# Patient Record
Sex: MALE | Race: WHITE | NOT HISPANIC OR LATINO | Employment: OTHER | URBAN - METROPOLITAN AREA
[De-identification: names, ages, dates, MRNs, and addresses within clinical notes are randomized per-mention and may not be internally consistent; named-entity substitution may affect disease eponyms.]

---

## 2017-01-04 ENCOUNTER — TELEPHONE (OUTPATIENT)
Dept: DERMATOLOGY | Facility: CLINIC | Age: 77
End: 2017-01-04

## 2017-01-09 ENCOUNTER — OFFICE VISIT (OUTPATIENT)
Dept: INTERNAL MEDICINE | Facility: CLINIC | Age: 77
End: 2017-01-09
Payer: MEDICARE

## 2017-01-09 VITALS
DIASTOLIC BLOOD PRESSURE: 82 MMHG | SYSTOLIC BLOOD PRESSURE: 130 MMHG | HEART RATE: 83 BPM | WEIGHT: 183.44 LBS | HEIGHT: 68 IN | BODY MASS INDEX: 27.8 KG/M2 | TEMPERATURE: 99 F

## 2017-01-09 DIAGNOSIS — E11.9 TYPE 2 DIABETES MELLITUS WITHOUT COMPLICATION, WITHOUT LONG-TERM CURRENT USE OF INSULIN: Primary | ICD-10-CM

## 2017-01-09 DIAGNOSIS — D61.818 PANCYTOPENIA: ICD-10-CM

## 2017-01-09 DIAGNOSIS — I10 ESSENTIAL HYPERTENSION: ICD-10-CM

## 2017-01-09 DIAGNOSIS — K75.81 LIVER CIRRHOSIS SECONDARY TO NASH (NONALCOHOLIC STEATOHEPATITIS): ICD-10-CM

## 2017-01-09 DIAGNOSIS — F31.9 BIPOLAR 1 DISORDER: ICD-10-CM

## 2017-01-09 DIAGNOSIS — J40 BRONCHITIS: ICD-10-CM

## 2017-01-09 DIAGNOSIS — K74.60 LIVER CIRRHOSIS SECONDARY TO NASH (NONALCOHOLIC STEATOHEPATITIS): ICD-10-CM

## 2017-01-09 PROCEDURE — 99214 OFFICE O/P EST MOD 30 MIN: CPT | Mod: S$GLB,,, | Performed by: INTERNAL MEDICINE

## 2017-01-09 PROCEDURE — 99999 PR PBB SHADOW E&M-EST. PATIENT-LVL III: CPT | Mod: PBBFAC,,, | Performed by: INTERNAL MEDICINE

## 2017-01-09 PROCEDURE — 3075F SYST BP GE 130 - 139MM HG: CPT | Mod: S$GLB,,, | Performed by: INTERNAL MEDICINE

## 2017-01-09 PROCEDURE — 1126F AMNT PAIN NOTED NONE PRSNT: CPT | Mod: S$GLB,,, | Performed by: INTERNAL MEDICINE

## 2017-01-09 PROCEDURE — 1159F MED LIST DOCD IN RCRD: CPT | Mod: S$GLB,,, | Performed by: INTERNAL MEDICINE

## 2017-01-09 PROCEDURE — 1157F ADVNC CARE PLAN IN RCRD: CPT | Mod: S$GLB,,, | Performed by: INTERNAL MEDICINE

## 2017-01-09 PROCEDURE — 3079F DIAST BP 80-89 MM HG: CPT | Mod: S$GLB,,, | Performed by: INTERNAL MEDICINE

## 2017-01-09 PROCEDURE — 1160F RVW MEDS BY RX/DR IN RCRD: CPT | Mod: S$GLB,,, | Performed by: INTERNAL MEDICINE

## 2017-01-09 RX ORDER — BROMPHENIRAMINE MALEATE, PSEUDOEPHEDRINE HYDROCHLORIDE, AND DEXTROMETHORPHAN HYDROBROMIDE 2; 30; 10 MG/5ML; MG/5ML; MG/5ML
SYRUP ORAL
Refills: 0 | COMMUNITY
Start: 2017-01-03 | End: 2017-01-17

## 2017-01-09 RX ORDER — DIVALPROEX SODIUM 250 MG/1
TABLET, DELAYED RELEASE ORAL
COMMUNITY
Start: 2016-11-08 | End: 2017-01-17 | Stop reason: SDUPTHER

## 2017-01-09 RX ORDER — ALBUTEROL SULFATE 90 UG/1
2 AEROSOL, METERED RESPIRATORY (INHALATION) EVERY 6 HOURS PRN
Qty: 18 G | Refills: 0 | Status: SHIPPED | OUTPATIENT
Start: 2017-01-09 | End: 2017-02-21

## 2017-01-09 RX ORDER — PROMETHAZINE HYDROCHLORIDE AND DEXTROMETHORPHAN HYDROBROMIDE 6.25; 15 MG/5ML; MG/5ML
SYRUP ORAL
Refills: 0 | COMMUNITY
Start: 2017-01-04 | End: 2017-02-21

## 2017-01-09 RX ORDER — ARIPIPRAZOLE 5 MG/1
TABLET ORAL
COMMUNITY
Start: 2016-10-06 | End: 2017-01-09

## 2017-01-09 RX ORDER — LEVOFLOXACIN 500 MG/1
500 TABLET, FILM COATED ORAL DAILY
Refills: 0 | COMMUNITY
Start: 2017-01-03 | End: 2017-01-17

## 2017-01-09 RX ORDER — CIPROFLOXACIN 500 MG/1
TABLET ORAL
COMMUNITY
Start: 2017-01-01 | End: 2017-01-17

## 2017-01-09 NOTE — MR AVS SNAPSHOT
[facility] - Internal Medicine  [address]  [place] [address]  Phone: [phone]  Fax: [phone]                  Kenneth Sheikh Jr.   2017 1:00 PM   Office Visit    Description:  Male : 1940   Provider:  Prisca Persaud MD   Department:  [facility] - Internal Medicine           Reason for Visit     Establish Care           Diagnoses this Visit        Comments    Type 2 diabetes mellitus without complication, without long-term current use of insulin    -  Primary     Bronchitis                To Do List           Your Future Surgeries/Procedures     2017   Surgery with Domenico Fox MD   [facility] ([facility])    [address] [facility] [address] [phone]              Goals (5 Years of Data)     None      Follow-Up and Disposition     Return in about 4 weeks (around 2017).       These Medications        Disp Refills Start End    SITagliptan (JANUVIA) 50 MG Tab 30 tablet 3 2017     Take 1 tablet (50 mg total) by mouth once daily. - Oral    Pharmacy: [facility]/pharmacy #[id number] [place], [place] - [address] [facility] Ph #: [phone]       albuterol 90 mcg/actuation inhaler 18 g 0 2017     Inhale 2 puffs into the lungs every 6 (six) hours as needed for Wheezing. - Inhalation    Pharmacy: [facility]/pharmacy #[id number] [place] [address] - [address] [facility] Ph #: [phone]         [facility] On Call     [facility] On Call Nurse Care Line -  Assistance  Registered nurses in the [facility] provide clinical advisement, health education, appointment booking, and other advisory services.  Call for this free service at [phone].             Medications           Message regarding Medications     Verify the changes and/or additions to your medication regime listed below are the same as discussed with your clinician today.  If any of these changes or additions are incorrect,  "please notify your healthcare provider.        START taking these NEW medications        Refills    albuterol 90 mcg/actuation inhaler 0    Sig: Inhale 2 puffs into the lungs every 6 (six) hours as needed for Wheezing.    Class: Normal    Route: Inhalation      STOP taking these medications     aripiprazole (ABILIFY) 5 MG Tab            Verify that the below list of medications is an accurate representation of the medications you are currently taking.  If none reported, the list may be blank. If incorrect, please contact your healthcare provider. Carry this list with you in case of emergency.           Current Medications     aspirin 81 MG Chew Take 81 mg by mouth once daily.    brompheniramine-pseudoeph-DM 2-30-10 mg/5 mL Syrp TAKE 1 TEASPOONFUL BY MOUTH EVERY 6 HOURS AS NEEDED FOR COUGH /CONGESTION    divalproex (DEPAKOTE) 250 MG 24 hr tablet Take 750 mg by mouth once daily.    divalproex (DEPAKOTE) 250 MG EC tablet     lactulose (CHRONULAC) 10 gram/15 mL solution Take 15 mLs (10 g total) by mouth 2 (two) times daily.    levoFLOXacin (LEVAQUIN) 500 MG tablet Take 500 mg by mouth once daily.    losartan (COZAAR) 50 MG tablet Take 1 tablet (50 mg total) by mouth once daily.    promethazine-dextromethorphan (PROMETHAZINE-DM) 6.25-15 mg/5 mL Syrp TAKE 5 ML BY MOUTH AT BEDTIME    salicylic acid-lactic acid 17 % external solution     SITagliptan (JANUVIA) 50 MG Tab Take 1 tablet (50 mg total) by mouth once daily.    albuterol 90 mcg/actuation inhaler Inhale 2 puffs into the lungs every 6 (six) hours as needed for Wheezing.    ciprofloxacin HCl (CIPRO) 500 MG tablet     omega-3 fatty acids (FISH OIL) 500 mg Cap Take 1,000 mg by mouth 2 (two) times daily.           Clinical Reference Information           Vital Signs - Last Recorded  Most recent update: 1/9/2017  1:20 PM by Genoveva Barron MA    BP Pulse Temp Ht Wt BMI    130/82 83 98.7 °F (37.1 °C) (Oral) 5' 8" (1.727 m) 83.2 kg (183 lb 6.8 oz) 27.89 kg/m2      Blood " Pressure          Most Recent Value    BP  130/82      Allergies as of 1/9/2017     Abilify [Aripiprazole]      Immunizations Administered on Date of Encounter - 1/9/2017     None      [facility] Sign-Up     Activating your [facility] account is as easy as 1-2-3!     1) Visit [email or web address], select Sign Up Now, enter this activation code and your date of birth, then select Next.  [email or web address]  Expires: 2/3/2017  3:51 PM      2) Create a username and password to use when you visit [facility] in the future and select a security question in case you lose your password and select Next.    3) Enter your e-mail address and click Sign Up!    Additional Information  If you have questions, please e-mail [email or web address] or call [phone] to talk to our [facility] staff. Remember, [facility] is NOT to be used for urgent needs. For medical emergencies, dial 911.

## 2017-01-13 ENCOUNTER — TELEPHONE (OUTPATIENT)
Dept: INTERNAL MEDICINE | Facility: CLINIC | Age: 77
End: 2017-01-13

## 2017-01-13 NOTE — TELEPHONE ENCOUNTER
Spoke to pt wife and she stated that someone from hemo. was suppose to give her a call about scheduling an appt for her . She stated that no one has called as of yet and that she will give them a call to schedule with Dr.Alejandra Contreras . Number to hematology was provided to pt.

## 2017-01-13 NOTE — TELEPHONE ENCOUNTER
Please call patient and schedule follow up appointment with Lena Reynolds MD in March (Hematology).    ----- Message from Lena Reynolds MD sent at 1/10/2017  8:34 AM CST -----  Regarding: RE: Pancytopenia  If there are any significant changes in patient's blood counts then we would definitely want to see him sooner but he shoul have a follow up appt around February or March. I had spoken to the wife maybe two months ago?? But a follow up around March would be appropriate. If everything looks stable then we can extend follow up to every 6 months or so. Thank you!    ----- Message -----     From: Prisca Persaud MD     Sent: 1/9/2017   6:47 PM       To: Lena Reynolds MD  Subject: Pancytopenia                                     I wanted to touch base on a mutual patient, Mr. Sheikh.  I met him for the first time today and will take over as his PCP.  I know he saw you in October for pancytopenia and had bone marrow biopsy which was inconclusive.  I know some labs were ordered as well.  Would you like to see him for follow up?  He was diagnosed with EBV and cirrhosis last month, both of which could explain the pancytopenia, but I just wanted to make sure y'all weren't planning to repeat bone marrow biopsy or do any further workup?    Thanks so much,  Prisca Persaud

## 2017-01-17 ENCOUNTER — LAB VISIT (OUTPATIENT)
Dept: LAB | Facility: HOSPITAL | Age: 77
End: 2017-01-17
Attending: INTERNAL MEDICINE
Payer: MEDICARE

## 2017-01-17 ENCOUNTER — TELEPHONE (OUTPATIENT)
Dept: INTERNAL MEDICINE | Facility: CLINIC | Age: 77
End: 2017-01-17

## 2017-01-17 ENCOUNTER — OFFICE VISIT (OUTPATIENT)
Dept: HEPATOLOGY | Facility: CLINIC | Age: 77
End: 2017-01-17
Payer: MEDICARE

## 2017-01-17 ENCOUNTER — TELEPHONE (OUTPATIENT)
Dept: HEPATOLOGY | Facility: CLINIC | Age: 77
End: 2017-01-17

## 2017-01-17 VITALS
SYSTOLIC BLOOD PRESSURE: 171 MMHG | RESPIRATION RATE: 16 BRPM | HEART RATE: 76 BPM | DIASTOLIC BLOOD PRESSURE: 82 MMHG | BODY MASS INDEX: 28.34 KG/M2 | WEIGHT: 187 LBS | HEIGHT: 68 IN | OXYGEN SATURATION: 97 % | TEMPERATURE: 96 F

## 2017-01-17 DIAGNOSIS — K74.60 LIVER CIRRHOSIS SECONDARY TO NASH (NONALCOHOLIC STEATOHEPATITIS): ICD-10-CM

## 2017-01-17 DIAGNOSIS — R18.8 CIRRHOSIS OF LIVER WITH ASCITES, UNSPECIFIED HEPATIC CIRRHOSIS TYPE: Primary | ICD-10-CM

## 2017-01-17 DIAGNOSIS — R18.8 ASCITES OF LIVER: ICD-10-CM

## 2017-01-17 DIAGNOSIS — K75.81 LIVER CIRRHOSIS SECONDARY TO NASH (NONALCOHOLIC STEATOHEPATITIS): ICD-10-CM

## 2017-01-17 DIAGNOSIS — K74.60 CIRRHOSIS OF LIVER WITH ASCITES, UNSPECIFIED HEPATIC CIRRHOSIS TYPE: ICD-10-CM

## 2017-01-17 DIAGNOSIS — K74.60 CIRRHOSIS OF LIVER WITH ASCITES, UNSPECIFIED HEPATIC CIRRHOSIS TYPE: Primary | ICD-10-CM

## 2017-01-17 DIAGNOSIS — E11.9 TYPE 2 DIABETES MELLITUS WITHOUT COMPLICATION, WITHOUT LONG-TERM CURRENT USE OF INSULIN: ICD-10-CM

## 2017-01-17 DIAGNOSIS — R18.8 CIRRHOSIS OF LIVER WITH ASCITES, UNSPECIFIED HEPATIC CIRRHOSIS TYPE: ICD-10-CM

## 2017-01-17 LAB
ALBUMIN SERPL BCP-MCNC: 2.6 G/DL
ALP SERPL-CCNC: 77 U/L
ALT SERPL W/O P-5'-P-CCNC: 7 U/L
ANION GAP SERPL CALC-SCNC: 7 MMOL/L
AST SERPL-CCNC: 23 U/L
BILIRUB SERPL-MCNC: 0.8 MG/DL
BUN SERPL-MCNC: 19 MG/DL
CALCIUM SERPL-MCNC: 8.9 MG/DL
CHLORIDE SERPL-SCNC: 108 MMOL/L
CO2 SERPL-SCNC: 24 MMOL/L
CREAT SERPL-MCNC: 0.9 MG/DL
EST. GFR  (AFRICAN AMERICAN): >60 ML/MIN/1.73 M^2
EST. GFR  (NON AFRICAN AMERICAN): >60 ML/MIN/1.73 M^2
GLUCOSE SERPL-MCNC: 140 MG/DL
POTASSIUM SERPL-SCNC: 4.8 MMOL/L
PROT SERPL-MCNC: 6.9 G/DL
SODIUM SERPL-SCNC: 139 MMOL/L

## 2017-01-17 PROCEDURE — 1159F MED LIST DOCD IN RCRD: CPT | Mod: S$GLB,,, | Performed by: INTERNAL MEDICINE

## 2017-01-17 PROCEDURE — 1160F RVW MEDS BY RX/DR IN RCRD: CPT | Mod: S$GLB,,, | Performed by: INTERNAL MEDICINE

## 2017-01-17 PROCEDURE — 3079F DIAST BP 80-89 MM HG: CPT | Mod: S$GLB,,, | Performed by: INTERNAL MEDICINE

## 2017-01-17 PROCEDURE — 80053 COMPREHEN METABOLIC PANEL: CPT

## 2017-01-17 PROCEDURE — 3077F SYST BP >= 140 MM HG: CPT | Mod: S$GLB,,, | Performed by: INTERNAL MEDICINE

## 2017-01-17 PROCEDURE — 99214 OFFICE O/P EST MOD 30 MIN: CPT | Mod: S$GLB,,, | Performed by: INTERNAL MEDICINE

## 2017-01-17 PROCEDURE — 1126F AMNT PAIN NOTED NONE PRSNT: CPT | Mod: S$GLB,,, | Performed by: INTERNAL MEDICINE

## 2017-01-17 PROCEDURE — 36415 COLL VENOUS BLD VENIPUNCTURE: CPT | Mod: PO

## 2017-01-17 PROCEDURE — 1157F ADVNC CARE PLAN IN RCRD: CPT | Mod: S$GLB,,, | Performed by: INTERNAL MEDICINE

## 2017-01-17 PROCEDURE — 99999 PR PBB SHADOW E&M-EST. PATIENT-LVL III: CPT | Mod: PBBFAC,,, | Performed by: INTERNAL MEDICINE

## 2017-01-17 RX ORDER — FUROSEMIDE 40 MG/1
40 TABLET ORAL DAILY
Qty: 30 TABLET | Refills: 11 | Status: SHIPPED | OUTPATIENT
Start: 2017-01-17 | End: 2017-02-21 | Stop reason: SDUPTHER

## 2017-01-17 NOTE — TELEPHONE ENCOUNTER
----- Message from Lissette Tolliver sent at 1/17/2017 10:32 AM CST -----  Contact: Ginna, pts wife  Ginna is calling to request that you put a referral in to see someone in hematology.  She is unsure of the reason why she was told to make an appt in hematology for the pt and we will need the reason for visit in order to schedule the appt.    Ginna can be reached at [phone]

## 2017-01-17 NOTE — TELEPHONE ENCOUNTER
----- Message from Lissette Tolliver sent at 1/17/2017 10:21 AM CST -----  Contact: Ginna, pts wife  Ginna is calling to schedule an appt for pt to be seen today.  She stated that pt is experiencing swelling in his stomach and ankles. She also stated that he is having problems with his medication.      Ginna also stated that she has left several messages and has not had a return phone call to discuss pts problems and that is why she is trying to schedule an appt to be seen today.  She stated that pt cannot wait any longer.    Ginna can be reached at [phone]    Ginna also stated that they are debating whether or not she should change physicians and go to another facility.  She also stated that this is a very scary situation for her and this all all new to her.  Please call her back to give her some clarity on things and schedule her appt to be seen asap.

## 2017-01-17 NOTE — PROGRESS NOTES
"Subjective:       Patient ID: Kenneth Sheikh Jr. is a 76 y.o. male.    Chief Complaint: Cirrhosis    HPI  I saw this 76 y.o. man I had met in hospital recently when he was admitted with lethargy and some confusion.  His wife wanted another meeting for clarification of some of their questions.    This was attributed to EBV infection but I suspect that it may have been related to hepatic encephalopathy.    Admitted because of increasing lethargy +++/confusion/dizziness/dysarthria  - Nov 2016  - imaging showed ascites  - abdo swelling improving    - IR failed to drain fluid    - imaging earlier this year showed some liver abnormalities with hepatic steatosis    Risk factors for MCDONALD and some ascites on imaging.  Unable to get ascitic fluid for tests.        The patient denies any history of liver disease. No significant alcohol use ("a couple beers when I was younger"). No family history of liver disease. No new medications.       Patient has risk factors for MCDONALD, which can progress to cirrhosis, which could explain ascites, fatigue, and pancytopenia.     ?MCDONALD  No significant hx of alcohol    Investigations:  Platelets 150-160  Normal LFTs, bilirubin  HBV/HCV neg    CT abdo: 11/26/2016  Normal liver but ascites present    Abdo US: 11/25/2016  Hepatic steatosis.  Splenomegaly.  Moderate volume ascites.      PMH:  DM  Sinus Ca- 1996  Rectal Ca- June 2016    SH:  Retired  Ex [employer] worker      FH:  Aunt had cirrhosis- non- alcohol      Review of Systems   Constitutional: Negative for activity change, appetite change, chills, fatigue, fever and unexpected weight change.   HENT: Negative for hearing loss.    Eyes: Negative for discharge and visual disturbance.   Respiratory: Negative for cough, chest tightness, shortness of breath and wheezing.    Cardiovascular: Negative for chest pain, palpitations and leg swelling.   Gastrointestinal: Negative for abdominal distention, abdominal pain, constipation, diarrhea and " nausea.   Genitourinary: Negative for dysuria and frequency.   Musculoskeletal: Negative for arthralgias and back pain.   Skin: Negative for pallor and rash.   Neurological: Negative for dizziness, tremors, speech difficulty and headaches.   Hematological: Negative for adenopathy.   Psychiatric/Behavioral: Negative for agitation and confusion.           Lab Results   Component Value Date    ALT 11 12/08/2016    AST 32 12/08/2016    ALKPHOS 66 12/08/2016    BILITOT 0.9 12/08/2016     Past Medical History   Diagnosis Date    Anticoagulant long-term use     Bipolar 1 disorder     Bipolar 1 disorder 11/24/2016     bipolar    Cancer      history of skin cancer/sinus cancer & rectal cancer    Depressed bipolar affective disorder     Diabetes mellitus      type 2    EBV infection 12/7/2016     EBV DNA, PCR Latest Ref Range: None detected  None detected EBV DNA-Copies/mL Unknown Test Not Performed EBV Early Antigen Ab, IgG Latest Ref Range: <1:10 Titer 1:40 (A) EBV Nuclear Ag Ab Latest Ref Range: <1:5 Titer >=1:80 (A) EBV VCA IgG Latest Ref Range: <1:10 Titer 1:160 (A) EBV VCA IgM Latest Ref Range: <1:10 Titer <1:10     History of TIA (transient ischemic attack)      several-taking Plavix    Hx of gallstones     Hypertension     Hyperthyroidism 11/30/2016    Low HDL (under 40) 12/7/2016    Mixed hyperlipidemia 11/23/2016    JUAN MANUEL (obstructive sleep apnea)     Stroke     Transient cerebral ischemia 7/22/2016     Past Surgical History   Procedure Laterality Date    Tonsillectomy      Sinus sx for cancer      Sinus surgery for sinus cancer      Skin cancer removed-several times-nose & ear      Moh's procedure x4      Uvulopalatopharyngoplasty       for sleep apnea    Undescened testicle sx       Current Outpatient Prescriptions   Medication Sig    aspirin 81 MG Chew Take 81 mg by mouth once daily.    divalproex (DEPAKOTE) 250 MG 24 hr tablet Take 750 mg by mouth once daily.    lactulose (CHRONULAC) 10  gram/15 mL solution Take 15 mLs (10 g total) by mouth 2 (two) times daily.    losartan (COZAAR) 50 MG tablet Take 1 tablet (50 mg total) by mouth once daily.    promethazine-dextromethorphan (PROMETHAZINE-DM) 6.25-15 mg/5 mL Syrp TAKE 5 ML BY MOUTH AT BEDTIME    SITagliptan (JANUVIA) 50 MG Tab Take 1 tablet (50 mg total) by mouth once daily.    albuterol 90 mcg/actuation inhaler Inhale 2 puffs into the lungs every 6 (six) hours as needed for Wheezing.    furosemide (LASIX) 40 MG tablet Take 1 tablet (40 mg total) by mouth once daily.    omega-3 fatty acids (FISH OIL) 500 mg Cap Take 1,000 mg by mouth 2 (two) times daily.     No current facility-administered medications for this visit.        Objective:      Physical Exam   Constitutional: He is oriented to person, place, and time. He appears well-nourished.   HENT:   Head: Normocephalic.   Eyes: Pupils are equal, round, and reactive to light.   Neck: No thyromegaly present.   Cardiovascular: Normal rate, regular rhythm and normal heart sounds.    Pulmonary/Chest: Effort normal and breath sounds normal. He has no wheezes.   Abdominal: Soft. He exhibits no distension and no mass. There is no tenderness.   Lymphadenopathy:     He has no cervical adenopathy.   Neurological: He is alert and oriented to person, place, and time.   Skin: Skin is warm. No rash noted. No erythema.   Psychiatric: He has a normal mood and affect. His behavior is normal.       Assessment:       1. Cirrhosis of liver with ascites, unspecified hepatic cirrhosis type    2. Ascites of liver    3. 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 Type 2 diabetes mellitus without complication, without long-term current use of insulin        Plan:   - discussed the dx of cirrhosis and the implications of this  - started furosemide 40mg daily  - advised on how to use lactulose in order to minimize diarrheal symptoms  - electrolytes next week  - discussed his ongoing treatment  with depakote which is contraindicated in liver disease- he has been on this for a number of years and I think the benefits of this outweigh the risks so I suggested continuing treatment but I will discuss this with his psychiatrist.    Clinic in 4 weeks.     NB [employer]    DR Marty Figueroa  [phone]    Discussed with Dr Figueroa on the telephone on 1/26/17 at 10am. He agrees that we should continue with his current psychiatric medications since it was difficult to achieve a good result with alternative mood stabilizers. He feels that the chances of achieving a good result with other drugs are around 15%.

## 2017-01-17 NOTE — TELEPHONE ENCOUNTER
Spoke with patient's wife. I apologize for no one getting back in touch with her. Pt voice all concerns. Regarding medication, retaining fluid, diagnosis, and not getting a call back, and going to pt relation. I asked pt if offered an appointment today would she be able to come. Mrs. Sheikh stated yes.     Spoke with Dr. SUMMERS, he will see patient today @ 230.     MA called pt's wife today offered appt today at 230 she accepted.

## 2017-01-17 NOTE — MR AVS SNAPSHOT
[facility] - Hepatology  [address]  [place] [address]  Phone: [phone]  Fax: [phone]                  Kenneth Sheikh Jr.   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)         Type 2 diabetes mellitus without complication, without long-term current use of insulin                To Do List           Future Appointments        Provider Department Dept Phone    2017 3:40 PM Prisca Persaud MD [facility] - Internal Medicine [phone]    2017 8:00 AM LAB, HEMONC CANCER BLDG [facility] [phone]    2017 9:30 AM Bud Bob MD [facility] [phone]      Your Future Surgeries/Procedures     2017   Surgery with Domenico Fox MD   [facility] ([facility])    [address] [facility] [address] [phone]              Goals (5 Years of Data)     None       These Medications        Disp Refills Start End    furosemide (LASIX) 40 MG tablet 30 tablet 11 2017    Take 1 tablet (40 mg total) by mouth once daily. - Oral    Pharmacy: [facility]/pharmacy #[id number] - [place], [place] - [address] [facility] #: [phone]         [facility] On Call     [facility] On [facility] Nurse Care Line -  Assistance  Registered nurses in the [facility] provide clinical advisement, health education, appointment booking, and other advisory services.  Call for this free service at [phone].             Medications           Message regarding Medications     Verify the changes and/or additions to your  medication regime listed below are the same as discussed with your clinician today.  If any of these changes or additions are incorrect, please notify your healthcare provider.        START taking these NEW medications        Refills    furosemide (LASIX) 40 MG tablet 11    Sig: Take 1 tablet (40 mg total) by mouth once daily.    Class: Normal    Route: Oral      STOP taking these medications     ciprofloxacin HCl (CIPRO) 500 MG tablet     brompheniramine-pseudoeph-DM 2-30-10 mg/5 mL Syrp TAKE 1 TEASPOONFUL BY MOUTH EVERY 6 HOURS AS NEEDED FOR COUGH /CONGESTION    divalproex (DEPAKOTE) 250 MG EC tablet     levoFLOXacin (LEVAQUIN) 500 MG tablet Take 500 mg by mouth once daily.    salicylic acid-lactic acid 17 % external solution            Verify that the below list of medications is an accurate representation of the medications you are currently taking.  If none reported, the list may be blank. If incorrect, please contact your healthcare provider. Carry this list with you in case of emergency.           Current Medications     aspirin 81 MG Chew Take 81 mg by mouth once daily.    divalproex (DEPAKOTE) 250 MG 24 hr tablet Take 750 mg by mouth once daily.    lactulose (CHRONULAC) 10 gram/15 mL solution Take 15 mLs (10 g total) by mouth 2 (two) times daily.    losartan (COZAAR) 50 MG tablet Take 1 tablet (50 mg total) by mouth once daily.    promethazine-dextromethorphan (PROMETHAZINE-DM) 6.25-15 mg/5 mL Syrp TAKE 5 ML BY MOUTH AT BEDTIME    SITagliptan (JANUVIA) 50 MG Tab Take 1 tablet (50 mg total) by mouth once daily.    albuterol 90 mcg/actuation inhaler Inhale 2 puffs into the lungs every 6 (six) hours as needed for Wheezing.    furosemide (LASIX) 40 MG tablet Take 1 tablet (40 mg total) by mouth once daily.    omega-3 fatty acids (FISH OIL) 500 mg Cap Take 1,000 mg by mouth 2 (two) times daily.           Clinical Reference Information           Vital Signs - Last Recorded  Most recent update: 1/17/2017  2:36 PM  "by Rosita Underwood MA    BP Pulse Temp Resp Ht Wt    (!) 171/82 (BP Location: Right arm, Patient Position: Sitting, BP Method: Automatic) 76 96.3 °F (35.7 °C) (Oral) 16 5' 8" (1.727 m) 84.8 kg (187 lb)    SpO2 BMI             97% 28.43 kg/m2         Blood Pressure          Most Recent Value    BP  (!)  171/82      Allergies as of 1/17/2017     Abilify [Aripiprazole]      Immunizations Administered on Date of Encounter - 1/17/2017     None      [facility] Sign-Up     Activating your [facility] account is as easy as 1-2-3!     1) Visit [email or web address], select Sign Up Now, enter this activation code and your date of birth, then select Next.  [email or web address]  Expires: 2/3/2017  3:51 PM      2) Create a username and password to use when you visit [facility] in the future and select a security question in case you lose your password and select Next.    3) Enter your e-mail address and click Sign Up!    Additional Information  If you have questions, please e-mail [email or web address] or call [phone] to talk to our [facility] staff. Remember, [facility] is NOT to be used for urgent needs. For medical emergencies, dial 911.         "

## 2017-01-18 NOTE — TELEPHONE ENCOUNTER
Spoke to patient . He said that he already have an appt with  on 02/21 and there is no need to scheduled another appt with Dr.Alejandre Reynolds. Please advise.

## 2017-01-24 ENCOUNTER — LAB VISIT (OUTPATIENT)
Dept: LAB | Facility: HOSPITAL | Age: 77
End: 2017-01-24
Attending: INTERNAL MEDICINE
Payer: MEDICARE

## 2017-01-24 DIAGNOSIS — R18.8 ASCITES OF LIVER: ICD-10-CM

## 2017-01-24 DIAGNOSIS — R18.8 CIRRHOSIS OF LIVER WITH ASCITES, UNSPECIFIED HEPATIC CIRRHOSIS TYPE: ICD-10-CM

## 2017-01-24 DIAGNOSIS — K74.60 CIRRHOSIS OF LIVER WITH ASCITES, UNSPECIFIED HEPATIC CIRRHOSIS TYPE: ICD-10-CM

## 2017-01-24 LAB
ALBUMIN SERPL BCP-MCNC: 2.6 G/DL
ALP SERPL-CCNC: 76 U/L
ALT SERPL W/O P-5'-P-CCNC: 6 U/L
ANION GAP SERPL CALC-SCNC: 7 MMOL/L
AST SERPL-CCNC: 23 U/L
BILIRUB SERPL-MCNC: 1.1 MG/DL
BUN SERPL-MCNC: 21 MG/DL
CALCIUM SERPL-MCNC: 8.8 MG/DL
CHLORIDE SERPL-SCNC: 105 MMOL/L
CO2 SERPL-SCNC: 29 MMOL/L
CREAT SERPL-MCNC: 1 MG/DL
EST. GFR  (AFRICAN AMERICAN): >60 ML/MIN/1.73 M^2
EST. GFR  (NON AFRICAN AMERICAN): >60 ML/MIN/1.73 M^2
GLUCOSE SERPL-MCNC: 155 MG/DL
POTASSIUM SERPL-SCNC: 4 MMOL/L
PROT SERPL-MCNC: 6.9 G/DL
SODIUM SERPL-SCNC: 141 MMOL/L

## 2017-01-24 PROCEDURE — 36415 COLL VENOUS BLD VENIPUNCTURE: CPT | Mod: PO

## 2017-01-24 PROCEDURE — 80053 COMPREHEN METABOLIC PANEL: CPT

## 2017-01-25 ENCOUNTER — TELEPHONE (OUTPATIENT)
Dept: SURGERY | Facility: CLINIC | Age: 77
End: 2017-01-25

## 2017-01-25 NOTE — TELEPHONE ENCOUNTER
Returned call. No answer. Left message he does need a 3 month follow up appointment with a flexible sigmoidoscopy in March 2017. Appointment scheduled and appointment letter mailed.

## 2017-01-25 NOTE — TELEPHONE ENCOUNTER
----- Message from Sonia Knight sent at 1/24/2017 10:31 AM CST -----  Contact: pt#[phone]  Pt wants to know if he still needs to come see Dr Talavera every 3 months. Please call

## 2017-01-31 DIAGNOSIS — R18.8 CIRRHOSIS OF LIVER WITH ASCITES, UNSPECIFIED HEPATIC CIRRHOSIS TYPE: Primary | ICD-10-CM

## 2017-01-31 DIAGNOSIS — K74.60 CIRRHOSIS OF LIVER WITH ASCITES, UNSPECIFIED HEPATIC CIRRHOSIS TYPE: Primary | ICD-10-CM

## 2017-01-31 DIAGNOSIS — K75.81 LIVER CIRRHOSIS SECONDARY TO NASH (NONALCOHOLIC STEATOHEPATITIS): ICD-10-CM

## 2017-01-31 DIAGNOSIS — K74.60 LIVER CIRRHOSIS SECONDARY TO NASH (NONALCOHOLIC STEATOHEPATITIS): ICD-10-CM

## 2017-02-01 ENCOUNTER — TELEPHONE (OUTPATIENT)
Dept: HEPATOLOGY | Facility: CLINIC | Age: 77
End: 2017-02-01

## 2017-02-01 NOTE — TELEPHONE ENCOUNTER
----- Message from Renato Womack MD sent at 2/1/2017  4:03 PM CST -----  Please let him know that him blood work is stable

## 2017-02-03 ENCOUNTER — ANESTHESIA EVENT (OUTPATIENT)
Dept: ENDOSCOPY | Facility: HOSPITAL | Age: 77
End: 2017-02-03
Payer: MEDICARE

## 2017-02-03 ENCOUNTER — TELEPHONE (OUTPATIENT)
Dept: HEPATOLOGY | Facility: CLINIC | Age: 77
End: 2017-02-03

## 2017-02-03 ENCOUNTER — SURGERY (OUTPATIENT)
Age: 77
End: 2017-02-03

## 2017-02-03 ENCOUNTER — ANESTHESIA (OUTPATIENT)
Dept: ENDOSCOPY | Facility: HOSPITAL | Age: 77
End: 2017-02-03
Payer: MEDICARE

## 2017-02-03 VITALS — RESPIRATION RATE: 20 BRPM

## 2017-02-03 PROBLEM — K74.60 CIRRHOSIS: Status: ACTIVE | Noted: 2017-02-03

## 2017-02-03 PROCEDURE — D9220A PRA ANESTHESIA: Mod: ,,, | Performed by: ANESTHESIOLOGY

## 2017-02-03 PROCEDURE — 63600175 PHARM REV CODE 636 W HCPCS: Performed by: NURSE ANESTHETIST, CERTIFIED REGISTERED

## 2017-02-03 PROCEDURE — 25000003 PHARM REV CODE 250: Performed by: NURSE ANESTHETIST, CERTIFIED REGISTERED

## 2017-02-03 RX ORDER — LIDOCAINE HCL/PF 100 MG/5ML
SYRINGE (ML) INTRAVENOUS
Status: DISCONTINUED | OUTPATIENT
Start: 2017-02-03 | End: 2017-02-03

## 2017-02-03 RX ORDER — PROPOFOL 10 MG/ML
VIAL (ML) INTRAVENOUS CONTINUOUS PRN
Status: DISCONTINUED | OUTPATIENT
Start: 2017-02-03 | End: 2017-02-03

## 2017-02-03 RX ORDER — PROPOFOL 10 MG/ML
VIAL (ML) INTRAVENOUS
Status: DISCONTINUED | OUTPATIENT
Start: 2017-02-03 | End: 2017-02-03

## 2017-02-03 RX ADMIN — EPHEDRINE SULFATE 5 MG: 50 INJECTION, SOLUTION INTRAMUSCULAR; INTRAVENOUS; SUBCUTANEOUS at 11:02

## 2017-02-03 RX ADMIN — PROPOFOL 50 MG: 10 INJECTION, EMULSION INTRAVENOUS at 11:02

## 2017-02-03 RX ADMIN — LIDOCAINE HYDROCHLORIDE 50 MG: 20 INJECTION, SOLUTION INTRAVENOUS at 11:02

## 2017-02-03 RX ADMIN — PROPOFOL 100 MCG/KG/MIN: 10 INJECTION, EMULSION INTRAVENOUS at 11:02

## 2017-02-03 RX ADMIN — PROPOFOL 20 MG: 10 INJECTION, EMULSION INTRAVENOUS at 11:02

## 2017-02-03 NOTE — TELEPHONE ENCOUNTER
Returned patient call, spoke with patient's wife Ginna.  Patient's wife would like to speak with you regarding patient's medication. States after patient had his EGD done today 2/3/17. Dr Fox was suggesting that patient be placed on a Beta Blocker. Informed wife that will will inform Dr Womack. Also that  the Provider will review Dr Fox findings, and we will give her a call back.

## 2017-02-03 NOTE — ANESTHESIA POSTPROCEDURE EVALUATION
"Anesthesia Post Evaluation    Patient: Kenneth Sheikh Jr.    Procedure(s) Performed: Procedure(s) (LRB):  ESOPHAGOGASTRODUODENOSCOPY (EGD) (N/A)    Final Anesthesia Type: general  Patient location during evaluation: GI PACU  Patient participation: Yes- Able to Participate  Level of consciousness: awake and alert and oriented  Post-procedure vital signs: reviewed and stable  Pain management: adequate  Airway patency: patent  PONV status at discharge: No PONV  Anesthetic complications: no      Cardiovascular status: hemodynamically stable  Respiratory status: unassisted, spontaneous ventilation and room air  Hydration status: euvolemic  Follow-up not needed.        Visit Vitals    /60 (BP Location: Left arm, Patient Position: Lying, BP Method: Automatic)    Pulse 60    Temp 36.4 °C (97.5 °F) (Axillary)    Resp 18    Ht 5' 8" (1.727 m)    Wt 79.4 kg (175 lb)    SpO2 100%    BMI 26.61 kg/m2       Pain/Sean Score: Pain Assessment Performed: Yes (2/3/2017 11:40 AM)  Presence of Pain: denies (2/3/2017 11:50 AM)  Sean Score: 10 (2/3/2017 11:50 AM)      "

## 2017-02-03 NOTE — ANESTHESIA RELEASE NOTE
"Anesthesia Release from PACU Note    Patient: Kenneth Sheikh Jr.    Procedure(s) Performed: Procedure(s) (LRB):  ESOPHAGOGASTRODUODENOSCOPY (EGD) (N/A)    Anesthesia type: general    Post pain: Adequate analgesia    Post assessment: no apparent anesthetic complications, tolerated procedure well and no evidence of recall    Last Vitals:   Visit Vitals    /60 (BP Location: Left arm, Patient Position: Lying, BP Method: Automatic)    Pulse 60    Temp 36.4 °C (97.5 °F) (Axillary)    Resp 18    Ht 5' 8" (1.727 m)    Wt 79.4 kg (175 lb)    SpO2 100%    BMI 26.61 kg/m2       Post vital signs: stable    Level of consciousness: awake, alert  and oriented    Nausea/Vomiting: no nausea/no vomiting    Complications: none    Airway Patency: patent    Respiratory: unassisted, spontaneous ventilation, room air    Cardiovascular: stable and blood pressure at baseline    Hydration: euvolemic  "

## 2017-02-03 NOTE — ANESTHESIA PREPROCEDURE EVALUATION
02/03/2017  Kenneth Sheikh Jr. is a 77 y.o., male.    [facility] Anesthesia Evaluation    I have reviewed the Patient Summary Reports.    I have reviewed the Nursing Notes.   I have reviewed the Medications.     Review of Systems  Anesthesia Hx:  No problems with previous Anesthesia  History of prior surgery of interest to airway management or planning: Previous anesthesia: General Denies Family Hx of Anesthesia complications.   Denies Personal Hx of Anesthesia complications.   Social:  Non-Smoker    Hematology/Oncology:  Hematology Normal       -- Cancer in past history:  surgery  Oncology Comments: Skin     EENT/Dental:   Top upper right incisor cap   Cardiovascular:   Exercise tolerance: poor Hypertension hyperlipidemia    Pulmonary:   Shortness of breath Sleep Apnea JUAN MANUEL resolved with weight loss   Renal/:  Renal/ Normal     Hepatic/GI:   Liver Disease, Cirrhosis   Musculoskeletal:  Musculoskeletal Normal    Neurological:   TIA, CVA    Endocrine:   Diabetes, type 2 Hyperthyroidism    Dermatological:  Skin Normal    Psych:   Psychiatric History depression Bipolar I         Physical Exam  General:  Well nourished    Airway/Jaw/Neck:  Airway Findings: Mouth Opening: Small, but > 3cm Tongue: Normal  General Airway Assessment: Adult, Average  Mallampati: III  Improves to II with phonation.  TM Distance: 4 - 6 cm        Eyes/Ears/Nose:  EYES/EARS/NOSE FINDINGS: Normal   Dental:  Dental Findings: upper front caps   Chest/Lungs:  Chest/Lungs Findings: Clear to auscultation, Normal Respiratory Rate     Heart/Vascular:  Heart Findings: Rate: Normal  Rhythm: Regular Rhythm  Sounds: Normal  Heart murmur: negative Vascular Findings: Normal    Abdomen:  Abdomen Findings: Normal    Musculoskeletal:  Musculoskeletal Findings: Normal   Skin:  Skin Findings: Normal    Mental Status:  Mental Status Findings:  Cooperative, Alert  and Oriented         Anesthesia Plan  Type of Anesthesia, risks & benefits discussed:  Anesthesia Type:  general  Patient's Preference:   Intra-op Monitoring Plan:   Intra-op Monitoring Plan Comments:   Post Op Pain Control Plan:   Post Op Pain Control Plan Comments:   Induction:   IV  Beta Blocker:  Patient is not currently on a Beta-Blocker (No further documentation required).       Informed Consent: Patient understands risks and agrees with Anesthesia plan.  Questions answered. Anesthesia consent signed with patient.  ASA Score: 3     Day of Surgery Review of History & Physical:  There are no significant changes.          Ready For Surgery From Anesthesia Perspective.

## 2017-02-03 NOTE — TELEPHONE ENCOUNTER
Called Mrs Sheikh to discuss the following issues:    1. Recent EGD showed gastric varix- I explained that he has a risk of bleeding but he is currently on losartan which can be used for portal hypertension.  I will discuss with Dr Perla the possibility of sclerosant or coil injection as well.    2. Discuss the instructions of his psychiatrist to reduce valproic acid dosing- they have already reduced to 2 instead of 3 tabs daily.  I agreed with this approach.    I will see him in about 2 weeks time and he will get another CMP next week.

## 2017-02-03 NOTE — ANESTHESIA POSTPROCEDURE EVALUATION
"Anesthesia Post Evaluation    Patient: Kenneth Sheikh Jr.    Procedure(s) Performed: Procedure(s) (LRB):  ESOPHAGOGASTRODUODENOSCOPY (EGD) (N/A)    Final Anesthesia Type: general  Patient location during evaluation: PACU  Patient participation: Yes- Able to Participate  Level of consciousness: awake and alert  Post-procedure vital signs: reviewed and stable  Pain management: adequate  Airway patency: patent  PONV status at discharge: No PONV  Anesthetic complications: no      Cardiovascular status: hemodynamically stable  Respiratory status: unassisted  Hydration status: euvolemic  Follow-up not needed.        Visit Vitals    /60 (BP Location: Left arm, Patient Position: Lying, BP Method: Automatic)    Pulse 60    Temp 36.4 °C (97.5 °F) (Axillary)    Resp 18    Ht 5' 8" (1.727 m)    Wt 79.4 kg (175 lb)    SpO2 100%    BMI 26.61 kg/m2       Pain/Sean Score: Pain Assessment Performed: Yes (2/3/2017 11:40 AM)  Presence of Pain: denies (2/3/2017 11:50 AM)  Sean Score: 10 (2/3/2017 11:50 AM)      "

## 2017-02-03 NOTE — TELEPHONE ENCOUNTER
----- Message from Ligia Syed sent at 2/3/2017  1:42 PM CST -----  Contact: patient   Calling to speak with someone about a medication for her  . Please call  (home) or call  (cell)

## 2017-02-03 NOTE — TRANSFER OF CARE
"Anesthesia Transfer of Care Note    Patient: Kenneth Sheikh Jr.    Procedure(s) Performed: Procedure(s) (LRB):  ESOPHAGOGASTRODUODENOSCOPY (EGD) (N/A)    Patient location: PACU    Anesthesia Type: general    Transport from OR: Transported from OR on 2-3 L/min O2 by NC with adequate spontaneous ventilation    Post pain: adequate analgesia    Post assessment: no apparent anesthetic complications    Post vital signs: stable    Level of consciousness: sedated    Nausea/Vomiting: no nausea/vomiting    Complications: none    Comments: Remained at bedside in pacu to adequately treat his bp.      Last vitals:   Visit Vitals    BP (!) 111/54    Pulse (!) 54    Temp 36.4 °C (97.5 °F) (Axillary)    Resp 18    Ht 5' 8" (1.727 m)    Wt 79.4 kg (175 lb)    SpO2 100%    BMI 26.61 kg/m2     "

## 2017-02-06 ENCOUNTER — OFFICE VISIT (OUTPATIENT)
Dept: INTERNAL MEDICINE | Facility: CLINIC | Age: 77
End: 2017-02-06
Payer: MEDICARE

## 2017-02-06 ENCOUNTER — TELEPHONE (OUTPATIENT)
Dept: GASTROENTEROLOGY | Facility: CLINIC | Age: 77
End: 2017-02-06

## 2017-02-06 VITALS
HEART RATE: 74 BPM | SYSTOLIC BLOOD PRESSURE: 161 MMHG | HEIGHT: 68 IN | BODY MASS INDEX: 25.42 KG/M2 | OXYGEN SATURATION: 99 % | DIASTOLIC BLOOD PRESSURE: 65 MMHG | WEIGHT: 167.75 LBS | TEMPERATURE: 98 F

## 2017-02-06 DIAGNOSIS — D61.818 PANCYTOPENIA: ICD-10-CM

## 2017-02-06 DIAGNOSIS — F31.9 BIPOLAR 1 DISORDER: ICD-10-CM

## 2017-02-06 DIAGNOSIS — I10 ESSENTIAL HYPERTENSION: ICD-10-CM

## 2017-02-06 DIAGNOSIS — K74.60 LIVER CIRRHOSIS SECONDARY TO NASH (NONALCOHOLIC STEATOHEPATITIS): Primary | ICD-10-CM

## 2017-02-06 DIAGNOSIS — K75.81 LIVER CIRRHOSIS SECONDARY TO NASH (NONALCOHOLIC STEATOHEPATITIS): Primary | ICD-10-CM

## 2017-02-06 DIAGNOSIS — E11.9 TYPE 2 DIABETES MELLITUS WITHOUT COMPLICATION, WITHOUT LONG-TERM CURRENT USE OF INSULIN: ICD-10-CM

## 2017-02-06 PROCEDURE — 1159F MED LIST DOCD IN RCRD: CPT | Mod: S$GLB,,, | Performed by: INTERNAL MEDICINE

## 2017-02-06 PROCEDURE — 1157F ADVNC CARE PLAN IN RCRD: CPT | Mod: S$GLB,,, | Performed by: INTERNAL MEDICINE

## 2017-02-06 PROCEDURE — 1126F AMNT PAIN NOTED NONE PRSNT: CPT | Mod: S$GLB,,, | Performed by: INTERNAL MEDICINE

## 2017-02-06 PROCEDURE — 99999 PR PBB SHADOW E&M-EST. PATIENT-LVL III: CPT | Mod: PBBFAC,,, | Performed by: INTERNAL MEDICINE

## 2017-02-06 PROCEDURE — 3078F DIAST BP <80 MM HG: CPT | Mod: S$GLB,,, | Performed by: INTERNAL MEDICINE

## 2017-02-06 PROCEDURE — 3077F SYST BP >= 140 MM HG: CPT | Mod: S$GLB,,, | Performed by: INTERNAL MEDICINE

## 2017-02-06 PROCEDURE — 99214 OFFICE O/P EST MOD 30 MIN: CPT | Mod: S$GLB,,, | Performed by: INTERNAL MEDICINE

## 2017-02-06 NOTE — TELEPHONE ENCOUNTER
----- Message from Aneudy Perla MD sent at 2/3/2017  5:35 PM CST -----  Renato- Reviewed images. Certainly looks amenable to EUS-glue/coil therapy. I'll arrange to bring him and his wife to my clinic to discuss pros/cons of therapy.    Arleth- please arrange for a clinic visit with me. Thanks.      ----- Message -----     From: Renato Womack MD     Sent: 2/3/2017   3:38 PM       To: MD Aneudy Rose    This marlon had a large Gastric varix discovered on endoscopy. He's never bled but his wife is extremely anxious about his risk of bleeding.    Do you think you can do something endoscopically?    Thanks  Renato

## 2017-02-06 NOTE — PROGRESS NOTES
Internal Medicine    Subjective:      Patient ID: Kenneth Sheikh Jr. is a 77 y.o. male.    Chief Complaint: Follow-up    HPI Comments: Presents to establish care.  Admitted to the hospital for worsening fatigue, ataxia, and falls in November.  [facility] by Dr. Sheikh on 12/7/16.      Cirrhosis 2/2 MCDONALD:  Diagnosed by Hepatology on 12/20 after fibroscan.  Followed by Dr. Womack.  Taking lactulose - feels he can not leave the house due to bowel movements.  Discussed with patient that goal is 3 BMs per day.  Had EGD 2/3/17 which showed gastric varices - planning for EUS-glue/coil therapy with Dr. Perla.  Recently started on Lasix 40mg daily for fluid - patient states that he has lost weight now that he is on this.  He feels his swelling has improved some.  Patient has follow up with Dr. Womack on 2/21/17.     Pancytopenia:  Recently worked up by Heme-Onc for pancytopenia and had a bone marrow biopsy on 11/3/16 that was inconclusive.  Has follow up scheduled for 2/21/17 with Dr. Bob.    HTN:  Taking Losartan 50mg daily.    HLD:  Stopped fenofibrate per Dr. Sheikh due to low cholesterol.  Now taking fish oil.      DM-2:  Off metformin due to elevated lactate and diarrhea.  Started on Januvia 50mg daily by Dr. Sheikh.  States his FSG's have improved - denies hypoglycemia or hyperglycemia.  Lowest blood sugar was 80.    TIAs:  Taking ASA 81mg daily.  No symptoms recently    Bipolar d/o:  Taking Depeakote 2 tablets daily - reduced from 3 tablets daily per psychiatrist, Dr. Figueroa, due to liver function.  Per patient and wife, Dr. Womack is fine with patient taking 3 tablets per day.      H/o Rectal cancer s/p resection 06/2016:  Seen by colorectal surgery 12/16/16 - repeat biopsy taken which was negative per patient.      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 Camacho.  Had recent Mohs at [facility].      Review of Systems  "  Constitutional: Negative for appetite change, chills, fatigue, fever and unexpected weight change (Losing weight with addition of Lasix).   HENT: Positive for hearing loss (Followed by ENT).    Eyes: Negative for visual disturbance.   Respiratory: Negative for cough, chest tightness, shortness of breath and wheezing.    Cardiovascular: Negative for chest pain, palpitations and leg swelling.   Gastrointestinal: Positive for abdominal distention and diarrhea (Taking lactulose). Negative for abdominal pain, blood in stool, constipation, nausea and vomiting.   Genitourinary: Positive for frequency (Since starting Lasix). Negative for dysuria, hematuria and urgency.   Musculoskeletal: Negative for arthralgias and myalgias.   Skin: Negative for rash and wound.   Neurological: Negative for dizziness, numbness and headaches.   Psychiatric/Behavioral: Negative for behavioral problems.        Patient reports he feels good, wife states he is anxious       Past medical history, surgical history, and family medical history reviewed and updated as appropriate.    Medications and allergies reviewed.     Objective:     Visit Vitals    BP (!) 161/65    Pulse 74    Temp 97.9 °F (36.6 °C) (Oral)    Ht 5' 8" (1.727 m)    Wt 76.1 kg (167 lb 12.3 oz)    SpO2 99%    BMI 25.51 kg/m2     Physical Exam   Constitutional: He is oriented to person, place, and time. He appears well-developed and well-nourished. No distress.   HENT:   Head: Normocephalic and atraumatic.   Eyes: Conjunctivae and EOM are normal.   Cardiovascular: Normal rate and regular rhythm.  Exam reveals no gallop and no friction rub.    No murmur heard.  Pulmonary/Chest: Effort normal and breath sounds normal. No respiratory distress. He has no wheezes. He has no rales.   Abdominal: He exhibits distension. He exhibits no mass. There is no tenderness. There is no rebound and no guarding.   Musculoskeletal: Normal range of motion. He exhibits no edema or tenderness. "   1+ pitting edema in bilateral LE's - improved since last appointment.   Neurological: He is alert and oriented to person, place, and time. No cranial nerve deficit.   Skin: No rash noted. No erythema.   Psychiatric: He has a normal mood and affect. His behavior is normal.       RESULTS: Recent Depakote level 71.8.  On 3 tablets daily.    Assessment:     1. 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 Pancytopenia    3. Bipolar 1 disorder    4. Essential hypertension    5. Type 2 diabetes mellitus without complication, without long-term current use of insulin        Plan:   Kenneth was seen today for follow-up.    Diagnoses and all orders for this visit:    Liver cirrhosis secondary to MCDONALD (nonalcoholic steatohepatitis)   Continue losartan per Dr. Womack for gastric varices.  Continue lactulose for hepatic encephalopathy.  Continue Lasix for edema/ascites.  Has follow up on 2/21/17.    Pancytopenia   Has follow up with Hematology on 2/21/17.    Bipolar 1 disorder    Per wife, psychiatrist wants patient on only 2 tablets, however Dr. Womack has told her he is fine with 3 tablets.  Will confirm with Dr. Womack.      Essential hypertension   Continue losartan and Lasix.    Type 2 diabetes mellitus without complication, without long-term current use of insulin   Continue Januvia.      Return in about 2 months (around 4/6/2017).    Prisca Persaud MD  Internal Medicine  [facility]

## 2017-02-06 NOTE — MR AVS SNAPSHOT
[facility]  [address]  [facility] [address]  Phone: [phone]  Fax: [phone]                  Kenneth Sheikh Jr.   2017 3:40 PM   Office Visit    Description:  Male : 1940   Provider:  Prisca Persaud MD   Department:  [facility] Internal Medicine           Reason for Visit     Follow-up                To Do List           Future Appointments        Provider Department Dept Phone    2017 8:15 AM LAB, [place] [facility] [phone]    2017 8:00 AM LAB, HEMONC CANCER BLDG [facility] [phone]    2017 9:30 AM Bud Bob MD [place] [phone]    2017 11:00 AM Renato Womack MD [facility] - Hepatology [phone]    3/13/2017 10:15 AM Haris Talavera MD [facility] Rect[facility] [phone]      Goals (5 Years of Data)     None      Follow-Up and Disposition     Return in about 2 months (around 2017).      [facility]     [facility] Nurse Care Line -  Assistance  Registered nurses in the [facility] provide clinical advisement, health education, appointment booking, and other advisory services.  Call for this free service at [phone].             Medications           Message regarding Medications     Verify the changes and/or additions to your medication regime listed below are the same as discussed with your clinician today.  If any of these changes or additions are incorrect, please notify your healthcare provider.             Verify that the below list of medications is an accurate representation of the medications you are currently taking.  If none reported, the list may be blank. If incorrect, please contact your healthcare provider. Carry this list with you in case of emergency.           Current Medications     albuterol 90 mcg/actuation inhaler Inhale 2 puffs into the lungs every 6 (six) hours as needed for Wheezing.  "   aspirin 81 MG Chew Take 81 mg by mouth once daily.    divalproex (DEPAKOTE) 250 MG 24 hr tablet Take 750 mg by mouth once daily.    furosemide (LASIX) 40 MG tablet Take 1 tablet (40 mg total) by mouth once daily.    lactulose (CHRONULAC) 10 gram/15 mL solution Take 15 mLs (10 g total) by mouth 2 (two) times daily.    losartan (COZAAR) 50 MG tablet Take 1 tablet (50 mg total) by mouth once daily.    omega-3 fatty acids (FISH OIL) 500 mg Cap Take 1,000 mg by mouth 2 (two) times daily.    promethazine-dextromethorphan (PROMETHAZINE-DM) 6.25-15 mg/5 mL Syrp TAKE 5 ML BY MOUTH AT BEDTIME    SITagliptan (JANUVIA) 50 MG Tab Take 1 tablet (50 mg total) by mouth once daily.           Clinical Reference Information           Your Vitals Were     BP Pulse Temp Height Weight SpO2    161/65 74 97.9 °F (36.6 °C) (Oral) 5' 8" (1.727 m) 76.1 kg (167 lb 12.3 oz) 99%    BMI                25.51 kg/m2          Blood Pressure          Most Recent Value    BP  (!)  161/65      Allergies as of 2/6/2017     Abilify [Aripiprazole]      Immunizations Administered on Date of Encounter - 2/6/2017     None      [facility] Sign-Up     Activating your [facility] account is as easy as 1-2-3!     1) Visit [email or web address], select Sign Up Now, enter this activation code and your date of birth, then select Next.  [email or web address]  Expires: 3/23/2017  4:36 PM      2) Create a username and password to use when you visit [facility] in the future and select a security question in case you lose your password and select Next.    3) Enter your e-mail address and click Sign Up!    Additional Information  If you have questions, please e-mail [email or web address] or call [phone] to talk to our [facility] staff. Remember, [facility] is NOT to be used for urgent needs. For medical emergencies, dial 911.         Language Assistance Services     ATTENTION: Language assistance services are available, free of charge. Please call [phone].      ATENCIÓN: Si " habla ramonañol, tiene a bell disposición servicios gratuitos de asistencia lingüística. Marc al [phone].     MARGARET Ý: N?u b?n nói Ti?ng Vi?t, có các d?ch v? h? tr? ngôn ng? mi?n phí dành cho b?n. G?i s? [phone].         [facility] complies with applicable Federal civil rights laws and does not discriminate on the basis of race, color, national origin, age, disability, or sex.

## 2017-02-07 ENCOUNTER — LAB VISIT (OUTPATIENT)
Dept: LAB | Facility: HOSPITAL | Age: 77
End: 2017-02-07
Attending: INTERNAL MEDICINE
Payer: MEDICARE

## 2017-02-07 DIAGNOSIS — K75.81 LIVER CIRRHOSIS SECONDARY TO NASH (NONALCOHOLIC STEATOHEPATITIS): ICD-10-CM

## 2017-02-07 DIAGNOSIS — R18.8 CIRRHOSIS OF LIVER WITH ASCITES, UNSPECIFIED HEPATIC CIRRHOSIS TYPE: ICD-10-CM

## 2017-02-07 DIAGNOSIS — K74.60 CIRRHOSIS OF LIVER WITH ASCITES, UNSPECIFIED HEPATIC CIRRHOSIS TYPE: ICD-10-CM

## 2017-02-07 DIAGNOSIS — K74.60 LIVER CIRRHOSIS SECONDARY TO NASH (NONALCOHOLIC STEATOHEPATITIS): ICD-10-CM

## 2017-02-07 LAB
ALBUMIN SERPL BCP-MCNC: 2.7 G/DL
ALP SERPL-CCNC: 112 U/L
ALT SERPL W/O P-5'-P-CCNC: 10 U/L
ANION GAP SERPL CALC-SCNC: 6 MMOL/L
AST SERPL-CCNC: 28 U/L
BILIRUB SERPL-MCNC: 1 MG/DL
BUN SERPL-MCNC: 22 MG/DL
CALCIUM SERPL-MCNC: 9.1 MG/DL
CHLORIDE SERPL-SCNC: 107 MMOL/L
CO2 SERPL-SCNC: 28 MMOL/L
CREAT SERPL-MCNC: 1 MG/DL
EST. GFR  (AFRICAN AMERICAN): >60 ML/MIN/1.73 M^2
EST. GFR  (NON AFRICAN AMERICAN): >60 ML/MIN/1.73 M^2
GLUCOSE SERPL-MCNC: 128 MG/DL
POTASSIUM SERPL-SCNC: 4.2 MMOL/L
PROT SERPL-MCNC: 7.4 G/DL
SODIUM SERPL-SCNC: 141 MMOL/L

## 2017-02-07 PROCEDURE — 80053 COMPREHEN METABOLIC PANEL: CPT

## 2017-02-07 PROCEDURE — 36415 COLL VENOUS BLD VENIPUNCTURE: CPT | Mod: PO

## 2017-02-08 ENCOUNTER — TELEPHONE (OUTPATIENT)
Dept: HEPATOLOGY | Facility: CLINIC | Age: 77
End: 2017-02-08

## 2017-02-08 NOTE — TELEPHONE ENCOUNTER
----- Message from Renato Womack MD sent at 2/8/2017 10:28 AM CST -----  Please let him know that his blood work is stable

## 2017-02-09 ENCOUNTER — TELEPHONE (OUTPATIENT)
Dept: INTERNAL MEDICINE | Facility: CLINIC | Age: 77
End: 2017-02-09

## 2017-02-09 NOTE — TELEPHONE ENCOUNTER
Please call patient and let him know that Dr. Womack is fine with him continuing Depakote.  The dose of the Depakote should be left up to his psychiatrist.        ----- Message from Renato Womack MD sent at 2/8/2017  8:17 AM CST -----  Walt Cope    I spoke to his psychiatrist and we had agreed that the risks of taking him off Depakote outweigh the theoretical liver benefits. We therefore agreed to continue it. We did not discuss a dose but I think the psychiatrist felt that he would reduce the dose because of his liver dysfunction.    Given this patient's other medical problems, I don't think a dose reduction is going to make much difference but I guess the psychiatrist who prescribes it felt more comfortable lowering the dose.    I don't have any strong feelings. If it's going to cause unhappiness, he can resume his higher dose but that is really the psychiatrist's decision since he prescribes it.    I hope I am making sense. If you want to discuss this further give me a call on my cell .    Renato

## 2017-02-10 ENCOUNTER — TELEPHONE (OUTPATIENT)
Dept: ENDOSCOPY | Facility: HOSPITAL | Age: 77
End: 2017-02-10

## 2017-02-10 RX ORDER — LOSARTAN POTASSIUM 50 MG/1
50 TABLET ORAL DAILY
Qty: 90 TABLET | Refills: 3 | Status: SHIPPED | OUTPATIENT
Start: 2017-02-10 | End: 2017-05-24 | Stop reason: SDUPTHER

## 2017-02-10 NOTE — TELEPHONE ENCOUNTER
----- Message from Camelia Jya sent at 2/9/2017  4:28 PM CST -----  Contact: self [phone]  Patient is returning a phone call.  Who left a message for the patient: nurse  Does patient know what this is regarding:  Returning nurse call  Comments:

## 2017-02-12 ENCOUNTER — HOSPITAL ENCOUNTER (EMERGENCY)
Facility: HOSPITAL | Age: 77
Discharge: HOME | End: 2017-02-12
Attending: EMERGENCY MEDICINE
Payer: MEDICARE

## 2017-02-12 VITALS
DIASTOLIC BLOOD PRESSURE: 74 MMHG | HEIGHT: 68 IN | TEMPERATURE: 99 F | WEIGHT: 176 LBS | RESPIRATION RATE: 18 BRPM | BODY MASS INDEX: 26.67 KG/M2 | OXYGEN SATURATION: 97 % | HEART RATE: 65 BPM | SYSTOLIC BLOOD PRESSURE: 158 MMHG

## 2017-02-12 DIAGNOSIS — R19.5 STOOL GUAIAC POSITIVE: ICD-10-CM

## 2017-02-12 DIAGNOSIS — R04.0 EPISTAXIS: Primary | ICD-10-CM

## 2017-02-12 DIAGNOSIS — K74.60 LIVER CIRRHOSIS SECONDARY TO NASH (NONALCOHOLIC STEATOHEPATITIS): ICD-10-CM

## 2017-02-12 DIAGNOSIS — K75.81 LIVER CIRRHOSIS SECONDARY TO NASH (NONALCOHOLIC STEATOHEPATITIS): ICD-10-CM

## 2017-02-12 LAB
BUN SERPL-MCNC: 23 MG/DL (ref 6–30)
CHLORIDE SERPL-SCNC: 104 MMOL/L (ref 95–110)
CREAT SERPL-MCNC: 0.9 MG/DL (ref 0.5–1.4)
GLUCOSE SERPL-MCNC: 137 MG/DL (ref 70–110)
HCT VFR BLD CALC: 31 %PCV (ref 36–54)
POC IONIZED CALCIUM: 1.2 MMOL/L (ref 1.06–1.42)
POC PTINR: 1.6 (ref 0.9–1.2)
POC PTWBT: 18.4 SEC (ref 9.7–14.3)
POC TCO2 (MEASURED): 26 MMOL/L (ref 23–29)
POTASSIUM BLD-SCNC: 3.6 MMOL/L (ref 3.5–5.1)
SAMPLE: ABNORMAL
SAMPLE: ABNORMAL
SODIUM BLD-SCNC: 145 MMOL/L (ref 136–145)

## 2017-02-12 PROCEDURE — 25000003 PHARM REV CODE 250: Performed by: ANESTHESIOLOGY

## 2017-02-12 PROCEDURE — 99283 EMERGENCY DEPT VISIT LOW MDM: CPT | Mod: ,,, | Performed by: EMERGENCY MEDICINE

## 2017-02-12 PROCEDURE — 99900035 HC TECH TIME PER 15 MIN (STAT)

## 2017-02-12 PROCEDURE — 85610 PROTHROMBIN TIME: CPT

## 2017-02-12 PROCEDURE — 99284 EMERGENCY DEPT VISIT MOD MDM: CPT | Mod: 25

## 2017-02-12 RX ORDER — SILVER NITRATE 38.21; 12.74 MG/1; MG/1
1 STICK TOPICAL
Status: COMPLETED | OUTPATIENT
Start: 2017-02-12 | End: 2017-02-12

## 2017-02-12 RX ADMIN — PHENYLEPHRINE HYDROCHLORIDE 1 SPRAY: 0.5 SPRAY NASAL at 08:02

## 2017-02-12 RX ADMIN — SILVER NITRATE 1 APPLICATOR: 38.21; 12.74 STICK TOPICAL at 09:02

## 2017-02-12 NOTE — ED PROVIDER NOTES
Encounter Date: 2/12/2017    SCRIBE #1 NOTE: I, Nathalie Lopez, am scribing for, and in the presence of,  Dr. Lee. I have scribed the following portions of the note - the Resident attestation.       History     Chief Complaint   Patient presents with    Epistaxis     Patient reports that symptoms began on last night. Patient reports that he takes 81mg ASA.     Review of patient's allergies indicates:   Allergen Reactions    Abilify [aripiprazole] Other (See Comments)     Sleepy, could not function.     HPI  The history is provided by the patient.   76yo M with PMHx of bipolar disorder, cirrhosis, DM2, CVA, JUAN MANUEL, HTN, HLD, CAD on 81mg aspirin, sinus cancer s/p resection who presents with epistaxis since last night. He reports that it is intermittent, and it comes in waves with clots. He tried afrin and neosporin. He denies CP, SOB, N/V, fever, chills.       Past Medical History   Diagnosis Date    Anticoagulant long-term use     Bipolar 1 disorder     Bipolar 1 disorder 11/24/2016     bipolar    Cancer      history of skin cancer/sinus cancer & rectal cancer    Depressed bipolar affective disorder     Diabetes mellitus      type 2    EBV infection 12/7/2016     EBV DNA, PCR Latest Ref Range: None detected  None detected EBV DNA-Copies/mL Unknown Test Not Performed EBV Early Antigen Ab, IgG Latest Ref Range: <1:10 Titer 1:40 (A) EBV Nuclear Ag Ab Latest Ref Range: <1:5 Titer >=1:80 (A) EBV VCA IgG Latest Ref Range: <1:10 Titer 1:160 (A) EBV VCA IgM Latest Ref Range: <1:10 Titer <1:10     History of TIA (transient ischemic attack)      several-taking Plavix    Hx of gallstones     Hypertension     Hyperthyroidism 11/30/2016    Low HDL (under 40) 12/7/2016    Mixed hyperlipidemia 11/23/2016    JUAN MANUEL (obstructive sleep apnea)     Stroke     Transient cerebral ischemia 7/22/2016     Past Medical History Pertinent Negatives   Diagnosis Date Noted    Arthritis 11/23/2016    Asthma 11/23/2016    CHF  (congestive heart failure) 11/23/2016    COPD (chronic obstructive pulmonary disease) 11/23/2016    Coronary artery disease 11/23/2016    Encounter for blood transfusion 11/23/2016    Seizures 11/23/2016     Past Surgical History   Procedure Laterality Date    Tonsillectomy      Sinus sx for cancer      Sinus surgery for sinus cancer      Skin cancer removed-several times-nose & ear      Moh's procedure x4      Uvulopalatopharyngoplasty       for sleep apnea    Undescened testicle sx       Family History   Problem Relation Age of Onset    Anesthesia problems Neg Hx      Social History   Substance Use Topics    Smoking status: Former Smoker     Packs/day: 0.25     Years: 2.00    Smokeless tobacco: None      Comment: quit at age 17 less than a pack a day    Alcohol use Yes      Comment: rare     Review of Systems   Constitutional: Negative for chills and fever.   HENT: Negative for congestion and sore throat.         Epistaxis   Respiratory: Negative for chest tightness, shortness of breath and wheezing.    Cardiovascular: Negative for chest pain and leg swelling.   Gastrointestinal: Negative for abdominal pain, nausea and vomiting.   Genitourinary: Negative for dysuria, frequency and urgency.   Musculoskeletal: Negative for back pain.   Skin: Negative for rash.   Neurological: Negative for weakness and headaches.   Hematological: Does not bruise/bleed easily.   Psychiatric/Behavioral: Negative for agitation, behavioral problems and confusion.       Physical Exam   Initial Vitals   BP Pulse Resp Temp SpO2   02/12/17 0632 02/12/17 0632 02/12/17 0632 02/12/17 0632 02/12/17 0632   157/74 74 20 98.9 °F (37.2 °C) 94 %     Physical Exam    Nursing note and vitals reviewed.  Constitutional: He appears well-developed and well-nourished. He is not diaphoretic. No distress.   Blood over clothes   HENT:   Head: Normocephalic and atraumatic.   Dried blood on face    L sided bleeding > R-sided bleeding  Nose crocked  from previous nasal surgery   Eyes: Pupils are equal, round, and reactive to light. No scleral icterus.   Neck: Normal range of motion. Neck supple.   Cardiovascular: Normal rate, regular rhythm and normal heart sounds.   Pulmonary/Chest: Breath sounds normal. No respiratory distress. He has no wheezes.   Abdominal: Bowel sounds are normal. He exhibits distension (chronically distended with positive fluid wave). There is no tenderness. There is no rebound and no guarding.   Genitourinary: Rectal exam shows guaiac positive stool. Guaiac positive stool. : Acceptable.  Genitourinary Comments: No gross blood. No hemorrhoids seen externally or palpated internally   Musculoskeletal: He exhibits no edema or tenderness.   Neurological: He is alert and oriented to person, place, and time. No cranial nerve deficit.   Psychiatric: He has a normal mood and affect. Thought content normal.     MELD-Na score: 7 at 11/27/2016  4:17 AM  MELD score: 7 at 11/27/2016  4:17 AM  Calculated from:  Serum Creatinine: 0.9 mg/dL (Rounded to 1) at 11/27/2016  4:17 AM  Serum Sodium: 139 mmol/L (Rounded to 137) at 11/27/2016  4:17 AM  Total Bilirubin: 0.7 mg/dL (Rounded to 1) at 11/27/2016  4:17 AM  INR(ratio): 1.1 at 11/26/2016  3:53 AM  Age: 76 years      ED Course   Procedures  Labs Reviewed   ISTAT PROCEDURE - Abnormal; Notable for the following:        Result Value    POC PTWBT 18.4 (*)     POC PTINR 1.6 (*)     All other components within normal limits   ISTAT PROCEDURE - Abnormal; Notable for the following:     POC Glucose 137 (*)     POC Hematocrit 31 (*)     All other components within normal limits              Differential diagnosis includes but not limited to decreased PT/INR 2/2 to cirrhosis, nasal mucosal irritation. istat chem 8 and INR ordered. Phenylephrine spray to nose.   Nadia Arana MD, PGY-3  7:23 AM    Elevated INR at 1.6.  Nadia Arana MD, PGY-3  8:38 AM    Patient's wife now  complaining of possibly seeing blood in stool or on tissue paper. Will do heme card for stool.  Nadia Arana MD, PGY-3  8:49 AM    As wife concerned for repeat bleed, will apply silver nitrate.  Nadia Arana MD, PGY-3  9:10 AM    Will discharge home with phenylephrine. Counseled to follow up with hepatology and GI. Return precautions given.   Nadia Arana MD, PGY-3  10:04 AM              Medical Decision Making:   History:   Old Medical Records: I decided to obtain old medical records.  Clinical Tests:   Lab Tests: Ordered and Reviewed            Scribe Attestation:   Scribe #1: I performed the above scribed service and the documentation accurately describes the services I performed. I attest to the accuracy of the note.    Attending Attestation:   Physician Attestation Statement for Resident:  As the supervising MD   Physician Attestation Statement: I have personally seen and examined this patient.   I agree with the above history. -: Patient is a 77 year old male with a nosebleed, essentially resolved. INR is a little elevated. We will treat supportively.    As the supervising MD I agree with the above PE.    As the supervising MD I agree with the above treatment, course, plan, and disposition.  I have reviewed and agree with the residents interpretation of the following: lab data.          Physician Attestation for Scribe:  Physician Attestation Statement for Scribe #1: I, Dr. Lee, reviewed documentation, as scribed by Nathalie Lopez in my presence, and it is both accurate and complete.                 ED Course     Clinical Impression:   The primary encounter diagnosis was Epistaxis. 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    Disposition:   Disposition: Discharged  Condition: Stable       Sherwin Lee MD  02/17/17 1114

## 2017-02-12 NOTE — ED NOTES
Patient c/o nose starting to bleed spontaneously last night around 9pm.  On baby aspirin.  Denies Shortness of breath or difficulty breathing but c/o spitting up blood from it draining from nose into throat.  Hx Cirrhosis.

## 2017-02-12 NOTE — ED AVS SNAPSHOT
[facility]  [address] [address]               Kenneth Sheikh Jr.   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    Specialty:  Hepatology    Why:  As needed    Contact information:    [address] [address]  [phone]        Follow up with [facility] - Gastroenterology In 1 week.    Specialty:  Gastroenterology    Why:  hemoccult positive stool    Contact information:    [address] [id number] [phone]    Additional information:    [address] - 4th Floor      [facility] On Call     [facility] On Call Nurse Care Line - 24/7 Assistance  Registered nurses in the [facility] On Call Center provide clinical advisement, health education, appointment booking, and other advisory services.  Call for this free service at [phone].             Medications           Message regarding Medications     Verify the changes and/or additions to your medication regime listed below are the same as discussed with your clinician today.  If any of these changes or additions are incorrect, please notify your healthcare provider.        These medications were administered today        Dose Freq    phenylephrine HCL 0.5% nasal spray 1 spray 1 spray ED 1 Time    Si spray by Each Nare route ED 1 Time.    Class: Normal    Route: Each Nare    silver nitrate  "applicators applicator 1 applicator 1 applicator ED 1 Time    Sig: Apply 1 applicator topically ED 1 Time.    Class: Normal    Route: Topical           Verify that the below list of medications is an accurate representation of the medications you are currently taking.  If none reported, the list may be blank. If incorrect, please contact your healthcare provider. Carry this list with you in case of emergency.           Current Medications     albuterol 90 mcg/actuation inhaler Inhale 2 puffs into the lungs every 6 (six) hours as needed for Wheezing.    aspirin 81 MG Chew Take 81 mg by mouth once daily.    divalproex (DEPAKOTE) 250 MG 24 hr tablet Take 750 mg by mouth once daily.    furosemide (LASIX) 40 MG tablet Take 1 tablet (40 mg total) by mouth once daily.    lactulose (CHRONULAC) 10 gram/15 mL solution Take 15 mLs (10 g total) by mouth 2 (two) times daily.    losartan (COZAAR) 50 MG tablet Take 1 tablet (50 mg total) by mouth once daily.    omega-3 fatty acids (FISH OIL) 500 mg Cap Take 1,000 mg by mouth 2 (two) times daily.    promethazine-dextromethorphan (PROMETHAZINE-DM) 6.25-15 mg/5 mL Syrp TAKE 5 ML BY MOUTH AT BEDTIME    SITagliptan (JANUVIA) 50 MG Tab Take 1 tablet (50 mg total) by mouth once daily.           Clinical Reference Information           Your Vitals Were     BP Pulse Temp Resp Height Weight    159/74 (BP Location: Left arm, Patient Position: Sitting, BP Method: Automatic) 60 98.9 °F (37.2 °C) (Oral) 18 5' 8" (1.727 m) 79.8 kg (176 lb)    SpO2 BMI             96% 26.76 kg/m2         Allergies as of 2/12/2017        Reactions    Abilify [Aripiprazole] Other (See Comments)    Sleepy, could not function.      Immunizations Administered on Date of Encounter - 2/12/2017     None      ED Micro, Lab, POCT     Start Ordered       Status Ordering Provider    02/12/17 0751 02/12/17 0751  ISTAT PROCEDURE  Once      Final result     02/12/17 0750 02/12/17 0750  ISTAT PROCEDURE  Once      Final result  "    02/12/17 0722 02/12/17 0722  ISTAT CHEM8  Once      Acknowledged       ED Imaging Orders     None      Your Scheduled Appointments     Feb 21, 2017  8:00 AM CST   Non-Fasting Lab with LAB, HEMONC CANCER BLDG   [facility]    [address]  [place] [address]   [phone]            Feb 21, 2017  9:30 AM CST   Established Patient Visit with Bud Bob MD   [facility] ([facility])    [address]  [place] [address]   [phone]            Feb 21, 2017 11:00 AM CST   Established Patient Visit with Renato Womack MD   [facility] - Hepatology ([facility] )    [facility]  [place] [address]   [phone]            Mar 13, 2017 10:15 AM CDT   Established Patient Visit with Haris Talavera MD   [facility]Col[facility] Rectal [facility]urg ([facility] )    [address]  [place] [address]   [phone]            Mar 21, 2017 11:30 AM CDT   GASTROENTEROLOGY ESTABLISHED PATIENT with Aneudy Perla MD   [facility] - Gastroenterology ([facility] )    [address]  [place] [address]   [phone]              [facility] Sign-Up     Activating your [facility] account is as easy as 1-2-3!     1) Visit [email or web address], select Sign Up Now, enter this activation code and your date of birth, then select Next.  [email or web address]  Expires: 3/23/2017  4:36 PM      2) Create a username and password to use when you visit [facility] in the future and select a security question in case you lose your password and select Next.    3) Enter your e-mail address and click Sign Up!    Additional Information  If you have questions, please e-mail [email or web address] or call [phone] to talk to our [facility] staff. Remember, [facility] is NOT to be used for urgent needs. For medical emergencies, dial 911.         Smoking Cessation     If you would like to quit smoking:   You may be eligible for  free services if you are a [place] resident and started smoking cigarettes before September 1, 1988.  Call the [employer]oking [employer]essation [employer] ([employer]) toll free at (246) 345-1735 or (988) 622-6529.   Call 8-800-QUIT-NOW if you do not meet the above criteria.             [facility] complies with applicable Federal civil rights laws and does not discriminate on the basis of race, color, national origin, age, disability, or sex.        Language Assistance Services     ATTENTION: Language assistance services are available, free of charge. Please call [phone].      ATENCIÓN: Si habla español, tiene a bell disposición servicios gratuitos de asistencia lingüística. Llame al [phone].     CHÚ Ý: N?u b?n nói Ti?ng Vi?t, có các d?ch v? h? tr? ngôn ng? mi?n phí dành cho b?n. G?i s? [phone].

## 2017-02-21 ENCOUNTER — TELEPHONE (OUTPATIENT)
Dept: GASTROENTEROLOGY | Facility: CLINIC | Age: 77
End: 2017-02-21

## 2017-02-21 ENCOUNTER — OFFICE VISIT (OUTPATIENT)
Dept: HEPATOLOGY | Facility: CLINIC | Age: 77
End: 2017-02-21
Payer: MEDICARE

## 2017-02-21 ENCOUNTER — OFFICE VISIT (OUTPATIENT)
Dept: HEMATOLOGY/ONCOLOGY | Facility: CLINIC | Age: 77
End: 2017-02-21
Payer: MEDICARE

## 2017-02-21 ENCOUNTER — LAB VISIT (OUTPATIENT)
Dept: LAB | Facility: HOSPITAL | Age: 77
End: 2017-02-21
Attending: INTERNAL MEDICINE
Payer: MEDICARE

## 2017-02-21 VITALS
BODY MASS INDEX: 26.43 KG/M2 | HEIGHT: 68 IN | HEART RATE: 79 BPM | SYSTOLIC BLOOD PRESSURE: 149 MMHG | OXYGEN SATURATION: 100 % | DIASTOLIC BLOOD PRESSURE: 86 MMHG | TEMPERATURE: 96 F | WEIGHT: 174.38 LBS

## 2017-02-21 VITALS
BODY MASS INDEX: 26.49 KG/M2 | HEIGHT: 68 IN | TEMPERATURE: 98 F | HEART RATE: 68 BPM | SYSTOLIC BLOOD PRESSURE: 161 MMHG | WEIGHT: 174.81 LBS | DIASTOLIC BLOOD PRESSURE: 73 MMHG | RESPIRATION RATE: 18 BRPM

## 2017-02-21 DIAGNOSIS — K74.60 CIRRHOSIS OF LIVER WITH ASCITES, UNSPECIFIED HEPATIC CIRRHOSIS TYPE: ICD-10-CM

## 2017-02-21 DIAGNOSIS — K74.60 LIVER CIRRHOSIS SECONDARY TO NASH (NONALCOHOLIC STEATOHEPATITIS): ICD-10-CM

## 2017-02-21 DIAGNOSIS — D61.818 PANCYTOPENIA: ICD-10-CM

## 2017-02-21 DIAGNOSIS — R18.8 ASCITES OF LIVER: ICD-10-CM

## 2017-02-21 DIAGNOSIS — K76.82 HEPATIC ENCEPHALOPATHY: Primary | ICD-10-CM

## 2017-02-21 DIAGNOSIS — R18.8 CIRRHOSIS OF LIVER WITH ASCITES, UNSPECIFIED HEPATIC CIRRHOSIS TYPE: ICD-10-CM

## 2017-02-21 DIAGNOSIS — D61.818 PANCYTOPENIA: Primary | ICD-10-CM

## 2017-02-21 DIAGNOSIS — I86.4 GASTRIC VARICES: Primary | ICD-10-CM

## 2017-02-21 DIAGNOSIS — K75.81 LIVER CIRRHOSIS SECONDARY TO NASH (NONALCOHOLIC STEATOHEPATITIS): ICD-10-CM

## 2017-02-21 LAB
ALBUMIN SERPL BCP-MCNC: 2.6 G/DL
ALP SERPL-CCNC: 125 U/L
ALT SERPL W/O P-5'-P-CCNC: 9 U/L
ANION GAP SERPL CALC-SCNC: 7 MMOL/L
AST SERPL-CCNC: 25 U/L
BASOPHILS # BLD AUTO: 0.02 K/UL
BASOPHILS NFR BLD: 0.6 %
BILIRUB SERPL-MCNC: 1 MG/DL
BUN SERPL-MCNC: 26 MG/DL
CALCIUM SERPL-MCNC: 9.3 MG/DL
CHLORIDE SERPL-SCNC: 106 MMOL/L
CO2 SERPL-SCNC: 27 MMOL/L
CREAT SERPL-MCNC: 1.2 MG/DL
DIFFERENTIAL METHOD: ABNORMAL
EOSINOPHIL # BLD AUTO: 0.1 K/UL
EOSINOPHIL NFR BLD: 2.3 %
ERYTHROCYTE [DISTWIDTH] IN BLOOD BY AUTOMATED COUNT: 14 %
EST. GFR  (AFRICAN AMERICAN): >60 ML/MIN/1.73 M^2
EST. GFR  (NON AFRICAN AMERICAN): 57.2 ML/MIN/1.73 M^2
GLUCOSE SERPL-MCNC: 190 MG/DL
HCT VFR BLD AUTO: 34.7 %
HGB BLD-MCNC: 11 G/DL
LYMPHOCYTES # BLD AUTO: 1 K/UL
LYMPHOCYTES NFR BLD: 28.6 %
MCH RBC QN AUTO: 27.4 PG
MCHC RBC AUTO-ENTMCNC: 31.7 %
MCV RBC AUTO: 87 FL
MONOCYTES # BLD AUTO: 0.5 K/UL
MONOCYTES NFR BLD: 14.2 %
NEUTROPHILS # BLD AUTO: 1.9 K/UL
NEUTROPHILS NFR BLD: 54.3 %
PLATELET # BLD AUTO: 118 K/UL
PMV BLD AUTO: 10.2 FL
POTASSIUM SERPL-SCNC: 4.6 MMOL/L
PROT SERPL-MCNC: 7.5 G/DL
RBC # BLD AUTO: 4.01 M/UL
SODIUM SERPL-SCNC: 140 MMOL/L
WBC # BLD AUTO: 3.46 K/UL

## 2017-02-21 PROCEDURE — 1159F MED LIST DOCD IN RCRD: CPT | Mod: S$GLB,,, | Performed by: INTERNAL MEDICINE

## 2017-02-21 PROCEDURE — 3077F SYST BP >= 140 MM HG: CPT | Mod: S$GLB,,, | Performed by: INTERNAL MEDICINE

## 2017-02-21 PROCEDURE — 3079F DIAST BP 80-89 MM HG: CPT | Mod: S$GLB,,, | Performed by: INTERNAL MEDICINE

## 2017-02-21 PROCEDURE — 1157F ADVNC CARE PLAN IN RCRD: CPT | Mod: S$GLB,,, | Performed by: INTERNAL MEDICINE

## 2017-02-21 PROCEDURE — 99999 PR PBB SHADOW E&M-EST. PATIENT-LVL III: CPT | Mod: PBBFAC,,, | Performed by: INTERNAL MEDICINE

## 2017-02-21 PROCEDURE — 99214 OFFICE O/P EST MOD 30 MIN: CPT | Mod: S$GLB,,, | Performed by: INTERNAL MEDICINE

## 2017-02-21 PROCEDURE — 3078F DIAST BP <80 MM HG: CPT | Mod: S$GLB,,, | Performed by: INTERNAL MEDICINE

## 2017-02-21 PROCEDURE — 1160F RVW MEDS BY RX/DR IN RCRD: CPT | Mod: S$GLB,,, | Performed by: INTERNAL MEDICINE

## 2017-02-21 PROCEDURE — 1126F AMNT PAIN NOTED NONE PRSNT: CPT | Mod: S$GLB,,, | Performed by: INTERNAL MEDICINE

## 2017-02-21 PROCEDURE — 99213 OFFICE O/P EST LOW 20 MIN: CPT | Mod: S$GLB,,, | Performed by: INTERNAL MEDICINE

## 2017-02-21 RX ORDER — FUROSEMIDE 20 MG/1
60 TABLET ORAL DAILY
Qty: 270 TABLET | Refills: 3 | Status: SHIPPED | OUTPATIENT
Start: 2017-02-21 | End: 2017-04-25 | Stop reason: SDUPTHER

## 2017-02-21 NOTE — MR AVS SNAPSHOT
[facility] - Hepatology  [address]  [place] [address]  Phone: [phone]  Fax: [phone]                  Kenneth Sheikh Jr.   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    Follow-up and Disposition History       These Medications        Disp Refills Start End    rifAXIMin (XIFAXAN) 550 mg Tab 60 tablet 6 2017     Take 1 tablet (550 mg total) by mouth 2 (two) times daily. - Oral    Pharmacy: [facility] [address] Ph #: [phone]       furosemide (LASIX) 20 MG tablet 270 tablet 3 2017    Take 3 tablets (60 mg total) by mouth once daily. - Oral    Pharmacy: [facility] - [place] [id number] [facility] Ph #: [phone]         [facility]     [facility] Nurse Care [facility] -  Assistance  Registered nurses in the [facility] provide clinical advisement, health education, appointment booking, and other advisory services.  Call for this free service at [phone].              Medications           Message regarding Medications     Verify the changes and/or additions to your medication regime listed below are the same as discussed with your clinician today.  If any of these changes or additions are incorrect, please notify your healthcare provider.        START taking these NEW medications        Refills    rifAXIMin (XIFAXAN) 550 mg Tab 6    Sig: Take 1 tablet (550 mg total) by mouth 2 (two) times daily.    Class: Normal    Route: Oral      CHANGE how you are taking these medications     Start Taking Instead of    furosemide (LASIX) 20 MG tablet furosemide (LASIX) 40 MG tablet    Dosage:  Take 3 tablets (60 mg total) by mouth once daily. Dosage:  Take 1 tablet (40 mg total) by mouth once daily.    Reason for Change:  Reorder       STOP taking these medications     albuterol 90 mcg/actuation inhaler Inhale 2 puffs into the lungs every 6 (six) hours as needed for Wheezing.    aspirin 81 MG Chew Take 81 mg by mouth once daily.    omega-3 fatty acids (FISH OIL) 500 mg Cap Take 1,000 mg by mouth 2 (two) times daily.    promethazine-dextromethorphan (PROMETHAZINE-DM) 6.25-15 mg/5 mL Syrp TAKE 5 ML BY MOUTH AT BEDTIME           Verify that the below list of medications is an accurate representation of the medications you are currently taking.  If none reported, the list may be blank. If incorrect, please contact your healthcare provider. Carry this list with you in case of emergency.           Current Medications     divalproex (DEPAKOTE) 250 MG 24 hr tablet Take 750 mg by mouth once daily.    furosemide (LASIX) 20 MG tablet Take 3 tablets (60 mg total) by mouth once daily.    lactulose (CHRONULAC) 10 gram/15 mL solution Take 15 mLs (10 g total) by mouth 2 (two) times daily.    losartan (COZAAR) 50 MG tablet Take 1 tablet (50 mg total) by mouth once daily.    rifAXIMin (XIFAXAN) 550 mg Tab Take 1 tablet (550 mg total) by mouth 2 (two) times daily.    SITagliptan (JANUVIA) 50 MG Tab Take 1  "tablet (50 mg total) by mouth once daily.           Clinical Reference Information           Your Vitals Were     BP Pulse Temp Height Weight SpO2    149/86 (BP Location: Left arm, Patient Position: Sitting, BP Method: Automatic) 79 96.1 °F (35.6 °C) (Oral) 5' 8" (1.727 m) 79.1 kg (174 lb 6.1 oz) 100%    BMI                26.51 kg/m2          Blood Pressure          Most Recent Value    BP  (!)  149/86      Allergies as of 2/21/2017     Abilify [Aripiprazole]      Immunizations Administered on Date of Encounter - 2/21/2017     None      [facility] Sign-Up     Activating your [facility] account is as easy as 1-2-3!     1) Visit [email or web address], select Sign Up Now, enter this activation code and your date of birth, then select Next.  [email or web address]  Expires: 3/23/2017  4:36 PM      2) Create a username and password to use when you visit [facility] in the future and select a security question in case you lose your password and select Next.    3) Enter your e-mail address and click Sign Up!    Additional Information  If you have questions, please e-mail [email or web address] or call [phone] to talk to our [facility] staff. Remember, [facility] is NOT to be used for urgent needs. For medical emergencies, dial 911.         Instructions    Increase furosemide (lasix) to 60 mg daily       Language Assistance Services     ATTENTION: Language assistance services are available, free of charge. Please call [phone].      ATENCIÓN: Si habla español, tiene a bell disposición servicios gratuitos de asistencia lingüística. Llame al [phone].     MARGARET Ý: N?u b?n nói Ti?ng Vi?t, có các d?ch v? h? tr? ngôn ng? mi?n phí dành cho b?n. G?i s? [phone].         [facility] complies with applicable Federal civil rights laws and does not discriminate on the basis of race, color, national origin, age, disability, or sex.        "

## 2017-02-21 NOTE — PROGRESS NOTES
"Subjective:       Patient ID: Kenneth Sheikh Jr. is a 77 y.o. male.    Chief Complaint: Cirrhosis and Ascites    HPI  I saw this 77 y.o. man I had met in hospital recently when he was admitted with lethargy and some confusion.    This was attributed to EBV infection but I suspect that it may have been related to hepatic encephalopathy.    Admitted because of increasing lethargy +++/confusion/dizziness/dysarthria  - Nov 2016  - imaging showed ascites    - imaging earlier this year showed some liver abnormalities with hepatic steatosis     The patient denies any history of liver disease. No significant alcohol use ("a couple beers when I was younger"). No family history of liver disease. No new medications.       Patient has risk factors for MCDONALD, which can progress to cirrhosis, which could explain ascites, fatigue, and pancytopenia.     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    Investigations:  Platelets 150-160  Normal LFTs, bilirubin  HBV/HCV neg    CT abdo: 11/26/2016  Normal liver but ascites present    Abdo US: 11/25/2016  Hepatic steatosis.  Splenomegaly.  Moderate volume ascites.    MELD-Na score: 7 at 11/27/2016  4:17 AM  MELD score: 7 at 11/27/2016  4:17 AM  Calculated from:  Serum Creatinine: 0.9 mg/dL (Rounded to 1) at 11/27/2016  4:17 AM  Serum Sodium: 139 mmol/L (Rounded to 137) at 11/27/2016  4:17 AM  Total Bilirubin: 0.7 mg/dL (Rounded to 1) at 11/27/2016  4:17 AM  INR(ratio): 1.1 at 11/26/2016  3:53 AM  Age: 76 years        PMH:  DM  Sinus Ca- 1996  Rectal Ca- June 2016    SH:  Retired  Ex [employer] worker      FH:  Aunt had cirrhosis- non- alcohol      Review of Systems   Constitutional: Negative for activity change, appetite change, chills, fatigue, fever and unexpected weight change.   HENT: Negative for hearing loss.    Eyes: Negative for discharge and visual disturbance. "   Respiratory: Negative for cough, chest tightness, shortness of breath and wheezing.    Cardiovascular: Negative for chest pain, palpitations and leg swelling.   Gastrointestinal: Negative for abdominal distention, abdominal pain, constipation, diarrhea and nausea.   Genitourinary: Negative for dysuria and frequency.   Musculoskeletal: Negative for arthralgias and back pain.   Skin: Negative for pallor and rash.   Neurological: Negative for dizziness, tremors, speech difficulty and headaches.   Hematological: Negative for adenopathy.   Psychiatric/Behavioral: Negative for agitation and confusion.           Lab Results   Component Value Date    ALT 9 (L) 02/21/2017    AST 25 02/21/2017    ALKPHOS 125 02/21/2017    BILITOT 1.0 02/21/2017     Past Medical History   Diagnosis Date    Anticoagulant long-term use     Bipolar 1 disorder     Bipolar 1 disorder 11/24/2016     bipolar    Cancer      history of skin cancer/sinus cancer & rectal cancer    Depressed bipolar affective disorder     Diabetes mellitus      type 2    EBV infection 12/7/2016     EBV DNA, PCR Latest Ref Range: None detected  None detected EBV DNA-Copies/mL Unknown Test Not Performed EBV Early Antigen Ab, IgG Latest Ref Range: <1:10 Titer 1:40 (A) EBV Nuclear Ag Ab Latest Ref Range: <1:5 Titer >=1:80 (A) EBV VCA IgG Latest Ref Range: <1:10 Titer 1:160 (A) EBV VCA IgM Latest Ref Range: <1:10 Titer <1:10     History of TIA (transient ischemic attack)      several-taking Plavix    Hx of gallstones     Hypertension     Hyperthyroidism 11/30/2016    Low HDL (under 40) 12/7/2016    Mixed hyperlipidemia 11/23/2016    JUAN MANUEL (obstructive sleep apnea)     Stroke     Transient cerebral ischemia 7/22/2016     Past Surgical History   Procedure Laterality Date    Tonsillectomy      Sinus sx for cancer      Sinus surgery for sinus cancer      Skin cancer removed-several times-nose & ear      Moh's procedure x4      Uvulopalatopharyngoplasty        for sleep apnea    Undescened testicle sx       Current Outpatient Prescriptions   Medication Sig    divalproex (DEPAKOTE) 250 MG 24 hr tablet Take 750 mg by mouth once daily.    furosemide (LASIX) 20 MG tablet Take 3 tablets (60 mg total) by mouth once daily.    lactulose (CHRONULAC) 10 gram/15 mL solution Take 15 mLs (10 g total) by mouth 2 (two) times daily.    losartan (COZAAR) 50 MG tablet Take 1 tablet (50 mg total) by mouth once daily.    SITagliptan (JANUVIA) 50 MG Tab Take 1 tablet (50 mg total) by mouth once daily.    rifAXIMin (XIFAXAN) 550 mg Tab Take 1 tablet (550 mg total) by mouth 2 (two) times daily.     No current facility-administered medications for this visit.        Objective:      Physical Exam   Constitutional: He is oriented to person, place, and time. He appears well-nourished.   HENT:   Head: Normocephalic.   Eyes: Pupils are equal, round, and reactive to light.   Neck: No thyromegaly present.   Cardiovascular: Normal rate, regular rhythm and normal heart sounds.    Pulmonary/Chest: Effort normal and breath sounds normal. He has no wheezes.   Abdominal: Soft. He exhibits distension. He exhibits no mass. There is no tenderness.   Mild ascites.   Musculoskeletal: He exhibits edema.   Lymphadenopathy:     He has no cervical adenopathy.   Neurological: He is alert and oriented to person, place, and time.   Skin: Skin is warm. No rash noted. No erythema.   Psychiatric: He has a normal mood and affect. His behavior is normal.       Assessment:       1. Hepatic encephalopathy    2. Cirrhosis of liver with ascites, unspecified hepatic cirrhosis type    3. Pancytopenia    4. 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 Ascites of liver        Plan:   - discussed the dx of cirrhosis and the implications of this  - although he has lost some of his leg edema, he still has some and also has mild ascites- increase furosemide to 60mg daily  - advised on how to use  lactulose in order to minimize diarrheal symptoms  + add rifaximin  - discussed his ongoing treatment with depakote which is contraindicated in liver disease- he has been on this for a number of years and I think the benefits of this outweigh the risks so I suggested continuing treatment but I will discuss this with his psychiatrist on lowered dose now.    - he is not on deshawn because he is currently on losartan- losartan has also been used for its portal hypotensive effect.  - I will ask Dr Perla to see if he can see him sooner than March 21st for his intervention    Clinic in 4 weeks.     MARIA G GarciaBuddhist

## 2017-02-21 NOTE — PROGRESS NOTES
Subjective:       Patient ID: Kenneth Sheikh Jr. is a 77 y.o. male.    Chief Complaint: No chief complaint on file.    HPI Comments: Mr. Sheikh is a 76 year old male with history of diabetes, bipolar disorder, rectal cancer, sinus cancer, squamous cell skin cancer who was iniitially seen in clinic October 2016 for pancytopenia.  Blood work showed WBC 2.5 42.5% neutrophils, 38.9% lymphocytes, and 15.3 % monocytes (elevated). Hgb 10.9 g/ dL with normal MCV and MCH. Plt count 109K. Bone marrow biopsy was performed 11/3/16 and showed an essentially normal marrow with 20-30 % cellularity, trilineage hematopoiesis, and normal cytogenetics.Patient had been experiencing increasing overall weakness, unbalanced gait, forgetfulness and generalized fatigue over the past couple of years. He was later hospitalized in November 2016 with fever and confusion. He was found to be EBV positive but ammonia was elevated as well.CT abdo: 11/26/2016 Normal liver but ascites present. Abdo US: 11/25/2016 Hepatic steatosis.Splenomegaly.Moderate volume ascites. Fibroscan 12/20/16 showed cirrhosis. EGD 2/3/17 showed large gastric cardia varices without any stigmata.  Patient has been on lasix and lactulose. Patient leg edema has improved but abdomen has become increasingly distended limiting lung expansion. Unfortunately he has experienced significant diarrhea from minimal doses of lacutulose  Patient was seen in ED about one week ago with epistaxis. Patient was given afrin and silver nitrate applied. Bleeding stopped. INR 1.6 at that time.  CBC today showed WBC 3.46 54.3% gran Hgb 11.0,   Patient presents with his wife to discuss etiology of pancytopenia and potential management given they are [employer]es.             Review of Systems   Constitutional: Negative for diaphoresis, fatigue and fever.   HENT: Negative for sinus pressure and trouble swallowing.    Eyes: Negative for photophobia and visual disturbance.   Respiratory:  Negative for cough and shortness of breath.    Gastrointestinal: Positive for abdominal distention and diarrhea. Negative for abdominal pain.   Endocrine: Negative for polyphagia and polyuria.   Genitourinary: Negative for dysuria and hematuria.   Musculoskeletal: Negative for gait problem and myalgias.   Skin: Negative for color change and pallor.   Neurological: Negative for light-headedness and headaches.   Hematological: Negative for adenopathy. Bruises/bleeds easily.   Psychiatric/Behavioral: Negative for agitation and behavioral problems.       Objective:      Physical Exam   Constitutional: He is oriented to person, place, and time. He appears well-developed and well-nourished.   HENT:   Mouth/Throat: Oropharynx is clear and moist. No oropharyngeal exudate.   Eyes: Conjunctivae are normal. Pupils are equal, round, and reactive to light.   Neck: Normal range of motion.   Cardiovascular: Normal rate and regular rhythm.    Pulmonary/Chest: Effort normal and breath sounds normal.   Abdominal: Soft. Bowel sounds are normal.   Musculoskeletal: He exhibits no edema or tenderness.   Lymphadenopathy:     He has no cervical adenopathy.   Neurological: He is alert and oriented to person, place, and time.   Skin: Skin is warm and dry.   Few ecchymosis over arms   Psychiatric: He has a normal mood and affect. His behavior is normal.       Assessment:       1. Pancytopenia    2. Cirrhosis of liver with ascites, unspecified hepatic cirrhosis type      3. 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 Blood counts appear stable and no interventions would be indicated. Patient will not be transfused blood, platelets or plasma. In the event of bleeding volume expansion would be indicated. Blood substitute is not available. Growth factor support may be considered in the future as needed depedning on the degree of cytopenia  progression.Splenectomy may improve blood counts but risk of surgery may outweigh the benefit from splenectomy. Hopefully interventions can be performed to reduce portal hypertension and limit the risk of variceal bleeding. Patient will be seen by hepatology today. Will follow their recommendations. All questions answered to satisfaction.   Case dicussed with Dr. Paulino Bee MD   Pager [phone]

## 2017-02-22 LAB
ANA SER QL IF: POSITIVE
ANA TITR SER IF: NORMAL {TITER}

## 2017-02-23 ENCOUNTER — TELEPHONE (OUTPATIENT)
Dept: HEPATOLOGY | Facility: CLINIC | Age: 77
End: 2017-02-23

## 2017-02-23 LAB
ANTI SM ANTIBODY: 0.77 EU
ANTI SM/RNP ANTIBODY: 6.21 EU
ANTI-SM INTERPRETATION: NEGATIVE
ANTI-SM/RNP INTERPRETATION: NEGATIVE
ANTI-SSA ANTIBODY: 1.59 EU
ANTI-SSA INTERPRETATION: NEGATIVE
ANTI-SSB ANTIBODY: 1.52 EU
ANTI-SSB INTERPRETATION: NEGATIVE
DSDNA AB SER-ACNC: NORMAL [IU]/ML

## 2017-02-23 NOTE — TELEPHONE ENCOUNTER
Patient seen in clinic with Dr Womack. [employer] Patient Enrollment Forms/Prior Authorization Forms faxed to [employer] [phone] and phone # [phone] option 1. DB

## 2017-02-23 NOTE — TELEPHONE ENCOUNTER
Received forms from [employer] to send ICD-10 code to the [place] Reimbursement Support [employer].  Information sent to Fax #  [phone] and  # [phone]. DB

## 2017-02-24 ENCOUNTER — TELEPHONE (OUTPATIENT)
Dept: GASTROENTEROLOGY | Facility: CLINIC | Age: 77
End: 2017-02-24

## 2017-02-24 ENCOUNTER — TELEPHONE (OUTPATIENT)
Dept: PHARMACY | Facility: CLINIC | Age: 77
End: 2017-02-24

## 2017-03-03 ENCOUNTER — TELEPHONE (OUTPATIENT)
Dept: HEPATOLOGY | Facility: CLINIC | Age: 77
End: 2017-03-03

## 2017-03-03 NOTE — TELEPHONE ENCOUNTER
Received a fax from [employer] to send the completed forms to the [employer] Patient Assistance Program at Fax # [phone] and PH # [phone].  Forms completed and faxed as requested. DB

## 2017-03-13 ENCOUNTER — OFFICE VISIT (OUTPATIENT)
Dept: SURGERY | Facility: CLINIC | Age: 77
End: 2017-03-13
Payer: MEDICARE

## 2017-03-13 VITALS
DIASTOLIC BLOOD PRESSURE: 78 MMHG | BODY MASS INDEX: 26.15 KG/M2 | HEART RATE: 72 BPM | WEIGHT: 171.94 LBS | SYSTOLIC BLOOD PRESSURE: 149 MMHG

## 2017-03-13 DIAGNOSIS — Z85.048 HISTORY OF RECTAL CANCER: Primary | ICD-10-CM

## 2017-03-13 PROCEDURE — 1157F ADVNC CARE PLAN IN RCRD: CPT | Mod: S$GLB,,, | Performed by: COLON & RECTAL SURGERY

## 2017-03-13 PROCEDURE — 99213 OFFICE O/P EST LOW 20 MIN: CPT | Mod: 25,S$GLB,, | Performed by: COLON & RECTAL SURGERY

## 2017-03-13 PROCEDURE — 3078F DIAST BP <80 MM HG: CPT | Mod: S$GLB,,, | Performed by: COLON & RECTAL SURGERY

## 2017-03-13 PROCEDURE — 1159F MED LIST DOCD IN RCRD: CPT | Mod: S$GLB,,, | Performed by: COLON & RECTAL SURGERY

## 2017-03-13 PROCEDURE — 3077F SYST BP >= 140 MM HG: CPT | Mod: S$GLB,,, | Performed by: COLON & RECTAL SURGERY

## 2017-03-13 PROCEDURE — 99999 PR PBB SHADOW E&M-EST. PATIENT-LVL II: CPT | Mod: PBBFAC,,, | Performed by: COLON & RECTAL SURGERY

## 2017-03-13 PROCEDURE — 45330 DIAGNOSTIC SIGMOIDOSCOPY: CPT | Mod: S$GLB,,, | Performed by: COLON & RECTAL SURGERY

## 2017-03-13 PROCEDURE — 1126F AMNT PAIN NOTED NONE PRSNT: CPT | Mod: S$GLB,,, | Performed by: COLON & RECTAL SURGERY

## 2017-03-13 PROCEDURE — 1160F RVW MEDS BY RX/DR IN RCRD: CPT | Mod: S$GLB,,, | Performed by: COLON & RECTAL SURGERY

## 2017-03-13 NOTE — PROGRESS NOTES
CRS Office Visit    SUBJECTIVE:     Chief Complaint: Follow up rectal mass    History of Present Illness:  Patient is a 77 y.o. male presents 5 months s/p TAE for T1 rectal cancer - doing well from anorectal standpoint - and here for surveillance. Had recent hospital admission for generalized fatigue and ataxia.  Currently being evaluated for cirrhosis. No rectal complaints today.  Plan for flex sig today        Review of Systems   Constitutional: Negative for fatigue and fever.   Respiratory: Negative for cough and shortness of breath.   Genitourinary: Negative for dysuria and urgency.     Review of patient's allergies indicates:   Allergen Reactions    Abilify [aripiprazole] Other (See Comments)     Sleepy, could not function.       Past Medical History:   Diagnosis Date    Anticoagulant long-term use     Bipolar 1 disorder     Bipolar 1 disorder 11/24/2016    bipolar    Cancer     history of skin cancer/sinus cancer & rectal cancer    Depressed bipolar affective disorder     Diabetes mellitus     type 2    EBV infection 12/7/2016    EBV DNA, PCR Latest Ref Range: None detected  None detected EBV DNA-Copies/mL Unknown Test Not Performed EBV Early Antigen Ab, IgG Latest Ref Range: <1:10 Titer 1:40 (A) EBV Nuclear Ag Ab Latest Ref Range: <1:5 Titer >=1:80 (A) EBV VCA IgG Latest Ref Range: <1:10 Titer 1:160 (A) EBV VCA IgM Latest Ref Range: <1:10 Titer <1:10     History of TIA (transient ischemic attack)     several-taking Plavix    Hx of gallstones     Hypertension     Hyperthyroidism 11/30/2016    Low HDL (under 40) 12/7/2016    Mixed hyperlipidemia 11/23/2016    JUAN MANUEL (obstructive sleep apnea)     Stroke     Transient cerebral ischemia 7/22/2016     Past Surgical History:   Procedure Laterality Date    Moh's procedure x4      Sinus surgery for sinus cancer      sinus sx for cancer      skin cancer removed-several times-nose & ear      TONSILLECTOMY      Undescened testicle sx       UVULOPALATOPHARYNGOPLASTY      for sleep apnea     Family History   Problem Relation Age of Onset    Anesthesia problems Neg Hx      Social History   Substance Use Topics    Smoking status: Former Smoker     Packs/day: 0.25     Years: 2.00    Smokeless tobacco: Not on file      Comment: quit at age 17 less than a pack a day    Alcohol use Yes      Comment: rare        Review of Systems:  Constitutional: no fever or chills  Eyes: no visual changes  ENT: no nasal congestion or sore throat  Respiratory: no cough or shortness of breath  Cardiovascular: no chest pain or palpitations  Gastrointestinal: no nausea or vomiting, tolerating diet    OBJECTIVE:     Vital Signs (Most Recent)  Pulse: 72 (03/13/17 1028)  BP: (!) 149/78 (03/13/17 1028)    Physical Exam:  General: White male in NAD sitting in chair in clinic  Neuro: aaox4 maex4 perrl  Respiratory: resps even unlabored  Cardiac: cap refill <2 sec  Abdomen: Normal, benign.  Extremities: Warm dry and intact  Anorectal: negative    SUBJECTIVE:   This patient presents for flexible sigmoidoscopy.   Indications: most recent sigmoid exam 3 months prior    OBJECTIVE:   He appears well, vitals are normal. Abdomen is normal; soft, non tender, no organomegaly or masses.  Anus - normal.  Digital rectal is normal.    PROCEDURE:    After explaining the procedure, informed consent was obtained. Using the Olympus OSF-3 instrument, flexible sigmoidoscopy was carried out.  Proceeded to 30 cm. Further exam limited by: full length of scope inserted.   Findings: normal mucosa without polyps, granulation tissue noted at prior scar site @ 10cm from anal verge, no  tumors or diverticula, no biopsies taken, bowel prep was adequate, procedure well tolerated without complications.    No complications were encountered;  the procedure was well   tolerated.  The patient is asked to call if any unusual pains or   bleeding occur.  May resume normal diet and activities at this  time.        ASSESSMENT/PLAN:     ASSESSMENT:   77M s/p trans anal excision for T1 rectal CA with normal flexible sigmoidoscopy.    PLAN:   Per orders. Post-procedure instructions are given to the patient.   Recommendations: repeat exam in 3 months during EGD with Dr Perla for esophageal varices. If granulation present, will plan on snare excision - will need full bowel prep.    Geo Tadeo MD  Colon and Rectal Surgery Fellow  [phone]      I have personally taken the history and examined this patient and agree with the resident's note as stated above.    Haris Talavera

## 2017-03-15 ENCOUNTER — HOSPITAL ENCOUNTER (OUTPATIENT)
Dept: CARDIOLOGY | Facility: CLINIC | Age: 77
Discharge: HOME | End: 2017-03-15
Payer: MEDICARE

## 2017-03-15 ENCOUNTER — DOCUMENTATION ONLY (OUTPATIENT)
Dept: CARDIOLOGY | Facility: CLINIC | Age: 77
End: 2017-03-15

## 2017-03-15 DIAGNOSIS — I35.9 NONRHEUMATIC AORTIC VALVE DISORDER: ICD-10-CM

## 2017-03-15 DIAGNOSIS — I86.4 GASTRIC VARICES: ICD-10-CM

## 2017-03-15 LAB
DIASTOLIC DYSFUNCTION: NO
ESTIMATED PA SYSTOLIC PRESSURE: 29
MITRAL VALVE REGURGITATION: NORMAL
RETIRED EF AND QEF - SEE NOTES: 65 (ref 55–65)
TRICUSPID VALVE REGURGITATION: NORMAL

## 2017-03-15 PROCEDURE — 93306 TTE W/DOPPLER COMPLETE: CPT | Mod: S$GLB,,, | Performed by: INTERNAL MEDICINE

## 2017-03-15 NOTE — PROGRESS NOTES
Patient identified by 2 identifiers.   Allergies reviewed.  22 g IV placed to Rt AC.  Bubble study explained to patient, patient verbalized understanding.  Bubble performed X 2, with & without Valsalva.  Pt tolerated procedure well.  IV d/c with catheter intact, pressure dsg applied.

## 2017-03-21 ENCOUNTER — OFFICE VISIT (OUTPATIENT)
Dept: GASTROENTEROLOGY | Facility: CLINIC | Age: 77
End: 2017-03-21
Payer: MEDICARE

## 2017-03-21 VITALS
WEIGHT: 172.38 LBS | HEIGHT: 68 IN | BODY MASS INDEX: 26.13 KG/M2 | HEART RATE: 54 BPM | DIASTOLIC BLOOD PRESSURE: 48 MMHG | SYSTOLIC BLOOD PRESSURE: 93 MMHG

## 2017-03-21 DIAGNOSIS — I86.4 GASTRIC VARICES: Primary | ICD-10-CM

## 2017-03-21 PROCEDURE — 1160F RVW MEDS BY RX/DR IN RCRD: CPT | Mod: S$GLB,,, | Performed by: INTERNAL MEDICINE

## 2017-03-21 PROCEDURE — 1159F MED LIST DOCD IN RCRD: CPT | Mod: S$GLB,,, | Performed by: INTERNAL MEDICINE

## 2017-03-21 PROCEDURE — 3078F DIAST BP <80 MM HG: CPT | Mod: S$GLB,,, | Performed by: INTERNAL MEDICINE

## 2017-03-21 PROCEDURE — 3074F SYST BP LT 130 MM HG: CPT | Mod: S$GLB,,, | Performed by: INTERNAL MEDICINE

## 2017-03-21 PROCEDURE — 1157F ADVNC CARE PLAN IN RCRD: CPT | Mod: S$GLB,,, | Performed by: INTERNAL MEDICINE

## 2017-03-21 PROCEDURE — 1126F AMNT PAIN NOTED NONE PRSNT: CPT | Mod: S$GLB,,, | Performed by: INTERNAL MEDICINE

## 2017-03-21 PROCEDURE — 99213 OFFICE O/P EST LOW 20 MIN: CPT | Mod: S$GLB,,, | Performed by: INTERNAL MEDICINE

## 2017-03-21 PROCEDURE — 99999 PR PBB SHADOW E&M-EST. PATIENT-LVL III: CPT | Mod: PBBFAC,,, | Performed by: INTERNAL MEDICINE

## 2017-03-21 RX ORDER — CEFUROXIME AXETIL 250 MG/1
250 TABLET ORAL 2 TIMES DAILY
Refills: 0 | COMMUNITY
Start: 2017-03-17 | End: 2017-04-25 | Stop reason: ALTCHOICE

## 2017-03-21 NOTE — PROGRESS NOTES
[facility]    Reason for visit (Chief Complaint): gastric varices    Referring provider/PCP: Renato Womack MD  [address] [place] [address]    History of Present Illness: Patient presents for evaluation of gastric varices. These were found on recent EGD performed for variceal screening in the setting of cirrhosis. He reports no hx of bleeding. Denies melena, hematochezia, or hemetemesis. He is followed for his liver disease by our hepatology group (Dr. Womack). He is a [employer], and is concerned about what may happen should he bleed from varices. He is deemed a suboptimal candidate for TIPS due to hepatic encephalopathy.       Review of Systems   Constitutional: no fever, chills or change in weight   Eyes: no visual changes   ENT: no sore throat or dysphagia; hx of nose bleeds  Respiratory: no cough or shortness of breath   Cardiovascular: no chest pain or palpitations   Gastrointestinal: as per HPI  Hematologic/Lymphatic: no easy bruising or lymphadenopathy   Musculoskeletal: no arthralgias or myalgias   Neurological: no seizures, tremors or change in mental status  Behavioral/Psych: no auditory or visual hallucinations    Past Medical History:   Diagnosis Date    Anticoagulant long-term use     Bipolar 1 disorder     Bipolar 1 disorder 11/24/2016    bipolar    Cancer     history of skin cancer/sinus cancer & rectal cancer    Depressed bipolar affective disorder     Diabetes mellitus     type 2    EBV infection 12/7/2016    EBV DNA, PCR Latest Ref Range: None detected  None detected EBV DNA-Copies/mL Unknown Test Not Performed EBV Early Antigen Ab, IgG Latest Ref Range: <1:10 Titer 1:40 (A) EBV Nuclear Ag Ab Latest Ref Range: <1:5 Titer >=1:80 (A) EBV VCA IgG Latest Ref Range: <1:10 Titer 1:160 (A) EBV VCA IgM Latest Ref Range: <1:10 Titer <1:10     History of TIA (transient ischemic attack)     several-taking Plavix    Hx of gallstones      Hypertension     Hyperthyroidism 11/30/2016    Low HDL (under 40) 12/7/2016    Mixed hyperlipidemia 11/23/2016    JUAN MANUEL (obstructive sleep apnea)     Stroke     Transient cerebral ischemia 7/22/2016       Past Surgical History:   Procedure Laterality Date    Moh's procedure x4      Sinus surgery for sinus cancer      sinus sx for cancer      skin cancer removed-several times-nose & ear      TONSILLECTOMY      Undescened testicle sx      UVULOPALATOPHARYNGOPLASTY      for sleep apnea       Family History   Problem Relation Age of Onset    Anesthesia problems Neg Hx        Social History     Social History    Marital status:      Spouse name: N/A    Number of children: N/A    Years of education: N/A     Occupational History    Not on file.     Social History Main Topics    Smoking status: Former Smoker     Packs/day: 0.25     Years: 2.00    Smokeless tobacco: Not on file      Comment: quit at age 17 less than a pack a day    Alcohol use Yes      Comment: rare    Drug use: No    Sexual activity: Not on file     Other Topics Concern    Not on file     Social History Narrative       Current Outpatient Prescriptions on File Prior to Visit   Medication Sig Dispense Refill    divalproex (DEPAKOTE) 250 MG 24 hr tablet Take 750 mg by mouth once daily.      furosemide (LASIX) 20 MG tablet Take 3 tablets (60 mg total) by mouth once daily. 270 tablet 3    losartan (COZAAR) 50 MG tablet Take 1 tablet (50 mg total) by mouth once daily. 90 tablet 3    rifAXIMin (XIFAXAN) 550 mg Tab Take 1 tablet (550 mg total) by mouth 2 (two) times daily. 60 tablet 6    SITagliptan (JANUVIA) 50 MG Tab Take 1 tablet (50 mg total) by mouth once daily. 30 tablet 3    lactulose (CHRONULAC) 10 gram/15 mL solution Take 15 mLs (10 g total) by mouth 2 (two) times daily. 473 mL 1     No current facility-administered medications on file prior to visit.        Review of patient's allergies indicates:   Allergen  Reactions    Abilify [aripiprazole] Other (See Comments)     Sleepy, could not function.       Physical Exam:  General: Well-developed, well-appearing, no acute distress  Neuro: alert and oriented to person, place, time; normal appearing gait  Eyes: No scleral icterus, pupils equally round, normal-appearing conjunctiva  Neck: supple; no cervical lymphadenopathy  CVS: regular rate and rhythm; no murmurs  Lungs: clear to auscultation bilaterally; no labored breathing, no wheezes  Abdomen: soft, non-distended, non-tender, no rebound tenderness or guarding, nomal bowel sounds  Extremities: no cyanosis, edema, or clubbing  Skin: no rash, no jaundice        Laboratory:   Lab Results   Component Value Date    ALT 9 (L) 02/21/2017    AST 25 02/21/2017    ALKPHOS 125 02/21/2017    BILITOT 1.0 02/21/2017     Lab Results   Component Value Date    WBC 3.46 (L) 02/21/2017    HGB 11.0 (L) 02/21/2017    HCT 34.7 (L) 02/21/2017    MCV 87 02/21/2017     (L) 02/21/2017     Lab Results   Component Value Date    INR 1.1 11/26/2016    INR 1.2 11/23/2016       Imaging:  Reviewed prior EGD report and images    Assessment/Plan:  Gastric varices- We discussed outcomes of no treatment for large gastric varices- about 25% rate of bleeding at 2yrs. We discussed options for endoscopic treatment that we have used at [facility]- specifically EUS-guided glue/coil treatment. A recent study that was published by my prior group demonstrated excellent success in GV eradication with EUS-guided glue/coil treatment, including for primary prophylactic treatment. We discussed that use of glue in this scenario is considered off-label, but that there is a large body of world-wide evidence in support of the efficacy and safety of intravariceal glue injection. The combination of coil + glue should theoretically decrease one of the most concerning possible risks- embolization. Even is embolization occurs, this is usually subclinical to the lungs. A recent  cardiac echo with bubble study confirms no intracardiac shunt- the presence of which would be more concerning for a possible cerebral embolic event from glue.    The patient did inquire about performing lower endoscopy at the same session. Dr. Talavera is tentatively planning snare resection of granulation tissue in the colon. I am concerned about performing interventions in two areas should the pt subsequently have post-procedure bleeding. It may be best to do these on separate days.    The impression and plan was discussed in detail with the patient and his wife. All questions have been answered and the patient voices understanding of our plan at this point. The risk of the procedure was discussed in detail which includes but not limited to bleeding, infection, inflammation, perforation, and death. An opportunity to ask questions was offered, and all questions were answered to the patient's satisfaction.    Aneudy Perla MD, [facility]  Section Head of Advanced Endoscopy  Department of Gastroenterology

## 2017-03-21 NOTE — LETTER
March 21, 2017      Renato Womack MD  [address]  [facility] [id number]           [facility] - Gastroenterology  [address]  [facility] [address]  Phone: [phone]  Fax: [phone]          Patient: Kenneth Sheikh Jr.   MR Number: [id number]   YOB: 1940   Date of Visit: 3/21/2017       Dear Dr. Renato Womack:    Thank you for referring Kenneth Sheikh to me for evaluation. Attached you will find relevant portions of my assessment and plan of care.    If you have questions, please do not hesitate to call me. I look forward to following Kenneth Sheikh along with you.    Sincerely,    Aneudy Perla MD    Enclosure  CC:  No Recipients    If you would like to receive this communication electronically, please contact [email or web address] or [phone] to request more information on [employer] Link access.    For providers and/or their staff who would like to refer a patient to [facility], please contact us through our one-stop-shop provider referral line, [facility], at [phone].    If you feel you have received this communication in error or would no longer like to receive these types of communications, please e-mail [email or web address]

## 2017-03-22 ENCOUNTER — TELEPHONE (OUTPATIENT)
Dept: ENDOSCOPY | Facility: HOSPITAL | Age: 77
End: 2017-03-22

## 2017-03-22 ENCOUNTER — TELEPHONE (OUTPATIENT)
Dept: HEPATOLOGY | Facility: CLINIC | Age: 77
End: 2017-03-22

## 2017-03-22 ENCOUNTER — LAB VISIT (OUTPATIENT)
Dept: LAB | Facility: HOSPITAL | Age: 77
End: 2017-03-22
Attending: INTERNAL MEDICINE
Payer: MEDICARE

## 2017-03-22 ENCOUNTER — OFFICE VISIT (OUTPATIENT)
Dept: HEPATOLOGY | Facility: CLINIC | Age: 77
End: 2017-03-22
Payer: MEDICARE

## 2017-03-22 VITALS
DIASTOLIC BLOOD PRESSURE: 71 MMHG | SYSTOLIC BLOOD PRESSURE: 159 MMHG | OXYGEN SATURATION: 99 % | HEIGHT: 68 IN | BODY MASS INDEX: 25.92 KG/M2 | TEMPERATURE: 96 F | RESPIRATION RATE: 20 BRPM | WEIGHT: 171.06 LBS | HEART RATE: 55 BPM

## 2017-03-22 DIAGNOSIS — K75.81 LIVER CIRRHOSIS SECONDARY TO NASH (NONALCOHOLIC STEATOHEPATITIS): Primary | ICD-10-CM

## 2017-03-22 DIAGNOSIS — I86.4 GASTRIC VARICES: Primary | ICD-10-CM

## 2017-03-22 DIAGNOSIS — K74.60 LIVER CIRRHOSIS SECONDARY TO NASH (NONALCOHOLIC STEATOHEPATITIS): ICD-10-CM

## 2017-03-22 DIAGNOSIS — K75.81 LIVER CIRRHOSIS SECONDARY TO NASH (NONALCOHOLIC STEATOHEPATITIS): ICD-10-CM

## 2017-03-22 DIAGNOSIS — R18.8 ASCITES OF LIVER: ICD-10-CM

## 2017-03-22 DIAGNOSIS — K74.60 LIVER CIRRHOSIS SECONDARY TO NASH (NONALCOHOLIC STEATOHEPATITIS): Primary | ICD-10-CM

## 2017-03-22 LAB
AFP SERPL-MCNC: 1.5 NG/ML
ALBUMIN SERPL BCP-MCNC: 2.5 G/DL
ALP SERPL-CCNC: 114 U/L
ALT SERPL W/O P-5'-P-CCNC: 9 U/L
ANION GAP SERPL CALC-SCNC: 9 MMOL/L
AST SERPL-CCNC: 22 U/L
BASOPHILS # BLD AUTO: 0.02 K/UL
BASOPHILS NFR BLD: 0.6 %
BILIRUB SERPL-MCNC: 1 MG/DL
BUN SERPL-MCNC: 37 MG/DL
CALCIUM SERPL-MCNC: 9 MG/DL
CHLORIDE SERPL-SCNC: 106 MMOL/L
CO2 SERPL-SCNC: 26 MMOL/L
CREAT SERPL-MCNC: 1.4 MG/DL
DIFFERENTIAL METHOD: ABNORMAL
EOSINOPHIL # BLD AUTO: 0.1 K/UL
EOSINOPHIL NFR BLD: 1.7 %
ERYTHROCYTE [DISTWIDTH] IN BLOOD BY AUTOMATED COUNT: 14.9 %
EST. GFR  (AFRICAN AMERICAN): 54.9 ML/MIN/1.73 M^2
EST. GFR  (NON AFRICAN AMERICAN): 47.4 ML/MIN/1.73 M^2
GLUCOSE SERPL-MCNC: 118 MG/DL
HCT VFR BLD AUTO: 31.6 %
HGB BLD-MCNC: 10 G/DL
INR PPP: 1.3
LYMPHOCYTES # BLD AUTO: 0.6 K/UL
LYMPHOCYTES NFR BLD: 17.8 %
MCH RBC QN AUTO: 26.5 PG
MCHC RBC AUTO-ENTMCNC: 31.6 %
MCV RBC AUTO: 84 FL
MONOCYTES # BLD AUTO: 1 K/UL
MONOCYTES NFR BLD: 27.3 %
NEUTROPHILS # BLD AUTO: 1.8 K/UL
NEUTROPHILS NFR BLD: 52 %
PLATELET # BLD AUTO: 108 K/UL
PMV BLD AUTO: 9.8 FL
POTASSIUM SERPL-SCNC: 3.9 MMOL/L
PROT SERPL-MCNC: 7.5 G/DL
PROTHROMBIN TIME: 13.7 SEC
RBC # BLD AUTO: 3.77 M/UL
SODIUM SERPL-SCNC: 141 MMOL/L
WBC # BLD AUTO: 3.48 K/UL

## 2017-03-22 PROCEDURE — 99999 PR PBB SHADOW E&M-EST. PATIENT-LVL III: CPT | Mod: PBBFAC,,, | Performed by: INTERNAL MEDICINE

## 2017-03-22 PROCEDURE — 1126F AMNT PAIN NOTED NONE PRSNT: CPT | Mod: S$GLB,,, | Performed by: INTERNAL MEDICINE

## 2017-03-22 PROCEDURE — 1157F ADVNC CARE PLAN IN RCRD: CPT | Mod: S$GLB,,, | Performed by: INTERNAL MEDICINE

## 2017-03-22 PROCEDURE — 1160F RVW MEDS BY RX/DR IN RCRD: CPT | Mod: S$GLB,,, | Performed by: INTERNAL MEDICINE

## 2017-03-22 PROCEDURE — 36415 COLL VENOUS BLD VENIPUNCTURE: CPT

## 2017-03-22 PROCEDURE — 3078F DIAST BP <80 MM HG: CPT | Mod: S$GLB,,, | Performed by: INTERNAL MEDICINE

## 2017-03-22 PROCEDURE — 80053 COMPREHEN METABOLIC PANEL: CPT

## 2017-03-22 PROCEDURE — 85610 PROTHROMBIN TIME: CPT

## 2017-03-22 PROCEDURE — 3077F SYST BP >= 140 MM HG: CPT | Mod: S$GLB,,, | Performed by: INTERNAL MEDICINE

## 2017-03-22 PROCEDURE — 99214 OFFICE O/P EST MOD 30 MIN: CPT | Mod: S$GLB,,, | Performed by: INTERNAL MEDICINE

## 2017-03-22 PROCEDURE — 85025 COMPLETE CBC W/AUTO DIFF WBC: CPT

## 2017-03-22 PROCEDURE — 82105 ALPHA-FETOPROTEIN SERUM: CPT

## 2017-03-22 PROCEDURE — 1159F MED LIST DOCD IN RCRD: CPT | Mod: S$GLB,,, | Performed by: INTERNAL MEDICINE

## 2017-03-22 NOTE — PROGRESS NOTES
"Subjective:       Patient ID: Kenneth Sheikh Jr. is a 77 y.o. male.    Chief Complaint: Cirrhosis    HPI  I saw this 77 y.o. man I had met in hospital recently when he was admitted with lethargy and some confusion.    I suspect that it may have been related to hepatic encephalopathy.    Admitted because of increasing lethargy +++/confusion/dizziness/dysarthria  - Nov 2016  - imaging showed ascites    - imaging earlier this year showed some liver abnormalities with hepatic steatosis     The patient denies any history of liver disease. No significant alcohol use ("a couple beers when I was younger"). No family history of liver disease. No new medications.       Patient has risk factors for MCDONALD, which can progress to cirrhosis, which could explain ascites, fatigue, and pancytopenia.     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    Investigations:  Platelets 150-160  Normal LFTs, bilirubin  HBV/HCV neg    CT abdo: 11/26/2016  Normal liver but ascites present    Abdo US: 11/25/2016  Hepatic steatosis.  Splenomegaly.  Moderate volume ascites.    MELD-Na score: 7 at 11/27/2016  4:17 AM  MELD score: 7 at 11/27/2016  4:17 AM  Calculated from:  Serum Creatinine: 0.9 mg/dL (Rounded to 1) at 11/27/2016  4:17 AM  Serum Sodium: 139 mmol/L (Rounded to 137) at 11/27/2016  4:17 AM  Total Bilirubin: 0.7 mg/dL (Rounded to 1) at 11/27/2016  4:17 AM  INR(ratio): 1.1 at 11/26/2016  3:53 AM  Age: 76 years        PMH:  DM  Sinus Ca- 1996  Rectal Ca- June 2016    SH:  Retired  Ex [employer] worker      FH:  Aunt had cirrhosis- non- alcohol      Review of Systems   Constitutional: Negative for activity change, appetite change, chills, fatigue, fever and unexpected weight change.   HENT: Negative for hearing loss.    Eyes: Negative for discharge and visual disturbance.   Respiratory: Negative for cough, chest tightness, shortness " of breath and wheezing.    Cardiovascular: Negative for chest pain, palpitations and leg swelling.   Gastrointestinal: Negative for abdominal distention, abdominal pain, constipation, diarrhea and nausea.   Genitourinary: Negative for dysuria and frequency.   Musculoskeletal: Negative for arthralgias and back pain.   Skin: Negative for pallor and rash.   Neurological: Negative for dizziness, tremors, speech difficulty and headaches.   Hematological: Negative for adenopathy.   Psychiatric/Behavioral: Negative for agitation and confusion.           Lab Results   Component Value Date    ALT 9 (L) 02/21/2017    AST 25 02/21/2017    ALKPHOS 125 02/21/2017    BILITOT 1.0 02/21/2017     Past Medical History:   Diagnosis Date    Anticoagulant long-term use     Bipolar 1 disorder     Bipolar 1 disorder 11/24/2016    bipolar    Cancer     history of skin cancer/sinus cancer & rectal cancer    Depressed bipolar affective disorder     Diabetes mellitus     type 2    EBV infection 12/7/2016    EBV DNA, PCR Latest Ref Range: None detected  None detected EBV DNA-Copies/mL Unknown Test Not Performed EBV Early Antigen Ab, IgG Latest Ref Range: <1:10 Titer 1:40 (A) EBV Nuclear Ag Ab Latest Ref Range: <1:5 Titer >=1:80 (A) EBV VCA IgG Latest Ref Range: <1:10 Titer 1:160 (A) EBV VCA IgM Latest Ref Range: <1:10 Titer <1:10     History of TIA (transient ischemic attack)     several-taking Plavix    Hx of gallstones     Hypertension     Hyperthyroidism 11/30/2016    Low HDL (under 40) 12/7/2016    Mixed hyperlipidemia 11/23/2016    JUAN MANUEL (obstructive sleep apnea)     Stroke     Transient cerebral ischemia 7/22/2016     Past Surgical History:   Procedure Laterality Date    Moh's procedure x4      Sinus surgery for sinus cancer      sinus sx for cancer      skin cancer removed-several times-nose & ear      TONSILLECTOMY      Undescened testicle sx      UVULOPALATOPHARYNGOPLASTY      for sleep apnea     Current  Outpatient Prescriptions   Medication Sig    cefUROXime (CEFTIN) 250 MG tablet Take 250 mg by mouth 2 (two) times daily.    dextromethorphan-guaifenesin  mg (MUCINEX DM)  mg per 12 hr tablet Take 1 tablet by mouth every 12 (twelve) hours.    divalproex (DEPAKOTE) 250 MG 24 hr tablet Take 750 mg by mouth once daily.    furosemide (LASIX) 20 MG tablet Take 3 tablets (60 mg total) by mouth once daily.    lactulose (CHRONULAC) 10 gram/15 mL solution Take 15 mLs (10 g total) by mouth 2 (two) times daily.    losartan (COZAAR) 50 MG tablet Take 1 tablet (50 mg total) by mouth once daily.    rifAXIMin (XIFAXAN) 550 mg Tab Take 1 tablet (550 mg total) by mouth 2 (two) times daily.    SITagliptan (JANUVIA) 50 MG Tab Take 1 tablet (50 mg total) by mouth once daily.     No current facility-administered medications for this visit.        Objective:      Physical Exam   Constitutional: He is oriented to person, place, and time. He appears well-nourished.   HENT:   Head: Normocephalic.   Eyes: Pupils are equal, round, and reactive to light.   Neck: No thyromegaly present.   Cardiovascular: Normal rate, regular rhythm and normal heart sounds.    Pulmonary/Chest: Effort normal and breath sounds normal. He has no wheezes.   Abdominal: Soft. He exhibits distension. He exhibits no mass. There is no tenderness.   Mild ascites.   Musculoskeletal: He exhibits edema.   Lymphadenopathy:     He has no cervical adenopathy.   Neurological: He is alert and oriented to person, place, and time.   Skin: Skin is warm. No rash noted. No erythema.   Psychiatric: He has a normal mood and affect. His behavior is normal.       Assessment:       1. 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 Ascites of liver        Plan:   - discussed the dx of cirrhosis and the implications of this  - has responded to diuretics and has very little edema but still has mild ascites.  - he is not on deshawn because he is  currently on losartan- losartan has also been used for its portal hypotensive effect.  - seen by Dr Perla who will arrange for coiling and gluing of his GV.    Watery diarrhea since starting rifaximin  - also on cefuroxime for sinus infection    Advised to stop rifaximin and reconsider after he comes off cefuroxime- but at 1 tab daily.    Labs today  Clinic in 4 weeks.     MARIA G GarciaYazidi

## 2017-03-22 NOTE — TELEPHONE ENCOUNTER
----- Message from Aneudy Perla MD sent at 3/22/2017  1:09 PM CDT -----  Thanks Rashmi Reinoso- Please go ahead and schedule upper EUS-glue/coil of gastric varix.    ----- Message -----     From: Haris Talavera MD     Sent: 3/22/2017   9:51 AM       To: Aneudy Perla MD    No problem.  Do your thing - mine can wait until he is over that.    cbw  ----- Message -----     From: Aneudy Perla MD     Sent: 3/21/2017   4:06 PM       To: Alicia Rouse MA, MD [facility]- Saw this pt today. I know you wanted to coordinate upper and lower endoscopies for him. If we do coordinate, I'm just concerned about assessing post-procedure bleeding from two sites in this pt with cirrhosis/portal HTN. I know that I'll be planning to place embolization coils and glue into this large gastric varix. There is some risk of bleeding just with this procedure, and depending on how things go, I may even admit for observation. If there's a good chance that his granulation tissue on colonoscopy needs snare resection, it may be best to perform these on separate days. He's OK with it if we do these separately. Let me know if that's OK with you. Thanks.

## 2017-03-30 ENCOUNTER — TELEPHONE (OUTPATIENT)
Dept: GASTROENTEROLOGY | Facility: CLINIC | Age: 77
End: 2017-03-30

## 2017-04-06 ENCOUNTER — OFFICE VISIT (OUTPATIENT)
Dept: INTERNAL MEDICINE | Facility: CLINIC | Age: 77
End: 2017-04-06
Payer: MEDICARE

## 2017-04-06 ENCOUNTER — TELEPHONE (OUTPATIENT)
Dept: INTERNAL MEDICINE | Facility: CLINIC | Age: 77
End: 2017-04-06

## 2017-04-06 VITALS
OXYGEN SATURATION: 97 % | HEART RATE: 55 BPM | HEIGHT: 68 IN | WEIGHT: 165.81 LBS | SYSTOLIC BLOOD PRESSURE: 118 MMHG | BODY MASS INDEX: 25.13 KG/M2 | DIASTOLIC BLOOD PRESSURE: 58 MMHG

## 2017-04-06 DIAGNOSIS — I10 ESSENTIAL HYPERTENSION: ICD-10-CM

## 2017-04-06 DIAGNOSIS — E11.9 TYPE 2 DIABETES MELLITUS WITHOUT COMPLICATION, WITHOUT LONG-TERM CURRENT USE OF INSULIN: ICD-10-CM

## 2017-04-06 DIAGNOSIS — F31.9 BIPOLAR 1 DISORDER: ICD-10-CM

## 2017-04-06 DIAGNOSIS — D61.818 PANCYTOPENIA: ICD-10-CM

## 2017-04-06 DIAGNOSIS — K74.60 LIVER CIRRHOSIS SECONDARY TO NASH (NONALCOHOLIC STEATOHEPATITIS): Primary | ICD-10-CM

## 2017-04-06 DIAGNOSIS — K75.81 LIVER CIRRHOSIS SECONDARY TO NASH (NONALCOHOLIC STEATOHEPATITIS): Primary | ICD-10-CM

## 2017-04-06 PROCEDURE — 3074F SYST BP LT 130 MM HG: CPT | Mod: S$GLB,,, | Performed by: INTERNAL MEDICINE

## 2017-04-06 PROCEDURE — 99214 OFFICE O/P EST MOD 30 MIN: CPT | Mod: S$GLB,,, | Performed by: INTERNAL MEDICINE

## 2017-04-06 PROCEDURE — 1157F ADVNC CARE PLAN IN RCRD: CPT | Mod: S$GLB,,, | Performed by: INTERNAL MEDICINE

## 2017-04-06 PROCEDURE — 3078F DIAST BP <80 MM HG: CPT | Mod: S$GLB,,, | Performed by: INTERNAL MEDICINE

## 2017-04-06 PROCEDURE — 1160F RVW MEDS BY RX/DR IN RCRD: CPT | Mod: S$GLB,,, | Performed by: INTERNAL MEDICINE

## 2017-04-06 PROCEDURE — 1159F MED LIST DOCD IN RCRD: CPT | Mod: S$GLB,,, | Performed by: INTERNAL MEDICINE

## 2017-04-06 PROCEDURE — 1126F AMNT PAIN NOTED NONE PRSNT: CPT | Mod: S$GLB,,, | Performed by: INTERNAL MEDICINE

## 2017-04-06 PROCEDURE — 99999 PR PBB SHADOW E&M-EST. PATIENT-LVL III: CPT | Mod: PBBFAC,,, | Performed by: INTERNAL MEDICINE

## 2017-04-06 RX ORDER — BLOOD SUGAR DIAGNOSTIC
STRIP MISCELLANEOUS
COMMUNITY
Start: 2017-02-23 | End: 2018-05-25 | Stop reason: SDUPTHER

## 2017-04-06 NOTE — MR AVS SNAPSHOT
[facility]  [address]  [facility] [address]  Phone: [phone]  Fax: [phone]                  Kenneth Sheikh Jr.   2017 10:40 AM   Office Visit    Description:  Male : 1940   Provider:  Prisca Persaud MD   Department:  [facility] - Internal Medicine           Reason for Visit     Follow-up           Diagnoses this Visit        Comments    Type 2 diabetes mellitus without complication, without long-term current use of insulin                To Do List           Future Appointments        Provider Department Dept Phone    2017 11:30 AM LAB, APPOINTMENT [facility] [phone]    2017 11:00 AM Renato Womack MD [facility] - Hepatology [phone]      Goals (5 Years of Data)     None      Follow-Up and Disposition     Return in about 2 months (around 2017).       These Medications        Disp Refills Start End    SITagliptan (JANUVIA) 50 MG Tab 90 tablet 3 2017     Take 1 tablet (50 mg total) by mouth once daily. - Oral    [facility] - [address] Ph #: [phone]         [facility] On Call     [facility] On [facility] Nurse Care Line -  Assistance  Unless otherwise directed by your provider, please contact [facility], our nurse care line that is available for  assistance.     Registered nurses in the [facility] Center provide: appointment scheduling, clinical advisement, health education, and other advisory services.  Call: [phone] (toll free)               Medications           Message regarding Medications     Verify the changes and/or additions to your medication regime listed below are the same as discussed with your clinician today.  If any of these changes or additions are incorrect, please notify your healthcare provider.             Verify that the below list of medications is an accurate representation of the medications you are  "currently taking.  If none reported, the list may be blank. If incorrect, please contact your healthcare provider. Carry this list with you in case of emergency.           Current Medications     ACCU-CHEK NEYDA PLUS TEST STRP Strp     cefUROXime (CEFTIN) 250 MG tablet Take 250 mg by mouth 2 (two) times daily.    dextromethorphan-guaifenesin  mg (MUCINEX DM)  mg per 12 hr tablet Take 1 tablet by mouth every 12 (twelve) hours.    divalproex (DEPAKOTE) 250 MG 24 hr tablet Take 750 mg by mouth once daily.    furosemide (LASIX) 20 MG tablet Take 3 tablets (60 mg total) by mouth once daily.    lactulose (CHRONULAC) 10 gram/15 mL solution Take 15 mLs (10 g total) by mouth 2 (two) times daily.    losartan (COZAAR) 50 MG tablet Take 1 tablet (50 mg total) by mouth once daily.    rifAXIMin (XIFAXAN) 550 mg Tab Take 1 tablet (550 mg total) by mouth 2 (two) times daily.    SITagliptan (JANUVIA) 50 MG Tab Take 1 tablet (50 mg total) by mouth once daily.           Clinical Reference Information           Your Vitals Were     BP Pulse Height Weight SpO2 BMI    118/58 (BP Location: Left arm, Patient Position: Sitting) 55 5' 8" (1.727 m) 75.2 kg (165 lb 12.6 oz) 97% 25.21 kg/m2      Blood Pressure          Most Recent Value    BP  (!)  118/58      Allergies as of 4/6/2017     Abilify [Aripiprazole]      Immunizations Administered on Date of Encounter - 4/6/2017     None      Orders Placed During Today's Visit     Future Labs/Procedures Expected by Expires    Hemoglobin A1c  4/6/2017 6/5/2018      [facility] Sign-Up     Activating your [facility] account is as easy as 1-2-3!     1) Visit [email or web address], select Sign Up Now, enter this activation code and your date of birth, then select Next.  [email or web address]  Expires: 5/21/2017 11:14 AM      2) Create a username and password to use when you visit [facility] in the future and select a security question in case you lose your password and select Next.    3) Enter your e-mail " address and click Sign Up!    Additional Information  If you have questions, please e-mail [email or web address] or call [phone] to talk to our [facility] staff. Remember, [facility] is NOT to be used for urgent needs. For medical emergencies, dial 911.         Language Assistance Services     ATTENTION: Language assistance services are available, free of charge. Please call [phone].      ATENCIÓN: Si habla ramonañol, tiene a bell disposición servicios gratuitos de asistencia lingüística. Llame al [phone].     CHÚ Ý: N?u b?n nói Ti?ng Vi?t, có các d?ch v? h? tr? ngôn ng? mi?n phí dành cho b?n. G?i s? [phone].         [facility] complies with applicable Federal civil rights laws and does not discriminate on the basis of race, color, national origin, age, disability, or sex.

## 2017-04-06 NOTE — TELEPHONE ENCOUNTER
----- Message from Dallas Tena sent at 4/6/2017 11:04 AM CDT -----  Contact: Donna/ Pharmacy Patient assistance program./ [phone]   Patient assistance is calling to inform the office that [employer] will be sending in some paperwork for the pt's medication Januvia and she needs to have the office call her when the paperwork comes in so that she can pick the paperwork up.  If you have any questions, please feel free to call Ms. Thompson at the ext above.  Please call and advise.    Thank you

## 2017-04-06 NOTE — PROGRESS NOTES
Internal Medicine    Subjective:      Patient ID: Kenneth Sheikh Jr. is a 77 y.o. male.    Chief Complaint: Follow-up    HPI Comments: Presents for follow up appointment.  Last seen 2/6/17.      Cirrhosis 2/2 MCDONALD:  Diagnosed by Hepatology on 12/20 after fibroscan.  Followed by Dr. Womack.  Taking Rifaximin once daily - has BM twice daily.  Denies recent confusion.  Swelling improved since last appointment - taking Lasix 60mg daily and Losartan 50mg daily.  Scheduled for EUS-glue/coil therapy with Dr. Perla - 4/19/17.     Pancytopenia:  Followed by Heme-Onc.  Last seen 2/21/17.  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   HTN: Taking Losartan 50mg daily.     HLD:  Stopped fenofibrate per Dr. Sheikh due to low cholesterol.  Now taking fish oil.      DM-2: Off metformin due to elevated lactate and diarrhea.  Now taking Januvia 50mg daily by Dr. Sheikh.  FSG's 80's to low 100's.     TIAs:  Taking ASA 81mg daily.  No symptoms recently     Bipolar d/o: Taking Depakote 500mg qHS.  Followed by psychiatrist, Dr. Figueroa.     H/o Rectal cancer s/p resection 06/2016:  Seen by colorectal surgery 12/16/16 - repeat biopsy taken which was negative per patient.      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 Camacho.  Had recent Mohs at [facility].      Review of Systems   Constitutional: Negative for appetite change, chills, fatigue, fever and unexpected weight change.   HENT: Positive for hearing loss and sore throat.    Eyes: Negative for visual disturbance.   Respiratory: Negative for cough, chest tightness, shortness of breath and wheezing.    Cardiovascular: Negative for chest pain, palpitations and leg swelling (Significantly improved).   Gastrointestinal: Negative for abdominal pain, blood in stool, constipation, diarrhea, nausea and vomiting.   Genitourinary: Negative for difficulty  "urinating.   Musculoskeletal: Negative for arthralgias and myalgias.   Skin: Negative for rash.   Neurological: Negative for dizziness, weakness, numbness and headaches.   Hematological: Negative for adenopathy.   Psychiatric/Behavioral: Negative for behavioral problems and confusion.       Past medical history, surgical history, and family medical history reviewed and updated as appropriate.    Medications and allergies reviewed.     Objective:     BP (!) 118/58 (BP Location: Left arm, Patient Position: Sitting)  Pulse (!) 55  Ht 5' 8" (1.727 m)  Wt 75.2 kg (165 lb 12.6 oz)  SpO2 97%  BMI 25.21 kg/m2  Physical Exam   Constitutional: He is oriented to person, place, and time. He appears well-developed and well-nourished. No distress.   HENT:   Head: Normocephalic and atraumatic.   Eyes: Conjunctivae and EOM are normal. No scleral icterus.   Cardiovascular: Normal rate and regular rhythm.  Exam reveals no gallop and no friction rub.    No murmur heard.  Pulmonary/Chest: Effort normal and breath sounds normal. No respiratory distress. He has no wheezes. He has no rales.   Abdominal: Soft. Bowel sounds are normal. He exhibits distension. He exhibits no mass. There is no tenderness. There is no rebound and no guarding.   Musculoskeletal: He exhibits no edema or tenderness.   Neurological: He is alert and oriented to person, place, and time.   Skin: No rash noted. No erythema.   Psychiatric: He has a normal mood and affect. His behavior is normal.       Assessment:     1. 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 Pancytopenia    3. Type 2 diabetes mellitus without complication, without long-term current use of insulin    4. Essential hypertension    5. Bipolar 1 disorder        Plan:   Kenneth was seen today for follow-up.    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    Pancytopenia   Continue to monitor.    Type 2 diabetes mellitus without complication, without long-term current use of insulin  -     SITagliptan (JANUVIA) 50 MG Tab; Take 1 tablet (50 mg total) by mouth once daily.  -     Hemoglobin A1c; Future; Expected date: 4/6/17    Essential hypertension   Continue medications as discussed in HPI.    Bipolar 1 disorder   Continue Depakote as per Psychiatrist.  Mood stable at this time.    Return in about 2 months (around 6/6/2017).    Prisca Persaud MD  Internal Medicine  [facility]

## 2017-04-07 NOTE — TELEPHONE ENCOUNTER
Spoke with pt in reference to his lab results. Pt understood results and pt also understood to keep taking Januvia at the same dosage

## 2017-04-07 NOTE — TELEPHONE ENCOUNTER
Please call patient and let him know that HgA1c from yesterday looked good.  It is 6.7.  Continue the Januvia at the same dose.

## 2017-04-10 ENCOUNTER — TELEPHONE (OUTPATIENT)
Dept: INTERNAL MEDICINE | Facility: CLINIC | Age: 77
End: 2017-04-10

## 2017-04-10 ENCOUNTER — TELEPHONE (OUTPATIENT)
Dept: ENDOSCOPY | Facility: HOSPITAL | Age: 77
End: 2017-04-10

## 2017-04-10 DIAGNOSIS — I85.00 ESOPHAGEAL VARICES WITHOUT BLEEDING, UNSPECIFIED ESOPHAGEAL VARICES TYPE: Primary | ICD-10-CM

## 2017-04-10 DIAGNOSIS — K74.60 HEPATIC CIRRHOSIS, UNSPECIFIED HEPATIC CIRRHOSIS TYPE: ICD-10-CM

## 2017-04-10 NOTE — TELEPHONE ENCOUNTER
Please call patient and let her know that I will send a message to the nurse who called her earlier.  I do not see any imaging in the patient's chart that says he has gallstones, so I would like to see where the nurse got this information.

## 2017-04-10 NOTE — TELEPHONE ENCOUNTER
Pt wife is concerned about Junevia 50mg, because the pt is having surgery on the 19th, the nurse who called before his surgery the nurse mention that the patient has gallstones and she's unaware of him ever being diagnosed with gallstones. Also the given medication states do not take if pt has gallstones. Please Advise

## 2017-04-10 NOTE — TELEPHONE ENCOUNTER
Attempted to contact patient regarding EUS scheduled 4/19/17 at 1000, spoke with patient's wife, Ginna. Reviewed patient's medical history, allergies, and medications. Verbally reviewed prep instructions with wife. Wife verbalized understanding. Prep instructions and lab appointment slip mailed to patient. Wife has no further questions at this time.

## 2017-04-11 ENCOUNTER — TELEPHONE (OUTPATIENT)
Dept: INTERNAL MEDICINE | Facility: CLINIC | Age: 77
End: 2017-04-11

## 2017-04-11 NOTE — TELEPHONE ENCOUNTER
Please call patient's wife back and let her know that I spoke to the nurse she spoke with yesterday.  Since the patient will be fasting for his procedure on 4/19/17, he is not to take Januvia that morning before the procedure.  He is to resume Januvia once he is eating again.  Please make sure that the patient and his wife understood these instructions.

## 2017-04-11 NOTE — TELEPHONE ENCOUNTER
"----- Message from Ernestine Garzon RN sent at 4/11/2017  9:22 AM CDT -----  Regarding: RE: Gallstones  Hi Prisca,    Part of the scheduling process, is to review the medical history listed in patient's chart and to update it accordingly. Gall stones was entered in previously under patient's medical history, although I'm not able to tell who entered this information. I did make a note next to this entry stating that his wife denied this. As far as stopping the Januvia, patient will be fasting for his EUS. Our diabetic instructions states "Do not take morning of procedure. Take when you start eating again." This was explained to patient's wife, and a written copy was mailed to patient. Please let me know if you need me to explain anything further.    Thank you,  Ernestine  ----- Message -----     From: Prisca Persaud MD     Sent: 4/10/2017   4:30 PM       To: Ernestine Garzon RN  Subject: Gallstones                                       Hi Ernestine,     I got a message from this patient's wife stating that during yall's conversation this morning she would told her  has gallstones and should stop Januvia.  I could not find any imaging in his record that shows gallstones.  Let me know what imaging you were looking at for the gallstones or whether the wife just misunderstood the conversation.  Just want to make sure I give them the correct info.    Thanks so much!  Prisca Persaud"

## 2017-04-11 NOTE — TELEPHONE ENCOUNTER
Spoke with pt's wife and notified her of the given instructions for the pt's procedure on April 19,2017. Pt's wife stated she understood the given instructions she also understood not to give the pt Januvia and to continue medication once pt is eating again

## 2017-04-19 ENCOUNTER — SURGERY (OUTPATIENT)
Age: 77
End: 2017-04-19

## 2017-04-19 ENCOUNTER — ANESTHESIA EVENT (OUTPATIENT)
Dept: ENDOSCOPY | Facility: HOSPITAL | Age: 77
End: 2017-04-19
Payer: MEDICARE

## 2017-04-19 ENCOUNTER — NURSE TRIAGE (OUTPATIENT)
Dept: ADMINISTRATIVE | Facility: CLINIC | Age: 77
End: 2017-04-19

## 2017-04-19 ENCOUNTER — HOSPITAL ENCOUNTER (OUTPATIENT)
Facility: HOSPITAL | Age: 77
Discharge: HOME | End: 2017-04-19
Attending: INTERNAL MEDICINE | Admitting: INTERNAL MEDICINE
Payer: MEDICARE

## 2017-04-19 ENCOUNTER — ANESTHESIA (OUTPATIENT)
Dept: ENDOSCOPY | Facility: HOSPITAL | Age: 77
End: 2017-04-19
Payer: MEDICARE

## 2017-04-19 VITALS
WEIGHT: 165 LBS | OXYGEN SATURATION: 100 % | HEIGHT: 68 IN | HEART RATE: 80 BPM | BODY MASS INDEX: 25.01 KG/M2 | SYSTOLIC BLOOD PRESSURE: 122 MMHG | DIASTOLIC BLOOD PRESSURE: 70 MMHG | TEMPERATURE: 98 F | RESPIRATION RATE: 18 BRPM

## 2017-04-19 DIAGNOSIS — I86.4 GASTRIC VARICES: Primary | ICD-10-CM

## 2017-04-19 LAB — POCT GLUCOSE: 104 MG/DL (ref 70–110)

## 2017-04-19 PROCEDURE — 37000008 HC ANESTHESIA 1ST 15 MINUTES: Performed by: INTERNAL MEDICINE

## 2017-04-19 PROCEDURE — 37244 VASC EMBOLIZE/OCCLUDE BLEED: CPT | Mod: 52,,, | Performed by: INTERNAL MEDICINE

## 2017-04-19 PROCEDURE — 25000003 PHARM REV CODE 250: Performed by: NURSE ANESTHETIST, CERTIFIED REGISTERED

## 2017-04-19 PROCEDURE — 82962 GLUCOSE BLOOD TEST: CPT | Performed by: INTERNAL MEDICINE

## 2017-04-19 PROCEDURE — 27201026 HC NEEDLE, EUSN-19QC: Performed by: INTERNAL MEDICINE

## 2017-04-19 PROCEDURE — 43253 EGD US TRANSMURAL INJXN/MARK: CPT | Performed by: INTERNAL MEDICINE

## 2017-04-19 PROCEDURE — 43253 EGD US TRANSMURAL INJXN/MARK: CPT | Mod: 51,,, | Performed by: INTERNAL MEDICINE

## 2017-04-19 PROCEDURE — D9220A PRA ANESTHESIA: Mod: ,,, | Performed by: ANESTHESIOLOGY

## 2017-04-19 PROCEDURE — 43999 UNLISTED PROCEDURE STOMACH: CPT | Performed by: INTERNAL MEDICINE

## 2017-04-19 PROCEDURE — 63600175 PHARM REV CODE 636 W HCPCS: Performed by: NURSE ANESTHETIST, CERTIFIED REGISTERED

## 2017-04-19 PROCEDURE — 37000009 HC ANESTHESIA EA ADD 15 MINS: Performed by: INTERNAL MEDICINE

## 2017-04-19 PROCEDURE — 25000003 PHARM REV CODE 250: Performed by: INTERNAL MEDICINE

## 2017-04-19 RX ORDER — LIDOCAINE HCL/PF 100 MG/5ML
SYRINGE (ML) INTRAVENOUS
Status: DISCONTINUED | OUTPATIENT
Start: 2017-04-19 | End: 2017-04-19

## 2017-04-19 RX ORDER — CIPROFLOXACIN 500 MG/1
500 TABLET ORAL 2 TIMES DAILY
Qty: 10 TABLET | Refills: 0 | Status: SHIPPED | OUTPATIENT
Start: 2017-04-19 | End: 2017-04-25 | Stop reason: ALTCHOICE

## 2017-04-19 RX ORDER — CIPROFLOXACIN 2 MG/ML
INJECTION, SOLUTION INTRAVENOUS
Status: DISCONTINUED | OUTPATIENT
Start: 2017-04-19 | End: 2017-04-19

## 2017-04-19 RX ORDER — GLYCOPYRROLATE 0.2 MG/ML
INJECTION INTRAMUSCULAR; INTRAVENOUS
Status: DISCONTINUED | OUTPATIENT
Start: 2017-04-19 | End: 2017-04-19

## 2017-04-19 RX ORDER — PROPOFOL 10 MG/ML
VIAL (ML) INTRAVENOUS
Status: DISCONTINUED | OUTPATIENT
Start: 2017-04-19 | End: 2017-04-19

## 2017-04-19 RX ORDER — SODIUM CHLORIDE 9 MG/ML
INJECTION, SOLUTION INTRAVENOUS CONTINUOUS
Status: DISCONTINUED | OUTPATIENT
Start: 2017-04-19 | End: 2017-04-19 | Stop reason: HOSPADM

## 2017-04-19 RX ORDER — PHENYLEPHRINE HYDROCHLORIDE 10 MG/ML
INJECTION INTRAVENOUS
Status: DISCONTINUED | OUTPATIENT
Start: 2017-04-19 | End: 2017-04-19

## 2017-04-19 RX ADMIN — PROPOFOL 30 MG: 10 INJECTION, EMULSION INTRAVENOUS at 10:04

## 2017-04-19 RX ADMIN — LIDOCAINE HYDROCHLORIDE 50 MG: 20 INJECTION, SOLUTION INTRAVENOUS at 10:04

## 2017-04-19 RX ADMIN — PROPOFOL 30 MG: 10 INJECTION, EMULSION INTRAVENOUS at 11:04

## 2017-04-19 RX ADMIN — PHENYLEPHRINE HYDROCHLORIDE 100 MCG: 10 INJECTION INTRAVENOUS at 10:04

## 2017-04-19 RX ADMIN — EPHEDRINE SULFATE 10 MG: 50 INJECTION, SOLUTION INTRAMUSCULAR; INTRAVENOUS; SUBCUTANEOUS at 10:04

## 2017-04-19 RX ADMIN — SODIUM CHLORIDE: 0.9 INJECTION, SOLUTION INTRAVENOUS at 09:04

## 2017-04-19 RX ADMIN — GLYCOPYRROLATE 0.2 MG: 0.2 INJECTION, SOLUTION INTRAMUSCULAR; INTRAVENOUS at 10:04

## 2017-04-19 RX ADMIN — EPHEDRINE SULFATE 10 MG: 50 INJECTION, SOLUTION INTRAMUSCULAR; INTRAVENOUS; SUBCUTANEOUS at 11:04

## 2017-04-19 RX ADMIN — SODIUM CHLORIDE: 0.9 INJECTION, SOLUTION INTRAVENOUS at 11:04

## 2017-04-19 RX ADMIN — PHENYLEPHRINE HYDROCHLORIDE 100 MCG: 10 INJECTION INTRAVENOUS at 11:04

## 2017-04-19 RX ADMIN — CIPROFLOXACIN 400 MG: 2 INJECTION, SOLUTION INTRAVENOUS at 10:04

## 2017-04-19 RX ADMIN — PROPOFOL 50 MG: 10 INJECTION, EMULSION INTRAVENOUS at 10:04

## 2017-04-19 NOTE — ANESTHESIA RELEASE NOTE
"Anesthesia Release from PACU Note    Patient: Kenneth Sheikh    Procedure(s) Performed: Procedure(s) (LRB):  ULTRASOUND-ENDOSCOPIC-UPPER/glue coil of gastric varices (N/A)    Anesthesia type: general    Post pain: Adequate analgesia    Post assessment: no apparent anesthetic complications, tolerated procedure well and no evidence of recall    Last Vitals:   Visit Vitals    /77    Pulse 85    Temp 36.6 °C (97.8 °F) (Oral)    Resp 15    Ht 5' 8" (1.727 m)    Wt 74.8 kg (165 lb)    SpO2 96%    BMI 25.09 kg/m2       Post vital signs: stable    Level of consciousness: awake    Nausea/Vomiting: no nausea/no vomiting    Complications: none    Airway Patency: patent    Respiratory: unassisted    Cardiovascular: stable and blood pressure at baseline    Hydration: euvolemic  "

## 2017-04-19 NOTE — PATIENT INSTRUCTIONS
Discharge Summary/Instructions for after an Upper EUS  Patient Name: Kenneth Sheikh  Patient MRN: [id number]  Patient YOB: 1940 Wednesday, April 19, 2017    Aneudy Perla MD  1.  Do Not eat or drink anything for 1 hour.  Try sips of water first.  If   tolerated, resume your regular diet or one recommended by your physician.  2.  Do not drive, operate machinery, make critical decisions, or do   activities that require coordination or balance for 24 hours.  3.  You may experience a sore throat for 24 to 48 hours.  You may use throat   lozenges or gargle with warm salt water to relieve the discomfort.  4.  Because air was put into your stomach during the procedure, you may   experience some belching.  5.  Go directly to the emergency room if you notice any of the following:   Chills and/or fever over 101   Persistent vomiting or vomiting with blood   Severe abdominal pain, other than gas cramps   Severe chest pain   Black, tarry stools  Your doctor recommends these additional instructions:  - Discharge patient to home (ambulatory).   - Resume previous diet; Discharge to home (ambulatory); Resume outpatient   medications  - Abx for 5 days (prescription given).  - Repeat the upper endoscopic ultrasound in 6 weeks for retreatment.  You are being discharged to home.   Your physician has recommended a repeat upper endoscopic ultrasound in six   weeks for retreatment.  Discharge patient to home (ambulatory).   Repeat the upper endoscopic ultrasound in 6 weeks for retreatment.  If you have any questions on the above instructions, call the GI Lab and ask   for an endoscopy nurse.  If you have any problems, please call your physician.  EMERGENCY PHONE NUMBER: [phone]  LAB RESULTS: [phone]  Aneudy ePrla MD  4/19/2017 12:14:21 PM  This report has been verified and signed electronically.

## 2017-04-19 NOTE — TRANSFER OF CARE
"Anesthesia Transfer of Care Note    Patient: Kenneth Sheikh    Procedure(s) Performed: Procedure(s) (LRB):  ULTRASOUND-ENDOSCOPIC-UPPER/glue coil of gastric varices (N/A)    Patient location: [facility]    Anesthesia Type: general    Transport from OR: Transported from OR on 2-3 L/min O2 by NC with adequate spontaneous ventilation    Post pain: adequate analgesia    Post assessment: no apparent anesthetic complications    Post vital signs: stable    Level of consciousness: awake and alert    Nausea/Vomiting: no nausea/vomiting    Complications: none          Last vitals:   Visit Vitals    BP (!) 137/59    Pulse 83    Temp 36.6 °C (97.8 °F) (Oral)    Resp 17    Ht 5' 8" (1.727 m)    Wt 74.8 kg (165 lb)    SpO2 96%    BMI 25.09 kg/m2     "

## 2017-04-19 NOTE — TELEPHONE ENCOUNTER
"  Reason for Disposition   [1] Caller requesting NON-URGENT health information AND [2] PCP's office is the best resource    Protocols used: ST INFORMATION ONLY CALL-A-AH  wife called- states that patient had surgery today and since being home, she hears a "rattling" in his chest. Wife states that it sounds like he needs to cough something up. Pt denies any kind of shortness of breath- no fever- no labored breathing. Wife states that it does not look like the patient is in any kind of respiratory distress. Pt is complaining of a sore throat after surgery. No other complaints from patient.    Please contact patient with further instructions, questions, or concerns.  "

## 2017-04-19 NOTE — ANESTHESIA PREPROCEDURE EVALUATION
04/19/2017  Kenneth Sheikh is a 77 y.o., male.    Anesthesia Evaluation         Review of Systems      Physical Exam  General:  Well nourished    Airway/Jaw/Neck:  Airway Findings: Mouth Opening: Normal Tongue: Normal  General Airway Assessment: Adult  Mallampati: II  Improves to I with phonation.  TM Distance: Normal, at least 6 cm      Dental:  Dental Findings: Upper Dentures   Chest/Lungs:  Chest/Lungs Findings: Clear to auscultation, Normal Respiratory Rate     Heart/Vascular:  Heart Findings: Rate: Normal  Rhythm: Regular Rhythm        Mental Status:  Mental Status Findings:  Cooperative         Anesthesia Plan  Type of Anesthesia, risks & benefits discussed:  Anesthesia Type:  general, MAC  Patient's Preference: either  Intra-op Monitoring Plan:   Intra-op Monitoring Plan Comments:   Post Op Pain Control Plan:   Post Op Pain Control Plan Comments:   Induction:   IV  Beta Blocker:  Patient is not currently on a Beta-Blocker (No further documentation required).       Informed Consent: Patient understands risks and agrees with Anesthesia plan.  Questions answered. Anesthesia consent signed with patient.  ASA Score: 3     Day of Surgery Review of History & Physical:    H&P update referred to the surgeon.         Ready For Surgery From Anesthesia Perspective.

## 2017-04-19 NOTE — DISCHARGE INSTRUCTIONS
Endoscopic Ultrasound (EUS)    An endoscopic ultrasound (EUS) is a test to look at the inside of your gastrointestinal (GI) tract. It's commonly used to look for cancers or growths in the esophagus, stomach, pancreas, liver, and rectum. It can help to stage cancer (see how advanced a cancer is). EUS may also be used to help diagnose certain diseases or to drain cysts or abscesses.  What is EUS?  EUS shows both ultrasound images and live video of the GI tract. During the test, a flexible tube called an endoscope (scope) is used. At the end of the scope is a tiny video camera and light. The video camera sends live images to a monitor. The scope also contains a very small ultrasound device. This uses sound waves to create images and send them to a monitor.  A needle is passed through the scope. The needle can be used take a small sample of tissue for testing. This is called a biopsy. The needle can be used to take a sample of fluid. This is called fine-needle aspiration (FNA).  Risks and possible complications of EUS  Risks and possible complications include the following:  · Bleeding  · Infection  · A perforation (hole) in the digestive tract   · Risks of sedation or anesthesia   Before the test  Be prepared prior to the test:  · Tell your healthcare provider what medicine you take. This includes vitamins, herbs, and over-the-counter medicine. It also includes any blood thinners, such as warfarin, clopidogrel, ibuprofen, or daily aspirin. Ask your healthcare provider if you need to stop taking some or all of them before the test.  · You may be prescribed antibiotics to take before or after the test. This depends on the area being studied and what is done during the test. These medicines help prevent infection.  · Carefully follow the instructions for preparing for the test to make sure results are accurate. Instructions may include:  ¨ If youre having an EUS of the upper GI tract (esophagus, stomach, duodenum,  pancreas, liver):  § Do not eat or drink for 6 hours before the test.  ¨ If youre having an EUS of the lower GI tract (rectum):  § Before the test, do bowel prep as instructed to clean your rectum of stool. This may involve a clear liquid diet and using a laxative (liquid or pills) the night before the test. Or it may mean doing one or more enemas the morning of the test.  § Do not eat or drink for 6 hours before the test.  · Be sure to arrive on time at the facility. Bring your identification and health insurance card. Leave valuables at home. If you have them, bring X-rays or other test results with you.  Let the healthcare provider know  For your safety, tell the healthcare provider if you:  · Take insulin. Your dose may need to be changed on the day of your test.  · Are allergic to latex.  · Have any other allergies.  · Are taking blood thinners.   During the test  An endoscopic ultrasound usually takes place in a hospital. The procedure itself may take 1 to 2 hours. You will likely go home soon afterward. During the test:  · You lie on your left side on an exam table.  · An intravenous (IV) line will be put into a vein in your arm or hand. This line supplies fluids and medicines. To keep you comfortable during the test, you will be given a sedative medicine. This medicine prevents discomfort and will make you sleepy.  · If you are having an EUS of the upper GI tract, local anesthetic may be sprayed in your throat. This will help you be more comfortable as the healthcare provider inserts the scope. The healthcare provider then gently puts the flexible scope into your mouth or nose and down your throat.  · If youre having an EUS of the lower GI tract, the healthcare provider gently puts the flexible scope into your anus.  · During the test, the scope sends live video and ultrasound images from inside your body to nearby monitors. These are used to examine your GI tract. Specialized procedures, such as drainage,  are done as needed.  · The healthcare provider may discuss the results with you soon after the test. Biopsy results take several  days.  · In most cases, you can go home within a few hours of the test. When you leave the facility, have an adult family member or friend drive you, even if you don't feel that sleepy.  After the test  Here is what to expect after the test:  · You may feel tired from the sedative. This should wear off by the end of the day.  · If you had an upper digestive endoscopy, your throat may feel sore for a day or two. Over-the-counter sore throat lozenges and spray should help.  · You can eat and drink normally as soon as the test is done.  When to call the healthcare provider  Call your healthcare provider if you notice any of the following:  · Fever of 100.4°F (38.0°C) or higher, or as advised by your healthcare provider  · Shortness of breath  · Vomiting blood, blood in stool, or black stools  · Coughing or hoarse voice that wont go away   Date Last Reviewed: 7/1/2016  © 0842-8283 [employer]. [address] [place] [address]. All rights reserved. This information is not intended as a substitute for professional medical care. Always follow your healthcare professional's instructions.

## 2017-04-19 NOTE — PROGRESS NOTES
Patient and family given discharge instructions.  All questions answered.  Patient and family verbalized understanding of instructions.  Dr. Perla at bedside.  Pt dressed and ready for discharge.

## 2017-04-19 NOTE — H&P (VIEW-ONLY)
[facility]    Reason for visit (Chief Complaint): gastric varices    Referring provider/PCP: Renato Woamck MD  [address] [place] [address]    History of Present Illness: Patient presents for evaluation of gastric varices. These were found on recent EGD performed for variceal screening in the setting of cirrhosis. He reports no hx of bleeding. Denies melena, hematochezia, or hemetemesis. He is followed for his liver disease by our hepatology group (Dr. Womack). He is a [employer], and is concerned about what may happen should he bleed from varices. He is deemed a suboptimal candidate for TIPS due to hepatic encephalopathy.       Review of Systems   Constitutional: no fever, chills or change in weight   Eyes: no visual changes   ENT: no sore throat or dysphagia; hx of nose bleeds  Respiratory: no cough or shortness of breath   Cardiovascular: no chest pain or palpitations   Gastrointestinal: as per HPI  Hematologic/Lymphatic: no easy bruising or lymphadenopathy   Musculoskeletal: no arthralgias or myalgias   Neurological: no seizures, tremors or change in mental status  Behavioral/Psych: no auditory or visual hallucinations    Past Medical History:   Diagnosis Date    Anticoagulant long-term use     Bipolar 1 disorder     Bipolar 1 disorder 11/24/2016    bipolar    Cancer     history of skin cancer/sinus cancer & rectal cancer    Depressed bipolar affective disorder     Diabetes mellitus     type 2    EBV infection 12/7/2016    EBV DNA, PCR Latest Ref Range: None detected  None detected EBV DNA-Copies/mL Unknown Test Not Performed EBV Early Antigen Ab, IgG Latest Ref Range: <1:10 Titer 1:40 (A) EBV Nuclear Ag Ab Latest Ref Range: <1:5 Titer >=1:80 (A) EBV VCA IgG Latest Ref Range: <1:10 Titer 1:160 (A) EBV VCA IgM Latest Ref Range: <1:10 Titer <1:10     History of TIA (transient ischemic attack)     several-taking Plavix    Hx of gallstones      Hypertension     Hyperthyroidism 11/30/2016    Low HDL (under 40) 12/7/2016    Mixed hyperlipidemia 11/23/2016    JUAN MANUEL (obstructive sleep apnea)     Stroke     Transient cerebral ischemia 7/22/2016       Past Surgical History:   Procedure Laterality Date    Moh's procedure x4      Sinus surgery for sinus cancer      sinus sx for cancer      skin cancer removed-several times-nose & ear      TONSILLECTOMY      Undescened testicle sx      UVULOPALATOPHARYNGOPLASTY      for sleep apnea       Family History   Problem Relation Age of Onset    Anesthesia problems Neg Hx        Social History     Social History    Marital status:      Spouse name: N/A    Number of children: N/A    Years of education: N/A     Occupational History    Not on file.     Social History Main Topics    Smoking status: Former Smoker     Packs/day: 0.25     Years: 2.00    Smokeless tobacco: Not on file      Comment: quit at age 17 less than a pack a day    Alcohol use Yes      Comment: rare    Drug use: No    Sexual activity: Not on file     Other Topics Concern    Not on file     Social History Narrative       Current Outpatient Prescriptions on File Prior to Visit   Medication Sig Dispense Refill    divalproex (DEPAKOTE) 250 MG 24 hr tablet Take 750 mg by mouth once daily.      furosemide (LASIX) 20 MG tablet Take 3 tablets (60 mg total) by mouth once daily. 270 tablet 3    losartan (COZAAR) 50 MG tablet Take 1 tablet (50 mg total) by mouth once daily. 90 tablet 3    rifAXIMin (XIFAXAN) 550 mg Tab Take 1 tablet (550 mg total) by mouth 2 (two) times daily. 60 tablet 6    SITagliptan (JANUVIA) 50 MG Tab Take 1 tablet (50 mg total) by mouth once daily. 30 tablet 3    lactulose (CHRONULAC) 10 gram/15 mL solution Take 15 mLs (10 g total) by mouth 2 (two) times daily. 473 mL 1     No current facility-administered medications on file prior to visit.        Review of patient's allergies indicates:   Allergen  Reactions    Abilify [aripiprazole] Other (See Comments)     Sleepy, could not function.       Physical Exam:  General: Well-developed, well-appearing, no acute distress  Neuro: alert and oriented to person, place, time; normal appearing gait  Eyes: No scleral icterus, pupils equally round, normal-appearing conjunctiva  Neck: supple; no cervical lymphadenopathy  CVS: regular rate and rhythm; no murmurs  Lungs: clear to auscultation bilaterally; no labored breathing, no wheezes  Abdomen: soft, non-distended, non-tender, no rebound tenderness or guarding, nomal bowel sounds  Extremities: no cyanosis, edema, or clubbing  Skin: no rash, no jaundice        Laboratory:   Lab Results   Component Value Date    ALT 9 (L) 02/21/2017    AST 25 02/21/2017    ALKPHOS 125 02/21/2017    BILITOT 1.0 02/21/2017     Lab Results   Component Value Date    WBC 3.46 (L) 02/21/2017    HGB 11.0 (L) 02/21/2017    HCT 34.7 (L) 02/21/2017    MCV 87 02/21/2017     (L) 02/21/2017     Lab Results   Component Value Date    INR 1.1 11/26/2016    INR 1.2 11/23/2016       Imaging:  Reviewed prior EGD report and images    Assessment/Plan:  Gastric varices- We discussed outcomes of no treatment for large gastric varices- about 25% rate of bleeding at 2yrs. We discussed options for endoscopic treatment that we have used at [facility]- specifically EUS-guided glue/coil treatment. A recent study that was published by my prior group demonstrated excellent success in GV eradication with EUS-guided glue/coil treatment, including for primary prophylactic treatment. We discussed that use of glue in this scenario is considered off-label, but that there is a large body of world-wide evidence in support of the efficacy and safety of intravariceal glue injection. The combination of coil + glue should theoretically decrease one of the most concerning possible risks- embolization. Even is embolization occurs, this is usually subclinical to the lungs. A recent  cardiac echo with bubble study confirms no intracardiac shunt- the presence of which would be more concerning for a possible cerebral embolic event from glue.    The patient did inquire about performing lower endoscopy at the same session. Dr. Talavera is tentatively planning snare resection of granulation tissue in the colon. I am concerned about performing interventions in two areas should the pt subsequently have post-procedure bleeding. It may be best to do these on separate days.    The impression and plan was discussed in detail with the patient and his wife. All questions have been answered and the patient voices understanding of our plan at this point. The risk of the procedure was discussed in detail which includes but not limited to bleeding, infection, inflammation, perforation, and death. An opportunity to ask questions was offered, and all questions were answered to the patient's satisfaction.    Aneudy Perla MD, [facility]  Section Head of Advanced Endoscopy  Department of Gastroenterology

## 2017-04-19 NOTE — ANESTHESIA POSTPROCEDURE EVALUATION
"Anesthesia Post Evaluation    Patient: Kenneth Sheikh    Procedure(s) Performed: Procedure(s) (LRB):  ULTRASOUND-ENDOSCOPIC-UPPER/glue coil of gastric varices (N/A)    Final Anesthesia Type: general  Patient location during evaluation: PACU  Patient participation: Yes- Able to Participate  Level of consciousness: awake and alert  Post-procedure vital signs: reviewed and stable  Pain management: adequate  Airway patency: patent  PONV status at discharge: No PONV  Anesthetic complications: no      Cardiovascular status: blood pressure returned to baseline and hemodynamically stable  Respiratory status: unassisted, spontaneous ventilation and room air  Hydration status: euvolemic  Follow-up not needed.        Visit Vitals    /73    Pulse 85    Temp 36.6 °C (97.8 °F) (Oral)    Resp 15    Ht 5' 8" (1.727 m)    Wt 74.8 kg (165 lb)    SpO2 96%    BMI 25.09 kg/m2       Pain/Sean Score: Presence of Pain: non-verbal indicators absent (4/19/2017 11:25 AM)  Pain Rating Prior to Med Admin: 0 (4/19/2017  9:31 AM)  Pain Rating Post Med Admin: 0 (4/19/2017  9:31 AM)  Sean Score: 8 (4/19/2017 11:25 AM)  Modified Sean Score: 16 (4/19/2017 11:25 AM)      "

## 2017-04-19 NOTE — IP AVS SNAPSHOT
[facility]  [address]  [facility] [address]  Phone: [phone]           Patient Discharge Instructions   Our goal is to set you up for success. This packet includes information on your condition, medications, and your home care.  It will help you care for yourself to prevent having to return to the hospital.     Please ask your nurse if you have any questions.      There are many details to remember when preparing to leave the hospital. Here is what you will need to do:    1. Take your medicine. If you are prescribed medications, review your Medication List on the following pages. You may have new medications to  at the pharmacy and others that you'll need to stop taking. Review the instructions for how and when to take your medications. Talk with your doctor or nurses if you are unsure of what to do.     2. Go to your follow-up appointments. Specific follow-up information is listed in the following pages. Your may be contacted by a nurse or clinical provider about future appointments. Be sure we have all of the phone numbers to reach you. Please contact your provider's office if you are unable to make an appointment.     3. Watch for warning signs. Your doctor or nurse will give you detailed warning signs to watch for and when to call for assistance. These instructions may also include educational information about your condition. If you experience any of warning signs to your health, call your doctor.           [facility]  Unless otherwise directed by your provider, please   contact [facility], our nurse care line   that is available for 24/7 assistance.     [phone] (toll-free)     Registered nurses in the [facility]   provide: appointment scheduling, clinical advisement, health education, and other advisory services.                  ** Verify the list of medication(s) below is accurate and up to date. Carry this with you in case of  emergency. If your medications have changed, please notify your healthcare provider.             Medication List      ASK your doctor about these medications        Additional Info                      ACCU-CHEK NEYDA PLUS TEST STRP Strp   Refills:  0   Generic drug:  blood sugar diagnostic      Begin Date    AM    Noon    PM    Bedtime       cefUROXime 250 MG tablet   Commonly known as:  CEFTIN   Refills:  0   Dose:  250 mg    Instructions:  Take 250 mg by mouth 2 (two) times daily.     Begin Date    AM    Noon    PM    Bedtime       dextromethorphan-guaifenesin  mg  mg per 12 hr tablet   Commonly known as:  MUCINEX DM   Refills:  0   Dose:  1 tablet    Instructions:  Take 1 tablet by mouth every 12 (twelve) hours.     Begin Date    AM    Noon    PM    Bedtime       divalproex  MG 24 hr tablet   Commonly known as:  DEPAKOTE ER   Refills:  0   Dose:  500 mg    Instructions:  Take 500 mg by mouth once daily.     Begin Date    AM    Noon    PM    Bedtime       furosemide 20 MG tablet   Commonly known as:  LASIX   Quantity:  270 tablet   Refills:  3   Dose:  60 mg    Instructions:  Take 3 tablets (60 mg total) by mouth once daily.     Begin Date    AM    Noon    PM    Bedtime       lactulose 10 gram/15 mL solution   Commonly known as:  CHRONULAC   Quantity:  473 mL   Refills:  1   Dose:  10 g    Instructions:  Take 15 mLs (10 g total) by mouth 2 (two) times daily.     Begin Date    AM    Noon    PM    Bedtime       losartan 50 MG tablet   Commonly known as:  COZAAR   Quantity:  90 tablet   Refills:  3   Dose:  50 mg    Instructions:  Take 1 tablet (50 mg total) by mouth once daily.     Begin Date    AM    Noon    PM    Bedtime       rifAXIMin 550 mg Tab   Commonly known as:  XIFAXAN   Quantity:  60 tablet   Refills:  6   Dose:  550 mg    Instructions:  Take 1 tablet (550 mg total) by mouth 2 (two) times daily.     Begin Date    AM    Noon    PM    Bedtime       SITagliptan 50 MG Tab   Commonly known  as:  BARBI   Quantity:  90 tablet   Refills:  3   Dose:  50 mg    Instructions:  Take 1 tablet (50 mg total) by mouth once daily.     Begin Date    AM    Noon    PM    Bedtime                  Please bring to all follow up appointments:    1. A copy of your discharge instructions.  2. All medicines you are currently taking in their original bottles.  3. Identification and insurance card.    Please arrive 15 minutes ahead of scheduled appointment time.    Please call 24 hours in advance if you must reschedule your appointment and/or time.        Your Scheduled Appointments     Apr 25, 2017 11:00 AM CDT   Established Patient Visit with MD Chi Mason - Hepatology ([facility] )    [address]  [place] [address]   [phone]            Jun 08, 2017 10:40 AM CDT   Established Patient Visit with MD Chi Ortiz - Internal Medicine ([facility])    [address]  [place] [address]   [phone]                  Discharge Instructions         Endoscopic Ultrasound (EUS)    An endoscopic ultrasound (EUS) is a test to look at the inside of your gastrointestinal (GI) tract. It's commonly used to look for cancers or growths in the esophagus, stomach, pancreas, liver, and rectum. It can help to stage cancer (see how advanced a cancer is). EUS may also be used to help diagnose certain diseases or to drain cysts or abscesses.  What is EUS?  EUS shows both ultrasound images and live video of the GI tract. During the test, a flexible tube called an endoscope (scope) is used. At the end of the scope is a tiny video camera and light. The video camera sends live images to a monitor. The scope also contains a very small ultrasound device. This uses sound waves to create images and send them to a monitor.  A needle is passed through the scope. The needle can be used take a small sample of tissue for testing. This is called a  biopsy. The needle can be used to take a sample of fluid. This is called fine-needle aspiration (FNA).  Risks and possible complications of EUS  Risks and possible complications include the following:  · Bleeding  · Infection  · A perforation (hole) in the digestive tract   · Risks of sedation or anesthesia   Before the test  Be prepared prior to the test:  · Tell your healthcare provider what medicine you take. This includes vitamins, herbs, and over-the-counter medicine. It also includes any blood thinners, such as warfarin, clopidogrel, ibuprofen, or daily aspirin. Ask your healthcare provider if you need to stop taking some or all of them before the test.  · You may be prescribed antibiotics to take before or after the test. This depends on the area being studied and what is done during the test. These medicines help prevent infection.  · Carefully follow the instructions for preparing for the test to make sure results are accurate. Instructions may include:  ¨ If youre having an EUS of the upper GI tract (esophagus, stomach, duodenum, pancreas, liver):  § Do not eat or drink for 6 hours before the test.  ¨ If youre having an EUS of the lower GI tract (rectum):  § Before the test, do bowel prep as instructed to clean your rectum of stool. This may involve a clear liquid diet and using a laxative (liquid or pills) the night before the test. Or it may mean doing one or more enemas the morning of the test.  § Do not eat or drink for 6 hours before the test.  · Be sure to arrive on time at the facility. Bring your identification and health insurance card. Leave valuables at home. If you have them, bring X-rays or other test results with you.  Let the healthcare provider know  For your safety, tell the healthcare provider if you:  · Take insulin. Your dose may need to be changed on the day of your test.  · Are allergic to latex.  · Have any other allergies.  · Are taking blood thinners.   During the test  An  endoscopic ultrasound usually takes place in a hospital. The procedure itself may take 1 to 2 hours. You will likely go home soon afterward. During the test:  · You lie on your left side on an exam table.  · An intravenous (IV) line will be put into a vein in your arm or hand. This line supplies fluids and medicines. To keep you comfortable during the test, you will be given a sedative medicine. This medicine prevents discomfort and will make you sleepy.  · If you are having an EUS of the upper GI tract, local anesthetic may be sprayed in your throat. This will help you be more comfortable as the healthcare provider inserts the scope. The healthcare provider then gently puts the flexible scope into your mouth or nose and down your throat.  · If youre having an EUS of the lower GI tract, the healthcare provider gently puts the flexible scope into your anus.  · During the test, the scope sends live video and ultrasound images from inside your body to nearby monitors. These are used to examine your GI tract. Specialized procedures, such as drainage, are done as needed.  · The healthcare provider may discuss the results with you soon after the test. Biopsy results take several  days.  · In most cases, you can go home within a few hours of the test. When you leave the facility, have an adult family member or friend drive you, even if you don't feel that sleepy.  After the test  Here is what to expect after the test:  · You may feel tired from the sedative. This should wear off by the end of the day.  · If you had an upper digestive endoscopy, your throat may feel sore for a day or two. Over-the-counter sore throat lozenges and spray should help.  · You can eat and drink normally as soon as the test is done.  When to call the healthcare provider  Call your healthcare provider if you notice any of the following:  · Fever of 100.4°F (38.0°C) or higher, or as advised by your healthcare provider  · Shortness of  "breath  · Vomiting blood, blood in stool, or black stools  · Coughing or hoarse voice that wont go away   Date Last Reviewed: 7/1/2016  © 7124-8761 [employer] [address] [place] [address], [place] [address]. All rights reserved. This information is not intended as a substitute for professional medical care. Always follow your healthcare professional's instructions.            Admission Information     Date & Time Provider Department CSN    4/19/2017  9:02 AM Aneudy Perla MD [facility] [id number]      Care Providers     Provider Role Specialty Primary office phone    Aneudy Perla MD Attending Provider Gastroenterology [phone]    Aneudy Perla MD Surgeon  Gastroenterology [phone]      Your Vitals Were     BP Pulse Temp Resp Height Weight    133/77 85 97.8 °F (36.6 °C) (Oral) 15 5' 8" (1.727 m) 74.8 kg (165 lb)    SpO2 BMI             96% 25.09 kg/m2         Recent Lab Values        11/30/2016 4/6/2017                        3:35 PM 11:24 AM          A1C 6.4 (H) 6.7 (H)          Comment for A1C at  3:35 PM on 11/30/2016:  According to ADA guidelines, hemoglobin A1C <7.0% represents  optimal control in non-pregnant diabetic patients.  Different  metrics may apply to specific populations.   Standards of Medical Care in Diabetes - 2016.  For the purpose of screening for the presence of diabetes:  <5.7%     Consistent with the absence of diabetes  5.7-6.4%  Consistent with increasing risk for diabetes   (prediabetes)  >or=6.5%  Consistent with diabetes  Currently no consensus exists for use of hemoglobin A1C  for diagnosis of diabetes for children.      Comment for A1C at 11:24 AM on 4/6/2017:  According to ADA guidelines, hemoglobin A1C <7.0% represents  optimal control in non-pregnant diabetic patients.  Different  metrics may apply to specific populations.   Standards of Medical Care in Diabetes - 2016.  For the purpose of screening for the presence of diabetes:  <5.7%     " Consistent with the absence of diabetes  5.7-6.4%  Consistent with increasing risk for diabetes   (prediabetes)  >or=6.5%  Consistent with diabetes  Currently no consensus exists for use of hemoglobin A1C  for diagnosis of diabetes for children.        Allergies as of 4/19/2017        Reactions    Abilify [Aripiprazole] Other (See Comments)    Sleepy, could not function.      Advance Directives     An advance directive is a document which, in the event you are no longer able to make decisions for yourself, tells your healthcare team what kind of treatment you do or do not want to receive, or who you would like to make those decisions for you.  If you do not currently have an advance directive, [facility] encourages you to create one.  For more information call:  [phone] ([phone]), [phone] ([phone]),  or log on to [email or web address].        Smoking Cessation     If you would like to quit smoking:   You may be eligible for free services if you are a [place] resident and started smoking cigarettes before September 1, 1988.  Call the Smoking Cessation Trust (SCT) toll free at [phone] or [phone] [phone].   Call -645-QUIT-NOW if you do not meet the above criteria.   Contact us via email: [email or web address]   View our website for more information: [email or web address]        Language Assistance Services     ATTENTION: Language assistance services are available, free of charge. Please call [phone].      ATENCIÓN: Si habla español, tiene a bell disposición servicios gratuitos de asistencia lingüística. Llame al [phone].     [facility] Ý: N?u b?n nói Ti?ng Vi?t, có các d?ch v? h? tr? ngôn ng? mi?n phí dành cho b?n. G?i s? [phone].        Diabetes Discharge Instructions                                   [facility] Sign-Up     Activating your [facility] account is as easy as 1-2-3!     1) Visit [email or web address], select Sign Up Now, enter this activation code and your date  of birth, then select Next.  [email or web address]  Expires: 5/21/2017 11:14 AM      2) Create a username and password to use when you visit [facility] in the future and select a security question in case you lose your password and select Next.    3) Enter your e-mail address and click Sign Up!    Additional Information  If you have questions, please e-mail [email or web address] or call [phone] to talk to our [facility] staff. Remember, [facility] is NOT to be used for urgent needs. For medical emergencies, dial 911.          [facility] complies with applicable Federal civil rights laws and does not discriminate on the basis of race, color, national origin, age, disability, or sex.

## 2017-04-20 ENCOUNTER — TELEPHONE (OUTPATIENT)
Dept: GASTROENTEROLOGY | Facility: CLINIC | Age: 77
End: 2017-04-20

## 2017-04-20 DIAGNOSIS — I85.00 ESOPHAGEAL VARICES WITHOUT BLEEDING, UNSPECIFIED ESOPHAGEAL VARICES TYPE: Primary | ICD-10-CM

## 2017-04-20 NOTE — TELEPHONE ENCOUNTER
"Reason for Disposition   [1] Caller requesting NON-URGENT health information AND [2] PCP's office is the best resource    Protocols used: ST INFORMATION ONLY CALL-A-AH  wife called- states that patient had surgery today and since being home, she hears a "rattling" in his chest. Wife states that it sounds like he needs to cough something up. Pt denies any kind of shortness of breath- no fever- no labored breathing. Wife states that it does not look like the patient is in any kind of respiratory distress. Pt is complaining of a sore throat after surgery. No other complaints from patient.     Please contact patient with further instructions, questions, or concerns.    I spoke with Ms Sheikh this AM.    She says he  Had a rattle in his chest and a fever of 100.8. He had some chills. He spit up some brown phylem She gave him something for the fever and he slept well last night. Today, he is doing well, No rattle or fever. He is eating and seems to be back to normal.  "

## 2017-04-24 ENCOUNTER — TELEPHONE (OUTPATIENT)
Dept: GASTROENTEROLOGY | Facility: CLINIC | Age: 77
End: 2017-04-24

## 2017-04-25 ENCOUNTER — LAB VISIT (OUTPATIENT)
Dept: LAB | Facility: HOSPITAL | Age: 77
End: 2017-04-25
Attending: INTERNAL MEDICINE
Payer: MEDICARE

## 2017-04-25 ENCOUNTER — OFFICE VISIT (OUTPATIENT)
Dept: HEPATOLOGY | Facility: CLINIC | Age: 77
End: 2017-04-25
Payer: MEDICARE

## 2017-04-25 VITALS
HEART RATE: 63 BPM | BODY MASS INDEX: 26.56 KG/M2 | OXYGEN SATURATION: 100 % | RESPIRATION RATE: 20 BRPM | HEIGHT: 68 IN | SYSTOLIC BLOOD PRESSURE: 132 MMHG | TEMPERATURE: 97 F | DIASTOLIC BLOOD PRESSURE: 65 MMHG | WEIGHT: 175.25 LBS

## 2017-04-25 DIAGNOSIS — K74.60 CIRRHOSIS OF LIVER WITH ASCITES, UNSPECIFIED HEPATIC CIRRHOSIS TYPE: ICD-10-CM

## 2017-04-25 DIAGNOSIS — R18.8 CIRRHOSIS OF LIVER WITH ASCITES, UNSPECIFIED HEPATIC CIRRHOSIS TYPE: ICD-10-CM

## 2017-04-25 DIAGNOSIS — E11.9 TYPE 2 DIABETES MELLITUS WITHOUT COMPLICATION, WITHOUT LONG-TERM CURRENT USE OF INSULIN: Primary | ICD-10-CM

## 2017-04-25 DIAGNOSIS — R18.8 ASCITES OF LIVER: ICD-10-CM

## 2017-04-25 LAB
ALBUMIN SERPL BCP-MCNC: 2.4 G/DL
ALP SERPL-CCNC: 122 U/L
ALT SERPL W/O P-5'-P-CCNC: 7 U/L
ANION GAP SERPL CALC-SCNC: 7 MMOL/L
AST SERPL-CCNC: 23 U/L
BILIRUB SERPL-MCNC: 0.9 MG/DL
BUN SERPL-MCNC: 31 MG/DL
CALCIUM SERPL-MCNC: 9.5 MG/DL
CHLORIDE SERPL-SCNC: 106 MMOL/L
CO2 SERPL-SCNC: 32 MMOL/L
CREAT SERPL-MCNC: 1.6 MG/DL
EST. GFR  (AFRICAN AMERICAN): 46.7 ML/MIN/1.73 M^2
EST. GFR  (NON AFRICAN AMERICAN): 40.4 ML/MIN/1.73 M^2
GLUCOSE SERPL-MCNC: 147 MG/DL
POTASSIUM SERPL-SCNC: 5.3 MMOL/L
PROT SERPL-MCNC: 7.5 G/DL
SODIUM SERPL-SCNC: 145 MMOL/L

## 2017-04-25 PROCEDURE — 99999 PR PBB SHADOW E&M-EST. PATIENT-LVL III: CPT | Mod: PBBFAC,,, | Performed by: INTERNAL MEDICINE

## 2017-04-25 PROCEDURE — 1157F ADVNC CARE PLAN IN RCRD: CPT | Mod: S$GLB,,, | Performed by: INTERNAL MEDICINE

## 2017-04-25 PROCEDURE — 1159F MED LIST DOCD IN RCRD: CPT | Mod: S$GLB,,, | Performed by: INTERNAL MEDICINE

## 2017-04-25 PROCEDURE — 36415 COLL VENOUS BLD VENIPUNCTURE: CPT

## 2017-04-25 PROCEDURE — 1126F AMNT PAIN NOTED NONE PRSNT: CPT | Mod: S$GLB,,, | Performed by: INTERNAL MEDICINE

## 2017-04-25 PROCEDURE — 3078F DIAST BP <80 MM HG: CPT | Mod: S$GLB,,, | Performed by: INTERNAL MEDICINE

## 2017-04-25 PROCEDURE — 3075F SYST BP GE 130 - 139MM HG: CPT | Mod: S$GLB,,, | Performed by: INTERNAL MEDICINE

## 2017-04-25 PROCEDURE — 80053 COMPREHEN METABOLIC PANEL: CPT

## 2017-04-25 PROCEDURE — 99215 OFFICE O/P EST HI 40 MIN: CPT | Mod: S$GLB,,, | Performed by: INTERNAL MEDICINE

## 2017-04-25 PROCEDURE — 1160F RVW MEDS BY RX/DR IN RCRD: CPT | Mod: S$GLB,,, | Performed by: INTERNAL MEDICINE

## 2017-04-25 RX ORDER — FUROSEMIDE 20 MG/1
80 TABLET ORAL DAILY
Qty: 270 TABLET | Refills: 3 | Status: SHIPPED | OUTPATIENT
Start: 2017-04-25 | End: 2018-10-08 | Stop reason: SDUPTHER

## 2017-04-25 RX ORDER — SPIRONOLACTONE 50 MG/1
50 TABLET, FILM COATED ORAL DAILY
Qty: 30 TABLET | Refills: 3 | Status: SHIPPED | OUTPATIENT
Start: 2017-04-25 | End: 2017-06-23

## 2017-04-25 NOTE — PROGRESS NOTES
"Subjective:       Patient ID: Kenneth Sheikh is a 77 y.o. male.    Chief Complaint: Cirrhosis    HPI  I saw this 77 y.o. man I had met in hospital recently when he was admitted with lethargy and some confusion.    I suspect that it may have been related to hepatic encephalopathy. At that time, he was diagnosed with decompensated cirrhosis.    Admitted because of increasing lethargy +++/confusion/dizziness/dysarthria  - Nov 2016  - imaging showed ascites     No significant alcohol use ("a couple beers when I was younger"). No family history of liver disease. No new medications.       Patient has risk factors for MCDONALD.  No significant hx of alcohol    Recent bilateral leg edema, worsening ascites and epistaxis  He also has a large gastric varix which has never bled- recently treated by Dr Perla endoscopically but will need another endoscopic session.    Watery diarrhea since starting rifaximin  - also on cefuroxime for sinus infection    Had post procedure fever despite antibiotics   repeat procedure in 6 week    Will need diuretic changes because of fluid retention:      CT abdo: 11/26/2016  Normal liver but ascites present    Abdo US: 11/25/2016  Hepatic steatosis.  Splenomegaly.  Moderate volume ascites.    EUS guided endoscopic treatment of varices: 4/19/2017  Normal esophagus.                        - Type 1 isolated gastric varices (IGV1, varices                         located in the fundus).                        - Varices were visualized endosonographically in                         the fundus of the stomach. Treated with EUS-guided                         insertion of embolization coils and EUS-guided glue                         injection.                        - No specimens collected.                        - Repeat the upper endoscopic ultrasound in 6 weeks                         for retreatment.    MELD-Na score: 13 at 3/22/2017 11:21 AM  MELD score: 13 at 3/22/2017 11:21 AM  Calculated from:  Serum " Creatinine: 1.4 mg/dL at 3/22/2017 11:21 AM  Serum Sodium: 141 mmol/L (Rounded to 137) at 3/22/2017 11:21 AM  Total Bilirubin: 1.0 mg/dL at 3/22/2017 11:21 AM  INR(ratio): 1.3 at 3/22/2017 11:21 AM  Age: 77 years      PMH:  DM  Sinus Ca- 1996  Rectal Ca- June 2016    SH:  Retired  Ex [employer] worker      FH:  Aunt had cirrhosis- non- alcohol      Review of Systems   Constitutional: Negative for activity change, appetite change, chills, fatigue, fever and unexpected weight change.   HENT: Negative for hearing loss.    Eyes: Negative for discharge and visual disturbance.   Respiratory: Negative for cough, chest tightness, shortness of breath and wheezing.    Cardiovascular: Negative for chest pain, palpitations and leg swelling.   Gastrointestinal: Negative for abdominal distention, abdominal pain, constipation, diarrhea and nausea.   Genitourinary: Negative for dysuria and frequency.   Musculoskeletal: Negative for arthralgias and back pain.   Skin: Negative for pallor and rash.   Neurological: Negative for dizziness, tremors, speech difficulty and headaches.   Hematological: Negative for adenopathy.   Psychiatric/Behavioral: Negative for agitation and confusion.           Lab Results   Component Value Date    ALT 7 (L) 04/25/2017    AST 23 04/25/2017    ALKPHOS 122 04/25/2017    BILITOT 0.9 04/25/2017     Past Medical History:   Diagnosis Date    Anticoagulant long-term use     Bipolar 1 disorder     Bipolar 1 disorder 11/24/2016    bipolar    Cancer     history of skin cancer/sinus cancer & rectal cancer    Depressed bipolar affective disorder     Diabetes mellitus     type 2    EBV infection 12/7/2016    EBV DNA, PCR Latest Ref Range: None detected  None detected EBV DNA-Copies/mL Unknown Test Not Performed EBV Early Antigen Ab, IgG Latest Ref Range: <1:10 Titer 1:40 (A) EBV Nuclear Ag Ab Latest Ref Range: <1:5 Titer >=1:80 (A) EBV VCA IgG Latest Ref Range: <1:10 Titer 1:160 (A) EBV VCA IgM Latest Ref  Range: <1:10 Titer <1:10     History of TIA (transient ischemic attack)     several-taking Plavix    Hx of gallstones     Wife denies    Hypertension     Hyperthyroidism 11/30/2016    Low HDL (under 40) 12/7/2016    Mixed hyperlipidemia 11/23/2016    JUAN MANUEL (obstructive sleep apnea)     Stroke     Transient cerebral ischemia 7/22/2016     Past Surgical History:   Procedure Laterality Date    Moh's procedure x4      Sinus surgery for sinus cancer      sinus sx for cancer      skin cancer removed-several times-nose & ear      TONSILLECTOMY      Undescened testicle sx      UVULOPALATOPHARYNGOPLASTY      for sleep apnea     Current Outpatient Prescriptions   Medication Sig    ACCU-CHEK NEYDA PLUS TEST STRP Strp     divalproex (DEPAKOTE) 250 MG 24 hr tablet Take 500 mg by mouth once daily.     furosemide (LASIX) 20 MG tablet Take 4 tablets (80 mg total) by mouth once daily.    lactulose (CHRONULAC) 10 gram/15 mL solution Take 15 mLs (10 g total) by mouth 2 (two) times daily.    losartan (COZAAR) 50 MG tablet Take 1 tablet (50 mg total) by mouth once daily.    rifAXIMin (XIFAXAN) 550 mg Tab Take 1 tablet (550 mg total) by mouth 2 (two) times daily.    SITagliptan (JANUVIA) 50 MG Tab Take 1 tablet (50 mg total) by mouth once daily.    spironolactone (ALDACTONE) 50 MG tablet Take 1 tablet (50 mg total) by mouth once daily.     No current facility-administered medications for this visit.        Objective:      Physical Exam   Constitutional: He is oriented to person, place, and time. He appears well-nourished.   HENT:   Head: Normocephalic.   Eyes: Pupils are equal, round, and reactive to light.   Neck: No thyromegaly present.   Cardiovascular: Normal rate, regular rhythm and normal heart sounds.    Pulmonary/Chest: Effort normal and breath sounds normal. He has no wheezes.   Abdominal: Soft. He exhibits distension. He exhibits no mass. There is no tenderness.   Mild ascites.   Musculoskeletal: He  exhibits edema.   Lymphadenopathy:     He has no cervical adenopathy.   Neurological: He is alert and oriented to person, place, and time.   Skin: Skin is warm. No rash noted. No erythema.   Psychiatric: He has a normal mood and affect. His behavior is normal.       Assessment:       1. Type 2 diabetes mellitus without complication, without long-term current use of insulin    2. Cirrhosis of liver with ascites, unspecified hepatic cirrhosis type    3. Ascites of liver        Plan:   - discussed the dx of cirrhosis and the implications of this  - has responded to diuretics and has some edema and mild-moderated ascites.  - had not been on deshawn because he is currently on losartan- losartan has also been used for its portal hypotensive effect.    - Add deshawn 50mg AND + another 20mg of furosemide  - prefers not to be drained at the moment  - CMP weekly  - On rifaximin  - Labs today  Clinic in 4 weeks.     NB Tenriism

## 2017-04-25 NOTE — MR AVS SNAPSHOT
[facility]  [address]  [facility] [address]  Phone: [phone]  Fax: [phone]                  Kenneth Sheikh   2017 11:00 AM   Office Visit    Description:  Male : 1940   Provider:  Renato Womack MD   Department:  [facility]           Reason for Visit     Cirrhosis           Diagnoses this Visit        Comments    Cirrhosis of liver with ascites, unspecified hepatic cirrhosis type                To Do List           Future Appointments        Provider Department Dept Phone    2017 10:40 AM MD [facility] - Internal Medicine [phone]      Goals (5 Years of Data)     None       These Medications        Disp Refills Start End    furosemide (LASIX) 20 MG tablet 270 tablet 3 2017    Take 4 tablets (80 mg total) by mouth once daily. - Oral    Pharmacy: [facility] - [place] [id number] [facility] Ph #: [phone]       spironolactone (ALDACTONE) 50 MG tablet 30 tablet 3 2017    Take 1 tablet (50 mg total) by mouth once daily. - Oral    Pharmacy: [facility] - [place] [id number] [facility] Ph #: [phone]         [facility]     [facility] Nurse Care Line -  Assistance  Unless otherwise directed by your provider, please contact [facility], our nurse care line that is available for  assistance.     Registered nurses in the [facility] provide: appointment scheduling, clinical advisement, health education, and other advisory services.  Call: [phone] (toll free)               Medications           Message regarding Medications     Verify the changes and/or additions to your medication regime listed below are the same as discussed with your clinician today.  If any of these changes or additions are incorrect, please notify your healthcare provider.        START taking these NEW medications        Refills     spironolactone (ALDACTONE) 50 MG tablet 3    Sig: Take 1 tablet (50 mg total) by mouth once daily.    Class: Normal    Route: Oral      CHANGE how you are taking these medications     Start Taking Instead of    furosemide (LASIX) 20 MG tablet furosemide (LASIX) 20 MG tablet    Dosage:  Take 4 tablets (80 mg total) by mouth once daily. Dosage:  Take 3 tablets (60 mg total) by mouth once daily.    Reason for Change:  Reorder       STOP taking these medications     ciprofloxacin HCl (CIPRO) 500 MG tablet Take 1 tablet (500 mg total) by mouth 2 (two) times daily.    cefUROXime (CEFTIN) 250 MG tablet Take 250 mg by mouth 2 (two) times daily.    dextromethorphan-guaifenesin  mg (MUCINEX DM)  mg per 12 hr tablet Take 1 tablet by mouth every 12 (twelve) hours.           Verify that the below list of medications is an accurate representation of the medications you are currently taking.  If none reported, the list may be blank. If incorrect, please contact your healthcare provider. Carry this list with you in case of emergency.           Current Medications     ACCU-CHEK NEYDA PLUS TEST STRP Strp     divalproex (DEPAKOTE) 250 MG 24 hr tablet Take 500 mg by mouth once daily.     furosemide (LASIX) 20 MG tablet Take 4 tablets (80 mg total) by mouth once daily.    lactulose (CHRONULAC) 10 gram/15 mL solution Take 15 mLs (10 g total) by mouth 2 (two) times daily.    losartan (COZAAR) 50 MG tablet Take 1 tablet (50 mg total) by mouth once daily.    rifAXIMin (XIFAXAN) 550 mg Tab Take 1 tablet (550 mg total) by mouth 2 (two) times daily.    SITagliptan (JANUVIA) 50 MG Tab Take 1 tablet (50 mg total) by mouth once daily.    spironolactone (ALDACTONE) 50 MG tablet Take 1 tablet (50 mg total) by mouth once daily.           Clinical Reference Information           Your Vitals Were     BP Pulse Temp Resp Height Weight    132/65 (BP Location: Left arm, Patient Position: Sitting, BP Method: Automatic) 63 97.3 °F (36.3 °C)  "(Oral) 20 5' 8" (1.727 m) 79.5 kg (175 lb 4.3 oz)    SpO2 BMI             100% 26.65 kg/m2         Blood Pressure          Most Recent Value    BP  132/65      Allergies as of 4/25/2017     Abilify [Aripiprazole]      Immunizations Administered on Date of Encounter - 4/25/2017     None      Orders Placed During Today's Visit     Recurring Lab Work Interval Expires    Comprehensive metabolic panel  Once a week until 4/25/2018 4/25/2018      [facility] Sign-Up     Activating your [facility] account is as easy as 1-2-3!     1) Visit [email or web address], select Sign Up Now, enter this activation code and your date of birth, then select Next.  [email or web address]  Expires: 5/21/2017 11:14 AM      2) Create a username and password to use when you visit [facility] in the future and select a security question in case you lose your password and select Next.    3) Enter your e-mail address and click Sign Up!    Additional Information  If you have questions, please e-mail [email or web address] or call [phone] to talk to our [facility] staff. Remember, [facility] is NOT to be used for urgent needs. For medical emergencies, dial 911.         Language Assistance Services     ATTENTION: Language assistance services are available, free of charge. Please call [phone].      ATENCIÓN: Si habla español, tiene a bell disposición servicios gratuitos de asistencia lingüística. Llame al [phone].     CHÚ Ý: N?u b?n nói Ti?ng Vi?t, có các d?ch v? h? tr? ngôn ng? mi?n phí dành cho b?n. G?i s? [phone].         [facility] complies with applicable Federal civil rights laws and does not discriminate on the basis of race, color, national origin, age, disability, or sex.        "

## 2017-05-01 ENCOUNTER — TELEPHONE (OUTPATIENT)
Dept: PHARMACY | Facility: CLINIC | Age: 77
End: 2017-05-01

## 2017-05-01 ENCOUNTER — TELEPHONE (OUTPATIENT)
Dept: INTERNAL MEDICINE | Facility: CLINIC | Age: 77
End: 2017-05-01

## 2017-05-01 ENCOUNTER — TELEPHONE (OUTPATIENT)
Dept: HEPATOLOGY | Facility: CLINIC | Age: 77
End: 2017-05-01

## 2017-05-01 NOTE — TELEPHONE ENCOUNTER
----- Message from Janelle Bueno sent at 5/1/2017 12:51 PM CDT -----  Contact: Donna with Pharmacy Patient Assistant Program  Donna would like to know if you have received a letter from "[employer] regarding Januvia.  Please call Donna at [phone].    Thanks!

## 2017-05-01 NOTE — TELEPHONE ENCOUNTER
Spoke with MsZeusDonna in reference to receiving a letter from [employer] which I haven't received yet. Ms. Thompson said she will give them a call and let them know to send out another letter

## 2017-05-02 DIAGNOSIS — E11.9 TYPE 2 DIABETES MELLITUS WITHOUT COMPLICATION, WITHOUT LONG-TERM CURRENT USE OF INSULIN: ICD-10-CM

## 2017-05-02 RX ORDER — SITAGLIPTIN 50 MG/1
TABLET, FILM COATED ORAL
Qty: 30 TABLET | Refills: 3 | Status: SHIPPED | OUTPATIENT
Start: 2017-05-02 | End: 2017-05-17 | Stop reason: SDUPTHER

## 2017-05-03 ENCOUNTER — TELEPHONE (OUTPATIENT)
Dept: HEPATOLOGY | Facility: CLINIC | Age: 77
End: 2017-05-03

## 2017-05-03 NOTE — TELEPHONE ENCOUNTER
Patient wife called back, want MD to know that patient stop taking his SPIRONOLACTONE due to CHEST PAIN. Wife said that patient only took one pill and he had chest pain after that so he stop taking the medicine.     Wife want's to know if its ok for patient to stop this medicine? She doesn't want the patient to start building up fluid. She wants MD's opinion about this.     She also want to know what is the results of patient weekly labs?    Route message to MD

## 2017-05-04 ENCOUNTER — LAB VISIT (OUTPATIENT)
Dept: LAB | Facility: HOSPITAL | Age: 77
End: 2017-05-04
Attending: INTERNAL MEDICINE
Payer: MEDICARE

## 2017-05-04 DIAGNOSIS — R18.8 CIRRHOSIS OF LIVER WITH ASCITES, UNSPECIFIED HEPATIC CIRRHOSIS TYPE: ICD-10-CM

## 2017-05-04 DIAGNOSIS — K74.60 CIRRHOSIS OF LIVER WITH ASCITES, UNSPECIFIED HEPATIC CIRRHOSIS TYPE: ICD-10-CM

## 2017-05-04 LAB
ALBUMIN SERPL BCP-MCNC: 2.5 G/DL
ALP SERPL-CCNC: 96 U/L
ALT SERPL W/O P-5'-P-CCNC: 6 U/L
ANION GAP SERPL CALC-SCNC: 9 MMOL/L
AST SERPL-CCNC: 23 U/L
BILIRUB SERPL-MCNC: 0.9 MG/DL
BUN SERPL-MCNC: 28 MG/DL
CALCIUM SERPL-MCNC: 9.3 MG/DL
CHLORIDE SERPL-SCNC: 100 MMOL/L
CO2 SERPL-SCNC: 29 MMOL/L
CREAT SERPL-MCNC: 1.6 MG/DL
EST. GFR  (AFRICAN AMERICAN): 46.7 ML/MIN/1.73 M^2
EST. GFR  (NON AFRICAN AMERICAN): 40.4 ML/MIN/1.73 M^2
GLUCOSE SERPL-MCNC: 198 MG/DL
POTASSIUM SERPL-SCNC: 4.1 MMOL/L
PROT SERPL-MCNC: 7.5 G/DL
SODIUM SERPL-SCNC: 138 MMOL/L

## 2017-05-04 PROCEDURE — 36415 COLL VENOUS BLD VENIPUNCTURE: CPT | Mod: PO

## 2017-05-04 PROCEDURE — 80053 COMPREHEN METABOLIC PANEL: CPT

## 2017-05-05 ENCOUNTER — TELEPHONE (OUTPATIENT)
Dept: HEPATOLOGY | Facility: CLINIC | Age: 77
End: 2017-05-05

## 2017-05-05 NOTE — TELEPHONE ENCOUNTER
Spoke with Mrs Sheikh- Mr Sheikh had chest pain after one dose of spironolactone and has not taken it since.  He feels lethargic.  Advised her to consider paracentesis for his ascites. He is apparently reluctant to get this done. Also advised to bring him to hospital if he becomes drowsy/confused.

## 2017-05-10 ENCOUNTER — HOSPITAL ENCOUNTER (INPATIENT)
Facility: HOSPITAL | Age: 77
LOS: 5 days | Discharge: HOME HEALTH | DRG: 871 | End: 2017-05-15
Attending: EMERGENCY MEDICINE | Admitting: HOSPITALIST
Payer: MEDICARE

## 2017-05-10 DIAGNOSIS — R26.9 GAIT DISORDER: ICD-10-CM
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
## 2017-05-10 DIAGNOSIS — R78.81 BACTEREMIA: ICD-10-CM

## 2017-05-10 DIAGNOSIS — R18.8 CIRRHOSIS OF LIVER WITH ASCITES, UNSPECIFIED HEPATIC CIRRHOSIS TYPE: Primary | ICD-10-CM
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
## 2017-05-10 DIAGNOSIS — K75.81 LIVER CIRRHOSIS SECONDARY TO NASH (NONALCOHOLIC STEATOHEPATITIS): ICD-10-CM

## 2017-05-10 DIAGNOSIS — F31.9 BIPOLAR 1 DISORDER: ICD-10-CM

## 2017-05-10 DIAGNOSIS — R25.1 TREMOR: ICD-10-CM

## 2017-05-10 DIAGNOSIS — K74.60 LIVER CIRRHOSIS SECONDARY TO NASH (NONALCOHOLIC STEATOHEPATITIS): ICD-10-CM

## 2017-05-10 DIAGNOSIS — K74.60 CIRRHOSIS OF LIVER WITH ASCITES, UNSPECIFIED HEPATIC CIRRHOSIS TYPE: Primary | ICD-10-CM
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
## 2017-05-10 DIAGNOSIS — R50.9 FEVER: ICD-10-CM

## 2017-05-10 DIAGNOSIS — R18.8 ASCITES OF LIVER: ICD-10-CM

## 2017-05-10 DIAGNOSIS — R16.1 SPLENOMEGALY: ICD-10-CM

## 2017-05-10 DIAGNOSIS — E78.6 LOW HDL (UNDER 40): Chronic | ICD-10-CM

## 2017-05-10 DIAGNOSIS — R18.8 ASCITES: ICD-10-CM

## 2017-05-10 DIAGNOSIS — I86.4 GASTRIC VARICES: ICD-10-CM

## 2017-05-10 DIAGNOSIS — A40.9 STREPTOCOCCAL SEPSIS: ICD-10-CM

## 2017-05-10 DIAGNOSIS — K74.60 HEPATIC CIRRHOSIS, UNSPECIFIED HEPATIC CIRRHOSIS TYPE: ICD-10-CM
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
## 2017-05-10 DIAGNOSIS — N17.9 AKI (ACUTE KIDNEY INJURY): ICD-10-CM

## 2017-05-10 DIAGNOSIS — E11.9 TYPE 2 DIABETES MELLITUS WITHOUT COMPLICATION, WITHOUT LONG-TERM CURRENT USE OF INSULIN: ICD-10-CM

## 2017-05-10 DIAGNOSIS — I95.1 ORTHOSTATIC HYPOTENSION: ICD-10-CM

## 2017-05-10 DIAGNOSIS — D61.818 PANCYTOPENIA: ICD-10-CM

## 2017-05-10 DIAGNOSIS — R41.3 MEMORY LOSS: ICD-10-CM

## 2017-05-10 DIAGNOSIS — I10 ESSENTIAL HYPERTENSION: ICD-10-CM

## 2017-05-10 LAB
ALBUMIN SERPL BCP-MCNC: 2.4 G/DL
ALP SERPL-CCNC: 122 U/L
ALT SERPL W/O P-5'-P-CCNC: 10 U/L
AMMONIA PLAS-SCNC: 27 UMOL/L
ANION GAP SERPL CALC-SCNC: 11 MMOL/L
AST SERPL-CCNC: 32 U/L
BACTERIA #/AREA URNS AUTO: ABNORMAL /HPF
BASOPHILS # BLD AUTO: 0.02 K/UL
BASOPHILS NFR BLD: 0.3 %
BILIRUB SERPL-MCNC: 1 MG/DL
BILIRUB UR QL STRIP: NEGATIVE
BUN SERPL-MCNC: 29 MG/DL
CALCIUM SERPL-MCNC: 8.9 MG/DL
CHLORIDE SERPL-SCNC: 98 MMOL/L
CLARITY UR REFRACT.AUTO: CLEAR
CO2 SERPL-SCNC: 27 MMOL/L
COLOR UR AUTO: YELLOW
CREAT SERPL-MCNC: 1.6 MG/DL
DIFFERENTIAL METHOD: ABNORMAL
EOSINOPHIL # BLD AUTO: 0 K/UL
EOSINOPHIL NFR BLD: 0.2 %
ERYTHROCYTE [DISTWIDTH] IN BLOOD BY AUTOMATED COUNT: 16.6 %
EST. GFR  (AFRICAN AMERICAN): 46.7 ML/MIN/1.73 M^2
EST. GFR  (NON AFRICAN AMERICAN): 40.4 ML/MIN/1.73 M^2
GLUCOSE SERPL-MCNC: 167 MG/DL
GLUCOSE UR QL STRIP: NEGATIVE
HCT VFR BLD AUTO: 32.5 %
HGB BLD-MCNC: 10.6 G/DL
HGB UR QL STRIP: ABNORMAL
HYALINE CASTS UR QL AUTO: 3 /LPF
INR PPP: 1.2
KETONES UR QL STRIP: NEGATIVE
LACTATE SERPL-SCNC: 1.8 MMOL/L
LEUKOCYTE ESTERASE UR QL STRIP: NEGATIVE
LIPASE SERPL-CCNC: 23 U/L
LYMPHOCYTES # BLD AUTO: 0.4 K/UL
LYMPHOCYTES NFR BLD: 7.2 %
MCH RBC QN AUTO: 27.2 PG
MCHC RBC AUTO-ENTMCNC: 32.6 %
MCV RBC AUTO: 84 FL
MICROSCOPIC COMMENT: ABNORMAL
MONOCYTES # BLD AUTO: 1 K/UL
MONOCYTES NFR BLD: 15.8 %
NEUTROPHILS # BLD AUTO: 4.6 K/UL
NEUTROPHILS NFR BLD: 76 %
NITRITE UR QL STRIP: NEGATIVE
PH UR STRIP: 5 [PH] (ref 5–8)
PLATELET # BLD AUTO: 106 K/UL
PMV BLD AUTO: 9.8 FL
POCT GLUCOSE: 125 MG/DL (ref 70–110)
POCT GLUCOSE: 127 MG/DL (ref 70–110)
POTASSIUM SERPL-SCNC: 4.1 MMOL/L
PROT SERPL-MCNC: 7.8 G/DL
PROT UR QL STRIP: NEGATIVE
PROTHROMBIN TIME: 12.7 SEC
RBC # BLD AUTO: 3.89 M/UL
RBC #/AREA URNS AUTO: 9 /HPF (ref 0–4)
SODIUM SERPL-SCNC: 136 MMOL/L
SP GR UR STRIP: 1.01 (ref 1–1.03)
URN SPEC COLLECT METH UR: ABNORMAL
UROBILINOGEN UR STRIP-ACNC: NEGATIVE EU/DL
WBC # BLD AUTO: 6.09 K/UL
WBC #/AREA URNS AUTO: 1 /HPF (ref 0–5)

## 2017-05-10 PROCEDURE — 99223 1ST HOSP IP/OBS HIGH 75: CPT | Mod: ,,, | Performed by: HOSPITALIST

## 2017-05-10 PROCEDURE — 25000003 PHARM REV CODE 250: Performed by: INTERNAL MEDICINE

## 2017-05-10 PROCEDURE — 85610 PROTHROMBIN TIME: CPT

## 2017-05-10 PROCEDURE — 93005 ELECTROCARDIOGRAM TRACING: CPT

## 2017-05-10 PROCEDURE — 87040 BLOOD CULTURE FOR BACTERIA: CPT | Mod: 59

## 2017-05-10 PROCEDURE — 93010 ELECTROCARDIOGRAM REPORT: CPT | Mod: ,,, | Performed by: INTERNAL MEDICINE

## 2017-05-10 PROCEDURE — 99285 EMERGENCY DEPT VISIT HI MDM: CPT | Mod: ,,, | Performed by: PHYSICIAN ASSISTANT

## 2017-05-10 PROCEDURE — 83690 ASSAY OF LIPASE: CPT

## 2017-05-10 PROCEDURE — 0W9G3ZZ DRAINAGE OF PERITONEAL CAVITY, PERCUTANEOUS APPROACH: ICD-10-PCS | Performed by: RADIOLOGY

## 2017-05-10 PROCEDURE — 11000001 HC ACUTE MED/SURG PRIVATE ROOM

## 2017-05-10 PROCEDURE — 82140 ASSAY OF AMMONIA: CPT

## 2017-05-10 PROCEDURE — 85025 COMPLETE CBC W/AUTO DIFF WBC: CPT

## 2017-05-10 PROCEDURE — 96360 HYDRATION IV INFUSION INIT: CPT

## 2017-05-10 PROCEDURE — 87186 SC STD MICRODIL/AGAR DIL: CPT

## 2017-05-10 PROCEDURE — 96361 HYDRATE IV INFUSION ADD-ON: CPT

## 2017-05-10 PROCEDURE — 81001 URINALYSIS AUTO W/SCOPE: CPT

## 2017-05-10 PROCEDURE — 80053 COMPREHEN METABOLIC PANEL: CPT

## 2017-05-10 PROCEDURE — 99285 EMERGENCY DEPT VISIT HI MDM: CPT | Mod: 25

## 2017-05-10 PROCEDURE — 83605 ASSAY OF LACTIC ACID: CPT

## 2017-05-10 RX ORDER — SPIRONOLACTONE 25 MG/1
25 TABLET ORAL DAILY
Status: DISCONTINUED | OUTPATIENT
Start: 2017-05-11 | End: 2017-05-11

## 2017-05-10 RX ORDER — INSULIN ASPART 100 [IU]/ML
0-5 INJECTION, SOLUTION INTRAVENOUS; SUBCUTANEOUS
Status: DISCONTINUED | OUTPATIENT
Start: 2017-05-10 | End: 2017-05-15 | Stop reason: HOSPADM

## 2017-05-10 RX ORDER — FUROSEMIDE 80 MG/1
80 TABLET ORAL DAILY
Status: DISCONTINUED | OUTPATIENT
Start: 2017-05-10 | End: 2017-05-10

## 2017-05-10 RX ORDER — GLUCAGON 1 MG
1 KIT INJECTION
Status: DISCONTINUED | OUTPATIENT
Start: 2017-05-10 | End: 2017-05-15 | Stop reason: HOSPADM

## 2017-05-10 RX ORDER — IBUPROFEN 200 MG
24 TABLET ORAL
Status: DISCONTINUED | OUTPATIENT
Start: 2017-05-10 | End: 2017-05-15 | Stop reason: HOSPADM

## 2017-05-10 RX ORDER — SODIUM CHLORIDE 9 MG/ML
INJECTION, SOLUTION INTRAVENOUS CONTINUOUS
Status: DISCONTINUED | OUTPATIENT
Start: 2017-05-10 | End: 2017-05-10

## 2017-05-10 RX ORDER — DIVALPROEX SODIUM 500 MG/1
500 TABLET, FILM COATED, EXTENDED RELEASE ORAL NIGHTLY
Status: DISCONTINUED | OUTPATIENT
Start: 2017-05-11 | End: 2017-05-15 | Stop reason: HOSPADM

## 2017-05-10 RX ORDER — LOSARTAN POTASSIUM 25 MG/1
25 TABLET ORAL DAILY
Status: DISCONTINUED | OUTPATIENT
Start: 2017-05-11 | End: 2017-05-10

## 2017-05-10 RX ORDER — LOSARTAN POTASSIUM 50 MG/1
50 TABLET ORAL DAILY
Status: DISCONTINUED | OUTPATIENT
Start: 2017-05-10 | End: 2017-05-10

## 2017-05-10 RX ORDER — IBUPROFEN 200 MG
16 TABLET ORAL
Status: DISCONTINUED | OUTPATIENT
Start: 2017-05-10 | End: 2017-05-15 | Stop reason: HOSPADM

## 2017-05-10 RX ORDER — SPIRONOLACTONE 50 MG/1
50 TABLET, FILM COATED ORAL DAILY
Status: DISCONTINUED | OUTPATIENT
Start: 2017-05-10 | End: 2017-05-10

## 2017-05-10 RX ORDER — DIVALPROEX SODIUM 500 MG/1
500 TABLET, FILM COATED, EXTENDED RELEASE ORAL DAILY
Status: DISCONTINUED | OUTPATIENT
Start: 2017-05-10 | End: 2017-05-10

## 2017-05-10 RX ORDER — FUROSEMIDE 40 MG/1
40 TABLET ORAL DAILY
Status: DISCONTINUED | OUTPATIENT
Start: 2017-05-11 | End: 2017-05-12

## 2017-05-10 RX ADMIN — RIFAXIMIN 550 MG: 550 TABLET ORAL at 09:05

## 2017-05-10 RX ADMIN — SPIRONOLACTONE 50 MG: 25 TABLET, FILM COATED ORAL at 10:05

## 2017-05-10 RX ADMIN — SODIUM CHLORIDE: 0.9 INJECTION, SOLUTION INTRAVENOUS at 10:05

## 2017-05-10 RX ADMIN — FUROSEMIDE 80 MG: 40 TABLET ORAL at 10:05

## 2017-05-10 RX ADMIN — RIFAXIMIN 550 MG: 550 TABLET ORAL at 10:05

## 2017-05-10 NOTE — ASSESSMENT & PLAN NOTE
Fall precautions discussed with his wife.  Shower chair, cane/walker  Reduce diruetics and hold losartin  SPB dropped 135-> 114 after receiving IVF today

## 2017-05-10 NOTE — ASSESSMENT & PLAN NOTE
Story is consistent with orthostatic hypotension, likely worse w diuretic therapy.     BS 60s - hold oral  diabetic meds .  Monitor BS.  No results for input(s): POCTGLUCOSE in the last 72 hours.     Note hypotension:  Check orthostatics, reduce diuretics and BP meds, IVFs started.   Albumin 2.4.     No evidence of infection, 1/4 SIRS

## 2017-05-10 NOTE — ASSESSMENT & PLAN NOTE
Telemetry.   Orthostatic BPs   BP meds - hold losartin  Reduce diuretics lasix 40 and spironolactone 25.  If BP remains low, consider midodrine  Echo 11/2016: ef 60%  Fall precautions at home, rest of life.

## 2017-05-10 NOTE — ED NOTES
TAISHA HOU paged. Wife would like to know if patient can have water and if his doctors have spoken to his liver MD yet to to let him know he is being admitted, and is concerned about him restarting home medications.

## 2017-05-10 NOTE — H&P
Inpatient Radiology Pre-procedure Note    History of Present Illness:  Kenneth Sheikh is a 77 y.o. male who presents for ultrasound assisted paracentesis.  Admission H&P reviewed.  Past Medical History:   Diagnosis Date    Anticoagulant long-term use     Bipolar 1 disorder     Bipolar 1 disorder 11/24/2016    bipolar    Cancer     history of skin cancer/sinus cancer & rectal cancer    Depressed bipolar affective disorder     Diabetes mellitus     type 2    EBV infection 12/7/2016    EBV DNA, PCR Latest Ref Range: None detected  None detected EBV DNA-Copies/mL Unknown Test Not Performed EBV Early Antigen Ab, IgG Latest Ref Range: <1:10 Titer 1:40 (A) EBV Nuclear Ag Ab Latest Ref Range: <1:5 Titer >=1:80 (A) EBV VCA IgG Latest Ref Range: <1:10 Titer 1:160 (A) EBV VCA IgM Latest Ref Range: <1:10 Titer <1:10     History of TIA (transient ischemic attack)     several-taking Plavix    Hx of gallstones     Wife denies    Hypertension     Hyperthyroidism 11/30/2016    Low HDL (under 40) 12/7/2016    Mixed hyperlipidemia 11/23/2016    JUAN MANUEL (obstructive sleep apnea)     Stroke     Transient cerebral ischemia 7/22/2016     Past Surgical History:   Procedure Laterality Date    Moh's procedure x4      Sinus surgery for sinus cancer      sinus sx for cancer      skin cancer removed-several times-nose & ear      TONSILLECTOMY      Undescened testicle sx      UVULOPALATOPHARYNGOPLASTY      for sleep apnea       Review of Systems:   As documented in primary team H&P    Home Meds:   Prior to Admission medications    Medication Sig Start Date End Date Taking? Authorizing Provider   ACCU-CHEK NEYDA PLUS TEST STRP Strp  2/23/17  Yes Historical Provider, MD   divalproex (DEPAKOTE) 250 MG 24 hr tablet Take 500 mg by mouth once daily.    Yes Historical Provider, MD   furosemide (LASIX) 20 MG tablet Take 4 tablets (80 mg total) by mouth once daily. 4/25/17 4/25/18 Yes Renato Womack MD   JANUVIA 50 mg Tab TAKE 1  TABLET (50 MG TOTAL) BY MOUTH ONCE DAILY. 5/2/17  Yes Prisca Espinoza MD   lactulose (CHRONULAC) 10 gram/15 mL solution Take 15 mLs (10 g total) by mouth 2 (two) times daily. 12/20/16  Yes Renato Womack MD   losartan (COZAAR) 50 MG tablet Take 1 tablet (50 mg total) by mouth once daily. 2/10/17  Yes Prisca Espinoza MD   rifAXIMin (XIFAXAN) 550 mg Tab Take 1 tablet (550 mg total) by mouth 2 (two) times daily. 2/21/17  Yes Renato Womack MD   spironolactone (ALDACTONE) 50 MG tablet Take 1 tablet (50 mg total) by mouth once daily. 4/25/17 4/25/18 Yes Renato Womack MD   SITagliptan (JANUVIA) 50 MG Tab Take 1 tablet (50 mg total) by mouth once daily. 4/6/17   Prisca Espinoza MD     Scheduled Meds:    divalproex ER  500 mg Oral Daily    [START ON 5/11/2017] furosemide  40 mg Oral Daily    [START ON 5/11/2017] losartan  25 mg Oral Daily    rifAXIMin  550 mg Oral BID    [START ON 5/11/2017] spironolactone  25 mg Oral Daily     Continuous Infusions:    sodium chloride 0.9% 50 mL/hr at 05/10/17 1058     PRN Meds:dextrose 50%, dextrose 50%, glucagon (human recombinant), glucose, glucose, insulin aspart  Anticoagulants/Antiplatelets: no anticoagulation    Allergies:   Review of patient's allergies indicates:   Allergen Reactions    Abilify [aripiprazole] Other (See Comments)     Sleepy, could not function.     Sedation Hx: have not been any systemic reactions    Labs:    Recent Labs  Lab 05/10/17  0519   INR 1.2       Recent Labs  Lab 05/10/17  0519   WBC 6.09   HGB 10.6*   HCT 32.5*   MCV 84   *      Recent Labs  Lab 05/10/17  0519   *      K 4.1   CL 98   CO2 27   BUN 29*   CREATININE 1.6*   CALCIUM 8.9   ALT 10   AST 32   ALBUMIN 2.4*   BILITOT 1.0         Vitals:  Temp: 97.5 °F (36.4 °C) (05/10/17 1437)  Pulse: (!) 54 (05/10/17 1534)  Resp: 18 (05/10/17 1534)  BP: 123/66 (05/10/17 1534)  SpO2: 99 % (05/10/17 1534)     Physical Exam:  ASA: 2  Mallampati: Ii    General:  no acute distress  Mental Status: alert and oriented to person, place and time  HEENT: normocephalic, atraumatic  Chest: unlabored breathing  Abdomen: distended  Extremity: moves all extremities    Plan: ultrasound assisted paracentesis  Sedation Plan: local    Bradley Mandujano MD  Resident  Department of Radiology  Pager: [phone]

## 2017-05-10 NOTE — H&P
[facility] Hospital Medicine  History & Physical    Patient Name: Kenneth Sheikh  MRN: [id number]  Admission Date: 5/10/2017  Attending Physician: Jannette Alston MD  Primary Care Provider: Prisca Espinoza MD    [facility] Medicine Team: [facility] Jannette Alston MD     Patient information was obtained from patient, spouse/SO and ER records.     Subjective:     Principal Problem:Fall at home    Chief Complaint:   Chief Complaint   Patient presents with    Weakness     Pt with history of Liver Cirrhosis c/o weakness for three days. Pt unable to get out of bed today.         HPI: 77 y.o. Male with MCDONALD Cirrhosis, p[t of Dr. Miller, w2ho has been having progressive fatigue and weakness over the past 3 weeks.  He has been talking his lasix, but not the spironolactone because it caused chest pain.  The weakness exacerbates in the shower and on several occasions he could not walk out of the Bathroom, opting to sit on the commode and call his wife.  He got up to go to the bathroom at 3 am, tried to sit on the toilet and missed it, falling on the floor.  He has polyurea with lasix but cam only relieve small volumes.  His wife states that his appitite is poor.  He has not eaten anything for 3 days.  She bought him a Smoothie and it took all day for him to finish it.      His wife reports inreased tremor, poor memory, one episiode of posterior neck pain, right sided pain after the fall. Lactulose was stopped when he had too many stools 2 months ago.     She denies fever at home, but temp[ 100.9 in ED, culture done, paracenteis ordered and results pending      Past Medical History:   Diagnosis Date    Anticoagulant long-term use     Bipolar 1 disorder     Bipolar 1 disorder 11/24/2016    bipolar    Cancer     history of skin cancer/sinus cancer & rectal cancer    Depressed bipolar affective disorder     Diabetes mellitus     type 2    EBV infection 12/7/2016    EBV DNA, PCR Latest Ref Range:  None detected  None detected EBV DNA-Copies/mL Unknown Test Not Performed EBV Early Antigen Ab, IgG Latest Ref Range: <1:10 Titer 1:40 (A) EBV Nuclear Ag Ab Latest Ref Range: <1:5 Titer >=1:80 (A) EBV VCA IgG Latest Ref Range: <1:10 Titer 1:160 (A) EBV VCA IgM Latest Ref Range: <1:10 Titer <1:10     History of TIA (transient ischemic attack)     several-taking Plavix    Hx of gallstones     Wife denies    Hypertension     Hyperthyroidism 11/30/2016    Low HDL (under 40) 12/7/2016    Mixed hyperlipidemia 11/23/2016    JUAN MANUEL (obstructive sleep apnea)     Stroke     Transient cerebral ischemia 7/22/2016       Past Surgical History:   Procedure Laterality Date    Moh's procedure x4      Sinus surgery for sinus cancer      sinus sx for cancer      skin cancer removed-several times-nose & ear      TONSILLECTOMY      Undescened testicle sx      UVULOPALATOPHARYNGOPLASTY      for sleep apnea       Review of patient's allergies indicates:   Allergen Reactions    Abilify [aripiprazole] Other (See Comments)     Sleepy, could not function.       No current facility-administered medications on file prior to encounter.      Current Outpatient Prescriptions on File Prior to Encounter   Medication Sig    ACCU-CHEK NEYDA PLUS TEST STRP Strp     divalproex (DEPAKOTE) 250 MG 24 hr tablet Take 500 mg by mouth once daily.     furosemide (LASIX) 20 MG tablet Take 4 tablets (80 mg total) by mouth once daily.    JANUVIA 50 mg Tab TAKE 1 TABLET (50 MG TOTAL) BY MOUTH ONCE DAILY.    lactulose (CHRONULAC) 10 gram/15 mL solution Take 15 mLs (10 g total) by mouth 2 (two) times daily.    losartan (COZAAR) 50 MG tablet Take 1 tablet (50 mg total) by mouth once daily.    rifAXIMin (XIFAXAN) 550 mg Tab Take 1 tablet (550 mg total) by mouth 2 (two) times daily.    spironolactone (ALDACTONE) 50 MG tablet Take 1 tablet (50 mg total) by mouth once daily.    SITagliptan (JANUVIA) 50 MG Tab Take 1 tablet (50 mg total) by mouth  "once daily.     Family History     None        Social History Main Topics    Smoking status: Former Smoker     Packs/day: 0.25     Years: 2.00    Smokeless tobacco: Not on file      Comment: quit at age 17 less than a pack a day    Alcohol use No      Comment: rare    Drug use: No    Sexual activity: Not on file     Review of Systems   Constitutional: Positive for appetite change and fatigue. Negative for chills, diaphoresis and fever.        Weakness,   Poor appetite     HENT: Negative for congestion, nosebleeds, sinus pressure, sore throat, trouble swallowing and voice change.    Eyes: Negative for visual disturbance.   Respiratory: Positive for apnea (snoring and gasps) and chest tightness (assosciated w spironolactone). Negative for cough, choking, shortness of breath and wheezing.    Cardiovascular: Positive for leg swelling. Negative for chest pain and palpitations.   Gastrointestinal: Positive for abdominal distention. Negative for abdominal pain, blood in stool, constipation, diarrhea, nausea and vomiting.   Endocrine: Positive for polyuria. Negative for polydipsia.   Genitourinary: Positive for decreased urine volume and difficulty urinating. Negative for dysuria, flank pain, frequency and hematuria.   Musculoskeletal: Negative for arthralgias, back pain, gait problem, joint swelling, myalgias and neck stiffness.   Skin: Negative for rash and wound.   Neurological: Positive for tremors, syncope, weakness (general;zed) and light-headedness. Negative for dizziness, speech difficulty, numbness and headaches.   Psychiatric/Behavioral: Positive for dysphoric mood ("He's a grumpy old man"). Negative for agitation, behavioral problems, confusion, decreased concentration and hallucinations.     Objective:     Vital Signs (Most Recent):  Temp: 97.5 °F (36.4 °C) (05/10/17 1437)  Pulse: (!) 54 (05/10/17 1534)  Resp: 18 (05/10/17 1534)  BP: 123/66 (05/10/17 1534)  SpO2: 99 % (05/10/17 1534) Vital Signs (24h " Range):  Temp:  [97.5 °F (36.4 °C)-100.9 °F (38.3 °C)] 97.5 °F (36.4 °C)  Pulse:  [53-92] 54  Resp:  [14-20] 18  SpO2:  [94 %-99 %] 99 %  BP: (108-143)/(58-80) 123/66     Weight: 76.2 kg (168 lb)  Body mass index is 25.54 kg/(m^2).    Physical Exam   Constitutional: He is oriented to person, place, and time. He appears well-developed. No distress.   Thin with m wasting in the face and estremities   HENT:   Head: Normocephalic and atraumatic.   Mouth/Throat: Oropharynx is clear and moist.   Eyes: Conjunctivae and EOM are normal. Pupils are equal, round, and reactive to light. No scleral icterus.   Neck: Normal range of motion and full passive range of motion without pain. Neck supple. No JVD present. Carotid bruit is not present. No tracheal deviation, no edema and normal range of motion present. No thyromegaly present.   Cardiovascular: Normal rate, regular rhythm, normal heart sounds and intact distal pulses.  Exam reveals no gallop and no friction rub.    No murmur heard.  Pulmonary/Chest: Effort normal and breath sounds normal. No accessory muscle usage or stridor. No apnea. No respiratory distress. He has no wheezes. He has no rales. He exhibits no tenderness.   Abdominal: Soft. Bowel sounds are normal. He exhibits distension. He exhibits no ascites and no mass. There is no hepatosplenomegaly. There is no tenderness. There is no rebound, no guarding, no tenderness at McBurney's point and negative Hatch's sign. No hernia.   Musculoskeletal: Normal range of motion. He exhibits edema. He exhibits no tenderness.        Right shoulder: He exhibits normal range of motion, no tenderness and normal strength.   Lymphadenopathy:        Head (right side): No submental, no submandibular, no preauricular and no posterior auricular adenopathy present.        Head (left side): No submental, no submandibular, no preauricular and no posterior auricular adenopathy present.     He has no cervical adenopathy.        Right: No  inguinal and no supraclavicular adenopathy present.        Left: No inguinal and no supraclavicular adenopathy present.   Neurological: He is alert and oriented to person, place, and time. He has normal strength. He displays no atrophy and no tremor. No cranial nerve deficit. He exhibits normal muscle tone.   Resting tremor   Skin: Skin is warm and dry. No abrasion, no bruising, no ecchymosis, no laceration and no rash noted. He is not diaphoretic. No cyanosis or erythema. No pallor. Nails show no clubbing.   Psychiatric: He has a normal mood and affect. His behavior is normal. Judgment and thought content normal.   Vitals reviewed.       Significant Labs:   CBC:   Recent Labs  Lab 05/10/17  0519   WBC 6.09   HGB 10.6*   HCT 32.5*   *     CMP:   Recent Labs  Lab 05/10/17  0519      K 4.1   CL 98   CO2 27   *   BUN 29*   CREATININE 1.6*   CALCIUM 8.9   PROT 7.8   ALBUMIN 2.4*   BILITOT 1.0   ALKPHOS 122   AST 32   ALT 10   ANIONGAP 11   EGFRNONAA 40.4*     Lipase:   Recent Labs  Lab 05/10/17  0519   LIPASE 23     POCT Glucose: No results for input(s): POCTGLUCOSE in the last 48 hours.    Significant Imaging: cxr - clear      Assessment/Plan:     * Fall at home  Telemetry.   Orthostatic BPs   BP meds - hold losartin  Reduce diuretics lasix 40 and spironolactone 25.  If BP remains low, consider midodrine  Echo 11/2016: ef 60%  Fall precautions at home, rest of life.         Orthostatic hypotension  Fall precautions discussed with his wife.  Shower chair, cane/walker  Reduce diruetics and hold losartin  SPB dropped 135-> 114 after receiving IVF today        Fatigue  Story is consistent with orthostatic hypotension, likely worse w diuretic therapy.     BS 60s - hold oral  diabetic meds .  Monitor BS.  No results for input(s): POCTGLUCOSE in the last 72 hours.     Note hypotension:  Check orthostatics, reduce diuretics and BP meds, IVFs started.   Albumin 2.4.     No evidence of infection, 1/4  SIRS        Liver cirrhosis secondary to MCDONALD (nonalcoholic steatohepatitis)  paracentesie done upon admit    Type 2 diabetes mellitus without complication, without long-term current use of insulin  No results for input(s): POCTGLUCOSE in the last 72 hours.  BS 60s->160s      BELLA (acute kidney injury)  Mild volume depletion due to diuretics. Vs early HRS        VTE Risk Mitigation         Ordered     Medium Risk of VTE  Once      05/10/17 0611     Place sequential compression device  Until discontinued      05/10/17 0611     Place JORDEN hose  Until discontinued      05/10/17 0611        Jannette Alston MD  Department of Hospital Medicine   [facility]

## 2017-05-10 NOTE — PLAN OF CARE
05/10/17 1524   Discharge Assessment   Assessment Type Discharge Planning Assessment   Confirmed/corrected address and phone number on facesheet? Yes   Assessment information obtained from? Caregiver   Expected Length of Stay (days) 3   Communicated expected length of stay with patient/caregiver yes   Prior to hospitilization cognitive status: Alert/Oriented   Prior to hospitalization functional status: Independent   Current cognitive status: Alert/Oriented   Current Functional Status: Independent   Arrived From home or self-care   Lives With spouse   Able to Return to Prior Arrangements yes   Is patient able to care for self after discharge? Unable to determine at this time (comments)   How many people do you have in your home that can help with your care after discharge? 1   Patient's perception of discharge disposition home or selfcare   Readmission Within The Last 30 Days no previous admission in last 30 days   Patient currently being followed by outpatient case management? No   Patient currently receives home health services? No   Does the patient currently use HME? No   Equipment Currently Used at Home none   Do you have any problems affording any of your prescribed medications? No   Is the patient taking medications as prescribed? yes   Discharge Plan A Home Health   Discharge Plan B Home with family   Patient/Family In Agreement With Plan yes   Met with patient spouse and discussed discharge planning. She states that patient has had home health in past and believes that he would benefit from home therapy now as he has become weaker. Will follow for PT/OT recs.

## 2017-05-10 NOTE — SUBJECTIVE & OBJECTIVE
Past Medical History:   Diagnosis Date    Anticoagulant long-term use     Bipolar 1 disorder     Bipolar 1 disorder 11/24/2016    bipolar    Cancer     history of skin cancer/sinus cancer & rectal cancer    Depressed bipolar affective disorder     Diabetes mellitus     type 2    EBV infection 12/7/2016    EBV DNA, PCR Latest Ref Range: None detected  None detected EBV DNA-Copies/mL Unknown Test Not Performed EBV Early Antigen Ab, IgG Latest Ref Range: <1:10 Titer 1:40 (A) EBV Nuclear Ag Ab Latest Ref Range: <1:5 Titer >=1:80 (A) EBV VCA IgG Latest Ref Range: <1:10 Titer 1:160 (A) EBV VCA IgM Latest Ref Range: <1:10 Titer <1:10     History of TIA (transient ischemic attack)     several-taking Plavix    Hx of gallstones     Wife denies    Hypertension     Hyperthyroidism 11/30/2016    Low HDL (under 40) 12/7/2016    Mixed hyperlipidemia 11/23/2016    JUAN MANUEL (obstructive sleep apnea)     Stroke     Transient cerebral ischemia 7/22/2016       Past Surgical History:   Procedure Laterality Date    Moh's procedure x4      Sinus surgery for sinus cancer      sinus sx for cancer      skin cancer removed-several times-nose & ear      TONSILLECTOMY      Undescened testicle sx      UVULOPALATOPHARYNGOPLASTY      for sleep apnea       Review of patient's allergies indicates:   Allergen Reactions    Abilify [aripiprazole] Other (See Comments)     Sleepy, could not function.       No current facility-administered medications on file prior to encounter.      Current Outpatient Prescriptions on File Prior to Encounter   Medication Sig    ACCU-CHEK NEYDA PLUS TEST STRP Strp     divalproex (DEPAKOTE) 250 MG 24 hr tablet Take 500 mg by mouth once daily.     furosemide (LASIX) 20 MG tablet Take 4 tablets (80 mg total) by mouth once daily.    JANUVIA 50 mg Tab TAKE 1 TABLET (50 MG TOTAL) BY MOUTH ONCE DAILY.    lactulose (CHRONULAC) 10 gram/15 mL solution Take 15 mLs (10 g total) by mouth 2 (two) times daily.  "   losartan (COZAAR) 50 MG tablet Take 1 tablet (50 mg total) by mouth once daily.    rifAXIMin (XIFAXAN) 550 mg Tab Take 1 tablet (550 mg total) by mouth 2 (two) times daily.    spironolactone (ALDACTONE) 50 MG tablet Take 1 tablet (50 mg total) by mouth once daily.    SITagliptan (JANUVIA) 50 MG Tab Take 1 tablet (50 mg total) by mouth once daily.     Family History     None        Social History Main Topics    Smoking status: Former Smoker     Packs/day: 0.25     Years: 2.00    Smokeless tobacco: Not on file      Comment: quit at age 17 less than a pack a day    Alcohol use No      Comment: rare    Drug use: No    Sexual activity: Not on file     Review of Systems   Constitutional: Positive for appetite change and fatigue. Negative for chills, diaphoresis and fever.        Weakness,   Poor appetite     HENT: Negative for congestion, nosebleeds, sinus pressure, sore throat, trouble swallowing and voice change.    Eyes: Negative for visual disturbance.   Respiratory: Positive for apnea (snoring and gasps) and chest tightness (assosciated w spironolactone). Negative for cough, choking, shortness of breath and wheezing.    Cardiovascular: Positive for leg swelling. Negative for chest pain and palpitations.   Gastrointestinal: Positive for abdominal distention. Negative for abdominal pain, blood in stool, constipation, diarrhea, nausea and vomiting.   Endocrine: Positive for polyuria. Negative for polydipsia.   Genitourinary: Positive for decreased urine volume and difficulty urinating. Negative for dysuria, flank pain, frequency and hematuria.   Musculoskeletal: Negative for arthralgias, back pain, gait problem, joint swelling, myalgias and neck stiffness.   Skin: Negative for rash and wound.   Neurological: Positive for tremors, syncope, weakness (general;zed) and light-headedness. Negative for dizziness, speech difficulty, numbness and headaches.   Psychiatric/Behavioral: Positive for dysphoric mood ("He's " "a grumpy old man"). Negative for agitation, behavioral problems, confusion, decreased concentration and hallucinations.     Objective:     Vital Signs (Most Recent):  Temp: 97.5 °F (36.4 °C) (05/10/17 1437)  Pulse: (!) 54 (05/10/17 1534)  Resp: 18 (05/10/17 1534)  BP: 123/66 (05/10/17 1534)  SpO2: 99 % (05/10/17 1534) Vital Signs (24h Range):  Temp:  [97.5 °F (36.4 °C)-100.9 °F (38.3 °C)] 97.5 °F (36.4 °C)  Pulse:  [53-92] 54  Resp:  [14-20] 18  SpO2:  [94 %-99 %] 99 %  BP: (108-143)/(58-80) 123/66     Weight: 76.2 kg (168 lb)  Body mass index is 25.54 kg/(m^2).    Physical Exam   Constitutional: He is oriented to person, place, and time. He appears well-developed. No distress.   Thin with m wasting in the face and estremities   HENT:   Head: Normocephalic and atraumatic.   Mouth/Throat: Oropharynx is clear and moist.   Eyes: Conjunctivae and EOM are normal. Pupils are equal, round, and reactive to light. No scleral icterus.   Neck: Normal range of motion and full passive range of motion without pain. Neck supple. No JVD present. Carotid bruit is not present. No tracheal deviation, no edema and normal range of motion present. No thyromegaly present.   Cardiovascular: Normal rate, regular rhythm, normal heart sounds and intact distal pulses.  Exam reveals no gallop and no friction rub.    No murmur heard.  Pulmonary/Chest: Effort normal and breath sounds normal. No accessory muscle usage or stridor. No apnea. No respiratory distress. He has no wheezes. He has no rales. He exhibits no tenderness.   Abdominal: Soft. Bowel sounds are normal. He exhibits distension. He exhibits no ascites and no mass. There is no hepatosplenomegaly. There is no tenderness. There is no rebound, no guarding, no tenderness at McBurney's point and negative Hatch's sign. No hernia.   Musculoskeletal: Normal range of motion. He exhibits edema. He exhibits no tenderness.        Right shoulder: He exhibits normal range of motion, no tenderness " and normal strength.   Lymphadenopathy:        Head (right side): No submental, no submandibular, no preauricular and no posterior auricular adenopathy present.        Head (left side): No submental, no submandibular, no preauricular and no posterior auricular adenopathy present.     He has no cervical adenopathy.        Right: No inguinal and no supraclavicular adenopathy present.        Left: No inguinal and no supraclavicular adenopathy present.   Neurological: He is alert and oriented to person, place, and time. He has normal strength. He displays no atrophy and no tremor. No cranial nerve deficit. He exhibits normal muscle tone.   Resting tremor   Skin: Skin is warm and dry. No abrasion, no bruising, no ecchymosis, no laceration and no rash noted. He is not diaphoretic. No cyanosis or erythema. No pallor. Nails show no clubbing.   Psychiatric: He has a normal mood and affect. His behavior is normal. Judgment and thought content normal.   Vitals reviewed.       Significant Labs:   CBC:   Recent Labs  Lab 05/10/17  0519   WBC 6.09   HGB 10.6*   HCT 32.5*   *     CMP:   Recent Labs  Lab 05/10/17  0519      K 4.1   CL 98   CO2 27   *   BUN 29*   CREATININE 1.6*   CALCIUM 8.9   PROT 7.8   ALBUMIN 2.4*   BILITOT 1.0   ALKPHOS 122   AST 32   ALT 10   ANIONGAP 11   EGFRNONAA 40.4*     Lipase:   Recent Labs  Lab 05/10/17  0519   LIPASE 23     POCT Glucose: No results for input(s): POCTGLUCOSE in the last 48 hours.    Significant Imaging: cxr - clear

## 2017-05-10 NOTE — NURSING
Pt arrived to room 1006 from ED. AVSS on RA. Wife at bedside. Med B MD, Dr. Alston. notified of pt to floor.

## 2017-05-10 NOTE — ED TRIAGE NOTES
Pt presents to ED after fall this am. Pt stated that his legs have been weak and that he recently has been having decreased PO intake. Pt currently AAOx2. Pt has hx of liver cirrhosis.     LOC: Patient name and date of birth verified.  The patient is awake, alert and aware of environment with an appropriate affect, the patient is oriented x 3 and speaking appropriately.  Pt in NAD.    APPEARANCE: Patient resting comfortably and in no acute distress, patient is clean and well groomed, patient's clothing is properly fastened.  SKIN: The skin is warm and dry, color consistent with ethnicity, patient has normal skin turgor and moist mucus membranes, skin intact, no breakdown or brusing noted.  MUSCULOSKELETAL: Patient moving all extremities well, no obvious swelling or deformities noted.  RESPIRATORY: Airway is open and patent, respirations are spontaneous, patient has a normal effort and rate, no accessory muscle use noted.  CARDIAC: Patient has a normal rate and rhythm, no periphreal edema noted, capillary refill < 3 seconds.  ABDOMEN: Soft and non tender to palpation, distention noted. Bowel sounds present in all four quadrants.  NEUROLOGIC: Eyes open spontaneously, behavior appropriate to situation, follows commands, facial expression symmetrical, bilateral hand grasp equal and even, purposeful motor response noted, normal sensation in all extremities when touched with a finger.

## 2017-05-10 NOTE — IP AVS SNAPSHOT
[facility]  [address]  [facility] [address]  Phone: [phone]           Patient Discharge Instructions   Our goal is to set you up for success. This packet includes information on your condition, medications, and your home care.  It will help you care for yourself to prevent having to return to the hospital.     Please ask your nurse if you have any questions.      There are many details to remember when preparing to leave the hospital. Here is what you will need to do:    1. Take your medicine. If you are prescribed medications, review your Medication List on the following pages. You may have new medications to  at the pharmacy and others that you'll need to stop taking. Review the instructions for how and when to take your medications. Talk with your doctor or nurses if you are unsure of what to do.     2. Go to your follow-up appointments. Specific follow-up information is listed in the following pages. Your may be contacted by a nurse or clinical provider about future appointments. Be sure we have all of the phone numbers to reach you. Please contact your provider's office if you are unable to make an appointment.     3. Watch for warning signs. Your doctor or nurse will give you detailed warning signs to watch for and when to call for assistance. These instructions may also include educational information about your condition. If you experience any of warning signs to your health, call your doctor.           [facility]  Unless otherwise directed by your provider, please   contact [facility], our nurse care line   that is available for 24/7 assistance.     [phone] (toll-free)     Registered nurses in the [facility]   provide: appointment scheduling, clinical advisement, health education, and other advisory services.                  ** Verify the list of medication(s) below is accurate and up to date. Carry this with you in case of  emergency. If your medications have changed, please notify your healthcare provider.             Medication List      START taking these medications        Additional Info                      cefTRIAXone 2 g in dextrose 5 % 50 mL 2 g/50 mL Pgbk IVPB   Commonly known as:  ROCEPHIN   Quantity:  12 each   Refills:  0   Dose:  2 g   Indications:  Intra-abdominal    Last time this was given:  2 g on 5/15/2017  3:04 PM   Instructions:  Inject 50 mLs (2 g total) into the vein once daily.     Begin Date    AM    Noon    PM    Bedtime         CONTINUE taking these medications        Additional Info                      ACCU-CHEK NEYDA PLUS TEST STRP Strp   Refills:  0   Generic drug:  blood sugar diagnostic      Begin Date    AM    Noon    PM    Bedtime       divalproex  MG 24 hr tablet   Commonly known as:  DEPAKOTE ER   Refills:  0   Dose:  500 mg    Last time this was given:  500 mg on 5/14/2017  9:09 PM   Instructions:  Take 500 mg by mouth once daily.     Begin Date    AM    Noon    PM    Bedtime       furosemide 20 MG tablet   Commonly known as:  LASIX   Quantity:  270 tablet   Refills:  3   Dose:  80 mg    Last time this was given:  40 mg on 5/12/2017 10:53 AM   Instructions:  Take 4 tablets (80 mg total) by mouth once daily.     Begin Date    AM    Noon    PM    Bedtime       lactulose 10 gram/15 mL solution   Commonly known as:  CHRONULAC   Quantity:  473 mL   Refills:  1   Dose:  10 g    Instructions:  Take 15 mLs (10 g total) by mouth 2 (two) times daily.     Begin Date    AM    Noon    PM    Bedtime       losartan 50 MG tablet   Commonly known as:  COZAAR   Quantity:  90 tablet   Refills:  3   Dose:  50 mg    Instructions:  Take 1 tablet (50 mg total) by mouth once daily.     Begin Date    AM    Noon    PM    Bedtime       rifAXIMin 550 mg Tab   Commonly known as:  XIFAXAN   Quantity:  60 tablet   Refills:  6   Dose:  550 mg    Last time this was given:  550 mg on 5/15/2017  9:44 AM   Instructions:  Take 1  tablet (550 mg total) by mouth 2 (two) times daily.     Begin Date    AM    Noon    PM    Bedtime       * SITagliptan 50 MG Tab   Commonly known as:  JANUVIA   Quantity:  90 tablet   Refills:  3   Dose:  50 mg    Instructions:  Take 1 tablet (50 mg total) by mouth once daily.     Begin Date    AM    Noon    PM    Bedtime       * JANUVIA 50 MG Tab   Quantity:  30 tablet   Refills:  3   Generic drug:  SITagliptan    Instructions:  TAKE 1 TABLET (50 MG TOTAL) BY MOUTH ONCE DAILY.     Begin Date    AM    Noon    PM    Bedtime       spironolactone 50 MG tablet   Commonly known as:  ALDACTONE   Quantity:  30 tablet   Refills:  3   Dose:  50 mg    Last time this was given:  50 mg on 5/10/2017 10:57 AM   Instructions:  Take 1 tablet (50 mg total) by mouth once daily.     Begin Date    AM    Noon    PM    Bedtime       * Notice:  This list has 2 medication(s) that are the same as other medications prescribed for you. Read the directions carefully, and ask your doctor or other care provider to review them with you.         Where to Get Your Medications      Information about where to get these medications is not yet available     ! Ask your nurse or doctor about these medications     cefTRIAXone 2 g in dextrose 5 % 50 mL 2 g/50 mL Pgbk IVPB                  Please bring to all follow up appointments:    1. A copy of your discharge instructions.  2. All medicines you are currently taking in their original bottles.  3. Identification and insurance card.    Please arrive 15 minutes ahead of scheduled appointment time.    Please call 24 hours in advance if you must reschedule your appointment and/or time.        Your Scheduled Appointments     May 17, 2017 11:00 AM CDT   Non-Fasting Lab with LAB, [place] [facility]    [address]  [facility] [address]   [phone]            May 24, 2017  2:00 PM CDT   Non-Fasting Lab with LAB, [place] [facility] ([facility])    [address] [facility] [address]   [phone]            May 24, 2017  3:00 PM CDT   Established Patient Visit with Renato Womack MD   [facility] ([facility] )    [address]  [place] [address]   [phone]            Jun 08, 2017 10:40 AM CDT   Established Patient Visit with Prisca Espinoza MD   [facility] Internal [facility] ([facility])    [address]  [place] [address] [phone]              Follow-up Information     Follow up with Prisca Espinoza MD.    Specialties:  Psychiatry, Internal Medicine    Contact information:    [address] [place] [address] [id number] [phone]          Discharge Instructions     Future Orders    CANE FOR HOME USE     Questions:    Type of Cane:  Narrow Quad    Height:  5' 8" (1.727 m)    Weight:  77 kg (169 lb 12.1 oz)    Does patient have medical equipment at home?:  none    Length of need (1-99 months):  99    Please check all that apply:  Patient's condition impairs ambulation.    Vendor:  [facility]    Expected Date of Delivery:  5/15/2017    Expected Time of Delivery:          Primary Diagnosis     Your primary diagnosis was:  Fall At Home      Admission Information     Date & Time Provider Department CSN    5/10/2017  5:05 AM Marcus Rice MD [facility] [id number]      Care Providers     Provider Role Specialty Primary office phone    Marcus Rice MD Attending Provider Hospitalist [phone]    Marcus Rice MD Team Attending  Hospitalist [phone]    Jannette Alston MD Team Attending  Hospitalist [phone]    Thelma March MD Consulting Physician  Hepatology [phone]    Modesto Berry MD Consulting Physician  Hepatology [phone]    Tracie Moncada MD Consulting Physician  Gastroenterology [phone]    Renato Womack MD Consulting " "Physician  Hepatology [phone]      Your Vitals Were     BP Pulse Temp Resp Height Weight    148/80 56 98 °F (36.7 °C) 18 5' 8" (1.727 m) 77 kg (169 lb 12.1 oz)    SpO2 BMI             99% 25.81 kg/m2         Recent Lab Values        11/30/2016 4/6/2017 5/11/2017                     3:35 PM 11:24 AM  4:07 AM         A1C 6.4 (H) 6.7 (H) 6.3 (H)         Comment for A1C at  3:35 PM on 11/30/2016:  According to ADA guidelines, hemoglobin A1C <7.0% represents  optimal control in non-pregnant diabetic patients.  Different  metrics may apply to specific populations.   Standards of Medical Care in Diabetes - 2016.  For the purpose of screening for the presence of diabetes:  <5.7%     Consistent with the absence of diabetes  5.7-6.4%  Consistent with increasing risk for diabetes   (prediabetes)  >or=6.5%  Consistent with diabetes  Currently no consensus exists for use of hemoglobin A1C  for diagnosis of diabetes for children.      Comment for A1C at 11:24 AM on 4/6/2017:  According to ADA guidelines, hemoglobin A1C <7.0% represents  optimal control in non-pregnant diabetic patients.  Different  metrics may apply to specific populations.   Standards of Medical Care in Diabetes - 2016.  For the purpose of screening for the presence of diabetes:  <5.7%     Consistent with the absence of diabetes  5.7-6.4%  Consistent with increasing risk for diabetes   (prediabetes)  >or=6.5%  Consistent with diabetes  Currently no consensus exists for use of hemoglobin A1C  for diagnosis of diabetes for children.      Comment for A1C at  4:07 AM on 5/11/2017:  According to ADA guidelines, hemoglobin A1C <7.0% represents  optimal control in non-pregnant diabetic patients.  Different  metrics may apply to specific populations.   Standards of Medical Care in Diabetes - 2016.  For the purpose of screening for the presence of diabetes:  <5.7%     Consistent with the absence of diabetes  5.7-6.4%  Consistent with increasing risk for diabetes "   (prediabetes)  >or=6.5%  Consistent with diabetes  Currently no consensus exists for use of hemoglobin A1C  for diagnosis of diabetes for children.        Pending Labs     Order Current Status    Blood culture Preliminary result    Blood culture Preliminary result      Allergies as of 5/15/2017        Reactions    Abilify [Aripiprazole] Other (See Comments)    Sleepy, could not function.      Advance Directives     An advance directive is a document which, in the event you are no longer able to make decisions for yourself, tells your healthcare team what kind of treatment you do or do not want to receive, or who you would like to make those decisions for you.  If you do not currently have an advance directive, [facility] encourages you to create one.  For more information call:  [phone]), [phone] ([phone]),  or log on to [email or web address].        Smoking Cessation     If you would like to quit smoking:   You may be eligible for free services if you are a [place] resident and started smoking cigarettes before September 1, 1988.  Call the Smoking Cessation Trust (SCT) toll free at (743) 301-3118 or (862) 212-2411.   Call 7-730-QUIT-NOW if you do not meet the above criteria.   Contact us via email: [email or web address]   View our website for more information: [email or web address]        Language Assistance Services     ATTENTION: Language assistance services are available, free of charge. Please call [phone].      ATENCIÓN: Si habla español, tiene a bell disposición servicios gratuitos de asistencia lingüística. Llame al [phone].     CHÚ Ý: N?u b?n nói Ti?ng Vi?t, có các d?ch v? h? tr? ngôn ng? mi?n phí dành cho b?n. G?i s? [phone].        Diabetes Discharge Instructions                                   [facility] Sign-Up     Activating your [facility] account is as easy as 1-2-3!     1) Visit [email or web address], select Sign Up Now, enter this activation  code and your date of birth, then select Next.  [email or web address]  Expires: 5/21/2017 11:14 AM      2) Create a username and password to use when you visit [facility] in the future and select a security question in case you lose your password and select Next.    3) Enter your e-mail address and click Sign Up!    Additional Information  If you have questions, please e-mail [email or web address] or call [phone] to talk to our [facility] staff. Remember, [facility] is NOT to be used for urgent needs. For medical emergencies, dial 911.          [facility] complies with applicable Federal civil rights laws and does not discriminate on the basis of race, color, national origin, age, disability, or sex.

## 2017-05-10 NOTE — ED PROVIDER NOTES
Encounter Date: 5/10/2017    SCRIBE #1 NOTE: I, Kaushik Cervantes, am scribing for, and in the presence of,  Dr. Hall. I have scribed the following portions of the note - the EKG reading and the APC attestation.       History     Chief Complaint   Patient presents with    Weakness     Pt with history of Liver Cirrhosis c/o weakness for three days. Pt unable to get out of bed today.      Review of patient's allergies indicates:   Allergen Reactions    Abilify [aripiprazole] Other (See Comments)     Sleepy, could not function.     HPI Comments: 77-year-old male presents to the ER with wife for evaluation of worsening weakness and fatigue.  Patient was recently diagnosed with cirrhosis of the liver secondary to MCDONALD.  He has had worsening abdominal ascites but has been reluctant to undergo drainage.  He was admitted recently and there was concern of hepatic encephalopathy.     Currently he admits to 2 weeks of worsening fatigue.  The wife reports he has become unstable with his gait.  There was a fall approximately 2 hours PTA today.  There was no head trauma or LOC.  He denies any fever, chills, nausea, vomiting.  He denies any urinary or bowel symptoms.    Past Medical History:   Diagnosis Date    Anticoagulant long-term use     Bipolar 1 disorder     Bipolar 1 disorder 11/24/2016    bipolar    Cancer     history of skin cancer/sinus cancer & rectal cancer    Depressed bipolar affective disorder     Diabetes mellitus     type 2    EBV infection 12/7/2016    EBV DNA, PCR Latest Ref Range: None detected  None detected EBV DNA-Copies/mL Unknown Test Not Performed EBV Early Antigen Ab, IgG Latest Ref Range: <1:10 Titer 1:40 (A) EBV Nuclear Ag Ab Latest Ref Range: <1:5 Titer >=1:80 (A) EBV VCA IgG Latest Ref Range: <1:10 Titer 1:160 (A) EBV VCA IgM Latest Ref Range: <1:10 Titer <1:10     History of TIA (transient ischemic attack)     several-taking Plavix    Hx of gallstones     Wife denies    Hypertension      Hyperthyroidism 11/30/2016    Low HDL (under 40) 12/7/2016    Mixed hyperlipidemia 11/23/2016    JUAN MANUEL (obstructive sleep apnea)     Stroke     Transient cerebral ischemia 7/22/2016     Past Surgical History:   Procedure Laterality Date    Moh's procedure x4      Sinus surgery for sinus cancer      sinus sx for cancer      skin cancer removed-several times-nose & ear      TONSILLECTOMY      Undescened testicle sx      UVULOPALATOPHARYNGOPLASTY      for sleep apnea     Family History   Problem Relation Age of Onset    Anesthesia problems Neg Hx      Social History   Substance Use Topics    Smoking status: Former Smoker     Packs/day: 0.25     Years: 2.00    Smokeless tobacco: None      Comment: quit at age 17 less than a pack a day    Alcohol use No      Comment: rare     Review of Systems   Constitutional: Positive for fatigue. Negative for fever.   HENT: Negative for sore throat.    Respiratory: Negative for shortness of breath.    Cardiovascular: Negative for chest pain.   Gastrointestinal: Negative for nausea.   Genitourinary: Negative for dysuria.   Musculoskeletal: Negative for back pain.   Skin: Negative for rash.   Neurological: Positive for weakness.   Hematological: Does not bruise/bleed easily.       Physical Exam   Initial Vitals   BP Pulse Resp Temp SpO2   05/10/17 0459 05/10/17 0459 05/10/17 0459 05/10/17 0459 05/10/17 0459   140/80 92 18 100.9 °F (38.3 °C) 95 %     Physical Exam    Constitutional: Vital signs are normal. He appears well-developed and well-nourished. He is not diaphoretic. No distress.   HENT:   Head: Normocephalic and atraumatic.   Right Ear: External ear normal.   Left Ear: External ear normal.   Eyes: Conjunctivae are normal.   Cardiovascular: Normal rate, regular rhythm and normal heart sounds. Exam reveals no gallop and no friction rub.    No murmur heard.  Pulmonary/Chest: No respiratory distress. He has no wheezes. He has no rhonchi. He has no rales. He exhibits no  tenderness.   Abdominal: Soft. Normal appearance, normal aorta and bowel sounds are normal.   Large abdomen, distended, there is a large amount of ascites    Nontender on exam   Musculoskeletal: Normal range of motion.   Neurological: He is alert and oriented to person, place, and time.   Skin: Skin is warm and intact.   Psychiatric: He has a normal mood and affect. His speech is normal and behavior is normal. Cognition and memory are normal.         ED Course   Procedures  Labs Reviewed   CBC W/ AUTO DIFFERENTIAL - Abnormal; Notable for the following:        Result Value    RBC 3.89 (*)     Hemoglobin 10.6 (*)     Hematocrit 32.5 (*)     RDW 16.6 (*)     Platelets 106 (*)     Lymph # 0.4 (*)     Gran% 76.0 (*)     Lymph% 7.2 (*)     Mono% 15.8 (*)     All other components within normal limits   COMPREHENSIVE METABOLIC PANEL - Abnormal; Notable for the following:     Glucose 167 (*)     BUN, Bld 29 (*)     Creatinine 1.6 (*)     Albumin 2.4 (*)     eGFR if  46.7 (*)     eGFR if non  40.4 (*)     All other components within normal limits   PROTIME-INR - Abnormal; Notable for the following:     Prothrombin Time 12.7 (*)     All other components within normal limits   URINALYSIS - Abnormal; Notable for the following:     Occult Blood UA 2+ (*)     All other components within normal limits   URINALYSIS MICROSCOPIC - Abnormal; Notable for the following:     RBC, UA 9 (*)     Hyaline Casts, UA 3 (*)     All other components within normal limits   LIPASE   AMMONIA   LACTIC ACID, PLASMA   POCT GLUCOSE MONITORING CONTINUOUS     EKG Readings: (Independently Interpreted)   Normal sinus rhythm, left axis deviation, no ST elevation or depression          Medical Decision Making:   History:   Old Medical Records: I decided to obtain old medical records.  Independently Interpreted Test(s):   I have ordered and independently interpreted X-rays - see summary below.       <> Summary of X-Ray  Reading(s): CXR: nad  I have ordered and independently interpreted EKG Reading(s) - see prior notes  Clinical Tests:   Lab Tests: Ordered and Reviewed  Radiological Study: Ordered  Medical Tests: Ordered  ED Management:  77-year-old male with liver cirrhosis secondary to ascites presents with weakness and febrile.     Plan to get septic workup, plan to admit to internal medicine    Patient evaluation for possible infectious etiology has been thus far unremarkable in the ER.  Admission orders placed to internal medicine for further evaluation and TAP of patient's abdomen.  Other:   I have discussed this case with another health care provider.       <> Summary of the Discussion: IM            Scribe Attestation:   Scribe #1: I performed the above scribed service and the documentation accurately describes the services I performed. I attest to the accuracy of the note.    Attending Attestation:     Physician Attestation Statement for NP/PA:   I discussed this assessment and plan of this patient with the NP/PA, but I did not personally examine the patient. The face to face encounter was performed by the NP/PA.    Other NP/PA Attestation Additions:    History of Present Illness: Weakness and fever         Physician Attestation for Scribe:  Physician Attestation Statement for Scribe #1: I, Dr. Hall, reviewed documentation, as scribed by Kaushik Cervantes in my presence, and it is both accurate and complete.                 ED Course     Clinical Impression:   The primary encounter diagnosis was Cirrhosis of liver with ascites, unspecified hepatic cirrhosis type. Diagnoses of Ascites and Fever were also pertinent to this visit.    Disposition:   Disposition: Admitted  Condition: Stable       Adrien Rodriguez PA-C  05/10/17 0613       William Hall III, MD  05/11/17 8522

## 2017-05-11 PROBLEM — R78.81 BACTEREMIA: Status: ACTIVE | Noted: 2017-05-11

## 2017-05-11 LAB
ALBUMIN SERPL BCP-MCNC: 1.8 G/DL
ALBUMIN SERPL BCP-MCNC: 2.1 G/DL
ALP SERPL-CCNC: 106 U/L
ALP SERPL-CCNC: 117 U/L
ALT SERPL W/O P-5'-P-CCNC: 11 U/L
ALT SERPL W/O P-5'-P-CCNC: 8 U/L
ANION GAP SERPL CALC-SCNC: 10 MMOL/L
ANION GAP SERPL CALC-SCNC: 9 MMOL/L
AST SERPL-CCNC: 27 U/L
AST SERPL-CCNC: 31 U/L
BASOPHILS # BLD AUTO: 0.01 K/UL
BASOPHILS NFR BLD: 0.2 %
BILIRUB SERPL-MCNC: 0.8 MG/DL
BILIRUB SERPL-MCNC: 1.1 MG/DL
BUN SERPL-MCNC: 30 MG/DL
BUN SERPL-MCNC: 36 MG/DL
CALCIUM SERPL-MCNC: 8.2 MG/DL
CALCIUM SERPL-MCNC: 8.9 MG/DL
CHLORIDE SERPL-SCNC: 98 MMOL/L
CHLORIDE SERPL-SCNC: 99 MMOL/L
CO2 SERPL-SCNC: 28 MMOL/L
CO2 SERPL-SCNC: 28 MMOL/L
CREAT SERPL-MCNC: 1.4 MG/DL
CREAT SERPL-MCNC: 2.1 MG/DL
DIFFERENTIAL METHOD: ABNORMAL
EOSINOPHIL # BLD AUTO: 0 K/UL
EOSINOPHIL NFR BLD: 0.7 %
ERYTHROCYTE [DISTWIDTH] IN BLOOD BY AUTOMATED COUNT: 16.3 %
EST. GFR  (AFRICAN AMERICAN): 33.6 ML/MIN/1.73 M^2
EST. GFR  (AFRICAN AMERICAN): 54.9 ML/MIN/1.73 M^2
EST. GFR  (NON AFRICAN AMERICAN): 29.1 ML/MIN/1.73 M^2
EST. GFR  (NON AFRICAN AMERICAN): 47.4 ML/MIN/1.73 M^2
ESTIMATED AVG GLUCOSE: 134 MG/DL
GLUCOSE SERPL-MCNC: 251 MG/DL
GLUCOSE SERPL-MCNC: 98 MG/DL
HBA1C MFR BLD HPLC: 6.3 %
HCT VFR BLD AUTO: 30 %
HGB BLD-MCNC: 9.9 G/DL
LACTATE SERPL-SCNC: 2.2 MMOL/L
LYMPHOCYTES # BLD AUTO: 0.6 K/UL
LYMPHOCYTES NFR BLD: 13.3 %
MAGNESIUM SERPL-MCNC: 1.7 MG/DL
MCH RBC QN AUTO: 27.4 PG
MCHC RBC AUTO-ENTMCNC: 33 %
MCV RBC AUTO: 83 FL
MONOCYTES # BLD AUTO: 0.6 K/UL
MONOCYTES NFR BLD: 14.5 %
NEUTROPHILS # BLD AUTO: 3.1 K/UL
NEUTROPHILS NFR BLD: 71.3 %
PHOSPHATE SERPL-MCNC: 3.3 MG/DL
PLATELET # BLD AUTO: 94 K/UL
PMV BLD AUTO: 9.9 FL
POCT GLUCOSE: 128 MG/DL (ref 70–110)
POCT GLUCOSE: 230 MG/DL (ref 70–110)
POTASSIUM SERPL-SCNC: 4 MMOL/L
POTASSIUM SERPL-SCNC: 4.1 MMOL/L
PROT SERPL-MCNC: 5.9 G/DL
PROT SERPL-MCNC: 6.8 G/DL
RBC # BLD AUTO: 3.61 M/UL
SODIUM SERPL-SCNC: 135 MMOL/L
SODIUM SERPL-SCNC: 137 MMOL/L
WBC # BLD AUTO: 4.28 K/UL

## 2017-05-11 PROCEDURE — 87040 BLOOD CULTURE FOR BACTERIA: CPT

## 2017-05-11 PROCEDURE — 63600175 PHARM REV CODE 636 W HCPCS: Performed by: INTERNAL MEDICINE

## 2017-05-11 PROCEDURE — 63600175 PHARM REV CODE 636 W HCPCS: Performed by: PHYSICIAN ASSISTANT

## 2017-05-11 PROCEDURE — 80053 COMPREHEN METABOLIC PANEL: CPT | Mod: 91

## 2017-05-11 PROCEDURE — 97167 OT EVAL HIGH COMPLEX 60 MIN: CPT

## 2017-05-11 PROCEDURE — 85025 COMPLETE CBC W/AUTO DIFF WBC: CPT

## 2017-05-11 PROCEDURE — 99233 SBSQ HOSP IP/OBS HIGH 50: CPT | Mod: GC,,, | Performed by: INTERNAL MEDICINE

## 2017-05-11 PROCEDURE — 25000003 PHARM REV CODE 250: Performed by: HOSPITALIST

## 2017-05-11 PROCEDURE — 83605 ASSAY OF LACTIC ACID: CPT

## 2017-05-11 PROCEDURE — 84100 ASSAY OF PHOSPHORUS: CPT

## 2017-05-11 PROCEDURE — 11000001 HC ACUTE MED/SURG PRIVATE ROOM

## 2017-05-11 PROCEDURE — 80053 COMPREHEN METABOLIC PANEL: CPT

## 2017-05-11 PROCEDURE — 25000003 PHARM REV CODE 250: Performed by: PHYSICIAN ASSISTANT

## 2017-05-11 PROCEDURE — P9047 ALBUMIN (HUMAN), 25%, 50ML: HCPCS | Performed by: INTERNAL MEDICINE

## 2017-05-11 PROCEDURE — 83036 HEMOGLOBIN GLYCOSYLATED A1C: CPT

## 2017-05-11 PROCEDURE — 97161 PT EVAL LOW COMPLEX 20 MIN: CPT

## 2017-05-11 PROCEDURE — 99233 SBSQ HOSP IP/OBS HIGH 50: CPT | Mod: ,,, | Performed by: INTERNAL MEDICINE

## 2017-05-11 PROCEDURE — 25000003 PHARM REV CODE 250: Performed by: INTERNAL MEDICINE

## 2017-05-11 PROCEDURE — 83735 ASSAY OF MAGNESIUM: CPT

## 2017-05-11 PROCEDURE — 36415 COLL VENOUS BLD VENIPUNCTURE: CPT

## 2017-05-11 RX ORDER — ALBUMIN HUMAN 250 G/1000ML
12.5 SOLUTION INTRAVENOUS ONCE
Status: DISCONTINUED | OUTPATIENT
Start: 2017-05-11 | End: 2017-05-11

## 2017-05-11 RX ORDER — ACETAMINOPHEN 500 MG
500 TABLET ORAL ONCE
Status: COMPLETED | OUTPATIENT
Start: 2017-05-11 | End: 2017-05-11

## 2017-05-11 RX ORDER — SODIUM CHLORIDE 9 MG/ML
INJECTION, SOLUTION INTRAVENOUS CONTINUOUS
Status: DISCONTINUED | OUTPATIENT
Start: 2017-05-11 | End: 2017-05-15 | Stop reason: HOSPADM

## 2017-05-11 RX ORDER — ACETAMINOPHEN 500 MG
500 TABLET ORAL EVERY 6 HOURS PRN
Status: DISCONTINUED | OUTPATIENT
Start: 2017-05-11 | End: 2017-05-15 | Stop reason: HOSPADM

## 2017-05-11 RX ORDER — ALBUMIN HUMAN 250 G/1000ML
100 SOLUTION INTRAVENOUS ONCE
Status: COMPLETED | OUTPATIENT
Start: 2017-05-11 | End: 2017-05-11

## 2017-05-11 RX ADMIN — INSULIN ASPART 2 UNITS: 100 INJECTION, SOLUTION INTRAVENOUS; SUBCUTANEOUS at 01:05

## 2017-05-11 RX ADMIN — ALBUMIN (HUMAN) 100 G: 12.5 SOLUTION INTRAVENOUS at 02:05

## 2017-05-11 RX ADMIN — CEFTRIAXONE 1 G: 1 INJECTION, SOLUTION INTRAVENOUS at 03:05

## 2017-05-11 RX ADMIN — FUROSEMIDE 40 MG: 40 TABLET ORAL at 08:05

## 2017-05-11 RX ADMIN — RIFAXIMIN 550 MG: 550 TABLET ORAL at 08:05

## 2017-05-11 RX ADMIN — ACETAMINOPHEN 500 MG: 500 TABLET, FILM COATED ORAL at 09:05

## 2017-05-11 RX ADMIN — VANCOMYCIN HYDROCHLORIDE 1250 MG: 10 INJECTION, POWDER, LYOPHILIZED, FOR SOLUTION INTRAVENOUS at 05:05

## 2017-05-11 RX ADMIN — SODIUM CHLORIDE: 0.9 INJECTION, SOLUTION INTRAVENOUS at 02:05

## 2017-05-11 RX ADMIN — DIVALPROEX SODIUM 500 MG: 500 TABLET, FILM COATED, EXTENDED RELEASE ORAL at 08:05

## 2017-05-11 NOTE — CONSULTS
Hepatology   Consult note      SUBJECTIVE:     Reason for Consult:  MCDONALD / now positive blood culture    History of Present Illness:  Patient is a 77 y.o. male with MCDONALD Cirrhosis, pt of Dr. Miller, who has been having progressive fatigue and weakness over the past 3 weeks. He has been talking his lasix, but not the spironolactone because it caused chest pain. The weakness exacerbates in the shower and on several occasions he could not walk out of the Bathroom, opting to sit on the commode and call his wife. He got up to go to the bathroom at 3 am, tried to sit on the toilet and missed it, falling on the floor. He has polyurea with lasix but cam only relieve small volumes. His wife states that his appitite is poor. He has not eaten much for the last 3-4 days     His wife reports inreased tremor, poor memory, one episiode of posterior neck pain, right sided pain after the fall. Lactulose was stopped when he had too many stools 2 months ago.      She denies fever at home, but temp 100.9 in ED, blood culture done here on 5/10/17 showing gram positive cocci in chains resembling strep , paracenteis done but no fluid sent for culture or studies.       PTA Medications   Medication Sig    ACCU-CHEK NEYDA PLUS TEST STRP Strp     divalproex (DEPAKOTE) 250 MG 24 hr tablet Take 500 mg by mouth once daily.     furosemide (LASIX) 20 MG tablet Take 4 tablets (80 mg total) by mouth once daily.    JANUVIA 50 mg Tab TAKE 1 TABLET (50 MG TOTAL) BY MOUTH ONCE DAILY.    lactulose (CHRONULAC) 10 gram/15 mL solution Take 15 mLs (10 g total) by mouth 2 (two) times daily.    losartan (COZAAR) 50 MG tablet Take 1 tablet (50 mg total) by mouth once daily.    rifAXIMin (XIFAXAN) 550 mg Tab Take 1 tablet (550 mg total) by mouth 2 (two) times daily.    spironolactone (ALDACTONE) 50 MG tablet Take 1 tablet (50 mg total) by mouth once daily.    SITagliptan (JANUVIA) 50 MG Tab Take 1 tablet (50 mg total) by mouth once daily.        Review of patient's allergies indicates:   Allergen Reactions    Abilify [aripiprazole] Other (See Comments)     Sleepy, could not function.        Past Medical History:   Diagnosis Date    Anticoagulant long-term use     Bipolar 1 disorder     Bipolar 1 disorder 11/24/2016    bipolar    Cancer     history of skin cancer/sinus cancer & rectal cancer    Depressed bipolar affective disorder     Diabetes mellitus     type 2    EBV infection 12/7/2016    EBV DNA, PCR Latest Ref Range: None detected  None detected EBV DNA-Copies/mL Unknown Test Not Performed EBV Early Antigen Ab, IgG Latest Ref Range: <1:10 Titer 1:40 (A) EBV Nuclear Ag Ab Latest Ref Range: <1:5 Titer >=1:80 (A) EBV VCA IgG Latest Ref Range: <1:10 Titer 1:160 (A) EBV VCA IgM Latest Ref Range: <1:10 Titer <1:10     History of TIA (transient ischemic attack)     several-taking Plavix    Hx of gallstones     Wife denies    Hypertension     Hyperthyroidism 11/30/2016    Low HDL (under 40) 12/7/2016    Mixed hyperlipidemia 11/23/2016    JUAN MANUEL (obstructive sleep apnea)     Stroke     Transient cerebral ischemia 7/22/2016     Past Surgical History:   Procedure Laterality Date    Moh's procedure x4      Sinus surgery for sinus cancer      sinus sx for cancer      skin cancer removed-several times-nose & ear      TONSILLECTOMY      Undescened testicle sx      UVULOPALATOPHARYNGOPLASTY      for sleep apnea     Family History   Problem Relation Age of Onset    Anesthesia problems Neg Hx      Social History   Substance Use Topics    Smoking status: Former Smoker     Packs/day: 0.25     Years: 2.00    Smokeless tobacco: None      Comment: quit at age 17 less than a pack a day    Alcohol use No      Comment: rare       Review of Systems:  Constitutional: + fever and fatigue   Eyes: no visual changes  ENT: no nasal congestion or sore throat  Respiratory: no cough or shorness of breath  Cardiovascular: no chest pain or  palpitations  Gastrointestinal: no nausea or vomiting, no abdominal pain, + decreased appetite   Genitourinary: no hematuria or dysuria  Integument/Breast: no rash or pruritis  Hematologic/Lymphatic: no easy bruising or lymphadenopathy  Musculoskeletal: no arthralgias or myalgias  Neurological: + tremor   Endocrine: + cold intolerance    OBJECTIVE:     Vital Signs (Most Recent)  Temp: 98.1 °F (36.7 °C) (05/11/17 1300)  Pulse: 82 (05/11/17 1100)  Resp: 18 (05/11/17 1300)  BP: (!) 82/48 (05/11/17 1300)  SpO2: 100 % (05/11/17 1300)    Physical Exam:  General appearance: elderly appearing male in no acute distress   Head: normocephalic, atraumatic  Eyes:  No scleral icterus   Nose: Nares normal. Septum midline.  Throat: lips, mucosa, and tongue normal  Neck: supple, symmetrical, trachea midline, no JVD  Lungs:  clear to auscultation bilaterally and normal respiratory effort  Heart: regular rate and rhythm, S1, S2 normal, no murmur  Abdomen: distended with positive fluid wave,BS positive x 4 quadrants; no rebound tenderness or guarding  Extremities: no cyanosis  Pulses: 2+ and symmetric  Skin: Skin color, texture, turgor normal.  Neurologic: oriented to person and place     Laboratory    CBC:   Recent Labs  Lab 05/10/17  0519 05/11/17  0407   WBC 6.09 4.28   RBC 3.89* 3.61*   HGB 10.6* 9.9*   HCT 32.5* 30.0*   * 94*   MCV 84 83   MCH 27.2 27.4   MCHC 32.6 33.0     BMP:   Recent Labs  Lab 05/10/17  0519 05/11/17  0407   * 98    137   K 4.1 4.1   CL 98 99   CO2 27 28   BUN 29* 30*   CREATININE 1.6* 1.4   CALCIUM 8.9 8.9   MG  --  1.7     Coagulation:   Recent Labs  Lab 05/10/17  0519   INR 1.2     Microbiology Results (last 7 days)     Procedure Component Value Units Date/Time    Blood Culture #2 **CANNOT BE ORDERED STAT** [[id number]] Collected:  05/10/17 0520    Order Status:  Completed Specimen:  Blood from Peripheral, Wrist, Right Updated:  05/11/17 0831     Blood Culture, Routine Gram stain aer  bottle: Gram positive cocci in chains resembling Strep      Blood Culture, Routine Results called to and read back by: Frances Corrigan RN  05/11/2017  02:47     Blood Culture, Routine Gram stain sp bottle: Gram positive cocci in chains resembling Strep     Blood Culture, Routine Positive results previously called 05/11/2017  08:31    Blood culture [[id number]] Collected:  05/11/17 0407    Order Status:  Sent Specimen:  Blood Updated:  05/11/17 0524    Blood culture [[id number]] Collected:  05/11/17 0407    Order Status:  Sent Specimen:  Blood Updated:  05/11/17 0500    Blood Culture #1 **CANNOT BE ORDERED STAT** [[id number]] Collected:  05/10/17 0519    Order Status:  Completed Specimen:  Blood from Peripheral, Antecubital, Right Updated:  05/11/17 0248     Blood Culture, Routine Gram stain aer bottle: Gram positive cocci in chains resembling Strep      Blood Culture, Routine Gram stain sp bottle: Gram positive cocci in chains resembling Strep      Blood Culture, Routine Results called to and read back by: Frances Corrigan RN   05/11/2017  02:48          Diagnostic Results:  Reviewed     ASSESSMENT/PLAN:     Active Hospital Problems    Diagnosis  POA    *Fall at home [W19.XXXA, Y92.099]  Not Applicable    Bacteremia [R78.81]  Unknown     GPC  On Vanc      Cirrhosis of liver with ascites [K74.60]  Yes    Orthostatic hypotension [I95.1]  Yes    BELLA (acute kidney injury) [N17.9]  Yes    Liver cirrhosis secondary to MCDONALD (nonalcoholic steatohepatitis) [K75.81, K74.60]  Yes    Fatigue [R53.83]  Yes    Gait disorder [R26.9]  Yes    Essential hypertension [I10]  Yes    Type 2 diabetes mellitus without complication, without long-term current use of insulin [E11.9]  Yes     A1c 6.4 (11/30/16)  Metformin stopped during hospitalization 11/2016 because of increased lactate.        Resolved Hospital Problems    Diagnosis Date Resolved POA   No resolved problems to display.     77 y.o. male with MCDONALD Cirrhosis, pt of   Angela, who has been having progressive fatigue and weakness over the past 3 weeks.     Blood culture done here on 5/10/17 showing gram positive cocci in chains resembling strep , paracenteis done but no fluid sent for culture or studies.     INR 1.2, bilirubin 1.1, AST/ALT 31/11.    MELD-Na score: 12 at 5/11/2017  4:07 AM  MELD score: 12 at 5/11/2017  4:07 AM  Calculated from:  Serum Creatinine: 1.4 mg/dL at 5/11/2017  4:07 AM  Serum Sodium: 137 mmol/L at 5/11/2017  4:07 AM  Total Bilirubin: 1.1 mg/dL at 5/11/2017  4:07 AM  INR(ratio): 1.2 at 5/10/2017  5:19 AM  Age: 77 years    Recommendations:   - keep scheduled outpatient appointment later this month with Dr. Womack   - would have obtained a diagnostic paracentesis with cultures, unfortunately sample was not sent, so would treat for possible SBP as a potential infectious source given the bacteremia  - low Na Diet, less than 2g  - continue antibiotics and follow up blood cultures   - recommend giving Albumin 1.5 g/kg IV today followed by 1 g/kg IV on day 3, given that we cannot evaluate for possible SBP and can presume that SBP is a possible source of patients bacteremia.      Esdras Rocha  Gastroenterology Fellow (PGY V)

## 2017-05-11 NOTE — PT/OT/SLP EVAL
Occupational Therapy  Evaluation    Kenneth Sheikh   MRN: [id number]   Admitting Diagnosis: Fall at home    OT Date of Treatment: 05/11/17   OT Start Time: 0749  OT Stop Time: 0805  OT Total Time (min): 16 min    Billable Minutes:  Evaluation 16    Diagnosis: Fall at home       Past Medical History:   Diagnosis Date    Anticoagulant long-term use     Bipolar 1 disorder     Bipolar 1 disorder 11/24/2016    bipolar    Cancer     history of skin cancer/sinus cancer & rectal cancer    Depressed bipolar affective disorder     Diabetes mellitus     type 2    EBV infection 12/7/2016    EBV DNA, PCR Latest Ref Range: None detected  None detected EBV DNA-Copies/mL Unknown Test Not Performed EBV Early Antigen Ab, IgG Latest Ref Range: <1:10 Titer 1:40 (A) EBV Nuclear Ag Ab Latest Ref Range: <1:5 Titer >=1:80 (A) EBV VCA IgG Latest Ref Range: <1:10 Titer 1:160 (A) EBV VCA IgM Latest Ref Range: <1:10 Titer <1:10     History of TIA (transient ischemic attack)     several-taking Plavix    Hx of gallstones     Wife denies    Hypertension     Hyperthyroidism 11/30/2016    Low HDL (under 40) 12/7/2016    Mixed hyperlipidemia 11/23/2016    JUAN MANUEL (obstructive sleep apnea)     Stroke     Transient cerebral ischemia 7/22/2016      Past Surgical History:   Procedure Laterality Date    Moh's procedure x4      Sinus surgery for sinus cancer      sinus sx for cancer      skin cancer removed-several times-nose & ear      TONSILLECTOMY      Undescened testicle sx      UVULOPALATOPHARYNGOPLASTY      for sleep apnea       Referring physician: Marcus Rice MD  Date referred to OT: 5/10/2017    General Precautions: Standard, fall (cardiac)  Orthopedic Precautions: N/A  Braces: N/A    Do you have any cultural, spiritual, Orthodoxy conflicts, given your current situation?: JW     Patient History:  Living Environment  Lives With: spouse  Living Arrangements: house  Home Accessibility: stairs to enter home  Home Layout: Able to live  "on 1st floor  Number of Stairs to Enter Home: 1  Transportation Available: family or friend will provide  Living Environment Comment: Pt reported living in a SSH with 1 TULIO. Bathroom has a tub/shower combo. PTA, pt was (I) with all ADLs and mobility. No other falls reported besides recent fall. Has assistance upon d/c.   Equipment Currently Used at Home: none    Prior level of function:   Bed Mobility/Transfers: independent  Grooming: independent  Bathing: independent  Upper Body Dressing: independent  Lower Body Dressing: independent  Toileting: independent        Dominant hand: right    Subjective:  Communicated with RN prior to session.    Pt agreeable to OT/PT evaluation     Chief Complaint: being cold  Patient/Family stated goals: increase (I) in ADLs    Pain Ratin/10  Location - Side: Bilateral  Location - Orientation: generalized  Location: abdomen  Pain Addressed: Reposition, Distraction  Pain Rating Post-Intervention: 4/10    Objective:  Patient found with: telemetry    Cognitive Exam:  Oriented to: Person, Place, Time and Situation  Follows Commands/attention: Inattentive and Follows two-step commands  Communication: clear/fluent  Memory:  Poor immediate recall  Safety awareness/insight to disability: impaired  Coping skills/emotional control: flat affect  -Increased time for information processing. Has multiple episodes of "freezing" and requiring cues to continue to task    Visual/perceptual:  Intact    Physical Exam:  Postural examination/scapula alignment: Rounded shoulder, Head forward and Abnormal trunk flexion  Skin integrity: Thin and Dry  Edema: None noted in BUE    Sensation:   Intact in BUE    Upper Extremity Range of Motion:  Right Upper Extremity: WFL  Left Upper Extremity: WFL    Upper Extremity Strength:  Right Upper Extremity: 3+/5 grossly in all generalized muscle groups  Left Upper Extremity: 3+/5 grossly in all generalized muscle groups   Strength: fair      Fine motor " "coordination:   Impaired  Left hand thumb/finger opposition skills impaired, Right hand thumb/finger opposition skills impaired, Left hand, manipulation of objects impaired and Right hand, manipulation of objects impaired    Gross motor coordination: tremors    Functional Mobility:  Bed Mobility:  Scooting/Bridging: Stand by Assistance  Supine to Sit: Stand by Assistance (required increase time )   -verbal cues for technique due to pt having an episode of "freezing" and required verbal cues to continue to tqask    Transfers: verbal/tactile cues for safety and technique  Sit <> Stand Assistance: Maximum Assistance (sit to stand: SBA , Stand to sit:maximum assistance from EOB)  Sit <> Stand Assistive Device: Rolling Walker    Functional Ambulation: Performed functional mobility ~20 ft with minimum assistance using RW. During mobility, pt stopped and stood outside of room and was not responding to questions or moving per PT's request. Increased time was given for pt to process information but pt had no response of understanding. Required tactile cues to continue mobility. Once near bed, pt began sitting while not being close to EOB safely required maximum assistance to safely transition from stand to sit to EOB.    Activities of Daily Living:    UE Dressing Level of Assistance: Total assistance (for buttons and adjustment of shirt due to increased tremors)      Balance:   Static Sit: FAIR+: Able to take MINIMAL challenges from all directions  Dynamic Sit: FAIR+: Maintains balance through MINIMAL excursions of active trunk motion  Static Stand: POOR+: Needs MINIMAL assist to maintain    Therapeutic Activities and Exercises:  Pt educated on OT role/POC, whiteboard updated, re-orientation to call light and safety UIC using call light. Verbalized understanding.       AM-PAC 6 CLICK ADL  How much help from another person does this patient currently need?  1 = Unable, Total/Dependent Assistance  2 = A lot, Maximum/Moderate " "Assistance  3 = A little, Minimum/Contact Guard/Supervision  4 = None, Modified [place]/Independent    Putting on and taking off regular lower body clothing? : 2  Bathing (including washing, rinsing, drying)?: 2  Toileting, which includes using toilet, bedpan, or urinal? : 2  Putting on and taking off regular upper body clothing?: 2  Taking care of personal grooming such as brushing teeth?: 2  Eating meals?: 3  Total Score: 13    AM-PAC Raw Score CMS "G-Code Modifier Level of Impairment Assistance   6 % Total / Unable   7 - 9 CM 80 - 100% Maximal Assist   10 - 14 CL 60 - 80% Moderate Assist   15 - 19 CK 40 - 60% Moderate Assist   20 - 22 CJ 20 - 40% Minimal Assist   23 CI 1-20% SBA / CGA   24 CH 0% Independent/ Mod I       Patient left supine with all lines intact, call button in reach, bed alarm on and RN notified. RN notified to order BSC due to pt safety in bathroom    Assessment:  Kenneth Sheikh is a 77 y.o. male with a medical diagnosis of Fall at home and presents with impaired endurance and impaired cognition impacting performance on ADLs. Session tolerated well and eager to work with therapy. PTA, pt was (I) with all ADLs. Currently requires significant assistance for UE dressing of buttons due to tremors and coordination impairments. Required continuous verbal cues for safety while performing mobility and transfers demonstrating and increased fall risk. Pt had multiple episodes of "freezing" where pt would not move and forget what task he was performing requiring verbal/tactile cues to complete task. OT is recommending SNF to address listed impairments and increase safety awareness. He will continue to benefit from skilled OT services to maximize safety and increase (I) in ADLs.     Pt presented with a high complexity OT evaluation.  Pt required an expanded an expanded review of medical history and occupational profile.  Pt demod 5+ performance deficits (physical, cognitive, or psychosocial) " resulting in limitations and engagement restrictions.  Clinical decision making required high analytical complexity with multiple treatment options.  Pt with cormorbidities and required significant modification of task/assistance with assessment.      Physical- skills refer to impairments of body structure or functions, balance, mobility; strength, endurance, FMC, GMC, sensation, dexterity, and posture.  Cognitive- skills refer to ability to attend, communicate, perceive, think, understand, problem solve, mentally sequence, learn, and remember resulting in ability to organize occupational performance in timely and safe manner.    Psychosocial- skills refer to interpersonal interactions, habits, routines, and behaviors, active use of coping strategies, and environmental adaptations to appropriately participate in everyday tasks and situations.       Rehab identified problem list/impairments: Rehab identified problem list/impairments: weakness, impaired endurance, impaired self care skills, impaired functional mobilty, gait instability, impaired balance, decreased upper extremity function, decreased lower extremity function, decreased safety awareness    Rehab potential is good.    Activity tolerance: Good    Discharge recommendations: Discharge Facility/Level Of Care Needs: nursing facility, skilled     Barriers to discharge: Barriers to Discharge: Other (Comment) (pt requires increased assistance)    Equipment recommendations: bath bench, bedside commode, walker, rolling     GOALS:   Occupational Therapy Goals        Problem: Occupational Therapy Goal    Goal Priority Disciplines Outcome Interventions   Occupational Therapy Goal     OT, PT/OT Ongoing (interventions implemented as appropriate)    Description:  Goals to be met by: 5/21/2017     Patient will increase functional independence with ADLs by performing:    UE Dressing with Supervision.  LE Dressing with Minimal Assistance.  Grooming while standing at sink  with Minimal Assistance.  Supine to sit with Minimal Assistance.  Stand pivot transfers with Minimal Assistance.  Toilet transfer to toilet with Minimal Assistance.  Upper extremity exercise program x15 reps per handout, with supervision.                PLAN:  Patient to be seen 4 x/week to address the above listed problems via self-care/home management, therapeutic activities, therapeutic exercises  Plan of Care expires: 06/10/17  Plan of Care reviewed with: patient         ZACKARY Foote  05/11/2017

## 2017-05-11 NOTE — PT/OT/SLP EVAL
Physical Therapy  Evaluation    Kenneth Sheikh   MRN: [id number]   Admitting Diagnosis: Fall at home    PT Received On: 05/11/17  PT Start Time: 0748     PT Stop Time: 0805    PT Total Time (min): 17 min       Billable Minutes:  Evaluation 17    Diagnosis: Fall at home    Past Medical History:   Diagnosis Date    Anticoagulant long-term use     Bipolar 1 disorder     Bipolar 1 disorder 11/24/2016    bipolar    Cancer     history of skin cancer/sinus cancer & rectal cancer    Depressed bipolar affective disorder     Diabetes mellitus     type 2    EBV infection 12/7/2016    EBV DNA, PCR Latest Ref Range: None detected  None detected EBV DNA-Copies/mL Unknown Test Not Performed EBV Early Antigen Ab, IgG Latest Ref Range: <1:10 Titer 1:40 (A) EBV Nuclear Ag Ab Latest Ref Range: <1:5 Titer >=1:80 (A) EBV VCA IgG Latest Ref Range: <1:10 Titer 1:160 (A) EBV VCA IgM Latest Ref Range: <1:10 Titer <1:10     History of TIA (transient ischemic attack)     several-taking Plavix    Hx of gallstones     Wife denies    Hypertension     Hyperthyroidism 11/30/2016    Low HDL (under 40) 12/7/2016    Mixed hyperlipidemia 11/23/2016    JUAN MANUEL (obstructive sleep apnea)     Stroke     Transient cerebral ischemia 7/22/2016      Past Surgical History:   Procedure Laterality Date    Moh's procedure x4      Sinus surgery for sinus cancer      sinus sx for cancer      skin cancer removed-several times-nose & ear      TONSILLECTOMY      Undescened testicle sx      UVULOPALATOPHARYNGOPLASTY      for sleep apnea       Referring physician: TIMMY Rice  Date referred to PT: 05/10/2017    General Precautions: Standard, fall  Orthopedic Precautions: N/A   Braces: N/A            Patient History:  Lives With: spouse  Living Arrangements: house  Home Accessibility: stairs to enter home  Home Layout: Able to live on 1st floor  Number of Stairs to Enter Home: 1  Living Environment Comment: Pt lives with wife in 1-story house with 1 TULIO. Pt  reports (I) with ADLs and amb. Pt reports no past falls prior to fall prior to admit. Pt reports wife able to provide assist upon d/c.   Equipment Currently Used at Home: none  DME owned (not currently used): none    Previous Level of Function:  Ambulation Skills: independent  Transfer Skills: independent  ADL Skills: independent    Subjective:  Communicated with RN prior to session.  Pt agreeable to therapy session.   Chief Complaint: stomach pain   Patient goals: return to PLOF.    Pain Ratin/10   Location - Side: Bilateral  Location - Orientation: generalized  Location: abdomen  Pain Addressed: Reposition, Distraction  Pain Rating Post-Intervention: 4/10    Objective:   Patient found with: telemetry     Cognitive Exam:  Oriented to: Person, Place and Time    Follows Commands/attention: Follows multistep  commands  Communication: clear/fluent and flat affect  Safety awareness/insight to disability: impaired    Physical Exam:  Postural examination/scapula alignment: Rounded shoulder    Skin integrity: Visible skin intact  Edema: None noted B LE    Sensation:   Intact  light/touch B LE    Lower Extremity Range of Motion:  Right Lower Extremity: WFL  Left Lower Extremity: WFL    Lower Extremity Strength:  Right Lower Extremity: WFL  Left Lower Extremity: WFL     Functional Mobility:  Bed Mobility:  Supine to Sit: Stand by Assistance (increased time)  Sit to Supine: Stand by Assistance    Transfers:  Sit <> Stand Assistance: Stand By Assistance (vc's for hand placement)  Sit <> Stand Assistive Device: Rolling Walker    Gait:   Gait Distance: ~20ft with increased flex posture with increased shuffling, required max A at end of gait to guide hips to EOB  Assistance 1: Minimum assistance, Maximum assistance  Gait Assistive Device: Rolling walker  Gait Pattern: reciprocal  Gait Deviation(s): decreased efrain, decreased toe-to-floor clearance, decreased weight-shifting ability, decreased velocity of limb motion,  decreased step length, decreased stride length    Balance:   Static Sit: FAIR+: Able to take MINIMAL challenges from all directions  Dynamic Sit: FAIR+: Maintains balance through MINIMAL excursions of active trunk motion  Static Stand: POOR+: Needs MINIMAL assist to maintain  Dynamic stand: POOR: N/A    Therapeutic Activities and Exercises:  Pt educated on role of PT/POC.  Pt safe to perform transfers with RN staff; RN notified to order BSC rather than amb to bathroom for safety at this time.     AM-PAC 6 CLICK MOBILITY  How much help from another person does this patient currently need?   1 = Unable, Total/Dependent Assistance  2 = A lot, Maximum/Moderate Assistance  3 = A little, Minimum/Contact Guard/Supervision  4 = None, Modified [place]/Independent    Turning over in bed (including adjusting bedclothes, sheets and blankets)?: 4  Sitting down on and standing up from a chair with arms (e.g., wheelchair, bedside commode, etc.): 3  Moving from lying on back to sitting on the side of the bed?: 3  Moving to and from a bed to a chair (including a wheelchair)?: 3  Need to walk in hospital room?: 2  Climbing 3-5 steps with a railing?: 1  Total Score: 16     AM-PAC Raw Score CMS G-Code Modifier Level of Impairment Assistance   6 % Total / Unable   7 - 9 CM 80 - 100% Maximal Assist   10 - 14 CL 60 - 80% Moderate Assist   15 - 19 CK 40 - 60% Moderate Assist   20 - 22 CJ 20 - 40% Minimal Assist   23 CI 1-20% SBA / CGA   24 CH 0% Independent/ Mod I     Patient left supine with all lines intact, call button in reach and RN notified.    Assessment:   Kenneth Sheikh is a 77 y.o. male with a medical diagnosis of Fall at home and presents with decreased balance, coordination, endurance and overall functional mobility. Pt performed bed mobility SBA and transfers SBA with RW. Pt amb ~20ft with increased flex posture with increased shuffling with min A with RW then required max A at end of gait to guide hips to EOB. Pt  will benefit from skilled PT to improve deficits and increase overall functional mobility. Pt will benefit from SNF (short-stay) to return to PLOF and ensure safety upon d/c.     Rehab identified problem list/impairments: Rehab identified problem list/impairments: weakness, gait instability, decreased lower extremity function, impaired endurance, impaired balance, decreased safety awareness, decreased coordination, impaired functional mobilty    Rehab potential is good.    Activity tolerance: Good    Discharge recommendations: Discharge Facility/Level Of Care Needs: nursing facility, skilled     Barriers to discharge: Barriers to Discharge: Decreased caregiver support (pt requires increased assist at this time)    Equipment recommendations: Equipment Needed After Discharge: bedside commode, bath bench, walker, rolling     GOALS:   Physical Therapy Goals        Problem: Physical Therapy Goal    Goal Priority Disciplines Outcome Goal Variances Interventions   Physical Therapy Goal     PT/OT, PT Ongoing (interventions implemented as appropriate)     Description:  Goals to be met by: 2017     Patient will increase functional independence with mobility by performin. Supine to sit with Modified [place]  2. Sit to supine with Modified [place]  3. Sit to stand transfer with Modified [place]  4. Gait  x 100 feet with Contact Guard Assistance using Rolling Walker.   5. Lower extremity exercise program x15 reps per handout, with supervision                PLAN:    Patient to be seen 5 x/week to address the above listed problems via gait training, therapeutic activities, therapeutic exercises  Plan of Care expires: 17  Plan of Care reviewed with: patient          SANJEEV NICHOLAS, PT  2017

## 2017-05-11 NOTE — PLAN OF CARE
Problem: Physical Therapy Goal  Goal: Physical Therapy Goal  Goals to be met by: 2017     Patient will increase functional independence with mobility by performin. Supine to sit with Modified [place]  2. Sit to supine with Modified [place]  3. Sit to stand transfer with Modified [place]  4. Gait x 100 feet with Contact Guard Assistance using Rolling Walker.   5. Lower extremity exercise program x15 reps per handout, with supervision  Outcome: Ongoing (interventions implemented as appropriate)  Pt evaluation complete.     SANJEEV PETERSON, PT  2017

## 2017-05-11 NOTE — PLAN OF CARE
Problem: Patient Care Overview  Goal: Plan of Care Review  Outcome: Ongoing (interventions implemented as appropriate)  Pt aaox4, up with assist. AVSS on RA. Tele monitor on. accuchecks achs maintained. Orthostatic BP's taken. Pt had paracentesis today, 5L removed, tolerated well. WCTM.

## 2017-05-11 NOTE — PLAN OF CARE
Problem: Patient Care Overview  Goal: Plan of Care Review  Outcome: Ongoing (interventions implemented as appropriate)  Pt free from falls. Pt wears non slip socks when ambulating. Pt bed low and locked position. Pt afebrile. Pt IV site without redness or edema.  Positive blood cultures pt started on vancomycin. Educated pt on importance of hand washing. Pt has denied any pain or discomfort this shift.

## 2017-05-11 NOTE — SUBJECTIVE & OBJECTIVE
Past Medical History:   Diagnosis Date    Anticoagulant long-term use     Bipolar 1 disorder     Bipolar 1 disorder 11/24/2016    bipolar    Cancer     history of skin cancer/sinus cancer & rectal cancer    Depressed bipolar affective disorder     Diabetes mellitus     type 2    EBV infection 12/7/2016    EBV DNA, PCR Latest Ref Range: None detected  None detected EBV DNA-Copies/mL Unknown Test Not Performed EBV Early Antigen Ab, IgG Latest Ref Range: <1:10 Titer 1:40 (A) EBV Nuclear Ag Ab Latest Ref Range: <1:5 Titer >=1:80 (A) EBV VCA IgG Latest Ref Range: <1:10 Titer 1:160 (A) EBV VCA IgM Latest Ref Range: <1:10 Titer <1:10     History of TIA (transient ischemic attack)     several-taking Plavix    Hx of gallstones     Wife denies    Hypertension     Hyperthyroidism 11/30/2016    Low HDL (under 40) 12/7/2016    Mixed hyperlipidemia 11/23/2016    JUAN MANUEL (obstructive sleep apnea)     Stroke     Transient cerebral ischemia 7/22/2016       Past Surgical History:   Procedure Laterality Date    Moh's procedure x4      Sinus surgery for sinus cancer      sinus sx for cancer      skin cancer removed-several times-nose & ear      TONSILLECTOMY      Undescened testicle sx      UVULOPALATOPHARYNGOPLASTY      for sleep apnea       Review of patient's allergies indicates:   Allergen Reactions    Abilify [aripiprazole] Other (See Comments)     Sleepy, could not function.       No current facility-administered medications on file prior to encounter.      Current Outpatient Prescriptions on File Prior to Encounter   Medication Sig    ACCU-CHEK NEYDA PLUS TEST STRP Strp     divalproex (DEPAKOTE) 250 MG 24 hr tablet Take 500 mg by mouth once daily.     furosemide (LASIX) 20 MG tablet Take 4 tablets (80 mg total) by mouth once daily.    JANUVIA 50 mg Tab TAKE 1 TABLET (50 MG TOTAL) BY MOUTH ONCE DAILY.    lactulose (CHRONULAC) 10 gram/15 mL solution Take 15 mLs (10 g total) by mouth 2 (two) times daily.  "   losartan (COZAAR) 50 MG tablet Take 1 tablet (50 mg total) by mouth once daily.    rifAXIMin (XIFAXAN) 550 mg Tab Take 1 tablet (550 mg total) by mouth 2 (two) times daily.    spironolactone (ALDACTONE) 50 MG tablet Take 1 tablet (50 mg total) by mouth once daily.    SITagliptan (JANUVIA) 50 MG Tab Take 1 tablet (50 mg total) by mouth once daily.     Family History     None        Social History Main Topics    Smoking status: Former Smoker     Packs/day: 0.25     Years: 2.00    Smokeless tobacco: Not on file      Comment: quit at age 17 less than a pack a day    Alcohol use No      Comment: rare    Drug use: No    Sexual activity: Not on file     Review of Systems   Constitutional: Positive for appetite change and fatigue. Negative for chills, diaphoresis and fever.        Weakness,   Poor appetite     HENT: Negative for congestion, nosebleeds, sinus pressure, sore throat, trouble swallowing and voice change.    Eyes: Negative for visual disturbance.   Respiratory: Positive for apnea (snoring and gasps) and chest tightness (assosciated w spironolactone). Negative for cough, choking, shortness of breath and wheezing.    Cardiovascular: Positive for leg swelling. Negative for chest pain and palpitations.   Gastrointestinal: Positive for abdominal distention. Negative for abdominal pain, blood in stool, constipation, diarrhea, nausea and vomiting.   Endocrine: Positive for polyuria. Negative for polydipsia.   Genitourinary: Positive for decreased urine volume and difficulty urinating. Negative for dysuria, flank pain, frequency and hematuria.   Musculoskeletal: Negative for arthralgias, back pain, gait problem, joint swelling, myalgias and neck stiffness.   Skin: Negative for rash and wound.   Neurological: Positive for tremors, syncope, weakness (general;zed) and light-headedness. Negative for dizziness, speech difficulty, numbness and headaches.   Psychiatric/Behavioral: Positive for dysphoric mood ("He's " "a grumpy old man"). Negative for agitation, behavioral problems, confusion, decreased concentration and hallucinations.     Objective:     Vital Signs (Most Recent):  Temp: (!) 101.4 °F (38.6 °C) (05/11/17 0951)  Pulse: 98 (05/11/17 0727)  Resp: 20 (05/11/17 0727)  BP: (!) 144/63 (05/11/17 0727)  SpO2: (!) 93 % (05/11/17 0727) Vital Signs (24h Range):  Temp:  [97.5 °F (36.4 °C)-101.5 °F (38.6 °C)] 101.4 °F (38.6 °C)  Pulse:  [53-98] 98  Resp:  [18-20] 20  SpO2:  [93 %-100 %] 93 %  BP: (108-151)/(57-67) 144/63     Weight: 77 kg (169 lb 12.1 oz)  Body mass index is 25.81 kg/(m^2).    Physical Exam   Constitutional: He is oriented to person, place, and time. He appears well-developed. No distress.   Thin with m wasting in the face and estremities   HENT:   Head: Normocephalic and atraumatic.   Mouth/Throat: Oropharynx is clear and moist.   Eyes: Conjunctivae and EOM are normal. Pupils are equal, round, and reactive to light. No scleral icterus.   Neck: Normal range of motion and full passive range of motion without pain. Neck supple. No JVD present. Carotid bruit is not present. No tracheal deviation, no edema and normal range of motion present. No thyromegaly present.   Cardiovascular: Normal rate, regular rhythm, normal heart sounds and intact distal pulses.  Exam reveals no gallop and no friction rub.    No murmur heard.  Pulmonary/Chest: Effort normal and breath sounds normal. No accessory muscle usage or stridor. No apnea. No respiratory distress. He has no wheezes. He has no rales. He exhibits no tenderness.   Abdominal: Soft. Bowel sounds are normal. He exhibits distension. He exhibits no ascites and no mass. There is no hepatosplenomegaly. There is no tenderness. There is no rebound, no guarding, no tenderness at McBurney's point and negative Hatch's sign. No hernia.   Musculoskeletal: Normal range of motion. He exhibits edema. He exhibits no tenderness.        Right shoulder: He exhibits normal range of " motion, no tenderness and normal strength.   Lymphadenopathy:        Head (right side): No submental, no submandibular, no preauricular and no posterior auricular adenopathy present.        Head (left side): No submental, no submandibular, no preauricular and no posterior auricular adenopathy present.     He has no cervical adenopathy.        Right: No inguinal and no supraclavicular adenopathy present.        Left: No inguinal and no supraclavicular adenopathy present.   Neurological: He is alert and oriented to person, place, and time. He has normal strength. He displays no atrophy and no tremor. No cranial nerve deficit. He exhibits normal muscle tone.   Resting tremor   Skin: Skin is warm and dry. No abrasion, no bruising, no ecchymosis, no laceration and no rash noted. He is not diaphoretic. No cyanosis or erythema. No pallor. Nails show no clubbing.   Psychiatric: He has a normal mood and affect. His behavior is normal. Judgment and thought content normal.   Vitals reviewed.       Significant Labs:   CBC:     Recent Labs  Lab 05/10/17  0519 05/11/17  0407   WBC 6.09 4.28   HGB 10.6* 9.9*   HCT 32.5* 30.0*   * 94*     CMP:     Recent Labs  Lab 05/10/17  0519 05/11/17  0407    137   K 4.1 4.1   CL 98 99   CO2 27 28   * 98   BUN 29* 30*   CREATININE 1.6* 1.4   CALCIUM 8.9 8.9   PROT 7.8 6.8   ALBUMIN 2.4* 2.1*   BILITOT 1.0 1.1*   ALKPHOS 122 117   AST 32 31   ALT 10 11   ANIONGAP 11 10   EGFRNONAA 40.4* 47.4*     Lipase:     Recent Labs  Lab 05/10/17  0519   LIPASE 23     POCT Glucose:     Recent Labs  Lab 05/10/17  1723 05/10/17  2118 05/11/17  0845   POCTGLUCOSE 125* 127* 128*       Significant Imaging: cxr - clear

## 2017-05-11 NOTE — PROGRESS NOTES
[facility] Hospital Medicine  Progress Note    Patient Name: Kenneth Sheikh  MRN: [id number]  Patient Class: IP- Inpatient   Admission Date: 5/10/2017  Length of Stay: 1 days  Attending Physician: Marcus Rice MD  Primary Care Provider: Prisca Espinoza MD    Hospital Medicine Team: [facility] HOSP MED B Marcus Rice MD    Subjective:     Principal Problem:Fall at home    HPI:  77 y.o. Male with MCDONALD Cirrhosis, p[t of Dr. Miller, w2ho has been having progressive fatigue and weakness over the past 3 weeks.  He has been talking his lasix, but not the spironolactone because it caused chest pain.  The weakness exacerbates in the shower and on several occasions he could not walk out of the Bathroom, opting to sit on the commode and call his wife.  He got up to go to the bathroom at 3 am, tried to sit on the toilet and missed it, falling on the floor.  He has polyurea with lasix but cam only relieve small volumes.  His wife states that his appitite is poor.  He has not eaten anything for 3 days.  She bought him a Smoothie and it took all day for him to finish it.      His wife reports inreased tremor, poor memory, one episiode of posterior neck pain, right sided pain after the fall. Lactulose was stopped when he had too many stools 2 months ago.     She denies fever at home, but temp[ 100.9 in ED, culture done, paracenteis ordered and results pending      Hospital Course:       Past Medical History:   Diagnosis Date    Anticoagulant long-term use     Bipolar 1 disorder     Bipolar 1 disorder 11/24/2016    bipolar    Cancer     history of skin cancer/sinus cancer & rectal cancer    Depressed bipolar affective disorder     Diabetes mellitus     type 2    EBV infection 12/7/2016    EBV DNA, PCR Latest Ref Range: None detected  None detected EBV DNA-Copies/mL Unknown Test Not Performed EBV Early Antigen Ab, IgG Latest Ref Range: <1:10 Titer 1:40 (A) EBV Nuclear Ag Ab Latest Ref Range: <1:5  Titer >=1:80 (A) EBV VCA IgG Latest Ref Range: <1:10 Titer 1:160 (A) EBV VCA IgM Latest Ref Range: <1:10 Titer <1:10     History of TIA (transient ischemic attack)     several-taking Plavix    Hx of gallstones     Wife denies    Hypertension     Hyperthyroidism 11/30/2016    Low HDL (under 40) 12/7/2016    Mixed hyperlipidemia 11/23/2016    JUAN MANUEL (obstructive sleep apnea)     Stroke     Transient cerebral ischemia 7/22/2016       Past Surgical History:   Procedure Laterality Date    Moh's procedure x4      Sinus surgery for sinus cancer      sinus sx for cancer      skin cancer removed-several times-nose & ear      TONSILLECTOMY      Undescened testicle sx      UVULOPALATOPHARYNGOPLASTY      for sleep apnea       Review of patient's allergies indicates:   Allergen Reactions    Abilify [aripiprazole] Other (See Comments)     Sleepy, could not function.       No current facility-administered medications on file prior to encounter.      Current Outpatient Prescriptions on File Prior to Encounter   Medication Sig    ACCU-CHEK NEYDA PLUS TEST STRP Strp     divalproex (DEPAKOTE) 250 MG 24 hr tablet Take 500 mg by mouth once daily.     furosemide (LASIX) 20 MG tablet Take 4 tablets (80 mg total) by mouth once daily.    JANUVIA 50 mg Tab TAKE 1 TABLET (50 MG TOTAL) BY MOUTH ONCE DAILY.    lactulose (CHRONULAC) 10 gram/15 mL solution Take 15 mLs (10 g total) by mouth 2 (two) times daily.    losartan (COZAAR) 50 MG tablet Take 1 tablet (50 mg total) by mouth once daily.    rifAXIMin (XIFAXAN) 550 mg Tab Take 1 tablet (550 mg total) by mouth 2 (two) times daily.    spironolactone (ALDACTONE) 50 MG tablet Take 1 tablet (50 mg total) by mouth once daily.    SITagliptan (JANUVIA) 50 MG Tab Take 1 tablet (50 mg total) by mouth once daily.     Family History     None        Social History Main Topics    Smoking status: Former Smoker     Packs/day: 0.25     Years: 2.00    Smokeless tobacco: Not on file       "Comment: quit at age 17 less than a pack a day    Alcohol use No      Comment: rare    Drug use: No    Sexual activity: Not on file     Review of Systems   Constitutional: Positive for appetite change and fatigue. Negative for chills, diaphoresis and fever.        Weakness,   Poor appetite     HENT: Negative for congestion, nosebleeds, sinus pressure, sore throat, trouble swallowing and voice change.    Eyes: Negative for visual disturbance.   Respiratory: Positive for apnea (snoring and gasps) and chest tightness (assosciated w spironolactone). Negative for cough, choking, shortness of breath and wheezing.    Cardiovascular: Positive for leg swelling. Negative for chest pain and palpitations.   Gastrointestinal: Positive for abdominal distention. Negative for abdominal pain, blood in stool, constipation, diarrhea, nausea and vomiting.   Endocrine: Positive for polyuria. Negative for polydipsia.   Genitourinary: Positive for decreased urine volume and difficulty urinating. Negative for dysuria, flank pain, frequency and hematuria.   Musculoskeletal: Negative for arthralgias, back pain, gait problem, joint swelling, myalgias and neck stiffness.   Skin: Negative for rash and wound.   Neurological: Positive for tremors, syncope, weakness (general;zed) and light-headedness. Negative for dizziness, speech difficulty, numbness and headaches.   Psychiatric/Behavioral: Positive for dysphoric mood ("He's a grumpy old man"). Negative for agitation, behavioral problems, confusion, decreased concentration and hallucinations.     Objective:     Vital Signs (Most Recent):  Temp: (!) 101.4 °F (38.6 °C) (05/11/17 0951)  Pulse: 98 (05/11/17 0727)  Resp: 20 (05/11/17 0727)  BP: (!) 144/63 (05/11/17 0727)  SpO2: (!) 93 % (05/11/17 0727) Vital Signs (24h Range):  Temp:  [97.5 °F (36.4 °C)-101.5 °F (38.6 °C)] 101.4 °F (38.6 °C)  Pulse:  [53-98] 98  Resp:  [18-20] 20  SpO2:  [93 %-100 %] 93 %  BP: (108-151)/(57-67) 144/63     Weight: " 77 kg (169 lb 12.1 oz)  Body mass index is 25.81 kg/(m^2).    Physical Exam   Constitutional: He is oriented to person, place, and time. He appears well-developed. No distress.   Thin with m wasting in the face and estremities   HENT:   Head: Normocephalic and atraumatic.   Mouth/Throat: Oropharynx is clear and moist.   Eyes: Conjunctivae and EOM are normal. Pupils are equal, round, and reactive to light. No scleral icterus.   Neck: Normal range of motion and full passive range of motion without pain. Neck supple. No JVD present. Carotid bruit is not present. No tracheal deviation, no edema and normal range of motion present. No thyromegaly present.   Cardiovascular: Normal rate, regular rhythm, normal heart sounds and intact distal pulses.  Exam reveals no gallop and no friction rub.    No murmur heard.  Pulmonary/Chest: Effort normal and breath sounds normal. No accessory muscle usage or stridor. No apnea. No respiratory distress. He has no wheezes. He has no rales. He exhibits no tenderness.   Abdominal: Soft. Bowel sounds are normal. He exhibits distension. He exhibits no ascites and no mass. There is no hepatosplenomegaly. There is no tenderness. There is no rebound, no guarding, no tenderness at McBurney's point and negative Hatch's sign. No hernia.   Musculoskeletal: Normal range of motion. He exhibits edema. He exhibits no tenderness.        Right shoulder: He exhibits normal range of motion, no tenderness and normal strength.   Lymphadenopathy:        Head (right side): No submental, no submandibular, no preauricular and no posterior auricular adenopathy present.        Head (left side): No submental, no submandibular, no preauricular and no posterior auricular adenopathy present.     He has no cervical adenopathy.        Right: No inguinal and no supraclavicular adenopathy present.        Left: No inguinal and no supraclavicular adenopathy present.   Neurological: He is alert and oriented to person,  place, and time. He has normal strength. He displays no atrophy and no tremor. No cranial nerve deficit. He exhibits normal muscle tone.   Resting tremor   Skin: Skin is warm and dry. No abrasion, no bruising, no ecchymosis, no laceration and no rash noted. He is not diaphoretic. No cyanosis or erythema. No pallor. Nails show no clubbing.   Psychiatric: He has a normal mood and affect. His behavior is normal. Judgment and thought content normal.   Vitals reviewed.       Significant Labs:   CBC:     Recent Labs  Lab 05/10/17  0519 05/11/17  0407   WBC 6.09 4.28   HGB 10.6* 9.9*   HCT 32.5* 30.0*   * 94*     CMP:     Recent Labs  Lab 05/10/17  0519 05/11/17  0407    137   K 4.1 4.1   CL 98 99   CO2 27 28   * 98   BUN 29* 30*   CREATININE 1.6* 1.4   CALCIUM 8.9 8.9   PROT 7.8 6.8   ALBUMIN 2.4* 2.1*   BILITOT 1.0 1.1*   ALKPHOS 122 117   AST 32 31   ALT 10 11   ANIONGAP 11 10   EGFRNONAA 40.4* 47.4*     Lipase:     Recent Labs  Lab 05/10/17  0519   LIPASE 23     POCT Glucose:     Recent Labs  Lab 05/10/17  1723 05/10/17  2118 05/11/17  0845   POCTGLUCOSE 125* 127* 128*       Significant Imaging: cxr - clear      Assessment/Plan:      Liver cirrhosis secondary to MCDONALD (nonalcoholic steatohepatitis)  paracentesie done upon admit    Cirrhosis of liver with ascites        BELLA (acute kidney injury)  Mild volume depletion due to diuretics. Vs early HRS        VTE Risk Mitigation         Ordered     Medium Risk of VTE  Once      05/10/17 0611     Place sequential compression device  Until discontinued      05/10/17 0611     Place JORDEN hose  Until discontinued      05/10/17 0611          Marcus Rice MD  Department of Hospital Medicine   [facility]

## 2017-05-11 NOTE — PLAN OF CARE
Problem: Patient Care Overview  Goal: Plan of Care Review  Outcome: Ongoing (interventions implemented as appropriate)  Patient oriented x4 today. At approximately 1500, patient because hypotensive, bradycardic, and lethargic. MD notified. New orders noted. Patient now more alert and less hypotensive. Wife at bedside. Plan of care reviewed with wife and patient, wife had many questions and concerns. Safety maintained throughout shift. Will continue to monitor.

## 2017-05-11 NOTE — PLAN OF CARE
Problem: Occupational Therapy Goal  Goal: Occupational Therapy Goal  Goals to be met by: 5/21/2017     Patient will increase functional independence with ADLs by performing:    UE Dressing with Supervision.  LE Dressing with Minimal Assistance.  Grooming while standing at sink with Minimal Assistance.  Supine to sit with Minimal Assistance.  Stand pivot transfers with Minimal Assistance.  Toilet transfer to toilet with Minimal Assistance.  Upper extremity exercise program x15 reps per handout, with supervision.  Outcome: Ongoing (interventions implemented as appropriate)  OT evaluation completed. OT goals and POC established.     ZACKARY Starkey  5/11/2017

## 2017-05-12 ENCOUNTER — TELEPHONE (OUTPATIENT)
Dept: ENDOSCOPY | Facility: HOSPITAL | Age: 77
End: 2017-05-12

## 2017-05-12 LAB
BASOPHILS # BLD AUTO: 0.01 K/UL
BASOPHILS NFR BLD: 0.2 %
DIFFERENTIAL METHOD: ABNORMAL
EOSINOPHIL # BLD AUTO: 0 K/UL
EOSINOPHIL NFR BLD: 1 %
ERYTHROCYTE [DISTWIDTH] IN BLOOD BY AUTOMATED COUNT: 16.5 %
HCT VFR BLD AUTO: 25 %
HGB BLD-MCNC: 8.2 G/DL
LYMPHOCYTES # BLD AUTO: 0.8 K/UL
LYMPHOCYTES NFR BLD: 19.1 %
MAGNESIUM SERPL-MCNC: 1.7 MG/DL
MCH RBC QN AUTO: 27.3 PG
MCHC RBC AUTO-ENTMCNC: 32.8 %
MCV RBC AUTO: 83 FL
MONOCYTES # BLD AUTO: 0.6 K/UL
MONOCYTES NFR BLD: 14.1 %
NEUTROPHILS # BLD AUTO: 2.6 K/UL
NEUTROPHILS NFR BLD: 65.4 %
PHOSPHATE SERPL-MCNC: 3.4 MG/DL
PLATELET # BLD AUTO: 85 K/UL
PMV BLD AUTO: 10.5 FL
POCT GLUCOSE: 115 MG/DL (ref 70–110)
RBC # BLD AUTO: 3 M/UL
WBC # BLD AUTO: 4.03 K/UL

## 2017-05-12 PROCEDURE — 83735 ASSAY OF MAGNESIUM: CPT

## 2017-05-12 PROCEDURE — 25000003 PHARM REV CODE 250: Performed by: HOSPITALIST

## 2017-05-12 PROCEDURE — 25000003 PHARM REV CODE 250: Performed by: PHYSICIAN ASSISTANT

## 2017-05-12 PROCEDURE — 99233 SBSQ HOSP IP/OBS HIGH 50: CPT | Mod: ,,, | Performed by: INTERNAL MEDICINE

## 2017-05-12 PROCEDURE — 11000001 HC ACUTE MED/SURG PRIVATE ROOM

## 2017-05-12 PROCEDURE — 25000003 PHARM REV CODE 250: Performed by: INTERNAL MEDICINE

## 2017-05-12 PROCEDURE — 84100 ASSAY OF PHOSPHORUS: CPT

## 2017-05-12 PROCEDURE — 63600175 PHARM REV CODE 636 W HCPCS: Performed by: PHYSICIAN ASSISTANT

## 2017-05-12 PROCEDURE — 36415 COLL VENOUS BLD VENIPUNCTURE: CPT

## 2017-05-12 PROCEDURE — 97535 SELF CARE MNGMENT TRAINING: CPT

## 2017-05-12 PROCEDURE — 63600175 PHARM REV CODE 636 W HCPCS: Performed by: INTERNAL MEDICINE

## 2017-05-12 PROCEDURE — 85025 COMPLETE CBC W/AUTO DIFF WBC: CPT

## 2017-05-12 RX ADMIN — RIFAXIMIN 550 MG: 550 TABLET ORAL at 08:05

## 2017-05-12 RX ADMIN — DIVALPROEX SODIUM 500 MG: 500 TABLET, FILM COATED, EXTENDED RELEASE ORAL at 08:05

## 2017-05-12 RX ADMIN — RIFAXIMIN 550 MG: 550 TABLET ORAL at 10:05

## 2017-05-12 RX ADMIN — FUROSEMIDE 40 MG: 40 TABLET ORAL at 10:05

## 2017-05-12 RX ADMIN — SODIUM CHLORIDE: 0.9 INJECTION, SOLUTION INTRAVENOUS at 08:05

## 2017-05-12 RX ADMIN — VANCOMYCIN HYDROCHLORIDE 1250 MG: 10 INJECTION, POWDER, LYOPHILIZED, FOR SOLUTION INTRAVENOUS at 04:05

## 2017-05-12 RX ADMIN — CEFTRIAXONE 1 G: 1 INJECTION, SOLUTION INTRAVENOUS at 05:05

## 2017-05-12 NOTE — PT/OT/SLP PROGRESS
Occupational Therapy  Treatment    Kenneth Sheikh   MRN: [id number]   Admitting Diagnosis: Fall at home    OT Date of Treatment: 17   OT Start Time: 1315  OT Stop Time: 1342  OT Total Time (min): 27 min    Billable Minutes:  Self Care/Home Management 27    General Precautions: Standard, fall  Orthopedic Precautions: N/A  Braces: N/A    Do you have any cultural, spiritual, Cheondoism conflicts, given your current situation?: MAINOR    Subjective:  Communicated with RN prior to session.      Pain Ratin/10              Pain Rating Post-Intervention: 0/10    Objective:  Patient found with: telemetry     Functional Mobility:  Bed Mobility:  Supine to Sit: Stand by Assistance    Transfers:   Sit <> Stand Assistance: Stand By Assistance  Sit <> Stand Assistive Device: Rolling Walker  Bed <> Chair Technique: Stand Pivot  Bed <> Chair Transfer Assistance: Stand By Assistance  Bed <> Chair Assistive Device: Rolling Walker  Toilet Transfer Technique: Stand Pivot  Toilet Transfer Assistance: Stand By Assistance    Functional Ambulation: Pt was able to walk out into hallway c CGA and then to bathroom c  RW.  Pt did not require any rest breaks.    Activities of Daily Living:       UE Dressing Level of Assistance: Minimum assistance (To don hospital gown 2* IV line)    LE Dressing Level of Assistance: Stand by assistance (To don/doff socks.)    Grooming Position: Standing at sink  Grooming Level of Assistance: Modified independent (To wash hands.)  Toileting Where Assessed: Toilet  Toileting Level of Assistance: Modified independent            Balance:   Static Sit: NORMAL: No deviations seen in posture held statically  Dynamic Sit: NORMAL: No deviations seen in posture held dynamically  Static Stand: GOOD: Takes MODERATE challenges from all directions  Dynamic stand: GOOD: Needs SUPERVISION only during gait and able to self right with moderate         AM-PAC 6 CLICK ADL   How much help from another person does this patient  currently need?   1 = Unable, Total/Dependent Assistance  2 = A lot, Maximum/Moderate Assistance  3 = A little, Minimum/Contact Guard/Supervision  4 = None, Modified [place]/Independent    Putting on and taking off regular lower body clothing? : 3  Bathing (including washing, rinsing, drying)?: 4  Toileting, which includes using toilet, bedpan, or urinal? : 4  Putting on and taking off regular upper body clothing?: 4  Taking care of personal grooming such as brushing teeth?: 4  Eating meals?: 4  Total Score: 23     AM-PAC Raw Score CMS G-Code Modifier Level of Impairment Assistance   6 % Total / Unable   7 - 9 CM 80 - 100% Maximal Assist   10 - 14 CL 60 - 80% Moderate Assist   15 - 19 CK 40 - 60% Moderate Assist   20 - 22 CJ 20 - 40% Minimal Assist   23 CI 1-20% SBA / CGA   24 CH 0% Independent/ Mod I     Patient left up in chair with all lines intact, call button in reach, RN notified and family present    ASSESSMENT:  Kenneth Sheikh is a 77 y.o. male with a medical diagnosis of Fall at home and presents with deficits in ADL's and functional T/F's.  Pt was able to perform supine/sit, sit/stand, and walk out into hallway c CGA and RW.  Able to perform UB dressing c min A and LB dressing, grooming task, and toilet hygiene c CGA/SBA.      Rehab identified problem list/impairments: Rehab identified problem list/impairments: impaired endurance, impaired self care skills, impaired functional mobilty    Rehab potential is good.    Activity tolerance: Good    Discharge recommendations: Discharge Facility/Level Of Care Needs: home health OT     Barriers to discharge: Barriers to Discharge: Inaccessible home environment (Educated wife on how to clear path in house for mobility)    Equipment recommendations: bedside commode, bath bench, walker, rolling     GOALS:   Occupational Therapy Goals        Problem: Occupational Therapy Goal    Goal Priority Disciplines Outcome Interventions   Occupational Therapy Goal      OT, PT/OT Ongoing (interventions implemented as appropriate)    Description:  Goals to be met by: 5/21/2017     Patient will increase functional independence with ADLs by performing:    UE Dressing with Supervision.  LE Dressing with Minimal Assistance.  Grooming while standing at sink with Minimal Assistance.  Supine to sit with Minimal Assistance.  Stand pivot transfers with Minimal Assistance.  Toilet transfer to toilet with Minimal Assistance.  Upper extremity exercise program x15 reps per handout, with supervision.                Plan:  Patient to be seen 4 x/week to address the above listed problems via self-care/home management, therapeutic activities, therapeutic exercises  Plan of Care expires: 06/10/17  Plan of Care reviewed with: patient, spouse         ZACKARY Sheridan  05/12/2017

## 2017-05-12 NOTE — SUBJECTIVE & OBJECTIVE
Past Medical History:   Diagnosis Date    Anticoagulant long-term use     Bipolar 1 disorder     Bipolar 1 disorder 11/24/2016    bipolar    Cancer     history of skin cancer/sinus cancer & rectal cancer    Depressed bipolar affective disorder     Diabetes mellitus     type 2    EBV infection 12/7/2016    EBV DNA, PCR Latest Ref Range: None detected  None detected EBV DNA-Copies/mL Unknown Test Not Performed EBV Early Antigen Ab, IgG Latest Ref Range: <1:10 Titer 1:40 (A) EBV Nuclear Ag Ab Latest Ref Range: <1:5 Titer >=1:80 (A) EBV VCA IgG Latest Ref Range: <1:10 Titer 1:160 (A) EBV VCA IgM Latest Ref Range: <1:10 Titer <1:10     History of TIA (transient ischemic attack)     several-taking Plavix    Hx of gallstones     Wife denies    Hypertension     Hyperthyroidism 11/30/2016    Low HDL (under 40) 12/7/2016    Mixed hyperlipidemia 11/23/2016    JUAN MANUEL (obstructive sleep apnea)     Stroke     Transient cerebral ischemia 7/22/2016       Past Surgical History:   Procedure Laterality Date    Moh's procedure x4      Sinus surgery for sinus cancer      sinus sx for cancer      skin cancer removed-several times-nose & ear      TONSILLECTOMY      Undescened testicle sx      UVULOPALATOPHARYNGOPLASTY      for sleep apnea       Review of patient's allergies indicates:   Allergen Reactions    Abilify [aripiprazole] Other (See Comments)     Sleepy, could not function.       No current facility-administered medications on file prior to encounter.      Current Outpatient Prescriptions on File Prior to Encounter   Medication Sig    ACCU-CHEK NEYDA PLUS TEST STRP Strp     divalproex (DEPAKOTE) 250 MG 24 hr tablet Take 500 mg by mouth once daily.     furosemide (LASIX) 20 MG tablet Take 4 tablets (80 mg total) by mouth once daily.    JANUVIA 50 mg Tab TAKE 1 TABLET (50 MG TOTAL) BY MOUTH ONCE DAILY.    lactulose (CHRONULAC) 10 gram/15 mL solution Take 15 mLs (10 g total) by mouth 2 (two) times daily.  "   losartan (COZAAR) 50 MG tablet Take 1 tablet (50 mg total) by mouth once daily.    rifAXIMin (XIFAXAN) 550 mg Tab Take 1 tablet (550 mg total) by mouth 2 (two) times daily.    spironolactone (ALDACTONE) 50 MG tablet Take 1 tablet (50 mg total) by mouth once daily.    SITagliptan (JANUVIA) 50 MG Tab Take 1 tablet (50 mg total) by mouth once daily.     Family History     None        Social History Main Topics    Smoking status: Former Smoker     Packs/day: 0.25     Years: 2.00    Smokeless tobacco: Not on file      Comment: quit at age 17 less than a pack a day    Alcohol use No      Comment: rare    Drug use: No    Sexual activity: Not on file     Review of Systems   Constitutional: Positive for appetite change and fatigue. Negative for chills, diaphoresis and fever.        Weakness,   Poor appetite     HENT: Negative for congestion, nosebleeds, sinus pressure, sore throat, trouble swallowing and voice change.    Eyes: Negative for visual disturbance.   Respiratory: Positive for apnea (snoring and gasps) and chest tightness (assosciated w spironolactone). Negative for cough, choking, shortness of breath and wheezing.    Cardiovascular: Positive for leg swelling. Negative for chest pain and palpitations.   Gastrointestinal: Positive for abdominal distention. Negative for abdominal pain, blood in stool, constipation, diarrhea, nausea and vomiting.   Endocrine: Positive for polyuria. Negative for polydipsia.   Genitourinary: Positive for decreased urine volume and difficulty urinating. Negative for dysuria, flank pain, frequency and hematuria.   Musculoskeletal: Negative for arthralgias, back pain, gait problem, joint swelling, myalgias and neck stiffness.   Skin: Negative for rash and wound.   Neurological: Positive for tremors, syncope, weakness (general;zed) and light-headedness. Negative for dizziness, speech difficulty, numbness and headaches.   Psychiatric/Behavioral: Positive for dysphoric mood ("He's " "a grumpy old man"). Negative for agitation, behavioral problems, confusion, decreased concentration and hallucinations.     Objective:     Vital Signs (Most Recent):  Temp: 98.8 °F (37.1 °C) (05/12/17 1100)  Pulse: (!) 52 (05/12/17 1100)  Resp: 18 (05/12/17 1100)  BP: (!) 120/58 (05/12/17 1031)  SpO2: (!) 94 % (05/12/17 1031) Vital Signs (24h Range):  Temp:  [97.4 °F (36.3 °C)-98.8 °F (37.1 °C)] 98.8 °F (37.1 °C)  Pulse:  [47-85] 52  Resp:  [18-20] 18  SpO2:  [90 %-100 %] 94 %  BP: ()/(39-58) 120/58     Weight: 77 kg (169 lb 12.1 oz)  Body mass index is 25.81 kg/(m^2).    Physical Exam   Constitutional: He is oriented to person, place, and time. He appears well-developed. No distress.   Thin with m wasting in the face and estremities   HENT:   Head: Normocephalic and atraumatic.   Mouth/Throat: Oropharynx is clear and moist.   Eyes: Conjunctivae and EOM are normal. Pupils are equal, round, and reactive to light. No scleral icterus.   Neck: Normal range of motion and full passive range of motion without pain. Neck supple. No JVD present. Carotid bruit is not present. No tracheal deviation, no edema and normal range of motion present. No thyromegaly present.   Cardiovascular: Normal rate, regular rhythm, normal heart sounds and intact distal pulses.  Exam reveals no gallop and no friction rub.    No murmur heard.  Pulmonary/Chest: Effort normal and breath sounds normal. No accessory muscle usage or stridor. No apnea. No respiratory distress. He has no wheezes. He has no rales. He exhibits no tenderness.   Abdominal: Soft. Bowel sounds are normal. He exhibits distension. He exhibits no ascites and no mass. There is no hepatosplenomegaly. There is no tenderness. There is no rebound, no guarding, no tenderness at McBurney's point and negative Hatch's sign. No hernia.   Musculoskeletal: Normal range of motion. He exhibits edema. He exhibits no tenderness.        Right shoulder: He exhibits normal range of motion, " no tenderness and normal strength.   Lymphadenopathy:        Head (right side): No submental, no submandibular, no preauricular and no posterior auricular adenopathy present.        Head (left side): No submental, no submandibular, no preauricular and no posterior auricular adenopathy present.     He has no cervical adenopathy.        Right: No inguinal and no supraclavicular adenopathy present.        Left: No inguinal and no supraclavicular adenopathy present.   Neurological: He is alert and oriented to person, place, and time. He has normal strength. He displays no atrophy and no tremor. No cranial nerve deficit. He exhibits normal muscle tone.   Resting tremor   Skin: Skin is warm and dry. No abrasion, no bruising, no ecchymosis, no laceration and no rash noted. He is not diaphoretic. No cyanosis or erythema. No pallor. Nails show no clubbing.   Psychiatric: He has a normal mood and affect. His behavior is normal. Judgment and thought content normal.   Vitals reviewed.       Significant Labs:   CBC:     Recent Labs  Lab 05/11/17  0407 05/12/17  0408   WBC 4.28 4.03   HGB 9.9* 8.2*   HCT 30.0* 25.0*   PLT 94* 85*     CMP:     Recent Labs  Lab 05/11/17  0407 05/11/17  1445    135*   K 4.1 4.0   CL 99 98   CO2 28 28   GLU 98 251*   BUN 30* 36*   CREATININE 1.4 2.1*   CALCIUM 8.9 8.2*   PROT 6.8 5.9*   ALBUMIN 2.1* 1.8*   BILITOT 1.1* 0.8   ALKPHOS 117 106   AST 31 27   ALT 11 8*   ANIONGAP 10 9   EGFRNONAA 47.4* 29.1*     Lipase:   No results for input(s): LIPASE in the last 48 hours.  POCT Glucose:     Recent Labs  Lab 05/11/17  0845 05/11/17  1256 05/12/17  0215   POCTGLUCOSE 128* 230* 115*       Significant Imaging: cxr - clear

## 2017-05-12 NOTE — PLAN OF CARE
Discharge planning: Discussed recommendation for snf from therapists.  Pt's spouse states she prefers home health services at time of discharge.

## 2017-05-12 NOTE — PROGRESS NOTES
[facility] Hospital Medicine  Progress Note    Patient Name: Kenneth Sheikh  MRN: [id number]  Patient Class: IP- Inpatient   Admission Date: 5/10/2017  Length of Stay: 2 days  Attending Physician: Marcus Rice MD  Primary Care Provider: Prisca Espinoza MD    Hospital Medicine Team: [facility] Marcus Rice MD    Subjective:     Principal Problem:Fall at home    HPI:  77 y.o. Male with MCDONALD Cirrhosis, p[t of Dr. Miller, w2ho has been having progressive fatigue and weakness over the past 3 weeks.  He has been talking his lasix, but not the spironolactone because it caused chest pain.  The weakness exacerbates in the shower and on several occasions he could not walk out of the Bathroom, opting to sit on the commode and call his wife.  He got up to go to the bathroom at 3 am, tried to sit on the toilet and missed it, falling on the floor.  He has polyurea with lasix but cam only relieve small volumes.  His wife states that his appitite is poor.  He has not eaten anything for 3 days.  She bought him a Smoothie and it took all day for him to finish it.      His wife reports inreased tremor, poor memory, one episiode of posterior neck pain, right sided pain after the fall. Lactulose was stopped when he had too many stools 2 months ago.     She denies fever at home, but temp[ 100.9 in ED, culture done, paracenteis ordered and results pending      Hospital Course:       Past Medical History:   Diagnosis Date    Anticoagulant long-term use     Bipolar 1 disorder     Bipolar 1 disorder 11/24/2016    bipolar    Cancer     history of skin cancer/sinus cancer & rectal cancer    Depressed bipolar affective disorder     Diabetes mellitus     type 2    EBV infection 12/7/2016    EBV DNA, PCR Latest Ref Range: None detected  None detected EBV DNA-Copies/mL Unknown Test Not Performed EBV Early Antigen Ab, IgG Latest Ref Range: <1:10 Titer 1:40 (A) EBV Nuclear Ag Ab Latest Ref Range: <1:5  Titer >=1:80 (A) EBV VCA IgG Latest Ref Range: <1:10 Titer 1:160 (A) EBV VCA IgM Latest Ref Range: <1:10 Titer <1:10     History of TIA (transient ischemic attack)     several-taking Plavix    Hx of gallstones     Wife denies    Hypertension     Hyperthyroidism 11/30/2016    Low HDL (under 40) 12/7/2016    Mixed hyperlipidemia 11/23/2016    JUAN MANUEL (obstructive sleep apnea)     Stroke     Transient cerebral ischemia 7/22/2016       Past Surgical History:   Procedure Laterality Date    Moh's procedure x4      Sinus surgery for sinus cancer      sinus sx for cancer      skin cancer removed-several times-nose & ear      TONSILLECTOMY      Undescened testicle sx      UVULOPALATOPHARYNGOPLASTY      for sleep apnea       Review of patient's allergies indicates:   Allergen Reactions    Abilify [aripiprazole] Other (See Comments)     Sleepy, could not function.       No current facility-administered medications on file prior to encounter.      Current Outpatient Prescriptions on File Prior to Encounter   Medication Sig    ACCU-CHEK NEYDA PLUS TEST STRP Strp     divalproex (DEPAKOTE) 250 MG 24 hr tablet Take 500 mg by mouth once daily.     furosemide (LASIX) 20 MG tablet Take 4 tablets (80 mg total) by mouth once daily.    JANUVIA 50 mg Tab TAKE 1 TABLET (50 MG TOTAL) BY MOUTH ONCE DAILY.    lactulose (CHRONULAC) 10 gram/15 mL solution Take 15 mLs (10 g total) by mouth 2 (two) times daily.    losartan (COZAAR) 50 MG tablet Take 1 tablet (50 mg total) by mouth once daily.    rifAXIMin (XIFAXAN) 550 mg Tab Take 1 tablet (550 mg total) by mouth 2 (two) times daily.    spironolactone (ALDACTONE) 50 MG tablet Take 1 tablet (50 mg total) by mouth once daily.    SITagliptan (JANUVIA) 50 MG Tab Take 1 tablet (50 mg total) by mouth once daily.     Family History     None        Social History Main Topics    Smoking status: Former Smoker     Packs/day: 0.25     Years: 2.00    Smokeless tobacco: Not on file       "Comment: quit at age 17 less than a pack a day    Alcohol use No      Comment: rare    Drug use: No    Sexual activity: Not on file     Review of Systems   Constitutional: Positive for appetite change and fatigue. Negative for chills, diaphoresis and fever.        Weakness,   Poor appetite     HENT: Negative for congestion, nosebleeds, sinus pressure, sore throat, trouble swallowing and voice change.    Eyes: Negative for visual disturbance.   Respiratory: Positive for apnea (snoring and gasps) and chest tightness (assosciated w spironolactone). Negative for cough, choking, shortness of breath and wheezing.    Cardiovascular: Positive for leg swelling. Negative for chest pain and palpitations.   Gastrointestinal: Positive for abdominal distention. Negative for abdominal pain, blood in stool, constipation, diarrhea, nausea and vomiting.   Endocrine: Positive for polyuria. Negative for polydipsia.   Genitourinary: Positive for decreased urine volume and difficulty urinating. Negative for dysuria, flank pain, frequency and hematuria.   Musculoskeletal: Negative for arthralgias, back pain, gait problem, joint swelling, myalgias and neck stiffness.   Skin: Negative for rash and wound.   Neurological: Positive for tremors, syncope, weakness (general;zed) and light-headedness. Negative for dizziness, speech difficulty, numbness and headaches.   Psychiatric/Behavioral: Positive for dysphoric mood ("He's a grumpy old man"). Negative for agitation, behavioral problems, confusion, decreased concentration and hallucinations.     Objective:     Vital Signs (Most Recent):  Temp: 98.8 °F (37.1 °C) (05/12/17 1100)  Pulse: (!) 52 (05/12/17 1100)  Resp: 18 (05/12/17 1100)  BP: (!) 120/58 (05/12/17 1031)  SpO2: (!) 94 % (05/12/17 1031) Vital Signs (24h Range):  Temp:  [97.4 °F (36.3 °C)-98.8 °F (37.1 °C)] 98.8 °F (37.1 °C)  Pulse:  [47-85] 52  Resp:  [18-20] 18  SpO2:  [90 %-100 %] 94 %  BP: ()/(39-58) 120/58     Weight: 77 " kg (169 lb 12.1 oz)  Body mass index is 25.81 kg/(m^2).    Physical Exam   Constitutional: He is oriented to person, place, and time. He appears well-developed. No distress.   Thin with m wasting in the face and estremities   HENT:   Head: Normocephalic and atraumatic.   Mouth/Throat: Oropharynx is clear and moist.   Eyes: Conjunctivae and EOM are normal. Pupils are equal, round, and reactive to light. No scleral icterus.   Neck: Normal range of motion and full passive range of motion without pain. Neck supple. No JVD present. Carotid bruit is not present. No tracheal deviation, no edema and normal range of motion present. No thyromegaly present.   Cardiovascular: Normal rate, regular rhythm, normal heart sounds and intact distal pulses.  Exam reveals no gallop and no friction rub.    No murmur heard.  Pulmonary/Chest: Effort normal and breath sounds normal. No accessory muscle usage or stridor. No apnea. No respiratory distress. He has no wheezes. He has no rales. He exhibits no tenderness.   Abdominal: Soft. Bowel sounds are normal. He exhibits distension. He exhibits no ascites and no mass. There is no hepatosplenomegaly. There is no tenderness. There is no rebound, no guarding, no tenderness at McBurney's point and negative Hatch's sign. No hernia.   Musculoskeletal: Normal range of motion. He exhibits edema. He exhibits no tenderness.        Right shoulder: He exhibits normal range of motion, no tenderness and normal strength.   Lymphadenopathy:        Head (right side): No submental, no submandibular, no preauricular and no posterior auricular adenopathy present.        Head (left side): No submental, no submandibular, no preauricular and no posterior auricular adenopathy present.     He has no cervical adenopathy.        Right: No inguinal and no supraclavicular adenopathy present.        Left: No inguinal and no supraclavicular adenopathy present.   Neurological: He is alert and oriented to person, place,  and time. He has normal strength. He displays no atrophy and no tremor. No cranial nerve deficit. He exhibits normal muscle tone.   Resting tremor   Skin: Skin is warm and dry. No abrasion, no bruising, no ecchymosis, no laceration and no rash noted. He is not diaphoretic. No cyanosis or erythema. No pallor. Nails show no clubbing.   Psychiatric: He has a normal mood and affect. His behavior is normal. Judgment and thought content normal.   Vitals reviewed.       Significant Labs:   CBC:     Recent Labs  Lab 05/11/17  0407 05/12/17  0408   WBC 4.28 4.03   HGB 9.9* 8.2*   HCT 30.0* 25.0*   PLT 94* 85*     CMP:     Recent Labs  Lab 05/11/17  0407 05/11/17  1445    135*   K 4.1 4.0   CL 99 98   CO2 28 28   GLU 98 251*   BUN 30* 36*   CREATININE 1.4 2.1*   CALCIUM 8.9 8.2*   PROT 6.8 5.9*   ALBUMIN 2.1* 1.8*   BILITOT 1.1* 0.8   ALKPHOS 117 106   AST 31 27   ALT 11 8*   ANIONGAP 10 9   EGFRNONAA 47.4* 29.1*     Lipase:   No results for input(s): LIPASE in the last 48 hours.  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

## 2017-05-12 NOTE — PLAN OF CARE
Problem: Occupational Therapy Goal  Goal: Occupational Therapy Goal  Goals to be met by: 5/21/2017     Patient will increase functional independence with ADLs by performing:    UE Dressing with Supervision.  LE Dressing with Minimal Assistance.  Grooming while standing at sink with Minimal Assistance.  Supine to sit with Minimal Assistance.  Stand pivot transfers with Minimal Assistance.  Toilet transfer to toilet with Minimal Assistance.  Upper extremity exercise program x15 reps per handout, with supervision.   Cont. POC

## 2017-05-13 PROBLEM — A40.9 STREPTOCOCCAL SEPSIS: Status: ACTIVE | Noted: 2017-05-13

## 2017-05-13 LAB
BACTERIA BLD CULT: NORMAL
BASOPHILS # BLD AUTO: 0.01 K/UL
BASOPHILS NFR BLD: 0.3 %
DIFFERENTIAL METHOD: ABNORMAL
EOSINOPHIL # BLD AUTO: 0.1 K/UL
EOSINOPHIL NFR BLD: 2.7 %
ERYTHROCYTE [DISTWIDTH] IN BLOOD BY AUTOMATED COUNT: 16.6 %
HCT VFR BLD AUTO: 28.4 %
HGB BLD-MCNC: 9.4 G/DL
LYMPHOCYTES # BLD AUTO: 0.7 K/UL
LYMPHOCYTES NFR BLD: 18.5 %
MAGNESIUM SERPL-MCNC: 1.8 MG/DL
MCH RBC QN AUTO: 27.4 PG
MCHC RBC AUTO-ENTMCNC: 33.1 %
MCV RBC AUTO: 83 FL
MONOCYTES # BLD AUTO: 0.7 K/UL
MONOCYTES NFR BLD: 17.4 %
NEUTROPHILS # BLD AUTO: 2.3 K/UL
NEUTROPHILS NFR BLD: 60.8 %
PHOSPHATE SERPL-MCNC: 2.9 MG/DL
PLATELET # BLD AUTO: 92 K/UL
PMV BLD AUTO: 10.2 FL
POCT GLUCOSE: 136 MG/DL (ref 70–110)
POCT GLUCOSE: 187 MG/DL (ref 70–110)
POCT GLUCOSE: 216 MG/DL (ref 70–110)
POCT GLUCOSE: 228 MG/DL (ref 70–110)
POCT GLUCOSE: 231 MG/DL (ref 70–110)
RBC # BLD AUTO: 3.43 M/UL
WBC # BLD AUTO: 3.73 K/UL

## 2017-05-13 PROCEDURE — 99232 SBSQ HOSP IP/OBS MODERATE 35: CPT | Mod: GC,,, | Performed by: INTERNAL MEDICINE

## 2017-05-13 PROCEDURE — 97110 THERAPEUTIC EXERCISES: CPT

## 2017-05-13 PROCEDURE — 63600175 PHARM REV CODE 636 W HCPCS: Performed by: INTERNAL MEDICINE

## 2017-05-13 PROCEDURE — 36415 COLL VENOUS BLD VENIPUNCTURE: CPT

## 2017-05-13 PROCEDURE — 11000001 HC ACUTE MED/SURG PRIVATE ROOM

## 2017-05-13 PROCEDURE — 25000003 PHARM REV CODE 250: Performed by: HOSPITALIST

## 2017-05-13 PROCEDURE — 99223 1ST HOSP IP/OBS HIGH 75: CPT | Mod: ,,, | Performed by: INTERNAL MEDICINE

## 2017-05-13 PROCEDURE — 25000003 PHARM REV CODE 250: Performed by: INTERNAL MEDICINE

## 2017-05-13 PROCEDURE — 84100 ASSAY OF PHOSPHORUS: CPT

## 2017-05-13 PROCEDURE — 25000003 PHARM REV CODE 250: Performed by: PHYSICIAN ASSISTANT

## 2017-05-13 PROCEDURE — 97116 GAIT TRAINING THERAPY: CPT

## 2017-05-13 PROCEDURE — 85025 COMPLETE CBC W/AUTO DIFF WBC: CPT

## 2017-05-13 PROCEDURE — 83735 ASSAY OF MAGNESIUM: CPT

## 2017-05-13 PROCEDURE — 63600175 PHARM REV CODE 636 W HCPCS: Performed by: PHYSICIAN ASSISTANT

## 2017-05-13 PROCEDURE — 99233 SBSQ HOSP IP/OBS HIGH 50: CPT | Mod: ,,, | Performed by: INTERNAL MEDICINE

## 2017-05-13 RX ADMIN — RIFAXIMIN 550 MG: 550 TABLET ORAL at 08:05

## 2017-05-13 RX ADMIN — RIFAXIMIN 550 MG: 550 TABLET ORAL at 09:05

## 2017-05-13 RX ADMIN — VANCOMYCIN HYDROCHLORIDE 1250 MG: 10 INJECTION, POWDER, LYOPHILIZED, FOR SOLUTION INTRAVENOUS at 05:05

## 2017-05-13 RX ADMIN — DIVALPROEX SODIUM 500 MG: 500 TABLET, FILM COATED, EXTENDED RELEASE ORAL at 09:05

## 2017-05-13 RX ADMIN — SODIUM CHLORIDE: 0.9 INJECTION, SOLUTION INTRAVENOUS at 09:05

## 2017-05-13 RX ADMIN — INSULIN ASPART 2 UNITS: 100 INJECTION, SOLUTION INTRAVENOUS; SUBCUTANEOUS at 07:05

## 2017-05-13 RX ADMIN — CEFTRIAXONE 2 G: 2 INJECTION, SOLUTION INTRAVENOUS at 05:05

## 2017-05-13 NOTE — ASSESSMENT & PLAN NOTE
- likely odontogenic or GI etiology  - seems transient  - patient is without stigmata of IE  - recommend 2 weeks of therapy from 05/11  - ceftriaxone seems like a reasonable choice given renal insufficiency

## 2017-05-13 NOTE — PLAN OF CARE
Problem: Fall Risk (Adult)  Goal: Identify Related Risk Factors and Signs and Symptoms  Related risk factors and signs and symptoms are identified upon initiation of Human Response Clinical Practice Guideline (CPG)   Pt AOx4; VSS; no falls or injuries noted; pt denies pain; wife at bedside will continue to monitor

## 2017-05-13 NOTE — PT/OT/SLP PROGRESS
"Physical Therapy  Treatment    Kenneth Sheikh   MRN: [id number]   Admitting Diagnosis: Fall at home    PT Received On: 10/23/17  PT Start Time: 1023     PT Stop Time: 1050    PT Total Time (min): 27 min       Billable Minutes:  Gait Training 19 and Therapeutic Exercise 8    Treatment Type: Treatment  PT/PTA: PTA     PTA Visit Number: 1       General Precautions: Standard, fall  Orthopedic Precautions: N/A   Braces:           Subjective:  Communicated with NSG prior to session.  Patient states " I am doing fine, I go to the toilette by myself and I walk in the hallway too"      Pain Ratin/10              Pain Rating Post-Intervention: 0/10    Objective:   Patient found with: telemetry, peripheral IV    Functional Mobility:  Bed Mobility:   Scooting/Bridging: Supervision  Supine to Sit: Supervision  Sit to Supine: Supervision    Transfers:  Sit <> Stand Assistance: Supervision  Sit <> Stand Assistive Device: Rolling Walker  Bed <> Chair Technique: Stand Pivot  Bed <> Chair Assistance: Supervision  Bed <> Chair Assistive Device: No Assistive Device    Gait:   Gait Distance: 300 ft with occasional cues to correct head down posture.  Assistance 1: Stand by Assistance  Gait Assistive Device: Rolling walker  Gait Pattern: reciprocal  Gait Deviation(s): decreased efrain    Stairs:      Balance:   Static Sit: GOOD: Takes MODERATE challenges from all directions  Dynamic Sit: GOOD: Maintains balance through MODERATE excursions of active trunk movement  Static Stand: FAIR+: Takes MINIMAL challenges from all directions  Dynamic stand: FAIR+: Needs CLOSE SUPERVISION during gait and is able to right self with minor LOB     Therapeutic Activities and Exercises:  Donned a second gown and assisted to the toilette. There ex in sitting: LAQ, HIP FLEX AND HEEL/TOE RAISES 2X15 REPS B LE with rest breaks between sets.      AM-PAC 6 CLICK MOBILITY  How much help from another person does this patient currently need?   1 = Unable, " Total/Dependent Assistance  2 = A lot, Maximum/Moderate Assistance  3 = A little, Minimum/Contact Guard/Supervision  4 = None, Modified [place]/Independent    Turning over in bed (including adjusting bedclothes, sheets and blankets)?: 4  Sitting down on and standing up from a chair with arms (e.g., wheelchair, bedside commode, etc.): 4  Moving from lying on back to sitting on the side of the bed?: 4  Moving to and from a bed to a chair (including a wheelchair)?: 4  Need to walk in hospital room?: 3  Climbing 3-5 steps with a railing?: 3  Total Score: 22    AM-PAC Raw Score CMS G-Code Modifier Level of Impairment Assistance   6 % Total / Unable   7 - 9 CM 80 - 100% Maximal Assist   10 - 14 CL 60 - 80% Moderate Assist   15 - 19 CK 40 - 60% Moderate Assist   20 - 22 CJ 20 - 40% Minimal Assist   23 CI 1-20% SBA / CGA   24 CH 0% Independent/ Mod I     Patient left up in chair with all lines intact, call button in reach and Spouse present.    Assessment:  Kenneth Sheikh is a 77 y.o. male with a medical diagnosis of Fall at home and presents with decreased postural awareness and min strength deficits, but showed good safety awareness during transfers and gait and good compliance with all instructions. Patient would benefit from continued P.T. To address deficits.    Rehab identified problem list/impairments: Rehab identified problem list/impairments: weakness, gait instability    Rehab potential is good.    Activity tolerance: Excellent    Discharge recommendations: Discharge Facility/Level Of Care Needs: home health PT, home health OT, other (see comments) (Changed recommendations to home health after talking to Medina Paz, P.T.)     Barriers to discharge: Barriers to Discharge: Inaccessible home environment    Equipment recommendations: Equipment Needed After Discharge: bath bench, walker, rolling     GOALS:   Physical Therapy Goals        Problem: Physical Therapy Goal    Goal Priority Disciplines Outcome  Goal Variances Interventions   Physical Therapy Goal     PT/OT, PT Ongoing (interventions implemented as appropriate)     Description:  Goals to be met by: 2017     Patient will increase functional independence with mobility by performin. Supine to sit with Modified [place]- met  2. Sit to supine with Modified [place]  3. Sit to stand transfer with Modified [place]- met  4. Gait  x 100 feet with Contact Guard Assistance using Rolling Walker- met.   5. Lower extremity exercise program x15 reps per handout, with supervision                 PLAN:    Patient to be seen 5 x/week  to address the above listed problems via gait training, therapeutic activities, therapeutic exercises  Plan of Care expires: 17  Plan of Care reviewed with: patient, spouse         Boo Ley, PTA  2017

## 2017-05-13 NOTE — PLAN OF CARE
Problem: Fall Risk (Adult)  Goal: Identify Related Risk Factors and Signs and Symptoms  Related risk factors and signs and symptoms are identified upon initiation of Human Response Clinical Practice Guideline (CPG)   Outcome: Ongoing (interventions implemented as appropriate)  Patient has been out of bed, supervised as he stood in the bathroom and shaved with the electric razor.  He has had no falls, standards of safety maintained and all personal effects and phone, call light within reach.  Wife with patient most of the day.  IV patent, NS at 100cc/hour.  Abdomen is large and gait is slightly unsteady.  Wife corrects patient often about his remembering,  He is clear and thoughts are connected when he communicates with this nurse.

## 2017-05-13 NOTE — SUBJECTIVE & OBJECTIVE
Past Medical History:   Diagnosis Date    Anticoagulant long-term use     Bipolar 1 disorder     Bipolar 1 disorder 11/24/2016    bipolar    Cancer     history of skin cancer/sinus cancer & rectal cancer    Depressed bipolar affective disorder     Diabetes mellitus     type 2    EBV infection 12/7/2016    EBV DNA, PCR Latest Ref Range: None detected  None detected EBV DNA-Copies/mL Unknown Test Not Performed EBV Early Antigen Ab, IgG Latest Ref Range: <1:10 Titer 1:40 (A) EBV Nuclear Ag Ab Latest Ref Range: <1:5 Titer >=1:80 (A) EBV VCA IgG Latest Ref Range: <1:10 Titer 1:160 (A) EBV VCA IgM Latest Ref Range: <1:10 Titer <1:10     History of TIA (transient ischemic attack)     several-taking Plavix    Hx of gallstones     Wife denies    Hypertension     Hyperthyroidism 11/30/2016    Low HDL (under 40) 12/7/2016    Mixed hyperlipidemia 11/23/2016    JUAN MANUEL (obstructive sleep apnea)     Stroke     Transient cerebral ischemia 7/22/2016       Past Surgical History:   Procedure Laterality Date    Moh's procedure x4      Sinus surgery for sinus cancer      sinus sx for cancer      skin cancer removed-several times-nose & ear      TONSILLECTOMY      Undescened testicle sx      UVULOPALATOPHARYNGOPLASTY      for sleep apnea       Review of patient's allergies indicates:   Allergen Reactions    Abilify [aripiprazole] Other (See Comments)     Sleepy, could not function.       Medications:  Prescriptions Prior to Admission   Medication Sig    ACCU-CHEK NEYDA PLUS TEST STRP Strp     divalproex (DEPAKOTE) 250 MG 24 hr tablet Take 500 mg by mouth once daily.     furosemide (LASIX) 20 MG tablet Take 4 tablets (80 mg total) by mouth once daily.    JANUVIA 50 mg Tab TAKE 1 TABLET (50 MG TOTAL) BY MOUTH ONCE DAILY.    lactulose (CHRONULAC) 10 gram/15 mL solution Take 15 mLs (10 g total) by mouth 2 (two) times daily.    losartan (COZAAR) 50 MG tablet Take 1 tablet (50 mg total) by mouth once daily.     rifAXIMin (XIFAXAN) 550 mg Tab Take 1 tablet (550 mg total) by mouth 2 (two) times daily.    spironolactone (ALDACTONE) 50 MG tablet Take 1 tablet (50 mg total) by mouth once daily.    SITagliptan (JANUVIA) 50 MG Tab Take 1 tablet (50 mg total) by mouth once daily.     Antibiotics     Start     Stop Route Frequency Ordered    05/10/17 1130  rifAXIMin tablet 550 mg      -- Oral 2 times daily 05/10/17 1031    05/11/17 0400  vancomycin (VANCOCIN) 1,250 mg in dextrose 5 % 250 mL IVPB  (Vancomycin IVPB with levels panel)      -- IV Every 24 hours (non-standard times) 05/11/17 0258    05/11/17 1600  cefTRIAXone (ROCEPHIN) 1 g in dextrose 5 % 50 mL IVPB      -- IV Every 24 hours (non-standard times) 05/11/17 1429        Antifungals     None        Antivirals     None           Immunization History   Administered Date(s) Administered    Pneumococcal Conjugate - 13 Valent 06/29/2016       Family History     None        Social History     Social History    Marital status:      Spouse name: N/A    Number of children: N/A    Years of education: N/A     Social History Main Topics    Smoking status: Former Smoker     Packs/day: 0.25     Years: 2.00    Smokeless tobacco: None      Comment: quit at age 17 less than a pack a day    Alcohol use No      Comment: rare    Drug use: No    Sexual activity: Not Asked     Other Topics Concern    None     Social History Narrative     Review of Systems   Constitutional: Positive for chills and fever.   Gastrointestinal: Positive for abdominal distention.   All other systems reviewed and are negative.    Objective:     Vital Signs (Most Recent):  Temp: 98.5 °F (36.9 °C) (05/13/17 1221)  Pulse: 67 (05/13/17 1221)  Resp: 18 (05/13/17 1221)  BP: 135/65 (05/13/17 1221)  SpO2: 96 % (05/13/17 1221) Vital Signs (24h Range):  Temp:  [97.5 °F (36.4 °C)-98.5 °F (36.9 °C)] 98.5 °F (36.9 °C)  Pulse:  [47-83] 67  Resp:  [16-18] 18  SpO2:  [95 %-97 %] 96 %  BP: (110-165)/(55-82) 135/65      Weight: 77 kg (169 lb 12.1 oz)  Body mass index is 25.81 kg/(m^2).    Estimated Creatinine Clearance: 27.6 mL/min (based on Cr of 2.1).    Physical Exam   Constitutional: He is oriented to person, place, and time. He appears well-developed and well-nourished.   HENT:   Head: Normocephalic and atraumatic.   Right Ear: External ear normal.   Left Ear: External ear normal.   Mouth/Throat: Oropharynx is clear and moist.   Eyes: Conjunctivae and EOM are normal. Pupils are equal, round, and reactive to light.   Neck: Normal range of motion. Neck supple. No thyromegaly present.   Cardiovascular: Normal rate, regular rhythm and normal heart sounds.    No murmur heard.  Pulmonary/Chest: Effort normal and breath sounds normal. He has no wheezes. He has no rales.   Abdominal: Soft. Bowel sounds are normal. He exhibits distension. He exhibits no mass. There is no tenderness. There is no rebound.   Musculoskeletal: Normal range of motion.   Lymphadenopathy:     He has no cervical adenopathy.   Neurological: He is alert and oriented to person, place, and time.   Skin: Skin is warm and dry. No erythema.   Psychiatric: He has a normal mood and affect. His behavior is normal. Judgment and thought content normal.   Nursing note and vitals reviewed.      Significant Labs:   Blood Culture:   Recent Labs  Lab 11/23/16  1615 11/23/16  1618 05/10/17  0519 05/10/17  0520 05/11/17  0407   LABBLOO No growth after 5 days. No growth after 5 days. Gram stain aer bottle: Gram positive cocci in chains resembling Strep   Gram stain sp bottle: Gram positive cocci in chains resembling Strep   Results called to and read back by: Frances Corrigan RN   05/11/2017  02:48  STREPTOCOCCUS MITIS/ORALIS  Gram stain aer bottle: Gram positive cocci in chains resembling Strep   Results called to and read back by: Frances Corrigan RN  05/11/2017  02:47  Gram stain sp bottle: Gram positive cocci in chains resembling Strep  Positive results previously called  05/11/2017  08:31  STREPTOCOCCUS MITIS/ORALIS For susceptibility see order # [id number] No Growth to date  No Growth to date  No Growth to date  No Growth to date  No Growth to date  No Growth to date     CBC:   Recent Labs  Lab 05/12/17  0408 05/13/17  0501   WBC 4.03 3.73*   HGB 8.2* 9.4*   HCT 25.0* 28.4*   PLT 85* 92*     CMP:   Recent Labs  Lab 05/11/17  1445   *   K 4.0   CL 98   CO2 28   *   BUN 36*   CREATININE 2.1*   CALCIUM 8.2*   PROT 5.9*   ALBUMIN 1.8*   BILITOT 0.8   ALKPHOS 106   AST 27   ALT 8*   ANIONGAP 9   EGFRNONAA 29.1*       Significant Imaging: I have reviewed all pertinent imaging results/findings within the past 24 hours.

## 2017-05-13 NOTE — CONSULTS
[facility]  Infectious Disease  Consult Note    Patient Name: Kenneth Sheikh  MRN: [id number]  Admission Date: 5/10/2017  Hospital Length of Stay: 3 days  Attending Physician: Jeanne Crawley MD  Primary Care Provider: Prisca Espinoza MD     Isolation Status: No active isolations    Patient information was obtained from patient, spouse/SO, past medical records and ER records.      Inpatient consult to Infectious Diseases  Consult performed by: TERRANCE MARTINEZ  Consult ordered by: JEANNE CRAWLEY        Assessment/Plan:     Streptococcal sepsis  - likely odontogenic or GI etiology  - seems transient  - patient is without stigmata of IE  - recommend 2 weeks of therapy from 05/11  - ceftriaxone seems like a reasonable choice given renal insufficiency      Thank you for your consult. I will sign off. Please contact us if you have any additional questions.    Terrance Martinez MD  Infectious Disease  [facility]    Subjective:     Principal Problem: Fall at home    HPI: Male with MCDONALD Cirrhosis, p[t of Dr. Miller, w2ho has been having progressive fatigue and weakness over the past 3 weeks. He has been talking his lasix, but not the spironolactone because it caused chest pain. The weakness exacerbates in the shower and on several occasions he could not walk out of the Bathroom, opting to sit on the commode and call his wife. He got up to go to the bathroom at 3 am, tried to sit on the toilet and missed it, falling on the floor. He has polyurea with lasix but cam only relieve small volumes. His wife states that his appitite is poor. He has not eaten anything for 3 days. She bought him a Smoothie and it took all day for him to finish it. His wife reports inreased tremor, poor memory, one episiode of posterior neck pain, right sided pain after the fall. Lactulose was stopped when he had too many stools 2 months ago.  She denies fever at home, but temp 100.9 in ED, culture  done and grew Strep mitis. I am consulted regarding his bacteremia.The patient reports shaking at home. No recent dental work. Had an EGD about 4 weeks ago with clipping of a blood vessel.        Past Medical History:   Diagnosis Date    Anticoagulant long-term use     Bipolar 1 disorder     Bipolar 1 disorder 11/24/2016    bipolar    Cancer     history of skin cancer/sinus cancer & rectal cancer    Depressed bipolar affective disorder     Diabetes mellitus     type 2    EBV infection 12/7/2016    EBV DNA, PCR Latest Ref Range: None detected  None detected EBV DNA-Copies/mL Unknown Test Not Performed EBV Early Antigen Ab, IgG Latest Ref Range: <1:10 Titer 1:40 (A) EBV Nuclear Ag Ab Latest Ref Range: <1:5 Titer >=1:80 (A) EBV VCA IgG Latest Ref Range: <1:10 Titer 1:160 (A) EBV VCA IgM Latest Ref Range: <1:10 Titer <1:10     History of TIA (transient ischemic attack)     several-taking Plavix    Hx of gallstones     Wife denies    Hypertension     Hyperthyroidism 11/30/2016    Low HDL (under 40) 12/7/2016    Mixed hyperlipidemia 11/23/2016    JUAN MANUEL (obstructive sleep apnea)     Stroke     Transient cerebral ischemia 7/22/2016       Past Surgical History:   Procedure Laterality Date    Moh's procedure x4      Sinus surgery for sinus cancer      sinus sx for cancer      skin cancer removed-several times-nose & ear      TONSILLECTOMY      Undescened testicle sx      UVULOPALATOPHARYNGOPLASTY      for sleep apnea       Review of patient's allergies indicates:   Allergen Reactions    Abilify [aripiprazole] Other (See Comments)     Sleepy, could not function.       Medications:  Prescriptions Prior to Admission   Medication Sig    ACCU-CHEK NEYDA PLUS TEST STRP Strp     divalproex (DEPAKOTE) 250 MG 24 hr tablet Take 500 mg by mouth once daily.     furosemide (LASIX) 20 MG tablet Take 4 tablets (80 mg total) by mouth once daily.    JANUVIA 50 mg Tab TAKE 1 TABLET (50 MG TOTAL) BY MOUTH ONCE DAILY.     lactulose (CHRONULAC) 10 gram/15 mL solution Take 15 mLs (10 g total) by mouth 2 (two) times daily.    losartan (COZAAR) 50 MG tablet Take 1 tablet (50 mg total) by mouth once daily.    rifAXIMin (XIFAXAN) 550 mg Tab Take 1 tablet (550 mg total) by mouth 2 (two) times daily.    spironolactone (ALDACTONE) 50 MG tablet Take 1 tablet (50 mg total) by mouth once daily.    SITagliptan (JANUVIA) 50 MG Tab Take 1 tablet (50 mg total) by mouth once daily.     Antibiotics     Start     Stop Route Frequency Ordered    05/10/17 1130  rifAXIMin tablet 550 mg      -- Oral 2 times daily 05/10/17 1031    05/11/17 0400  vancomycin (VANCOCIN) 1,250 mg in dextrose 5 % 250 mL IVPB  (Vancomycin IVPB with levels panel)      -- IV Every 24 hours (non-standard times) 05/11/17 0258    05/11/17 1600  cefTRIAXone (ROCEPHIN) 1 g in dextrose 5 % 50 mL IVPB      -- IV Every 24 hours (non-standard times) 05/11/17 1429        Antifungals     None        Antivirals     None           Immunization History   Administered Date(s) Administered    Pneumococcal Conjugate - 13 Valent 06/29/2016       Family History     None        Social History     Social History    Marital status:      Spouse name: N/A    Number of children: N/A    Years of education: N/A     Social History Main Topics    Smoking status: Former Smoker     Packs/day: 0.25     Years: 2.00    Smokeless tobacco: None      Comment: quit at age 17 less than a pack a day    Alcohol use No      Comment: rare    Drug use: No    Sexual activity: Not Asked     Other Topics Concern    None     Social History Narrative     Review of Systems   Constitutional: Positive for chills and fever.   Gastrointestinal: Positive for abdominal distention.   All other systems reviewed and are negative.    Objective:     Vital Signs (Most Recent):  Temp: 98.5 °F (36.9 °C) (05/13/17 1221)  Pulse: 67 (05/13/17 1221)  Resp: 18 (05/13/17 1221)  BP: 135/65 (05/13/17 1221)  SpO2: 96 % (05/13/17  1221) Vital Signs (24h Range):  Temp:  [97.5 °F (36.4 °C)-98.5 °F (36.9 °C)] 98.5 °F (36.9 °C)  Pulse:  [47-83] 67  Resp:  [16-18] 18  SpO2:  [95 %-97 %] 96 %  BP: (110-165)/(55-82) 135/65     Weight: 77 kg (169 lb 12.1 oz)  Body mass index is 25.81 kg/(m^2).    Estimated Creatinine Clearance: 27.6 mL/min (based on Cr of 2.1).    Physical Exam   Constitutional: He is oriented to person, place, and time. He appears well-developed and well-nourished.   HENT:   Head: Normocephalic and atraumatic.   Right Ear: External ear normal.   Left Ear: External ear normal.   Mouth/Throat: Oropharynx is clear and moist.   Eyes: Conjunctivae and EOM are normal. Pupils are equal, round, and reactive to light.   Neck: Normal range of motion. Neck supple. No thyromegaly present.   Cardiovascular: Normal rate, regular rhythm and normal heart sounds.    No murmur heard.  Pulmonary/Chest: Effort normal and breath sounds normal. He has no wheezes. He has no rales.   Abdominal: Soft. Bowel sounds are normal. He exhibits distension. He exhibits no mass. There is no tenderness. There is no rebound.   Musculoskeletal: Normal range of motion.   Lymphadenopathy:     He has no cervical adenopathy.   Neurological: He is alert and oriented to person, place, and time.   Skin: Skin is warm and dry. No erythema.   Psychiatric: He has a normal mood and affect. His behavior is normal. Judgment and thought content normal.   Nursing note and vitals reviewed.      Significant Labs:   Blood Culture:   Recent Labs  Lab 11/23/16  1615 11/23/16  1618 05/10/17  0519 05/10/17  0520 05/11/17  0407   LABBLOO No growth after 5 days. No growth after 5 days. Gram stain aer bottle: Gram positive cocci in chains resembling Strep   Gram stain sp bottle: Gram positive cocci in chains resembling Strep   Results called to and read back by: Frances Corrigan RN   05/11/2017  02:48  STREPTOCOCCUS MITIS/ORALIS  Gram stain aer bottle: Gram positive cocci in chains resembling  Strep   Results called to and read back by: Frances Corrigan RN  05/11/2017  02:47  Gram stain sp bottle: Gram positive cocci in chains resembling Strep  Positive results previously called 05/11/2017  08:31  STREPTOCOCCUS MITIS/ORALIS For susceptibility see order # [id number] No Growth to date  No Growth to date  No Growth to date  No Growth to date  No Growth to date  No Growth to date     CBC:   Recent Labs  Lab 05/12/17  0408 05/13/17  0501   WBC 4.03 3.73*   HGB 8.2* 9.4*   HCT 25.0* 28.4*   PLT 85* 92*     CMP:   Recent Labs  Lab 05/11/17  1445   *   K 4.0   CL 98   CO2 28   *   BUN 36*   CREATININE 2.1*   CALCIUM 8.2*   PROT 5.9*   ALBUMIN 1.8*   BILITOT 0.8   ALKPHOS 106   AST 27   ALT 8*   ANIONGAP 9   EGFRNONAA 29.1*       Significant Imaging: I have reviewed all pertinent imaging results/findings within the past 24 hours.

## 2017-05-13 NOTE — PROGRESS NOTES
Hepatology   Progress note      SUBJECTIVE:     Reason for Consult:  MCDONALD / now positive blood culture    Presentation history:  Patient is a 77 y.o. male with MCDONALD Cirrhosis, pt of Dr. Miller, who has been having progressive fatigue and weakness over the past 3 weeks. He has been talking his lasix, but not the spironolactone because it caused chest pain. The weakness exacerbates in the shower and on several occasions he could not walk out of the Bathroom, opting to sit on the commode and call his wife. He got up to go to the bathroom at 3 am, tried to sit on the toilet and missed it, falling on the floor. He has polyurea with lasix but cam only relieve small volumes. His wife states that his appitite is poor. He has not eaten much for the last 3-4 days     His wife reports inreased tremor, poor memory, one episiode of posterior neck pain, right sided pain after the fall. Lactulose was stopped when he had too many stools 2 months ago.      She denies fever at home, but temp 100.9 in ED, blood culture done here on 5/10/17 showing gram positive cocci in chains resembling strep , paracenteis done but no fluid sent for culture or studies.       Interval history:  No acute events overnight, patient states he feels much better     PTA Medications   Medication Sig    ACCU-CHEK NEYDA PLUS TEST STRP Strp     divalproex (DEPAKOTE) 250 MG 24 hr tablet Take 500 mg by mouth once daily.     furosemide (LASIX) 20 MG tablet Take 4 tablets (80 mg total) by mouth once daily.    JANUVIA 50 mg Tab TAKE 1 TABLET (50 MG TOTAL) BY MOUTH ONCE DAILY.    lactulose (CHRONULAC) 10 gram/15 mL solution Take 15 mLs (10 g total) by mouth 2 (two) times daily.    losartan (COZAAR) 50 MG tablet Take 1 tablet (50 mg total) by mouth once daily.    rifAXIMin (XIFAXAN) 550 mg Tab Take 1 tablet (550 mg total) by mouth 2 (two) times daily.    spironolactone (ALDACTONE) 50 MG tablet Take 1 tablet (50 mg total) by mouth once daily.     SITagliptan (JANUVIA) 50 MG Tab Take 1 tablet (50 mg total) by mouth once daily.       Review of patient's allergies indicates:   Allergen Reactions    Abilify [aripiprazole] Other (See Comments)     Sleepy, could not function.        Past Medical History:   Diagnosis Date    Anticoagulant long-term use     Bipolar 1 disorder     Bipolar 1 disorder 11/24/2016    bipolar    Cancer     history of skin cancer/sinus cancer & rectal cancer    Depressed bipolar affective disorder     Diabetes mellitus     type 2    EBV infection 12/7/2016    EBV DNA, PCR Latest Ref Range: None detected  None detected EBV DNA-Copies/mL Unknown Test Not Performed EBV Early Antigen Ab, IgG Latest Ref Range: <1:10 Titer 1:40 (A) EBV Nuclear Ag Ab Latest Ref Range: <1:5 Titer >=1:80 (A) EBV VCA IgG Latest Ref Range: <1:10 Titer 1:160 (A) EBV VCA IgM Latest Ref Range: <1:10 Titer <1:10     History of TIA (transient ischemic attack)     several-taking Plavix    Hx of gallstones     Wife denies    Hypertension     Hyperthyroidism 11/30/2016    Low HDL (under 40) 12/7/2016    Mixed hyperlipidemia 11/23/2016    JUAN MANUEL (obstructive sleep apnea)     Stroke     Transient cerebral ischemia 7/22/2016     Past Surgical History:   Procedure Laterality Date    Moh's procedure x4      Sinus surgery for sinus cancer      sinus sx for cancer      skin cancer removed-several times-nose & ear      TONSILLECTOMY      Undescened testicle sx      UVULOPALATOPHARYNGOPLASTY      for sleep apnea     Family History   Problem Relation Age of Onset    Anesthesia problems Neg Hx      Social History   Substance Use Topics    Smoking status: Former Smoker     Packs/day: 0.25     Years: 2.00    Smokeless tobacco: None      Comment: quit at age 17 less than a pack a day    Alcohol use No      Comment: rare       Review of Systems:  Respiratory: no cough or shorness of breath  Cardiovascular: no chest pain or palpitations  Gastrointestinal: no nausea  or vomiting, no abdominal pain, + decreased appetite   Hematologic/Lymphatic: no easy bruising or lymphadenopathy  Musculoskeletal: no arthralgias or myalgias  Neurological: + tremor   Endocrine: + cold intolerance    OBJECTIVE:     Vital Signs (Most Recent)  Temp: 98.2 °F (36.8 °C) (05/13/17 0835)  Pulse: 68 (05/13/17 1100)  Resp: 18 (05/13/17 0835)  BP: 130/61 (05/13/17 0835)  SpO2: 97 % (05/13/17 0835)    Physical Exam:  General appearance: elderly appearing male in no acute distress   Eyes:  No scleral icterus   Neck: supple, symmetrical, trachea midline, no JVD  Lungs:  clear to auscultation bilaterally and normal respiratory effort  Heart: regular rate and rhythm, S1, S2 normal, no murmur  Abdomen: distended with positive fluid wave,BS positive x 4 quadrants; no rebound tenderness or guarding  Extremities: no cyanosis  Pulses: 2+ and symmetric  Neurologic: oriented to person and place     Laboratory    CBC:     Recent Labs  Lab 05/11/17  0407 05/12/17  0408 05/13/17  0501   WBC 4.28 4.03 3.73*   RBC 3.61* 3.00* 3.43*   HGB 9.9* 8.2* 9.4*   HCT 30.0* 25.0* 28.4*   PLT 94* 85* 92*   MCV 83 83 83   MCH 27.4 27.3 27.4   MCHC 33.0 32.8 33.1     BMP:   Recent Labs  Lab 05/10/17  0519 05/11/17  0407 05/11/17  1445  05/13/17  0501   * 98 251*  --   --     137 135*  --   --    K 4.1 4.1 4.0  --   --    CL 98 99 98  --   --    CO2 27 28 28  --   --    BUN 29* 30* 36*  --   --    CREATININE 1.6* 1.4 2.1*  --   --    CALCIUM 8.9 8.9 8.2*  --   --    MG  --  1.7  --   < > 1.8   < > = values in this interval not displayed.  Coagulation:     Recent Labs  Lab 05/10/17  0519   INR 1.2     Microbiology Results (last 7 days)     Procedure Component Value Units Date/Time    Blood culture [[id number]] Collected:  05/11/17 0407    Order Status:  Completed Specimen:  Blood Updated:  05/13/17 1012     Blood Culture, Routine No Growth to date     Blood Culture, Routine No Growth to date     Blood Culture, Routine No Growth  to date    Blood culture [[id number]] Collected:  05/11/17 0407    Order Status:  Completed Specimen:  Blood Updated:  05/13/17 1012     Blood Culture, Routine No Growth to date     Blood Culture, Routine No Growth to date     Blood Culture, Routine No Growth to date    Blood Culture #1 **CANNOT BE ORDERED STAT** [[id number]]  (Susceptibility) Collected:  05/10/17 0519    Order Status:  Completed Specimen:  Blood from Peripheral, Antecubital, Right Updated:  05/13/17 0930     Blood Culture, Routine Gram stain aer bottle: Gram positive cocci in chains resembling Strep      Blood Culture, Routine Gram stain sp bottle: Gram positive cocci in chains resembling Strep      Blood Culture, Routine Results called to and read back by: Frances Corrigan RN   05/11/2017  02:48     Blood Culture, Routine STREPTOCOCCUS MITIS/ORALIS     Blood Culture #2 **CANNOT BE ORDERED STAT** [[id number]] Collected:  05/10/17 0520    Order Status:  Completed Specimen:  Blood from Peripheral, Wrist, Right Updated:  05/13/17 0930     Blood Culture, Routine Gram stain aer bottle: Gram positive cocci in chains resembling Strep      Blood Culture, Routine Results called to and read back by: Frances Corrigan RN  05/11/2017  02:47     Blood Culture, Routine Gram stain sp bottle: Gram positive cocci in chains resembling Strep     Blood Culture, Routine Positive results previously called 05/11/2017  08:31     Blood Culture, Routine --     STREPTOCOCCUS MITIS/ORALIS   For susceptibility see order # [id number]            Diagnostic Results:  Reviewed     ASSESSMENT/PLAN:     Active Hospital Problems    Diagnosis  POA    *Fall at home [W19.XXXA, Y92.099]  Not Applicable    Bacteremia [R78.81]  Unknown     GPC, No ID yet  On Vanc. Rocephin added      Cirrhosis of liver with ascites [K74.60]  Yes    Orthostatic hypotension [I95.1]  Yes    BELLA (acute kidney injury) [N17.9]  Yes    Liver cirrhosis secondary to MCDONALD (nonalcoholic steatohepatitis) [K75.81,  K74.60]  Yes    Fatigue [R53.83]  Yes    Gait disorder [R26.9]  Yes    Essential hypertension [I10]  Yes    Type 2 diabetes mellitus without complication, without long-term current use of insulin [E11.9]  Yes     A1c 6.4 (11/30/16)  Metformin stopped during hospitalization 11/2016 because of increased lactate.        Resolved Hospital Problems    Diagnosis Date Resolved POA   No resolved problems to display.     77 y.o. male with MCDONALD Cirrhosis, pt of Dr. Miller, who has been having progressive fatigue and weakness over the past 3 weeks.     Blood culture done here on 5/10/17 showing gram positive cocci in chains resembling strep , paracenteis done but no fluid sent for culture or studies.     INR 1.2, bilirubin 1.1, AST/ALT 31/11.    MELD-Na score: 18 at 5/11/2017  2:45 PM  MELD score: 16 at 5/11/2017  2:45 PM  Calculated from:  Serum Creatinine: 2.1 mg/dL at 5/11/2017  2:45 PM  Serum Sodium: 135 mmol/L at 5/11/2017  2:45 PM  Total Bilirubin: 0.8 mg/dL (Rounded to 1) at 5/11/2017  2:45 PM  INR(ratio): 1.2 at 5/10/2017  5:19 AM  Age: 77 years    Recommendations:   - keep scheduled outpatient appointment later this month with Dr. Womack   - would have obtained a diagnostic paracentesis with cultures, unfortunately sample was not sent, so would treat for possible SBP as a potential infectious source given the bacteremia  - low Na Diet, less than 2g  - recommend checking daily CMP, would certainly at least check renal function given acute kidney injury on admit  - patient will need 14 days of antibiotics for his bacteremia, either vancomycin or ceftriaxone would be reasonable options       Esdras Rocha  Gastroenterology Fellow (PGY V)

## 2017-05-13 NOTE — PLAN OF CARE
Problem: Fall Risk (Adult)  Goal: Absence of Falls  Patient will demonstrate the desired outcomes by discharge/transition of care.   Outcome: Ongoing (interventions implemented as appropriate)  No falls.

## 2017-05-13 NOTE — PLAN OF CARE
Problem: Physical Therapy Goal  Goal: Physical Therapy Goal  Goals to be met by: 2017     Patient will increase functional independence with mobility by performin. Supine to sit with Modified Elmo- met  2. Sit to supine with Modified Elmo  3. Sit to stand transfer with Modified Elmo- met  4. Gait x 100 feet with Contact Guard Assistance using Rolling Walker- met.   5. Lower extremity exercise program x15 reps per handout, with supervision   Outcome: Ongoing (interventions implemented as appropriate)  Patient has achieved significant progress as evidence of walking range and independence with transfers.

## 2017-05-13 NOTE — PLAN OF CARE
Problem: Patient Care Overview  Goal: Plan of Care Review  Outcome: Ongoing (interventions implemented as appropriate)  Uneventful night. VSS, afebrile. No falls or injury. Pt is AAOx4. He is able to ambulate with standby assist. Glucose is stable. Fall precautions are maintained.

## 2017-05-14 LAB
BASOPHILS # BLD AUTO: 0.01 K/UL
BASOPHILS NFR BLD: 0.3 %
DIFFERENTIAL METHOD: ABNORMAL
EOSINOPHIL # BLD AUTO: 0.1 K/UL
EOSINOPHIL NFR BLD: 2.8 %
ERYTHROCYTE [DISTWIDTH] IN BLOOD BY AUTOMATED COUNT: 16.4 %
HCT VFR BLD AUTO: 26.9 %
HGB BLD-MCNC: 8.9 G/DL
LYMPHOCYTES # BLD AUTO: 0.7 K/UL
LYMPHOCYTES NFR BLD: 20.2 %
MAGNESIUM SERPL-MCNC: 1.7 MG/DL
MCH RBC QN AUTO: 27.3 PG
MCHC RBC AUTO-ENTMCNC: 33.1 %
MCV RBC AUTO: 83 FL
MONOCYTES # BLD AUTO: 0.7 K/UL
MONOCYTES NFR BLD: 21.1 %
NEUTROPHILS # BLD AUTO: 1.8 K/UL
NEUTROPHILS NFR BLD: 55.3 %
PHOSPHATE SERPL-MCNC: 2.8 MG/DL
PLATELET # BLD AUTO: 102 K/UL
PMV BLD AUTO: 9.6 FL
POCT GLUCOSE: 125 MG/DL (ref 70–110)
POCT GLUCOSE: 158 MG/DL (ref 70–110)
POCT GLUCOSE: 165 MG/DL (ref 70–110)
POCT GLUCOSE: 167 MG/DL (ref 70–110)
POCT GLUCOSE: 171 MG/DL (ref 70–110)
RBC # BLD AUTO: 3.26 M/UL
WBC # BLD AUTO: 3.22 K/UL

## 2017-05-14 PROCEDURE — 85025 COMPLETE CBC W/AUTO DIFF WBC: CPT

## 2017-05-14 PROCEDURE — 99233 SBSQ HOSP IP/OBS HIGH 50: CPT | Mod: ,,, | Performed by: INTERNAL MEDICINE

## 2017-05-14 PROCEDURE — 84100 ASSAY OF PHOSPHORUS: CPT

## 2017-05-14 PROCEDURE — 25000003 PHARM REV CODE 250: Performed by: HOSPITALIST

## 2017-05-14 PROCEDURE — P9047 ALBUMIN (HUMAN), 25%, 50ML: HCPCS | Performed by: INTERNAL MEDICINE

## 2017-05-14 PROCEDURE — 63600175 PHARM REV CODE 636 W HCPCS: Performed by: INTERNAL MEDICINE

## 2017-05-14 PROCEDURE — 25000003 PHARM REV CODE 250: Performed by: INTERNAL MEDICINE

## 2017-05-14 PROCEDURE — 11000001 HC ACUTE MED/SURG PRIVATE ROOM

## 2017-05-14 PROCEDURE — 83735 ASSAY OF MAGNESIUM: CPT

## 2017-05-14 PROCEDURE — 36415 COLL VENOUS BLD VENIPUNCTURE: CPT

## 2017-05-14 RX ORDER — ALBUMIN HUMAN 250 G/1000ML
70 SOLUTION INTRAVENOUS ONCE
Status: COMPLETED | OUTPATIENT
Start: 2017-05-14 | End: 2017-05-14

## 2017-05-14 RX ADMIN — RIFAXIMIN 550 MG: 550 TABLET ORAL at 09:05

## 2017-05-14 RX ADMIN — CEFTRIAXONE 2 G: 2 INJECTION, SOLUTION INTRAVENOUS at 03:05

## 2017-05-14 RX ADMIN — ALBUMIN (HUMAN) 70 G: 12.5 SOLUTION INTRAVENOUS at 11:05

## 2017-05-14 RX ADMIN — DIVALPROEX SODIUM 500 MG: 500 TABLET, FILM COATED, EXTENDED RELEASE ORAL at 09:05

## 2017-05-14 NOTE — ASSESSMENT & PLAN NOTE
- B/C 5/10/17 S mitir/oralis, B/C 5/11/17 NGTD  - likely odontogenic or GI etiology  - seems transient  - patient is without stigmata of IE  - Continue Ceftriaxone total 14 days from 05/11/17 (last day would be 5/25/17)

## 2017-05-14 NOTE — PROGRESS NOTES
[facility] Hospital Medicine  Progress Note    Patient Name: Kenneth Sheikh  MRN: [id number]  Patient Class: IP- Inpatient   Admission Date: 5/10/2017  Length of Stay: 3 days  Attending Physician: Marcus Rice MD  Primary Care Provider: Prisca Espinoza MD    Hospital Medicine Team: [facility] B Marcus Rice MD    Subjective:     Principal Problem:Fall at home    HPI:  77 y.o. Male with MCDONALD Cirrhosis, p[t of Dr. Miller, w2ho has been having progressive fatigue and weakness over the past 3 weeks.  He has been talking his lasix, but not the spironolactone because it caused chest pain.  The weakness exacerbates in the shower and on several occasions he could not walk out of the Bathroom, opting to sit on the commode and call his wife.  He got up to go to the bathroom at 3 am, tried to sit on the toilet and missed it, falling on the floor.  He has polyurea with lasix but cam only relieve small volumes.  His wife states that his appitite is poor.  He has not eaten anything for 3 days.  She bought him a Smoothie and it took all day for him to finish it.      His wife reports inreased tremor, poor memory, one episiode of posterior neck pain, right sided pain after the fall. Lactulose was stopped when he had too many stools 2 months ago.     She denies fever at home, but temp[ 100.9 in ED, culture done, paracenteis ordered and results pending      Hospital Course:       Past Medical History:   Diagnosis Date    Anticoagulant long-term use     Bipolar 1 disorder     Bipolar 1 disorder 11/24/2016    bipolar    Cancer     history of skin cancer/sinus cancer & rectal cancer    Depressed bipolar affective disorder     Diabetes mellitus     type 2    EBV infection 12/7/2016    EBV DNA, PCR Latest Ref Range: None detected  None detected EBV DNA-Copies/mL Unknown Test Not Performed EBV Early Antigen Ab, IgG Latest Ref Range: <1:10 Titer 1:40 (A) EBV Nuclear Ag Ab Latest Ref Range: <1:5  Titer >=1:80 (A) EBV VCA IgG Latest Ref Range: <1:10 Titer 1:160 (A) EBV VCA IgM Latest Ref Range: <1:10 Titer <1:10     History of TIA (transient ischemic attack)     several-taking Plavix    Hx of gallstones     Wife denies    Hypertension     Hyperthyroidism 11/30/2016    Low HDL (under 40) 12/7/2016    Mixed hyperlipidemia 11/23/2016    JUAN MANUEL (obstructive sleep apnea)     Stroke     Transient cerebral ischemia 7/22/2016       Past Surgical History:   Procedure Laterality Date    Moh's procedure x4      Sinus surgery for sinus cancer      sinus sx for cancer      skin cancer removed-several times-nose & ear      TONSILLECTOMY      Undescened testicle sx      UVULOPALATOPHARYNGOPLASTY      for sleep apnea       Review of patient's allergies indicates:   Allergen Reactions    Abilify [aripiprazole] Other (See Comments)     Sleepy, could not function.       No current facility-administered medications on file prior to encounter.      Current Outpatient Prescriptions on File Prior to Encounter   Medication Sig    ACCU-CHEK NEYDA PLUS TEST STRP Strp     divalproex (DEPAKOTE) 250 MG 24 hr tablet Take 500 mg by mouth once daily.     furosemide (LASIX) 20 MG tablet Take 4 tablets (80 mg total) by mouth once daily.    JANUVIA 50 mg Tab TAKE 1 TABLET (50 MG TOTAL) BY MOUTH ONCE DAILY.    lactulose (CHRONULAC) 10 gram/15 mL solution Take 15 mLs (10 g total) by mouth 2 (two) times daily.    losartan (COZAAR) 50 MG tablet Take 1 tablet (50 mg total) by mouth once daily.    rifAXIMin (XIFAXAN) 550 mg Tab Take 1 tablet (550 mg total) by mouth 2 (two) times daily.    spironolactone (ALDACTONE) 50 MG tablet Take 1 tablet (50 mg total) by mouth once daily.    SITagliptan (JANUVIA) 50 MG Tab Take 1 tablet (50 mg total) by mouth once daily.     Family History     None        Social History Main Topics    Smoking status: Former Smoker     Packs/day: 0.25     Years: 2.00    Smokeless tobacco: Not on file       "Comment: quit at age 17 less than a pack a day    Alcohol use No      Comment: rare    Drug use: No    Sexual activity: Not on file     Review of Systems   Constitutional: Positive for appetite change and fatigue. Negative for chills, diaphoresis and fever.        Weakness,   Poor appetite     HENT: Negative for congestion, nosebleeds, sinus pressure, sore throat, trouble swallowing and voice change.    Eyes: Negative for visual disturbance.   Respiratory: Positive for apnea (snoring and gasps) and chest tightness (assosciated w spironolactone). Negative for cough, choking, shortness of breath and wheezing.    Cardiovascular: Positive for leg swelling. Negative for chest pain and palpitations.   Gastrointestinal: Positive for abdominal distention. Negative for abdominal pain, blood in stool, constipation, diarrhea, nausea and vomiting.   Endocrine: Positive for polyuria. Negative for polydipsia.   Genitourinary: Positive for decreased urine volume and difficulty urinating. Negative for dysuria, flank pain, frequency and hematuria.   Musculoskeletal: Negative for arthralgias, back pain, gait problem, joint swelling, myalgias and neck stiffness.   Skin: Negative for rash and wound.   Neurological: Positive for tremors, syncope, weakness (general;zed) and light-headedness. Negative for dizziness, speech difficulty, numbness and headaches.   Psychiatric/Behavioral: Positive for dysphoric mood ("He's a grumpy old man"). Negative for agitation, behavioral problems, confusion, decreased concentration and hallucinations.     Objective:     Vital Signs (Most Recent):  Temp: 98.3 °F (36.8 °C) (05/13/17 1631)  Pulse: 65 (05/13/17 1900)  Resp: 18 (05/13/17 1631)  BP: (!) 141/69 (05/13/17 1631)  SpO2: 96 % (05/13/17 1631) Vital Signs (24h Range):  Temp:  [97.5 °F (36.4 °C)-98.5 °F (36.9 °C)] 98.3 °F (36.8 °C)  Pulse:  [47-83] 65  Resp:  [16-18] 18  SpO2:  [95 %-97 %] 96 %  BP: (114-141)/(55-82) 141/69     Weight: 77 kg (169 " lb 12.1 oz)  Body mass index is 25.81 kg/(m^2).    Physical Exam   Constitutional: He is oriented to person, place, and time. He appears well-developed. No distress.   Thin with m wasting in the face and estremities   HENT:   Head: Normocephalic and atraumatic.   Mouth/Throat: Oropharynx is clear and moist.   Eyes: Conjunctivae and EOM are normal. Pupils are equal, round, and reactive to light. No scleral icterus.   Neck: Normal range of motion and full passive range of motion without pain. Neck supple. No JVD present. Carotid bruit is not present. No tracheal deviation, no edema and normal range of motion present. No thyromegaly present.   Cardiovascular: Normal rate, regular rhythm, normal heart sounds and intact distal pulses.  Exam reveals no gallop and no friction rub.    No murmur heard.  Pulmonary/Chest: Effort normal and breath sounds normal. No accessory muscle usage or stridor. No apnea. No respiratory distress. He has no wheezes. He has no rales. He exhibits no tenderness.   Abdominal: Soft. Bowel sounds are normal. He exhibits distension. He exhibits no ascites and no mass. There is no hepatosplenomegaly. There is no tenderness. There is no rebound, no guarding, no tenderness at McBurney's point and negative Hatch's sign. No hernia.   Musculoskeletal: Normal range of motion. He exhibits edema. He exhibits no tenderness.        Right shoulder: He exhibits normal range of motion, no tenderness and normal strength.   Lymphadenopathy:        Head (right side): No submental, no submandibular, no preauricular and no posterior auricular adenopathy present.        Head (left side): No submental, no submandibular, no preauricular and no posterior auricular adenopathy present.     He has no cervical adenopathy.        Right: No inguinal and no supraclavicular adenopathy present.        Left: No inguinal and no supraclavicular adenopathy present.   Neurological: He is alert and oriented to person, place, and time.  He has normal strength. He displays no atrophy and no tremor. No cranial nerve deficit. He exhibits normal muscle tone.   Resting tremor   Skin: Skin is warm and dry. No abrasion, no bruising, no ecchymosis, no laceration and no rash noted. He is not diaphoretic. No cyanosis or erythema. No pallor. Nails show no clubbing.   Psychiatric: He has a normal mood and affect. His behavior is normal. Judgment and thought content normal.   Vitals reviewed.       Significant Labs:   CBC:     Recent Labs  Lab 05/12/17  0408 05/13/17  0501   WBC 4.03 3.73*   HGB 8.2* 9.4*   HCT 25.0* 28.4*   PLT 85* 92*     CMP:   No results for input(s): NA, K, CL, CO2, GLU, BUN, CREATININE, CALCIUM, PROT, ALBUMIN, BILITOT, ALKPHOS, AST, ALT, ANIONGAP, EGFRNONAA in the last 48 hours.    Invalid input(s): ESTGFAFRICA  Lipase:   No results for input(s): LIPASE in the last 48 hours.  POCT Glucose:     Recent Labs  Lab 05/13/17  1323 05/13/17  1831 05/13/17  1856   POCTGLUCOSE 187* 216* 231*       Significant Imaging: cxr - clear      Assessment/Plan:      Cirrhosis of liver with ascites  Ascites is returning      Bacteremia        VTE Risk Mitigation         Ordered     Medium Risk of VTE  Once      05/10/17 0611     Place sequential compression device  Until discontinued      05/10/17 0611     Place JORDEN hose  Until discontinued      05/10/17 0611          Marcus Rice MD  Department of Hospital Medicine   [facility]

## 2017-05-14 NOTE — SUBJECTIVE & OBJECTIVE
Past Medical History:   Diagnosis Date    Anticoagulant long-term use     Bipolar 1 disorder     Bipolar 1 disorder 11/24/2016    bipolar    Cancer     history of skin cancer/sinus cancer & rectal cancer    Depressed bipolar affective disorder     Diabetes mellitus     type 2    EBV infection 12/7/2016    EBV DNA, PCR Latest Ref Range: None detected  None detected EBV DNA-Copies/mL Unknown Test Not Performed EBV Early Antigen Ab, IgG Latest Ref Range: <1:10 Titer 1:40 (A) EBV Nuclear Ag Ab Latest Ref Range: <1:5 Titer >=1:80 (A) EBV VCA IgG Latest Ref Range: <1:10 Titer 1:160 (A) EBV VCA IgM Latest Ref Range: <1:10 Titer <1:10     History of TIA (transient ischemic attack)     several-taking Plavix    Hx of gallstones     Wife denies    Hypertension     Hyperthyroidism 11/30/2016    Low HDL (under 40) 12/7/2016    Mixed hyperlipidemia 11/23/2016    JUAN MANUEL (obstructive sleep apnea)     Stroke     Transient cerebral ischemia 7/22/2016       Past Surgical History:   Procedure Laterality Date    Moh's procedure x4      Sinus surgery for sinus cancer      sinus sx for cancer      skin cancer removed-several times-nose & ear      TONSILLECTOMY      Undescened testicle sx      UVULOPALATOPHARYNGOPLASTY      for sleep apnea       Review of patient's allergies indicates:   Allergen Reactions    Abilify [aripiprazole] Other (See Comments)     Sleepy, could not function.       No current facility-administered medications on file prior to encounter.      Current Outpatient Prescriptions on File Prior to Encounter   Medication Sig    ACCU-CHEK NEYDA PLUS TEST STRP Strp     divalproex (DEPAKOTE) 250 MG 24 hr tablet Take 500 mg by mouth once daily.     furosemide (LASIX) 20 MG tablet Take 4 tablets (80 mg total) by mouth once daily.    JANUVIA 50 mg Tab TAKE 1 TABLET (50 MG TOTAL) BY MOUTH ONCE DAILY.    lactulose (CHRONULAC) 10 gram/15 mL solution Take 15 mLs (10 g total) by mouth 2 (two) times daily.  "   losartan (COZAAR) 50 MG tablet Take 1 tablet (50 mg total) by mouth once daily.    rifAXIMin (XIFAXAN) 550 mg Tab Take 1 tablet (550 mg total) by mouth 2 (two) times daily.    spironolactone (ALDACTONE) 50 MG tablet Take 1 tablet (50 mg total) by mouth once daily.    SITagliptan (JANUVIA) 50 MG Tab Take 1 tablet (50 mg total) by mouth once daily.     Family History     None        Social History Main Topics    Smoking status: Former Smoker     Packs/day: 0.25     Years: 2.00    Smokeless tobacco: Not on file      Comment: quit at age 17 less than a pack a day    Alcohol use No      Comment: rare    Drug use: No    Sexual activity: Not on file     Review of Systems   Constitutional: Positive for appetite change and fatigue. Negative for chills, diaphoresis and fever.        Weakness,   Poor appetite     HENT: Negative for congestion, nosebleeds, sinus pressure, sore throat, trouble swallowing and voice change.    Eyes: Negative for visual disturbance.   Respiratory: Positive for apnea (snoring and gasps) and chest tightness (assosciated w spironolactone). Negative for cough, choking, shortness of breath and wheezing.    Cardiovascular: Positive for leg swelling. Negative for chest pain and palpitations.   Gastrointestinal: Positive for abdominal distention. Negative for abdominal pain, blood in stool, constipation, diarrhea, nausea and vomiting.   Endocrine: Positive for polyuria. Negative for polydipsia.   Genitourinary: Positive for decreased urine volume and difficulty urinating. Negative for dysuria, flank pain, frequency and hematuria.   Musculoskeletal: Negative for arthralgias, back pain, gait problem, joint swelling, myalgias and neck stiffness.   Skin: Negative for rash and wound.   Neurological: Positive for tremors, syncope, weakness (general;zed) and light-headedness. Negative for dizziness, speech difficulty, numbness and headaches.   Psychiatric/Behavioral: Positive for dysphoric mood ("He's " "a grumpy old man"). Negative for agitation, behavioral problems, confusion, decreased concentration and hallucinations.     Objective:     Vital Signs (Most Recent):  Temp: 98.3 °F (36.8 °C) (05/13/17 1631)  Pulse: 65 (05/13/17 1900)  Resp: 18 (05/13/17 1631)  BP: (!) 141/69 (05/13/17 1631)  SpO2: 96 % (05/13/17 1631) Vital Signs (24h Range):  Temp:  [97.5 °F (36.4 °C)-98.5 °F (36.9 °C)] 98.3 °F (36.8 °C)  Pulse:  [47-83] 65  Resp:  [16-18] 18  SpO2:  [95 %-97 %] 96 %  BP: (114-141)/(55-82) 141/69     Weight: 77 kg (169 lb 12.1 oz)  Body mass index is 25.81 kg/(m^2).    Physical Exam   Constitutional: He is oriented to person, place, and time. He appears well-developed. No distress.   Thin with m wasting in the face and estremities   HENT:   Head: Normocephalic and atraumatic.   Mouth/Throat: Oropharynx is clear and moist.   Eyes: Conjunctivae and EOM are normal. Pupils are equal, round, and reactive to light. No scleral icterus.   Neck: Normal range of motion and full passive range of motion without pain. Neck supple. No JVD present. Carotid bruit is not present. No tracheal deviation, no edema and normal range of motion present. No thyromegaly present.   Cardiovascular: Normal rate, regular rhythm, normal heart sounds and intact distal pulses.  Exam reveals no gallop and no friction rub.    No murmur heard.  Pulmonary/Chest: Effort normal and breath sounds normal. No accessory muscle usage or stridor. No apnea. No respiratory distress. He has no wheezes. He has no rales. He exhibits no tenderness.   Abdominal: Soft. Bowel sounds are normal. He exhibits distension. He exhibits no ascites and no mass. There is no hepatosplenomegaly. There is no tenderness. There is no rebound, no guarding, no tenderness at McBurney's point and negative Hatch's sign. No hernia.   Musculoskeletal: Normal range of motion. He exhibits edema. He exhibits no tenderness.        Right shoulder: He exhibits normal range of motion, no " tenderness and normal strength.   Lymphadenopathy:        Head (right side): No submental, no submandibular, no preauricular and no posterior auricular adenopathy present.        Head (left side): No submental, no submandibular, no preauricular and no posterior auricular adenopathy present.     He has no cervical adenopathy.        Right: No inguinal and no supraclavicular adenopathy present.        Left: No inguinal and no supraclavicular adenopathy present.   Neurological: He is alert and oriented to person, place, and time. He has normal strength. He displays no atrophy and no tremor. No cranial nerve deficit. He exhibits normal muscle tone.   Resting tremor   Skin: Skin is warm and dry. No abrasion, no bruising, no ecchymosis, no laceration and no rash noted. He is not diaphoretic. No cyanosis or erythema. No pallor. Nails show no clubbing.   Psychiatric: He has a normal mood and affect. His behavior is normal. Judgment and thought content normal.   Vitals reviewed.       Significant Labs:   CBC:     Recent Labs  Lab 05/12/17  0408 05/13/17  0501   WBC 4.03 3.73*   HGB 8.2* 9.4*   HCT 25.0* 28.4*   PLT 85* 92*     CMP:   No results for input(s): NA, K, CL, CO2, GLU, BUN, CREATININE, CALCIUM, PROT, ALBUMIN, BILITOT, ALKPHOS, AST, ALT, ANIONGAP, EGFRNONAA in the last 48 hours.    Invalid input(s): ESTGFAFRICA  Lipase:   No results for input(s): LIPASE in the last 48 hours.  POCT Glucose:     Recent Labs  Lab 05/13/17  1323 05/13/17  1831 05/13/17  1856   POCTGLUCOSE 187* 216* 231*       Significant Imaging: cxr - clear

## 2017-05-14 NOTE — PROGRESS NOTES
[facility] Hospital Medicine  Progress Note    Patient Name: Kenneth Sheikh  MRN: [id number]  Patient Class: IP- Inpatient   Admission Date: 5/10/2017  Length of Stay: 4 days  Attending Physician: Marcus Rice MD  Primary Care Provider: Prisca Espinoza MD    Hospital Medicine Team: [facility] HOSP MED B Marcus Rice MD    Subjective:     Principal Problem:Fall at home    HPI:  77 y.o. Male with MCDONALD Cirrhosis, p[t of Dr. Miller, w2ho has been having progressive fatigue and weakness over the past 3 weeks.  He has been talking his lasix, but not the spironolactone because it caused chest pain.  The weakness exacerbates in the shower and on several occasions he could not walk out of the Bathroom, opting to sit on the commode and call his wife.  He got up to go to the bathroom at 3 am, tried to sit on the toilet and missed it, falling on the floor.  He has polyurea with lasix but cam only relieve small volumes.  His wife states that his appitite is poor.  He has not eaten anything for 3 days.  She bought him a Smoothie and it took all day for him to finish it.      His wife reports inreased tremor, poor memory, one episiode of posterior neck pain, right sided pain after the fall. Lactulose was stopped when he had too many stools 2 months ago.     She denies fever at home, but temp[ 100.9 in ED, culture done, paracenteis ordered and results pending      Hospital Course:       Past Medical History:   Diagnosis Date    Anticoagulant long-term use     Bipolar 1 disorder     Bipolar 1 disorder 11/24/2016    bipolar    Cancer     history of skin cancer/sinus cancer & rectal cancer    Depressed bipolar affective disorder     Diabetes mellitus     type 2    EBV infection 12/7/2016    EBV DNA, PCR Latest Ref Range: None detected  None detected EBV DNA-Copies/mL Unknown Test Not Performed EBV Early Antigen Ab, IgG Latest Ref Range: <1:10 Titer 1:40 (A) EBV Nuclear Ag Ab Latest Ref Range: <1:5  Titer >=1:80 (A) EBV VCA IgG Latest Ref Range: <1:10 Titer 1:160 (A) EBV VCA IgM Latest Ref Range: <1:10 Titer <1:10     History of TIA (transient ischemic attack)     several-taking Plavix    Hx of gallstones     Wife denies    Hypertension     Hyperthyroidism 11/30/2016    Low HDL (under 40) 12/7/2016    Mixed hyperlipidemia 11/23/2016    JUAN MANUEL (obstructive sleep apnea)     Stroke     Transient cerebral ischemia 7/22/2016       Past Surgical History:   Procedure Laterality Date    Moh's procedure x4      Sinus surgery for sinus cancer      sinus sx for cancer      skin cancer removed-several times-nose & ear      TONSILLECTOMY      Undescened testicle sx      UVULOPALATOPHARYNGOPLASTY      for sleep apnea       Review of patient's allergies indicates:   Allergen Reactions    Abilify [aripiprazole] Other (See Comments)     Sleepy, could not function.       No current facility-administered medications on file prior to encounter.      Current Outpatient Prescriptions on File Prior to Encounter   Medication Sig    ACCU-CHEK NEYDA PLUS TEST STRP Strp     divalproex (DEPAKOTE) 250 MG 24 hr tablet Take 500 mg by mouth once daily.     furosemide (LASIX) 20 MG tablet Take 4 tablets (80 mg total) by mouth once daily.    JANUVIA 50 mg Tab TAKE 1 TABLET (50 MG TOTAL) BY MOUTH ONCE DAILY.    lactulose (CHRONULAC) 10 gram/15 mL solution Take 15 mLs (10 g total) by mouth 2 (two) times daily.    losartan (COZAAR) 50 MG tablet Take 1 tablet (50 mg total) by mouth once daily.    rifAXIMin (XIFAXAN) 550 mg Tab Take 1 tablet (550 mg total) by mouth 2 (two) times daily.    spironolactone (ALDACTONE) 50 MG tablet Take 1 tablet (50 mg total) by mouth once daily.    SITagliptan (JANUVIA) 50 MG Tab Take 1 tablet (50 mg total) by mouth once daily.     Family History     None        Social History Main Topics    Smoking status: Former Smoker     Packs/day: 0.25     Years: 2.00    Smokeless tobacco: Not on file       "Comment: quit at age 17 less than a pack a day    Alcohol use No      Comment: rare    Drug use: No    Sexual activity: Not on file     Review of Systems   Constitutional: Positive for appetite change and fatigue. Negative for chills, diaphoresis and fever.        Weakness,   Poor appetite     HENT: Negative for congestion, nosebleeds, sinus pressure, sore throat, trouble swallowing and voice change.    Eyes: Negative for visual disturbance.   Respiratory: Positive for apnea (snoring and gasps) and chest tightness (assosciated w spironolactone). Negative for cough, choking, shortness of breath and wheezing.    Cardiovascular: Positive for leg swelling. Negative for chest pain and palpitations.   Gastrointestinal: Positive for abdominal distention. Negative for abdominal pain, blood in stool, constipation, diarrhea, nausea and vomiting.   Endocrine: Positive for polyuria. Negative for polydipsia.   Genitourinary: Positive for decreased urine volume and difficulty urinating. Negative for dysuria, flank pain, frequency and hematuria.   Musculoskeletal: Negative for arthralgias, back pain, gait problem, joint swelling, myalgias and neck stiffness.   Skin: Negative for rash and wound.   Neurological: Positive for tremors, syncope, weakness (general;zed) and light-headedness. Negative for dizziness, speech difficulty, numbness and headaches.   Psychiatric/Behavioral: Positive for dysphoric mood ("He's a grumpy old man"). Negative for agitation, behavioral problems, confusion, decreased concentration and hallucinations.     Objective:     Vital Signs (Most Recent):  Temp: 98.4 °F (36.9 °C) (05/14/17 0900)  Pulse: 74 (05/14/17 0900)  Resp: 18 (05/14/17 0900)  BP: 136/68 (05/14/17 0900)  SpO2: 95 % (05/14/17 0900) Vital Signs (24h Range):  Temp:  [98.1 °F (36.7 °C)-98.5 °F (36.9 °C)] 98.4 °F (36.9 °C)  Pulse:  [50-74] 74  Resp:  [18] 18  SpO2:  [93 %-96 %] 95 %  BP: (135-147)/(65-70) 136/68     Weight: 77 kg (169 lb 12.1 " oz)  Body mass index is 25.81 kg/(m^2).    Physical Exam   Constitutional: He is oriented to person, place, and time. He appears well-developed. No distress.   Thin with m wasting in the face and estremities   HENT:   Head: Normocephalic and atraumatic.   Mouth/Throat: Oropharynx is clear and moist.   Eyes: Conjunctivae and EOM are normal. Pupils are equal, round, and reactive to light. No scleral icterus.   Neck: Normal range of motion and full passive range of motion without pain. Neck supple. No JVD present. Carotid bruit is not present. No tracheal deviation, no edema and normal range of motion present. No thyromegaly present.   Cardiovascular: Normal rate, regular rhythm, normal heart sounds and intact distal pulses.  Exam reveals no gallop and no friction rub.    No murmur heard.  Pulmonary/Chest: Effort normal and breath sounds normal. No accessory muscle usage or stridor. No apnea. No respiratory distress. He has no wheezes. He has no rales. He exhibits no tenderness.   Abdominal: Soft. Bowel sounds are normal. He exhibits distension. He exhibits no ascites and no mass. There is no hepatosplenomegaly. There is no tenderness. There is no rebound, no guarding, no tenderness at McBurney's point and negative Hatch's sign. No hernia.   Musculoskeletal: Normal range of motion. He exhibits edema. He exhibits no tenderness.        Right shoulder: He exhibits normal range of motion, no tenderness and normal strength.   Lymphadenopathy:        Head (right side): No submental, no submandibular, no preauricular and no posterior auricular adenopathy present.        Head (left side): No submental, no submandibular, no preauricular and no posterior auricular adenopathy present.     He has no cervical adenopathy.        Right: No inguinal and no supraclavicular adenopathy present.        Left: No inguinal and no supraclavicular adenopathy present.   Neurological: He is alert and oriented to person, place, and time. He has  normal strength. He displays no atrophy and no tremor. No cranial nerve deficit. He exhibits normal muscle tone.   Resting tremor   Skin: Skin is warm and dry. No abrasion, no bruising, no ecchymosis, no laceration and no rash noted. He is not diaphoretic. No cyanosis or erythema. No pallor. Nails show no clubbing.   Psychiatric: He has a normal mood and affect. His behavior is normal. Judgment and thought content normal.   Vitals reviewed.       Significant Labs:   CBC:     Recent Labs  Lab 05/13/17  0501 05/14/17  0351   WBC 3.73* 3.22*   HGB 9.4* 8.9*   HCT 28.4* 26.9*   PLT 92* 102*     CMP:   No results for input(s): NA, K, CL, CO2, GLU, BUN, CREATININE, CALCIUM, PROT, ALBUMIN, BILITOT, ALKPHOS, AST, ALT, ANIONGAP, EGFRNONAA in the last 48 hours.    Invalid input(s): ESTGFAFRICA  Lipase:   No results for input(s): LIPASE in the last 48 hours.  POCT Glucose:     Recent Labs  Lab 05/13/17  1856 05/13/17  2103 05/14/17  0833   POCTGLUCOSE 231* 165* 125*       Significant Imaging: cxr - clear      Assessment/Plan:      Bacteremia        VTE Risk Mitigation         Ordered     Medium Risk of VTE  Once      05/10/17 0611     Place sequential compression device  Until discontinued      05/10/17 0611     Place JORDEN hose  Until discontinued      05/10/17 0611          Marcus Rice MD  Department of Hospital Medicine   [facility]

## 2017-05-14 NOTE — PROGRESS NOTES
[facility]  Infectious Disease  Progress Note    Patient Name: Kenneth Sheikh  MRN: [id number]  Admission Date: 5/10/2017  Length of Stay: 4 days  Attending Physician: Marcus Rice MD  Primary Care Provider: Prisca Espinoza MD    Isolation Status: No active isolations  Assessment/Plan:      Streptococcal sepsis  - B/C 5/10/17 S mitir/oralis, B/C 5/11/17 NGTD  - likely odontogenic or GI etiology  - seems transient  - patient is without stigmata of IE  - Continue Ceftriaxone total 14 days from 05/11/17 (last day would be 5/25/17)      Anticipated Disposition: Ceftriaxone until 5/25/17    Thank you for your consult. I will follow-up with patient. Please contact us if you have any additional questions.    Giuliano Figueroa MD  Infectious Disease Fellow, PGY-4  Pager: [phone]  [facility]      Subjective:     Principal Problem:Fall at home    HPI: Male with MCDONALD Cirrhosis, p[t of Dr. Miller, w2ho has been having progressive fatigue and weakness over the past 3 weeks. He has been talking his lasix, but not the spironolactone because it caused chest pain. The weakness exacerbates in the shower and on several occasions he could not walk out of the Bathroom, opting to sit on the commode and call his wife. He got up to go to the bathroom at 3 am, tried to sit on the toilet and missed it, falling on the floor. He has polyurea with lasix but cam only relieve small volumes. His wife states that his appitite is poor. He has not eaten anything for 3 days. She bought him a Smoothie and it took all day for him to finish it. His wife reports inreased tremor, poor memory, one episiode of posterior neck pain, right sided pain after the fall. Lactulose was stopped when he had too many stools 2 months ago.  She denies fever at home, but temp 100.9 in ED, culture done and grew Strep mitis. I am consulted regarding his bacteremia.The patient reports shaking at home. No recent dental work. Had  an EGD about 4 weeks ago with clipping of a blood vessel.      Interval History: Afebrile, no issues overnight    Review of Systems   Constitutional: Negative for chills and fever.   HENT: Negative for sore throat.    Respiratory: Negative for cough, chest tightness and shortness of breath.    Cardiovascular: Negative for chest pain, palpitations and leg swelling.   Gastrointestinal: Negative for abdominal distention, abdominal pain, diarrhea, nausea and vomiting.   Genitourinary: Negative for dysuria, flank pain and urgency.   Skin: Negative for rash.   Neurological: Negative for dizziness, light-headedness and numbness.   Psychiatric/Behavioral: Negative for confusion.     Objective:     Vital Signs (Most Recent):  Temp: 98.1 °F (36.7 °C) (05/14/17 0000)  Pulse: (!) 57 (05/14/17 0300)  Resp: 18 (05/14/17 0000)  BP: (!) 147/70 (05/14/17 0000)  SpO2: 96 % (05/14/17 0000) Vital Signs (24h Range):  Temp:  [98.1 °F (36.7 °C)-98.5 °F (36.9 °C)] 98.1 °F (36.7 °C)  Pulse:  [57-83] 57  Resp:  [18] 18  SpO2:  [93 %-97 %] 96 %  BP: (130-147)/(61-70) 147/70     Weight: 77 kg (169 lb 12.1 oz)  Body mass index is 25.81 kg/(m^2).    Estimated Creatinine Clearance: 27.6 mL/min (based on Cr of 2.1).    Physical Exam   Constitutional: He is oriented to person, place, and time. No distress.   HENT:   Head: Normocephalic and atraumatic.   Mouth/Throat: No oropharyngeal exudate.   Eyes: No scleral icterus.   Cardiovascular: Normal rate, regular rhythm and normal heart sounds.  Exam reveals no gallop and no friction rub.    No murmur heard.  Pulmonary/Chest: Effort normal and breath sounds normal. No respiratory distress. He has no wheezes. He has no rales.   Abdominal: Soft. Bowel sounds are normal. He exhibits distension. There is no tenderness. There is no rebound and no guarding.   Neurological: He is alert and oriented to person, place, and time. No cranial nerve deficit.   Vitals reviewed.      Significant Labs:   Blood Culture:    Recent Labs  Lab 11/23/16  1615 11/23/16  1618 05/10/17  0519 05/10/17  0520 05/11/17  0407   LABBLOO No growth after 5 days. No growth after 5 days. Gram stain aer bottle: Gram positive cocci in chains resembling Strep   Gram stain sp bottle: Gram positive cocci in chains resembling Strep   Results called to and read back by: Frances Corrigan RN   05/11/2017  02:48  STREPTOCOCCUS MITIS/ORALIS  Gram stain aer bottle: Gram positive cocci in chains resembling Strep   Results called to and read back by: Frances Corrigan RN  05/11/2017  02:47  Gram stain sp bottle: Gram positive cocci in chains resembling Strep  Positive results previously called 05/11/2017  08:31  STREPTOCOCCUS MITIS/ORALIS For susceptibility see order # [id number] No Growth to date  No Growth to date  No Growth to date  No Growth to date  No Growth to date  No Growth to date     BMP:   Recent Labs  Lab 05/14/17  0351   MG 1.7     CBC:   Recent Labs  Lab 05/13/17  0501 05/14/17  0351   WBC 3.73* 3.22*   HGB 9.4* 8.9*   HCT 28.4* 26.9*   PLT 92* 102*     CMP: No results for input(s): NA, K, CL, CO2, GLU, BUN, CREATININE, CALCIUM, PROT, ALBUMIN, BILITOT, ALKPHOS, AST, ALT, ANIONGAP, EGFRNONAA in the last 48 hours.    Invalid input(s): ESTGFAFRICA    Significant Imaging: I have reviewed all pertinent imaging results/findings within the past 24 hours.

## 2017-05-14 NOTE — SUBJECTIVE & OBJECTIVE
Interval History: Afebrile, no issues overnight    Review of Systems   Constitutional: Negative for chills and fever.   HENT: Negative for sore throat.    Respiratory: Negative for cough, chest tightness and shortness of breath.    Cardiovascular: Negative for chest pain, palpitations and leg swelling.   Gastrointestinal: Negative for abdominal distention, abdominal pain, diarrhea, nausea and vomiting.   Genitourinary: Negative for dysuria, flank pain and urgency.   Skin: Negative for rash.   Neurological: Negative for dizziness, light-headedness and numbness.   Psychiatric/Behavioral: Negative for confusion.     Objective:     Vital Signs (Most Recent):  Temp: 98.1 °F (36.7 °C) (05/14/17 0000)  Pulse: (!) 57 (05/14/17 0300)  Resp: 18 (05/14/17 0000)  BP: (!) 147/70 (05/14/17 0000)  SpO2: 96 % (05/14/17 0000) Vital Signs (24h Range):  Temp:  [98.1 °F (36.7 °C)-98.5 °F (36.9 °C)] 98.1 °F (36.7 °C)  Pulse:  [57-83] 57  Resp:  [18] 18  SpO2:  [93 %-97 %] 96 %  BP: (130-147)/(61-70) 147/70     Weight: 77 kg (169 lb 12.1 oz)  Body mass index is 25.81 kg/(m^2).    Estimated Creatinine Clearance: 27.6 mL/min (based on Cr of 2.1).    Physical Exam   Constitutional: He is oriented to person, place, and time. No distress.   HENT:   Head: Normocephalic and atraumatic.   Mouth/Throat: No oropharyngeal exudate.   Eyes: No scleral icterus.   Cardiovascular: Normal rate, regular rhythm and normal heart sounds.  Exam reveals no gallop and no friction rub.    No murmur heard.  Pulmonary/Chest: Effort normal and breath sounds normal. No respiratory distress. He has no wheezes. He has no rales.   Abdominal: Soft. Bowel sounds are normal. He exhibits distension. There is no tenderness. There is no rebound and no guarding.   Neurological: He is alert and oriented to person, place, and time. No cranial nerve deficit.   Vitals reviewed.      Significant Labs:   Blood Culture:   Recent Labs  Lab 11/23/16  1615 11/23/16  1618 05/10/17  0519  05/10/17  0520 05/11/17  0407   LABBLOO No growth after 5 days. No growth after 5 days. Gram stain aer bottle: Gram positive cocci in chains resembling Strep   Gram stain sp bottle: Gram positive cocci in chains resembling Strep   Results called to and read back by: Frances Corrigan RN   05/11/2017  02:48  STREPTOCOCCUS MITIS/ORALIS  Gram stain aer bottle: Gram positive cocci in chains resembling Strep   Results called to and read back by: Frances Corrigan RN  05/11/2017  02:47  Gram stain sp bottle: Gram positive cocci in chains resembling Strep  Positive results previously called 05/11/2017  08:31  STREPTOCOCCUS MITIS/ORALIS For susceptibility see order # [id number] No Growth to date  No Growth to date  No Growth to date  No Growth to date  No Growth to date  No Growth to date     BMP:   Recent Labs  Lab 05/14/17  0351   MG 1.7     CBC:   Recent Labs  Lab 05/13/17  0501 05/14/17  0351   WBC 3.73* 3.22*   HGB 9.4* 8.9*   HCT 28.4* 26.9*   PLT 92* 102*     CMP: No results for input(s): NA, K, CL, CO2, GLU, BUN, CREATININE, CALCIUM, PROT, ALBUMIN, BILITOT, ALKPHOS, AST, ALT, ANIONGAP, EGFRNONAA in the last 48 hours.    Invalid input(s): ESTGFAFRICA    Significant Imaging: I have reviewed all pertinent imaging results/findings within the past 24 hours.

## 2017-05-14 NOTE — SUBJECTIVE & OBJECTIVE
Past Medical History:   Diagnosis Date    Anticoagulant long-term use     Bipolar 1 disorder     Bipolar 1 disorder 11/24/2016    bipolar    Cancer     history of skin cancer/sinus cancer & rectal cancer    Depressed bipolar affective disorder     Diabetes mellitus     type 2    EBV infection 12/7/2016    EBV DNA, PCR Latest Ref Range: None detected  None detected EBV DNA-Copies/mL Unknown Test Not Performed EBV Early Antigen Ab, IgG Latest Ref Range: <1:10 Titer 1:40 (A) EBV Nuclear Ag Ab Latest Ref Range: <1:5 Titer >=1:80 (A) EBV VCA IgG Latest Ref Range: <1:10 Titer 1:160 (A) EBV VCA IgM Latest Ref Range: <1:10 Titer <1:10     History of TIA (transient ischemic attack)     several-taking Plavix    Hx of gallstones     Wife denies    Hypertension     Hyperthyroidism 11/30/2016    Low HDL (under 40) 12/7/2016    Mixed hyperlipidemia 11/23/2016    JUAN MANUEL (obstructive sleep apnea)     Stroke     Transient cerebral ischemia 7/22/2016       Past Surgical History:   Procedure Laterality Date    Moh's procedure x4      Sinus surgery for sinus cancer      sinus sx for cancer      skin cancer removed-several times-nose & ear      TONSILLECTOMY      Undescened testicle sx      UVULOPALATOPHARYNGOPLASTY      for sleep apnea       Review of patient's allergies indicates:   Allergen Reactions    Abilify [aripiprazole] Other (See Comments)     Sleepy, could not function.       No current facility-administered medications on file prior to encounter.      Current Outpatient Prescriptions on File Prior to Encounter   Medication Sig    ACCU-CHEK NEYDA PLUS TEST STRP Strp     divalproex (DEPAKOTE) 250 MG 24 hr tablet Take 500 mg by mouth once daily.     furosemide (LASIX) 20 MG tablet Take 4 tablets (80 mg total) by mouth once daily.    JANUVIA 50 mg Tab TAKE 1 TABLET (50 MG TOTAL) BY MOUTH ONCE DAILY.    lactulose (CHRONULAC) 10 gram/15 mL solution Take 15 mLs (10 g total) by mouth 2 (two) times daily.  "   losartan (COZAAR) 50 MG tablet Take 1 tablet (50 mg total) by mouth once daily.    rifAXIMin (XIFAXAN) 550 mg Tab Take 1 tablet (550 mg total) by mouth 2 (two) times daily.    spironolactone (ALDACTONE) 50 MG tablet Take 1 tablet (50 mg total) by mouth once daily.    SITagliptan (JANUVIA) 50 MG Tab Take 1 tablet (50 mg total) by mouth once daily.     Family History     None        Social History Main Topics    Smoking status: Former Smoker     Packs/day: 0.25     Years: 2.00    Smokeless tobacco: Not on file      Comment: quit at age 17 less than a pack a day    Alcohol use No      Comment: rare    Drug use: No    Sexual activity: Not on file     Review of Systems   Constitutional: Positive for appetite change and fatigue. Negative for chills, diaphoresis and fever.        Weakness,   Poor appetite     HENT: Negative for congestion, nosebleeds, sinus pressure, sore throat, trouble swallowing and voice change.    Eyes: Negative for visual disturbance.   Respiratory: Positive for apnea (snoring and gasps) and chest tightness (assosciated w spironolactone). Negative for cough, choking, shortness of breath and wheezing.    Cardiovascular: Positive for leg swelling. Negative for chest pain and palpitations.   Gastrointestinal: Positive for abdominal distention. Negative for abdominal pain, blood in stool, constipation, diarrhea, nausea and vomiting.   Endocrine: Positive for polyuria. Negative for polydipsia.   Genitourinary: Positive for decreased urine volume and difficulty urinating. Negative for dysuria, flank pain, frequency and hematuria.   Musculoskeletal: Negative for arthralgias, back pain, gait problem, joint swelling, myalgias and neck stiffness.   Skin: Negative for rash and wound.   Neurological: Positive for tremors, syncope, weakness (general;zed) and light-headedness. Negative for dizziness, speech difficulty, numbness and headaches.   Psychiatric/Behavioral: Positive for dysphoric mood ("He's " "a grumpy old man"). Negative for agitation, behavioral problems, confusion, decreased concentration and hallucinations.     Objective:     Vital Signs (Most Recent):  Temp: 98.4 °F (36.9 °C) (05/14/17 0900)  Pulse: 74 (05/14/17 0900)  Resp: 18 (05/14/17 0900)  BP: 136/68 (05/14/17 0900)  SpO2: 95 % (05/14/17 0900) Vital Signs (24h Range):  Temp:  [98.1 °F (36.7 °C)-98.5 °F (36.9 °C)] 98.4 °F (36.9 °C)  Pulse:  [50-74] 74  Resp:  [18] 18  SpO2:  [93 %-96 %] 95 %  BP: (135-147)/(65-70) 136/68     Weight: 77 kg (169 lb 12.1 oz)  Body mass index is 25.81 kg/(m^2).    Physical Exam   Constitutional: He is oriented to person, place, and time. He appears well-developed. No distress.   Thin with m wasting in the face and estremities   HENT:   Head: Normocephalic and atraumatic.   Mouth/Throat: Oropharynx is clear and moist.   Eyes: Conjunctivae and EOM are normal. Pupils are equal, round, and reactive to light. No scleral icterus.   Neck: Normal range of motion and full passive range of motion without pain. Neck supple. No JVD present. Carotid bruit is not present. No tracheal deviation, no edema and normal range of motion present. No thyromegaly present.   Cardiovascular: Normal rate, regular rhythm, normal heart sounds and intact distal pulses.  Exam reveals no gallop and no friction rub.    No murmur heard.  Pulmonary/Chest: Effort normal and breath sounds normal. No accessory muscle usage or stridor. No apnea. No respiratory distress. He has no wheezes. He has no rales. He exhibits no tenderness.   Abdominal: Soft. Bowel sounds are normal. He exhibits distension. He exhibits no ascites and no mass. There is no hepatosplenomegaly. There is no tenderness. There is no rebound, no guarding, no tenderness at McBurney's point and negative Hatch's sign. No hernia.   Musculoskeletal: Normal range of motion. He exhibits edema. He exhibits no tenderness.        Right shoulder: He exhibits normal range of motion, no tenderness " and normal strength.   Lymphadenopathy:        Head (right side): No submental, no submandibular, no preauricular and no posterior auricular adenopathy present.        Head (left side): No submental, no submandibular, no preauricular and no posterior auricular adenopathy present.     He has no cervical adenopathy.        Right: No inguinal and no supraclavicular adenopathy present.        Left: No inguinal and no supraclavicular adenopathy present.   Neurological: He is alert and oriented to person, place, and time. He has normal strength. He displays no atrophy and no tremor. No cranial nerve deficit. He exhibits normal muscle tone.   Resting tremor   Skin: Skin is warm and dry. No abrasion, no bruising, no ecchymosis, no laceration and no rash noted. He is not diaphoretic. No cyanosis or erythema. No pallor. Nails show no clubbing.   Psychiatric: He has a normal mood and affect. His behavior is normal. Judgment and thought content normal.   Vitals reviewed.       Significant Labs:   CBC:     Recent Labs  Lab 05/13/17  0501 05/14/17  0351   WBC 3.73* 3.22*   HGB 9.4* 8.9*   HCT 28.4* 26.9*   PLT 92* 102*     CMP:   No results for input(s): NA, K, CL, CO2, GLU, BUN, CREATININE, CALCIUM, PROT, ALBUMIN, BILITOT, ALKPHOS, AST, ALT, ANIONGAP, EGFRNONAA in the last 48 hours.    Invalid input(s): ESTGFAFRICA  Lipase:   No results for input(s): LIPASE in the last 48 hours.  POCT Glucose:     Recent Labs  Lab 05/13/17  1856 05/13/17  2103 05/14/17  0833   POCTGLUCOSE 231* 165* 125*       Significant Imaging: cxr - clear

## 2017-05-14 NOTE — PLAN OF CARE
Problem: Diabetes, Type 2 (Adult)  Intervention: Optimize Glycemic Control  AC/HS BG monitoring, pt is WNL no coverage needed. Pt is aware of BG parameters. involved in care.       Problem: Fall Risk (Adult)  Intervention: Reduce Risk/Promote Restraint Free Environment  Free from falls and injury      Problem: Patient Care Overview  Goal: Plan of Care Review  Outcome: Ongoing (interventions implemented as appropriate)  No complaints overnight. NS continues to infuse at 100 ml/hr. Voids spontaneously. AAOx4. No falls or injury overnight. Bed alarm is on. Pt will call staff if assistance is needed. Stand by assistance when ambulating.

## 2017-05-15 VITALS
WEIGHT: 169.75 LBS | HEART RATE: 60 BPM | RESPIRATION RATE: 18 BRPM | TEMPERATURE: 98 F | HEIGHT: 68 IN | OXYGEN SATURATION: 99 % | DIASTOLIC BLOOD PRESSURE: 78 MMHG | SYSTOLIC BLOOD PRESSURE: 164 MMHG | BODY MASS INDEX: 25.73 KG/M2

## 2017-05-15 LAB
BASOPHILS # BLD AUTO: 0.01 K/UL
BASOPHILS NFR BLD: 0.4 %
DIFFERENTIAL METHOD: ABNORMAL
EOSINOPHIL # BLD AUTO: 0.1 K/UL
EOSINOPHIL NFR BLD: 4.7 %
ERYTHROCYTE [DISTWIDTH] IN BLOOD BY AUTOMATED COUNT: 16.7 %
HCT VFR BLD AUTO: 25.9 %
HGB BLD-MCNC: 8.6 G/DL
LYMPHOCYTES # BLD AUTO: 0.8 K/UL
LYMPHOCYTES NFR BLD: 30.2 %
MAGNESIUM SERPL-MCNC: 1.7 MG/DL
MCH RBC QN AUTO: 27.4 PG
MCHC RBC AUTO-ENTMCNC: 33.2 %
MCV RBC AUTO: 83 FL
MONOCYTES # BLD AUTO: 0.5 K/UL
MONOCYTES NFR BLD: 18.8 %
NEUTROPHILS # BLD AUTO: 1.2 K/UL
NEUTROPHILS NFR BLD: 45.5 %
PHOSPHATE SERPL-MCNC: 2.8 MG/DL
PLATELET # BLD AUTO: 98 K/UL
PMV BLD AUTO: 9.7 FL
POCT GLUCOSE: 111 MG/DL (ref 70–110)
POCT GLUCOSE: 146 MG/DL (ref 70–110)
RBC # BLD AUTO: 3.14 M/UL
WBC # BLD AUTO: 2.55 K/UL

## 2017-05-15 PROCEDURE — 84100 ASSAY OF PHOSPHORUS: CPT

## 2017-05-15 PROCEDURE — 85025 COMPLETE CBC W/AUTO DIFF WBC: CPT

## 2017-05-15 PROCEDURE — 36415 COLL VENOUS BLD VENIPUNCTURE: CPT

## 2017-05-15 PROCEDURE — C1751 CATH, INF, PER/CENT/MIDLINE: HCPCS

## 2017-05-15 PROCEDURE — 76937 US GUIDE VASCULAR ACCESS: CPT

## 2017-05-15 PROCEDURE — 99233 SBSQ HOSP IP/OBS HIGH 50: CPT | Mod: ,,, | Performed by: INTERNAL MEDICINE

## 2017-05-15 PROCEDURE — 36569 INSJ PICC 5 YR+ W/O IMAGING: CPT

## 2017-05-15 PROCEDURE — 83735 ASSAY OF MAGNESIUM: CPT

## 2017-05-15 PROCEDURE — 25000003 PHARM REV CODE 250: Performed by: INTERNAL MEDICINE

## 2017-05-15 PROCEDURE — 99238 HOSP IP/OBS DSCHRG MGMT 30/<: CPT | Mod: ,,, | Performed by: INTERNAL MEDICINE

## 2017-05-15 PROCEDURE — 63600175 PHARM REV CODE 636 W HCPCS: Performed by: INTERNAL MEDICINE

## 2017-05-15 RX ADMIN — RIFAXIMIN 550 MG: 550 TABLET ORAL at 09:05

## 2017-05-15 RX ADMIN — CEFTRIAXONE 2 G: 2 INJECTION, SOLUTION INTRAVENOUS at 03:05

## 2017-05-15 NOTE — NURSING
Spoke with Farzana with [facility] will be here within a few mins to teach wife how to administer medications.

## 2017-05-15 NOTE — PROGRESS NOTES
[facility]  Infectious Disease  Progress Note    Patient Name: Kenneth Sheikh  MRN: [id number]  Admission Date: 5/10/2017  Length of Stay: 5 days  Attending Physician: Marcus Rice MD  Primary Care Provider: Prisca Espinoza MD    Isolation Status: No active isolations  Assessment/Plan:      Streptococcal sepsis  - B/C 5/10/17 S mitir/oralis, B/C 5/11/17 NGTD  - likely odontogenic or GI etiology  - seems transient  - patient is without stigmata of IE  - Continue Ceftriaxone total 14 days from 05/11/17 (last day would be 5/25/17)  - if bacteremia returns, will need CHARLINE    I will sign off. Please contact us if you have any additional questions.    Terrance Nash MD  Infectious Disease  [facility]    Subjective:     Principal Problem:Fall at home    HPI: Male with MCDONALD Cirrhosis, p[t of Dr. Miller, w2ho has been having progressive fatigue and weakness over the past 3 weeks. He has been talking his lasix, but not the spironolactone because it caused chest pain. The weakness exacerbates in the shower and on several occasions he could not walk out of the Bathroom, opting to sit on the commode and call his wife. He got up to go to the bathroom at 3 am, tried to sit on the toilet and missed it, falling on the floor. He has polyurea with lasix but cam only relieve small volumes. His wife states that his appitite is poor. He has not eaten anything for 3 days. She bought him a Smoothie and it took all day for him to finish it. His wife reports inreased tremor, poor memory, one episiode of posterior neck pain, right sided pain after the fall. Lactulose was stopped when he had too many stools 2 months ago.  She denies fever at home, but temp 100.9 in ED, culture done and grew Strep mitis. I am consulted regarding his bacteremia.The patient reports shaking at home. No recent dental work. Had an EGD about 4 weeks ago with clipping of a blood vessel.      Interval History: Feeling  well. Denies fever or chills. Ready to go home.    Review of Systems   Constitutional: Negative for chills and fever.   Gastrointestinal: Positive for abdominal distention.   All other systems reviewed and are negative.    Objective:     Vital Signs (Most Recent):  Temp: 97.9 °F (36.6 °C) (05/15/17 0912)  Pulse: (!) 59 (05/15/17 0912)  Resp: 20 (05/15/17 0912)  BP: (!) 156/80 (05/15/17 0912)  SpO2: 97 % (05/15/17 0912) Vital Signs (24h Range):  Temp:  [97.9 °F (36.6 °C)-98.6 °F (37 °C)] 97.9 °F (36.6 °C)  Pulse:  [50-74] 59  Resp:  [18-20] 20  SpO2:  [97 %-100 %] 97 %  BP: (124-156)/(70-80) 156/80     Weight: 77 kg (169 lb 12.1 oz)  Body mass index is 25.81 kg/(m^2).    Estimated Creatinine Clearance: 27.6 mL/min (based on Cr of 2.1).    Physical Exam   Constitutional: He is oriented to person, place, and time. He appears well-developed and well-nourished.   HENT:   Head: Normocephalic and atraumatic.   Right Ear: External ear normal.   Left Ear: External ear normal.   Mouth/Throat: Oropharynx is clear and moist.   Eyes: Conjunctivae and EOM are normal. Pupils are equal, round, and reactive to light.   Neck: Normal range of motion. Neck supple. No thyromegaly present.   Cardiovascular: Normal rate, regular rhythm and normal heart sounds.    No murmur heard.  Pulmonary/Chest: Effort normal and breath sounds normal. He has no wheezes. He has no rales.   Abdominal: Soft. Bowel sounds are normal. He exhibits distension. He exhibits no mass. There is no tenderness. There is no rebound.   Musculoskeletal: Normal range of motion.   Lymphadenopathy:     He has no cervical adenopathy.   Neurological: He is alert and oriented to person, place, and time.   Skin: Skin is warm and dry.   Psychiatric: He has a normal mood and affect. His behavior is normal.   Vitals reviewed.      Significant Labs:   Blood Culture:   Recent Labs  Lab 11/23/16  1615 11/23/16  1618 05/10/17  0519 05/10/17  0520 05/11/17  0407   LABBLOO No growth  after 5 days. No growth after 5 days. Gram stain aer bottle: Gram positive cocci in chains resembling Strep   Gram stain sp bottle: Gram positive cocci in chains resembling Strep   Results called to and read back by: Frances Corrigan RN   05/11/2017  02:48  STREPTOCOCCUS MITIS/ORALIS  Gram stain aer bottle: Gram positive cocci in chains resembling Strep   Results called to and read back by: Frances Corrigan RN  05/11/2017  02:47  Gram stain sp bottle: Gram positive cocci in chains resembling Strep  Positive results previously called 05/11/2017  08:31  STREPTOCOCCUS MITIS/ORALIS For susceptibility see order # [id number] No Growth to date  No Growth to date  No Growth to date  No Growth to date  No Growth to date  No Growth to date  No Growth to date  No Growth to date     CBC:   Recent Labs  Lab 05/14/17  0351 05/15/17  0402   WBC 3.22* 2.55*   HGB 8.9* 8.6*   HCT 26.9* 25.9*   * 98*     CMP: No results for input(s): NA, K, CL, CO2, GLU, BUN, CREATININE, CALCIUM, PROT, ALBUMIN, BILITOT, ALKPHOS, AST, ALT, ANIONGAP, EGFRNONAA in the last 48 hours.    Invalid input(s): ESTGFAFRICA    Significant Imaging: None

## 2017-05-15 NOTE — ASSESSMENT & PLAN NOTE
- B/C 5/10/17 S mitir/oralis, B/C 5/11/17 NGTD  - likely odontogenic or GI etiology  - seems transient  - patient is without stigmata of IE  - Continue Ceftriaxone total 14 days from 05/11/17 (last day would be 5/25/17)  - if bacteremia returns, will need CHARLINE

## 2017-05-15 NOTE — PLAN OF CARE
[facility]    HOME HEALTH ORDERS  FACE TO FACE ENCOUNTER    Patient Name: Kenneth Sheikh  YOB: 1940    PCP: Prisca Espinoza MD   PCP Address: [address] [address]  PCP Phone Number: [phone]  PCP Fax: [phone]    Encounter Date: 05/15/2017    Admit to Home Health    Diagnoses:  Active Hospital Problems    Diagnosis  POA    *Fall at home [W19.XXXA, Y92.099]  Not Applicable    Streptococcal sepsis [A40.9]  Unknown    Bacteremia [R78.81]  Unknown     GPC, NStrep mitis  C/W rocephin  PICC in am and dc.      Cirrhosis of liver with ascites [K74.60]  Yes    Orthostatic hypotension [I95.1]  Yes    BELLA (acute kidney injury) [N17.9]  Yes    Liver cirrhosis secondary to MCDONALD (nonalcoholic steatohepatitis) [K75.81, K74.60]  Yes    Fatigue [R53.83]  Yes    Gait disorder [R26.9]  Yes    Essential hypertension [I10]  Yes    Type 2 diabetes mellitus without complication, without long-term current use of insulin [E11.9]  Yes     A1c 6.4 (11/30/16)  Metformin stopped during hospitalization 11/2016 because of increased lactate.        Resolved Hospital Problems    Diagnosis Date Resolved POA   No resolved problems to display.       Future Appointments  Date Time Provider Department [place]   5/17/2017 11:00 AM LAB, [facility] LAB [place]   5/24/2017 2:00 PM LAB, [facility] LAB [facility]   5/24/2017 3:00 PM Renato Womack MD [facility] HEPAT [facility]   6/8/2017 10:40 AM [facility]           I have seen and examined this patient face to face today. My clinical findings that support the need for the home health skilled services and home bound status are the following:  Weakness/numbness causing balance and gait disturbance due to Infection making it taxing to leave home.    Allergies:  Review of patient's allergies indicates:   Allergen Reactions    Abilify [aripiprazole] Other (See Comments)      Sleepy, could not function.       Diet: regular diet    Activities: activity as tolerated    Nursing:   SN to complete comprehensive assessment including routine vital signs. Instruct on disease process and s/s of complications to report to MD. Review/verify medication list sent home with the patient at time of discharge  and instruct patient/caregiver as needed. Frequency may be adjusted depending on start of care date.    Notify MD if SBP > 160 or < 90; DBP > 90 or < 50; HR > 120 or < 50; Temp > 101; Other:        CONSULTS:    Physical Therapy to evaluate and treat. Evaluate for home safety and equipment needs; Establish/upgrade home exercise program. Perform / instruct on therapeutic exercises, gait training, transfer training, and Range of Motion.    MISCELLANEOUS CARE:  Home Infusion Therapy:   SN to perform Infusion Therapy/Central Line Care.  Review Central Line Care & Central Line Flush with patient.    Administer (drug and dose): Rocephin 2gms q24  IVPB  Last dose given: 5/15/2017                       Home dose due: 5/16/2017  End date 5/27/2017    Scrub the Hub: Prior to accessing the line, always perform a 30 second alcohol scrub  Each lumen of the central line is to be flushed at least daily with 10 mL Normal Saline and 3 mL Heparin flush (100 units/mL)  Skilled Nurse (SN) may draw blood from IV access  Blood Draw Procedure:   - Aspirate at least 5 mL of blood   - Discard   - Obtain specimen   - Change posiflow cap   - Flush with 20 mL Normal Saline followed by a                 3-5 mL Heparin flush (100 units/mL)  Central :   - Sterile dressing changes are done weekly and as needed.   - Use chlor-hexadine scrub to cleanse site, apply Biopatch to insertion site,       apply securement device dressing   - Posi-flow caps are changed weekly and after EVERY lab draw.   - If sterile gauze is under dressing to control oozing,                 dressing change must be performed every 24 hours  until gauze is not needed.          Medications: Review discharge medications with patient and family and provide education.      Current Discharge Medication List      START taking these medications    Details   cefTRIAXone 2 g in dextrose 5 % 50 mL (ROCEPHIN) 2 g/50 mL PgBk IVPB Inject 50 mLs (2 g total) into the vein once daily.  Qty: 12 each, Refills: 0         CONTINUE these medications which have NOT CHANGED    Details   ACCU-CHEK NEYDA PLUS TEST STRP Strp       divalproex (DEPAKOTE) 250 MG 24 hr tablet Take 500 mg by mouth once daily.       furosemide (LASIX) 20 MG tablet Take 4 tablets (80 mg total) by mouth once daily.  Qty: 270 tablet, Refills: 3    Associated Diagnoses: Cirrhosis of liver with ascites, unspecified hepatic cirrhosis type      !! JANUVIA 50 mg Tab TAKE 1 TABLET (50 MG TOTAL) BY MOUTH ONCE DAILY.  Qty: 30 tablet, Refills: 3    Associated Diagnoses: Type 2 diabetes mellitus without complication, without long-term current use of insulin      lactulose (CHRONULAC) 10 gram/15 mL solution Take 15 mLs (10 g total) by mouth 2 (two) times daily.  Qty: 473 mL, Refills: 1    Associated Diagnoses: Cirrhosis of liver with ascites, unspecified hepatic cirrhosis type      losartan (COZAAR) 50 MG tablet Take 1 tablet (50 mg total) by mouth once daily.  Qty: 90 tablet, Refills: 3      rifAXIMin (XIFAXAN) 550 mg Tab Take 1 tablet (550 mg total) by mouth 2 (two) times daily.  Qty: 60 tablet, Refills: 6    Associated Diagnoses: Hepatic encephalopathy      spironolactone (ALDACTONE) 50 MG tablet Take 1 tablet (50 mg total) by mouth once daily.  Qty: 30 tablet, Refills: 3    Associated Diagnoses: Cirrhosis of liver with ascites, unspecified hepatic cirrhosis type      !! SITagliptan (JANUVIA) 50 MG Tab Take 1 tablet (50 mg total) by mouth once daily.  Qty: 90 tablet, Refills: 3    Associated Diagnoses: Type 2 diabetes mellitus without complication, without long-term current use of insulin       !! - Potential  duplicate medications found. Please discuss with provider.          I certify that this patient is confined to his home and needs physical therapy.

## 2017-05-15 NOTE — SUBJECTIVE & OBJECTIVE
Interval History: Feeling well. Denies fever or chills. Ready to go home.    Review of Systems   Constitutional: Negative for chills and fever.   Gastrointestinal: Positive for abdominal distention.   All other systems reviewed and are negative.    Objective:     Vital Signs (Most Recent):  Temp: 97.9 °F (36.6 °C) (05/15/17 0912)  Pulse: (!) 59 (05/15/17 0912)  Resp: 20 (05/15/17 0912)  BP: (!) 156/80 (05/15/17 0912)  SpO2: 97 % (05/15/17 0912) Vital Signs (24h Range):  Temp:  [97.9 °F (36.6 °C)-98.6 °F (37 °C)] 97.9 °F (36.6 °C)  Pulse:  [50-74] 59  Resp:  [18-20] 20  SpO2:  [97 %-100 %] 97 %  BP: (124-156)/(70-80) 156/80     Weight: 77 kg (169 lb 12.1 oz)  Body mass index is 25.81 kg/(m^2).    Estimated Creatinine Clearance: 27.6 mL/min (based on Cr of 2.1).    Physical Exam   Constitutional: He is oriented to person, place, and time. He appears well-developed and well-nourished.   HENT:   Head: Normocephalic and atraumatic.   Right Ear: External ear normal.   Left Ear: External ear normal.   Mouth/Throat: Oropharynx is clear and moist.   Eyes: Conjunctivae and EOM are normal. Pupils are equal, round, and reactive to light.   Neck: Normal range of motion. Neck supple. No thyromegaly present.   Cardiovascular: Normal rate, regular rhythm and normal heart sounds.    No murmur heard.  Pulmonary/Chest: Effort normal and breath sounds normal. He has no wheezes. He has no rales.   Abdominal: Soft. Bowel sounds are normal. He exhibits distension. He exhibits no mass. There is no tenderness. There is no rebound.   Musculoskeletal: Normal range of motion.   Lymphadenopathy:     He has no cervical adenopathy.   Neurological: He is alert and oriented to person, place, and time.   Skin: Skin is warm and dry.   Psychiatric: He has a normal mood and affect. His behavior is normal.   Vitals reviewed.      Significant Labs:   Blood Culture:   Recent Labs  Lab 11/23/16  1615 11/23/16  1618 05/10/17  0519 05/10/17  0520  05/11/17  0407   LABBLOO No growth after 5 days. No growth after 5 days. Gram stain aer bottle: Gram positive cocci in chains resembling Strep   Gram stain sp bottle: Gram positive cocci in chains resembling Strep   Results called to and read back by: Frances Corrigan RN   05/11/2017  02:48  STREPTOCOCCUS MITIS/ORALIS  Gram stain aer bottle: Gram positive cocci in chains resembling Strep   Results called to and read back by: Frances Corrigan RN  05/11/2017  02:47  Gram stain sp bottle: Gram positive cocci in chains resembling Strep  Positive results previously called 05/11/2017  08:31  STREPTOCOCCUS MITIS/ORALIS For susceptibility see order # [id number] No Growth to date  No Growth to date  No Growth to date  No Growth to date  No Growth to date  No Growth to date  No Growth to date  No Growth to date     CBC:   Recent Labs  Lab 05/14/17  0351 05/15/17  0402   WBC 3.22* 2.55*   HGB 8.9* 8.6*   HCT 26.9* 25.9*   * 98*     CMP: No results for input(s): NA, K, CL, CO2, GLU, BUN, CREATININE, CALCIUM, PROT, ALBUMIN, BILITOT, ALKPHOS, AST, ALT, ANIONGAP, EGFRNONAA in the last 48 hours.    Invalid input(s): ESTGFAFR[place]    Significant Imaging: None

## 2017-05-15 NOTE — CONSULTS
Midline placed in left basilis vein, 18x8 catheter length. Lot #[id number]. Used real time ultrasound. Image recorded and saved.

## 2017-05-15 NOTE — PLAN OF CARE
Discharge planning:  Plan for d/c home today with home health and IV home infusion. Discussed choice with patient and spouse. Referrals placed to [facility] and [facility].

## 2017-05-15 NOTE — NURSING
Dr Rice contacted due to patient and spouse questions of albumin being a blood product due to Rastafari reasons

## 2017-05-15 NOTE — PLAN OF CARE
Problem: Patient Care Overview  Goal: Plan of Care Review  Outcome: Ongoing (interventions implemented as appropriate)  Glucoses stable. Reinforced to wear non-skid socks and call for assistance ambulating to prevent falling. Verbalized understanding. Afebrile. Rocephin given. Pt to receive daily  After discharge. [facility] RN educated wife on administration. Dose given today. HH to see pt tomorrow. Plan to discharge home today with wife.

## 2017-05-15 NOTE — NURSING
D/C instructions provided to pt and spouse, along with medications and times for next administration. IV's removed, except for midline placed today for IV infusions at home with [facility]. Pt will finish antibiotic infusion and then leave via wheelchair.

## 2017-05-15 NOTE — NURSING
Pt with d/c orders, needs PICC line placed and must await for [facility] education and medication , will continue to assess, orders were placed for both the PICC and [facility].

## 2017-05-16 ENCOUNTER — TELEPHONE (OUTPATIENT)
Dept: INTERNAL MEDICINE | Facility: CLINIC | Age: 77
End: 2017-05-16

## 2017-05-16 DIAGNOSIS — R53.1 WEAKNESS: Primary | ICD-10-CM

## 2017-05-16 DIAGNOSIS — K74.69 OTHER CIRRHOSIS OF LIVER: Primary | ICD-10-CM

## 2017-05-16 PROBLEM — R78.81 BACTEREMIA: Status: ACTIVE | Noted: 2017-05-16

## 2017-05-16 PROBLEM — A40.9 STREPTOCOCCAL SEPSIS: Status: ACTIVE | Noted: 2017-05-16

## 2017-05-16 LAB
BACTERIA BLD CULT: NORMAL
BACTERIA BLD CULT: NORMAL

## 2017-05-16 NOTE — TELEPHONE ENCOUNTER
----- Message from Dallas Tena sent at 5/16/2017 10:51 AM CDT -----  Contact: David/ Jacqueline/ [phone] ext [phone]  Type: Rx    Name of medication(s): Rollator walker with seat/ shower chair    Is this a refill? New rx? new    Who prescribed medication?    Pharmacy Name, Phone, & Location: [facility]/ [id number]    Comments: pt's  would like to request that prescriptions for the equipment above be sent in the DME.  Please call and advise.    Thank you

## 2017-05-17 ENCOUNTER — PATIENT OUTREACH (OUTPATIENT)
Dept: ADMINISTRATIVE | Facility: CLINIC | Age: 77
End: 2017-05-17
Payer: MEDICARE

## 2017-05-17 ENCOUNTER — TELEPHONE (OUTPATIENT)
Dept: HEPATOLOGY | Facility: CLINIC | Age: 77
End: 2017-05-17

## 2017-05-17 NOTE — TELEPHONE ENCOUNTER
MA called patient wife inform her that per MD yes they will test his fluid that is part of their routine. Patient wife understood. LINDSEY

## 2017-05-17 NOTE — TELEPHONE ENCOUNTER
MA called patient wife back, she stated that she want to know if patient fluid need to be tested for infection.she stated that patient have blood infection right now and she said that Doctors from ER don't know where the infection started. She also report that patient is very weak right now.

## 2017-05-17 NOTE — TELEPHONE ENCOUNTER
----- Message from Prisca Jung sent at 5/17/2017  9:55 AM CDT -----  Contact: Ginna/spouse    Called to see if Paracentesis van be done through pt's pic line, and a host of other questions. Please call back at [phone]

## 2017-05-22 ENCOUNTER — HOSPITAL ENCOUNTER (OUTPATIENT)
Dept: INTERVENTIONAL RADIOLOGY/VASCULAR | Facility: HOSPITAL | Age: 77
Discharge: HOME | End: 2017-05-22
Attending: INTERNAL MEDICINE
Payer: MEDICARE

## 2017-05-22 ENCOUNTER — TELEPHONE (OUTPATIENT)
Dept: HEPATOLOGY | Facility: CLINIC | Age: 77
End: 2017-05-22

## 2017-05-22 VITALS
SYSTOLIC BLOOD PRESSURE: 105 MMHG | OXYGEN SATURATION: 96 % | DIASTOLIC BLOOD PRESSURE: 58 MMHG | RESPIRATION RATE: 16 BRPM | HEART RATE: 70 BPM

## 2017-05-22 DIAGNOSIS — K74.69 OTHER CIRRHOSIS OF LIVER: ICD-10-CM

## 2017-05-22 LAB
APPEARANCE FLD: CLEAR
BODY FLD TYPE: NORMAL
COLOR FLD: YELLOW
LYMPHOCYTES NFR FLD MANUAL: 82 %
MONOS+MACROS NFR FLD MANUAL: 14 %
NEUTROPHILS NFR FLD MANUAL: 4 %
WBC # FLD: 395 /CU MM

## 2017-05-22 PROCEDURE — 49083 ABD PARACENTESIS W/IMAGING: CPT | Mod: ,,, | Performed by: FAMILY MEDICINE

## 2017-05-22 PROCEDURE — A7048 VACUUM DRAIN BOTTLE/TUBE KIT: HCPCS

## 2017-05-22 PROCEDURE — 87075 CULTR BACTERIA EXCEPT BLOOD: CPT

## 2017-05-22 PROCEDURE — P9047 ALBUMIN (HUMAN), 25%, 50ML: HCPCS | Performed by: INTERNAL MEDICINE

## 2017-05-22 PROCEDURE — 63600175 PHARM REV CODE 636 W HCPCS: Performed by: INTERNAL MEDICINE

## 2017-05-22 PROCEDURE — C1729 CATH, DRAINAGE: HCPCS

## 2017-05-22 PROCEDURE — 87070 CULTURE OTHR SPECIMN AEROBIC: CPT

## 2017-05-22 PROCEDURE — 89051 BODY FLUID CELL COUNT: CPT

## 2017-05-22 RX ORDER — ALBUMIN HUMAN 250 G/1000ML
25 SOLUTION INTRAVENOUS
Status: DISCONTINUED | OUTPATIENT
Start: 2017-05-22 | End: 2017-05-23 | Stop reason: HOSPADM

## 2017-05-22 RX ADMIN — ALBUMIN (HUMAN) 25 G: 25 SOLUTION INTRAVENOUS at 11:05

## 2017-05-22 RX ADMIN — ALBUMIN (HUMAN) 25 G: 25 SOLUTION INTRAVENOUS at 10:05

## 2017-05-22 NOTE — H&P
Radiology History & Physical      SUBJECTIVE:     Chief Complaint: abdominal distention    History of Present Illness:  Kenneth Sheikh is a 77 y.o. male who presents for ultrasound guided paracentesis  Past Medical History:   Diagnosis Date    Anticoagulant long-term use     Bipolar 1 disorder     Bipolar 1 disorder 11/24/2016    bipolar    Cancer     history of skin cancer/sinus cancer & rectal cancer    Depressed bipolar affective disorder     Diabetes mellitus     type 2    EBV infection 12/7/2016    EBV DNA, PCR Latest Ref Range: None detected  None detected EBV DNA-Copies/mL Unknown Test Not Performed EBV Early Antigen Ab, IgG Latest Ref Range: <1:10 Titer 1:40 (A) EBV Nuclear Ag Ab Latest Ref Range: <1:5 Titer >=1:80 (A) EBV VCA IgG Latest Ref Range: <1:10 Titer 1:160 (A) EBV VCA IgM Latest Ref Range: <1:10 Titer <1:10     History of TIA (transient ischemic attack)     several-taking Plavix    Hx of gallstones     Wife denies    Hypertension     Hyperthyroidism 11/30/2016    Low HDL (under 40) 12/7/2016    Mixed hyperlipidemia 11/23/2016    JUAN MANUEL (obstructive sleep apnea)     Stroke     Transient cerebral ischemia 7/22/2016     Past Surgical History:   Procedure Laterality Date    Moh's procedure x4      Sinus surgery for sinus cancer      sinus sx for cancer      skin cancer removed-several times-nose & ear      TONSILLECTOMY      Undescened testicle sx      UVULOPALATOPHARYNGOPLASTY      for sleep apnea       Home Meds:   Prior to Admission medications    Medication Sig Start Date End Date Taking? Authorizing Provider   ACCU-CHEK NEYDA PLUS TEST STRP Strp  2/23/17   Historical Provider, MD   cefTRIAXone 2 g in dextrose 5 % 50 mL (ROCEPHIN) 2 g/50 mL PgBk IVPB Inject 50 mLs (2 g total) into the vein once daily. 5/15/17 5/27/17  Marcus Rice MD   divalproex (DEPAKOTE) 250 MG 24 hr tablet Take 500 mg by mouth once daily.     Historical Provider, MD   furosemide (LASIX) 20 MG tablet Take 4  tablets (80 mg total) by mouth once daily. 4/25/17 4/25/18  Renato Womack MD   lactulose (CHRONULAC) 10 gram/15 mL solution Take 15 mLs (10 g total) by mouth 2 (two) times daily. 12/20/16   Renato Womack MD   losartan (COZAAR) 50 MG tablet Take 1 tablet (50 mg total) by mouth once daily. 2/10/17   Prisca Espinoza MD   rifAXIMin (XIFAXAN) 550 mg Tab Take 1 tablet (550 mg total) by mouth 2 (two) times daily. 2/21/17   Renato Womack MD   SITagliptan (JANUVIA) 50 MG Tab Take 1 tablet (50 mg total) by mouth once daily. 4/6/17   Prisca Espinoza MD   spironolactone (ALDACTONE) 50 MG tablet Take 1 tablet (50 mg total) by mouth once daily. 4/25/17 4/25/18  Renato Womack MD     Anticoagulants/Antiplatelets: no anticoagulation    Allergies:   Review of patient's allergies indicates:   Allergen Reactions    Abilify [aripiprazole] Other (See Comments)     Sleepy, could not function.     Sedation History:  no adverse reactions    Review of Systems:   Hematological: no known coagulopathies  Respiratory: no shortness of breath  Cardiovascular: no chest pain  Gastrointestinal: no abdominal pain  Genito-Urinary: no dysuria  Musculoskeletal: negative  Neurological: no TIA or stroke symptoms         OBJECTIVE:     Vital Signs (Most Recent)  Pulse: 75 (05/22/17 1004)  BP: 136/67 (05/22/17 1004)  SpO2: 96 % (05/22/17 1004)    Physical Exam:  ASA: 2  Mallampati: n/a    General: no acute distress  Mental Status: alert and oriented to person, place and time  HEENT: normocephalic, atraumatic  Chest: unlabored breathing  Heart: regular heart rate  Abdomen: distended  Extremity: moves all extremities    Laboratory  Lab Results   Component Value Date    INR 1.2 05/10/2017       Lab Results   Component Value Date    WBC 2.55 (L) 05/15/2017    HGB 8.6 (L) 05/15/2017    HCT 25.9 (L) 05/15/2017    MCV 83 05/15/2017    PLT 98 (L) 05/15/2017      Lab Results   Component Value Date     (H) 05/11/2017      (L) 05/11/2017    K 4.0 05/11/2017    CL 98 05/11/2017    CO2 28 05/11/2017    BUN 36 (H) 05/11/2017    CREATININE 2.1 (H) 05/11/2017    CALCIUM 8.2 (L) 05/11/2017    MG 1.7 05/15/2017    ALT 8 (L) 05/11/2017    AST 27 05/11/2017    ALBUMIN 1.8 (L) 05/11/2017    BILITOT 0.8 05/11/2017       ASSESSMENT/PLAN:     Sedation Plan: local  Patient will undergo ultrasound guided paracentesis.    MATT Mauricio, FNP  Interventional Radiology  [phone] spectralink

## 2017-05-22 NOTE — TELEPHONE ENCOUNTER
----- Message from Renato Womack MD sent at 5/22/2017  3:18 PM CDT -----  Please let him know that his ascites fluid shows no infection.

## 2017-05-22 NOTE — PROGRESS NOTES
Paracentesis complete.  7900 mLs peritoneal fluid drained. Pt tolerated well. Dressing to R abdomen clean, dry, and intact. Albumin 200 mL required per protocol. Ascites sent to lab for testing. Discharge instructions and handouts provided. Pt verbalized understanding. Pt transported to [facility] via wheelchair by radiology tech .

## 2017-05-22 NOTE — PROCEDURES
Radiology Post-Procedure Note    Pre Op Diagnosis: Ascites  Post Op Diagnosis: Same    Procedure: Ultrasound Guided Paracentesis    Procedure performed by: Souleymane ROSS, Елена     Written Informed Consent Obtained: Yes  Specimen Removed: YES clear yellow fluid  Estimated Blood Loss: Minimal    Findings:   Successful paracentesis.  Albumin administered PRN per protocol.    Patient tolerated procedure well.    Елена Comer, APRN, FNP  Interventional Radiology  [phone] [employer]

## 2017-05-23 ENCOUNTER — TELEPHONE (OUTPATIENT)
Dept: HEPATOLOGY | Facility: CLINIC | Age: 77
End: 2017-05-23

## 2017-05-23 DIAGNOSIS — R50.9 FEVER, UNSPECIFIED FEVER CAUSE: Primary | ICD-10-CM

## 2017-05-24 ENCOUNTER — OFFICE VISIT (OUTPATIENT)
Dept: HEPATOLOGY | Facility: CLINIC | Age: 77
End: 2017-05-24
Payer: MEDICARE

## 2017-05-24 ENCOUNTER — TELEPHONE (OUTPATIENT)
Dept: INTERNAL MEDICINE | Facility: CLINIC | Age: 77
End: 2017-05-24

## 2017-05-24 ENCOUNTER — LAB VISIT (OUTPATIENT)
Dept: LAB | Facility: HOSPITAL | Age: 77
End: 2017-05-24
Attending: INTERNAL MEDICINE
Payer: MEDICARE

## 2017-05-24 VITALS
WEIGHT: 163.56 LBS | OXYGEN SATURATION: 99 % | BODY MASS INDEX: 24.23 KG/M2 | HEIGHT: 69 IN | TEMPERATURE: 96 F | DIASTOLIC BLOOD PRESSURE: 62 MMHG | HEART RATE: 62 BPM | SYSTOLIC BLOOD PRESSURE: 100 MMHG

## 2017-05-24 DIAGNOSIS — K75.81 LIVER CIRRHOSIS SECONDARY TO NASH (NONALCOHOLIC STEATOHEPATITIS): ICD-10-CM

## 2017-05-24 DIAGNOSIS — K75.81 LIVER CIRRHOSIS SECONDARY TO NASH (NONALCOHOLIC STEATOHEPATITIS): Primary | ICD-10-CM

## 2017-05-24 DIAGNOSIS — F31.9 BIPOLAR 1 DISORDER: ICD-10-CM

## 2017-05-24 DIAGNOSIS — R29.898 MUSCULAR DECONDITIONING: Primary | ICD-10-CM

## 2017-05-24 DIAGNOSIS — R18.8 CIRRHOSIS OF LIVER WITH ASCITES, UNSPECIFIED HEPATIC CIRRHOSIS TYPE: ICD-10-CM

## 2017-05-24 DIAGNOSIS — R18.8 ASCITES OF LIVER: ICD-10-CM

## 2017-05-24 DIAGNOSIS — I10 ESSENTIAL HYPERTENSION: ICD-10-CM

## 2017-05-24 DIAGNOSIS — E11.9 TYPE 2 DIABETES MELLITUS WITHOUT COMPLICATION, WITHOUT LONG-TERM CURRENT USE OF INSULIN: ICD-10-CM

## 2017-05-24 DIAGNOSIS — K74.60 LIVER CIRRHOSIS SECONDARY TO NASH (NONALCOHOLIC STEATOHEPATITIS): ICD-10-CM

## 2017-05-24 DIAGNOSIS — K74.60 CIRRHOSIS OF LIVER WITH ASCITES, UNSPECIFIED HEPATIC CIRRHOSIS TYPE: ICD-10-CM

## 2017-05-24 DIAGNOSIS — A40.9 STREPTOCOCCAL SEPSIS: ICD-10-CM

## 2017-05-24 DIAGNOSIS — R50.9 FEVER, UNSPECIFIED FEVER CAUSE: ICD-10-CM

## 2017-05-24 DIAGNOSIS — K74.60 LIVER CIRRHOSIS SECONDARY TO NASH (NONALCOHOLIC STEATOHEPATITIS): Primary | ICD-10-CM

## 2017-05-24 LAB
ALBUMIN SERPL BCP-MCNC: 2.8 G/DL
ALP SERPL-CCNC: 135 U/L
ALT SERPL W/O P-5'-P-CCNC: 8 U/L
ANION GAP SERPL CALC-SCNC: 8 MMOL/L
AST SERPL-CCNC: 26 U/L
BILIRUB SERPL-MCNC: 0.4 MG/DL
BUN SERPL-MCNC: 28 MG/DL
CALCIUM SERPL-MCNC: 9.2 MG/DL
CHLORIDE SERPL-SCNC: 100 MMOL/L
CO2 SERPL-SCNC: 31 MMOL/L
CREAT SERPL-MCNC: 1.3 MG/DL
EST. GFR  (AFRICAN AMERICAN): 60 ML/MIN/1.73 M^2
EST. GFR  (NON AFRICAN AMERICAN): 51.9 ML/MIN/1.73 M^2
GLUCOSE SERPL-MCNC: 166 MG/DL
POTASSIUM SERPL-SCNC: 4 MMOL/L
PROT SERPL-MCNC: 7.1 G/DL
SODIUM SERPL-SCNC: 139 MMOL/L

## 2017-05-24 PROCEDURE — 1159F MED LIST DOCD IN RCRD: CPT | Mod: S$GLB,,, | Performed by: INTERNAL MEDICINE

## 2017-05-24 PROCEDURE — 1126F AMNT PAIN NOTED NONE PRSNT: CPT | Mod: S$GLB,,, | Performed by: INTERNAL MEDICINE

## 2017-05-24 PROCEDURE — 1157F ADVNC CARE PLAN IN RCRD: CPT | Mod: S$GLB,,, | Performed by: INTERNAL MEDICINE

## 2017-05-24 PROCEDURE — 99215 OFFICE O/P EST HI 40 MIN: CPT | Mod: S$GLB,,, | Performed by: INTERNAL MEDICINE

## 2017-05-24 PROCEDURE — 99999 PR PBB SHADOW E&M-EST. PATIENT-LVL III: CPT | Mod: PBBFAC,,, | Performed by: INTERNAL MEDICINE

## 2017-05-24 RX ORDER — LOSARTAN POTASSIUM 25 MG/1
25 TABLET ORAL DAILY
Qty: 90 TABLET | Refills: 6 | Status: SHIPPED | OUTPATIENT
Start: 2017-05-24 | End: 2017-06-23

## 2017-05-24 RX ORDER — CIPROFLOXACIN 500 MG/1
500 TABLET ORAL
Qty: 12 TABLET | Refills: 3 | Status: SHIPPED | OUTPATIENT
Start: 2017-05-29 | End: 2018-05-11 | Stop reason: SDUPTHER

## 2017-05-24 RX ORDER — DIVALPROEX SODIUM 250 MG/1
250 TABLET, DELAYED RELEASE ORAL DAILY
COMMUNITY
Start: 2017-05-05 | End: 2018-12-10 | Stop reason: SDUPTHER

## 2017-05-24 NOTE — TELEPHONE ENCOUNTER
"Spoke to Adán with [facility], he informed me on how to correct the pt;s DME order. Dr. Espinoza called so that she would be updated on the order as well    -change the Type to "Rolator"  - take out "Platform Attachments"    Spoke to pt's wife Ginna to inform her that order was corrected, Ginna states that she will call Adán to see if the insurance will pay for it and the Rolator will be ready.   "

## 2017-05-24 NOTE — TELEPHONE ENCOUNTER
Placed order for Rolator and Shower chair on 5/16/17.  Luis Alfredo call [facility] and see what prescription needs to say if they are not correct.

## 2017-05-24 NOTE — PROGRESS NOTES
"Subjective:       Patient ID: Kenneth Sheikh is a 77 y.o. male.    Chief Complaint: No chief complaint on file.    HPI  I saw this 77 y.o. man I had met in hospital recently when he was admitted with lethargy and some confusion.    I suspect that it may have been related to hepatic encephalopathy. At that time, he was diagnosed with decompensated cirrhosis.    Admitted because of increasing lethargy +++/confusion/dizziness/dysarthria  - Nov 2016  - imaging showed ascites     No significant alcohol use ("a couple beers when I was younger"). No family history of liver disease. No new medications.    Paracentesis on 5/22/2017- 7 liters      Patient has risk factors for MCDONALD.  No significant hx of alcohol    Recent bilateral leg edema, worsening ascites and epistaxis  He also has a large gastric varix which has never bled- recently treated by Dr Perla endoscopically but will need another endoscopic session.  - Had post procedure fever despite antibiotics   repeat procedure in 6 week    Most recent issue was an E coli bacteremia- on IV antibiotics at home- PICC    EUS guided endoscopic treatment of varices: 4/19/2017  Normal esophagus.                        - Type 1 isolated gastric varices (IGV1, varices                         located in the fundus).                        - Varices were visualized endosonographically in                         the fundus of the stomach. Treated with EUS-guided                         insertion of embolization coils and EUS-guided glue                         injection.                        - No specimens collected.                        - Repeat the upper endoscopic ultrasound in 6 weeks                         for retreatment.    MELD-Na score: 18 at 5/11/2017  2:45 PM  MELD score: 16 at 5/11/2017  2:45 PM  Calculated from:  Serum Creatinine: 2.1 mg/dL at 5/11/2017  2:45 PM  Serum Sodium: 135 mmol/L at 5/11/2017  2:45 PM  Total Bilirubin: 0.8 mg/dL (Rounded to 1) at 5/11/2017  2:45 " PM  INR(ratio): 1.2 at 5/10/2017  5:19 AM  Age: 77 years      PMH:  DM  Sinus Ca- 1996  Rectal Ca- June 2016    SH:  Retired  Ex [employer] worker      FH:  Aunt had cirrhosis- non- alcohol      Review of Systems   Constitutional: Negative for activity change, appetite change, chills, fatigue, fever and unexpected weight change.   HENT: Negative for hearing loss.    Eyes: Negative for discharge and visual disturbance.   Respiratory: Negative for cough, chest tightness, shortness of breath and wheezing.    Cardiovascular: Negative for chest pain, palpitations and leg swelling.   Gastrointestinal: Negative for abdominal distention, abdominal pain, constipation, diarrhea and nausea.   Genitourinary: Negative for dysuria and frequency.   Musculoskeletal: Negative for arthralgias and back pain.   Skin: Negative for pallor and rash.   Neurological: Negative for dizziness, tremors, speech difficulty and headaches.   Hematological: Negative for adenopathy.   Psychiatric/Behavioral: Negative for agitation and confusion.           Lab Results   Component Value Date    ALT 8 (L) 05/11/2017    AST 27 05/11/2017    ALKPHOS 106 05/11/2017    BILITOT 0.8 05/11/2017     Past Medical History:   Diagnosis Date    Anticoagulant long-term use     Bipolar 1 disorder     Bipolar 1 disorder 11/24/2016    bipolar    Cancer     history of skin cancer/sinus cancer & rectal cancer    Depressed bipolar affective disorder     Diabetes mellitus     type 2    EBV infection 12/7/2016    EBV DNA, PCR Latest Ref Range: None detected  None detected EBV DNA-Copies/mL Unknown Test Not Performed EBV Early Antigen Ab, IgG Latest Ref Range: <1:10 Titer 1:40 (A) EBV Nuclear Ag Ab Latest Ref Range: <1:5 Titer >=1:80 (A) EBV VCA IgG Latest Ref Range: <1:10 Titer 1:160 (A) EBV VCA IgM Latest Ref Range: <1:10 Titer <1:10     History of TIA (transient ischemic attack)     several-taking Plavix    Hx of gallstones     Wife denies    Hypertension      Hyperthyroidism 11/30/2016    Low HDL (under 40) 12/7/2016    Mixed hyperlipidemia 11/23/2016    JUAN MANUEL (obstructive sleep apnea)     Stroke     Transient cerebral ischemia 7/22/2016     Past Surgical History:   Procedure Laterality Date    Moh's procedure x4      Sinus surgery for sinus cancer      sinus sx for cancer      skin cancer removed-several times-nose & ear      TONSILLECTOMY      Undescened testicle sx      UVULOPALATOPHARYNGOPLASTY      for sleep apnea     Current Outpatient Prescriptions   Medication Sig    ACCU-CHEK NEYDA PLUS TEST STRP Strp     cefTRIAXone 2 g in dextrose 5 % 50 mL (ROCEPHIN) 2 g/50 mL PgBk IVPB Inject 50 mLs (2 g total) into the vein once daily.    divalproex (DEPAKOTE) 250 MG EC tablet     furosemide (LASIX) 20 MG tablet Take 4 tablets (80 mg total) by mouth once daily.    lactulose (CHRONULAC) 10 gram/15 mL solution Take 15 mLs (10 g total) by mouth 2 (two) times daily.    losartan (COZAAR) 50 MG tablet Take 1 tablet (50 mg total) by mouth once daily.    rifAXIMin (XIFAXAN) 550 mg Tab Take 1 tablet (550 mg total) by mouth 2 (two) times daily.    SITagliptan (JANUVIA) 50 MG Tab Take 1 tablet (50 mg total) by mouth once daily.    spironolactone (ALDACTONE) 50 MG tablet Take 1 tablet (50 mg total) by mouth once daily.     No current facility-administered medications for this visit.        Objective:      Physical Exam   Constitutional: He is oriented to person, place, and time. He appears well-nourished.   HENT:   Head: Normocephalic.   Eyes: Pupils are equal, round, and reactive to light.   Neck: No thyromegaly present.   Cardiovascular: Normal rate, regular rhythm and normal heart sounds.    Pulmonary/Chest: Effort normal and breath sounds normal. He has no wheezes.   Abdominal: Soft. He exhibits distension. He exhibits no mass. There is no tenderness.   Mild ascites.   Musculoskeletal: He exhibits edema.   Lymphadenopathy:     He has no cervical adenopathy.    Neurological: He is alert and oriented to person, place, and time.   Skin: Skin is warm. No rash noted. No erythema.   Psychiatric: He has a normal mood and affect. His behavior is normal.       Assessment:       1. 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 Ascites of liver    3. Bipolar 1 disorder    4. Type 2 diabetes mellitus without complication, without long-term current use of insulin    5. Streptococcal sepsis        Plan:   - re-discussed the dx of cirrhosis and the implications of this  - has responded to diuretics and has some edema and mild-moderated ascites..  - paracenteses every 10 days (approx)  - Dr Nash has decided that his PICC line will be removed on Friday May 26 2017.  - Dr Perla for repeat procedure May 31st- delay for 4 weeks  - Teeth cleaning on June 8th- postpone      Started on Cipro 500 mg weekly for SBP prophylaxis.  Clinic in 4-6  weeks       NB Yazidism

## 2017-05-24 NOTE — PATIENT INSTRUCTIONS
1. Decreased losartan dose to 25 mg daily  2. Start cipro 500mg daily  3. Regular as required paracenteses

## 2017-05-24 NOTE — TELEPHONE ENCOUNTER
----- Message from Michelle Geller sent at 5/24/2017  1:18 PM CDT -----  Contact: Wife/Ginna/[phone] cell   Pt's wife said that she is calling in regards to needing to speak with the nurse asap,she stated that she has been speaking with [facility] and was told the order they received for pt was not for a Rollaitor she stated that is what she requested for pt because he gets tired when he walks she is going to be in the area today until 4pm and wants to know if [facility] if someone can call Adán @[phone] he has been trying to assist her and if he gets the order she can pick the item up today . Please call and advise          Thank you

## 2017-05-25 ENCOUNTER — TELEPHONE (OUTPATIENT)
Dept: INTERNAL MEDICINE | Facility: CLINIC | Age: 77
End: 2017-05-25

## 2017-05-25 DIAGNOSIS — R53.1 WEAKNESS: Primary | ICD-10-CM

## 2017-05-25 LAB — BACTERIA SPEC AEROBE CULT: NO GROWTH

## 2017-05-25 NOTE — TELEPHONE ENCOUNTER
Wife stated that they received a bench for pt to use in shower and a it didn't fit in the bathroom so the company took it back pt wife would like for a bath chair to be ordered. Wife also stated that the pt was sent home with a cane but not the right one so the wife stated that the therapist wants the pt with a straight cane. Wife stated that they received the walker today.

## 2017-05-25 NOTE — TELEPHONE ENCOUNTER
Please call DME and see if they need additional orders from me or can make these changes on their own.

## 2017-05-25 NOTE — TELEPHONE ENCOUNTER
Spoke with Adán at [facility] and he stated that he will contact the pt's wife and have them deliver the pt a bath chair and a straight cane

## 2017-05-29 ENCOUNTER — HOSPITAL ENCOUNTER (OUTPATIENT)
Dept: INTERVENTIONAL RADIOLOGY/VASCULAR | Facility: HOSPITAL | Age: 77
Discharge: HOME | End: 2017-05-29
Attending: INTERNAL MEDICINE
Payer: MEDICARE

## 2017-05-29 VITALS
SYSTOLIC BLOOD PRESSURE: 98 MMHG | OXYGEN SATURATION: 100 % | RESPIRATION RATE: 18 BRPM | HEART RATE: 52 BPM | DIASTOLIC BLOOD PRESSURE: 49 MMHG

## 2017-05-29 DIAGNOSIS — R18.8 ASCITES OF LIVER: ICD-10-CM

## 2017-05-29 LAB
BACTERIA BLD CULT: NORMAL
BACTERIA SPEC ANAEROBE CULT: NORMAL
GRAM STN SPEC: NORMAL
GRAM STN SPEC: NORMAL

## 2017-05-29 PROCEDURE — C1729 CATH, DRAINAGE: HCPCS

## 2017-05-29 PROCEDURE — 87075 CULTR BACTERIA EXCEPT BLOOD: CPT

## 2017-05-29 PROCEDURE — 87070 CULTURE OTHR SPECIMN AEROBIC: CPT

## 2017-05-29 PROCEDURE — 63600175 PHARM REV CODE 636 W HCPCS: Performed by: INTERNAL MEDICINE

## 2017-05-29 PROCEDURE — 49083 ABD PARACENTESIS W/IMAGING: CPT | Mod: ,,, | Performed by: RADIOLOGY

## 2017-05-29 PROCEDURE — 87205 SMEAR GRAM STAIN: CPT

## 2017-05-29 PROCEDURE — P9047 ALBUMIN (HUMAN), 25%, 50ML: HCPCS | Performed by: INTERNAL MEDICINE

## 2017-05-29 RX ORDER — ALBUMIN HUMAN 250 G/1000ML
25 SOLUTION INTRAVENOUS ONCE
Status: COMPLETED | OUTPATIENT
Start: 2017-05-29 | End: 2017-05-29

## 2017-05-29 RX ADMIN — ALBUMIN (HUMAN) 25 G: 25 SOLUTION INTRAVENOUS at 12:05

## 2017-05-29 NOTE — DISCHARGE INSTRUCTIONS
Interventional Radiology [phone]  Mon-Fri  8A-4P  24hr Radiology Resident on call [phone]

## 2017-05-29 NOTE — PROGRESS NOTES
Paracentesis complete. Pt tolerated well. Total 6,600 ml peritoneal fluid removed. 200 ml Albumin 25% IV administered.   Pt verbalized instructions on care for puncture site to Q.  AVS reviewd and provided.

## 2017-05-29 NOTE — H&P
Radiology History & Physical      SUBJECTIVE:     Chief Complaint: ascites    History of Present Illness:  Kenneth Sheikh is a 77 y.o. male who presents for paracentesis.    Past Medical History:   Diagnosis Date    Anticoagulant long-term use     Bipolar 1 disorder     Bipolar 1 disorder 11/24/2016    bipolar    Cancer     history of skin cancer/sinus cancer & rectal cancer    Depressed bipolar affective disorder     Diabetes mellitus     type 2    EBV infection 12/7/2016    EBV DNA, PCR Latest Ref Range: None detected  None detected EBV DNA-Copies/mL Unknown Test Not Performed EBV Early Antigen Ab, IgG Latest Ref Range: <1:10 Titer 1:40 (A) EBV Nuclear Ag Ab Latest Ref Range: <1:5 Titer >=1:80 (A) EBV VCA IgG Latest Ref Range: <1:10 Titer 1:160 (A) EBV VCA IgM Latest Ref Range: <1:10 Titer <1:10     History of TIA (transient ischemic attack)     several-taking Plavix    Hx of gallstones     Wife denies    Hypertension     Hyperthyroidism 11/30/2016    Low HDL (under 40) 12/7/2016    Mixed hyperlipidemia 11/23/2016    JUAN MANUEL (obstructive sleep apnea)     Stroke     Transient cerebral ischemia 7/22/2016     Past Surgical History:   Procedure Laterality Date    Moh's procedure x4      Sinus surgery for sinus cancer      sinus sx for cancer      skin cancer removed-several times-nose & ear      TONSILLECTOMY      Undescened testicle sx      UVULOPALATOPHARYNGOPLASTY      for sleep apnea       Home Meds:   Prior to Admission medications    Medication Sig Start Date End Date Taking? Authorizing Provider   ACCU-CHEK NEYDA PLUS TEST STRP Strp  2/23/17   Historical Provider, MD   ciprofloxacin HCl (CIPRO) 500 MG tablet Take 1 tablet (500 mg total) by mouth every Monday. 5/29/17   Renato Womack MD   divalproex (DEPAKOTE) 250 MG EC tablet  5/5/17   Historical Provider, MD   furosemide (LASIX) 20 MG tablet Take 4 tablets (80 mg total) by mouth once daily. 4/25/17 4/25/18  Renato Womack MD    lactulose (CHRONULAC) 10 gram/15 mL solution Take 15 mLs (10 g total) by mouth 2 (two) times daily. 12/20/16   Renato Womack MD   losartan (COZAAR) 25 MG tablet Take 1 tablet (25 mg total) by mouth once daily. 5/24/17   Renato Womack MD   rifAXIMin (XIFAXAN) 550 mg Tab Take 1 tablet (550 mg total) by mouth 2 (two) times daily. 2/21/17   Renato Womack MD   SITagliptan (JANUVIA) 50 MG Tab Take 1 tablet (50 mg total) by mouth once daily. 4/6/17   Prisca Espinoza MD   spironolactone (ALDACTONE) 50 MG tablet Take 1 tablet (50 mg total) by mouth once daily. 4/25/17 4/25/18  Renato Womack MD     Anticoagulants/Antiplatelets: no anticoagulation    Allergies:   Review of patient's allergies indicates:   Allergen Reactions    Abilify [aripiprazole] Other (See Comments)     Sleepy, could not function.     Sedation History:  no adverse reactions    Review of Systems:   Hematological: no known coagulopathies  Respiratory: no shortness of breath  Cardiovascular: no chest pain  Gastrointestinal: no abdominal pain  Genito-Urinary: no dysuria  Musculoskeletal: negative  Neurological: no TIA or stroke symptoms         OBJECTIVE:     Vital Signs (Most Recent)       Physical Exam:  ASA: 2  Mallampati: 2    General: no acute distress  Mental Status: alert and oriented to person, place and time  HEENT: normocephalic, atraumatic  Chest: unlabored breathing  Heart: regular heart rate  Abdomen: nondistended  Extremity: moves all extremities    Laboratory  Lab Results   Component Value Date    INR 1.2 05/10/2017       Lab Results   Component Value Date    WBC 2.55 (L) 05/15/2017    HGB 8.6 (L) 05/15/2017    HCT 25.9 (L) 05/15/2017    MCV 83 05/15/2017    PLT 98 (L) 05/15/2017      Lab Results   Component Value Date     (H) 05/24/2017     05/24/2017    K 4.0 05/24/2017     05/24/2017    CO2 31 (H) 05/24/2017    BUN 28 (H) 05/24/2017    CREATININE 1.3 05/24/2017    CALCIUM 9.2 05/24/2017    MG  "1.7 05/15/2017    ALT 8 (L) 05/24/2017    AST 26 05/24/2017    ALBUMIN 2.8 (L) 05/24/2017    BILITOT 0.4 05/24/2017       ASSESSMENT/PLAN:     Sedation Plan: local  Patient will undergo paracentesis.    Kunal Kowalski MD ([place])  Radiology PGY-3  [phone]       "

## 2017-05-29 NOTE — PROCEDURES
"Radiology Post-Procedure Note    Pre Op Diagnosis: Ascites  Post Op Diagnosis: Same    Procedure: Paracentesis    Procedure performed by: Meghana TRAYLOR, Kunal and George Peña MD    Written Informed Consent Obtained: Yes  Specimen Removed: YES 20cc  Estimated Blood Loss: Minimal    Findings:   Successful paracentesis.  Albumin administered PRN per protocol.    Patient tolerated procedure well.    Kunal Kowalski MD ([facility])  Radiology PGY-3  [phone]      "

## 2017-05-29 NOTE — PROGRESS NOTES
Pt to rm 125. Dr Kowalski  to bedside. eh Catheter placed to LLQ with use of ultrasound guidance. No complaints or signs of distress. Will continue to monitor.

## 2017-05-31 NOTE — HOSPITAL COURSE
77 y.o. Male with MCDONALD Cirrhosis, p[t of Dr. Miller, w2ho has been having progressive fatigue and weakness over the past 3 weeks.  He has been talking his lasix, but not the spironolactone because it caused chest pain.  The weakness exacerbates in the shower and on several occasions he could not walk out of the Bathroom, opting to sit on the commode and call his wife.  He got up to go to the bathroom at 3 am, tried to sit on the toilet and missed it, falling on the floor.  He has polyurea with lasix but cam only relieve small volumes.  His wife states that his appitite is poor.  He has not eaten anything for 3 days.  She bought him a Smoothie and it took all day for him to finish it.       His wife reports inreased tremor, poor memory, one episiode of posterior neck pain, right sided pain after the fall. Lactulose was stopped when he had too many stools 2 months ago.      She denies fever at home, but temp[ 100.9 in ED, culture done, paracenteis ordered and results pending    Grew out strp mits from blood culture . Mouth source or SBP. Will treat with rocephin fro 14 days.

## 2017-05-31 NOTE — DISCHARGE SUMMARY
[facility] Hospital Medicine  Discharge Summary      Patient Name: Kenneth Sheikh  MRN: [id number]  Admission Date: 5/10/2017  Hospital Length of Stay: 5 days  Discharge Date and Time: 5/15/2017  4:31 PM  Attending Physician: No att. providers found   Discharging Provider: Jeanne Crawley MD  Primary Care Provider: Prisca Espinoza MD  [facility] Medicine Team: [facility] HOSP MED B Jeanne Crawley MD    HPI:   No notes on file    * No surgery found *      Indwelling Lines/Drains at time of discharge:   Lines/Drains/Airways          No matching active lines, drains, or airways        Hospital Course:   77 y.o. Male with MCDONALD Cirrhosis, p[t of Dr. Miller, w2ho has been having progressive fatigue and weakness over the past 3 weeks.  He has been talking his lasix, but not the spironolactone because it caused chest pain.  The weakness exacerbates in the shower and on several occasions he could not walk out of the Bathroom, opting to sit on the commode and call his wife.  He got up to go to the bathroom at 3 am, tried to sit on the toilet and missed it, falling on the floor.  He has polyurea with lasix but cam only relieve small volumes.  His wife states that his appitite is poor.  He has not eaten anything for 3 days.  She bought him a Smoothie and it took all day for him to finish it.       His wife reports inreased tremor, poor memory, one episiode of posterior neck pain, right sided pain after the fall. Lactulose was stopped when he had too many stools 2 months ago.      She denies fever at home, but temp[ 100.9 in ED, culture done, paracenteis ordered and results pending    Grew out strp mits from blood culture . Mouth source or SBP. Will treat with rocephin fro 14 days.     Consults:   Consults         Status Ordering Provider     Inpatient consult to Hepatology  Once     Provider:  (Not yet assigned)    Completed JEANNE CRAWLEY     Inpatient consult to Infectious Diseases  Once     Provider:  (Not  "yet assigned)    Completed JEANNE CRAWLEY     Inpatient consult to PICC team ([facility] [place])  Once     Provider:  (Not yet assigned)    Completed JEANNE CRAWLEY          Significant Diagnostic Studies: Labs: BMP: No results for input(s): GLU, NA, K, CL, CO2, BUN, CREATININE, CALCIUM, MG in the last 48 hours.    Pending Diagnostic Studies:     None        Final Active Diagnoses:    Diagnosis Date Noted POA    PRINCIPAL PROBLEM:  Fall at home [W19.XXXA, Y92.099] 05/10/2017 Not Applicable    Bacteremia [R78.81] 05/16/2017 Yes    Streptococcal sepsis [A40.9] 05/16/2017 Yes    Cirrhosis of liver with ascites [K74.60] 05/10/2017 Yes    Orthostatic hypotension [I95.1] 05/10/2017 Yes    BELLA (acute kidney injury) [N17.9] 05/10/2017 Yes    Liver cirrhosis secondary to MCDONALD (nonalcoholic steatohepatitis) [K75.81, K74.60] 12/25/2016 Yes    Fatigue [R53.83] 11/23/2016 Yes    Gait disorder [R26.9] 11/23/2016 Yes    Essential hypertension [I10] 11/23/2016 Yes    Type 2 diabetes mellitus without complication, without long-term current use of insulin [E11.9] 11/23/2016 Yes      Problems Resolved During this Admission:    Diagnosis Date Noted Date Resolved POA      No new Assessment & Plan notes have been filed under this hospital service since the last note was generated.  Service: Hospital Medicine      Discharged Condition: good    Disposition: Home-Health Care [facility]    Follow Up:  Follow-up Information     Prisca Espinoza MD.    Specialties:  Psychiatry, Internal Medicine  Contact information:  [address] [place] [address] [phone]                 Patient Instructions:     CANE FOR HOME USE   Order Specific Question Answer Comments   Type of Cane: Narrow Quad    Height: 5' 8" (1.727 m)    Weight: 77 kg (169 lb 12.1 oz)    Does patient have medical equipment at home? none    Length of need (1-99 months): 99    Please check all that apply: Patient's condition impairs ambulation.    Vendor: [facility]  "   Expected Date of Delivery: 5/15/2017        Medications:  Reconciled Home Medications:   Discharge Medication List as of 5/15/2017  3:08 PM      START taking these medications    Details   cefTRIAXone 2 g in dextrose 5 % 50 mL (ROCEPHIN) 2 g/50 mL PgBk IVPB Inject 50 mLs (2 g total) into the vein once daily., Starting 5/15/2017, Until Sat 5/27/17, No Print         CONTINUE these medications which have NOT CHANGED    Details   ACCU-CHEK NEYDA PLUS TEST STRP Strp Starting 2/23/2017, Until Discontinued, Historical Med      furosemide (LASIX) 20 MG tablet Take 4 tablets (80 mg total) by mouth once daily., Starting 4/25/2017, Until Wed 4/25/18, Normal      lactulose (CHRONULAC) 10 gram/15 mL solution Take 15 mLs (10 g total) by mouth 2 (two) times daily., Starting 12/20/2016, Until Discontinued, Normal      rifAXIMin (XIFAXAN) 550 mg Tab Take 1 tablet (550 mg total) by mouth 2 (two) times daily., Starting 2/21/2017, Until Discontinued, Normal      spironolactone (ALDACTONE) 50 MG tablet Take 1 tablet (50 mg total) by mouth once daily., Starting 4/25/2017, Until Wed 4/25/18, Normal      divalproex (DEPAKOTE) 250 MG 24 hr tablet Take 500 mg by mouth once daily. , Until Discontinued, Historical Med      !! JANUVIA 50 mg Tab TAKE 1 TABLET (50 MG TOTAL) BY MOUTH ONCE DAILY., Normal      losartan (COZAAR) 50 MG tablet Take 1 tablet (50 mg total) by mouth once daily., Starting 2/10/2017, Until Discontinued, Normal      !! SITagliptan (JANUVIA) 50 MG Tab Take 1 tablet (50 mg total) by mouth once daily., Starting 4/6/2017, Until Discontinued, Normal       !! - Potential duplicate medications found. Please discuss with provider.        Time spent on the discharge of patient: 30 minutes    Marcus Rice MD  Department of Hospital Medicine  [facility]

## 2017-06-01 LAB — BACTERIA SPEC AEROBE CULT: NO GROWTH

## 2017-06-05 ENCOUNTER — HOSPITAL ENCOUNTER (OUTPATIENT)
Dept: INTERVENTIONAL RADIOLOGY/VASCULAR | Facility: HOSPITAL | Age: 77
Discharge: HOME | End: 2017-06-05
Attending: INTERNAL MEDICINE
Payer: MEDICARE

## 2017-06-05 VITALS
HEART RATE: 63 BPM | OXYGEN SATURATION: 97 % | RESPIRATION RATE: 18 BRPM | SYSTOLIC BLOOD PRESSURE: 111 MMHG | DIASTOLIC BLOOD PRESSURE: 56 MMHG

## 2017-06-05 DIAGNOSIS — R18.8 ASCITES OF LIVER: ICD-10-CM

## 2017-06-05 LAB — BACTERIA SPEC ANAEROBE CULT: NORMAL

## 2017-06-05 PROCEDURE — P9047 ALBUMIN (HUMAN), 25%, 50ML: HCPCS | Performed by: INTERNAL MEDICINE

## 2017-06-05 PROCEDURE — 49083 ABD PARACENTESIS W/IMAGING: CPT | Mod: ,,, | Performed by: NURSE PRACTITIONER

## 2017-06-05 PROCEDURE — C1729 CATH, DRAINAGE: HCPCS

## 2017-06-05 PROCEDURE — 63600175 PHARM REV CODE 636 W HCPCS: Performed by: INTERNAL MEDICINE

## 2017-06-05 RX ORDER — ALBUMIN HUMAN 250 G/1000ML
25 SOLUTION INTRAVENOUS
Status: DISCONTINUED | OUTPATIENT
Start: 2017-06-05 | End: 2017-06-06 | Stop reason: HOSPADM

## 2017-06-05 RX ADMIN — ALBUMIN (HUMAN) 12.5 G: 25 SOLUTION INTRAVENOUS at 12:06

## 2017-06-05 RX ADMIN — ALBUMIN (HUMAN) 25 G: 25 SOLUTION INTRAVENOUS at 12:06

## 2017-06-05 NOTE — PROGRESS NOTES
Paracentesis complete. 5400 mLs peritoneal fluid drained. Pt tolerated well. Dressing to rlq clean, dry, and intact. Albumin 25% given 150 mLs. Pt given discharge instructions, verbalized understanding.  Pt brought to [facility] area to meet family via wheelchair.

## 2017-06-05 NOTE — PROGRESS NOTES
Pt arrived to  for paracentesis via wheelchair.  Name verified using two identifiers.  Allergies verified.  Waiting for consent.  Will continue to monitor.

## 2017-06-05 NOTE — PROCEDURES
Radiology Post-Procedure Note    Pre Op Diagnosis: Ascites  Post Op Diagnosis: Same    Procedure: Ultrasound Guided Paracentesis    Procedure performed by: Leroy Pyle NP/George Peña MD    Written Informed Consent Obtained: Yes  Specimen Removed: YES clear yellow fluid   Estimated Blood Loss: Minimal    Findings:   Successful paracentesis.  Albumin administered PRN per protocol.    Patient tolerated procedure well.    MATT Schmitz, FNP  Interventional Radiology  [phone] spectralink

## 2017-06-05 NOTE — H&P
Radiology History & Physical      SUBJECTIVE:     Chief Complaint: Ascites    History of Present Illness:  Kenneth Sheikh is a 77 y.o. male who presents for ultrasound guided paracentesis  Past Medical History:   Diagnosis Date    Anticoagulant long-term use     Bipolar 1 disorder     Bipolar 1 disorder 11/24/2016    bipolar    Cancer     history of skin cancer/sinus cancer & rectal cancer    Depressed bipolar affective disorder     Diabetes mellitus     type 2    EBV infection 12/7/2016    EBV DNA, PCR Latest Ref Range: None detected  None detected EBV DNA-Copies/mL Unknown Test Not Performed EBV Early Antigen Ab, IgG Latest Ref Range: <1:10 Titer 1:40 (A) EBV Nuclear Ag Ab Latest Ref Range: <1:5 Titer >=1:80 (A) EBV VCA IgG Latest Ref Range: <1:10 Titer 1:160 (A) EBV VCA IgM Latest Ref Range: <1:10 Titer <1:10     History of TIA (transient ischemic attack)     several-taking Plavix    Hx of gallstones     Wife denies    Hypertension     Hyperthyroidism 11/30/2016    Low HDL (under 40) 12/7/2016    Mixed hyperlipidemia 11/23/2016    JUAN MANUEL (obstructive sleep apnea)     Stroke     Transient cerebral ischemia 7/22/2016     Past Surgical History:   Procedure Laterality Date    Moh's procedure x4      Sinus surgery for sinus cancer      sinus sx for cancer      skin cancer removed-several times-nose & ear      TONSILLECTOMY      Undescened testicle sx      UVULOPALATOPHARYNGOPLASTY      for sleep apnea       Home Meds:   Prior to Admission medications    Medication Sig Start Date End Date Taking? Authorizing Provider   ACCU-CHEK NEYDA PLUS TEST STRP Strp  2/23/17   Historical Provider, MD   ciprofloxacin HCl (CIPRO) 500 MG tablet Take 1 tablet (500 mg total) by mouth every Monday. 5/29/17   Renato Womack MD   divalproex (DEPAKOTE) 250 MG EC tablet  5/5/17   Historical Provider, MD   furosemide (LASIX) 20 MG tablet Take 4 tablets (80 mg total) by mouth once daily. 4/25/17 4/25/18  Renato  MD Vu   lactulose (CHRONULAC) 10 gram/15 mL solution Take 15 mLs (10 g total) by mouth 2 (two) times daily. 12/20/16   Renato Womack MD   losartan (COZAAR) 25 MG tablet Take 1 tablet (25 mg total) by mouth once daily. 5/24/17   Renato Womack MD   rifAXIMin (XIFAXAN) 550 mg Tab Take 1 tablet (550 mg total) by mouth 2 (two) times daily. 2/21/17   Renato Womack MD   SITagliptan (JANUVIA) 50 MG Tab Take 1 tablet (50 mg total) by mouth once daily. 4/6/17   Prisca Espinoza MD   spironolactone (ALDACTONE) 50 MG tablet Take 1 tablet (50 mg total) by mouth once daily. 4/25/17 4/25/18  Renato Womack MD     Anticoagulants/Antiplatelets: no anticoagulation    Allergies:   Review of patient's allergies indicates:   Allergen Reactions    Abilify [aripiprazole] Other (See Comments)     Sleepy, could not function.     Sedation History:  no adverse reactions    Review of Systems:   Hematological: no known coagulopathies  Respiratory: no shortness of breath  Cardiovascular: no chest pain  Gastrointestinal: no abdominal pain  Genito-Urinary: no dysuria  Musculoskeletal: negative  Neurological: no TIA or stroke symptoms         OBJECTIVE:     Vital Signs (Most Recent)  Pulse: 63 (06/05/17 1030)  Resp: 18 (06/05/17 1030)  BP: (!) 111/56 (06/05/17 1030)  SpO2: 97 % (06/05/17 1030)    Physical Exam:  ASA: 3  Mallampati: na    General: no acute distress  Mental Status: alert and oriented to person, place and time  HEENT: normocephalic, atraumatic  Chest: unlabored breathing  Heart: regular heart rate  Abdomen: distended  Extremity: moves all extremities    Laboratory  Lab Results   Component Value Date    INR 1.2 05/10/2017       Lab Results   Component Value Date    WBC 2.55 (L) 05/15/2017    HGB 8.6 (L) 05/15/2017    HCT 25.9 (L) 05/15/2017    MCV 83 05/15/2017    PLT 98 (L) 05/15/2017      Lab Results   Component Value Date     (H) 05/24/2017     05/24/2017    K 4.0 05/24/2017    CL  100 05/24/2017    CO2 31 (H) 05/24/2017    BUN 28 (H) 05/24/2017    CREATININE 1.3 05/24/2017    CALCIUM 9.2 05/24/2017    MG 1.7 05/15/2017    ALT 8 (L) 05/24/2017    AST 26 05/24/2017    ALBUMIN 2.8 (L) 05/24/2017    BILITOT 0.4 05/24/2017       ASSESSMENT/PLAN:     Sedation Plan: local  Patient will undergo ultrasound guided paracentesis.    MATT Schmitz, FNP  Interventional Radiology  [phone] spectral[employer]

## 2017-06-08 ENCOUNTER — OFFICE VISIT (OUTPATIENT)
Dept: INTERNAL MEDICINE | Facility: CLINIC | Age: 77
End: 2017-06-08
Payer: MEDICARE

## 2017-06-08 ENCOUNTER — HOSPITAL ENCOUNTER (OUTPATIENT)
Dept: RADIOLOGY | Facility: HOSPITAL | Age: 77
Discharge: HOME | End: 2017-06-08
Attending: INTERNAL MEDICINE
Payer: MEDICARE

## 2017-06-08 VITALS
DIASTOLIC BLOOD PRESSURE: 64 MMHG | HEIGHT: 68 IN | BODY MASS INDEX: 24.29 KG/M2 | WEIGHT: 160.25 LBS | SYSTOLIC BLOOD PRESSURE: 90 MMHG

## 2017-06-08 DIAGNOSIS — D61.818 PANCYTOPENIA: ICD-10-CM

## 2017-06-08 DIAGNOSIS — I10 ESSENTIAL HYPERTENSION: Primary | ICD-10-CM

## 2017-06-08 DIAGNOSIS — F31.9 BIPOLAR 1 DISORDER: ICD-10-CM

## 2017-06-08 DIAGNOSIS — K74.60 LIVER CIRRHOSIS SECONDARY TO NASH (NONALCOHOLIC STEATOHEPATITIS): ICD-10-CM

## 2017-06-08 DIAGNOSIS — M54.2 NECK PAIN: ICD-10-CM

## 2017-06-08 DIAGNOSIS — E11.9 TYPE 2 DIABETES MELLITUS WITHOUT COMPLICATION, WITHOUT LONG-TERM CURRENT USE OF INSULIN: ICD-10-CM

## 2017-06-08 DIAGNOSIS — R18.8 ASCITES OF LIVER: ICD-10-CM

## 2017-06-08 DIAGNOSIS — I95.9 HYPOTENSION, UNSPECIFIED HYPOTENSION TYPE: ICD-10-CM

## 2017-06-08 DIAGNOSIS — K75.81 LIVER CIRRHOSIS SECONDARY TO NASH (NONALCOHOLIC STEATOHEPATITIS): ICD-10-CM

## 2017-06-08 PROCEDURE — 1157F ADVNC CARE PLAN IN RCRD: CPT | Mod: S$GLB,,, | Performed by: INTERNAL MEDICINE

## 2017-06-08 PROCEDURE — 99999 PR PBB SHADOW E&M-EST. PATIENT-LVL III: CPT | Mod: PBBFAC,,, | Performed by: INTERNAL MEDICINE

## 2017-06-08 PROCEDURE — 72040 X-RAY EXAM NECK SPINE 2-3 VW: CPT | Mod: TC

## 2017-06-08 PROCEDURE — 99214 OFFICE O/P EST MOD 30 MIN: CPT | Mod: S$GLB,,, | Performed by: INTERNAL MEDICINE

## 2017-06-08 PROCEDURE — 1125F AMNT PAIN NOTED PAIN PRSNT: CPT | Mod: S$GLB,,, | Performed by: INTERNAL MEDICINE

## 2017-06-08 PROCEDURE — 99499 UNLISTED E&M SERVICE: CPT | Mod: S$GLB,,, | Performed by: INTERNAL MEDICINE

## 2017-06-08 PROCEDURE — 1159F MED LIST DOCD IN RCRD: CPT | Mod: S$GLB,,, | Performed by: INTERNAL MEDICINE

## 2017-06-08 PROCEDURE — 72040 X-RAY EXAM NECK SPINE 2-3 VW: CPT | Mod: 26,,, | Performed by: RADIOLOGY

## 2017-06-08 NOTE — PROGRESS NOTES
Internal Medicine    Subjective:      Patient ID: Kenneth Sheikh is a 77 y.o. male.    Chief Complaint: Follow-up    Presents for follow up appointment.  Last seen 4/6/17.  Admitted 5/10/17 - 5/16/17 for Strep mitis sepsis - treated with Rocephin x 14 days.    Cirrhosis 2/2 MCDONALD:  Diagnosed by Hepatology on 12/20 after fibroscan.  Followed by Dr. Womack - last seen 5/29/17.  Recent bilateral leg edema, worsening ascites and epistaxis; He also has a large gastric varix which has never bled - recently treated by Dr Perla endoscopically but will need another endoscopic session.  Getting paracentesis every Monday.  Taking Lasix 80mg daily and Losartan 25mg daily (recently decreased due to hypotension).  Prescribed Aldactone but only took once and stopped because it caused chest pain.  Taking Rifaximin twice daily - has BM every day.  Taking lactulose as needed (about every other day).      Pancytopenia:  Followed by Heme-Onc.  Last seen 2/21/17.  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     HTN:  Taking Losartan 25mg daily (reduced recently).    HLD:  Stopped fenofibrate per Dr. Sheikh due to low cholesterol.  No longer on medication.    DM-2:  Off metformin due to elevated lactate and diarrhea.  Now taking Januvia 50mg daily by Dr. Sheikh.  FSG's 90's.  No hypo    TIAs:  No symptoms recently.  No longer taking ASA.    Bipolar d/o:  Taking Depakote 500mg qHS.  Followed by psychiatrist, Dr. Figueroa.    H/o Rectal cancer s/p resection 06/2016:  Seen by colorectal surgery 12/16/16 - repeat biopsy taken which was negative per patient.     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 Camacho.  Had recent Mohs at [facility].    Neck pain:  Has been present for several months.  Worse in the shower.  Feels better when he puts his chin to his chest or when he lies down.         Review of Systems  "  Constitutional: Positive for appetite change, fatigue and unexpected weight change. Negative for chills and fever.   HENT: Positive for congestion. Negative for trouble swallowing.    Eyes: Negative for visual disturbance.   Respiratory: Negative for cough, chest tightness, shortness of breath and wheezing.    Cardiovascular: Positive for leg swelling. Negative for chest pain and palpitations.   Gastrointestinal: Positive for abdominal distention. Negative for abdominal pain, blood in stool, constipation, diarrhea, nausea and vomiting.   Genitourinary: Negative for difficulty urinating.   Musculoskeletal: Positive for neck pain. Negative for arthralgias and myalgias.   Skin: Negative for rash and wound.   Neurological: Negative for dizziness, weakness, numbness and headaches.   Hematological: Negative for adenopathy.   Psychiatric/Behavioral: Negative for behavioral problems and confusion.       Past medical history, surgical history, and family medical history reviewed and updated as appropriate.    Medications and allergies reviewed.     Objective:     BP 90/64   Ht 5' 8" (1.727 m)   Wt 72.7 kg (160 lb 4.4 oz)   BMI 24.37 kg/m²   Physical Exam   Constitutional: He is oriented to person, place, and time. He appears well-developed. No distress.   Thin, frail   HENT:   Head: Normocephalic and atraumatic.   Eyes: Conjunctivae and EOM are normal. No scleral icterus.   Cardiovascular: Normal rate and regular rhythm.  Exam reveals no gallop and no friction rub.    No murmur heard.  Pulmonary/Chest: Effort normal and breath sounds normal. No respiratory distress. He has no wheezes. He has no rales.   Abdominal: Soft. He exhibits distension. There is no tenderness.   Musculoskeletal: He exhibits tenderness (Mild over cervical spine). He exhibits no edema.   Neurological: He is alert and oriented to person, place, and time.   Skin: No rash noted. No erythema.   Psychiatric: He has a normal mood and affect. His behavior " is normal.       RESULTS:   Lab Results   Component Value Date    WBC 2.55 (L) 05/15/2017    HGB 8.6 (L) 05/15/2017    HCT 25.9 (L) 05/15/2017    MCV 83 05/15/2017    PLT 98 (L) 05/15/2017     BMP  Lab Results   Component Value Date     06/08/2017    K 4.7 06/08/2017     06/08/2017    CO2 29 06/08/2017    BUN 43 (H) 06/08/2017    CREATININE 2.2 (H) 06/08/2017    CALCIUM 8.8 06/08/2017    ANIONGAP 10 06/08/2017    ESTGFRAFRICA 32 (A) 06/08/2017    EGFRNONAA 27 (A) 06/08/2017     Lab Results   Component Value Date    ALT 8 (L) 05/24/2017    AST 26 05/24/2017    ALKPHOS 135 05/24/2017    BILITOT 0.4 05/24/2017     Lab Results   Component Value Date    TSH 0.435 11/30/2016       Assessment:     1. Essential hypertension    2. Hypotension, unspecified hypotension type    3. 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 Ascites of liver    5. Type 2 diabetes mellitus without complication, without long-term current use of insulin    6. Neck pain    7. Bipolar 1 disorder    8. Pancytopenia        Plan:   Kenneth was seen today for follow-up.    Diagnoses and all orders for this visit:    Essential hypertension  Hypotension, unspecified hypotension type   Will discontinue Losartan due to low BP.  Will repeat BMP to assess K.  Will likely need to lower Lasix dose as well.  -     Basic metabolic panel; Future; Expected date: 06/08/2017    Liver cirrhosis secondary to MCDONALD (nonalcoholic steatohepatitis)  Ascites of liver  -     Basic metabolic panel; Future; Expected date: 06/08/2017    Type 2 diabetes mellitus without complication, without long-term current use of insulin   Continue Januvia.    Neck pain  -     X-Ray Cervical Spine AP And Lateral; Future; Expected date: 06/08/2017    Bipolar 1 disorder   Continue Depakote.    Pancytopenia   Continue to monitor.    Return in about 4 weeks (around 7/6/2017).    Prisca Espinoza MD  Internal Medicine  [facility]

## 2017-06-08 NOTE — Clinical Note
Dr. Womack, I saw this mutual patient in clinic today.  His BP has been running low, even on the lower dose of losartan.  He is weak and dizzy.  I stopped the losartan today and am repeating BMP to assess K.  His Lasix may even need to be lowered soon.  Just wanted to keep you updated and make sure you are ok with this. Thanks, Prisca Espinoza

## 2017-06-09 ENCOUNTER — TELEPHONE (OUTPATIENT)
Dept: INTERNAL MEDICINE | Facility: CLINIC | Age: 77
End: 2017-06-09

## 2017-06-09 DIAGNOSIS — R18.8 ASCITES OF LIVER: Primary | ICD-10-CM

## 2017-06-09 DIAGNOSIS — K74.60 HEPATIC CIRRHOSIS, UNSPECIFIED HEPATIC CIRRHOSIS TYPE: ICD-10-CM

## 2017-06-09 DIAGNOSIS — I10 ESSENTIAL HYPERTENSION: Primary | ICD-10-CM

## 2017-06-09 NOTE — TELEPHONE ENCOUNTER
Spoke with pt's wife and she understood his results. Pt doesn't want to schedule and appt just yet for Back and Spine pt would like to wait and see if he continues to experience pain in his neck before scheduling. Pt's wife understood to cut pt's Lasix to 40mg daily and if the pt swelling gets worse to contact the office to let Dr. Espinoza know

## 2017-06-09 NOTE — PROGRESS NOTES
Patient, Kenneth Sheikh (MRN #[id number]), presented with a recent Estimated Glumerular Filtration Rate (EGFR) between 15 and 29 consistent with the definition of chronic kidney disease stage 4 (ICD10 - N18.4).    eGFR if non    Date Value Ref Range Status   06/08/2017 27 (A) >60 mL/min/1.73 m^2 Final     Comment:     Calculation used to obtain the estimated glomerular filtration  rate (eGFR) is the CKD-EPI equation. Since race is unknown   in our information system, the eGFR values for   -American and Non--American patients are given   for each creatinine result.         The patient's chronic kidney disease stage 4 was monitored, evaluated, addressed and/or treated. This addendum to the medical record is made on 06/09/2017.

## 2017-06-09 NOTE — TELEPHONE ENCOUNTER
Please call patient and let him know that x-ray of his neck showed arthritis with some narrowing of his vertebrae.  Please find out if he is interested in me sending him to the [facility] for possible interventions?  Also let him know that his potassium level was fine on his blood work.  I would recommend that he reduce the Lasix to 40mg daily and we will need to repeat his blood work (BMP) in 1 week (please schedule).  Tell him to let me know if his swelling significantly worsens with decreased Lasix dose.

## 2017-06-12 ENCOUNTER — HOSPITAL ENCOUNTER (OUTPATIENT)
Dept: INTERVENTIONAL RADIOLOGY/VASCULAR | Facility: HOSPITAL | Age: 77
Discharge: HOME | End: 2017-06-12
Attending: INTERNAL MEDICINE
Payer: MEDICARE

## 2017-06-12 VITALS
DIASTOLIC BLOOD PRESSURE: 67 MMHG | RESPIRATION RATE: 16 BRPM | SYSTOLIC BLOOD PRESSURE: 135 MMHG | HEART RATE: 53 BPM | OXYGEN SATURATION: 100 %

## 2017-06-12 DIAGNOSIS — R18.8 ASCITES OF LIVER: ICD-10-CM

## 2017-06-12 PROCEDURE — 49083 ABD PARACENTESIS W/IMAGING: CPT | Mod: ,,, | Performed by: NURSE PRACTITIONER

## 2017-06-12 PROCEDURE — C1729 CATH, DRAINAGE: HCPCS

## 2017-06-12 PROCEDURE — A7048 VACUUM DRAIN BOTTLE/TUBE KIT: HCPCS

## 2017-06-12 NOTE — PROGRESS NOTES
Paracentesis complete. 4900 mLs peritoneal fluid drained. Pt tolerated well. Dressing to RLQ clean, dry, and intact. No Albumin required per protocol. No lab specimens ordered. Discharge instructions and handouts provided. Pt verbalized understanding. Pt ambulated to lobby, gait steady.

## 2017-06-12 NOTE — H&P
Radiology History & Physical      SUBJECTIVE:     Chief Complaint: Ascites    History of Present Illness:  Kenneth Sheikh is a 77 y.o. male who presents for ultrasound guided paracentesis  Past Medical History:   Diagnosis Date    Anticoagulant long-term use     Bipolar 1 disorder     Bipolar 1 disorder 11/24/2016    bipolar    Cancer     history of skin cancer/sinus cancer & rectal cancer    Depressed bipolar affective disorder     Diabetes mellitus     type 2    EBV infection 12/7/2016    EBV DNA, PCR Latest Ref Range: None detected  None detected EBV DNA-Copies/mL Unknown Test Not Performed EBV Early Antigen Ab, IgG Latest Ref Range: <1:10 Titer 1:40 (A) EBV Nuclear Ag Ab Latest Ref Range: <1:5 Titer >=1:80 (A) EBV VCA IgG Latest Ref Range: <1:10 Titer 1:160 (A) EBV VCA IgM Latest Ref Range: <1:10 Titer <1:10     History of TIA (transient ischemic attack)     several-taking Plavix    Hx of gallstones     Wife denies    Hypertension     Hyperthyroidism 11/30/2016    Low HDL (under 40) 12/7/2016    Mixed hyperlipidemia 11/23/2016    JUAN MANUEL (obstructive sleep apnea)     Stroke     Transient cerebral ischemia 7/22/2016     Past Surgical History:   Procedure Laterality Date    Moh's procedure x4      Sinus surgery for sinus cancer      sinus sx for cancer      skin cancer removed-several times-nose & ear      TONSILLECTOMY      Undescened testicle sx      UVULOPALATOPHARYNGOPLASTY      for sleep apnea       Home Meds:   Prior to Admission medications    Medication Sig Start Date End Date Taking? Authorizing Provider   ACCU-CHEK NEYDA PLUS TEST STRP Strp  2/23/17   Historical Provider, MD   ciprofloxacin HCl (CIPRO) 500 MG tablet Take 1 tablet (500 mg total) by mouth every Monday. 5/29/17   Renato Womack MD   divalproex (DEPAKOTE) 250 MG EC tablet  5/5/17   Historical Provider, MD   furosemide (LASIX) 20 MG tablet Take 4 tablets (80 mg total) by mouth once daily. 4/25/17 4/25/18  Renato  MD Vu   lactulose (CHRONULAC) 10 gram/15 mL solution Take 15 mLs (10 g total) by mouth 2 (two) times daily. 12/20/16   Renato Womack MD   losartan (COZAAR) 25 MG tablet Take 1 tablet (25 mg total) by mouth once daily. 5/24/17   Renato Womack MD   rifAXIMin (XIFAXAN) 550 mg Tab Take 1 tablet (550 mg total) by mouth 2 (two) times daily. 2/21/17   Renato Womack MD   SITagliptan (JANUVIA) 50 MG Tab Take 1 tablet (50 mg total) by mouth once daily. 4/6/17   Prisca Espinoza MD   spironolactone (ALDACTONE) 50 MG tablet Take 1 tablet (50 mg total) by mouth once daily. 4/25/17 4/25/18  Renato Womack MD     Anticoagulants/Antiplatelets: no anticoagulation    Allergies:   Review of patient's allergies indicates:   Allergen Reactions    Abilify [aripiprazole] Other (See Comments)     Sleepy, could not function.     Sedation History:  no adverse reactions    Review of Systems:   Hematological: no known coagulopathies  Respiratory: no shortness of breath  Cardiovascular: no chest pain  Gastrointestinal: no abdominal pain  Genito-Urinary: no dysuria  Musculoskeletal: negative  Neurological: no TIA or stroke symptoms         OBJECTIVE:     Vital Signs (Most Recent)  Pulse: (!) 51 (06/12/17 1108)  Resp: 16 (06/12/17 1108)  BP: (!) 147/65 (06/12/17 1108)  SpO2: 95 % (06/12/17 1108)    Physical Exam:  ASA: 3  Mallampati: na    General: no acute distress  Mental Status: alert and oriented to person, place and time  HEENT: normocephalic, atraumatic  Chest: unlabored breathing  Heart: regular heart rate  Abdomen: distended  Extremity: moves all extremities    Laboratory  Lab Results   Component Value Date    INR 1.1 06/12/2017       Lab Results   Component Value Date    WBC 2.55 (L) 05/15/2017    HGB 8.6 (L) 05/15/2017    HCT 25.9 (L) 05/15/2017    MCV 83 05/15/2017    PLT 98 (L) 05/15/2017      Lab Results   Component Value Date     (H) 06/08/2017     06/08/2017    K 4.7 06/08/2017      06/08/2017    CO2 29 06/08/2017    BUN 43 (H) 06/08/2017    CREATININE 2.2 (H) 06/08/2017    CALCIUM 8.8 06/08/2017    MG 1.7 05/15/2017    ALT 8 (L) 05/24/2017    AST 26 05/24/2017    ALBUMIN 2.8 (L) 05/24/2017    BILITOT 0.4 05/24/2017       ASSESSMENT/PLAN:     Sedation Plan: local  Patient will undergo: ultrasound guided paracentesis.    MATT Schmitz, FNP  Interventional Radiology  [phone] [employer]

## 2017-06-12 NOTE — PROCEDURES
Radiology Post-Procedure Note    Pre Op Diagnosis: Ascites  Post Op Diagnosis: Same    Procedure: Ultrasound Guided Paracentesis    Procedure performed by: Leroy Pyle NP/ George Peña MD    Written Informed Consent Obtained: Yes  Specimen Removed: YES clear yellow fluid  Estimated Blood Loss: Minimal    Findings:   Successful paracentesis.  Albumin administered PRN per protocol.    Patient tolerated procedure well.    MATT Schmitz, FNP  Interventional Radiology  [phone] [employer]

## 2017-06-16 ENCOUNTER — TELEPHONE (OUTPATIENT)
Dept: INTERNAL MEDICINE | Facility: CLINIC | Age: 77
End: 2017-06-16

## 2017-06-16 ENCOUNTER — LAB VISIT (OUTPATIENT)
Dept: LAB | Facility: HOSPITAL | Age: 77
End: 2017-06-16
Attending: INTERNAL MEDICINE
Payer: MEDICARE

## 2017-06-16 DIAGNOSIS — K74.69 OTHER CIRRHOSIS OF LIVER: Primary | ICD-10-CM

## 2017-06-16 DIAGNOSIS — I10 ESSENTIAL HYPERTENSION: ICD-10-CM

## 2017-06-16 DIAGNOSIS — K74.60 HEPATIC CIRRHOSIS, UNSPECIFIED HEPATIC CIRRHOSIS TYPE: ICD-10-CM

## 2017-06-16 LAB
ANION GAP SERPL CALC-SCNC: 8 MMOL/L
BUN SERPL-MCNC: 24 MG/DL
CALCIUM SERPL-MCNC: 8.9 MG/DL
CHLORIDE SERPL-SCNC: 102 MMOL/L
CO2 SERPL-SCNC: 28 MMOL/L
CREAT SERPL-MCNC: 1.5 MG/DL
EST. GFR  (AFRICAN AMERICAN): 50 ML/MIN/1.73 M^2
EST. GFR  (NON AFRICAN AMERICAN): 44 ML/MIN/1.73 M^2
GLUCOSE SERPL-MCNC: 161 MG/DL
POTASSIUM SERPL-SCNC: 4 MMOL/L
SODIUM SERPL-SCNC: 138 MMOL/L

## 2017-06-16 PROCEDURE — 80048 BASIC METABOLIC PNL TOTAL CA: CPT

## 2017-06-16 PROCEDURE — 36415 COLL VENOUS BLD VENIPUNCTURE: CPT

## 2017-06-16 NOTE — TELEPHONE ENCOUNTER
Spoke with pt and he understood his results pt also stated that his swelling is doing better since you lowered the lasix

## 2017-06-16 NOTE — TELEPHONE ENCOUNTER
Please call patient or wife and let them know that kidney function looks much better on today's labs than it did 1 week ago.  His potassium level looks good as well.  Please find out how his swelling is doing on the lower dose of Lasix?

## 2017-06-19 ENCOUNTER — HOSPITAL ENCOUNTER (OUTPATIENT)
Dept: INTERVENTIONAL RADIOLOGY/VASCULAR | Facility: HOSPITAL | Age: 77
Discharge: HOME | End: 2017-06-19
Attending: INTERNAL MEDICINE
Payer: MEDICARE

## 2017-06-19 VITALS
SYSTOLIC BLOOD PRESSURE: 114 MMHG | RESPIRATION RATE: 20 BRPM | DIASTOLIC BLOOD PRESSURE: 56 MMHG | OXYGEN SATURATION: 99 % | HEART RATE: 54 BPM

## 2017-06-19 DIAGNOSIS — R18.8 ASCITES OF LIVER: ICD-10-CM

## 2017-06-19 PROCEDURE — A7048 VACUUM DRAIN BOTTLE/TUBE KIT: HCPCS

## 2017-06-19 PROCEDURE — 49083 ABD PARACENTESIS W/IMAGING: CPT | Mod: ,,, | Performed by: FAMILY MEDICINE

## 2017-06-19 PROCEDURE — C1729 CATH, DRAINAGE: HCPCS

## 2017-06-19 NOTE — DISCHARGE INSTRUCTIONS
"For scheduling: Call Mackenzie at [phone]    For questions or concerns call: [facility] 8 AM- 5PM: [phone].   **After hours and weekends: Call [phone] and ask for "Radiology Resident on call".    For immediate concerns that are not emergent, you may call our radiology clinic at: [phone]      "

## 2017-06-19 NOTE — PROGRESS NOTES
Paracentesis complete. 4900 mLs peritoneal fluid drained. Pt tolerated well. Dressing to RLQ clean, dry, and intact. Patient given discharge instructions and verbalized understanding. Patient escorted to lobby in wheelchair.

## 2017-06-19 NOTE — H&P
Radiology History & Physical      SUBJECTIVE:     Chief Complaint: ascites    History of Present Illness:  Kenneth Sheikh is a 77 y.o. male who presents for ultrasound guided paracentesis  Past Medical History:   Diagnosis Date    Anticoagulant long-term use     Bipolar 1 disorder     Bipolar 1 disorder 11/24/2016    bipolar    Cancer     history of skin cancer/sinus cancer & rectal cancer    Depressed bipolar affective disorder     Diabetes mellitus     type 2    EBV infection 12/7/2016    EBV DNA, PCR Latest Ref Range: None detected  None detected EBV DNA-Copies/mL Unknown Test Not Performed EBV Early Antigen Ab, IgG Latest Ref Range: <1:10 Titer 1:40 (A) EBV Nuclear Ag Ab Latest Ref Range: <1:5 Titer >=1:80 (A) EBV VCA IgG Latest Ref Range: <1:10 Titer 1:160 (A) EBV VCA IgM Latest Ref Range: <1:10 Titer <1:10     History of TIA (transient ischemic attack)     several-taking Plavix    Hx of gallstones     Wife denies    Hypertension     Hyperthyroidism 11/30/2016    Low HDL (under 40) 12/7/2016    Mixed hyperlipidemia 11/23/2016    JUAN MANUEL (obstructive sleep apnea)     Stroke     Transient cerebral ischemia 7/22/2016     Past Surgical History:   Procedure Laterality Date    Moh's procedure x4      Sinus surgery for sinus cancer      sinus sx for cancer      skin cancer removed-several times-nose & ear      TONSILLECTOMY      Undescened testicle sx      UVULOPALATOPHARYNGOPLASTY      for sleep apnea       Home Meds:   Prior to Admission medications    Medication Sig Start Date End Date Taking? Authorizing Provider   ACCU-CHEK NEYDA PLUS TEST STRP Strp  2/23/17   Historical Provider, MD   ciprofloxacin HCl (CIPRO) 500 MG tablet Take 1 tablet (500 mg total) by mouth every Monday. 5/29/17   Renato Womack MD   divalproex (DEPAKOTE) 250 MG EC tablet  5/5/17   Historical Provider, MD   furosemide (LASIX) 20 MG tablet Take 4 tablets (80 mg total) by mouth once daily. 4/25/17 4/25/18  Renato  MD Vu   lactulose (CHRONULAC) 10 gram/15 mL solution Take 15 mLs (10 g total) by mouth 2 (two) times daily. 12/20/16   Renato Womack MD   losartan (COZAAR) 25 MG tablet Take 1 tablet (25 mg total) by mouth once daily. 5/24/17   Renato Womack MD   rifAXIMin (XIFAXAN) 550 mg Tab Take 1 tablet (550 mg total) by mouth 2 (two) times daily. 2/21/17   Renato Womack MD   SITagliptan (JANUVIA) 50 MG Tab Take 1 tablet (50 mg total) by mouth once daily. 4/6/17   Prisca Espinoza MD   spironolactone (ALDACTONE) 50 MG tablet Take 1 tablet (50 mg total) by mouth once daily. 4/25/17 4/25/18  Renato Womack MD     Anticoagulants/Antiplatelets: no anticoagulation    Allergies:   Review of patient's allergies indicates:   Allergen Reactions    Abilify [aripiprazole] Other (See Comments)     Sleepy, could not function.     Sedation History:  no adverse reactions    Review of Systems:   Hematological: no known coagulopathies  Respiratory: no shortness of breath  Cardiovascular: no chest pain  Gastrointestinal: no abdominal pain  Genito-Urinary: no dysuria  Musculoskeletal: negative  Neurological: no TIA or stroke symptoms         OBJECTIVE:     Vital Signs (Most Recent)  Pulse: (!) 53 (06/19/17 1057)  Resp: 18 (06/19/17 1057)  BP: 132/67 (06/19/17 1057)  SpO2: 98 % (06/19/17 1057)    Physical Exam:  ASA: 2  Mallampati: n/a    General: no acute distress  Mental Status: alert and oriented to person, place and time  HEENT: normocephalic, atraumatic  Chest: unlabored breathing  Heart: regular heart rate  Abdomen: distended  Extremity: moves all extremities    Laboratory  Lab Results   Component Value Date    INR 1.1 06/12/2017       Lab Results   Component Value Date    WBC 3.07 (L) 06/19/2017    HGB 10.2 (L) 06/19/2017    HCT 31.8 (L) 06/19/2017    MCV 86 06/19/2017     (L) 06/19/2017      Lab Results   Component Value Date     (H) 06/16/2017     06/16/2017    K 4.0 06/16/2017    CL  102 06/16/2017    CO2 28 06/16/2017    BUN 24 (H) 06/16/2017    CREATININE 1.5 (H) 06/16/2017    CALCIUM 8.9 06/16/2017    MG 1.7 05/15/2017    ALT 8 (L) 05/24/2017    AST 26 05/24/2017    ALBUMIN 2.8 (L) 05/24/2017    BILITOT 0.4 05/24/2017       ASSESSMENT/PLAN:     Sedation Plan: local  Patient will undergo ultrasound guided paracentesis.    MATT Mauricio, FNP  Interventional Radiology  [phone] [employer]

## 2017-06-23 ENCOUNTER — OFFICE VISIT (OUTPATIENT)
Dept: HEPATOLOGY | Facility: CLINIC | Age: 77
End: 2017-06-23
Payer: MEDICARE

## 2017-06-23 VITALS
WEIGHT: 164.88 LBS | OXYGEN SATURATION: 99 % | SYSTOLIC BLOOD PRESSURE: 133 MMHG | BODY MASS INDEX: 24.99 KG/M2 | RESPIRATION RATE: 18 BRPM | DIASTOLIC BLOOD PRESSURE: 63 MMHG | TEMPERATURE: 97 F | HEIGHT: 68 IN

## 2017-06-23 DIAGNOSIS — R18.8 CIRRHOSIS OF LIVER WITH ASCITES, UNSPECIFIED HEPATIC CIRRHOSIS TYPE: ICD-10-CM

## 2017-06-23 DIAGNOSIS — E11.9 TYPE 2 DIABETES MELLITUS WITHOUT COMPLICATION, WITHOUT LONG-TERM CURRENT USE OF INSULIN: ICD-10-CM

## 2017-06-23 DIAGNOSIS — I95.1 ORTHOSTATIC HYPOTENSION: ICD-10-CM

## 2017-06-23 DIAGNOSIS — K74.60 CIRRHOSIS OF LIVER WITH ASCITES, UNSPECIFIED HEPATIC CIRRHOSIS TYPE: ICD-10-CM

## 2017-06-23 DIAGNOSIS — K74.60 LIVER CIRRHOSIS SECONDARY TO NASH (NONALCOHOLIC STEATOHEPATITIS): ICD-10-CM

## 2017-06-23 DIAGNOSIS — D61.818 PANCYTOPENIA: ICD-10-CM

## 2017-06-23 DIAGNOSIS — K75.81 LIVER CIRRHOSIS SECONDARY TO NASH (NONALCOHOLIC STEATOHEPATITIS): ICD-10-CM

## 2017-06-23 DIAGNOSIS — F31.9 BIPOLAR 1 DISORDER: Primary | ICD-10-CM

## 2017-06-23 PROCEDURE — 1126F AMNT PAIN NOTED NONE PRSNT: CPT | Mod: S$GLB,,, | Performed by: INTERNAL MEDICINE

## 2017-06-23 PROCEDURE — 99215 OFFICE O/P EST HI 40 MIN: CPT | Mod: S$GLB,,, | Performed by: INTERNAL MEDICINE

## 2017-06-23 PROCEDURE — 1159F MED LIST DOCD IN RCRD: CPT | Mod: S$GLB,,, | Performed by: INTERNAL MEDICINE

## 2017-06-23 PROCEDURE — 1157F ADVNC CARE PLAN IN RCRD: CPT | Mod: S$GLB,,, | Performed by: INTERNAL MEDICINE

## 2017-06-23 PROCEDURE — 99999 PR PBB SHADOW E&M-EST. PATIENT-LVL IV: CPT | Mod: PBBFAC,,, | Performed by: INTERNAL MEDICINE

## 2017-06-23 NOTE — PROGRESS NOTES
"Subjective:       Patient ID: Kenneth Sheikh is a 77 y.o. male.    Chief Complaint: Cirrhosis    HPI  I saw this 77 y.o. man  whom I had met in hospital when he was admitted with lethargy and some confusion.    I suspect that it may have been related to hepatic encephalopathy. At that time, he was diagnosed with decompensated cirrhosis.    Admitted because of increasing lethargy +++/confusion/dizziness/dysarthria  - Nov 2016  - imaging showed ascites     No significant alcohol use ("a couple beers when I was younger"). No family history of liver disease. No new medications.      Patient has risk factors for MCDONALD.  No significant hx of alcohol    He also has a large gastric varix which has never bled- recently treated by Dr Perla endoscopically but will need another endoscopic session (4/16/17).  - Had post procedure fever despite antibiotics      Most recent issue was an E coli bacteremia- on IV antibiotics at home- PICC    Currently he is reasonably stable with weekly paracentesis.  Decreased dose of diuretics.  Creatinine has improved.  Continues to feel tired but no HE.    MELD-Na score: 18 at 5/11/2017  2:45 PM  MELD score: 16 at 5/11/2017  2:45 PM  Calculated from:  Serum Creatinine: 2.1 mg/dL at 5/11/2017  2:45 PM  Serum Sodium: 135 mmol/L at 5/11/2017  2:45 PM  Total Bilirubin: 0.8 mg/dL (Rounded to 1) at 5/11/2017  2:45 PM  INR(ratio): 1.2 at 5/10/2017  5:19 AM  Age: 77 years      PMH:  DM  Sinus Ca- 1996  Rectal Ca- June 2016    SH:  Retired  Ex [employer] worker      FH:  Aunt had cirrhosis- non- alcohol      Review of Systems   Constitutional: Negative for activity change, appetite change, chills, fatigue, fever and unexpected weight change.   HENT: Negative for hearing loss.    Eyes: Negative for discharge and visual disturbance.   Respiratory: Negative for cough, chest tightness, shortness of breath and wheezing.    Cardiovascular: Negative for chest pain, palpitations and leg swelling.   Gastrointestinal: " Negative for abdominal distention, abdominal pain, constipation, diarrhea and nausea.   Genitourinary: Negative for dysuria and frequency.   Musculoskeletal: Negative for arthralgias and back pain.   Skin: Negative for pallor and rash.   Neurological: Negative for dizziness, tremors, speech difficulty and headaches.   Hematological: Negative for adenopathy.   Psychiatric/Behavioral: Negative for agitation and confusion.           Lab Results   Component Value Date    ALT 8 (L) 05/24/2017    AST 26 05/24/2017    ALKPHOS 135 05/24/2017    BILITOT 0.4 05/24/2017     Past Medical History:   Diagnosis Date    Anticoagulant long-term use     Bipolar 1 disorder     Bipolar 1 disorder 11/24/2016    bipolar    Cancer     history of skin cancer/sinus cancer & rectal cancer    Depressed bipolar affective disorder     Diabetes mellitus     type 2    EBV infection 12/7/2016    EBV DNA, PCR Latest Ref Range: None detected  None detected EBV DNA-Copies/mL Unknown Test Not Performed EBV Early Antigen Ab, IgG Latest Ref Range: <1:10 Titer 1:40 (A) EBV Nuclear Ag Ab Latest Ref Range: <1:5 Titer >=1:80 (A) EBV VCA IgG Latest Ref Range: <1:10 Titer 1:160 (A) EBV VCA IgM Latest Ref Range: <1:10 Titer <1:10     History of TIA (transient ischemic attack)     several-taking Plavix    Hx of gallstones     Wife denies    Hypertension     Hyperthyroidism 11/30/2016    Low HDL (under 40) 12/7/2016    Mixed hyperlipidemia 11/23/2016    JUAN MANUEL (obstructive sleep apnea)     Stroke     Transient cerebral ischemia 7/22/2016     Past Surgical History:   Procedure Laterality Date    Moh's procedure x4      Sinus surgery for sinus cancer      sinus sx for cancer      skin cancer removed-several times-nose & ear      TONSILLECTOMY      Undescened testicle sx      UVULOPALATOPHARYNGOPLASTY      for sleep apnea     Current Outpatient Prescriptions   Medication Sig    ACCU-CHEK NEYDA PLUS TEST STRP Strp     ciprofloxacin HCl (CIPRO)  500 MG tablet Take 1 tablet (500 mg total) by mouth every Monday.    divalproex (DEPAKOTE) 250 MG EC tablet     furosemide (LASIX) 20 MG tablet Take 4 tablets (80 mg total) by mouth once daily. (Patient taking differently: Take 40 mg by mouth once daily. Taking 40 mg per day)    lactulose (CHRONULAC) 10 gram/15 mL solution Take 15 mLs (10 g total) by mouth 2 (two) times daily.    rifAXIMin (XIFAXAN) 550 mg Tab Take 1 tablet (550 mg total) by mouth 2 (two) times daily.    SITagliptan (JANUVIA) 50 MG Tab Take 1 tablet (50 mg total) by mouth once daily.     No current facility-administered medications for this visit.        Objective:      Physical Exam   Constitutional: He is oriented to person, place, and time. He appears well-nourished.   HENT:   Head: Normocephalic.   Eyes: Pupils are equal, round, and reactive to light.   Neck: No thyromegaly present.   Cardiovascular: Normal rate, regular rhythm and normal heart sounds.    Pulmonary/Chest: Effort normal and breath sounds normal. He has no wheezes.   Abdominal: Soft. He exhibits distension. He exhibits no mass. There is no tenderness.   Mild ascites.   Musculoskeletal: He exhibits edema.   Lymphadenopathy:     He has no cervical adenopathy.   Neurological: He is alert and oriented to person, place, and time.   Skin: Skin is warm. No rash noted. No erythema.   Psychiatric: He has a normal mood and affect. His behavior is normal.       Assessment:       1. Bipolar 1 disorder    2. Orthostatic hypotension    3. Pancytopenia    4. Type 2 diabetes mellitus without complication, without long-term current use of insulin    5. 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 Cirrhosis of liver with ascites, unspecified hepatic cirrhosis type        Plan:   - re-discussed the dx of cirrhosis and the implications of this  - no change in diuretic dose  - paracenteses every week- feels better when he gets albumin- will arrange for him to get albumin  infusion every time he gets a paracentesis regardless of the volume removed.  - on Cipro 500 mg weekly for SBP prophylaxis.  - delay EUS guided variceal injection per patient request because of concerns for infection.  - Dr Talavera re anal mass ?repeat colonoscopy  Clinic in 4-6  weeks       NB [employer]

## 2017-06-23 NOTE — Clinical Note
Aneudy, he would like to delay his next procedure for another month- he is concerned about infection.

## 2017-06-26 ENCOUNTER — HOSPITAL ENCOUNTER (OUTPATIENT)
Dept: INTERVENTIONAL RADIOLOGY/VASCULAR | Facility: HOSPITAL | Age: 77
Discharge: HOME | End: 2017-06-26
Attending: INTERNAL MEDICINE
Payer: MEDICARE

## 2017-06-26 VITALS
DIASTOLIC BLOOD PRESSURE: 63 MMHG | OXYGEN SATURATION: 100 % | HEART RATE: 55 BPM | RESPIRATION RATE: 16 BRPM | SYSTOLIC BLOOD PRESSURE: 114 MMHG

## 2017-06-26 DIAGNOSIS — R18.8 ASCITES OF LIVER: ICD-10-CM

## 2017-06-26 LAB
APPEARANCE FLD: NORMAL
BODY FLD TYPE: NORMAL
COLOR FLD: YELLOW
LYMPHOCYTES NFR FLD MANUAL: 62 %
MESOTHL CELL NFR FLD MANUAL: 3 %
MONOS+MACROS NFR FLD MANUAL: 27 %
NEUTROPHILS NFR FLD MANUAL: 8 %
WBC # FLD: 372 /CU MM

## 2017-06-26 PROCEDURE — P9047 ALBUMIN (HUMAN), 25%, 50ML: HCPCS | Performed by: INTERNAL MEDICINE

## 2017-06-26 PROCEDURE — 49083 ABD PARACENTESIS W/IMAGING: CPT | Mod: ,,, | Performed by: NURSE PRACTITIONER

## 2017-06-26 PROCEDURE — 63600175 PHARM REV CODE 636 W HCPCS: Performed by: INTERNAL MEDICINE

## 2017-06-26 PROCEDURE — C1729 CATH, DRAINAGE: HCPCS

## 2017-06-26 PROCEDURE — 89051 BODY FLUID CELL COUNT: CPT

## 2017-06-26 RX ORDER — ALBUMIN HUMAN 250 G/1000ML
25 SOLUTION INTRAVENOUS ONCE
Status: COMPLETED | OUTPATIENT
Start: 2017-06-26 | End: 2017-06-26

## 2017-06-26 RX ADMIN — ALBUMIN (HUMAN) 25 G: 25 SOLUTION INTRAVENOUS at 11:06

## 2017-06-26 NOTE — PROCEDURES
Radiology Post-Procedure Note    Pre Op Diagnosis: Ascites  Post Op Diagnosis: Same    Procedure: Ultrasound Guided Paracentesis    Procedure performed by: Leroy Pyle NP/ Chung Bass MD    Written Informed Consent Obtained: Yes  Specimen Removed: YES clear yellow fluid   Estimated Blood Loss: Minimal    Findings:   Successful paracentesis.  Albumin administered PRN per protocol.    Patient tolerated procedure well.    MATT Schmitz, ESTEFANYP  Interventional Radiology  [phone] [employer]

## 2017-06-26 NOTE — PROGRESS NOTES
Paracentesis finished at this time. Pt tolerated well. 5.5 liters removed from left abdomen. Dressing applied clean, dry and intact. 100cc of albumin given IV. IV discontinued with catheter intact. Patient given discharge instructions and verbalized understanding of at home care. Patient discharged home via wheelchair.

## 2017-06-26 NOTE — DISCHARGE INSTRUCTIONS
For scheduling: Call Mackenzie at [phone]    For questions or concerns call: [facility] 8 AM- 5PM [phone]. Radiology resident on call [phone].    For immediate concerns that are not emergent, you may call our radiology clinic at: [phone]

## 2017-06-26 NOTE — H&P
Radiology History & Physical      SUBJECTIVE:     Chief Complaint: Ascites    History of Present Illness:  Kenneth Sheikh is a 77 y.o. male who presents for ultrasound guided paracentesis  Past Medical History:   Diagnosis Date    Anticoagulant long-term use     Bipolar 1 disorder     Bipolar 1 disorder 11/24/2016    bipolar    Cancer     history of skin cancer/sinus cancer & rectal cancer    Depressed bipolar affective disorder     Diabetes mellitus     type 2    EBV infection 12/7/2016    EBV DNA, PCR Latest Ref Range: None detected  None detected EBV DNA-Copies/mL Unknown Test Not Performed EBV Early Antigen Ab, IgG Latest Ref Range: <1:10 Titer 1:40 (A) EBV Nuclear Ag Ab Latest Ref Range: <1:5 Titer >=1:80 (A) EBV VCA IgG Latest Ref Range: <1:10 Titer 1:160 (A) EBV VCA IgM Latest Ref Range: <1:10 Titer <1:10     History of TIA (transient ischemic attack)     several-taking Plavix    Hx of gallstones     Wife denies    Hypertension     Hyperthyroidism 11/30/2016    Low HDL (under 40) 12/7/2016    Mixed hyperlipidemia 11/23/2016    JUAN MANUEL (obstructive sleep apnea)     Stroke     Transient cerebral ischemia 7/22/2016     Past Surgical History:   Procedure Laterality Date    Moh's procedure x4      Sinus surgery for sinus cancer      sinus sx for cancer      skin cancer removed-several times-nose & ear      TONSILLECTOMY      Undescened testicle sx      UVULOPALATOPHARYNGOPLASTY      for sleep apnea       Home Meds:   Prior to Admission medications    Medication Sig Start Date End Date Taking? Authorizing Provider   ACCU-CHEK NEYDA PLUS TEST STRP Strp  2/23/17   Historical Provider, MD   ciprofloxacin HCl (CIPRO) 500 MG tablet Take 1 tablet (500 mg total) by mouth every Monday. 5/29/17   Renato Womack MD   divalproex (DEPAKOTE) 250 MG EC tablet  5/5/17   Historical Provider, MD   furosemide (LASIX) 20 MG tablet Take 4 tablets (80 mg total) by mouth once daily.  Patient taking differently: Take  40 mg by mouth once daily. Taking 40 mg per day 4/25/17 4/25/18  Renato Womack MD   lactulose (CHRONULAC) 10 gram/15 mL solution Take 15 mLs (10 g total) by mouth 2 (two) times daily. 12/20/16   Renato Womack MD   rifAXIMin (XIFAXAN) 550 mg Tab Take 1 tablet (550 mg total) by mouth 2 (two) times daily. 2/21/17   Renato Womack MD   SITagliptan (JANUVIA) 50 MG Tab Take 1 tablet (50 mg total) by mouth once daily. 4/6/17   Prisca Espinoza MD     Anticoagulants/Antiplatelets: no anticoagulation    Allergies:   Review of patient's allergies indicates:   Allergen Reactions    Abilify [aripiprazole] Other (See Comments)     Sleepy, could not function.     Sedation History:  no adverse reactions    Review of Systems:   Hematological: no known coagulopathies  Respiratory: no shortness of breath  Cardiovascular: no chest pain  Gastrointestinal: no abdominal pain  Genito-Urinary: no dysuria  Musculoskeletal: negative  Neurological: no TIA or stroke symptoms         OBJECTIVE:     Vital Signs (Most Recent)  Pulse: (!) 59 (06/26/17 1023)  Resp: 16 (06/26/17 1023)  BP: (!) 130/58 (06/26/17 1023)  SpO2: 98 % (06/26/17 1023)    Physical Exam:  ASA: 3  Mallampati: na    General: no acute distress  Mental Status: alert and oriented to person, place and time  HEENT: normocephalic, atraumatic  Chest: unlabored breathing  Heart: regular heart rate  Abdomen: distended  Extremity: moves all extremities    Laboratory  Lab Results   Component Value Date    INR 1.1 06/12/2017       Lab Results   Component Value Date    WBC 3.07 (L) 06/19/2017    HGB 10.2 (L) 06/19/2017    HCT 31.8 (L) 06/19/2017    MCV 86 06/19/2017     (L) 06/19/2017      Lab Results   Component Value Date     (H) 06/16/2017     06/16/2017    K 4.0 06/16/2017     06/16/2017    CO2 28 06/16/2017    BUN 24 (H) 06/16/2017    CREATININE 1.5 (H) 06/16/2017    CALCIUM 8.9 06/16/2017    MG 1.7 05/15/2017    ALT 8 (L) 05/24/2017     AST 26 05/24/2017    ALBUMIN 2.8 (L) 05/24/2017    BILITOT 0.4 05/24/2017       ASSESSMENT/PLAN:     Sedation Plan: local  Patient will undergo ultrasound guided paracentesis.    MATT Schmitz, FNP  Interventional Radiology  [phone] [employer]

## 2017-07-03 ENCOUNTER — HOSPITAL ENCOUNTER (OUTPATIENT)
Dept: INTERVENTIONAL RADIOLOGY/VASCULAR | Facility: HOSPITAL | Age: 77
Discharge: HOME | End: 2017-07-03
Attending: INTERNAL MEDICINE
Payer: MEDICARE

## 2017-07-03 VITALS
OXYGEN SATURATION: 100 % | RESPIRATION RATE: 18 BRPM | HEART RATE: 73 BPM | DIASTOLIC BLOOD PRESSURE: 78 MMHG | SYSTOLIC BLOOD PRESSURE: 137 MMHG

## 2017-07-03 DIAGNOSIS — R18.8 ASCITES OF LIVER: ICD-10-CM

## 2017-07-03 PROCEDURE — 49083 ABD PARACENTESIS W/IMAGING: CPT | Mod: ,,, | Performed by: FAMILY MEDICINE

## 2017-07-03 PROCEDURE — C1729 CATH, DRAINAGE: HCPCS

## 2017-07-03 NOTE — H&P
Radiology History & Physical      SUBJECTIVE:     Chief Complaint: ascites    History of Present Illness:  Kenneth Sheikh is a 77 y.o. male who presents for ultrasound guided paracentesis  Past Medical History:   Diagnosis Date    Anticoagulant long-term use     Bipolar 1 disorder     Bipolar 1 disorder 11/24/2016    bipolar    Cancer     history of skin cancer/sinus cancer & rectal cancer    Depressed bipolar affective disorder     Diabetes mellitus     type 2    EBV infection 12/7/2016    EBV DNA, PCR Latest Ref Range: None detected  None detected EBV DNA-Copies/mL Unknown Test Not Performed EBV Early Antigen Ab, IgG Latest Ref Range: <1:10 Titer 1:40 (A) EBV Nuclear Ag Ab Latest Ref Range: <1:5 Titer >=1:80 (A) EBV VCA IgG Latest Ref Range: <1:10 Titer 1:160 (A) EBV VCA IgM Latest Ref Range: <1:10 Titer <1:10     History of TIA (transient ischemic attack)     several-taking Plavix    Hx of gallstones     Wife denies    Hypertension     Hyperthyroidism 11/30/2016    Low HDL (under 40) 12/7/2016    Mixed hyperlipidemia 11/23/2016    JUAN MANUEL (obstructive sleep apnea)     Stroke     Transient cerebral ischemia 7/22/2016     Past Surgical History:   Procedure Laterality Date    Moh's procedure x4      Sinus surgery for sinus cancer      sinus sx for cancer      skin cancer removed-several times-nose & ear      TONSILLECTOMY      Undescened testicle sx      UVULOPALATOPHARYNGOPLASTY      for sleep apnea       Home Meds:   Prior to Admission medications    Medication Sig Start Date End Date Taking? Authorizing Provider   ACCU-CHEK NEYDA PLUS TEST STRP Strp  2/23/17   Historical Provider, MD   ciprofloxacin HCl (CIPRO) 500 MG tablet Take 1 tablet (500 mg total) by mouth every Monday. 5/29/17   Renato Womack MD   divalproex (DEPAKOTE) 250 MG EC tablet  5/5/17   Historical Provider, MD   furosemide (LASIX) 20 MG tablet Take 4 tablets (80 mg total) by mouth once daily.  Patient taking differently: Take  40 mg by mouth once daily. Taking 40 mg per day 4/25/17 4/25/18  Renato Womack MD   lactulose (CHRONULAC) 10 gram/15 mL solution Take 15 mLs (10 g total) by mouth 2 (two) times daily. 12/20/16   Renato Womack MD   rifAXIMin (XIFAXAN) 550 mg Tab Take 1 tablet (550 mg total) by mouth 2 (two) times daily. 2/21/17   Renato Womack MD   SITagliptan (JANUVIA) 50 MG Tab Take 1 tablet (50 mg total) by mouth once daily. 4/6/17   Prisca Espinoza MD     Anticoagulants/Antiplatelets: no anticoagulation    Allergies:   Review of patient's allergies indicates:   Allergen Reactions    Abilify [aripiprazole] Other (See Comments)     Sleepy, could not function.     Sedation History:  no adverse reactions    Review of Systems:   Hematological: no known coagulopathies  Respiratory: no shortness of breath  Cardiovascular: no chest pain  Gastrointestinal: no abdominal pain  Genito-Urinary: no dysuria  Musculoskeletal: negative  Neurological: no TIA or stroke symptoms         OBJECTIVE:     Vital Signs (Most Recent)       Physical Exam:  ASA: 2  Mallampati: n/a    General: no acute distress  Mental Status: alert and oriented to person, place and time  HEENT: normocephalic, atraumatic  Chest: unlabored breathing  Heart: regular heart rate  Abdomen: distended  Extremity: moves all extremities    Laboratory  Lab Results   Component Value Date    INR 1.1 06/12/2017       Lab Results   Component Value Date    WBC 3.07 (L) 06/19/2017    HGB 10.2 (L) 06/19/2017    HCT 31.8 (L) 06/19/2017    MCV 86 06/19/2017     (L) 06/19/2017      Lab Results   Component Value Date     (H) 06/16/2017     06/16/2017    K 4.0 06/16/2017     06/16/2017    CO2 28 06/16/2017    BUN 24 (H) 06/16/2017    CREATININE 1.5 (H) 06/16/2017    CALCIUM 8.9 06/16/2017    MG 1.7 05/15/2017    ALT 8 (L) 05/24/2017    AST 26 05/24/2017    ALBUMIN 2.8 (L) 05/24/2017    BILITOT 0.4 05/24/2017       ASSESSMENT/PLAN:     Sedation Plan:  local  Patient will undergo ultrasound guided paracentesis.    MATT Mauricio, FNP  Interventional Radiology  [phone] [employer]

## 2017-07-03 NOTE — PROGRESS NOTES
Paracentesis complete, 5400 MLs removed.  100 Albumin administered per MD request. Dressing applied to RLQ puncture site, dressing clean dry and intact. Pt given discharge instructions and handouts, pt verbalizes understanding. Questions answered. Pt denies pain and discomfort. Pt refusing transport. Ptto lobby for transport home with family.

## 2017-07-03 NOTE — DISCHARGE INSTRUCTIONS
For scheduling: Call Mackenzie at [phone]    For questions or concerns call: SHAR MON[facility] 8 AM- 5PM [phone]. Radiology resident on call [phone].    For immediate concerns that are not emergent, you may call our radiology clinic at: [phone]

## 2017-07-03 NOTE — PROCEDURES
Radiology Post-Procedure Note    Pre Op Diagnosis: Ascites  Post Op Diagnosis: Same    Procedure: Ultrasound Guided Paracentesis    Procedure performed by: Souleymane ROSS, Елена     Written Informed Consent Obtained: Yes  Specimen Removed: YES clear yellow fluid  Estimated Blood Loss: Minimal    Findings:   Successful paracentesis.  Albumin administered PRN per protocol.    Patient tolerated procedure well.    Елена Comer, APRN, FNP  Interventional Radiology  [phone] [employer]

## 2017-07-11 ENCOUNTER — HOSPITAL ENCOUNTER (OUTPATIENT)
Dept: INTERVENTIONAL RADIOLOGY/VASCULAR | Facility: HOSPITAL | Age: 77
Discharge: HOME | End: 2017-07-11
Attending: INTERNAL MEDICINE
Payer: MEDICARE

## 2017-07-11 VITALS
RESPIRATION RATE: 18 BRPM | HEART RATE: 58 BPM | SYSTOLIC BLOOD PRESSURE: 110 MMHG | DIASTOLIC BLOOD PRESSURE: 54 MMHG | OXYGEN SATURATION: 100 %

## 2017-07-11 DIAGNOSIS — R18.8 ASCITES OF LIVER: ICD-10-CM

## 2017-07-11 PROCEDURE — P9047 ALBUMIN (HUMAN), 25%, 50ML: HCPCS | Performed by: INTERNAL MEDICINE

## 2017-07-11 PROCEDURE — 49083 ABD PARACENTESIS W/IMAGING: CPT | Mod: ,,, | Performed by: FAMILY MEDICINE

## 2017-07-11 PROCEDURE — 63600175 PHARM REV CODE 636 W HCPCS: Performed by: INTERNAL MEDICINE

## 2017-07-11 PROCEDURE — C1729 CATH, DRAINAGE: HCPCS

## 2017-07-11 RX ORDER — ALBUMIN HUMAN 250 G/1000ML
25 SOLUTION INTRAVENOUS ONCE
Status: COMPLETED | OUTPATIENT
Start: 2017-07-11 | End: 2017-07-11

## 2017-07-11 RX ADMIN — ALBUMIN (HUMAN) 25 G: 25 SOLUTION INTRAVENOUS at 01:07

## 2017-07-11 NOTE — PROCEDURES
Radiology Post-Procedure Note    Pre Op Diagnosis: Ascites  Post Op Diagnosis: Same    Procedure: Ultrasound Guided Paracentesis    Procedure performed by: Souleymane ROSS, Елена     Written Informed Consent Obtained: Yes  Specimen Removed: YES clear yellow fluid  Estimated Blood Loss: Minimal    Findings:   Successful paracentesis.  Albumin administered PRN per protocol.    Patient tolerated procedure well.    Елена Comer, APRN, FNP  Interventional Radiology  [phone] [employer]

## 2017-07-11 NOTE — DISCHARGE INSTRUCTIONS
For scheduling: Call Mackenzie at [phone]    For questions or concerns call: SHAR [facility] 8 AM- 5PM [phone]. Radiology resident on call [phone].    For immediate concerns that are not emergent, you may call our radiology clinic at: [phone]

## 2017-07-11 NOTE — PROGRESS NOTES
Paracentesis complete. 5700 mLs peritoneal fluid drained. Pt tolerated well. Dressing to rlq clean, dry, and intact. Albumin 25% given, 100 mLs. NAD noted.   Pt given discharge instructions.  Verbalized understanding.  Pt escorted to lobby area via wheelchair.

## 2017-07-11 NOTE — PROGRESS NOTES
Pt arrived to  for paracentesis.  Name verified using two identifiers.  Allergies verified.  Will continue to monitor.

## 2017-07-11 NOTE — H&P
Radiology History & Physical      SUBJECTIVE:     Chief Complaint: ascites    History of Present Illness:  Kenneth Sheikh is a 77 y.o. male who presents for ultrasound guided paracentesis  Past Medical History:   Diagnosis Date    Anticoagulant long-term use     Bipolar 1 disorder     Bipolar 1 disorder 11/24/2016    bipolar    Cancer     history of skin cancer/sinus cancer & rectal cancer    Depressed bipolar affective disorder     Diabetes mellitus     type 2    EBV infection 12/7/2016    EBV DNA, PCR Latest Ref Range: None detected  None detected EBV DNA-Copies/mL Unknown Test Not Performed EBV Early Antigen Ab, IgG Latest Ref Range: <1:10 Titer 1:40 (A) EBV Nuclear Ag Ab Latest Ref Range: <1:5 Titer >=1:80 (A) EBV VCA IgG Latest Ref Range: <1:10 Titer 1:160 (A) EBV VCA IgM Latest Ref Range: <1:10 Titer <1:10     History of TIA (transient ischemic attack)     several-taking Plavix    Hx of gallstones     Wife denies    Hypertension     Hyperthyroidism 11/30/2016    Low HDL (under 40) 12/7/2016    Mixed hyperlipidemia 11/23/2016    JUAN MANUEL (obstructive sleep apnea)     Stroke     Transient cerebral ischemia 7/22/2016     Past Surgical History:   Procedure Laterality Date    Moh's procedure x4      Sinus surgery for sinus cancer      sinus sx for cancer      skin cancer removed-several times-nose & ear      TONSILLECTOMY      Undescened testicle sx      UVULOPALATOPHARYNGOPLASTY      for sleep apnea       Home Meds:   Prior to Admission medications    Medication Sig Start Date End Date Taking? Authorizing Provider   ACCU-CHEK NEYDA PLUS TEST STRP Strp  2/23/17   Historical Provider, MD   ciprofloxacin HCl (CIPRO) 500 MG tablet Take 1 tablet (500 mg total) by mouth every Monday. 5/29/17   Renato Womack MD   divalproex (DEPAKOTE) 250 MG EC tablet  5/5/17   Historical Provider, MD   furosemide (LASIX) 20 MG tablet Take 4 tablets (80 mg total) by mouth once daily.  Patient taking differently: Take  40 mg by mouth once daily. Taking 40 mg per day 4/25/17 4/25/18  Renato Womack MD   lactulose (CHRONULAC) 10 gram/15 mL solution Take 15 mLs (10 g total) by mouth 2 (two) times daily. 12/20/16   Renato Womack MD   rifAXIMin (XIFAXAN) 550 mg Tab Take 1 tablet (550 mg total) by mouth 2 (two) times daily. 2/21/17   Renato Womack MD   SITagliptan (JANUVIA) 50 MG Tab Take 1 tablet (50 mg total) by mouth once daily. 4/6/17   Prisca Espinoza MD     Anticoagulants/Antiplatelets: no anticoagulation    Allergies:   Review of patient's allergies indicates:   Allergen Reactions    Abilify [aripiprazole] Other (See Comments)     Sleepy, could not function.     Sedation History:  no adverse reactions    Review of Systems:   Hematological: no known coagulopathies  Respiratory: no shortness of breath  Cardiovascular: no chest pain  Gastrointestinal: no abdominal pain  Genito-Urinary: no dysuria  Musculoskeletal: negative  Neurological: no TIA or stroke symptoms         OBJECTIVE:     Vital Signs (Most Recent)       Physical Exam:  ASA: 2  Mallampati: n/a    General: no acute distress  Mental Status: alert and oriented to person, place and time  HEENT: normocephalic, atraumatic  Chest: unlabored breathing  Heart: regular heart rate  Abdomen: distended  Extremity: moves all extremities    Laboratory  Lab Results   Component Value Date    INR 1.1 06/12/2017       Lab Results   Component Value Date    WBC 3.07 (L) 06/19/2017    HGB 10.2 (L) 06/19/2017    HCT 31.8 (L) 06/19/2017    MCV 86 06/19/2017     (L) 06/19/2017      Lab Results   Component Value Date     (H) 06/16/2017     06/16/2017    K 4.0 06/16/2017     06/16/2017    CO2 28 06/16/2017    BUN 24 (H) 06/16/2017    CREATININE 1.5 (H) 06/16/2017    CALCIUM 8.9 06/16/2017    MG 1.7 05/15/2017    ALT 8 (L) 05/24/2017    AST 26 05/24/2017    ALBUMIN 2.8 (L) 05/24/2017    BILITOT 0.4 05/24/2017       ASSESSMENT/PLAN:     Sedation Plan:  local  Patient will undergo ultrasound guided paracentesis.    MATT Mauricio, FNP  Interventional Radiology  [phone] [employer]

## 2017-07-13 ENCOUNTER — OFFICE VISIT (OUTPATIENT)
Dept: INTERNAL MEDICINE | Facility: CLINIC | Age: 77
End: 2017-07-13
Payer: MEDICARE

## 2017-07-13 VITALS
HEIGHT: 68 IN | HEART RATE: 58 BPM | BODY MASS INDEX: 24.79 KG/M2 | SYSTOLIC BLOOD PRESSURE: 113 MMHG | WEIGHT: 163.56 LBS | DIASTOLIC BLOOD PRESSURE: 57 MMHG

## 2017-07-13 DIAGNOSIS — C20 RECTAL CANCER: ICD-10-CM

## 2017-07-13 DIAGNOSIS — F31.9 BIPOLAR 1 DISORDER: ICD-10-CM

## 2017-07-13 DIAGNOSIS — E11.9 TYPE 2 DIABETES MELLITUS WITHOUT COMPLICATION, WITHOUT LONG-TERM CURRENT USE OF INSULIN: ICD-10-CM

## 2017-07-13 DIAGNOSIS — K74.60 LIVER CIRRHOSIS SECONDARY TO NASH (NONALCOHOLIC STEATOHEPATITIS): Primary | ICD-10-CM

## 2017-07-13 DIAGNOSIS — K75.81 LIVER CIRRHOSIS SECONDARY TO NASH (NONALCOHOLIC STEATOHEPATITIS): Primary | ICD-10-CM

## 2017-07-13 DIAGNOSIS — M54.50 CHRONIC MIDLINE LOW BACK PAIN WITHOUT SCIATICA: ICD-10-CM

## 2017-07-13 DIAGNOSIS — D61.818 PANCYTOPENIA: ICD-10-CM

## 2017-07-13 DIAGNOSIS — G89.29 CHRONIC MIDLINE LOW BACK PAIN WITHOUT SCIATICA: ICD-10-CM

## 2017-07-13 PROBLEM — A40.9 STREPTOCOCCAL SEPSIS: Status: RESOLVED | Noted: 2017-05-16 | Resolved: 2017-07-13

## 2017-07-13 PROBLEM — R78.81 BACTEREMIA: Status: RESOLVED | Noted: 2017-05-16 | Resolved: 2017-07-13

## 2017-07-13 PROCEDURE — 1157F ADVNC CARE PLAN IN RCRD: CPT | Mod: S$GLB,,, | Performed by: INTERNAL MEDICINE

## 2017-07-13 PROCEDURE — 99999 PR PBB SHADOW E&M-EST. PATIENT-LVL III: CPT | Mod: PBBFAC,,, | Performed by: INTERNAL MEDICINE

## 2017-07-13 PROCEDURE — 1126F AMNT PAIN NOTED NONE PRSNT: CPT | Mod: S$GLB,,, | Performed by: INTERNAL MEDICINE

## 2017-07-13 PROCEDURE — 99214 OFFICE O/P EST MOD 30 MIN: CPT | Mod: S$GLB,,, | Performed by: INTERNAL MEDICINE

## 2017-07-13 PROCEDURE — 1159F MED LIST DOCD IN RCRD: CPT | Mod: S$GLB,,, | Performed by: INTERNAL MEDICINE

## 2017-07-13 NOTE — Clinical Note
Dr. Talavera,  You last saw this patient in March and wanted repeat exam in 3 months for possible excision.  Wife does not believe that was done during endoscopy with Dr. Perla.  They are wondering when you would like to see them?   Thanks so much, Prisca Espinoza

## 2017-07-13 NOTE — PROGRESS NOTES
Internal Medicine    Subjective:      Patient ID: Kenneth Sheikh is a 77 y.o. male.    Chief Complaint: Follow-up    Presents for follow up appointment.  Last seen 6/9/17.      Cirrhosis 2/2 MCDONALD:  Losartan stopped and Lasix decreased to 40mg daily after last appointment due to hypotension and BELLA.  Patient states he is doing well with the medication changes and denies worsening edema.  Repeat BMP on 6/16/17 with improved Cr of 1.5 down from 2.2.  Followed by Dr. Womack - last seen 6/25/17.  Treated by Dr. Perla endoscopically but will need another endoscopic session - this was put on hold due to recent Strep mitis sepsis.  Taking Rifaximin twice daily - has BM every day.  Getting paracentesis weekly.  Last paracentesis 7/11/17 - drained 5.7L.      H/o Rectal cancer s/p resection 06/2016:  Last seen by colorectal surgery (Dr. Talavera) on 3/14/17.  Plan was for repeat exam in 3 months during EGD with Dr. Perla, however wife reports this did not happen.  If granulation present during exam, plan was for snare excision with full bowel prep.    DM-2:  Off metformin due to elevated lactate and diarrhea.  Now taking Januvia 50mg daily by Dr. Sheikh.  Denies hyperglycemia or hypoglycemia.       Pancytopenia:  Followed by Heme-Onc.  Last seen 2/21/17.  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      HLD:  Stopped fenofibrate per Dr. Sheikh due to low cholesterol.  No longer on medication.     TIAs:  No symptoms recently.  No longer taking ASA.     Bipolar d/o:  Taking Depakote 500mg qHS.  Followed by psychiatrist, Dr. Figueroa.     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 Camacho.  Had recent Mohs at [facility].     Neck pain:  Patient reports neck pain has improved.  States he has lower back pain.  Sits on the couch all day per wife.  States he rarely moves around.  Patient declining  "referral to [facility] or PT.  Says he will start walking on his own.        Review of Systems   Constitutional: Positive for fatigue (Chronic). Negative for appetite change, chills, fever and unexpected weight change.   HENT: Negative for sore throat and trouble swallowing.    Eyes: Negative for visual disturbance.   Respiratory: Negative for cough, chest tightness, shortness of breath and wheezing.    Cardiovascular: Negative for chest pain, palpitations and leg swelling.   Gastrointestinal: Positive for abdominal distention (Off and on in between paracenteses). Negative for abdominal pain, blood in stool, constipation, diarrhea, nausea and vomiting.   Genitourinary: Negative for difficulty urinating.   Musculoskeletal: Positive for back pain. Negative for arthralgias, myalgias and neck pain.   Skin: Negative for rash and wound.   Neurological: Positive for weakness (Chronic). Negative for dizziness, numbness and headaches.   Hematological: Negative for adenopathy.   Psychiatric/Behavioral: Negative for behavioral problems, confusion and dysphoric mood. The patient is not nervous/anxious.        Past medical history, surgical history, and family medical history reviewed and updated as appropriate.    Medications and allergies reviewed.     Objective:     BP (!) 113/57   Pulse (!) 58   Ht 5' 8" (1.727 m)   Wt 74.2 kg (163 lb 9.3 oz)   BMI 24.87 kg/m²   Physical Exam   Constitutional: He is oriented to person, place, and time. He appears well-developed and well-nourished. No distress.   HENT:   Head: Normocephalic and atraumatic.   Eyes: Conjunctivae and EOM are normal.   Cardiovascular: Normal rate and regular rhythm.  Exam reveals no gallop and no friction rub.    No murmur heard.  Pulmonary/Chest: Effort normal and breath sounds normal. No respiratory distress. He has no wheezes. He has no rales.   Abdominal: Soft. He exhibits no distension. There is no tenderness.   Musculoskeletal: Normal range of motion. " He exhibits no edema or tenderness.   Neurological: He is alert and oriented to person, place, and time. No cranial nerve deficit.   Skin: No rash noted. No erythema.   Psychiatric: He has a normal mood and affect. His behavior is normal.       RESULTS:   Lab Results   Component Value Date    WBC 3.07 (L) 06/19/2017    HGB 10.2 (L) 06/19/2017    HCT 31.8 (L) 06/19/2017    MCV 86 06/19/2017     (L) 06/19/2017     BMP  Lab Results   Component Value Date     06/16/2017    K 4.0 06/16/2017     06/16/2017    CO2 28 06/16/2017    BUN 24 (H) 06/16/2017    CREATININE 1.5 (H) 06/16/2017    CALCIUM 8.9 06/16/2017    ANIONGAP 8 06/16/2017    ESTGFRAFRICA 50 (A) 06/16/2017    EGFRNONAA 44 (A) 06/16/2017     Lab Results   Component Value Date    ALT 8 (L) 05/24/2017    AST 26 05/24/2017    ALKPHOS 135 05/24/2017    BILITOT 0.4 05/24/2017     Lab Results   Component Value Date    TSH 0.435 11/30/2016       Assessment:     1. 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 Rectal cancer    3. Type 2 diabetes mellitus without complication, without long-term current use of insulin    4. Pancytopenia    5. Bipolar 1 disorder    6. Chronic midline low back pain without sciatica        Plan:   Kenneth was seen today for follow-up.    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    Rectal cancer   Will send message to Dr. Talavera regarding plan for follow up.      Type 2 diabetes mellitus without complication, without long-term current use of insulin   Continue medication/plan as discussed in HPI.    Pancytopenia   Continue to monitor.    Bipolar 1 disorder   Continue medication/plan as discussed in HPI.    Chronic midline low back pain without sciatica   Declining referral to [facility] or PT.  Plans to start walking on his own.      Health maintenance reviewed with patient.  Diabetic  foot and eye exam and next appointment.    Return in about 2 months (around 9/13/2017).    Prisca Espinoza MD  Internal Medicine  [facility]

## 2017-07-17 ENCOUNTER — TELEPHONE (OUTPATIENT)
Dept: INTERVENTIONAL RADIOLOGY/VASCULAR | Facility: HOSPITAL | Age: 77
End: 2017-07-17

## 2017-07-17 DIAGNOSIS — R18.8 ASCITES OF LIVER: Primary | ICD-10-CM

## 2017-07-18 ENCOUNTER — RESEARCH ENCOUNTER (OUTPATIENT)
Dept: RESEARCH | Facility: HOSPITAL | Age: 77
End: 2017-07-18

## 2017-07-18 ENCOUNTER — HOSPITAL ENCOUNTER (OUTPATIENT)
Dept: INTERVENTIONAL RADIOLOGY/VASCULAR | Facility: HOSPITAL | Age: 77
Discharge: HOME | End: 2017-07-18
Attending: INTERNAL MEDICINE
Payer: MEDICARE

## 2017-07-18 VITALS
HEART RATE: 58 BPM | OXYGEN SATURATION: 100 % | RESPIRATION RATE: 18 BRPM | DIASTOLIC BLOOD PRESSURE: 68 MMHG | SYSTOLIC BLOOD PRESSURE: 128 MMHG

## 2017-07-18 DIAGNOSIS — R18.8 ASCITES OF LIVER: ICD-10-CM

## 2017-07-18 PROCEDURE — 63600175 PHARM REV CODE 636 W HCPCS: Performed by: INTERNAL MEDICINE

## 2017-07-18 PROCEDURE — P9047 ALBUMIN (HUMAN), 25%, 50ML: HCPCS | Performed by: INTERNAL MEDICINE

## 2017-07-18 PROCEDURE — C1729 CATH, DRAINAGE: HCPCS

## 2017-07-18 PROCEDURE — 49083 ABD PARACENTESIS W/IMAGING: CPT | Mod: ,,, | Performed by: FAMILY MEDICINE

## 2017-07-18 RX ORDER — ALBUMIN HUMAN 250 G/1000ML
25 SOLUTION INTRAVENOUS
Status: DISCONTINUED | OUTPATIENT
Start: 2017-07-18 | End: 2017-07-19 | Stop reason: HOSPADM

## 2017-07-18 RX ADMIN — ALBUMIN (HUMAN) 25 G: 25 SOLUTION INTRAVENOUS at 11:07

## 2017-07-18 RX ADMIN — ALBUMIN (HUMAN) 12.5 G: 25 SOLUTION INTRAVENOUS at 11:07

## 2017-07-18 NOTE — PROGRESS NOTES
Pt to rm 125. M Wright NP to bedside. Yueh Catheter placed to RLQ with use of ultrasound guidance. No complaints or signs of distress. Will continue to monitor.

## 2017-07-18 NOTE — H&P
Radiology History & Physical      SUBJECTIVE:     Chief Complaint: ascites    History of Present Illness:  Kenneth Sheikh is a 77 y.o. male who presents for ultrasound guided paracentesis  Past Medical History:   Diagnosis Date    Anticoagulant long-term use     Bipolar 1 disorder     Bipolar 1 disorder 11/24/2016    bipolar    Cancer     history of skin cancer/sinus cancer & rectal cancer    Depressed bipolar affective disorder     Diabetes mellitus     type 2    EBV infection 12/7/2016    EBV DNA, PCR Latest Ref Range: None detected  None detected EBV DNA-Copies/mL Unknown Test Not Performed EBV Early Antigen Ab, IgG Latest Ref Range: <1:10 Titer 1:40 (A) EBV Nuclear Ag Ab Latest Ref Range: <1:5 Titer >=1:80 (A) EBV VCA IgG Latest Ref Range: <1:10 Titer 1:160 (A) EBV VCA IgM Latest Ref Range: <1:10 Titer <1:10     History of TIA (transient ischemic attack)     several-taking Plavix    Hx of gallstones     Wife denies    Hypertension     Hyperthyroidism 11/30/2016    Low HDL (under 40) 12/7/2016    Mixed hyperlipidemia 11/23/2016    JUAN MANUEL (obstructive sleep apnea)     Stroke     Transient cerebral ischemia 7/22/2016     Past Surgical History:   Procedure Laterality Date    Moh's procedure x4      Sinus surgery for sinus cancer      sinus sx for cancer      skin cancer removed-several times-nose & ear      TONSILLECTOMY      Undescened testicle sx      UVULOPALATOPHARYNGOPLASTY      for sleep apnea       Home Meds:   Prior to Admission medications    Medication Sig Start Date End Date Taking? Authorizing Provider   ACCU-CHEK NEYDA PLUS TEST STRP Strp  2/23/17   Historical Provider, MD   ciprofloxacin HCl (CIPRO) 500 MG tablet Take 1 tablet (500 mg total) by mouth every Monday. 5/29/17   Renato Womack MD   divalproex (DEPAKOTE) 250 MG EC tablet  5/5/17   Historical Provider, MD   furosemide (LASIX) 20 MG tablet Take 4 tablets (80 mg total) by mouth once daily.  Patient taking differently: Take  40 mg by mouth once daily. Taking 40 mg per day 4/25/17 4/25/18  Renato Womack MD   lactulose (CHRONULAC) 10 gram/15 mL solution Take 15 mLs (10 g total) by mouth 2 (two) times daily. 12/20/16   Renato Womack MD   rifAXIMin (XIFAXAN) 550 mg Tab Take 1 tablet (550 mg total) by mouth 2 (two) times daily. 2/21/17   Renato Womack MD   SITagliptan (JANUVIA) 50 MG Tab Take 1 tablet (50 mg total) by mouth once daily. 4/6/17   Prisca Espinoza MD     Anticoagulants/Antiplatelets: no anticoagulation    Allergies:   Review of patient's allergies indicates:   Allergen Reactions    Abilify [aripiprazole] Other (See Comments)     Sleepy, could not function.     Sedation History:  no adverse reactions    Review of Systems:   Hematological: no known coagulopathies  Respiratory: no shortness of breath  Cardiovascular: no chest pain  Gastrointestinal: no abdominal pain  Genito-Urinary: no dysuria  Musculoskeletal: negative  Neurological: no TIA or stroke symptoms         OBJECTIVE:     Vital Signs (Most Recent)       Physical Exam:  ASA: 2  Mallampati: n/a    General: no acute distress  Mental Status: alert and oriented to person, place and time  HEENT: normocephalic, atraumatic  Chest: unlabored breathing  Heart: regular heart rateloa  Abdomen: distended  Extremity: moves all extremities    Laboratory  Lab Results   Component Value Date    INR 1.0 07/18/2017       Lab Results   Component Value Date    WBC 3.83 (L) 07/18/2017    HGB 10.9 (L) 07/18/2017    HCT 34.0 (L) 07/18/2017    MCV 87 07/18/2017     (L) 07/18/2017      Lab Results   Component Value Date     (H) 06/16/2017     06/16/2017    K 4.0 06/16/2017     06/16/2017    CO2 28 06/16/2017    BUN 24 (H) 06/16/2017    CREATININE 1.5 (H) 06/16/2017    CALCIUM 8.9 06/16/2017    MG 1.7 05/15/2017    ALT 8 (L) 05/24/2017    AST 26 05/24/2017    ALBUMIN 2.8 (L) 05/24/2017    BILITOT 0.4 05/24/2017       ASSESSMENT/PLAN:     Sedation  Plan: local  Patient will undergo ultrasound guided paracentesis.    MATT Mauricio, FNP  Interventional Radiology  [phone] [employer]

## 2017-07-18 NOTE — PROGRESS NOTES
Pt alone was approached by Елена Perez in IR regarding participation in [employer] Research study (IRB#2015.036.B). The specific study project discussed was [id number].  The study discussed involves pts who are having a paracentesis procedure performed and do not have active infectious disease.  Pt was agreeable to participate.     The ICF was reviewed with pt. The discussion included:   - participation is voluntary;   - pt can change his mind about participating at any time;   - if he changes his mind about participation, he can call us at contact info in ICF and we will discard samples remaining;   - samples that have been used prior to our notification (such as samples collected and shipped the same day) will still be included in research;   - specimens collected will include excess ascites fluid from paracentesis procedure;   - specimens will be stored and shipped with unique code that can only be linked to pt by [facility] staff;   - all medical information released to researchers will be stripped of identifiers;   - de-identified samples will be released to outside researchers;   - there is a small risk of loss of confidentiality, but we make every effort to ensure privacy;   - no other physical risks outside of those involved in standard of care procedure;  - pt will be compensated with a $25.00 stipend if all study-related procedures are fully completed per each study protocol.     Pt did not have any questions.  Pt also agreed to store collected specimens at the [facility] if [facility] does not accept them due to their inclusion/exclusion criteria.  Pt willingly and independently signed ICF for protocol 2015.036.B. A copy of signed ICF will be mailed to pt with instructions to call with any questions that may arise or if she should change her mind regarding participation in [facility] program.

## 2017-07-18 NOTE — PROCEDURES
Radiology Post-Procedure Note    Pre Op Diagnosis: Ascites  Post Op Diagnosis: Same    Procedure: Ultrasound Guided Paracentesis    Procedure performed by: Souleymane ROSS, Елена     Written Informed Consent Obtained: Yes  Specimen Removed: YES clear yellow fluid  Estimated Blood Loss: Minimal    Findings:   Successful paracentesis.  Albumin administered PRN per protocol.    Patient tolerated procedure well.    Елена Comer, APRN, FNP  Interventional Radiology  [phone] [employer]

## 2017-07-21 DIAGNOSIS — E11.9 TYPE 2 DIABETES MELLITUS WITHOUT COMPLICATION: ICD-10-CM

## 2017-07-26 ENCOUNTER — HOSPITAL ENCOUNTER (OUTPATIENT)
Dept: INTERVENTIONAL RADIOLOGY/VASCULAR | Facility: HOSPITAL | Age: 77
Discharge: HOME | End: 2017-07-26
Attending: INTERNAL MEDICINE
Payer: MEDICARE

## 2017-07-26 VITALS
OXYGEN SATURATION: 99 % | HEART RATE: 62 BPM | RESPIRATION RATE: 20 BRPM | SYSTOLIC BLOOD PRESSURE: 134 MMHG | DIASTOLIC BLOOD PRESSURE: 77 MMHG

## 2017-07-26 DIAGNOSIS — R18.8 ASCITES OF LIVER: ICD-10-CM

## 2017-07-26 PROCEDURE — C1729 CATH, DRAINAGE: HCPCS

## 2017-07-26 PROCEDURE — P9047 ALBUMIN (HUMAN), 25%, 50ML: HCPCS | Performed by: INTERNAL MEDICINE

## 2017-07-26 PROCEDURE — A7048 VACUUM DRAIN BOTTLE/TUBE KIT: HCPCS

## 2017-07-26 PROCEDURE — 49083 ABD PARACENTESIS W/IMAGING: CPT | Mod: ,,, | Performed by: NURSE PRACTITIONER

## 2017-07-26 PROCEDURE — 63600175 PHARM REV CODE 636 W HCPCS: Performed by: INTERNAL MEDICINE

## 2017-07-26 RX ORDER — ALBUMIN HUMAN 250 G/1000ML
25 SOLUTION INTRAVENOUS CONTINUOUS PRN
Status: DISCONTINUED | OUTPATIENT
Start: 2017-07-26 | End: 2017-07-27 | Stop reason: HOSPADM

## 2017-07-26 RX ADMIN — ALBUMIN (HUMAN) 12.5 G: 25 SOLUTION INTRAVENOUS at 12:07

## 2017-07-26 RX ADMIN — ALBUMIN (HUMAN) 25 G: 25 SOLUTION INTRAVENOUS at 12:07

## 2017-07-26 NOTE — PROCEDURES
Radiology Post-Procedure Note    Pre Op Diagnosis: Ascites  Post Op Diagnosis: Same    Procedure: Ultrasound Guided Paracentesis    Procedure performed by: Leroy Pyle NP/Chung Bass MD    Written Informed Consent Obtained: Yes  Specimen Removed: YES clear yellow fluid   Estimated Blood Loss: Minimal    Findings:   Successful paracentesis.  Albumin administered PRN per protocol.    Patient tolerated procedure well.    MATT Schmitz, ESTEFANYP  Interventional Radiology  [phone] [employer]

## 2017-07-26 NOTE — PROGRESS NOTES
Paracentesis complete. 5900 mLs peritoneal fluid drained. Pt tolerated well. Dressing to LLQ clean, dry, and intact. Albumin 25% given 150 mLs. Patient given discharge instructions and verbalized understanding. Patient escorted out in wheelchair.

## 2017-07-26 NOTE — H&P
Radiology History & Physical      SUBJECTIVE:     Chief Complaint: Ascites    History of Present Illness:  Kenneth Sheikh is a 77 y.o. male who presents for ultrasound guided paracentesis  Past Medical History:   Diagnosis Date    Anticoagulant long-term use     Bipolar 1 disorder     Bipolar 1 disorder 11/24/2016    bipolar    Cancer     history of skin cancer/sinus cancer & rectal cancer    Depressed bipolar affective disorder     Diabetes mellitus     type 2    EBV infection 12/7/2016    EBV DNA, PCR Latest Ref Range: None detected  None detected EBV DNA-Copies/mL Unknown Test Not Performed EBV Early Antigen Ab, IgG Latest Ref Range: <1:10 Titer 1:40 (A) EBV Nuclear Ag Ab Latest Ref Range: <1:5 Titer >=1:80 (A) EBV VCA IgG Latest Ref Range: <1:10 Titer 1:160 (A) EBV VCA IgM Latest Ref Range: <1:10 Titer <1:10     History of TIA (transient ischemic attack)     several-taking Plavix    Hx of gallstones     Wife denies    Hypertension     Hyperthyroidism 11/30/2016    Low HDL (under 40) 12/7/2016    Mixed hyperlipidemia 11/23/2016    JUAN MANUEL (obstructive sleep apnea)     Stroke     Transient cerebral ischemia 7/22/2016     Past Surgical History:   Procedure Laterality Date    Moh's procedure x4      Sinus surgery for sinus cancer      sinus sx for cancer      skin cancer removed-several times-nose & ear      TONSILLECTOMY      Undescened testicle sx      UVULOPALATOPHARYNGOPLASTY      for sleep apnea       Home Meds:   Prior to Admission medications    Medication Sig Start Date End Date Taking? Authorizing Provider   ACCU-CHEK NEYDA PLUS TEST STRP Strp  2/23/17   Historical Provider, MD   ciprofloxacin HCl (CIPRO) 500 MG tablet Take 1 tablet (500 mg total) by mouth every Monday. 5/29/17   Renato Womack MD   divalproex (DEPAKOTE) 250 MG EC tablet  5/5/17   Historical Provider, MD   furosemide (LASIX) 20 MG tablet Take 4 tablets (80 mg total) by mouth once daily.  Patient taking differently: Take  40 mg by mouth once daily. Taking 40 mg per day 4/25/17 4/25/18  Renato Womack MD   lactulose (CHRONULAC) 10 gram/15 mL solution Take 15 mLs (10 g total) by mouth 2 (two) times daily. 12/20/16   Renato Womack MD   rifAXIMin (XIFAXAN) 550 mg Tab Take 1 tablet (550 mg total) by mouth 2 (two) times daily. 2/21/17   Renato Womack MD   SITagliptan (JANUVIA) 50 MG Tab Take 1 tablet (50 mg total) by mouth once daily. 4/6/17   Prisca Espinoza MD     Anticoagulants/Antiplatelets: no anticoagulation    Allergies:   Review of patient's allergies indicates:   Allergen Reactions    Abilify [aripiprazole] Other (See Comments)     Sleepy, could not function.     Sedation History:  no adverse reactions    Review of Systems:   Hematological: no known coagulopathies  Respiratory: no shortness of breath  Cardiovascular: no chest pain  Gastrointestinal: no abdominal pain  Genito-Urinary: no dysuria  Musculoskeletal: negative  Neurological: no TIA or stroke symptoms         OBJECTIVE:     Vital Signs (Most Recent)       Physical Exam:  ASA: 3  Mallampati: na    General: no acute distress  Mental Status: alert and oriented to person, place and time  HEENT: normocephalic, atraumatic  Chest: unlabored breathing  Heart: regular heart rate  Abdomen: distended  Extremity: moves all extremities    Laboratory  Lab Results   Component Value Date    INR 1.0 07/18/2017       Lab Results   Component Value Date    WBC 3.83 (L) 07/18/2017    HGB 10.9 (L) 07/18/2017    HCT 34.0 (L) 07/18/2017    MCV 87 07/18/2017     (L) 07/18/2017      Lab Results   Component Value Date     (H) 06/16/2017     06/16/2017    K 4.0 06/16/2017     06/16/2017    CO2 28 06/16/2017    BUN 24 (H) 06/16/2017    CREATININE 1.5 (H) 06/16/2017    CALCIUM 8.9 06/16/2017    MG 1.7 05/15/2017    ALT 8 (L) 05/24/2017    AST 26 05/24/2017    ALBUMIN 2.8 (L) 05/24/2017    BILITOT 0.4 05/24/2017       ASSESSMENT/PLAN:     Sedation Plan:  local  Patient will undergo ultrasound guided paracentesis.    MATT Schmitz, FNP  Interventional Radiology  [phone] [employer]

## 2017-07-26 NOTE — DISCHARGE INSTRUCTIONS
"For scheduling: Call Mackenzie at [phone]    For questions or concerns call: SHAR MON-FRI 8 AM- 5PM: [phone].   **After hours and weekends: Call [phone] and ask for "Radiology Resident on call".    For immediate concerns that are not emergent, you may call our radiology clinic at: [phone]      "

## 2017-07-31 ENCOUNTER — OFFICE VISIT (OUTPATIENT)
Dept: HEPATOLOGY | Facility: CLINIC | Age: 77
End: 2017-07-31
Payer: MEDICARE

## 2017-07-31 VITALS
BODY MASS INDEX: 25.76 KG/M2 | OXYGEN SATURATION: 98 % | TEMPERATURE: 96 F | WEIGHT: 170 LBS | HEART RATE: 64 BPM | HEIGHT: 68 IN | RESPIRATION RATE: 18 BRPM | DIASTOLIC BLOOD PRESSURE: 72 MMHG | SYSTOLIC BLOOD PRESSURE: 155 MMHG

## 2017-07-31 DIAGNOSIS — K74.60 LIVER CIRRHOSIS SECONDARY TO NASH (NONALCOHOLIC STEATOHEPATITIS): Primary | ICD-10-CM

## 2017-07-31 DIAGNOSIS — K75.81 LIVER CIRRHOSIS SECONDARY TO NASH (NONALCOHOLIC STEATOHEPATITIS): Primary | ICD-10-CM

## 2017-07-31 PROCEDURE — 3008F BODY MASS INDEX DOCD: CPT | Mod: S$GLB,,, | Performed by: INTERNAL MEDICINE

## 2017-07-31 PROCEDURE — 99214 OFFICE O/P EST MOD 30 MIN: CPT | Mod: S$GLB,,, | Performed by: INTERNAL MEDICINE

## 2017-07-31 PROCEDURE — 1159F MED LIST DOCD IN RCRD: CPT | Mod: S$GLB,,, | Performed by: INTERNAL MEDICINE

## 2017-07-31 PROCEDURE — 99999 PR PBB SHADOW E&M-EST. PATIENT-LVL IV: CPT | Mod: PBBFAC,,, | Performed by: INTERNAL MEDICINE

## 2017-07-31 PROCEDURE — 1157F ADVNC CARE PLAN IN RCRD: CPT | Mod: S$GLB,,, | Performed by: INTERNAL MEDICINE

## 2017-07-31 PROCEDURE — 1125F AMNT PAIN NOTED PAIN PRSNT: CPT | Mod: S$GLB,,, | Performed by: INTERNAL MEDICINE

## 2017-07-31 NOTE — Clinical Note
Dr Talavera, this man was asking me if you thought he needed a repeat flex sig to look at this anal lesion. His wife is very anxious about this.

## 2017-07-31 NOTE — PROGRESS NOTES
"Subjective:       Patient ID: Kenneth Sheikh is a 77 y.o. male.    Chief Complaint: Follow-up    HPI  I saw this 77 y.o. man  whom I had met in hospital when he was admitted with lethargy and some confusion.    I suspect that it may have been related to hepatic encephalopathy. At that time, he was diagnosed with decompensated cirrhosis.    Admitted because of increasing lethargy +++/confusion/dizziness/dysarthria  - Nov 2016  - imaging showed ascites     No significant alcohol use ("a couple beers when I was younger"). No family history of liver disease. No new medications.      Patient has risk factors for MCDONALD.  No significant hx of alcohol    He also has a large gastric varix which has never bled- recently treated by Dr Perla endoscopically but will need another endoscopic session (4/16/17).  - Had post procedure fever despite antibiotics      Most recent issue was an E coli bacteremia- on IV antibiotics at home- PICC    Currently he is reasonably stable with weekly paracentesis.  Decreased dose of diuretics.  Creatinine has improved.  Continues to feel tired but no HE.    MELD-Na score: 18 at 5/11/2017  2:45 PM  MELD score: 16 at 5/11/2017  2:45 PM  Calculated from:  Serum Creatinine: 2.1 mg/dL at 5/11/2017  2:45 PM  Serum Sodium: 135 mmol/L at 5/11/2017  2:45 PM  Total Bilirubin: 0.8 mg/dL (Rounded to 1) at 5/11/2017  2:45 PM  INR(ratio): 1.2 at 5/10/2017  5:19 AM  Age: 77 years      PMH:  DM  Sinus Ca- 1996  Rectal Ca- June 2016    SH:  Retired  Ex [employer] worker      FH:  Aunt had cirrhosis- non- alcohol      Review of Systems   Constitutional: Negative for activity change, appetite change, chills, fatigue, fever and unexpected weight change.   HENT: Negative for hearing loss.    Eyes: Negative for discharge and visual disturbance.   Respiratory: Negative for cough, chest tightness, shortness of breath and wheezing.    Cardiovascular: Negative for chest pain, palpitations and leg swelling.   Gastrointestinal: " Negative for abdominal distention, abdominal pain, constipation, diarrhea and nausea.   Genitourinary: Negative for dysuria and frequency.   Musculoskeletal: Negative for arthralgias and back pain.   Skin: Negative for pallor and rash.   Neurological: Negative for dizziness, tremors, speech difficulty and headaches.   Hematological: Negative for adenopathy.   Psychiatric/Behavioral: Negative for agitation and confusion.           Lab Results   Component Value Date    ALT 8 (L) 05/24/2017    AST 26 05/24/2017    ALKPHOS 135 05/24/2017    BILITOT 0.4 05/24/2017     Past Medical History:   Diagnosis Date    Anticoagulant long-term use     Bipolar 1 disorder     Bipolar 1 disorder 11/24/2016    bipolar    Cancer     history of skin cancer/sinus cancer & rectal cancer    Depressed bipolar affective disorder     Diabetes mellitus     type 2    EBV infection 12/7/2016    EBV DNA, PCR Latest Ref Range: None detected  None detected EBV DNA-Copies/mL Unknown Test Not Performed EBV Early Antigen Ab, IgG Latest Ref Range: <1:10 Titer 1:40 (A) EBV Nuclear Ag Ab Latest Ref Range: <1:5 Titer >=1:80 (A) EBV VCA IgG Latest Ref Range: <1:10 Titer 1:160 (A) EBV VCA IgM Latest Ref Range: <1:10 Titer <1:10     History of TIA (transient ischemic attack)     several-taking Plavix    Hx of gallstones     Wife denies    Hypertension     Hyperthyroidism 11/30/2016    Low HDL (under 40) 12/7/2016    Mixed hyperlipidemia 11/23/2016    JUAN MANUEL (obstructive sleep apnea)     Stroke     Transient cerebral ischemia 7/22/2016     Past Surgical History:   Procedure Laterality Date    Moh's procedure x4      Sinus surgery for sinus cancer      sinus sx for cancer      skin cancer removed-several times-nose & ear      TONSILLECTOMY      Undescened testicle sx      UVULOPALATOPHARYNGOPLASTY      for sleep apnea     Current Outpatient Prescriptions   Medication Sig    ACCU-CHEK NEYDA PLUS TEST STRP Strp     ciprofloxacin HCl (CIPRO)  500 MG tablet Take 1 tablet (500 mg total) by mouth every Monday.    divalproex (DEPAKOTE) 250 MG EC tablet     furosemide (LASIX) 20 MG tablet Take 4 tablets (80 mg total) by mouth once daily. (Patient taking differently: Take 40 mg by mouth once daily. Taking 40 mg per day)    lactulose (CHRONULAC) 10 gram/15 mL solution Take 15 mLs (10 g total) by mouth 2 (two) times daily.    rifAXIMin (XIFAXAN) 550 mg Tab Take 1 tablet (550 mg total) by mouth 2 (two) times daily.    SITagliptan (JANUVIA) 50 MG Tab Take 1 tablet (50 mg total) by mouth once daily.     No current facility-administered medications for this visit.        Objective:      Physical Exam   Constitutional: He is oriented to person, place, and time. He appears well-nourished.   HENT:   Head: Normocephalic.   Eyes: Pupils are equal, round, and reactive to light.   Neck: No thyromegaly present.   Cardiovascular: Normal rate, regular rhythm and normal heart sounds.    Pulmonary/Chest: Effort normal and breath sounds normal. He has no wheezes.   Abdominal: Soft. He exhibits distension. He exhibits no mass. There is no tenderness.   Mild ascites.   Musculoskeletal: He exhibits edema.   Lymphadenopathy:     He has no cervical adenopathy.   Neurological: He is alert and oriented to person, place, and time.   Skin: Skin is warm. No rash noted. No erythema.   Psychiatric: He has a normal mood and affect. His behavior is normal.       Assessment:       No diagnosis found.    Plan:   - re-discussed the dx of cirrhosis and the implications of this  - no change in diuretic dose  - paracenteses every week- feels better when he gets albumin- will arrange for him to get albumin infusion every time he gets a paracentesis regardless of the volume removed.  - on Cipro 500 mg weekly for SBP prophylaxis.  - further delay EUS guided variceal injection per patient request because of concerns for infection.  - Dr Talavera re anal mass ?repeat colonoscopy  Clinic in 6   baldo CUMMINS Islam    - needs repeat flex sigm for a check at rectum- Dr Talavera    - lactulose for HE- daily  - on rifaximin    Fever/HE/bleeding- to attend ER

## 2017-08-02 ENCOUNTER — HOSPITAL ENCOUNTER (OUTPATIENT)
Dept: INTERVENTIONAL RADIOLOGY/VASCULAR | Facility: HOSPITAL | Age: 77
Discharge: HOME | End: 2017-08-02
Attending: INTERNAL MEDICINE
Payer: MEDICARE

## 2017-08-02 VITALS
RESPIRATION RATE: 18 BRPM | SYSTOLIC BLOOD PRESSURE: 142 MMHG | DIASTOLIC BLOOD PRESSURE: 79 MMHG | OXYGEN SATURATION: 96 % | HEART RATE: 87 BPM

## 2017-08-02 DIAGNOSIS — R18.8 CIRRHOSIS OF LIVER WITH ASCITES, UNSPECIFIED HEPATIC CIRRHOSIS TYPE: ICD-10-CM

## 2017-08-02 DIAGNOSIS — K74.60 CIRRHOSIS OF LIVER WITH ASCITES, UNSPECIFIED HEPATIC CIRRHOSIS TYPE: ICD-10-CM

## 2017-08-02 DIAGNOSIS — R18.8 OTHER ASCITES: Primary | ICD-10-CM

## 2017-08-02 DIAGNOSIS — R18.8 ASCITES OF LIVER: ICD-10-CM

## 2017-08-02 PROCEDURE — 49083 ABD PARACENTESIS W/IMAGING: CPT | Mod: ,,, | Performed by: FAMILY MEDICINE

## 2017-08-02 PROCEDURE — A7048 VACUUM DRAIN BOTTLE/TUBE KIT: HCPCS

## 2017-08-02 PROCEDURE — P9047 ALBUMIN (HUMAN), 25%, 50ML: HCPCS | Performed by: INTERNAL MEDICINE

## 2017-08-02 PROCEDURE — 63600175 PHARM REV CODE 636 W HCPCS: Performed by: INTERNAL MEDICINE

## 2017-08-02 PROCEDURE — C1729 CATH, DRAINAGE: HCPCS

## 2017-08-02 RX ORDER — ALBUMIN HUMAN 250 G/1000ML
25 SOLUTION INTRAVENOUS CONTINUOUS PRN
Status: DISCONTINUED | OUTPATIENT
Start: 2017-08-02 | End: 2017-08-03 | Stop reason: HOSPADM

## 2017-08-02 RX ADMIN — ALBUMIN (HUMAN) 25 G: 25 SOLUTION INTRAVENOUS at 02:08

## 2017-08-02 NOTE — H&P
Radiology History & Physical      SUBJECTIVE:     Chief Complaint: ascites    History of Present Illness:  Kenneth Sheikh is a 77 y.o. male who presents for ultrasound guided paracentesis  Past Medical History:   Diagnosis Date    Anticoagulant long-term use     Bipolar 1 disorder     Bipolar 1 disorder 11/24/2016    bipolar    Cancer     history of skin cancer/sinus cancer & rectal cancer    Depressed bipolar affective disorder     Diabetes mellitus     type 2    EBV infection 12/7/2016    EBV DNA, PCR Latest Ref Range: None detected  None detected EBV DNA-Copies/mL Unknown Test Not Performed EBV Early Antigen Ab, IgG Latest Ref Range: <1:10 Titer 1:40 (A) EBV Nuclear Ag Ab Latest Ref Range: <1:5 Titer >=1:80 (A) EBV VCA IgG Latest Ref Range: <1:10 Titer 1:160 (A) EBV VCA IgM Latest Ref Range: <1:10 Titer <1:10     History of TIA (transient ischemic attack)     several-taking Plavix    Hx of gallstones     Wife denies    Hypertension     Hyperthyroidism 11/30/2016    Low HDL (under 40) 12/7/2016    Mixed hyperlipidemia 11/23/2016    JUAN MANUEL (obstructive sleep apnea)     Stroke     Transient cerebral ischemia 7/22/2016     Past Surgical History:   Procedure Laterality Date    Moh's procedure x4      Sinus surgery for sinus cancer      sinus sx for cancer      skin cancer removed-several times-nose & ear      TONSILLECTOMY      Undescened testicle sx      UVULOPALATOPHARYNGOPLASTY      for sleep apnea       Home Meds:   Prior to Admission medications    Medication Sig Start Date End Date Taking? Authorizing Provider   ACCU-CHEK NEYDA PLUS TEST STRP Strp  2/23/17   Historical Provider, MD   ciprofloxacin HCl (CIPRO) 500 MG tablet Take 1 tablet (500 mg total) by mouth every Monday. 5/29/17   Renato Womack MD   divalproex (DEPAKOTE) 250 MG EC tablet  5/5/17   Historical Provider, MD   furosemide (LASIX) 20 MG tablet Take 4 tablets (80 mg total) by mouth once daily.  Patient taking differently: Take  40 mg by mouth once daily. Taking 40 mg per day 4/25/17 4/25/18  Renato Womack MD   lactulose (CHRONULAC) 10 gram/15 mL solution Take 15 mLs (10 g total) by mouth 2 (two) times daily. 12/20/16   Renato Womack MD   rifAXIMin (XIFAXAN) 550 mg Tab Take 1 tablet (550 mg total) by mouth 2 (two) times daily. 2/21/17   Renato Womack MD   SITagliptan (JANUVIA) 50 MG Tab Take 1 tablet (50 mg total) by mouth once daily. 4/6/17   Prisca Espinoza MD     Anticoagulants/Antiplatelets: no anticoagulation    Allergies:   Review of patient's allergies indicates:   Allergen Reactions    Abilify [aripiprazole] Other (See Comments)     Sleepy, could not function.     Sedation History:  no adverse reactions    Review of Systems:   Hematological: no known coagulopathies  Respiratory: no shortness of breath  Cardiovascular: no chest pain  Gastrointestinal: no abdominal pain  Genito-Urinary: no dysuria  Musculoskeletal: negative  Neurological: no TIA or stroke symptoms         OBJECTIVE:     Vital Signs (Most Recent)       Physical Exam:  ASA: 2  Mallampati: n/a    General: no acute distress  Mental Status: alert and oriented to person, place and time  HEENT: normocephalic, atraumatic  Chest: unlabored breathing  Heart: regular heart rate  Abdomen: distended  Extremity: moves all extremities    Laboratory  Lab Results   Component Value Date    INR 1.0 07/18/2017       Lab Results   Component Value Date    WBC 3.83 (L) 07/18/2017    HGB 10.9 (L) 07/18/2017    HCT 34.0 (L) 07/18/2017    MCV 87 07/18/2017     (L) 07/18/2017      Lab Results   Component Value Date     (H) 06/16/2017     06/16/2017    K 4.0 06/16/2017     06/16/2017    CO2 28 06/16/2017    BUN 24 (H) 06/16/2017    CREATININE 1.5 (H) 06/16/2017    CALCIUM 8.9 06/16/2017    MG 1.7 05/15/2017    ALT 8 (L) 05/24/2017    AST 26 05/24/2017    ALBUMIN 2.8 (L) 05/24/2017    BILITOT 0.4 05/24/2017       ASSESSMENT/PLAN:     Sedation Plan:  local  Patient will undergo paracentesis.    MATT Mauricio, FNP  Interventional Radiology  [phone] [employer]

## 2017-08-02 NOTE — PROGRESS NOTES
Pt arrived to room 125 for paracentesis. Pt identified using two pt identifiers. Allergies reviewed. NAD noted. Awaiting consent and H&P at this time. WCTM.

## 2017-08-02 NOTE — PROCEDURES

## 2017-08-02 NOTE — PROGRESS NOTES
Paracentesis complete. 5300 mLs peritoneal fluid drained. Pt tolerated well. Dressing to LLQ clean, dry, and intact. Albumin 25% given 100 mLs. Specimens sent per lab order. Discharge instructions and handouts provided. Pt verbalized understanding. Pt to lobby via wheelchair.

## 2017-08-09 ENCOUNTER — TELEPHONE (OUTPATIENT)
Dept: INTERVENTIONAL RADIOLOGY/VASCULAR | Facility: HOSPITAL | Age: 77
End: 2017-08-09

## 2017-08-10 ENCOUNTER — HOSPITAL ENCOUNTER (OUTPATIENT)
Dept: INTERVENTIONAL RADIOLOGY/VASCULAR | Facility: HOSPITAL | Age: 77
Discharge: HOME | End: 2017-08-10
Attending: INTERNAL MEDICINE
Payer: MEDICARE

## 2017-08-10 VITALS
OXYGEN SATURATION: 100 % | SYSTOLIC BLOOD PRESSURE: 112 MMHG | HEART RATE: 60 BPM | DIASTOLIC BLOOD PRESSURE: 59 MMHG | RESPIRATION RATE: 18 BRPM

## 2017-08-10 DIAGNOSIS — K74.60 CIRRHOSIS OF LIVER WITH ASCITES, UNSPECIFIED HEPATIC CIRRHOSIS TYPE: ICD-10-CM

## 2017-08-10 DIAGNOSIS — R18.8 CIRRHOSIS OF LIVER WITH ASCITES, UNSPECIFIED HEPATIC CIRRHOSIS TYPE: ICD-10-CM

## 2017-08-10 DIAGNOSIS — R18.8 OTHER ASCITES: ICD-10-CM

## 2017-08-10 LAB
APPEARANCE FLD: CLEAR
BODY FLD TYPE: NORMAL
BODY FLUID COMMENTS: NORMAL
COLOR FLD: YELLOW
LYMPHOCYTES NFR FLD MANUAL: 61 %
MONOS+MACROS NFR FLD MANUAL: 35 %
NEUTROPHILS NFR FLD MANUAL: 4 %
WBC # FLD: 240 /CU MM

## 2017-08-10 PROCEDURE — A7048 VACUUM DRAIN BOTTLE/TUBE KIT: HCPCS

## 2017-08-10 PROCEDURE — 49083 ABD PARACENTESIS W/IMAGING: CPT | Mod: ,,, | Performed by: FAMILY MEDICINE

## 2017-08-10 PROCEDURE — 89051 BODY FLUID CELL COUNT: CPT

## 2017-08-10 PROCEDURE — P9047 ALBUMIN (HUMAN), 25%, 50ML: HCPCS | Performed by: INTERNAL MEDICINE

## 2017-08-10 PROCEDURE — 63600175 PHARM REV CODE 636 W HCPCS: Performed by: INTERNAL MEDICINE

## 2017-08-10 PROCEDURE — C1729 CATH, DRAINAGE: HCPCS

## 2017-08-10 RX ORDER — ALBUMIN HUMAN 250 G/1000ML
25 SOLUTION INTRAVENOUS
Status: DISCONTINUED | OUTPATIENT
Start: 2017-08-10 | End: 2017-08-11 | Stop reason: HOSPADM

## 2017-08-10 RX ADMIN — ALBUMIN (HUMAN) 25 G: 25 SOLUTION INTRAVENOUS at 02:08

## 2017-08-10 RX ADMIN — ALBUMIN (HUMAN) 12.5 G: 25 SOLUTION INTRAVENOUS at 02:08

## 2017-08-10 NOTE — PROGRESS NOTES
Paracentesis complete. 5200 mLs peritoneal fluid drained. Pt tolerated well. Dressing to clean, dry, and intact. Albumin 25% given 150 mLs. Specimens sent per lab order. Discharge instructions and handouts provided. Pt verbalized understanding.

## 2017-08-10 NOTE — H&P
Radiology History & Physical      SUBJECTIVE:     Chief Complaint: ascites    History of Present Illness:  Kenneth Sheikh is a 77 y.o. male who presents for ultrasound guided paracentesis  Past Medical History:   Diagnosis Date    Anticoagulant long-term use     Bipolar 1 disorder     Bipolar 1 disorder 11/24/2016    bipolar    Cancer     history of skin cancer/sinus cancer & rectal cancer    Depressed bipolar affective disorder     Diabetes mellitus     type 2    EBV infection 12/7/2016    EBV DNA, PCR Latest Ref Range: None detected  None detected EBV DNA-Copies/mL Unknown Test Not Performed EBV Early Antigen Ab, IgG Latest Ref Range: <1:10 Titer 1:40 (A) EBV Nuclear Ag Ab Latest Ref Range: <1:5 Titer >=1:80 (A) EBV VCA IgG Latest Ref Range: <1:10 Titer 1:160 (A) EBV VCA IgM Latest Ref Range: <1:10 Titer <1:10     History of TIA (transient ischemic attack)     several-taking Plavix    Hx of gallstones     Wife denies    Hypertension     Hyperthyroidism 11/30/2016    Low HDL (under 40) 12/7/2016    Mixed hyperlipidemia 11/23/2016    JUAN MANUEL (obstructive sleep apnea)     Stroke     Transient cerebral ischemia 7/22/2016     Past Surgical History:   Procedure Laterality Date    Moh's procedure x4      Sinus surgery for sinus cancer      sinus sx for cancer      skin cancer removed-several times-nose & ear      TONSILLECTOMY      Undescened testicle sx      UVULOPALATOPHARYNGOPLASTY      for sleep apnea       Home Meds:   Prior to Admission medications    Medication Sig Start Date End Date Taking? Authorizing Provider   ACCU-CHEK NEYDA PLUS TEST STRP Strp  2/23/17   Historical Provider, MD   ciprofloxacin HCl (CIPRO) 500 MG tablet Take 1 tablet (500 mg total) by mouth every Monday. 5/29/17   Renato Womack MD   divalproex (DEPAKOTE) 250 MG EC tablet  5/5/17   Historical Provider, MD   furosemide (LASIX) 20 MG tablet Take 4 tablets (80 mg total) by mouth once daily.  Patient taking differently: Take  40 mg by mouth once daily. Taking 40 mg per day 4/25/17 4/25/18  Renato Womack MD   lactulose (CHRONULAC) 10 gram/15 mL solution Take 15 mLs (10 g total) by mouth 2 (two) times daily. 12/20/16   Renato Womack MD   rifAXIMin (XIFAXAN) 550 mg Tab Take 1 tablet (550 mg total) by mouth 2 (two) times daily. 2/21/17   Renato Womack MD   SITagliptan (JANUVIA) 50 MG Tab Take 1 tablet (50 mg total) by mouth once daily. 4/6/17   Prisca Espinoza MD     Anticoagulants/Antiplatelets: no anticoagulation    Allergies:   Review of patient's allergies indicates:   Allergen Reactions    Abilify [aripiprazole] Other (See Comments)     Sleepy, could not function.     Sedation History:  no adverse reactions    Review of Systems:   Hematological: no known coagulopathies  Respiratory: no shortness of breath  Cardiovascular: no chest pain  Gastrointestinal: no abdominal pain  Genito-Urinary: no dysuria  Musculoskeletal: negative  Neurological: no TIA or stroke symptoms         OBJECTIVE:     Vital Signs (Most Recent)  Pulse: 76 (08/10/17 1349)  Resp: 18 (08/10/17 1349)  BP: 128/71 (08/10/17 1349)  SpO2: 98 % (08/10/17 1349)    Physical Exam:  ASA: 2  Mallampati: n/a    General: no acute distress  Mental Status: alert and oriented to person, place and time  HEENT: normocephalic, atraumatic  Chest: unlabored breathing  Heart: regular heart rate  Abdomen: distended  Extremity: moves all extremities    Laboratory  Lab Results   Component Value Date    INR 1.0 07/18/2017       Lab Results   Component Value Date    WBC 3.83 (L) 07/18/2017    HGB 10.9 (L) 07/18/2017    HCT 34.0 (L) 07/18/2017    MCV 87 07/18/2017     (L) 07/18/2017      Lab Results   Component Value Date     (H) 06/16/2017     06/16/2017    K 4.0 06/16/2017     06/16/2017    CO2 28 06/16/2017    BUN 24 (H) 06/16/2017    CREATININE 1.5 (H) 06/16/2017    CALCIUM 8.9 06/16/2017    MG 1.7 05/15/2017    ALT 8 (L) 05/24/2017    AST 26  05/24/2017    ALBUMIN 2.8 (L) 05/24/2017    BILITOT 0.4 05/24/2017       ASSESSMENT/PLAN:     Sedation Plan: local  Patient will undergo ultrasound guided paracentesis.    MATT Mauricio, FNP  Interventional Radiology  [phone] [employer]

## 2017-08-10 NOTE — DISCHARGE INSTRUCTIONS
For scheduling: Call Mackenzie at [phone]    For questions or concerns call: SHAR [facility] 8 AM- 5PM [phone]. Radiology resident on call [phone].    For immediate concerns that are not emergent, you may call our radiology clinic at: [phone]

## 2017-08-10 NOTE — PROCEDURES

## 2017-08-11 ENCOUNTER — HOSPITAL ENCOUNTER (OUTPATIENT)
Dept: RADIOLOGY | Facility: HOSPITAL | Age: 77
Discharge: HOME | End: 2017-08-11
Attending: INTERNAL MEDICINE
Payer: MEDICARE

## 2017-08-11 DIAGNOSIS — K75.81 LIVER CIRRHOSIS SECONDARY TO NASH (NONALCOHOLIC STEATOHEPATITIS): ICD-10-CM

## 2017-08-11 DIAGNOSIS — K74.60 LIVER CIRRHOSIS SECONDARY TO NASH (NONALCOHOLIC STEATOHEPATITIS): ICD-10-CM

## 2017-08-11 PROCEDURE — 76705 ECHO EXAM OF ABDOMEN: CPT | Mod: 26,,, | Performed by: RADIOLOGY

## 2017-08-11 PROCEDURE — 76705 ECHO EXAM OF ABDOMEN: CPT | Mod: TC

## 2017-08-16 ENCOUNTER — TELEPHONE (OUTPATIENT)
Dept: INTERVENTIONAL RADIOLOGY/VASCULAR | Facility: HOSPITAL | Age: 77
End: 2017-08-16

## 2017-08-17 ENCOUNTER — HOSPITAL ENCOUNTER (OUTPATIENT)
Dept: INTERVENTIONAL RADIOLOGY/VASCULAR | Facility: HOSPITAL | Age: 77
Discharge: HOME | End: 2017-08-17
Attending: INTERNAL MEDICINE
Payer: MEDICARE

## 2017-08-17 VITALS
HEART RATE: 59 BPM | RESPIRATION RATE: 12 BRPM | OXYGEN SATURATION: 100 % | DIASTOLIC BLOOD PRESSURE: 57 MMHG | SYSTOLIC BLOOD PRESSURE: 106 MMHG

## 2017-08-17 DIAGNOSIS — R18.8 CIRRHOSIS OF LIVER WITH ASCITES, UNSPECIFIED HEPATIC CIRRHOSIS TYPE: ICD-10-CM

## 2017-08-17 DIAGNOSIS — K74.60 CIRRHOSIS OF LIVER WITH ASCITES, UNSPECIFIED HEPATIC CIRRHOSIS TYPE: ICD-10-CM

## 2017-08-17 DIAGNOSIS — R18.8 OTHER ASCITES: ICD-10-CM

## 2017-08-17 LAB
APPEARANCE FLD: NORMAL
BASOPHILS NFR FLD MANUAL: 2 %
BODY FLD TYPE: NORMAL
COLOR FLD: YELLOW
LYMPHOCYTES NFR FLD MANUAL: 69 %
MONOS+MACROS NFR FLD MANUAL: 26 %
NEUTROPHILS NFR FLD MANUAL: 3 %
WBC # FLD: 230 /CU MM

## 2017-08-17 PROCEDURE — P9047 ALBUMIN (HUMAN), 25%, 50ML: HCPCS | Performed by: INTERNAL MEDICINE

## 2017-08-17 PROCEDURE — C1769 GUIDE WIRE: HCPCS

## 2017-08-17 PROCEDURE — 89051 BODY FLUID CELL COUNT: CPT

## 2017-08-17 PROCEDURE — 49083 ABD PARACENTESIS W/IMAGING: CPT | Mod: ,,, | Performed by: FAMILY MEDICINE

## 2017-08-17 PROCEDURE — 63600175 PHARM REV CODE 636 W HCPCS: Performed by: INTERNAL MEDICINE

## 2017-08-17 RX ORDER — ALBUMIN HUMAN 250 G/1000ML
12.5 SOLUTION INTRAVENOUS ONCE
Status: COMPLETED | OUTPATIENT
Start: 2017-08-17 | End: 2017-08-17

## 2017-08-17 RX ORDER — ALBUMIN HUMAN 250 G/1000ML
25 SOLUTION INTRAVENOUS ONCE
Status: COMPLETED | OUTPATIENT
Start: 2017-08-17 | End: 2017-08-17

## 2017-08-17 RX ADMIN — ALBUMIN (HUMAN) 25 G: 25 SOLUTION INTRAVENOUS at 02:08

## 2017-08-17 RX ADMIN — ALBUMIN (HUMAN) 12.5 G: 25 SOLUTION INTRAVENOUS at 03:08

## 2017-08-17 NOTE — H&P
Radiology History & Physical      SUBJECTIVE:     Chief Complaint: ascites    History of Present Illness:  Kenneth Sheikh is a 77 y.o. male who presents for ultrasound guided paracentesis  Past Medical History:   Diagnosis Date    Anticoagulant long-term use     Bipolar 1 disorder     Bipolar 1 disorder 11/24/2016    bipolar    Cancer     history of skin cancer/sinus cancer & rectal cancer    Depressed bipolar affective disorder     Diabetes mellitus     type 2    EBV infection 12/7/2016    EBV DNA, PCR Latest Ref Range: None detected  None detected EBV DNA-Copies/mL Unknown Test Not Performed EBV Early Antigen Ab, IgG Latest Ref Range: <1:10 Titer 1:40 (A) EBV Nuclear Ag Ab Latest Ref Range: <1:5 Titer >=1:80 (A) EBV VCA IgG Latest Ref Range: <1:10 Titer 1:160 (A) EBV VCA IgM Latest Ref Range: <1:10 Titer <1:10     History of TIA (transient ischemic attack)     several-taking Plavix    Hx of gallstones     Wife denies    Hypertension     Hyperthyroidism 11/30/2016    Low HDL (under 40) 12/7/2016    Mixed hyperlipidemia 11/23/2016    JUAN MANUEL (obstructive sleep apnea)     Stroke     Transient cerebral ischemia 7/22/2016     Past Surgical History:   Procedure Laterality Date    Moh's procedure x4      Sinus surgery for sinus cancer      sinus sx for cancer      skin cancer removed-several times-nose & ear      TONSILLECTOMY      Undescened testicle sx      UVULOPALATOPHARYNGOPLASTY      for sleep apnea       Home Meds:   Prior to Admission medications    Medication Sig Start Date End Date Taking? Authorizing Provider   ACCU-CHEK NEYDA PLUS TEST STRP Strp  2/23/17   Historical Provider, MD   ciprofloxacin HCl (CIPRO) 500 MG tablet Take 1 tablet (500 mg total) by mouth every Monday. 5/29/17   Renato Womack MD   divalproex (DEPAKOTE) 250 MG EC tablet  5/5/17   Historical Provider, MD   furosemide (LASIX) 20 MG tablet Take 4 tablets (80 mg total) by mouth once daily.  Patient taking differently: Take  40 mg by mouth once daily. Taking 40 mg per day 4/25/17 4/25/18  Renato Womack MD   lactulose (CHRONULAC) 10 gram/15 mL solution Take 15 mLs (10 g total) by mouth 2 (two) times daily. 12/20/16   Renato Womack MD   rifAXIMin (XIFAXAN) 550 mg Tab Take 1 tablet (550 mg total) by mouth 2 (two) times daily. 2/21/17   Renato Womack MD   SITagliptan (JANUVIA) 50 MG Tab Take 1 tablet (50 mg total) by mouth once daily. 4/6/17   Prisca Espinoza MD     Anticoagulants/Antiplatelets: no anticoagulation    Allergies:   Review of patient's allergies indicates:   Allergen Reactions    Abilify [aripiprazole] Other (See Comments)     Sleepy, could not function.     Sedation History:  no adverse reactions    Review of Systems:   Hematological: no known coagulopathies  Respiratory: no shortness of breath  Cardiovascular: no chest pain  Gastrointestinal: no abdominal pain  Genito-Urinary: no dysuria  Musculoskeletal: negative  Neurological: no TIA or stroke symptoms         OBJECTIVE:     Vital Signs (Most Recent)       Physical Exam:  ASA: 2  Mallampati: n/a    General: no acute distress  Mental Status: alert and oriented to person, place and time  HEENT: normocephalic, atraumatic  Chest: unlabored breathing  Heart: regular heart rate  Abdomen: distended  Extremity: moves all extremities    Laboratory  Lab Results   Component Value Date    INR 1.0 07/18/2017       Lab Results   Component Value Date    WBC 3.83 (L) 07/18/2017    HGB 10.9 (L) 07/18/2017    HCT 34.0 (L) 07/18/2017    MCV 87 07/18/2017     (L) 07/18/2017      Lab Results   Component Value Date     (H) 06/16/2017     06/16/2017    K 4.0 06/16/2017     06/16/2017    CO2 28 06/16/2017    BUN 24 (H) 06/16/2017    CREATININE 1.5 (H) 06/16/2017    CALCIUM 8.9 06/16/2017    MG 1.7 05/15/2017    ALT 8 (L) 05/24/2017    AST 26 05/24/2017    ALBUMIN 2.8 (L) 05/24/2017    BILITOT 0.4 05/24/2017       ASSESSMENT/PLAN:     Sedation Plan:  local  Patient will undergo ultrasound guided paracentesis.    MATT Mauricio, FNP  Interventional Radiology  [phone] [employer]

## 2017-08-17 NOTE — PROGRESS NOTES
Paracentesis finished at this time. Pt tolerated well. 5400cc removed from right abdomen. Dressing applied clean, dry and intact. 150cc of albumin given IV. IV removed with catheter intact. Patient given discharge instructions and verbalized understanding of at home care. Patient discharged home via wheelchair under care of spouse.

## 2017-08-17 NOTE — PROCEDURES

## 2017-08-17 NOTE — DISCHARGE INSTRUCTIONS
For scheduling: Call Mackenzie at [phone]    For questions or concerns call: [facility] 8 AM- 5PM [phone]. Radiology resident on call [phone].    For immediate concerns that are not emergent, you may call our radiology clinic at: [phone]

## 2017-08-18 ENCOUNTER — OFFICE VISIT (OUTPATIENT)
Dept: SURGERY | Facility: CLINIC | Age: 77
End: 2017-08-18
Payer: MEDICARE

## 2017-08-18 VITALS
DIASTOLIC BLOOD PRESSURE: 62 MMHG | SYSTOLIC BLOOD PRESSURE: 114 MMHG | HEART RATE: 58 BPM | BODY MASS INDEX: 24.99 KG/M2 | HEIGHT: 68 IN | WEIGHT: 164.88 LBS

## 2017-08-18 DIAGNOSIS — Z85.048 HISTORY OF RECTAL CANCER: Primary | ICD-10-CM

## 2017-08-18 PROCEDURE — 1126F AMNT PAIN NOTED NONE PRSNT: CPT | Mod: S$GLB,,, | Performed by: COLON & RECTAL SURGERY

## 2017-08-18 PROCEDURE — 99213 OFFICE O/P EST LOW 20 MIN: CPT | Mod: 25,S$GLB,, | Performed by: COLON & RECTAL SURGERY

## 2017-08-18 PROCEDURE — 3008F BODY MASS INDEX DOCD: CPT | Mod: S$GLB,,, | Performed by: COLON & RECTAL SURGERY

## 2017-08-18 PROCEDURE — 99999 PR PBB SHADOW E&M-EST. PATIENT-LVL III: CPT | Mod: PBBFAC,,, | Performed by: COLON & RECTAL SURGERY

## 2017-08-18 PROCEDURE — 3078F DIAST BP <80 MM HG: CPT | Mod: S$GLB,,, | Performed by: COLON & RECTAL SURGERY

## 2017-08-18 PROCEDURE — 1157F ADVNC CARE PLAN IN RCRD: CPT | Mod: S$GLB,,, | Performed by: COLON & RECTAL SURGERY

## 2017-08-18 PROCEDURE — 1159F MED LIST DOCD IN RCRD: CPT | Mod: S$GLB,,, | Performed by: COLON & RECTAL SURGERY

## 2017-08-18 PROCEDURE — 3074F SYST BP LT 130 MM HG: CPT | Mod: S$GLB,,, | Performed by: COLON & RECTAL SURGERY

## 2017-08-19 NOTE — PROGRESS NOTES
Subjective:       Patient ID: Kenneth Sheikh is a 77 y.o. male.    Chief Complaint: Follow-up    HPI Established patient.  Post transanal excision of a T1 rectal cancer in June 2016.  Since that time he has been diagnosed with non-alcoholic cirrhosis and has ascites requiring recurrent aspiration.  He is asymptomatic from his rectal cancer.               tReview of Systems   Constitutional: Negative for chills and fever.   Respiratory: Negative for cough and shortness of breath.    Cardiovascular: Negative for chest pain and palpitations.   Neurological: Negative for light-headedness and numbness.   Hematological: Negative for adenopathy. Does not bruise/bleed easily.       Objective:      Physical Exam   Constitutional: He is oriented to person, place, and time. He appears well-developed and well-nourished.   Pulmonary/Chest: Effort normal. No respiratory distress.   Abdominal: Soft. He exhibits distension.   Genitourinary:   Genitourinary Comments: See flex sig note below   Neurological: He is alert and oriented to person, place, and time.       Assessment:       1. History of rectal cancer        Plan:        sigmoidoscopy was performed.  Verbal consent.  2 enemas.  Scope #1922.  It was advanced to the sigmoid colon and then withdrawn slowly taking care to examine the mucosal surfaces carefully.  There was tattoo and scar noted in the rectum without evidence of recurrent neoplasia.  The procedure was well tolerated.  Patient was discharged home ambulatory. no complications.      F/u 6months.

## 2017-08-24 DIAGNOSIS — R18.8 OTHER ASCITES: Primary | ICD-10-CM

## 2017-08-25 ENCOUNTER — HOSPITAL ENCOUNTER (OUTPATIENT)
Dept: INTERVENTIONAL RADIOLOGY/VASCULAR | Facility: HOSPITAL | Age: 77
Discharge: HOME | End: 2017-08-25
Attending: INTERNAL MEDICINE
Payer: MEDICARE

## 2017-08-25 VITALS
DIASTOLIC BLOOD PRESSURE: 60 MMHG | HEART RATE: 67 BPM | OXYGEN SATURATION: 97 % | SYSTOLIC BLOOD PRESSURE: 131 MMHG | RESPIRATION RATE: 18 BRPM

## 2017-08-25 DIAGNOSIS — R18.8 OTHER ASCITES: ICD-10-CM

## 2017-08-25 DIAGNOSIS — K74.60 CIRRHOSIS OF LIVER WITH ASCITES, UNSPECIFIED HEPATIC CIRRHOSIS TYPE: ICD-10-CM

## 2017-08-25 DIAGNOSIS — R18.8 CIRRHOSIS OF LIVER WITH ASCITES, UNSPECIFIED HEPATIC CIRRHOSIS TYPE: ICD-10-CM

## 2017-08-25 LAB
APPEARANCE FLD: CLEAR
BASOPHILS NFR FLD MANUAL: 1 %
BODY FLD TYPE: NORMAL
COLOR FLD: YELLOW
LYMPHOCYTES NFR FLD MANUAL: 60 %
MESOTHL CELL NFR FLD MANUAL: 1 %
MONOS+MACROS NFR FLD MANUAL: 32 %
NEUTROPHILS NFR FLD MANUAL: 6 %
WBC # FLD: 223 /CU MM

## 2017-08-25 PROCEDURE — P9047 ALBUMIN (HUMAN), 25%, 50ML: HCPCS | Performed by: INTERNAL MEDICINE

## 2017-08-25 PROCEDURE — A7048 VACUUM DRAIN BOTTLE/TUBE KIT: HCPCS

## 2017-08-25 PROCEDURE — 89051 BODY FLUID CELL COUNT: CPT

## 2017-08-25 PROCEDURE — 49083 ABD PARACENTESIS W/IMAGING: CPT | Mod: ,,, | Performed by: FAMILY MEDICINE

## 2017-08-25 PROCEDURE — 63600175 PHARM REV CODE 636 W HCPCS: Performed by: INTERNAL MEDICINE

## 2017-08-25 PROCEDURE — C1729 CATH, DRAINAGE: HCPCS

## 2017-08-25 RX ORDER — ALBUMIN HUMAN 250 G/1000ML
25 SOLUTION INTRAVENOUS
Status: DISCONTINUED | OUTPATIENT
Start: 2017-08-25 | End: 2017-08-26 | Stop reason: HOSPADM

## 2017-08-25 RX ADMIN — ALBUMIN (HUMAN) 25 G: 25 SOLUTION INTRAVENOUS at 02:08

## 2017-08-25 NOTE — PROGRESS NOTES
Paracentesis complete. 5400 mLs peritoneal fluid drained. Pt tolerated well. Dressing to clean, dry, and intact. Albumin 25% given 150 mLs. Specimens sent per lab order. Discharge instructions and handouts provided. Pt verbalized understanding.

## 2017-08-25 NOTE — DISCHARGE INSTRUCTIONS
For scheduling: Call Mackenzie at [phone]    For questions or concerns call: [facility] 8 AM- 5PM [phone]. Radiology resident on call [phone].    For immediate concerns that are not emergent, you may call our radiology clinic at: [phone]

## 2017-08-31 ENCOUNTER — TELEPHONE (OUTPATIENT)
Dept: INTERVENTIONAL RADIOLOGY/VASCULAR | Facility: HOSPITAL | Age: 77
End: 2017-08-31

## 2017-09-01 ENCOUNTER — HOSPITAL ENCOUNTER (OUTPATIENT)
Dept: INTERVENTIONAL RADIOLOGY/VASCULAR | Facility: HOSPITAL | Age: 77
Discharge: HOME | End: 2017-09-01
Attending: INTERNAL MEDICINE
Payer: MEDICARE

## 2017-09-01 VITALS
SYSTOLIC BLOOD PRESSURE: 128 MMHG | RESPIRATION RATE: 18 BRPM | DIASTOLIC BLOOD PRESSURE: 64 MMHG | HEART RATE: 60 BPM | OXYGEN SATURATION: 100 %

## 2017-09-01 DIAGNOSIS — R18.8 OTHER ASCITES: ICD-10-CM

## 2017-09-01 DIAGNOSIS — K74.60 CIRRHOSIS OF LIVER WITH ASCITES, UNSPECIFIED HEPATIC CIRRHOSIS TYPE: ICD-10-CM

## 2017-09-01 DIAGNOSIS — R18.8 CIRRHOSIS OF LIVER WITH ASCITES, UNSPECIFIED HEPATIC CIRRHOSIS TYPE: ICD-10-CM

## 2017-09-01 LAB
APPEARANCE FLD: NORMAL
BODY FLD TYPE: NORMAL
COLOR FLD: YELLOW
LYMPHOCYTES NFR FLD MANUAL: 69 %
MONOS+MACROS NFR FLD MANUAL: 26 %
NEUTROPHILS NFR FLD MANUAL: 5 %
WBC # FLD: 196 /CU MM

## 2017-09-01 PROCEDURE — P9047 ALBUMIN (HUMAN), 25%, 50ML: HCPCS | Performed by: INTERNAL MEDICINE

## 2017-09-01 PROCEDURE — 89051 BODY FLUID CELL COUNT: CPT

## 2017-09-01 PROCEDURE — 49083 ABD PARACENTESIS W/IMAGING: CPT | Mod: ,,, | Performed by: FAMILY MEDICINE

## 2017-09-01 PROCEDURE — C1729 CATH, DRAINAGE: HCPCS

## 2017-09-01 PROCEDURE — A7048 VACUUM DRAIN BOTTLE/TUBE KIT: HCPCS

## 2017-09-01 PROCEDURE — 63600175 PHARM REV CODE 636 W HCPCS: Performed by: INTERNAL MEDICINE

## 2017-09-01 RX ORDER — ALBUMIN HUMAN 250 G/1000ML
25 SOLUTION INTRAVENOUS
Status: DISCONTINUED | OUTPATIENT
Start: 2017-09-01 | End: 2017-09-02 | Stop reason: HOSPADM

## 2017-09-01 RX ADMIN — ALBUMIN (HUMAN) 12.5 G: 25 SOLUTION INTRAVENOUS at 12:09

## 2017-09-01 RX ADMIN — ALBUMIN (HUMAN) 25 G: 25 SOLUTION INTRAVENOUS at 12:09

## 2017-09-01 NOTE — PROCEDURES

## 2017-09-01 NOTE — PROGRESS NOTES
Paracentesis complete. 5500 mLs peritoneal fluid drained. Pt tolerated well. Dressing to clean, dry, and intact. Albumin 25% given 150 mLs. Specimens sent per lab order. Discharge instructions and handouts provided. Pt verbalized understanding.

## 2017-09-01 NOTE — H&P
Radiology History & Physical      SUBJECTIVE:     Chief Complaint: ascites    History of Present Illness:  Kenneth Sheikh is a 77 y.o. male who presents for ultrasound guided paracentesis  Past Medical History:   Diagnosis Date    Anticoagulant long-term use     Bipolar 1 disorder     Bipolar 1 disorder 11/24/2016    bipolar    Cancer     history of skin cancer/sinus cancer & rectal cancer    Depressed bipolar affective disorder     Diabetes mellitus     type 2    EBV infection 12/7/2016    EBV DNA, PCR Latest Ref Range: None detected  None detected EBV DNA-Copies/mL Unknown Test Not Performed EBV Early Antigen Ab, IgG Latest Ref Range: <1:10 Titer 1:40 (A) EBV Nuclear Ag Ab Latest Ref Range: <1:5 Titer >=1:80 (A) EBV VCA IgG Latest Ref Range: <1:10 Titer 1:160 (A) EBV VCA IgM Latest Ref Range: <1:10 Titer <1:10     History of TIA (transient ischemic attack)     several-taking Plavix    Hx of gallstones     Wife denies    Hypertension     Hyperthyroidism 11/30/2016    Low HDL (under 40) 12/7/2016    Mixed hyperlipidemia 11/23/2016    JUAN MANUEL (obstructive sleep apnea)     Stroke     Transient cerebral ischemia 7/22/2016     Past Surgical History:   Procedure Laterality Date    Moh's procedure x4      Sinus surgery for sinus cancer      sinus sx for cancer      skin cancer removed-several times-nose & ear      TONSILLECTOMY      Undescened testicle sx      UVULOPALATOPHARYNGOPLASTY      for sleep apnea       Home Meds:   Prior to Admission medications    Medication Sig Start Date End Date Taking? Authorizing Provider   ACCU-CHEK NEYDA PLUS TEST STRP Strp  2/23/17   Historical Provider, MD   ciprofloxacin HCl (CIPRO) 500 MG tablet Take 1 tablet (500 mg total) by mouth every Monday. 5/29/17   Renato Womack MD   divalproex (DEPAKOTE) 250 MG EC tablet  5/5/17   Historical Provider, MD   furosemide (LASIX) 20 MG tablet Take 4 tablets (80 mg total) by mouth once daily.  Patient taking differently: Take  40 mg by mouth once daily. Taking 40 mg per day 4/25/17 4/25/18  Renato Womack MD   lactulose (CHRONULAC) 10 gram/15 mL solution Take 15 mLs (10 g total) by mouth 2 (two) times daily. 12/20/16   Renato Womack MD   rifAXIMin (XIFAXAN) 550 mg Tab Take 1 tablet (550 mg total) by mouth 2 (two) times daily. 2/21/17   Renato Womack MD   SITagliptan (JANUVIA) 50 MG Tab Take 1 tablet (50 mg total) by mouth once daily. 4/6/17   Prisca Espinoza MD     Anticoagulants/Antiplatelets: no anticoagulation    Allergies:   Review of patient's allergies indicates:   Allergen Reactions    Abilify [aripiprazole] Other (See Comments)     Sleepy, could not function.     Sedation History:  no adverse reactions    Review of Systems:   Hematological: no known coagulopathies  Respiratory: no shortness of breath  Cardiovascular: no chest pain  Gastrointestinal: no abdominal pain  Genito-Urinary: no dysuria  Musculoskeletal: negative  Neurological: no TIA or stroke symptoms         OBJECTIVE:     Vital Signs (Most Recent)  Pulse: 64 (09/01/17 1111)  Resp: 18 (09/01/17 1111)  BP: (!) 142/68 (09/01/17 1111)  SpO2: 99 % (09/01/17 1111)    Physical Exam:  ASA: 2  Mallampati: n/a    General: no acute distress  Mental Status: alert and oriented to person, place and time  HEENT: normocephalic, atraumatic  Chest: unlabored breathing  Heart: regular heart rate  Abdomen: distended  Extremity: moves all extremities    Laboratory  Lab Results   Component Value Date    INR 1.0 08/25/2017       Lab Results   Component Value Date    WBC 4.22 08/25/2017    HGB 11.0 (L) 08/25/2017    HCT 33.7 (L) 08/25/2017    MCV 86 08/25/2017     (L) 08/25/2017      Lab Results   Component Value Date     (H) 06/16/2017     06/16/2017    K 4.0 06/16/2017     06/16/2017    CO2 28 06/16/2017    BUN 24 (H) 06/16/2017    CREATININE 1.5 (H) 06/16/2017    CALCIUM 8.9 06/16/2017    MG 1.7 05/15/2017    ALT 8 (L) 05/24/2017    AST 26  05/24/2017    ALBUMIN 2.8 (L) 05/24/2017    BILITOT 0.4 05/24/2017       ASSESSMENT/PLAN:     Sedation Plan: local  Patient will undergo ultrasound guided paracentesis.    MATT Mauricio, FNP  Interventional Radiology  [phone] [employer]

## 2017-09-01 NOTE — DISCHARGE INSTRUCTIONS
For scheduling: Call Mackenzie at [phone]    For questions or concerns call: [facility] 8 AM- 5PM [phone]. Radiology resident on call [phone].    For immediate concerns that are not emergent, you may call our radiology clinic at: [phone]

## 2017-09-08 ENCOUNTER — HOSPITAL ENCOUNTER (OUTPATIENT)
Dept: INTERVENTIONAL RADIOLOGY/VASCULAR | Facility: HOSPITAL | Age: 77
Discharge: HOME | End: 2017-09-08
Attending: INTERNAL MEDICINE
Payer: MEDICARE

## 2017-09-08 VITALS
OXYGEN SATURATION: 100 % | HEART RATE: 54 BPM | DIASTOLIC BLOOD PRESSURE: 61 MMHG | RESPIRATION RATE: 18 BRPM | SYSTOLIC BLOOD PRESSURE: 124 MMHG

## 2017-09-08 DIAGNOSIS — K74.60 CIRRHOSIS OF LIVER WITH ASCITES, UNSPECIFIED HEPATIC CIRRHOSIS TYPE: ICD-10-CM

## 2017-09-08 DIAGNOSIS — R18.8 OTHER ASCITES: ICD-10-CM

## 2017-09-08 DIAGNOSIS — R18.8 CIRRHOSIS OF LIVER WITH ASCITES, UNSPECIFIED HEPATIC CIRRHOSIS TYPE: ICD-10-CM

## 2017-09-08 LAB
APPEARANCE FLD: CLEAR
BODY FLD TYPE: NORMAL
COLOR FLD: YELLOW
LYMPHOCYTES NFR FLD MANUAL: 74 %
MONOS+MACROS NFR FLD MANUAL: 20 %
NEUTROPHILS NFR FLD MANUAL: 6 %
WBC # FLD: 203 /CU MM

## 2017-09-08 PROCEDURE — C1729 CATH, DRAINAGE: HCPCS

## 2017-09-08 PROCEDURE — P9047 ALBUMIN (HUMAN), 25%, 50ML: HCPCS | Performed by: INTERNAL MEDICINE

## 2017-09-08 PROCEDURE — 89051 BODY FLUID CELL COUNT: CPT

## 2017-09-08 PROCEDURE — 63600175 PHARM REV CODE 636 W HCPCS: Performed by: INTERNAL MEDICINE

## 2017-09-08 PROCEDURE — 49083 ABD PARACENTESIS W/IMAGING: CPT | Mod: ,,, | Performed by: FAMILY MEDICINE

## 2017-09-08 RX ORDER — ALBUMIN HUMAN 250 G/1000ML
25 SOLUTION INTRAVENOUS
Status: DISCONTINUED | OUTPATIENT
Start: 2017-09-08 | End: 2017-09-09 | Stop reason: HOSPADM

## 2017-09-08 RX ADMIN — ALBUMIN (HUMAN) 25 G: 25 SOLUTION INTRAVENOUS at 02:09

## 2017-09-08 RX ADMIN — ALBUMIN (HUMAN) 12.5 G: 25 SOLUTION INTRAVENOUS at 03:09

## 2017-09-08 NOTE — PROGRESS NOTES
Paracentesis complete. 5300 mLs peritoneal fluid drained. Pt tolerated well. Dressing to rlq clean, dry, and intact. Albumin 25% given 150 mLs. Specimens sent per lab order. IV d/c'd with cath tip intact.  Pt given discharge instructions, verbalized understanding. Pt escorted to lobby via wheelchair.

## 2017-09-08 NOTE — PROCEDURES

## 2017-09-08 NOTE — DISCHARGE INSTRUCTIONS
For scheduling: Call Mackenzie at [phone]    For questions or concerns call: [facility] 8 AM- 5PM [phone]. Radiology resident on call [phone].    For immediate concerns that are not emergent, you may call our radiology clinic at: [phone]

## 2017-09-08 NOTE — H&P
Radiology History & Physical      SUBJECTIVE:     Chief Complaint: ascites    History of Present Illness:  Kenneth Sheikh is a 77 y.o. male who presents for ultrasound guided paracentesis  Past Medical History:   Diagnosis Date    Anticoagulant long-term use     Bipolar 1 disorder     Bipolar 1 disorder 11/24/2016    bipolar    Cancer     history of skin cancer/sinus cancer & rectal cancer    Depressed bipolar affective disorder     Diabetes mellitus     type 2    EBV infection 12/7/2016    EBV DNA, PCR Latest Ref Range: None detected  None detected EBV DNA-Copies/mL Unknown Test Not Performed EBV Early Antigen Ab, IgG Latest Ref Range: <1:10 Titer 1:40 (A) EBV Nuclear Ag Ab Latest Ref Range: <1:5 Titer >=1:80 (A) EBV VCA IgG Latest Ref Range: <1:10 Titer 1:160 (A) EBV VCA IgM Latest Ref Range: <1:10 Titer <1:10     History of TIA (transient ischemic attack)     several-taking Plavix    Hx of gallstones     Wife denies    Hypertension     Hyperthyroidism 11/30/2016    Low HDL (under 40) 12/7/2016    Mixed hyperlipidemia 11/23/2016    JUAN MANUEL (obstructive sleep apnea)     Stroke     Transient cerebral ischemia 7/22/2016     Past Surgical History:   Procedure Laterality Date    Moh's procedure x4      Sinus surgery for sinus cancer      sinus sx for cancer      skin cancer removed-several times-nose & ear      TONSILLECTOMY      Undescened testicle sx      UVULOPALATOPHARYNGOPLASTY      for sleep apnea       Home Meds:   Prior to Admission medications    Medication Sig Start Date End Date Taking? Authorizing Provider   ACCU-CHEK NEYDA PLUS TEST STRP Strp  2/23/17   Historical Provider, MD   ciprofloxacin HCl (CIPRO) 500 MG tablet Take 1 tablet (500 mg total) by mouth every Monday. 5/29/17   Renato Womack MD   divalproex (DEPAKOTE) 250 MG EC tablet  5/5/17   Historical Provider, MD   furosemide (LASIX) 20 MG tablet Take 4 tablets (80 mg total) by mouth once daily.  Patient taking differently: Take  40 mg by mouth once daily. Taking 40 mg per day 4/25/17 4/25/18  Renato Womack MD   lactulose (CHRONULAC) 10 gram/15 mL solution Take 15 mLs (10 g total) by mouth 2 (two) times daily. 12/20/16   Renato Womack MD   rifAXIMin (XIFAXAN) 550 mg Tab Take 1 tablet (550 mg total) by mouth 2 (two) times daily. 2/21/17   Renato Womack MD   SITagliptan (JANUVIA) 50 MG Tab Take 1 tablet (50 mg total) by mouth once daily. 4/6/17   Prisca Espinoza MD     Anticoagulants/Antiplatelets: no anticoagulation    Allergies:   Review of patient's allergies indicates:   Allergen Reactions    Abilify [aripiprazole] Other (See Comments)     Sleepy, could not function.     Sedation History:  no adverse reactions    Review of Systems:   Hematological: no known coagulopathies  Respiratory: no shortness of breath  Cardiovascular: no chest pain  Gastrointestinal: no abdominal pain  Genito-Urinary: no dysuria  Musculoskeletal: negative  Neurological: no TIA or stroke symptoms         OBJECTIVE:     Vital Signs (Most Recent)  Pulse: 63 (09/08/17 1404)  Resp: 15 (09/08/17 1404)  BP: (!) 125/59 (09/08/17 1404)  SpO2: 100 % (09/08/17 1404)    Physical Exam:  ASA: 2  Mallampati: n/a    General: no acute distress  Mental Status: alert and oriented to person, place and time  HEENT: normocephalic, atraumatic  Chest: unlabored breathing  Heart: regular heart rate  Abdomen: distended  Extremity: moves all extremities    Laboratory  Lab Results   Component Value Date    INR 1.0 08/25/2017       Lab Results   Component Value Date    WBC 4.22 08/25/2017    HGB 11.0 (L) 08/25/2017    HCT 33.7 (L) 08/25/2017    MCV 86 08/25/2017     (L) 08/25/2017      Lab Results   Component Value Date     (H) 06/16/2017     06/16/2017    K 4.0 06/16/2017     06/16/2017    CO2 28 06/16/2017    BUN 24 (H) 06/16/2017    CREATININE 1.5 (H) 06/16/2017    CALCIUM 8.9 06/16/2017    MG 1.7 05/15/2017    ALT 8 (L) 05/24/2017    AST 26  05/24/2017    ALBUMIN 2.8 (L) 05/24/2017    BILITOT 0.4 05/24/2017       ASSESSMENT/PLAN:     Sedation Plan: local  Patient will undergo ultrasound guided paracentesis.    MATT Mauricio, FNP  Interventional Radiology  [phone] [employer]

## 2017-09-15 ENCOUNTER — HOSPITAL ENCOUNTER (OUTPATIENT)
Dept: INTERVENTIONAL RADIOLOGY/VASCULAR | Facility: HOSPITAL | Age: 77
Discharge: HOME | End: 2017-09-15
Attending: INTERNAL MEDICINE
Payer: MEDICARE

## 2017-09-15 VITALS
SYSTOLIC BLOOD PRESSURE: 118 MMHG | HEART RATE: 57 BPM | RESPIRATION RATE: 18 BRPM | DIASTOLIC BLOOD PRESSURE: 69 MMHG | OXYGEN SATURATION: 100 %

## 2017-09-15 DIAGNOSIS — R18.8 CIRRHOSIS OF LIVER WITH ASCITES, UNSPECIFIED HEPATIC CIRRHOSIS TYPE: ICD-10-CM

## 2017-09-15 DIAGNOSIS — K74.60 CIRRHOSIS OF LIVER WITH ASCITES, UNSPECIFIED HEPATIC CIRRHOSIS TYPE: ICD-10-CM

## 2017-09-15 DIAGNOSIS — R18.8 OTHER ASCITES: ICD-10-CM

## 2017-09-15 LAB
APPEARANCE FLD: CLEAR
BODY FLD TYPE: NORMAL
COLOR FLD: YELLOW
LYMPHOCYTES NFR FLD MANUAL: 76 %
MONOS+MACROS NFR FLD MANUAL: 17 %
NEUTROPHILS NFR FLD MANUAL: 7 %
WBC # FLD: 213 /CU MM

## 2017-09-15 PROCEDURE — C1729 CATH, DRAINAGE: HCPCS

## 2017-09-15 PROCEDURE — 89051 BODY FLUID CELL COUNT: CPT

## 2017-09-15 PROCEDURE — 49083 ABD PARACENTESIS W/IMAGING: CPT | Mod: ,,, | Performed by: RADIOLOGY

## 2017-09-15 NOTE — PROGRESS NOTES
Paracentesis complete, 4.4L removed, pt tolerates well.  Labs sent. Dc instructions reviewed, pt verbalizes understanding.  Wheeled to lobby.

## 2017-09-15 NOTE — PROCEDURES
Radiology Post-Procedure Note    Pre Op Diagnosis: Ascites  Post Op Diagnosis: Same    Procedure: Paracentesis    Procedure performed by: Chung Bass MD    Written Informed Consent Obtained: Yes  Specimen Removed: YES 20 cc yellow ascitic fluid.   Estimated Blood Loss: Minimal    Findings:   Successful paracentesis.  Albumin administered PRN per protocol.    Patient tolerated procedure well.    David Galvan MD  PGY - 2  Department of Radiology

## 2017-09-15 NOTE — DISCHARGE INSTRUCTIONS
Please call with any questions or concerns.      Monday thru Friday 8:00 am - 4:30 pm    Interventional Radiology   [phone]    After Hours    Ask for the Radiology Intern on call  [phone]

## 2017-09-22 ENCOUNTER — HOSPITAL ENCOUNTER (OUTPATIENT)
Dept: INTERVENTIONAL RADIOLOGY/VASCULAR | Facility: HOSPITAL | Age: 77
Discharge: HOME | End: 2017-09-22
Attending: INTERNAL MEDICINE
Payer: MEDICARE

## 2017-09-22 VITALS
DIASTOLIC BLOOD PRESSURE: 71 MMHG | HEART RATE: 54 BPM | RESPIRATION RATE: 18 BRPM | OXYGEN SATURATION: 97 % | SYSTOLIC BLOOD PRESSURE: 120 MMHG

## 2017-09-22 DIAGNOSIS — K74.60 CIRRHOSIS OF LIVER WITH ASCITES, UNSPECIFIED HEPATIC CIRRHOSIS TYPE: ICD-10-CM

## 2017-09-22 DIAGNOSIS — R18.8 OTHER ASCITES: ICD-10-CM

## 2017-09-22 DIAGNOSIS — R18.8 CIRRHOSIS OF LIVER WITH ASCITES, UNSPECIFIED HEPATIC CIRRHOSIS TYPE: ICD-10-CM

## 2017-09-22 LAB
APPEARANCE FLD: CLEAR
BODY FLD TYPE: NORMAL
COLOR FLD: YELLOW
LYMPHOCYTES NFR FLD MANUAL: 66 %
MONOS+MACROS NFR FLD MANUAL: 27 %
NEUTROPHILS NFR FLD MANUAL: 7 %
WBC # FLD: 201 /CU MM

## 2017-09-22 PROCEDURE — 63600175 PHARM REV CODE 636 W HCPCS: Performed by: INTERNAL MEDICINE

## 2017-09-22 PROCEDURE — 89051 BODY FLUID CELL COUNT: CPT

## 2017-09-22 PROCEDURE — C1729 CATH, DRAINAGE: HCPCS

## 2017-09-22 PROCEDURE — 49083 ABD PARACENTESIS W/IMAGING: CPT | Mod: LT,,, | Performed by: FAMILY MEDICINE

## 2017-09-22 PROCEDURE — P9047 ALBUMIN (HUMAN), 25%, 50ML: HCPCS | Performed by: INTERNAL MEDICINE

## 2017-09-22 RX ORDER — ALBUMIN HUMAN 250 G/1000ML
25 SOLUTION INTRAVENOUS
Status: DISCONTINUED | OUTPATIENT
Start: 2017-09-22 | End: 2017-09-23 | Stop reason: HOSPADM

## 2017-09-22 RX ADMIN — ALBUMIN (HUMAN) 25 G: 25 SOLUTION INTRAVENOUS at 03:09

## 2017-09-22 RX ADMIN — ALBUMIN (HUMAN) 12.5 G: 25 SOLUTION INTRAVENOUS at 03:09

## 2017-09-22 NOTE — DISCHARGE INSTRUCTIONS
For scheduling: Call Mackenzie at [phone]    For questions or concerns call: [facility] 8 AM- 5PM [phone]. Radiology resident on call [phone].    For immediate concerns that are not emergent, you may call our radiology clinic at: [phone]

## 2017-09-22 NOTE — H&P
Radiology History & Physical      SUBJECTIVE:     Chief Complaint: ascites    History of Present Illness:  Kenneth Sheikh is a 77 y.o. male who presents for ultrasound guided paracentesis  Past Medical History:   Diagnosis Date    Anticoagulant long-term use     Bipolar 1 disorder     Bipolar 1 disorder 11/24/2016    bipolar    Cancer     history of skin cancer/sinus cancer & rectal cancer    Depressed bipolar affective disorder     Diabetes mellitus     type 2    EBV infection 12/7/2016    EBV DNA, PCR Latest Ref Range: None detected  None detected EBV DNA-Copies/mL Unknown Test Not Performed EBV Early Antigen Ab, IgG Latest Ref Range: <1:10 Titer 1:40 (A) EBV Nuclear Ag Ab Latest Ref Range: <1:5 Titer >=1:80 (A) EBV VCA IgG Latest Ref Range: <1:10 Titer 1:160 (A) EBV VCA IgM Latest Ref Range: <1:10 Titer <1:10     History of TIA (transient ischemic attack)     several-taking Plavix    Hx of gallstones     Wife denies    Hypertension     Hyperthyroidism 11/30/2016    Low HDL (under 40) 12/7/2016    Mixed hyperlipidemia 11/23/2016    JUAN MANUEL (obstructive sleep apnea)     Stroke     Transient cerebral ischemia 7/22/2016     Past Surgical History:   Procedure Laterality Date    Moh's procedure x4      Sinus surgery for sinus cancer      sinus sx for cancer      skin cancer removed-several times-nose & ear      TONSILLECTOMY      Undescened testicle sx      UVULOPALATOPHARYNGOPLASTY      for sleep apnea       Home Meds:   Prior to Admission medications    Medication Sig Start Date End Date Taking? Authorizing Provider   ACCU-CHEK NEYDA PLUS TEST STRP Strp  2/23/17   Historical Provider, MD   ciprofloxacin HCl (CIPRO) 500 MG tablet Take 1 tablet (500 mg total) by mouth every Monday. 5/29/17   Renato Womack MD   divalproex (DEPAKOTE) 250 MG EC tablet  5/5/17   Historical Provider, MD   furosemide (LASIX) 20 MG tablet Take 4 tablets (80 mg total) by mouth once daily.  Patient taking differently: Take  40 mg by mouth once daily. Taking 40 mg per day 4/25/17 4/25/18  Renato Womack MD   lactulose (CHRONULAC) 10 gram/15 mL solution Take 15 mLs (10 g total) by mouth 2 (two) times daily. 12/20/16   Renato Womack MD   rifAXIMin (XIFAXAN) 550 mg Tab Take 1 tablet (550 mg total) by mouth 2 (two) times daily. 2/21/17   Renato Womack MD   SITagliptan (JANUVIA) 50 MG Tab Take 1 tablet (50 mg total) by mouth once daily. 4/6/17   Prisca Espinoza MD     Anticoagulants/Antiplatelets: no anticoagulation    Allergies:   Review of patient's allergies indicates:   Allergen Reactions    Abilify [aripiprazole] Other (See Comments)     Sleepy, could not function.     Sedation History:  no adverse reactions    Review of Systems:   Hematological: no known coagulopathies  Respiratory: no shortness of breath  Cardiovascular: no chest pain  Gastrointestinal: no abdominal pain  Genito-Urinary: no dysuria  Musculoskeletal: negative  Neurological: no TIA or stroke symptoms         OBJECTIVE:     Vital Signs (Most Recent)  Pulse: 62 (09/22/17 1400)  Resp: 18 (09/22/17 1400)  BP: 122/69 (09/22/17 1400)  SpO2: 98 % (09/22/17 1400)    Physical Exam:  ASA: 2  Mallampati: n/a    General: no acute distress  Mental Status: alert and oriented to person, place and time  HEENT: normocephalic, atraumatic  Chest: unlabored breathing  Heart: regular heart rate  Abdomen: distended  Extremity: moves all extremities    Laboratory  Lab Results   Component Value Date    INR 1.0 08/25/2017       Lab Results   Component Value Date    WBC 4.22 08/25/2017    HGB 11.0 (L) 08/25/2017    HCT 33.7 (L) 08/25/2017    MCV 86 08/25/2017     (L) 08/25/2017      Lab Results   Component Value Date     (H) 06/16/2017     06/16/2017    K 4.0 06/16/2017     06/16/2017    CO2 28 06/16/2017    BUN 24 (H) 06/16/2017    CREATININE 1.5 (H) 06/16/2017    CALCIUM 8.9 06/16/2017    MG 1.7 05/15/2017    ALT 8 (L) 05/24/2017    AST 26  05/24/2017    ALBUMIN 2.8 (L) 05/24/2017    BILITOT 0.4 05/24/2017       ASSESSMENT/PLAN:     Sedation Plan: local  Patient will undergo ultrasound guided paracentesis.    MATT Mauricio, FNP  Interventional Radiology  [phone] [employer]

## 2017-09-22 NOTE — PROGRESS NOTES
Paracentesis complete. 5900 mLs peritoneal fluid drained. Pt tolerated well. Dressing to llq clean, dry, and intact. Albumin 25% given 150 mLs. Specimens sent per lab order. Discharge instructions given, verbalized understanding. Pt brought to lobby via wheelchair.

## 2017-09-22 NOTE — PROCEDURES

## 2017-09-28 DIAGNOSIS — R18.8 OTHER ASCITES: Primary | ICD-10-CM

## 2017-09-29 ENCOUNTER — HOSPITAL ENCOUNTER (OUTPATIENT)
Dept: CARDIOLOGY | Facility: CLINIC | Age: 77
Discharge: HOME | End: 2017-09-29
Payer: MEDICARE

## 2017-09-29 ENCOUNTER — HOSPITAL ENCOUNTER (OUTPATIENT)
Dept: INTERVENTIONAL RADIOLOGY/VASCULAR | Facility: HOSPITAL | Age: 77
Discharge: HOME | End: 2017-09-29
Attending: INTERNAL MEDICINE
Payer: MEDICARE

## 2017-09-29 ENCOUNTER — TELEPHONE (OUTPATIENT)
Dept: INTERNAL MEDICINE | Facility: CLINIC | Age: 77
End: 2017-09-29

## 2017-09-29 ENCOUNTER — OFFICE VISIT (OUTPATIENT)
Dept: INTERNAL MEDICINE | Facility: CLINIC | Age: 77
End: 2017-09-29
Payer: MEDICARE

## 2017-09-29 VITALS
SYSTOLIC BLOOD PRESSURE: 114 MMHG | HEIGHT: 68 IN | HEART RATE: 60 BPM | DIASTOLIC BLOOD PRESSURE: 60 MMHG | WEIGHT: 177.94 LBS | BODY MASS INDEX: 26.97 KG/M2

## 2017-09-29 VITALS
RESPIRATION RATE: 19 BRPM | HEART RATE: 81 BPM | OXYGEN SATURATION: 100 % | DIASTOLIC BLOOD PRESSURE: 68 MMHG | SYSTOLIC BLOOD PRESSURE: 140 MMHG

## 2017-09-29 DIAGNOSIS — Z85.828 HISTORY OF SCC (SQUAMOUS CELL CARCINOMA) OF SKIN: ICD-10-CM

## 2017-09-29 DIAGNOSIS — K74.60 CIRRHOSIS OF LIVER WITH ASCITES, UNSPECIFIED HEPATIC CIRRHOSIS TYPE: ICD-10-CM

## 2017-09-29 DIAGNOSIS — D61.818 PANCYTOPENIA: ICD-10-CM

## 2017-09-29 DIAGNOSIS — C20 RECTAL CANCER: ICD-10-CM

## 2017-09-29 DIAGNOSIS — R18.8 CIRRHOSIS OF LIVER WITH ASCITES, UNSPECIFIED HEPATIC CIRRHOSIS TYPE: ICD-10-CM

## 2017-09-29 DIAGNOSIS — R07.9 CHEST PAIN, UNSPECIFIED TYPE: Primary | ICD-10-CM

## 2017-09-29 DIAGNOSIS — E11.9 TYPE 2 DIABETES MELLITUS WITHOUT COMPLICATION, WITHOUT LONG-TERM CURRENT USE OF INSULIN: ICD-10-CM

## 2017-09-29 DIAGNOSIS — R18.8 OTHER ASCITES: ICD-10-CM

## 2017-09-29 DIAGNOSIS — F31.9 BIPOLAR 1 DISORDER: ICD-10-CM

## 2017-09-29 DIAGNOSIS — R07.9 CHEST PAIN, UNSPECIFIED TYPE: ICD-10-CM

## 2017-09-29 DIAGNOSIS — N18.30 CKD (CHRONIC KIDNEY DISEASE) STAGE 3, GFR 30-59 ML/MIN: ICD-10-CM

## 2017-09-29 LAB
APPEARANCE FLD: NORMAL
BODY FLD TYPE: NORMAL
COLOR FLD: YELLOW
LYMPHOCYTES NFR FLD MANUAL: 41 %
MESOTHL CELL NFR FLD MANUAL: 2 %
MONOS+MACROS NFR FLD MANUAL: 54 %
NEUTROPHILS NFR FLD MANUAL: 3 %
WBC # FLD: 208 /CU MM

## 2017-09-29 PROCEDURE — 3074F SYST BP LT 130 MM HG: CPT | Mod: S$GLB,,, | Performed by: INTERNAL MEDICINE

## 2017-09-29 PROCEDURE — 1126F AMNT PAIN NOTED NONE PRSNT: CPT | Mod: S$GLB,,, | Performed by: INTERNAL MEDICINE

## 2017-09-29 PROCEDURE — C1729 CATH, DRAINAGE: HCPCS

## 2017-09-29 PROCEDURE — 99214 OFFICE O/P EST MOD 30 MIN: CPT | Mod: S$GLB,,, | Performed by: INTERNAL MEDICINE

## 2017-09-29 PROCEDURE — 49083 ABD PARACENTESIS W/IMAGING: CPT | Mod: ,,, | Performed by: FAMILY MEDICINE

## 2017-09-29 PROCEDURE — 99999 PR PBB SHADOW E&M-EST. PATIENT-LVL III: CPT | Mod: PBBFAC,,, | Performed by: INTERNAL MEDICINE

## 2017-09-29 PROCEDURE — 93000 ELECTROCARDIOGRAM COMPLETE: CPT | Mod: S$GLB,,, | Performed by: INTERNAL MEDICINE

## 2017-09-29 PROCEDURE — 1157F ADVNC CARE PLAN IN RCRD: CPT | Mod: S$GLB,,, | Performed by: INTERNAL MEDICINE

## 2017-09-29 PROCEDURE — 63600175 PHARM REV CODE 636 W HCPCS: Performed by: INTERNAL MEDICINE

## 2017-09-29 PROCEDURE — P9047 ALBUMIN (HUMAN), 25%, 50ML: HCPCS | Performed by: INTERNAL MEDICINE

## 2017-09-29 PROCEDURE — 89051 BODY FLUID CELL COUNT: CPT

## 2017-09-29 PROCEDURE — 1159F MED LIST DOCD IN RCRD: CPT | Mod: S$GLB,,, | Performed by: INTERNAL MEDICINE

## 2017-09-29 PROCEDURE — 3078F DIAST BP <80 MM HG: CPT | Mod: S$GLB,,, | Performed by: INTERNAL MEDICINE

## 2017-09-29 PROCEDURE — 3008F BODY MASS INDEX DOCD: CPT | Mod: S$GLB,,, | Performed by: INTERNAL MEDICINE

## 2017-09-29 PROCEDURE — A7048 VACUUM DRAIN BOTTLE/TUBE KIT: HCPCS

## 2017-09-29 RX ORDER — ALBUMIN HUMAN 250 G/1000ML
25 SOLUTION INTRAVENOUS
Status: DISCONTINUED | OUTPATIENT
Start: 2017-09-29 | End: 2017-09-30 | Stop reason: HOSPADM

## 2017-09-29 RX ORDER — ALBUMIN HUMAN 250 G/1000ML
12.5 SOLUTION INTRAVENOUS ONCE
Status: COMPLETED | OUTPATIENT
Start: 2017-09-29 | End: 2017-09-29

## 2017-09-29 RX ADMIN — ALBUMIN (HUMAN) 12.5 G: 25 SOLUTION INTRAVENOUS at 02:09

## 2017-09-29 RX ADMIN — ALBUMIN (HUMAN) 25 G: 25 SOLUTION INTRAVENOUS at 01:09

## 2017-09-29 NOTE — PROCEDURES
Radiology Post-Procedure Note    Pre Op Diagnosis: Ascites  Post Op Diagnosis: Same    Procedure: Ultrasound Guided Paracentesis    Procedure performed by: Souleymane ROSS, Елена     Written Informed Consent Obtained: Yes  Specimen Removed: YES daniel colored fluid  Estimated Blood Loss: Minimal    Findings:   Successful paracentesis.  Albumin administered PRN per protocol.    Patient tolerated procedure well.    Елена Comer, APRN, FNP  Interventional Radiology  [phone] [employer]

## 2017-09-29 NOTE — TELEPHONE ENCOUNTER
----- Message from Prisca Espinoza MD sent at 9/29/2017  1:08 PM CDT -----  Please call patient and let him know that his Depakote level was in a good range.  It is 56.7.

## 2017-09-29 NOTE — DISCHARGE INSTRUCTIONS
Interventional Radiology [phone]  Mon-Fri  8A-4P  24hr Radiology Resident on call [phone]

## 2017-09-29 NOTE — PROGRESS NOTES
Internal Medicine    Subjective:      Patient ID: Kenneth Sheikh is a 77 y.o. male.    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  Last seen 6/9/17.      Chest pain:  Reports he had some chest pain while waiting for appointment today.  Lasted approximately 5 minutes.  Describes as dull pain.  Not associated with exertion.  Has had in the past too - never associated with exertion.  Goes away on its own.  Last episode was 1-2 months ago.  Denies chest pain currently.     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  No longer taking Losartan (stopped 2/2 BELLA).  Kidney function has improved.  Followed by Dr. Womack - last seen 6/25/17.  Treated by Dr. Perla endoscopically but will need another endoscopic session - this was put on hold due to recent Strep mitis sepsis.  Taking Rifaximin twice daily - has BM every day.  Getting paracentesis weekly.  Last paracentesis 9/22/17 - drained 5.9L.  Taking Cipro once a week for SBP prophylaxis.       H/o Rectal cancer s/p resection 06/2016:  Last seen by colorectal surgery (Dr. Talavera) on 8/19/17 - no evidence of recurrent neoplasia - follow up in 6 months.     DM-2:  Off metformin due to elevated lactate and diarrhea.  Now taking Januvia 50mg daily by Dr. Sheikh.  Denies hyperglycemia or hypoglycemia.       Pancytopenia:  Followed by Heme-Onc.  Last seen 2/21/17.  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      HLD:  Stopped fenofibrate per Dr. Sheikh due to low cholesterol.  No longer on medication.     TIAs:  No symptoms recently.  No longer taking ASA.     Bipolar d/o:  Taking Depakote 500mg qHS.  Needs level per wife.  Mood has been improved recently.  Denies depression or dany.  Followed by psychiatrist, Dr. Figueroa.     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 Camacho.  Had recent Mohs at [facility].        Review of Systems   Constitutional: Positive for fatigue. Negative for appetite change, chills, fever and unexpected weight change.   HENT: Negative for sore throat and trouble swallowing.    Eyes: Negative for visual disturbance.   Respiratory: Positive for shortness of breath (When ascites is bad). Negative for cough, chest tightness and wheezing.    Cardiovascular: Positive for chest pain (As per HPI, none currently) and leg swelling (Chronic). Negative for palpitations.   Gastrointestinal: Negative for abdominal pain, blood in stool, constipation, diarrhea, nausea and vomiting.   Genitourinary: Negative for difficulty urinating.   Musculoskeletal: Negative for arthralgias and myalgias.   Skin: Negative for rash and wound.   Neurological: Positive for weakness (Chronic). Negative for dizziness, numbness and headaches.   Psychiatric/Behavioral: Negative for behavioral problems and confusion.       Past medical history, surgical history, and family medical history reviewed and updated as appropriate.    Medications and allergies reviewed.     Objective:     Vitals:    09/29/17 1106   BP: 114/60   Pulse: 60   Weight: 80.7 kg (177 lb 14.6 oz)   Height: 5' 8" (1.727 m)     Physical Exam    RESULTS:   Lab Results   Component Value Date    WBC 2.82 (L) 09/29/2017    HGB 11.3 (L) 09/29/2017    HCT 34.4 (L) 09/29/2017    MCV 85 09/29/2017     (L) 09/29/2017     BMP  Lab Results   Component Value Date     06/16/2017    K 4.0 06/16/2017     06/16/2017    CO2 28 06/16/2017    BUN 24 (H) 06/16/2017    CREATININE 1.5 (H) 06/16/2017    CALCIUM 8.9 06/16/2017    ANIONGAP 8 06/16/2017    ESTGFRAFRICA 50 (A) 06/16/2017    EGFRNONAA 44 (A) 06/16/2017     Lab Results   Component Value Date    ALT 8 (L) 05/24/2017    AST 26 05/24/2017    ALKPHOS 135 05/24/2017    BILITOT 0.4 05/24/2017     Lab Results   Component Value Date    TSH 0.435 11/30/2016 "     Lab Results   Component Value Date    HGBA1C 6.3 (H) 05/11/2017         Assessment:     1. Chest pain, unspecified type    2. Cirrhosis of liver with ascites, unspecified hepatic cirrhosis type    3. CKD (chronic kidney disease) stage 3, GFR 30-59 ml/min    4. Pancytopenia    5. Type 2 diabetes mellitus without complication, without long-term current use of insulin    6. Rectal cancer    7. Bipolar 1 disorder    8. History of SCC (squamous cell carcinoma) of skin        Plan:   Kenneth was seen today for follow-up.    Diagnoses and all orders for this visit:    Chest pain, unspecified type   No chest pain currently.  Will get EKG.  -     SCHEDULED EKG 12-LEAD (to Muse); Future    Cirrhosis of liver with ascites, unspecified hepatic cirrhosis type  CKD (chronic kidney disease) stage 3, GFR 30-59 ml/min  Pancytopenia   Continue medication/plan as discussed in HPI.    Type 2 diabetes mellitus without complication, without long-term current use of insulin   Due for eye exam soon - patient plans to schedule.  Sees outside physician.   Continue medication/plan as discussed in HPI.    Rectal cancer   Continue medication/plan as discussed in HPI.    Bipolar 1 disorder   Continue medication/plan as discussed in HPI.  -     VALPROIC ACID; Future; Expected date: 09/29/2017    History of SCC (squamous cell carcinoma) of skin  -     Ambulatory Referral to Dermatology    Health maintenance reviewed with patient.  Foot exam and urine microalbumin at next visit.      Return in about 4 months (around 1/29/2018).      Prisca Espinoza MD  Internal Medicine  [facility]

## 2017-09-29 NOTE — H&P
Radiology History & Physical      SUBJECTIVE:     Chief Complaint: ascites    History of Present Illness:  Kenneth Sheikh is a 77 y.o. male who presents for ultrasound guided paracentesis  Past Medical History:   Diagnosis Date    Anticoagulant long-term use     Bipolar 1 disorder     Bipolar 1 disorder 11/24/2016    bipolar    Cancer     history of skin cancer/sinus cancer & rectal cancer    Depressed bipolar affective disorder     Diabetes mellitus     type 2    EBV infection 12/7/2016    EBV DNA, PCR Latest Ref Range: None detected  None detected EBV DNA-Copies/mL Unknown Test Not Performed EBV Early Antigen Ab, IgG Latest Ref Range: <1:10 Titer 1:40 (A) EBV Nuclear Ag Ab Latest Ref Range: <1:5 Titer >=1:80 (A) EBV VCA IgG Latest Ref Range: <1:10 Titer 1:160 (A) EBV VCA IgM Latest Ref Range: <1:10 Titer <1:10     History of TIA (transient ischemic attack)     several-taking Plavix    Hx of gallstones     Wife denies    Hypertension     Hyperthyroidism 11/30/2016    Low HDL (under 40) 12/7/2016    Mixed hyperlipidemia 11/23/2016    JUAN MANUEL (obstructive sleep apnea)     Stroke     Transient cerebral ischemia 7/22/2016     Past Surgical History:   Procedure Laterality Date    Moh's procedure x4      Sinus surgery for sinus cancer      sinus sx for cancer      skin cancer removed-several times-nose & ear      TONSILLECTOMY      Undescened testicle sx      UVULOPALATOPHARYNGOPLASTY      for sleep apnea       Home Meds:   Prior to Admission medications    Medication Sig Start Date End Date Taking? Authorizing Provider   ACCU-CHEK NEYDA PLUS TEST STRP Strp  2/23/17   Historical Provider, MD   ciprofloxacin HCl (CIPRO) 500 MG tablet Take 1 tablet (500 mg total) by mouth every Monday. 5/29/17   Renato Womack MD   divalproex (DEPAKOTE) 250 MG EC tablet  5/5/17   Historical Provider, MD   furosemide (LASIX) 20 MG tablet Take 4 tablets (80 mg total) by mouth once daily.  Patient taking differently: Take  40 mg by mouth once daily. Taking 40 mg per day 4/25/17 4/25/18  Renato Womack MD   lactulose (CHRONULAC) 10 gram/15 mL solution Take 15 mLs (10 g total) by mouth 2 (two) times daily. 12/20/16   Renato Womack MD   rifAXIMin (XIFAXAN) 550 mg Tab Take 1 tablet (550 mg total) by mouth 2 (two) times daily. 2/21/17   Renato Womack MD   SITagliptan (JANUVIA) 50 MG Tab Take 1 tablet (50 mg total) by mouth once daily. 4/6/17   Prisca Espinoza MD     Anticoagulants/Antiplatelets: no anticoagulation    Allergies:   Review of patient's allergies indicates:   Allergen Reactions    Abilify [aripiprazole] Other (See Comments)     Sleepy, could not function.     Sedation History:  no adverse reactions    Review of Systems:   Hematological: no known coagulopathies  Respiratory: no shortness of breath  Cardiovascular: no chest pain  Gastrointestinal: no abdominal pain  Genito-Urinary: no dysuria  Musculoskeletal: negative  Neurological: no TIA or stroke symptoms         OBJECTIVE:     Vital Signs (Most Recent)  Pulse: (!) 59 (09/29/17 1303)  Resp: 20 (09/29/17 1303)  BP: 137/87 (09/29/17 1303)  SpO2: 98 % (09/29/17 1303)    Physical Exam:  ASA: 2  Mallampati: n/a    General: no acute distress  Mental Status: alert and oriented to person, place and time  HEENT: normocephalic, atraumatic  Chest: unlabored breathing  Heart: regular heart rate  Abdomen: distended  Extremity: moves all extremities    Laboratory  Lab Results   Component Value Date    INR 1.1 09/29/2017       Lab Results   Component Value Date    WBC 2.82 (L) 09/29/2017    HGB 11.3 (L) 09/29/2017    HCT 34.4 (L) 09/29/2017    MCV 85 09/29/2017     (L) 09/29/2017      Lab Results   Component Value Date     (H) 06/16/2017     06/16/2017    K 4.0 06/16/2017     06/16/2017    CO2 28 06/16/2017    BUN 24 (H) 06/16/2017    CREATININE 1.5 (H) 06/16/2017    CALCIUM 8.9 06/16/2017    MG 1.7 05/15/2017    ALT 8 (L) 05/24/2017    AST  26 05/24/2017    ALBUMIN 2.8 (L) 05/24/2017    BILITOT 0.4 05/24/2017       ASSESSMENT/PLAN:     Sedation Plan: local  Patient will undergo ultrasound guided paracentesis.    MATT Mauricio, FNP  Interventional Radiology  [phone] [employer]

## 2017-10-03 ENCOUNTER — OFFICE VISIT (OUTPATIENT)
Dept: HEPATOLOGY | Facility: CLINIC | Age: 77
End: 2017-10-03
Payer: MEDICARE

## 2017-10-03 VITALS
TEMPERATURE: 98 F | OXYGEN SATURATION: 99 % | HEART RATE: 62 BPM | BODY MASS INDEX: 26.36 KG/M2 | HEIGHT: 68 IN | SYSTOLIC BLOOD PRESSURE: 141 MMHG | WEIGHT: 173.94 LBS | DIASTOLIC BLOOD PRESSURE: 66 MMHG

## 2017-10-03 DIAGNOSIS — K76.82 HEPATIC ENCEPHALOPATHY: ICD-10-CM

## 2017-10-03 DIAGNOSIS — R18.8 ASCITES OF LIVER: ICD-10-CM

## 2017-10-03 DIAGNOSIS — K75.81 LIVER CIRRHOSIS SECONDARY TO NASH (NONALCOHOLIC STEATOHEPATITIS): Primary | ICD-10-CM

## 2017-10-03 DIAGNOSIS — N18.30 CKD (CHRONIC KIDNEY DISEASE) STAGE 3, GFR 30-59 ML/MIN: ICD-10-CM

## 2017-10-03 DIAGNOSIS — Z85.828 HISTORY OF SCC (SQUAMOUS CELL CARCINOMA) OF SKIN: ICD-10-CM

## 2017-10-03 DIAGNOSIS — K74.60 LIVER CIRRHOSIS SECONDARY TO NASH (NONALCOHOLIC STEATOHEPATITIS): Primary | ICD-10-CM

## 2017-10-03 PROCEDURE — 99999 PR PBB SHADOW E&M-EST. PATIENT-LVL III: CPT | Mod: PBBFAC,,, | Performed by: INTERNAL MEDICINE

## 2017-10-03 PROCEDURE — 99214 OFFICE O/P EST MOD 30 MIN: CPT | Mod: S$GLB,,, | Performed by: INTERNAL MEDICINE

## 2017-10-03 NOTE — PROGRESS NOTES
"Subjective:       Patient ID: Kenneth Sheikh is a 77 y.o. male.    Chief Complaint: Cirrhosis and Ascites    HPI  I saw this 77 y.o. man  whom I had met in hospital when he was admitted with lethargy and some confusion back in Dec 2016.    At that time, he was diagnosed with decompensated cirrhosis.    Admitted because of increasing lethargy +++/confusion/dizziness/dysarthria  - Nov 2016  - imaging showed ascites     No significant alcohol use ("a couple beers when I was younger"). No family history of liver disease. No new medications.      Patient has risk factors for MCDONALD.  No significant hx of alcohol    He also has a large gastric varix which has never bled-  treated x1 by Dr Perla endoscopically but will need another endoscopic session (4/16/17).  - Had post procedure fever despite antibiotics    Most recent issue was an E coli bacteremia- on IV antibiotics at home- PICC    Currently he is reasonably stable with weekly paracentesis.  Decreased dose of diuretics.      MELD-Na score: 18 at 5/11/2017  2:45 PM  MELD score: 16 at 5/11/2017  2:45 PM  Calculated from:  Serum Creatinine: 2.1 mg/dL at 5/11/2017  2:45 PM  Serum Sodium: 135 mmol/L at 5/11/2017  2:45 PM  Total Bilirubin: 0.8 mg/dL (Rounded to 1) at 5/11/2017  2:45 PM  INR(ratio): 1.2 at 5/10/2017  5:19 AM  Age: 77 years      PMH:  DM  Sinus Ca- 1996  Rectal Ca- June 2016    SH:  Retired  Ex [employer] worker      FH:  Aunt had cirrhosis- non- alcohol    Review of Systems   Constitutional: Negative for activity change, appetite change, chills, fatigue, fever and unexpected weight change.   HENT: Negative for hearing loss.    Eyes: Negative for discharge and visual disturbance.   Respiratory: Negative for cough, chest tightness, shortness of breath and wheezing.    Cardiovascular: Negative for chest pain, palpitations and leg swelling.   Gastrointestinal: Negative for abdominal distention, abdominal pain, constipation, diarrhea and nausea.   Genitourinary: " Negative for dysuria and frequency.   Musculoskeletal: Negative for arthralgias and back pain.   Skin: Negative for pallor and rash.   Neurological: Negative for dizziness, tremors, speech difficulty and headaches.   Hematological: Negative for adenopathy.   Psychiatric/Behavioral: Negative for agitation and confusion.           Lab Results   Component Value Date    ALT 8 (L) 05/24/2017    AST 26 05/24/2017    ALKPHOS 135 05/24/2017    BILITOT 0.4 05/24/2017     Past Medical History:   Diagnosis Date    Anticoagulant long-term use     Bipolar 1 disorder     Bipolar 1 disorder 11/24/2016    bipolar    Cancer     history of skin cancer/sinus cancer & rectal cancer    Depressed bipolar affective disorder     Diabetes mellitus     type 2    EBV infection 12/7/2016    EBV DNA, PCR Latest Ref Range: None detected  None detected EBV DNA-Copies/mL Unknown Test Not Performed EBV Early Antigen Ab, IgG Latest Ref Range: <1:10 Titer 1:40 (A) EBV Nuclear Ag Ab Latest Ref Range: <1:5 Titer >=1:80 (A) EBV VCA IgG Latest Ref Range: <1:10 Titer 1:160 (A) EBV VCA IgM Latest Ref Range: <1:10 Titer <1:10     History of TIA (transient ischemic attack)     several-taking Plavix    Hx of gallstones     Wife denies    Hypertension     Hyperthyroidism 11/30/2016    Low HDL (under 40) 12/7/2016    Mixed hyperlipidemia 11/23/2016    JUAN MANUEL (obstructive sleep apnea)     Stroke     Transient cerebral ischemia 7/22/2016     Past Surgical History:   Procedure Laterality Date    Moh's procedure x4      Sinus surgery for sinus cancer      sinus sx for cancer      skin cancer removed-several times-nose & ear      TONSILLECTOMY      Undescened testicle sx      UVULOPALATOPHARYNGOPLASTY      for sleep apnea     Current Outpatient Prescriptions   Medication Sig    ciprofloxacin HCl (CIPRO) 500 MG tablet Take 1 tablet (500 mg total) by mouth every Monday.    divalproex (DEPAKOTE) 250 MG EC tablet     furosemide (LASIX) 20 MG  tablet Take 4 tablets (80 mg total) by mouth once daily. (Patient taking differently: Take 40 mg by mouth once daily. Taking 40 mg per day)    lactulose (CHRONULAC) 10 gram/15 mL solution Take 15 mLs (10 g total) by mouth 2 (two) times daily.    rifAXIMin (XIFAXAN) 550 mg Tab Take 1 tablet (550 mg total) by mouth 2 (two) times daily.    SITagliptan (JANUVIA) 50 MG Tab Take 1 tablet (50 mg total) by mouth once daily.    ACCU-CHEK NEYDA PLUS TEST STRP Strp      No current facility-administered medications for this visit.        Objective:      Physical Exam   Constitutional: He is oriented to person, place, and time. He appears well-nourished.   HENT:   Head: Normocephalic.   Eyes: Pupils are equal, round, and reactive to light.   Neck: No thyromegaly present.   Cardiovascular: Normal rate, regular rhythm and normal heart sounds.    Pulmonary/Chest: Effort normal and breath sounds normal. He has no wheezes.   Abdominal: Soft. He exhibits distension. He exhibits no mass. There is no tenderness.   Mild ascites.   Musculoskeletal: He exhibits edema.   Lymphadenopathy:     He has no cervical adenopathy.   Neurological: He is alert and oriented to person, place, and time.   Skin: Skin is warm. No rash noted. No erythema.   Psychiatric: He has a normal mood and affect. His behavior is normal.       Assessment:       1. 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 Hepatic encephalopathy    3. History of SCC (squamous cell carcinoma) of skin    4. CKD (chronic kidney disease) stage 3, GFR 30-59 ml/min    5. Ascites of liver        Plan:   - re-discussed the dx of cirrhosis and the implications of this  - no change in diuretic dose  - paracenteses every week- feels better when he gets albumin- will arrange for him to get albumin infusion every time he gets a paracentesis regardless of the volume removed.  - on Cipro 500 mg weekly for SBP prophylaxis.  - does not wish for further EUS guided  treatment of varices because of post procedure sepsis.  - will need follow up by Dr Talavera for his rectal mass- recent sigmoidoscopy was clear- 6 months- around Feb 2018  Clinic in  3 months     MARIA G dover

## 2017-10-04 ENCOUNTER — TELEPHONE (OUTPATIENT)
Dept: INTERNAL MEDICINE | Facility: CLINIC | Age: 77
End: 2017-10-04

## 2017-10-04 NOTE — TELEPHONE ENCOUNTER
----- Message from Prisca Espinoza MD sent at 10/3/2017  5:33 PM CDT -----  Please call patient and let them know that recent EKG looked fine.  Tell him to let us know if the chest pain occurs again.

## 2017-10-04 NOTE — TELEPHONE ENCOUNTER
Spoke with pt's wife and she understood his results and will give the office a call if he experience any pain in his chest again

## 2017-10-06 ENCOUNTER — HOSPITAL ENCOUNTER (OUTPATIENT)
Dept: INTERVENTIONAL RADIOLOGY/VASCULAR | Facility: HOSPITAL | Age: 77
Discharge: HOME | End: 2017-10-06
Attending: INTERNAL MEDICINE
Payer: MEDICARE

## 2017-10-06 VITALS
DIASTOLIC BLOOD PRESSURE: 58 MMHG | HEART RATE: 90 BPM | RESPIRATION RATE: 96 BRPM | SYSTOLIC BLOOD PRESSURE: 123 MMHG | OXYGEN SATURATION: 95 %

## 2017-10-06 DIAGNOSIS — K74.60 CIRRHOSIS OF LIVER WITH ASCITES, UNSPECIFIED HEPATIC CIRRHOSIS TYPE: ICD-10-CM

## 2017-10-06 DIAGNOSIS — R18.8 CIRRHOSIS OF LIVER WITH ASCITES, UNSPECIFIED HEPATIC CIRRHOSIS TYPE: ICD-10-CM

## 2017-10-06 DIAGNOSIS — R18.8 OTHER ASCITES: ICD-10-CM

## 2017-10-06 LAB
APPEARANCE FLD: CLEAR
BODY FLD TYPE: NORMAL
COLOR FLD: YELLOW
LYMPHOCYTES NFR FLD MANUAL: 62 %
MONOS+MACROS NFR FLD MANUAL: 33 %
NEUTROPHILS NFR FLD MANUAL: 5 %
WBC # FLD: 159 /CU MM

## 2017-10-06 PROCEDURE — P9047 ALBUMIN (HUMAN), 25%, 50ML: HCPCS | Performed by: INTERNAL MEDICINE

## 2017-10-06 PROCEDURE — 49083 ABD PARACENTESIS W/IMAGING: CPT | Mod: ,,, | Performed by: RADIOLOGY

## 2017-10-06 PROCEDURE — 89051 BODY FLUID CELL COUNT: CPT

## 2017-10-06 PROCEDURE — 63600175 PHARM REV CODE 636 W HCPCS: Performed by: INTERNAL MEDICINE

## 2017-10-06 PROCEDURE — C1729 CATH, DRAINAGE: HCPCS

## 2017-10-06 RX ORDER — ALBUMIN HUMAN 250 G/1000ML
25 SOLUTION INTRAVENOUS ONCE
Status: COMPLETED | OUTPATIENT
Start: 2017-10-06 | End: 2017-10-06

## 2017-10-06 RX ADMIN — ALBUMIN (HUMAN) 25 G: 25 SOLUTION INTRAVENOUS at 01:10

## 2017-10-06 NOTE — DISCHARGE INSTRUCTIONS
Interventional Radiology [phone]  Mon-Fri  8A-4P  24hr Radiology Resident on call [phone]

## 2017-10-06 NOTE — DISCHARGE SUMMARY
[facility]  Interventional Radiology  Procedure - Outpatient    Date: 10/06/2017 Time: 1:18 PM    Pre-Op Diagnosis: Recurrent ascites    Post-Op Diagnosis: same    Procedure Performed by: Darwin Yo MD    Assistant: none    Procedure: U/S guided paracentesis    Specimen/Tissue Removed: 5000 mL of clear yellow ascites    Estimated Blood Loss: Less than 5 mL    Procedure Note/Findings: U/S guided paracentesis performed with 6 Croatian Yueh needle via Right lower abdominal approach. Albumin administered at request of patient and referring physician.    Please refer to dictated report for additional details.

## 2017-10-06 NOTE — H&P
[facility]  History & Physical - Short Stay  Interventional Radiology    SUBJECTIVE:     Chief Complaint/Reason for Admission: Recurrent ascites    Informant(s):  self and Electronic Health Record    History of Present Illness:  Kenneth Sheikh is a 77 y.o. male with a history of recurrent ascites.    Patient presents for paracentesis.    Scheduled Meds:   Continuous Infusions:   PRN Meds:     Review of patient's allergies indicates:   Allergen Reactions    Abilify [aripiprazole] Other (See Comments)     Sleepy, could not function.       Past Medical History:   Diagnosis Date    Anticoagulant long-term use     Bipolar 1 disorder     Bipolar 1 disorder 11/24/2016    bipolar    Cancer     history of skin cancer/sinus cancer & rectal cancer    Depressed bipolar affective disorder     Diabetes mellitus     type 2    EBV infection 12/7/2016    EBV DNA, PCR Latest Ref Range: None detected  None detected EBV DNA-Copies/mL Unknown Test Not Performed EBV Early Antigen Ab, IgG Latest Ref Range: <1:10 Titer 1:40 (A) EBV Nuclear Ag Ab Latest Ref Range: <1:5 Titer >=1:80 (A) EBV VCA IgG Latest Ref Range: <1:10 Titer 1:160 (A) EBV VCA IgM Latest Ref Range: <1:10 Titer <1:10     History of TIA (transient ischemic attack)     several-taking Plavix    Hx of gallstones     Wife denies    Hypertension     Hyperthyroidism 11/30/2016    Low HDL (under 40) 12/7/2016    Mixed hyperlipidemia 11/23/2016    JUAN MANUEL (obstructive sleep apnea)     Stroke     Transient cerebral ischemia 7/22/2016     Past Surgical History:   Procedure Laterality Date    Moh's procedure x4      Sinus surgery for sinus cancer      sinus sx for cancer      skin cancer removed-several times-nose & ear      TONSILLECTOMY      Undescened testicle sx      UVULOPALATOPHARYNGOPLASTY      for sleep apnea     Family History   Problem Relation Age of Onset    Anesthesia problems Neg Hx      Social History   Substance Use Topics     Smoking status: Former Smoker     Packs/day: 0.25     Years: 2.00    Smokeless tobacco: Never Used      Comment: quit at age 17 less than a pack a day    Alcohol use No      Comment: rare        Review of Systems:  ROS not obtained    OBJECTIVE:     Vital Signs (Most Recent):       Physical Exam:  Alert and oriented    Laboratory  CBC:   Lab Results   Component Value Date/Time    WBC 2.82 (L) 09/29/2017 12:30 PM    RBC 4.05 (L) 09/29/2017 12:30 PM    HGB 11.3 (L) 09/29/2017 12:30 PM    HCT 34.4 (L) 09/29/2017 12:30 PM    HCT 31 (L) 02/12/2017 07:51 AM     (L) 09/29/2017 12:30 PM    MCV 85 09/29/2017 12:30 PM    MCH 27.9 09/29/2017 12:30 PM    MCHC 32.8 09/29/2017 12:30 PM     Coagulation:   Lab Results   Component Value Date/Time    INR 1.1 09/29/2017 12:30 PM    APTT 24.9 11/23/2016 04:15 PM         ASSESSMENT/PLAN:     Ascites.    Patient will undergo paracentesis.      Sedation Plan: 1% lidocaine local anesthesia

## 2017-10-06 NOTE — PROGRESS NOTES
Paracentesis complete. Pt tolerated well. Total 5,000 ml peritoneal fluid removed. 100 ml Albumin 25% IV administered.   Pt verbalized instructions on care for puncture site to Q.  AVS reviewd and provided.

## 2017-10-13 ENCOUNTER — OFFICE VISIT (OUTPATIENT)
Dept: DERMATOLOGY | Facility: CLINIC | Age: 77
End: 2017-10-13
Payer: MEDICARE

## 2017-10-13 ENCOUNTER — HOSPITAL ENCOUNTER (OUTPATIENT)
Dept: INTERVENTIONAL RADIOLOGY/VASCULAR | Facility: HOSPITAL | Age: 77
Discharge: HOME | End: 2017-10-13
Attending: INTERNAL MEDICINE
Payer: MEDICARE

## 2017-10-13 VITALS
SYSTOLIC BLOOD PRESSURE: 115 MMHG | DIASTOLIC BLOOD PRESSURE: 54 MMHG | HEART RATE: 60 BPM | RESPIRATION RATE: 20 BRPM | OXYGEN SATURATION: 98 %

## 2017-10-13 DIAGNOSIS — R18.8 OTHER ASCITES: ICD-10-CM

## 2017-10-13 DIAGNOSIS — D48.5 NEOPLASM OF UNCERTAIN BEHAVIOR OF SKIN: Primary | ICD-10-CM

## 2017-10-13 DIAGNOSIS — K74.60 CIRRHOSIS OF LIVER WITH ASCITES, UNSPECIFIED HEPATIC CIRRHOSIS TYPE: ICD-10-CM

## 2017-10-13 DIAGNOSIS — L57.0 AK (ACTINIC KERATOSIS): ICD-10-CM

## 2017-10-13 DIAGNOSIS — R18.8 CIRRHOSIS OF LIVER WITH ASCITES, UNSPECIFIED HEPATIC CIRRHOSIS TYPE: ICD-10-CM

## 2017-10-13 DIAGNOSIS — L82.1 SK (SEBORRHEIC KERATOSIS): ICD-10-CM

## 2017-10-13 LAB
APPEARANCE FLD: CLEAR
BODY FLD TYPE: NORMAL
COLOR FLD: YELLOW
LYMPHOCYTES NFR FLD MANUAL: 63 %
MONOS+MACROS NFR FLD MANUAL: 32 %
NEUTROPHILS NFR FLD MANUAL: 5 %
WBC # FLD: 176 /CU MM

## 2017-10-13 PROCEDURE — 99212 OFFICE O/P EST SF 10 MIN: CPT | Mod: 25,S$GLB,, | Performed by: DERMATOLOGY

## 2017-10-13 PROCEDURE — C1729 CATH, DRAINAGE: HCPCS

## 2017-10-13 PROCEDURE — 49083 ABD PARACENTESIS W/IMAGING: CPT | Mod: ,,, | Performed by: RADIOLOGY

## 2017-10-13 PROCEDURE — 63600175 PHARM REV CODE 636 W HCPCS: Performed by: INTERNAL MEDICINE

## 2017-10-13 PROCEDURE — A7048 VACUUM DRAIN BOTTLE/TUBE KIT: HCPCS

## 2017-10-13 PROCEDURE — 17000 DESTRUCT PREMALG LESION: CPT | Mod: S$GLB,,, | Performed by: DERMATOLOGY

## 2017-10-13 PROCEDURE — 89051 BODY FLUID CELL COUNT: CPT

## 2017-10-13 PROCEDURE — 99999 PR PBB SHADOW E&M-EST. PATIENT-LVL III: CPT | Mod: PBBFAC,,, | Performed by: DERMATOLOGY

## 2017-10-13 PROCEDURE — 11101 PR BIOPSY, EACH ADDED LESION: CPT | Mod: 59,S$GLB,, | Performed by: DERMATOLOGY

## 2017-10-13 PROCEDURE — P9047 ALBUMIN (HUMAN), 25%, 50ML: HCPCS | Performed by: INTERNAL MEDICINE

## 2017-10-13 PROCEDURE — 17003 DESTRUCT PREMALG LES 2-14: CPT | Mod: S$GLB,,, | Performed by: DERMATOLOGY

## 2017-10-13 PROCEDURE — 88305 TISSUE EXAM BY PATHOLOGIST: CPT | Performed by: PATHOLOGY

## 2017-10-13 PROCEDURE — 11100 PR BIOPSY OF SKIN LESION: CPT | Mod: 59,S$GLB,, | Performed by: DERMATOLOGY

## 2017-10-13 RX ORDER — ALBUMIN HUMAN 250 G/1000ML
25 SOLUTION INTRAVENOUS CONTINUOUS PRN
Status: DISCONTINUED | OUTPATIENT
Start: 2017-10-13 | End: 2017-10-14 | Stop reason: HOSPADM

## 2017-10-13 RX ADMIN — ALBUMIN (HUMAN) 12.5 G: 25 SOLUTION INTRAVENOUS at 02:10

## 2017-10-13 RX ADMIN — ALBUMIN (HUMAN) 25 G: 25 SOLUTION INTRAVENOUS at 01:10

## 2017-10-13 NOTE — H&P
Radiology History & Physical      SUBJECTIVE:     Chief Complaint: ascites    History of Present Illness:  Kenneth Sheikh is a 77 y.o. male who presents for ultrasound assisted paracentesis.  Past Medical History:   Diagnosis Date    Anticoagulant long-term use     Bipolar 1 disorder     Bipolar 1 disorder 11/24/2016    bipolar    Cancer     history of skin cancer/sinus cancer & rectal cancer    Depressed bipolar affective disorder     Diabetes mellitus     type 2    EBV infection 12/7/2016    EBV DNA, PCR Latest Ref Range: None detected  None detected EBV DNA-Copies/mL Unknown Test Not Performed EBV Early Antigen Ab, IgG Latest Ref Range: <1:10 Titer 1:40 (A) EBV Nuclear Ag Ab Latest Ref Range: <1:5 Titer >=1:80 (A) EBV VCA IgG Latest Ref Range: <1:10 Titer 1:160 (A) EBV VCA IgM Latest Ref Range: <1:10 Titer <1:10     History of TIA (transient ischemic attack)     several-taking Plavix    Hx of gallstones     Wife denies    Hypertension     Hyperthyroidism 11/30/2016    Low HDL (under 40) 12/7/2016    Mixed hyperlipidemia 11/23/2016    JUAN MANUEL (obstructive sleep apnea)     Stroke     Transient cerebral ischemia 7/22/2016     Past Surgical History:   Procedure Laterality Date    Moh's procedure x4      Sinus surgery for sinus cancer      sinus sx for cancer      skin cancer removed-several times-nose & ear      TONSILLECTOMY      Undescened testicle sx      UVULOPALATOPHARYNGOPLASTY      for sleep apnea       Home Meds:   Prior to Admission medications    Medication Sig Start Date End Date Taking? Authorizing Provider   ACCU-CHEK NEYDA PLUS TEST STRP Strp  2/23/17   Historical Provider, MD   ciprofloxacin HCl (CIPRO) 500 MG tablet Take 1 tablet (500 mg total) by mouth every Monday. 5/29/17   Renato Womack MD   divalproex (DEPAKOTE) 250 MG EC tablet  5/5/17   Historical Provider, MD   furosemide (LASIX) 20 MG tablet Take 4 tablets (80 mg total) by mouth once daily.  Patient taking differently:  Take 40 mg by mouth once daily. Taking 40 mg per day 4/25/17 4/25/18  Renato Womack MD   lactulose (CHRONULAC) 10 gram/15 mL solution Take 15 mLs (10 g total) by mouth 2 (two) times daily. 12/20/16   Renato Womack MD   rifAXIMin (XIFAXAN) 550 mg Tab Take 1 tablet (550 mg total) by mouth 2 (two) times daily. 10/3/17   Renato Womack MD   SITagliptan (JANUVIA) 50 MG Tab Take 1 tablet (50 mg total) by mouth once daily. 4/6/17   Prisca Espinoza MD     Anticoagulants/Antiplatelets: no anticoagulation    Allergies:   Review of patient's allergies indicates:   Allergen Reactions    Abilify [aripiprazole] Other (See Comments)     Sleepy, could not function.     Sedation History:  no adverse reactions    Review of Systems:   Hematological: no known coagulopathies  Respiratory: no shortness of breath  Cardiovascular: no chest pain  Gastrointestinal: no abdominal pain  Genito-Urinary: no dysuria  Musculoskeletal: negative  Neurological: no TIA or stroke symptoms         OBJECTIVE:     Vital Signs (Most Recent)  Pulse: 64 (10/13/17 1323)  Resp: 18 (10/13/17 1323)  BP: 139/79 (10/13/17 1323)  SpO2: 96 % (10/13/17 1323)    Physical Exam:  ASA: 2  Mallampati: II    General: no acute distress  Mental Status: alert and oriented to person, place and time  HEENT: normocephalic, atraumatic  Chest: unlabored breathing  Abdomen: distended  Extremity: moves all extremities    Laboratory  Lab Results   Component Value Date    INR 1.1 09/29/2017       Lab Results   Component Value Date    WBC 2.82 (L) 09/29/2017    HGB 11.3 (L) 09/29/2017    HCT 34.4 (L) 09/29/2017    MCV 85 09/29/2017     (L) 09/29/2017      Lab Results   Component Value Date     (H) 06/16/2017     06/16/2017    K 4.0 06/16/2017     06/16/2017    CO2 28 06/16/2017    BUN 24 (H) 06/16/2017    CREATININE 1.5 (H) 06/16/2017    CALCIUM 8.9 06/16/2017    MG 1.7 05/15/2017    ALT 8 (L) 05/24/2017    AST 26 05/24/2017    ALBUMIN 2.8  (L) 05/24/2017    BILITOT 0.4 05/24/2017       ASSESSMENT/PLAN:     Sedation Plan: local lidocaine  Patient will undergo ultrasound assisted paracentesis.    Bradley Mandujano MD  Resident  Department of Radiology  Pager: [phone]

## 2017-10-13 NOTE — DISCHARGE INSTRUCTIONS
[facility] [phone] (MON-FRI 8 AM- 5PM). Radiology Resident on call [phone].

## 2017-10-13 NOTE — PATIENT INSTRUCTIONS
Shave Biopsy Wound Care    Your doctor has performed a shave biopsy today.  A band aid and vaseline ointment has been placed over the site.  This should remain in place for 24 hours.  It is recommended that you keep the area dry for the first 24 hours.  After 24 hours, you may remove the band aid and wash the area with warm soap and water and apply Vaseline jelly.  Many patients prefer to use Neosporin or Bacitracin ointment.  This is acceptable; however, know that you can develop an allergy to this medication even if you have used it safely for years.  It is important to keep the area moist.  Letting it dry out and get air slows healing time, and will worsen the scar.  Band aid is optional after first 24 hours.      If you notice increasing redness, tenderness, pain, or yellow drainage at the biopsy site, please notify your doctor.  These are signs of an infection.    If your biopsy site is bleeding, apply firm pressure for 15 minutes straight.  Repeat for another 15 minutes, if it is still bleeding.   If the surgical site continues to bleed, then please contact your doctor.      [facility] [place] [address]/ [phone] FAX/ [email or web address]      CRYOSURGERY      Your doctor has used a method called cryosurgery to treat your skin condition. Cryosurgery refers to the use of very cold substances to treat a variety of skin conditions such as warts, pre-skin cancers, molluscum contagiosum, sun spots, and several benign growths. The substance we use in cryosurgery is liquid nitrogen and is so cold (-195 degrees Celsius) that is burns when administered.     Following treatment in the office, the skin may immediately burn and become red. You may find the area around the lesion is affected as well. It is sometimes necessary to treat not only the lesion, but a small area of the surrounding normal skin to achieve a good response.     A blister, and even a blood filled blister, may form  after treatment.   This is a normal response. If the blister is painful, it is acceptable to sterilize a needle and with rubbing alcohol and gently pop the blister. It is important that you gently wash the area with soap and warm water as the blister fluid may contain wart virus if a wart was treated. Do no remove the roof of the blister.     The area treated can take anywhere from 1-3 weeks to heal. Healing time depends on the kind skin lesion treated, the location, and how aggressively the lesion was treated. It is recommended that the areas treated are covered with Vaseline or bacitracin ointment and a band-aid. If a band-aid is not practical, just ointment applied several times per day will do. Keeping these areas moist will speed the healing time.    Treatment with liquid nitrogen can leave a scar. In dark skin, it may be a light or dark scar, in light skin it may be a white or pink scar. These will generally fade with time, but may never go away completely.     If you have any concerns after your treatment, please feel free to call the office.       [facility] [place] [address]/ [phone] FAX/ [email or web address]    Summer Sun Protection      The [facility] would like to remind you of the importance of sun protection all year round and particularly during the summer when the suns rays are the strongest. It has been proven that both acute and chronic sun exposure damages our cells and leads to skin cancer. Beyond skin cancer, the sun causes 90% of the symptoms of pre-mature skin aging, including wrinkles, lentigines (brown spots), and thin, easily bruised skin. Proper sun protection can help prevent these unwanted conditions.    Many patients report that the dont go in the sun. It has been shown that the average person receives 18 hours of incidental sun exposure per week during activities such as walking through parking lots, driving, or sitting next to  windows. This accumulates to several bad sunburns per year!    In choosing sunscreen, you want one that protects against both UVA and UVB rays. It is recommended that you use one of SPF 30 or higher. It is important to apply the sunscreen about 20 minutes prior to sun exposure. Most sunscreens are chemical sunscreens and a reaction must take place in the skin so that they are effective. If they are applied and then you are immediately exposed to the sun or start sweating, this reaction has not had time to take place and you are therefore unprotected. Sunscreen needs to be reapplied every 2 hours if you are participating in water sports or sweating. We recommend Elta MD or Neutrogena Ultra Sheer Dry Touch SPF 55 for daily use; however there are many options and it is most important for you to find one that you will use on a consistent basis.    If you have sensitive skin, you may do best with a sunscreen that contains only physical blockers such as titanium dioxide or zinc oxide. These are typically thicker and harder to apply, however they afford very good protection. Neutrogena Sensitive Skin, Blue Lizard Sensitive Skin (pink top) or Neutrogena Pure and Free are popular ones.     Aside from sunscreen, clothes with UV protection, wide brimmed hats, and sunglasses are other means of sun protection that we recommend.                        [facility] - DERMATOLOGY  [address]  [place] [address]  Dept: [phone]  Dept Fax: [phone]

## 2017-10-13 NOTE — PROGRESS NOTES
Subjective:       Patient ID:  Kenneth Sheikh is a 77 y.o. male who presents for   Chief Complaint   Patient presents with    Spot     Face     Spot  - Initial  Affected locations: face  Duration: 4 months  Signs / symptoms: scaling  Severity: mild  Timing: constant  Aggravated by: nothing  Relieving factors/Treatments tried: nothing  Improvement on treatment: no relief      Pt states he had some precancers frozen from his face about 4 months ago from a dermatologist elsewhere and he's concerned that they were frozen too hard so his skin never healed.  He does have a hx of NMSC.    Review of Systems   Constitutional: Negative for fever, chills and fatigue.   Hematologic/Lymphatic: Bruises/bleeds easily.        Objective:    Physical Exam   Constitutional: He appears well-developed and well-nourished.   Neurological: He is alert and oriented to person, place, and time.   Psychiatric: He has a normal mood and affect.   Skin:   Areas Examined (abnormalities noted in diagram):   Scalp / Hair Palpated and Inspected  Head / Face Inspection Performed  Neck Inspection Performed                          Diagram Legend     Erythematous scaling macule/papule c/w actinic keratosis       Vascular papule c/w angioma      Pigmented verrucoid papule/plaque c/w seborrheic keratosis      Yellow umbilicated papule c/w sebaceous hyperplasia      Irregularly shaped tan macule c/w lentigo     1-2 mm smooth white papules consistent with Milia      Movable subcutaneous cyst with punctum c/w epidermal inclusion cyst      Subcutaneous movable cyst c/w pilar cyst      Firm pink to brown papule c/w dermatofibroma      Pedunculated fleshy papule(s) c/w skin tag(s)      Evenly pigmented macule c/w junctional nevus     Mildly variegated pigmented, slightly irregular-bordered macule c/w mildly atypical nevus      Flesh colored to evenly pigmented papule c/w intradermal nevus       Pink pearly papule/plaque c/w basal cell carcinoma       Erythematous hyperkeratotic cursted plaque c/w SCC      Surgical scar with no sign of skin cancer recurrence      Open and closed comedones      Inflammatory papules and pustules      Verrucoid papule consistent consistent with wart     Erythematous eczematous patches and plaques     Dystrophic onycholytic nail with subungual debris c/w onychomycosis     Umbilicated papule    Erythematous-base heme-crusted tan verrucoid plaque consistent with inflamed seborrheic keratosis     Erythematous Silvery Scaling Plaque c/w Psoriasis     See annotation      Assessment / Plan:    Neoplasm of uncertain behavior of skin  Shave biopsy procedure note:    Shave biopsy performed after verbal consent including risk of infection, scar, recurrence, need for additional treatment of site. Area prepped with alcohol, anesthetized with approximately 1.0cc of 1% lidocaine with epinephrine. Lesional tissue shaved with razor blade. Hemostasis achieved with application of aluminum chloride followed by hyfrecation. No complications. Dressing applied. Wound care explained.  -     Tissue Specimen To Pathology, Dermatology  -     Tissue Specimen To Pathology, Dermatology  -     Tissue Specimen To Pathology, Dermatology  -     Tissue Specimen To Pathology, Dermatology    Pathology Orders:      Normal Orders This Visit    Tissue Specimen To Pathology, Dermatology     Questions:    Directional Terms:  Other(comment)    Clinical information:  r/o SCC vs. HAK Comment - shave    Specific Site:  L preauricular cheek    Tissue Specimen To Pathology, Dermatology     Questions:    Directional Terms:  Other(comment)    Clinical information:  r/o SCC vs. HAK Comment - shave    Specific Site:  R jawline    Tissue Specimen To Pathology, Dermatology     Questions:    Directional Terms:  Other(comment)    Clinical information:  r/o SCC vs. HAK Comment - shave    Specific Site:  R forehead    Tissue Specimen To Pathology, Dermatology     Questions:    Directional  Terms:  Other(comment)    Clinical information:  r/o SCC vs. HAK Comment - shave    Specific Site:  R frontal scalp          AK (actinic keratosis)  Cryosurgery Procedure Note    Verbal consent from the patient is obtained and the patient is aware of the precancerous quality and need for treatment of these lesions. Liquid nitrogen cryosurgery is applied to the 10 actinic keratoses, as detailed in the physical exam, to produce a freeze injury. The patient is aware that blisters may form and is instructed on wound care with gentle cleansing and use of vaseline ointment to keep moist until healed. The patient is supplied a handout on cryosurgery and is instructed to call if lesions do not completely resolve.    SK (seborrheic keratosis)  These are benign inherited growths without a malignant potential. Reassurance given to patient. No treatment is necessary.   Treatment of benign, asymptomatic lesions may be considered cosmetic.  Warned about risk of hypo- or hyperpigmentation with treatment and risk of recurrence.    Return in about 3 months (around 1/13/2018) for skin check or sooner pending biopsy results.

## 2017-10-13 NOTE — PROGRESS NOTES
.Radiology Post-Procedure Note    Pre Op Diagnosis: Ascites  Post Op Diagnosis: Same    Procedure: Paracentesis    Procedure performed by: Tevin TRAYLOR, Leisa Mandujano    Written Informed Consent Obtained: Yes  Specimen Removed: YES 5.5 L serous fluid  Estimated Blood Loss: Minimal    Findings:   Successful paracentesis.  Albumin administered PRN per protocol.    Patient tolerated procedure well.    Bradley Mandujano MD  Resident  Department of Radiology  Pager: [phone]

## 2017-10-13 NOTE — PROGRESS NOTES
Paracentesis complete. 5500 mLs peritoneal fluid drained. Pt tolerated well. Dressing to RLQ clean, dry, and intact. Albumin 25% given 150 mLs. Patient given discharge instructions and verbalized understanding. Patient escorted to lobby in wheelchair.

## 2017-10-20 ENCOUNTER — HOSPITAL ENCOUNTER (OUTPATIENT)
Dept: INTERVENTIONAL RADIOLOGY/VASCULAR | Facility: HOSPITAL | Age: 77
Discharge: HOME | End: 2017-10-20
Attending: INTERNAL MEDICINE
Payer: MEDICARE

## 2017-10-20 VITALS
DIASTOLIC BLOOD PRESSURE: 62 MMHG | RESPIRATION RATE: 18 BRPM | SYSTOLIC BLOOD PRESSURE: 115 MMHG | HEART RATE: 63 BPM | OXYGEN SATURATION: 99 %

## 2017-10-20 DIAGNOSIS — R18.8 CIRRHOSIS OF LIVER WITH ASCITES, UNSPECIFIED HEPATIC CIRRHOSIS TYPE: ICD-10-CM

## 2017-10-20 DIAGNOSIS — K74.60 CIRRHOSIS OF LIVER WITH ASCITES, UNSPECIFIED HEPATIC CIRRHOSIS TYPE: ICD-10-CM

## 2017-10-20 DIAGNOSIS — R18.8 OTHER ASCITES: ICD-10-CM

## 2017-10-20 LAB
APPEARANCE FLD: CLEAR
BODY FLD TYPE: NORMAL
COLOR FLD: YELLOW
LYMPHOCYTES NFR FLD MANUAL: 72 %
MONOS+MACROS NFR FLD MANUAL: 23 %
NEUTROPHILS NFR FLD MANUAL: 5 %
WBC # FLD: 151 /CU MM

## 2017-10-20 PROCEDURE — 49083 ABD PARACENTESIS W/IMAGING: CPT | Mod: ,,, | Performed by: RADIOLOGY

## 2017-10-20 PROCEDURE — C1729 CATH, DRAINAGE: HCPCS

## 2017-10-20 PROCEDURE — A7048 VACUUM DRAIN BOTTLE/TUBE KIT: HCPCS

## 2017-10-20 PROCEDURE — P9047 ALBUMIN (HUMAN), 25%, 50ML: HCPCS | Performed by: INTERNAL MEDICINE

## 2017-10-20 PROCEDURE — 89051 BODY FLUID CELL COUNT: CPT

## 2017-10-20 PROCEDURE — 63600175 PHARM REV CODE 636 W HCPCS: Performed by: INTERNAL MEDICINE

## 2017-10-20 RX ORDER — ALBUMIN HUMAN 250 G/1000ML
25 SOLUTION INTRAVENOUS
Status: DISCONTINUED | OUTPATIENT
Start: 2017-10-20 | End: 2017-10-21 | Stop reason: HOSPADM

## 2017-10-20 RX ADMIN — ALBUMIN (HUMAN) 12.5 G: 25 SOLUTION INTRAVENOUS at 02:10

## 2017-10-20 RX ADMIN — ALBUMIN (HUMAN) 25 G: 25 SOLUTION INTRAVENOUS at 02:10

## 2017-10-20 NOTE — H&P
Radiology Post-Procedure Note    Pre Op Diagnosis: Ascites  Post Op Diagnosis: Same    Procedure: Paracentesis    Procedure performed by: Dr. Ruddy Villarreal    Written Informed Consent Obtained: Yes  Specimen Removed: YES   Estimated Blood Loss: Minimal    Findings:   Successful paracentesis.  Albumin administered PRN per protocol.    Patient tolerated procedure well.    Ruddy Villarreal MD  Radiology, PGY - II  [phone]

## 2017-10-20 NOTE — DISCHARGE INSTRUCTIONS
For scheduling: Call Mackenzie at [phone]    For questions or concerns call: [facility] MON-FRI 8 AM- 5PM [phone]. Radiology resident on call [phone].    For immediate concerns that are not emergent, you may call our radiology clinic at: [phone]

## 2017-10-20 NOTE — H&P
Radiology History & Physical      SUBJECTIVE:     Chief Complaint: Ascites    History of Present Illness:  Kenneth Sheikh is a 77 y.o. male with hx of Cirrhosis who presents for paracentesis.    Past Medical History:   Diagnosis Date    Anticoagulant long-term use     Bipolar 1 disorder     Bipolar 1 disorder 11/24/2016    bipolar    Cancer     history of skin cancer/sinus cancer & rectal cancer    Depressed bipolar affective disorder     Diabetes mellitus     type 2    EBV infection 12/7/2016    EBV DNA, PCR Latest Ref Range: None detected  None detected EBV DNA-Copies/mL Unknown Test Not Performed EBV Early Antigen Ab, IgG Latest Ref Range: <1:10 Titer 1:40 (A) EBV Nuclear Ag Ab Latest Ref Range: <1:5 Titer >=1:80 (A) EBV VCA IgG Latest Ref Range: <1:10 Titer 1:160 (A) EBV VCA IgM Latest Ref Range: <1:10 Titer <1:10     History of TIA (transient ischemic attack)     several-taking Plavix    Hx of gallstones     Wife denies    Hypertension     Hyperthyroidism 11/30/2016    Low HDL (under 40) 12/7/2016    Mixed hyperlipidemia 11/23/2016    JUAN MANUEL (obstructive sleep apnea)     Stroke     Transient cerebral ischemia 7/22/2016     Past Surgical History:   Procedure Laterality Date    Moh's procedure x4      Sinus surgery for sinus cancer      sinus sx for cancer      skin cancer removed-several times-nose & ear      TONSILLECTOMY      Undescened testicle sx      UVULOPALATOPHARYNGOPLASTY      for sleep apnea       Home Meds:   Prior to Admission medications    Medication Sig Start Date End Date Taking? Authorizing Provider   ACCU-CHEK NEYDA PLUS TEST STRP Strp  2/23/17   Historical Provider, MD   ciprofloxacin HCl (CIPRO) 500 MG tablet Take 1 tablet (500 mg total) by mouth every Monday. 5/29/17   Renato Womack MD   divalproex (DEPAKOTE) 250 MG EC tablet  5/5/17   Historical Provider, MD   furosemide (LASIX) 20 MG tablet Take 4 tablets (80 mg total) by mouth once daily.  Patient taking  differently: Take 40 mg by mouth once daily. Taking 40 mg per day 4/25/17 4/25/18  Renato Womack MD   lactulose (CHRONULAC) 10 gram/15 mL solution Take 15 mLs (10 g total) by mouth 2 (two) times daily. 12/20/16   Renato Womack MD   rifAXIMin (XIFAXAN) 550 mg Tab Take 1 tablet (550 mg total) by mouth 2 (two) times daily. 10/3/17   Renato Womack MD   SITagliptan (JANUVIA) 50 MG Tab Take 1 tablet (50 mg total) by mouth once daily. 4/6/17   Prisca Espinoza MD     Anticoagulants/Antiplatelets: no anticoagulation    Allergies:   Review of patient's allergies indicates:   Allergen Reactions    Abilify [aripiprazole] Other (See Comments)     Sleepy, could not function.     Sedation History:  no adverse reactions    Review of Systems:   Hematological: no known coagulopathies  Respiratory: no shortness of breath  Cardiovascular: no chest pain  Gastrointestinal: no abdominal pain  Genito-Urinary: no dysuria  Musculoskeletal: negative  Neurological: no TIA or stroke symptoms         OBJECTIVE:     Vital Signs (Most Recent)  Pulse: 66 (10/20/17 1333)  Resp: 18 (10/20/17 1333)  BP: (!) 142/66 (10/20/17 1333)  SpO2: 97 % (10/20/17 1333)    Physical Exam:  ASA: 2  Mallampati: 2    General: no acute distress  Mental Status: alert and oriented to person, place and time  HEENT: normocephalic, atraumatic  Chest: unlabored breathing  Heart: regular heart rate  Abdomen: distended  Extremity: moves all extremities    Laboratory  Lab Results   Component Value Date    INR 1.1 09/29/2017       Lab Results   Component Value Date    WBC 2.82 (L) 09/29/2017    HGB 11.3 (L) 09/29/2017    HCT 34.4 (L) 09/29/2017    MCV 85 09/29/2017     (L) 09/29/2017      Lab Results   Component Value Date     (H) 06/16/2017     06/16/2017    K 4.0 06/16/2017     06/16/2017    CO2 28 06/16/2017    BUN 24 (H) 06/16/2017    CREATININE 1.5 (H) 06/16/2017    CALCIUM 8.9 06/16/2017    MG 1.7 05/15/2017    ALT 8 (L)  05/24/2017    AST 26 05/24/2017    ALBUMIN 2.8 (L) 05/24/2017    BILITOT 0.4 05/24/2017       ASSESSMENT/PLAN:     Sedation Plan: Local    Patient will undergo paracentesis.    Ruddy Villarreal MD  Radiology, PGY - II  [phone]

## 2017-10-20 NOTE — PROGRESS NOTES
Paracentesis complete. 5800 mLs peritoneal fluid drained. Pt tolerated well. Dressing to clean, dry, and intact. Albumin 25% given 150 mLs. Specimens sent per lab order. Discharge instructions and handouts provided. Pt verbalized understanding.

## 2017-10-26 ENCOUNTER — TELEPHONE (OUTPATIENT)
Dept: DERMATOLOGY | Facility: CLINIC | Age: 77
End: 2017-10-26

## 2017-10-26 ENCOUNTER — OFFICE VISIT (OUTPATIENT)
Dept: INTERNAL MEDICINE | Facility: CLINIC | Age: 77
End: 2017-10-26
Payer: MEDICARE

## 2017-10-26 VITALS
BODY MASS INDEX: 26.97 KG/M2 | SYSTOLIC BLOOD PRESSURE: 122 MMHG | HEART RATE: 72 BPM | WEIGHT: 177.94 LBS | DIASTOLIC BLOOD PRESSURE: 68 MMHG | HEIGHT: 68 IN

## 2017-10-26 DIAGNOSIS — D61.818 PANCYTOPENIA: ICD-10-CM

## 2017-10-26 DIAGNOSIS — R41.3 MEMORY LOSS: ICD-10-CM

## 2017-10-26 DIAGNOSIS — I70.0 ABDOMINAL AORTIC ATHEROSCLEROSIS: ICD-10-CM

## 2017-10-26 DIAGNOSIS — R25.1 TREMOR: ICD-10-CM

## 2017-10-26 DIAGNOSIS — N18.30 CKD (CHRONIC KIDNEY DISEASE) STAGE 3, GFR 30-59 ML/MIN: ICD-10-CM

## 2017-10-26 DIAGNOSIS — Z85.828 HISTORY OF SCC (SQUAMOUS CELL CARCINOMA) OF SKIN: ICD-10-CM

## 2017-10-26 DIAGNOSIS — K76.82 HEPATIC ENCEPHALOPATHY: ICD-10-CM

## 2017-10-26 DIAGNOSIS — R26.9 GAIT DISORDER: ICD-10-CM

## 2017-10-26 DIAGNOSIS — Z85.048 HISTORY OF RECTAL CANCER: ICD-10-CM

## 2017-10-26 DIAGNOSIS — F31.9 BIPOLAR 1 DISORDER: ICD-10-CM

## 2017-10-26 DIAGNOSIS — K75.81 LIVER CIRRHOSIS SECONDARY TO NASH (NONALCOHOLIC STEATOHEPATITIS): ICD-10-CM

## 2017-10-26 DIAGNOSIS — N18.30 TYPE 2 DIABETES MELLITUS WITH STAGE 3 CHRONIC KIDNEY DISEASE, WITHOUT LONG-TERM CURRENT USE OF INSULIN: ICD-10-CM

## 2017-10-26 DIAGNOSIS — K74.60 LIVER CIRRHOSIS SECONDARY TO NASH (NONALCOHOLIC STEATOHEPATITIS): ICD-10-CM

## 2017-10-26 DIAGNOSIS — E11.22 TYPE 2 DIABETES MELLITUS WITH STAGE 3 CHRONIC KIDNEY DISEASE, WITHOUT LONG-TERM CURRENT USE OF INSULIN: ICD-10-CM

## 2017-10-26 DIAGNOSIS — Z00.00 ENCOUNTER FOR PREVENTIVE HEALTH EXAMINATION: Primary | ICD-10-CM

## 2017-10-26 DIAGNOSIS — R18.8 ASCITES OF LIVER: ICD-10-CM

## 2017-10-26 PROCEDURE — 99999 PR PBB SHADOW E&M-EST. PATIENT-LVL IV: CPT | Mod: PBBFAC,,, | Performed by: NURSE PRACTITIONER

## 2017-10-26 PROCEDURE — G0439 PPPS, SUBSEQ VISIT: HCPCS | Mod: S$GLB,,, | Performed by: NURSE PRACTITIONER

## 2017-10-26 NOTE — TELEPHONE ENCOUNTER
----- Message from Prisca Lamb sent at 10/25/2017 11:09 AM CDT -----  Contact: pts wife  Baylee -pt- pts wife is calling to speak with the nurse pt needs an appt to be schedule an appt for an evaluation pt would like to be seen at the main campus on [facility]. Can you please call pt at [phone] jc

## 2017-10-26 NOTE — PROGRESS NOTES
"Kenneth Sheikh presented for a  Medicare AWV and comprehensive Health Risk Assessment today. The following components were reviewed and updated:    · Medical history  · Family History  · Social history  · Allergies and Current Medications  · Health Risk Assessment  · Health Maintenance  · Care Team     ** See Completed Assessments for Annual Wellness Visit within the encounter summary.**       The following assessments were completed:  · Living Situation  · CAGE  · Depression Screening  · Timed Get Up and Go  · Whisper Test  · Cognitive Function Screening  · Nutrition Screening  · ADL Screening  · PAQ Screening    Vitals:    10/26/17 1314   BP: 122/68   BP Location: Left arm   Patient Position: Sitting   Pulse: 72   Weight: 80.7 kg (177 lb 14.6 oz)   Height: 5' 8" (1.727 m)     Body mass index is 27.05 kg/m².     Physical Exam   Constitutional: He is oriented to person, place, and time. He appears well-developed. No distress.   HENT:   Head: Normocephalic and atraumatic.   Cardiovascular: Normal rate, regular rhythm and normal heart sounds.    Pulses:       Dorsalis pedis pulses are 1+ on the right side, and 1+ on the left side.        Posterior tibial pulses are 1+ on the right side, and 1+ on the left side.   Bilateral 2+ pedal edema   Pulmonary/Chest: Effort normal. No respiratory distress. He has no wheezes. He has no rales.   Abdominal: He exhibits distension.   Musculoskeletal: He exhibits no edema, tenderness or deformity.   Slow gait   Feet:   Right Foot:   Protective Sensation: 5 sites tested. 5 sites sensed.   Skin Integrity: Negative for ulcer or skin breakdown.   Left Foot:   Protective Sensation: 5 sites tested. 5 sites sensed.   Skin Integrity: Negative for ulcer or skin breakdown.   Neurological: He is alert and oriented to person, place, and time.   Skin: Skin is warm and dry. He is not diaphoretic.   Psychiatric: He has a normal mood and affect. His behavior is normal. He expresses no homicidal and no " suicidal ideation. He expresses no suicidal plans and no homicidal plans.   Vitals reviewed.        Diagnoses and health risks identified today and associated recommendations/orders:    1. Encounter for preventive health examination  Declined Flu and Pneumovax vaccines.   Microalbumin already ordered by PCP.  Declined referral to Case Management.  Diabetic Foot exam completed.    2. Liver cirrhosis secondary to MCDONALD (nonalcoholic steatohepatitis)  Stable.   Paracentesis scheduled for 10/27/2017.  Followed by Hepatology.     3. Pancytopenia  Stable.   Followed by Hem/Onc.     4. Type 2 diabetes mellitus with stage 3 chronic kidney disease, without long-term current use of insulin  Stable.   Continue current medication.  Followed by PCP.     5. Bipolar 1 disorder  Stable.   Continue current medication.  Followed by PCP.     6. Hepatic encephalopathy  Stable.   Followed by Hepatology.     7. Abdominal aortic atherosclerosis  Stable.   Seen on CT from 11/26/2016.  Followed by PCP.     8. History of rectal cancer  Stable.   Followed by Colon Surgery.     9. CKD (chronic kidney disease) stage 3, GFR 30-59 ml/min  Stable.   Followed by PCP.     10. Memory loss  Stable.   Followed by Neurology.     11. Tremor  Stable.   Followed by Neurology.     12. Gait disorder  Stable.   Followed by Neurology.     13. Ascites of liver  Stable.   Followed by Hepatology.     14. History of SCC (squamous cell carcinoma) of skin  Stable.   Followed by Derm.       Provided Kenneth with a 5-10 year written screening schedule and personal prevention plan. Recommendations were developed using the USPSTF age appropriate recommendations. Education, counseling, and referrals were provided as needed. After Visit Summary printed and given to patient which includes a list of additional screenings\tests needed.    Return in about 3 months (around 1/25/2018) for follow up with PCP. Return in 1 year for HRA.    Kym Zimmerman NP

## 2017-10-26 NOTE — TELEPHONE ENCOUNTER
----- Message from Prisca Lamb sent at 10/26/2017 11:14 AM CDT -----  Contact: pts wife   Baylee-pt- pts wife is calling to speak with the nurse they have left a message on Friday and left another message about the pts appt. Can you please call pts wife at [phone] or cell phone number [phone] pts wife is calling to schedule his consult or surgery. Pts wife has left several messages and no one has returned her call to schedule the pts appt     CARINA

## 2017-10-26 NOTE — PATIENT INSTRUCTIONS
Counseling and Referral of Other Preventative  (Italic type indicates deductible and co-insurance are waived)    Patient Name: Kenneth Sheikh  Today's Date: 10/26/2017      SERVICE LIMITATIONS RECOMMENDATION    Vaccines    · Pneumococcal (once after 65)    · Influenza (annually)    · Hepatitis B (if medium/high risk)    · Prevnar 13      Hepatitis B medium/high risk factors:       - End-stage renal disease       - Hemophiliacs who received Factor VII or         IX concentrates       - Clients of institutions for the mentally             retarded       - Persons who live in the same house as          a HepB carrier       - Homosexual men       - Illicit injectable drug abusers     Pneumococcal: Recommended to patient     Influenza: Recommended to patient     Hepatitis B: N/A     Prevnar 13: Done, no repeat necessary    Prostate cancer screening (annually to age 75)     Prostate specific antigen (PSA) Shared decision making with Provider. Sometimes a co-pay may be required if the patient decides to have this test. The USPSTF no longer recommends prostate cancer screening routinely in medicine: every 1 year    Colorectal cancer screening (to age 75)    · Fecal occult blood test (annual)  · Flexible sigmoidoscopy (5y)  · Screening colonoscopy (10y)  · Barium enema   N/A    Diabetes self-management training (no USPSTF recommendations)  Requires referral by treating physician for patient with diabetes or renal disease. 10 hours of initial DSMT sessions of no less than 30 minutes each in a continuous 12-month period. 2 hours of follow-up DSMT in subsequent years.  N/A    Glaucoma screening (no USPSTF recommendation)  Diabetes mellitus, family history   , age 50 or over    American, age 65 or over  Recommend follow up with eye care professional regularly    Medical nutrition therapy for diabetes or renal disease (no recommended schedule)  Requires referral by treating physician for patient with diabetes  or renal disease or kidney transplant within the past 3 years.  Can be provided in same year as diabetes self-management training (DSMT), and CMS recommends medical nutrition therapy take place after DSMT. Up to 3 hours for initial year and 2 hours in subsequent years.  N/A    Cardiovascular screening blood tests (every 5 years)  · Fasting lipid panel  Order as a panel if possible  Last done 12/2016, recommend to repeat every  year    Diabetes screening tests (at least every 3 years, Medicare covers annually or at 6-month intervals for prediabetic patients)  · Fasting blood sugar (FBS) or glucose tolerance test (GTT)  Patient must be diagnosed with one of the following:       - Hypertension       - Dyslipidemia       - Obesity (BMI 30kg/m2)       - Previous elevated impaired FBS or GTT       ... or any two of the following:       - Overweight (BMI 25 but <30)       - Family history of diabetes       - Age 65 or older       - History of gestational diabetes or birth of baby weighing more than 9 pounds  Last done 5/2017, recommend to repeat every 6  months    Abdominal aortic aneurysm screening (once)  · Sonogram   Limited to patients who meet one of the following criteria:       - Men who are 65-75 years old and have smoked more than 100 cigarette in their lifetime       - Anyone with a family history of abdominal aortic aneurysm       - Anyone recommended for screening by the USPSTF  N/A    HIV screening (annually for increased risk patients)  · HIV-1 and HIV-2 by EIA, or BETO, rapid antibody test or oral mucosa transudate  Patients must be at increased risk for HIV infection per USPSTF guidelines or pregnant. Tests covered annually for patient at increased risk or as requested by the patient. Pregnant patients may receive up to 3 tests during pregnancy.  Risks discussed, screening is not recommended    Smoking cessation counseling (up to 8 sessions per year)  Patients must be asymptomatic of tobacco-related  conditions to receive as a preventative service.  Non-smoker    Subsequent annual wellness visit  At least 12 months since last AWV  Return in one year     The following information is provided to all patients.  This information is to help you find resources for any of the problems found today that may be affecting your health:                Living healthy guide: www.[facility].[place].[facility]      Understanding Diabetes: www.diabetes.org      Eating healthy: www.cdc.gov/healthyweight      CDC home safety checklist: www.cdc.gov/steadi/patient.html      Agency on Aging: www.goea.[place].[facility]      Alcoholics anonymous (AA): www.aa.org      Physical Activity: www.michaelle.nih.gov/un6vxed      Tobacco use: www.quitwithusla.org

## 2017-10-27 ENCOUNTER — HOSPITAL ENCOUNTER (OUTPATIENT)
Dept: INTERVENTIONAL RADIOLOGY/VASCULAR | Facility: HOSPITAL | Age: 77
Discharge: HOME | End: 2017-10-27
Attending: INTERNAL MEDICINE
Payer: MEDICARE

## 2017-10-27 VITALS
HEART RATE: 60 BPM | SYSTOLIC BLOOD PRESSURE: 134 MMHG | RESPIRATION RATE: 18 BRPM | DIASTOLIC BLOOD PRESSURE: 61 MMHG | OXYGEN SATURATION: 99 %

## 2017-10-27 DIAGNOSIS — R18.8 OTHER ASCITES: ICD-10-CM

## 2017-10-27 DIAGNOSIS — R18.8 CIRRHOSIS OF LIVER WITH ASCITES, UNSPECIFIED HEPATIC CIRRHOSIS TYPE: ICD-10-CM

## 2017-10-27 DIAGNOSIS — K74.60 CIRRHOSIS OF LIVER WITH ASCITES, UNSPECIFIED HEPATIC CIRRHOSIS TYPE: ICD-10-CM

## 2017-10-27 LAB
APPEARANCE FLD: CLEAR
BODY FLD TYPE: NORMAL
COLOR FLD: YELLOW
LYMPHOCYTES NFR FLD MANUAL: 52 %
MONOS+MACROS NFR FLD MANUAL: 45 %
NEUTROPHILS NFR FLD MANUAL: 3 %
WBC # FLD: 153 /CU MM

## 2017-10-27 PROCEDURE — A7048 VACUUM DRAIN BOTTLE/TUBE KIT: HCPCS

## 2017-10-27 PROCEDURE — 63600175 PHARM REV CODE 636 W HCPCS: Performed by: INTERNAL MEDICINE

## 2017-10-27 PROCEDURE — C1729 CATH, DRAINAGE: HCPCS

## 2017-10-27 PROCEDURE — P9047 ALBUMIN (HUMAN), 25%, 50ML: HCPCS | Performed by: INTERNAL MEDICINE

## 2017-10-27 PROCEDURE — 49083 ABD PARACENTESIS W/IMAGING: CPT | Mod: ,,, | Performed by: FAMILY MEDICINE

## 2017-10-27 PROCEDURE — 89051 BODY FLUID CELL COUNT: CPT

## 2017-10-27 RX ORDER — ALBUMIN HUMAN 250 G/1000ML
25 SOLUTION INTRAVENOUS
Status: DISCONTINUED | OUTPATIENT
Start: 2017-10-27 | End: 2017-10-28 | Stop reason: HOSPADM

## 2017-10-27 RX ADMIN — ALBUMIN (HUMAN) 25 G: 25 SOLUTION INTRAVENOUS at 02:10

## 2017-10-27 RX ADMIN — ALBUMIN (HUMAN) 12.5 G: 25 SOLUTION INTRAVENOUS at 02:10

## 2017-10-27 NOTE — DISCHARGE INSTRUCTIONS
For scheduling: Call Mackenzie at [phone]    For questions or concerns call: SHAR MON-FRI 8 AM- 5PM [phone]. Radiology resident on call [phone].    For immediate concerns that are not emergent, you may call our radiology clinic at: [phone]

## 2017-10-27 NOTE — H&P
Radiology History & Physical      SUBJECTIVE:     Chief Complaint: ascites    History of Present Illness:  Kenneth Sheikh is a 77 y.o. male who presents for ultrasound guided paracentesis  Past Medical History:   Diagnosis Date    Anticoagulant long-term use     Bipolar 1 disorder     Bipolar 1 disorder 11/24/2016    bipolar    Cancer     history of skin cancer/sinus cancer & rectal cancer    Depressed bipolar affective disorder     Diabetes mellitus     type 2    EBV infection 12/7/2016    EBV DNA, PCR Latest Ref Range: None detected  None detected EBV DNA-Copies/mL Unknown Test Not Performed EBV Early Antigen Ab, IgG Latest Ref Range: <1:10 Titer 1:40 (A) EBV Nuclear Ag Ab Latest Ref Range: <1:5 Titer >=1:80 (A) EBV VCA IgG Latest Ref Range: <1:10 Titer 1:160 (A) EBV VCA IgM Latest Ref Range: <1:10 Titer <1:10     History of TIA (transient ischemic attack)     several-taking Plavix    Hx of gallstones     Wife denies    Hypertension     Hyperthyroidism 11/30/2016    Low HDL (under 40) 12/7/2016    Mixed hyperlipidemia 11/23/2016    JUAN MANUEL (obstructive sleep apnea)     Pneumonia     Stroke     Transient cerebral ischemia 7/22/2016    Type 2 diabetes mellitus      Past Surgical History:   Procedure Laterality Date    Moh's procedure x4      rectal cancer      Sinus surgery for sinus cancer      sinus sx for cancer      skin cancer removed-several times-nose & ear      TONSILLECTOMY      Undescened testicle sx      UVULOPALATOPHARYNGOPLASTY      for sleep apnea       Home Meds:   Prior to Admission medications    Medication Sig Start Date End Date Taking? Authorizing Provider   ACCU-CHEK NEYDA PLUS TEST STRP Strp  2/23/17   Historical Provider, MD   ciprofloxacin HCl (CIPRO) 500 MG tablet Take 1 tablet (500 mg total) by mouth every Monday. 5/29/17   Renato Womack MD   divalproex (DEPAKOTE) 250 MG EC tablet  5/5/17   Historical Provider, MD   furosemide (LASIX) 20 MG tablet Take 4 tablets (80  mg total) by mouth once daily.  Patient taking differently: Take 40 mg by mouth once daily. Taking 40 mg per day 4/25/17 4/25/18  Renato Womack MD   lactulose (CHRONULAC) 10 gram/15 mL solution Take 15 mLs (10 g total) by mouth 2 (two) times daily. 12/20/16   Renato Womack MD   rifAXIMin (XIFAXAN) 550 mg Tab Take 1 tablet (550 mg total) by mouth 2 (two) times daily. 10/3/17   Renato Womack MD   SITagliptan (JANUVIA) 50 MG Tab Take 1 tablet (50 mg total) by mouth once daily. 4/6/17   Prisca Espinoza MD     Anticoagulants/Antiplatelets: no anticoagulation    Allergies:   Review of patient's allergies indicates:   Allergen Reactions    Abilify [aripiprazole] Other (See Comments)     Sleepy, could not function.     Sedation History:  no adverse reactions    Review of Systems:   Hematological: no known coagulopathies  Respiratory: no shortness of breath  Cardiovascular: no chest pain  Gastrointestinal: no abdominal pain  Genito-Urinary: no dysuria  Musculoskeletal: negative  Neurological: no TIA or stroke symptoms         OBJECTIVE:     Vital Signs (Most Recent)  Pulse: 62 (10/27/17 1318)  Resp: 18 (10/27/17 1318)  BP: (!) 155/71 (10/27/17 1318)  SpO2: 97 % (10/27/17 1318)    Physical Exam:  ASA: 2  Mallampati: n/a    General: no acute distress  Mental Status: alert and oriented to person, place and time  HEENT: normocephalic, atraumatic  Chest: unlabored breathing  Heart: regular heart rate  Abdomen: distended  Extremity: moves all extremities    Laboratory  Lab Results   Component Value Date    INR 1.1 09/29/2017       Lab Results   Component Value Date    WBC 2.82 (L) 09/29/2017    HGB 11.3 (L) 09/29/2017    HCT 34.4 (L) 09/29/2017    MCV 85 09/29/2017     (L) 09/29/2017      Lab Results   Component Value Date     (H) 06/16/2017     06/16/2017    K 4.0 06/16/2017     06/16/2017    CO2 28 06/16/2017    BUN 24 (H) 06/16/2017    CREATININE 1.5 (H) 06/16/2017    CALCIUM 8.9  06/16/2017    MG 1.7 05/15/2017    ALT 8 (L) 05/24/2017    AST 26 05/24/2017    ALBUMIN 2.8 (L) 05/24/2017    BILITOT 0.4 05/24/2017       ASSESSMENT/PLAN:     Sedation Plan: local  Patient will undergo ultrasound guided paracentesis.    MATT Mauricio, FNP  Interventional Radiology  [phone] [employer]

## 2017-10-27 NOTE — PROCEDURES

## 2017-10-30 ENCOUNTER — INITIAL CONSULT (OUTPATIENT)
Dept: DERMATOLOGY | Facility: CLINIC | Age: 77
End: 2017-10-30
Payer: MEDICARE

## 2017-10-30 VITALS
BODY MASS INDEX: 26.83 KG/M2 | HEIGHT: 68 IN | WEIGHT: 177 LBS | DIASTOLIC BLOOD PRESSURE: 74 MMHG | HEART RATE: 68 BPM | SYSTOLIC BLOOD PRESSURE: 142 MMHG

## 2017-10-30 DIAGNOSIS — D04.4 SQUAMOUS CELL CARCINOMA IN SITU OF SCALP: ICD-10-CM

## 2017-10-30 DIAGNOSIS — C44.329 SQUAMOUS CELL CANCER OF SKIN OF RIGHT CHEEK: Primary | ICD-10-CM

## 2017-10-30 DIAGNOSIS — D04.39 SQUAMOUS CELL CARCINOMA IN SITU OF SKIN OF LEFT CHEEK: ICD-10-CM

## 2017-10-30 PROCEDURE — 99214 OFFICE O/P EST MOD 30 MIN: CPT | Mod: S$GLB,,, | Performed by: DERMATOLOGY

## 2017-10-30 PROCEDURE — 99999 PR PBB SHADOW E&M-EST. PATIENT-LVL III: CPT | Mod: PBBFAC,,, | Performed by: DERMATOLOGY

## 2017-10-30 NOTE — PROGRESS NOTES
=============    ALLERGIES:  Abilify [aripiprazole]    CHIEF COMPLAINT:  This 77 y.o. male comes for evaluation for Mohs' Micrographic Surgery, Fresh Tissue Technique, for treatment of four skin cancers as noted below . Consultation requested by Dejah Anders MD.    The patient is accompanied to this visit by his wife.     HISTORY OF PRESENT ILLNESS:   Location(s): as noted below  Duration: 5-6 months or so  Quality: persistent   Context: status post biopsies by Dr. Anders; path = as noted below; pathology accession #[id number], [facility]   Prior Treatment: none to these sites that he knows of  See also the handwritten notes/diagrams scanned to chart for additional details.    Defibrillator: No  Pacemaker: No  Artificial heart valves: No  Artificial joints: No     FINAL PATHOLOGIC DIAGNOSIS  1. Skin, left preauricular cheek, shave biopsy:  - SQUAMOUS CELL CARCINOMA IN SITU.  - THE TUMOR EXTENDS TO THE LATERAL BIOPSY MARGINS.  2. Skin, right jawline, shave biopsy:  - INVASIVE SQUAMOUS CELL CARCINOMA WITH FOCAL BASALOID AND CLEAR CELL DIFFERENTIATION.  - THE TUMOR EXTENDS TO THE LATERAL BIOPSY MARGINS.  3. Skin, right forehead, shave biopsy:  - SQUAMOUS CELL CARCINOMA IN SITU.  - THE TUMOR EXTENDS TO THE LATERAL BIOPSY MARGINS.  4. Skin, right frontal scalp, shave biopsy:  - SQUAMOUS CELL CARCINOMA IN SITU.  - MARGINS ARE NEGATIVE FOR FULL THICKNESS SQUAMOUS ATYPIA IN THE PLANES OF SECTION.  Diagnosed by: Lorenza Vila M.D.  (Electronically Signed: 2017-10-18 14:37:11)    REVIEW OF SYSTEMS:   General: general health fair  Skin: has previous history of skin cancer(s); prior Mohs surgery  CV: has hypertension, no artificial valves, has no chest pain  Resp: has no shortness of breath  Endo: has diabetes  Hem/Lymph: not taking prescribed anticoagulants, has easy bruising/bleeding  Allergy/Immuno: has allergies as noted above  GI: has no history of hepatitis; has non-alcoholic hepatic cirrhosis  MS: as noted  above   Neuro: history of TIA    PAST MEDICAL HISTORY:  Past Medical History:   Diagnosis Date    Anticoagulant long-term use     Bipolar 1 disorder     Bipolar 1 disorder 11/24/2016    bipolar    Cancer     history of skin cancer/sinus cancer & rectal cancer    Depressed bipolar affective disorder     Diabetes mellitus     type 2    EBV infection 12/7/2016    EBV DNA, PCR Latest Ref Range: None detected  None detected EBV DNA-Copies/mL Unknown Test Not Performed EBV Early Antigen Ab, IgG Latest Ref Range: <1:10 Titer 1:40 (A) EBV Nuclear Ag Ab Latest Ref Range: <1:5 Titer >=1:80 (A) EBV VCA IgG Latest Ref Range: <1:10 Titer 1:160 (A) EBV VCA IgM Latest Ref Range: <1:10 Titer <1:10     History of TIA (transient ischemic attack)     several-taking Plavix    Hx of gallstones     Wife denies    Hypertension     Hyperthyroidism 11/30/2016    Low HDL (under 40) 12/7/2016    Mixed hyperlipidemia 11/23/2016    JUAN MANUEL (obstructive sleep apnea)     Pneumonia     Stroke     Transient cerebral ischemia 7/22/2016    Type 2 diabetes mellitus        PAST SURGICAL HISTORY:  Past Surgical History:   Procedure Laterality Date    Moh's procedure x4      rectal cancer      Sinus surgery for sinus cancer      sinus sx for cancer      skin cancer removed-several times-nose & ear      TONSILLECTOMY      Undescened testicle sx      UVULOPALATOPHARYNGOPLASTY      for sleep apnea        SOCIAL HISTORY:  Dependencies: smoking status as noted below  Social History   Substance Use Topics    Smoking status: Former Smoker     Packs/day: 0.25     Years: 2.00    Smokeless tobacco: Never Used      Comment: quit at age 17 less than a pack a day    Alcohol use No      Comment: rare       PERTINENT MEDICATIONS:  See medications list.  Current Outpatient Prescriptions on File Prior to Visit   Medication Sig Dispense Refill    ACCU-CHEK NEYDA PLUS TEST STRP Strp       ciprofloxacin HCl (CIPRO) 500 MG tablet Take 1 tablet (500  mg total) by mouth every Monday. 12 tablet 3    divalproex (DEPAKOTE) 250 MG EC tablet       furosemide (LASIX) 20 MG tablet Take 4 tablets (80 mg total) by mouth once daily. (Patient taking differently: Take 40 mg by mouth once daily. Taking 40 mg per day) 270 tablet 3    lactulose (CHRONULAC) 10 gram/15 mL solution Take 15 mLs (10 g total) by mouth 2 (two) times daily. 473 mL 1    rifAXIMin (XIFAXAN) 550 mg Tab Take 1 tablet (550 mg total) by mouth 2 (two) times daily. 60 tablet 11    SITagliptan (JANUVIA) 50 MG Tab Take 1 tablet (50 mg total) by mouth once daily. 90 tablet 3     No current facility-administered medications on file prior to visit.        ALLERGIES:  Abilify [aripiprazole]    EXAM:  See also the handwritten notes/diagrams scanned to chart for additional details.  Constitutional  General appearance: well-developed, well-nourished, well-kempt elderly white male    Eyes  Inspection of conjunctivae and lids reveals no abnormalities; sclerae anicteric  Neurologic/Psychiatric  Alert,  normal orientation to time, place, person  Normal mood and affect with no evidence of depression, anxiety, agitation  Skin: see photo(s)3  Head: background marked solar damage to exposed areas of skin; in addition, on his right inferior cheek there is an approximately 1 cm eschar at the site of previous biopsy; on his right forehead/temple there is an approximately 1.5 cm erythematous/scaly plaque at the site of previous biopsy; on his right anterior scalp there is an approximately 1.5 cm eroded plaque at the site of previous biopsy; on his left preauricular cheek there is an ill-defined, 4-5 cm area of irregular hyperkeratosis and erythema at the site of previous biopsy; these site(s) confirmed by reference to the photograph(s) in the chart taken at the time of the biopsy/biopsies by the referring physician; in addition, there are multiple areas of erythema with irregular hyperkeratosis consistent with actinic  keratoses on his scalp and elsewhere on his face  Neck: examination reveals marked chronic solar damage  Right upper extremity: examination reveals marked chronic solar damage; some ecchymosis/ecchymoses   Left upper extremity: examination reveals marked chronic solar damage; some ecchymosis/ecchymoses   Lymph: no palpable adenopathy noted to the bilateral submental, submandibular, or anterior cervical areas    ASSESSMENT: biopsy-proven invasive squamous cell carcinoma of the right cheek/mandibular margin, pending treatment  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, pending treatment  biopsy-proven squamous cell carcinoma in situ of the right anterior scalp, possibly adequately treated by the biopsy  Biopsy-proven squamous cell carcinoma in situ of the right forehead/temple, pending treatment  Multiple actinic keratoses, scalp and face   chronic solar damage to areas as noted above  personal history of non-melanoma skin cancer    PLAN:  The diagnoses and management options, and risks and benefits of the alternatives, including observation/non-treatment, radiation treatment, excision with vertical frozen section or paraffin-embedded section margin evaluation, and Mohs' Micrographic Surgery, Fresh Tissue Technique, were discussed at length with the patient. In particular, the discussion included, but was not limited to, the following:    One alternative at this point would be to defer further treatment and observe the lesions. With small skin cancers of this kind, it is possible that a biopsy can be sufficient to definitively treat a small skin cancer of this kind. Alternatively, some skin cancers are slow growing and do not require immediate treatment. The potential advantage of this choice would be to avoid the need for possibly unnecessary additional surgery. Among the potential disadvantages of this would be the possibility of enlargement of the lesions, more extensive spread of  the lesions or recurrence at a later date, which might necessitate larger and more complex surgeries.    Radiation treatment can be an effective treatment for these types of skin cancer. The usual course of treatment is every weekday for several weeks. Local irritation will result from treatment, although no systemic side effects are expected. The potential advantage of radiation treatment is that it avoids the need for surgery. Among the disadvantages of radiation treatment are the length of treatment, the local inflammatory response, the absence of pathologic confirmation of the removal of the skin cancer, a possible increased risk of additional skin cancer in the treated area in later years, and a somewhat increased risk of recurrence at a later date.     Excisional surgery can be an effective treatment for these types of skin cancer. This would involve excision of the lesions with margin evaluation by submitting the specimens to a pathologist for either immediate marginal assessment via frozen section processing, or delayed marginal assessment by fixed-tissue processing. The potential advantage of this technique is that it offers a way of treating the lesions with some degree of histologic confirmation of tumor removal. Among the disadvantages of this treatment are the possible need for re-excision if marginal involvement is identified, a somewhat greater likelihood of recurrence as compared to Mohs' surgery because of the less comprehensive margin evaluation inherent in the technique, and the general potential risks of surgery, including allergic reactions to the anesthetic and other materials used, infection, injury to nerves in the area with consequent loss of sensation or muscle function, and scarring or distortion of surrounding structures.    Mohs' surgery is a very effective treatment for these types of skin cancer. The potential advantage of Mohs' surgery is that this technique offers the greatest  possible certainty of knowing that the skin cancer has been completely removed, with the removal of the least amount of normal tissue. The potential disadvantages of Mohs' surgery include the duration of the surgery, the possible need for a separate surgery for reconstruction following tumor removal, and scarring as a result. In addition, general potential risks of surgery as noted above also apply to treatment via Mohs' surgery.    Topical treatment with 5-fluorouracil or imiquimod or another similar agent could be an effective treatment for a superficial, early skin cancers such as the squamous cell carcinomas in situ on his right forehead/temple, right anterior scalp, and left preauricular area  The usual course of treatment is application of the medication for 2-3 months. Local irritation will result from treatment, although no systemic side effects are expected. The potential advantage of topical treatment is that it avoids the need for surgery. Among the disadvantages of this treatment are the length of treatment, the local inflammatory response, the absence of pathologic confirmation of the removal of the skin cancer, and the possible risk of recurrence at a later date.    In light of the nature of  the invasive squamous cell carcinoma on his right inferior cheek,  Mohs' micrographic surgery was thought to be the most appropriate management choice, and this diagnosis is appropriate for treatment by Mohs' micrographic surgery.    A decision nontreatment of the other sites is deferred for the time being.  However, given the multiple other lesions compatible with actinic keratoses elsewhere on his scalp and face, it might be reasonable to have him undergo a trial of therapy with Efudex or imiquimod to these areas and to the areas which showed squamous cell carcinoma in situ on previous biopsy, and attempt to avoid the need for a more invasive procedure.  In particular, given the extent of the changes on his left  cheek, Mohs' surgery would probably require the participation of a head and neck surgeon for reconstruction to follow tumor removal, which I discussed with Mr. Sheikh and his wife.      All questions were answered to their satisfaction. He fully understands the aims, risks, alternatives, and possible complications, and has elected to proceed with the surgery, and verbally consented to do so. The procedure will be scheduled in the near future.    Routine pre-op instructions were given to him.    Discussion of the above issues constituted greater than 50% of the face-to-face encounter, the duration of which was 25 minutes.    --------------------------------------  Note: Some or all of this note may have been generated using voice recognition software. There may be voice recognition errors including grammatical and/or spelling errors found in the text. Attempts were made to correct these errors prior to signature.

## 2017-10-30 NOTE — LETTER
October 30, 2017      Dejah Anders MD  [address]  [facility] [address]           [facility]  [address]  [facility] [address]  Phone: [phone]  Fax: [phone]          Patient: Kenneth Sheikh   MR Number: [id number]   YOB: 1940   Date of Visit: 10/30/2017       Dear Dr. Dejah Anders:    Thank you for referring Kenneth Sheikh to me for evaluation. Attached you will find relevant portions of my assessment and plan of care.    If you have questions, please do not hesitate to call me. I look forward to following Kenneth Sheikh along with you.    Sincerely,    Kenn Beach MD    Enclosure  CC:  No Recipients    If you would like to receive this communication electronically, please contact [email or web address] or [phone] to request more information on [employer] access.    For providers and/or their staff who would like to refer a patient to [facility], please contact us through our one-stop-shop provider referral line, [facility], at [phone].    If you feel you have received this communication in error or would no longer like to receive these types of communications, please e-mail [email or web address]

## 2017-11-02 DIAGNOSIS — R18.8 OTHER ASCITES: Primary | ICD-10-CM

## 2017-11-03 ENCOUNTER — HOSPITAL ENCOUNTER (OUTPATIENT)
Dept: INTERVENTIONAL RADIOLOGY/VASCULAR | Facility: HOSPITAL | Age: 77
Discharge: HOME | End: 2017-11-03
Attending: INTERNAL MEDICINE
Payer: MEDICARE

## 2017-11-03 VITALS
WEIGHT: 160 LBS | DIASTOLIC BLOOD PRESSURE: 78 MMHG | HEART RATE: 74 BPM | OXYGEN SATURATION: 99 % | SYSTOLIC BLOOD PRESSURE: 148 MMHG | BODY MASS INDEX: 24.33 KG/M2 | RESPIRATION RATE: 20 BRPM

## 2017-11-03 DIAGNOSIS — R18.8 OTHER ASCITES: ICD-10-CM

## 2017-11-03 DIAGNOSIS — R18.8 CIRRHOSIS OF LIVER WITH ASCITES, UNSPECIFIED HEPATIC CIRRHOSIS TYPE: ICD-10-CM

## 2017-11-03 DIAGNOSIS — K74.60 CIRRHOSIS OF LIVER WITH ASCITES, UNSPECIFIED HEPATIC CIRRHOSIS TYPE: ICD-10-CM

## 2017-11-03 LAB
APPEARANCE FLD: NORMAL
BODY FLD TYPE: NORMAL
COLOR FLD: YELLOW
LYMPHOCYTES NFR FLD MANUAL: 75 %
MESOTHL CELL NFR FLD MANUAL: 2 %
MONOS+MACROS NFR FLD MANUAL: 22 %
NEUTROPHILS NFR FLD MANUAL: 1 %
WBC # FLD: 100 /CU MM

## 2017-11-03 PROCEDURE — C1729 CATH, DRAINAGE: HCPCS

## 2017-11-03 PROCEDURE — 63600175 PHARM REV CODE 636 W HCPCS: Performed by: INTERNAL MEDICINE

## 2017-11-03 PROCEDURE — 89051 BODY FLUID CELL COUNT: CPT

## 2017-11-03 PROCEDURE — P9047 ALBUMIN (HUMAN), 25%, 50ML: HCPCS | Performed by: INTERNAL MEDICINE

## 2017-11-03 PROCEDURE — 49083 ABD PARACENTESIS W/IMAGING: CPT | Mod: ,,, | Performed by: RADIOLOGY

## 2017-11-03 PROCEDURE — A7048 VACUUM DRAIN BOTTLE/TUBE KIT: HCPCS

## 2017-11-03 RX ORDER — ALBUMIN HUMAN 250 G/1000ML
37.5 SOLUTION INTRAVENOUS ONCE
Status: COMPLETED | OUTPATIENT
Start: 2017-11-03 | End: 2017-11-03

## 2017-11-03 RX ADMIN — ALBUMIN (HUMAN) 37.5 G: 25 SOLUTION INTRAVENOUS at 02:11

## 2017-11-03 NOTE — H&P
Radiology History & Physical      SUBJECTIVE:     Chief Complaint: ascites    History of Present Illness:  Kenneth Sheikh is a 77 y.o. male who presents for ultrasound guided paracentesis  Past Medical History:   Diagnosis Date    Anticoagulant long-term use     Bipolar 1 disorder     Bipolar 1 disorder 11/24/2016    bipolar    Cancer     history of skin cancer/sinus cancer & rectal cancer    Depressed bipolar affective disorder     Diabetes mellitus     type 2    EBV infection 12/7/2016    EBV DNA, PCR Latest Ref Range: None detected  None detected EBV DNA-Copies/mL Unknown Test Not Performed EBV Early Antigen Ab, IgG Latest Ref Range: <1:10 Titer 1:40 (A) EBV Nuclear Ag Ab Latest Ref Range: <1:5 Titer >=1:80 (A) EBV VCA IgG Latest Ref Range: <1:10 Titer 1:160 (A) EBV VCA IgM Latest Ref Range: <1:10 Titer <1:10     History of TIA (transient ischemic attack)     several-taking Plavix    Hx of gallstones     Wife denies    Hypertension     Hyperthyroidism 11/30/2016    Low HDL (under 40) 12/7/2016    Mixed hyperlipidemia 11/23/2016    JUAN MANUEL (obstructive sleep apnea)     Pneumonia     Stroke     Transient cerebral ischemia 7/22/2016    Type 2 diabetes mellitus      Past Surgical History:   Procedure Laterality Date    Moh's procedure x4      rectal cancer      Sinus surgery for sinus cancer      sinus sx for cancer      skin cancer removed-several times-nose & ear      TONSILLECTOMY      Undescened testicle sx      UVULOPALATOPHARYNGOPLASTY      for sleep apnea       Home Meds:   Prior to Admission medications    Medication Sig Start Date End Date Taking? Authorizing Provider   ACCU-CHEK NEYDA PLUS TEST STRP Strp  2/23/17   Historical Provider, MD   ciprofloxacin HCl (CIPRO) 500 MG tablet Take 1 tablet (500 mg total) by mouth every Monday. 5/29/17   Renato Womack MD   divalproex (DEPAKOTE) 250 MG EC tablet  5/5/17   Historical Provider, MD   furosemide (LASIX) 20 MG tablet Take 4 tablets (80  mg total) by mouth once daily.  Patient taking differently: Take 40 mg by mouth once daily. Taking 40 mg per day 4/25/17 4/25/18  Renato Womack MD   lactulose (CHRONULAC) 10 gram/15 mL solution Take 15 mLs (10 g total) by mouth 2 (two) times daily. 12/20/16   Renato Womack MD   rifAXIMin (XIFAXAN) 550 mg Tab Take 1 tablet (550 mg total) by mouth 2 (two) times daily. 10/3/17   Renato Womack MD   SITagliptan (JANUVIA) 50 MG Tab Take 1 tablet (50 mg total) by mouth once daily. 4/6/17   Prisca Espinoza MD     Anticoagulants/Antiplatelets: no anticoagulation    Allergies:   Review of patient's allergies indicates:   Allergen Reactions    Abilify [aripiprazole] Other (See Comments)     Sleepy, could not function.     Sedation History:  no adverse reactions    Review of Systems:   Hematological: no known coagulopathies  Respiratory: no shortness of breath  Cardiovascular: no chest pain  Gastrointestinal: no abdominal pain  Genito-Urinary: no dysuria  Musculoskeletal: negative  Neurological: no TIA or stroke symptoms         OBJECTIVE:     Vital Signs (Most Recent)       Physical Exam:  ASA: 2  Mallampati: n/a    General: no acute distress  Mental Status: alert and oriented to person, place and time  HEENT: normocephalic, atraumatic  Chest: unlabored breathing  Heart: regular heart rate  Abdomen: distended  Extremity: moves all extremities    Laboratory  Lab Results   Component Value Date    INR 1.1 09/29/2017       Lab Results   Component Value Date    WBC 3.58 (L) 11/03/2017    HGB 10.6 (L) 11/03/2017    HCT 32.9 (L) 11/03/2017    MCV 85 11/03/2017     (L) 11/03/2017      Lab Results   Component Value Date     (H) 06/16/2017     06/16/2017    K 4.0 06/16/2017     06/16/2017    CO2 28 06/16/2017    BUN 24 (H) 06/16/2017    CREATININE 1.5 (H) 06/16/2017    CALCIUM 8.9 06/16/2017    MG 1.7 05/15/2017    ALT 8 (L) 05/24/2017    AST 26 05/24/2017    ALBUMIN 2.8 (L) 05/24/2017     BILITOT 0.4 05/24/2017       ASSESSMENT/PLAN:     Sedation Plan: local  Patient will undergo ultrasound guided paracentesis.    MATT Mauricio, FNP  Interventional Radiology  [phone] [employer]

## 2017-11-03 NOTE — PROCEDURES
Radiology Post-Procedure Note    Pre Op Diagnosis: Ascites  Post Op Diagnosis: Same    Procedure: Paracentesis    Procedure performed by: Alexander Peña MD, MD, Nicholas     Written Informed Consent Obtained: Yes  Specimen Removed: YES   Estimated Blood Loss: Minimal    Findings:   Successful paracentesis.  Albumin administered PRN per protocol.    Patient tolerated procedure well.    Marcos Beckford MD  Department of Radiology   PGY II  [phone]

## 2017-11-03 NOTE — PROGRESS NOTES
Paracentesis complete, 6000 MLs removed. Specimen sent to lab. 150ml Albumin administered per protocol. Dressing applied to RUQ puncture site, dressing clean dry and intact. Pt given discharge instructions and handouts, pt verbalizes understanding. Questions answered. Pt denies pain and discomfort. Pt refusing transport. Pt  to lobby via wheelchair for transport home with family. .

## 2017-11-03 NOTE — DISCHARGE INSTRUCTIONS
For scheduling: Call Mackenzie at [phone]    For questions or concerns call: [facility] 8 AM- 5PM [phone]. Radiology resident on call [phone].    For immediate concerns that are not emergent, you may call our radiology clinic at: [phone]

## 2017-11-10 ENCOUNTER — HOSPITAL ENCOUNTER (OUTPATIENT)
Dept: INTERVENTIONAL RADIOLOGY/VASCULAR | Facility: HOSPITAL | Age: 77
Discharge: HOME | End: 2017-11-10
Attending: INTERNAL MEDICINE
Payer: MEDICARE

## 2017-11-10 VITALS
RESPIRATION RATE: 18 BRPM | OXYGEN SATURATION: 99 % | HEART RATE: 69 BPM | DIASTOLIC BLOOD PRESSURE: 70 MMHG | SYSTOLIC BLOOD PRESSURE: 153 MMHG

## 2017-11-10 DIAGNOSIS — K74.60 CIRRHOSIS OF LIVER WITH ASCITES, UNSPECIFIED HEPATIC CIRRHOSIS TYPE: ICD-10-CM

## 2017-11-10 DIAGNOSIS — R18.8 CIRRHOSIS OF LIVER WITH ASCITES, UNSPECIFIED HEPATIC CIRRHOSIS TYPE: ICD-10-CM

## 2017-11-10 DIAGNOSIS — R18.8 OTHER ASCITES: ICD-10-CM

## 2017-11-10 LAB
APPEARANCE FLD: NORMAL
BODY FLD TYPE: NORMAL
COLOR FLD: YELLOW
LYMPHOCYTES NFR FLD MANUAL: 65 %
MESOTHL CELL NFR FLD MANUAL: 1 %
MONOS+MACROS NFR FLD MANUAL: 30 %
NEUTROPHILS NFR FLD MANUAL: 4 %
WBC # FLD: 141 /CU MM

## 2017-11-10 PROCEDURE — C1729 CATH, DRAINAGE: HCPCS

## 2017-11-10 PROCEDURE — A7048 VACUUM DRAIN BOTTLE/TUBE KIT: HCPCS

## 2017-11-10 PROCEDURE — 89051 BODY FLUID CELL COUNT: CPT

## 2017-11-10 PROCEDURE — 49083 ABD PARACENTESIS W/IMAGING: CPT | Mod: ,,, | Performed by: RADIOLOGY

## 2017-11-15 ENCOUNTER — TELEPHONE (OUTPATIENT)
Dept: HEPATOLOGY | Facility: CLINIC | Age: 77
End: 2017-11-15

## 2017-11-16 DIAGNOSIS — R18.8 CIRRHOSIS OF LIVER WITH ASCITES, UNSPECIFIED HEPATIC CIRRHOSIS TYPE: ICD-10-CM

## 2017-11-16 DIAGNOSIS — K74.60 CIRRHOSIS OF LIVER WITH ASCITES, UNSPECIFIED HEPATIC CIRRHOSIS TYPE: ICD-10-CM

## 2017-11-16 RX ORDER — LACTULOSE 10 G/15ML
10 SOLUTION ORAL; RECTAL 2 TIMES DAILY
Qty: 473 ML | Refills: 1 | Status: SHIPPED | OUTPATIENT
Start: 2017-11-16 | End: 2017-11-20 | Stop reason: SDUPTHER

## 2017-11-16 NOTE — TELEPHONE ENCOUNTER
----- Message from Lorie Joseph sent at 11/15/2017 11:21 AM CST -----  [facility]  [facility]  lactulose (CHRONULAC) 10 gram/15 mL solution        Please call [phone]        Thanks!!!

## 2017-11-17 ENCOUNTER — HOSPITAL ENCOUNTER (OUTPATIENT)
Dept: INTERVENTIONAL RADIOLOGY/VASCULAR | Facility: HOSPITAL | Age: 77
Discharge: HOME | End: 2017-11-17
Attending: INTERNAL MEDICINE
Payer: MEDICARE

## 2017-11-17 VITALS
OXYGEN SATURATION: 98 % | DIASTOLIC BLOOD PRESSURE: 81 MMHG | RESPIRATION RATE: 18 BRPM | SYSTOLIC BLOOD PRESSURE: 164 MMHG | HEART RATE: 60 BPM

## 2017-11-17 DIAGNOSIS — K74.60 CIRRHOSIS OF LIVER WITH ASCITES, UNSPECIFIED HEPATIC CIRRHOSIS TYPE: ICD-10-CM

## 2017-11-17 DIAGNOSIS — R18.8 CIRRHOSIS OF LIVER WITH ASCITES, UNSPECIFIED HEPATIC CIRRHOSIS TYPE: ICD-10-CM

## 2017-11-17 DIAGNOSIS — R18.8 OTHER ASCITES: ICD-10-CM

## 2017-11-17 LAB
APPEARANCE FLD: NORMAL
BODY FLD TYPE: NORMAL
COLOR FLD: YELLOW
LYMPHOCYTES NFR FLD MANUAL: 48 %
MONOS+MACROS NFR FLD MANUAL: 48 %
NEUTROPHILS NFR FLD MANUAL: 4 %
WBC # FLD: 128 /CU MM

## 2017-11-17 PROCEDURE — 63600175 PHARM REV CODE 636 W HCPCS: Performed by: INTERNAL MEDICINE

## 2017-11-17 PROCEDURE — C1729 CATH, DRAINAGE: HCPCS

## 2017-11-17 PROCEDURE — P9047 ALBUMIN (HUMAN), 25%, 50ML: HCPCS | Performed by: INTERNAL MEDICINE

## 2017-11-17 PROCEDURE — 89051 BODY FLUID CELL COUNT: CPT

## 2017-11-17 PROCEDURE — 49083 ABD PARACENTESIS W/IMAGING: CPT | Mod: ,,, | Performed by: RADIOLOGY

## 2017-11-17 RX ORDER — ALBUMIN HUMAN 250 G/1000ML
25 SOLUTION INTRAVENOUS
Status: DISCONTINUED | OUTPATIENT
Start: 2017-11-17 | End: 2017-11-18 | Stop reason: HOSPADM

## 2017-11-17 RX ADMIN — ALBUMIN (HUMAN) 12.5 G: 25 SOLUTION INTRAVENOUS at 02:11

## 2017-11-17 RX ADMIN — ALBUMIN (HUMAN) 25 G: 25 SOLUTION INTRAVENOUS at 02:11

## 2017-11-17 NOTE — H&P
Radiology History & Physical      SUBJECTIVE:     Chief Complaint: ascites     History of Present Illness:  Kenneth Sheikh is a 77 y.o. male who presents for paracentesis.     Past Medical History:   Diagnosis Date    Anticoagulant long-term use     Bipolar 1 disorder     Bipolar 1 disorder 11/24/2016    bipolar    Cancer     history of skin cancer/sinus cancer & rectal cancer    Depressed bipolar affective disorder     Diabetes mellitus     type 2    EBV infection 12/7/2016    EBV DNA, PCR Latest Ref Range: None detected  None detected EBV DNA-Copies/mL Unknown Test Not Performed EBV Early Antigen Ab, IgG Latest Ref Range: <1:10 Titer 1:40 (A) EBV Nuclear Ag Ab Latest Ref Range: <1:5 Titer >=1:80 (A) EBV VCA IgG Latest Ref Range: <1:10 Titer 1:160 (A) EBV VCA IgM Latest Ref Range: <1:10 Titer <1:10     History of TIA (transient ischemic attack)     several-taking Plavix    Hx of gallstones     Wife denies    Hypertension     Hyperthyroidism 11/30/2016    Low HDL (under 40) 12/7/2016    Mixed hyperlipidemia 11/23/2016    JUAN MANUEL (obstructive sleep apnea)     Pneumonia     Stroke     Transient cerebral ischemia 7/22/2016    Type 2 diabetes mellitus      Past Surgical History:   Procedure Laterality Date    Moh's procedure x4      rectal cancer      Sinus surgery for sinus cancer      sinus sx for cancer      skin cancer removed-several times-nose & ear      TONSILLECTOMY      Undescened testicle sx      UVULOPALATOPHARYNGOPLASTY      for sleep apnea       Home Meds:   Prior to Admission medications    Medication Sig Start Date End Date Taking? Authorizing Provider   ACCU-CHEK NEYDA PLUS TEST STRP Strp  2/23/17   Historical Provider, MD   ciprofloxacin HCl (CIPRO) 500 MG tablet Take 1 tablet (500 mg total) by mouth every Monday. 5/29/17   Renato Womack MD   divalproex (DEPAKOTE) 250 MG EC tablet  5/5/17   Historical Provider, MD   furosemide (LASIX) 20 MG tablet Take 4 tablets (80 mg total) by  mouth once daily.  Patient taking differently: Take 40 mg by mouth once daily. Taking 40 mg per day 4/25/17 4/25/18  Renato Womack MD   lactulose (CHRONULAC) 10 gram/15 mL solution Take 15 mLs (10 g total) by mouth 2 (two) times daily. 11/16/17   Renato Womack MD   rifAXIMin (XIFAXAN) 550 mg Tab Take 1 tablet (550 mg total) by mouth 2 (two) times daily. 10/3/17   Renato Womack MD   SITagliptan (JANUVIA) 50 MG Tab Take 1 tablet (50 mg total) by mouth once daily. 4/6/17   Prisca Espinoza MD     Anticoagulants/Antiplatelets: no anticoagulation    Allergies:   Review of patient's allergies indicates:   Allergen Reactions    Abilify [aripiprazole] Other (See Comments)     Sleepy, could not function.     Sedation History:  no adverse reactions    Review of Systems:   Hematological: no known coagulopathies  Respiratory: no shortness of breath  Cardiovascular: no chest pain  Gastrointestinal: no abdominal pain  Genito-Urinary: no dysuria  Musculoskeletal: negative  Neurological: no TIA or stroke symptoms         OBJECTIVE:     Vital Signs (Most Recent)       Physical Exam:  ASA: 2  Mallampati: 2    General: no acute distress  Mental Status: alert and oriented to person, place and time  HEENT: normocephalic, atraumatic  Chest: unlabored breathing  Heart: regular heart rate  Abdomen: nondistended  Extremity: moves all extremities    Laboratory  Lab Results   Component Value Date    INR 1.1 11/03/2017       Lab Results   Component Value Date    WBC 3.58 (L) 11/03/2017    HGB 10.6 (L) 11/03/2017    HCT 32.9 (L) 11/03/2017    MCV 85 11/03/2017     (L) 11/03/2017      Lab Results   Component Value Date     (H) 06/16/2017     06/16/2017    K 4.0 06/16/2017     06/16/2017    CO2 28 06/16/2017    BUN 24 (H) 06/16/2017    CREATININE 1.5 (H) 06/16/2017    CALCIUM 8.9 06/16/2017    MG 1.7 05/15/2017    ALT 8 (L) 05/24/2017    AST 26 05/24/2017    ALBUMIN 2.8 (L) 05/24/2017    BILITOT 0.4  05/24/2017       ASSESSMENT/PLAN:     Sedation Plan: local   Patient will undergo US guided paracentesis.    Marcos Beckford MD  Department of Radiology   PGY II  [phone]

## 2017-11-17 NOTE — PROGRESS NOTES
Paracentesis complete. 5300 mLs peritoneal fluid drained. Pt tolerated well. Dressing to llq clean, dry, and intact. Albumin 25% given 150 mLs. Specimens sent per lab order. Pt given discharge instructions, verbalized understanding.  Pt brought to lobby via wheelchair.

## 2017-11-17 NOTE — PROCEDURES
Radiology Post-Procedure Note    Pre Op Diagnosis: Ascites  Post Op Diagnosis: Same    Procedure: Paracentesis    Procedure performed by: Erik Hinson MD    Written Informed Consent Obtained: Yes  Specimen Removed: YES   Estimated Blood Loss: Minimal    Findings:   Successful paracentesis.  Albumin administered PRN per protocol.    Patient tolerated procedure well.    Marcos Beckford MD  Department of Radiology   PGY II  [phone]

## 2017-11-17 NOTE — DISCHARGE INSTRUCTIONS
For scheduling: Call Mackenzie at [phone]    For questions or concerns call: SHAR MON[facility] 8 AM- 5PM [phone]. Radiology resident on call [phone].    For immediate concerns that are not emergent, you may call our radiology clinic at: [phone]

## 2017-11-19 NOTE — PROGRESS NOTES
ALLERGIES:   Abilify [aripiprazole]      Current Outpatient Prescriptions:     ACCU-CHEK NEYDA PLUS TEST STRP Strp, , Disp: , Rfl:     ciprofloxacin HCl (CIPRO) 500 MG tablet, Take 1 tablet (500 mg total) by mouth every Monday., Disp: 12 tablet, Rfl: 3    divalproex (DEPAKOTE) 250 MG EC tablet, , Disp: , Rfl:     furosemide (LASIX) 20 MG tablet, Take 4 tablets (80 mg total) by mouth once daily. (Patient taking differently: Take 40 mg by mouth once daily. Taking 40 mg per day), Disp: 270 tablet, Rfl: 3    lactulose (CHRONULAC) 10 gram/15 mL solution, Take 15 mLs (10 g total) by mouth 2 (two) times daily., Disp: 473 mL, Rfl: 1    rifAXIMin (XIFAXAN) 550 mg Tab, Take 1 tablet (550 mg total) by mouth 2 (two) times daily., Disp: 60 tablet, Rfl: 11    SITagliptan (JANUVIA) 50 MG Tab, Take 1 tablet (50 mg total) by mouth once daily., Disp: 90 tablet, Rfl: 3  -------------------------------------------------------------  PROCEDURE: Mohs' Micrographic Surgery    SITE: right cheek    INDICATION: squamous cell carcinoma, invasive, in an area at increased risk of recurrence    CASE NUMBER: [id number]      ANESTHETIC: 4 mL 1% Lidocaine with Epinephrine 1:100,000    SURGICAL PREP: Ethanol and Hibiclens    SURGEON: Kenn Beach MD    ASSISTANTS: Oumou Landers CST     PREOPERATIVE DIAGNOSIS: squamous cell carcinoma     POSTOPERATIVE DIAGNOSIS: squamous cell carcinoma     PATHOLOGIC DIAGNOSIS: squamous cell carcinoma, invasive    STAGES OF MOHS' SURGERY PERFORMED: one    TUMOR-FREE PLANE ACHIEVED: yes    HEMOSTASIS: Hyfrecation     SPECIMENS: two (two in stage A)    INITIAL LESION SIZE: 2.0 x 2.2 cm    FINAL DEFECT SIZE: 2.0 x 2.3 cm    WOUND REPAIR/DISPOSITION: see below    NARRATIVE:    The patient is a 77 y.o.male referred by Dejah Anders MD with a history of cancer on the right inferior cheek which was biopsied - pathology accession #[id number], [facility]. Findings revealed invasive squamous cell carcinoma.  Examination revealed an eschar within an ill-defined area of erythema on the right inferior cheek at the site of prior biopsy, which was confirmed by reference to the photograph taken at the previous patient visit. In light of the ill-defined nature of this tumor and the location on the face, Mohs' micrographic surgery was thought to be the most appropriate management choice, and this diagnosis is appropriate for treatment by Mohs' micrographic surgery.  I discussed it with the patient and he fully understands the aims, risks, alternatives, and possible complications, and elects to proceed.  There are no medical or surgical contraindications to the procedure.     A signed informed consent was obtained.    PROCEDURE:  The patient was placed in the left lateral decubitus position on the operating table in the Mohs' Surgery Suite. The area in question was thoroughly prepped with ethanol and Hibiclens, with particular care to avoid application to the immediate periocular skin. A sterile surgical marker was used to outline the clinically apparent margins of the involved area, and a narrow margin of normal-appearing skin. Reference marks were made at the periphery of the outlined area with the surgical marker. The proposed area of excision was measured and photographed. Local anesthesia of 1% Lidocaine with 1:100,000 epinephrine was administered.  The total volume of anesthetic used throughout this portion of the procedure was as documented above. The area was prepared and draped in the standard manner. All of the grossly identifiable area of clinically abnormal tissue and an underlying/peripheral layer was taken and processed by the Mohs' technique.  Hemostasis was obtained with the hyfrecator. Tissue was taken from any areas of residual marginal involvement (if present) and processed by the Mohs' technique in as many stages as needed until a tumor-free plane was achieved.    Colors of inks used in the reference nicks at  "epidermal margins (if present) and/or inking of non-epithelial edges, if applicable, is represented on the Mohs map as follows: solid lines represent red ink, dots represent blue ink, jagged lines represent black ink, curlicues represent green ink, "xxx" represents yellow ink.    The first Mohs' layer consisted of two section(s) with 7 slide(s) evaluated. No residual tumor was noted at the margins of the first Mohs' layer. Histology of the specimen(s) showed changes consistent with chronic solar damage and an area of proliferative actinic keratosis on the section 2, as noted on the Mohs map; no evidence of invasive squamous cell carcinoma was noted.    A total of two section(s) and 7 slide(s) were examined under the microscope via the Mohs technique.  A cancer free plane was reached after layer number one. Defect final size was as noted above.      The wound was covered with a nonadherent dressing between stages, and the patient allowed to wait in the waiting area during these periods. The final defect was photographed at the completion of the Mohs' procedure.    See the separate procedure note which follows regarding repair of the defect following Mohs' surgery.        -----------------------------------------------    REPAIR FOLLOWING MOHS' MICROGRAPHIC SURGERY    PREOPERATIVE DIAGNOSIS: defect following Mohs' surgery for an invasive squamous cell carcinoma     POSTOPERATIVE DIAGNOSIS: same    PROCEDURE PERFORMED: complex linear closure     ANESTHETIC: 5 mL 1% Lidocaine with Epinephrine 1:100,000     SURGICAL PREP: Hibiclens    SURGEON: Kenn Beach MD     ASSISTANTS: as above    LOCATION: right cheek      INDICATIONS:  Earlier in the day, the patient underwent Mohs' micrographic surgical excision of a squamous cell carcinoma on the right cheek. Tumor free margins were achieved after layer number one.  Later in the day, the management of the resulting wound was addressed with the patient. I discussed the " various wound management options with the patient and he fully understands the aims, risks, alternatives, and possible complications of the alternatives, and he elects to proceed with closure of the defect in the manner noted below.  There are no medical or surgical contraindications to the procedure.    A signed informed consent was previously obtained.    PROCEDURE:  Repair via complex closure:  The patient was returned to the procedure room following completion of the Mohs' procedure and final slide review. Because of the size, shape and location of the defect, simple closure could not be achieved without possible distortion of surrounding structures, excessive tension on the wound margins and an unacceptable risk of wound dehiscence, and the creation of standing cone deformities. Consideration was given to the site of the wound, the surrounding structures, and the orientation of closure necessary to provide the optimal functional and cosmetic outcome. After devoting time to these considerations, and to the orientation of the vectors of maximal skin tension surrounding the defect, the area was prepped again and a fusiform closure was outlined on the skin surrounding the defect with a sterile surgical marker, to minimize tension across the wound. Additional anesthetic was infiltrated into the tissues surrounding the defect and the anticipated area of repair, to maintain anesthesia during the procedure. Preparation of the site for closure was then carried out by extending the defect through excision of triangles of superfluous tissue on either side of the wound to square the shoulders of the defect and to allow closure without distortion by standing cone deformities, creating a fusiform defect measuring 2.0 x 6.0 cm in size.  Wound margins were extensively undermined to allow advancement of the wound margins into the defect and to permit closure with minimal tension. After hemostasis was achieved with the  hyfrecator, closure was accomplished with:      multiple #5-0 buried interrupted Vicryl suture(s) and    several #5-0 simple interrupted Prolene suture(s) and    two #5-0 running locked Prolene suture(s) for final approximation of the wound margins.    Total length of the final closure was 6.0 cm.     The site was photographed following completion of the repair. Final dressing consisted of petrolatum, Telfa and tape.    Estimated blood loss for the total procedure was less than 5 mL.    Total operative time including tissue processing in the Mohs' laboratory and microscopic Mohs' frozen section slide review was 2 hour(s). Verbal and written wound care instructions were given to the patient, and he expressed understanding of these instructions. The patient tolerated the procedure well and left the operating room in good condition; he is to return in 7 days for suture removal.     Dr. Beach's cell phone number was given to the patient with instructions to call prn with any problems.    Addendum  He is still pending treatment to several other sites; this may involve use of topicals eg efudex; we will discuss at his followup visit.

## 2017-11-20 ENCOUNTER — PROCEDURE VISIT (OUTPATIENT)
Dept: DERMATOLOGY | Facility: CLINIC | Age: 77
End: 2017-11-20
Payer: MEDICARE

## 2017-11-20 VITALS
HEIGHT: 68 IN | BODY MASS INDEX: 24.25 KG/M2 | SYSTOLIC BLOOD PRESSURE: 119 MMHG | WEIGHT: 160 LBS | HEART RATE: 54 BPM | DIASTOLIC BLOOD PRESSURE: 58 MMHG

## 2017-11-20 DIAGNOSIS — K74.60 CIRRHOSIS OF LIVER WITH ASCITES, UNSPECIFIED HEPATIC CIRRHOSIS TYPE: ICD-10-CM

## 2017-11-20 DIAGNOSIS — R18.8 CIRRHOSIS OF LIVER WITH ASCITES, UNSPECIFIED HEPATIC CIRRHOSIS TYPE: ICD-10-CM

## 2017-11-20 DIAGNOSIS — C44.329 SQUAMOUS CELL CANCER OF SKIN OF RIGHT CHEEK: ICD-10-CM

## 2017-11-20 PROCEDURE — 13132 CMPLX RPR F/C/C/M/N/AX/G/H/F: CPT | Mod: 51,S$GLB,, | Performed by: DERMATOLOGY

## 2017-11-20 PROCEDURE — 99499 UNLISTED E&M SERVICE: CPT | Mod: S$GLB,,, | Performed by: DERMATOLOGY

## 2017-11-20 PROCEDURE — 17311 MOHS 1 STAGE H/N/HF/G: CPT | Mod: S$GLB,,, | Performed by: DERMATOLOGY

## 2017-11-20 RX ORDER — LACTULOSE 10 G/15ML
10 SOLUTION ORAL; RECTAL 2 TIMES DAILY
Qty: 473 ML | Refills: 6 | Status: SHIPPED | OUTPATIENT
Start: 2017-11-20 | End: 2018-02-09 | Stop reason: SDUPTHER

## 2017-11-22 ENCOUNTER — TELEPHONE (OUTPATIENT)
Dept: INTERVENTIONAL RADIOLOGY/VASCULAR | Facility: HOSPITAL | Age: 77
End: 2017-11-22

## 2017-11-22 NOTE — TELEPHONE ENCOUNTER
Phone call ( complete)   Arrival time-     1p     Allergies reviewed  Labs (current  )

## 2017-11-24 ENCOUNTER — HOSPITAL ENCOUNTER (OUTPATIENT)
Dept: INTERVENTIONAL RADIOLOGY/VASCULAR | Facility: HOSPITAL | Age: 77
Discharge: HOME | End: 2017-11-24
Attending: INTERNAL MEDICINE
Payer: MEDICARE

## 2017-11-24 VITALS
OXYGEN SATURATION: 98 % | HEART RATE: 60 BPM | RESPIRATION RATE: 16 BRPM | SYSTOLIC BLOOD PRESSURE: 108 MMHG | DIASTOLIC BLOOD PRESSURE: 55 MMHG

## 2017-11-24 DIAGNOSIS — R18.8 OTHER ASCITES: ICD-10-CM

## 2017-11-24 DIAGNOSIS — R18.8 CIRRHOSIS OF LIVER WITH ASCITES, UNSPECIFIED HEPATIC CIRRHOSIS TYPE: ICD-10-CM

## 2017-11-24 DIAGNOSIS — K74.60 CIRRHOSIS OF LIVER WITH ASCITES, UNSPECIFIED HEPATIC CIRRHOSIS TYPE: ICD-10-CM

## 2017-11-24 LAB
APPEARANCE FLD: CLEAR
BODY FLD TYPE: NORMAL
BODY FLUID COMMENTS: NORMAL
COLOR FLD: YELLOW
LYMPHOCYTES NFR FLD MANUAL: 64 %
MONOS+MACROS NFR FLD MANUAL: 25 %
NEUTROPHILS NFR FLD MANUAL: 11 %
WBC # FLD: 160 /CU MM

## 2017-11-24 PROCEDURE — P9047 ALBUMIN (HUMAN), 25%, 50ML: HCPCS | Performed by: INTERNAL MEDICINE

## 2017-11-24 PROCEDURE — 63600175 PHARM REV CODE 636 W HCPCS: Performed by: INTERNAL MEDICINE

## 2017-11-24 PROCEDURE — 49083 ABD PARACENTESIS W/IMAGING: CPT | Mod: ,,, | Performed by: RADIOLOGY

## 2017-11-24 PROCEDURE — 89051 BODY FLUID CELL COUNT: CPT

## 2017-11-24 PROCEDURE — A7048 VACUUM DRAIN BOTTLE/TUBE KIT: HCPCS

## 2017-11-24 PROCEDURE — C1729 CATH, DRAINAGE: HCPCS

## 2017-11-24 RX ORDER — ALBUMIN HUMAN 250 G/1000ML
12.5 SOLUTION INTRAVENOUS ONCE
Status: COMPLETED | OUTPATIENT
Start: 2017-11-24 | End: 2017-11-24

## 2017-11-24 RX ORDER — ALBUMIN HUMAN 250 G/1000ML
25 SOLUTION INTRAVENOUS ONCE
Status: COMPLETED | OUTPATIENT
Start: 2017-11-24 | End: 2017-11-24

## 2017-11-24 RX ADMIN — ALBUMIN (HUMAN) 12.5 G: 25 SOLUTION INTRAVENOUS at 02:11

## 2017-11-24 RX ADMIN — ALBUMIN (HUMAN) 25 G: 25 SOLUTION INTRAVENOUS at 02:11

## 2017-11-24 NOTE — PROGRESS NOTES
Paracentesis finished at this time. Pt tolerated well. 5600cc removed from right abdomen. Labs taken and sent for testing. 150cc of albumin given IV. Bandage applied to right abdomen clean, dry and intact. IV discontinued with catheter intact. Patient given discharge instructions and verbalized understanding of at home care. Patient discharged home via wheelchair under care of spouse.

## 2017-11-24 NOTE — DISCHARGE SUMMARY
Radiology Discharge Summary      Hospital Course: No complications    Admit Date: 11/24/2017  Discharge Date: 11/24/2017     Instructions Given to Patient: Yes  Diet: Resume prior diet  Activity: activity as tolerated    Description of Condition on Discharge: Stable  Vital Signs (Most Recent): Pulse: 60 (11/24/17 1422)  Resp: 16 (11/24/17 1422)  BP: (!) 108/55 (11/24/17 1422)  SpO2: 98 % (11/24/17 1422)    Discharge Disposition: Home    Discharge Diagnosis: Ascites. F/U as scheduled.    George Peña M.D.  Diagnostic and Interventional Radiologist  Department of Radiology  Pager: [phone]

## 2017-11-24 NOTE — DISCHARGE INSTRUCTIONS
For scheduling: Call Mackenzie at [phone]    For questions or concerns call: SHAR MON[facility] 8 AM- 5PM [phone]. Radiology resident on call [phone].    For immediate concerns that are not emergent, you may call our radiology clinic at: [phone]

## 2017-11-24 NOTE — H&P
Radiology History & Physical      SUBJECTIVE:     Chief Complaint: Ascites    History of Present Illness:  Kenneth Sheikh is a 77 y.o. male who presents for diagnostic/therapeutic paracentesis  Past Medical History:   Diagnosis Date    Anticoagulant long-term use     Bipolar 1 disorder     Bipolar 1 disorder 11/24/2016    bipolar    Cancer     history of skin cancer/sinus cancer & rectal cancer    Depressed bipolar affective disorder     Diabetes mellitus     type 2    EBV infection 12/7/2016    EBV DNA, PCR Latest Ref Range: None detected  None detected EBV DNA-Copies/mL Unknown Test Not Performed EBV Early Antigen Ab, IgG Latest Ref Range: <1:10 Titer 1:40 (A) EBV Nuclear Ag Ab Latest Ref Range: <1:5 Titer >=1:80 (A) EBV VCA IgG Latest Ref Range: <1:10 Titer 1:160 (A) EBV VCA IgM Latest Ref Range: <1:10 Titer <1:10     History of TIA (transient ischemic attack)     several-taking Plavix    Hx of gallstones     Wife denies    Hypertension     Hyperthyroidism 11/30/2016    Low HDL (under 40) 12/7/2016    Mixed hyperlipidemia 11/23/2016    JUAN MANUEL (obstructive sleep apnea)     Pneumonia     Stroke     Transient cerebral ischemia 7/22/2016    Type 2 diabetes mellitus      Past Surgical History:   Procedure Laterality Date    Moh's procedure x4      rectal cancer      Sinus surgery for sinus cancer      sinus sx for cancer      skin cancer removed-several times-nose & ear      TONSILLECTOMY      Undescened testicle sx      UVULOPALATOPHARYNGOPLASTY      for sleep apnea       Home Meds:   Prior to Admission medications    Medication Sig Start Date End Date Taking? Authorizing Provider   ACCU-CHEK NEYDA PLUS TEST STRP Strp  2/23/17   Historical Provider, MD   ciprofloxacin HCl (CIPRO) 500 MG tablet Take 1 tablet (500 mg total) by mouth every Monday. 5/29/17   Renato Womack MD   divalproex (DEPAKOTE) 250 MG EC tablet  5/5/17   Historical Provider, MD   furosemide (LASIX) 20 MG tablet Take 4  tablets (80 mg total) by mouth once daily.  Patient taking differently: Take 40 mg by mouth once daily. Taking 40 mg per day 4/25/17 4/25/18  Renato Womack MD   lactulose (CHRONULAC) 10 gram/15 mL solution Take 15 mLs (10 g total) by mouth 2 (two) times daily. 11/20/17   Renato Womack MD   rifAXIMin (XIFAXAN) 550 mg Tab Take 1 tablet (550 mg total) by mouth 2 (two) times daily. 10/3/17   Renato Womack MD   SITagliptan (JANUVIA) 50 MG Tab Take 1 tablet (50 mg total) by mouth once daily. 4/6/17   Prisca Espinoza MD     Anticoagulants/Antiplatelets: no anticoagulation    Allergies:   Review of patient's allergies indicates:   Allergen Reactions    Abilify [aripiprazole] Other (See Comments)     Sleepy, could not function.     Sedation History:  no adverse reactions    Review of Systems:   Hematological: no known coagulopathies  Respiratory: no shortness of breath  Cardiovascular: no chest pain  Gastrointestinal: no abdominal pain  Genito-Urinary: no dysuria  Musculoskeletal: negative  Neurological: no TIA or stroke symptoms         OBJECTIVE:     Vital Signs (Most Recent)  Pulse: 67 (11/24/17 1306)  Resp: 18 (11/24/17 1306)  BP: (!) 159/77 (11/24/17 1306)  SpO2: 98 % (11/24/17 1306)    Physical Exam:  ASA: 3  Mallampati: 2    General: no acute distress  Mental Status: alert and oriented to person, place and time  HEENT: normocephalic, atraumatic  Chest: unlabored breathing  Heart: regular heart rate  Abdomen: distended  Extremity: moves all extremities    Laboratory  Lab Results   Component Value Date    INR 1.1 11/03/2017       Lab Results   Component Value Date    WBC 3.58 (L) 11/03/2017    HGB 10.6 (L) 11/03/2017    HCT 32.9 (L) 11/03/2017    MCV 85 11/03/2017     (L) 11/03/2017      Lab Results   Component Value Date     (H) 06/16/2017     06/16/2017    K 4.0 06/16/2017     06/16/2017    CO2 28 06/16/2017    BUN 24 (H) 06/16/2017    CREATININE 1.5 (H) 06/16/2017     CALCIUM 8.9 06/16/2017    MG 1.7 05/15/2017    ALT 8 (L) 05/24/2017    AST 26 05/24/2017    ALBUMIN 2.8 (L) 05/24/2017    BILITOT 0.4 05/24/2017       ASSESSMENT/PLAN:     Sedation Plan: Local  Patient will undergo diagnostic/therapeutic paracentesis.    Justin Rivas M.D.  PGY-2 Radiology  Pager# [phone]

## 2017-11-27 ENCOUNTER — OFFICE VISIT (OUTPATIENT)
Dept: DERMATOLOGY | Facility: CLINIC | Age: 77
End: 2017-11-27
Payer: MEDICARE

## 2017-11-27 DIAGNOSIS — L57.0 ACTINIC KERATOSIS: ICD-10-CM

## 2017-11-27 DIAGNOSIS — D04.30 SQUAMOUS CELL CARCINOMA IN SITU OF SKIN OF FACE: Primary | ICD-10-CM

## 2017-11-27 PROCEDURE — 99024 POSTOP FOLLOW-UP VISIT: CPT | Mod: S$GLB,,, | Performed by: DERMATOLOGY

## 2017-11-27 RX ORDER — FLUOROURACIL 50 MG/G
CREAM TOPICAL
Qty: 40 G | Refills: 1 | Status: SHIPPED | OUTPATIENT
Start: 2017-11-27 | End: 2018-03-01 | Stop reason: SDUPTHER

## 2017-11-27 NOTE — PROGRESS NOTES
CC: 77 y.o.male patient is here for suture removal.     HPI: Patient is one week(s) s/p Mohs' micrographic surgery, fresh tissue technique of a squamous cell carcinoma on the right cheek, with subsequent repair   Patient reports no problems.    ROS: Skin: still pending treatment to other areas of squamous cell carcinoma in situ as noted below    FINAL PATHOLOGIC DIAGNOSIS  1. Skin, left preauricular cheek, shave biopsy:  - SQUAMOUS CELL CARCINOMA IN SITU.  - THE TUMOR EXTENDS TO THE LATERAL BIOPSY MARGINS.  2. Skin, right jawline, shave biopsy:  - INVASIVE SQUAMOUS CELL CARCINOMA WITH FOCAL BASALOID AND CLEAR CELL DIFFERENTIATION.  - THE TUMOR EXTENDS TO THE LATERAL BIOPSY MARGINS.  3. Skin, right forehead, shave biopsy:  - SQUAMOUS CELL CARCINOMA IN SITU.  - THE TUMOR EXTENDS TO THE LATERAL BIOPSY MARGINS.  4. Skin, right frontal scalp, shave biopsy:  - SQUAMOUS CELL CARCINOMA IN SITU.  - MARGINS ARE NEGATIVE FOR FULL THICKNESS SQUAMOUS ATYPIA IN THE PLANES OF SECTION.  Diagnosed by: Lorenza Vila M.D.  (Electronically Signed: 2017-10-18 14:37:11)    EXAM:  Sutures intact to surgical site.  Wound healing well.  Good approximation of skin edges.  No undue erythema to surrounding skin or signs or symptoms of infection.  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  Given the extent of involvement, it seems reasonable to have him undergo a course of treatment with a topical such as Efudex cream to clear as much of the changes as possible, and possibly avoid the need for more invasive treatment such as Mohs' surgery.  We discussed options for the timing of treatment.  Given the upcoming holidays, we will defer treatment until after New Year's.  He is to start treatment after an activity on January 6.  I have sent a prescription to his pharmacy.  He is to follow-up 2 weeks thereafter, or sooner in the event that any problems arise in the meantime.  All questions were answered to his satisfaction.

## 2017-11-29 ENCOUNTER — TELEPHONE (OUTPATIENT)
Dept: DERMATOLOGY | Facility: CLINIC | Age: 77
End: 2017-11-29

## 2017-11-29 NOTE — TELEPHONE ENCOUNTER
----- Message from Shana Chawla sent at 11/28/2017  4:32 PM CST -----  Contact: pts wife at [phone]  PWS pt_Pam-Pt needs to change his appt on Jan. 29 if possible.  Please call at above number.

## 2017-11-30 ENCOUNTER — TELEPHONE (OUTPATIENT)
Dept: DERMATOLOGY | Facility: CLINIC | Age: 77
End: 2017-11-30

## 2017-11-30 NOTE — TELEPHONE ENCOUNTER
----- Message from Saniya York sent at 11/30/2017 11:09 AM CST -----  Contact: patient wife  Please call above patient wife has question about script given to her  waiting on a call from the nurse

## 2017-11-30 NOTE — TELEPHONE ENCOUNTER
11-30-17 Returned patient call, spoke with hhis wife,she was concerned about the cost of the medicatin, and wanted to know if we h ad samples. I told her that we do not have samples.She said they will get the medication. Oumou

## 2017-12-01 ENCOUNTER — HOSPITAL ENCOUNTER (OUTPATIENT)
Dept: INTERVENTIONAL RADIOLOGY/VASCULAR | Facility: HOSPITAL | Age: 77
Discharge: HOME | End: 2017-12-01
Attending: INTERNAL MEDICINE
Payer: MEDICARE

## 2017-12-01 VITALS — DIASTOLIC BLOOD PRESSURE: 53 MMHG | SYSTOLIC BLOOD PRESSURE: 108 MMHG | HEART RATE: 56 BPM | RESPIRATION RATE: 18 BRPM

## 2017-12-01 DIAGNOSIS — K74.60 CIRRHOSIS OF LIVER WITH ASCITES, UNSPECIFIED HEPATIC CIRRHOSIS TYPE: ICD-10-CM

## 2017-12-01 DIAGNOSIS — R18.8 OTHER ASCITES: ICD-10-CM

## 2017-12-01 DIAGNOSIS — R18.8 CIRRHOSIS OF LIVER WITH ASCITES, UNSPECIFIED HEPATIC CIRRHOSIS TYPE: ICD-10-CM

## 2017-12-01 LAB
APPEARANCE FLD: NORMAL
BODY FLD TYPE: NORMAL
COLOR FLD: YELLOW
EOSINOPHIL NFR FLD MANUAL: 1 %
LYMPHOCYTES NFR FLD MANUAL: 60 %
MESOTHL CELL NFR FLD MANUAL: 2 %
MONOS+MACROS NFR FLD MANUAL: 34 %
NEUTROPHILS NFR FLD MANUAL: 3 %
WBC # FLD: 108 /CU MM

## 2017-12-01 PROCEDURE — C1729 CATH, DRAINAGE: HCPCS

## 2017-12-01 PROCEDURE — 89051 BODY FLUID CELL COUNT: CPT

## 2017-12-01 PROCEDURE — A7048 VACUUM DRAIN BOTTLE/TUBE KIT: HCPCS

## 2017-12-01 PROCEDURE — P9047 ALBUMIN (HUMAN), 25%, 50ML: HCPCS | Performed by: INTERNAL MEDICINE

## 2017-12-01 PROCEDURE — 49083 ABD PARACENTESIS W/IMAGING: CPT | Mod: ,,, | Performed by: FAMILY MEDICINE

## 2017-12-01 PROCEDURE — 63600175 PHARM REV CODE 636 W HCPCS: Performed by: INTERNAL MEDICINE

## 2017-12-01 RX ORDER — ALBUMIN HUMAN 250 G/1000ML
37.5 SOLUTION INTRAVENOUS ONCE
Status: COMPLETED | OUTPATIENT
Start: 2017-12-01 | End: 2017-12-01

## 2017-12-01 RX ADMIN — ALBUMIN (HUMAN) 37.5 G: 25 SOLUTION INTRAVENOUS at 02:12

## 2017-12-01 NOTE — PROCEDURES

## 2017-12-01 NOTE — PROGRESS NOTES
Paracentesis complete. 6600 mLs peritoneal fluid drained. Pt tolerated well. Dressing to left abd clean, dry, and intact. Albumin 25% given 150 mLs. Specimens sent per lab order. Pt discharge

## 2017-12-01 NOTE — H&P
Radiology History & Physical      SUBJECTIVE:     Chief Complaint: ascites    History of Present Illness:  Kenneth Sheikh is a 77 y.o. male who presents for ultrasound guided paracentesis  Past Medical History:   Diagnosis Date    Anticoagulant long-term use     Bipolar 1 disorder     Bipolar 1 disorder 11/24/2016    bipolar    Cancer     history of skin cancer/sinus cancer & rectal cancer    Depressed bipolar affective disorder     Diabetes mellitus     type 2    EBV infection 12/7/2016    EBV DNA, PCR Latest Ref Range: None detected  None detected EBV DNA-Copies/mL Unknown Test Not Performed EBV Early Antigen Ab, IgG Latest Ref Range: <1:10 Titer 1:40 (A) EBV Nuclear Ag Ab Latest Ref Range: <1:5 Titer >=1:80 (A) EBV VCA IgG Latest Ref Range: <1:10 Titer 1:160 (A) EBV VCA IgM Latest Ref Range: <1:10 Titer <1:10     History of TIA (transient ischemic attack)     several-taking Plavix    Hx of gallstones     Wife denies    Hypertension     Hyperthyroidism 11/30/2016    Low HDL (under 40) 12/7/2016    Mixed hyperlipidemia 11/23/2016    JUAN MANUEL (obstructive sleep apnea)     Pneumonia     Stroke     Transient cerebral ischemia 7/22/2016    Type 2 diabetes mellitus      Past Surgical History:   Procedure Laterality Date    Moh's procedure x4      rectal cancer      Sinus surgery for sinus cancer      sinus sx for cancer      skin cancer removed-several times-nose & ear      TONSILLECTOMY      Undescened testicle sx      UVULOPALATOPHARYNGOPLASTY      for sleep apnea       Home Meds:   Prior to Admission medications    Medication Sig Start Date End Date Taking? Authorizing Provider   ACCU-CHEK NEYDA PLUS TEST STRP Strp  2/23/17   Historical Provider, MD   ciprofloxacin HCl (CIPRO) 500 MG tablet Take 1 tablet (500 mg total) by mouth every Monday. 5/29/17   Renato Womack MD   divalproex (DEPAKOTE) 250 MG EC tablet  5/5/17   Historical Provider, MD   fluorouracil (EFUDEX) 5 % cream AAA bid x 2 weeks  11/27/17   Kenn Beach MD   furosemide (LASIX) 20 MG tablet Take 4 tablets (80 mg total) by mouth once daily.  Patient taking differently: Take 40 mg by mouth once daily. Taking 40 mg per day 4/25/17 4/25/18  Renato Womack MD   lactulose (CHRONULAC) 10 gram/15 mL solution Take 15 mLs (10 g total) by mouth 2 (two) times daily. 11/20/17   eRnato Womack MD   rifAXIMin (XIFAXAN) 550 mg Tab Take 1 tablet (550 mg total) by mouth 2 (two) times daily. 10/3/17   Renato Womack MD   SITagliptan (JANUVIA) 50 MG Tab Take 1 tablet (50 mg total) by mouth once daily. 4/6/17   Prisca Espinoza MD     Anticoagulants/Antiplatelets: no anticoagulation    Allergies:   Review of patient's allergies indicates:   Allergen Reactions    Abilify [aripiprazole] Other (See Comments)     Sleepy, could not function.     Sedation History:  no adverse reactions    Review of Systems:   Hematological: no known coagulopathies  Respiratory: no shortness of breath  Cardiovascular: no chest pain  Gastrointestinal: no abdominal pain  Genito-Urinary: no dysuria  Musculoskeletal: negative  Neurological: no TIA or stroke symptoms         OBJECTIVE:     Vital Signs (Most Recent)       Physical Exam:  ASA: 2  Mallampati: n/a    General: no acute distress  Mental Status: alert and oriented to person, place and time  HEENT: normocephalic, atraumatic  Chest: unlabored breathing  Heart: regular heart rate  Abdomen: distended  Extremity: moves all extremities    Laboratory  Lab Results   Component Value Date    INR 1.1 11/03/2017       Lab Results   Component Value Date    WBC 3.58 (L) 11/03/2017    HGB 10.6 (L) 11/03/2017    HCT 32.9 (L) 11/03/2017    MCV 85 11/03/2017     (L) 11/03/2017      Lab Results   Component Value Date     (H) 06/16/2017     06/16/2017    K 4.0 06/16/2017     06/16/2017    CO2 28 06/16/2017    BUN 24 (H) 06/16/2017    CREATININE 1.5 (H) 06/16/2017    CALCIUM 8.9 06/16/2017    MG 1.7  05/15/2017    ALT 8 (L) 05/24/2017    AST 26 05/24/2017    ALBUMIN 2.8 (L) 05/24/2017    BILITOT 0.4 05/24/2017       ASSESSMENT/PLAN:     Sedation Plan: local  Patient will undergo ultrasound guided paracentesis.    MATT Mauricio, FNP  Interventional Radiology  [phone] [employer]

## 2017-12-07 DIAGNOSIS — R18.8 OTHER ASCITES: Primary | ICD-10-CM

## 2017-12-08 ENCOUNTER — HOSPITAL ENCOUNTER (OUTPATIENT)
Dept: INTERVENTIONAL RADIOLOGY/VASCULAR | Facility: HOSPITAL | Age: 77
Discharge: HOME | End: 2017-12-08
Attending: INTERNAL MEDICINE
Payer: MEDICARE

## 2017-12-08 VITALS
SYSTOLIC BLOOD PRESSURE: 107 MMHG | HEART RATE: 56 BPM | DIASTOLIC BLOOD PRESSURE: 52 MMHG | RESPIRATION RATE: 18 BRPM | OXYGEN SATURATION: 100 %

## 2017-12-08 DIAGNOSIS — K74.60 CIRRHOSIS OF LIVER WITH ASCITES, UNSPECIFIED HEPATIC CIRRHOSIS TYPE: ICD-10-CM

## 2017-12-08 DIAGNOSIS — R18.8 OTHER ASCITES: ICD-10-CM

## 2017-12-08 DIAGNOSIS — R18.8 CIRRHOSIS OF LIVER WITH ASCITES, UNSPECIFIED HEPATIC CIRRHOSIS TYPE: ICD-10-CM

## 2017-12-08 LAB
APPEARANCE FLD: CLEAR
BODY FLD TYPE: NORMAL
COLOR FLD: YELLOW
LYMPHOCYTES NFR FLD MANUAL: 25 %
MONOS+MACROS NFR FLD MANUAL: 64 %
NEUTROPHILS NFR FLD MANUAL: 11 %
WBC # FLD: 149 /CU MM

## 2017-12-08 PROCEDURE — C1729 CATH, DRAINAGE: HCPCS

## 2017-12-08 PROCEDURE — A7048 VACUUM DRAIN BOTTLE/TUBE KIT: HCPCS

## 2017-12-08 PROCEDURE — P9047 ALBUMIN (HUMAN), 25%, 50ML: HCPCS | Performed by: INTERNAL MEDICINE

## 2017-12-08 PROCEDURE — 89051 BODY FLUID CELL COUNT: CPT

## 2017-12-08 PROCEDURE — 63600175 PHARM REV CODE 636 W HCPCS: Performed by: INTERNAL MEDICINE

## 2017-12-08 PROCEDURE — 49083 ABD PARACENTESIS W/IMAGING: CPT | Mod: ,,, | Performed by: FAMILY MEDICINE

## 2017-12-08 RX ORDER — ALBUMIN HUMAN 250 G/1000ML
25 SOLUTION INTRAVENOUS
Status: DISCONTINUED | OUTPATIENT
Start: 2017-12-08 | End: 2017-12-09 | Stop reason: HOSPADM

## 2017-12-08 RX ADMIN — ALBUMIN (HUMAN) 12.5 G: 25 SOLUTION INTRAVENOUS at 02:12

## 2017-12-08 RX ADMIN — ALBUMIN (HUMAN) 25 G: 25 SOLUTION INTRAVENOUS at 02:12

## 2017-12-08 NOTE — DISCHARGE INSTRUCTIONS
For scheduling: Call Mackenzie at [phone]    For questions or concerns call: SHAR MON[facility] 8 AM- 5PM [phone]. Radiology resident on call [phone].    For immediate concerns that are not emergent, you may call our radiology clinic at: [phone]

## 2017-12-08 NOTE — PROCEDURES

## 2017-12-08 NOTE — PROGRESS NOTES
Paracentesis complete. 5250 mLs peritoneal fluid drained. Pt tolerated well. Dressing to clean, dry, and intact. Albumin 25% given 150 mLs. Specimens sent per lab order. Discharge instructions and handouts provided. Pt verbalized understanding.

## 2017-12-08 NOTE — H&P
Radiology History & Physical      SUBJECTIVE:     Chief Complaint: ascites    History of Present Illness:  Kenneth Sheikh is a 77 y.o. male who presents for ultrasound guided paracentesis  Past Medical History:   Diagnosis Date    Anticoagulant long-term use     Bipolar 1 disorder     Bipolar 1 disorder 11/24/2016    bipolar    Cancer     history of skin cancer/sinus cancer & rectal cancer    Depressed bipolar affective disorder     Diabetes mellitus     type 2    EBV infection 12/7/2016    EBV DNA, PCR Latest Ref Range: None detected  None detected EBV DNA-Copies/mL Unknown Test Not Performed EBV Early Antigen Ab, IgG Latest Ref Range: <1:10 Titer 1:40 (A) EBV Nuclear Ag Ab Latest Ref Range: <1:5 Titer >=1:80 (A) EBV VCA IgG Latest Ref Range: <1:10 Titer 1:160 (A) EBV VCA IgM Latest Ref Range: <1:10 Titer <1:10     History of TIA (transient ischemic attack)     several-taking Plavix    Hx of gallstones     Wife denies    Hypertension     Hyperthyroidism 11/30/2016    Low HDL (under 40) 12/7/2016    Mixed hyperlipidemia 11/23/2016    JUAN MANUEL (obstructive sleep apnea)     Pneumonia     Stroke     Transient cerebral ischemia 7/22/2016    Type 2 diabetes mellitus      Past Surgical History:   Procedure Laterality Date    Moh's procedure x4      rectal cancer      Sinus surgery for sinus cancer      sinus sx for cancer      skin cancer removed-several times-nose & ear      TONSILLECTOMY      Undescened testicle sx      UVULOPALATOPHARYNGOPLASTY      for sleep apnea       Home Meds:   Prior to Admission medications    Medication Sig Start Date End Date Taking? Authorizing Provider   ACCU-CHEK NEYDA PLUS TEST STRP Strp  2/23/17   Historical Provider, MD   ciprofloxacin HCl (CIPRO) 500 MG tablet Take 1 tablet (500 mg total) by mouth every Monday. 5/29/17   Renato Womack MD   divalproex (DEPAKOTE) 250 MG EC tablet  5/5/17   Historical Provider, MD   fluorouracil (EFUDEX) 5 % cream AAA bid x 2 weeks  11/27/17   Kenn Beach MD   furosemide (LASIX) 20 MG tablet Take 4 tablets (80 mg total) by mouth once daily.  Patient taking differently: Take 40 mg by mouth once daily. Taking 40 mg per day 4/25/17 4/25/18  Renato Womack MD   lactulose (CHRONULAC) 10 gram/15 mL solution Take 15 mLs (10 g total) by mouth 2 (two) times daily. 11/20/17   Renato Womack MD   rifAXIMin (XIFAXAN) 550 mg Tab Take 1 tablet (550 mg total) by mouth 2 (two) times daily. 10/3/17   Renato Womack MD   SITagliptan (JANUVIA) 50 MG Tab Take 1 tablet (50 mg total) by mouth once daily. 4/6/17   Prisca Espinoza MD     Anticoagulants/Antiplatelets: no anticoagulation    Allergies:   Review of patient's allergies indicates:   Allergen Reactions    Abilify [aripiprazole] Other (See Comments)     Sleepy, could not function.     Sedation History:  no adverse reactions    Review of Systems:   Hematological: no known coagulopathies  Respiratory: no shortness of breath  Cardiovascular: no chest pain  Gastrointestinal: no abdominal pain  Genito-Urinary: no dysuria  Musculoskeletal: negative  Neurological: no TIA or stroke symptoms         OBJECTIVE:     Vital Signs (Most Recent)       Physical Exam:  ASA: 2  Mallampati: n/a    General: no acute distress  Mental Status: alert and oriented to person, place and time  HEENT: normocephalic, atraumatic  Chest: unlabored breathing  Heart: regular heart rate  Abdomen: distended  Extremity: moves all extremities    Laboratory  Lab Results   Component Value Date    INR 1.0 12/08/2017       Lab Results   Component Value Date    WBC 3.22 (L) 12/08/2017    HGB 11.0 (L) 12/08/2017    HCT 33.9 (L) 12/08/2017    MCV 86 12/08/2017     (L) 12/08/2017      Lab Results   Component Value Date     (H) 06/16/2017     06/16/2017    K 4.0 06/16/2017     06/16/2017    CO2 28 06/16/2017    BUN 24 (H) 06/16/2017    CREATININE 1.5 (H) 06/16/2017    CALCIUM 8.9 06/16/2017    MG 1.7  05/15/2017    ALT 8 (L) 05/24/2017    AST 26 05/24/2017    ALBUMIN 2.8 (L) 05/24/2017    BILITOT 0.4 05/24/2017       ASSESSMENT/PLAN:     Sedation Plan: local  Patient will undergo ultrasound guided paracentesis.    MATT Mauricio, FNP  Interventional Radiology  [phone] [employer]

## 2017-12-15 ENCOUNTER — HOSPITAL ENCOUNTER (OUTPATIENT)
Dept: INTERVENTIONAL RADIOLOGY/VASCULAR | Facility: HOSPITAL | Age: 77
Discharge: HOME | End: 2017-12-15
Attending: INTERNAL MEDICINE
Payer: MEDICARE

## 2017-12-15 VITALS
OXYGEN SATURATION: 96 % | DIASTOLIC BLOOD PRESSURE: 69 MMHG | RESPIRATION RATE: 20 BRPM | SYSTOLIC BLOOD PRESSURE: 130 MMHG | HEART RATE: 68 BPM

## 2017-12-15 DIAGNOSIS — R18.8 OTHER ASCITES: Primary | ICD-10-CM

## 2017-12-15 DIAGNOSIS — R18.8 OTHER ASCITES: ICD-10-CM

## 2017-12-15 DIAGNOSIS — K75.81 LIVER CIRRHOSIS SECONDARY TO NASH: ICD-10-CM

## 2017-12-15 DIAGNOSIS — K74.60 LIVER CIRRHOSIS SECONDARY TO NASH: ICD-10-CM

## 2017-12-15 DIAGNOSIS — K74.60 CIRRHOSIS OF LIVER WITH ASCITES, UNSPECIFIED HEPATIC CIRRHOSIS TYPE: ICD-10-CM

## 2017-12-15 DIAGNOSIS — R18.8 CIRRHOSIS OF LIVER WITH ASCITES, UNSPECIFIED HEPATIC CIRRHOSIS TYPE: ICD-10-CM

## 2017-12-15 LAB
APPEARANCE FLD: CLEAR
BODY FLD TYPE: NORMAL
COLOR FLD: YELLOW
LYMPHOCYTES NFR FLD MANUAL: 57 %
MONOS+MACROS NFR FLD MANUAL: 36 %
NEUTROPHILS NFR FLD MANUAL: 7 %
WBC # FLD: 134 /CU MM

## 2017-12-15 PROCEDURE — P9047 ALBUMIN (HUMAN), 25%, 50ML: HCPCS | Performed by: INTERNAL MEDICINE

## 2017-12-15 PROCEDURE — 49083 ABD PARACENTESIS W/IMAGING: CPT | Mod: ,,, | Performed by: RADIOLOGY

## 2017-12-15 PROCEDURE — A7048 VACUUM DRAIN BOTTLE/TUBE KIT: HCPCS

## 2017-12-15 PROCEDURE — 63600175 PHARM REV CODE 636 W HCPCS: Performed by: INTERNAL MEDICINE

## 2017-12-15 PROCEDURE — 89051 BODY FLUID CELL COUNT: CPT

## 2017-12-15 PROCEDURE — C1729 CATH, DRAINAGE: HCPCS

## 2017-12-15 RX ORDER — ALBUMIN HUMAN 250 G/1000ML
37.5 SOLUTION INTRAVENOUS ONCE
Status: COMPLETED | OUTPATIENT
Start: 2017-12-15 | End: 2017-12-15

## 2017-12-15 RX ADMIN — ALBUMIN (HUMAN) 37.5 G: 25 SOLUTION INTRAVENOUS at 03:12

## 2017-12-15 NOTE — H&P
Radiology Post-Procedure Note    Pre Op Diagnosis: Ascites  Post Op Diagnosis: Same    Procedure: Paracentesis    Procedure performed by: Justin Rivas MD and Chung Bass MD    Following informed consent, the patient underwent sterile preparation and 1% lidocaine for local anesthesia. Ultrasound guidance was used to insert a 5-Occitan Yueh catheter into the peritoneal space. 5.8L of yellow-colored fluid was successfully removed with no complications.    Written Informed Consent Obtained: Yes  Specimen Removed: YES; 20 cc's of ascites obtained for laboratory analysis  Estimated Blood Loss: Minimal    Findings:   Successful paracentesis.  Albumin administered PRN per protocol.    Patient tolerated procedure well.    Justin Rivas M.D.  PGY-2 Radiology  Pager# [phone]

## 2017-12-15 NOTE — PROGRESS NOTES
Paracentesis complete.5800   mLs peritoneal fluid drained. Pt tolerated well. Dressing to right abdclean, dry, and intact. Albumin 25% given 150mLs. Specimens sent per lab order. Pt discharged

## 2017-12-15 NOTE — H&P
Radiology History & Physical      SUBJECTIVE:     Chief Complaint: ascites    History of Present Illness:  Kenneth Sheikh is a 77 y.o. male who presents for ultrasound guided paracentesis  Past Medical History:   Diagnosis Date    Anticoagulant long-term use     Bipolar 1 disorder     Bipolar 1 disorder 11/24/2016    bipolar    Cancer     history of skin cancer/sinus cancer & rectal cancer    Depressed bipolar affective disorder     Diabetes mellitus     type 2    EBV infection 12/7/2016    EBV DNA, PCR Latest Ref Range: None detected  None detected EBV DNA-Copies/mL Unknown Test Not Performed EBV Early Antigen Ab, IgG Latest Ref Range: <1:10 Titer 1:40 (A) EBV Nuclear Ag Ab Latest Ref Range: <1:5 Titer >=1:80 (A) EBV VCA IgG Latest Ref Range: <1:10 Titer 1:160 (A) EBV VCA IgM Latest Ref Range: <1:10 Titer <1:10     History of TIA (transient ischemic attack)     several-taking Plavix    Hx of gallstones     Wife denies    Hypertension     Hyperthyroidism 11/30/2016    Low HDL (under 40) 12/7/2016    Mixed hyperlipidemia 11/23/2016    JUAN MANUEL (obstructive sleep apnea)     Pneumonia     Stroke     Transient cerebral ischemia 7/22/2016    Type 2 diabetes mellitus      Past Surgical History:   Procedure Laterality Date    Moh's procedure x4      rectal cancer      Sinus surgery for sinus cancer      sinus sx for cancer      skin cancer removed-several times-nose & ear      TONSILLECTOMY      Undescened testicle sx      UVULOPALATOPHARYNGOPLASTY      for sleep apnea       Home Meds:   Prior to Admission medications    Medication Sig Start Date End Date Taking? Authorizing Provider   ACCU-CHEK NEYDA PLUS TEST STRP Strp  2/23/17   Historical Provider, MD   ciprofloxacin HCl (CIPRO) 500 MG tablet Take 1 tablet (500 mg total) by mouth every Monday. 5/29/17   Renato Womack MD   divalproex (DEPAKOTE) 250 MG EC tablet  5/5/17   Historical Provider, MD   fluorouracil (EFUDEX) 5 % cream AAA bid x 2 weeks  11/27/17   Kenn Beach MD   furosemide (LASIX) 20 MG tablet Take 4 tablets (80 mg total) by mouth once daily.  Patient taking differently: Take 40 mg by mouth once daily. Taking 40 mg per day 4/25/17 4/25/18  Renato Womack MD   lactulose (CHRONULAC) 10 gram/15 mL solution Take 15 mLs (10 g total) by mouth 2 (two) times daily. 11/20/17   Renato Womack MD   rifAXIMin (XIFAXAN) 550 mg Tab Take 1 tablet (550 mg total) by mouth 2 (two) times daily. 10/3/17   Renato Womack MD   SITagliptan (JANUVIA) 50 MG Tab Take 1 tablet (50 mg total) by mouth once daily. 4/6/17   Prisca Espinoza MD     Anticoagulants/Antiplatelets: no anticoagulation    Allergies:   Review of patient's allergies indicates:   Allergen Reactions    Abilify [aripiprazole] Other (See Comments)     Sleepy, could not function.     Sedation History:  no adverse reactions    Review of Systems:   Hematological: no known coagulopathies  Respiratory: no shortness of breath  Cardiovascular: no chest pain  Gastrointestinal: no abdominal pain  Genito-Urinary: no dysuria  Musculoskeletal: negative  Neurological: no TIA or stroke symptoms         OBJECTIVE:     Vital Signs (Most Recent)       Physical Exam:  ASA: 2  Mallampati: n/a    General: no acute distress  Mental Status: alert and oriented to person, place and time  HEENT: normocephalic, atraumatic  Chest: unlabored breathing  Heart: regular heart rate  Abdomen: distended  Extremity: moves all extremities    Laboratory  Lab Results   Component Value Date    INR 1.0 12/08/2017       Lab Results   Component Value Date    WBC 3.22 (L) 12/08/2017    HGB 11.0 (L) 12/08/2017    HCT 33.9 (L) 12/08/2017    MCV 86 12/08/2017     (L) 12/08/2017      Lab Results   Component Value Date     (H) 06/16/2017     06/16/2017    K 4.0 06/16/2017     06/16/2017    CO2 28 06/16/2017    BUN 24 (H) 06/16/2017    CREATININE 1.5 (H) 06/16/2017    CALCIUM 8.9 06/16/2017    MG 1.7  05/15/2017    ALT 8 (L) 05/24/2017    AST 26 05/24/2017    ALBUMIN 2.8 (L) 05/24/2017    BILITOT 0.4 05/24/2017       ASSESSMENT/PLAN:     Sedation Plan: local  Patient will undergo ultrasound guided paracentesis.    MATT Mauricio, FNP  Interventional Radiology  [phone] [employer]

## 2017-12-22 ENCOUNTER — HOSPITAL ENCOUNTER (OUTPATIENT)
Dept: INTERVENTIONAL RADIOLOGY/VASCULAR | Facility: HOSPITAL | Age: 77
Discharge: HOME | End: 2017-12-22
Attending: INTERNAL MEDICINE
Payer: MEDICARE

## 2017-12-22 VITALS
HEART RATE: 62 BPM | OXYGEN SATURATION: 98 % | RESPIRATION RATE: 16 BRPM | DIASTOLIC BLOOD PRESSURE: 70 MMHG | SYSTOLIC BLOOD PRESSURE: 144 MMHG

## 2017-12-22 DIAGNOSIS — R18.8 OTHER ASCITES: ICD-10-CM

## 2017-12-22 DIAGNOSIS — R18.8 CIRRHOSIS OF LIVER WITH ASCITES, UNSPECIFIED HEPATIC CIRRHOSIS TYPE: ICD-10-CM

## 2017-12-22 DIAGNOSIS — K74.60 CIRRHOSIS OF LIVER WITH ASCITES, UNSPECIFIED HEPATIC CIRRHOSIS TYPE: ICD-10-CM

## 2017-12-22 LAB
APPEARANCE FLD: CLEAR
BODY FLD TYPE: NORMAL
COLOR FLD: YELLOW
GRAM STN SPEC: NORMAL
LYMPHOCYTES NFR FLD MANUAL: 71 %
MESOTHL CELL NFR FLD MANUAL: 2 %
MONOS+MACROS NFR FLD MANUAL: 25 %
NEUTROPHILS NFR FLD MANUAL: 2 %
WBC # FLD: 102 /CU MM

## 2017-12-22 PROCEDURE — 87205 SMEAR GRAM STAIN: CPT

## 2017-12-22 PROCEDURE — 49083 ABD PARACENTESIS W/IMAGING: CPT | Mod: ,,, | Performed by: RADIOLOGY

## 2017-12-22 PROCEDURE — 87075 CULTR BACTERIA EXCEPT BLOOD: CPT

## 2017-12-22 PROCEDURE — 87070 CULTURE OTHR SPECIMN AEROBIC: CPT

## 2017-12-22 PROCEDURE — A7048 VACUUM DRAIN BOTTLE/TUBE KIT: HCPCS

## 2017-12-22 PROCEDURE — 89051 BODY FLUID CELL COUNT: CPT

## 2017-12-22 PROCEDURE — 63600175 PHARM REV CODE 636 W HCPCS: Performed by: INTERNAL MEDICINE

## 2017-12-22 PROCEDURE — P9047 ALBUMIN (HUMAN), 25%, 50ML: HCPCS | Performed by: INTERNAL MEDICINE

## 2017-12-22 PROCEDURE — C1729 CATH, DRAINAGE: HCPCS

## 2017-12-22 RX ORDER — ALBUMIN HUMAN 250 G/1000ML
12.5 SOLUTION INTRAVENOUS ONCE
Status: COMPLETED | OUTPATIENT
Start: 2017-12-22 | End: 2017-12-22

## 2017-12-22 RX ORDER — ALBUMIN HUMAN 250 G/1000ML
25 SOLUTION INTRAVENOUS ONCE
Status: COMPLETED | OUTPATIENT
Start: 2017-12-22 | End: 2017-12-22

## 2017-12-22 RX ADMIN — ALBUMIN (HUMAN) 25 G: 25 SOLUTION INTRAVENOUS at 03:12

## 2017-12-22 RX ADMIN — ALBUMIN (HUMAN) 12.5 G: 25 SOLUTION INTRAVENOUS at 03:12

## 2017-12-22 NOTE — H&P
Radiology History & Physical      SUBJECTIVE:     Chief Complaint: ascites    History of Present Illness:  Kenneth Sheikh is a 77 y.o. male who presents for paracentesis.     Past Medical History:   Diagnosis Date    Anticoagulant long-term use     Bipolar 1 disorder     Bipolar 1 disorder 11/24/2016    bipolar    Cancer     history of skin cancer/sinus cancer & rectal cancer    Depressed bipolar affective disorder     Diabetes mellitus     type 2    EBV infection 12/7/2016    EBV DNA, PCR Latest Ref Range: None detected  None detected EBV DNA-Copies/mL Unknown Test Not Performed EBV Early Antigen Ab, IgG Latest Ref Range: <1:10 Titer 1:40 (A) EBV Nuclear Ag Ab Latest Ref Range: <1:5 Titer >=1:80 (A) EBV VCA IgG Latest Ref Range: <1:10 Titer 1:160 (A) EBV VCA IgM Latest Ref Range: <1:10 Titer <1:10     History of TIA (transient ischemic attack)     several-taking Plavix    Hx of gallstones     Wife denies    Hypertension     Hyperthyroidism 11/30/2016    Low HDL (under 40) 12/7/2016    Mixed hyperlipidemia 11/23/2016    JUAN MANUEL (obstructive sleep apnea)     Pneumonia     Stroke     Transient cerebral ischemia 7/22/2016    Type 2 diabetes mellitus      Past Surgical History:   Procedure Laterality Date    Moh's procedure x4      rectal cancer      Sinus surgery for sinus cancer      sinus sx for cancer      skin cancer removed-several times-nose & ear      TONSILLECTOMY      Undescened testicle sx      UVULOPALATOPHARYNGOPLASTY      for sleep apnea       Home Meds:   Prior to Admission medications    Medication Sig Start Date End Date Taking? Authorizing Provider   ACCU-CHEK NEYDA PLUS TEST STRP Strp  2/23/17   Historical Provider, MD   ciprofloxacin HCl (CIPRO) 500 MG tablet Take 1 tablet (500 mg total) by mouth every Monday. 5/29/17   Renato Womack MD   divalproex (DEPAKOTE) 250 MG EC tablet  5/5/17   Historical Provider, MD   fluorouracil (EFUDEX) 5 % cream AAA bid x 2 weeks 11/27/17    Kenn Beach MD   furosemide (LASIX) 20 MG tablet Take 4 tablets (80 mg total) by mouth once daily.  Patient taking differently: Take 40 mg by mouth once daily. Taking 40 mg per day 4/25/17 4/25/18  Renato Womack MD   lactulose (CHRONULAC) 10 gram/15 mL solution Take 15 mLs (10 g total) by mouth 2 (two) times daily. 11/20/17   Renato Womack MD   rifAXIMin (XIFAXAN) 550 mg Tab Take 1 tablet (550 mg total) by mouth 2 (two) times daily. 10/3/17   Renato Womack MD   SITagliptan (JANUVIA) 50 MG Tab Take 1 tablet (50 mg total) by mouth once daily. 4/6/17   Prisca Espinoza MD     Anticoagulants/Antiplatelets: no anticoagulation    Allergies:   Review of patient's allergies indicates:   Allergen Reactions    Abilify [aripiprazole] Other (See Comments)     Sleepy, could not function.     Sedation History:  no adverse reactions    Review of Systems:   Hematological: no known coagulopathies  Respiratory: no shortness of breath  Cardiovascular: no chest pain  Gastrointestinal: no abdominal pain  Genito-Urinary: no dysuria  Musculoskeletal: negative  Neurological: no TIA or stroke symptoms         OBJECTIVE:     Vital Signs (Most Recent)  Pulse: 62 (12/22/17 1423)  Resp: 16 (12/22/17 1423)  BP: (!) 144/70 (12/22/17 1423)  SpO2: 98 % (12/22/17 1423)    Physical Exam:  ASA: 2  Mallampati: 2    General: no acute distress  Mental Status: alert and oriented to person, place and time  HEENT: normocephalic, atraumatic  Chest: unlabored breathing  Heart: regular heart rate  Abdomen: nondistended  Extremity: moves all extremities    Laboratory  Lab Results   Component Value Date    INR 1.0 12/08/2017       Lab Results   Component Value Date    WBC 3.22 (L) 12/08/2017    HGB 11.0 (L) 12/08/2017    HCT 33.9 (L) 12/08/2017    MCV 86 12/08/2017     (L) 12/08/2017      Lab Results   Component Value Date     (H) 06/16/2017     06/16/2017    K 4.0 06/16/2017     06/16/2017    CO2 28  06/16/2017    BUN 24 (H) 06/16/2017    CREATININE 1.5 (H) 06/16/2017    CALCIUM 8.9 06/16/2017    MG 1.7 05/15/2017    ALT 8 (L) 05/24/2017    AST 26 05/24/2017    ALBUMIN 2.8 (L) 05/24/2017    BILITOT 0.4 05/24/2017       ASSESSMENT/PLAN:     Sedation Plan: local  Patient will undergo paracentesis.

## 2017-12-22 NOTE — PROCEDURES
Radiology Post-Procedure Note    Pre Op Diagnosis: Ascites  Post Op Diagnosis: Same    Procedure: Paracentesis    Procedure performed by: MD Arron.     Written Informed Consent Obtained: Yes  Specimen Removed: NO  Estimated Blood Loss: Minimal    Findings:   US was used to identify LLQ pocket of fluid. 5Fr Yeuh catheter was advanced and clear yellow fluid was aspirated. Successful paracentesis.  Albumin administered PRN per protocol.    Patient tolerated procedure well.    Azalia Heller M.D. 2:38 PM 12/22/2017

## 2017-12-22 NOTE — PROGRESS NOTES
Paracentesis complete, 5000 mls removed. 150 mls administered per protocol. Specimen sent to lab. No acute events. Pt resting comfortably.

## 2017-12-26 LAB — BACTERIA SPEC AEROBE CULT: NO GROWTH

## 2017-12-29 ENCOUNTER — HOSPITAL ENCOUNTER (OUTPATIENT)
Dept: INTERVENTIONAL RADIOLOGY/VASCULAR | Facility: HOSPITAL | Age: 77
Discharge: HOME | End: 2017-12-29
Attending: INTERNAL MEDICINE
Payer: MEDICARE

## 2017-12-29 VITALS
HEART RATE: 58 BPM | OXYGEN SATURATION: 100 % | DIASTOLIC BLOOD PRESSURE: 70 MMHG | RESPIRATION RATE: 17 BRPM | SYSTOLIC BLOOD PRESSURE: 115 MMHG

## 2017-12-29 DIAGNOSIS — Z79.899 NEED FOR PROPHYLACTIC CHEMOTHERAPY: Primary | ICD-10-CM

## 2017-12-29 DIAGNOSIS — R18.8 CIRRHOSIS OF LIVER WITH ASCITES, UNSPECIFIED HEPATIC CIRRHOSIS TYPE: ICD-10-CM

## 2017-12-29 DIAGNOSIS — K74.60 CIRRHOSIS OF LIVER WITH ASCITES, UNSPECIFIED HEPATIC CIRRHOSIS TYPE: ICD-10-CM

## 2017-12-29 DIAGNOSIS — K75.81 LIVER CIRRHOSIS SECONDARY TO NASH: ICD-10-CM

## 2017-12-29 DIAGNOSIS — R18.8 OTHER ASCITES: ICD-10-CM

## 2017-12-29 DIAGNOSIS — K74.60 LIVER CIRRHOSIS SECONDARY TO NASH: ICD-10-CM

## 2017-12-29 LAB
APPEARANCE FLD: CLEAR
BACTERIA SPEC ANAEROBE CULT: NORMAL
BODY FLD TYPE: NORMAL
COLOR FLD: YELLOW
LYMPHOCYTES NFR FLD MANUAL: 67 %
MONOS+MACROS NFR FLD MANUAL: 28 %
NEUTROPHILS NFR FLD MANUAL: 5 %
WBC # FLD: 122 /CU MM

## 2017-12-29 PROCEDURE — P9047 ALBUMIN (HUMAN), 25%, 50ML: HCPCS | Performed by: INTERNAL MEDICINE

## 2017-12-29 PROCEDURE — 89051 BODY FLUID CELL COUNT: CPT

## 2017-12-29 PROCEDURE — C1729 CATH, DRAINAGE: HCPCS

## 2017-12-29 PROCEDURE — 63600175 PHARM REV CODE 636 W HCPCS: Performed by: INTERNAL MEDICINE

## 2017-12-29 PROCEDURE — 49083 ABD PARACENTESIS W/IMAGING: CPT | Mod: ,,, | Performed by: RADIOLOGY

## 2017-12-29 RX ORDER — ALBUMIN HUMAN 250 G/1000ML
12.5 SOLUTION INTRAVENOUS ONCE
Status: COMPLETED | OUTPATIENT
Start: 2017-12-29 | End: 2017-12-29

## 2017-12-29 RX ORDER — ALBUMIN HUMAN 250 G/1000ML
25 SOLUTION INTRAVENOUS ONCE
Status: COMPLETED | OUTPATIENT
Start: 2017-12-29 | End: 2017-12-29

## 2017-12-29 RX ADMIN — ALBUMIN (HUMAN) 25 G: 25 SOLUTION INTRAVENOUS at 02:12

## 2017-12-29 RX ADMIN — ALBUMIN (HUMAN) 12.5 G: 25 SOLUTION INTRAVENOUS at 02:12

## 2017-12-29 NOTE — DISCHARGE INSTRUCTIONS
For scheduling: Call Mackenzie at [phone]    For questions or concerns call: [facility] 8 AM- 5PM [phone]. Radiology resident on call [phone].    For immediate concerns that are not emergent, you may call our radiology clinic at: [phone]

## 2017-12-29 NOTE — PROCEDURES
Radiology Post-Procedure Note    Pre Op Diagnosis: Ascites  Post Op Diagnosis: Same    Procedure: Paracentesis    Procedure performed by: Juventino Prado MD    Written Informed Consent Obtained: Yes  Specimen Removed: YES clear yellow fluid  Estimated Blood Loss: Minimal    Findings:   Successful paracentesis.  Albumin administered PRN per protocol.    Patient tolerated procedure well.    Juventino Prado MD  Radiology PGY-2  [phone]

## 2017-12-29 NOTE — H&P
Radiology History & Physical      SUBJECTIVE:     Chief Complaint: ascites    History of Present Illness:  Kenneth Sheikh is a 77 y.o. male who presents for paracentesis.    Past Medical History:   Diagnosis Date    Anticoagulant long-term use     Bipolar 1 disorder     Bipolar 1 disorder 11/24/2016    bipolar    Cancer     history of skin cancer/sinus cancer & rectal cancer    Depressed bipolar affective disorder     Diabetes mellitus     type 2    EBV infection 12/7/2016    EBV DNA, PCR Latest Ref Range: None detected  None detected EBV DNA-Copies/mL Unknown Test Not Performed EBV Early Antigen Ab, IgG Latest Ref Range: <1:10 Titer 1:40 (A) EBV Nuclear Ag Ab Latest Ref Range: <1:5 Titer >=1:80 (A) EBV VCA IgG Latest Ref Range: <1:10 Titer 1:160 (A) EBV VCA IgM Latest Ref Range: <1:10 Titer <1:10     History of TIA (transient ischemic attack)     several-taking Plavix    Hx of gallstones     Wife denies    Hypertension     Hyperthyroidism 11/30/2016    Low HDL (under 40) 12/7/2016    Mixed hyperlipidemia 11/23/2016    JUAN MANUEL (obstructive sleep apnea)     Pneumonia     Stroke     Transient cerebral ischemia 7/22/2016    Type 2 diabetes mellitus      Past Surgical History:   Procedure Laterality Date    Moh's procedure x4      rectal cancer      Sinus surgery for sinus cancer      sinus sx for cancer      skin cancer removed-several times-nose & ear      TONSILLECTOMY      Undescened testicle sx      UVULOPALATOPHARYNGOPLASTY      for sleep apnea       Home Meds:   Prior to Admission medications    Medication Sig Start Date End Date Taking? Authorizing Provider   ACCU-CHEK NEYDA PLUS TEST STRP Strp  2/23/17   Historical Provider, MD   ciprofloxacin HCl (CIPRO) 500 MG tablet Take 1 tablet (500 mg total) by mouth every Monday. 5/29/17   Renato Womack MD   divalproex (DEPAKOTE) 250 MG EC tablet  5/5/17   Historical Provider, MD   fluorouracil (EFUDEX) 5 % cream AAA bid x 2 weeks 11/27/17    Kenn Beach MD   furosemide (LASIX) 20 MG tablet Take 4 tablets (80 mg total) by mouth once daily.  Patient taking differently: Take 40 mg by mouth once daily. Taking 40 mg per day 4/25/17 4/25/18  Renato Womack MD   lactulose (CHRONULAC) 10 gram/15 mL solution Take 15 mLs (10 g total) by mouth 2 (two) times daily. 11/20/17   Renato Womack MD   rifAXIMin (XIFAXAN) 550 mg Tab Take 1 tablet (550 mg total) by mouth 2 (two) times daily. 10/3/17   Renato Womack MD   SITagliptan (JANUVIA) 50 MG Tab Take 1 tablet (50 mg total) by mouth once daily. 4/6/17   Prisca Espinoza MD     Anticoagulants/Antiplatelets: no anticoagulation    Allergies:   Review of patient's allergies indicates:   Allergen Reactions    Abilify [aripiprazole] Other (See Comments)     Sleepy, could not function.     Sedation History:  no adverse reactions    Review of Systems:   Hematological: no known coagulopathies  Respiratory: no shortness of breath  Cardiovascular: no chest pain  Gastrointestinal: no abdominal pain  Genito-Urinary: no dysuria  Musculoskeletal: negative  Neurological: no TIA or stroke symptoms         OBJECTIVE:     Vital Signs (Most Recent)  Pulse: (!) 57 (12/29/17 1335)  Resp: 19 (12/29/17 1335)  BP: (!) 155/69 (12/29/17 1335)  SpO2: 98 % (12/29/17 1335)    Physical Exam:  ASA: 2  Mallampati: 2    General: no acute distress  Mental Status: alert and oriented to person, place and time  HEENT: normocephalic, atraumatic  Chest: unlabored breathing  Heart: regular heart rate  Abdomen: distended  Extremity: moves all extremities    Laboratory  Lab Results   Component Value Date    INR 1.0 12/08/2017       Lab Results   Component Value Date    WBC 3.69 (L) 12/29/2017    HGB 11.0 (L) 12/29/2017    HCT 35.0 (L) 12/29/2017    MCV 87 12/29/2017     (L) 12/29/2017      Lab Results   Component Value Date     (H) 06/16/2017     06/16/2017    K 4.0 06/16/2017     06/16/2017    CO2 28  06/16/2017    BUN 24 (H) 06/16/2017    CREATININE 1.5 (H) 06/16/2017    CALCIUM 8.9 06/16/2017    MG 1.7 05/15/2017    ALT 8 (L) 05/24/2017    AST 26 05/24/2017    ALBUMIN 2.8 (L) 05/24/2017    BILITOT 0.4 05/24/2017       ASSESSMENT/PLAN:     Sedation Plan: local   Patient will undergo paracentesis.    Juventino Prado MD  Radiology PGY-2  [phone]

## 2017-12-29 NOTE — PROGRESS NOTES
Paracentesis complete at this time. Patient tolerated well 5L removed from right abdomen.  150cc of albumin given IV. Dressing applied, clean dry and intact. Patient verbalized understanding of discharge instructions. Patient in wheelchair, wheeled to lobby.

## 2018-01-05 ENCOUNTER — HOSPITAL ENCOUNTER (OUTPATIENT)
Dept: INTERVENTIONAL RADIOLOGY/VASCULAR | Facility: HOSPITAL | Age: 78
Discharge: HOME | End: 2018-01-05
Attending: INTERNAL MEDICINE
Payer: MEDICARE

## 2018-01-05 ENCOUNTER — OFFICE VISIT (OUTPATIENT)
Dept: DERMATOLOGY | Facility: CLINIC | Age: 78
End: 2018-01-05
Payer: MEDICARE

## 2018-01-05 VITALS
DIASTOLIC BLOOD PRESSURE: 64 MMHG | OXYGEN SATURATION: 100 % | RESPIRATION RATE: 16 BRPM | SYSTOLIC BLOOD PRESSURE: 106 MMHG | HEART RATE: 54 BPM

## 2018-01-05 DIAGNOSIS — K74.60 LIVER CIRRHOSIS SECONDARY TO NASH: ICD-10-CM

## 2018-01-05 DIAGNOSIS — L57.0 AK (ACTINIC KERATOSIS): ICD-10-CM

## 2018-01-05 DIAGNOSIS — D48.5 NEOPLASM OF UNCERTAIN BEHAVIOR OF SKIN: Primary | ICD-10-CM

## 2018-01-05 DIAGNOSIS — R18.8 CIRRHOSIS OF LIVER WITH ASCITES, UNSPECIFIED HEPATIC CIRRHOSIS TYPE: ICD-10-CM

## 2018-01-05 DIAGNOSIS — K75.81 LIVER CIRRHOSIS SECONDARY TO NASH: ICD-10-CM

## 2018-01-05 DIAGNOSIS — D09.9 SQUAMOUS CELL CARCINOMA IN SITU: ICD-10-CM

## 2018-01-05 DIAGNOSIS — R18.8 OTHER ASCITES: ICD-10-CM

## 2018-01-05 DIAGNOSIS — K74.60 CIRRHOSIS OF LIVER WITH ASCITES, UNSPECIFIED HEPATIC CIRRHOSIS TYPE: ICD-10-CM

## 2018-01-05 LAB
APPEARANCE FLD: CLEAR
BODY FLD TYPE: NORMAL
COLOR FLD: YELLOW
LYMPHOCYTES NFR FLD MANUAL: 29 %
MONOS+MACROS NFR FLD MANUAL: 69 %
NEUTROPHILS NFR FLD MANUAL: 2 %
WBC # FLD: 118 /CU MM

## 2018-01-05 PROCEDURE — 99213 OFFICE O/P EST LOW 20 MIN: CPT | Mod: PBBFAC,25 | Performed by: DERMATOLOGY

## 2018-01-05 PROCEDURE — 49083 ABD PARACENTESIS W/IMAGING: CPT | Mod: GC,,, | Performed by: RADIOLOGY

## 2018-01-05 PROCEDURE — 11100 PR BIOPSY OF SKIN LESION: CPT | Mod: 59,S$PBB,, | Performed by: DERMATOLOGY

## 2018-01-05 PROCEDURE — 89051 BODY FLUID CELL COUNT: CPT

## 2018-01-05 PROCEDURE — 49083 ABD PARACENTESIS W/IMAGING: CPT

## 2018-01-05 PROCEDURE — 99212 OFFICE O/P EST SF 10 MIN: CPT | Mod: 25,S$PBB,, | Performed by: DERMATOLOGY

## 2018-01-05 PROCEDURE — 99999 PR PBB SHADOW E&M-EST. PATIENT-LVL III: CPT | Mod: PBBFAC,,, | Performed by: DERMATOLOGY

## 2018-01-05 PROCEDURE — 17000 DESTRUCT PREMALG LESION: CPT | Mod: S$PBB,,, | Performed by: DERMATOLOGY

## 2018-01-05 PROCEDURE — 11100 PR BIOPSY OF SKIN LESION: CPT | Mod: 59,PBBFAC | Performed by: DERMATOLOGY

## 2018-01-05 PROCEDURE — 88305 TISSUE EXAM BY PATHOLOGIST: CPT | Performed by: PATHOLOGY

## 2018-01-05 PROCEDURE — 17000 DESTRUCT PREMALG LESION: CPT | Mod: PBBFAC | Performed by: DERMATOLOGY

## 2018-01-05 PROCEDURE — A7048 VACUUM DRAIN BOTTLE/TUBE KIT: HCPCS

## 2018-01-05 RX ORDER — ALBUMIN HUMAN 250 G/1000ML
25 SOLUTION INTRAVENOUS
Status: DISCONTINUED | OUTPATIENT
Start: 2018-01-05 | End: 2018-01-06 | Stop reason: HOSPADM

## 2018-01-05 NOTE — PATIENT INSTRUCTIONS
Shave Biopsy Wound Care    Your doctor has performed a shave biopsy today.  A band aid and vaseline ointment has been placed over the site.  This should remain in place for 24 hours.  It is recommended that you keep the area dry for the first 24 hours.  After 24 hours, you may remove the band aid and wash the area with warm soap and water and apply Vaseline jelly.  Many patients prefer to use Neosporin or Bacitracin ointment.  This is acceptable; however, know that you can develop an allergy to this medication even if you have used it safely for years.  It is important to keep the area moist.  Letting it dry out and get air slows healing time, and will worsen the scar.  Band aid is optional after first 24 hours.      If you notice increasing redness, tenderness, pain, or yellow drainage at the biopsy site, please notify your doctor.  These are signs of an infection.    If your biopsy site is bleeding, apply firm pressure for 15 minutes straight.  Repeat for another 15 minutes, if it is still bleeding.   If the surgical site continues to bleed, then please contact your doctor.      [facility] [place] [address]/ [phone] FAX/ [email or web address]

## 2018-01-05 NOTE — PROCEDURES
Radiology Post-Procedure Note    Pre Op Diagnosis: Ascites  Post Op Diagnosis: Same    Procedure: Paracentesis    Procedure performed by: David Bravo MD and Gabriel Ruiz MD    Written Informed Consent Obtained: Yes  Specimen Removed: YES ascites  Estimated Blood Loss: Minimal    Findings:     Successful paracentesis.  Albumin administered PRN per protocol.    Patient tolerated procedure well.    Gabriel Ruiz MD  PGY-5  Department of Radiology  [phone]

## 2018-01-05 NOTE — DISCHARGE INSTRUCTIONS
For scheduling: Call Mackenzie at [phone]    For questions or concerns call: [facility] 8 AM- 5PM [phone]. Radiology resident on call [phone].    For immediate concerns that are not emergent, you may call our radiology clinic at: [phone]

## 2018-01-05 NOTE — PROGRESS NOTES
Paracentesis complete. 5000 mLs peritoneal fluid drained. Pt tolerated well. Dressing to clean, dry, and intact. Albumin 25% given 150 mLs. Specimens sent per lab order. Discharge instructions and handouts provided. Pt verbalized understanding.

## 2018-01-05 NOTE — H&P
Radiology History & Physical      SUBJECTIVE:     Chief Complaint: Ascites, hx of skin and rectal cancer.    History of Present Illness:    Kenneth Sheikh is a 77 y.o. male who presents for paracentesis.    Past Medical History:   Diagnosis Date    Anticoagulant long-term use     Bipolar 1 disorder     Bipolar 1 disorder 11/24/2016    bipolar    Cancer     history of skin cancer/sinus cancer & rectal cancer    Depressed bipolar affective disorder     Diabetes mellitus     type 2    EBV infection 12/7/2016    EBV DNA, PCR Latest Ref Range: None detected  None detected EBV DNA-Copies/mL Unknown Test Not Performed EBV Early Antigen Ab, IgG Latest Ref Range: <1:10 Titer 1:40 (A) EBV Nuclear Ag Ab Latest Ref Range: <1:5 Titer >=1:80 (A) EBV VCA IgG Latest Ref Range: <1:10 Titer 1:160 (A) EBV VCA IgM Latest Ref Range: <1:10 Titer <1:10     History of TIA (transient ischemic attack)     several-taking Plavix    Hx of gallstones     Wife denies    Hypertension     Hyperthyroidism 11/30/2016    Low HDL (under 40) 12/7/2016    Mixed hyperlipidemia 11/23/2016    JUAN MANUEL (obstructive sleep apnea)     Pneumonia     Stroke     Transient cerebral ischemia 7/22/2016    Type 2 diabetes mellitus      Past Surgical History:   Procedure Laterality Date    Moh's procedure x4      rectal cancer      Sinus surgery for sinus cancer      sinus sx for cancer      skin cancer removed-several times-nose & ear      TONSILLECTOMY      Undescened testicle sx      UVULOPALATOPHARYNGOPLASTY      for sleep apnea       Home Meds:   Prior to Admission medications    Medication Sig Start Date End Date Taking? Authorizing Provider   ACCU-CHEK NEYDA PLUS TEST STRP Strp  2/23/17   Historical Provider, MD   ciprofloxacin HCl (CIPRO) 500 MG tablet Take 1 tablet (500 mg total) by mouth every Monday. 5/29/17   Renato Womack MD   divalproex (DEPAKOTE) 250 MG EC tablet  5/5/17   Historical Provider, MD   fluorouracil (EFUDEX) 5 % cream  AAA bid x 2 weeks 11/27/17   Kenn Beach MD   furosemide (LASIX) 20 MG tablet Take 4 tablets (80 mg total) by mouth once daily.  Patient taking differently: Take 40 mg by mouth once daily. Taking 40 mg per day 4/25/17 4/25/18  Renato Womack MD   lactulose (CHRONULAC) 10 gram/15 mL solution Take 15 mLs (10 g total) by mouth 2 (two) times daily. 11/20/17   Renato Womack MD   rifAXIMin (XIFAXAN) 550 mg Tab Take 1 tablet (550 mg total) by mouth 2 (two) times daily. 10/3/17   Renato Womack MD   SITagliptan (JANUVIA) 50 MG Tab Take 1 tablet (50 mg total) by mouth once daily. 4/6/17   Prisca Espinoza MD     Anticoagulants/Antiplatelets: no anticoagulation    Allergies:   Review of patient's allergies indicates:   Allergen Reactions    Abilify [aripiprazole] Other (See Comments)     Sleepy, could not function.     Sedation History:  no adverse reactions    Review of Systems:   Hematological: no known coagulopathies  Respiratory: no shortness of breath  Cardiovascular: no chest pain  Gastrointestinal: no abdominal pain  Genito-Urinary: no dysuria  Musculoskeletal: negative  Neurological: no TIA or stroke symptoms         OBJECTIVE:     Vital Signs (Most Recent)  Pulse: (!) 58 (01/05/18 1330)  Resp: 16 (01/05/18 1330)  BP: (!) 140/69 (01/05/18 1330)  SpO2: 99 % (01/05/18 1330)    Physical Exam:  ASA: 2  Mallampati: 2    General: no acute distress  Mental Status: alert and oriented to person, place and time  HEENT: normocephalic, atraumatic  Chest: unlabored breathing  Heart: regular heart rate  Abdomen: distended  Extremity: moves all extremities    Laboratory  Lab Results   Component Value Date    INR 1.0 12/08/2017       Lab Results   Component Value Date    WBC 3.69 (L) 12/29/2017    HGB 11.0 (L) 12/29/2017    HCT 35.0 (L) 12/29/2017    MCV 87 12/29/2017     (L) 12/29/2017      Lab Results   Component Value Date     (H) 12/29/2017     12/29/2017    K 4.3 12/29/2017    CL  102 12/29/2017    CO2 28 12/29/2017    BUN 24 (H) 12/29/2017    CREATININE 1.8 (H) 12/29/2017    CALCIUM 9.0 12/29/2017    MG 1.7 05/15/2017    ALT <5 (L) 12/29/2017    AST 16 12/29/2017    ALBUMIN 2.6 (L) 12/29/2017    BILITOT 0.7 12/29/2017       ASSESSMENT/PLAN:     Sedation Plan: local  Patient will undergo paracentesis.    Gabriel Ruiz MD  PGY-5  Department of Radiology  [phone]

## 2018-01-05 NOTE — PROGRESS NOTES
Subjective:       Patient ID:  Kenneth Sheikh is a 77 y.o. male who presents for   Chief Complaint   Patient presents with    Spot     new spot on right shoulder x months, hurts, picked it, no med     HPI  Pt is here today with his wife for a new lesion on his R shoulder x a few months. Tender to touch. No current tx.    Pt recently had a SCC excised from his R jawline by Dr. Beach in 11/2017, and he has 3 other SCC's in situ on his face which will be treated with Efudex, starting 1/14/17. He states he already has a f/u appt with Dr. Beach scheduled to monitor that treatment.    Also c/o a tender spot on his L neck x few months.    Review of Systems   Constitutional: Negative for fever and chills.   Skin: Negative for itching and rash.        Objective:    Physical Exam   Constitutional: He appears well-developed and well-nourished. No distress.   Neurological: He is alert and oriented to person, place, and time. He is not disoriented.   Psychiatric: He has a normal mood and affect.   Skin:   Areas Examined (abnormalities noted in diagram):   Head / Face Inspection Performed  Neck Inspection Performed  RUE Inspected            Pt declined skin exam of any other areas today.                Diagram Legend     Erythematous scaling macule/papule c/w actinic keratosis       Vascular papule c/w angioma      Pigmented verrucoid papule/plaque c/w seborrheic keratosis      Yellow umbilicated papule c/w sebaceous hyperplasia      Irregularly shaped tan macule c/w lentigo     1-2 mm smooth white papules consistent with Milia      Movable subcutaneous cyst with punctum c/w epidermal inclusion cyst      Subcutaneous movable cyst c/w pilar cyst      Firm pink to brown papule c/w dermatofibroma      Pedunculated fleshy papule(s) c/w skin tag(s)      Evenly pigmented macule c/w junctional nevus     Mildly variegated pigmented, slightly irregular-bordered macule c/w mildly atypical nevus      Flesh colored to evenly  pigmented papule c/w intradermal nevus       Pink pearly papule/plaque c/w basal cell carcinoma      Erythematous hyperkeratotic cursted plaque c/w SCC      Surgical scar with no sign of skin cancer recurrence      Open and closed comedones      Inflammatory papules and pustules      Verrucoid papule consistent consistent with wart     Erythematous eczematous patches and plaques     Dystrophic onycholytic nail with subungual debris c/w onychomycosis     Umbilicated papule    Erythematous-base heme-crusted tan verrucoid plaque consistent with inflamed seborrheic keratosis     Erythematous Silvery Scaling Plaque c/w Psoriasis     See annotation      Assessment / Plan:      Neoplasm of uncertain behavior of skin  Shave biopsy procedure note:    Shave biopsy performed after verbal consent including risk of infection, scar, recurrence, need for additional treatment of site. Area prepped with alcohol, anesthetized with approximately 1.0cc of 1% lidocaine with epinephrine. Lesional tissue shaved with razor blade. Hemostasis achieved with application of aluminum chloride followed by hyfrecation. No complications. Dressing applied. Wound care explained.    -     Tissue Specimen To Pathology, Dermatology    Pathology Orders:     Normal Orders This Visit    Tissue Specimen To Pathology, Dermatology     Questions:    Directional Terms:  Other(comment)    Clinical information:  r/o SCC vs. HAK Comment - shave    Specific Site:  R shoulder        AK (actinic keratosis)  Cryosurgery Procedure Note    Verbal consent from the patient is obtained and the patient is aware of the precancerous quality and need for treatment of these lesions. Liquid nitrogen cryosurgery is applied to the 1 actinic keratoses, as detailed in the physical exam, to produce a freeze injury. The patient is aware that blisters may form and is instructed on wound care with gentle cleansing and use of vaseline ointment to keep moist until healed. The patient is  supplied a handout on cryosurgery and is instructed to call if lesions do not completely resolve.    Squamous cell carcinoma in situ x 3  To be treated with efudex as prescribed by Dr. Beach    rtc 3-4 months for skin check or sooner pending bx results

## 2018-01-11 DIAGNOSIS — R18.8 OTHER ASCITES: Primary | ICD-10-CM

## 2018-01-12 ENCOUNTER — HOSPITAL ENCOUNTER (OUTPATIENT)
Dept: INTERVENTIONAL RADIOLOGY/VASCULAR | Facility: HOSPITAL | Age: 78
Discharge: HOME | End: 2018-01-12
Attending: INTERNAL MEDICINE
Payer: MEDICARE

## 2018-01-12 VITALS
HEART RATE: 56 BPM | SYSTOLIC BLOOD PRESSURE: 126 MMHG | RESPIRATION RATE: 18 BRPM | DIASTOLIC BLOOD PRESSURE: 61 MMHG | OXYGEN SATURATION: 97 %

## 2018-01-12 DIAGNOSIS — K74.60 CIRRHOSIS OF LIVER WITH ASCITES, UNSPECIFIED HEPATIC CIRRHOSIS TYPE: ICD-10-CM

## 2018-01-12 DIAGNOSIS — K75.81 LIVER CIRRHOSIS SECONDARY TO NASH: ICD-10-CM

## 2018-01-12 DIAGNOSIS — R18.8 CIRRHOSIS OF LIVER WITH ASCITES, UNSPECIFIED HEPATIC CIRRHOSIS TYPE: ICD-10-CM

## 2018-01-12 DIAGNOSIS — K74.60 LIVER CIRRHOSIS SECONDARY TO NASH: ICD-10-CM

## 2018-01-12 DIAGNOSIS — R18.8 OTHER ASCITES: ICD-10-CM

## 2018-01-12 LAB
APPEARANCE FLD: CLEAR
BASOPHILS NFR FLD MANUAL: 1 %
BODY FLD TYPE: NORMAL
COLOR FLD: YELLOW
LYMPHOCYTES NFR FLD MANUAL: 74 %
MONOS+MACROS NFR FLD MANUAL: 20 %
NEUTROPHILS NFR FLD MANUAL: 5 %
WBC # FLD: 105 /CU MM

## 2018-01-12 PROCEDURE — 49083 ABD PARACENTESIS W/IMAGING: CPT

## 2018-01-12 PROCEDURE — 89051 BODY FLUID CELL COUNT: CPT

## 2018-01-12 PROCEDURE — P9047 ALBUMIN (HUMAN), 25%, 50ML: HCPCS | Mod: JG | Performed by: INTERNAL MEDICINE

## 2018-01-12 PROCEDURE — 49083 ABD PARACENTESIS W/IMAGING: CPT | Mod: GC,,, | Performed by: FAMILY MEDICINE

## 2018-01-12 PROCEDURE — 63600175 PHARM REV CODE 636 W HCPCS: Mod: JG | Performed by: INTERNAL MEDICINE

## 2018-01-12 RX ORDER — ALBUMIN HUMAN 250 G/1000ML
37.5 SOLUTION INTRAVENOUS ONCE
Status: COMPLETED | OUTPATIENT
Start: 2018-01-12 | End: 2018-01-12

## 2018-01-12 RX ADMIN — ALBUMIN (HUMAN) 37.5 G: 25 SOLUTION INTRAVENOUS at 02:01

## 2018-01-12 NOTE — DISCHARGE INSTRUCTIONS
"For scheduling: Call Mackenzie at [phone]    For questions or concerns call: [facility] 8 AM- 5PM: [phone].   **After hours and weekends: Call [phone] and ask for "Radiology Resident on call".    For immediate concerns that are not emergent, you may call our radiology clinic at: [phone]    "

## 2018-01-12 NOTE — PROCEDURES

## 2018-01-12 NOTE — PROGRESS NOTES
Pt arrived to IR room 124 for para, no acute distress noted. Orders and labs reviewed on chart. Awaiting consent.

## 2018-01-12 NOTE — H&P
Radiology History & Physical      SUBJECTIVE:     Chief Complaint: ascites    History of Present Illness:  Kenneth Sheikh is a 77 y.o. male who presents for ultrasound guided paracentesis  Past Medical History:   Diagnosis Date    Anticoagulant long-term use     Bipolar 1 disorder     Bipolar 1 disorder 11/24/2016    bipolar    Cancer     history of skin cancer/sinus cancer & rectal cancer    Depressed bipolar affective disorder     Diabetes mellitus     type 2    EBV infection 12/7/2016    EBV DNA, PCR Latest Ref Range: None detected  None detected EBV DNA-Copies/mL Unknown Test Not Performed EBV Early Antigen Ab, IgG Latest Ref Range: <1:10 Titer 1:40 (A) EBV Nuclear Ag Ab Latest Ref Range: <1:5 Titer >=1:80 (A) EBV VCA IgG Latest Ref Range: <1:10 Titer 1:160 (A) EBV VCA IgM Latest Ref Range: <1:10 Titer <1:10     History of TIA (transient ischemic attack)     several-taking Plavix    Hx of gallstones     Wife denies    Hypertension     Hyperthyroidism 11/30/2016    Low HDL (under 40) 12/7/2016    Mixed hyperlipidemia 11/23/2016    JUAN MANUEL (obstructive sleep apnea)     Pneumonia     Skin disease     Stroke     Transient cerebral ischemia 7/22/2016    Type 2 diabetes mellitus      Past Surgical History:   Procedure Laterality Date    Moh's procedure x4      rectal cancer      Sinus surgery for sinus cancer      sinus sx for cancer      skin cancer removed-several times-nose & ear      TONSILLECTOMY      Undescened testicle sx      UVULOPALATOPHARYNGOPLASTY      for sleep apnea       Home Meds:   Prior to Admission medications    Medication Sig Start Date End Date Taking? Authorizing Provider   ACCU-CHEK NEYDA PLUS TEST STRP Strp  2/23/17   Historical Provider, MD   ciprofloxacin HCl (CIPRO) 500 MG tablet Take 1 tablet (500 mg total) by mouth every Monday. 5/29/17   Renato Womack MD   divalproex (DEPAKOTE) 250 MG EC tablet  5/5/17   Historical Provider, MD   fluorouracil (EFUDEX) 5 % cream  AAA bid x 2 weeks 11/27/17   Kenn Beach MD   furosemide (LASIX) 20 MG tablet Take 4 tablets (80 mg total) by mouth once daily.  Patient taking differently: Take 40 mg by mouth once daily. Taking 40 mg per day 4/25/17 4/25/18  Renato Womack MD   lactulose (CHRONULAC) 10 gram/15 mL solution Take 15 mLs (10 g total) by mouth 2 (two) times daily. 11/20/17   Renato Womack MD   rifAXIMin (XIFAXAN) 550 mg Tab Take 1 tablet (550 mg total) by mouth 2 (two) times daily. 10/3/17   Renato Womack MD   SITagliptan (JANUVIA) 50 MG Tab Take 1 tablet (50 mg total) by mouth once daily. 4/6/17   Prisca Espinoza MD     Anticoagulants/Antiplatelets: no anticoagulation    Allergies:   Review of patient's allergies indicates:   Allergen Reactions    Abilify [aripiprazole] Other (See Comments)     Sleepy, could not function.     Sedation History:  no adverse reactions    Review of Systems:   Hematological: no known coagulopathies  Respiratory: no shortness of breath  Cardiovascular: no chest pain  Gastrointestinal: no abdominal pain  Genito-Urinary: no dysuria  Musculoskeletal: negative  Neurological: no TIA or stroke symptoms         OBJECTIVE:     Vital Signs (Most Recent)       Physical Exam:  ASA: 2  Mallampati: n/a    General: no acute distress  Mental Status: alert and oriented to person, place and time  HEENT: normocephalic, atraumatic  Chest: unlabored breathing  Heart: regular heart rate  Abdomen: distended  Extremity: moves all extremities    Laboratory  Lab Results   Component Value Date    INR 1.1 01/12/2018       Lab Results   Component Value Date    WBC 3.69 (L) 12/29/2017    HGB 11.0 (L) 12/29/2017    HCT 35.0 (L) 12/29/2017    MCV 87 12/29/2017     (L) 12/29/2017      Lab Results   Component Value Date     (H) 12/29/2017     12/29/2017    K 4.3 12/29/2017     12/29/2017    CO2 28 12/29/2017    BUN 24 (H) 12/29/2017    CREATININE 1.8 (H) 12/29/2017    CALCIUM 9.0  12/29/2017    MG 1.7 05/15/2017    ALT <5 (L) 12/29/2017    AST 16 12/29/2017    ALBUMIN 2.6 (L) 12/29/2017    BILITOT 0.7 12/29/2017       ASSESSMENT/PLAN:     Sedation Plan: local  Patient will undergo ultrasound guided paracentesis.    MATT Mauricio, FNP  Interventional Radiology  [phone] [employer]

## 2018-01-12 NOTE — PROGRESS NOTES
Para completed, pt tolerated well. No apparent distress noted. 5 Liters removed, mepore applied CDI. Labs collected and sent. Albumin 150 ml given per protocol.  Discharge instructions reviewed and acknowledged. Pt discharged via wheelchair and private vehicle.

## 2018-01-16 ENCOUNTER — TELEPHONE (OUTPATIENT)
Dept: DERMATOLOGY | Facility: CLINIC | Age: 78
End: 2018-01-16

## 2018-01-16 NOTE — TELEPHONE ENCOUNTER
[phone] Returned patient call and I told him to apply a tin layer of the cream twice a day. After bath time wait 15-20 minutes than apply.

## 2018-01-16 NOTE — TELEPHONE ENCOUNTER
----- Message from Prisca Lamb sent at 1/15/2018  2:42 PM CST -----  Contact: pts wife   Baylee- pts wife is calling to speak with the nurse pt was given a cream to put on his face to take the skin cancers off pts wife is asking if the pt can wash his face can you please call pts wife at they need to know this afternoon if he can wash his face [phone]    CARINA

## 2018-01-19 ENCOUNTER — HOSPITAL ENCOUNTER (OUTPATIENT)
Dept: INTERVENTIONAL RADIOLOGY/VASCULAR | Facility: HOSPITAL | Age: 78
Discharge: HOME | End: 2018-01-19
Attending: INTERNAL MEDICINE
Payer: MEDICARE

## 2018-01-19 VITALS
HEART RATE: 55 BPM | RESPIRATION RATE: 18 BRPM | DIASTOLIC BLOOD PRESSURE: 55 MMHG | OXYGEN SATURATION: 98 % | SYSTOLIC BLOOD PRESSURE: 115 MMHG

## 2018-01-19 DIAGNOSIS — R18.8 OTHER ASCITES: ICD-10-CM

## 2018-01-19 DIAGNOSIS — K74.60 LIVER CIRRHOSIS SECONDARY TO NASH: ICD-10-CM

## 2018-01-19 DIAGNOSIS — R18.8 CIRRHOSIS OF LIVER WITH ASCITES, UNSPECIFIED HEPATIC CIRRHOSIS TYPE: ICD-10-CM

## 2018-01-19 DIAGNOSIS — K75.81 LIVER CIRRHOSIS SECONDARY TO NASH: ICD-10-CM

## 2018-01-19 DIAGNOSIS — K74.60 CIRRHOSIS OF LIVER WITH ASCITES, UNSPECIFIED HEPATIC CIRRHOSIS TYPE: ICD-10-CM

## 2018-01-19 LAB
APPEARANCE FLD: NORMAL
BODY FLD TYPE: NORMAL
COLOR FLD: YELLOW
LYMPHOCYTES NFR FLD MANUAL: 26 %
MONOS+MACROS NFR FLD MANUAL: 63 %
NEUTROPHILS NFR FLD MANUAL: 11 %
WBC # FLD: 83 /CU MM

## 2018-01-19 PROCEDURE — 49083 ABD PARACENTESIS W/IMAGING: CPT | Mod: GC,,, | Performed by: RADIOLOGY

## 2018-01-19 PROCEDURE — 89051 BODY FLUID CELL COUNT: CPT

## 2018-01-19 PROCEDURE — A7048 VACUUM DRAIN BOTTLE/TUBE KIT: HCPCS

## 2018-01-19 PROCEDURE — 49083 ABD PARACENTESIS W/IMAGING: CPT

## 2018-01-19 RX ORDER — ALBUMIN HUMAN 250 G/1000ML
25 SOLUTION INTRAVENOUS
Status: DISCONTINUED | OUTPATIENT
Start: 2018-01-19 | End: 2018-01-20 | Stop reason: HOSPADM

## 2018-01-19 NOTE — PROCEDURES
Radiology Post-Procedure Note    Pre Op Diagnosis: Ascites  Post Op Diagnosis: Same    Procedure: Paracentesis    Procedure performed by: Daniel Lainez MD and Gabriel Ruiz MD    Written Informed Consent Obtained: Yes  Specimen Removed: YES ascites  Estimated Blood Loss: Minimal    Findings:   Successful paracentesis.  Albumin administered PRN per protocol.    Patient tolerated procedure well.    Gabriel Ruiz MD  PGY-5  Department of Radiology  [phone]

## 2018-01-19 NOTE — H&P
Radiology History & Physical      SUBJECTIVE:     Chief Complaint: Rectal cancer and ascites.    History of Present Illness:    Kenneth Sheikh is a 77 y.o. male who presents for paracentesis.    Past Medical History:   Diagnosis Date    Anticoagulant long-term use     Bipolar 1 disorder     Bipolar 1 disorder 11/24/2016    bipolar    Cancer     history of skin cancer/sinus cancer & rectal cancer    Depressed bipolar affective disorder     Diabetes mellitus     type 2    EBV infection 12/7/2016    EBV DNA, PCR Latest Ref Range: None detected  None detected EBV DNA-Copies/mL Unknown Test Not Performed EBV Early Antigen Ab, IgG Latest Ref Range: <1:10 Titer 1:40 (A) EBV Nuclear Ag Ab Latest Ref Range: <1:5 Titer >=1:80 (A) EBV VCA IgG Latest Ref Range: <1:10 Titer 1:160 (A) EBV VCA IgM Latest Ref Range: <1:10 Titer <1:10     History of TIA (transient ischemic attack)     several-taking Plavix    Hx of gallstones     Wife denies    Hypertension     Hyperthyroidism 11/30/2016    Low HDL (under 40) 12/7/2016    Mixed hyperlipidemia 11/23/2016    JUAN MANUEL (obstructive sleep apnea)     Pneumonia     Skin disease     Stroke     Transient cerebral ischemia 7/22/2016    Type 2 diabetes mellitus      Past Surgical History:   Procedure Laterality Date    Moh's procedure x4      rectal cancer      Sinus surgery for sinus cancer      sinus sx for cancer      skin cancer removed-several times-nose & ear      TONSILLECTOMY      Undescened testicle sx      UVULOPALATOPHARYNGOPLASTY      for sleep apnea       Home Meds:   Prior to Admission medications    Medication Sig Start Date End Date Taking? Authorizing Provider   ACCU-CHEK NEYDA PLUS TEST STRP Strp  2/23/17   Historical Provider, MD   ciprofloxacin HCl (CIPRO) 500 MG tablet Take 1 tablet (500 mg total) by mouth every Monday. 5/29/17   Renato Womack MD   divalproex (DEPAKOTE) 250 MG EC tablet  5/5/17   Historical Provider, MD   fluorouracil (EFUDEX) 5 %  cream AAA bid x 2 weeks 11/27/17   Kenn Beach MD   furosemide (LASIX) 20 MG tablet Take 4 tablets (80 mg total) by mouth once daily.  Patient taking differently: Take 40 mg by mouth once daily. Taking 40 mg per day 4/25/17 4/25/18  Renato Womack MD   lactulose (CHRONULAC) 10 gram/15 mL solution Take 15 mLs (10 g total) by mouth 2 (two) times daily. 11/20/17   Renato Womack MD   rifAXIMin (XIFAXAN) 550 mg Tab Take 1 tablet (550 mg total) by mouth 2 (two) times daily. 10/3/17   Renato Womack MD   SITagliptan (JANUVIA) 50 MG Tab Take 1 tablet (50 mg total) by mouth once daily. 4/6/17   Prisca Espinoza MD     Anticoagulants/Antiplatelets: no anticoagulation    Allergies:   Review of patient's allergies indicates:   Allergen Reactions    Abilify [aripiprazole] Other (See Comments)     Sleepy, could not function.     Sedation History:  no adverse reactions    Review of Systems:   Hematological: no known coagulopathies  Respiratory: no shortness of breath  Cardiovascular: no chest pain  Gastrointestinal: no abdominal pain  Genito-Urinary: no dysuria  Musculoskeletal: negative  Neurological: no TIA or stroke symptoms         OBJECTIVE:     Vital Signs (Most Recent)  Pulse: 60 (01/19/18 1350)  Resp: 18 (01/19/18 1350)  BP: (!) 113/58 (01/19/18 1350)  SpO2: 96 % (01/19/18 1350)    Physical Exam:    General: no acute distress  Mental Status: alert and oriented to person, place and time  HEENT: normocephalic, atraumatic  Chest: unlabored breathing  Heart: regular heart rate  Abdomen: distended  Extremity: moves all extremities    Laboratory  Lab Results   Component Value Date    INR 1.1 01/12/2018       Lab Results   Component Value Date    WBC 3.69 (L) 12/29/2017    HGB 11.0 (L) 12/29/2017    HCT 35.0 (L) 12/29/2017    MCV 87 12/29/2017     (L) 12/29/2017      Lab Results   Component Value Date     (H) 12/29/2017     12/29/2017    K 4.3 12/29/2017     12/29/2017    CO2  28 12/29/2017    BUN 24 (H) 12/29/2017    CREATININE 1.8 (H) 12/29/2017    CALCIUM 9.0 12/29/2017    MG 1.7 05/15/2017    ALT <5 (L) 12/29/2017    AST 16 12/29/2017    ALBUMIN 2.6 (L) 12/29/2017    BILITOT 0.7 12/29/2017       ASSESSMENT/PLAN:     Sedation Plan: local  Patient will undergo paracentesis    Gabriel Ruiz MD  PGY-5  Department of Radiology  [phone]

## 2018-01-19 NOTE — PROGRESS NOTES
Paracentesis complete, 4500 mls removed.  Pt tolerated procedure well.  Dressing applied to site, C/D/I. Specimen sent to lab.  Pt discharged, VSS

## 2018-01-25 ENCOUNTER — HOSPITAL ENCOUNTER (OUTPATIENT)
Dept: RADIOLOGY | Facility: HOSPITAL | Age: 78
Discharge: HOME | End: 2018-01-25
Attending: INTERNAL MEDICINE
Payer: MEDICARE

## 2018-01-25 ENCOUNTER — OFFICE VISIT (OUTPATIENT)
Dept: INTERNAL MEDICINE | Facility: CLINIC | Age: 78
End: 2018-01-25
Payer: MEDICARE

## 2018-01-25 VITALS
HEIGHT: 68 IN | BODY MASS INDEX: 27.46 KG/M2 | DIASTOLIC BLOOD PRESSURE: 70 MMHG | HEART RATE: 94 BPM | SYSTOLIC BLOOD PRESSURE: 124 MMHG | WEIGHT: 181.19 LBS

## 2018-01-25 DIAGNOSIS — G89.29 CHRONIC PAIN OF BOTH KNEES: ICD-10-CM

## 2018-01-25 DIAGNOSIS — M25.561 CHRONIC PAIN OF BOTH KNEES: ICD-10-CM

## 2018-01-25 DIAGNOSIS — Z23 NEED FOR 23-POLYVALENT PNEUMOCOCCAL POLYSACCHARIDE VACCINE: ICD-10-CM

## 2018-01-25 DIAGNOSIS — M25.562 CHRONIC PAIN OF BOTH KNEES: Primary | ICD-10-CM

## 2018-01-25 DIAGNOSIS — N18.30 TYPE 2 DIABETES MELLITUS WITH STAGE 3 CHRONIC KIDNEY DISEASE, WITHOUT LONG-TERM CURRENT USE OF INSULIN: ICD-10-CM

## 2018-01-25 DIAGNOSIS — M25.561 CHRONIC PAIN OF BOTH KNEES: Primary | ICD-10-CM

## 2018-01-25 DIAGNOSIS — K74.60 LIVER CIRRHOSIS SECONDARY TO NASH (NONALCOHOLIC STEATOHEPATITIS): ICD-10-CM

## 2018-01-25 DIAGNOSIS — I86.4 GASTRIC VARICES: ICD-10-CM

## 2018-01-25 DIAGNOSIS — G89.29 CHRONIC PAIN OF BOTH KNEES: Primary | ICD-10-CM

## 2018-01-25 DIAGNOSIS — Z12.5 PROSTATE CANCER SCREENING: ICD-10-CM

## 2018-01-25 DIAGNOSIS — M25.562 CHRONIC PAIN OF BOTH KNEES: ICD-10-CM

## 2018-01-25 DIAGNOSIS — E11.22 TYPE 2 DIABETES MELLITUS WITH STAGE 3 CHRONIC KIDNEY DISEASE, WITHOUT LONG-TERM CURRENT USE OF INSULIN: ICD-10-CM

## 2018-01-25 DIAGNOSIS — N18.30 CKD (CHRONIC KIDNEY DISEASE) STAGE 3, GFR 30-59 ML/MIN: ICD-10-CM

## 2018-01-25 DIAGNOSIS — M54.2 CHRONIC NECK PAIN: ICD-10-CM

## 2018-01-25 DIAGNOSIS — Z85.828 HISTORY OF SCC (SQUAMOUS CELL CARCINOMA) OF SKIN: ICD-10-CM

## 2018-01-25 DIAGNOSIS — G89.29 CHRONIC NECK PAIN: ICD-10-CM

## 2018-01-25 DIAGNOSIS — D61.818 PANCYTOPENIA: ICD-10-CM

## 2018-01-25 DIAGNOSIS — K75.81 LIVER CIRRHOSIS SECONDARY TO NASH (NONALCOHOLIC STEATOHEPATITIS): ICD-10-CM

## 2018-01-25 PROCEDURE — 73562 X-RAY EXAM OF KNEE 3: CPT | Mod: 50,TC

## 2018-01-25 PROCEDURE — 99999 PR PBB SHADOW E&M-EST. PATIENT-LVL III: CPT | Mod: PBBFAC,,, | Performed by: INTERNAL MEDICINE

## 2018-01-25 PROCEDURE — 99214 OFFICE O/P EST MOD 30 MIN: CPT | Mod: S$PBB,,, | Performed by: INTERNAL MEDICINE

## 2018-01-25 PROCEDURE — G0009 ADMIN PNEUMOCOCCAL VACCINE: HCPCS | Mod: PBBFAC

## 2018-01-25 PROCEDURE — 99213 OFFICE O/P EST LOW 20 MIN: CPT | Mod: PBBFAC,25 | Performed by: INTERNAL MEDICINE

## 2018-01-25 PROCEDURE — 73562 X-RAY EXAM OF KNEE 3: CPT | Mod: 26,50,, | Performed by: RADIOLOGY

## 2018-01-25 NOTE — PROGRESS NOTES
Internal Medicine    Subjective:      Patient ID: Kenneth Sheikh is a 77 y.o. male.    Chief Complaint: Follow-up    Presents for follow up appointment.  Last seen 9/29/17.      Neck pain, bilateral knee pain:  Says these have been present for 2-3 months.  Neck pain is worse on the left and feels like a pulled muscle.  Wants to try massage because this helped his friend.  Knee pain is posterior and only hurts when he is on his feet.  Denies worsening LE swelling or erythema.  Avoiding NSAIDs due to cirrhosis.  Was told he could occasionally take Tylenol but not regularly.       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  Losartan stopped due to BELLA.  Followed by Dr. Womack - last seen 10/3/17.  Taking Rifaximin twice daily - has BM every day.  Getting paracentesis weekly.  Taking Cipro once a week for SBP prophylaxis.       H/o Rectal cancer s/p resection 06/2016:  Last seen by colorectal surgery (Dr. Talavera) on 8/19/17 - no evidence of recurrent neoplasia - follow up in 6 months (2/2018, not scheduled).     DM-2:  Off metformin due to elevated lactate and diarrhea.  Now taking Januvia 50mg daily by Dr. Sheikh.  Denies hyperglycemia or hypoglycemia.  No recent HgA1c.     Pancytopenia:  Followed by Heme-Onc.  Last seen 2/21/17.  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      HLD:  Stopped fenofibrate per Dr. Sheikh due to low cholesterol.  No longer on medication.     TIAs:  No symptoms recently.  No longer taking ASA.     Bipolar d/o:  Taking Depakote 500mg qHS.  Last Depakote level 58.5 on 12/29/17.  Denies depression or dany.  Followed by psychiatrist, Dr. Figueroa.     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 Anders and Dr. Beach.        Review of Systems   Constitutional: Positive for fatigue (Chronic). Negative for activity change, chills, fever and  "unexpected weight change.   Eyes: Negative for visual disturbance.   Respiratory: Negative for cough, chest tightness, shortness of breath and wheezing.    Cardiovascular: Positive for leg swelling (Baseline, no increased swelling). Negative for chest pain and palpitations.   Gastrointestinal: Negative for abdominal pain, blood in stool, diarrhea, nausea and vomiting.   Genitourinary: Negative for difficulty urinating.   Musculoskeletal: Positive for arthralgias and neck pain.   Skin: Positive for rash (Irritation on face from Derm treatment). Negative for wound.   Neurological: Negative for dizziness, weakness, numbness and headaches.   Psychiatric/Behavioral: Negative for confusion.       Past medical history, surgical history, and family medical history reviewed and updated as appropriate.    Medications and allergies reviewed.     Objective:     Vitals:    01/25/18 1037   BP: 124/70   BP Location: Left arm   Patient Position: Sitting   BP Method: Medium (Manual)   Pulse: 94   Weight: 82.2 kg (181 lb 3.5 oz)   Height: 5' 8" (1.727 m)     Physical Exam   Constitutional: He is oriented to person, place, and time. He appears well-developed and well-nourished. No distress.   HENT:   Head: Normocephalic and atraumatic.   Eyes: Conjunctivae and EOM are normal. No scleral icterus.   Cardiovascular: Normal rate and regular rhythm.  Exam reveals no gallop and no friction rub.    No murmur heard.  Pulmonary/Chest: Effort normal and breath sounds normal. No respiratory distress. He has no wheezes. He has no rales.   Abdominal: Soft. He exhibits distension. There is no tenderness.   Musculoskeletal: He exhibits edema (1+). He exhibits no tenderness.   Neurological: He is alert and oriented to person, place, and time.   Skin: No rash noted. No erythema.   Psychiatric: He has a normal mood and affect. His behavior is normal.       RESULTS:   Lab Results   Component Value Date    WBC 3.69 (L) 12/29/2017    HGB 11.0 (L) " 12/29/2017    HCT 35.0 (L) 12/29/2017    MCV 87 12/29/2017     (L) 12/29/2017     BMP  Lab Results   Component Value Date     12/29/2017    K 4.3 12/29/2017     12/29/2017    CO2 28 12/29/2017    BUN 24 (H) 12/29/2017    CREATININE 1.8 (H) 12/29/2017    CALCIUM 9.0 12/29/2017    ANIONGAP 8 12/29/2017    ESTGFRAFRICA 40.5 (A) 12/29/2017    EGFRNONAA 35.0 (A) 12/29/2017     Lab Results   Component Value Date    ALT <5 (L) 12/29/2017    AST 16 12/29/2017    ALKPHOS 92 12/29/2017    BILITOT 0.7 12/29/2017     Lab Results   Component Value Date    TSH 0.435 11/30/2016     Lab Results   Component Value Date    HGBA1C 7.9 (H) 01/25/2018         Assessment:     1. Chronic pain of both knees    2. Chronic neck pain    3. Type 2 diabetes mellitus with stage 3 chronic kidney disease, without long-term current use of insulin    4. 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 Gastric varices    6. Pancytopenia    7. CKD (chronic kidney disease) stage 3, GFR 30-59 ml/min    8. History of SCC (squamous cell carcinoma) of skin    9. Prostate cancer screening    10. Need for 23-polyvalent pneumococcal polysaccharide vaccine        Plan:   Kenneth was seen today for follow-up.    Diagnoses and all orders for this visit:    *Continue medication/plan as discussed in HPI except for changes discussed below.    Chronic pain of both knees   Declining PT at this time.  Patient is aware he can not take NSAIDs and can only take Tylenol on occasion.  Discussed possibility of lidocaine patches - he wants to discuss this with his Hepatologist.    -     X-Ray Knee 3 View Bilateral; Future; Expected date: 01/25/2018    Chronic neck pain   Discussed recommendation of Healthy Back program.  He would rather do PT because [place] location is more convenient.  He first wants to try massages (told him that this should not be deep massage).  He will let me know if he changes his mind about PT and I will place  order.      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     Follow-up in about 3 months (around 4/25/2018).    Prisca Espinoza MD  Internal Medicine  [facility]

## 2018-01-26 ENCOUNTER — HOSPITAL ENCOUNTER (OUTPATIENT)
Dept: INTERVENTIONAL RADIOLOGY/VASCULAR | Facility: HOSPITAL | Age: 78
Discharge: HOME | End: 2018-01-26
Attending: INTERNAL MEDICINE
Payer: MEDICARE

## 2018-01-26 VITALS
RESPIRATION RATE: 16 BRPM | OXYGEN SATURATION: 97 % | SYSTOLIC BLOOD PRESSURE: 122 MMHG | DIASTOLIC BLOOD PRESSURE: 58 MMHG | HEART RATE: 56 BPM

## 2018-01-26 DIAGNOSIS — K74.60 CIRRHOSIS OF LIVER WITH ASCITES, UNSPECIFIED HEPATIC CIRRHOSIS TYPE: ICD-10-CM

## 2018-01-26 DIAGNOSIS — K75.81 LIVER CIRRHOSIS SECONDARY TO NASH: ICD-10-CM

## 2018-01-26 DIAGNOSIS — K74.60 LIVER CIRRHOSIS SECONDARY TO NASH: ICD-10-CM

## 2018-01-26 DIAGNOSIS — R18.8 CIRRHOSIS OF LIVER WITH ASCITES, UNSPECIFIED HEPATIC CIRRHOSIS TYPE: ICD-10-CM

## 2018-01-26 DIAGNOSIS — R18.8 OTHER ASCITES: ICD-10-CM

## 2018-01-26 LAB
APPEARANCE FLD: NORMAL
BODY FLD TYPE: NORMAL
COLOR FLD: YELLOW
GRAM STN SPEC: NORMAL
GRAM STN SPEC: NORMAL
LYMPHOCYTES NFR FLD MANUAL: 69 %
MESOTHL CELL NFR FLD MANUAL: 1 %
MONOS+MACROS NFR FLD MANUAL: 21 %
NEUTROPHILS NFR FLD MANUAL: 9 %
WBC # FLD: 123 /CU MM

## 2018-01-26 PROCEDURE — 87075 CULTR BACTERIA EXCEPT BLOOD: CPT

## 2018-01-26 PROCEDURE — 49083 ABD PARACENTESIS W/IMAGING: CPT

## 2018-01-26 PROCEDURE — 49083 ABD PARACENTESIS W/IMAGING: CPT | Mod: GC,,, | Performed by: RADIOLOGY

## 2018-01-26 PROCEDURE — A7048 VACUUM DRAIN BOTTLE/TUBE KIT: HCPCS

## 2018-01-26 PROCEDURE — 87070 CULTURE OTHR SPECIMN AEROBIC: CPT

## 2018-01-26 PROCEDURE — 87205 SMEAR GRAM STAIN: CPT

## 2018-01-26 PROCEDURE — 89051 BODY FLUID CELL COUNT: CPT

## 2018-01-26 NOTE — PROGRESS NOTES
Paracentesis complete, 4300 MLs removed. Specimen sent to lab. Dressing applied to LLQ puncture site, dressing clean dry and intact. Pt given discharge instructions and handouts, pt verbalizes understanding. Questions answered. Pt denies pain and discomfort. Pt refusing transport. Pt ambulated to [facility] for transport home with family. Gait steady.

## 2018-01-26 NOTE — PROCEDURES
Radiology Post-Procedure Note    Pre Op Diagnosis: Ascites  Post Op Diagnosis: Same    Procedure: Paracentesis    Procedure performed by: Juventino Prado MD    Written Informed Consent Obtained: Yes  Specimen Removed: YES clear yellow fluid  Estimated Blood Loss: Minimal    Findings:   Successful paracentesis.  Albumin administered PRN per protocol.    Patient tolerated procedure well.    Juventino Prado MD  Radiology PGY-2  [phone]

## 2018-01-26 NOTE — H&P
Radiology History & Physical      SUBJECTIVE:     Chief Complaint: ascites    History of Present Illness:  Kenneth Sheikh is a 77 y.o. male who presents for paracentesis.    Past Medical History:   Diagnosis Date    Anticoagulant long-term use     Bipolar 1 disorder     Bipolar 1 disorder 11/24/2016    bipolar    Cancer     history of skin cancer/sinus cancer & rectal cancer    Depressed bipolar affective disorder     Diabetes mellitus     type 2    EBV infection 12/7/2016    EBV DNA, PCR Latest Ref Range: None detected  None detected EBV DNA-Copies/mL Unknown Test Not Performed EBV Early Antigen Ab, IgG Latest Ref Range: <1:10 Titer 1:40 (A) EBV Nuclear Ag Ab Latest Ref Range: <1:5 Titer >=1:80 (A) EBV VCA IgG Latest Ref Range: <1:10 Titer 1:160 (A) EBV VCA IgM Latest Ref Range: <1:10 Titer <1:10     History of TIA (transient ischemic attack)     several-taking Plavix    Hx of gallstones     Wife denies    Hypertension     Hyperthyroidism 11/30/2016    Low HDL (under 40) 12/7/2016    Mixed hyperlipidemia 11/23/2016    JUAN MANUEL (obstructive sleep apnea)     Pneumonia     Skin disease     Stroke     Transient cerebral ischemia 7/22/2016    Type 2 diabetes mellitus      Past Surgical History:   Procedure Laterality Date    Moh's procedure x4      rectal cancer      Sinus surgery for sinus cancer      sinus sx for cancer      skin cancer removed-several times-nose & ear      TONSILLECTOMY      Undescened testicle sx      UVULOPALATOPHARYNGOPLASTY      for sleep apnea       Home Meds:   Prior to Admission medications    Medication Sig Start Date End Date Taking? Authorizing Provider   ACCU-CHEK NEYDA PLUS TEST STRP Strp  2/23/17   Historical Provider, MD   ciprofloxacin HCl (CIPRO) 500 MG tablet Take 1 tablet (500 mg total) by mouth every Monday. 5/29/17   Renato Womack MD   divalproex (DEPAKOTE) 250 MG EC tablet  5/5/17   Historical Provider, MD   fluorouracil (EFUDEX) 5 % cream AAA bid x 2  weeks 11/27/17   Kenn Beach MD   furosemide (LASIX) 20 MG tablet Take 4 tablets (80 mg total) by mouth once daily.  Patient taking differently: Take 40 mg by mouth once daily. Taking 40 mg per day 4/25/17 4/25/18  Renato Womack MD   lactulose (CHRONULAC) 10 gram/15 mL solution Take 15 mLs (10 g total) by mouth 2 (two) times daily. 11/20/17   Renato Womack MD   rifAXIMin (XIFAXAN) 550 mg Tab Take 1 tablet (550 mg total) by mouth 2 (two) times daily. 10/3/17   Renato Womack MD   SITagliptan (JANUVIA) 50 MG Tab Take 1 tablet (50 mg total) by mouth once daily. 4/6/17   Prisca Espinoza MD     Anticoagulants/Antiplatelets: no anticoagulation    Allergies:   Review of patient's allergies indicates:   Allergen Reactions    Abilify [aripiprazole] Other (See Comments)     Sleepy, could not function.     Sedation History:  no adverse reactions    Review of Systems:   Hematological: no known coagulopathies  Respiratory: no shortness of breath  Cardiovascular: no chest pain  Gastrointestinal: no abdominal pain  Genito-Urinary: no dysuria  Musculoskeletal: negative  Neurological: no TIA or stroke symptoms         OBJECTIVE:     Vital Signs (Most Recent)  Pulse: (!) 59 (01/26/18 1316)  Resp: 16 (01/26/18 1316)  BP: (!) 151/70 (01/26/18 1316)  SpO2: 97 % (01/26/18 1316)    Physical Exam:  ASA: 3  Mallampati: 2    General: no acute distress  Mental Status: alert and oriented to person, place and time  HEENT: normocephalic, atraumatic  Chest: unlabored breathing  Heart: regular heart rate  Abdomen: distended  Extremity: moves all extremities    Laboratory  Lab Results   Component Value Date    INR 1.1 01/12/2018       Lab Results   Component Value Date    WBC 3.69 (L) 12/29/2017    HGB 11.0 (L) 12/29/2017    HCT 35.0 (L) 12/29/2017    MCV 87 12/29/2017     (L) 12/29/2017      Lab Results   Component Value Date     (H) 12/29/2017     12/29/2017    K 4.3 12/29/2017     102  12/29/2017    CO2 28 12/29/2017    BUN 24 (H) 12/29/2017    CREATININE 1.8 (H) 12/29/2017    CALCIUM 9.0 12/29/2017    MG 1.7 05/15/2017    ALT <5 (L) 12/29/2017    AST 16 12/29/2017    ALBUMIN 2.6 (L) 12/29/2017    BILITOT 0.7 12/29/2017       ASSESSMENT/PLAN:     Sedation Plan: local  Patient will undergo paracentesis.    Juventino Prado MD  Radiology PGY-2  [phone]

## 2018-01-26 NOTE — DISCHARGE INSTRUCTIONS
For scheduling: Call Mackenzie at [phone]    For questions or concerns call: [facility] 8 AM- 5PM [phone]. Radiology resident on call [phone].    For immediate concerns that are not emergent, you may call our radiology clinic at: [phone]

## 2018-01-29 ENCOUNTER — OFFICE VISIT (OUTPATIENT)
Dept: DERMATOLOGY | Facility: CLINIC | Age: 78
End: 2018-01-29
Payer: MEDICARE

## 2018-01-29 ENCOUNTER — TELEPHONE (OUTPATIENT)
Dept: INTERNAL MEDICINE | Facility: CLINIC | Age: 78
End: 2018-01-29

## 2018-01-29 DIAGNOSIS — L57.0 ACTINIC KERATOSIS: ICD-10-CM

## 2018-01-29 DIAGNOSIS — D04.30 SQUAMOUS CELL CARCINOMA IN SITU OF SKIN OF FACE: ICD-10-CM

## 2018-01-29 DIAGNOSIS — T49.95XA DERMATITIS MEDICAMENTOSA (DRUG APPLIED TO SKIN): Primary | ICD-10-CM

## 2018-01-29 DIAGNOSIS — L25.1 DERMATITIS MEDICAMENTOSA (DRUG APPLIED TO SKIN): Primary | ICD-10-CM

## 2018-01-29 DIAGNOSIS — R40.0 DAYTIME SOMNOLENCE: Primary | ICD-10-CM

## 2018-01-29 LAB — BACTERIA SPEC AEROBE CULT: NO GROWTH

## 2018-01-29 PROCEDURE — 99212 OFFICE O/P EST SF 10 MIN: CPT | Mod: PBBFAC | Performed by: DERMATOLOGY

## 2018-01-29 PROCEDURE — 99999 PR PBB SHADOW E&M-EST. PATIENT-LVL II: CPT | Mod: PBBFAC,,, | Performed by: DERMATOLOGY

## 2018-01-29 PROCEDURE — 99212 OFFICE O/P EST SF 10 MIN: CPT | Mod: S$PBB,,, | Performed by: DERMATOLOGY

## 2018-01-29 NOTE — TELEPHONE ENCOUNTER
----- Message from Jocelyn Cox sent at 1/29/2018 10:53 AM CST -----  Contact: self/[phone] please call before 12 or after 3 pm.    Name of test: HEMOGLOBIN A1C [LAB90]  PSA, SCREENING [[id number]]    Date of test: 01/25/18    Ordering provider: Dr Espinoza    Where was the test performed: [facility] INTERNAL MED    Comments: Please call and advise.       Thank you

## 2018-01-29 NOTE — PROGRESS NOTES
CHIEF COMPLAINT: followup treatment with topical fluoruracil    HISTORY OF PRESENT ILLNESS:  Location(s): face  Duration: has been applying the medication for 2 weeks  Quality: some irritation, some stinging/burning  Context: applying medication BID    ROS:   Skin: status post Mohs surgery to the area of invasive SCC on the right cheek on 11/20  status post biopsy to other lesions as noted below  See the last note; started treatment for multifocal actinic keratosis and the lesions noted    Prior path  FINAL PATHOLOGIC DIAGNOSIS  1. Skin, left preauricular cheek, shave biopsy:  - SQUAMOUS CELL CARCINOMA IN SITU.  - THE TUMOR EXTENDS TO THE LATERAL BIOPSY MARGINS.    2. Skin, right jawline, shave biopsy:  - INVASIVE SQUAMOUS CELL CARCINOMA WITH FOCAL BASALOID AND CLEAR CELL DIFFERENTIATION.  - THE TUMOR EXTENDS TO THE LATERAL BIOPSY MARGINS.    3. Skin, right forehead, shave biopsy:  - SQUAMOUS CELL CARCINOMA IN SITU.  - THE TUMOR EXTENDS TO THE LATERAL BIOPSY MARGINS.    4. Skin, right frontal scalp, shave biopsy:  - SQUAMOUS CELL CARCINOMA IN SITU.  - MARGINS ARE NEGATIVE FOR FULL THICKNESS SQUAMOUS ATYPIA IN THE PLANES OF SECTION.    EXAMINATION:  Skin: there are confluent areas of erythema with scaling/crusting to areas of application on the cheeks, nose and forehead and anterior scalp; typical in appearance for fluorouracil-induced dermatitis in areas of actinic keratosis; less confluent and more spotty changes to the anterior scalp; there are definite areas of sparing in these areas as well    ASSESSMENT:   Typical dermatitis due to application of fluoruracil to areas of actinic keratosis and squamous cell carcinoma in situ    PLAN:  Current status and management options, and risks, benefits, and alternatives were discussed.  discontinue application of fluorouracil pro tem  Followup 4 weeks  Will probably restart after the current irritation has resolved  Call prn  amena  --------------------------------------  Note: Some or all of this note may have been generated using voice recognition software. There may be voice recognition errors including grammatical and/or spelling errors found in the text. Attempts were made to correct these errors prior to signature.

## 2018-01-29 NOTE — TELEPHONE ENCOUNTER
----- Message from Prisca Espinoza MD sent at 1/29/2018  3:38 PM CST -----  Please call patient and wife and let them know that patient's HgA1c has worsened to 7.9.  Please find out if he is taking his Januvia as prescribed.  Also find out how long he has been feeling fatigued since this was not reported at appointment with me last week.

## 2018-01-29 NOTE — TELEPHONE ENCOUNTER
Please call patient and wife and let them know that patient's HgA1c has worsened to 7.9.  Please find out if he is taking his Januvia as prescribed.  Let her know that knee x-rays showed arthritis.  Also find out how long he has been feeling fatigued and sleeping more since this was not reported at appointment with me last week.

## 2018-01-29 NOTE — TELEPHONE ENCOUNTER
Spoke with pt's wife and she would like to know pt's blood test and x-ray. She also stated that the pt sleeps all day and hasn't been testing his blood sugars lately

## 2018-01-29 NOTE — TELEPHONE ENCOUNTER
Spoke with pt's wife he understood his results. She also understood that the pt has come in for a follow up appt and additional lab work prior to follow up appt date and time

## 2018-01-31 NOTE — TELEPHONE ENCOUNTER
Per wife, daytime somnolence is not new and has been going on for a long time.  Will get some additional lab work to ensure nothing new is going on.  Orders have been placed - please schedule for this Friday.  Also please schedule follow up appointment in the next 2-4 weeks (40 minute appointment).

## 2018-01-31 NOTE — TELEPHONE ENCOUNTER
Spoke with pt and he stated that he will like for the provider to contact him in reference the lab tetsing

## 2018-02-01 ENCOUNTER — TELEPHONE (OUTPATIENT)
Dept: INTERNAL MEDICINE | Facility: CLINIC | Age: 78
End: 2018-02-01

## 2018-02-01 DIAGNOSIS — R18.8 OTHER ASCITES: Primary | ICD-10-CM

## 2018-02-01 NOTE — TELEPHONE ENCOUNTER
----- Message from Amanda Hubbard sent at 2/1/2018 12:42 PM CST -----  Contact: [place] [phone]  Patient's wife requesting a call back from the office in regards to labs , stated she would like to know do patient need to fast aren't . Please call and advise, Thanks

## 2018-02-01 NOTE — TELEPHONE ENCOUNTER
Spoke w pt wife; schedule follow up appointment with MD. Also, informed that labs are non fasting.

## 2018-02-02 ENCOUNTER — HOSPITAL ENCOUNTER (OUTPATIENT)
Dept: INTERVENTIONAL RADIOLOGY/VASCULAR | Facility: HOSPITAL | Age: 78
Discharge: HOME | End: 2018-02-02
Attending: INTERNAL MEDICINE
Payer: MEDICARE

## 2018-02-02 VITALS
OXYGEN SATURATION: 98 % | HEART RATE: 54 BPM | DIASTOLIC BLOOD PRESSURE: 67 MMHG | SYSTOLIC BLOOD PRESSURE: 148 MMHG | RESPIRATION RATE: 18 BRPM

## 2018-02-02 DIAGNOSIS — K74.60 LIVER CIRRHOSIS SECONDARY TO NASH: ICD-10-CM

## 2018-02-02 DIAGNOSIS — R18.8 CIRRHOSIS OF LIVER WITH ASCITES, UNSPECIFIED HEPATIC CIRRHOSIS TYPE: ICD-10-CM

## 2018-02-02 DIAGNOSIS — K74.60 CIRRHOSIS OF LIVER WITH ASCITES, UNSPECIFIED HEPATIC CIRRHOSIS TYPE: ICD-10-CM

## 2018-02-02 DIAGNOSIS — R18.8 OTHER ASCITES: ICD-10-CM

## 2018-02-02 DIAGNOSIS — K75.81 LIVER CIRRHOSIS SECONDARY TO NASH: ICD-10-CM

## 2018-02-02 LAB
APPEARANCE FLD: NORMAL
BACTERIA SPEC ANAEROBE CULT: NORMAL
BODY FLD TYPE: NORMAL
COLOR FLD: YELLOW
LYMPHOCYTES NFR FLD MANUAL: 68 %
MONOS+MACROS NFR FLD MANUAL: 28 %
NEUTROPHILS NFR FLD MANUAL: 4 %
WBC # FLD: 96 /CU MM

## 2018-02-02 PROCEDURE — 49083 ABD PARACENTESIS W/IMAGING: CPT

## 2018-02-02 PROCEDURE — 89051 BODY FLUID CELL COUNT: CPT

## 2018-02-02 PROCEDURE — P9047 ALBUMIN (HUMAN), 25%, 50ML: HCPCS | Mod: JG | Performed by: INTERNAL MEDICINE

## 2018-02-02 PROCEDURE — 63600175 PHARM REV CODE 636 W HCPCS: Mod: JG | Performed by: INTERNAL MEDICINE

## 2018-02-02 PROCEDURE — 49083 ABD PARACENTESIS W/IMAGING: CPT | Mod: ,,, | Performed by: FAMILY MEDICINE

## 2018-02-02 RX ORDER — ALBUMIN HUMAN 250 G/1000ML
25 SOLUTION INTRAVENOUS
Status: DISCONTINUED | OUTPATIENT
Start: 2018-02-02 | End: 2018-02-03 | Stop reason: HOSPADM

## 2018-02-02 RX ADMIN — ALBUMIN (HUMAN) 12.5 G: 25 SOLUTION INTRAVENOUS at 03:02

## 2018-02-02 RX ADMIN — ALBUMIN (HUMAN) 25 G: 25 SOLUTION INTRAVENOUS at 02:02

## 2018-02-02 NOTE — TELEPHONE ENCOUNTER
----- Message from Prisca Espinoza MD sent at 2/2/2018  4:37 PM CST -----  Please call patient and let him know that labs from today were stable.

## 2018-02-02 NOTE — PROGRESS NOTES
Paracentesis complete. 5300 mLs peritoneal fluid drained. Pt tolerated well. Dressing to rlq clean, dry, and intact. Albumin 25% given 150 mLs. Specimens sent per lab order. Pt given discharge instructions. Pt to be brought to lobby via wheelchair.

## 2018-02-02 NOTE — DISCHARGE INSTRUCTIONS
For scheduling: Call Mackenzie at [phone]    For questions or concerns call: [facility] 8 AM- 5PM [phone]. Radiology resident on call [phone].    For immediate concerns that are not emergent, you may call our radiology clinic at: [phone]

## 2018-02-02 NOTE — H&P
Radiology History & Physical      SUBJECTIVE:     Chief Complaint: ascites    History of Present Illness:  Kenneth Sheikh is a 78 y.o. male who presents for ultrasound guided paracentesis  Past Medical History:   Diagnosis Date    Anticoagulant long-term use     Bipolar 1 disorder     Bipolar 1 disorder 11/24/2016    bipolar    Cancer     history of skin cancer/sinus cancer & rectal cancer    Depressed bipolar affective disorder     Diabetes mellitus     type 2    EBV infection 12/7/2016    EBV DNA, PCR Latest Ref Range: None detected  None detected EBV DNA-Copies/mL Unknown Test Not Performed EBV Early Antigen Ab, IgG Latest Ref Range: <1:10 Titer 1:40 (A) EBV Nuclear Ag Ab Latest Ref Range: <1:5 Titer >=1:80 (A) EBV VCA IgG Latest Ref Range: <1:10 Titer 1:160 (A) EBV VCA IgM Latest Ref Range: <1:10 Titer <1:10     History of TIA (transient ischemic attack)     several-taking Plavix    Hx of gallstones     Wife denies    Hypertension     Hyperthyroidism 11/30/2016    Low HDL (under 40) 12/7/2016    Mixed hyperlipidemia 11/23/2016    JUAN MANUEL (obstructive sleep apnea)     Pneumonia     Skin disease     Stroke     Transient cerebral ischemia 7/22/2016    Type 2 diabetes mellitus      Past Surgical History:   Procedure Laterality Date    Moh's procedure x4      rectal cancer      Sinus surgery for sinus cancer      sinus sx for cancer      skin cancer removed-several times-nose & ear      TONSILLECTOMY      Undescened testicle sx      UVULOPALATOPHARYNGOPLASTY      for sleep apnea       Home Meds:   Prior to Admission medications    Medication Sig Start Date End Date Taking? Authorizing Provider   ACCU-CHEK NEYDA PLUS TEST STRP Strp  2/23/17   Historical Provider, MD   ciprofloxacin HCl (CIPRO) 500 MG tablet Take 1 tablet (500 mg total) by mouth every Monday. 5/29/17   Renato Womack MD   divalproex (DEPAKOTE) 250 MG EC tablet  5/5/17   Historical Provider, MD   fluorouracil (EFUDEX) 5 % cream  AAA bid x 2 weeks 11/27/17   Kenn Beach MD   furosemide (LASIX) 20 MG tablet Take 4 tablets (80 mg total) by mouth once daily.  Patient taking differently: Take 40 mg by mouth once daily. Taking 40 mg per day 4/25/17 4/25/18  Renato Womack MD   lactulose (CHRONULAC) 10 gram/15 mL solution Take 15 mLs (10 g total) by mouth 2 (two) times daily. 11/20/17   Renato Womack MD   rifAXIMin (XIFAXAN) 550 mg Tab Take 1 tablet (550 mg total) by mouth 2 (two) times daily. 10/3/17   Renato Womack MD   SITagliptan (JANUVIA) 50 MG Tab Take 1 tablet (50 mg total) by mouth once daily. 4/6/17   Prisca Espinoza MD     Anticoagulants/Antiplatelets: no anticoagulation    Allergies:   Review of patient's allergies indicates:   Allergen Reactions    Abilify [aripiprazole] Other (See Comments)     Sleepy, could not function.     Sedation History:  no adverse reactions    Review of Systems:   Hematological: no known coagulopathies  Respiratory: no shortness of breath  Cardiovascular: no chest pain  Gastrointestinal: no abdominal pain  Genito-Urinary: no dysuria  Musculoskeletal: negative  Neurological: no TIA or stroke symptoms         OBJECTIVE:     Vital Signs (Most Recent)  Pulse: (!) 54 (02/02/18 1350)  Resp: 18 (02/02/18 1350)  BP: (!) 148/67 (02/02/18 1350)  SpO2: 98 % (02/02/18 1350)    Physical Exam:  ASA: 2  Mallampati: n/a    General: no acute distress  Mental Status: alert and oriented to person, place and time  HEENT: normocephalic, atraumatic  Chest: unlabored breathing  Heart: regular heart rate  Abdomen: distended  Extremity: moves all extremities    Laboratory  Lab Results   Component Value Date    INR 1.1 01/12/2018       Lab Results   Component Value Date    WBC 3.96 02/02/2018    WBC 3.96 02/02/2018    HGB 10.7 (L) 02/02/2018    HGB 10.7 (L) 02/02/2018    HCT 33.4 (L) 02/02/2018    HCT 33.4 (L) 02/02/2018    MCV 85 02/02/2018    MCV 85 02/02/2018     (L) 02/02/2018     (L)  02/02/2018      Lab Results   Component Value Date     (H) 02/02/2018     02/02/2018    K 4.0 02/02/2018     02/02/2018    CO2 29 02/02/2018    BUN 25 (H) 02/02/2018    CREATININE 1.5 (H) 02/02/2018    CALCIUM 8.6 (L) 02/02/2018    MG 1.7 05/15/2017    ALT 9 (L) 02/02/2018    AST 21 02/02/2018    ALBUMIN 2.2 (L) 02/02/2018    BILITOT 0.7 02/02/2018       ASSESSMENT/PLAN:     Sedation Plan: local  Patient will undergo ultrasound guided paracentesis.    MATT Mauricio, FNP  Interventional Radiology  [phone] [employer]

## 2018-02-02 NOTE — PROCEDURES
Radiology Post-Procedure Note    Pre Op Diagnosis: Ascites  Post Op Diagnosis: Same    Procedure: Ultrasound Guided Paracentesis    Procedure performed by: Souleymane ROSS, Елена     Written Informed Consent Obtained: Yes  Specimen Removed: YES clear yellow  Estimated Blood Loss: Minimal    Findings:   Successful paracentesis.  Albumin administered PRN per protocol.    Patient tolerated procedure well.    Елена Comer, APRN, FNP  Interventional Radiology  [phone] [employer]

## 2018-02-05 ENCOUNTER — TELEPHONE (OUTPATIENT)
Dept: INTERNAL MEDICINE | Facility: CLINIC | Age: 78
End: 2018-02-05

## 2018-02-05 NOTE — TELEPHONE ENCOUNTER
----- Message from Tomasa Garay sent at 2/3/2018 10:48 AM CST -----  Contact: Spouse/Ginna [phone]  Returned Call    Who left the message: Tejas Connelly    When did the practice call: 02/02/2018 4:45pm    Please advise.    Thank You

## 2018-02-09 ENCOUNTER — HOSPITAL ENCOUNTER (OUTPATIENT)
Dept: INTERVENTIONAL RADIOLOGY/VASCULAR | Facility: HOSPITAL | Age: 78
Discharge: HOME | End: 2018-02-09
Attending: INTERNAL MEDICINE
Payer: MEDICARE

## 2018-02-09 ENCOUNTER — OFFICE VISIT (OUTPATIENT)
Dept: HEPATOLOGY | Facility: CLINIC | Age: 78
End: 2018-02-09
Payer: MEDICARE

## 2018-02-09 VITALS
DIASTOLIC BLOOD PRESSURE: 60 MMHG | RESPIRATION RATE: 18 BRPM | HEART RATE: 53 BPM | SYSTOLIC BLOOD PRESSURE: 137 MMHG | DIASTOLIC BLOOD PRESSURE: 63 MMHG | SYSTOLIC BLOOD PRESSURE: 129 MMHG | RESPIRATION RATE: 18 BRPM | TEMPERATURE: 97 F | HEART RATE: 53 BPM | OXYGEN SATURATION: 100 % | OXYGEN SATURATION: 99 % | HEIGHT: 68 IN | WEIGHT: 165 LBS | BODY MASS INDEX: 25.01 KG/M2

## 2018-02-09 DIAGNOSIS — R18.8 ASCITES OF LIVER: ICD-10-CM

## 2018-02-09 DIAGNOSIS — R18.8 OTHER ASCITES: ICD-10-CM

## 2018-02-09 DIAGNOSIS — R18.8 OTHER ASCITES: Primary | ICD-10-CM

## 2018-02-09 DIAGNOSIS — K75.81 LIVER CIRRHOSIS SECONDARY TO NASH: ICD-10-CM

## 2018-02-09 DIAGNOSIS — K74.60 LIVER CIRRHOSIS SECONDARY TO NASH: ICD-10-CM

## 2018-02-09 DIAGNOSIS — R18.8 CIRRHOSIS OF LIVER WITH ASCITES, UNSPECIFIED HEPATIC CIRRHOSIS TYPE: ICD-10-CM

## 2018-02-09 DIAGNOSIS — K75.81 LIVER CIRRHOSIS SECONDARY TO NASH (NONALCOHOLIC STEATOHEPATITIS): ICD-10-CM

## 2018-02-09 DIAGNOSIS — K74.60 CIRRHOSIS OF LIVER WITH ASCITES, UNSPECIFIED HEPATIC CIRRHOSIS TYPE: ICD-10-CM

## 2018-02-09 DIAGNOSIS — K74.60 LIVER CIRRHOSIS SECONDARY TO NASH (NONALCOHOLIC STEATOHEPATITIS): ICD-10-CM

## 2018-02-09 LAB
APPEARANCE FLD: CLEAR
BASOPHILS NFR FLD MANUAL: 2 %
BODY FLD TYPE: NORMAL
COLOR FLD: YELLOW
EOSINOPHIL NFR FLD MANUAL: 1 %
LYMPHOCYTES NFR FLD MANUAL: 55 %
MONOS+MACROS NFR FLD MANUAL: 34 %
NEUTROPHILS NFR FLD MANUAL: 8 %
WBC # FLD: 87 /CU MM

## 2018-02-09 PROCEDURE — 49083 ABD PARACENTESIS W/IMAGING: CPT

## 2018-02-09 PROCEDURE — A7048 VACUUM DRAIN BOTTLE/TUBE KIT: HCPCS

## 2018-02-09 PROCEDURE — 1126F AMNT PAIN NOTED NONE PRSNT: CPT | Mod: ,,, | Performed by: INTERNAL MEDICINE

## 2018-02-09 PROCEDURE — 49083 ABD PARACENTESIS W/IMAGING: CPT | Mod: LT,,, | Performed by: RADIOLOGY

## 2018-02-09 PROCEDURE — 99999 PR PBB SHADOW E&M-EST. PATIENT-LVL III: CPT | Mod: PBBFAC,,, | Performed by: INTERNAL MEDICINE

## 2018-02-09 PROCEDURE — 1159F MED LIST DOCD IN RCRD: CPT | Mod: ,,, | Performed by: INTERNAL MEDICINE

## 2018-02-09 PROCEDURE — 89051 BODY FLUID CELL COUNT: CPT

## 2018-02-09 PROCEDURE — 99213 OFFICE O/P EST LOW 20 MIN: CPT | Mod: PBBFAC,25 | Performed by: INTERNAL MEDICINE

## 2018-02-09 PROCEDURE — 99214 OFFICE O/P EST MOD 30 MIN: CPT | Mod: S$PBB,,, | Performed by: INTERNAL MEDICINE

## 2018-02-09 RX ORDER — LACTULOSE 10 G/15ML
10 SOLUTION ORAL; RECTAL 2 TIMES DAILY
Qty: 473 ML | Refills: 6 | Status: SHIPPED | OUTPATIENT
Start: 2018-02-09 | End: 2018-03-07 | Stop reason: SDUPTHER

## 2018-02-09 NOTE — PROGRESS NOTES
Para completed, pt tolerated well. No apparent distress noted. 4.5 Liters removed from left abd, tegaderm applied CDI. Labs collected and sent. Discharge instructions reviewed and acknowledged. Pt discharged via ambulation and private vehicle.

## 2018-02-09 NOTE — PROCEDURES
Radiology Post-Procedure Note    Pre Op Diagnosis: Ascites  Post Op Diagnosis: Same    Procedure: Paracentesis    Procedure performed by: Erik Hinson MD    Written Informed Consent Obtained: Yes  Specimen Removed: YES 10cc clear yellow fluid  Estimated Blood Loss: Minimal    Findings:   Successful paracentesis.  Albumin administered PRN per protocol.    Patient tolerated procedure well.    Westley Garzon MD  PGY-III  Dept of Radiology   Pager: [phone]

## 2018-02-09 NOTE — H&P
Radiology History & Physical      SUBJECTIVE:     Chief Complaint: Ascites    History of Present Illness:  Kenneth Sheikh is a 78 y.o. male who presents for ultrasound guided paracentesis.    Past Medical History:   Diagnosis Date    Anticoagulant long-term use     Bipolar 1 disorder     Bipolar 1 disorder 11/24/2016    bipolar    Cancer     history of skin cancer/sinus cancer & rectal cancer    Depressed bipolar affective disorder     Diabetes mellitus     type 2    EBV infection 12/7/2016    EBV DNA, PCR Latest Ref Range: None detected  None detected EBV DNA-Copies/mL Unknown Test Not Performed EBV Early Antigen Ab, IgG Latest Ref Range: <1:10 Titer 1:40 (A) EBV Nuclear Ag Ab Latest Ref Range: <1:5 Titer >=1:80 (A) EBV VCA IgG Latest Ref Range: <1:10 Titer 1:160 (A) EBV VCA IgM Latest Ref Range: <1:10 Titer <1:10     History of TIA (transient ischemic attack)     several-taking Plavix    Hx of gallstones     Wife denies    Hypertension     Hyperthyroidism 11/30/2016    Low HDL (under 40) 12/7/2016    Mixed hyperlipidemia 11/23/2016    JUAN MANUEL (obstructive sleep apnea)     Pneumonia     Skin disease     Stroke     Transient cerebral ischemia 7/22/2016    Type 2 diabetes mellitus      Past Surgical History:   Procedure Laterality Date    Moh's procedure x4      rectal cancer      Sinus surgery for sinus cancer      sinus sx for cancer      skin cancer removed-several times-nose & ear      TONSILLECTOMY      Undescened testicle sx      UVULOPALATOPHARYNGOPLASTY      for sleep apnea       Home Meds:   Prior to Admission medications    Medication Sig Start Date End Date Taking? Authorizing Provider   ACCU-CHEK NEYDA PLUS TEST STRP Strp  2/23/17   Historical Provider, MD   ciprofloxacin HCl (CIPRO) 500 MG tablet Take 1 tablet (500 mg total) by mouth every Monday. 5/29/17   Renato Womack MD   divalproex (DEPAKOTE) 250 MG EC tablet  5/5/17   Historical Provider, MD   fluorouracil (EFUDEX) 5 %  cream AAA bid x 2 weeks 11/27/17   Kenn Beach MD   furosemide (LASIX) 20 MG tablet Take 4 tablets (80 mg total) by mouth once daily.  Patient taking differently: Take 40 mg by mouth once daily. Taking 40 mg per day 4/25/17 4/25/18  Renato Womack MD   lactulose (CHRONULAC) 10 gram/15 mL solution Take 15 mLs (10 g total) by mouth 2 (two) times daily. 11/20/17   Renato Womack MD   rifAXIMin (XIFAXAN) 550 mg Tab Take 1 tablet (550 mg total) by mouth 2 (two) times daily. 10/3/17   Renato Womack MD   SITagliptan (JANUVIA) 50 MG Tab Take 1 tablet (50 mg total) by mouth once daily. 4/6/17   Prisca Espinoza MD     Anticoagulants/Antiplatelets: no anticoagulation    Allergies:   Review of patient's allergies indicates:   Allergen Reactions    Abilify [aripiprazole] Other (See Comments)     Sleepy, could not function.     Sedation History:  no adverse reactions    Review of Systems:   Hematological: no known coagulopathies  Respiratory: no shortness of breath  Cardiovascular: no chest pain  Gastrointestinal: no abdominal pain  Genito-Urinary: no dysuria  Musculoskeletal: negative  Neurological: no TIA or stroke symptoms         OBJECTIVE:     Vital Signs (Most Recent)       Physical Exam:  ASA: II  Mallampati: II    General: no acute distress  Mental Status: alert and oriented to person, place and time  HEENT: normocephalic, atraumatic  Chest: unlabored breathing  Abdomen: nondistended  Extremity: moves all extremities    Laboratory  Lab Results   Component Value Date    INR 1.1 01/12/2018       Lab Results   Component Value Date    WBC 3.96 02/02/2018    WBC 3.96 02/02/2018    HGB 10.7 (L) 02/02/2018    HGB 10.7 (L) 02/02/2018    HCT 33.4 (L) 02/02/2018    HCT 33.4 (L) 02/02/2018    MCV 85 02/02/2018    MCV 85 02/02/2018     (L) 02/02/2018     (L) 02/02/2018      Lab Results   Component Value Date     (H) 02/02/2018     02/02/2018    K 4.0 02/02/2018      02/02/2018    CO2 29 02/02/2018    BUN 25 (H) 02/02/2018    CREATININE 1.5 (H) 02/02/2018    CALCIUM 8.6 (L) 02/02/2018    MG 1.7 05/15/2017    ALT 9 (L) 02/02/2018    AST 21 02/02/2018    ALBUMIN 2.2 (L) 02/02/2018    BILITOT 0.7 02/02/2018       ASSESSMENT/PLAN:     Sedation Plan: Local anesthesia  Patient will undergo ultrasound guided paracentesis.    Westley Garzon MD  PGY-III  Dept of Radiology   Pager: [phone]

## 2018-02-09 NOTE — PROGRESS NOTES
"Subjective:       Patient ID: Kenneth Sheikh is a 78 y.o. male.    Chief Complaint: Liver cirrhosis secondary to MCDONALD (nonalcoholic steatohepati    HPI  I saw this 78 y.o. man  whom I had met in hospital when he was admitted with lethargy and some confusion back in Dec 2016.    At that time, he was diagnosed with decompensated cirrhosis.    Admitted because of increasing lethargy +++/confusion/dizziness/dysarthria  - Nov 2016  - imaging showed ascites     No significant alcohol use ("a couple beers when I was younger"). No family history of liver disease. No new medications.      Patient has risk factors for MCDONALD.  No significant hx of alcohol    He also has a large gastric varix which has never bled-  treated x1 by Dr Perla endoscopically but will need another endoscopic session (4/16/17).  - Had post procedure fever despite antibiotics    Most recent issue was an E coli bacteremia- on IV antibiotics at home- PICC    Currently he is reasonably stable with weekly paracentesis.  Decreased dose of diuretics and his para volume appears to also be decreasing.  Mental alert and has not experienced HE for a while.    - lower leg edema and ascites  - generally weak      MELD-Na score: 18 at 5/11/2017  2:45 PM  MELD score: 16 at 5/11/2017  2:45 PM  Calculated from:  Serum Creatinine: 2.1 mg/dL at 5/11/2017  2:45 PM  Serum Sodium: 135 mmol/L at 5/11/2017  2:45 PM  Total Bilirubin: 0.8 mg/dL (Rounded to 1) at 5/11/2017  2:45 PM  INR(ratio): 1.2 at 5/10/2017  5:19 AM  Age: 77 years      PMH:  DM  Sinus Ca- 1996  Rectal Ca- June 2016    SH:  Retired  Ex [employer] worker      FH:  Aunt had cirrhosis- non- alcohol    Review of Systems   Constitutional: Negative for activity change, appetite change, chills, fatigue, fever and unexpected weight change.   HENT: Negative for hearing loss.    Eyes: Negative for discharge and visual disturbance.   Respiratory: Negative for cough, chest tightness, shortness of breath and wheezing.  "   Cardiovascular: Negative for chest pain, palpitations and leg swelling.   Gastrointestinal: Negative for abdominal distention, abdominal pain, constipation, diarrhea and nausea.   Genitourinary: Negative for dysuria and frequency.   Musculoskeletal: Negative for arthralgias and back pain.   Skin: Negative for pallor and rash.   Neurological: Negative for dizziness, tremors, speech difficulty and headaches.   Hematological: Negative for adenopathy.   Psychiatric/Behavioral: Negative for agitation and confusion.           Lab Results   Component Value Date    ALT 9 (L) 02/02/2018    AST 21 02/02/2018    ALKPHOS 110 02/02/2018    BILITOT 0.7 02/02/2018     Past Medical History:   Diagnosis Date    Anticoagulant long-term use     Bipolar 1 disorder     Bipolar 1 disorder 11/24/2016    bipolar    Cancer     history of skin cancer/sinus cancer & rectal cancer    Depressed bipolar affective disorder     Diabetes mellitus     type 2    EBV infection 12/7/2016    EBV DNA, PCR Latest Ref Range: None detected  None detected EBV DNA-Copies/mL Unknown Test Not Performed EBV Early Antigen Ab, IgG Latest Ref Range: <1:10 Titer 1:40 (A) EBV Nuclear Ag Ab Latest Ref Range: <1:5 Titer >=1:80 (A) EBV VCA IgG Latest Ref Range: <1:10 Titer 1:160 (A) EBV VCA IgM Latest Ref Range: <1:10 Titer <1:10     History of TIA (transient ischemic attack)     several-taking Plavix    Hx of gallstones     Wife denies    Hypertension     Hyperthyroidism 11/30/2016    Low HDL (under 40) 12/7/2016    Mixed hyperlipidemia 11/23/2016    JUAN MANUEL (obstructive sleep apnea)     Pneumonia     Skin disease     Stroke     Transient cerebral ischemia 7/22/2016    Type 2 diabetes mellitus      Past Surgical History:   Procedure Laterality Date    Moh's procedure x4      rectal cancer      Sinus surgery for sinus cancer      sinus sx for cancer      skin cancer removed-several times-nose & ear      TONSILLECTOMY      Undescened testicle sx       UVULOPALATOPHARYNGOPLASTY      for sleep apnea     Current Outpatient Prescriptions   Medication Sig    ACCU-CHEK NEYDA PLUS TEST STRP Strp     ciprofloxacin HCl (CIPRO) 500 MG tablet Take 1 tablet (500 mg total) by mouth every Monday.    divalproex (DEPAKOTE) 250 MG EC tablet     fluorouracil (EFUDEX) 5 % cream AAA bid x 2 weeks    furosemide (LASIX) 20 MG tablet Take 4 tablets (80 mg total) by mouth once daily. (Patient taking differently: Take 40 mg by mouth once daily. Taking 40 mg per day)    lactulose (CHRONULAC) 10 gram/15 mL solution Take 15 mLs (10 g total) by mouth 2 (two) times daily.    rifAXIMin (XIFAXAN) 550 mg Tab Take 1 tablet (550 mg total) by mouth 2 (two) times daily.    SITagliptan (JANUVIA) 50 MG Tab Take 1 tablet (50 mg total) by mouth once daily.     No current facility-administered medications for this visit.        Objective:      Physical Exam   Constitutional: He is oriented to person, place, and time. He appears well-nourished.   HENT:   Head: Normocephalic.   Eyes: Pupils are equal, round, and reactive to light.   Neck: No thyromegaly present.   Cardiovascular: Normal rate, regular rhythm and normal heart sounds.    Pulmonary/Chest: Effort normal and breath sounds normal. He has no wheezes.   Abdominal: Soft. He exhibits distension. He exhibits no mass. There is no tenderness.   Mild ascites.   Musculoskeletal: He exhibits edema.   Lymphadenopathy:     He has no cervical adenopathy.   Neurological: He is alert and oriented to person, place, and time.   Skin: Skin is warm. No rash noted. No erythema.   Psychiatric: He has a normal mood and affect. His behavior is normal.       Assessment:       1. Other ascites    2. Cirrhosis of liver with ascites, unspecified hepatic cirrhosis type    3. Ascites of liver    4. Liver cirrhosis secondary to MCDONALD (nonalcoholic steatohepatitis)        Plan:   - re-discussed the dx of cirrhosis and the implications of this  - no change in  diuretic dose  - paracenteses every week- feels better when he gets albumin-   - on Cipro 500 mg weekly for SBP prophylaxis.  - does not wish for further EUS guided treatment of varices because of post procedure sepsis.  Clinic in  3 months     MARIA G dover

## 2018-02-09 NOTE — DISCHARGE INSTRUCTIONS
[facility] [phone] (MON-FRI 8 AM- 5PM). Radiology Resident on call [phone].

## 2018-02-15 DIAGNOSIS — K74.60 CIRRHOSIS OF LIVER WITH ASCITES, UNSPECIFIED HEPATIC CIRRHOSIS TYPE: Primary | ICD-10-CM

## 2018-02-15 DIAGNOSIS — R18.8 CIRRHOSIS OF LIVER WITH ASCITES, UNSPECIFIED HEPATIC CIRRHOSIS TYPE: Primary | ICD-10-CM

## 2018-02-16 ENCOUNTER — HOSPITAL ENCOUNTER (OUTPATIENT)
Dept: INTERVENTIONAL RADIOLOGY/VASCULAR | Facility: HOSPITAL | Age: 78
Discharge: HOME | End: 2018-02-16
Attending: INTERNAL MEDICINE
Payer: MEDICARE

## 2018-02-16 ENCOUNTER — OFFICE VISIT (OUTPATIENT)
Dept: INTERNAL MEDICINE | Facility: CLINIC | Age: 78
End: 2018-02-16
Payer: MEDICARE

## 2018-02-16 VITALS
DIASTOLIC BLOOD PRESSURE: 63 MMHG | OXYGEN SATURATION: 100 % | HEART RATE: 51 BPM | SYSTOLIC BLOOD PRESSURE: 132 MMHG | RESPIRATION RATE: 18 BRPM

## 2018-02-16 VITALS
BODY MASS INDEX: 27.9 KG/M2 | HEART RATE: 58 BPM | SYSTOLIC BLOOD PRESSURE: 130 MMHG | HEIGHT: 68 IN | DIASTOLIC BLOOD PRESSURE: 58 MMHG | WEIGHT: 184.06 LBS

## 2018-02-16 DIAGNOSIS — R18.8 OTHER ASCITES: ICD-10-CM

## 2018-02-16 DIAGNOSIS — R53.1 WEAKNESS: ICD-10-CM

## 2018-02-16 DIAGNOSIS — K74.60 LIVER CIRRHOSIS SECONDARY TO NASH: ICD-10-CM

## 2018-02-16 DIAGNOSIS — K75.81 LIVER CIRRHOSIS SECONDARY TO NASH: ICD-10-CM

## 2018-02-16 DIAGNOSIS — N18.30 TYPE 2 DIABETES MELLITUS WITH STAGE 3 CHRONIC KIDNEY DISEASE, WITHOUT LONG-TERM CURRENT USE OF INSULIN: Primary | ICD-10-CM

## 2018-02-16 DIAGNOSIS — K74.60 LIVER CIRRHOSIS SECONDARY TO NASH (NONALCOHOLIC STEATOHEPATITIS): ICD-10-CM

## 2018-02-16 DIAGNOSIS — R18.8 CIRRHOSIS OF LIVER WITH ASCITES, UNSPECIFIED HEPATIC CIRRHOSIS TYPE: ICD-10-CM

## 2018-02-16 DIAGNOSIS — K74.60 CIRRHOSIS OF LIVER WITH ASCITES, UNSPECIFIED HEPATIC CIRRHOSIS TYPE: ICD-10-CM

## 2018-02-16 DIAGNOSIS — K75.81 LIVER CIRRHOSIS SECONDARY TO NASH (NONALCOHOLIC STEATOHEPATITIS): ICD-10-CM

## 2018-02-16 DIAGNOSIS — E11.22 TYPE 2 DIABETES MELLITUS WITH STAGE 3 CHRONIC KIDNEY DISEASE, WITHOUT LONG-TERM CURRENT USE OF INSULIN: Primary | ICD-10-CM

## 2018-02-16 DIAGNOSIS — R18.8 ASCITES OF LIVER: ICD-10-CM

## 2018-02-16 LAB
APPEARANCE FLD: NORMAL
BODY FLD TYPE: NORMAL
COLOR FLD: YELLOW
EOSINOPHIL NFR FLD MANUAL: 1 %
LYMPHOCYTES NFR FLD MANUAL: 67 %
MONOS+MACROS NFR FLD MANUAL: 27 %
NEUTROPHILS NFR FLD MANUAL: 5 %
WBC # FLD: 71 /CU MM

## 2018-02-16 PROCEDURE — 1126F AMNT PAIN NOTED NONE PRSNT: CPT | Mod: ,,, | Performed by: INTERNAL MEDICINE

## 2018-02-16 PROCEDURE — 89051 BODY FLUID CELL COUNT: CPT

## 2018-02-16 PROCEDURE — 99213 OFFICE O/P EST LOW 20 MIN: CPT | Mod: S$PBB,,, | Performed by: INTERNAL MEDICINE

## 2018-02-16 PROCEDURE — P9047 ALBUMIN (HUMAN), 25%, 50ML: HCPCS | Mod: JG | Performed by: INTERNAL MEDICINE

## 2018-02-16 PROCEDURE — 49083 ABD PARACENTESIS W/IMAGING: CPT

## 2018-02-16 PROCEDURE — 99213 OFFICE O/P EST LOW 20 MIN: CPT | Mod: PBBFAC,25 | Performed by: INTERNAL MEDICINE

## 2018-02-16 PROCEDURE — 49083 ABD PARACENTESIS W/IMAGING: CPT | Mod: ,,, | Performed by: FAMILY MEDICINE

## 2018-02-16 PROCEDURE — 99999 PR PBB SHADOW E&M-EST. PATIENT-LVL III: CPT | Mod: PBBFAC,,, | Performed by: INTERNAL MEDICINE

## 2018-02-16 PROCEDURE — 63600175 PHARM REV CODE 636 W HCPCS: Mod: JG | Performed by: INTERNAL MEDICINE

## 2018-02-16 PROCEDURE — 1159F MED LIST DOCD IN RCRD: CPT | Mod: ,,, | Performed by: INTERNAL MEDICINE

## 2018-02-16 RX ORDER — ALBUMIN HUMAN 250 G/1000ML
25 SOLUTION INTRAVENOUS ONCE
Status: COMPLETED | OUTPATIENT
Start: 2018-02-16 | End: 2018-02-16

## 2018-02-16 RX ADMIN — ALBUMIN (HUMAN) 25 G: 25 SOLUTION INTRAVENOUS at 01:02

## 2018-02-16 NOTE — H&P
Radiology History & Physical      SUBJECTIVE:     Chief Complaint: ascites    History of Present Illness:  Kenneth Sheikh is a 78 y.o. male who presents for ultrasound guided paracentesis  Past Medical History:   Diagnosis Date    Anticoagulant long-term use     Bipolar 1 disorder     Bipolar 1 disorder 11/24/2016    bipolar    Cancer     history of skin cancer/sinus cancer & rectal cancer    Depressed bipolar affective disorder     Diabetes mellitus     type 2    EBV infection 12/7/2016    EBV DNA, PCR Latest Ref Range: None detected  None detected EBV DNA-Copies/mL Unknown Test Not Performed EBV Early Antigen Ab, IgG Latest Ref Range: <1:10 Titer 1:40 (A) EBV Nuclear Ag Ab Latest Ref Range: <1:5 Titer >=1:80 (A) EBV VCA IgG Latest Ref Range: <1:10 Titer 1:160 (A) EBV VCA IgM Latest Ref Range: <1:10 Titer <1:10     History of TIA (transient ischemic attack)     several-taking Plavix    Hx of gallstones     Wife denies    Hypertension     Hyperthyroidism 11/30/2016    Low HDL (under 40) 12/7/2016    Mixed hyperlipidemia 11/23/2016    JUAN MANUEL (obstructive sleep apnea)     Pneumonia     Skin disease     Stroke     Transient cerebral ischemia 7/22/2016    Type 2 diabetes mellitus      Past Surgical History:   Procedure Laterality Date    Moh's procedure x4      rectal cancer      Sinus surgery for sinus cancer      sinus sx for cancer      skin cancer removed-several times-nose & ear      TONSILLECTOMY      Undescened testicle sx      UVULOPALATOPHARYNGOPLASTY      for sleep apnea       Home Meds:   Prior to Admission medications    Medication Sig Start Date End Date Taking? Authorizing Provider   ACCU-CHEK NEYDA PLUS TEST STRP Strp  2/23/17   Historical Provider, MD   ciprofloxacin HCl (CIPRO) 500 MG tablet Take 1 tablet (500 mg total) by mouth every Monday. 5/29/17   Renato Womack MD   divalproex (DEPAKOTE) 250 MG EC tablet  5/5/17   Historical Provider, MD   fluorouracil (EFUDEX) 5 % cream  AAA bid x 2 weeks 11/27/17   Kenn Beach MD   furosemide (LASIX) 20 MG tablet Take 4 tablets (80 mg total) by mouth once daily.  Patient taking differently: Take 40 mg by mouth once daily. Taking 40 mg per day 4/25/17 4/25/18  Renato Womack MD   lactulose (CHRONULAC) 10 gram/15 mL solution Take 15 mLs (10 g total) by mouth 2 (two) times daily. 2/9/18   Renato Womack MD   rifAXIMin (XIFAXAN) 550 mg Tab Take 1 tablet (550 mg total) by mouth 2 (two) times daily. 10/3/17   Renato Womack MD   SITagliptan (JANUVIA) 50 MG Tab Take 1 tablet (50 mg total) by mouth once daily. 4/6/17   Prisca Espinoza MD     Anticoagulants/Antiplatelets: no anticoagulation    Allergies:   Review of patient's allergies indicates:   Allergen Reactions    Abilify [aripiprazole] Other (See Comments)     Sleepy, could not function.     Sedation History:  no adverse reactions    Review of Systems:   Hematological: no known coagulopathies  Respiratory: no shortness of breath  Cardiovascular: no chest pain  Gastrointestinal: no abdominal pain  Genito-Urinary: no dysuria  Musculoskeletal: negative  Neurological: no TIA or stroke symptoms         OBJECTIVE:     Vital Signs (Most Recent)       Physical Exam:  ASA: 2  Mallampati: n/a    General: no acute distress  Mental Status: alert and oriented to person, place and time  HEENT: normocephalic, atraumatic  Chest: unlabored breathing  Heart: regular heart rate  Abdomen: distended  Extremity: moves all extremities    Laboratory  Lab Results   Component Value Date    INR 1.1 01/12/2018       Lab Results   Component Value Date    WBC 3.96 02/02/2018    WBC 3.96 02/02/2018    HGB 10.7 (L) 02/02/2018    HGB 10.7 (L) 02/02/2018    HCT 33.4 (L) 02/02/2018    HCT 33.4 (L) 02/02/2018    MCV 85 02/02/2018    MCV 85 02/02/2018     (L) 02/02/2018     (L) 02/02/2018      Lab Results   Component Value Date     (H) 02/02/2018     02/02/2018    K 4.0 02/02/2018      02/02/2018    CO2 29 02/02/2018    BUN 25 (H) 02/02/2018    CREATININE 1.5 (H) 02/02/2018    CALCIUM 8.6 (L) 02/02/2018    MG 1.7 05/15/2017    ALT 9 (L) 02/02/2018    AST 21 02/02/2018    ALBUMIN 2.2 (L) 02/02/2018    BILITOT 0.7 02/02/2018       ASSESSMENT/PLAN:     Sedation Plan: local  Patient will undergo ultrasound guided paracentesis.    MATT Mauricio, FNP  Interventional Radiology  [phone] [employer]

## 2018-02-16 NOTE — PROGRESS NOTES
Paracentesis complete. 5000 mLs peritoneal fluid drained. Pt tolerated well. Dressing to left abd  clean, dry, and intact. Albumin 25% given 100 mLs.  Pt discharged.

## 2018-02-16 NOTE — PROGRESS NOTES
"Internal Medicine    Subjective:      Patient ID: Kenneth Sheikh is a 78 y.o. male.    Chief Complaint: Follow-up    Presents for follow up appointment.  Last seen 1/27/18.  Scheduled today's visit because wife was reporting increased somnolence at home.        Patient and wife state patient has not been somnolent at home and state this was a misunderstanding with my assistant.  See last note on 1/27/18 for chronic issues.      DM-2:  Last HgA1c 7.9 on 1/25/18 (up from 6.3 on 5/11/17).  Off metformin due to elevated lactate and diarrhea.  Now taking Januvia 50mg daily.  States he has been working on his diet since he found out about recent HgA1c increase.    Chronic weakness, deconditioning:  Patient declining referral to PT stating he already does exercises at home.        Review of Systems   Constitutional: Positive for fatigue. Negative for appetite change, chills, fever and unexpected weight change.   HENT: Negative for sore throat and trouble swallowing.    Eyes: Negative for visual disturbance.   Respiratory: Negative for cough, chest tightness, shortness of breath and wheezing.    Cardiovascular: Positive for leg swelling (Chronic). Negative for chest pain and palpitations.   Gastrointestinal: Positive for abdominal distention (Getting para today). Negative for abdominal pain, blood in stool, constipation, diarrhea, nausea and vomiting.   Genitourinary: Negative for difficulty urinating.   Musculoskeletal: Negative for arthralgias and myalgias.   Skin: Negative for rash and wound.   Neurological: Positive for weakness (Chronic). Negative for dizziness, numbness and headaches.   Psychiatric/Behavioral: Negative for behavioral problems.       Past medical history, surgical history, and family medical history reviewed and updated as appropriate.    Medications and allergies reviewed.     Objective:     Vitals:    02/16/18 1138   BP: (!) 130/58   Pulse: (!) 58   Weight: 83.5 kg (184 lb 1.4 oz)   Height: 5' 8" (1.727 " m)     Physical Exam   Constitutional: He is oriented to person, place, and time. He appears well-developed and well-nourished. No distress.   HENT:   Head: Normocephalic and atraumatic.   Eyes: Conjunctivae and EOM are normal. No scleral icterus.   Cardiovascular: Normal rate and regular rhythm.  Exam reveals no gallop and no friction rub.    No murmur heard.  Pulmonary/Chest: Effort normal and breath sounds normal. No respiratory distress. He has no wheezes. He has no rales.   Abdominal: Soft. He exhibits distension. There is no tenderness.   Musculoskeletal: He exhibits edema (BLE's, chronic). He exhibits no tenderness.   Neurological: He is alert and oriented to person, place, and time.   Skin: No rash noted. No erythema.   Psychiatric: He has a normal mood and affect. His behavior is normal.       RESULTS:   Lab Results   Component Value Date    WBC 3.96 02/02/2018    WBC 3.96 02/02/2018    HGB 10.7 (L) 02/02/2018    HGB 10.7 (L) 02/02/2018    HCT 33.4 (L) 02/02/2018    HCT 33.4 (L) 02/02/2018    MCV 85 02/02/2018    MCV 85 02/02/2018     (L) 02/02/2018     (L) 02/02/2018     BMP  Lab Results   Component Value Date     02/02/2018    K 4.0 02/02/2018     02/02/2018    CO2 29 02/02/2018    BUN 25 (H) 02/02/2018    CREATININE 1.5 (H) 02/02/2018    CALCIUM 8.6 (L) 02/02/2018    ANIONGAP 6 (L) 02/02/2018    ESTGFRAFRICA 50.1 (A) 02/02/2018    EGFRNONAA 43.3 (A) 02/02/2018     Lab Results   Component Value Date    ALT 9 (L) 02/02/2018    AST 21 02/02/2018    ALKPHOS 110 02/02/2018    BILITOT 0.7 02/02/2018     Lab Results   Component Value Date    TSH 1.179 02/02/2018     Lab Results   Component Value Date    HGBA1C 7.9 (H) 01/25/2018         Assessment:     1. Type 2 diabetes mellitus with stage 3 chronic kidney disease, without long-term current use of insulin    2. 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 Ascites of liver    4. Weakness        Plan:   Kenneth  was seen today for follow-up.    Diagnoses and all orders for this visit:    *Continue medication/plan as discussed in HPI except for changes discussed below.    Type 2 diabetes mellitus with stage 3 chronic kidney disease, without long-term current use of insulin    Liver cirrhosis secondary to MCDONALD (nonalcoholic steatohepatitis)    Ascites of liver    Weakness    Follow-up in about 3 months (around 5/16/2018).    Prisca Espinoza MD  Internal Medicine  [facility]

## 2018-02-16 NOTE — PROCEDURES

## 2018-02-23 ENCOUNTER — HOSPITAL ENCOUNTER (OUTPATIENT)
Dept: INTERVENTIONAL RADIOLOGY/VASCULAR | Facility: HOSPITAL | Age: 78
Discharge: HOME | End: 2018-02-23
Attending: INTERNAL MEDICINE
Payer: MEDICARE

## 2018-02-23 VITALS
HEART RATE: 57 BPM | OXYGEN SATURATION: 98 % | DIASTOLIC BLOOD PRESSURE: 61 MMHG | SYSTOLIC BLOOD PRESSURE: 118 MMHG | RESPIRATION RATE: 20 BRPM

## 2018-02-23 DIAGNOSIS — R18.8 OTHER ASCITES: ICD-10-CM

## 2018-02-23 DIAGNOSIS — K74.60 LIVER CIRRHOSIS SECONDARY TO NASH: ICD-10-CM

## 2018-02-23 DIAGNOSIS — K75.81 LIVER CIRRHOSIS SECONDARY TO NASH: ICD-10-CM

## 2018-02-23 DIAGNOSIS — K74.60 CIRRHOSIS OF LIVER WITH ASCITES, UNSPECIFIED HEPATIC CIRRHOSIS TYPE: ICD-10-CM

## 2018-02-23 DIAGNOSIS — K74.60 LIVER CIRRHOSIS SECONDARY TO NASH (NONALCOHOLIC STEATOHEPATITIS): ICD-10-CM

## 2018-02-23 DIAGNOSIS — K75.81 LIVER CIRRHOSIS SECONDARY TO NASH (NONALCOHOLIC STEATOHEPATITIS): ICD-10-CM

## 2018-02-23 DIAGNOSIS — R18.8 OTHER ASCITES: Primary | ICD-10-CM

## 2018-02-23 DIAGNOSIS — R18.8 CIRRHOSIS OF LIVER WITH ASCITES, UNSPECIFIED HEPATIC CIRRHOSIS TYPE: ICD-10-CM

## 2018-02-23 PROCEDURE — 63600175 PHARM REV CODE 636 W HCPCS: Mod: JG | Performed by: INTERNAL MEDICINE

## 2018-02-23 PROCEDURE — P9047 ALBUMIN (HUMAN), 25%, 50ML: HCPCS | Mod: JG | Performed by: INTERNAL MEDICINE

## 2018-02-23 PROCEDURE — A7048 VACUUM DRAIN BOTTLE/TUBE KIT: HCPCS

## 2018-02-23 PROCEDURE — 49083 ABD PARACENTESIS W/IMAGING: CPT

## 2018-02-23 PROCEDURE — 49083 ABD PARACENTESIS W/IMAGING: CPT | Mod: GC,,, | Performed by: RADIOLOGY

## 2018-02-23 RX ORDER — ALBUMIN HUMAN 250 G/1000ML
25 SOLUTION INTRAVENOUS CONTINUOUS PRN
Status: DISCONTINUED | OUTPATIENT
Start: 2018-02-23 | End: 2018-02-24 | Stop reason: HOSPADM

## 2018-02-23 RX ADMIN — ALBUMIN (HUMAN) 25 G: 25 SOLUTION INTRAVENOUS at 01:02

## 2018-02-23 NOTE — H&P
Radiology History & Physical      SUBJECTIVE:     Chief Complaint: Rectal cancer and ascites.    History of Present Illness:    Kenneth Sheikh is a 78 y.o. male who presents for paracentesis.    Past Medical History:   Diagnosis Date    Anticoagulant long-term use     Bipolar 1 disorder     Bipolar 1 disorder 11/24/2016    bipolar    Cancer     history of skin cancer/sinus cancer & rectal cancer    Depressed bipolar affective disorder     Diabetes mellitus     type 2    EBV infection 12/7/2016    EBV DNA, PCR Latest Ref Range: None detected  None detected EBV DNA-Copies/mL Unknown Test Not Performed EBV Early Antigen Ab, IgG Latest Ref Range: <1:10 Titer 1:40 (A) EBV Nuclear Ag Ab Latest Ref Range: <1:5 Titer >=1:80 (A) EBV VCA IgG Latest Ref Range: <1:10 Titer 1:160 (A) EBV VCA IgM Latest Ref Range: <1:10 Titer <1:10     History of TIA (transient ischemic attack)     several-taking Plavix    Hx of gallstones     Wife denies    Hypertension     Hyperthyroidism 11/30/2016    Low HDL (under 40) 12/7/2016    Mixed hyperlipidemia 11/23/2016    JUAN MANUEL (obstructive sleep apnea)     Pneumonia     Skin disease     Stroke     Transient cerebral ischemia 7/22/2016    Type 2 diabetes mellitus      Past Surgical History:   Procedure Laterality Date    Moh's procedure x4      rectal cancer      Sinus surgery for sinus cancer      sinus sx for cancer      skin cancer removed-several times-nose & ear      TONSILLECTOMY      Undescened testicle sx      UVULOPALATOPHARYNGOPLASTY      for sleep apnea       Home Meds:   Prior to Admission medications    Medication Sig Start Date End Date Taking? Authorizing Provider   ACCU-CHEK NEYDA PLUS TEST STRP Strp  2/23/17   Historical Provider, MD   ciprofloxacin HCl (CIPRO) 500 MG tablet Take 1 tablet (500 mg total) by mouth every Monday. 5/29/17   Renato Womack MD   divalproex (DEPAKOTE) 250 MG EC tablet  5/5/17   Historical Provider, MD   fluorouracil (EFUDEX) 5 %  cream AAA bid x 2 weeks 11/27/17   Kenn Beach MD   furosemide (LASIX) 20 MG tablet Take 4 tablets (80 mg total) by mouth once daily.  Patient taking differently: Take 40 mg by mouth once daily. Taking 40 mg per day 4/25/17 4/25/18  Renato Womack MD   lactulose (CHRONULAC) 10 gram/15 mL solution Take 15 mLs (10 g total) by mouth 2 (two) times daily. 2/9/18   Renato Womack MD   rifAXIMin (XIFAXAN) 550 mg Tab Take 1 tablet (550 mg total) by mouth 2 (two) times daily. 10/3/17   Renato Womack MD   SITagliptan (JANUVIA) 50 MG Tab Take 1 tablet (50 mg total) by mouth once daily. 4/6/17   Prisca Espinoza MD     Anticoagulants/Antiplatelets: no anticoagulation    Allergies:   Review of patient's allergies indicates:   Allergen Reactions    Abilify [aripiprazole] Other (See Comments)     Sleepy, could not function.     Sedation History:  no adverse reactions    Review of Systems:   Hematological: no known coagulopathies  Respiratory: no shortness of breath  Cardiovascular: no chest pain  Gastrointestinal: no abdominal pain  Genito-Urinary: no dysuria  Musculoskeletal: negative  Neurological: no TIA or stroke symptoms         OBJECTIVE:     Vital Signs (Most Recent)       Physical Exam:    General: no acute distress  Mental Status: alert and oriented to person, place and time  HEENT: normocephalic, atraumatic  Chest: unlabored breathing  Heart: regular heart rate  Abdomen: distended  Extremity: moves all extremities    Laboratory  Lab Results   Component Value Date    INR 1.0 02/16/2018       Lab Results   Component Value Date    WBC 3.96 02/02/2018    WBC 3.96 02/02/2018    HGB 10.7 (L) 02/02/2018    HGB 10.7 (L) 02/02/2018    HCT 33.4 (L) 02/02/2018    HCT 33.4 (L) 02/02/2018    MCV 85 02/02/2018    MCV 85 02/02/2018     (L) 02/02/2018     (L) 02/02/2018      Lab Results   Component Value Date     (H) 02/02/2018     02/02/2018    K 4.0 02/02/2018     02/02/2018     CO2 29 02/02/2018    BUN 25 (H) 02/02/2018    CREATININE 1.5 (H) 02/02/2018    CALCIUM 8.6 (L) 02/02/2018    MG 1.7 05/15/2017    ALT 9 (L) 02/02/2018    AST 21 02/02/2018    ALBUMIN 2.2 (L) 02/02/2018    BILITOT 0.7 02/02/2018       ASSESSMENT/PLAN:     Sedation Plan: local.  Patient will undergo paracentesis.    Gabriel Ruiz MD  PGY-5  Department of Radiology  [phone]

## 2018-02-23 NOTE — PROGRESS NOTES
Paracentesis complete. 5000 mLs peritoneal fluid drained. Pt tolerated well. Dressing to RLQ clean, dry, and intact. Albumin 25% 100mL given. Patient verbalized understanding of discharge instructions and escorted to lobby.

## 2018-03-01 ENCOUNTER — OFFICE VISIT (OUTPATIENT)
Dept: DERMATOLOGY | Facility: CLINIC | Age: 78
End: 2018-03-01
Payer: MEDICARE

## 2018-03-01 DIAGNOSIS — D04.4 SQUAMOUS CELL CARCINOMA IN SITU (SCCIS) OF SCALP: ICD-10-CM

## 2018-03-01 DIAGNOSIS — T49.95XA DERMATITIS MEDICAMENTOSA (DRUG APPLIED TO SKIN): Primary | ICD-10-CM

## 2018-03-01 DIAGNOSIS — L25.1 DERMATITIS MEDICAMENTOSA (DRUG APPLIED TO SKIN): Primary | ICD-10-CM

## 2018-03-01 DIAGNOSIS — D04.39 SQUAMOUS CELL CARCINOMA IN SITU OF SKIN OF CHEEK: ICD-10-CM

## 2018-03-01 PROCEDURE — 99212 OFFICE O/P EST SF 10 MIN: CPT | Mod: S$PBB,,, | Performed by: DERMATOLOGY

## 2018-03-01 PROCEDURE — 99212 OFFICE O/P EST SF 10 MIN: CPT | Mod: PBBFAC | Performed by: DERMATOLOGY

## 2018-03-01 PROCEDURE — 99999 PR PBB SHADOW E&M-EST. PATIENT-LVL II: CPT | Mod: PBBFAC,,, | Performed by: DERMATOLOGY

## 2018-03-01 RX ORDER — FLUOROURACIL 50 MG/G
CREAM TOPICAL
Qty: 40 G | Refills: 2 | Status: SHIPPED | OUTPATIENT
Start: 2018-03-01 | End: 2018-05-25

## 2018-03-01 NOTE — PROGRESS NOTES
CHIEF COMPLAINT: followup treatment with topical fluoruracil    HISTORY OF PRESENT ILLNESS:  Location(s): face  Timing: I last saw him one month ago   Duration: He applied the medication for about 2 weeks  Quality: better now  Context: I had him discontinue treatment at his last visit because of the degree of irritation    ROS:   Skin: status post Mohs surgery to the area of invasive SCC on the right cheek on 11/20  status post biopsy to other lesions as noted below  See the previous notes; started treatment for multifocal actinic keratosis and the lesions noted  His wife also notes spots on his left temporal scalp and right deltoid region    Prior path  FINAL PATHOLOGIC DIAGNOSIS  1. Skin, left preauricular cheek, shave biopsy:  - SQUAMOUS CELL CARCINOMA IN SITU.  - THE TUMOR EXTENDS TO THE LATERAL BIOPSY MARGINS.    2. Skin, right jawline, shave biopsy:  - INVASIVE SQUAMOUS CELL CARCINOMA WITH FOCAL BASALOID AND CLEAR CELL DIFFERENTIATION.  - THE TUMOR EXTENDS TO THE LATERAL BIOPSY MARGINS.    3. Skin, right forehead, shave biopsy:  - SQUAMOUS CELL CARCINOMA IN SITU.  - THE TUMOR EXTENDS TO THE LATERAL BIOPSY MARGINS.    4. Skin, right frontal scalp, shave biopsy:  - SQUAMOUS CELL CARCINOMA IN SITU.  - MARGINS ARE NEGATIVE FOR FULL THICKNESS SQUAMOUS ATYPIA IN THE PLANES OF SECTION.    EXAMINATION:  Skin: There remains some mild erythema to areas of previous application on his face, with minimal to no residual scaling at present  On his right temple there is an approximately 1 cm raised, crusted nodule which is consistent with a basal cell carcinoma clinically  On his left temporal scalp there is an approximate 1.5 cm pink, somewhat scaly plaque which is consistent with an area of actinic keratosis  On his right deltoid region of the upper arm there is an approximately 1 cm pink, somewhat sclerotic plaque which is clinically suggestive of an area of actinic keratosis    ASSESSMENT:   Resolving dermatitis  medicamentosa, face  Areas of squamous cell carcinoma in situ as noted above  Probable actinic keratosis, left temporal scalp  Probable basal cell carcinoma, right temple  Actinic keratosis versus superficial basal cell carcinoma, right deltoid area    PLAN:  Current status and management options, and risks, benefits, and alternatives were discussed.  Restart application of Efudex  Follow-up one week; call as needed sooner  --------------------------------------  Note: Some or all of this note may have been generated using voice recognition software. There may be voice recognition errors including grammatical and/or spelling errors found in the text. Attempts were made to correct these errors prior to signature.

## 2018-03-02 ENCOUNTER — HOSPITAL ENCOUNTER (OUTPATIENT)
Dept: INTERVENTIONAL RADIOLOGY/VASCULAR | Facility: HOSPITAL | Age: 78
Discharge: HOME | End: 2018-03-02
Attending: INTERNAL MEDICINE
Payer: MEDICARE

## 2018-03-02 VITALS
DIASTOLIC BLOOD PRESSURE: 58 MMHG | OXYGEN SATURATION: 97 % | SYSTOLIC BLOOD PRESSURE: 127 MMHG | RESPIRATION RATE: 16 BRPM | HEART RATE: 54 BPM

## 2018-03-02 DIAGNOSIS — K75.81 LIVER CIRRHOSIS SECONDARY TO NASH (NONALCOHOLIC STEATOHEPATITIS): ICD-10-CM

## 2018-03-02 DIAGNOSIS — R18.8 CIRRHOSIS OF LIVER WITH ASCITES, UNSPECIFIED HEPATIC CIRRHOSIS TYPE: Primary | ICD-10-CM

## 2018-03-02 DIAGNOSIS — K74.60 CIRRHOSIS OF LIVER WITH ASCITES, UNSPECIFIED HEPATIC CIRRHOSIS TYPE: Primary | ICD-10-CM

## 2018-03-02 DIAGNOSIS — K74.60 LIVER CIRRHOSIS SECONDARY TO NASH (NONALCOHOLIC STEATOHEPATITIS): ICD-10-CM

## 2018-03-02 DIAGNOSIS — R18.8 OTHER ASCITES: ICD-10-CM

## 2018-03-02 LAB
APPEARANCE FLD: CLEAR
BASOPHILS NFR FLD MANUAL: 1 %
BODY FLD TYPE: NORMAL
COLOR FLD: YELLOW
LYMPHOCYTES NFR FLD MANUAL: 33 %
MONOS+MACROS NFR FLD MANUAL: 61 %
NEUTROPHILS NFR FLD MANUAL: 5 %
WBC # FLD: 91 /CU MM

## 2018-03-02 PROCEDURE — 49083 ABD PARACENTESIS W/IMAGING: CPT

## 2018-03-02 PROCEDURE — P9047 ALBUMIN (HUMAN), 25%, 50ML: HCPCS | Mod: JG | Performed by: INTERNAL MEDICINE

## 2018-03-02 PROCEDURE — 49083 ABD PARACENTESIS W/IMAGING: CPT | Mod: GC,,, | Performed by: RADIOLOGY

## 2018-03-02 PROCEDURE — 87075 CULTR BACTERIA EXCEPT BLOOD: CPT

## 2018-03-02 PROCEDURE — 89051 BODY FLUID CELL COUNT: CPT

## 2018-03-02 PROCEDURE — 63600175 PHARM REV CODE 636 W HCPCS: Mod: JG | Performed by: INTERNAL MEDICINE

## 2018-03-02 PROCEDURE — 87070 CULTURE OTHR SPECIMN AEROBIC: CPT

## 2018-03-02 PROCEDURE — 87076 CULTURE ANAEROBE IDENT EACH: CPT

## 2018-03-02 PROCEDURE — A7048 VACUUM DRAIN BOTTLE/TUBE KIT: HCPCS

## 2018-03-02 RX ORDER — ALBUMIN HUMAN 250 G/1000ML
25 SOLUTION INTRAVENOUS CONTINUOUS PRN
Status: DISCONTINUED | OUTPATIENT
Start: 2018-03-02 | End: 2018-03-03 | Stop reason: HOSPADM

## 2018-03-02 RX ADMIN — ALBUMIN (HUMAN) 25 G: 25 SOLUTION INTRAVENOUS at 02:03

## 2018-03-02 NOTE — DISCHARGE INSTRUCTIONS
For scheduling: Call Mackenzie at [phone]    For questions or concerns call: [facility] 8 AM- 5PM [phone]. Radiology resident on call [phone].    For immediate concerns that are not emergent, you may call our radiology clinic at: [phone]

## 2018-03-02 NOTE — H&P
Radiology History & Physical      SUBJECTIVE:     Chief Complaint: ascites    History of Present Illness:  Kenneth Sheikh is a 78 y.o. male who presents for us guided paracentesis  Past Medical History:   Diagnosis Date    Anticoagulant long-term use     Bipolar 1 disorder     Bipolar 1 disorder 11/24/2016    bipolar    Cancer     history of skin cancer/sinus cancer & rectal cancer    Depressed bipolar affective disorder     Diabetes mellitus     type 2    EBV infection 12/7/2016    EBV DNA, PCR Latest Ref Range: None detected  None detected EBV DNA-Copies/mL Unknown Test Not Performed EBV Early Antigen Ab, IgG Latest Ref Range: <1:10 Titer 1:40 (A) EBV Nuclear Ag Ab Latest Ref Range: <1:5 Titer >=1:80 (A) EBV VCA IgG Latest Ref Range: <1:10 Titer 1:160 (A) EBV VCA IgM Latest Ref Range: <1:10 Titer <1:10     History of TIA (transient ischemic attack)     several-taking Plavix    Hx of gallstones     Wife denies    Hypertension     Hyperthyroidism 11/30/2016    Low HDL (under 40) 12/7/2016    Mixed hyperlipidemia 11/23/2016    JUAN MANUEL (obstructive sleep apnea)     Pneumonia     Skin disease     Stroke     Transient cerebral ischemia 7/22/2016    Type 2 diabetes mellitus      Past Surgical History:   Procedure Laterality Date    Moh's procedure x4      rectal cancer      Sinus surgery for sinus cancer      sinus sx for cancer      skin cancer removed-several times-nose & ear      TONSILLECTOMY      Undescened testicle sx      UVULOPALATOPHARYNGOPLASTY      for sleep apnea       Home Meds:   Prior to Admission medications    Medication Sig Start Date End Date Taking? Authorizing Provider   ACCU-CHEK NEYDA PLUS TEST STRP Strp  2/23/17   Historical Provider, MD   ciprofloxacin HCl (CIPRO) 500 MG tablet Take 1 tablet (500 mg total) by mouth every Monday. 5/29/17   Renato Womack MD   divalproex (DEPAKOTE) 250 MG EC tablet  5/5/17   Historical Provider, MD   fluorouracil (EFUDEX) 5 % cream Apply  BID to affected areas as directed 3/1/18   Kenn Beach MD   furosemide (LASIX) 20 MG tablet Take 4 tablets (80 mg total) by mouth once daily.  Patient taking differently: Take 40 mg by mouth once daily. Taking 40 mg per day 4/25/17 4/25/18  Renato Womack MD   lactulose (CHRONULAC) 10 gram/15 mL solution Take 15 mLs (10 g total) by mouth 2 (two) times daily. 2/9/18   Renato Womack MD   rifAXIMin (XIFAXAN) 550 mg Tab Take 1 tablet (550 mg total) by mouth 2 (two) times daily. 10/3/17   Renato Womack MD   SITagliptan (JANUVIA) 50 MG Tab Take 1 tablet (50 mg total) by mouth once daily. 4/6/17   Prisca Espinoza MD     Anticoagulants/Antiplatelets: no anticoagulation    Allergies:   Review of patient's allergies indicates:   Allergen Reactions    Abilify [aripiprazole] Other (See Comments)     Sleepy, could not function.     Sedation History:  no adverse reactions    Review of Systems:   Hematological: no known coagulopathies  Respiratory: no shortness of breath  Cardiovascular: no chest pain  Gastrointestinal: no abdominal pain  Genito-Urinary: no dysuria  Musculoskeletal: negative  Neurological: no TIA or stroke symptoms         OBJECTIVE:     Vital Signs (Most Recent)       Physical Exam:  ASA: 2  Mallampati: 1    General: no acute distress  Mental Status: alert and oriented to person, place and time  HEENT: normocephalic, atraumatic  Chest: unlabored breathing  Heart: regular heart rate  Abdomen: distended  Extremity: moves all extremities    Laboratory  Lab Results   Component Value Date    INR 1.0 02/16/2018       Lab Results   Component Value Date    WBC 3.96 02/02/2018    WBC 3.96 02/02/2018    HGB 10.7 (L) 02/02/2018    HGB 10.7 (L) 02/02/2018    HCT 33.4 (L) 02/02/2018    HCT 33.4 (L) 02/02/2018    MCV 85 02/02/2018    MCV 85 02/02/2018     (L) 02/02/2018     (L) 02/02/2018      Lab Results   Component Value Date     (H) 02/02/2018     02/02/2018    K 4.0  02/02/2018     02/02/2018    CO2 29 02/02/2018    BUN 25 (H) 02/02/2018    CREATININE 1.5 (H) 02/02/2018    CALCIUM 8.6 (L) 02/02/2018    MG 1.7 05/15/2017    ALT 9 (L) 02/02/2018    AST 21 02/02/2018    ALBUMIN 2.2 (L) 02/02/2018    BILITOT 0.7 02/02/2018       ASSESSMENT/PLAN:     Sedation Plan: local olnly  Patient will undergo us guided paracentesis.

## 2018-03-02 NOTE — DISCHARGE SUMMARY
Radiology Discharge Summary      Hospital Course: No complications    Admit Date: 3/2/2018  Discharge Date: 03/02/2018     Instructions Given to Patient: Yes  Diet: Resume prior diet  Activity: activity as tolerated    Description of Condition on Discharge: Stable  Vital Signs (Most Recent): Pulse: (!) 54 (03/02/18 1300)  Resp: 16 (03/02/18 1300)  BP: (!) 127/58 (03/02/18 1300)  SpO2: 97 % (03/02/18 1300)    Discharge Disposition: Home    Discharge Diagnosis: ascites     Follow-up: per ordering provider    Gokul Stovall MD  Department of Radiology  Pager: [phone]

## 2018-03-02 NOTE — PROGRESS NOTES
Paracentesis complete, 3700 MLs removed. Specimen sent to lab. 100 mls Albumin administered per MD order. Dressing applied to RLQ puncture site, dressing clean dry and intact. Pt given discharge instructions and handouts, pt verbalizes understanding. Questions answered. Pt denies pain and discomfort. Pt to lobby for transport home with family.

## 2018-03-02 NOTE — H&P
Radiology Post-Procedure Note    Pre Op Diagnosis: Ascites  Post Op Diagnosis: Same    Procedure: Paracentesis    Procedure performed by: Gokul Stovall MD (res); George Peña MD    Written Informed Consent Obtained: Yes  Specimen Removed: NO  Estimated Blood Loss: Minimal    Findings:   Successful paracentesis.  Albumin administered PRN per protocol.    Patient tolerated procedure well.    Gokul Stovall MD  R1, Radiology  P: [phone]

## 2018-03-05 ENCOUNTER — OFFICE VISIT (OUTPATIENT)
Dept: SURGERY | Facility: CLINIC | Age: 78
End: 2018-03-05
Payer: MEDICARE

## 2018-03-05 VITALS
HEIGHT: 68 IN | WEIGHT: 176.38 LBS | SYSTOLIC BLOOD PRESSURE: 138 MMHG | HEART RATE: 54 BPM | DIASTOLIC BLOOD PRESSURE: 62 MMHG | BODY MASS INDEX: 26.73 KG/M2

## 2018-03-05 DIAGNOSIS — Z85.048 HISTORY OF RECTAL CANCER: Primary | ICD-10-CM

## 2018-03-05 LAB — BACTERIA SPEC AEROBE CULT: NO GROWTH

## 2018-03-05 PROCEDURE — 45330 DIAGNOSTIC SIGMOIDOSCOPY: CPT | Mod: PBBFAC | Performed by: COLON & RECTAL SURGERY

## 2018-03-05 PROCEDURE — 99213 OFFICE O/P EST LOW 20 MIN: CPT | Mod: PBBFAC | Performed by: COLON & RECTAL SURGERY

## 2018-03-05 PROCEDURE — 45330 DIAGNOSTIC SIGMOIDOSCOPY: CPT | Mod: S$PBB,,, | Performed by: COLON & RECTAL SURGERY

## 2018-03-05 PROCEDURE — 99213 OFFICE O/P EST LOW 20 MIN: CPT | Mod: 25,S$PBB,, | Performed by: COLON & RECTAL SURGERY

## 2018-03-05 PROCEDURE — 99999 PR PBB SHADOW E&M-EST. PATIENT-LVL III: CPT | Mod: PBBFAC,,, | Performed by: COLON & RECTAL SURGERY

## 2018-03-06 NOTE — PROGRESS NOTES
Subjective:       Patient ID: Kenneth Sheikh is a 78 y.o. male.    Chief Complaint: Rectal Cancer    HPI established patient.  History of transanal excision for a T1 rectal cancer in 2016.  Presents today for surveillance.  Over the past year he has developed ascites which requires weekly paracentesis.  He denies change in bowel habits, rectal bleeding, anal, rectal, or pelvic pain    Review of Systems   Constitutional: Negative for chills and fever.   Respiratory: Positive for shortness of breath. Negative for cough.    Cardiovascular: Negative for chest pain and palpitations.   Gastrointestinal: Positive for abdominal distention. Negative for nausea and vomiting.   Genitourinary: Negative for dysuria and urgency.   Neurological: Negative for seizures and numbness.       Objective:      Physical Exam   Constitutional: He is oriented to person, place, and time.   Eyes: Conjunctivae and EOM are normal.   Pulmonary/Chest: Effort normal. No respiratory distress.   Abdominal: He exhibits distension. There is no tenderness.   Genitourinary:   Genitourinary Comments: Anal rectal and sigmoidoscopy.    Verbal consent.  2 enema prep.  Scope number [id number].  Scope was inserted via the anus and inserted to the mid sigmoid.  Quality of the prep was good scope was withdrawn slowly and mucosal service inspected.  There is scarring tattoo in the area of the transanal excision without evidence of neoplasia.  Procedure was well tolerated.  Patient is discharged home with escort.   Musculoskeletal: Normal range of motion. He exhibits no edema.   Neurological: He is alert and oriented to person, place, and time.   Skin: Skin is warm and dry.       Assessment:       1. History of rectal cancer        Plan:       No evidence of recurrence.   Follow-up 1 year for surveillance sigmoidoscopy - if medically fit.

## 2018-03-07 DIAGNOSIS — K74.60 CIRRHOSIS OF LIVER WITH ASCITES, UNSPECIFIED HEPATIC CIRRHOSIS TYPE: ICD-10-CM

## 2018-03-07 DIAGNOSIS — R18.8 CIRRHOSIS OF LIVER WITH ASCITES, UNSPECIFIED HEPATIC CIRRHOSIS TYPE: ICD-10-CM

## 2018-03-08 DIAGNOSIS — R18.8 OTHER ASCITES: Primary | ICD-10-CM

## 2018-03-08 RX ORDER — LACTULOSE 10 G/15ML
SOLUTION ORAL; RECTAL
Qty: 2838 ML | Refills: 5 | Status: SHIPPED | OUTPATIENT
Start: 2018-03-08 | End: 2019-05-02 | Stop reason: SDUPTHER

## 2018-03-08 NOTE — TELEPHONE ENCOUNTER
Received a call from patient saying that patient usually do CBC and PT-INR labs once a month. Since patient will have blood test drawn tomorrow she want the INR to be done also so he will get lab one time only linked PT-INR to his appt tomorrow. Next one will be next month. Sun

## 2018-03-09 ENCOUNTER — HOSPITAL ENCOUNTER (OUTPATIENT)
Dept: INTERVENTIONAL RADIOLOGY/VASCULAR | Facility: HOSPITAL | Age: 78
Discharge: HOME | End: 2018-03-09
Attending: INTERNAL MEDICINE
Payer: MEDICARE

## 2018-03-09 VITALS — RESPIRATION RATE: 18 BRPM | DIASTOLIC BLOOD PRESSURE: 74 MMHG | SYSTOLIC BLOOD PRESSURE: 169 MMHG | HEART RATE: 64 BPM

## 2018-03-09 DIAGNOSIS — K74.60 LIVER CIRRHOSIS SECONDARY TO NASH: ICD-10-CM

## 2018-03-09 DIAGNOSIS — K75.81 LIVER CIRRHOSIS SECONDARY TO NASH: ICD-10-CM

## 2018-03-09 DIAGNOSIS — K74.60 CIRRHOSIS OF LIVER WITH ASCITES, UNSPECIFIED HEPATIC CIRRHOSIS TYPE: ICD-10-CM

## 2018-03-09 DIAGNOSIS — R18.8 OTHER ASCITES: ICD-10-CM

## 2018-03-09 DIAGNOSIS — R18.8 CIRRHOSIS OF LIVER WITH ASCITES, UNSPECIFIED HEPATIC CIRRHOSIS TYPE: ICD-10-CM

## 2018-03-09 LAB
APPEARANCE FLD: NORMAL
BODY FLD TYPE: NORMAL
COLOR FLD: YELLOW
LYMPHOCYTES NFR FLD MANUAL: 52 %
MONOS+MACROS NFR FLD MANUAL: 46 %
NEUTROPHILS NFR FLD MANUAL: 2 %
WBC # FLD: 76 /CU MM

## 2018-03-09 PROCEDURE — 49083 ABD PARACENTESIS W/IMAGING: CPT | Mod: GC,,, | Performed by: RADIOLOGY

## 2018-03-09 PROCEDURE — 49083 ABD PARACENTESIS W/IMAGING: CPT

## 2018-03-09 PROCEDURE — 89051 BODY FLUID CELL COUNT: CPT

## 2018-03-09 PROCEDURE — 63600175 PHARM REV CODE 636 W HCPCS: Mod: JG | Performed by: INTERNAL MEDICINE

## 2018-03-09 PROCEDURE — P9047 ALBUMIN (HUMAN), 25%, 50ML: HCPCS | Mod: JG | Performed by: INTERNAL MEDICINE

## 2018-03-09 PROCEDURE — A7048 VACUUM DRAIN BOTTLE/TUBE KIT: HCPCS

## 2018-03-09 RX ORDER — ALBUMIN HUMAN 250 G/1000ML
12.5 SOLUTION INTRAVENOUS
Status: DISCONTINUED | OUTPATIENT
Start: 2018-03-09 | End: 2018-03-10 | Stop reason: HOSPADM

## 2018-03-09 RX ADMIN — ALBUMIN (HUMAN) 12.5 G: 25 SOLUTION INTRAVENOUS at 02:03

## 2018-03-09 NOTE — DISCHARGE INSTRUCTIONS
For scheduling: Call Mackenzie at [phone]    For questions or concerns call: [facility] 8 AM- 5PM [phone]. Radiology resident on call [phone].    For immediate concerns that are not emergent, you may call our radiology clinic at: [phone]

## 2018-03-09 NOTE — H&P
Radiology History & Physical      SUBJECTIVE:     Chief Complaint: Ascites    History of Present Illness:  Kenneth Sheikh is a 78 y.o. male who presents for paracentesis  Past Medical History:   Diagnosis Date    Anticoagulant long-term use     Bipolar 1 disorder     Bipolar 1 disorder 11/24/2016    bipolar    Cancer     history of skin cancer/sinus cancer & rectal cancer    Depressed bipolar affective disorder     Diabetes mellitus     type 2    EBV infection 12/7/2016    EBV DNA, PCR Latest Ref Range: None detected  None detected EBV DNA-Copies/mL Unknown Test Not Performed EBV Early Antigen Ab, IgG Latest Ref Range: <1:10 Titer 1:40 (A) EBV Nuclear Ag Ab Latest Ref Range: <1:5 Titer >=1:80 (A) EBV VCA IgG Latest Ref Range: <1:10 Titer 1:160 (A) EBV VCA IgM Latest Ref Range: <1:10 Titer <1:10     History of TIA (transient ischemic attack)     several-taking Plavix    Hx of gallstones     Wife denies    Hypertension     Hyperthyroidism 11/30/2016    Low HDL (under 40) 12/7/2016    Mixed hyperlipidemia 11/23/2016    JUAN MANUEL (obstructive sleep apnea)     Pneumonia     Skin disease     Stroke     Transient cerebral ischemia 7/22/2016    Type 2 diabetes mellitus      Past Surgical History:   Procedure Laterality Date    Moh's procedure x4      rectal cancer      Sinus surgery for sinus cancer      sinus sx for cancer      skin cancer removed-several times-nose & ear      TONSILLECTOMY      Undescened testicle sx      UVULOPALATOPHARYNGOPLASTY      for sleep apnea       Home Meds:   Prior to Admission medications    Medication Sig Start Date End Date Taking? Authorizing Provider   ACCU-CHEK NEYDA PLUS TEST STRP Strp  2/23/17   Historical Provider, MD   ciprofloxacin HCl (CIPRO) 500 MG tablet Take 1 tablet (500 mg total) by mouth every Monday. 5/29/17   Renato Womack MD   divalproex (DEPAKOTE) 250 MG EC tablet  5/5/17   Historical Provider, MD   fluorouracil (EFUDEX) 5 % cream Apply BID to  affected areas as directed 3/1/18   Kenn Beach MD   furosemide (LASIX) 20 MG tablet Take 4 tablets (80 mg total) by mouth once daily.  Patient taking differently: Take 40 mg by mouth once daily. Taking 40 mg per day 4/25/17 4/25/18  Renato Womack MD   lactulose (CHRONULAC) 10 gram/15 mL solution TAKE 15 MLS BY MOUTH TWICE DAILY 3/8/18   Renato Womack MD   rifAXIMin (XIFAXAN) 550 mg Tab Take 1 tablet (550 mg total) by mouth 2 (two) times daily. 10/3/17   Renato Womack MD   SITagliptan (JANUVIA) 50 MG Tab Take 1 tablet (50 mg total) by mouth once daily. 4/6/17   Prisca Espinoza MD     Anticoagulants/Antiplatelets: no anticoagulation    Allergies:   Review of patient's allergies indicates:   Allergen Reactions    Abilify [aripiprazole] Other (See Comments)     Sleepy, could not function.     Sedation History:  no adverse reactions    Review of Systems:   Hematological: no known coagulopathies  Respiratory: no shortness of breath  Cardiovascular: no chest pain  Gastrointestinal: no abdominal pain  Genito-Urinary: no dysuria  Musculoskeletal: negative  Neurological: no TIA or stroke symptoms         OBJECTIVE:     Vital Signs (Most Recent)  Pulse: 61 (03/09/18 1337)  Resp: 18 (03/09/18 1337)  BP: (!) 161/76 (03/09/18 1337)    Physical Exam:  ASA: 3  Mallampati: 3    General: no acute distress  Mental Status: alert and oriented to person, place and time  HEENT: normocephalic, atraumatic  Chest: unlabored breathing  Heart: regular heart rate  Abdomen: nondistended  Extremity: moves all extremities    Laboratory  Lab Results   Component Value Date    INR 1.0 02/16/2018       Lab Results   Component Value Date    WBC 3.56 (L) 03/09/2018    HGB 11.1 (L) 03/09/2018    HCT 34.0 (L) 03/09/2018    MCV 86 03/09/2018     (L) 03/09/2018      Lab Results   Component Value Date     (H) 02/02/2018     02/02/2018    K 4.0 02/02/2018     02/02/2018    CO2 29 02/02/2018    BUN 25  (H) 02/02/2018    CREATININE 1.5 (H) 02/02/2018    CALCIUM 8.6 (L) 02/02/2018    MG 1.7 05/15/2017    ALT 9 (L) 02/02/2018    AST 21 02/02/2018    ALBUMIN 2.2 (L) 02/02/2018    BILITOT 0.7 02/02/2018       ASSESSMENT/PLAN:     Sedation Plan: none  Patient will undergo paracentesis.    Abhi Pelletier MD  Radiology

## 2018-03-09 NOTE — PROGRESS NOTES
Paracentesis complete. 4000 mLs peritoneal fluid drained. Pt tolerated well. Dressing to rlq clean, dry, and intact. Albumin 25% given 100 mLs. Specimens sent per lab order. Pt given discharge instructions, verbalized understanding.  Pt to be brought to [facility] area via wheelchair.

## 2018-03-12 LAB — BACTERIA SPEC ANAEROBE CULT: NORMAL

## 2018-03-15 ENCOUNTER — OFFICE VISIT (OUTPATIENT)
Dept: DERMATOLOGY | Facility: CLINIC | Age: 78
End: 2018-03-15
Payer: MEDICARE

## 2018-03-15 DIAGNOSIS — L25.1 DERMATITIS MEDICAMENTOSA (DRUG APPLIED TO SKIN): Primary | ICD-10-CM

## 2018-03-15 DIAGNOSIS — T49.95XA DERMATITIS MEDICAMENTOSA (DRUG APPLIED TO SKIN): Primary | ICD-10-CM

## 2018-03-15 PROCEDURE — 99999 PR PBB SHADOW E&M-EST. PATIENT-LVL III: CPT | Mod: PBBFAC,,, | Performed by: DERMATOLOGY

## 2018-03-15 PROCEDURE — 99213 OFFICE O/P EST LOW 20 MIN: CPT | Mod: PBBFAC | Performed by: DERMATOLOGY

## 2018-03-15 PROCEDURE — 99212 OFFICE O/P EST SF 10 MIN: CPT | Mod: S$PBB,,, | Performed by: DERMATOLOGY

## 2018-03-15 NOTE — PROGRESS NOTES
CHIEF COMPLAINT: followup treatment with topical fluoruracil    The patient is accompanied to this visit by his wife.    HISTORY OF PRESENT ILLNESS:  Location(s): face  Timing: I last saw him 2 weeks ago  Duration: He has been applying medication for the last 2 weeks  Quality: some irritation; not bad  Context: see previous notes; he used the medication about 2 weeks and had a good deal of irritation, so he stopped for about a month and restarted 2 weeks ago    ROS:   Skin: status post Mohs surgery to the area of invasive SCC on the right cheek on 11/20  status post biopsy to other lesions as noted below  See the previous notes; started treatment for multifocal actinic keratosis and the lesions noted  His wife also notes spots on his right forearm and left upper arm, and on his scalp    Prior path  FINAL PATHOLOGIC DIAGNOSIS  1. Skin, left preauricular cheek, shave biopsy:  - SQUAMOUS CELL CARCINOMA IN SITU.  - THE TUMOR EXTENDS TO THE LATERAL BIOPSY MARGINS.    2. Skin, right jawline, shave biopsy:  - INVASIVE SQUAMOUS CELL CARCINOMA WITH FOCAL BASALOID AND CLEAR CELL DIFFERENTIATION.  - THE TUMOR EXTENDS TO THE LATERAL BIOPSY MARGINS.    3. Skin, right forehead, shave biopsy:  - SQUAMOUS CELL CARCINOMA IN SITU.  - THE TUMOR EXTENDS TO THE LATERAL BIOPSY MARGINS.    4. Skin, right frontal scalp, shave biopsy:  - SQUAMOUS CELL CARCINOMA IN SITU.  - MARGINS ARE NEGATIVE FOR FULL THICKNESS SQUAMOUS ATYPIA IN THE PLANES OF SECTION.                                      EXAM:  Constitutional  General appearance: well-developed, well-nourished, well-kempt older white male    Eyes  Inspection of conjunctivae and lids reveals no abnormalities  Neurologic/Psychiatric  Alert,  normal orientation to time, place, person  Normal mood and affect with no evidence of depression, anxiety, agitation  Skin: see photos below  Head: there is some mild to moderate eczematous changes on the right and left cheek and forehead; there is an  approximately 1.5 cm eschar to the left temporal scalp; there are some scattered lesions on his scalp consistent with actinic keratoses  Right upper extremity: there are several pink, scaly plaques consistent with actinic keratosis vs squamous cell carcinoma in situ   Left upper extremity: there is an approximately 1 cm pink, crusted plaque on his left upper arm                ASSESSMENT:   dermatitis medicamentosa, face  Areas of squamous cell carcinoma in situ as noted above, previously biopsied  actinic keratosis vs squamous cell carcinoma in situ, right forearm and left arm    PLAN:  Current status and management options, and risks, benefits, and alternatives were discussed.  Continue application of Efudex  Follow-up 2 weeks; call as needed sooner  --------------------------------------  Note: Some or all of this note may have been generated using voice recognition software. There may be voice recognition errors including grammatical and/or spelling errors found in the text. Attempts were made to correct these errors prior to signature.

## 2018-03-16 ENCOUNTER — HOSPITAL ENCOUNTER (OUTPATIENT)
Dept: INTERVENTIONAL RADIOLOGY/VASCULAR | Facility: HOSPITAL | Age: 78
Discharge: HOME | End: 2018-03-16
Attending: INTERNAL MEDICINE
Payer: MEDICARE

## 2018-03-16 VITALS
SYSTOLIC BLOOD PRESSURE: 147 MMHG | RESPIRATION RATE: 16 BRPM | DIASTOLIC BLOOD PRESSURE: 67 MMHG | HEART RATE: 56 BPM | OXYGEN SATURATION: 99 %

## 2018-03-16 DIAGNOSIS — R18.8 OTHER ASCITES: ICD-10-CM

## 2018-03-16 LAB
APPEARANCE FLD: CLEAR
BODY FLD TYPE: NORMAL
COLOR FLD: YELLOW
LYMPHOCYTES NFR FLD MANUAL: 48 %
MONOS+MACROS NFR FLD MANUAL: 52 %
WBC # FLD: 112 /CU MM

## 2018-03-16 PROCEDURE — 63600175 PHARM REV CODE 636 W HCPCS: Mod: JG | Performed by: INTERNAL MEDICINE

## 2018-03-16 PROCEDURE — 89051 BODY FLUID CELL COUNT: CPT

## 2018-03-16 PROCEDURE — 49083 ABD PARACENTESIS W/IMAGING: CPT

## 2018-03-16 PROCEDURE — 49083 ABD PARACENTESIS W/IMAGING: CPT | Mod: ,,, | Performed by: RADIOLOGY

## 2018-03-16 PROCEDURE — A7048 VACUUM DRAIN BOTTLE/TUBE KIT: HCPCS

## 2018-03-16 PROCEDURE — P9047 ALBUMIN (HUMAN), 25%, 50ML: HCPCS | Mod: JG | Performed by: INTERNAL MEDICINE

## 2018-03-16 RX ORDER — ALBUMIN HUMAN 250 G/1000ML
25 SOLUTION INTRAVENOUS ONCE
Status: COMPLETED | OUTPATIENT
Start: 2018-03-16 | End: 2018-03-16

## 2018-03-16 RX ADMIN — ALBUMIN (HUMAN) 25 G: 25 SOLUTION INTRAVENOUS at 03:03

## 2018-03-16 NOTE — H&P
Radiology History & Physical      SUBJECTIVE:     Chief Complaint: Ascites    History of Present Illness:  Kenneth Sheikh is a 78 y.o. male with rectal cancer who presents for a therapeutic diagnosis.     Past Medical History:   Diagnosis Date    Anticoagulant long-term use     Bipolar 1 disorder     Bipolar 1 disorder 11/24/2016    bipolar    Cancer     history of skin cancer/sinus cancer & rectal cancer    Depressed bipolar affective disorder     Diabetes mellitus     type 2    EBV infection 12/7/2016    EBV DNA, PCR Latest Ref Range: None detected  None detected EBV DNA-Copies/mL Unknown Test Not Performed EBV Early Antigen Ab, IgG Latest Ref Range: <1:10 Titer 1:40 (A) EBV Nuclear Ag Ab Latest Ref Range: <1:5 Titer >=1:80 (A) EBV VCA IgG Latest Ref Range: <1:10 Titer 1:160 (A) EBV VCA IgM Latest Ref Range: <1:10 Titer <1:10     History of TIA (transient ischemic attack)     several-taking Plavix    Hx of gallstones     Wife denies    Hypertension     Hyperthyroidism 11/30/2016    Low HDL (under 40) 12/7/2016    Mixed hyperlipidemia 11/23/2016    JUAN MANUEL (obstructive sleep apnea)     Pneumonia     Skin disease     Stroke     Transient cerebral ischemia 7/22/2016    Type 2 diabetes mellitus      Past Surgical History:   Procedure Laterality Date    Moh's procedure x4      rectal cancer      Sinus surgery for sinus cancer      sinus sx for cancer      skin cancer removed-several times-nose & ear      TONSILLECTOMY      Undescened testicle sx      UVULOPALATOPHARYNGOPLASTY      for sleep apnea       Home Meds:   Prior to Admission medications    Medication Sig Start Date End Date Taking? Authorizing Provider   ACCU-CHEK NEYDA PLUS TEST STRP Strp  2/23/17   Historical Provider, MD   ciprofloxacin HCl (CIPRO) 500 MG tablet Take 1 tablet (500 mg total) by mouth every Monday. 5/29/17   Renato Womack MD   divalproex (DEPAKOTE) 250 MG EC tablet  5/5/17   Historical Provider, MD   fluorouracil  (EFUDEX) 5 % cream Apply BID to affected areas as directed 3/1/18   Kenn Beach MD   furosemide (LASIX) 20 MG tablet Take 4 tablets (80 mg total) by mouth once daily.  Patient taking differently: Take 40 mg by mouth once daily. Taking 40 mg per day 4/25/17 4/25/18  Renato Womack MD   lactulose (CHRONULAC) 10 gram/15 mL solution TAKE 15 MLS BY MOUTH TWICE DAILY 3/8/18   Renato Womack MD   rifAXIMin (XIFAXAN) 550 mg Tab Take 1 tablet (550 mg total) by mouth 2 (two) times daily. 10/3/17   Renato Womack MD   SITagliptan (JANUVIA) 50 MG Tab Take 1 tablet (50 mg total) by mouth once daily. 4/6/17   Prisca Espinoza MD     Anticoagulants/Antiplatelets: no anticoagulation    Allergies:   Review of patient's allergies indicates:   Allergen Reactions    Abilify [aripiprazole] Other (See Comments)     Sleepy, could not function.     Sedation History:  no adverse reactions    Review of Systems:   Hematological: negative  no known coagulopathies  Respiratory: no shortness of breath  Cardiovascular: no chest pain  Gastrointestinal: no abdominal pain  Genito-Urinary: no dysuria  Musculoskeletal: negative  Neurological: no TIA or stroke symptoms         OBJECTIVE:     Vital Signs (Most Recent)  Pulse: (!) 54 (03/16/18 1328)  Resp: 16 (03/16/18 1328)  BP: (!) 160/74 (03/16/18 1328)  SpO2: 97 % (03/16/18 1328)    Physical Exam:  General: no acute distress  Mental Status: alert and oriented to person, place and time  HEENT: normocephalic, atraumatic  Chest: unlabored breathing  Abdomen: nondistended  Extremity: moves all extremities    Laboratory  Lab Results   Component Value Date    INR 1.1 03/09/2018       Lab Results   Component Value Date    WBC 3.56 (L) 03/09/2018    HGB 11.1 (L) 03/09/2018    HCT 34.0 (L) 03/09/2018    MCV 86 03/09/2018     (L) 03/09/2018      Lab Results   Component Value Date     (H) 02/02/2018     02/02/2018    K 4.0 02/02/2018     02/02/2018    CO2 29  02/02/2018    BUN 25 (H) 02/02/2018    CREATININE 1.5 (H) 02/02/2018    CALCIUM 8.6 (L) 02/02/2018    MG 1.7 05/15/2017    ALT 9 (L) 02/02/2018    AST 21 02/02/2018    ALBUMIN 2.2 (L) 02/02/2018    BILITOT 0.7 02/02/2018       ASSESSMENT/PLAN:     Sedation Plan: Local.   Patient will undergo a therapeutic paracentesis.    Fco Borrero M.D.  Radiology, PGY-V  [phone]

## 2018-03-16 NOTE — PROCEDURES
Radiology Post-Procedure Note    Pre Op Diagnosis: Ascites  Post Op Diagnosis: Same    Procedure: Paracentesis    Procedure performed by: Daniel Lainez MD    Written Informed Consent Obtained: Yes  Specimen Removed: YES   Estimated Blood Loss: Minimal    Findings:   Successful paracentesis.  Albumin administered PRN per protocol.    Patient tolerated procedure well.    Fco Borrero M.D.  Radiology, PGY-V  [phone]

## 2018-03-16 NOTE — DISCHARGE INSTRUCTIONS
For scheduling: Call Mackenzie at [phone]    For questions or concerns call: [facility] 8 AM- 5PM [phone]. Radiology resident on call [phone].    For immediate concerns that are not emergent, you may call our radiology clinic at: [phone]

## 2018-03-16 NOTE — PROGRESS NOTES
Paracentesis complete, 4100 MLs removed. Specimen sent to lab. Dressing applied to right abdomen puncture site, dressing clean dry and intact. Pt given discharge instructions and handouts, pt verbalizes understanding. Questions answered. Pt denies pain and discomfort. Pt transported to lobby via wheelchair.

## 2018-03-23 ENCOUNTER — HOSPITAL ENCOUNTER (OUTPATIENT)
Dept: INTERVENTIONAL RADIOLOGY/VASCULAR | Facility: HOSPITAL | Age: 78
Discharge: HOME | End: 2018-03-23
Attending: INTERNAL MEDICINE
Payer: MEDICARE

## 2018-03-23 VITALS
HEART RATE: 65 BPM | DIASTOLIC BLOOD PRESSURE: 54 MMHG | OXYGEN SATURATION: 100 % | RESPIRATION RATE: 18 BRPM | SYSTOLIC BLOOD PRESSURE: 118 MMHG

## 2018-03-23 DIAGNOSIS — K75.81 LIVER CIRRHOSIS SECONDARY TO NASH (NONALCOHOLIC STEATOHEPATITIS): ICD-10-CM

## 2018-03-23 DIAGNOSIS — K74.60 LIVER CIRRHOSIS SECONDARY TO NASH (NONALCOHOLIC STEATOHEPATITIS): ICD-10-CM

## 2018-03-23 DIAGNOSIS — R18.8 OTHER ASCITES: ICD-10-CM

## 2018-03-23 LAB
APPEARANCE FLD: NORMAL
BODY FLD TYPE: NORMAL
COLOR FLD: YELLOW
LYMPHOCYTES NFR FLD MANUAL: 57 %
MONOS+MACROS NFR FLD MANUAL: 38 %
NEUTROPHILS NFR FLD MANUAL: 5 %
WBC # FLD: 78 /CU MM

## 2018-03-23 PROCEDURE — 49083 ABD PARACENTESIS W/IMAGING: CPT | Mod: ,,, | Performed by: FAMILY MEDICINE

## 2018-03-23 PROCEDURE — 49083 ABD PARACENTESIS W/IMAGING: CPT

## 2018-03-23 PROCEDURE — 89051 BODY FLUID CELL COUNT: CPT

## 2018-03-23 RX ORDER — ALBUMIN HUMAN 250 G/1000ML
25 SOLUTION INTRAVENOUS ONCE
Status: DISCONTINUED | OUTPATIENT
Start: 2018-03-23 | End: 2018-03-24 | Stop reason: HOSPADM

## 2018-03-23 NOTE — H&P
Radiology History & Physical      SUBJECTIVE:     Chief Complaint: ascites    History of Present Illness:  Kenneth Sheikh is a 78 y.o. male who presents for ultrasound guided paracentesis  Past Medical History:   Diagnosis Date    Anticoagulant long-term use     Bipolar 1 disorder     Bipolar 1 disorder 11/24/2016    bipolar    Cancer     history of skin cancer/sinus cancer & rectal cancer    Depressed bipolar affective disorder     Diabetes mellitus     type 2    EBV infection 12/7/2016    EBV DNA, PCR Latest Ref Range: None detected  None detected EBV DNA-Copies/mL Unknown Test Not Performed EBV Early Antigen Ab, IgG Latest Ref Range: <1:10 Titer 1:40 (A) EBV Nuclear Ag Ab Latest Ref Range: <1:5 Titer >=1:80 (A) EBV VCA IgG Latest Ref Range: <1:10 Titer 1:160 (A) EBV VCA IgM Latest Ref Range: <1:10 Titer <1:10     History of TIA (transient ischemic attack)     several-taking Plavix    Hx of gallstones     Wife denies    Hypertension     Hyperthyroidism 11/30/2016    Low HDL (under 40) 12/7/2016    Mixed hyperlipidemia 11/23/2016    JUAN MANUEL (obstructive sleep apnea)     Pneumonia     Skin disease     Stroke     Transient cerebral ischemia 7/22/2016    Type 2 diabetes mellitus      Past Surgical History:   Procedure Laterality Date    Moh's procedure x4      rectal cancer      Sinus surgery for sinus cancer      sinus sx for cancer      skin cancer removed-several times-nose & ear      TONSILLECTOMY      Undescened testicle sx      UVULOPALATOPHARYNGOPLASTY      for sleep apnea       Home Meds:   Prior to Admission medications    Medication Sig Start Date End Date Taking? Authorizing Provider   ACCU-CHEK NEYDA PLUS TEST STRP Strp  2/23/17   Historical Provider, MD   ciprofloxacin HCl (CIPRO) 500 MG tablet Take 1 tablet (500 mg total) by mouth every Monday. 5/29/17   Renato Womack MD   divalproex (DEPAKOTE) 250 MG EC tablet  5/5/17   Historical Provider, MD   fluorouracil (EFUDEX) 5 % cream  Apply BID to affected areas as directed 3/1/18   Kenn Beach MD   furosemide (LASIX) 20 MG tablet Take 4 tablets (80 mg total) by mouth once daily.  Patient taking differently: Take 40 mg by mouth once daily. Taking 40 mg per day 4/25/17 4/25/18  Renato Womack MD   lactulose (CHRONULAC) 10 gram/15 mL solution TAKE 15 MLS BY MOUTH TWICE DAILY 3/8/18   Renato Womack MD   rifAXIMin (XIFAXAN) 550 mg Tab Take 1 tablet (550 mg total) by mouth 2 (two) times daily. 10/3/17   Renato Womack MD   SITagliptan (JANUVIA) 50 MG Tab Take 1 tablet (50 mg total) by mouth once daily. 4/6/17   Prisca Espinoza MD     Anticoagulants/Antiplatelets: no anticoagulation    Allergies:   Review of patient's allergies indicates:   Allergen Reactions    Abilify [aripiprazole] Other (See Comments)     Sleepy, could not function.     Sedation History:  no adverse reactions    Review of Systems:   Hematological: no known coagulopathies  Respiratory: no shortness of breath  Cardiovascular: no chest pain  Gastrointestinal: no abdominal pain  Genito-Urinary: no dysuria  Musculoskeletal: negative  Neurological: no TIA or stroke symptoms         OBJECTIVE:     Vital Signs (Most Recent)       Physical Exam:  ASA: 2  Mallampati: n/a    General: no acute distress  Mental Status: alert and oriented to person, place and time  HEENT: normocephalic, atraumatic  Chest: unlabored breathing  Heart: regular heart rate  Abdomen: distended  Extremity: moves all extremities    Laboratory  Lab Results   Component Value Date    INR 1.1 03/09/2018       Lab Results   Component Value Date    WBC 3.56 (L) 03/09/2018    HGB 11.1 (L) 03/09/2018    HCT 34.0 (L) 03/09/2018    MCV 86 03/09/2018     (L) 03/09/2018      Lab Results   Component Value Date     (H) 02/02/2018     02/02/2018    K 4.0 02/02/2018     02/02/2018    CO2 29 02/02/2018    BUN 25 (H) 02/02/2018    CREATININE 1.5 (H) 02/02/2018    CALCIUM 8.6 (L)  02/02/2018    MG 1.7 05/15/2017    ALT 9 (L) 02/02/2018    AST 21 02/02/2018    ALBUMIN 2.2 (L) 02/02/2018    BILITOT 0.7 02/02/2018       ASSESSMENT/PLAN:     Sedation Plan: local  Patient will undergo ultrasound guided paracentesis.    MATT Mauricio, FNP  Interventional Radiology  [phone] [employer]

## 2018-03-23 NOTE — PROGRESS NOTES
Paracentesis complete, 51666 MLs removed. Specimen sent to lab. Albumin administered per protocol. Dressing applied to LUQ puncture site, dressing clean dry and intact. Pt given discharge instructions and handouts, pt verbalizes understanding. Questions answered. Pt denies pain and discomfort. Pt wheeled to lobby

## 2018-03-29 ENCOUNTER — OFFICE VISIT (OUTPATIENT)
Dept: DERMATOLOGY | Facility: CLINIC | Age: 78
End: 2018-03-29
Payer: MEDICARE

## 2018-03-29 ENCOUNTER — HOSPITAL ENCOUNTER (OUTPATIENT)
Dept: INTERVENTIONAL RADIOLOGY/VASCULAR | Facility: HOSPITAL | Age: 78
Discharge: HOME | End: 2018-03-29
Attending: INTERNAL MEDICINE
Payer: MEDICARE

## 2018-03-29 VITALS
OXYGEN SATURATION: 99 % | HEART RATE: 57 BPM | SYSTOLIC BLOOD PRESSURE: 146 MMHG | RESPIRATION RATE: 18 BRPM | DIASTOLIC BLOOD PRESSURE: 65 MMHG

## 2018-03-29 DIAGNOSIS — R18.8 OTHER ASCITES: ICD-10-CM

## 2018-03-29 DIAGNOSIS — D04.30 SQUAMOUS CELL CARCINOMA IN SITU OF SKIN OF FACE: ICD-10-CM

## 2018-03-29 DIAGNOSIS — L57.0 ACTINIC KERATOSIS: ICD-10-CM

## 2018-03-29 DIAGNOSIS — K75.81 LIVER CIRRHOSIS SECONDARY TO NASH (NONALCOHOLIC STEATOHEPATITIS): ICD-10-CM

## 2018-03-29 DIAGNOSIS — L25.1 DERMATITIS MEDICAMENTOSA (DRUG APPLIED TO SKIN): Primary | ICD-10-CM

## 2018-03-29 DIAGNOSIS — T49.95XA DERMATITIS MEDICAMENTOSA (DRUG APPLIED TO SKIN): Primary | ICD-10-CM

## 2018-03-29 DIAGNOSIS — K74.60 LIVER CIRRHOSIS SECONDARY TO NASH (NONALCOHOLIC STEATOHEPATITIS): ICD-10-CM

## 2018-03-29 LAB
APPEARANCE FLD: CLEAR
BODY FLD TYPE: NORMAL
COLOR FLD: YELLOW
GRAM STN SPEC: NORMAL
LYMPHOCYTES NFR FLD MANUAL: 67 %
MONOS+MACROS NFR FLD MANUAL: 29 %
NEUTROPHILS NFR FLD MANUAL: 4 %
WBC # FLD: 71 /CU MM

## 2018-03-29 PROCEDURE — 99999 PR PBB SHADOW E&M-EST. PATIENT-LVL II: CPT | Mod: PBBFAC,,, | Performed by: DERMATOLOGY

## 2018-03-29 PROCEDURE — 49083 ABD PARACENTESIS W/IMAGING: CPT

## 2018-03-29 PROCEDURE — 87205 SMEAR GRAM STAIN: CPT

## 2018-03-29 PROCEDURE — 63600175 PHARM REV CODE 636 W HCPCS: Mod: JG | Performed by: INTERNAL MEDICINE

## 2018-03-29 PROCEDURE — 87070 CULTURE OTHR SPECIMN AEROBIC: CPT

## 2018-03-29 PROCEDURE — P9047 ALBUMIN (HUMAN), 25%, 50ML: HCPCS | Mod: JG | Performed by: INTERNAL MEDICINE

## 2018-03-29 PROCEDURE — 49083 ABD PARACENTESIS W/IMAGING: CPT | Mod: ,,, | Performed by: FAMILY MEDICINE

## 2018-03-29 PROCEDURE — 89051 BODY FLUID CELL COUNT: CPT

## 2018-03-29 PROCEDURE — 99212 OFFICE O/P EST SF 10 MIN: CPT | Mod: S$PBB,,, | Performed by: DERMATOLOGY

## 2018-03-29 PROCEDURE — 87075 CULTR BACTERIA EXCEPT BLOOD: CPT

## 2018-03-29 PROCEDURE — 99212 OFFICE O/P EST SF 10 MIN: CPT | Mod: PBBFAC,25 | Performed by: DERMATOLOGY

## 2018-03-29 RX ORDER — ALBUMIN HUMAN 250 G/1000ML
25 SOLUTION INTRAVENOUS CONTINUOUS PRN
Status: DISCONTINUED | OUTPATIENT
Start: 2018-03-29 | End: 2018-03-30 | Stop reason: HOSPADM

## 2018-03-29 RX ADMIN — ALBUMIN (HUMAN) 25 G: 25 SOLUTION INTRAVENOUS at 01:03

## 2018-03-29 NOTE — PROGRESS NOTES
Paracentesis complete, 4200 MLs removed. Specimen sent to lab. 100 ml Albumin administered per protocol. Dressing applied to LLQ puncture site, dressing clean dry and intact. Pt given discharge instructions and handouts, pt verbalizes understanding. Questions answered. Pt denies pain and discomfort. Pt refusing transport. Pt wheeled to lobby for transport home with family.

## 2018-03-29 NOTE — PROCEDURES
Radiology Post-Procedure Note    Pre Op Diagnosis: Ascites  Post Op Diagnosis: Same    Procedure: Ultrasound Guided Paracentesis    Procedure performed by: [place] ROSS, Елена     Written Informed Consent Obtained: Yes  Specimen Removed: YES cloudy yellow  Estimated Blood Loss: Minimal    Findings:   Successful paracentesis.  Albumin administered PRN per protocol.    Patient tolerated procedure well.    Елена Comer, APRN, FNP  Interventional Radiology  [phone] [employer]

## 2018-03-29 NOTE — H&P
Radiology History & Physical      SUBJECTIVE:     Chief Complaint: ascites    History of Present Illness:  Kenneth Sheikh is a 78 y.o. male who presents for ultrasound guided paracentesis  Past Medical History:   Diagnosis Date    Anticoagulant long-term use     Bipolar 1 disorder     Bipolar 1 disorder 11/24/2016    bipolar    Cancer     history of skin cancer/sinus cancer & rectal cancer    Depressed bipolar affective disorder     Diabetes mellitus     type 2    EBV infection 12/7/2016    EBV DNA, PCR Latest Ref Range: None detected  None detected EBV DNA-Copies/mL Unknown Test Not Performed EBV Early Antigen Ab, IgG Latest Ref Range: <1:10 Titer 1:40 (A) EBV Nuclear Ag Ab Latest Ref Range: <1:5 Titer >=1:80 (A) EBV VCA IgG Latest Ref Range: <1:10 Titer 1:160 (A) EBV VCA IgM Latest Ref Range: <1:10 Titer <1:10     History of TIA (transient ischemic attack)     several-taking Plavix    Hx of gallstones     Wife denies    Hypertension     Hyperthyroidism 11/30/2016    Low HDL (under 40) 12/7/2016    Mixed hyperlipidemia 11/23/2016    JUAN MANUEL (obstructive sleep apnea)     Pneumonia     Skin disease     Stroke     Transient cerebral ischemia 7/22/2016    Type 2 diabetes mellitus      Past Surgical History:   Procedure Laterality Date    Moh's procedure x4      rectal cancer      Sinus surgery for sinus cancer      sinus sx for cancer      skin cancer removed-several times-nose & ear      TONSILLECTOMY      Undescened testicle sx      UVULOPALATOPHARYNGOPLASTY      for sleep apnea       Home Meds:   Prior to Admission medications    Medication Sig Start Date End Date Taking? Authorizing Provider   ACCU-CHEK NEYDA PLUS TEST STRP Strp  2/23/17   Historical Provider, MD   ciprofloxacin HCl (CIPRO) 500 MG tablet Take 1 tablet (500 mg total) by mouth every Monday. 5/29/17   Renato Womack MD   divalproex (DEPAKOTE) 250 MG EC tablet  5/5/17   Historical Provider, MD   fluorouracil (EFUDEX) 5 % cream  Apply BID to affected areas as directed 3/1/18   Kenn Beach MD   furosemide (LASIX) 20 MG tablet Take 4 tablets (80 mg total) by mouth once daily.  Patient taking differently: Take 40 mg by mouth once daily. Taking 40 mg per day 4/25/17 4/25/18  Renato Womack MD   lactulose (CHRONULAC) 10 gram/15 mL solution TAKE 15 MLS BY MOUTH TWICE DAILY 3/8/18   Renato Womack MD   rifAXIMin (XIFAXAN) 550 mg Tab Take 1 tablet (550 mg total) by mouth 2 (two) times daily. 10/3/17   Renato Womack MD   SITagliptan (JANUVIA) 50 MG Tab Take 1 tablet (50 mg total) by mouth once daily. 4/6/17   Prisca Espinoza MD     Anticoagulants/Antiplatelets: no anticoagulation    Allergies:   Review of patient's allergies indicates:   Allergen Reactions    Abilify [aripiprazole] Other (See Comments)     Sleepy, could not function.     Sedation History:  no adverse reactions    Review of Systems:   Hematological: no known coagulopathies  Respiratory: no shortness of breath  Cardiovascular: no chest pain  Gastrointestinal: no abdominal pain  Genito-Urinary: no dysuria  Musculoskeletal: negative  Neurological: no TIA or stroke symptoms         OBJECTIVE:     Vital Signs (Most Recent)  Pulse: 95 (03/29/18 1225)  Resp: 18 (03/29/18 1225)  BP: (!) 143/66 (03/29/18 1225)  SpO2: 96 % (03/29/18 1225)    Physical Exam:  ASA: 2  Mallampati: n/a    General: no acute distress  Mental Status: alert and oriented to person, place and time  HEENT: normocephalic, atraumatic  Chest: unlabored breathing  Heart: regular heart rate  Abdomen: distended  Extremity: moves all extremities    Laboratory  Lab Results   Component Value Date    INR 1.1 03/09/2018       Lab Results   Component Value Date    WBC 3.56 (L) 03/09/2018    HGB 11.1 (L) 03/09/2018    HCT 34.0 (L) 03/09/2018    MCV 86 03/09/2018     (L) 03/09/2018      Lab Results   Component Value Date     (H) 02/02/2018     02/02/2018    K 4.0 02/02/2018      02/02/2018    CO2 29 02/02/2018    BUN 25 (H) 02/02/2018    CREATININE 1.5 (H) 02/02/2018    CALCIUM 8.6 (L) 02/02/2018    MG 1.7 05/15/2017    ALT 9 (L) 02/02/2018    AST 21 02/02/2018    ALBUMIN 2.2 (L) 02/02/2018    BILITOT 0.7 02/02/2018       ASSESSMENT/PLAN:     Sedation Plan: local  Patient will undergo ultrasound guided paracentesis.    MATT Mauricio, FNP  Interventional Radiology  [phone] [employer]

## 2018-03-29 NOTE — PROGRESS NOTES
CHIEF COMPLAINT: followup treatment with topical fluoruracil    The patient is accompanied to this visit by his wife.    HISTORY OF PRESENT ILLNESS:  Location(s): face, right arm, left upper arm, neck  Duration: has been applying the medication for 2 weeks this round  Quality: some irritation to the face; less so to the extremities  Context: he used the medication for 2 weeks in January; he had a very brisk reaction, and we discontinued treatment; at his followup 2 weeks ago most of the inflammation had resolved, and he restarted treatment on his face and upper extremities, but is not putting it on his forehead    ROS:   Skin: status post Mohs surgery to the area of invasive SCC on the right cheek on 11/20  status post biopsy to other lesions as noted below  See the previous notes; started treatment for multifocal actinic keratosis and the lesions noted    Prior path  FINAL PATHOLOGIC DIAGNOSIS  1. Skin, left preauricular cheek, shave biopsy:  - SQUAMOUS CELL CARCINOMA IN SITU.  - THE TUMOR EXTENDS TO THE LATERAL BIOPSY MARGINS.    2. Skin, right jawline, shave biopsy:  - INVASIVE SQUAMOUS CELL CARCINOMA WITH FOCAL BASALOID AND CLEAR CELL DIFFERENTIATION.  - THE TUMOR EXTENDS TO THE LATERAL BIOPSY MARGINS.    3. Skin, right forehead, shave biopsy:  - SQUAMOUS CELL CARCINOMA IN SITU.  - THE TUMOR EXTENDS TO THE LATERAL BIOPSY MARGINS.    4. Skin, right frontal scalp, shave biopsy:  - SQUAMOUS CELL CARCINOMA IN SITU.  - MARGINS ARE NEGATIVE FOR FULL THICKNESS SQUAMOUS ATYPIA IN THE PLANES OF SECTION.    EXAMINATION:  Skin: there are areas of near confluent mild to moderate eczematous changes on both cheeks; there is a crusted area on his left temporal scalp, and eczematous plaques on his right forearm and left upper arm, and more confetti-like changes on this right and left neck; typical in appearance for fluorouracil-induced dermatitis in areas of actinic keratosis/squamous cell carcinoma in situ     Photos from  visit on 3/15            ASSESSMENT:   Typical dermatitis due to application of fluoruracil to areas of application, as expected    PLAN:  Current status and management options, and risks, benefits, and alternatives were discussed.  discontinue application of fluorouracil x 2 weeks on the face, then restart, including on the forehead  Continue application to other areas if tolerated  Followup 3 weeks  Call PRN sooner  --------------------------------------  Note: Some or all of this note may have been generated using voice recognition software. There may be voice recognition errors including grammatical and/or spelling errors found in the text. Attempts were made to correct these errors prior to signature.

## 2018-04-01 LAB — BACTERIA SPEC AEROBE CULT: NO GROWTH

## 2018-04-05 LAB — BACTERIA SPEC ANAEROBE CULT: NORMAL

## 2018-04-06 ENCOUNTER — HOSPITAL ENCOUNTER (OUTPATIENT)
Dept: INTERVENTIONAL RADIOLOGY/VASCULAR | Facility: HOSPITAL | Age: 78
Discharge: HOME | End: 2018-04-06
Attending: INTERNAL MEDICINE
Payer: MEDICARE

## 2018-04-06 VITALS
DIASTOLIC BLOOD PRESSURE: 71 MMHG | RESPIRATION RATE: 18 BRPM | HEART RATE: 63 BPM | OXYGEN SATURATION: 100 % | SYSTOLIC BLOOD PRESSURE: 136 MMHG

## 2018-04-06 DIAGNOSIS — R18.8 OTHER ASCITES: ICD-10-CM

## 2018-04-06 DIAGNOSIS — K75.81 LIVER CIRRHOSIS SECONDARY TO NASH (NONALCOHOLIC STEATOHEPATITIS): ICD-10-CM

## 2018-04-06 DIAGNOSIS — K74.60 LIVER CIRRHOSIS SECONDARY TO NASH (NONALCOHOLIC STEATOHEPATITIS): ICD-10-CM

## 2018-04-06 LAB
APPEARANCE FLD: CLEAR
BODY FLD TYPE: NORMAL
COLOR FLD: YELLOW
GRAM STN SPEC: NORMAL
GRAM STN SPEC: NORMAL
LYMPHOCYTES NFR FLD MANUAL: 72 %
MONOS+MACROS NFR FLD MANUAL: 24 %
NEUTROPHILS NFR FLD MANUAL: 4 %
WBC # FLD: 66 /CU MM

## 2018-04-06 PROCEDURE — 49083 ABD PARACENTESIS W/IMAGING: CPT | Mod: ,,, | Performed by: FAMILY MEDICINE

## 2018-04-06 PROCEDURE — 87205 SMEAR GRAM STAIN: CPT

## 2018-04-06 PROCEDURE — 87070 CULTURE OTHR SPECIMN AEROBIC: CPT

## 2018-04-06 PROCEDURE — 87075 CULTR BACTERIA EXCEPT BLOOD: CPT

## 2018-04-06 PROCEDURE — 89051 BODY FLUID CELL COUNT: CPT

## 2018-04-06 PROCEDURE — 63600175 PHARM REV CODE 636 W HCPCS: Mod: JG | Performed by: INTERNAL MEDICINE

## 2018-04-06 PROCEDURE — 49083 ABD PARACENTESIS W/IMAGING: CPT

## 2018-04-06 PROCEDURE — P9047 ALBUMIN (HUMAN), 25%, 50ML: HCPCS | Mod: JG | Performed by: INTERNAL MEDICINE

## 2018-04-06 RX ORDER — ALBUMIN HUMAN 250 G/1000ML
25 SOLUTION INTRAVENOUS ONCE
Status: COMPLETED | OUTPATIENT
Start: 2018-04-06 | End: 2018-04-06

## 2018-04-06 RX ADMIN — ALBUMIN (HUMAN) 25 G: 25 SOLUTION INTRAVENOUS at 11:04

## 2018-04-06 NOTE — H&P
Radiology History & Physical      SUBJECTIVE:     Chief Complaint: ascites    History of Present Illness:  Kenneth Sheikh is a 78 y.o. male who presents for ultrasound guided paracentesis  Past Medical History:   Diagnosis Date    Anticoagulant long-term use     Bipolar 1 disorder     Bipolar 1 disorder 11/24/2016    bipolar    Cancer     history of skin cancer/sinus cancer & rectal cancer    Depressed bipolar affective disorder     Diabetes mellitus     type 2    EBV infection 12/7/2016    EBV DNA, PCR Latest Ref Range: None detected  None detected EBV DNA-Copies/mL Unknown Test Not Performed EBV Early Antigen Ab, IgG Latest Ref Range: <1:10 Titer 1:40 (A) EBV Nuclear Ag Ab Latest Ref Range: <1:5 Titer >=1:80 (A) EBV VCA IgG Latest Ref Range: <1:10 Titer 1:160 (A) EBV VCA IgM Latest Ref Range: <1:10 Titer <1:10     History of TIA (transient ischemic attack)     several-taking Plavix    Hx of gallstones     Wife denies    Hypertension     Hyperthyroidism 11/30/2016    Low HDL (under 40) 12/7/2016    Mixed hyperlipidemia 11/23/2016    JUAN MANUEL (obstructive sleep apnea)     Pneumonia     Skin disease     Stroke     Transient cerebral ischemia 7/22/2016    Type 2 diabetes mellitus      Past Surgical History:   Procedure Laterality Date    Moh's procedure x4      rectal cancer      Sinus surgery for sinus cancer      sinus sx for cancer      skin cancer removed-several times-nose & ear      TONSILLECTOMY      Undescened testicle sx      UVULOPALATOPHARYNGOPLASTY      for sleep apnea       Home Meds:   Prior to Admission medications    Medication Sig Start Date End Date Taking? Authorizing Provider   ACCU-CHEK NEYDA PLUS TEST STRP Strp  2/23/17   Historical Provider, MD   ciprofloxacin HCl (CIPRO) 500 MG tablet Take 1 tablet (500 mg total) by mouth every Monday. 5/29/17   Renato Womack MD   divalproex (DEPAKOTE) 250 MG EC tablet  5/5/17   Historical Provider, MD   fluorouracil (EFUDEX) 5 % cream  Apply BID to affected areas as directed 3/1/18   Kenn Beach MD   furosemide (LASIX) 20 MG tablet Take 4 tablets (80 mg total) by mouth once daily.  Patient taking differently: Take 40 mg by mouth once daily. Taking 40 mg per day 4/25/17 4/25/18  Renato Womack MD   lactulose (CHRONULAC) 10 gram/15 mL solution TAKE 15 MLS BY MOUTH TWICE DAILY 3/8/18   Renato Womack MD   rifAXIMin (XIFAXAN) 550 mg Tab Take 1 tablet (550 mg total) by mouth 2 (two) times daily. 10/3/17   Renato Womack MD   SITagliptan (JANUVIA) 50 MG Tab Take 1 tablet (50 mg total) by mouth once daily. 4/6/17   Prisca Espinoza MD     Anticoagulants/Antiplatelets: no anticoagulation    Allergies:   Review of patient's allergies indicates:   Allergen Reactions    Abilify [aripiprazole] Other (See Comments)     Sleepy, could not function.     Sedation History:  no adverse reactions    Review of Systems:   Hematological: no known coagulopathies  Respiratory: no shortness of breath  Cardiovascular: no chest pain  Gastrointestinal: no abdominal pain  Genito-Urinary: no dysuria  Musculoskeletal: negative  Neurological: no TIA or stroke symptoms         OBJECTIVE:     Vital Signs (Most Recent)  Pulse: 65 (04/06/18 1114)  Resp: 17 (04/06/18 1114)  BP: (!) 147/81 (04/06/18 1114)  SpO2: 98 % (04/06/18 1114)    Physical Exam:  ASA: 2  Mallampati: n/a    General: no acute distress  Mental Status: alert and oriented to person, place and time  HEENT: normocephalic, atraumatic  Chest: unlabored breathing  Heart: regular heart rate  Abdomen: distended  Extremity: moves all extremities    Laboratory  Lab Results   Component Value Date    INR 1.1 03/09/2018       Lab Results   Component Value Date    WBC 3.56 (L) 03/09/2018    HGB 11.1 (L) 03/09/2018    HCT 34.0 (L) 03/09/2018    MCV 86 03/09/2018     (L) 03/09/2018      Lab Results   Component Value Date     (H) 02/02/2018     02/02/2018    K 4.0 02/02/2018      02/02/2018    CO2 29 02/02/2018    BUN 25 (H) 02/02/2018    CREATININE 1.5 (H) 02/02/2018    CALCIUM 8.6 (L) 02/02/2018    MG 1.7 05/15/2017    ALT 9 (L) 02/02/2018    AST 21 02/02/2018    ALBUMIN 2.2 (L) 02/02/2018    BILITOT 0.7 02/02/2018       ASSESSMENT/PLAN:     Sedation Plan: local  Patient will undergo ultrasound guided paracentesis.    MATT Mauricio, FNP  Interventional Radiology  [phone] [employer]

## 2018-04-06 NOTE — PROGRESS NOTES
Paracentesis complete at this time. Patient tolerated well 5000mL removed from right abdoment.  100cc of albumin given IV. Dressing applied, clean dry and intact. Patient verbalized understanding of discharge instructions. Patient taken to lobby via wheelchair.

## 2018-04-06 NOTE — PROCEDURES

## 2018-04-06 NOTE — DISCHARGE INSTRUCTIONS
For scheduling: Call Mackenzie at [phone]    For questions or concerns call: [facility] 8 AM- 5PM [phone]. Radiology resident on call [phone].    For immediate concerns that are not emergent, you may call our radiology clinic at: [phone]

## 2018-04-07 ENCOUNTER — NURSE TRIAGE (OUTPATIENT)
Dept: ADMINISTRATIVE | Facility: CLINIC | Age: 78
End: 2018-04-07

## 2018-04-07 NOTE — TELEPHONE ENCOUNTER
"    Reason for Disposition   MILD-MODERATE diarrhea (e.g., 1-6 times / day more than normal) (all triage questions negative)    Answer Assessment - Initial Assessment Questions  1. DIARRHEA SEVERITY: "How bad is the diarrhea?" "How many extra stools have you had in the past 24 hours than normal?"     - MILD: Few loose or mushy BMs; increase of 1-3 stools over normal daily number of stools; mild increase in ostomy output.    - MODERATE: Increase of 4-6 stools daily over normal; moderate increase in ostomy output.    - SEVERE (or Worst Possible): Increase of 7 or more stools daily over normal; moderate increase in ostomy output; incontinence.      Moderate   2. ONSET: "When did the diarrhea begin?"       Yesterday   3. BM CONSISTENCY: "How loose or watery is the diarrhea?"       Loose   4. VOMITING: "Are you also vomiting?" If so, ask: "How many times in the past 24 hours?"       No   5. ABDOMINAL PAIN: "Are you having any abdominal pain?" If yes: "What does it feel like?" (e.g., crampy, dull, intermittent, constant)       No   6. ABDOMINAL PAIN SEVERITY: If present, ask: "How bad is the pain?"  (e.g., Scale 1-10; mild, moderate, or severe)     - MILD (1-3): doesn't interfere with normal activities, abdomen soft and not tender to touch      - MODERATE (4-7): interferes with normal activities or awakens from sleep, tender to touch      - SEVERE (8-10): excruciating pain, doubled over, unable to do any normal activities        No   7. ORAL INTAKE: If vomiting, "Have you been able to drink liquids?" "How much fluids have you had in the past 24 hours?"      Yes   8. HYDRATION: "Any signs of dehydration?" (e.g., dry mouth [not just dry lips], too weak to stand, dizziness, new weight loss) "When did you last urinate?"      Yes   9. EXPOSURE: "Have you traveled to a foreign country recently?" "Have you been exposed to anyone with diarrhea?" "Could you have eaten any food that was spoiled?"      No   10. OTHER SYMPTOMS: "Do " "you have any other symptoms?" (e.g., fever, blood in stool)        No   11. PREGNANCY: "Is there any chance you are pregnant?" "When was your last menstrual period?"        N/A    [facility]      "

## 2018-04-09 LAB — BACTERIA SPEC AEROBE CULT: NO GROWTH

## 2018-04-13 ENCOUNTER — HOSPITAL ENCOUNTER (OUTPATIENT)
Dept: INTERVENTIONAL RADIOLOGY/VASCULAR | Facility: HOSPITAL | Age: 78
Discharge: HOME | End: 2018-04-13
Attending: INTERNAL MEDICINE
Payer: MEDICARE

## 2018-04-13 VITALS
RESPIRATION RATE: 19 BRPM | OXYGEN SATURATION: 97 % | HEART RATE: 61 BPM | DIASTOLIC BLOOD PRESSURE: 58 MMHG | SYSTOLIC BLOOD PRESSURE: 121 MMHG

## 2018-04-13 DIAGNOSIS — R18.8 OTHER ASCITES: ICD-10-CM

## 2018-04-13 DIAGNOSIS — K75.81 LIVER CIRRHOSIS SECONDARY TO NASH (NONALCOHOLIC STEATOHEPATITIS): ICD-10-CM

## 2018-04-13 DIAGNOSIS — K74.60 LIVER CIRRHOSIS SECONDARY TO NASH (NONALCOHOLIC STEATOHEPATITIS): ICD-10-CM

## 2018-04-13 LAB
APPEARANCE FLD: CLEAR
BACTERIA SPEC ANAEROBE CULT: NORMAL
BODY FLD TYPE: NORMAL
COLOR FLD: YELLOW
GRAM STN SPEC: NORMAL
GRAM STN SPEC: NORMAL
LYMPHOCYTES NFR FLD MANUAL: 80 %
MONOS+MACROS NFR FLD MANUAL: 10 %
NEUTROPHILS NFR FLD MANUAL: 10 %
WBC # FLD: 79 /CU MM

## 2018-04-13 PROCEDURE — P9047 ALBUMIN (HUMAN), 25%, 50ML: HCPCS | Mod: JG | Performed by: INTERNAL MEDICINE

## 2018-04-13 PROCEDURE — 63600175 PHARM REV CODE 636 W HCPCS: Mod: JG | Performed by: INTERNAL MEDICINE

## 2018-04-13 PROCEDURE — 87075 CULTR BACTERIA EXCEPT BLOOD: CPT

## 2018-04-13 PROCEDURE — 49083 ABD PARACENTESIS W/IMAGING: CPT | Mod: ,,, | Performed by: FAMILY MEDICINE

## 2018-04-13 PROCEDURE — 87205 SMEAR GRAM STAIN: CPT

## 2018-04-13 PROCEDURE — 87070 CULTURE OTHR SPECIMN AEROBIC: CPT

## 2018-04-13 PROCEDURE — 49083 ABD PARACENTESIS W/IMAGING: CPT

## 2018-04-13 PROCEDURE — 89051 BODY FLUID CELL COUNT: CPT

## 2018-04-13 RX ORDER — ALBUMIN HUMAN 250 G/1000ML
25 SOLUTION INTRAVENOUS CONTINUOUS PRN
Status: DISCONTINUED | OUTPATIENT
Start: 2018-04-13 | End: 2018-04-14 | Stop reason: HOSPADM

## 2018-04-13 RX ADMIN — ALBUMIN (HUMAN) 25 G: 25 SOLUTION INTRAVENOUS at 11:04

## 2018-04-13 NOTE — H&P
Radiology History & Physical      SUBJECTIVE:     Chief Complaint: ascites    History of Present Illness:  Kenneth Sheikh is a 78 y.o. male who presents for ultrasound guided paracentesis  Past Medical History:   Diagnosis Date    Anticoagulant long-term use     Bipolar 1 disorder     Bipolar 1 disorder 11/24/2016    bipolar    Cancer     history of skin cancer/sinus cancer & rectal cancer    Depressed bipolar affective disorder     Diabetes mellitus     type 2    EBV infection 12/7/2016    EBV DNA, PCR Latest Ref Range: None detected  None detected EBV DNA-Copies/mL Unknown Test Not Performed EBV Early Antigen Ab, IgG Latest Ref Range: <1:10 Titer 1:40 (A) EBV Nuclear Ag Ab Latest Ref Range: <1:5 Titer >=1:80 (A) EBV VCA IgG Latest Ref Range: <1:10 Titer 1:160 (A) EBV VCA IgM Latest Ref Range: <1:10 Titer <1:10     History of TIA (transient ischemic attack)     several-taking Plavix    Hx of gallstones     Wife denies    Hypertension     Hyperthyroidism 11/30/2016    Low HDL (under 40) 12/7/2016    Mixed hyperlipidemia 11/23/2016    JUAN MANUEL (obstructive sleep apnea)     Pneumonia     Skin disease     Stroke     Transient cerebral ischemia 7/22/2016    Type 2 diabetes mellitus      Past Surgical History:   Procedure Laterality Date    Moh's procedure x4      rectal cancer      Sinus surgery for sinus cancer      sinus sx for cancer      skin cancer removed-several times-nose & ear      TONSILLECTOMY      Undescened testicle sx      UVULOPALATOPHARYNGOPLASTY      for sleep apnea       Home Meds:   Prior to Admission medications    Medication Sig Start Date End Date Taking? Authorizing Provider   ACCU-CHEK NEYDA PLUS TEST STRP Strp  2/23/17   Historical Provider, MD   ciprofloxacin HCl (CIPRO) 500 MG tablet Take 1 tablet (500 mg total) by mouth every Monday. 5/29/17   Renato Womack MD   divalproex (DEPAKOTE) 250 MG EC tablet  5/5/17   Historical Provider, MD   fluorouracil (EFUDEX) 5 % cream  Apply BID to affected areas as directed 3/1/18   Kenn Beach MD   furosemide (LASIX) 20 MG tablet Take 4 tablets (80 mg total) by mouth once daily.  Patient taking differently: Take 40 mg by mouth once daily. Taking 40 mg per day 4/25/17 4/25/18  Renato Womack MD   lactulose (CHRONULAC) 10 gram/15 mL solution TAKE 15 MLS BY MOUTH TWICE DAILY 3/8/18   Renato Womack MD   rifAXIMin (XIFAXAN) 550 mg Tab Take 1 tablet (550 mg total) by mouth 2 (two) times daily. 10/3/17   Renato Womack MD   SITagliptan (JANUVIA) 50 MG Tab Take 1 tablet (50 mg total) by mouth once daily. 4/6/17   Prisca Espinoza MD     Anticoagulants/Antiplatelets: no anticoagulation    Allergies:   Review of patient's allergies indicates:   Allergen Reactions    Abilify [aripiprazole] Other (See Comments)     Sleepy, could not function.     Sedation History:  no adverse reactions    Review of Systems:   Hematological: no known coagulopathies  Respiratory: no shortness of breath  Cardiovascular: no chest pain  Gastrointestinal: no abdominal pain  Genito-Urinary: no dysuria  Musculoskeletal: negative  Neurological: no TIA or stroke symptoms         OBJECTIVE:     Vital Signs (Most Recent)  Pulse: 63 (04/13/18 1122)  Resp: 19 (04/13/18 1122)  BP: (!) 168/72 (04/13/18 1122)  SpO2: 97 % (04/13/18 1122)    Physical Exam:  ASA: 2  Mallampati: n/a    General: no acute distress  Mental Status: alert and oriented to person, place and time  HEENT: normocephalic, atraumatic  Chest: unlabored breathing  Heart: regular heart rate  Abdomen: distended  Extremity: moves all extremities    Laboratory  Lab Results   Component Value Date    INR 1.1 03/09/2018       Lab Results   Component Value Date    WBC 3.56 (L) 03/09/2018    HGB 11.1 (L) 03/09/2018    HCT 34.0 (L) 03/09/2018    MCV 86 03/09/2018     (L) 03/09/2018      Lab Results   Component Value Date     (H) 02/02/2018     02/02/2018    K 4.0 02/02/2018      02/02/2018    CO2 29 02/02/2018    BUN 25 (H) 02/02/2018    CREATININE 1.5 (H) 02/02/2018    CALCIUM 8.6 (L) 02/02/2018    MG 1.7 05/15/2017    ALT 9 (L) 02/02/2018    AST 21 02/02/2018    ALBUMIN 2.2 (L) 02/02/2018    BILITOT 0.7 02/02/2018       ASSESSMENT/PLAN:     Sedation Plan: local  Patient will undergo ultrasound guided paracentesis.    MATT Mauricio, FNP  Interventional Radiology  [phone] [employer]

## 2018-04-13 NOTE — DISCHARGE INSTRUCTIONS
[facility] [phone] (MON-FRI 8 AM- 5PM). Radiology Resident on call [phone].

## 2018-04-13 NOTE — PROGRESS NOTES
Para completed, pt tolerated well. No apparent distress noted. 5.8 Liters removed from right abd, mepore applied CDI. Labs collected and sent. Albumin 100 ml given per md order.  Discharge instructions reviewed and acknowledged. Pt discharged via wheelchair and private vehicle.

## 2018-04-13 NOTE — PROCEDURES

## 2018-04-16 LAB — BACTERIA SPEC AEROBE CULT: NO GROWTH

## 2018-04-19 ENCOUNTER — OFFICE VISIT (OUTPATIENT)
Dept: DERMATOLOGY | Facility: CLINIC | Age: 78
End: 2018-04-19
Payer: MEDICARE

## 2018-04-19 DIAGNOSIS — L25.1 DERMATITIS MEDICAMENTOSA (DRUG APPLIED TO SKIN): Primary | ICD-10-CM

## 2018-04-19 DIAGNOSIS — T49.95XA DERMATITIS MEDICAMENTOSA (DRUG APPLIED TO SKIN): Primary | ICD-10-CM

## 2018-04-19 PROCEDURE — 99212 OFFICE O/P EST SF 10 MIN: CPT | Mod: S$PBB,,, | Performed by: DERMATOLOGY

## 2018-04-19 PROCEDURE — 99999 PR PBB SHADOW E&M-EST. PATIENT-LVL II: CPT | Mod: PBBFAC,,, | Performed by: DERMATOLOGY

## 2018-04-19 PROCEDURE — 99212 OFFICE O/P EST SF 10 MIN: CPT | Mod: PBBFAC | Performed by: DERMATOLOGY

## 2018-04-19 NOTE — PROGRESS NOTES
CHIEF COMPLAINT: followup treatment with topical fluoruracil    The patient is accompanied to this visit by his wife.    HISTORY OF PRESENT ILLNESS:  Location(s): face, scalp, arms  Duration: has been applying the medication for one week this go round on the face; about 5 weeks elsewhere  Quality: mild irritation  Context: applying medication BID; has about a half a tube left, which his wife thinks will last about a week    ROS: he is status post Mohs micrographic surgery, fresh tissue technique to the right lower cheek squamous cell carcinoma in November    EXAMINATION:  Skin: there is some mild erythema to the face; there is still an approximately 1 x 1.5 cm crusted plaque on the right temple  The previously-noted site to his left temporal scalp is pink without crusting now  He has some pink plaques on the right forearm and shoulder    Prior Path:   FINAL PATHOLOGIC DIAGNOSIS  1. Skin, left preauricular cheek, shave biopsy:  - SQUAMOUS CELL CARCINOMA IN SITU.  - THE TUMOR EXTENDS TO THE LATERAL BIOPSY MARGINS.    2. Skin, right jawline, shave biopsy:  - INVASIVE SQUAMOUS CELL CARCINOMA WITH FOCAL BASALOID AND CLEAR CELL DIFFERENTIATION.  - THE TUMOR EXTENDS TO THE LATERAL BIOPSY MARGINS.    3. Skin, right forehead, shave biopsy:  - SQUAMOUS CELL CARCINOMA IN SITU.  - THE TUMOR EXTENDS TO THE LATERAL BIOPSY MARGINS.    4. Skin, right frontal scalp, shave biopsy:  - SQUAMOUS CELL CARCINOMA IN SITU.  - MARGINS ARE NEGATIVE FOR FULL THICKNESS SQUAMOUS ATYPIA IN THE PLANES OF SECTION.    Prior photos:                          ASSESSMENT:   Typical dermatitis due to application of fluoruracil, as expected    PLAN:  Current status and management options, and risks, benefits, and alternatives were discussed.  continue application of fluorouracil until current supply completed  Followup 4 weeks  Call prn sooner  --------------------------------------  Note: Some or all of this note may have been generated using voice  recognition software. There may be voice recognition errors including grammatical and/or spelling errors found in the text. Attempts were made to correct these errors prior to signature.

## 2018-04-20 ENCOUNTER — HOSPITAL ENCOUNTER (OUTPATIENT)
Dept: INTERVENTIONAL RADIOLOGY/VASCULAR | Facility: HOSPITAL | Age: 78
Discharge: HOME | End: 2018-04-20
Attending: INTERNAL MEDICINE
Payer: MEDICARE

## 2018-04-20 VITALS
HEART RATE: 58 BPM | SYSTOLIC BLOOD PRESSURE: 132 MMHG | OXYGEN SATURATION: 99 % | RESPIRATION RATE: 18 BRPM | DIASTOLIC BLOOD PRESSURE: 63 MMHG

## 2018-04-20 DIAGNOSIS — K75.81 LIVER CIRRHOSIS SECONDARY TO NASH (NONALCOHOLIC STEATOHEPATITIS): ICD-10-CM

## 2018-04-20 DIAGNOSIS — R18.8 OTHER ASCITES: ICD-10-CM

## 2018-04-20 DIAGNOSIS — K74.60 LIVER CIRRHOSIS SECONDARY TO NASH (NONALCOHOLIC STEATOHEPATITIS): ICD-10-CM

## 2018-04-20 LAB
APPEARANCE FLD: CLEAR
BACTERIA SPEC ANAEROBE CULT: NORMAL
BASOPHILS NFR FLD MANUAL: 1 %
BODY FLD TYPE: NORMAL
COLOR FLD: YELLOW
GRAM STN SPEC: NORMAL
GRAM STN SPEC: NORMAL
LYMPHOCYTES NFR FLD MANUAL: 62 %
MONOS+MACROS NFR FLD MANUAL: 32 %
NEUTROPHILS NFR FLD MANUAL: 5 %
WBC # FLD: 136 /CU MM

## 2018-04-20 PROCEDURE — P9047 ALBUMIN (HUMAN), 25%, 50ML: HCPCS | Mod: JG | Performed by: INTERNAL MEDICINE

## 2018-04-20 PROCEDURE — A7048 VACUUM DRAIN BOTTLE/TUBE KIT: HCPCS

## 2018-04-20 PROCEDURE — 63600175 PHARM REV CODE 636 W HCPCS: Mod: JG | Performed by: INTERNAL MEDICINE

## 2018-04-20 PROCEDURE — 87075 CULTR BACTERIA EXCEPT BLOOD: CPT

## 2018-04-20 PROCEDURE — 49083 ABD PARACENTESIS W/IMAGING: CPT | Mod: ,,, | Performed by: FAMILY MEDICINE

## 2018-04-20 PROCEDURE — 89051 BODY FLUID CELL COUNT: CPT

## 2018-04-20 PROCEDURE — 87070 CULTURE OTHR SPECIMN AEROBIC: CPT

## 2018-04-20 PROCEDURE — 87205 SMEAR GRAM STAIN: CPT

## 2018-04-20 PROCEDURE — 49083 ABD PARACENTESIS W/IMAGING: CPT

## 2018-04-20 RX ORDER — ALBUMIN HUMAN 250 G/1000ML
25 SOLUTION INTRAVENOUS ONCE
Status: COMPLETED | OUTPATIENT
Start: 2018-04-20 | End: 2018-04-20

## 2018-04-20 RX ADMIN — ALBUMIN (HUMAN) 25 G: 25 SOLUTION INTRAVENOUS at 03:04

## 2018-04-20 NOTE — PROGRESS NOTES
Paracentesis complete, 4900 MLs removed. Specimen sent to lab. Albumin administered per protocol. Dressing applied to right abdomen puncture site, dressing clean dry and intact. Pt given discharge instructions and handouts, pt verbalizes understanding. Questions answered. Pt denies pain and discomfort. Pt transported to [facility] via wheelchair.

## 2018-04-20 NOTE — PROCEDURES

## 2018-04-20 NOTE — H&P
Radiology History & Physical      SUBJECTIVE:     Chief Complaint: ascites    History of Present Illness:  Kenneth Sheikh is a 78 y.o. male who presents for ultrasound guided paracentesis  Past Medical History:   Diagnosis Date    Anticoagulant long-term use     Bipolar 1 disorder     Bipolar 1 disorder 11/24/2016    bipolar    Cancer     history of skin cancer/sinus cancer & rectal cancer    Depressed bipolar affective disorder     Diabetes mellitus     type 2    EBV infection 12/7/2016    EBV DNA, PCR Latest Ref Range: None detected  None detected EBV DNA-Copies/mL Unknown Test Not Performed EBV Early Antigen Ab, IgG Latest Ref Range: <1:10 Titer 1:40 (A) EBV Nuclear Ag Ab Latest Ref Range: <1:5 Titer >=1:80 (A) EBV VCA IgG Latest Ref Range: <1:10 Titer 1:160 (A) EBV VCA IgM Latest Ref Range: <1:10 Titer <1:10     History of TIA (transient ischemic attack)     several-taking Plavix    Hx of gallstones     Wife denies    Hypertension     Hyperthyroidism 11/30/2016    Low HDL (under 40) 12/7/2016    Mixed hyperlipidemia 11/23/2016    JUAN MANUEL (obstructive sleep apnea)     Pneumonia     Skin disease     Stroke     Transient cerebral ischemia 7/22/2016    Type 2 diabetes mellitus      Past Surgical History:   Procedure Laterality Date    Moh's procedure x4      rectal cancer      Sinus surgery for sinus cancer      sinus sx for cancer      skin cancer removed-several times-nose & ear      TONSILLECTOMY      Undescened testicle sx      UVULOPALATOPHARYNGOPLASTY      for sleep apnea       Home Meds:   Prior to Admission medications    Medication Sig Start Date End Date Taking? Authorizing Provider   ACCU-CHEK NEYDA PLUS TEST STRP Strp  2/23/17   Historical Provider, MD   ciprofloxacin HCl (CIPRO) 500 MG tablet Take 1 tablet (500 mg total) by mouth every Monday. 5/29/17   Renato Womack MD   divalproex (DEPAKOTE) 250 MG EC tablet  5/5/17   Historical Provider, MD   fluorouracil (EFUDEX) 5 % cream  Apply BID to affected areas as directed 3/1/18   Kenn Beach MD   furosemide (LASIX) 20 MG tablet Take 4 tablets (80 mg total) by mouth once daily.  Patient taking differently: Take 40 mg by mouth once daily. Taking 40 mg per day 4/25/17 4/25/18  Renato Womack MD   lactulose (CHRONULAC) 10 gram/15 mL solution TAKE 15 MLS BY MOUTH TWICE DAILY 3/8/18   Renato Womack MD   rifAXIMin (XIFAXAN) 550 mg Tab Take 1 tablet (550 mg total) by mouth 2 (two) times daily. 10/3/17   Renato Womack MD   SITagliptan (JANUVIA) 50 MG Tab Take 1 tablet (50 mg total) by mouth once daily. 4/6/17   Prisca Espinoza MD     Anticoagulants/Antiplatelets: no anticoagulation    Allergies:   Review of patient's allergies indicates:   Allergen Reactions    Abilify [aripiprazole] Other (See Comments)     Sleepy, could not function.     Sedation History:  no adverse reactions    Review of Systems:   Hematological: no known coagulopathies  Respiratory: no shortness of breath  Cardiovascular: no chest pain  Gastrointestinal: no abdominal pain  Genito-Urinary: no dysuria  Musculoskeletal: negative  Neurological: no TIA or stroke symptoms         OBJECTIVE:     Vital Signs (Most Recent)       Physical Exam:  ASA: 2  Mallampati: n/a    General: no acute distress  Mental Status: alert and oriented to person, place and time  HEENT: normocephalic, atraumatic  Chest: unlabored breathing  Heart: regular heart rate  Abdomen: distended  Extremity: moves all extremities    Laboratory  Lab Results   Component Value Date    INR 1.1 03/09/2018       Lab Results   Component Value Date    WBC 3.56 (L) 03/09/2018    HGB 11.1 (L) 03/09/2018    HCT 34.0 (L) 03/09/2018    MCV 86 03/09/2018     (L) 03/09/2018      Lab Results   Component Value Date     (H) 02/02/2018     02/02/2018    K 4.0 02/02/2018     02/02/2018    CO2 29 02/02/2018    BUN 25 (H) 02/02/2018    CREATININE 1.5 (H) 02/02/2018    CALCIUM 8.6 (L)  02/02/2018    MG 1.7 05/15/2017    ALT 9 (L) 02/02/2018    AST 21 02/02/2018    ALBUMIN 2.2 (L) 02/02/2018    BILITOT 0.7 02/02/2018       ASSESSMENT/PLAN:     Sedation Plan: local  Patient will undergo ultrasound guided paracentesis.    MATT Mauricio, FNP  Interventional Radiology  [phone] [employer]

## 2018-04-23 LAB — BACTERIA SPEC AEROBE CULT: NO GROWTH

## 2018-04-27 ENCOUNTER — HOSPITAL ENCOUNTER (OUTPATIENT)
Dept: INTERVENTIONAL RADIOLOGY/VASCULAR | Facility: HOSPITAL | Age: 78
Discharge: HOME | End: 2018-04-27
Attending: INTERNAL MEDICINE
Payer: MEDICARE

## 2018-04-27 VITALS
HEART RATE: 59 BPM | RESPIRATION RATE: 18 BRPM | DIASTOLIC BLOOD PRESSURE: 55 MMHG | SYSTOLIC BLOOD PRESSURE: 113 MMHG | OXYGEN SATURATION: 99 %

## 2018-04-27 DIAGNOSIS — K75.81 LIVER CIRRHOSIS SECONDARY TO NASH (NONALCOHOLIC STEATOHEPATITIS): ICD-10-CM

## 2018-04-27 DIAGNOSIS — R18.8 OTHER ASCITES: ICD-10-CM

## 2018-04-27 DIAGNOSIS — K74.60 LIVER CIRRHOSIS SECONDARY TO NASH (NONALCOHOLIC STEATOHEPATITIS): ICD-10-CM

## 2018-04-27 LAB
APPEARANCE FLD: NORMAL
BACTERIA SPEC ANAEROBE CULT: NORMAL
BODY FLD TYPE: NORMAL
COLOR FLD: NORMAL
EOSINOPHIL NFR FLD MANUAL: 1 %
LYMPHOCYTES NFR FLD MANUAL: 72 %
MONOS+MACROS NFR FLD MANUAL: 24 %
NEUTROPHILS NFR FLD MANUAL: 3 %
WBC # FLD: 61 /CU MM

## 2018-04-27 PROCEDURE — 49083 ABD PARACENTESIS W/IMAGING: CPT | Mod: ,,, | Performed by: FAMILY MEDICINE

## 2018-04-27 PROCEDURE — P9047 ALBUMIN (HUMAN), 25%, 50ML: HCPCS | Mod: JG | Performed by: INTERNAL MEDICINE

## 2018-04-27 PROCEDURE — 89051 BODY FLUID CELL COUNT: CPT

## 2018-04-27 PROCEDURE — 63600175 PHARM REV CODE 636 W HCPCS: Mod: JG | Performed by: INTERNAL MEDICINE

## 2018-04-27 PROCEDURE — A7048 VACUUM DRAIN BOTTLE/TUBE KIT: HCPCS

## 2018-04-27 PROCEDURE — 49083 ABD PARACENTESIS W/IMAGING: CPT

## 2018-04-27 RX ORDER — ALBUMIN HUMAN 250 G/1000ML
25 SOLUTION INTRAVENOUS
Status: DISCONTINUED | OUTPATIENT
Start: 2018-04-27 | End: 2018-04-28 | Stop reason: HOSPADM

## 2018-04-27 RX ADMIN — ALBUMIN (HUMAN) 25 G: 25 SOLUTION INTRAVENOUS at 11:04

## 2018-04-27 NOTE — PROGRESS NOTES
Patient stable, tolerated paracentesis well, 5 L removed, labs sent,100 mL albumin given per protocol.  Discharge instruction, and follow up care reviewed.  Patient verbalized understanding, and denies further questions.  Provided with ROCU and after hours number.  Patient transported via wheelchair.

## 2018-04-27 NOTE — PROCEDURES
Radiology Post-Procedure Note    Pre Op Diagnosis: Ascites  Post Op Diagnosis: Same    Procedure: Ultrasound Guided Paracentesis    Procedure performed by: Souleymane ROSS, Елена     Written Informed Consent Obtained: Yes  Specimen Removed: YES daniel fluid  Estimated Blood Loss: Minimal    Findings:   Successful paracentesis.  Albumin administered PRN per protocol.    Patient tolerated procedure well.    Елена Comer, APRN, FNP  Interventional Radiology  [phone] [employer]

## 2018-04-27 NOTE — DISCHARGE INSTRUCTIONS
"  For questions or concerns call: [facility] MON-FRI 8 AM- 5PM: [phone].   **After hours and weekends: Call [phone] and ask for "Radiology Resident on call".    For immediate concerns that are not emergent, you may call our radiology clinic at: [phone]    "

## 2018-04-27 NOTE — H&P
Radiology History & Physical      SUBJECTIVE:     Chief Complaint: ascites    History of Present Illness:  Kenneth Sheikh is a 78 y.o. male who presents for ultrasound guided paracentesis  Past Medical History:   Diagnosis Date    Anticoagulant long-term use     Bipolar 1 disorder     Bipolar 1 disorder 11/24/2016    bipolar    Cancer     history of skin cancer/sinus cancer & rectal cancer    Depressed bipolar affective disorder     Diabetes mellitus     type 2    EBV infection 12/7/2016    EBV DNA, PCR Latest Ref Range: None detected  None detected EBV DNA-Copies/mL Unknown Test Not Performed EBV Early Antigen Ab, IgG Latest Ref Range: <1:10 Titer 1:40 (A) EBV Nuclear Ag Ab Latest Ref Range: <1:5 Titer >=1:80 (A) EBV VCA IgG Latest Ref Range: <1:10 Titer 1:160 (A) EBV VCA IgM Latest Ref Range: <1:10 Titer <1:10     History of TIA (transient ischemic attack)     several-taking Plavix    Hx of gallstones     Wife denies    Hypertension     Hyperthyroidism 11/30/2016    Low HDL (under 40) 12/7/2016    Mixed hyperlipidemia 11/23/2016    JUAN MANUEL (obstructive sleep apnea)     Pneumonia     Skin disease     Stroke     Transient cerebral ischemia 7/22/2016    Type 2 diabetes mellitus      Past Surgical History:   Procedure Laterality Date    Moh's procedure x4      rectal cancer      Sinus surgery for sinus cancer      sinus sx for cancer      skin cancer removed-several times-nose & ear      TONSILLECTOMY      Undescened testicle sx      UVULOPALATOPHARYNGOPLASTY      for sleep apnea       Home Meds:   Prior to Admission medications    Medication Sig Start Date End Date Taking? Authorizing Provider   ACCU-CHEK NEYDA PLUS TEST STRP Strp  2/23/17   Historical Provider, MD   ciprofloxacin HCl (CIPRO) 500 MG tablet Take 1 tablet (500 mg total) by mouth every Monday. 5/29/17   Renato Womack MD   divalproex (DEPAKOTE) 250 MG EC tablet  5/5/17   Historical Provider, MD   fluorouracil (EFUDEX) 5 % cream  Apply BID to affected areas as directed 3/1/18   Kenn Beach MD   furosemide (LASIX) 20 MG tablet Take 4 tablets (80 mg total) by mouth once daily.  Patient taking differently: Take 40 mg by mouth once daily. Taking 40 mg per day 4/25/17 4/25/18  Renato Womack MD   lactulose (CHRONULAC) 10 gram/15 mL solution TAKE 15 MLS BY MOUTH TWICE DAILY 3/8/18   Renato Womack MD   rifAXIMin (XIFAXAN) 550 mg Tab Take 1 tablet (550 mg total) by mouth 2 (two) times daily. 10/3/17   Renato Womack MD   SITagliptan (JANUVIA) 50 MG Tab Take 1 tablet (50 mg total) by mouth once daily. 4/6/17   Prisca Espinoza MD     Anticoagulants/Antiplatelets: no anticoagulation    Allergies:   Review of patient's allergies indicates:   Allergen Reactions    Abilify [aripiprazole] Other (See Comments)     Sleepy, could not function.     Sedation History:  no adverse reactions    Review of Systems:   Hematological: no known coagulopathies  Respiratory: no shortness of breath  Cardiovascular: no chest pain  Gastrointestinal: no abdominal pain  Genito-Urinary: no dysuria  Musculoskeletal: negative  Neurological: no TIA or stroke symptoms         OBJECTIVE:     Vital Signs (Most Recent)       Physical Exam:  ASA: 2  Mallampati: n/a    General: no acute distress  Mental Status: alert and oriented to person, place and time  HEENT: normocephalic, atraumatic  Chest: unlabored breathing  Heart: regular heart rate  Abdomen: distended  Extremity: moves all extremities    Laboratory  Lab Results   Component Value Date    INR 1.1 03/09/2018       Lab Results   Component Value Date    WBC 3.56 (L) 03/09/2018    HGB 11.1 (L) 03/09/2018    HCT 34.0 (L) 03/09/2018    MCV 86 03/09/2018     (L) 03/09/2018      Lab Results   Component Value Date     (H) 02/02/2018     02/02/2018    K 4.0 02/02/2018     02/02/2018    CO2 29 02/02/2018    BUN 25 (H) 02/02/2018    CREATININE 1.5 (H) 02/02/2018    CALCIUM 8.6 (L)  02/02/2018    MG 1.7 05/15/2017    ALT 9 (L) 02/02/2018    AST 21 02/02/2018    ALBUMIN 2.2 (L) 02/02/2018    BILITOT 0.7 02/02/2018       ASSESSMENT/PLAN:     Sedation Plan: local  Patient will undergo ultrasound guided paracentesis.    MATT Mauricio, FNP  Interventional Radiology  [phone] [employer]

## 2018-05-04 ENCOUNTER — HOSPITAL ENCOUNTER (OUTPATIENT)
Dept: INTERVENTIONAL RADIOLOGY/VASCULAR | Facility: HOSPITAL | Age: 78
Discharge: HOME | End: 2018-05-04
Attending: INTERNAL MEDICINE
Payer: MEDICARE

## 2018-05-04 VITALS
OXYGEN SATURATION: 98 % | RESPIRATION RATE: 17 BRPM | DIASTOLIC BLOOD PRESSURE: 60 MMHG | HEART RATE: 59 BPM | SYSTOLIC BLOOD PRESSURE: 127 MMHG

## 2018-05-04 DIAGNOSIS — K74.60 LIVER CIRRHOSIS SECONDARY TO NASH (NONALCOHOLIC STEATOHEPATITIS): ICD-10-CM

## 2018-05-04 DIAGNOSIS — K75.81 LIVER CIRRHOSIS SECONDARY TO NASH (NONALCOHOLIC STEATOHEPATITIS): ICD-10-CM

## 2018-05-04 DIAGNOSIS — R18.8 OTHER ASCITES: ICD-10-CM

## 2018-05-04 LAB
APPEARANCE FLD: NORMAL
BODY FLD TYPE: NORMAL
COLOR FLD: YELLOW
EOSINOPHIL NFR FLD MANUAL: 1 %
LYMPHOCYTES NFR FLD MANUAL: 52 %
MONOS+MACROS NFR FLD MANUAL: 41 %
NEUTROPHILS NFR FLD MANUAL: 6 %
WBC # FLD: 118 /CU MM

## 2018-05-04 PROCEDURE — 49083 ABD PARACENTESIS W/IMAGING: CPT

## 2018-05-04 PROCEDURE — 89051 BODY FLUID CELL COUNT: CPT

## 2018-05-04 PROCEDURE — 49083 ABD PARACENTESIS W/IMAGING: CPT | Mod: ,,, | Performed by: FAMILY MEDICINE

## 2018-05-04 RX ORDER — ALBUMIN HUMAN 250 G/1000ML
12.5 SOLUTION INTRAVENOUS ONCE
Status: DISCONTINUED | OUTPATIENT
Start: 2018-05-04 | End: 2018-05-05 | Stop reason: HOSPADM

## 2018-05-04 RX ORDER — ALBUMIN HUMAN 250 G/1000ML
25 SOLUTION INTRAVENOUS ONCE
Status: DISCONTINUED | OUTPATIENT
Start: 2018-05-04 | End: 2018-05-05 | Stop reason: HOSPADM

## 2018-05-04 NOTE — H&P
Radiology History & Physical      SUBJECTIVE:     Chief Complaint: ascites    History of Present Illness:  Kenneth Sheikh is a 78 y.o. male who presents for ultrasound guided paracentesis  Past Medical History:   Diagnosis Date    Anticoagulant long-term use     Bipolar 1 disorder     Bipolar 1 disorder 11/24/2016    bipolar    Cancer     history of skin cancer/sinus cancer & rectal cancer    Depressed bipolar affective disorder     Diabetes mellitus     type 2    EBV infection 12/7/2016    EBV DNA, PCR Latest Ref Range: None detected  None detected EBV DNA-Copies/mL Unknown Test Not Performed EBV Early Antigen Ab, IgG Latest Ref Range: <1:10 Titer 1:40 (A) EBV Nuclear Ag Ab Latest Ref Range: <1:5 Titer >=1:80 (A) EBV VCA IgG Latest Ref Range: <1:10 Titer 1:160 (A) EBV VCA IgM Latest Ref Range: <1:10 Titer <1:10     History of TIA (transient ischemic attack)     several-taking Plavix    Hx of gallstones     Wife denies    Hypertension     Hyperthyroidism 11/30/2016    Low HDL (under 40) 12/7/2016    Mixed hyperlipidemia 11/23/2016    JUAN MANUEL (obstructive sleep apnea)     Pneumonia     Skin disease     Stroke     Transient cerebral ischemia 7/22/2016    Type 2 diabetes mellitus      Past Surgical History:   Procedure Laterality Date    Moh's procedure x4      rectal cancer      Sinus surgery for sinus cancer      sinus sx for cancer      skin cancer removed-several times-nose & ear      TONSILLECTOMY      Undescened testicle sx      UVULOPALATOPHARYNGOPLASTY      for sleep apnea       Home Meds:   Prior to Admission medications    Medication Sig Start Date End Date Taking? Authorizing Provider   ACCU-CHEK NEYDA PLUS TEST STRP Strp  2/23/17   Historical Provider, MD   ciprofloxacin HCl (CIPRO) 500 MG tablet Take 1 tablet (500 mg total) by mouth every Monday. 5/29/17   Renato Womack MD   divalproex (DEPAKOTE) 250 MG EC tablet  5/5/17   Historical Provider, MD   fluorouracil (EFUDEX) 5 % cream  Apply BID to affected areas as directed 3/1/18   Kenn Beach MD   furosemide (LASIX) 20 MG tablet Take 4 tablets (80 mg total) by mouth once daily.  Patient taking differently: Take 40 mg by mouth once daily. Taking 40 mg per day 4/25/17 4/25/18  Renato Womack MD   lactulose (CHRONULAC) 10 gram/15 mL solution TAKE 15 MLS BY MOUTH TWICE DAILY 3/8/18   Renato Womack MD   rifAXIMin (XIFAXAN) 550 mg Tab Take 1 tablet (550 mg total) by mouth 2 (two) times daily. 10/3/17   Renato Womack MD   SITagliptan (JANUVIA) 50 MG Tab Take 1 tablet (50 mg total) by mouth once daily. 4/6/17   Prisca Espinoza MD     Anticoagulants/Antiplatelets: no anticoagulation    Allergies:   Review of patient's allergies indicates:   Allergen Reactions    Abilify [aripiprazole] Other (See Comments)     Sleepy, could not function.     Sedation History:  no adverse reactions    Review of Systems:   Hematological: no known coagulopathies  Respiratory: no shortness of breath  Cardiovascular: no chest pain  Gastrointestinal: no abdominal pain  Genito-Urinary: no dysuria  Musculoskeletal: negative  Neurological: no TIA or stroke symptoms         OBJECTIVE:     Vital Signs (Most Recent)  Pulse: 61 (05/04/18 1107)  Resp: 17 (05/04/18 1107)  BP: (!) 151/78 (05/04/18 1107)  SpO2: 98 % (05/04/18 1107)    Physical Exam:  ASA: 2  Mallampati: n/a    General: no acute distress  Mental Status: alert and oriented to person, place and time  HEENT: normocephalic, atraumatic  Chest: unlabored breathing  Heart: regular heart rate  Abdomen: distended  Extremity: moves all extremities    Laboratory  Lab Results   Component Value Date    INR 1.1 03/09/2018       Lab Results   Component Value Date    WBC 3.56 (L) 03/09/2018    HGB 11.1 (L) 03/09/2018    HCT 34.0 (L) 03/09/2018    MCV 86 03/09/2018     (L) 03/09/2018      Lab Results   Component Value Date     (H) 02/02/2018     02/02/2018    K 4.0 02/02/2018      02/02/2018    CO2 29 02/02/2018    BUN 25 (H) 02/02/2018    CREATININE 1.5 (H) 02/02/2018    CALCIUM 8.6 (L) 02/02/2018    MG 1.7 05/15/2017    ALT 9 (L) 02/02/2018    AST 21 02/02/2018    ALBUMIN 2.2 (L) 02/02/2018    BILITOT 0.7 02/02/2018       ASSESSMENT/PLAN:     Sedation Plan: local  Patient will undergo ultrasound guided paracentesis.    MATT Mauricio, FNP  Interventional Radiology  [phone] [employer]

## 2018-05-04 NOTE — PROCEDURES

## 2018-05-04 NOTE — PROGRESS NOTES
Paracentesis complete at this time. Patient tolerated well 5000mL removed from left abdomen.  150cc of albumin given IV. Dressing applied, clean dry and intact. Patient verbalized understanding of discharge instructions. Patient transported to waiting area via wheelchair with IR staff.

## 2018-05-10 DIAGNOSIS — R18.8 OTHER ASCITES: Primary | ICD-10-CM

## 2018-05-11 ENCOUNTER — HOSPITAL ENCOUNTER (OUTPATIENT)
Dept: INTERVENTIONAL RADIOLOGY/VASCULAR | Facility: HOSPITAL | Age: 78
Discharge: HOME | End: 2018-05-11
Attending: INTERNAL MEDICINE
Payer: MEDICARE

## 2018-05-11 ENCOUNTER — OFFICE VISIT (OUTPATIENT)
Dept: HEPATOLOGY | Facility: CLINIC | Age: 78
End: 2018-05-11
Payer: MEDICARE

## 2018-05-11 VITALS
SYSTOLIC BLOOD PRESSURE: 142 MMHG | RESPIRATION RATE: 18 BRPM | HEART RATE: 59 BPM | OXYGEN SATURATION: 98 % | DIASTOLIC BLOOD PRESSURE: 66 MMHG

## 2018-05-11 VITALS
SYSTOLIC BLOOD PRESSURE: 150 MMHG | HEIGHT: 68 IN | WEIGHT: 173.75 LBS | RESPIRATION RATE: 18 BRPM | OXYGEN SATURATION: 100 % | DIASTOLIC BLOOD PRESSURE: 70 MMHG | HEART RATE: 57 BPM | BODY MASS INDEX: 26.33 KG/M2 | TEMPERATURE: 98 F

## 2018-05-11 DIAGNOSIS — N18.30 TYPE 2 DIABETES MELLITUS WITH STAGE 3 CHRONIC KIDNEY DISEASE, WITHOUT LONG-TERM CURRENT USE OF INSULIN: ICD-10-CM

## 2018-05-11 DIAGNOSIS — E11.22 TYPE 2 DIABETES MELLITUS WITH STAGE 3 CHRONIC KIDNEY DISEASE, WITHOUT LONG-TERM CURRENT USE OF INSULIN: ICD-10-CM

## 2018-05-11 DIAGNOSIS — K74.60 LIVER CIRRHOSIS SECONDARY TO NASH (NONALCOHOLIC STEATOHEPATITIS): ICD-10-CM

## 2018-05-11 DIAGNOSIS — R18.8 OTHER ASCITES: ICD-10-CM

## 2018-05-11 DIAGNOSIS — K75.81 LIVER CIRRHOSIS SECONDARY TO NASH (NONALCOHOLIC STEATOHEPATITIS): ICD-10-CM

## 2018-05-11 DIAGNOSIS — K74.60 LIVER CIRRHOSIS SECONDARY TO NASH (NONALCOHOLIC STEATOHEPATITIS): Primary | ICD-10-CM

## 2018-05-11 DIAGNOSIS — R18.8 ASCITES OF LIVER: ICD-10-CM

## 2018-05-11 DIAGNOSIS — N18.30 CKD (CHRONIC KIDNEY DISEASE) STAGE 3, GFR 30-59 ML/MIN: ICD-10-CM

## 2018-05-11 DIAGNOSIS — K75.81 LIVER CIRRHOSIS SECONDARY TO NASH (NONALCOHOLIC STEATOHEPATITIS): Primary | ICD-10-CM

## 2018-05-11 LAB
APPEARANCE FLD: NORMAL
BODY FLD TYPE: NORMAL
COLOR FLD: YELLOW
LYMPHOCYTES NFR FLD MANUAL: 66 %
MONOS+MACROS NFR FLD MANUAL: 28 %
NEUTROPHILS NFR FLD MANUAL: 6 %
WBC # FLD: 74 /CU MM

## 2018-05-11 PROCEDURE — P9047 ALBUMIN (HUMAN), 25%, 50ML: HCPCS | Mod: JG | Performed by: INTERNAL MEDICINE

## 2018-05-11 PROCEDURE — 99214 OFFICE O/P EST MOD 30 MIN: CPT | Mod: PBBFAC,25 | Performed by: INTERNAL MEDICINE

## 2018-05-11 PROCEDURE — 49083 ABD PARACENTESIS W/IMAGING: CPT

## 2018-05-11 PROCEDURE — 63600175 PHARM REV CODE 636 W HCPCS: Mod: JG | Performed by: INTERNAL MEDICINE

## 2018-05-11 PROCEDURE — 99214 OFFICE O/P EST MOD 30 MIN: CPT | Mod: S$PBB,,, | Performed by: INTERNAL MEDICINE

## 2018-05-11 PROCEDURE — 99999 PR PBB SHADOW E&M-EST. PATIENT-LVL IV: CPT | Mod: PBBFAC,,, | Performed by: INTERNAL MEDICINE

## 2018-05-11 PROCEDURE — 49083 ABD PARACENTESIS W/IMAGING: CPT | Mod: ,,, | Performed by: FAMILY MEDICINE

## 2018-05-11 PROCEDURE — 89051 BODY FLUID CELL COUNT: CPT

## 2018-05-11 RX ORDER — CIPROFLOXACIN 500 MG/1
500 TABLET ORAL
Qty: 12 TABLET | Refills: 3 | Status: SHIPPED | OUTPATIENT
Start: 2018-05-14 | End: 2019-05-02 | Stop reason: SDUPTHER

## 2018-05-11 RX ORDER — ALBUMIN HUMAN 250 G/1000ML
25 SOLUTION INTRAVENOUS
Status: DISCONTINUED | OUTPATIENT
Start: 2018-05-11 | End: 2018-05-12 | Stop reason: HOSPADM

## 2018-05-11 RX ADMIN — ALBUMIN (HUMAN) 25 G: 25 SOLUTION INTRAVENOUS at 12:05

## 2018-05-11 NOTE — PROCEDURES

## 2018-05-11 NOTE — PROGRESS NOTES
Paracentesis complete. 4800 mLs peritoneal fluid drained. Pt tolerated well. Dressing to clean, dry, and intact. Albumin 25% given 100 mLs. Specimens sent per lab order. Discharge instructions and handouts provided. Pt verbalized understanding.

## 2018-05-11 NOTE — DISCHARGE INSTRUCTIONS
For scheduling: Call Nichole at [phone]    For questions or concerns call: [facility] 8 AM- 5PM [phone]. Radiology resident on call [phone].    For immediate concerns that are not emergent, you may call our radiology clinic at: [phone]

## 2018-05-11 NOTE — PROGRESS NOTES
"Subjective:       Patient ID: Kenneth Sheikh is a 78 y.o. male.    Chief Complaint: Cirrhosis    HPI  I saw this 78 y.o. man  whom I had met in hospital when he was admitted with lethargy and some confusion back in Dec 2016.    At that time, he was diagnosed with decompensated cirrhosis.    Admitted because of increasing lethargy +++/confusion/dizziness/dysarthria  - Nov 2016  - imaging showed ascites     No significant alcohol use ("a couple beers when I was younger"). No family history of liver disease. No new medications.    Patient has risk factors for MCDONALD.    He also has a large gastric varix which has never bled-  treated x1 by Dr Perla endoscopically but will need another endoscopic session (4/16/17).  - Had post procedure fever despite antibiotics    Kylie E coli bacteremia- on IV antibiotics at home- PICC    Currently he is reasonably stable with weekly paracentesis.  Decreased dose of diuretics and his para volume appears to also be decreasing.  Mental alert and has not experienced HE for a while although wife describes him as lethargic and forgetful.    - lower leg edema and ascites      MELD-Na score: 18 at 5/11/2017  2:45 PM  MELD score: 16 at 5/11/2017  2:45 PM  Calculated from:  Serum Creatinine: 2.1 mg/dL at 5/11/2017  2:45 PM  Serum Sodium: 135 mmol/L at 5/11/2017  2:45 PM  Total Bilirubin: 0.8 mg/dL (Rounded to 1) at 5/11/2017  2:45 PM  INR(ratio): 1.2 at 5/10/2017  5:19 AM  Age: 77 years      PMH:  DM  Sinus Ca- 1996  Rectal Ca- June 2016    SH:  Retired  Ex [employer] worker      FH:  Aunt had cirrhosis- non- alcohol    Review of Systems   Constitutional: Negative for activity change, appetite change, chills, fatigue, fever and unexpected weight change.   HENT: Negative for hearing loss.    Eyes: Negative for discharge and visual disturbance.   Respiratory: Negative for cough, chest tightness, shortness of breath and wheezing.    Cardiovascular: Negative for chest pain, palpitations and leg " swelling.   Gastrointestinal: Negative for abdominal distention, abdominal pain, constipation, diarrhea and nausea.   Genitourinary: Negative for dysuria and frequency.   Musculoskeletal: Negative for arthralgias and back pain.   Skin: Negative for pallor and rash.   Neurological: Negative for dizziness, tremors, speech difficulty and headaches.   Hematological: Negative for adenopathy.   Psychiatric/Behavioral: Negative for agitation and confusion.           Lab Results   Component Value Date    ALT 9 (L) 02/02/2018    AST 21 02/02/2018    ALKPHOS 110 02/02/2018    BILITOT 0.7 02/02/2018     Past Medical History:   Diagnosis Date    Anticoagulant long-term use     Bipolar 1 disorder     Bipolar 1 disorder 11/24/2016    bipolar    Cancer     history of skin cancer/sinus cancer & rectal cancer    Depressed bipolar affective disorder     Diabetes mellitus     type 2    EBV infection 12/7/2016    EBV DNA, PCR Latest Ref Range: None detected  None detected EBV DNA-Copies/mL Unknown Test Not Performed EBV Early Antigen Ab, IgG Latest Ref Range: <1:10 Titer 1:40 (A) EBV Nuclear Ag Ab Latest Ref Range: <1:5 Titer >=1:80 (A) EBV VCA IgG Latest Ref Range: <1:10 Titer 1:160 (A) EBV VCA IgM Latest Ref Range: <1:10 Titer <1:10     History of TIA (transient ischemic attack)     several-taking Plavix    Hx of gallstones     Wife denies    Hypertension     Hyperthyroidism 11/30/2016    Low HDL (under 40) 12/7/2016    Mixed hyperlipidemia 11/23/2016    JUAN MANUEL (obstructive sleep apnea)     Pneumonia     Skin disease     Stroke     Transient cerebral ischemia 7/22/2016    Type 2 diabetes mellitus      Past Surgical History:   Procedure Laterality Date    Moh's procedure x4      rectal cancer      Sinus surgery for sinus cancer      sinus sx for cancer      skin cancer removed-several times-nose & ear      TONSILLECTOMY      Undescened testicle sx      UVULOPALATOPHARYNGOPLASTY      for sleep apnea     Current  Outpatient Prescriptions   Medication Sig    ACCU-CHEK NEYDA PLUS TEST STRP Strp     ciprofloxacin HCl (CIPRO) 500 MG tablet Take 1 tablet (500 mg total) by mouth every Monday.    divalproex (DEPAKOTE) 250 MG EC tablet     fluorouracil (EFUDEX) 5 % cream Apply BID to affected areas as directed    lactulose (CHRONULAC) 10 gram/15 mL solution TAKE 15 MLS BY MOUTH TWICE DAILY    rifAXIMin (XIFAXAN) 550 mg Tab Take 1 tablet (550 mg total) by mouth 2 (two) times daily.    SITagliptan (JANUVIA) 50 MG Tab Take 1 tablet (50 mg total) by mouth once daily.    furosemide (LASIX) 20 MG tablet Take 4 tablets (80 mg total) by mouth once daily. (Patient taking differently: Take 40 mg by mouth once daily. Taking 40 mg per day)     No current facility-administered medications for this visit.      Facility-Administered Medications Ordered in Other Visits   Medication    albumin human 25% bottle 25 g       Objective:      Physical Exam   Constitutional: He is oriented to person, place, and time. He appears well-nourished.   HENT:   Head: Normocephalic.   Eyes: Pupils are equal, round, and reactive to light.   Neck: No thyromegaly present.   Cardiovascular: Normal rate, regular rhythm and normal heart sounds.    Pulmonary/Chest: Effort normal and breath sounds normal. He has no wheezes.   Abdominal: Soft. He exhibits distension. He exhibits no mass. There is no tenderness.   Mild ascites.   Musculoskeletal: He exhibits edema.   Lymphadenopathy:     He has no cervical adenopathy.   Neurological: He is alert and oriented to person, place, and time.   Skin: Skin is warm. No rash noted. No erythema.   Psychiatric: He has a normal mood and affect. His behavior is normal.       Assessment:       1. 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 Type 2 diabetes mellitus with stage 3 chronic kidney disease, without long-term current use of insulin    3. CKD (chronic kidney disease) stage 3, GFR 30-59 ml/min     4. Ascites of liver        Plan:   - re-discussed the dx of cirrhosis and the implications of this  - no change in diuretic dose  - paracenteses every week- feels better when he gets albumin-   - on Cipro 500 mg weekly for SBP prophylaxis.  - does not wish for further EUS guided treatment of varices because of post procedure sepsis.  - labs next week with para  - abdo US  Clinic in  3 months     MARIA G GarciaYazidism

## 2018-05-16 ENCOUNTER — TELEPHONE (OUTPATIENT)
Dept: HEPATOLOGY | Facility: CLINIC | Age: 78
End: 2018-05-16

## 2018-05-16 NOTE — TELEPHONE ENCOUNTER
----- Message from Lorie Joseph sent at 5/16/2018 11:23 AM CDT -----  Contact: patient   Patient returning a call but unsure who and why.          Please call [phone]      Thanks

## 2018-05-16 NOTE — TELEPHONE ENCOUNTER
----- Message from Rosita Underwood MA sent at 5/16/2018 11:14 AM CDT -----  Contact: Patient's wife Ginna      ----- Message -----  From: Lissette Tolliver  Sent: 5/15/2018  10:48 AM  To: [facility] Gross Staff    Ginna is calling to find out if five hours fasting is enough for patient's US appt that's scheduled on 5/18 at 1:15pm.     Please advise       Ginna can be reached at [phone]      AS

## 2018-05-16 NOTE — TELEPHONE ENCOUNTER
Ginna wife of the patient wanted to know if the patient could fast for only 5 hrs for the U/S and not for 8 as the appt letter says. Checked with Dr Womack and he stated 5 would be OK.  Ginna wanted to talk directly to Dr Womack. Informed he was not in the clinic today.

## 2018-05-17 DIAGNOSIS — Z79.899 ON VALPROATE THERAPY: Primary | ICD-10-CM

## 2018-05-18 ENCOUNTER — HOSPITAL ENCOUNTER (OUTPATIENT)
Dept: INTERVENTIONAL RADIOLOGY/VASCULAR | Facility: HOSPITAL | Age: 78
Discharge: HOME | End: 2018-05-18
Attending: INTERNAL MEDICINE
Payer: MEDICARE

## 2018-05-18 ENCOUNTER — HOSPITAL ENCOUNTER (OUTPATIENT)
Dept: RADIOLOGY | Facility: HOSPITAL | Age: 78
Discharge: HOME | End: 2018-05-18
Attending: INTERNAL MEDICINE
Payer: MEDICARE

## 2018-05-18 VITALS
OXYGEN SATURATION: 100 % | DIASTOLIC BLOOD PRESSURE: 61 MMHG | RESPIRATION RATE: 12 BRPM | HEART RATE: 60 BPM | SYSTOLIC BLOOD PRESSURE: 118 MMHG

## 2018-05-18 DIAGNOSIS — K74.60 LIVER CIRRHOSIS SECONDARY TO NASH (NONALCOHOLIC STEATOHEPATITIS): ICD-10-CM

## 2018-05-18 DIAGNOSIS — K75.81 LIVER CIRRHOSIS SECONDARY TO NASH (NONALCOHOLIC STEATOHEPATITIS): ICD-10-CM

## 2018-05-18 DIAGNOSIS — R18.8 OTHER ASCITES: ICD-10-CM

## 2018-05-18 LAB
APPEARANCE FLD: NORMAL
BODY FLD TYPE: NORMAL
COLOR FLD: YELLOW
EOSINOPHIL NFR FLD MANUAL: 1 %
LYMPHOCYTES NFR FLD MANUAL: 34 %
MONOS+MACROS NFR FLD MANUAL: 60 %
NEUTROPHILS NFR FLD MANUAL: 5 %
WBC # FLD: 78 /CU MM

## 2018-05-18 PROCEDURE — P9047 ALBUMIN (HUMAN), 25%, 50ML: HCPCS | Mod: JG | Performed by: INTERNAL MEDICINE

## 2018-05-18 PROCEDURE — 49083 ABD PARACENTESIS W/IMAGING: CPT

## 2018-05-18 PROCEDURE — 63600175 PHARM REV CODE 636 W HCPCS: Mod: JG | Performed by: INTERNAL MEDICINE

## 2018-05-18 PROCEDURE — 76700 US EXAM ABDOM COMPLETE: CPT | Mod: TC

## 2018-05-18 PROCEDURE — 49083 ABD PARACENTESIS W/IMAGING: CPT | Mod: ,,, | Performed by: FAMILY MEDICINE

## 2018-05-18 PROCEDURE — 76700 US EXAM ABDOM COMPLETE: CPT | Mod: 26,,, | Performed by: RADIOLOGY

## 2018-05-18 PROCEDURE — 89051 BODY FLUID CELL COUNT: CPT

## 2018-05-18 RX ORDER — ALBUMIN HUMAN 250 G/1000ML
25 SOLUTION INTRAVENOUS ONCE
Status: COMPLETED | OUTPATIENT
Start: 2018-05-18 | End: 2018-05-18

## 2018-05-18 RX ADMIN — ALBUMIN (HUMAN) 25 G: 25 SOLUTION INTRAVENOUS at 11:05

## 2018-05-18 NOTE — DISCHARGE INSTRUCTIONS
For scheduling: Call Mackenzie at [phone]    For questions or concerns call: [facility] 8 AM- 5PM [phone]. Radiology resident on call [phone].    For immediate concerns that are not emergent, you may call our radiology clinic at: [phone]

## 2018-05-18 NOTE — H&P
Radiology History & Physical      SUBJECTIVE:     Chief Complaint: ascites    History of Present Illness:  Kenneth Sheikh is a 78 y.o. male who presents for ultrasound guided paracentesis  Past Medical History:   Diagnosis Date    Anticoagulant long-term use     Bipolar 1 disorder     Bipolar 1 disorder 11/24/2016    bipolar    Cancer     history of skin cancer/sinus cancer & rectal cancer    Depressed bipolar affective disorder     Diabetes mellitus     type 2    EBV infection 12/7/2016    EBV DNA, PCR Latest Ref Range: None detected  None detected EBV DNA-Copies/mL Unknown Test Not Performed EBV Early Antigen Ab, IgG Latest Ref Range: <1:10 Titer 1:40 (A) EBV Nuclear Ag Ab Latest Ref Range: <1:5 Titer >=1:80 (A) EBV VCA IgG Latest Ref Range: <1:10 Titer 1:160 (A) EBV VCA IgM Latest Ref Range: <1:10 Titer <1:10     History of TIA (transient ischemic attack)     several-taking Plavix    Hx of gallstones     Wife denies    Hypertension     Hyperthyroidism 11/30/2016    Low HDL (under 40) 12/7/2016    Mixed hyperlipidemia 11/23/2016    JUAN MANUEL (obstructive sleep apnea)     Pneumonia     Skin disease     Stroke     Transient cerebral ischemia 7/22/2016    Type 2 diabetes mellitus      Past Surgical History:   Procedure Laterality Date    Moh's procedure x4      rectal cancer      Sinus surgery for sinus cancer      sinus sx for cancer      skin cancer removed-several times-nose & ear      TONSILLECTOMY      Undescened testicle sx      UVULOPALATOPHARYNGOPLASTY      for sleep apnea       Home Meds:   Prior to Admission medications    Medication Sig Start Date End Date Taking? Authorizing Provider   ACCU-CHEK NEYDA PLUS TEST STRP Strp  2/23/17   Historical Provider, MD   ciprofloxacin HCl (CIPRO) 500 MG tablet Take 1 tablet (500 mg total) by mouth every Monday. 5/14/18   Renato Womack MD   divalproex (DEPAKOTE) 250 MG EC tablet  5/5/17   Historical Provider, MD   fluorouracil (EFUDEX) 5 % cream  Apply BID to affected areas as directed 3/1/18   Kenn Beach MD   furosemide (LASIX) 20 MG tablet Take 4 tablets (80 mg total) by mouth once daily.  Patient taking differently: Take 40 mg by mouth once daily. Taking 40 mg per day 4/25/17 4/25/18  Renato Womack MD   lactulose (CHRONULAC) 10 gram/15 mL solution TAKE 15 MLS BY MOUTH TWICE DAILY 3/8/18   Renato Womack MD   rifAXIMin (XIFAXAN) 550 mg Tab Take 1 tablet (550 mg total) by mouth 2 (two) times daily. 10/3/17   Renato Womack MD   SITagliptan (JANUVIA) 50 MG Tab Take 1 tablet (50 mg total) by mouth once daily. 4/6/17   Prisca Espinoza MD     Anticoagulants/Antiplatelets: no anticoagulation    Allergies:   Review of patient's allergies indicates:   Allergen Reactions    Abilify [aripiprazole] Other (See Comments)     Sleepy, could not function.     Sedation History:  no adverse reactions    Review of Systems:   Hematological: no known coagulopathies  Respiratory: no shortness of breath  Cardiovascular: no chest pain  Gastrointestinal: no abdominal pain  Genito-Urinary: no dysuria  Musculoskeletal: negative  Neurological: no TIA or stroke symptoms         OBJECTIVE:     Vital Signs (Most Recent)  Pulse: 86 (05/18/18 1054)  Resp: 16 (05/18/18 1054)  BP: (!) 146/79 (05/18/18 1054)  SpO2: 98 % (05/18/18 1054)    Physical Exam:  ASA: 2  Mallampati: n/a    General: no acute distress  Mental Status: alert and oriented to person, place and time  HEENT: normocephalic, atraumatic  Chest: unlabored breathing  Heart: regular heart rate  Abdomen: distended  Extremity: moves all extremities    Laboratory  Lab Results   Component Value Date    INR 1.1 03/09/2018       Lab Results   Component Value Date    WBC 3.56 (L) 03/09/2018    HGB 11.1 (L) 03/09/2018    HCT 34.0 (L) 03/09/2018    MCV 86 03/09/2018     (L) 03/09/2018      Lab Results   Component Value Date     (H) 02/02/2018     02/02/2018    K 4.0 02/02/2018      02/02/2018    CO2 29 02/02/2018    BUN 25 (H) 02/02/2018    CREATININE 1.5 (H) 02/02/2018    CALCIUM 8.6 (L) 02/02/2018    MG 1.7 05/15/2017    ALT 9 (L) 02/02/2018    AST 21 02/02/2018    ALBUMIN 2.2 (L) 02/02/2018    BILITOT 0.7 02/02/2018       ASSESSMENT/PLAN:     Sedation Plan: local  Patient will undergo ultrasound guided paracentesis.    MATT Mauricio, FNP  Interventional Radiology  [phone] [employer]

## 2018-05-18 NOTE — PROGRESS NOTES
Paracentesis complete at this time. Pt tolerated well. 5000cc removed from right abdomen. 100cc of albumin given IV. Patient given discharge instructions and verbalized understanding of at home care. IV discontinued with catheter intact. Bandage to right abdomen clean, dry and intact. Patient transferred to ultrasound department for next appointment. Wife updated.

## 2018-05-18 NOTE — PROCEDURES

## 2018-05-21 ENCOUNTER — TELEPHONE (OUTPATIENT)
Dept: HEPATOLOGY | Facility: CLINIC | Age: 78
End: 2018-05-21

## 2018-05-21 NOTE — TELEPHONE ENCOUNTER
----- Message from Renato Womack MD sent at 5/18/2018  4:32 PM CDT -----  Please let him know that him blood work is stable- including Depakote level

## 2018-05-21 NOTE — TELEPHONE ENCOUNTER
----- Message from Renato Womack MD sent at 5/18/2018  4:19 PM CDT -----  Please let him know that his US is okay- I'll see him in clinic in 3 months.

## 2018-05-22 NOTE — TELEPHONE ENCOUNTER
Received a call from patient wife asking about patient US results inform her that per MD results is ok.     She said that she have a questions when she read the [facility] results [facility] about the     Hyperechogenic lesion identified in the right hepatic lobe measuring 0.8 x 0.8 x 0.8 cm.    She wants to know what they need to do about this lesion. She want to make sure that this is nothing.     She also requested to fax the lab results to Dr. Lawanda Figueroa fax# [phone] faxed his lab results that was done 5/11/18.

## 2018-05-23 ENCOUNTER — TELEPHONE (OUTPATIENT)
Dept: TRANSPLANT | Facility: CLINIC | Age: 78
End: 2018-05-23

## 2018-05-23 NOTE — TELEPHONE ENCOUNTER
Spoke with Mrs Sheikh about the US result and reassured her that it showed a very small lesion that is likely benign but we will follow it in 3 months with a repeat US.

## 2018-05-24 ENCOUNTER — OFFICE VISIT (OUTPATIENT)
Dept: DERMATOLOGY | Facility: CLINIC | Age: 78
End: 2018-05-24
Payer: MEDICARE

## 2018-05-24 ENCOUNTER — TELEPHONE (OUTPATIENT)
Dept: HEPATOLOGY | Facility: CLINIC | Age: 78
End: 2018-05-24

## 2018-05-24 DIAGNOSIS — D04.39 SQUAMOUS CELL CARCINOMA IN SITU OF SKIN OF RIGHT TEMPLE REGION: Primary | ICD-10-CM

## 2018-05-24 PROCEDURE — 99999 PR PBB SHADOW E&M-EST. PATIENT-LVL II: CPT | Mod: PBBFAC,,, | Performed by: DERMATOLOGY

## 2018-05-24 PROCEDURE — 99213 OFFICE O/P EST LOW 20 MIN: CPT | Mod: S$PBB,,, | Performed by: DERMATOLOGY

## 2018-05-24 PROCEDURE — 99212 OFFICE O/P EST SF 10 MIN: CPT | Mod: PBBFAC | Performed by: DERMATOLOGY

## 2018-05-24 NOTE — TELEPHONE ENCOUNTER
----- Message from Janna Beaver sent at 5/24/2018 12:19 PM CDT -----  Contact: Kenn with Dr Lawanda Figueroa [phone]  eKnn with Dr Lawanda Figueroa's office is requesting the results from the lab work on 5.18.18 be faxed over to the following:    Fax [phone]    If there are any questions Kenn may be reached at [phone].    Thank you.  LC

## 2018-05-24 NOTE — PROGRESS NOTES
Review of patient's allergies indicates:   Allergen Reactions    Abilify [aripiprazole] Other (See Comments)     Sleepy, could not function.          CHIEF COMPLAINT: followup treatment with topical fluoruracil    The patient is accompanied to this visit by his wife.    HISTORY OF PRESENT ILLNESS:  Location(s): face, scalp  Timing: I last saw him 4 weeks ago  Duration: see previous notes; he applied the medication for about 6-7 weeks total on his face, with several breaks due to irritation  Quality: no residual irritation  Context: he finished use about 2 weeks ago; still has a little medicine left    Prior Path:  FINAL PATHOLOGIC DIAGNOSIS  1. Skin, left preauricular cheek, shave biopsy:  - SQUAMOUS CELL CARCINOMA IN SITU.  - THE TUMOR EXTENDS TO THE LATERAL BIOPSY MARGINS.  2. Skin, right jawline, shave biopsy:  - INVASIVE SQUAMOUS CELL CARCINOMA WITH FOCAL BASALOID AND CLEAR CELL DIFFERENTIATION.  - THE TUMOR EXTENDS TO THE LATERAL BIOPSY MARGINS.  3. Skin, right forehead, shave biopsy:  - SQUAMOUS CELL CARCINOMA IN SITU.  - THE TUMOR EXTENDS TO THE LATERAL BIOPSY MARGINS.  4. Skin, right frontal scalp, shave biopsy:  - SQUAMOUS CELL CARCINOMA IN SITU.  - MARGINS ARE NEGATIVE FOR FULL THICKNESS SQUAMOUS ATYPIA IN THE PLANES OF SECTION.  Diagnosed by: Lorenza Vila M.D.  (Electronically Signed: 2017-10-18 14:37:11)    Photo from 10/30/2017          Defibrillator: No  Pacemaker: No  Artificial heart valves: No  Artificial joints: No     REVIEW OF SYSTEMS:   General: general fair  Skin: has one persistent spot right temple  CV: has hypertension, no artificial valves  GI: undergoes weekly paracentesis on Fridays due to hepatic cirrhosis  Hem/Lymph: not taking prescribed anticoagulants  Allergy/Immuno: has allergies as noted above      PAST MEDICAL HISTORY:  Past Medical History:   Diagnosis Date    Anticoagulant long-term use     Bipolar 1 disorder     Bipolar 1 disorder 11/24/2016    bipolar    Cancer      history of skin cancer/sinus cancer & rectal cancer    Depressed bipolar affective disorder     Diabetes mellitus     type 2    EBV infection 12/7/2016    EBV DNA, PCR Latest Ref Range: None detected  None detected EBV DNA-Copies/mL Unknown Test Not Performed EBV Early Antigen Ab, IgG Latest Ref Range: <1:10 Titer 1:40 (A) EBV Nuclear Ag Ab Latest Ref Range: <1:5 Titer >=1:80 (A) EBV VCA IgG Latest Ref Range: <1:10 Titer 1:160 (A) EBV VCA IgM Latest Ref Range: <1:10 Titer <1:10     History of TIA (transient ischemic attack)     several-taking Plavix    Hx of gallstones     Wife denies    Hypertension     Hyperthyroidism 11/30/2016    Low HDL (under 40) 12/7/2016    Mixed hyperlipidemia 11/23/2016    JUAN MANUEL (obstructive sleep apnea)     Pneumonia     Skin disease     Stroke     Transient cerebral ischemia 7/22/2016    Type 2 diabetes mellitus        PAST SURGICAL HISTORY:  Past Surgical History:   Procedure Laterality Date    Moh's procedure x4      rectal cancer      Sinus surgery for sinus cancer      sinus sx for cancer      skin cancer removed-several times-nose & ear      TONSILLECTOMY      Undescened testicle sx      UVULOPALATOPHARYNGOPLASTY      for sleep apnea       SOCIAL HISTORY:  Social History   Substance Use Topics    Smoking status: Former Smoker     Packs/day: 0.25     Years: 2.00    Smokeless tobacco: Never Used      Comment: quit at age 17 less than a pack a day    Alcohol use No      Comment: rare        PERTINENT MEDICATIONS:  See medications list.  Current Outpatient Prescriptions on File Prior to Visit   Medication Sig Dispense Refill    ACCU-CHEK NEYDA PLUS TEST STRP Strp       ciprofloxacin HCl (CIPRO) 500 MG tablet Take 1 tablet (500 mg total) by mouth every Monday. 12 tablet 3    divalproex (DEPAKOTE) 250 MG EC tablet       fluorouracil (EFUDEX) 5 % cream Apply BID to affected areas as directed 40 g 2    lactulose (CHRONULAC) 10 gram/15 mL solution TAKE 15 MLS BY  MOUTH TWICE DAILY 2838 mL 5    rifAXIMin (XIFAXAN) 550 mg Tab Take 1 tablet (550 mg total) by mouth 2 (two) times daily. 60 tablet 11    SITagliptan (JANUVIA) 50 MG Tab Take 1 tablet (50 mg total) by mouth once daily. 90 tablet 3    furosemide (LASIX) 20 MG tablet Take 4 tablets (80 mg total) by mouth once daily. (Patient taking differently: Take 40 mg by mouth once daily. Taking 40 mg per day) 270 tablet 3     No current facility-administered medications on file prior to visit.        EXAMINATION:  Skin: the areas of previous erythema and scaling on his face have essentially cleared completely, except as noted below   There is still an eschar with some surrounding erythema and induration on his right forehead/temple; see the photo above; this is at the site of the eschar which is about 3 cm superior and posterior to the right lateral brow, and which on review of prior path is the area of prior biopsy by Dr. 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 In particular, the discussion included, but was not limited to, the following:    One alternative at this point would be to defer further treatment and observe the lesion. With small skin cancers of this kind, it is possible that a biopsy can be sufficient to definitively treat a small skin cancer of this kind. Alternatively, some skin cancers are slow growing and do not require  immediate treatment. The potential advantage of this choice would be to avoid the need for possibly unnecessary additional surgery. Among the potential disadvantages of this would be the possibility of enlargement of the lesion, more extensive spread of the lesion or recurrence at a later date, which might necessitate a larger and more complex surgery.    Radiation treatment can be an effective treatment for this type of skin cancer. The usual course of treatment is every weekday for several weeks. Local irritation will result from treatment, although no systemic side effects are expected. The potential advantage of radiation treatment is that it avoids the need for surgery. Among the disadvantages of radiation treatment are the length of treatment, the local inflammatory response, the absence of pathologic confirmation of the removal of the skin cancer, a possible increased risk of additional skin cancer in the treated area in later years, and a somewhat increased risk of recurrence at a later date.     Excisional surgery can be an effective treatment for this type of skin cancer. This would involve excision of the lesion with margin evaluation by submitting the specimen to a pathologist for either immediate marginal assessment via frozen section processing, or delayed marginal assessment by fixed-tissue processing. The potential advantage of this technique is that it offers a way of treating the lesion with some degree of histologic confirmation of tumor removal. Among the disadvantages of this treatment are the possible need for re-excision if marginal involvement is identified, a somewhat greater likelihood of recurrence as compared to Mohs' surgery because of the less comprehensive margin evaluation inherent in the technique, and the general potential risks of surgery, including allergic reactions to the anesthetic and other materials used, infection, injury to nerves in the area with consequent loss of  sensation or muscle function, and scarring or distortion of surrounding structures.    Mohs' surgery is a very effective treatment for this type of skin cancer. The potential advantage of Mohs' surgery is that this technique offers the greatest possible certainty of knowing that the skin cancer has been completely removed, with the removal of the least amount of normal tissue. The potential disadvantages of Mohs' surgery include the duration of the surgery, the possible need for a separate surgery for reconstruction following tumor removal, and scarring as a result. In addition, general potential risks of surgery as noted above also apply to treatment via Mohs' surgery.    In light of the peristent nature of this tumor following topical treatment and the location on the face,  Mohs' micrographic surgery was thought to be the most appropriate management choice, and this diagnosis is appropriate for treatment by Mohs' micrographic surgery.     Sufficient time was available for questions, and all questions were answered to his satisfaction. He fully understands the aims, risks, alternatives, and possible complications, and has elected to proceed with the surgery, and verbally consented to do so. The procedure will be scheduled in the near future.    Routine pre-op instructions were given to him.    --------------------------------------  Note: Some or all of this note may have been generated using voice recognition software. There may be voice recognition errors including grammatical and/or spelling errors found in the text. Attempts were made to correct these errors prior to signature.

## 2018-05-25 ENCOUNTER — OFFICE VISIT (OUTPATIENT)
Dept: INTERNAL MEDICINE | Facility: CLINIC | Age: 78
End: 2018-05-25
Payer: MEDICARE

## 2018-05-25 ENCOUNTER — TELEPHONE (OUTPATIENT)
Dept: INTERNAL MEDICINE | Facility: CLINIC | Age: 78
End: 2018-05-25

## 2018-05-25 ENCOUNTER — HOSPITAL ENCOUNTER (OUTPATIENT)
Dept: INTERVENTIONAL RADIOLOGY/VASCULAR | Facility: HOSPITAL | Age: 78
Discharge: HOME | End: 2018-05-25
Attending: INTERNAL MEDICINE
Payer: MEDICARE

## 2018-05-25 VITALS
BODY MASS INDEX: 27.87 KG/M2 | HEIGHT: 68 IN | DIASTOLIC BLOOD PRESSURE: 68 MMHG | WEIGHT: 183.88 LBS | SYSTOLIC BLOOD PRESSURE: 110 MMHG

## 2018-05-25 VITALS
RESPIRATION RATE: 16 BRPM | DIASTOLIC BLOOD PRESSURE: 65 MMHG | HEART RATE: 54 BPM | SYSTOLIC BLOOD PRESSURE: 135 MMHG | OXYGEN SATURATION: 98 %

## 2018-05-25 DIAGNOSIS — R18.8 OTHER ASCITES: ICD-10-CM

## 2018-05-25 DIAGNOSIS — K75.81 LIVER CIRRHOSIS SECONDARY TO NASH (NONALCOHOLIC STEATOHEPATITIS): ICD-10-CM

## 2018-05-25 DIAGNOSIS — D61.818 PANCYTOPENIA: ICD-10-CM

## 2018-05-25 DIAGNOSIS — Z85.048 HISTORY OF RECTAL CANCER: ICD-10-CM

## 2018-05-25 DIAGNOSIS — E11.22 TYPE 2 DIABETES MELLITUS WITH STAGE 3 CHRONIC KIDNEY DISEASE, WITHOUT LONG-TERM CURRENT USE OF INSULIN: Primary | ICD-10-CM

## 2018-05-25 DIAGNOSIS — K74.60 LIVER CIRRHOSIS SECONDARY TO NASH (NONALCOHOLIC STEATOHEPATITIS): ICD-10-CM

## 2018-05-25 DIAGNOSIS — R18.8 ASCITES OF LIVER: ICD-10-CM

## 2018-05-25 DIAGNOSIS — N18.30 CKD (CHRONIC KIDNEY DISEASE) STAGE 3, GFR 30-59 ML/MIN: ICD-10-CM

## 2018-05-25 DIAGNOSIS — F31.9 BIPOLAR 1 DISORDER: ICD-10-CM

## 2018-05-25 DIAGNOSIS — Z85.828 HISTORY OF SCC (SQUAMOUS CELL CARCINOMA) OF SKIN: ICD-10-CM

## 2018-05-25 DIAGNOSIS — N18.30 TYPE 2 DIABETES MELLITUS WITH STAGE 3 CHRONIC KIDNEY DISEASE, WITHOUT LONG-TERM CURRENT USE OF INSULIN: Primary | ICD-10-CM

## 2018-05-25 LAB
APPEARANCE FLD: CLEAR
BASOPHILS NFR FLD MANUAL: 1 %
BODY FLD TYPE: NORMAL
COLOR FLD: YELLOW
LYMPHOCYTES NFR FLD MANUAL: 85 %
MONOS+MACROS NFR FLD MANUAL: 11 %
NEUTROPHILS NFR FLD MANUAL: 3 %
WBC # FLD: 69 /CU MM

## 2018-05-25 PROCEDURE — P9047 ALBUMIN (HUMAN), 25%, 50ML: HCPCS | Mod: JG | Performed by: INTERNAL MEDICINE

## 2018-05-25 PROCEDURE — 99213 OFFICE O/P EST LOW 20 MIN: CPT | Mod: PBBFAC,25 | Performed by: INTERNAL MEDICINE

## 2018-05-25 PROCEDURE — 99214 OFFICE O/P EST MOD 30 MIN: CPT | Mod: S$PBB,,, | Performed by: INTERNAL MEDICINE

## 2018-05-25 PROCEDURE — A7048 VACUUM DRAIN BOTTLE/TUBE KIT: HCPCS

## 2018-05-25 PROCEDURE — 63600175 PHARM REV CODE 636 W HCPCS: Mod: JG | Performed by: INTERNAL MEDICINE

## 2018-05-25 PROCEDURE — 99999 PR PBB SHADOW E&M-EST. PATIENT-LVL III: CPT | Mod: PBBFAC,,, | Performed by: INTERNAL MEDICINE

## 2018-05-25 PROCEDURE — 49083 ABD PARACENTESIS W/IMAGING: CPT

## 2018-05-25 PROCEDURE — 89051 BODY FLUID CELL COUNT: CPT

## 2018-05-25 PROCEDURE — 49083 ABD PARACENTESIS W/IMAGING: CPT | Mod: ,,, | Performed by: FAMILY MEDICINE

## 2018-05-25 RX ORDER — ALBUMIN HUMAN 250 G/1000ML
25 SOLUTION INTRAVENOUS
Status: DISCONTINUED | OUTPATIENT
Start: 2018-05-25 | End: 2018-05-26 | Stop reason: HOSPADM

## 2018-05-25 RX ORDER — BLOOD SUGAR DIAGNOSTIC
1 STRIP MISCELLANEOUS DAILY
Qty: 90 STRIP | Refills: 3 | Status: SHIPPED | OUTPATIENT
Start: 2018-05-25 | End: 2018-10-26 | Stop reason: SDUPTHER

## 2018-05-25 RX ADMIN — ALBUMIN (HUMAN) 25 G: 25 SOLUTION INTRAVENOUS at 12:05

## 2018-05-25 NOTE — PROGRESS NOTES
Patient stable, tolerated paracentesis well,4.3 L removed, labs sent, 100 mL albumin given per protocol.  Discharge instruction, and follow up care reviewed.  Patient verbalized understanding, and denies further questions.  Provided with ROCU and after hours number.

## 2018-05-25 NOTE — TELEPHONE ENCOUNTER
----- Message from Prisca Espinoza MD sent at 5/25/2018  3:34 PM CDT -----  Please call patient and let him (and wife) know that diabetes looks much worse.  HgA1c has increased from 7.9 to 10.2.  I would like him to see Diabetes Education (referral is in) in the next few weeks - please schedule.  Then I want him to see me back in clinic sometime after that to discuss treatment options.  We will likely need to start insulin or another injectable option.  Please make sure patient has diabetes supplies at home.  He needs to check his glucose 3 times per day before meals and write all numbers down and bring glucose log to next appointment.

## 2018-05-25 NOTE — TELEPHONE ENCOUNTER
----- Message from Jocelyn Cox sent at 5/25/2018  3:44 PM CDT -----  Contact: [place]/[phone]/cell or [phone]  Patient is returning a phone call.  Who left a message for the patient: Dr Espinoza's Medical assistant  Does patient know what this is regarding:  result  Comments: thank you

## 2018-05-25 NOTE — H&P
Radiology History & Physical      SUBJECTIVE:     Chief Complaint: ascites    History of Present Illness:  Kenneth Sheikh is a 78 y.o. male who presents for ultrasound guided paracentesis  Past Medical History:   Diagnosis Date    Anticoagulant long-term use     Bipolar 1 disorder     Bipolar 1 disorder 11/24/2016    bipolar    Cancer     history of skin cancer/sinus cancer & rectal cancer    Depressed bipolar affective disorder     Diabetes mellitus     type 2    EBV infection 12/7/2016    EBV DNA, PCR Latest Ref Range: None detected  None detected EBV DNA-Copies/mL Unknown Test Not Performed EBV Early Antigen Ab, IgG Latest Ref Range: <1:10 Titer 1:40 (A) EBV Nuclear Ag Ab Latest Ref Range: <1:5 Titer >=1:80 (A) EBV VCA IgG Latest Ref Range: <1:10 Titer 1:160 (A) EBV VCA IgM Latest Ref Range: <1:10 Titer <1:10     History of TIA (transient ischemic attack)     several-taking Plavix    Hx of gallstones     Wife denies    Hypertension     Hyperthyroidism 11/30/2016    Low HDL (under 40) 12/7/2016    Mixed hyperlipidemia 11/23/2016    JUAN MANUEL (obstructive sleep apnea)     Pneumonia     Skin disease     Stroke     Transient cerebral ischemia 7/22/2016    Type 2 diabetes mellitus      Past Surgical History:   Procedure Laterality Date    Moh's procedure x4      rectal cancer      Sinus surgery for sinus cancer      sinus sx for cancer      skin cancer removed-several times-nose & ear      TONSILLECTOMY      Undescened testicle sx      UVULOPALATOPHARYNGOPLASTY      for sleep apnea       Home Meds:   Prior to Admission medications    Medication Sig Start Date End Date Taking? Authorizing Provider   ACCU-CHEK NEYDA PLUS TEST STRP Strp 1 strip by Misc.(Non-Drug; Combo Route) route once daily. 5/25/18   Prisca Espinoza MD   ciprofloxacin HCl (CIPRO) 500 MG tablet Take 1 tablet (500 mg total) by mouth every Monday. 5/14/18   Renato Womack MD   divalproex (DEPAKOTE) 250 MG EC tablet  5/5/17    Historical Provider, MD   furosemide (LASIX) 20 MG tablet Take 4 tablets (80 mg total) by mouth once daily.  Patient taking differently: Take 40 mg by mouth once daily. Taking 40 mg per day 4/25/17 5/25/18  Renato Womack MD   lactulose (CHRONULAC) 10 gram/15 mL solution TAKE 15 MLS BY MOUTH TWICE DAILY 3/8/18   Renato Womack MD   rifAXIMin (XIFAXAN) 550 mg Tab Take 1 tablet (550 mg total) by mouth 2 (two) times daily. 10/3/17   Renato Womack MD   SITagliptan (JANUVIA) 50 MG Tab Take 1 tablet (50 mg total) by mouth once daily. 4/6/17   Prisca Espinoza MD   ACCU-CHEK NEYDA PLUS TEST STRP Strp  2/23/17 5/25/18  Historical Provider, MD   fluorouracil (EFUDEX) 5 % cream Apply BID to affected areas as directed 3/1/18 5/25/18  Kenn Beach MD     Anticoagulants/Antiplatelets: no anticoagulation    Allergies:   Review of patient's allergies indicates:   Allergen Reactions    Abilify [aripiprazole] Other (See Comments)     Sleepy, could not function.     Sedation History:  no adverse reactions    Review of Systems:   Hematological: no known coagulopathies  Respiratory: no shortness of breath  Cardiovascular: no chest pain  Gastrointestinal: no abdominal pain  Genito-Urinary: no dysuria  Musculoskeletal: negative  Neurological: no TIA or stroke symptoms         OBJECTIVE:     Vital Signs (Most Recent)  Pulse: (!) 53 (05/25/18 1148)  Resp: 18 (05/25/18 1148)  BP: (!) 143/70 (05/25/18 1148)  SpO2: 97 % (05/25/18 1148)    Physical Exam:  ASA: 2  Mallampati: n/a    General: no acute distress  Mental Status: alert and oriented to person, place and time  HEENT: normocephalic, atraumatic  Chest: unlabored breathing  Heart: regular heart rate  Abdomen: distended  Extremity: moves all extremities    Laboratory  Lab Results   Component Value Date    INR 1.1 05/18/2018       Lab Results   Component Value Date    WBC 2.93 (L) 05/18/2018    HGB 10.7 (L) 05/18/2018    HCT 33.4 (L) 05/18/2018    MCV 86  05/18/2018     (L) 05/18/2018      Lab Results   Component Value Date     (H) 05/18/2018     05/18/2018    K 4.0 05/18/2018     05/18/2018    CO2 29 05/18/2018    BUN 19 05/18/2018    CREATININE 1.5 (H) 05/18/2018    CALCIUM 8.9 05/18/2018    MG 1.7 05/15/2017    ALT 7 (L) 05/18/2018    AST 19 05/18/2018    ALBUMIN 2.4 (L) 05/18/2018    BILITOT 0.5 05/18/2018       ASSESSMENT/PLAN:     Sedation Plan: local  Patient will undergo ultrasound guided paracentesis.    Елена Comer, APRN, FNP  Interventional Radiology  [phone] [employer]

## 2018-05-25 NOTE — DISCHARGE INSTRUCTIONS
"For scheduling: Call Mackenzie at [phone]    For questions or concerns call: [facility] 8 AM- 5PM: [phone].   **After hours and weekends: Call [phone] and ask for "Radiology Resident on call".    For immediate concerns that are not emergent, you may call our radiology clinic at: [phone]    "

## 2018-05-25 NOTE — TELEPHONE ENCOUNTER
Spoke with pt's wife she understood his results she has scheduled pt for diabetes Education and a follow up appt for pt to come in if appts are good for them they will keep if not she will call and reschedule

## 2018-05-25 NOTE — PROCEDURES

## 2018-05-25 NOTE — PROGRESS NOTES
Internal Medicine    Subjective:      Patient ID: Kenneth Sheikh is a 78 y.o. male.    Chief Complaint: Follow-up (type 2 diabetes mellitus with stage 3 chronic kidney disease, without long-term current use on insulin)    HPI:  Patient presents for follow up appointment.  The patient's last visit with me was on 2/16/2018.    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  Losartan stopped due to BELLA.  Followed by Dr. Womack - last seen 5/11/18.  Taking Rifaximin twice daily.  Getting paracentesis weekly.  Taking Cipro once a week for SBP prophylaxis.       H/o Rectal cancer s/p resection 06/2016:  Last seen by colorectal surgery (Dr. Talavera) on 3/6/18 - no evidence of recurrent neoplasia - follow up in 1 year.     DM-2:  Last HgA1c 7.9 on 1/25/18 (up from 6.3 on 5/11/17).  Off metformin due to elevated lactate and diarrhea.  Now taking Januvia 50mg daily.  States he is not compliant with diabetic diet.  Eat sugar frequently.  Declining referral to Diabetes Education.  Wife reports patient does not have much of an appetite.    Pancytopenia:  Has been seen by Heme-Onc (2/21/17).  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      HLD:  Stopped fenofibrate per Dr. Sheikh due to low cholesterol.  No longer on medication.     TIAs:  No symptoms recently.  No longer taking ASA.     Bipolar d/o:  Taking Depakote 500mg qHS.  Last Depakote level 58.8 on 5/18/18.  Denies depression or dany.  Followed by psychiatrist, Dr. Figueroa.     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 Beach (last seen yesterday).    Neck pain, bilateral knee pain:  Stable.  Avoiding NSAIDs due to cirrhosis.  Was told he could occasionally take Tylenol but not regularly.         Review of Systems   Constitutional: Positive for appetite change (Poor since cirrhosis diagnosis) and fatigue (Chronic). Negative for chills, fever  "and unexpected weight change.   HENT: Negative for sore throat and trouble swallowing.    Eyes: Negative for visual disturbance.   Respiratory: Negative for cough, chest tightness, shortness of breath and wheezing.    Cardiovascular: Negative for chest pain, palpitations and leg swelling.   Gastrointestinal: Positive for abdominal pain (Distention, para today). Negative for blood in stool, constipation, diarrhea, nausea and vomiting.   Genitourinary: Negative for difficulty urinating.   Musculoskeletal: Positive for arthralgias. Negative for myalgias.   Skin: Negative for rash and wound.   Neurological: Positive for weakness (Chronic). Negative for dizziness, numbness and headaches.   Psychiatric/Behavioral: Negative for behavioral problems and confusion.       Past medical history, surgical history, and family medical history reviewed and updated as appropriate.    Medications and allergies reviewed.     Objective:     Vitals:    05/25/18 0950   BP: 110/68   Weight: 83.4 kg (183 lb 13.8 oz)   Height: 5' 8" (1.727 m)     Physical Exam   Constitutional: He is oriented to person, place, and time. He appears well-developed and well-nourished. No distress.   HENT:   Head: Normocephalic and atraumatic.   Eyes: Conjunctivae and EOM are normal. No scleral icterus.   Cardiovascular: Normal rate and regular rhythm.  Exam reveals no gallop and no friction rub.    No murmur heard.  Pulmonary/Chest: Effort normal and breath sounds normal. No respiratory distress. He has no wheezes. He has no rales.   Abdominal: Soft. He exhibits distension. There is no tenderness.   Musculoskeletal: He exhibits edema. He exhibits no tenderness.   Neurological: He is alert and oriented to person, place, and time.   Skin: No rash noted. No erythema.   Psychiatric: He has a normal mood and affect. His behavior is normal. Judgment and thought content normal.       RESULTS:   Lab Results   Component Value Date    WBC 2.93 (L) 05/18/2018    HGB 10.7 " (L) 05/18/2018    HCT 33.4 (L) 05/18/2018    MCV 86 05/18/2018     (L) 05/18/2018     BMP  Lab Results   Component Value Date     05/18/2018    K 4.0 05/18/2018     05/18/2018    CO2 29 05/18/2018    BUN 19 05/18/2018    CREATININE 1.5 (H) 05/18/2018    CALCIUM 8.9 05/18/2018    ANIONGAP 7 (L) 05/18/2018    ESTGFRAFRICA 50.1 (A) 05/18/2018    EGFRNONAA 43.3 (A) 05/18/2018     Lab Results   Component Value Date    ALT 7 (L) 05/18/2018    AST 19 05/18/2018    ALKPHOS 108 05/18/2018    BILITOT 0.5 05/18/2018     Lab Results   Component Value Date    TSH 1.179 02/02/2018     Lab Results   Component Value Date    HGBA1C 10.2 (H) 05/25/2018         Assessment:     1. Type 2 diabetes mellitus with stage 3 chronic kidney disease, without long-term current use of insulin    2. 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 Ascites of liver    4. Pancytopenia    5. CKD (chronic kidney disease) stage 3, GFR 30-59 ml/min    6. History of SCC (squamous cell carcinoma) of skin    7. History of rectal cancer    8. Bipolar 1 disorder        Plan:     *Continue medication/plan as discussed in HPI except for changes discussed below.    Kenneth was seen today for follow-up.    Diagnoses and all orders for this visit:    Type 2 diabetes mellitus with stage 3 chronic kidney disease, without long-term current use of insulin   Discussed recommendations for diabetic diet.  Declining Diabetes Education.  If next HgA1c not improved, will need to discuss adding another medication.  Can not increase Januvia due to CKD.  -     Hemoglobin A1c; Future; Expected date: 05/25/2018  -     ACCU-CHEK NEYDA PLUS TEST STRP Strp; 1 strip by Misc.(Non-Drug; Combo Route) route once daily.    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     Follow-up in about 3 months (around 8/25/2018).    Prisca Espinoza MD  Internal Medicine  [facility]

## 2018-05-28 ENCOUNTER — TELEPHONE (OUTPATIENT)
Dept: INTERNAL MEDICINE | Facility: CLINIC | Age: 78
End: 2018-05-28

## 2018-05-28 NOTE — TELEPHONE ENCOUNTER
Spoke with pt's wife and rescheduled pt's appt for diabetes Education and f/u appt with us. Letters will be mailed out as a reminder for pt appt dates and time

## 2018-05-28 NOTE — TELEPHONE ENCOUNTER
----- Message from Trinidad Alves sent at 5/25/2018  4:18 PM CDT -----  Contact: pt [phone]  Pt wife would like a call back regarding an appt that was just scheduled. Please advise.

## 2018-06-01 ENCOUNTER — HOSPITAL ENCOUNTER (OUTPATIENT)
Dept: INTERVENTIONAL RADIOLOGY/VASCULAR | Facility: HOSPITAL | Age: 78
Discharge: HOME | End: 2018-06-01
Attending: INTERNAL MEDICINE
Payer: MEDICARE

## 2018-06-01 VITALS
HEART RATE: 63 BPM | SYSTOLIC BLOOD PRESSURE: 112 MMHG | DIASTOLIC BLOOD PRESSURE: 65 MMHG | OXYGEN SATURATION: 99 % | RESPIRATION RATE: 18 BRPM

## 2018-06-01 DIAGNOSIS — R18.8 OTHER ASCITES: ICD-10-CM

## 2018-06-01 LAB
APPEARANCE FLD: CLEAR
BODY FLD TYPE: NORMAL
COLOR FLD: YELLOW
LYMPHOCYTES NFR FLD MANUAL: 57 %
MONOS+MACROS NFR FLD MANUAL: 40 %
NEUTROPHILS NFR FLD MANUAL: 3 %
WBC # FLD: 80 /CU MM

## 2018-06-01 PROCEDURE — P9047 ALBUMIN (HUMAN), 25%, 50ML: HCPCS | Mod: JG | Performed by: INTERNAL MEDICINE

## 2018-06-01 PROCEDURE — A7048 VACUUM DRAIN BOTTLE/TUBE KIT: HCPCS

## 2018-06-01 PROCEDURE — 49083 ABD PARACENTESIS W/IMAGING: CPT | Mod: ,,, | Performed by: FAMILY MEDICINE

## 2018-06-01 PROCEDURE — 63600175 PHARM REV CODE 636 W HCPCS: Mod: JG | Performed by: INTERNAL MEDICINE

## 2018-06-01 PROCEDURE — 49083 ABD PARACENTESIS W/IMAGING: CPT

## 2018-06-01 PROCEDURE — 89051 BODY FLUID CELL COUNT: CPT

## 2018-06-01 RX ORDER — ALBUMIN HUMAN 250 G/1000ML
25 SOLUTION INTRAVENOUS ONCE
Status: COMPLETED | OUTPATIENT
Start: 2018-06-01 | End: 2018-06-01

## 2018-06-01 RX ADMIN — ALBUMIN (HUMAN) 25 G: 25 SOLUTION INTRAVENOUS at 11:06

## 2018-06-01 NOTE — PROGRESS NOTES
Paracentesis complete.4800 mLs peritoneal fluid drained. Pt tolerated well. Dressing to right abd clean, dry, and intact. Albumin 25% given 100 mLs. Specimens sent per lab order.

## 2018-06-01 NOTE — H&P

## 2018-06-01 NOTE — H&P
Radiology History & Physical      SUBJECTIVE:     Chief Complaint: ascites    History of Present Illness:  Kenneth Sheikh is a 78 y.o. male who presents for ultrasound guided paracentesis  Past Medical History:   Diagnosis Date    Anticoagulant long-term use     Bipolar 1 disorder     Bipolar 1 disorder 11/24/2016    bipolar    Cancer     history of skin cancer/sinus cancer & rectal cancer    Depressed bipolar affective disorder     Diabetes mellitus     type 2    EBV infection 12/7/2016    EBV DNA, PCR Latest Ref Range: None detected  None detected EBV DNA-Copies/mL Unknown Test Not Performed EBV Early Antigen Ab, IgG Latest Ref Range: <1:10 Titer 1:40 (A) EBV Nuclear Ag Ab Latest Ref Range: <1:5 Titer >=1:80 (A) EBV VCA IgG Latest Ref Range: <1:10 Titer 1:160 (A) EBV VCA IgM Latest Ref Range: <1:10 Titer <1:10     History of TIA (transient ischemic attack)     several-taking Plavix    Hx of gallstones     Wife denies    Hypertension     Hyperthyroidism 11/30/2016    Low HDL (under 40) 12/7/2016    Mixed hyperlipidemia 11/23/2016    JUAN MANUEL (obstructive sleep apnea)     Pneumonia     Skin disease     Stroke     Transient cerebral ischemia 7/22/2016    Type 2 diabetes mellitus      Past Surgical History:   Procedure Laterality Date    Moh's procedure x4      rectal cancer      Sinus surgery for sinus cancer      sinus sx for cancer      skin cancer removed-several times-nose & ear      TONSILLECTOMY      Undescened testicle sx      UVULOPALATOPHARYNGOPLASTY      for sleep apnea       Home Meds:   Prior to Admission medications    Medication Sig Start Date End Date Taking? Authorizing Provider   ACCU-CHEK NEYDA PLUS TEST STRP Strp 1 strip by Misc.(Non-Drug; Combo Route) route once daily. 5/25/18   Prisca Espinoza MD   ciprofloxacin HCl (CIPRO) 500 MG tablet Take 1 tablet (500 mg total) by mouth every Monday. 5/14/18   Renato Womack MD   divalproex (DEPAKOTE) 250 MG EC tablet  5/5/17    Historical Provider, MD   furosemide (LASIX) 20 MG tablet Take 4 tablets (80 mg total) by mouth once daily.  Patient taking differently: Take 40 mg by mouth once daily. Taking 40 mg per day 4/25/17 5/25/18  Renato Womack MD   lactulose (CHRONULAC) 10 gram/15 mL solution TAKE 15 MLS BY MOUTH TWICE DAILY 3/8/18   Renato Womack MD   rifAXIMin (XIFAXAN) 550 mg Tab Take 1 tablet (550 mg total) by mouth 2 (two) times daily. 10/3/17   Renato Womack MD   SITagliptan (JANUVIA) 50 MG Tab Take 1 tablet (50 mg total) by mouth once daily. 4/6/17   Prisca Espinoza MD     Anticoagulants/Antiplatelets: no anticoagulation    Allergies:   Review of patient's allergies indicates:   Allergen Reactions    Abilify [aripiprazole] Other (See Comments)     Sleepy, could not function.     Sedation History:  no adverse reactions    Review of Systems:   Hematological: no known coagulopathies  Respiratory: no shortness of breath  Cardiovascular: no chest pain  Gastrointestinal: no abdominal pain  Genito-Urinary: no dysuria  Musculoskeletal: negative  Neurological: no TIA or stroke symptoms         OBJECTIVE:     Vital Signs (Most Recent)       Physical Exam:  ASA: 2  Mallampati: n/a    General: no acute distress  Mental Status: alert and oriented to person, place and time  HEENT: normocephalic, atraumatic  Chest: unlabored breathing  Heart: regular heart rate  Abdomen: distended  Extremity: moves all extremities    Laboratory  Lab Results   Component Value Date    INR 1.1 05/18/2018       Lab Results   Component Value Date    WBC 2.93 (L) 05/18/2018    HGB 10.7 (L) 05/18/2018    HCT 33.4 (L) 05/18/2018    MCV 86 05/18/2018     (L) 05/18/2018      Lab Results   Component Value Date     (H) 05/18/2018     05/18/2018    K 4.0 05/18/2018     05/18/2018    CO2 29 05/18/2018    BUN 19 05/18/2018    CREATININE 1.5 (H) 05/18/2018    CALCIUM 8.9 05/18/2018    MG 1.7 05/15/2017    ALT 7 (L) 05/18/2018     AST 19 05/18/2018    ALBUMIN 2.4 (L) 05/18/2018    BILITOT 0.5 05/18/2018       ASSESSMENT/PLAN:     Sedation Plan: local  Patient will undergo ultrasound guided paracentesis.    MATT Mauricio, FNP  Interventional Radiology  [phone] [employer]

## 2018-06-08 ENCOUNTER — HOSPITAL ENCOUNTER (OUTPATIENT)
Dept: INTERVENTIONAL RADIOLOGY/VASCULAR | Facility: HOSPITAL | Age: 78
Discharge: HOME | End: 2018-06-08
Attending: INTERNAL MEDICINE
Payer: MEDICARE

## 2018-06-08 VITALS
HEART RATE: 57 BPM | OXYGEN SATURATION: 99 % | DIASTOLIC BLOOD PRESSURE: 60 MMHG | RESPIRATION RATE: 18 BRPM | SYSTOLIC BLOOD PRESSURE: 122 MMHG

## 2018-06-08 DIAGNOSIS — R18.8 OTHER ASCITES: ICD-10-CM

## 2018-06-08 LAB
APPEARANCE FLD: NORMAL
BODY FLD TYPE: NORMAL
COLOR FLD: YELLOW
EOSINOPHIL NFR FLD MANUAL: 1 %
LYMPHOCYTES NFR FLD MANUAL: 67 %
MESOTHL CELL NFR FLD MANUAL: 5 %
MONOS+MACROS NFR FLD MANUAL: 10 %
NEUTROPHILS NFR FLD MANUAL: 17 %
WBC # FLD: 72 /CU MM

## 2018-06-08 PROCEDURE — 49083 ABD PARACENTESIS W/IMAGING: CPT

## 2018-06-08 PROCEDURE — P9047 ALBUMIN (HUMAN), 25%, 50ML: HCPCS | Mod: JG | Performed by: INTERNAL MEDICINE

## 2018-06-08 PROCEDURE — A7048 VACUUM DRAIN BOTTLE/TUBE KIT: HCPCS

## 2018-06-08 PROCEDURE — 49083 ABD PARACENTESIS W/IMAGING: CPT | Mod: ,,, | Performed by: FAMILY MEDICINE

## 2018-06-08 PROCEDURE — 63600175 PHARM REV CODE 636 W HCPCS: Mod: JG | Performed by: INTERNAL MEDICINE

## 2018-06-08 PROCEDURE — 89051 BODY FLUID CELL COUNT: CPT

## 2018-06-08 RX ORDER — ALBUMIN HUMAN 250 G/1000ML
25 SOLUTION INTRAVENOUS ONCE
Status: COMPLETED | OUTPATIENT
Start: 2018-06-08 | End: 2018-06-08

## 2018-06-08 RX ADMIN — ALBUMIN (HUMAN) 25 G: 25 SOLUTION INTRAVENOUS at 12:06

## 2018-06-08 NOTE — H&P
Radiology History & Physical      SUBJECTIVE:     Chief Complaint: ascites    History of Present Illness:  Kenneth Sheikh is a 78 y.o. male who presents for ultrasound guided paracentesis  Past Medical History:   Diagnosis Date    Anticoagulant long-term use     Bipolar 1 disorder     Bipolar 1 disorder 11/24/2016    bipolar    Cancer     history of skin cancer/sinus cancer & rectal cancer    Depressed bipolar affective disorder     Diabetes mellitus     type 2    EBV infection 12/7/2016    EBV DNA, PCR Latest Ref Range: None detected  None detected EBV DNA-Copies/mL Unknown Test Not Performed EBV Early Antigen Ab, IgG Latest Ref Range: <1:10 Titer 1:40 (A) EBV Nuclear Ag Ab Latest Ref Range: <1:5 Titer >=1:80 (A) EBV VCA IgG Latest Ref Range: <1:10 Titer 1:160 (A) EBV VCA IgM Latest Ref Range: <1:10 Titer <1:10     History of TIA (transient ischemic attack)     several-taking Plavix    Hx of gallstones     Wife denies    Hypertension     Hyperthyroidism 11/30/2016    Low HDL (under 40) 12/7/2016    Mixed hyperlipidemia 11/23/2016    JUAN MANUEL (obstructive sleep apnea)     Pneumonia     Skin disease     Stroke     Transient cerebral ischemia 7/22/2016    Type 2 diabetes mellitus      Past Surgical History:   Procedure Laterality Date    Moh's procedure x4      rectal cancer      Sinus surgery for sinus cancer      sinus sx for cancer      skin cancer removed-several times-nose & ear      TONSILLECTOMY      Undescened testicle sx      UVULOPALATOPHARYNGOPLASTY      for sleep apnea       Home Meds:   Prior to Admission medications    Medication Sig Start Date End Date Taking? Authorizing Provider   ACCU-CHEK NEYDA PLUS TEST STRP Strp 1 strip by Misc.(Non-Drug; Combo Route) route once daily. 5/25/18   Prisca Espinoza MD   ciprofloxacin HCl (CIPRO) 500 MG tablet Take 1 tablet (500 mg total) by mouth every Monday. 5/14/18   Renato Womack MD   divalproex (DEPAKOTE) 250 MG EC tablet  5/5/17    Historical Provider, MD   furosemide (LASIX) 20 MG tablet Take 4 tablets (80 mg total) by mouth once daily.  Patient taking differently: Take 40 mg by mouth once daily. Taking 40 mg per day 4/25/17 5/25/18  Renato Womack MD   lactulose (CHRONULAC) 10 gram/15 mL solution TAKE 15 MLS BY MOUTH TWICE DAILY 3/8/18   Renato Womack MD   rifAXIMin (XIFAXAN) 550 mg Tab Take 1 tablet (550 mg total) by mouth 2 (two) times daily. 10/3/17   Renato Womack MD   SITagliptan (JANUVIA) 50 MG Tab Take 1 tablet (50 mg total) by mouth once daily. 4/6/17   Prisca Espinoza MD     Anticoagulants/Antiplatelets: no anticoagulation    Allergies:   Review of patient's allergies indicates:   Allergen Reactions    Abilify [aripiprazole] Other (See Comments)     Sleepy, could not function.     Sedation History:  no adverse reactions    Review of Systems:   Hematological: no known coagulopathies  Respiratory: no shortness of breath  Cardiovascular: no chest pain  Gastrointestinal: no abdominal pain  Genito-Urinary: no dysuria  Musculoskeletal: negative  Neurological: no TIA or stroke symptoms         OBJECTIVE:     Vital Signs (Most Recent)  Pulse: (!) 52 (06/08/18 1108)  Resp: 18 (06/08/18 1108)  BP: 133/63 (06/08/18 1108)  SpO2: 99 % (06/08/18 1108)    Physical Exam:  ASA: 2  Mallampati: n/a    General: no acute distress  Mental Status: alert and oriented to person, place and time  HEENT: normocephalic, atraumatic  Chest: unlabored breathing  Heart: regular heart rate  Abdomen: distended  Extremity: moves all extremities    Laboratory  Lab Results   Component Value Date    INR 1.1 05/18/2018       Lab Results   Component Value Date    WBC 2.93 (L) 05/18/2018    HGB 10.7 (L) 05/18/2018    HCT 33.4 (L) 05/18/2018    MCV 86 05/18/2018     (L) 05/18/2018      Lab Results   Component Value Date     (H) 05/18/2018     05/18/2018    K 4.0 05/18/2018     05/18/2018    CO2 29 05/18/2018    BUN 19  05/18/2018    CREATININE 1.5 (H) 05/18/2018    CALCIUM 8.9 05/18/2018    MG 1.7 05/15/2017    ALT 7 (L) 05/18/2018    AST 19 05/18/2018    ALBUMIN 2.4 (L) 05/18/2018    BILITOT 0.5 05/18/2018       ASSESSMENT/PLAN:     Sedation Plan: local  Patient will undergo ultrasound paracentesis.    MATT Mauricio, FNP  Interventional Radiology  [phone] [employer]

## 2018-06-08 NOTE — PROCEDURES
Radiology Post-Procedure Note    Pre Op Diagnosis: Ascites  Post Op Diagnosis: Same    Procedure: Ultrasound Guided Paracentesis    Procedure performed by: Souleymane ROSS, Елена     Written Informed Consent Obtained: Yes  Specimen Removed: YES cloudy yellow  Estimated Blood Loss: Minimal    Findings:   Successful paracentesis.  Albumin administered PRN per protocol.    Patient tolerated procedure well.    Елена Comer, APRN, FNP  Interventional Radiology  [phone] [employer]

## 2018-06-08 NOTE — PROGRESS NOTES
Paracentesis complete. 4900 mLs peritoneal fluid drained. Pt tolerated well. Dressing to right abd clean, dry, and intact. Albumin 25% given 100 mLs. Specimens sent per lab order. Pt discharged

## 2018-06-15 ENCOUNTER — HOSPITAL ENCOUNTER (OUTPATIENT)
Dept: INTERVENTIONAL RADIOLOGY/VASCULAR | Facility: HOSPITAL | Age: 78
Discharge: HOME | End: 2018-06-15
Attending: INTERNAL MEDICINE
Payer: MEDICARE

## 2018-06-15 VITALS
OXYGEN SATURATION: 98 % | HEART RATE: 58 BPM | SYSTOLIC BLOOD PRESSURE: 108 MMHG | RESPIRATION RATE: 16 BRPM | DIASTOLIC BLOOD PRESSURE: 60 MMHG

## 2018-06-15 DIAGNOSIS — R18.8 OTHER ASCITES: ICD-10-CM

## 2018-06-15 LAB
APPEARANCE FLD: NORMAL
BODY FLD TYPE: NORMAL
COLOR FLD: YELLOW
LYMPHOCYTES NFR FLD MANUAL: 80 %
MONOS+MACROS NFR FLD MANUAL: 12 %
NEUTROPHILS NFR FLD MANUAL: 8 %
WBC # FLD: 78 /CU MM

## 2018-06-15 PROCEDURE — 49083 ABD PARACENTESIS W/IMAGING: CPT | Mod: GC,,, | Performed by: RADIOLOGY

## 2018-06-15 PROCEDURE — 89051 BODY FLUID CELL COUNT: CPT

## 2018-06-15 PROCEDURE — 49083 ABD PARACENTESIS W/IMAGING: CPT

## 2018-06-15 PROCEDURE — 63600175 PHARM REV CODE 636 W HCPCS: Mod: JG | Performed by: INTERNAL MEDICINE

## 2018-06-15 PROCEDURE — P9047 ALBUMIN (HUMAN), 25%, 50ML: HCPCS | Mod: JG | Performed by: INTERNAL MEDICINE

## 2018-06-15 RX ORDER — ALBUMIN HUMAN 250 G/1000ML
25 SOLUTION INTRAVENOUS ONCE
Status: COMPLETED | OUTPATIENT
Start: 2018-06-15 | End: 2018-06-15

## 2018-06-15 RX ADMIN — ALBUMIN (HUMAN) 25 G: 25 SOLUTION INTRAVENOUS at 12:06

## 2018-06-15 NOTE — PROGRESS NOTES
Paracentesis complete at this time. Pt tolerated well. 4300cc removed from right abdomen. Labs obtained and sent. 100cc of albumin given IV. Bandage to right abdomen applied clean, dry and intact. IV discontinued with catheter intact. Patient verbalized understanding of at home care. Patient discharged home via wheelchair under care of spouse and transport.

## 2018-06-15 NOTE — DISCHARGE INSTRUCTIONS
For scheduling: Call Mackenzie at [phone]    For questions or concerns call: [facility] 8 AM- 5PM [phone]. Radiology resident on call [phone].    For immediate concerns that are not emergent, you may call our radiology clinic at: [phone]

## 2018-06-15 NOTE — H&P
Radiology History & Physical      SUBJECTIVE:     Chief Complaint: ascites    History of Present Illness:  Kenneth Sheikh is a 78 y.o. male with recurrent ascites who presents for US guided paracentesis.  Past Medical History:   Diagnosis Date    Anticoagulant long-term use     Bipolar 1 disorder     Bipolar 1 disorder 11/24/2016    bipolar    Cancer     history of skin cancer/sinus cancer & rectal cancer    Depressed bipolar affective disorder     Diabetes mellitus     type 2    EBV infection 12/7/2016    EBV DNA, PCR Latest Ref Range: None detected  None detected EBV DNA-Copies/mL Unknown Test Not Performed EBV Early Antigen Ab, IgG Latest Ref Range: <1:10 Titer 1:40 (A) EBV Nuclear Ag Ab Latest Ref Range: <1:5 Titer >=1:80 (A) EBV VCA IgG Latest Ref Range: <1:10 Titer 1:160 (A) EBV VCA IgM Latest Ref Range: <1:10 Titer <1:10     History of TIA (transient ischemic attack)     several-taking Plavix    Hx of gallstones     Wife denies    Hypertension     Hyperthyroidism 11/30/2016    Low HDL (under 40) 12/7/2016    Mixed hyperlipidemia 11/23/2016    JUAN MANUEL (obstructive sleep apnea)     Pneumonia     Skin disease     Stroke     Transient cerebral ischemia 7/22/2016    Type 2 diabetes mellitus      Past Surgical History:   Procedure Laterality Date    Moh's procedure x4      rectal cancer      Sinus surgery for sinus cancer      sinus sx for cancer      skin cancer removed-several times-nose & ear      TONSILLECTOMY      Undescened testicle sx      UVULOPALATOPHARYNGOPLASTY      for sleep apnea       Home Meds:   Prior to Admission medications    Medication Sig Start Date End Date Taking? Authorizing Provider   ACCU-CHEK NEYDA PLUS TEST STRP Strp 1 strip by Misc.(Non-Drug; Combo Route) route once daily. 5/25/18   Prisca Espinoza MD   ciprofloxacin HCl (CIPRO) 500 MG tablet Take 1 tablet (500 mg total) by mouth every Monday. 5/14/18   Renato Womack MD   divalproex (DEPAKOTE) 250 MG EC  tablet  5/5/17   Historical Provider, MD   furosemide (LASIX) 20 MG tablet Take 4 tablets (80 mg total) by mouth once daily.  Patient taking differently: Take 40 mg by mouth once daily. Taking 40 mg per day 4/25/17 5/25/18  Renato Womack MD   lactulose (CHRONULAC) 10 gram/15 mL solution TAKE 15 MLS BY MOUTH TWICE DAILY 3/8/18   Renato Womack MD   rifAXIMin (XIFAXAN) 550 mg Tab Take 1 tablet (550 mg total) by mouth 2 (two) times daily. 10/3/17   Renato Womack MD   SITagliptan (JANUVIA) 50 MG Tab Take 1 tablet (50 mg total) by mouth once daily. 4/6/17   Prisca Espinoza MD     Anticoagulants/Antiplatelets: no anticoagulation    Allergies:   Review of patient's allergies indicates:   Allergen Reactions    Abilify [aripiprazole] Other (See Comments)     Sleepy, could not function.     Sedation History:  no adverse reactions    Review of Systems:   Hematological: no known coagulopathies  Respiratory: no shortness of breath  Cardiovascular: no chest pain  Gastrointestinal: no abdominal pain  Genito-Urinary: no dysuria  Musculoskeletal: negative  Neurological: no TIA or stroke symptoms         OBJECTIVE:     Vital Signs (Most Recent)  Pulse: (!) 56 (06/15/18 1108)  Resp: 16 (06/15/18 1108)  BP: (!) 151/68 (06/15/18 1108)  SpO2: 99 % (06/15/18 1108)    Physical Exam:  ASA: 3  Mallampati: na    General: no acute distress  Mental Status: alert and oriented to person, place and time  HEENT: normocephalic, atraumatic  Chest: unlabored breathing  Heart: regular heart rate  Abdomen: distended  Extremity: moves all extremities    Laboratory  Lab Results   Component Value Date    INR 1.1 05/18/2018       Lab Results   Component Value Date    WBC 2.93 (L) 05/18/2018    HGB 10.7 (L) 05/18/2018    HCT 33.4 (L) 05/18/2018    MCV 86 05/18/2018     (L) 05/18/2018      Lab Results   Component Value Date     (H) 05/18/2018     05/18/2018    K 4.0 05/18/2018     05/18/2018    CO2 29 05/18/2018     BUN 19 05/18/2018    CREATININE 1.5 (H) 05/18/2018    CALCIUM 8.9 05/18/2018    MG 1.7 05/15/2017    ALT 7 (L) 05/18/2018    AST 19 05/18/2018    ALBUMIN 2.4 (L) 05/18/2018    BILITOT 0.5 05/18/2018       ASSESSMENT/PLAN:     Sedation Plan: local  Patient will undergo US guided paracentesis.    [facility]  Radiology PGY-4

## 2018-06-15 NOTE — PROCEDURES
Radiology Post-Procedure Note    Pre Op Diagnosis: Ascites  Post Op Diagnosis: Same    Procedure: Paracentesis    Procedure performed by: Chung Bass MD; Chilo Ivey (resident)      Written Informed Consent Obtained: Yes  Specimen Removed: YES clear yellow fluid; see dictation for volume removed  Estimated Blood Loss: Minimal    Findings:   Successful paracentesis.  Albumin administered PRN per protocol.    Patient tolerated procedure well.    Chilo Ivey  Radiology PGY-4

## 2018-06-17 NOTE — PROGRESS NOTES
ALLERGIES:   Abilify [aripiprazole]      Current Outpatient Prescriptions:     ACCU-CHEK NEYDA PLUS TEST STRP Strp, 1 strip by Misc.(Non-Drug; Combo Route) route once daily., Disp: 90 strip, Rfl: 3    ciprofloxacin HCl (CIPRO) 500 MG tablet, Take 1 tablet (500 mg total) by mouth every Monday., Disp: 12 tablet, Rfl: 3    divalproex (DEPAKOTE) 250 MG EC tablet, , Disp: , Rfl:     furosemide (LASIX) 20 MG tablet, Take 4 tablets (80 mg total) by mouth once daily. (Patient taking differently: Take 40 mg by mouth once daily. 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    CASE NUMBER: [id number]      ANESTHETIC: 3 mL 1% Lidocaine with Epinephrine 1:100,000    SURGICAL PREP: Ethanol and Hibiclens    SURGEON: Kenn Beach MD    ASSISTANTS: Gabriel Blackman CST     PREOPERATIVE DIAGNOSIS: squamous cell carcinoma in situ    POSTOPERATIVE DIAGNOSIS: squamous cell carcinoma in situ    PATHOLOGIC DIAGNOSIS: squamous cell carcinoma in situ    STAGES OF MOHS' SURGERY PERFORMED: one    TUMOR-FREE PLANE ACHIEVED: yes    HEMOSTASIS: Hyfrecation     SPECIMENS: two (two in stage A)    INITIAL LESION SIZE: 2.0 x 3.0 cm    FINAL DEFECT SIZE: 2.3 x 3.0 cm    WOUND REPAIR/DISPOSITION: see below    NARRATIVE:    The patient is a 78 y.o.male referred by Dejah Anders MD  with a history of cancer on the right forehead/temple which was biopsied - pathology accession #[id number], [facility]. 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 Examination revealed an area of crusting with pearly, rolled margins, anterior to an area of hypopigmented sclerotic changes on the right forehead/temple at the site of prior biopsy, which was confirmed by reference to the photograph taken at the previous patient visit. In light of the nature of this tumor and the location on the face, Mohs' micrographic surgery was thought to be the most appropriate management choice, and this diagnosis is appropriate for treatment by Mohs' micrographic surgery.  I discussed it with the patient and he fully understands the aims, risks, alternatives, and possible complications, and elects to proceed.  There are no medical or surgical contraindications to the procedure.     A signed informed consent was obtained.    PROCEDURE:  The patient was placed in the left lateral decubitus position on the operating table in the Mohs' Surgery Suite. The area in question was thoroughly prepped with ethanol and Hibiclens, with particular care to avoid application to the immediate periocular skin. A sterile surgical marker was used to outline the clinically apparent margins of the involved area, and a narrow margin of normal-appearing skin. Reference marks were made at the periphery of the outlined area with the surgical marker. The proposed area of excision was measured and photographed. Local anesthesia as noted above was administered.  The total volume of anesthetic used throughout this portion of the procedure was as documented above. The area was prepared and draped in the standard manner. All of the grossly identifiable area of clinically abnormal tissue and an underlying/peripheral layer was taken and processed by the Mohs' technique.  Hemostasis was obtained with the hyfrecator. Tissue was taken from any areas of residual marginal involvement (if present) and processed by the Mohs' technique in as many stages as needed until a  "tumor-free plane was achieved.    Colors of inks used in the reference nicks at epidermal margins (if present) and/or inking of non-epithelial edges, if applicable, is represented on the Mohs map as follows: solid lines represent red ink, dots represent blue ink, jagged lines represent black ink, curlicues represent green ink, "xxx" represents yellow ink.    The first Mohs' layer consisted of two section(s) with 4 slide(s) evaluated. No residual tumor was noted at the margins of the first Mohs' layer. Histology of the specimen(s) showed changes consistent with chronic solar damage.     A total of two section(s) and 4 slide(s) were examined under the microscope via the Mohs technique.  A cancer free plane was reached after layer number one. Defect final size was as noted above.      The wound was covered with a nonadherent dressing between stages, and the patient allowed to wait in the waiting area during these periods. The final defect was photographed at the completion of the Mohs' procedure.    See the separate procedure note which follows regarding repair of the defect following Mohs' surgery.        -----------------------------------------------    REPAIR FOLLOWING MOHS' MICROGRAPHIC SURGERY    PREOPERATIVE DIAGNOSIS: defect following Mohs' surgery for a squamous cell carcinoma in situ    POSTOPERATIVE DIAGNOSIS: same    PROCEDURE PERFORMED: complex closure    ANESTHETIC: 3 mL 1% Lidocaine with Epinephrine 1:100,000     SURGICAL PREP: Hibiclens    SURGEON: Kenn Beach MD     ASSISTANTS: as above    LOCATION: right temple      INDICATIONS:  Earlier in the day, the patient underwent Mohs' micrographic surgical excision of a squamous cell carcinoma on the right temple. Tumor free margins were achieved after layer number one.  Later in the day, the management of the resulting wound was addressed with the patient. I discussed the various wound management options with the patient and he fully understands the " aims, risks, alternatives, and possible complications of the alternatives, and he elects to proceed with closure of the defect in the manner noted below.  There are no medical or surgical contraindications to the procedure.    A signed informed consent was previously obtained.    PROCEDURE:  Repair via complex closure:  The patient was returned to the procedure room following completion of the Mohs' procedure and final slide review. Because of the size, shape and location of the defect, simple closure could not be achieved without possible distortion of surrounding structures, excessive tension on the wound margins and an unacceptable risk of wound dehiscence, and the creation of standing cone deformities. Consideration was given to the site of the wound, the surrounding structures, and the orientation of closure necessary to provide the optimal functional and cosmetic outcome. After devoting time to these considerations, and to the orientation of the vectors of maximal skin tension surrounding the defect, the area was prepped again and a fusiform closure was outlined on the skin surrounding the defect with a sterile surgical marker, to minimize tension across the wound. Additional anesthetic was infiltrated into the tissues surrounding the defect and the anticipated area of repair, to maintain anesthesia during the procedure. Preparation of the site for closure was then carried out by extending the defect through excision of triangles of superfluous tissue on either side of the wound to square the shoulders of the defect and to allow closure without distortion by standing cone deformities, creating a semi-fusiform defect with an anterior/medial M-plasty.  Wound margins were extensively undermined to allow advancement of the wound margins into the defect and to permit closure with minimal tension. After hemostasis was achieved with the hyfrecator, closure was accomplished with:      multiple #4-0 buried interrupted  Vicryl suture(s) and    several #4-0 simple interrupted Prolene suture(s) and    three #4-0 running locked Prolene suture(s) for final approximation of the wound margins.    Total length of the final closure, including the limbs of the M-plasty, was 7.0 cm.     The site was photographed following completion of the repair. Final dressing consisted of petrolatum, Telfa and tape.    Estimated blood loss for the total procedure was less than 10 mL.    Total operative time including tissue processing in the Mohs' laboratory and microscopic Mohs' frozen section slide review was 2 hour(s). Verbal and written wound care instructions were given to the patient, and he expressed understanding of these instructions. The patient tolerated the procedure well and left the operating room in good condition; he is to return in 7 days for suture removal.     Dr. Beach's cell phone number was given to the patient with instructions to call prn with any problems.

## 2018-06-18 ENCOUNTER — PROCEDURE VISIT (OUTPATIENT)
Dept: DERMATOLOGY | Facility: CLINIC | Age: 78
End: 2018-06-18
Payer: MEDICARE

## 2018-06-18 VITALS
HEIGHT: 68 IN | BODY MASS INDEX: 26.22 KG/M2 | HEART RATE: 54 BPM | DIASTOLIC BLOOD PRESSURE: 64 MMHG | SYSTOLIC BLOOD PRESSURE: 142 MMHG | WEIGHT: 173 LBS

## 2018-06-18 DIAGNOSIS — D04.39 SQUAMOUS CELL CARCINOMA IN SITU OF SKIN OF FOREHEAD: Primary | ICD-10-CM

## 2018-06-18 PROCEDURE — 13132 CMPLX RPR F/C/C/M/N/AX/G/H/F: CPT | Mod: PBBFAC | Performed by: DERMATOLOGY

## 2018-06-18 PROCEDURE — 17311 MOHS 1 STAGE H/N/HF/G: CPT | Mod: PBBFAC | Performed by: DERMATOLOGY

## 2018-06-18 PROCEDURE — 17311 MOHS 1 STAGE H/N/HF/G: CPT | Mod: S$PBB,,, | Performed by: DERMATOLOGY

## 2018-06-18 PROCEDURE — 13132 CMPLX RPR F/C/C/M/N/AX/G/H/F: CPT | Mod: 51,S$PBB,, | Performed by: DERMATOLOGY

## 2018-06-18 PROCEDURE — 99499 UNLISTED E&M SERVICE: CPT | Mod: S$PBB,,, | Performed by: DERMATOLOGY

## 2018-06-22 ENCOUNTER — TELEPHONE (OUTPATIENT)
Dept: ENDOCRINOLOGY | Facility: CLINIC | Age: 78
End: 2018-06-22

## 2018-06-22 ENCOUNTER — OFFICE VISIT (OUTPATIENT)
Dept: INTERNAL MEDICINE | Facility: CLINIC | Age: 78
End: 2018-06-22
Payer: MEDICARE

## 2018-06-22 ENCOUNTER — HOSPITAL ENCOUNTER (OUTPATIENT)
Dept: INTERVENTIONAL RADIOLOGY/VASCULAR | Facility: HOSPITAL | Age: 78
Discharge: HOME | End: 2018-06-22
Attending: INTERNAL MEDICINE
Payer: MEDICARE

## 2018-06-22 VITALS
OXYGEN SATURATION: 100 % | HEART RATE: 53 BPM | SYSTOLIC BLOOD PRESSURE: 138 MMHG | DIASTOLIC BLOOD PRESSURE: 63 MMHG | RESPIRATION RATE: 18 BRPM

## 2018-06-22 VITALS
HEIGHT: 68 IN | SYSTOLIC BLOOD PRESSURE: 112 MMHG | WEIGHT: 183 LBS | DIASTOLIC BLOOD PRESSURE: 58 MMHG | HEART RATE: 62 BPM | BODY MASS INDEX: 27.74 KG/M2

## 2018-06-22 DIAGNOSIS — N18.30 CKD (CHRONIC KIDNEY DISEASE) STAGE 3, GFR 30-59 ML/MIN: ICD-10-CM

## 2018-06-22 DIAGNOSIS — K75.81 LIVER CIRRHOSIS SECONDARY TO NASH (NONALCOHOLIC STEATOHEPATITIS): ICD-10-CM

## 2018-06-22 DIAGNOSIS — N18.30 TYPE 2 DIABETES MELLITUS WITH STAGE 3 CHRONIC KIDNEY DISEASE, WITHOUT LONG-TERM CURRENT USE OF INSULIN: Primary | ICD-10-CM

## 2018-06-22 DIAGNOSIS — Z85.048 HISTORY OF RECTAL CANCER: ICD-10-CM

## 2018-06-22 DIAGNOSIS — K74.60 LIVER CIRRHOSIS SECONDARY TO NASH (NONALCOHOLIC STEATOHEPATITIS): ICD-10-CM

## 2018-06-22 DIAGNOSIS — I86.4 GASTRIC VARICES: ICD-10-CM

## 2018-06-22 DIAGNOSIS — Z85.828 HISTORY OF SCC (SQUAMOUS CELL CARCINOMA) OF SKIN: ICD-10-CM

## 2018-06-22 DIAGNOSIS — R18.8 ASCITES OF LIVER: ICD-10-CM

## 2018-06-22 DIAGNOSIS — R18.8 OTHER ASCITES: ICD-10-CM

## 2018-06-22 DIAGNOSIS — E11.22 TYPE 2 DIABETES MELLITUS WITH STAGE 3 CHRONIC KIDNEY DISEASE, WITHOUT LONG-TERM CURRENT USE OF INSULIN: Primary | ICD-10-CM

## 2018-06-22 DIAGNOSIS — F31.9 BIPOLAR 1 DISORDER: ICD-10-CM

## 2018-06-22 LAB
APPEARANCE FLD: NORMAL
BODY FLD TYPE: NORMAL
COLOR FLD: YELLOW
EOSINOPHIL NFR FLD MANUAL: 1 %
LYMPHOCYTES NFR FLD MANUAL: 43 %
MESOTHL CELL NFR FLD MANUAL: 1 %
MONOS+MACROS NFR FLD MANUAL: 49 %
NEUTROPHILS NFR FLD MANUAL: 6 %
WBC # FLD: 96 /CU MM

## 2018-06-22 PROCEDURE — 49083 ABD PARACENTESIS W/IMAGING: CPT | Mod: ,,, | Performed by: FAMILY MEDICINE

## 2018-06-22 PROCEDURE — 99213 OFFICE O/P EST LOW 20 MIN: CPT | Mod: PBBFAC,25 | Performed by: INTERNAL MEDICINE

## 2018-06-22 PROCEDURE — 99999 PR PBB SHADOW E&M-EST. PATIENT-LVL III: CPT | Mod: PBBFAC,,, | Performed by: INTERNAL MEDICINE

## 2018-06-22 PROCEDURE — 63600175 PHARM REV CODE 636 W HCPCS: Mod: JG | Performed by: INTERNAL MEDICINE

## 2018-06-22 PROCEDURE — 49083 ABD PARACENTESIS W/IMAGING: CPT

## 2018-06-22 PROCEDURE — 89051 BODY FLUID CELL COUNT: CPT

## 2018-06-22 PROCEDURE — 99214 OFFICE O/P EST MOD 30 MIN: CPT | Mod: S$PBB,,, | Performed by: INTERNAL MEDICINE

## 2018-06-22 PROCEDURE — P9047 ALBUMIN (HUMAN), 25%, 50ML: HCPCS | Mod: JG | Performed by: INTERNAL MEDICINE

## 2018-06-22 RX ORDER — ALBUMIN HUMAN 250 G/1000ML
25 SOLUTION INTRAVENOUS ONCE
Status: DISCONTINUED | OUTPATIENT
Start: 2018-06-22 | End: 2018-06-23 | Stop reason: HOSPADM

## 2018-06-22 RX ORDER — ALBUMIN HUMAN 250 G/1000ML
25 SOLUTION INTRAVENOUS ONCE
Status: COMPLETED | OUTPATIENT
Start: 2018-06-22 | End: 2018-06-22

## 2018-06-22 RX ADMIN — ALBUMIN (HUMAN) 25 G: 25 SOLUTION INTRAVENOUS at 12:06

## 2018-06-22 NOTE — DISCHARGE INSTRUCTIONS
Scheduling:   [phone]      Please call with any questions or concerns.      Monday thru Friday 8:00 am - 4:30 pm    Interventional Radiology   [phone]    After Hours    Ask for the Radiology Intern on call  [phone]

## 2018-06-22 NOTE — PROCEDURES

## 2018-06-22 NOTE — PROGRESS NOTES
Internal Medicine    Subjective:      Patient ID: Kenneth Sheikh is a 78 y.o. male.    Chief Complaint: Follow-up (type 2 diabetes mellitus with stage 3 chronic kidner disease, without long-tern current use of insulin)    HPI:  Patient presents for follow up appointment.  The patient's last visit with me was on 5/25/2018.    DM-2:  Last HgA1c 10.2 on 5/25/18 up from 7.9 four months prior.  Well controlled at 6.3 last year.  Off metformin due to elevated lactate and diarrhea.  Now taking Januvia 50mg daily (can not increase dose due to renal function).  Has been working on diet and -255.  Has Diabetes Education appointment on 7/6/18.  Patient very hesitant to start insulin and asking that I speak with Dr. Womack before starting any new medications.      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  Losartan stopped due to BELLA.  Followed by Dr. Womack - last seen 5/11/18.  Taking Rifaximin twice daily.  Getting paracentesis weekly.  Taking Cipro once a week for SBP prophylaxis.       H/o Rectal cancer s/p resection 06/2016:  Last seen by colorectal surgery (Dr. Talavera) on 3/5/18 - no evidence of recurrent neoplasia - follow up in 1 year.     Pancytopenia:  Has been seen by Heme-Onc (2/21/17).  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      HLD:  Stopped fenofibrate per Dr. Sheikh due to low cholesterol.  No longer on medication.     TIAs:  No symptoms recently.  No longer taking ASA.     Bipolar d/o:  Taking Depakote 500mg qHS.  Last Depakote level 58.8 on 5/18/18.  Denies depression or dany.  Followed by psychiatrist, Dr. Figueroa.     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 Beach.     Neck pain, bilateral knee pain:  Stable.  Avoiding NSAIDs due to cirrhosis.  Was told he could occasionally take Tylenol but not regularly.      Review of Systems   Constitutional:  "Positive for appetite change (Poor) and fatigue (Chronic). Negative for chills, fever and unexpected weight change.   HENT: Negative for sore throat and trouble swallowing.    Eyes: Negative for visual disturbance.   Respiratory: Positive for shortness of breath (Occurs end of week prior to paracentesis). Negative for cough, chest tightness and wheezing.    Cardiovascular: Positive for leg swelling. Negative for chest pain and palpitations.   Gastrointestinal: Positive for abdominal distention. Negative for abdominal pain, blood in stool, constipation, diarrhea, nausea and vomiting.   Genitourinary: Negative for difficulty urinating.   Musculoskeletal: Positive for arthralgias (Chronic) and neck pain (Chronic). Negative for myalgias.   Skin: Negative for rash and wound.   Neurological: Positive for weakness (Chronic). Negative for dizziness, numbness and headaches.   Psychiatric/Behavioral: Negative for behavioral problems, confusion and dysphoric mood. The patient is not nervous/anxious.        Past medical history, surgical history, and family medical history reviewed and updated as appropriate.    Medications and allergies reviewed.     Objective:     Vitals:    06/22/18 0956   BP: (!) 112/58   Pulse: 62   Weight: 83 kg (182 lb 15.7 oz)   Height: 5' 8" (1.727 m)     Physical Exam   Constitutional: He is oriented to person, place, and time. He appears well-developed and well-nourished. No distress.   HENT:   Head: Normocephalic and atraumatic.   Eyes: Conjunctivae and EOM are normal. No scleral icterus.   Cardiovascular: Normal rate and regular rhythm.  Exam reveals no gallop and no friction rub.    No murmur heard.  Pulmonary/Chest: Effort normal and breath sounds normal. No respiratory distress. He has no wheezes. He has no rales.   Abdominal: Soft. He exhibits distension.   Musculoskeletal: He exhibits edema (BLE's). He exhibits no tenderness.   Neurological: He is alert and oriented to person, place, and time. "   Skin: No rash noted. No erythema.   Psychiatric: He has a normal mood and affect. His behavior is normal. Judgment and thought content normal.       RESULTS:   Lab Results   Component Value Date    WBC 2.93 (L) 05/18/2018    HGB 10.7 (L) 05/18/2018    HCT 33.4 (L) 05/18/2018    MCV 86 05/18/2018     (L) 05/18/2018     BMP  Lab Results   Component Value Date     05/18/2018    K 4.0 05/18/2018     05/18/2018    CO2 29 05/18/2018    BUN 19 05/18/2018    CREATININE 1.5 (H) 05/18/2018    CALCIUM 8.9 05/18/2018    ANIONGAP 7 (L) 05/18/2018    ESTGFRAFRICA 50.1 (A) 05/18/2018    EGFRNONAA 43.3 (A) 05/18/2018     Lab Results   Component Value Date    ALT 7 (L) 05/18/2018    AST 19 05/18/2018    ALKPHOS 108 05/18/2018    BILITOT 0.5 05/18/2018     Lab Results   Component Value Date    TSH 1.179 02/02/2018     Lab Results   Component Value Date    HGBA1C 10.2 (H) 05/25/2018         Assessment:     1. Type 2 diabetes mellitus with stage 3 chronic kidney disease, without long-term current use of insulin    2. CKD (chronic kidney disease) stage 3, GFR 30-59 ml/min    3. 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 Ascites of liver    5. Gastric varices    6. History of rectal cancer    7. History of SCC (squamous cell carcinoma) of skin    8. Bipolar 1 disorder        Plan:     *Continue medication/plan as discussed in HPI except for changes discussed below.    Kenneth was seen today for follow-up.    Diagnoses and all orders for this visit:    Type 2 diabetes mellitus with stage 3 chronic kidney disease, without long-term current use of insulin   Patient will continue to work on diet and has Diabetes Education scheduled.  I will send a message to Endocrine and Hepatology regarding safest agents since patient does not want to start insulin.  Repeat HgA1c in 2 months.  Patient will continue to monitor FSG (recommending BID but patient only doing daily) and bring log to next  appointment.     Eye exam and urine microalbumin at next appointment.  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    Prisca Espinoza MD  Internal Medicine  [facility]

## 2018-06-22 NOTE — H&P
Radiology History & Physical      SUBJECTIVE:     Chief Complaint: ascites    History of Present Illness:  Kenneth Sheikh is a 78 y.o. male who presents for ultrasound guided paracentesis  Past Medical History:   Diagnosis Date    Anticoagulant long-term use     Bipolar 1 disorder     Bipolar 1 disorder 11/24/2016    bipolar    Cancer     history of skin cancer/sinus cancer & rectal cancer    Depressed bipolar affective disorder     Diabetes mellitus     type 2    EBV infection 12/7/2016    EBV DNA, PCR Latest Ref Range: None detected  None detected EBV DNA-Copies/mL Unknown Test Not Performed EBV Early Antigen Ab, IgG Latest Ref Range: <1:10 Titer 1:40 (A) EBV Nuclear Ag Ab Latest Ref Range: <1:5 Titer >=1:80 (A) EBV VCA IgG Latest Ref Range: <1:10 Titer 1:160 (A) EBV VCA IgM Latest Ref Range: <1:10 Titer <1:10     History of TIA (transient ischemic attack)     several-taking Plavix    Hx of gallstones     Wife denies    Hypertension     Hyperthyroidism 11/30/2016    Low HDL (under 40) 12/7/2016    Mixed hyperlipidemia 11/23/2016    JUAN MANUEL (obstructive sleep apnea)     Pneumonia     Skin disease     Stroke     Transient cerebral ischemia 7/22/2016    Type 2 diabetes mellitus      Past Surgical History:   Procedure Laterality Date    Moh's procedure x4      rectal cancer      Sinus surgery for sinus cancer      sinus sx for cancer      skin cancer removed-several times-nose & ear      TONSILLECTOMY      Undescened testicle sx      UVULOPALATOPHARYNGOPLASTY      for sleep apnea       Home Meds:   Prior to Admission medications    Medication Sig Start Date End Date Taking? Authorizing Provider   ACCU-CHEK NEYDA PLUS TEST STRP Strp 1 strip by Misc.(Non-Drug; Combo Route) route once daily. 5/25/18   Prisca Espinoza MD   ciprofloxacin HCl (CIPRO) 500 MG tablet Take 1 tablet (500 mg total) by mouth every Monday. 5/14/18   Renato Womack MD   divalproex (DEPAKOTE) 250 MG EC tablet  5/5/17    Historical Provider, MD   furosemide (LASIX) 20 MG tablet Take 4 tablets (80 mg total) by mouth once daily.  Patient taking differently: Take 40 mg by mouth once daily. Taking 40 mg per day 4/25/17 6/22/18  Renato Womack MD   lactulose (CHRONULAC) 10 gram/15 mL solution TAKE 15 MLS BY MOUTH TWICE DAILY 3/8/18   Renato Womack MD   rifAXIMin (XIFAXAN) 550 mg Tab Take 1 tablet (550 mg total) by mouth 2 (two) times daily. 10/3/17   Renato Womack MD   SITagliptan (JANUVIA) 50 MG Tab Take 1 tablet (50 mg total) by mouth once daily. 4/6/17   Prisca Espinoza MD     Anticoagulants/Antiplatelets: no anticoagulation    Allergies:   Review of patient's allergies indicates:   Allergen Reactions    Abilify [aripiprazole] Other (See Comments)     Sleepy, could not function.     Sedation History:  no adverse reactions    Review of Systems:   Hematological: no known coagulopathies  Respiratory: no shortness of breath  Cardiovascular: no chest pain  Gastrointestinal: no abdominal pain  Genito-Urinary: no dysuria  Musculoskeletal: negative  Neurological: no TIA or stroke symptoms         OBJECTIVE:     Vital Signs (Most Recent)       Physical Exam:  ASA: 2  Mallampati: n/a    General: no acute distress  Mental Status: alert and oriented to person, place and time  HEENT: normocephalic, atraumatic  Chest: unlabored breathing  Heart: regular heart rate  Abdomen: distended  Extremity: moves all extremities    Laboratory  Lab Results   Component Value Date    INR 1.1 05/18/2018       Lab Results   Component Value Date    WBC 2.93 (L) 05/18/2018    HGB 10.7 (L) 05/18/2018    HCT 33.4 (L) 05/18/2018    MCV 86 05/18/2018     (L) 05/18/2018      Lab Results   Component Value Date     (H) 05/18/2018     05/18/2018    K 4.0 05/18/2018     05/18/2018    CO2 29 05/18/2018    BUN 19 05/18/2018    CREATININE 1.5 (H) 05/18/2018    CALCIUM 8.9 05/18/2018    MG 1.7 05/15/2017    ALT 7 (L) 05/18/2018     AST 19 05/18/2018    ALBUMIN 2.4 (L) 05/18/2018    BILITOT 0.5 05/18/2018       ASSESSMENT/PLAN:     Sedation Plan: local  Patient will undergo ultrasound guided paracentesis.    MATT Mauricio, FNP  Interventional Radiology  [phone] [employer]

## 2018-06-22 NOTE — PROGRESS NOTES
Paracentesis complete, 4500 MLs removed. Specimen sent to lab. 100ml Albumin administered per protocol. Dressing applied to LLQ puncture site, dressing clean dry and intact. Pt given discharge instructions and handouts, pt verbalizes understanding. Questions answered. Pt denies pain and discomfort. Pt refusing transport. Pt ambulated to [facility] for transport home with family. Gait steady.

## 2018-06-25 ENCOUNTER — OFFICE VISIT (OUTPATIENT)
Dept: DERMATOLOGY | Facility: CLINIC | Age: 78
End: 2018-06-25
Payer: MEDICARE

## 2018-06-25 DIAGNOSIS — Z48.02 VISIT FOR SUTURE REMOVAL: Primary | ICD-10-CM

## 2018-06-25 PROCEDURE — 99024 POSTOP FOLLOW-UP VISIT: CPT | Mod: POP,,, | Performed by: DERMATOLOGY

## 2018-06-25 PROCEDURE — 99212 OFFICE O/P EST SF 10 MIN: CPT | Mod: PBBFAC | Performed by: DERMATOLOGY

## 2018-06-25 PROCEDURE — 99999 PR PBB SHADOW E&M-EST. PATIENT-LVL II: CPT | Mod: PBBFAC,,, | Performed by: DERMATOLOGY

## 2018-06-25 NOTE — PROGRESS NOTES
CC: 78 y.o.male patient is here for suture removal.     HPI: Patient is one week(s) s/p Mohs' micrographic surgery, fresh tissue technique of aSquamous Cell Carcinomaon the forehead, with subsequent repair   Patient reports no problems.    EXAM:  Sutures intact.  Wound healing well.  Good approximation of skin edges.  No undue erythema to surrounding skin or signs or symptoms of infection.    IMPRESSION: Healing well post Mohs' micrographic surgery and repair    PLAN:  Site cleaned with peroxide, sutures removed  Dressed with petrolatum   Reviewed further care and expected course  Followup 6 weeks; call prn sooner

## 2018-06-26 DIAGNOSIS — E11.22 TYPE 2 DIABETES MELLITUS WITH STAGE 3 CHRONIC KIDNEY DISEASE, WITHOUT LONG-TERM CURRENT USE OF INSULIN: Primary | ICD-10-CM

## 2018-06-26 DIAGNOSIS — N18.30 TYPE 2 DIABETES MELLITUS WITH STAGE 3 CHRONIC KIDNEY DISEASE, WITHOUT LONG-TERM CURRENT USE OF INSULIN: Primary | ICD-10-CM

## 2018-06-26 RX ORDER — GLIPIZIDE 5 MG/1
2.5 TABLET ORAL
Qty: 45 TABLET | Refills: 1 | Status: SHIPPED | OUTPATIENT
Start: 2018-06-26 | End: 2018-08-10

## 2018-06-26 RX ORDER — GLIPIZIDE 5 MG/1
2.5 TABLET ORAL
Qty: 45 TABLET | Refills: 1 | Status: SHIPPED | OUTPATIENT
Start: 2018-06-26 | End: 2018-06-26 | Stop reason: SDUPTHER

## 2018-06-26 NOTE — TELEPHONE ENCOUNTER
Please call patient (and wife) and let him know that I have spoken with Endocrine and Dr. Womack and they both agree with starting a very low dose of an oral diabetic medication called glipizide.  He will take 1/2 tablet (2.5mg) every morning.  It is important that he continue checking glucose at least 2 times per day, especially right when he starts the medication.  If blood sugar is less than 150, then do not take the glipizide that day.  If blood sugars are consistently running too low, please give me a call.  I have sent an electronic prescription to his pharmacy for the new medication.

## 2018-06-29 ENCOUNTER — HOSPITAL ENCOUNTER (OUTPATIENT)
Dept: INTERVENTIONAL RADIOLOGY/VASCULAR | Facility: HOSPITAL | Age: 78
Discharge: HOME | End: 2018-06-29
Attending: INTERNAL MEDICINE
Payer: MEDICARE

## 2018-06-29 VITALS
RESPIRATION RATE: 18 BRPM | SYSTOLIC BLOOD PRESSURE: 109 MMHG | DIASTOLIC BLOOD PRESSURE: 50 MMHG | HEART RATE: 57 BPM | OXYGEN SATURATION: 100 %

## 2018-06-29 DIAGNOSIS — R18.8 OTHER ASCITES: ICD-10-CM

## 2018-06-29 LAB
APPEARANCE FLD: NORMAL
BODY FLD TYPE: NORMAL
COLOR FLD: YELLOW
EOSINOPHIL NFR FLD MANUAL: 1 %
LYMPHOCYTES NFR FLD MANUAL: 81 %
MONOS+MACROS NFR FLD MANUAL: 16 %
NEUTROPHILS NFR FLD MANUAL: 2 %
WBC # FLD: 59 /CU MM

## 2018-06-29 PROCEDURE — 49083 ABD PARACENTESIS W/IMAGING: CPT

## 2018-06-29 PROCEDURE — 63600175 PHARM REV CODE 636 W HCPCS: Mod: JG | Performed by: INTERNAL MEDICINE

## 2018-06-29 PROCEDURE — 89051 BODY FLUID CELL COUNT: CPT

## 2018-06-29 PROCEDURE — 49083 ABD PARACENTESIS W/IMAGING: CPT | Mod: ,,, | Performed by: FAMILY MEDICINE

## 2018-06-29 PROCEDURE — P9047 ALBUMIN (HUMAN), 25%, 50ML: HCPCS | Mod: JG | Performed by: INTERNAL MEDICINE

## 2018-06-29 RX ORDER — ALBUMIN HUMAN 250 G/1000ML
25 SOLUTION INTRAVENOUS ONCE
Status: COMPLETED | OUTPATIENT
Start: 2018-06-29 | End: 2018-06-29

## 2018-06-29 RX ADMIN — ALBUMIN (HUMAN) 25 G: 25 SOLUTION INTRAVENOUS at 01:06

## 2018-06-29 NOTE — PROCEDURES

## 2018-06-29 NOTE — H&P
Radiology History & Physical      SUBJECTIVE:     Chief Complaint: ascites    History of Present Illness:  Kenneth Sheikh is a 78 y.o. male who presents for ultrasound guided paracentesis  Past Medical History:   Diagnosis Date    Anticoagulant long-term use     Bipolar 1 disorder     Bipolar 1 disorder 11/24/2016    bipolar    Cancer     history of skin cancer/sinus cancer & rectal cancer    Depressed bipolar affective disorder     Diabetes mellitus     type 2    EBV infection 12/7/2016    EBV DNA, PCR Latest Ref Range: None detected  None detected EBV DNA-Copies/mL Unknown Test Not Performed EBV Early Antigen Ab, IgG Latest Ref Range: <1:10 Titer 1:40 (A) EBV Nuclear Ag Ab Latest Ref Range: <1:5 Titer >=1:80 (A) EBV VCA IgG Latest Ref Range: <1:10 Titer 1:160 (A) EBV VCA IgM Latest Ref Range: <1:10 Titer <1:10     History of TIA (transient ischemic attack)     several-taking Plavix    Hx of gallstones     Wife denies    Hypertension     Hyperthyroidism 11/30/2016    Low HDL (under 40) 12/7/2016    Mixed hyperlipidemia 11/23/2016    JUAN MANUEL (obstructive sleep apnea)     Pneumonia     Skin disease     Stroke     Transient cerebral ischemia 7/22/2016    Type 2 diabetes mellitus      Past Surgical History:   Procedure Laterality Date    Moh's procedure x4      rectal cancer      Sinus surgery for sinus cancer      sinus sx for cancer      skin cancer removed-several times-nose & ear      TONSILLECTOMY      Undescened testicle sx      UVULOPALATOPHARYNGOPLASTY      for sleep apnea       Home Meds:   Prior to Admission medications    Medication Sig Start Date End Date Taking? Authorizing Provider   ACCU-CHEK NEYDA PLUS TEST STRP Strp 1 strip by Misc.(Non-Drug; Combo Route) route once daily. 5/25/18   Prisca Espinoza MD   ciprofloxacin HCl (CIPRO) 500 MG tablet Take 1 tablet (500 mg total) by mouth every Monday. 5/14/18   Renato Womack MD   divalproex (DEPAKOTE) 250 MG EC tablet  5/5/17    Historical Provider, MD   furosemide (LASIX) 20 MG tablet Take 4 tablets (80 mg total) by mouth once daily.  Patient taking differently: Take 40 mg by mouth once daily. Taking 40 mg per day 4/25/17 6/22/18  Renato Womack MD   glipiZIDE (GLUCOTROL) 5 MG tablet Take 0.5 tablets (2.5 mg total) by mouth daily with breakfast. 6/26/18 6/26/19  Prisca Espinoza MD   lactulose (CHRONULAC) 10 gram/15 mL solution TAKE 15 MLS BY MOUTH TWICE DAILY 3/8/18   Renato Womack MD   rifAXIMin (XIFAXAN) 550 mg Tab Take 1 tablet (550 mg total) by mouth 2 (two) times daily. 10/3/17   Renato Womack MD   SITagliptan (JANUVIA) 50 MG Tab Take 1 tablet (50 mg total) by mouth once daily. 4/6/17   Prisca Espinoza MD     Anticoagulants/Antiplatelets: no anticoagulation    Allergies:   Review of patient's allergies indicates:   Allergen Reactions    Abilify [aripiprazole] Other (See Comments)     Sleepy, could not function.     Sedation History:  no adverse reactions    Review of Systems:   Hematological: no known coagulopathies  Respiratory: no shortness of breath  Cardiovascular: no chest pain  Gastrointestinal: no abdominal pain  Genito-Urinary: no dysuria  Musculoskeletal: negative  Neurological: no TIA or stroke symptoms         OBJECTIVE:     Vital Signs (Most Recent)  Pulse: (!) 55 (06/29/18 1202)  Resp: 18 (06/29/18 1202)  BP: 129/70 (06/29/18 1202)  SpO2: 100 % (06/29/18 1202)    Physical Exam:  ASA: 2  Mallampati: n/a    General: no acute distress  Mental Status: alert and oriented to person, place and time  HEENT: normocephalic, atraumatic  Chest: unlabored breathing  Heart: regular heart rate  Abdomen: nondistended  Extremity: moves all extremities    Laboratory  Lab Results   Component Value Date    INR 1.1 05/18/2018       Lab Results   Component Value Date    WBC 2.93 (L) 05/18/2018    HGB 10.7 (L) 05/18/2018    HCT 33.4 (L) 05/18/2018    MCV 86 05/18/2018     (L) 05/18/2018      Lab Results    Component Value Date     (H) 05/18/2018     05/18/2018    K 4.0 05/18/2018     05/18/2018    CO2 29 05/18/2018    BUN 19 05/18/2018    CREATININE 1.5 (H) 05/18/2018    CALCIUM 8.9 05/18/2018    MG 1.7 05/15/2017    ALT 7 (L) 05/18/2018    AST 19 05/18/2018    ALBUMIN 2.4 (L) 05/18/2018    BILITOT 0.5 05/18/2018       ASSESSMENT/PLAN:     Sedation Plan: local   Patient will undergo ultrasound guided paracentesis.    Елена Comer APRN, FNP  Interventional Radiology  [phone] [employer]

## 2018-06-29 NOTE — PROGRESS NOTES
Paracentesis complete. 4900 mLs peritoneal fluid drained. Pt tolerated well. Dressing to left abd clean, dry, and intact. Albumin 25% given 100mLs. Specimens sent per lab order.

## 2018-07-06 ENCOUNTER — CLINICAL SUPPORT (OUTPATIENT)
Dept: DIABETES | Facility: CLINIC | Age: 78
End: 2018-07-06
Payer: MEDICARE

## 2018-07-06 ENCOUNTER — HOSPITAL ENCOUNTER (OUTPATIENT)
Dept: INTERVENTIONAL RADIOLOGY/VASCULAR | Facility: HOSPITAL | Age: 78
Discharge: HOME | End: 2018-07-06
Attending: INTERNAL MEDICINE
Payer: MEDICARE

## 2018-07-06 VITALS
OXYGEN SATURATION: 100 % | SYSTOLIC BLOOD PRESSURE: 106 MMHG | HEART RATE: 56 BPM | RESPIRATION RATE: 18 BRPM | DIASTOLIC BLOOD PRESSURE: 59 MMHG

## 2018-07-06 DIAGNOSIS — R18.8 OTHER ASCITES: ICD-10-CM

## 2018-07-06 DIAGNOSIS — E11.22 TYPE 2 DIABETES MELLITUS WITH STAGE 3 CHRONIC KIDNEY DISEASE, WITHOUT LONG-TERM CURRENT USE OF INSULIN: ICD-10-CM

## 2018-07-06 DIAGNOSIS — N18.30 TYPE 2 DIABETES MELLITUS WITH STAGE 3 CHRONIC KIDNEY DISEASE, WITHOUT LONG-TERM CURRENT USE OF INSULIN: ICD-10-CM

## 2018-07-06 LAB
APPEARANCE FLD: CLEAR
BODY FLD TYPE: NORMAL
COLOR FLD: YELLOW
EOSINOPHIL NFR FLD MANUAL: 1 %
LYMPHOCYTES NFR FLD MANUAL: 80 %
MESOTHL CELL NFR FLD MANUAL: 2 %
MONOS+MACROS NFR FLD MANUAL: 13 %
NEUTROPHILS NFR FLD MANUAL: 4 %
WBC # FLD: 63 /CU MM

## 2018-07-06 PROCEDURE — G0108 DIAB MANAGE TRN  PER INDIV: HCPCS | Mod: PBBFAC | Performed by: DIETITIAN, REGISTERED

## 2018-07-06 PROCEDURE — 63600175 PHARM REV CODE 636 W HCPCS: Mod: JG | Performed by: INTERNAL MEDICINE

## 2018-07-06 PROCEDURE — 49083 ABD PARACENTESIS W/IMAGING: CPT | Mod: ,,, | Performed by: RADIOLOGY

## 2018-07-06 PROCEDURE — 27200940 IR PARACENTESIS WITH IMAGING

## 2018-07-06 PROCEDURE — 89051 BODY FLUID CELL COUNT: CPT

## 2018-07-06 PROCEDURE — 49083 ABD PARACENTESIS W/IMAGING: CPT

## 2018-07-06 PROCEDURE — P9047 ALBUMIN (HUMAN), 25%, 50ML: HCPCS | Mod: JG | Performed by: INTERNAL MEDICINE

## 2018-07-06 RX ORDER — ALBUMIN HUMAN 250 G/1000ML
25 SOLUTION INTRAVENOUS ONCE
Status: COMPLETED | OUTPATIENT
Start: 2018-07-06 | End: 2018-07-06

## 2018-07-06 RX ADMIN — ALBUMIN (HUMAN) 25 G: 25 SOLUTION INTRAVENOUS at 11:07

## 2018-07-06 NOTE — PROGRESS NOTES
Paracentesis complete, 4200 MLs removed. Specimen sent to lab. 100ml Albumin administered per protocol. Dressing applied to LLQ puncture site, dressing clean dry and intact. Pt given discharge instructions and handouts, pt verbalizes understanding. Questions answered. Pt denies pain and discomfort. Pt refusing transport. Pt wheeled to lobby for transport home with family.

## 2018-07-06 NOTE — H&P
Radiology History & Physical      SUBJECTIVE:     Chief Complaint: ascites    History of Present Illness:  Kenneth Sheikh is a 78 y.o. male who presents for ultrasound guided paracentesis  Past Medical History:   Diagnosis Date    Anticoagulant long-term use     Bipolar 1 disorder     Bipolar 1 disorder 11/24/2016    bipolar    Cancer     history of skin cancer/sinus cancer & rectal cancer    Depressed bipolar affective disorder     Diabetes mellitus     type 2    EBV infection 12/7/2016    EBV DNA, PCR Latest Ref Range: None detected  None detected EBV DNA-Copies/mL Unknown Test Not Performed EBV Early Antigen Ab, IgG Latest Ref Range: <1:10 Titer 1:40 (A) EBV Nuclear Ag Ab Latest Ref Range: <1:5 Titer >=1:80 (A) EBV VCA IgG Latest Ref Range: <1:10 Titer 1:160 (A) EBV VCA IgM Latest Ref Range: <1:10 Titer <1:10     History of TIA (transient ischemic attack)     several-taking Plavix    Hx of gallstones     Wife denies    Hypertension     Hyperthyroidism 11/30/2016    Low HDL (under 40) 12/7/2016    Mixed hyperlipidemia 11/23/2016    JUAN MANUEL (obstructive sleep apnea)     Pneumonia     Skin disease     Stroke     Transient cerebral ischemia 7/22/2016    Type 2 diabetes mellitus      Past Surgical History:   Procedure Laterality Date    Moh's procedure x4      rectal cancer      Sinus surgery for sinus cancer      sinus sx for cancer      skin cancer removed-several times-nose & ear      TONSILLECTOMY      Undescened testicle sx      UVULOPALATOPHARYNGOPLASTY      for sleep apnea       Home Meds:   Prior to Admission medications    Medication Sig Start Date End Date Taking? Authorizing Provider   ACCU-CHEK NEYDA PLUS TEST STRP Strp 1 strip by Misc.(Non-Drug; Combo Route) route once daily. 5/25/18   Prisca Espinoza MD   ciprofloxacin HCl (CIPRO) 500 MG tablet Take 1 tablet (500 mg total) by mouth every Monday. 5/14/18   Renato Womack MD   divalproex (DEPAKOTE) 250 MG EC tablet  5/5/17    Historical Provider, MD   furosemide (LASIX) 20 MG tablet Take 4 tablets (80 mg total) by mouth once daily.  Patient taking differently: Take 40 mg by mouth once daily. Taking 40 mg per day 4/25/17 6/22/18  Renato Womack MD   glipiZIDE (GLUCOTROL) 5 MG tablet Take 0.5 tablets (2.5 mg total) by mouth daily with breakfast. 6/26/18 6/26/19  Prisca Espinoza MD   lactulose (CHRONULAC) 10 gram/15 mL solution TAKE 15 MLS BY MOUTH TWICE DAILY 3/8/18   Renato Womack MD   rifAXIMin (XIFAXAN) 550 mg Tab Take 1 tablet (550 mg total) by mouth 2 (two) times daily. 10/3/17   Renato Womack MD   SITagliptan (JANUVIA) 50 MG Tab Take 1 tablet (50 mg total) by mouth once daily. 4/6/17   Prisca Espinoza MD     Anticoagulants/Antiplatelets: no anticoagulation    Allergies:   Review of patient's allergies indicates:   Allergen Reactions    Abilify [aripiprazole] Other (See Comments)     Sleepy, could not function.     Sedation History:  no adverse reactions    Review of Systems:   Hematological: no known coagulopathies  Respiratory: no shortness of breath  Cardiovascular: no chest pain  Gastrointestinal: no abdominal pain  Genito-Urinary: no dysuria  Musculoskeletal: negative  Neurological: no TIA or stroke symptoms         OBJECTIVE:     Vital Signs (Most Recent)       Physical Exam:  ASA: 2  Mallampati: n/a    General: no acute distress  Mental Status: alert and oriented to person, place and time  HEENT: normocephalic, atraumatic  Chest: unlabored breathing  Heart: regular heart rate  Abdomen: distended  Extremity: moves all extremities    Laboratory  Lab Results   Component Value Date    INR 1.1 05/18/2018       Lab Results   Component Value Date    WBC 2.93 (L) 05/18/2018    HGB 10.7 (L) 05/18/2018    HCT 33.4 (L) 05/18/2018    MCV 86 05/18/2018     (L) 05/18/2018      Lab Results   Component Value Date     (H) 05/18/2018     05/18/2018    K 4.0 05/18/2018     05/18/2018    CO2 29  05/18/2018    BUN 19 05/18/2018    CREATININE 1.5 (H) 05/18/2018    CALCIUM 8.9 05/18/2018    MG 1.7 05/15/2017    ALT 7 (L) 05/18/2018    AST 19 05/18/2018    ALBUMIN 2.4 (L) 05/18/2018    BILITOT 0.5 05/18/2018       ASSESSMENT/PLAN:     Sedation Plan: local  Patient will undergo ultrasound guided paracentesis.    MATT Mauricio, FNP  Interventional Radiology  [phone] [employer]

## 2018-07-06 NOTE — DISCHARGE INSTRUCTIONS
Please call with any questions or concerns.      Monday thru Friday 8:00 am - 4:30 pm    Interventional Radiology   [phone]    After Hours    Ask for the Radiology Intern on call  [phone]

## 2018-07-07 NOTE — PROGRESS NOTES
Diabetes Education  Author: Kelin Arredondo RD  Date: 2018    Diabetes Education Visit  Diabetes Education Record Assessment/Progress: Initial    Diabetes Type  Diabetes Type : Type II    Nutrition  Meal Planning: 3 meals per day (very limited appetite - eats very small meals)  What type of beverages do you drink?: diet soda/tea (sprite zero; u/s tea with splenda)  Meal Plan 24 Hour Recall - Breakfast:  (1 boiled egg, 1/2 english muffin, tea, water)  Meal Plan 24 Hour Recall - Lunch:  (1/4 sandwich, chips; OR nectarine, plum, cherries)  Meal Plan 24 Hour Recall - Dinner:  (1/2 pc catfish, 4 pc asparagus, 2 bites smothered potatoes)  Meal Plan 24 Hour Recall - Snack:  (used to eat  alot of chocolates and hard candies but has stopped)    Monitoring   Self Monitoring :  (SMBG fastin-199)  Blood Glucose Logs: No  Do you use a personal glucose monitor?: No  In the last month, how often have you had a low blood sugar reaction?: never  Can you tell when your blood sugar is too high?: no    Exercise   Exercise Type: none    Current Diabetes Treatment   Current Treatment: Oral Medication (Januvia 50 mg daily)    Social History  Preferred Learning Method: Face to Face  Primary Support: Self, Spouse (wife present - primary caretaker)  Smoking Status: Ex Smoker  Alcohol Use: Never    Barriers to Change  Barriers to Change: None  Learning Challenges : None    Readiness to Learn   Readiness to Learn : Acceptance    Cultural Influences  Cultural Influences:  (Pt is Jewish - no blood transfusions)        Diabetes Education Assessment/Progress  Diabetes Disease Process (diabetes disease process and treatment options): Discussion, Instructed, Individual Session, Written Materials Provided, Requires Assistance Family/SO  Discussed disease process and possible causes of recently elevated A1c. Discussed treatment options including dietary and lifestyle changes in addition to medication modification.    Nutrition  (Incorporating nutritional management into one's lifestyle): Discussion, Instructed, Individual Session, Written Materials Provided, Requires Assistance Family/SO  Reviewed all SF drink options. Discussed carb vs non-carb foods and reviewed appropriate amounts of carbs to have at meals vs snacks. Recommended no more than 2-3 servings of carb at meals and 0-1 servings of carb at snacks. Discussed need to have a protein and/or fat source with all carb choices - ramon since pt likes to eat large amounts of fruit at times. Discussed meal plans and snack ideas amenable to pt.     Medications (states correct name, dose, onset, peak, duration, side effects & timing of meds): Discussion, Instructed, Individual Session, Written Materials Provided, Requires Assistance Family/SO  Discussed timing and MOA of Januvia and glipizide. Pt recently instructed to take 1/2 tab (2.5mg) glipizide every morning BG >150. He has not started this yet. Discussed the importance of adding this medication as instructed, as his diet is very limited and unlikely to change much. Discussed possibility of insulin use to avoid further dietary restriction.     Monitoring (monitoring blood glucose/other parameters & using results): Discussion, Instructed, Individual Session, Written Materials Provided, Requires Assistance Family/SO  Reviewed goal BGs for different times of day and in relation to meals. Instructed pt to test BG once daily, but vary times between fasting and pre-dinner. Reviewed need for updated BG logs for all endo, PCP, and education appts.    Acute Complications (preventing, detecting, and treating acute complications): Discussion, Instructed, Individual Session, Written Materials Provided, Requires Assistance Family/SO  Reviewed hypoglycemia vs hyperglycemia symptoms and discussed appropriate treatments for each.     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 He receives weekly paracentesis - appetite improves following paracentesis, then declines throughout the days following.)    Cognitive (knowledge of self-management skills, functional health literacy): Discussion, Requires Assistance Family/SO  Wife with good health management knowledge - some diabetes self care knowledge - needs direct instruction and explanation to why meds should be taken or things done the way we recommend them.    Behavioral (readiness for change, lifestyle practices, self-care behaviors): Discussion  They appear motivated to avoid insulin - question ability to restrict diet enough to avoid "forever."      Goals  Patient has selected/evaluated goals during today's session: Yes, selected    Medications: Set (Take medications as instructed)  Start Date: 07/06/18         Diabetes Care Plan/Intervention  Education Plan/Intervention: Individual Follow-Up DSMT        Diabetes Meal Plan  Restrictions: Restricted Carbohydrate  Carbohydrate Per Meal: 20-30g, 30-45g  Carbohydrate Per Snack : 7-15g    Education Units of Time   Time Spent: 75 min    Health Maintenance was reviewed today with patient. Discussed with patient importance of routine eye exams, foot exams/foot care, blood work (i.e.: A1c, microalbumin, and lipid), dental visits, yearly flu vaccine, and pneumonia vaccine as indicated by PCP. Patient verbalized understanding.     Health Maintenance Topics with due status: Not Due       Topic Last Completion Date    Foot Exam 10/26/2017    Influenza Vaccine 01/27/2018    Hemoglobin A1c 05/25/2018     Health Maintenance Due   Topic Date Due    Urine Microalbumin  01/30/1950    TETANUS VACCINE  01/30/1956    Zoster Vaccine  01/30/1998    Eye Exam  11/29/2017    Lipid Panel  12/08/2017       "

## 2018-07-10 ENCOUNTER — TELEPHONE (OUTPATIENT)
Dept: INTERNAL MEDICINE | Facility: CLINIC | Age: 78
End: 2018-07-10

## 2018-07-10 NOTE — TELEPHONE ENCOUNTER
Spoke with Ms. Chacko to try to reschedule pt appt from his canceled appt 8/17/8  only would like the pt scheduled on Friday Evening's ONLY.. She was very upset that  would not contact her personally to let her know that she had a conversation with  in regards to the pt being prescribed a new medication she was aware that I spoke with the pt an he received the information that was given in message but she states that the pt doesn't understand everything someone calls him on the phone to say. I let  know if something becomes available I will call to help her schedule a sooner appointment

## 2018-07-10 NOTE — TELEPHONE ENCOUNTER
From the messages in Epic, it appears Chelsey spoke with him on 6/26/18 regarding instructions for new medication.  Did wife have questions about the new medication?

## 2018-07-10 NOTE — TELEPHONE ENCOUNTER
Yes. I went over her questions and concerns and she was still very upset about the pt being prescribed a new medication an she wasn't notified personally. I think she will feel better if you call her personally.

## 2018-07-12 ENCOUNTER — TELEPHONE (OUTPATIENT)
Dept: INTERNAL MEDICINE | Facility: CLINIC | Age: 78
End: 2018-07-12

## 2018-07-12 NOTE — TELEPHONE ENCOUNTER
Spoke with patient.  Says FSG has been <150 so has not taken glipizide.  Now glucometer is broken so he is working on getting a new one.  He will let me know if he needs a new prescription.  Left voicemail for wife as well.

## 2018-07-13 ENCOUNTER — HOSPITAL ENCOUNTER (OUTPATIENT)
Dept: INTERVENTIONAL RADIOLOGY/VASCULAR | Facility: HOSPITAL | Age: 78
Discharge: HOME | End: 2018-07-13
Attending: INTERNAL MEDICINE
Payer: MEDICARE

## 2018-07-13 VITALS
RESPIRATION RATE: 16 BRPM | HEART RATE: 52 BPM | SYSTOLIC BLOOD PRESSURE: 152 MMHG | DIASTOLIC BLOOD PRESSURE: 63 MMHG | OXYGEN SATURATION: 98 %

## 2018-07-13 DIAGNOSIS — R18.8 OTHER ASCITES: ICD-10-CM

## 2018-07-13 LAB
GRAM STN SPEC: NORMAL

## 2018-07-13 PROCEDURE — 49083 ABD PARACENTESIS W/IMAGING: CPT

## 2018-07-13 PROCEDURE — 87070 CULTURE OTHR SPECIMN AEROBIC: CPT

## 2018-07-13 PROCEDURE — P9047 ALBUMIN (HUMAN), 25%, 50ML: HCPCS | Mod: JG | Performed by: INTERNAL MEDICINE

## 2018-07-13 PROCEDURE — 87205 SMEAR GRAM STAIN: CPT

## 2018-07-13 PROCEDURE — 49083 ABD PARACENTESIS W/IMAGING: CPT | Mod: ,,, | Performed by: FAMILY MEDICINE

## 2018-07-13 PROCEDURE — 87075 CULTR BACTERIA EXCEPT BLOOD: CPT

## 2018-07-13 PROCEDURE — 63600175 PHARM REV CODE 636 W HCPCS: Mod: JG | Performed by: INTERNAL MEDICINE

## 2018-07-13 RX ORDER — ALBUMIN HUMAN 250 G/1000ML
25 SOLUTION INTRAVENOUS
Status: COMPLETED | OUTPATIENT
Start: 2018-07-13 | End: 2018-07-13

## 2018-07-13 RX ADMIN — ALBUMIN (HUMAN) 25 G: 25 SOLUTION INTRAVENOUS at 12:07

## 2018-07-13 NOTE — PROCEDURES

## 2018-07-13 NOTE — DISCHARGE INSTRUCTIONS
For scheduling: Call Mackenzie at [phone]    For questions or concerns call: [facility] 8 AM- 5PM [phone]. Radiology resident on call [phone].    For immediate concerns that are not emergent, you may call our radiology clinic at: [phone]

## 2018-07-13 NOTE — H&P
Radiology History & Physical      SUBJECTIVE:     Chief Complaint: ascites    History of Present Illness:  Kenneth Sheikh is a 78 y.o. male who presents for ultrasound guided paracentesis  Past Medical History:   Diagnosis Date    Anticoagulant long-term use     Bipolar 1 disorder     Bipolar 1 disorder 11/24/2016    bipolar    Cancer     history of skin cancer/sinus cancer & rectal cancer    Depressed bipolar affective disorder     Diabetes mellitus     type 2    EBV infection 12/7/2016    EBV DNA, PCR Latest Ref Range: None detected  None detected EBV DNA-Copies/mL Unknown Test Not Performed EBV Early Antigen Ab, IgG Latest Ref Range: <1:10 Titer 1:40 (A) EBV Nuclear Ag Ab Latest Ref Range: <1:5 Titer >=1:80 (A) EBV VCA IgG Latest Ref Range: <1:10 Titer 1:160 (A) EBV VCA IgM Latest Ref Range: <1:10 Titer <1:10     History of TIA (transient ischemic attack)     several-taking Plavix    Hx of gallstones     Wife denies    Hypertension     Hyperthyroidism 11/30/2016    Low HDL (under 40) 12/7/2016    Mixed hyperlipidemia 11/23/2016    JUAN MANUEL (obstructive sleep apnea)     Pneumonia     Skin disease     Stroke     Transient cerebral ischemia 7/22/2016    Type 2 diabetes mellitus      Past Surgical History:   Procedure Laterality Date    Moh's procedure x4      rectal cancer      Sinus surgery for sinus cancer      sinus sx for cancer      skin cancer removed-several times-nose & ear      TONSILLECTOMY      Undescened testicle sx      UVULOPALATOPHARYNGOPLASTY      for sleep apnea       Home Meds:   Prior to Admission medications    Medication Sig Start Date End Date Taking? Authorizing Provider   ACCU-CHEK NEYDA PLUS TEST STRP Strp 1 strip by Misc.(Non-Drug; Combo Route) route once daily. 5/25/18   Prisca Espinoza MD   ciprofloxacin HCl (CIPRO) 500 MG tablet Take 1 tablet (500 mg total) by mouth every Monday. 5/14/18   Renato Womack MD   divalproex (DEPAKOTE) 250 MG EC tablet  5/5/17    Historical Provider, MD   furosemide (LASIX) 20 MG tablet Take 4 tablets (80 mg total) by mouth once daily.  Patient taking differently: Take 40 mg by mouth once daily. Taking 40 mg per day 4/25/17 6/22/18  Renato Womack MD   glipiZIDE (GLUCOTROL) 5 MG tablet Take 0.5 tablets (2.5 mg total) by mouth daily with breakfast. 6/26/18 6/26/19  Prisca Espinoza MD   lactulose (CHRONULAC) 10 gram/15 mL solution TAKE 15 MLS BY MOUTH TWICE DAILY 3/8/18   Renato Womack MD   rifAXIMin (XIFAXAN) 550 mg Tab Take 1 tablet (550 mg total) by mouth 2 (two) times daily. 10/3/17   Renato Womack MD   SITagliptan (JANUVIA) 50 MG Tab Take 1 tablet (50 mg total) by mouth once daily. 4/6/17   Prisca Espinoza MD     Anticoagulants/Antiplatelets: no anticoagulation    Allergies:   Review of patient's allergies indicates:   Allergen Reactions    Abilify [aripiprazole] Other (See Comments)     Sleepy, could not function.     Sedation History:  no adverse reactions    Review of Systems:   Hematological: no known coagulopathies  Respiratory: no shortness of breath  Cardiovascular: no chest pain  Gastrointestinal: no abdominal pain  Genito-Urinary: no dysuria  Musculoskeletal: negative  Neurological: no TIA or stroke symptoms         OBJECTIVE:     Vital Signs (Most Recent)  Pulse: (!) 52 (07/13/18 1123)  Resp: 16 (07/13/18 1123)  BP: (!) 152/63 (07/13/18 1123)  SpO2: 98 % (07/13/18 1123)    Physical Exam:  ASA: 2  Mallampati: n/a    General: no acute distress  Mental Status: alert and oriented to person, place and time  HEENT: normocephalic, atraumatic  Chest: unlabored breathing  Heart: regular heart rate  Abdomen: distended  Extremity: moves all extremities    Laboratory  Lab Results   Component Value Date    INR 1.1 05/18/2018       Lab Results   Component Value Date    WBC 2.93 (L) 05/18/2018    HGB 10.7 (L) 05/18/2018    HCT 33.4 (L) 05/18/2018    MCV 86 05/18/2018     (L) 05/18/2018      Lab Results    Component Value Date     (H) 05/18/2018     05/18/2018    K 4.0 05/18/2018     05/18/2018    CO2 29 05/18/2018    BUN 19 05/18/2018    CREATININE 1.5 (H) 05/18/2018    CALCIUM 8.9 05/18/2018    MG 1.7 05/15/2017    ALT 7 (L) 05/18/2018    AST 19 05/18/2018    ALBUMIN 2.4 (L) 05/18/2018    BILITOT 0.5 05/18/2018       ASSESSMENT/PLAN:     Sedation Plan: local  Patient will undergo ultrasound guided paracentesis.    Елена Comer APRN, FNP  Interventional Radiology  [phone] [employer]

## 2018-07-13 NOTE — PROGRESS NOTES
Paracentesis complete, 4900 MLs removed. Specimen sent to lab. 100 Albumin administered per protocol. Dressing applied to puncture site, dressing clean dry and intact. Pt given discharge instructions and handouts, pt verbalizes understanding. Questions answered. Pt denies pain and discomfort. Pt refusing transport. Pt ambulated to [facility] for transport home with family. Gait steady.

## 2018-07-16 LAB — BACTERIA SPEC AEROBE CULT: NO GROWTH

## 2018-07-20 ENCOUNTER — HOSPITAL ENCOUNTER (OUTPATIENT)
Dept: INTERVENTIONAL RADIOLOGY/VASCULAR | Facility: HOSPITAL | Age: 78
Discharge: HOME | End: 2018-07-20
Attending: INTERNAL MEDICINE
Payer: MEDICARE

## 2018-07-20 VITALS
SYSTOLIC BLOOD PRESSURE: 131 MMHG | RESPIRATION RATE: 16 BRPM | HEART RATE: 55 BPM | DIASTOLIC BLOOD PRESSURE: 62 MMHG | OXYGEN SATURATION: 100 %

## 2018-07-20 DIAGNOSIS — R18.8 OTHER ASCITES: ICD-10-CM

## 2018-07-20 LAB
APPEARANCE FLD: NORMAL
BACTERIA SPEC ANAEROBE CULT: NORMAL
BODY FLD TYPE: NORMAL
COLOR FLD: YELLOW
LYMPHOCYTES NFR FLD MANUAL: 91 %
MONOS+MACROS NFR FLD MANUAL: 9 %
WBC # FLD: 68 /CU MM

## 2018-07-20 PROCEDURE — 49083 ABD PARACENTESIS W/IMAGING: CPT | Mod: GC,,, | Performed by: RADIOLOGY

## 2018-07-20 PROCEDURE — P9047 ALBUMIN (HUMAN), 25%, 50ML: HCPCS | Mod: JG | Performed by: INTERNAL MEDICINE

## 2018-07-20 PROCEDURE — 89051 BODY FLUID CELL COUNT: CPT

## 2018-07-20 PROCEDURE — 63600175 PHARM REV CODE 636 W HCPCS: Mod: JG | Performed by: INTERNAL MEDICINE

## 2018-07-20 PROCEDURE — 49083 ABD PARACENTESIS W/IMAGING: CPT

## 2018-07-20 PROCEDURE — 27200940 IR PARACENTESIS WITH IMAGING

## 2018-07-20 RX ORDER — ALBUMIN HUMAN 250 G/1000ML
25 SOLUTION INTRAVENOUS ONCE
Status: COMPLETED | OUTPATIENT
Start: 2018-07-20 | End: 2018-07-20

## 2018-07-20 RX ADMIN — ALBUMIN (HUMAN) 25 G: 25 SOLUTION INTRAVENOUS at 11:07

## 2018-07-20 NOTE — DISCHARGE INSTRUCTIONS
For scheduling: Call Mackenzie at [phone]    For questions or concerns call: [facility] 8 AM- 5PM [phone]. Radiology resident on call [phone].    For immediate concerns that are not emergent, you may call our radiology clinic at: [phone]

## 2018-07-20 NOTE — PROCEDURES
Radiology Post-Procedure Note    Pre Op Diagnosis: Ascites  Post Op Diagnosis: Same    Procedure: Paracentesis    Procedure performed by: Juanita    Written Informed Consent Obtained: Yes  Specimen Removed: YES   Estimated Blood Loss: Minimal    Findings:   Successful paracentesis.  Albumin administered PRN per protocol.    Patient tolerated procedure well.  Please see dictated note for further details and amount of fluid removed.         Idania Love MD  Department of Radiology  Resident

## 2018-07-20 NOTE — PROGRESS NOTES
Paracentesis complete at this time. Pt tolerated well. 5500cc removed from right abdomen. 100cc of albumin given IV. Bandage applied to right abdomen clean, dry, and intact. IV discontinued with catheter intact. Patient given discharge instructions and verbalized understanding of at home care. Patient discharged home via wheelchair with wife by side.

## 2018-07-20 NOTE — H&P
Radiology History & Physical      SUBJECTIVE:     Chief Complaint: ascites    History of Present Illness:  Kenneth Sheikh is a 78 y.o. male with MCDONALD cirrhosis and recurrent ascites who presents for routine paracentesis.     Past Medical History:   Diagnosis Date    Anticoagulant long-term use     Bipolar 1 disorder     Bipolar 1 disorder 11/24/2016    bipolar    Cancer     history of skin cancer/sinus cancer & rectal cancer    Depressed bipolar affective disorder     Diabetes mellitus     type 2    EBV infection 12/7/2016    EBV DNA, PCR Latest Ref Range: None detected  None detected EBV DNA-Copies/mL Unknown Test Not Performed EBV Early Antigen Ab, IgG Latest Ref Range: <1:10 Titer 1:40 (A) EBV Nuclear Ag Ab Latest Ref Range: <1:5 Titer >=1:80 (A) EBV VCA IgG Latest Ref Range: <1:10 Titer 1:160 (A) EBV VCA IgM Latest Ref Range: <1:10 Titer <1:10     History of TIA (transient ischemic attack)     several-taking Plavix    Hx of gallstones     Wife denies    Hypertension     Hyperthyroidism 11/30/2016    Low HDL (under 40) 12/7/2016    Mixed hyperlipidemia 11/23/2016    JUAN MANUEL (obstructive sleep apnea)     Pneumonia     Skin disease     Stroke     Transient cerebral ischemia 7/22/2016    Type 2 diabetes mellitus      Past Surgical History:   Procedure Laterality Date    Moh's procedure x4      rectal cancer      Sinus surgery for sinus cancer      sinus sx for cancer      skin cancer removed-several times-nose & ear      TONSILLECTOMY      Undescened testicle sx      UVULOPALATOPHARYNGOPLASTY      for sleep apnea       Home Meds:   Prior to Admission medications    Medication Sig Start Date End Date Taking? Authorizing Provider   ACCU-CHEK NEYDA PLUS TEST STRP Strp 1 strip by Misc.(Non-Drug; Combo Route) route once daily. 5/25/18   Prisca Espinoza MD   ciprofloxacin HCl (CIPRO) 500 MG tablet Take 1 tablet (500 mg total) by mouth every Monday. 5/14/18   Renato Womack MD   divalproex  (DEPAKOTE) 250 MG EC tablet  5/5/17   Historical Provider, MD   furosemide (LASIX) 20 MG tablet Take 4 tablets (80 mg total) by mouth once daily.  Patient taking differently: Take 40 mg by mouth once daily. Taking 40 mg per day 4/25/17 6/22/18  Renato Womack MD   glipiZIDE (GLUCOTROL) 5 MG tablet Take 0.5 tablets (2.5 mg total) by mouth daily with breakfast. 6/26/18 6/26/19  Prisca Espinoza MD   lactulose (CHRONULAC) 10 gram/15 mL solution TAKE 15 MLS BY MOUTH TWICE DAILY 3/8/18   Renato Womack MD   rifAXIMin (XIFAXAN) 550 mg Tab Take 1 tablet (550 mg total) by mouth 2 (two) times daily. 10/3/17   Renato Womack MD   SITagliptan (JANUVIA) 50 MG Tab Take 1 tablet (50 mg total) by mouth once daily. 4/6/17   Prisca Espinoza MD     Anticoagulants/Antiplatelets: no anticoagulation    Allergies:   Review of patient's allergies indicates:   Allergen Reactions    Abilify [aripiprazole] Other (See Comments)     Sleepy, could not function.     Sedation History:  no adverse reactions    Review of Systems:   Hematological: no known coagulopathies  Respiratory: no shortness of breath  Cardiovascular: no chest pain  Gastrointestinal: no abdominal pain  Genito-Urinary: no dysuria  Musculoskeletal: negative  Neurological: no TIA or stroke symptoms         OBJECTIVE:     Vital Signs (Most Recent)  Pulse: (!) 55 (07/20/18 1118)  Resp: 12 (07/20/18 1118)  BP: (!) 172/72 (07/20/18 1118)  SpO2: 97 % (07/20/18 1118)    Physical Exam:    General: no acute distress  Mental Status: alert and oriented to person, place and time  HEENT: normocephalic, atraumatic  Chest: unlabored breathing  Heart: regular heart rate  Abdomen: nondistended  Extremity: moves all extremities    Laboratory  Lab Results   Component Value Date    INR 1.1 05/18/2018       Lab Results   Component Value Date    WBC 2.93 (L) 05/18/2018    HGB 10.7 (L) 05/18/2018    HCT 33.4 (L) 05/18/2018    MCV 86 05/18/2018     (L) 05/18/2018      Lab  Results   Component Value Date     (H) 05/18/2018     05/18/2018    K 4.0 05/18/2018     05/18/2018    CO2 29 05/18/2018    BUN 19 05/18/2018    CREATININE 1.5 (H) 05/18/2018    CALCIUM 8.9 05/18/2018    MG 1.7 05/15/2017    ALT 7 (L) 05/18/2018    AST 19 05/18/2018    ALBUMIN 2.4 (L) 05/18/2018    BILITOT 0.5 05/18/2018       ASSESSMENT/PLAN:     Sedation Plan: local    Patient will undergo US guided paracentesis.      Idania Love MD  Department of Radiology  Resident

## 2018-07-27 ENCOUNTER — HOSPITAL ENCOUNTER (OUTPATIENT)
Dept: INTERVENTIONAL RADIOLOGY/VASCULAR | Facility: HOSPITAL | Age: 78
Discharge: HOME | End: 2018-07-27
Attending: INTERNAL MEDICINE
Payer: MEDICARE

## 2018-07-27 VITALS
RESPIRATION RATE: 18 BRPM | OXYGEN SATURATION: 100 % | SYSTOLIC BLOOD PRESSURE: 129 MMHG | DIASTOLIC BLOOD PRESSURE: 62 MMHG | HEART RATE: 57 BPM

## 2018-07-27 DIAGNOSIS — R18.8 OTHER ASCITES: ICD-10-CM

## 2018-07-27 LAB
APPEARANCE FLD: NORMAL
BODY FLD TYPE: NORMAL
COLOR FLD: YELLOW
EOSINOPHIL NFR FLD MANUAL: 1 %
GRAM STN SPEC: NORMAL
GRAM STN SPEC: NORMAL
LYMPHOCYTES NFR FLD MANUAL: 70 %
MONOS+MACROS NFR FLD MANUAL: 25 %
NEUTROPHILS NFR FLD MANUAL: 4 %
WBC # FLD: 65 /CU MM

## 2018-07-27 PROCEDURE — 87075 CULTR BACTERIA EXCEPT BLOOD: CPT

## 2018-07-27 PROCEDURE — 87205 SMEAR GRAM STAIN: CPT

## 2018-07-27 PROCEDURE — 49083 ABD PARACENTESIS W/IMAGING: CPT | Mod: ,,, | Performed by: FAMILY MEDICINE

## 2018-07-27 PROCEDURE — 89051 BODY FLUID CELL COUNT: CPT

## 2018-07-27 PROCEDURE — 27200940 IR PARACENTESIS WITH IMAGING

## 2018-07-27 PROCEDURE — 87070 CULTURE OTHR SPECIMN AEROBIC: CPT

## 2018-07-27 PROCEDURE — 49083 ABD PARACENTESIS W/IMAGING: CPT

## 2018-07-27 PROCEDURE — P9047 ALBUMIN (HUMAN), 25%, 50ML: HCPCS | Mod: JG | Performed by: INTERNAL MEDICINE

## 2018-07-27 PROCEDURE — 63600175 PHARM REV CODE 636 W HCPCS: Mod: JG | Performed by: INTERNAL MEDICINE

## 2018-07-27 RX ORDER — ALBUMIN HUMAN 250 G/1000ML
12.5 SOLUTION INTRAVENOUS ONCE
Status: COMPLETED | OUTPATIENT
Start: 2018-07-27 | End: 2018-07-27

## 2018-07-27 RX ADMIN — ALBUMIN (HUMAN) 12.5 G: 25 SOLUTION INTRAVENOUS at 12:07

## 2018-07-27 NOTE — PROCEDURES
Radiology Post-Procedure Note    Pre Op Diagnosis: Ascites  Post Op Diagnosis: Same    Procedure: Ultrasound Guided Paracentesis    Procedure performed by: Souleymane ROSS, Елена     Written Informed Consent Obtained: Yes  Specimen Removed: YES cloudy yellow  Estimated Blood Loss: Minimal    Findings:   Successful paracentesis.  Albumin administered PRN per protocol.    Patient tolerated procedure well.    Елена Comer, APRN, FNP  Interventional Radiology  [phone] [employer]

## 2018-07-27 NOTE — PROGRESS NOTES
Patient stable, tolerated paracentesis well, 4.8L removed, labs sent, 100 mL albumin given per protocol.  Discharge instruction, and follow up care reviewed.  Patient verbalized understanding, and denies further questions.  Provided with ROCU and after hours number.  Patient transported via wheelchair.

## 2018-07-27 NOTE — DISCHARGE INSTRUCTIONS
"For scheduling: Call Mackenzie at [phone]    For questions or concerns call: SHAR MON[facility] 8 AM- 5PM: [phone].   **After hours and weekends: Call [phone] and ask for "Radiology Resident on call".    For immediate concerns that are not emergent, you may call our radiology clinic at: [phone]    "

## 2018-07-27 NOTE — H&P
Radiology History & Physical      SUBJECTIVE:     Chief Complaint: ascites    History of Present Illness:  Kenneth Sheikh is a 78 y.o. male who presents for ultrasound guided paracentesis  Past Medical History:   Diagnosis Date    Anticoagulant long-term use     Bipolar 1 disorder     Bipolar 1 disorder 11/24/2016    bipolar    Cancer     history of skin cancer/sinus cancer & rectal cancer    Depressed bipolar affective disorder     Diabetes mellitus     type 2    EBV infection 12/7/2016    EBV DNA, PCR Latest Ref Range: None detected  None detected EBV DNA-Copies/mL Unknown Test Not Performed EBV Early Antigen Ab, IgG Latest Ref Range: <1:10 Titer 1:40 (A) EBV Nuclear Ag Ab Latest Ref Range: <1:5 Titer >=1:80 (A) EBV VCA IgG Latest Ref Range: <1:10 Titer 1:160 (A) EBV VCA IgM Latest Ref Range: <1:10 Titer <1:10     History of TIA (transient ischemic attack)     several-taking Plavix    Hx of gallstones     Wife denies    Hypertension     Hyperthyroidism 11/30/2016    Low HDL (under 40) 12/7/2016    Mixed hyperlipidemia 11/23/2016    JUAN MANUEL (obstructive sleep apnea)     Pneumonia     Skin disease     Stroke     Transient cerebral ischemia 7/22/2016    Type 2 diabetes mellitus      Past Surgical History:   Procedure Laterality Date    Moh's procedure x4      rectal cancer      Sinus surgery for sinus cancer      sinus sx for cancer      skin cancer removed-several times-nose & ear      TONSILLECTOMY      Undescened testicle sx      UVULOPALATOPHARYNGOPLASTY      for sleep apnea       Home Meds:   Prior to Admission medications    Medication Sig Start Date End Date Taking? Authorizing Provider   ACCU-CHEK NEYDA PLUS TEST STRP Strp 1 strip by Misc.(Non-Drug; Combo Route) route once daily. 5/25/18   Prisca Espinoza MD   ciprofloxacin HCl (CIPRO) 500 MG tablet Take 1 tablet (500 mg total) by mouth every Monday. 5/14/18   Renato Womack MD   divalproex (DEPAKOTE) 250 MG EC tablet  5/5/17    Historical Provider, MD   furosemide (LASIX) 20 MG tablet Take 4 tablets (80 mg total) by mouth once daily.  Patient taking differently: Take 40 mg by mouth once daily. Taking 40 mg per day 4/25/17 6/22/18  Renato Womack MD   glipiZIDE (GLUCOTROL) 5 MG tablet Take 0.5 tablets (2.5 mg total) by mouth daily with breakfast. 6/26/18 6/26/19  Prisca Espinoza MD   lactulose (CHRONULAC) 10 gram/15 mL solution TAKE 15 MLS BY MOUTH TWICE DAILY 3/8/18   Renato Womack MD   rifAXIMin (XIFAXAN) 550 mg Tab Take 1 tablet (550 mg total) by mouth 2 (two) times daily. 10/3/17   Renato Womack MD   SITagliptan (JANUVIA) 50 MG Tab Take 1 tablet (50 mg total) by mouth once daily. 4/6/17   Prisca Espinoza MD     Anticoagulants/Antiplatelets: no anticoagulation    Allergies:   Review of patient's allergies indicates:   Allergen Reactions    Abilify [aripiprazole] Other (See Comments)     Sleepy, could not function.     Sedation History:  no adverse reactions    Review of Systems:   Hematological: no known coagulopathies  Respiratory: no shortness of breath  Cardiovascular: no chest pain  Gastrointestinal: no abdominal pain  Genito-Urinary: no dysuria  Musculoskeletal: negative  Neurological: no TIA or stroke symptoms         OBJECTIVE:     Vital Signs (Most Recent)       Physical Exam:  ASA: 2  Mallampati: n/a    General: no acute distress  Mental Status: alert and oriented to person, place and time  HEENT: normocephalic, atraumatic  Chest: unlabored breathing  Heart: regular heart rate  Abdomen: distended  Extremity: moves all extremities    Laboratory  Lab Results   Component Value Date    INR 1.1 05/18/2018       Lab Results   Component Value Date    WBC 2.93 (L) 05/18/2018    HGB 10.7 (L) 05/18/2018    HCT 33.4 (L) 05/18/2018    MCV 86 05/18/2018     (L) 05/18/2018      Lab Results   Component Value Date     (H) 05/18/2018     05/18/2018    K 4.0 05/18/2018     05/18/2018    CO2 29  05/18/2018    BUN 19 05/18/2018    CREATININE 1.5 (H) 05/18/2018    CALCIUM 8.9 05/18/2018    MG 1.7 05/15/2017    ALT 7 (L) 05/18/2018    AST 19 05/18/2018    ALBUMIN 2.4 (L) 05/18/2018    BILITOT 0.5 05/18/2018       ASSESSMENT/PLAN:     Sedation Plan: local  Patient will undergo ultrasound guided paracentesis.    MATT Mauricio, FNP  Interventional Radiology  [phone] [employer]

## 2018-07-30 LAB — BACTERIA SPEC AEROBE CULT: NO GROWTH

## 2018-07-31 LAB — BACTERIA SPEC ANAEROBE CULT: NORMAL

## 2018-08-03 ENCOUNTER — HOSPITAL ENCOUNTER (OUTPATIENT)
Dept: INTERVENTIONAL RADIOLOGY/VASCULAR | Facility: HOSPITAL | Age: 78
Discharge: HOME | End: 2018-08-03
Attending: INTERNAL MEDICINE
Payer: MEDICARE

## 2018-08-03 VITALS
HEART RATE: 55 BPM | DIASTOLIC BLOOD PRESSURE: 56 MMHG | SYSTOLIC BLOOD PRESSURE: 117 MMHG | RESPIRATION RATE: 20 BRPM | OXYGEN SATURATION: 100 %

## 2018-08-03 DIAGNOSIS — R18.8 OTHER ASCITES: ICD-10-CM

## 2018-08-03 LAB
APPEARANCE FLD: NORMAL
BODY FLD TYPE: NORMAL
COLOR FLD: YELLOW
EOSINOPHIL NFR FLD MANUAL: 1 %
LYMPHOCYTES NFR FLD MANUAL: 63 %
MONOS+MACROS NFR FLD MANUAL: 34 %
NEUTROPHILS NFR FLD MANUAL: 2 %
WBC # FLD: 90 /CU MM

## 2018-08-03 PROCEDURE — 63600175 PHARM REV CODE 636 W HCPCS: Mod: JG | Performed by: INTERNAL MEDICINE

## 2018-08-03 PROCEDURE — P9047 ALBUMIN (HUMAN), 25%, 50ML: HCPCS | Mod: JG | Performed by: INTERNAL MEDICINE

## 2018-08-03 PROCEDURE — 89051 BODY FLUID CELL COUNT: CPT

## 2018-08-03 PROCEDURE — 49083 ABD PARACENTESIS W/IMAGING: CPT

## 2018-08-03 PROCEDURE — 49083 ABD PARACENTESIS W/IMAGING: CPT | Mod: ,,, | Performed by: FAMILY MEDICINE

## 2018-08-03 RX ORDER — ALBUMIN HUMAN 250 G/1000ML
25 SOLUTION INTRAVENOUS
Status: DISCONTINUED | OUTPATIENT
Start: 2018-08-03 | End: 2018-08-04 | Stop reason: HOSPADM

## 2018-08-03 RX ADMIN — ALBUMIN (HUMAN) 25 G: 25 SOLUTION INTRAVENOUS at 12:08

## 2018-08-03 NOTE — H&P
Radiology History & Physical      SUBJECTIVE:     Chief Complaint: ascites    History of Present Illness:  Kenneth Sheikh is a 78 y.o. male who presents for ultrasound guided paracentesis  Past Medical History:   Diagnosis Date    Anticoagulant long-term use     Bipolar 1 disorder     Bipolar 1 disorder 11/24/2016    bipolar    Cancer     history of skin cancer/sinus cancer & rectal cancer    Depressed bipolar affective disorder     Diabetes mellitus     type 2    EBV infection 12/7/2016    EBV DNA, PCR Latest Ref Range: None detected  None detected EBV DNA-Copies/mL Unknown Test Not Performed EBV Early Antigen Ab, IgG Latest Ref Range: <1:10 Titer 1:40 (A) EBV Nuclear Ag Ab Latest Ref Range: <1:5 Titer >=1:80 (A) EBV VCA IgG Latest Ref Range: <1:10 Titer 1:160 (A) EBV VCA IgM Latest Ref Range: <1:10 Titer <1:10     History of TIA (transient ischemic attack)     several-taking Plavix    Hx of gallstones     Wife denies    Hypertension     Hyperthyroidism 11/30/2016    Low HDL (under 40) 12/7/2016    Mixed hyperlipidemia 11/23/2016    JUAN MANUEL (obstructive sleep apnea)     Pneumonia     Skin disease     Stroke     Transient cerebral ischemia 7/22/2016    Type 2 diabetes mellitus      Past Surgical History:   Procedure Laterality Date    Moh's procedure x4      rectal cancer      Sinus surgery for sinus cancer      sinus sx for cancer      skin cancer removed-several times-nose & ear      TONSILLECTOMY      Undescened testicle sx      UVULOPALATOPHARYNGOPLASTY      for sleep apnea       Home Meds:   Prior to Admission medications    Medication Sig Start Date End Date Taking? Authorizing Provider   ACCU-CHEK NEYDA PLUS TEST STRP Strp 1 strip by Misc.(Non-Drug; Combo Route) route once daily. 5/25/18   Prisca Espinoza MD   ciprofloxacin HCl (CIPRO) 500 MG tablet Take 1 tablet (500 mg total) by mouth every Monday. 5/14/18   Renato Womack MD   divalproex (DEPAKOTE) 250 MG EC tablet  5/5/17    Historical Provider, MD   furosemide (LASIX) 20 MG tablet Take 4 tablets (80 mg total) by mouth once daily.  Patient taking differently: Take 40 mg by mouth once daily. Taking 40 mg per day 4/25/17 6/22/18  Renato Womack MD   glipiZIDE (GLUCOTROL) 5 MG tablet Take 0.5 tablets (2.5 mg total) by mouth daily with breakfast. 6/26/18 6/26/19  Prisca Espinoza MD   lactulose (CHRONULAC) 10 gram/15 mL solution TAKE 15 MLS BY MOUTH TWICE DAILY 3/8/18   Renato Womack MD   rifAXIMin (XIFAXAN) 550 mg Tab Take 1 tablet (550 mg total) by mouth 2 (two) times daily. 10/3/17   Reanto Womack MD   SITagliptan (JANUVIA) 50 MG Tab Take 1 tablet (50 mg total) by mouth once daily. 4/6/17   Prisca Espinoza MD     Anticoagulants/Antiplatelets: no anticoagulation    Allergies:   Review of patient's allergies indicates:   Allergen Reactions    Abilify [aripiprazole] Other (See Comments)     Sleepy, could not function.     Sedation History:  no adverse reactions    Review of Systems:   Hematological: no known coagulopathies  Respiratory: no shortness of breath  Cardiovascular: no chest pain  Gastrointestinal: no abdominal pain  Genito-Urinary: no dysuria  Musculoskeletal: negative  Neurological: no TIA or stroke symptoms         OBJECTIVE:     Vital Signs (Most Recent)       Physical Exam:  ASA: 2  Mallampati: n/a    General: no acute distress  Mental Status: alert and oriented to person, place and time  HEENT: normocephalic, atraumatic  Chest: unlabored breathing  Heart: regular heart rate  Abdomen: distended  Extremity: moves all extremities    Laboratory  Lab Results   Component Value Date    INR 1.1 05/18/2018       Lab Results   Component Value Date    WBC 2.93 (L) 05/18/2018    HGB 10.7 (L) 05/18/2018    HCT 33.4 (L) 05/18/2018    MCV 86 05/18/2018     (L) 05/18/2018      Lab Results   Component Value Date     (H) 05/18/2018     05/18/2018    K 4.0 05/18/2018     05/18/2018    CO2 29  05/18/2018    BUN 19 05/18/2018    CREATININE 1.5 (H) 05/18/2018    CALCIUM 8.9 05/18/2018    MG 1.7 05/15/2017    ALT 7 (L) 05/18/2018    AST 19 05/18/2018    ALBUMIN 2.4 (L) 05/18/2018    BILITOT 0.5 05/18/2018       ASSESSMENT/PLAN:     Sedation Plan: local  Patient will undergo ultrasound guided paracentesis.    MATT Mauricio, FNP  Interventional Radiology  [phone] [employer]

## 2018-08-03 NOTE — PROGRESS NOTES
Pt to IR-MPU 2 for paracentesis. ZULAY Comer NP to bedside. Yueh Catheter placed to RLQ with use of ultrasound guidance. No complaints or signs of distress. Will continue to monitor.

## 2018-08-03 NOTE — DISCHARGE INSTRUCTIONS
Interventional Radiology [phone]  Mon-Fri  8A-4P  24hr Radiology Resident on call [phone]

## 2018-08-03 NOTE — PROGRESS NOTES
Paracentesis complete. Pt tolerated well. Total 3,800 ml peritoneal fluid removed. 100 ml Albumin 25% IV administered.   Pt verbalized instructions on care for puncture site to RLQ.  AVS reviewd and provided.

## 2018-08-10 ENCOUNTER — HOSPITAL ENCOUNTER (OUTPATIENT)
Dept: INTERVENTIONAL RADIOLOGY/VASCULAR | Facility: HOSPITAL | Age: 78
Discharge: HOME | End: 2018-08-10
Attending: INTERNAL MEDICINE
Payer: MEDICARE

## 2018-08-10 ENCOUNTER — OFFICE VISIT (OUTPATIENT)
Dept: HEPATOLOGY | Facility: CLINIC | Age: 78
End: 2018-08-10
Payer: MEDICARE

## 2018-08-10 VITALS
SYSTOLIC BLOOD PRESSURE: 142 MMHG | RESPIRATION RATE: 18 BRPM | WEIGHT: 169.75 LBS | OXYGEN SATURATION: 98 % | HEIGHT: 68 IN | TEMPERATURE: 97 F | HEART RATE: 66 BPM | DIASTOLIC BLOOD PRESSURE: 63 MMHG | BODY MASS INDEX: 25.73 KG/M2

## 2018-08-10 VITALS
OXYGEN SATURATION: 99 % | SYSTOLIC BLOOD PRESSURE: 121 MMHG | DIASTOLIC BLOOD PRESSURE: 76 MMHG | HEART RATE: 58 BPM | RESPIRATION RATE: 16 BRPM

## 2018-08-10 DIAGNOSIS — K75.81 LIVER CIRRHOSIS SECONDARY TO NASH (NONALCOHOLIC STEATOHEPATITIS): Primary | ICD-10-CM

## 2018-08-10 DIAGNOSIS — R18.8 ASCITES OF LIVER: ICD-10-CM

## 2018-08-10 DIAGNOSIS — R18.8 OTHER ASCITES: ICD-10-CM

## 2018-08-10 DIAGNOSIS — K74.60 LIVER CIRRHOSIS SECONDARY TO NASH (NONALCOHOLIC STEATOHEPATITIS): Primary | ICD-10-CM

## 2018-08-10 LAB
APPEARANCE FLD: NORMAL
BASOPHILS NFR FLD MANUAL: 1 %
BODY FLD TYPE: NORMAL
COLOR FLD: YELLOW
EOSINOPHIL NFR FLD MANUAL: 1 %
LYMPHOCYTES NFR FLD MANUAL: 55 %
MONOS+MACROS NFR FLD MANUAL: 39 %
NEUTROPHILS NFR FLD MANUAL: 4 %
WBC # FLD: 121 /CU MM

## 2018-08-10 PROCEDURE — 89051 BODY FLUID CELL COUNT: CPT

## 2018-08-10 PROCEDURE — 63600175 PHARM REV CODE 636 W HCPCS: Mod: JG | Performed by: INTERNAL MEDICINE

## 2018-08-10 PROCEDURE — 99999 PR PBB SHADOW E&M-EST. PATIENT-LVL III: CPT | Mod: PBBFAC,,, | Performed by: INTERNAL MEDICINE

## 2018-08-10 PROCEDURE — 99214 OFFICE O/P EST MOD 30 MIN: CPT | Mod: S$PBB,,, | Performed by: INTERNAL MEDICINE

## 2018-08-10 PROCEDURE — 99213 OFFICE O/P EST LOW 20 MIN: CPT | Mod: PBBFAC,25 | Performed by: INTERNAL MEDICINE

## 2018-08-10 PROCEDURE — 27200940 IR PARACENTESIS WITH IMAGING

## 2018-08-10 PROCEDURE — 49083 ABD PARACENTESIS W/IMAGING: CPT | Mod: LT,GC,, | Performed by: STUDENT IN AN ORGANIZED HEALTH CARE EDUCATION/TRAINING PROGRAM

## 2018-08-10 PROCEDURE — P9047 ALBUMIN (HUMAN), 25%, 50ML: HCPCS | Mod: JG | Performed by: INTERNAL MEDICINE

## 2018-08-10 PROCEDURE — 49083 ABD PARACENTESIS W/IMAGING: CPT

## 2018-08-10 RX ORDER — ALBUMIN HUMAN 250 G/1000ML
25 SOLUTION INTRAVENOUS ONCE
Status: COMPLETED | OUTPATIENT
Start: 2018-08-10 | End: 2018-08-10

## 2018-08-10 RX ADMIN — ALBUMIN (HUMAN) 25 G: 25 SOLUTION INTRAVENOUS at 12:08

## 2018-08-10 NOTE — H&P
Radiology History & Physical      SUBJECTIVE:     Chief Complaint: ascites    History of Present Illness:  Kenneth Sheikh is a 78 y.o. male with recurrent ascites who presents for US guided paracentesis.  Past Medical History:   Diagnosis Date    Anticoagulant long-term use     Bipolar 1 disorder     Bipolar 1 disorder 11/24/2016    bipolar    Cancer     history of skin cancer/sinus cancer & rectal cancer    Depressed bipolar affective disorder     Diabetes mellitus     type 2    EBV infection 12/7/2016    EBV DNA, PCR Latest Ref Range: None detected  None detected EBV DNA-Copies/mL Unknown Test Not Performed EBV Early Antigen Ab, IgG Latest Ref Range: <1:10 Titer 1:40 (A) EBV Nuclear Ag Ab Latest Ref Range: <1:5 Titer >=1:80 (A) EBV VCA IgG Latest Ref Range: <1:10 Titer 1:160 (A) EBV VCA IgM Latest Ref Range: <1:10 Titer <1:10     History of TIA (transient ischemic attack)     several-taking Plavix    Hx of gallstones     Wife denies    Hypertension     Hyperthyroidism 11/30/2016    Low HDL (under 40) 12/7/2016    Mixed hyperlipidemia 11/23/2016    JUAN MANUEL (obstructive sleep apnea)     Pneumonia     Skin disease     Stroke     Transient cerebral ischemia 7/22/2016    Type 2 diabetes mellitus      Past Surgical History:   Procedure Laterality Date    Moh's procedure x4      rectal cancer      Sinus surgery for sinus cancer      sinus sx for cancer      skin cancer removed-several times-nose & ear      TONSILLECTOMY      Undescened testicle sx      UVULOPALATOPHARYNGOPLASTY      for sleep apnea       Home Meds:   Prior to Admission medications    Medication Sig Start Date End Date Taking? Authorizing Provider   ACCU-CHEK NEYDA PLUS TEST STRP Strp 1 strip by Misc.(Non-Drug; Combo Route) route once daily. 5/25/18   Prisca Espinoza MD   ciprofloxacin HCl (CIPRO) 500 MG tablet Take 1 tablet (500 mg total) by mouth every Monday. 5/14/18   Renato Womack MD   divalproex (DEPAKOTE) 250 MG EC  tablet  5/5/17   Historical Provider, MD   furosemide (LASIX) 20 MG tablet Take 4 tablets (80 mg total) by mouth once daily.  Patient taking differently: Take 40 mg by mouth once daily. Taking 40 mg per day 4/25/17 6/22/18  Renato Womack MD   glipiZIDE (GLUCOTROL) 5 MG tablet Take 0.5 tablets (2.5 mg total) by mouth daily with breakfast. 6/26/18 6/26/19  Prisca Espinoza MD   lactulose (CHRONULAC) 10 gram/15 mL solution TAKE 15 MLS BY MOUTH TWICE DAILY 3/8/18   Renato Womack MD   rifAXIMin (XIFAXAN) 550 mg Tab Take 1 tablet (550 mg total) by mouth 2 (two) times daily. 10/3/17   Renato Womack MD   SITagliptan (JANUVIA) 50 MG Tab Take 1 tablet (50 mg total) by mouth once daily. 4/6/17   Prisca Espinoza MD     Anticoagulants/Antiplatelets: no anticoagulation    Allergies:   Review of patient's allergies indicates:   Allergen Reactions    Abilify [aripiprazole] Other (See Comments)     Sleepy, could not function.     Sedation History:  no adverse reactions    Review of Systems:   Hematological: no known coagulopathies  Respiratory: no shortness of breath  Cardiovascular: no chest pain  Gastrointestinal: no abdominal pain  Genito-Urinary: no dysuria  Musculoskeletal: negative  Neurological: no TIA or stroke symptoms         OBJECTIVE:     Vital Signs (Most Recent)       Physical Exam:  ASA: 3  Mallampati: na    General: no acute distress  Mental Status: alert and oriented to person, place and time  HEENT: normocephalic, atraumatic  Chest: unlabored breathing  Heart: regular heart rate  Abdomen: distended  Extremity: moves all extremities    Laboratory  Lab Results   Component Value Date    INR 1.1 05/18/2018       Lab Results   Component Value Date    WBC 2.93 (L) 05/18/2018    HGB 10.7 (L) 05/18/2018    HCT 33.4 (L) 05/18/2018    MCV 86 05/18/2018     (L) 05/18/2018      Lab Results   Component Value Date     (H) 05/18/2018     05/18/2018    K 4.0 05/18/2018      05/18/2018    CO2 29 05/18/2018    BUN 19 05/18/2018    CREATININE 1.5 (H) 05/18/2018    CALCIUM 8.9 05/18/2018    MG 1.7 05/15/2017    ALT 7 (L) 05/18/2018    AST 19 05/18/2018    ALBUMIN 2.4 (L) 05/18/2018    BILITOT 0.5 05/18/2018       ASSESSMENT/PLAN:     Sedation Plan: local only  Patient will undergo US guided paracentesis.    Chilo Ivey  Radiology PGY-5

## 2018-08-10 NOTE — PROGRESS NOTES
Paracentesis complete, 3.6 MLs removed. Specimen sent to lab. 100ml Albumin administered per protocol. Dressing applied to LUQ puncture site, dressing clean dry and intact. Pt given discharge instructions and handouts, pt verbalizes understanding. Questions answered. Pt denies pain and discomfort. Pt wheeled to lobby.

## 2018-08-10 NOTE — PROGRESS NOTES
"Subjective:       Patient ID: Kenneth Sheikh is a 78 y.o. male.    Chief Complaint: Cirrhosis    HPI  I saw this 78 y.o. man  whom I had met in hospital when he was admitted with lethargy and some confusion back in Dec 2016.  At that time, he was diagnosed with decompensated cirrhosis.    - asking about ocaliva because someone saw a press release    Admitted because of increasing lethargy +++/confusion/dizziness/dysarthria  - Nov 2016  - imaging showed ascites     No significant alcohol use ("a couple beers when I was younger"). No family history of liver disease. No new medications.    Patient has risk factors for MCDONALD.    He also has a large gastric varix which has never bled-  treated x1 by Dr Perla endoscopically but will need another endoscopic session (4/16/17).  - Had post procedure fever despite antibiotics    Kylie E coli bacteremia- on IV antibiotics at home- PICC    Currently he is reasonably stable with weekly paracentesis.  Decreased dose of diuretics and his para volume appears to also be decreasing.  Mental alert and has not experienced HE for a while although wife describes him as lethargic and forgetful.    - lower leg edema and ascites    MELD-Na score: 11 at 5/18/2018 10:47 AM  MELD score: 11 at 5/18/2018 10:47 AM  Calculated from:  Serum Creatinine: 1.5 mg/dL at 5/18/2018 10:47 AM  Serum Sodium: 137 mmol/L at 5/18/2018 10:47 AM  Total Bilirubin: 0.5 mg/dL (Rounded to 1) at 5/18/2018 10:47 AM  INR(ratio): 1.1 at 5/18/2018 10:47 AM  Age: 78 years      PMH:  DM  Sinus Ca- 1996  Rectal Ca- June 2016    SH:  Retired  Ex [employer] worker      FH:  Aunt had cirrhosis- non- alcohol    Review of Systems   Constitutional: Negative for activity change, appetite change, chills, fatigue, fever and unexpected weight change.   HENT: Negative for hearing loss.    Eyes: Negative for discharge and visual disturbance.   Respiratory: Negative for cough, chest tightness, shortness of breath and wheezing.  "   Cardiovascular: Negative for chest pain, palpitations and leg swelling.   Gastrointestinal: Negative for abdominal distention, abdominal pain, constipation, diarrhea and nausea.   Genitourinary: Negative for dysuria and frequency.   Musculoskeletal: Negative for arthralgias and back pain.   Skin: Negative for pallor and rash.   Neurological: Negative for dizziness, tremors, speech difficulty and headaches.   Hematological: Negative for adenopathy.   Psychiatric/Behavioral: Negative for agitation and confusion.           Lab Results   Component Value Date    ALT 7 (L) 05/18/2018    AST 19 05/18/2018    ALKPHOS 108 05/18/2018    BILITOT 0.5 05/18/2018     Past Medical History:   Diagnosis Date    Anticoagulant long-term use     Bipolar 1 disorder     Bipolar 1 disorder 11/24/2016    bipolar    Cancer     history of skin cancer/sinus cancer & rectal cancer    Depressed bipolar affective disorder     Diabetes mellitus     type 2    EBV infection 12/7/2016    EBV DNA, PCR Latest Ref Range: None detected  None detected EBV DNA-Copies/mL Unknown Test Not Performed EBV Early Antigen Ab, IgG Latest Ref Range: <1:10 Titer 1:40 (A) EBV Nuclear Ag Ab Latest Ref Range: <1:5 Titer >=1:80 (A) EBV VCA IgG Latest Ref Range: <1:10 Titer 1:160 (A) EBV VCA IgM Latest Ref Range: <1:10 Titer <1:10     History of TIA (transient ischemic attack)     several-taking Plavix    Hx of gallstones     Wife denies    Hypertension     Hyperthyroidism 11/30/2016    Low HDL (under 40) 12/7/2016    Mixed hyperlipidemia 11/23/2016    JUAN MANUEL (obstructive sleep apnea)     Pneumonia     Skin disease     Stroke     Transient cerebral ischemia 7/22/2016    Type 2 diabetes mellitus      Past Surgical History:   Procedure Laterality Date    Moh's procedure x4      rectal cancer      Sinus surgery for sinus cancer      sinus sx for cancer      skin cancer removed-several times-nose & ear      TONSILLECTOMY      Undescened testicle sx       UVULOPALATOPHARYNGOPLASTY      for sleep apnea     Current Outpatient Prescriptions   Medication Sig    ciprofloxacin HCl (CIPRO) 500 MG tablet Take 1 tablet (500 mg total) by mouth every Monday.    divalproex (DEPAKOTE) 250 MG EC tablet Take 250 mg by mouth once daily.     lactulose (CHRONULAC) 10 gram/15 mL solution TAKE 15 MLS BY MOUTH TWICE DAILY    rifAXIMin (XIFAXAN) 550 mg Tab Take 1 tablet (550 mg total) by mouth 2 (two) times daily.    SITagliptan (JANUVIA) 50 MG Tab Take 1 tablet (50 mg total) by mouth once daily.    ACCU-CHEK NEYDA PLUS TEST STRP Strp 1 strip by Misc.(Non-Drug; Combo Route) route once daily.    furosemide (LASIX) 20 MG tablet Take 4 tablets (80 mg total) by mouth once daily. (Patient taking differently: Take 40 mg by mouth once daily. Taking 40 mg per day)     No current facility-administered medications for this visit.        Objective:      Physical Exam   Constitutional: He is oriented to person, place, and time. He appears well-nourished.   HENT:   Head: Normocephalic.   Eyes: Pupils are equal, round, and reactive to light.   Neck: No thyromegaly present.   Cardiovascular: Normal rate, regular rhythm and normal heart sounds.    Pulmonary/Chest: Effort normal and breath sounds normal. He has no wheezes.   Abdominal: Soft. He exhibits distension. He exhibits no mass. There is no tenderness.   Mild ascites.   Musculoskeletal: He exhibits edema.   Lymphadenopathy:     He has no cervical adenopathy.   Neurological: He is alert and oriented to person, place, and time.   Skin: Skin is warm. No rash noted. No erythema.   Psychiatric: He has a normal mood and affect. His behavior is normal.       Assessment:       1. 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 Ascites of liver        Plan:   - re-discussed the dx of cirrhosis and the implications of this  - no change in diuretic dose  - paracenteses every week- feels better when he gets albumin-   - on  Cipro 500 mg weekly for SBP prophylaxis.  - does not wish for further EUS guided treatment of varices because of post procedure sepsis.  - labs today  - no change in management  Clinic in  3 months     MARIA G CazaresConfucianism

## 2018-08-10 NOTE — PROCEDURES
Radiology Post-Procedure Note    Pre Op Diagnosis: Ascites  Post Op Diagnosis: Same    Procedure: Paracentesis    Procedure performed by: Casey TRAYLOR, Pradeep mueller      Written Informed Consent Obtained: Yes  Specimen Removed: YES clear yellow fluid  Estimated Blood Loss: Minimal    Findings:   Successful paracentesis.  Albumin administered PRN per protocol.    Patient tolerated procedure well.    Chilo Ivey  Radiology PGY-5

## 2018-08-13 ENCOUNTER — OFFICE VISIT (OUTPATIENT)
Dept: DERMATOLOGY | Facility: CLINIC | Age: 78
End: 2018-08-13
Payer: MEDICARE

## 2018-08-13 DIAGNOSIS — Z85.828 HISTORY OF MOH'S MICROGRAPHIC SURGERY FOR SKIN CANCER: Primary | ICD-10-CM

## 2018-08-13 DIAGNOSIS — Z98.890 HISTORY OF MOH'S MICROGRAPHIC SURGERY FOR SKIN CANCER: Primary | ICD-10-CM

## 2018-08-13 PROCEDURE — 99999 PR PBB SHADOW E&M-EST. PATIENT-LVL II: CPT | Mod: PBBFAC,,, | Performed by: DERMATOLOGY

## 2018-08-13 PROCEDURE — 99024 POSTOP FOLLOW-UP VISIT: CPT | Mod: POP,,, | Performed by: DERMATOLOGY

## 2018-08-13 PROCEDURE — 99212 OFFICE O/P EST SF 10 MIN: CPT | Mod: PBBFAC | Performed by: DERMATOLOGY

## 2018-08-13 NOTE — PROGRESS NOTES
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 Some residual erythema as anticipated.   Overall the cosmetic result is good      IMPRESSION: Well healed post Mohs' micrographic surgery    PLAN:  Discussed anticipated course  Followup to Dr. Anders in 1-2 months; PRN to me

## 2018-08-16 ENCOUNTER — TELEPHONE (OUTPATIENT)
Dept: INTERNAL MEDICINE | Facility: CLINIC | Age: 78
End: 2018-08-16

## 2018-08-16 NOTE — TELEPHONE ENCOUNTER
----- Message from Prisca Espinoza MD sent at 8/16/2018 12:16 PM CDT -----  Please call patient and wife and let them know that Hemoglobin A1c (diabetes number) has improved to 8.5, however it shows that his glucose is still running high (averaging 197 over the last 3 months).  We need to start the new medication (glipizide) or work on his diet more.  Goal Hemoglobin A1c is under 8.  Make sure he has diabetic supplies at home.  Also, I do not see a follow up appointment in our system, so please schedule this.

## 2018-08-17 ENCOUNTER — HOSPITAL ENCOUNTER (OUTPATIENT)
Dept: INTERVENTIONAL RADIOLOGY/VASCULAR | Facility: HOSPITAL | Age: 78
Discharge: HOME | End: 2018-08-17
Attending: INTERNAL MEDICINE
Payer: MEDICARE

## 2018-08-17 VITALS
DIASTOLIC BLOOD PRESSURE: 63 MMHG | OXYGEN SATURATION: 99 % | HEART RATE: 56 BPM | SYSTOLIC BLOOD PRESSURE: 148 MMHG | RESPIRATION RATE: 18 BRPM

## 2018-08-17 DIAGNOSIS — R18.8 OTHER ASCITES: ICD-10-CM

## 2018-08-17 LAB
APPEARANCE FLD: CLEAR
BODY FLD TYPE: NORMAL
COLOR FLD: YELLOW
EOSINOPHIL NFR FLD MANUAL: 1 %
LYMPHOCYTES NFR FLD MANUAL: 46 %
MONOS+MACROS NFR FLD MANUAL: 51 %
NEUTROPHILS NFR FLD MANUAL: 2 %
WBC # FLD: 71 /CU MM

## 2018-08-17 PROCEDURE — 27200940 IR PARACENTESIS WITH IMAGING

## 2018-08-17 PROCEDURE — 63600175 PHARM REV CODE 636 W HCPCS: Mod: JG | Performed by: INTERNAL MEDICINE

## 2018-08-17 PROCEDURE — 89051 BODY FLUID CELL COUNT: CPT

## 2018-08-17 PROCEDURE — P9047 ALBUMIN (HUMAN), 25%, 50ML: HCPCS | Mod: JG | Performed by: INTERNAL MEDICINE

## 2018-08-17 PROCEDURE — 49083 ABD PARACENTESIS W/IMAGING: CPT | Mod: LT,GC,, | Performed by: FAMILY MEDICINE

## 2018-08-17 RX ORDER — ALBUMIN HUMAN 250 G/1000ML
25 SOLUTION INTRAVENOUS
Status: DISCONTINUED | OUTPATIENT
Start: 2018-08-17 | End: 2018-08-18 | Stop reason: HOSPADM

## 2018-08-17 RX ADMIN — ALBUMIN (HUMAN) 25 G: 25 SOLUTION INTRAVENOUS at 12:08

## 2018-08-17 NOTE — DISCHARGE INSTRUCTIONS
Interventional Radiology [phone]  Mon-Fri  8A-4P  24hr Radiology Resident on call [phone]

## 2018-08-17 NOTE — PROGRESS NOTES
Paracentesis complete. Pt tolerated well. Total 3,600 ml peritoneal fluid removed. 100 ml Albumin 25% IV administered.   Pt verbalized instructions on care for puncture site to Q.  AVS reviewd and provided.

## 2018-08-17 NOTE — TELEPHONE ENCOUNTER
----- Message from Amanda Hubbard sent at 8/17/2018  3:24 PM CDT -----  Contact: [phone]  Type: Returning a call    Who left a message? Tejas    When did the practice call? 08/16    Comments: please advise, Thanks

## 2018-08-17 NOTE — PROGRESS NOTES
Pt to MPU 2 for paracentesis. ZULAY Comer NP to bedside. Yueh Catheter placed to LLQ with use of ultrasound guidance. No complaints or signs of distress. Will continue to monitor.

## 2018-08-17 NOTE — TELEPHONE ENCOUNTER
Spoke with pt's wife she understood pt's results she also understood that the pt should either work on a diet or start  The new medication pt stated that the pt has been working on a diet and trying very hard to stay on track also that the pt hasn't taken any of the glipizide since he has been prescribed this medication she stated he hasn't had a glucose reading in the AM above 150. I scheduled the pt for an follow up appt with us going off how the wife would like the pt to be scheduled she would only like the pt scheduled on Friday's around 1 or 1:30pm I tried to scheduled pt in the mornings when we have availability she stated she would only like appts after 1pm she was upset with next appt date we have available I advised her that if an appt becomes available before there scheduled appt time with us I can call and reschedule the pt's appt.

## 2018-08-17 NOTE — H&P
Radiology History & Physical      SUBJECTIVE:     Chief Complaint: ascites    History of Present Illness:  Kenneth Sheikh is a 78 y.o. male who presents for ultrasound guided paracentesis  Past Medical History:   Diagnosis Date    Anticoagulant long-term use     Bipolar 1 disorder     Bipolar 1 disorder 11/24/2016    bipolar    Cancer     history of skin cancer/sinus cancer & rectal cancer    Depressed bipolar affective disorder     Diabetes mellitus     type 2    EBV infection 12/7/2016    EBV DNA, PCR Latest Ref Range: None detected  None detected EBV DNA-Copies/mL Unknown Test Not Performed EBV Early Antigen Ab, IgG Latest Ref Range: <1:10 Titer 1:40 (A) EBV Nuclear Ag Ab Latest Ref Range: <1:5 Titer >=1:80 (A) EBV VCA IgG Latest Ref Range: <1:10 Titer 1:160 (A) EBV VCA IgM Latest Ref Range: <1:10 Titer <1:10     History of TIA (transient ischemic attack)     several-taking Plavix    Hx of gallstones     Wife denies    Hypertension     Hyperthyroidism 11/30/2016    Low HDL (under 40) 12/7/2016    Mixed hyperlipidemia 11/23/2016    JUAN MANUEL (obstructive sleep apnea)     Pneumonia     Skin disease     Stroke     Transient cerebral ischemia 7/22/2016    Type 2 diabetes mellitus      Past Surgical History:   Procedure Laterality Date    Moh's procedure x4      rectal cancer      Sinus surgery for sinus cancer      sinus sx for cancer      skin cancer removed-several times-nose & ear      TONSILLECTOMY      Undescened testicle sx      UVULOPALATOPHARYNGOPLASTY      for sleep apnea       Home Meds:   Prior to Admission medications    Medication Sig Start Date End Date Taking? Authorizing Provider   ACCU-CHEK NEYDA PLUS TEST STRP Strp 1 strip by Misc.(Non-Drug; Combo Route) route once daily. 5/25/18   Prisca Espinoza MD   ciprofloxacin HCl (CIPRO) 500 MG tablet Take 1 tablet (500 mg total) by mouth every Monday. 5/14/18   Renato Womack MD   divalproex (DEPAKOTE) 250 MG EC tablet Take 250 mg  by mouth once daily.  5/5/17   Historical Provider, MD   furosemide (LASIX) 20 MG tablet Take 4 tablets (80 mg total) by mouth once daily.  Patient taking differently: Take 40 mg by mouth once daily. Taking 40 mg per day 4/25/17 6/22/18  Renato Womack MD   lactulose (CHRONULAC) 10 gram/15 mL solution TAKE 15 MLS BY MOUTH TWICE DAILY 3/8/18   Renato Womack MD   rifAXIMin (XIFAXAN) 550 mg Tab Take 1 tablet (550 mg total) by mouth 2 (two) times daily. 10/3/17   Renato Womack MD   SITagliptan (JANUVIA) 50 MG Tab Take 1 tablet (50 mg total) by mouth once daily. 4/6/17   Prisca Espinoza MD     Anticoagulants/Antiplatelets: no anticoagulation    Allergies:   Review of patient's allergies indicates:   Allergen Reactions    Abilify [aripiprazole] Other (See Comments)     Sleepy, could not function.     Sedation History:  no adverse reactions    Review of Systems:   Hematological: no known coagulopathies  Respiratory: no shortness of breath  Cardiovascular: no chest pain  Gastrointestinal: no abdominal pain  Genito-Urinary: no dysuria  Musculoskeletal: negative  Neurological: no TIA or stroke symptoms         OBJECTIVE:     Vital Signs (Most Recent)       Physical Exam:  ASA: 2  Mallampati: n/a    General: no acute distress  Mental Status: alert and oriented to person, place and time  HEENT: normocephalic, atraumatic  Chest: unlabored breathing  Heart: regular heart rate  Abdomen: distended  Extremity: moves all extremities    Laboratory  Lab Results   Component Value Date    INR 1.1 08/10/2018       Lab Results   Component Value Date    WBC 2.72 (L) 08/10/2018    HGB 9.6 (L) 08/10/2018    HCT 30.2 (L) 08/10/2018    MCV 87 08/10/2018     (L) 08/10/2018      Lab Results   Component Value Date     (H) 08/10/2018     (H) 08/10/2018     08/10/2018     08/10/2018    K 4.2 08/10/2018    K 4.2 08/10/2018     08/10/2018     08/10/2018    CO2 27 08/10/2018    CO2 27  08/10/2018    BUN 17 08/10/2018    BUN 17 08/10/2018    CREATININE 1.2 08/10/2018    CREATININE 1.2 08/10/2018    CALCIUM 8.6 (L) 08/10/2018    CALCIUM 8.6 (L) 08/10/2018    MG 1.7 05/15/2017    ALT 6 (L) 08/10/2018    AST 15 08/10/2018    ALBUMIN 2.7 (L) 08/10/2018    BILITOT 0.6 08/10/2018       ASSESSMENT/PLAN:     Sedation Plan: local  Patient will undergo ultrasound guided paracentesis.    Елена Comer APRN, FNP  Interventional Radiology  [phone] [employer]

## 2018-08-17 NOTE — PROCEDURES

## 2018-08-24 ENCOUNTER — HOSPITAL ENCOUNTER (OUTPATIENT)
Dept: INTERVENTIONAL RADIOLOGY/VASCULAR | Facility: HOSPITAL | Age: 78
Discharge: HOME | End: 2018-08-24
Attending: INTERNAL MEDICINE
Payer: MEDICARE

## 2018-08-24 VITALS
RESPIRATION RATE: 18 BRPM | SYSTOLIC BLOOD PRESSURE: 128 MMHG | HEART RATE: 55 BPM | OXYGEN SATURATION: 100 % | DIASTOLIC BLOOD PRESSURE: 65 MMHG

## 2018-08-24 DIAGNOSIS — R18.8 OTHER ASCITES: ICD-10-CM

## 2018-08-24 LAB
APPEARANCE FLD: CLEAR
BODY FLD TYPE: NORMAL
COLOR FLD: YELLOW
GRAM STN SPEC: NORMAL
GRAM STN SPEC: NORMAL
LYMPHOCYTES NFR FLD MANUAL: 69 %
MONOS+MACROS NFR FLD MANUAL: 30 %
NEUTROPHILS NFR FLD MANUAL: 1 %
WBC # FLD: 65 /CU MM

## 2018-08-24 PROCEDURE — 63600175 PHARM REV CODE 636 W HCPCS: Mod: JG | Performed by: INTERNAL MEDICINE

## 2018-08-24 PROCEDURE — 89051 BODY FLUID CELL COUNT: CPT

## 2018-08-24 PROCEDURE — 87075 CULTR BACTERIA EXCEPT BLOOD: CPT

## 2018-08-24 PROCEDURE — P9047 ALBUMIN (HUMAN), 25%, 50ML: HCPCS | Mod: JG | Performed by: INTERNAL MEDICINE

## 2018-08-24 PROCEDURE — 49083 ABD PARACENTESIS W/IMAGING: CPT

## 2018-08-24 PROCEDURE — 87205 SMEAR GRAM STAIN: CPT

## 2018-08-24 PROCEDURE — 87070 CULTURE OTHR SPECIMN AEROBIC: CPT

## 2018-08-24 PROCEDURE — 49083 ABD PARACENTESIS W/IMAGING: CPT | Mod: ,,, | Performed by: FAMILY MEDICINE

## 2018-08-24 RX ORDER — ALBUMIN HUMAN 250 G/1000ML
25 SOLUTION INTRAVENOUS CONTINUOUS PRN
Status: DISCONTINUED | OUTPATIENT
Start: 2018-08-24 | End: 2018-08-25 | Stop reason: HOSPADM

## 2018-08-24 RX ADMIN — ALBUMIN (HUMAN) 25 G: 25 SOLUTION INTRAVENOUS at 11:08

## 2018-08-24 NOTE — PROGRESS NOTES
Paracentesis completed, pt tolerated well. No apparent distress noted. 4.1 Liters removed from left abd, mepore applied CDI. Labs collected and sent. Albumin 100 ml given per md order.  Discharge instructions reviewed and acknowledged. Pt discharged via wheelchair and private vehicle.

## 2018-08-24 NOTE — PROGRESS NOTES
Pt arrived to US room 2 for para, no acute distress noted. Orders and labs reviewed on chart. Awaiting consent.

## 2018-08-24 NOTE — DISCHARGE INSTRUCTIONS
[facility] [phone] (MON-FRI 8 AM- 5PM). Radiology Resident on call [phone].

## 2018-08-24 NOTE — H&P
Radiology History & Physical      SUBJECTIVE:     Chief Complaint: ascites    History of Present Illness:  Kenneth Sheikh is a 78 y.o. male who presents for ultrasound guided paracentesis  Past Medical History:   Diagnosis Date    Anticoagulant long-term use     Bipolar 1 disorder     Bipolar 1 disorder 11/24/2016    bipolar    Cancer     history of skin cancer/sinus cancer & rectal cancer    Depressed bipolar affective disorder     Diabetes mellitus     type 2    EBV infection 12/7/2016    EBV DNA, PCR Latest Ref Range: None detected  None detected EBV DNA-Copies/mL Unknown Test Not Performed EBV Early Antigen Ab, IgG Latest Ref Range: <1:10 Titer 1:40 (A) EBV Nuclear Ag Ab Latest Ref Range: <1:5 Titer >=1:80 (A) EBV VCA IgG Latest Ref Range: <1:10 Titer 1:160 (A) EBV VCA IgM Latest Ref Range: <1:10 Titer <1:10     History of TIA (transient ischemic attack)     several-taking Plavix    Hx of gallstones     Wife denies    Hypertension     Hyperthyroidism 11/30/2016    Low HDL (under 40) 12/7/2016    Mixed hyperlipidemia 11/23/2016    JUAN MANUEL (obstructive sleep apnea)     Pneumonia     Skin disease     Stroke     Transient cerebral ischemia 7/22/2016    Type 2 diabetes mellitus      Past Surgical History:   Procedure Laterality Date    Moh's procedure x4      rectal cancer      Sinus surgery for sinus cancer      sinus sx for cancer      skin cancer removed-several times-nose & ear      TONSILLECTOMY      Undescened testicle sx      UVULOPALATOPHARYNGOPLASTY      for sleep apnea       Home Meds:   Prior to Admission medications    Medication Sig Start Date End Date Taking? Authorizing Provider   ACCU-CHEK NEYDA PLUS TEST STRP Strp 1 strip by Misc.(Non-Drug; Combo Route) route once daily. 5/25/18   Prisca Espinoza MD   ciprofloxacin HCl (CIPRO) 500 MG tablet Take 1 tablet (500 mg total) by mouth every Monday. 5/14/18   Renato Womack MD   divalproex (DEPAKOTE) 250 MG EC tablet Take 250 mg  by mouth once daily.  5/5/17   Historical Provider, MD   furosemide (LASIX) 20 MG tablet Take 4 tablets (80 mg total) by mouth once daily.  Patient taking differently: Take 40 mg by mouth once daily. Taking 40 mg per day 4/25/17 6/22/18  Renato Womack MD   lactulose (CHRONULAC) 10 gram/15 mL solution TAKE 15 MLS BY MOUTH TWICE DAILY 3/8/18   Renato Womack MD   rifAXIMin (XIFAXAN) 550 mg Tab Take 1 tablet (550 mg total) by mouth 2 (two) times daily. 10/3/17   Renato Womack MD   SITagliptan (JANUVIA) 50 MG Tab Take 1 tablet (50 mg total) by mouth once daily. 4/6/17   Prisca Espinoza MD     Anticoagulants/Antiplatelets: no anticoagulation    Allergies:   Review of patient's allergies indicates:   Allergen Reactions    Abilify [aripiprazole] Other (See Comments)     Sleepy, could not function.     Sedation History:  no adverse reactions    Review of Systems:   Hematological: no known coagulopathies  Respiratory: no shortness of breath  Cardiovascular: no chest pain  Gastrointestinal: no abdominal pain  Genito-Urinary: no dysuria  Musculoskeletal: negative  Neurological: no TIA or stroke symptoms         OBJECTIVE:     Vital Signs (Most Recent)  Pulse: 60 (08/24/18 1115)  Resp: 18 (08/24/18 1115)  BP: 137/70 (08/24/18 1115)  SpO2: 100 % (08/24/18 1115)    Physical Exam:  ASA: 2  Mallampati: n/a    General: no acute distress  Mental Status: alert and oriented to person, place and time  HEENT: normocephalic, atraumatic  Chest: unlabored breathing  Heart: regular heart rate  Abdomen: distended  Extremity: moves all extremities    Laboratory  Lab Results   Component Value Date    INR 1.1 08/10/2018       Lab Results   Component Value Date    WBC 2.72 (L) 08/10/2018    HGB 9.6 (L) 08/10/2018    HCT 30.2 (L) 08/10/2018    MCV 87 08/10/2018     (L) 08/10/2018      Lab Results   Component Value Date     (H) 08/10/2018     (H) 08/10/2018     08/10/2018     08/10/2018    K  4.2 08/10/2018    K 4.2 08/10/2018     08/10/2018     08/10/2018    CO2 27 08/10/2018    CO2 27 08/10/2018    BUN 17 08/10/2018    BUN 17 08/10/2018    CREATININE 1.2 08/10/2018    CREATININE 1.2 08/10/2018    CALCIUM 8.6 (L) 08/10/2018    CALCIUM 8.6 (L) 08/10/2018    MG 1.7 05/15/2017    ALT 6 (L) 08/10/2018    AST 15 08/10/2018    ALBUMIN 2.7 (L) 08/10/2018    BILITOT 0.6 08/10/2018       ASSESSMENT/PLAN:     Sedation Plan: local  Patient will undergo ultrasound guided paracentesis.    MATT Mauricio, FNP  Interventional Radiology  [phone] [employer]

## 2018-08-27 ENCOUNTER — TELEPHONE (OUTPATIENT)
Dept: DERMATOLOGY | Facility: CLINIC | Age: 78
End: 2018-08-27

## 2018-08-27 LAB — BACTERIA SPEC AEROBE CULT: NO GROWTH

## 2018-08-31 ENCOUNTER — HOSPITAL ENCOUNTER (OUTPATIENT)
Dept: INTERVENTIONAL RADIOLOGY/VASCULAR | Facility: HOSPITAL | Age: 78
Discharge: HOME | End: 2018-08-31
Attending: INTERNAL MEDICINE
Payer: MEDICARE

## 2018-08-31 VITALS
DIASTOLIC BLOOD PRESSURE: 78 MMHG | HEART RATE: 62 BPM | OXYGEN SATURATION: 100 % | SYSTOLIC BLOOD PRESSURE: 157 MMHG | RESPIRATION RATE: 20 BRPM

## 2018-08-31 DIAGNOSIS — R18.8 OTHER ASCITES: ICD-10-CM

## 2018-08-31 LAB
APPEARANCE FLD: CLEAR
BACTERIA SPEC ANAEROBE CULT: NORMAL
BODY FLD TYPE: NORMAL
COLOR FLD: YELLOW
EOSINOPHIL NFR FLD MANUAL: 1 %
LYMPHOCYTES NFR FLD MANUAL: 89 %
MESOTHL CELL NFR FLD MANUAL: 4 %
MONOS+MACROS NFR FLD MANUAL: 3 %
NEUTROPHILS NFR FLD MANUAL: 3 %
WBC # FLD: 48 /CU MM

## 2018-08-31 PROCEDURE — 49083 ABD PARACENTESIS W/IMAGING: CPT | Mod: ,,, | Performed by: FAMILY MEDICINE

## 2018-08-31 PROCEDURE — 89051 BODY FLUID CELL COUNT: CPT

## 2018-08-31 PROCEDURE — 27200940 IR PARACENTESIS WITH IMAGING

## 2018-08-31 PROCEDURE — P9047 ALBUMIN (HUMAN), 25%, 50ML: HCPCS | Mod: JG | Performed by: INTERNAL MEDICINE

## 2018-08-31 PROCEDURE — 63600175 PHARM REV CODE 636 W HCPCS: Mod: JG | Performed by: INTERNAL MEDICINE

## 2018-08-31 PROCEDURE — 49083 ABD PARACENTESIS W/IMAGING: CPT

## 2018-08-31 RX ORDER — ALBUMIN HUMAN 250 G/1000ML
25 SOLUTION INTRAVENOUS ONCE
Status: COMPLETED | OUTPATIENT
Start: 2018-08-31 | End: 2018-08-31

## 2018-08-31 RX ADMIN — ALBUMIN (HUMAN) 25 G: 25 SOLUTION INTRAVENOUS at 01:08

## 2018-08-31 NOTE — H&P
Radiology History & Physical      SUBJECTIVE:     Chief Complaint: abdominal distention    History of Present Illness:  Kenneth Sheikh is a 78 y.o. male who presents for ultrasound guided paracentesis  Past Medical History:   Diagnosis Date    Anticoagulant long-term use     Bipolar 1 disorder     Bipolar 1 disorder 11/24/2016    bipolar    Cancer     history of skin cancer/sinus cancer & rectal cancer    Depressed bipolar affective disorder     Diabetes mellitus     type 2    EBV infection 12/7/2016    EBV DNA, PCR Latest Ref Range: None detected  None detected EBV DNA-Copies/mL Unknown Test Not Performed EBV Early Antigen Ab, IgG Latest Ref Range: <1:10 Titer 1:40 (A) EBV Nuclear Ag Ab Latest Ref Range: <1:5 Titer >=1:80 (A) EBV VCA IgG Latest Ref Range: <1:10 Titer 1:160 (A) EBV VCA IgM Latest Ref Range: <1:10 Titer <1:10     History of TIA (transient ischemic attack)     several-taking Plavix    Hx of gallstones     Wife denies    Hypertension     Hyperthyroidism 11/30/2016    Low HDL (under 40) 12/7/2016    Mixed hyperlipidemia 11/23/2016    JUAN MANUEL (obstructive sleep apnea)     Pneumonia     Skin disease     Stroke     Transient cerebral ischemia 7/22/2016    Type 2 diabetes mellitus      Past Surgical History:   Procedure Laterality Date    Moh's procedure x4      rectal cancer      Sinus surgery for sinus cancer      sinus sx for cancer      skin cancer removed-several times-nose & ear      TONSILLECTOMY      Undescened testicle sx      UVULOPALATOPHARYNGOPLASTY      for sleep apnea       Home Meds:   Prior to Admission medications    Medication Sig Start Date End Date Taking? Authorizing Provider   ACCU-CHEK NEYDA PLUS TEST STRP Strp 1 strip by Misc.(Non-Drug; Combo Route) route once daily. 5/25/18   Prisca Espinoza MD   ciprofloxacin HCl (CIPRO) 500 MG tablet Take 1 tablet (500 mg total) by mouth every Monday. 5/14/18   Renato Womack MD   divalproex (DEPAKOTE) 250 MG EC tablet  Take 250 mg by mouth once daily.  5/5/17   Historical Provider, MD   furosemide (LASIX) 20 MG tablet Take 4 tablets (80 mg total) by mouth once daily.  Patient taking differently: Take 40 mg by mouth once daily. Taking 40 mg per day 4/25/17 6/22/18  Renato Womack MD   lactulose (CHRONULAC) 10 gram/15 mL solution TAKE 15 MLS BY MOUTH TWICE DAILY 3/8/18   Renato Womack MD   rifAXIMin (XIFAXAN) 550 mg Tab Take 1 tablet (550 mg total) by mouth 2 (two) times daily. 10/3/17   Renato Womack MD   SITagliptan (JANUVIA) 50 MG Tab Take 1 tablet (50 mg total) by mouth once daily. 4/6/17   Prisca Espinoza MD     Anticoagulants/Antiplatelets: no anticoagulation    Allergies:   Review of patient's allergies indicates:   Allergen Reactions    Abilify [aripiprazole] Other (See Comments)     Sleepy, could not function.     Sedation History:  no adverse reactions    Review of Systems:   Hematological: no known coagulopathies  Respiratory: no shortness of breath  Cardiovascular: no chest pain  Gastrointestinal: no abdominal pain  Genito-Urinary: no dysuria  Musculoskeletal: negative  Neurological: no TIA or stroke symptoms         OBJECTIVE:     Vital Signs (Most Recent)       Physical Exam:  ASA: 2  Mallampati: NA    General: no acute distress  Mental Status: alert and oriented to person, place and time  HEENT: normocephalic, atraumatic  Chest: unlabored breathing  Heart: regular heart rate  Abdomen: distended  Extremity: moves all extremities    Laboratory  Lab Results   Component Value Date    INR 1.1 08/10/2018       Lab Results   Component Value Date    WBC 2.72 (L) 08/10/2018    HGB 9.6 (L) 08/10/2018    HCT 30.2 (L) 08/10/2018    MCV 87 08/10/2018     (L) 08/10/2018      Lab Results   Component Value Date     (H) 08/10/2018     (H) 08/10/2018     08/10/2018     08/10/2018    K 4.2 08/10/2018    K 4.2 08/10/2018     08/10/2018     08/10/2018    CO2 27 08/10/2018     CO2 27 08/10/2018    BUN 17 08/10/2018    BUN 17 08/10/2018    CREATININE 1.2 08/10/2018    CREATININE 1.2 08/10/2018    CALCIUM 8.6 (L) 08/10/2018    CALCIUM 8.6 (L) 08/10/2018    MG 1.7 05/15/2017    ALT 6 (L) 08/10/2018    AST 15 08/10/2018    ALBUMIN 2.7 (L) 08/10/2018    BILITOT 0.6 08/10/2018       ASSESSMENT/PLAN:     Sedation Plan: local anesthesia  Patient will undergo ultrasound guided paracentesis.    Елена Comer APRN, FNP  Interventional Radiology  [phone] [employer]

## 2018-08-31 NOTE — DISCHARGE INSTRUCTIONS
For scheduling: Call Mackenzie at [phone]    For questions or concerns call: [facility] 8 AM- 5PM [phone]. Radiology resident on call [phone].    For immediate concerns that are not emergent, you may call our radiology clinic at: [phone]

## 2018-08-31 NOTE — PROGRESS NOTES
Paracentesis complete. 3500 mLs peritoneal fluid drained. Pt tolerated well. Dressing to left clean, dry, and intact. Albumin 25% given 100 mLs. Specimens sent per lab order. Patient verbalized understanding of discharge instructions and was escorted out to lobby.

## 2018-08-31 NOTE — PROCEDURES

## 2018-09-07 ENCOUNTER — HOSPITAL ENCOUNTER (OUTPATIENT)
Dept: INTERVENTIONAL RADIOLOGY/VASCULAR | Facility: HOSPITAL | Age: 78
Discharge: HOME | End: 2018-09-07
Attending: INTERNAL MEDICINE
Payer: MEDICARE

## 2018-09-07 VITALS
DIASTOLIC BLOOD PRESSURE: 74 MMHG | OXYGEN SATURATION: 98 % | HEART RATE: 66 BPM | RESPIRATION RATE: 16 BRPM | SYSTOLIC BLOOD PRESSURE: 128 MMHG

## 2018-09-07 DIAGNOSIS — R18.8 OTHER ASCITES: ICD-10-CM

## 2018-09-07 LAB
APPEARANCE FLD: CLEAR
BODY FLD TYPE: NORMAL
COLOR FLD: YELLOW
LYMPHOCYTES NFR FLD MANUAL: 83 %
MONOS+MACROS NFR FLD MANUAL: 17 %
WBC # FLD: 65 /CU MM

## 2018-09-07 PROCEDURE — 63600175 PHARM REV CODE 636 W HCPCS: Mod: JG | Performed by: INTERNAL MEDICINE

## 2018-09-07 PROCEDURE — 49083 ABD PARACENTESIS W/IMAGING: CPT

## 2018-09-07 PROCEDURE — P9047 ALBUMIN (HUMAN), 25%, 50ML: HCPCS | Mod: JG | Performed by: INTERNAL MEDICINE

## 2018-09-07 PROCEDURE — 27200940 IR PARACENTESIS WITH IMAGING

## 2018-09-07 PROCEDURE — 49083 ABD PARACENTESIS W/IMAGING: CPT | Mod: ,,, | Performed by: RADIOLOGY

## 2018-09-07 PROCEDURE — 89051 BODY FLUID CELL COUNT: CPT

## 2018-09-07 RX ORDER — ALBUMIN HUMAN 250 G/1000ML
25 SOLUTION INTRAVENOUS ONCE
Status: COMPLETED | OUTPATIENT
Start: 2018-09-07 | End: 2018-09-07

## 2018-09-07 RX ADMIN — ALBUMIN (HUMAN) 25 G: 25 SOLUTION INTRAVENOUS at 12:09

## 2018-09-07 NOTE — DISCHARGE INSTRUCTIONS
For scheduling: Call Mackenzie at [phone]    For questions or concerns call: [facility] 8 AM- 5PM [phone].   Radiology resident on call [phone].    For immediate concerns that are not emergent, you may call our radiology clinic at: [phone]

## 2018-09-07 NOTE — PROCEDURES

## 2018-09-07 NOTE — H&P
Radiology History & Physical      SUBJECTIVE:     Chief Complaint: ascites    History of Present Illness:  Kenneth Sheikh is a 78 y.o. male who presents for ultrasound guided paracentesis  Past Medical History:   Diagnosis Date    Anticoagulant long-term use     Bipolar 1 disorder     Bipolar 1 disorder 11/24/2016    bipolar    Cancer     history of skin cancer/sinus cancer & rectal cancer    Depressed bipolar affective disorder     Diabetes mellitus     type 2    EBV infection 12/7/2016    EBV DNA, PCR Latest Ref Range: None detected  None detected EBV DNA-Copies/mL Unknown Test Not Performed EBV Early Antigen Ab, IgG Latest Ref Range: <1:10 Titer 1:40 (A) EBV Nuclear Ag Ab Latest Ref Range: <1:5 Titer >=1:80 (A) EBV VCA IgG Latest Ref Range: <1:10 Titer 1:160 (A) EBV VCA IgM Latest Ref Range: <1:10 Titer <1:10     History of TIA (transient ischemic attack)     several-taking Plavix    Hx of gallstones     Wife denies    Hypertension     Hyperthyroidism 11/30/2016    Low HDL (under 40) 12/7/2016    Mixed hyperlipidemia 11/23/2016    JUAN MANUEL (obstructive sleep apnea)     Pneumonia     Skin disease     Stroke     Transient cerebral ischemia 7/22/2016    Type 2 diabetes mellitus      Past Surgical History:   Procedure Laterality Date    Moh's procedure x4      rectal cancer      Sinus surgery for sinus cancer      sinus sx for cancer      skin cancer removed-several times-nose & ear      TONSILLECTOMY      Undescened testicle sx      UVULOPALATOPHARYNGOPLASTY      for sleep apnea       Home Meds:   Prior to Admission medications    Medication Sig Start Date End Date Taking? Authorizing Provider   ACCU-CHEK NEYDA PLUS TEST STRP Strp 1 strip by Misc.(Non-Drug; Combo Route) route once daily. 5/25/18   Prisca Espinoza MD   ciprofloxacin HCl (CIPRO) 500 MG tablet Take 1 tablet (500 mg total) by mouth every Monday. 5/14/18   Renato Womack MD   divalproex (DEPAKOTE) 250 MG EC tablet Take 250 mg  by mouth once daily.  5/5/17   Historical Provider, MD   furosemide (LASIX) 20 MG tablet Take 4 tablets (80 mg total) by mouth once daily.  Patient taking differently: Take 40 mg by mouth once daily. Taking 40 mg per day 4/25/17 6/22/18  Renato Womack MD   lactulose (CHRONULAC) 10 gram/15 mL solution TAKE 15 MLS BY MOUTH TWICE DAILY 3/8/18   Renato Womack MD   rifAXIMin (XIFAXAN) 550 mg Tab Take 1 tablet (550 mg total) by mouth 2 (two) times daily. 10/3/17   Renato Womack MD   SITagliptan (JANUVIA) 50 MG Tab Take 1 tablet (50 mg total) by mouth once daily. 4/6/17   Prisca Espinoza MD     Anticoagulants/Antiplatelets: no anticoagulation    Allergies:   Review of patient's allergies indicates:   Allergen Reactions    Abilify [aripiprazole] Other (See Comments)     Sleepy, could not function.     Sedation History:  no adverse reactions    Review of Systems:   Hematological: no known coagulopathies  Respiratory: no shortness of breath  Cardiovascular: no chest pain  Gastrointestinal: no abdominal pain  Genito-Urinary: no dysuria  Musculoskeletal: negative  Neurological: no TIA or stroke symptoms         OBJECTIVE:     Vital Signs (Most Recent)       Physical Exam:  ASA: 2  Mallampati: n/a    General: no acute distress  Mental Status: alert and oriented to person, place and time  HEENT: normocephalic, atraumatic  Chest: unlabored breathing  Heart: regular heart rate  Abdomen: distended  Extremity: moves all extremities    Laboratory  Lab Results   Component Value Date    INR 1.1 08/10/2018       Lab Results   Component Value Date    WBC 2.72 (L) 08/10/2018    HGB 9.6 (L) 08/10/2018    HCT 30.2 (L) 08/10/2018    MCV 87 08/10/2018     (L) 08/10/2018      Lab Results   Component Value Date     (H) 08/10/2018     (H) 08/10/2018     08/10/2018     08/10/2018    K 4.2 08/10/2018    K 4.2 08/10/2018     08/10/2018     08/10/2018    CO2 27 08/10/2018    CO2 27  08/10/2018    BUN 17 08/10/2018    BUN 17 08/10/2018    CREATININE 1.2 08/10/2018    CREATININE 1.2 08/10/2018    CALCIUM 8.6 (L) 08/10/2018    CALCIUM 8.6 (L) 08/10/2018    MG 1.7 05/15/2017    ALT 6 (L) 08/10/2018    AST 15 08/10/2018    ALBUMIN 2.7 (L) 08/10/2018    BILITOT 0.6 08/10/2018       ASSESSMENT/PLAN:     Sedation Plan: local  Patient will undergo ultrasound guided paracentesis.    Елена Comer APRN, FNP  Interventional Radiology  [phone] [employer]

## 2018-09-14 ENCOUNTER — HOSPITAL ENCOUNTER (OUTPATIENT)
Dept: INTERVENTIONAL RADIOLOGY/VASCULAR | Facility: HOSPITAL | Age: 78
Discharge: HOME | End: 2018-09-14
Attending: INTERNAL MEDICINE
Payer: MEDICARE

## 2018-09-14 VITALS
HEART RATE: 67 BPM | OXYGEN SATURATION: 97 % | SYSTOLIC BLOOD PRESSURE: 129 MMHG | RESPIRATION RATE: 18 BRPM | DIASTOLIC BLOOD PRESSURE: 60 MMHG

## 2018-09-14 DIAGNOSIS — R18.8 OTHER ASCITES: ICD-10-CM

## 2018-09-14 LAB
APPEARANCE FLD: NORMAL
BODY FLD TYPE: NORMAL
COLOR FLD: YELLOW
LYMPHOCYTES NFR FLD MANUAL: 60 %
MONOS+MACROS NFR FLD MANUAL: 36 %
NEUTROPHILS NFR FLD MANUAL: 4 %
WBC # FLD: 48 /CU MM

## 2018-09-14 PROCEDURE — 27200940 IR PARACENTESIS WITH IMAGING

## 2018-09-14 PROCEDURE — 49083 ABD PARACENTESIS W/IMAGING: CPT | Mod: ,,, | Performed by: FAMILY MEDICINE

## 2018-09-14 PROCEDURE — 63600175 PHARM REV CODE 636 W HCPCS: Mod: JG | Performed by: INTERNAL MEDICINE

## 2018-09-14 PROCEDURE — P9047 ALBUMIN (HUMAN), 25%, 50ML: HCPCS | Mod: JG | Performed by: INTERNAL MEDICINE

## 2018-09-14 PROCEDURE — 89051 BODY FLUID CELL COUNT: CPT

## 2018-09-14 PROCEDURE — 49083 ABD PARACENTESIS W/IMAGING: CPT

## 2018-09-14 RX ORDER — ALBUMIN HUMAN 250 G/1000ML
25 SOLUTION INTRAVENOUS CONTINUOUS PRN
Status: DISCONTINUED | OUTPATIENT
Start: 2018-09-14 | End: 2018-09-15 | Stop reason: HOSPADM

## 2018-09-14 RX ADMIN — ALBUMIN (HUMAN) 25 G: 25 SOLUTION INTRAVENOUS at 12:09

## 2018-09-14 NOTE — PROGRESS NOTES
Pt arrived to MPU room 1 for para, no acute distress noted. Orders and labs reviewed on chart. Awaiting consent.

## 2018-09-14 NOTE — PROCEDURES

## 2018-09-14 NOTE — DISCHARGE INSTRUCTIONS
[facility] [phone] (MON-FRI 8 AM- 5PM). Radiology Resident on call [phone].

## 2018-09-14 NOTE — PROGRESS NOTES
Para completed, pt tolerated well. No apparent distress noted. 5 Liters removed from right abd, mepore applied CDI. Labs collected and sent. Albumin 100 ml given per md order.  Discharge instructions reviewed and acknowledged. Pt discharged via wheelchair and private vehicle.

## 2018-09-21 ENCOUNTER — HOSPITAL ENCOUNTER (OUTPATIENT)
Dept: INTERVENTIONAL RADIOLOGY/VASCULAR | Facility: HOSPITAL | Age: 78
Discharge: HOME | End: 2018-09-21
Attending: INTERNAL MEDICINE
Payer: MEDICARE

## 2018-09-21 VITALS
DIASTOLIC BLOOD PRESSURE: 65 MMHG | HEART RATE: 62 BPM | OXYGEN SATURATION: 100 % | RESPIRATION RATE: 20 BRPM | SYSTOLIC BLOOD PRESSURE: 147 MMHG

## 2018-09-21 DIAGNOSIS — R18.8 OTHER ASCITES: ICD-10-CM

## 2018-09-21 LAB
APPEARANCE FLD: NORMAL
BODY FLD TYPE: NORMAL
COLOR FLD: YELLOW
LYMPHOCYTES NFR FLD MANUAL: 94 %
MONOS+MACROS NFR FLD MANUAL: 2 %
NEUTROPHILS NFR FLD MANUAL: 4 %
WBC # FLD: 52 /CU MM

## 2018-09-21 PROCEDURE — P9047 ALBUMIN (HUMAN), 25%, 50ML: HCPCS | Mod: JG | Performed by: INTERNAL MEDICINE

## 2018-09-21 PROCEDURE — 49083 ABD PARACENTESIS W/IMAGING: CPT | Mod: GC,,, | Performed by: STUDENT IN AN ORGANIZED HEALTH CARE EDUCATION/TRAINING PROGRAM

## 2018-09-21 PROCEDURE — 63600175 PHARM REV CODE 636 W HCPCS: Mod: JG | Performed by: INTERNAL MEDICINE

## 2018-09-21 PROCEDURE — 27200940 IR PARACENTESIS WITH IMAGING

## 2018-09-21 PROCEDURE — 89051 BODY FLUID CELL COUNT: CPT

## 2018-09-21 RX ORDER — ALBUMIN HUMAN 250 G/1000ML
25 SOLUTION INTRAVENOUS
Status: DISCONTINUED | OUTPATIENT
Start: 2018-09-21 | End: 2018-09-22 | Stop reason: HOSPADM

## 2018-09-21 RX ADMIN — ALBUMIN (HUMAN) 12.5 G: 25 SOLUTION INTRAVENOUS at 12:09

## 2018-09-21 RX ADMIN — ALBUMIN (HUMAN) 25 G: 25 SOLUTION INTRAVENOUS at 12:09

## 2018-09-21 NOTE — PROCEDURES
Radiology Post-Procedure Note    Pre Op Diagnosis: Ascites  Post Op Diagnosis: Same    Procedure: Paracentesis    Procedure performed by: George Peña MD and Justin Rivas MD     Following informed consent, the patient underwent sterile preparation and 1% lidocaine for local anesthesia. Ultrasound guidance was used to insert a 5-[place] Yueh catheter into the peritoneal space. Opaque yellow-colored fluid was successfully removed with no complications.    Written Informed Consent Obtained: Yes  Specimen Removed: YES; approximately 20 cc's of ascites obtained for laboratory analysis  Estimated Blood Loss: Minimal    Findings:   Successful paracentesis.  Albumin administered PRN per protocol.    Patient tolerated procedure well. Please see imaging report for further details.    Justin Rivas MD

## 2018-09-21 NOTE — PROGRESS NOTES
Paracentesis complete. Pt tolerated well. Total 5,200 ml peritoneal fluid removed. 150 ml Albumin 25% IV administered.   Pt verbalized instructions on care for puncture site to RLQ.  AVS reviewd and provided.

## 2018-09-21 NOTE — PROGRESS NOTES
Pt to MPU 4 for paracentesis. Dr Rivas to bedside. Yueh Catheter placed to RLQ with use of ultrasound guidance. No complaints or signs of distress. Will continue to monitor.

## 2018-09-21 NOTE — H&P
Radiology History & Physical      SUBJECTIVE:     Chief Complaint: Ascites    History of Present Illness:  Kenneth Sheikh is a 78 y.o. male who presents for US-guided paracentesis.    Past Medical History:   Diagnosis Date    Anticoagulant long-term use     Bipolar 1 disorder     Bipolar 1 disorder 11/24/2016    bipolar    Cancer     history of skin cancer/sinus cancer & rectal cancer    Depressed bipolar affective disorder     Diabetes mellitus     type 2    EBV infection 12/7/2016    EBV DNA, PCR Latest Ref Range: None detected  None detected EBV DNA-Copies/mL Unknown Test Not Performed EBV Early Antigen Ab, IgG Latest Ref Range: <1:10 Titer 1:40 (A) EBV Nuclear Ag Ab Latest Ref Range: <1:5 Titer >=1:80 (A) EBV VCA IgG Latest Ref Range: <1:10 Titer 1:160 (A) EBV VCA IgM Latest Ref Range: <1:10 Titer <1:10     History of TIA (transient ischemic attack)     several-taking Plavix    Hx of gallstones     Wife denies    Hypertension     Hyperthyroidism 11/30/2016    Low HDL (under 40) 12/7/2016    Mixed hyperlipidemia 11/23/2016    JUAN MANUEL (obstructive sleep apnea)     Pneumonia     Skin disease     Stroke     Transient cerebral ischemia 7/22/2016    Type 2 diabetes mellitus      Past Surgical History:   Procedure Laterality Date    BIOPSY-BONE MARROW Left 11/3/2016    Performed by [facility] at [facility] 2ND FLR    ESOPHAGOGASTRODUODENOSCOPY (EGD) N/A 2/3/2017    Performed by Domenico Fox MD at [facility] ENDO (4TH FLR)    EXCISION-MASS RECTAL  6/28/2016    Performed by Haris Talavera MD at [facility] OR 2ND FLR    Moh's procedure x4      rectal cancer      Sinus surgery for sinus cancer      sinus sx for cancer      skin cancer removed-several times-nose & ear      TONSILLECTOMY      ULTRASOUND-ENDOSCOPIC-UPPER/glue coil of gastric varices N/A 4/19/2017    Performed by Aneudy Perla MD at [facility] ENDO (2ND FLR)    Undescened testicle sx      UVULOPALATOPHARYNGOPLASTY      for sleep apnea        Home Meds:   Prior to Admission medications    Medication Sig Start Date End Date Taking? Authorizing Provider   ACCU-CHEK NEYDA PLUS TEST STRP Strp 1 strip by Misc.(Non-Drug; Combo Route) route once daily. 5/25/18   Prisca Espinoza MD   ciprofloxacin HCl (CIPRO) 500 MG tablet Take 1 tablet (500 mg total) by mouth every Monday. 5/14/18   Renato Womack MD   divalproex (DEPAKOTE) 250 MG EC tablet Take 250 mg by mouth once daily.  5/5/17   Historical Provider, MD   furosemide (LASIX) 20 MG tablet Take 4 tablets (80 mg total) by mouth once daily.  Patient taking differently: Take 40 mg by mouth once daily. Taking 40 mg per day 4/25/17 6/22/18  Renato Womack MD   lactulose (CHRONULAC) 10 gram/15 mL solution TAKE 15 MLS BY MOUTH TWICE DAILY 3/8/18   Renato Womack MD   rifAXIMin (XIFAXAN) 550 mg Tab Take 1 tablet (550 mg total) by mouth 2 (two) times daily. 10/3/17   Renato Womack MD   SITagliptan (JANUVIA) 50 MG Tab Take 1 tablet (50 mg total) by mouth once daily. 4/6/17   Prisca Espinoza MD     Anticoagulants/Antiplatelets: no anticoagulation    Allergies:   Review of patient's allergies indicates:   Allergen Reactions    Abilify [aripiprazole] Other (See Comments)     Sleepy, could not function.     Sedation History:  no adverse reactions    Review of Systems:   Hematological: no known coagulopathies  Respiratory: no shortness of breath  Cardiovascular: no chest pain  Gastrointestinal: no abdominal pain  Genito-Urinary: no dysuria  Musculoskeletal: negative  Neurological: no TIA or stroke symptoms         OBJECTIVE:     Vital Signs (Most Recent)  Pulse: 63 (09/21/18 1116)  Resp: 18 (09/21/18 1116)  BP: 126/60 (09/21/18 1116)  SpO2: 100 % (09/21/18 1116)    Physical Exam:  ASA: 2  Mallampati: 2    General: no acute distress  Mental Status: alert and oriented to person, place and time  HEENT: normocephalic, atraumatic  Chest: unlabored breathing  Abdomen: distended  Extremity: moves  all extremities    Laboratory  Lab Results   Component Value Date    INR 1.1 08/10/2018       Lab Results   Component Value Date    WBC 2.72 (L) 08/10/2018    HGB 9.6 (L) 08/10/2018    HCT 30.2 (L) 08/10/2018    MCV 87 08/10/2018     (L) 08/10/2018      Lab Results   Component Value Date     (H) 08/10/2018     (H) 08/10/2018     08/10/2018     08/10/2018    K 4.2 08/10/2018    K 4.2 08/10/2018     08/10/2018     08/10/2018    CO2 27 08/10/2018    CO2 27 08/10/2018    BUN 17 08/10/2018    BUN 17 08/10/2018    CREATININE 1.2 08/10/2018    CREATININE 1.2 08/10/2018    CALCIUM 8.6 (L) 08/10/2018    CALCIUM 8.6 (L) 08/10/2018    MG 1.7 05/15/2017    ALT 6 (L) 08/10/2018    AST 15 08/10/2018    ALBUMIN 2.7 (L) 08/10/2018    BILITOT 0.6 08/10/2018       ASSESSMENT/PLAN:     Sedation Plan: Local  Patient will undergo US-guided paracentesis.    Justin Rivas M.D.  PGY-3 Radiology

## 2018-09-21 NOTE — DISCHARGE INSTRUCTIONS
Interventional Radiology [phone]  Mon-Fri  8A-4P  24hr Radiology Resident on call [phone]

## 2018-09-28 ENCOUNTER — HOSPITAL ENCOUNTER (OUTPATIENT)
Dept: INTERVENTIONAL RADIOLOGY/VASCULAR | Facility: HOSPITAL | Age: 78
Discharge: HOME | End: 2018-09-28
Attending: INTERNAL MEDICINE
Payer: MEDICARE

## 2018-09-28 ENCOUNTER — OFFICE VISIT (OUTPATIENT)
Dept: DERMATOLOGY | Facility: CLINIC | Age: 78
End: 2018-09-28
Payer: MEDICARE

## 2018-09-28 VITALS
RESPIRATION RATE: 16 BRPM | DIASTOLIC BLOOD PRESSURE: 58 MMHG | HEART RATE: 58 BPM | OXYGEN SATURATION: 98 % | SYSTOLIC BLOOD PRESSURE: 122 MMHG

## 2018-09-28 DIAGNOSIS — L82.1 SEBORRHEIC KERATOSES: ICD-10-CM

## 2018-09-28 DIAGNOSIS — Z12.83 SCREENING EXAM FOR SKIN CANCER: ICD-10-CM

## 2018-09-28 DIAGNOSIS — R18.8 OTHER ASCITES: ICD-10-CM

## 2018-09-28 DIAGNOSIS — D48.5 NEOPLASM OF UNCERTAIN BEHAVIOR OF SKIN: Primary | ICD-10-CM

## 2018-09-28 DIAGNOSIS — L57.0 ACTINIC KERATOSIS: ICD-10-CM

## 2018-09-28 LAB
APPEARANCE FLD: CLEAR
BODY FLD TYPE: NORMAL
COLOR FLD: YELLOW
LYMPHOCYTES NFR FLD MANUAL: 83 %
MONOS+MACROS NFR FLD MANUAL: 17 %
WBC # FLD: 46 /CU MM

## 2018-09-28 PROCEDURE — 63600175 PHARM REV CODE 636 W HCPCS: Mod: JG | Performed by: INTERNAL MEDICINE

## 2018-09-28 PROCEDURE — P9047 ALBUMIN (HUMAN), 25%, 50ML: HCPCS | Mod: JG | Performed by: INTERNAL MEDICINE

## 2018-09-28 PROCEDURE — 11100 PR BIOPSY OF SKIN LESION: CPT | Mod: PBBFAC | Performed by: DERMATOLOGY

## 2018-09-28 PROCEDURE — 99213 OFFICE O/P EST LOW 20 MIN: CPT | Mod: PBBFAC,25 | Performed by: DERMATOLOGY

## 2018-09-28 PROCEDURE — 99999 PR PBB SHADOW E&M-EST. PATIENT-LVL III: CPT | Mod: PBBFAC,,, | Performed by: DERMATOLOGY

## 2018-09-28 PROCEDURE — 88305 TISSUE EXAM BY PATHOLOGIST: CPT | Performed by: PATHOLOGY

## 2018-09-28 PROCEDURE — 17000 DESTRUCT PREMALG LESION: CPT | Mod: 59,PBBFAC | Performed by: DERMATOLOGY

## 2018-09-28 PROCEDURE — 49083 ABD PARACENTESIS W/IMAGING: CPT

## 2018-09-28 PROCEDURE — 99213 OFFICE O/P EST LOW 20 MIN: CPT | Mod: 25,S$PBB,, | Performed by: DERMATOLOGY

## 2018-09-28 PROCEDURE — 89051 BODY FLUID CELL COUNT: CPT

## 2018-09-28 PROCEDURE — 17003 DESTRUCT PREMALG LES 2-14: CPT | Mod: 59,PBBFAC | Performed by: DERMATOLOGY

## 2018-09-28 PROCEDURE — 49083 ABD PARACENTESIS W/IMAGING: CPT | Mod: GC,,, | Performed by: RADIOLOGY

## 2018-09-28 PROCEDURE — 17003 DESTRUCT PREMALG LES 2-14: CPT | Mod: S$PBB,,, | Performed by: DERMATOLOGY

## 2018-09-28 PROCEDURE — 11100 PR BIOPSY OF SKIN LESION: CPT | Mod: 59,S$PBB,, | Performed by: DERMATOLOGY

## 2018-09-28 PROCEDURE — 17000 DESTRUCT PREMALG LESION: CPT | Mod: S$PBB,,, | Performed by: DERMATOLOGY

## 2018-09-28 RX ORDER — ALBUMIN HUMAN 250 G/1000ML
25 SOLUTION INTRAVENOUS CONTINUOUS PRN
Status: DISCONTINUED | OUTPATIENT
Start: 2018-09-28 | End: 2018-09-29 | Stop reason: HOSPADM

## 2018-09-28 RX ADMIN — ALBUMIN (HUMAN) 25 G: 25 SOLUTION INTRAVENOUS at 12:09

## 2018-09-28 NOTE — DISCHARGE INSTRUCTIONS
"For scheduling: Call Mackenzie at [phone]    For questions or concerns call: SHAR MON-FRI 8 AM- 5PM: [phone].   **After hours and weekends: Call [phone] and ask for "Radiology Resident on call".    For immediate concerns that are not emergent, you may call our radiology clinic at: [phone]    "

## 2018-09-28 NOTE — PROCEDURES
Radiology Post-Procedure Note    Pre Op Diagnosis: Ascites  Post Op Diagnosis: Same    Procedure: Paracentesis    Procedure performed by: Ruddy Villarreal MD    Written Informed Consent Obtained: Yes  Specimen Removed: YES (Yellow serous fluid)  Estimated Blood Loss: Minimal    Findings:   Successful paracentesis.  Albumin administered PRN per protocol.    Patient tolerated procedure well.    Ruddy Villarreal MD  Radiology, PGY - III  [phone]

## 2018-09-28 NOTE — H&P
Radiology History & Physical      SUBJECTIVE:     Chief Complaint: preprocedure    History of Present Illness:  Kenneth Sheikh is a 78 y.o. male who presents for U/S guided paracentesis.   Past Medical History:   Diagnosis Date    Anticoagulant long-term use     Bipolar 1 disorder     Bipolar 1 disorder 11/24/2016    bipolar    Cancer     history of skin cancer/sinus cancer & rectal cancer    Depressed bipolar affective disorder     Diabetes mellitus     type 2    EBV infection 12/7/2016    EBV DNA, PCR Latest Ref Range: None detected  None detected EBV DNA-Copies/mL Unknown Test Not Performed EBV Early Antigen Ab, IgG Latest Ref Range: <1:10 Titer 1:40 (A) EBV Nuclear Ag Ab Latest Ref Range: <1:5 Titer >=1:80 (A) EBV VCA IgG Latest Ref Range: <1:10 Titer 1:160 (A) EBV VCA IgM Latest Ref Range: <1:10 Titer <1:10     History of TIA (transient ischemic attack)     several-taking Plavix    Hx of gallstones     Wife denies    Hypertension     Hyperthyroidism 11/30/2016    Low HDL (under 40) 12/7/2016    Mixed hyperlipidemia 11/23/2016    JUAN MANUEL (obstructive sleep apnea)     Pneumonia     Skin disease     Stroke     Transient cerebral ischemia 7/22/2016    Type 2 diabetes mellitus      Past Surgical History:   Procedure Laterality Date    BIOPSY-BONE MARROW Left 11/3/2016    Performed by Bud Bob MD at [facility] OR 2ND FLR    ESOPHAGOGASTRODUODENOSCOPY (EGD) N/A 2/3/2017    Performed by Domenico Fox MD at [facility] ENDO (4TH FLR)    EXCISION-MASS RECTAL  6/28/2016    Performed by Haris Talavera MD at [facility] OR 2ND FLR    Moh's procedure x4      rectal cancer      Sinus surgery for sinus cancer      sinus sx for cancer      skin cancer removed-several times-nose & ear      TONSILLECTOMY      ULTRASOUND-ENDOSCOPIC-UPPER/glue coil of gastric varices N/A 4/19/2017    Performed by Aneudy Perla MD at [facility] ENDO (2ND FLR)    Undescened testicle sx      UVULOPALATOPHARYNGOPLASTY      for sleep  apnea       Home Meds:   Prior to Admission medications    Medication Sig Start Date End Date Taking? Authorizing Provider   ACCU-CHEK NEYDA PLUS TEST STRP Strp 1 strip by Misc.(Non-Drug; Combo Route) route once daily. 5/25/18   Prisca Espinoza MD   ciprofloxacin HCl (CIPRO) 500 MG tablet Take 1 tablet (500 mg total) by mouth every Monday. 5/14/18   Renato Womack MD   divalproex (DEPAKOTE) 250 MG EC tablet Take 250 mg by mouth once daily.  5/5/17   Historical Provider, MD   furosemide (LASIX) 20 MG tablet Take 4 tablets (80 mg total) by mouth once daily.  Patient taking differently: Take 40 mg by mouth once daily. Taking 40 mg per day 4/25/17 6/22/18  Renato Womack MD   lactulose (CHRONULAC) 10 gram/15 mL solution TAKE 15 MLS BY MOUTH TWICE DAILY 3/8/18   Renato Womack MD   rifAXIMin (XIFAXAN) 550 mg Tab Take 1 tablet (550 mg total) by mouth 2 (two) times daily. 10/3/17   Renato Womack MD   SITagliptan (JANUVIA) 50 MG Tab Take 1 tablet (50 mg total) by mouth once daily. 4/6/17   Prisca Espinoza MD     Anticoagulants/Antiplatelets: no anticoagulation    Allergies:   Review of patient's allergies indicates:   Allergen Reactions    Abilify [aripiprazole] Other (See Comments)     Sleepy, could not function.     Sedation History:  no adverse reactions    Review of Systems:   Hematological: no known coagulopathies  Respiratory: no shortness of breath  Cardiovascular: no chest pain  Gastrointestinal: no abdominal pain  Genito-Urinary: no dysuria  Musculoskeletal: negative  Neurological: no TIA or stroke symptoms         OBJECTIVE:     Vital Signs (Most Recent)  Pulse: (!) 59 (09/28/18 1145)  Resp: 16 (09/28/18 1145)  BP: (!) 158/69 (09/28/18 1145)  SpO2: 97 % (09/28/18 1145)    Physical Exam:  ASA: 2  Mallampati: 2  General: no acute distress  Mental Status: alert and oriented to person, place and time  HEENT: normocephalic, atraumatic  Chest: unlabored breathing  Heart: regular heart  rate  Abdomen: nondistended  Extremity: moves all extremities    Laboratory  Lab Results   Component Value Date    INR 1.1 08/10/2018       Lab Results   Component Value Date    WBC 2.72 (L) 08/10/2018    HGB 9.6 (L) 08/10/2018    HCT 30.2 (L) 08/10/2018    MCV 87 08/10/2018     (L) 08/10/2018      Lab Results   Component Value Date     (H) 08/10/2018     (H) 08/10/2018     08/10/2018     08/10/2018    K 4.2 08/10/2018    K 4.2 08/10/2018     08/10/2018     08/10/2018    CO2 27 08/10/2018    CO2 27 08/10/2018    BUN 17 08/10/2018    BUN 17 08/10/2018    CREATININE 1.2 08/10/2018    CREATININE 1.2 08/10/2018    CALCIUM 8.6 (L) 08/10/2018    CALCIUM 8.6 (L) 08/10/2018    MG 1.7 05/15/2017    ALT 6 (L) 08/10/2018    AST 15 08/10/2018    ALBUMIN 2.7 (L) 08/10/2018    BILITOT 0.6 08/10/2018       ASSESSMENT/PLAN:     Sedation Plan: Local anesthesia  Patient will undergo paracentesis.    Guerrero Victor MD  Interventional Radiology PGY-II  [facility]

## 2018-09-28 NOTE — PROGRESS NOTES
.Paracentesis complete. 5250 mLs peritoneal fluid drained. Pt tolerated well. Dressing to RLQ clean, dry, and intact. Albumin 25% given 100 mLs. Specimens sent per lab order. Patient given discharge instructions and verbalized understanding.

## 2018-10-03 ENCOUNTER — TELEPHONE (OUTPATIENT)
Dept: DERMATOLOGY | Facility: CLINIC | Age: 78
End: 2018-10-03

## 2018-10-03 NOTE — TELEPHONE ENCOUNTER
----- Message from Melani Kern MD sent at 10/3/2018  1:06 PM CDT -----  FINAL PATHOLOGIC DIAGNOSIS  1. Skin, left upper cutaneous lip, shave biopsy:  - BASAL CELL CARCINOMA.  - THE TUMOR EXTENDS TO THE DEEP AND LATERAL BIOPSY MARGINS.  - THE LESION IS ULCERATED.  MICROSCOPIC DESCRIPTION: Sections show a basaloid tumor within the dermis exhibiting peripheral palisading,  retraction artifact and apoptosis. There is epidermal ulceration with underlying dermal necrosis and inflammation.    Please notify patient of results and schedule for Mohs with Dr Beach. He is a patient well known to his staff.

## 2018-10-05 ENCOUNTER — HOSPITAL ENCOUNTER (OUTPATIENT)
Dept: INTERVENTIONAL RADIOLOGY/VASCULAR | Facility: HOSPITAL | Age: 78
Discharge: HOME | End: 2018-10-05
Attending: INTERNAL MEDICINE
Payer: MEDICARE

## 2018-10-05 VITALS
OXYGEN SATURATION: 99 % | HEART RATE: 60 BPM | DIASTOLIC BLOOD PRESSURE: 74 MMHG | SYSTOLIC BLOOD PRESSURE: 128 MMHG | RESPIRATION RATE: 16 BRPM

## 2018-10-05 DIAGNOSIS — R18.8 OTHER ASCITES: ICD-10-CM

## 2018-10-05 LAB
APPEARANCE FLD: NORMAL
BODY FLD TYPE: NORMAL
COLOR FLD: YELLOW
LYMPHOCYTES NFR FLD MANUAL: 78 %
MONOS+MACROS NFR FLD MANUAL: 17 %
NEUTROPHILS NFR FLD MANUAL: 5 %
WBC # FLD: 54 /CU MM

## 2018-10-05 PROCEDURE — P9047 ALBUMIN (HUMAN), 25%, 50ML: HCPCS | Mod: JG | Performed by: INTERNAL MEDICINE

## 2018-10-05 PROCEDURE — 27200940 IR PARACENTESIS WITH IMAGING

## 2018-10-05 PROCEDURE — 89051 BODY FLUID CELL COUNT: CPT

## 2018-10-05 PROCEDURE — 63600175 PHARM REV CODE 636 W HCPCS: Mod: JG | Performed by: INTERNAL MEDICINE

## 2018-10-05 PROCEDURE — 49083 ABD PARACENTESIS W/IMAGING: CPT | Mod: ,,, | Performed by: RADIOLOGY

## 2018-10-05 RX ORDER — ALBUMIN HUMAN 250 G/1000ML
25 SOLUTION INTRAVENOUS ONCE
Status: COMPLETED | OUTPATIENT
Start: 2018-10-05 | End: 2018-10-05

## 2018-10-05 RX ADMIN — ALBUMIN (HUMAN) 25 G: 25 SOLUTION INTRAVENOUS at 01:10

## 2018-10-05 NOTE — PROCEDURES
Radiology Post-Procedure Note    Pre Op Diagnosis: Ascites  Post Op Diagnosis: Same    Procedure: Paracentesis    Procedure performed by: Chung Bass MD    Written Informed Consent Obtained: Yes  Specimen Removed: YES please see dictated report for total volume removed.  Estimated Blood Loss: Minimal    Findings:   Successful paracentesis.  Albumin administered PRN per protocol.    Patient tolerated procedure well.    Chung Bass MD  Department of Radiology  Pager: [phone]

## 2018-10-05 NOTE — H&P
Radiology History & Physical      SUBJECTIVE:     Chief Complaint: Ascites    History of Present Illness:  Kenneth Sheikh is a 78 y.o. male who presents for paracentesis.  Past Medical History:   Diagnosis Date    Anticoagulant long-term use     Bipolar 1 disorder     Bipolar 1 disorder 11/24/2016    bipolar    Cancer     history of skin cancer/sinus cancer & rectal cancer    Depressed bipolar affective disorder     Diabetes mellitus     type 2    EBV infection 12/7/2016    EBV DNA, PCR Latest Ref Range: None detected  None detected EBV DNA-Copies/mL Unknown Test Not Performed EBV Early Antigen Ab, IgG Latest Ref Range: <1:10 Titer 1:40 (A) EBV Nuclear Ag Ab Latest Ref Range: <1:5 Titer >=1:80 (A) EBV VCA IgG Latest Ref Range: <1:10 Titer 1:160 (A) EBV VCA IgM Latest Ref Range: <1:10 Titer <1:10     History of TIA (transient ischemic attack)     several-taking Plavix    Hx of gallstones     Wife denies    Hypertension     Hyperthyroidism 11/30/2016    Low HDL (under 40) 12/7/2016    Mixed hyperlipidemia 11/23/2016    JUAN MANUEL (obstructive sleep apnea)     Pneumonia     Skin disease     Squamous cell carcinoma 08/2018    right temple    Stroke     Transient cerebral ischemia 7/22/2016    Type 2 diabetes mellitus      Past Surgical History:   Procedure Laterality Date    BIOPSY-BONE MARROW Left 11/3/2016    Performed by Bud [facility] at [facility] OR 2ND FLR    ESOPHAGOGASTRODUODENOSCOPY (EGD) N/A 2/3/2017    Performed by Domenico Fox MD at [facility] ENDO (4TH FLR)    EXCISION-MASS RECTAL  6/28/2016    Performed by Haris Talavera MD at [facility] OR 2ND FLR    Moh's procedure x4      rectal cancer      Sinus surgery for sinus cancer      sinus sx for cancer      skin cancer removed-several times-nose & ear      TONSILLECTOMY      ULTRASOUND-ENDOSCOPIC-UPPER/glue coil of gastric varices N/A 4/19/2017    Performed by Aneudy Perla MD at [facility] ENDO (2ND FLR)    Undescened testicle sx       UVULOPALATOPHARYNGOPLASTY      for sleep apnea       Home Meds:   Prior to Admission medications    Medication Sig Start Date End Date Taking? Authorizing Provider   ACCU-CHEK NEYDA PLUS TEST STRP Strp 1 strip by Misc.(Non-Drug; Combo Route) route once daily. 5/25/18   Prisca Espinoza MD   ciprofloxacin HCl (CIPRO) 500 MG tablet Take 1 tablet (500 mg total) by mouth every Monday. 5/14/18   Renato Womack MD   divalproex (DEPAKOTE) 250 MG EC tablet Take 250 mg by mouth once daily.  5/5/17   Historical Provider, MD   furosemide (LASIX) 20 MG tablet Take 4 tablets (80 mg total) by mouth once daily.  Patient taking differently: Take 40 mg by mouth once daily. Taking 40 mg per day 4/25/17 6/22/18  Renato Womack MD   lactulose (CHRONULAC) 10 gram/15 mL solution TAKE 15 MLS BY MOUTH TWICE DAILY 3/8/18   Renato Womack MD   rifAXIMin (XIFAXAN) 550 mg Tab Take 1 tablet (550 mg total) by mouth 2 (two) times daily. 10/3/17   Renato Womack MD   SITagliptan (JANUVIA) 50 MG Tab Take 1 tablet (50 mg total) by mouth once daily. 4/6/17   Prisca Espinoza MD     Anticoagulants/Antiplatelets: no anticoagulation    Allergies:   Review of patient's allergies indicates:   Allergen Reactions    Abilify [aripiprazole] Other (See Comments)     Sleepy, could not function.           OBJECTIVE:     Vital Signs (Most Recent)  Pulse: 60 (10/05/18 1100)  Resp: 16 (10/05/18 1100)  BP: 128/74 (10/05/18 1100)  SpO2: 99 % (10/05/18 1100)    Physical Exam:      General: no acute distress  Mental Status: alert and oriented to person, place and time  HEENT: normocephalic, atraumatic  Chest: unlabored breathing  Heart: regular heart rate  Abdomen: distended  Extremity: moves all extremities    Laboratory  Lab Results   Component Value Date    INR 1.1 08/10/2018       Lab Results   Component Value Date    WBC 2.72 (L) 08/10/2018    HGB 9.6 (L) 08/10/2018    HCT 30.2 (L) 08/10/2018    MCV 87 08/10/2018     (L)  08/10/2018      Lab Results   Component Value Date     (H) 08/10/2018     (H) 08/10/2018     08/10/2018     08/10/2018    K 4.2 08/10/2018    K 4.2 08/10/2018     08/10/2018     08/10/2018    CO2 27 08/10/2018    CO2 27 08/10/2018    BUN 17 08/10/2018    BUN 17 08/10/2018    CREATININE 1.2 08/10/2018    CREATININE 1.2 08/10/2018    CALCIUM 8.6 (L) 08/10/2018    CALCIUM 8.6 (L) 08/10/2018    MG 1.7 05/15/2017    ALT 6 (L) 08/10/2018    AST 15 08/10/2018    ALBUMIN 2.7 (L) 08/10/2018    BILITOT 0.6 08/10/2018       ASSESSMENT/PLAN:     Sedation Plan: Local  Patient will undergo US guided paracentesis. Informed consent obtained.    Chung Bass MD  Department of Radiology  Pager: [phone]

## 2018-10-08 DIAGNOSIS — K74.60 CIRRHOSIS OF LIVER WITH ASCITES, UNSPECIFIED HEPATIC CIRRHOSIS TYPE: ICD-10-CM

## 2018-10-08 DIAGNOSIS — R18.8 CIRRHOSIS OF LIVER WITH ASCITES, UNSPECIFIED HEPATIC CIRRHOSIS TYPE: ICD-10-CM

## 2018-10-08 NOTE — TELEPHONE ENCOUNTER
----- Message from Mayda Arce sent at 10/8/2018  8:31 AM CDT -----  Contact: [facility]  Pharmacy Calling    Reason for call: New Rx  Pharmacy Name: [facility]  Prescription Name: furosemide (LASIX) 20 MG tablet ()  Phone Number: [phone] fax: [phone]   Additional Information: n/a

## 2018-10-09 RX ORDER — FUROSEMIDE 20 MG/1
40 TABLET ORAL DAILY
Qty: 60 TABLET | Refills: 6 | Status: SHIPPED | OUTPATIENT
Start: 2018-10-09 | End: 2019-05-02 | Stop reason: SDUPTHER

## 2018-10-12 ENCOUNTER — HOSPITAL ENCOUNTER (OUTPATIENT)
Dept: INTERVENTIONAL RADIOLOGY/VASCULAR | Facility: HOSPITAL | Age: 78
Discharge: HOME | End: 2018-10-12
Attending: INTERNAL MEDICINE
Payer: MEDICARE

## 2018-10-12 VITALS
HEART RATE: 62 BPM | RESPIRATION RATE: 18 BRPM | DIASTOLIC BLOOD PRESSURE: 86 MMHG | SYSTOLIC BLOOD PRESSURE: 154 MMHG | OXYGEN SATURATION: 99 %

## 2018-10-12 DIAGNOSIS — R18.8 OTHER ASCITES: ICD-10-CM

## 2018-10-12 LAB
APPEARANCE FLD: CLEAR
BODY FLD TYPE: NORMAL
COLOR FLD: YELLOW
LYMPHOCYTES NFR FLD MANUAL: 76 %
MONOS+MACROS NFR FLD MANUAL: 23 %
NEUTROPHILS NFR FLD MANUAL: 1 %
WBC # FLD: 40 /CU MM

## 2018-10-12 PROCEDURE — C1729 CATH, DRAINAGE: HCPCS

## 2018-10-12 PROCEDURE — 89051 BODY FLUID CELL COUNT: CPT

## 2018-10-12 PROCEDURE — 49083 ABD PARACENTESIS W/IMAGING: CPT | Mod: ,,, | Performed by: NURSE PRACTITIONER

## 2018-10-12 PROCEDURE — 63600175 PHARM REV CODE 636 W HCPCS: Mod: JG | Performed by: INTERNAL MEDICINE

## 2018-10-12 PROCEDURE — P9047 ALBUMIN (HUMAN), 25%, 50ML: HCPCS | Mod: JG | Performed by: INTERNAL MEDICINE

## 2018-10-12 RX ORDER — ALBUMIN HUMAN 250 G/1000ML
25 SOLUTION INTRAVENOUS ONCE
Status: COMPLETED | OUTPATIENT
Start: 2018-10-12 | End: 2018-10-12

## 2018-10-12 RX ADMIN — ALBUMIN (HUMAN) 25 G: 25 SOLUTION INTRAVENOUS at 01:10

## 2018-10-12 NOTE — PROCEDURES
Radiology Post-Procedure Note    Pre Op Diagnosis: Ascites  Post Op Diagnosis: Same    Procedure: Ultrasound Guided Paracentesis    Procedure performed by: Vero Londono DNP  Written Informed Consent Obtained: Yes  Specimen Removed: YES clear yellow  Estimated Blood Loss: Minimal    Findings:   Successful paracentesis.  Albumin administered PRN per protocol.    Patient tolerated procedure well.

## 2018-10-12 NOTE — H&P
Radiology History & Physical        SUBJECTIVE:      Chief Complaint: abdominal distention     History of Present Illness:  Kenneth Sheikh is a 78 y.o. male who presents for evaluation of ascites       Past Medical History:   Diagnosis Date    Anticoagulant long-term use      Bipolar 1 disorder      Bipolar 1 disorder 11/24/2016     bipolar    Cancer       history of skin cancer/sinus cancer & rectal cancer    Depressed bipolar affective disorder      Diabetes mellitus       type 2    EBV infection 12/7/2016     EBV DNA, PCR      Latest Ref Range: None detected    None detected EBV DNA-Copies/mL         Unknown            Test Not Performed EBV Early Antigen Ab, IgG   Latest Ref Range: <1:10 Titer          1:40 (A) EBV Nuclear Ag Ab         Latest Ref Range: <1:5 Titer            >=1:80 (A) EBV VCA IgG       Latest Ref Range: <1:10 Titer          1:160 (A) EBV VCA IgM      Latest Ref Range: <1:10 Titer          <1:10     History of TIA (transient ischemic attack)       several-taking Plavix    Hx of gallstones       Wife denies    Hypertension      Hyperthyroidism 11/30/2016    Low HDL (under 40) 12/7/2016    Mixed hyperlipidemia 11/23/2016    JUAN MANUEL (obstructive sleep apnea)      Pneumonia      Skin disease      Squamous cell carcinoma 08/2018     right temple    Stroke      Transient cerebral ischemia 7/22/2016    Type 2 diabetes mellitus              Past Surgical History:   Procedure Laterality Date    BIOPSY-BONE MARROW Left 11/3/2016     Performed by [facility] at [facility] OR 2ND FLR    ESOPHAGOGASTRODUODENOSCOPY (EGD) N/A 2/3/2017     Performed by Domenico Fox MD at [facility] ENDO (4TH FLR)    EXCISION-MASS RECTAL   6/28/2016     Performed by Haris Talavera MD at [facility] OR 2ND FLR    Moh's procedure x4        rectal cancer        Sinus surgery for sinus cancer        sinus sx for cancer        skin cancer removed-several times-nose & ear        TONSILLECTOMY         ULTRASOUND-ENDOSCOPIC-UPPER/glue coil of gastric varices N/A 4/19/2017     Performed by [facility]udy [facility]la [facility] at [facility] ENDO (2ND FLR)    Undescened testicle sx        UVULOPALATOPHARYNGOPLASTY         for sleep apnea         Home Meds:           Prior to Admission medications    Medication Sig Start Date End Date Taking? Authorizing Provider   ACCU-CHEK NEYDA PLUS TEST STRP Strp 1 strip by Misc.(Non-Drug; Combo Route) route once daily. 5/25/18     Prisca Espinoza MD   ciprofloxacin HCl (CIPRO) 500 MG tablet Take 1 tablet (500 mg total) by mouth every Monday. 5/14/18     Renato Womack MD   divalproex (DEPAKOTE) 250 MG EC tablet Take 250 mg by mouth once daily.  5/5/17     Historical Provider, MD   furosemide (LASIX) 20 MG tablet Take 2 tablets (40 mg total) by mouth once daily. 10/9/18 10/9/19   Renato Womack MD   lactulose (CHRONULAC) 10 gram/15 mL solution TAKE 15 MLS BY MOUTH TWICE DAILY 3/8/18     Renato Womack MD   rifAXIMin (XIFAXAN) 550 mg Tab Take 1 tablet (550 mg total) by mouth 2 (two) times daily. 10/3/17     Renato Womack MD   SITagliptan (JANUVIA) 50 MG Tab Take 1 tablet (50 mg total) by mouth once daily. 4/6/17     Prisca Espinoza MD      Anticoagulants/Antiplatelets: no anticoagulation     Allergies:         Review of patient's allergies indicates:   Allergen Reactions    Abilify [aripiprazole] Other (See Comments)       Sleepy, could not function.      Sedation History:  no adverse reactions     Review of Systems:   Hematological: no known coagulopathies  Respiratory: no shortness of breath  Cardiovascular: no chest pain  Gastrointestinal: no abdominal pain  Genito-Urinary: no dysuria  Musculoskeletal: negative  Neurological: no TIA or stroke symptoms          OBJECTIVE:      Vital Signs  154/86; 99%, 62, 13 RR     Physical Exam:  ASA: 2  Mallampati: NA     General: no acute distress  Mental Status: alert and oriented to person, place and time  HEENT: normocephalic,  atraumatic  Chest: unlabored breathing  Heart: regular heart rate  Abdomen: distended  Extremity: moves all extremities     Laboratory  Lab Results   Component Value Date     INR 1.1 08/10/2018             Lab Results   Component Value Date     WBC 2.72 (L) 08/10/2018     HGB 9.6 (L) 08/10/2018     HCT 30.2 (L) 08/10/2018     MCV 87 08/10/2018      (L) 08/10/2018            Lab Results   Component Value Date      (H) 08/10/2018      (H) 08/10/2018      08/10/2018      08/10/2018     K 4.2 08/10/2018     K 4.2 08/10/2018      08/10/2018      08/10/2018     CO2 27 08/10/2018     CO2 27 08/10/2018     BUN 17 08/10/2018     BUN 17 08/10/2018     CREATININE 1.2 08/10/2018     CREATININE 1.2 08/10/2018     CALCIUM 8.6 (L) 08/10/2018     CALCIUM 8.6 (L) 08/10/2018     MG 1.7 05/15/2017     ALT 6 (L) 08/10/2018     AST 15 08/10/2018     ALBUMIN 2.7 (L) 08/10/2018     BILITOT 0.6 08/10/2018         ASSESSMENT/PLAN:      Sedation Plan: local anesthesia  Patient will undergo US guided paracentesis      Vero Londono DNP  Interventional Radiology

## 2018-10-19 ENCOUNTER — HOSPITAL ENCOUNTER (OUTPATIENT)
Dept: INTERVENTIONAL RADIOLOGY/VASCULAR | Facility: HOSPITAL | Age: 78
Discharge: HOME | End: 2018-10-19
Attending: INTERNAL MEDICINE
Payer: MEDICARE

## 2018-10-19 VITALS
HEART RATE: 56 BPM | RESPIRATION RATE: 18 BRPM | SYSTOLIC BLOOD PRESSURE: 128 MMHG | DIASTOLIC BLOOD PRESSURE: 57 MMHG | OXYGEN SATURATION: 98 %

## 2018-10-19 DIAGNOSIS — R18.8 OTHER ASCITES: ICD-10-CM

## 2018-10-19 LAB
APPEARANCE FLD: NORMAL
BODY FLD TYPE: NORMAL
COLOR FLD: YELLOW
LYMPHOCYTES NFR FLD MANUAL: 80 %
MONOS+MACROS NFR FLD MANUAL: 20 %
WBC # FLD: 40 /CU MM

## 2018-10-19 PROCEDURE — 27200940 IR PARACENTESIS WITH IMAGING

## 2018-10-19 PROCEDURE — P9047 ALBUMIN (HUMAN), 25%, 50ML: HCPCS | Mod: JG | Performed by: INTERNAL MEDICINE

## 2018-10-19 PROCEDURE — 89051 BODY FLUID CELL COUNT: CPT

## 2018-10-19 PROCEDURE — 63600175 PHARM REV CODE 636 W HCPCS: Mod: JG | Performed by: INTERNAL MEDICINE

## 2018-10-19 PROCEDURE — 49083 ABD PARACENTESIS W/IMAGING: CPT | Mod: ,,, | Performed by: RADIOLOGY

## 2018-10-19 RX ORDER — ALBUMIN HUMAN 250 G/1000ML
25 SOLUTION INTRAVENOUS CONTINUOUS PRN
Status: DISCONTINUED | OUTPATIENT
Start: 2018-10-19 | End: 2018-10-20 | Stop reason: HOSPADM

## 2018-10-19 RX ADMIN — ALBUMIN (HUMAN) 25 G: 25 SOLUTION INTRAVENOUS at 12:10

## 2018-10-19 NOTE — H&P
Radiology History & Physical      SUBJECTIVE:     Chief Complaint: abdominal distention    History of Present Illness:  Kenneth Sheikh is a 78 y.o. male who presents for ultrasound guided paracentesis  Past Medical History:   Diagnosis Date    Anticoagulant long-term use     Bipolar 1 disorder     Bipolar 1 disorder 11/24/2016    bipolar    Cancer     history of skin cancer/sinus cancer & rectal cancer    Depressed bipolar affective disorder     Diabetes mellitus     type 2    EBV infection 12/7/2016    EBV DNA, PCR Latest Ref Range: None detected  None detected EBV DNA-Copies/mL Unknown Test Not Performed EBV Early Antigen Ab, IgG Latest Ref Range: <1:10 Titer 1:40 (A) EBV Nuclear Ag Ab Latest Ref Range: <1:5 Titer >=1:80 (A) EBV VCA IgG Latest Ref Range: <1:10 Titer 1:160 (A) EBV VCA IgM Latest Ref Range: <1:10 Titer <1:10     History of TIA (transient ischemic attack)     several-taking Plavix    Hx of gallstones     Wife denies    Hypertension     Hyperthyroidism 11/30/2016    Low HDL (under 40) 12/7/2016    Mixed hyperlipidemia 11/23/2016    JUAN MANUEL (obstructive sleep apnea)     Pneumonia     Skin disease     Squamous cell carcinoma 08/2018    right temple    Stroke     Transient cerebral ischemia 7/22/2016    Type 2 diabetes mellitus      Past Surgical History:   Procedure Laterality Date    BIOPSY-BONE MARROW Left 11/3/2016    Performed by Bud Bob MD at [facility] OR 2ND FLR    ESOPHAGOGASTRODUODENOSCOPY (EGD) N/A 2/3/2017    Performed by Domenico Fox MD at [facility] ENDO (4TH FLR)    EXCISION-MASS RECTAL  6/28/2016    Performed by Haris Talavera MD at [facility] OR 2ND FLR    Moh's procedure x4      rectal cancer      Sinus surgery for sinus cancer      sinus sx for cancer      skin cancer removed-several times-nose & ear      TONSILLECTOMY      ULTRASOUND-ENDOSCOPIC-UPPER/glue coil of gastric varices N/A 4/19/2017    Performed by Aneudy Perla MD at [facility] ENDO (2ND FLR)     Undescened testicle sx      UVULOPALATOPHARYNGOPLASTY      for sleep apnea       Home Meds:   Prior to Admission medications    Medication Sig Start Date End Date Taking? Authorizing Provider   ACCU-CHEK NEYDA PLUS TEST STRP Strp 1 strip by Misc.(Non-Drug; Combo Route) route once daily. 5/25/18   Prisca Espinoza MD   ciprofloxacin HCl (CIPRO) 500 MG tablet Take 1 tablet (500 mg total) by mouth every Monday. 5/14/18   Renato Womack MD   divalproex (DEPAKOTE) 250 MG EC tablet Take 250 mg by mouth once daily.  5/5/17   Historical Provider, MD   furosemide (LASIX) 20 MG tablet Take 2 tablets (40 mg total) by mouth once daily. 10/9/18 10/9/19  Renato Womack MD   lactulose (CHRONULAC) 10 gram/15 mL solution TAKE 15 MLS BY MOUTH TWICE DAILY 3/8/18   Renato Womack MD   rifAXIMin (XIFAXAN) 550 mg Tab Take 1 tablet (550 mg total) by mouth 2 (two) times daily. 10/3/17   Renato Womack MD   SITagliptan (JANUVIA) 50 MG Tab Take 1 tablet (50 mg total) by mouth once daily. 4/6/17   Prisca Espinoza MD     Anticoagulants/Antiplatelets: no anticoagulation    Allergies:   Review of patient's allergies indicates:   Allergen Reactions    Abilify [aripiprazole] Other (See Comments)     Sleepy, could not function.     Sedation History:  no adverse reactions    Review of Systems:   Hematological: no known coagulopathies  Respiratory: no shortness of breath  Cardiovascular: no chest pain  Gastrointestinal: no abdominal pain  Genito-Urinary: no dysuria  Musculoskeletal: negative  Neurological: no TIA or stroke symptoms         OBJECTIVE:     Vital Signs (Most Recent)  Pulse: 60 (10/19/18 1130)  Resp: 18 (10/19/18 1130)  BP: (!) 148/67 (10/19/18 1130)  SpO2: 97 % (10/19/18 1130)    Physical Exam:  ASA: 2  Mallampati: n/a    General: no acute distress  Mental Status: alert and oriented to person, place and time  HEENT: normocephalic, atraumatic  Chest: unlabored breathing  Heart: regular heart rate  Abdomen:  distended  Extremity: moves all extremities    Laboratory  Lab Results   Component Value Date    INR 1.1 08/10/2018       Lab Results   Component Value Date    WBC 2.72 (L) 08/10/2018    HGB 9.6 (L) 08/10/2018    HCT 30.2 (L) 08/10/2018    MCV 87 08/10/2018     (L) 08/10/2018      Lab Results   Component Value Date     (H) 08/10/2018     (H) 08/10/2018     08/10/2018     08/10/2018    K 4.2 08/10/2018    K 4.2 08/10/2018     08/10/2018     08/10/2018    CO2 27 08/10/2018    CO2 27 08/10/2018    BUN 17 08/10/2018    BUN 17 08/10/2018    CREATININE 1.2 08/10/2018    CREATININE 1.2 08/10/2018    CALCIUM 8.6 (L) 08/10/2018    CALCIUM 8.6 (L) 08/10/2018    MG 1.7 05/15/2017    ALT 6 (L) 08/10/2018    AST 15 08/10/2018    ALBUMIN 2.7 (L) 08/10/2018    BILITOT 0.6 08/10/2018       ASSESSMENT/PLAN:     Sedation Plan: local  Patient will undergo ultrasound guided paracentesis.    MATT Mauricio, FNP  Interventional Radiology  [phone] [employer]

## 2018-10-19 NOTE — PROCEDURES

## 2018-10-19 NOTE — DISCHARGE INSTRUCTIONS
[facility] [phone] (MON-FRI 8 AM- 5PM). Radiology Resident on call [phone].

## 2018-10-19 NOTE — PROGRESS NOTES
Para completed, pt tolerated well. No apparent distress noted. 4.9 Liters removed from left abd, mepore applied CDI. Labs collected and sent. Albumin 100 ml given per md orders.  Discharge instructions reviewed and acknowledged. Pt discharged via wheelchair and private vehicle.

## 2018-10-25 ENCOUNTER — INITIAL CONSULT (OUTPATIENT)
Dept: DERMATOLOGY | Facility: CLINIC | Age: 78
End: 2018-10-25
Payer: MEDICARE

## 2018-10-25 VITALS
WEIGHT: 170 LBS | SYSTOLIC BLOOD PRESSURE: 137 MMHG | HEIGHT: 68 IN | DIASTOLIC BLOOD PRESSURE: 74 MMHG | HEART RATE: 55 BPM | BODY MASS INDEX: 25.76 KG/M2

## 2018-10-25 DIAGNOSIS — C44.01 BASAL CELL CARCINOMA, LIP: Primary | ICD-10-CM

## 2018-10-25 PROCEDURE — 99213 OFFICE O/P EST LOW 20 MIN: CPT | Mod: PBBFAC | Performed by: DERMATOLOGY

## 2018-10-25 PROCEDURE — 99999 PR PBB SHADOW E&M-EST. PATIENT-LVL III: CPT | Mod: PBBFAC,,, | Performed by: DERMATOLOGY

## 2018-10-25 PROCEDURE — 99214 OFFICE O/P EST MOD 30 MIN: CPT | Mod: S$PBB,,, | Performed by: DERMATOLOGY

## 2018-10-25 NOTE — PROGRESS NOTES
ALLERGIES:  Abilify [aripiprazole]    CHIEF COMPLAINT:  This 78 y.o. male comes for evaluation for Mohs' Micrographic Surgery, Fresh Tissue Technique, for treatment of a biopsy-proven basal cell carcinoma on the left upper cutaneous lip. Consultation requested by Erlin Polo M.D..    The patient is accompanied to this visit by his wife.    HISTORY OF PRESENT ILLNESS:   Location: left upper cutaneous lip  Duration: 2 months or more  Quality: persistent  Context: status post biopsy by Melani Kern M.D.; path = basal cell carcinoma; pathology accession #[id number], [facility]    Prior Treatment: none  See also the handwritten notes/diagrams scanned to chart for additional details.    Defibrillator: No  Pacemaker: No  Artificial heart valves: No  Artificial joints: No    REVIEW OF SYSTEMS:   General: general health fair  Skin: has previous history of skin cancer(s)  CV: has hypertension, no artificial valves, has no chest pain  Resp: has no shortness of breath  Endo: has diabetes  Hem/Lymph: not taking prescribed anticoagulants, has easy bruising/bleeding  Allergy/Immuno: has allergies as noted above  GI: has history of cirrhosis; weekly paracentesis to drain fluid, on Fridays  MS: as noted above     PAST MEDICAL HISTORY:  Past Medical History:   Diagnosis Date    Anticoagulant long-term use     Bipolar 1 disorder     Bipolar 1 disorder 11/24/2016    bipolar    Cancer     history of skin cancer/sinus cancer & rectal cancer    Depressed bipolar affective disorder     Diabetes mellitus     type 2    EBV infection 12/7/2016    EBV DNA, PCR Latest Ref Range: None detected  None detected EBV DNA-Copies/mL Unknown Test Not Performed EBV Early Antigen Ab, IgG Latest Ref Range: <1:10 Titer 1:40 (A) EBV Nuclear Ag Ab Latest Ref Range: <1:5 Titer >=1:80 (A) EBV VCA IgG Latest Ref Range: <1:10 Titer 1:160 (A) EBV VCA IgM Latest Ref Range: <1:10 Titer <1:10     History of TIA (transient ischemic attack)      several-taking Plavix    Hx of gallstones     Wife denies    Hypertension     Hyperthyroidism 11/30/2016    Low HDL (under 40) 12/7/2016    Mixed hyperlipidemia 11/23/2016    JUAN MANUEL (obstructive sleep apnea)     Pneumonia     Skin disease     Squamous cell carcinoma 08/2018    right temple    Stroke     Transient cerebral ischemia 7/22/2016    Type 2 diabetes mellitus        PAST SURGICAL HISTORY:  Past Surgical History:   Procedure Laterality Date    BIOPSY-BONE MARROW Left 11/3/2016    Performed by Bud Bob MD at [facility] OR 2ND FLR    ESOPHAGOGASTRODUODENOSCOPY (EGD) N/A 2/3/2017    Performed by Domenico Fox MD at [facility] ENDO (4TH FLR)    EXCISION-MASS RECTAL  6/28/2016    Performed by Haris Talavera MD at [facility] OR 2ND FLR    Moh's procedure x4      rectal cancer      Sinus surgery for sinus cancer      sinus sx for cancer      skin cancer removed-several times-nose & ear      TONSILLECTOMY      ULTRASOUND-ENDOSCOPIC-UPPER/glue coil of gastric varices N/A 4/19/2017    Performed by Aneudy Perla MD at [facility] ENDO (2ND FLR)    Undescened testicle sx      UVULOPALATOPHARYNGOPLASTY      for sleep apnea        SOCIAL HISTORY:  Dependencies: smoking status as noted below  Social History     Tobacco Use    Smoking status: Former Smoker     Packs/day: 0.25     Years: 2.00     Pack years: 0.50    Smokeless tobacco: Never Used    Tobacco comment: quit at age 17 less than a pack a day   Substance Use Topics    Alcohol use: No     Comment: rare    Drug use: No       PERTINENT MEDICATIONS:  See medications list.    Current Outpatient Medications:     ACCU-CHEK NEYDA PLUS TEST STRP Strp, 1 strip by Misc.(Non-Drug; Combo Route) route once daily., Disp: 90 strip, Rfl: 3    ciprofloxacin HCl (CIPRO) 500 MG tablet, Take 1 tablet (500 mg total) by mouth every Monday., Disp: 12 tablet, Rfl: 3    divalproex (DEPAKOTE) 250 MG EC tablet, Take 250 mg by mouth once daily. , Disp: , Rfl:      furosemide (LASIX) 20 MG tablet, Take 2 tablets (40 mg total) by mouth once daily., Disp: 60 tablet, Rfl: 6    lactulose (CHRONULAC) 10 gram/15 mL solution, TAKE 15 MLS BY MOUTH TWICE DAILY, Disp: 2838 mL, Rfl: 5    rifAXIMin (XIFAXAN) 550 mg Tab, Take 1 tablet (550 mg total) by mouth 2 (two) times daily., Disp: 60 tablet, Rfl: 11    SITagliptan (JANUVIA) 50 MG Tab, Take 1 tablet (50 mg total) by mouth once daily., Disp: 90 tablet, Rfl: 3    ALLERGIES:  Abilify [aripiprazole]    EXAM:  See also the handwritten notes/diagrams scanned to chart for additional details.  Constitutional  General appearance: well-developed, well-nourished, well-kempt older white male    Eyes  Inspection of conjunctivae and lids reveals no abnormalities; sclerae anicteric  Neurologic/Psychiatric  Alert,  normal orientation to time, place, person  Normal mood and affect with no evidence of depression, anxiety, agitation  Skin: see photo(s)  Head: background marked solar damage to exposed areas of skin; in addition, inspection/palpation reveals an approximately 1 cm pink plaque on the left lateral upper lip; site(s) confirmed by reference to the photograph(s) in the chart taken at the time of the biopsy/biopsies by the referring physician and he confirmed this as the site of the prior biopsy  Neck: examination reveals marked chronic solar damage  Right upper extremity: examination reveals marked chronic solar damage; some ecchymosis/ecchymoses   Left upper extremity: examination reveals marked chronic solar damage; some ecchymosis/ecchymoses     ASSESSMENT: biopsy-proven basal cell carcinoma of the left upper lip  chronic solar damage to areas as noted above  personal history of non-melanoma skin cancer    PLAN:  The diagnosis and management options, and risks and benefits of the alternatives, including observation/non-treatment, radiation treatment, excision with vertical frozen section or paraffin-embedded section margin evaluation, and  Mohs' Micrographic Surgery, Fresh Tissue Technique, were discussed at length with the patient. In particular, the discussion included, but was not limited to, the following:    One alternative at this point would be to defer further treatment and observe the lesion. With small skin cancers of this kind, it is possible that a biopsy can be sufficient to definitively treat a small skin cancer of this kind. Alternatively, some skin cancers are slow growing and do not require immediate treatment. The potential advantage of this choice would be to avoid the need for possibly unnecessary additional surgery. Among the potential disadvantages of this would be the possibility of enlargement of the lesion, more extensive spread of the lesion or recurrence at a later date, which might necessitate a larger and more complex surgery.    Radiation treatment can be an effective treatment for this type of skin cancer. The usual course of treatment is every weekday for several weeks. Local irritation will result from treatment, although no systemic side effects are expected. The potential advantage of radiation treatment is that it avoids the need for surgery. Among the disadvantages of radiation treatment are the length of treatment, the local inflammatory response, the absence of pathologic confirmation of the removal of the skin cancer, a possible increased risk of additional skin cancer in the treated area in later years, and a somewhat increased risk of recurrence at a later date.     Excisional surgery can be an effective treatment for this type of skin cancer. This would involve excision of the lesion with margin evaluation by submitting the specimen to a pathologist for either immediate marginal assessment via frozen section processing, or delayed marginal assessment by fixed-tissue processing. The potential advantage of this technique is that it offers a way of treating the lesion with some degree of histologic confirmation  of tumor removal. Among the disadvantages of this treatment are the possible need for re-excision if marginal involvement is identified, a somewhat greater likelihood of recurrence as compared to Mohs' surgery because of the less comprehensive margin evaluation inherent in the technique, and the general potential risks of surgery, including allergic reactions to the anesthetic and other materials used, infection, injury to nerves in the area with consequent loss of sensation or muscle function, and scarring or distortion of surrounding structures.    Mohs' surgery is a very effective treatment for this type of skin cancer. The potential advantage of Mohs' surgery is that this technique offers the greatest possible certainty of knowing that the skin cancer has been completely removed, with the removal of the least amount of normal tissue. The potential disadvantages of Mohs' surgery include the duration of the surgery, the possible need for a separate surgery for reconstruction following tumor removal, and scarring as a result. In addition, general potential risks of surgery as noted above also apply to treatment via Mohs' surgery.    In light of the nature of this tumor and the location on the lip in an area of increased risk of recurrence,  Mohs' micrographic surgery was thought to be the most appropriate management choice, and this diagnosis is appropriate for treatment by Mohs' micrographic surgery.     Sufficient time was available for questions, and all questions were answered to his satisfaction. He fully understands the aims, risks, alternatives, and possible complications, and has elected to proceed with the surgery, and verbally consented to do so. The procedure will be scheduled in the near future.    Routine pre-op instructions were given to him.    --------------------------------------  Note: Some or all of this note may have been generated using voice recognition software. There may be voice  recognition errors including grammatical and/or spelling errors found in the text. Attempts were made to correct these errors prior to signature.

## 2018-10-25 NOTE — LETTER
October 25, 2018      Melani Kern MD  [address] [facility] [address]           [facility] - Dermatology Surgery  [address]  [place] [address]  Phone: [phone]  Fax: [phone]          Patient: Kenneth Sheikh   MR Number: [id number]   YOB: 1940   Date of Visit: 10/25/2018       Dear Dr. Melani Kern:    Thank you for referring Kenneth Sheikh to me for evaluation. Attached you will find relevant portions of my assessment and plan of care.    If you have questions, please do not hesitate to call me. I look forward to following Kenneth Sheikh along with you.    Sincerely,    Kenn Beach MD    Enclosure  CC:  No Recipients    If you would like to receive this communication electronically, please contact [email or web address] or [phone] to request more information on [employer] access.    For providers and/or their staff who would like to refer a patient to [facility], please contact us through our one-stop-shop provider referral line, [facility], at [phone].    If you feel you have received this communication in error or would no longer like to receive these types of communications, please e-mail [email or web address]

## 2018-10-26 ENCOUNTER — OFFICE VISIT (OUTPATIENT)
Dept: INTERNAL MEDICINE | Facility: CLINIC | Age: 78
End: 2018-10-26
Payer: MEDICARE

## 2018-10-26 ENCOUNTER — HOSPITAL ENCOUNTER (OUTPATIENT)
Dept: INTERVENTIONAL RADIOLOGY/VASCULAR | Facility: HOSPITAL | Age: 78
Discharge: HOME | End: 2018-10-26
Attending: INTERNAL MEDICINE
Payer: MEDICARE

## 2018-10-26 VITALS
HEIGHT: 68 IN | HEART RATE: 66 BPM | SYSTOLIC BLOOD PRESSURE: 140 MMHG | DIASTOLIC BLOOD PRESSURE: 56 MMHG | BODY MASS INDEX: 25.66 KG/M2 | WEIGHT: 169.31 LBS

## 2018-10-26 VITALS
DIASTOLIC BLOOD PRESSURE: 78 MMHG | SYSTOLIC BLOOD PRESSURE: 132 MMHG | OXYGEN SATURATION: 95 % | HEART RATE: 77 BPM | RESPIRATION RATE: 17 BRPM

## 2018-10-26 DIAGNOSIS — D61.818 PANCYTOPENIA: ICD-10-CM

## 2018-10-26 DIAGNOSIS — K74.60 LIVER CIRRHOSIS SECONDARY TO NASH (NONALCOHOLIC STEATOHEPATITIS): ICD-10-CM

## 2018-10-26 DIAGNOSIS — R18.8 OTHER ASCITES: ICD-10-CM

## 2018-10-26 DIAGNOSIS — E11.22 TYPE 2 DIABETES MELLITUS WITH STAGE 3 CHRONIC KIDNEY DISEASE, WITHOUT LONG-TERM CURRENT USE OF INSULIN: Primary | ICD-10-CM

## 2018-10-26 DIAGNOSIS — F31.9 BIPOLAR 1 DISORDER: ICD-10-CM

## 2018-10-26 DIAGNOSIS — N18.30 TYPE 2 DIABETES MELLITUS WITH STAGE 3 CHRONIC KIDNEY DISEASE, WITHOUT LONG-TERM CURRENT USE OF INSULIN: Primary | ICD-10-CM

## 2018-10-26 DIAGNOSIS — K75.81 LIVER CIRRHOSIS SECONDARY TO NASH (NONALCOHOLIC STEATOHEPATITIS): ICD-10-CM

## 2018-10-26 DIAGNOSIS — N18.30 CKD (CHRONIC KIDNEY DISEASE) STAGE 3, GFR 30-59 ML/MIN: ICD-10-CM

## 2018-10-26 LAB
ALBUMIN/CREAT UR: 18.4 UG/MG
APPEARANCE FLD: NORMAL
BODY FLD TYPE: NORMAL
COLOR FLD: YELLOW
CREAT UR-MCNC: 38 MG/DL
LYMPHOCYTES NFR FLD MANUAL: 43 %
MESOTHL CELL NFR FLD MANUAL: 4 %
MICROALBUMIN UR DL<=1MG/L-MCNC: 7 UG/ML
MONOS+MACROS NFR FLD MANUAL: 49 %
NEUTROPHILS NFR FLD MANUAL: 4 %
WBC # FLD: 62 /CU MM

## 2018-10-26 PROCEDURE — 49083 ABD PARACENTESIS W/IMAGING: CPT | Mod: ,,, | Performed by: NURSE PRACTITIONER

## 2018-10-26 PROCEDURE — P9047 ALBUMIN (HUMAN), 25%, 50ML: HCPCS | Mod: JG | Performed by: INTERNAL MEDICINE

## 2018-10-26 PROCEDURE — 99999 PR PBB SHADOW E&M-EST. PATIENT-LVL III: CPT | Mod: PBBFAC,,, | Performed by: INTERNAL MEDICINE

## 2018-10-26 PROCEDURE — 99214 OFFICE O/P EST MOD 30 MIN: CPT | Mod: S$PBB,,, | Performed by: INTERNAL MEDICINE

## 2018-10-26 PROCEDURE — 27200940 IR PARACENTESIS WITH IMAGING

## 2018-10-26 PROCEDURE — 63600175 PHARM REV CODE 636 W HCPCS: Mod: JG | Performed by: INTERNAL MEDICINE

## 2018-10-26 PROCEDURE — 89051 BODY FLUID CELL COUNT: CPT

## 2018-10-26 PROCEDURE — 82043 UR ALBUMIN QUANTITATIVE: CPT

## 2018-10-26 PROCEDURE — 99213 OFFICE O/P EST LOW 20 MIN: CPT | Mod: PBBFAC | Performed by: INTERNAL MEDICINE

## 2018-10-26 RX ORDER — LANCETS
EACH MISCELLANEOUS
Qty: 90 EACH | Refills: 3 | Status: SHIPPED | OUTPATIENT
Start: 2018-10-26 | End: 2018-11-05 | Stop reason: SDUPTHER

## 2018-10-26 RX ORDER — BLOOD SUGAR DIAGNOSTIC
1 STRIP MISCELLANEOUS DAILY
Qty: 90 STRIP | Refills: 3 | Status: SHIPPED | OUTPATIENT
Start: 2018-10-26 | End: 2018-11-05 | Stop reason: SDUPTHER

## 2018-10-26 RX ORDER — ALBUMIN HUMAN 250 G/1000ML
25 SOLUTION INTRAVENOUS ONCE
Status: COMPLETED | OUTPATIENT
Start: 2018-10-26 | End: 2018-10-26

## 2018-10-26 RX ADMIN — ALBUMIN (HUMAN) 25 G: 25 SOLUTION INTRAVENOUS at 12:10

## 2018-10-26 NOTE — DISCHARGE INSTRUCTIONS
For scheduling: Call Mackenzie at [phone]    For questions or concerns call: SHAR MON-FRI 8 AM- 5PM [phone].   Radiology resident on call [phone].    For immediate concerns that are not emergent, you may call our radiology clinic at: [phone]

## 2018-10-26 NOTE — H&P
Radiology History & Physical      SUBJECTIVE:     Chief Complaint: abdominal distention    History of Present Illness:  Kenneth Sheikh is a 78 y.o. male who presents for evaluation for ascites  Past Medical History:   Diagnosis Date    Anticoagulant long-term use     Bipolar 1 disorder     Bipolar 1 disorder 11/24/2016    bipolar    Cancer     history of skin cancer/sinus cancer & rectal cancer    Depressed bipolar affective disorder     Diabetes mellitus     type 2    EBV infection 12/7/2016    EBV DNA, PCR Latest Ref Range: None detected  None detected EBV DNA-Copies/mL Unknown Test Not Performed EBV Early Antigen Ab, IgG Latest Ref Range: <1:10 Titer 1:40 (A) EBV Nuclear Ag Ab Latest Ref Range: <1:5 Titer >=1:80 (A) EBV VCA IgG Latest Ref Range: <1:10 Titer 1:160 (A) EBV VCA IgM Latest Ref Range: <1:10 Titer <1:10     History of TIA (transient ischemic attack)     several-taking Plavix    Hx of gallstones     Wife denies    Hypertension     Hyperthyroidism 11/30/2016    Low HDL (under 40) 12/7/2016    Mixed hyperlipidemia 11/23/2016    JUAN MANUEL (obstructive sleep apnea)     Pneumonia     Skin disease     Squamous cell carcinoma 08/2018    right temple    Stroke     Transient cerebral ischemia 7/22/2016    Type 2 diabetes mellitus      Past Surgical History:   Procedure Laterality Date    BIOPSY-BONE MARROW Left 11/3/2016    Performed by Bud Bob MD at [facility] OR 2ND FLR    ESOPHAGOGASTRODUODENOSCOPY (EGD) N/A 2/3/2017    Performed by Domenico Fox MD at [facility] ENDO (4TH FLR)    EXCISION-MASS RECTAL  6/28/2016    Performed by Haris Talavera MD at [facility] OR 2ND FLR    Moh's procedure x4      rectal cancer      Sinus surgery for sinus cancer      sinus sx for cancer      skin cancer removed-several times-nose & ear      TONSILLECTOMY      ULTRASOUND-ENDOSCOPIC-UPPER/glue coil of gastric varices N/A 4/19/2017    Performed by Aneudy Perla MD at [facility] ENDO (2ND FLR)    Undescened  testicle sx      UVULOPALATOPHARYNGOPLASTY      for sleep apnea       Home Meds:   Prior to Admission medications    Medication Sig Start Date End Date Taking? Authorizing Provider   ACCU-CHEK NEYDA PLUS TEST STRP Strp 1 strip by Misc.(Non-Drug; Combo Route) route once daily. 5/25/18   Prisca Espinoza MD   ciprofloxacin HCl (CIPRO) 500 MG tablet Take 1 tablet (500 mg total) by mouth every Monday. 5/14/18   Renato Womack MD   divalproex (DEPAKOTE) 250 MG EC tablet Take 250 mg by mouth once daily.  5/5/17   Historical Provider, MD   furosemide (LASIX) 20 MG tablet Take 2 tablets (40 mg total) by mouth once daily. 10/9/18 10/9/19  Renato Womack MD   lactulose (CHRONULAC) 10 gram/15 mL solution TAKE 15 MLS BY MOUTH TWICE DAILY 3/8/18   Renato Womack MD   rifAXIMin (XIFAXAN) 550 mg Tab Take 1 tablet (550 mg total) by mouth 2 (two) times daily. 10/3/17   Renato Womack MD   SITagliptan (JANUVIA) 50 MG Tab Take 1 tablet (50 mg total) by mouth once daily. 4/6/17   Prisca Espinoza MD     Anticoagulants/Antiplatelets: no anticoagulation    Allergies:   Review of patient's allergies indicates:   Allergen Reactions    Abilify [aripiprazole] Other (See Comments)     Sleepy, could not function.     Sedation History:  no adverse reactions    Review of Systems:   Hematological: no known coagulopathies  Respiratory: no shortness of breath  Cardiovascular: no chest pain  Gastrointestinal: no abdominal pain  Genito-Urinary: no dysuria  Musculoskeletal: negative  Neurological: no TIA or stroke symptoms         OBJECTIVE:     Vital Signs (Most Recent)       Physical Exam:  ASA: 2  Mallampati: na    General: no acute distress  Mental Status: alert and oriented to person, place and time  HEENT: normocephalic, atraumatic  Chest: unlabored breathing  Heart: regular heart rate  Abdomen: distended  Extremity: moves all extremities    Laboratory  Lab Results   Component Value Date    INR 1.1 08/10/2018        Lab Results   Component Value Date    WBC 2.72 (L) 08/10/2018    HGB 9.6 (L) 08/10/2018    HCT 30.2 (L) 08/10/2018    MCV 87 08/10/2018     (L) 08/10/2018      Lab Results   Component Value Date     (H) 08/10/2018     (H) 08/10/2018     08/10/2018     08/10/2018    K 4.2 08/10/2018    K 4.2 08/10/2018     08/10/2018     08/10/2018    CO2 27 08/10/2018    CO2 27 08/10/2018    BUN 17 08/10/2018    BUN 17 08/10/2018    CREATININE 1.2 08/10/2018    CREATININE 1.2 08/10/2018    CALCIUM 8.6 (L) 08/10/2018    CALCIUM 8.6 (L) 08/10/2018    MG 1.7 05/15/2017    ALT 6 (L) 08/10/2018    AST 15 08/10/2018    ALBUMIN 2.7 (L) 08/10/2018    BILITOT 0.6 08/10/2018       ASSESSMENT/PLAN:     Sedation Plan: local anesthesia  Patient will undergo US guided paracentesis

## 2018-10-26 NOTE — H&P
Radiology Post-Procedure Note    Pre Op Diagnosis: Ascites  Post Op Diagnosis: Same    Procedure: Ultrasound Guided Paracentesis    Procedure performed by: MINA Londono DNP  Written Informed Consent Obtained: Yes  Specimen Removed: YES clear yellow  Estimated Blood Loss: Minimal    Findings:   Successful paracentesis.  Albumin administered PRN per protocol.    Patient tolerated procedure well.

## 2018-10-26 NOTE — PROGRESS NOTES
Internal Medicine    Subjective:      Patient ID: Kenneth Sheikh is a 78 y.o. male.    Chief Complaint: Follow-up    HPI:  Patient presents for follow up appointment.  The patient's last visit with me was on 6/22/2018.    DM-2:  Last HgA1c 8.5 on 8/10/18.  Plan was to start glipizide 2.5mg daily but patient is not taking.  Has worked on diet.  FSG at home now ranging 110's-140's.  Off metformin due to elevated lactate and diarrhea.  Off Januvia due to renal function.     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  Losartan stopped due to BELLA.  Followed by Dr. Womack - last seen 8/10/18.  Taking Rifaximin twice daily.  Getting paracentesis weekly.  Taking Cipro once a week for SBP prophylaxis.       H/o Rectal cancer s/p resection 06/2016:  Last seen by colorectal surgery (Dr. Talavera) on 3/5/18 - no evidence of recurrent neoplasia - follow up in 1 year.     Pancytopenia:  Has been seen by [facility]e-Onc (2/21/17).  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      HLD:  No longer on medication.     TIAs:  No symptoms recently.  No longer taking ASA.     Bipolar d/o:  Taking Depakote 500mg qHS.  Last Depakote level 58.8 on 5/18/18.  Denies depression or dany.  Followed by psychiatrist, Dr. Figueroa.     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 Beach.     Neck pain, bilateral knee pain:  Stable.  Avoiding NSAIDs due to cirrhosis.  Was told he could occasionally take Tylenol but not taking regularly.         Review of Systems   Constitutional: Negative for appetite change, chills, fatigue, fever and unexpected weight change.   HENT: Negative for sore throat and trouble swallowing.    Eyes: Negative for visual disturbance.   Respiratory: Negative for cough, chest tightness, shortness of breath and wheezing.    Cardiovascular: Negative for chest pain, palpitations and leg swelling.  "  Gastrointestinal: Negative for abdominal pain, blood in stool, constipation, diarrhea, nausea and vomiting.   Genitourinary: Negative for difficulty urinating.   Musculoskeletal: Negative for arthralgias and myalgias.   Skin: Negative for rash and wound.   Neurological: Negative for dizziness, weakness, numbness and headaches.   Psychiatric/Behavioral: Negative for behavioral problems and confusion.       Past medical history, surgical history, and family medical history reviewed and updated as appropriate.    Medications and allergies reviewed.     Objective:     Vitals:    10/26/18 1409   BP: (!) 140/56   Pulse: 66   Weight: 76.8 kg (169 lb 5 oz)   Height: 5' 8" (1.727 m)     Physical Exam   Constitutional: He is oriented to person, place, and time. He appears well-developed and well-nourished. No distress.   HENT:   Head: Normocephalic and atraumatic.   Eyes: Conjunctivae and EOM are normal. No scleral icterus.   Cardiovascular: Normal rate and regular rhythm. Exam reveals no gallop and no friction rub.   No murmur heard.  Pulses:       Dorsalis pedis pulses are 2+ on the right side, and 2+ on the left side.        Posterior tibial pulses are 2+ on the right side, and 2+ on the left side.   Pulmonary/Chest: Effort normal and breath sounds normal. No respiratory distress. He has no wheezes. He has no rales.   Abdominal: Soft. He exhibits no distension.   Musculoskeletal: He exhibits edema (1+). He exhibits no tenderness.        Right foot: There is normal range of motion and no deformity.        Left foot: There is normal range of motion and no deformity.   Feet:   Right Foot:   Protective Sensation: 6 sites tested. 6 sites sensed.   Skin Integrity: Negative for ulcer, blister, skin breakdown, erythema, warmth, callus or dry skin.   Left Foot:   Protective Sensation: 6 sites tested. 6 sites sensed.   Skin Integrity: Negative for ulcer, blister, skin breakdown, erythema, warmth, callus or dry skin. "   Neurological: He is alert and oriented to person, place, and time.   Skin: No rash noted. No erythema.   Psychiatric: He has a normal mood and affect. His behavior is normal. Judgment and thought content normal.       RESULTS:   Lab Results   Component Value Date    WBC 2.72 (L) 08/10/2018    HGB 9.6 (L) 08/10/2018    HCT 30.2 (L) 08/10/2018    MCV 87 08/10/2018     (L) 08/10/2018     BMP  Lab Results   Component Value Date     08/10/2018     08/10/2018    K 4.2 08/10/2018    K 4.2 08/10/2018     08/10/2018     08/10/2018    CO2 27 08/10/2018    CO2 27 08/10/2018    BUN 17 08/10/2018    BUN 17 08/10/2018    CREATININE 1.2 08/10/2018    CREATININE 1.2 08/10/2018    CALCIUM 8.6 (L) 08/10/2018    CALCIUM 8.6 (L) 08/10/2018    ANIONGAP 7 (L) 08/10/2018    ANIONGAP 7 (L) 08/10/2018    ESTGFRAFRICA >60.0 08/10/2018    ESTGFRAFRICA >60.0 08/10/2018    EGFRNONAA 56.8 (A) 08/10/2018    EGFRNONAA 56.8 (A) 08/10/2018     Lab Results   Component Value Date    ALT 6 (L) 08/10/2018    AST 15 08/10/2018    ALKPHOS 107 08/10/2018    BILITOT 0.6 08/10/2018     Lab Results   Component Value Date    TSH 1.179 02/02/2018     Lab Results   Component Value Date    HGBA1C 8.5 (H) 08/10/2018         Assessment:     1. Type 2 diabetes mellitus with stage 3 chronic kidney disease, without long-term current use of insulin    2. 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 CKD (chronic kidney disease) stage 3, GFR 30-59 ml/min    4. Pancytopenia    5. Bipolar 1 disorder        Plan:     *Continue medication/plan as discussed in HPI except for changes discussed below.    Kenneth was seen today for follow-up.    Diagnoses and all orders for this visit:    Type 2 diabetes mellitus with stage 3 chronic kidney disease, without long-term current use of insulin  -     lancets Misc; To check blood sugar once daily, to use with Accu-Chek meter.  -     ACCU-CHEK NEYDA PLUS TEST STRP Strp; 1 strip  by Misc.(Non-Drug; Combo Route) route once daily.  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  Patient will schedule eye exam.    Follow-up in about 6 months (around 4/26/2019).    Prisca Espinoza MD  Internal Medicine  [facility]

## 2018-10-26 NOTE — PROGRESS NOTES
Paracentesis complete at this time. Patient tolerated well 4.5L removed from left abdomen.  100cc of albumin given IV. Dressing applied, clean dry and intact. Patient verbalized understanding of discharge instructions. Patient taken to lobby via wheelchair.

## 2018-10-29 NOTE — PROCEDURES
See H&P from same day at 11:38 for details regarding procedure.     George Peña M.D.  Diagnostic and Interventional Radiologist  Department of Radiology  Pager: [phone]

## 2018-11-02 ENCOUNTER — HOSPITAL ENCOUNTER (OUTPATIENT)
Dept: INTERVENTIONAL RADIOLOGY/VASCULAR | Facility: HOSPITAL | Age: 78
Discharge: HOME | End: 2018-11-02
Attending: INTERNAL MEDICINE
Payer: MEDICARE

## 2018-11-02 VITALS
HEART RATE: 55 BPM | SYSTOLIC BLOOD PRESSURE: 122 MMHG | OXYGEN SATURATION: 100 % | DIASTOLIC BLOOD PRESSURE: 60 MMHG | RESPIRATION RATE: 16 BRPM

## 2018-11-02 DIAGNOSIS — R18.8 OTHER ASCITES: ICD-10-CM

## 2018-11-02 PROCEDURE — 27200940 IR PARACENTESIS WITH IMAGING

## 2018-11-02 PROCEDURE — P9047 ALBUMIN (HUMAN), 25%, 50ML: HCPCS | Mod: JG | Performed by: INTERNAL MEDICINE

## 2018-11-02 PROCEDURE — 49083 ABD PARACENTESIS W/IMAGING: CPT | Mod: ,,, | Performed by: NURSE PRACTITIONER

## 2018-11-02 PROCEDURE — 63600175 PHARM REV CODE 636 W HCPCS: Mod: JG | Performed by: INTERNAL MEDICINE

## 2018-11-02 RX ORDER — ALBUMIN HUMAN 250 G/1000ML
25 SOLUTION INTRAVENOUS ONCE
Status: COMPLETED | OUTPATIENT
Start: 2018-11-02 | End: 2018-11-02

## 2018-11-02 RX ORDER — ALBUMIN HUMAN 250 G/1000ML
25 SOLUTION INTRAVENOUS ONCE
Status: DISCONTINUED | OUTPATIENT
Start: 2018-11-02 | End: 2018-11-03 | Stop reason: HOSPADM

## 2018-11-02 RX ADMIN — ALBUMIN (HUMAN) 25 G: 25 SOLUTION INTRAVENOUS at 12:11

## 2018-11-02 NOTE — H&P
Radiology History & Physical      SUBJECTIVE:     Chief Complaint: abdominal distention    History of Present Illness:  Kenneth Sheikh is a 78 y.o. male who presents for eval of ascites  Past Medical History:   Diagnosis Date    Anticoagulant long-term use     Bipolar 1 disorder     Bipolar 1 disorder 11/24/2016    bipolar    Cancer     history of skin cancer/sinus cancer & rectal cancer    Depressed bipolar affective disorder     Diabetes mellitus     type 2    EBV infection 12/7/2016    EBV DNA, PCR Latest Ref Range: None detected  None detected EBV DNA-Copies/mL Unknown Test Not Performed EBV Early Antigen Ab, IgG Latest Ref Range: <1:10 Titer 1:40 (A) EBV Nuclear Ag Ab Latest Ref Range: <1:5 Titer >=1:80 (A) EBV VCA IgG Latest Ref Range: <1:10 Titer 1:160 (A) EBV VCA IgM Latest Ref Range: <1:10 Titer <1:10     History of TIA (transient ischemic attack)     several-taking Plavix    Hx of gallstones     Wife denies    Hypertension     Hyperthyroidism 11/30/2016    Low HDL (under 40) 12/7/2016    Mixed hyperlipidemia 11/23/2016    JUAN MANUEL (obstructive sleep apnea)     Pneumonia     Skin disease     Squamous cell carcinoma 08/2018    right temple    Stroke     Transient cerebral ischemia 7/22/2016    Type 2 diabetes mellitus      Past Surgical History:   Procedure Laterality Date    BIOPSY-BONE MARROW Left 11/3/2016    Performed by [facility] at [facility] OR 2ND FLR    ESOPHAGOGASTRODUODENOSCOPY (EGD) N/A 2/3/2017    Performed by Domenico Fox MD at [facility] ENDO (4TH FLR)    EXCISION-MASS RECTAL  6/28/2016    Performed by Haris Talavera MD at [facility] OR 2ND FLR    Moh's procedure x4      rectal cancer      Sinus surgery for sinus cancer      sinus sx for cancer      skin cancer removed-several times-nose & ear      TONSILLECTOMY      ULTRASOUND-ENDOSCOPIC-UPPER/glue coil of gastric varices N/A 4/19/2017    Performed by Aneudy Perla MD at [facility] ENDO (2ND FLR)    Undescened testicle sx       UVULOPALATOPHARYNGOPLASTY      for sleep apnea       Home Meds:   Prior to Admission medications    Medication Sig Start Date End Date Taking? Authorizing Provider   ACCU-CHEK NEYDA PLUS TEST STRP Strp 1 strip by Misc.(Non-Drug; Combo Route) route once daily. 10/26/18   Prisca Espinoza MD   ciprofloxacin HCl (CIPRO) 500 MG tablet Take 1 tablet (500 mg total) by mouth every Monday. 5/14/18   Renato Womack MD   divalproex (DEPAKOTE) 250 MG EC tablet Take 250 mg by mouth once daily.  5/5/17   Historical Provider, MD   furosemide (LASIX) 20 MG tablet Take 2 tablets (40 mg total) by mouth once daily. 10/9/18 10/9/19  Renato Womack MD   lactulose (CHRONULAC) 10 gram/15 mL solution TAKE 15 MLS BY MOUTH TWICE DAILY 3/8/18   Renato Womack MD   lancets [facility] To check blood sugar once daily, to use with Accu-Chek meter. 10/26/18   Prisca Espinoza MD   rifAXIMin (XIFAXAN) 550 mg Tab Take 1 tablet (550 mg total) by mouth 2 (two) times daily. 10/3/17   Renato Womack MD   SITagliptan (JANUVIA) 50 MG Tab Take 1 tablet (50 mg total) by mouth once daily. 4/6/17   Prisca Espinoza MD     Anticoagulants/Antiplatelets: no anticoagulation    Allergies:   Review of patient's allergies indicates:   Allergen Reactions    Abilify [aripiprazole] Other (See Comments)     Sleepy, could not function.     Sedation History:  no adverse reactions    Review of Systems:   Hematological: no known coagulopathies  Respiratory: no shortness of breath  Cardiovascular: no chest pain  Gastrointestinal: no abdominal pain  Genito-Urinary: no dysuria  Musculoskeletal: negative  Neurological: no TIA or stroke symptoms         OBJECTIVE:     Vital Signs (Most Recent)  Pulse: (!) 54 (11/02/18 1159)  Resp: 16 (11/02/18 1159)  BP: (!) 152/72 (11/02/18 1159)  SpO2: 100 % (11/02/18 1159)    Physical Exam:  ASA: 2  Mallampati: na    General: no acute distress  Mental Status: alert and oriented to person, place and time  HEENT:  normocephalic, atraumatic  Chest: unlabored breathing  Heart: regular heart rate  Abdomen: distended  Extremity: moves all extremities    Laboratory  Lab Results   Component Value Date    INR 1.1 08/10/2018       Lab Results   Component Value Date    WBC 2.72 (L) 08/10/2018    HGB 9.6 (L) 08/10/2018    HCT 30.2 (L) 08/10/2018    MCV 87 08/10/2018     (L) 08/10/2018      Lab Results   Component Value Date     (H) 08/10/2018     (H) 08/10/2018     08/10/2018     08/10/2018    K 4.2 08/10/2018    K 4.2 08/10/2018     08/10/2018     08/10/2018    CO2 27 08/10/2018    CO2 27 08/10/2018    BUN 17 08/10/2018    BUN 17 08/10/2018    CREATININE 1.2 08/10/2018    CREATININE 1.2 08/10/2018    CALCIUM 8.6 (L) 08/10/2018    CALCIUM 8.6 (L) 08/10/2018    MG 1.7 05/15/2017    ALT 6 (L) 08/10/2018    AST 15 08/10/2018    ALBUMIN 2.7 (L) 08/10/2018    BILITOT 0.6 08/10/2018       ASSESSMENT/PLAN:     Sedation Plan: local anesthesia  Patient will undergo US guided paracentesis

## 2018-11-05 DIAGNOSIS — E11.22 TYPE 2 DIABETES MELLITUS WITH STAGE 3 CHRONIC KIDNEY DISEASE, WITHOUT LONG-TERM CURRENT USE OF INSULIN: ICD-10-CM

## 2018-11-05 DIAGNOSIS — N18.30 TYPE 2 DIABETES MELLITUS WITH STAGE 3 CHRONIC KIDNEY DISEASE, WITHOUT LONG-TERM CURRENT USE OF INSULIN: ICD-10-CM

## 2018-11-05 RX ORDER — LANCETS
EACH MISCELLANEOUS
Qty: 90 EACH | Refills: 3 | Status: ON HOLD | OUTPATIENT
Start: 2018-11-05 | End: 2020-09-11 | Stop reason: HOSPADM

## 2018-11-05 RX ORDER — BLOOD SUGAR DIAGNOSTIC
1 STRIP MISCELLANEOUS DAILY
Qty: 90 STRIP | Refills: 3 | Status: SHIPPED | OUTPATIENT
Start: 2018-11-05 | End: 2019-12-26 | Stop reason: SDUPTHER

## 2018-11-09 ENCOUNTER — HOSPITAL ENCOUNTER (OUTPATIENT)
Dept: INTERVENTIONAL RADIOLOGY/VASCULAR | Facility: HOSPITAL | Age: 78
Discharge: HOME | End: 2018-11-09
Attending: INTERNAL MEDICINE
Payer: MEDICARE

## 2018-11-09 VITALS
HEART RATE: 61 BPM | OXYGEN SATURATION: 100 % | SYSTOLIC BLOOD PRESSURE: 123 MMHG | RESPIRATION RATE: 16 BRPM | DIASTOLIC BLOOD PRESSURE: 67 MMHG

## 2018-11-09 DIAGNOSIS — R18.8 OTHER ASCITES: ICD-10-CM

## 2018-11-09 LAB
APPEARANCE FLD: CLEAR
BASOPHILS NFR FLD MANUAL: 1 %
BODY FLD TYPE: NORMAL
COLOR FLD: YELLOW
LYMPHOCYTES NFR FLD MANUAL: 59 %
MONOS+MACROS NFR FLD MANUAL: 36 %
NEUTROPHILS NFR FLD MANUAL: 4 %
WBC # FLD: 73 /CU MM

## 2018-11-09 PROCEDURE — 89051 BODY FLUID CELL COUNT: CPT

## 2018-11-09 PROCEDURE — 49083 ABD PARACENTESIS W/IMAGING: CPT | Mod: ,,, | Performed by: NURSE PRACTITIONER

## 2018-11-09 PROCEDURE — 27200940 IR PARACENTESIS WITH IMAGING

## 2018-11-09 PROCEDURE — 49083 ABD PARACENTESIS W/IMAGING: CPT

## 2018-11-09 PROCEDURE — P9047 ALBUMIN (HUMAN), 25%, 50ML: HCPCS | Mod: JG | Performed by: INTERNAL MEDICINE

## 2018-11-09 PROCEDURE — 63600175 PHARM REV CODE 636 W HCPCS: Mod: JG | Performed by: INTERNAL MEDICINE

## 2018-11-09 RX ORDER — ALBUMIN HUMAN 250 G/1000ML
25 SOLUTION INTRAVENOUS ONCE
Status: COMPLETED | OUTPATIENT
Start: 2018-11-09 | End: 2018-11-09

## 2018-11-09 RX ADMIN — ALBUMIN (HUMAN) 25 G: 25 SOLUTION INTRAVENOUS at 12:11

## 2018-11-09 NOTE — H&P
Radiology History & Physical      SUBJECTIVE:     Chief Complaint: abdominal distention    History of Present Illness:  Kenneth Sheikh is a 78 y.o. male who presents for evaluation of ascites  Past Medical History:   Diagnosis Date    Anticoagulant long-term use     Bipolar 1 disorder     Bipolar 1 disorder 11/24/2016    bipolar    Cancer     history of skin cancer/sinus cancer & rectal cancer    Depressed bipolar affective disorder     Diabetes mellitus     type 2    EBV infection 12/7/2016    EBV DNA, PCR Latest Ref Range: None detected  None detected EBV DNA-Copies/mL Unknown Test Not Performed EBV Early Antigen Ab, IgG Latest Ref Range: <1:10 Titer 1:40 (A) EBV Nuclear Ag Ab Latest Ref Range: <1:5 Titer >=1:80 (A) EBV VCA IgG Latest Ref Range: <1:10 Titer 1:160 (A) EBV VCA IgM Latest Ref Range: <1:10 Titer <1:10     History of TIA (transient ischemic attack)     several-taking Plavix    Hx of gallstones     Wife denies    Hypertension     Hyperthyroidism 11/30/2016    Low HDL (under 40) 12/7/2016    Mixed hyperlipidemia 11/23/2016    JUAN MANUEL (obstructive sleep apnea)     Pneumonia     Skin disease     Squamous cell carcinoma 08/2018    right temple    Stroke     Transient cerebral ischemia 7/22/2016    Type 2 diabetes mellitus      Past Surgical History:   Procedure Laterality Date    BIOPSY-BONE MARROW Left 11/3/2016    Performed by Bud Bob MD at [facility] OR 2ND FLR    ESOPHAGOGASTRODUODENOSCOPY (EGD) N/A 2/3/2017    Performed by Domenico Fox MD at [facility] ENDO (4TH FLR)    EXCISION-MASS RECTAL  6/28/2016    Performed by Haris Talavera MD at [facility] OR 2ND FLR    Moh's procedure x4      rectal cancer      Sinus surgery for sinus cancer      sinus sx for cancer      skin cancer removed-several times-nose & ear      TONSILLECTOMY      ULTRASOUND-ENDOSCOPIC-UPPER/glue coil of gastric varices N/A 4/19/2017    Performed by Aneudy Perla MD at [facility] ENDO (2ND FLR)    Undescened  testicle sx      UVULOPALATOPHARYNGOPLASTY      for sleep apnea       Home Meds:   Prior to Admission medications    Medication Sig Start Date End Date Taking? Authorizing Provider   ACCU-CHEK NEYDA PLUS TEST STRP Strp 1 strip by Misc.(Non-Drug; Combo Route) route once daily. 11/5/18   Prisca Espinoza MD   ciprofloxacin HCl (CIPRO) 500 MG tablet Take 1 tablet (500 mg total) by mouth every Monday. 5/14/18   Renato Womack MD   divalproex (DEPAKOTE) 250 MG EC tablet Take 250 mg by mouth once daily.  5/5/17   Historical Provider, MD   furosemide (LASIX) 20 MG tablet Take 2 tablets (40 mg total) by mouth once daily. 10/9/18 10/9/19  Renato Womack MD   lactulose (CHRONULAC) 10 gram/15 mL solution TAKE 15 MLS BY MOUTH TWICE DAILY 3/8/18   Renato Womack MD   lancets [facility] To check blood sugar once daily, to use with Accu-Chek meter. 11/5/18   Prisca Espinoza MD   rifAXIMin (XIFAXAN) 550 mg Tab Take 1 tablet (550 mg total) by mouth 2 (two) times daily. 10/3/17   Renato Womack MD   SITagliptan (JANUVIA) 50 MG Tab Take 1 tablet (50 mg total) by mouth once daily. 4/6/17   Prisca Espinoza MD     Anticoagulants/Antiplatelets: no anticoagulation    Allergies:   Review of patient's allergies indicates:   Allergen Reactions    Abilify [aripiprazole] Other (See Comments)     Sleepy, could not function.     Sedation History:  no adverse reactions    Review of Systems:   Hematological: no known coagulopathies  Respiratory: no shortness of breath  Cardiovascular: no chest pain  Gastrointestinal: no abdominal pain  Genito-Urinary: no dysuria  Musculoskeletal: negative  Neurological: no TIA or stroke symptoms         OBJECTIVE:     Vital Signs (Most Recent)  Pulse: 63 (11/09/18 1204)  Resp: 16 (11/09/18 1204)  BP: (!) 134/59 (11/09/18 1204)  SpO2: 99 % (11/09/18 1204)    Physical Exam:  ASA: 2  Mallampati: na    General: no acute distress  Mental Status: alert and oriented to person, place and  time  HEENT: normocephalic, atraumatic  Chest: unlabored breathing  Heart: regular heart rate  Abdomen: distended  Extremity: moves all extremities    Laboratory  Lab Results   Component Value Date    INR 1.1 08/10/2018       Lab Results   Component Value Date    WBC 2.72 (L) 08/10/2018    HGB 9.6 (L) 08/10/2018    HCT 30.2 (L) 08/10/2018    MCV 87 08/10/2018     (L) 08/10/2018      Lab Results   Component Value Date     (H) 08/10/2018     (H) 08/10/2018     08/10/2018     08/10/2018    K 4.2 08/10/2018    K 4.2 08/10/2018     08/10/2018     08/10/2018    CO2 27 08/10/2018    CO2 27 08/10/2018    BUN 17 08/10/2018    BUN 17 08/10/2018    CREATININE 1.2 08/10/2018    CREATININE 1.2 08/10/2018    CALCIUM 8.6 (L) 08/10/2018    CALCIUM 8.6 (L) 08/10/2018    MG 1.7 05/15/2017    ALT 6 (L) 08/10/2018    AST 15 08/10/2018    ALBUMIN 2.7 (L) 08/10/2018    BILITOT 0.6 08/10/2018       ASSESSMENT/PLAN:     Sedation Plan: local anesthesia  Patient will undergo US guided paracentesis.    MATT Mauricio, FNP  Interventional Radiology  [phone] [employer]

## 2018-11-09 NOTE — PROGRESS NOTES
Paracentesis complete at this time. Patient tolerated well 5000mL removed from right abdomen.  100cc of albumin given IV. Dressing applied, clean dry and intact. Patient verbalized understanding of discharge instructions. Patient taken out via wheelchair.

## 2018-11-09 NOTE — DISCHARGE INSTRUCTIONS
For scheduling: Call Mackenzie at [phone]    For questions or concerns call: [facility] 8 AM- 5PM [phone].   Radiology resident on call [phone].    For immediate concerns that are not emergent, you may call our radiology clinic at: [phone]

## 2018-11-11 NOTE — MR AVS SNAPSHOT
[facility]  [address]  [facility] [address]  Phone: [phone]  Fax: [phone]                  Kenneth Sheikh Jr.   3/22/2017 10:40 AM   Office Visit    Description:  Male : 1940   Provider:  Renato Womack MD   Department:  [facility]           Reason for Visit     Cirrhosis     Ascites           Diagnoses this Visit        Comments    Liver cirrhosis secondary to MCDONALD (nonalcoholic steatohepatitis)    -  Primary     Ascites of liver                To Do List           Future Appointments        Provider Department Dept Phone    2017 10:40 AM MD [facility] [phone]      Goals (5 Years of Data)     None      [facility]     [facility] On Call Nurse Care Line -  Assistance  Registered nurses in the [facility] On Call Center provide clinical advisement, health education, appointment booking, and other advisory services.  Call for this free service at [phone].             Medications           Message regarding Medications     Verify the changes and/or additions to your medication regime listed below are the same as discussed with your clinician today.  If any of these changes or additions are incorrect, please notify your healthcare provider.             Verify that the below list of medications is an accurate representation of the medications you are currently taking.  If none reported, the list may be blank. If incorrect, please contact your healthcare provider. Carry this list with you in case of emergency.           Current Medications     cefUROXime (CEFTIN) 250 MG tablet Take 250 mg by mouth 2 (two) times daily.    dextromethorphan-guaifenesin  mg (MUCINEX DM)  mg per 12 hr tablet Take 1 tablet by mouth every 12 (twelve) hours.    divalproex (DEPAKOTE) 250 MG 24 hr tablet Take 750 mg by mouth once daily.    furosemide (LASIX) 20 MG tablet Take 3 tablets (60 mg total) by mouth once daily.     "lactulose (CHRONULAC) 10 gram/15 mL solution Take 15 mLs (10 g total) by mouth 2 (two) times daily.    losartan (COZAAR) 50 MG tablet Take 1 tablet (50 mg total) by mouth once daily.    rifAXIMin (XIFAXAN) 550 mg Tab Take 1 tablet (550 mg total) by mouth 2 (two) times daily.    SITagliptan (JANUVIA) 50 MG Tab Take 1 tablet (50 mg total) by mouth once daily.           Clinical Reference Information           Your Vitals Were     BP Pulse Temp Resp Height Weight    159/71 (BP Location: Right arm, Patient Position: Sitting, BP Method: Automatic) 55 96.3 °F (35.7 °C) 20 5' 8" (1.727 m) 77.6 kg (171 lb 1.2 oz)    SpO2 BMI             99% 26.01 kg/m2         Blood Pressure          Most Recent Value    BP  (!)  159/71      Allergies as of 3/22/2017     Abilify [Aripiprazole]      Immunizations Administered on Date of Encounter - 3/22/2017     None      Orders Placed During Today's Visit     Future Labs/Procedures Expected by Expires    AFP tumor marker  3/22/2017 5/21/2018    CBC auto differential  3/22/2017 3/22/2018    Comprehensive metabolic panel  3/22/2017 5/21/2018    Protime-INR  3/22/2017 5/21/2018      [facility] Sign-Up     Activating your [facility] account is as easy as 1-2-3!     1) Visit [email or web address], select Sign Up Now, enter this activation code and your date of birth, then select Next.  [email or web address]  Expires: 3/23/2017  5:36 PM      2) Create a username and password to use when you visit [facility] in the future and select a security question in case you lose your password and select Next.    3) Enter your e-mail address and click Sign Up!    Additional Information  If you have questions, please e-mail [email or web address] or call [phone] to talk to our [facility] staff. Remember, [facility] is NOT to be used for urgent needs. For medical emergencies, dial 911.         Language Assistance Services     ATTENTION: Language assistance services are available, free of charge. Please call " [phone].      ATENCIÓN: Si habla español, tiene a bell disposición servicios gratuitos de asistencia lingüística. Llame al [phone].     CHÚ Ý: N?u b?n nói Ti?ng Vi?t, có các d?ch v? h? tr? ngôn ng? mi?n phí dành cho b?n. G?i s? [phone].         [facility] complies with applicable Federal civil rights laws and does not discriminate on the basis of race, color, national origin, age, disability, or sex.         Yes

## 2018-11-16 ENCOUNTER — HOSPITAL ENCOUNTER (OUTPATIENT)
Dept: INTERVENTIONAL RADIOLOGY/VASCULAR | Facility: HOSPITAL | Age: 78
Discharge: HOME | End: 2018-11-16
Attending: INTERNAL MEDICINE
Payer: MEDICARE

## 2018-11-16 VITALS
DIASTOLIC BLOOD PRESSURE: 57 MMHG | HEART RATE: 60 BPM | OXYGEN SATURATION: 96 % | SYSTOLIC BLOOD PRESSURE: 133 MMHG | RESPIRATION RATE: 18 BRPM

## 2018-11-16 DIAGNOSIS — R18.8 OTHER ASCITES: ICD-10-CM

## 2018-11-16 LAB
APPEARANCE FLD: NORMAL
BASOPHILS NFR FLD MANUAL: 3 %
BODY FLD TYPE: NORMAL
COLOR FLD: YELLOW
EOSINOPHIL NFR FLD MANUAL: 3 %
LYMPHOCYTES NFR FLD MANUAL: 35 %
MONOS+MACROS NFR FLD MANUAL: 50 %
NEUTROPHILS NFR FLD MANUAL: 9 %
WBC # FLD: 67 /CU MM

## 2018-11-16 PROCEDURE — 27200940 IR PARACENTESIS WITH IMAGING

## 2018-11-16 PROCEDURE — P9047 ALBUMIN (HUMAN), 25%, 50ML: HCPCS | Mod: JG | Performed by: INTERNAL MEDICINE

## 2018-11-16 PROCEDURE — 63600175 PHARM REV CODE 636 W HCPCS: Mod: JG | Performed by: INTERNAL MEDICINE

## 2018-11-16 PROCEDURE — 49083 ABD PARACENTESIS W/IMAGING: CPT | Mod: ,,, | Performed by: NURSE PRACTITIONER

## 2018-11-16 PROCEDURE — 89051 BODY FLUID CELL COUNT: CPT

## 2018-11-16 RX ORDER — ALBUMIN HUMAN 250 G/1000ML
25 SOLUTION INTRAVENOUS
Status: DISCONTINUED | OUTPATIENT
Start: 2018-11-16 | End: 2018-11-17 | Stop reason: HOSPADM

## 2018-11-16 RX ADMIN — ALBUMIN (HUMAN) 25 G: 25 SOLUTION INTRAVENOUS at 12:11

## 2018-11-16 NOTE — H&P
Radiology History & Physical      SUBJECTIVE:     Chief Complaint: abdominal distention    History of Present Illness:  Kenneth Sheikh is a 78 y.o. male who presents for local anesthesia  Past Medical History:   Diagnosis Date    Anticoagulant long-term use     Bipolar 1 disorder     Bipolar 1 disorder 11/24/2016    bipolar    Cancer     history of skin cancer/sinus cancer & rectal cancer    Depressed bipolar affective disorder     Diabetes mellitus     type 2    EBV infection 12/7/2016    EBV DNA, PCR Latest Ref Range: None detected  None detected EBV DNA-Copies/mL Unknown Test Not Performed EBV Early Antigen Ab, IgG Latest Ref Range: <1:10 Titer 1:40 (A) EBV Nuclear Ag Ab Latest Ref Range: <1:5 Titer >=1:80 (A) EBV VCA IgG Latest Ref Range: <1:10 Titer 1:160 (A) EBV VCA IgM Latest Ref Range: <1:10 Titer <1:10     History of TIA (transient ischemic attack)     several-taking Plavix    Hx of gallstones     Wife denies    Hypertension     Hyperthyroidism 11/30/2016    Low HDL (under 40) 12/7/2016    Mixed hyperlipidemia 11/23/2016    JUAN MANUEL (obstructive sleep apnea)     Pneumonia     Skin disease     Squamous cell carcinoma 08/2018    right temple    Stroke     Transient cerebral ischemia 7/22/2016    Type 2 diabetes mellitus      Past Surgical History:   Procedure Laterality Date    BIOPSY-BONE MARROW Left 11/3/2016    Performed by [facility] at [facility] OR 2ND FLR    ESOPHAGOGASTRODUODENOSCOPY (EGD) N/A 2/3/2017    Performed by Domenico Fox MD at [facility] ENDO (4TH FLR)    EXCISION-MASS RECTAL  6/28/2016    Performed by Haris Talavera MD at [facility] OR 2ND FLR    Moh's procedure x4      rectal cancer      Sinus surgery for sinus cancer      sinus sx for cancer      skin cancer removed-several times-nose & ear      TONSILLECTOMY      ULTRASOUND-ENDOSCOPIC-UPPER/glue coil of gastric varices N/A 4/19/2017    Performed by Aneudy Perla MD at [facility] ENDO (2ND FLR)    Undescened testicle  sx      UVULOPALATOPHARYNGOPLASTY      for sleep apnea       Home Meds:   Prior to Admission medications    Medication Sig Start Date End Date Taking? Authorizing Provider   ACCU-CHEK NEYDA PLUS TEST STRP Strp 1 strip by Misc.(Non-Drug; Combo Route) route once daily. 11/5/18   Prisca Espinoza MD   ciprofloxacin HCl (CIPRO) 500 MG tablet Take 1 tablet (500 mg total) by mouth every Monday. 5/14/18   Renato Womack MD   divalproex (DEPAKOTE) 250 MG EC tablet Take 250 mg by mouth once daily.  5/5/17   Historical Provider, MD   furosemide (LASIX) 20 MG tablet Take 2 tablets (40 mg total) by mouth once daily. 10/9/18 10/9/19  Renato Womack MD   lactulose (CHRONULAC) 10 gram/15 mL solution TAKE 15 MLS BY MOUTH TWICE DAILY 3/8/18   Renato Womack MD   lancets [facility] To check blood sugar once daily, to use with Accu-Chek meter. 11/5/18   Prisca Espinoza MD   rifAXIMin (XIFAXAN) 550 mg Tab Take 1 tablet (550 mg total) by mouth 2 (two) times daily. 10/3/17   Renato Womack MD   SITagliptan (JANUVIA) 50 MG Tab Take 1 tablet (50 mg total) by mouth once daily. 4/6/17   Prisca Espinoza MD     Anticoagulants/Antiplatelets: no anticoagulation    Allergies:   Review of patient's allergies indicates:   Allergen Reactions    Abilify [aripiprazole] Other (See Comments)     Sleepy, could not function.     Sedation History:  no adverse reactions    Review of Systems:   Hematological: no known coagulopathies  Respiratory: no shortness of breath  Cardiovascular: no chest pain  Gastrointestinal: no abdominal pain  Genito-Urinary: no dysuria  Musculoskeletal: negative  Neurological: no TIA or stroke symptoms         OBJECTIVE:     Vital Signs (Most Recent)  Pulse: 60 (11/16/18 1135)  Resp: 18 (11/16/18 1135)  BP: (!) 143/65 (11/16/18 1135)  SpO2: 97 % (11/16/18 1135)    Physical Exam:  ASA: 2  Mallampati: na    General: no acute distress  Mental Status: alert and oriented to person, place and time  HEENT:  normocephalic, atraumatic  Chest: unlabored breathing  Heart: regular heart rate  Abdomen: distended  Extremity: moves all extremities    Laboratory  Lab Results   Component Value Date    INR 1.1 08/10/2018       Lab Results   Component Value Date    WBC 2.72 (L) 08/10/2018    HGB 9.6 (L) 08/10/2018    HCT 30.2 (L) 08/10/2018    MCV 87 08/10/2018     (L) 08/10/2018      Lab Results   Component Value Date     (H) 08/10/2018     (H) 08/10/2018     08/10/2018     08/10/2018    K 4.2 08/10/2018    K 4.2 08/10/2018     08/10/2018     08/10/2018    CO2 27 08/10/2018    CO2 27 08/10/2018    BUN 17 08/10/2018    BUN 17 08/10/2018    CREATININE 1.2 08/10/2018    CREATININE 1.2 08/10/2018    CALCIUM 8.6 (L) 08/10/2018    CALCIUM 8.6 (L) 08/10/2018    MG 1.7 05/15/2017    ALT 6 (L) 08/10/2018    AST 15 08/10/2018    ALBUMIN 2.7 (L) 08/10/2018    BILITOT 0.6 08/10/2018       ASSESSMENT/PLAN:     Sedation Plan: local anesthesia  Patient will undergo US guided paracentesis

## 2018-11-16 NOTE — PROCEDURES
Radiology Post-Procedure Note    Pre Op Diagnosis: Ascites  Post Op Diagnosis: Same    Procedure: Paracentesis    Procedure performed by: Westley Trotter MD, Vero Londono NP, Harleen Castellanos MD     Written Informed Consent Obtained: Yes  Specimen Removed: YES, clear light yellow ascitic fluid  Estimated Blood Loss: Minimal    Findings:   Successful paracentesis.  Albumin administered PRN per protocol.    Patient tolerated procedure well.    Harleen Castellanos MD  PGY-1  Pager: [phone]

## 2018-11-23 ENCOUNTER — HOSPITAL ENCOUNTER (OUTPATIENT)
Dept: INTERVENTIONAL RADIOLOGY/VASCULAR | Facility: HOSPITAL | Age: 78
Discharge: HOME | End: 2018-11-23
Attending: INTERNAL MEDICINE
Payer: MEDICARE

## 2018-11-23 VITALS — HEART RATE: 59 BPM | SYSTOLIC BLOOD PRESSURE: 116 MMHG | DIASTOLIC BLOOD PRESSURE: 56 MMHG | OXYGEN SATURATION: 94 %

## 2018-11-23 DIAGNOSIS — R18.8 OTHER ASCITES: ICD-10-CM

## 2018-11-23 LAB
APPEARANCE FLD: NORMAL
BODY FLD TYPE: NORMAL
COLOR FLD: YELLOW
LYMPHOCYTES NFR FLD MANUAL: 83 %
MONOS+MACROS NFR FLD MANUAL: 12 %
NEUTROPHILS NFR FLD MANUAL: 5 %
WBC # FLD: 58 /CU MM

## 2018-11-23 PROCEDURE — 49083 ABD PARACENTESIS W/IMAGING: CPT | Mod: GC,,, | Performed by: FAMILY MEDICINE

## 2018-11-23 PROCEDURE — 27200940 IR PARACENTESIS WITH IMAGING

## 2018-11-23 PROCEDURE — 89051 BODY FLUID CELL COUNT: CPT

## 2018-11-23 PROCEDURE — 49083 ABD PARACENTESIS W/IMAGING: CPT

## 2018-11-23 NOTE — H&P
Radiology History & Physical      SUBJECTIVE:     Chief Complaint: abdominal distention    History of Present Illness:  Kenneth Sheikh is a 78 y.o. male who presents for ultrasound guided paracentesis  Past Medical History:   Diagnosis Date    Anticoagulant long-term use     Bipolar 1 disorder     Bipolar 1 disorder 11/24/2016    bipolar    Cancer     history of skin cancer/sinus cancer & rectal cancer    Depressed bipolar affective disorder     Diabetes mellitus     type 2    EBV infection 12/7/2016    EBV DNA, PCR Latest Ref Range: None detected  None detected EBV DNA-Copies/mL Unknown Test Not Performed EBV Early Antigen Ab, IgG Latest Ref Range: <1:10 Titer 1:40 (A) EBV Nuclear Ag Ab Latest Ref Range: <1:5 Titer >=1:80 (A) EBV VCA IgG Latest Ref Range: <1:10 Titer 1:160 (A) EBV VCA IgM Latest Ref Range: <1:10 Titer <1:10     History of TIA (transient ischemic attack)     several-taking Plavix    Hx of gallstones     Wife denies    Hypertension     Hyperthyroidism 11/30/2016    Low HDL (under 40) 12/7/2016    Mixed hyperlipidemia 11/23/2016    JUAN MANUEL (obstructive sleep apnea)     Pneumonia     Skin disease     Squamous cell carcinoma 08/2018    right temple    Stroke     Transient cerebral ischemia 7/22/2016    Type 2 diabetes mellitus      Past Surgical History:   Procedure Laterality Date    BIOPSY-BONE MARROW Left 11/3/2016    Performed by [facility] at [facility] 2ND FLR    ESOPHAGOGASTRODUODENOSCOPY (EGD) N/A 2/3/2017    Performed by Domenico Fox MD at [facility] ENDO (4TH FLR)    EXCISION-MASS RECTAL  6/28/2016    Performed by Haris Talavera MD at [facility] OR 2ND FLR    Moh's procedure x4      rectal cancer      Sinus surgery for sinus cancer      sinus sx for cancer      skin cancer removed-several times-nose & ear      TONSILLECTOMY      ULTRASOUND-ENDOSCOPIC-UPPER/glue coil of gastric varices N/A 4/19/2017    Performed by Aneudy Perla MD at [facility] ENDO (2ND FLR)     Undescened testicle sx      UVULOPALATOPHARYNGOPLASTY      for sleep apnea       Home Meds:   Prior to Admission medications    Medication Sig Start Date End Date Taking? Authorizing Provider   ACCU-CHEK NEYDA PLUS TEST STRP Strp 1 strip by Misc.(Non-Drug; Combo Route) route once daily. 11/5/18   Prisca Espinoza MD   ciprofloxacin HCl (CIPRO) 500 MG tablet Take 1 tablet (500 mg total) by mouth every Monday. 5/14/18   Renato Womack MD   divalproex (DEPAKOTE) 250 MG EC tablet Take 250 mg by mouth once daily.  5/5/17   Historical Provider, MD   furosemide (LASIX) 20 MG tablet Take 2 tablets (40 mg total) by mouth once daily. 10/9/18 10/9/19  Renato Womack MD   lactulose (CHRONULAC) 10 gram/15 mL solution TAKE 15 MLS BY MOUTH TWICE DAILY 3/8/18   Renato Womack MD   lancets [facility] To check blood sugar once daily, to use with Accu-Chek meter. 11/5/18   Prisca Espinoza MD   rifAXIMin (XIFAXAN) 550 mg Tab Take 1 tablet (550 mg total) by mouth 2 (two) times daily. 10/3/17   Renato Womack MD   SITagliptan (JANUVIA) 50 MG Tab Take 1 tablet (50 mg total) by mouth once daily. 4/6/17   Prisca Espinoza MD     Anticoagulants/Antiplatelets: no anticoagulation    Allergies:   Review of patient's allergies indicates:   Allergen Reactions    Abilify [aripiprazole] Other (See Comments)     Sleepy, could not function.     Sedation History:  no adverse reactions    Review of Systems:   Hematological: no known coagulopathies  Respiratory: no shortness of breath  Cardiovascular: no chest pain  Gastrointestinal: no abdominal pain  Genito-Urinary: no dysuria  Musculoskeletal: negative  Neurological: no TIA or stroke symptoms         OBJECTIVE:     Vital Signs (Most Recent)       Physical Exam:  ASA: 2  Mallampati: n/a    General: no acute distress  Mental Status: alert and oriented to person, place and time  HEENT: normocephalic, atraumatic  Chest: unlabored breathing  Heart: regular heart rate  Abdomen:  distended  Extremity: moves all extremities    Laboratory  Lab Results   Component Value Date    INR 1.1 08/10/2018       Lab Results   Component Value Date    WBC 2.72 (L) 08/10/2018    HGB 9.6 (L) 08/10/2018    HCT 30.2 (L) 08/10/2018    MCV 87 08/10/2018     (L) 08/10/2018      Lab Results   Component Value Date     (H) 08/10/2018     (H) 08/10/2018     08/10/2018     08/10/2018    K 4.2 08/10/2018    K 4.2 08/10/2018     08/10/2018     08/10/2018    CO2 27 08/10/2018    CO2 27 08/10/2018    BUN 17 08/10/2018    BUN 17 08/10/2018    CREATININE 1.2 08/10/2018    CREATININE 1.2 08/10/2018    CALCIUM 8.6 (L) 08/10/2018    CALCIUM 8.6 (L) 08/10/2018    MG 1.7 05/15/2017    ALT 6 (L) 08/10/2018    AST 15 08/10/2018    ALBUMIN 2.7 (L) 08/10/2018    BILITOT 0.6 08/10/2018       ASSESSMENT/PLAN:     Sedation Plan: local  Patient will undergo ultrasound guided paracentesis.    MATT Mauricio, FNP  Interventional Radiology  [phone] [employer]

## 2018-11-23 NOTE — PROCEDURES

## 2018-11-28 ENCOUNTER — TELEPHONE (OUTPATIENT)
Dept: INTERNAL MEDICINE | Facility: CLINIC | Age: 78
End: 2018-11-28

## 2018-11-28 NOTE — TELEPHONE ENCOUNTER
Please advise     Pt is asking that a depakote level be added to lab order. Labs have been schedule. Pt also want someone to call her back once lab results come in,and she needs a copy of the depakote level results for another provider sent to her.

## 2018-11-28 NOTE — TELEPHONE ENCOUNTER
Depakote level already ordered (same as valproic acid).  Make sure it is attached to the lab appointment.

## 2018-11-29 DIAGNOSIS — R18.8 OTHER ASCITES: Primary | ICD-10-CM

## 2018-11-30 ENCOUNTER — TELEPHONE (OUTPATIENT)
Dept: INTERNAL MEDICINE | Facility: CLINIC | Age: 78
End: 2018-11-30

## 2018-11-30 ENCOUNTER — HOSPITAL ENCOUNTER (OUTPATIENT)
Dept: INTERVENTIONAL RADIOLOGY/VASCULAR | Facility: HOSPITAL | Age: 78
Discharge: HOME | End: 2018-11-30
Attending: INTERNAL MEDICINE
Payer: MEDICARE

## 2018-11-30 VITALS
RESPIRATION RATE: 16 BRPM | DIASTOLIC BLOOD PRESSURE: 57 MMHG | HEART RATE: 58 BPM | SYSTOLIC BLOOD PRESSURE: 115 MMHG | OXYGEN SATURATION: 100 %

## 2018-11-30 DIAGNOSIS — R18.8 OTHER ASCITES: ICD-10-CM

## 2018-11-30 LAB
APPEARANCE FLD: NORMAL
BODY FLD TYPE: NORMAL
COLOR FLD: YELLOW
EOSINOPHIL NFR FLD MANUAL: 2 %
LYMPHOCYTES NFR FLD MANUAL: 82 %
MONOS+MACROS NFR FLD MANUAL: 14 %
NEUTROPHILS NFR FLD MANUAL: 2 %
WBC # FLD: 45 /CU MM

## 2018-11-30 PROCEDURE — 87075 CULTR BACTERIA EXCEPT BLOOD: CPT

## 2018-11-30 PROCEDURE — 49083 ABD PARACENTESIS W/IMAGING: CPT | Mod: GC,,, | Performed by: RADIOLOGY

## 2018-11-30 PROCEDURE — 87070 CULTURE OTHR SPECIMN AEROBIC: CPT

## 2018-11-30 PROCEDURE — 63600175 PHARM REV CODE 636 W HCPCS: Mod: JG | Performed by: INTERNAL MEDICINE

## 2018-11-30 PROCEDURE — P9047 ALBUMIN (HUMAN), 25%, 50ML: HCPCS | Mod: JG | Performed by: INTERNAL MEDICINE

## 2018-11-30 PROCEDURE — 27200940 IR PARACENTESIS WITH IMAGING

## 2018-11-30 PROCEDURE — 89051 BODY FLUID CELL COUNT: CPT

## 2018-11-30 PROCEDURE — 49083 ABD PARACENTESIS W/IMAGING: CPT

## 2018-11-30 RX ORDER — ALBUMIN HUMAN 250 G/1000ML
12.5 SOLUTION INTRAVENOUS ONCE
Status: COMPLETED | OUTPATIENT
Start: 2018-11-30 | End: 2018-11-30

## 2018-11-30 RX ORDER — ALBUMIN HUMAN 250 G/1000ML
25 SOLUTION INTRAVENOUS ONCE
Status: COMPLETED | OUTPATIENT
Start: 2018-11-30 | End: 2018-11-30

## 2018-11-30 RX ADMIN — ALBUMIN (HUMAN) 12.5 G: 25 SOLUTION INTRAVENOUS at 12:11

## 2018-11-30 RX ADMIN — ALBUMIN (HUMAN) 25 G: 25 SOLUTION INTRAVENOUS at 12:11

## 2018-11-30 NOTE — PROCEDURES
Radiology Post-Procedure Note    Pre Op Diagnosis: Ascites  Post Op Diagnosis: Same    Procedure: Paracentesis    Procedure performed by: Juventino Prado MD    Written Informed Consent Obtained: Yes  Specimen Removed: YES clear yellow fluid  Estimated Blood Loss: Minimal    Findings:   Successful paracentesis.  Albumin administered PRN per protocol.    Patient tolerated procedure well.    Juventino Prado MD  Radiology PGY-3  [phone]

## 2018-11-30 NOTE — H&P
Radiology History & Physical      SUBJECTIVE:     Chief Complaint: ascites    History of Present Illness:  Kenneth Sheikh is a 78 y.o. male who presents for paracentesis.  Past Medical History:   Diagnosis Date    Anticoagulant long-term use     Bipolar 1 disorder     Bipolar 1 disorder 11/24/2016    bipolar    Cancer     history of skin cancer/sinus cancer & rectal cancer    Depressed bipolar affective disorder     Diabetes mellitus     type 2    EBV infection 12/7/2016    EBV DNA, PCR Latest Ref Range: None detected  None detected EBV DNA-Copies/mL Unknown Test Not Performed EBV Early Antigen Ab, IgG Latest Ref Range: <1:10 Titer 1:40 (A) EBV Nuclear Ag Ab Latest Ref Range: <1:5 Titer >=1:80 (A) EBV VCA IgG Latest Ref Range: <1:10 Titer 1:160 (A) EBV VCA IgM Latest Ref Range: <1:10 Titer <1:10     History of TIA (transient ischemic attack)     several-taking Plavix    Hx of gallstones     Wife denies    Hypertension     Hyperthyroidism 11/30/2016    Low HDL (under 40) 12/7/2016    Mixed hyperlipidemia 11/23/2016    JUAN MANUEL (obstructive sleep apnea)     Pneumonia     Skin disease     Squamous cell carcinoma 08/2018    right temple    Stroke     Transient cerebral ischemia 7/22/2016    Type 2 diabetes mellitus      Past Surgical History:   Procedure Laterality Date    BIOPSY-BONE MARROW Left 11/3/2016    Performed by [facility] at [facility] OR 2ND FLR    ESOPHAGOGASTRODUODENOSCOPY (EGD) N/A 2/3/2017    Performed by Domenico Fox MD at [facility] ENDO (4TH FLR)    EXCISION-MASS RECTAL  6/28/2016    Performed by Haris Talavera MD at [facility] OR 2ND FLR    Moh's procedure x4      rectal cancer      Sinus surgery for sinus cancer      sinus sx for cancer      skin cancer removed-several times-nose & ear      TONSILLECTOMY      ULTRASOUND-ENDOSCOPIC-UPPER/glue coil of gastric varices N/A 4/19/2017    Performed by Aneudy Perla MD at [facility] ENDO (2ND FLR)    Undescened testicle sx       UVULOPALATOPHARYNGOPLASTY      for sleep apnea       Home Meds:   Prior to Admission medications    Medication Sig Start Date End Date Taking? Authorizing Provider   ACCU-CHEK NEYDA PLUS TEST STRP Strp 1 strip by Misc.(Non-Drug; Combo Route) route once daily. 11/5/18   Prisca Espinoza MD   ciprofloxacin HCl (CIPRO) 500 MG tablet Take 1 tablet (500 mg total) by mouth every Monday. 5/14/18   Renato Womack MD   divalproex (DEPAKOTE) 250 MG EC tablet Take 250 mg by mouth once daily.  5/5/17   Historical Provider, MD   furosemide (LASIX) 20 MG tablet Take 2 tablets (40 mg total) by mouth once daily. 10/9/18 10/9/19  Renato Womack MD   lactulose (CHRONULAC) 10 gram/15 mL solution TAKE 15 MLS BY MOUTH TWICE DAILY 3/8/18   Renato Womack MD   lancets [facility] To check blood sugar once daily, to use with Accu-Chek meter. 11/5/18   Prisca Espinoza MD   rifAXIMin (XIFAXAN) 550 mg Tab Take 1 tablet (550 mg total) by mouth 2 (two) times daily. 10/3/17   Renato Womack MD   SITagliptan (JANUVIA) 50 MG Tab Take 1 tablet (50 mg total) by mouth once daily. 4/6/17   Prisca Espinoza MD     Anticoagulants/Antiplatelets: no anticoagulation    Allergies:   Review of patient's allergies indicates:   Allergen Reactions    Abilify [aripiprazole] Other (See Comments)     Sleepy, could not function.     Sedation History:  no adverse reactions    Review of Systems:   Hematological: no known coagulopathies  Respiratory: no shortness of breath  Cardiovascular: no chest pain  Gastrointestinal: no abdominal pain  Genito-Urinary: no dysuria  Musculoskeletal: negative  Neurological: no TIA or stroke symptoms         OBJECTIVE:     Vital Signs (Most Recent)       Physical Exam:  ASA: 3  Mallampati: 2    General: no acute distress  Mental Status: alert and oriented to person, place and time  HEENT: normocephalic, atraumatic  Chest: unlabored breathing  Heart: regular heart rate  Abdomen: distended  Extremity: moves all  extremities    Laboratory  Lab Results   Component Value Date    INR 1.1 08/10/2018       Lab Results   Component Value Date    WBC 2.72 (L) 08/10/2018    HGB 9.6 (L) 08/10/2018    HCT 30.2 (L) 08/10/2018    MCV 87 08/10/2018     (L) 08/10/2018      Lab Results   Component Value Date     (H) 08/10/2018     (H) 08/10/2018     08/10/2018     08/10/2018    K 4.2 08/10/2018    K 4.2 08/10/2018     08/10/2018     08/10/2018    CO2 27 08/10/2018    CO2 27 08/10/2018    BUN 17 08/10/2018    BUN 17 08/10/2018    CREATININE 1.2 08/10/2018    CREATININE 1.2 08/10/2018    CALCIUM 8.6 (L) 08/10/2018    CALCIUM 8.6 (L) 08/10/2018    MG 1.7 05/15/2017    ALT 6 (L) 08/10/2018    AST 15 08/10/2018    ALBUMIN 2.7 (L) 08/10/2018    BILITOT 0.6 08/10/2018       ASSESSMENT/PLAN:     Sedation Plan: local  Patient will undergo paracentesis.    Juventino Prado MD  Radiology PGY-3  [phone]

## 2018-11-30 NOTE — TELEPHONE ENCOUNTER
Pt wife stated the medication reads Divalproex sod  250mg tab. Take 2 tablets by mouth at bed time. Pt wife says he doesn't need it just yet she just had it refilled. Pt wife also ask the provider give her a call back she would like to speak to her personally.

## 2018-11-30 NOTE — DISCHARGE INSTRUCTIONS
For scheduling: Call Mackenzie at [phone]    For questions or concerns call: SHAR MON[facility] 8 AM- 5PM [phone]. Radiology resident on call [phone].    For immediate concerns that are not emergent, you may call our radiology clinic at: [phone]

## 2018-11-30 NOTE — PROGRESS NOTES
Procedure complete. PT tolerated well. 4500 ml removed. Albumin given as per protocol. Left abdomen site clean, dry, intact, no bleeding, no hematoma. Pt given discharge and site care instructions. Pt verbalized understanding. Pt discharged home in care of wife via wheelchair.

## 2018-11-30 NOTE — TELEPHONE ENCOUNTER
----- Message from Мария Tolliver sent at 11/30/2018  4:21 PM CST -----  Contact: Pt wife   Pt wife is requesting a call back in regards to depakote level be added to pt blood test.       Pt wife can be contacted at [phone]

## 2018-11-30 NOTE — TELEPHONE ENCOUNTER
Yes, please confirm with wife whether he is taking 250mg before bed or 500mg before bed.  Also get them to read you the bottle word for word because there are different forms of Depakote and I want to make sure he stays on the same one since I have never prescribed it in the past.

## 2018-12-03 ENCOUNTER — TELEPHONE (OUTPATIENT)
Dept: INTERNAL MEDICINE | Facility: CLINIC | Age: 78
End: 2018-12-03

## 2018-12-03 DIAGNOSIS — F31.9 BIPOLAR 1 DISORDER: Primary | ICD-10-CM

## 2018-12-03 LAB — BACTERIA SPEC AEROBE CULT: NO GROWTH

## 2018-12-03 NOTE — TELEPHONE ENCOUNTER
Please call pharmacy and see if patient is currently prescribed divalproex delayed release tablet or extended release tablet?

## 2018-12-03 NOTE — TELEPHONE ENCOUNTER
----- Message from Bud Fox sent at 12/3/2018  1:56 PM CST -----  Contact: pt wife  Pt wife  would like to be called back regarding her husbands treatment.    Pt wife  can be reached at [phone] after 4 pm

## 2018-12-04 ENCOUNTER — LAB VISIT (OUTPATIENT)
Dept: LAB | Facility: HOSPITAL | Age: 78
End: 2018-12-04
Attending: INTERNAL MEDICINE
Payer: MEDICARE

## 2018-12-04 DIAGNOSIS — K75.81 LIVER CIRRHOSIS SECONDARY TO NASH (NONALCOHOLIC STEATOHEPATITIS): ICD-10-CM

## 2018-12-04 DIAGNOSIS — E11.22 TYPE 2 DIABETES MELLITUS WITH STAGE 3 CHRONIC KIDNEY DISEASE, WITHOUT LONG-TERM CURRENT USE OF INSULIN: ICD-10-CM

## 2018-12-04 DIAGNOSIS — D61.818 PANCYTOPENIA: ICD-10-CM

## 2018-12-04 DIAGNOSIS — N18.30 CKD (CHRONIC KIDNEY DISEASE) STAGE 3, GFR 30-59 ML/MIN: ICD-10-CM

## 2018-12-04 DIAGNOSIS — F31.9 BIPOLAR 1 DISORDER: ICD-10-CM

## 2018-12-04 DIAGNOSIS — K74.60 LIVER CIRRHOSIS SECONDARY TO NASH (NONALCOHOLIC STEATOHEPATITIS): ICD-10-CM

## 2018-12-04 DIAGNOSIS — N18.30 TYPE 2 DIABETES MELLITUS WITH STAGE 3 CHRONIC KIDNEY DISEASE, WITHOUT LONG-TERM CURRENT USE OF INSULIN: ICD-10-CM

## 2018-12-04 LAB
ALBUMIN SERPL BCP-MCNC: 2.5 G/DL
ALP SERPL-CCNC: 105 U/L
ALT SERPL W/O P-5'-P-CCNC: 5 U/L
ANION GAP SERPL CALC-SCNC: 7 MMOL/L
AST SERPL-CCNC: 18 U/L
BASOPHILS # BLD AUTO: 0.02 K/UL
BASOPHILS NFR BLD: 0.7 %
BILIRUB SERPL-MCNC: 0.7 MG/DL
BUN SERPL-MCNC: 17 MG/DL
CALCIUM SERPL-MCNC: 8.8 MG/DL
CHLORIDE SERPL-SCNC: 103 MMOL/L
CHOLEST SERPL-MCNC: 113 MG/DL
CHOLEST/HDLC SERPL: 3.3 {RATIO}
CO2 SERPL-SCNC: 29 MMOL/L
CREAT SERPL-MCNC: 1.2 MG/DL
DIFFERENTIAL METHOD: ABNORMAL
EOSINOPHIL # BLD AUTO: 0.1 K/UL
EOSINOPHIL NFR BLD: 2.4 %
ERYTHROCYTE [DISTWIDTH] IN BLOOD BY AUTOMATED COUNT: 15 %
EST. GFR  (AFRICAN AMERICAN): >60 ML/MIN/1.73 M^2
EST. GFR  (NON AFRICAN AMERICAN): 56.8 ML/MIN/1.73 M^2
ESTIMATED AVG GLUCOSE: 166 MG/DL
GLUCOSE SERPL-MCNC: 127 MG/DL
HBA1C MFR BLD HPLC: 7.4 %
HCT VFR BLD AUTO: 32.7 %
HDLC SERPL-MCNC: 34 MG/DL
HDLC SERPL: 30.1 %
HGB BLD-MCNC: 10.1 G/DL
IMM GRANULOCYTES # BLD AUTO: 0 K/UL
IMM GRANULOCYTES NFR BLD AUTO: 0 %
LDLC SERPL CALC-MCNC: 56.6 MG/DL
LYMPHOCYTES # BLD AUTO: 0.8 K/UL
LYMPHOCYTES NFR BLD: 26.6 %
MCH RBC QN AUTO: 27 PG
MCHC RBC AUTO-ENTMCNC: 30.9 G/DL
MCV RBC AUTO: 87 FL
MONOCYTES # BLD AUTO: 0.6 K/UL
MONOCYTES NFR BLD: 19.8 %
NEUTROPHILS # BLD AUTO: 1.5 K/UL
NEUTROPHILS NFR BLD: 50.5 %
NONHDLC SERPL-MCNC: 79 MG/DL
NRBC BLD-RTO: 0 /100 WBC
PLATELET # BLD AUTO: 122 K/UL
PMV BLD AUTO: 10.8 FL
POTASSIUM SERPL-SCNC: 3.6 MMOL/L
PROT SERPL-MCNC: 6.5 G/DL
RBC # BLD AUTO: 3.74 M/UL
SODIUM SERPL-SCNC: 139 MMOL/L
TRIGL SERPL-MCNC: 112 MG/DL
VALPROATE SERPL-MCNC: 56.2 UG/ML
WBC # BLD AUTO: 2.93 K/UL

## 2018-12-04 PROCEDURE — 80164 ASSAY DIPROPYLACETIC ACD TOT: CPT

## 2018-12-04 PROCEDURE — 83036 HEMOGLOBIN GLYCOSYLATED A1C: CPT

## 2018-12-04 PROCEDURE — 85025 COMPLETE CBC W/AUTO DIFF WBC: CPT

## 2018-12-04 PROCEDURE — 80053 COMPREHEN METABOLIC PANEL: CPT

## 2018-12-04 PROCEDURE — 80061 LIPID PANEL: CPT

## 2018-12-04 PROCEDURE — 36415 COLL VENOUS BLD VENIPUNCTURE: CPT | Mod: PO

## 2018-12-04 NOTE — TELEPHONE ENCOUNTER
Please advise     Contacted both pharmacy's on file and neither have the medication Divalproex on file.

## 2018-12-06 NOTE — TELEPHONE ENCOUNTER
Please call wife and see what pharmacy they are using for the Depakote.  I want to confirm exactly what formulation he is taking.  I know the bottle says Divalproex but I want to know if he is taking the delayed release tablet or extended release tablet.  They are slightly different.  Also let wife know that recent labs were stable.

## 2018-12-06 NOTE — TELEPHONE ENCOUNTER
Spoke to wife---the prescription bottle reads---Divalproex SOD  Tab 250mg  2 tablets at bedtime----wife states that this bottle she got was from [facility] mail order----would like to start getting it at the [facility] in [facility] but patient is not due for refill right now---has enough for about couple of months----states that Dr. Espinoza will let the pharmacy know to put it on filed

## 2018-12-07 ENCOUNTER — HOSPITAL ENCOUNTER (OUTPATIENT)
Dept: INTERVENTIONAL RADIOLOGY/VASCULAR | Facility: HOSPITAL | Age: 78
Discharge: HOME | End: 2018-12-07
Attending: INTERNAL MEDICINE
Payer: MEDICARE

## 2018-12-07 VITALS
HEART RATE: 61 BPM | SYSTOLIC BLOOD PRESSURE: 119 MMHG | OXYGEN SATURATION: 100 % | RESPIRATION RATE: 18 BRPM | DIASTOLIC BLOOD PRESSURE: 67 MMHG

## 2018-12-07 DIAGNOSIS — R18.8 OTHER ASCITES: ICD-10-CM

## 2018-12-07 LAB — BACTERIA SPEC ANAEROBE CULT: NORMAL

## 2018-12-07 PROCEDURE — 27200940 IR PARACENTESIS WITH IMAGING

## 2018-12-07 PROCEDURE — 49083 ABD PARACENTESIS W/IMAGING: CPT | Mod: GC,,, | Performed by: NURSE PRACTITIONER

## 2018-12-07 NOTE — PROGRESS NOTES
Paracentesis complete, 4000 MLs removed. Dressing applied to right abdomen puncture site, dressing clean dry and intact. Pt given discharge instructions and handouts, pt verbalizes understanding. Questions answered. Pt denies pain and discomfort. Pt transported to lobby via wheelchair.

## 2018-12-07 NOTE — H&P
Radiology History & Physical      SUBJECTIVE:     Chief Complaint: abdominal distention    History of Present Illness:  Kenneth Sheikh is a 78 y.o. male who presents for evaluation of ascites  Past Medical History:   Diagnosis Date    Anticoagulant long-term use     Bipolar 1 disorder     Bipolar 1 disorder 11/24/2016    bipolar    Cancer     history of skin cancer/sinus cancer & rectal cancer    Depressed bipolar affective disorder     Diabetes mellitus     type 2    EBV infection 12/7/2016    EBV DNA, PCR Latest Ref Range: None detected  None detected EBV DNA-Copies/mL Unknown Test Not Performed EBV Early Antigen Ab, IgG Latest Ref Range: <1:10 Titer 1:40 (A) EBV Nuclear Ag Ab Latest Ref Range: <1:5 Titer >=1:80 (A) EBV VCA IgG Latest Ref Range: <1:10 Titer 1:160 (A) EBV VCA IgM Latest Ref Range: <1:10 Titer <1:10     History of TIA (transient ischemic attack)     several-taking Plavix    Hx of gallstones     Wife denies    Hypertension     Hyperthyroidism 11/30/2016    Low HDL (under 40) 12/7/2016    Mixed hyperlipidemia 11/23/2016    JUAN MANUEL (obstructive sleep apnea)     Pneumonia     Skin disease     Squamous cell carcinoma 08/2018    right temple    Stroke     Transient cerebral ischemia 7/22/2016    Type 2 diabetes mellitus      Past Surgical History:   Procedure Laterality Date    BIOPSY-BONE MARROW Left 11/3/2016    Performed by Bud Bob MD at [facility] OR 2ND FLR    ESOPHAGOGASTRODUODENOSCOPY (EGD) N/A 2/3/2017    Performed by Domenico Fox MD at [facility] ENDO (4TH FLR)    EXCISION-MASS RECTAL  6/28/2016    Performed by Haris Talavera MD at [facility] OR 2ND FLR    Moh's procedure x4      rectal cancer      Sinus surgery for sinus cancer      sinus sx for cancer      skin cancer removed-several times-nose & ear      TONSILLECTOMY      ULTRASOUND-ENDOSCOPIC-UPPER/glue coil of gastric varices N/A 4/19/2017    Performed by Aneudy Perla MD at [facility] ENDO (2ND FLR)    Undescened  testicle sx      UVULOPALATOPHARYNGOPLASTY      for sleep apnea       Home Meds:   Prior to Admission medications    Medication Sig Start Date End Date Taking? Authorizing Provider   ACCU-CHEK NEYDA PLUS TEST STRP Strp 1 strip by Misc.(Non-Drug; Combo Route) route once daily. 11/5/18   Prisca Espinoza MD   ciprofloxacin HCl (CIPRO) 500 MG tablet Take 1 tablet (500 mg total) by mouth every Monday. 5/14/18   Renato Womack MD   divalproex (DEPAKOTE) 250 MG EC tablet Take 250 mg by mouth once daily.  5/5/17   Historical Provider, MD   furosemide (LASIX) 20 MG tablet Take 2 tablets (40 mg total) by mouth once daily. 10/9/18 10/9/19  Renato Womack MD   lactulose (CHRONULAC) 10 gram/15 mL solution TAKE 15 MLS BY MOUTH TWICE DAILY 3/8/18   Renato Womack MD   lancets [facility] To check blood sugar once daily, to use with Accu-Chek meter. 11/5/18   Prisca Espinoza MD   rifAXIMin (XIFAXAN) 550 mg Tab Take 1 tablet (550 mg total) by mouth 2 (two) times daily. 10/3/17   Renato Womack MD   SITagliptan (JANUVIA) 50 MG Tab Take 1 tablet (50 mg total) by mouth once daily. 4/6/17   Prisca Espinoza MD     Anticoagulants/Antiplatelets: no anticoagulation    Allergies:   Review of patient's allergies indicates:   Allergen Reactions    Abilify [aripiprazole] Other (See Comments)     Sleepy, could not function.     Sedation History:  no adverse reactions    Review of Systems:   Hematological: no known coagulopathies  Respiratory: no shortness of breath  Cardiovascular: no chest pain  Gastrointestinal: no abdominal pain  Genito-Urinary: no dysuria  Musculoskeletal: negative  Neurological: no TIA or stroke symptoms         OBJECTIVE:     Vital Signs (Most Recent)  Pulse: (!) 54 (12/07/18 1120)  Resp: 18 (12/07/18 1120)  BP: 124/61 (12/07/18 1120)  SpO2: 99 % (12/07/18 1120)    Physical Exam:  ASA: 2  Mallampati: na    General: no acute distress  Mental Status: alert and oriented to person, place and  time  HEENT: normocephalic, atraumatic  Chest: unlabored breathing  Heart: regular heart rate  Abdomen: distended  Extremity: moves all extremities    Laboratory  Lab Results   Component Value Date    INR 1.1 08/10/2018       Lab Results   Component Value Date    WBC 2.93 (L) 12/04/2018    HGB 10.1 (L) 12/04/2018    HCT 32.7 (L) 12/04/2018    MCV 87 12/04/2018     (L) 12/04/2018      Lab Results   Component Value Date     (H) 12/04/2018     12/04/2018    K 3.6 12/04/2018     12/04/2018    CO2 29 12/04/2018    BUN 17 12/04/2018    CREATININE 1.2 12/04/2018    CALCIUM 8.8 12/04/2018    MG 1.7 05/15/2017    ALT 5 (L) 12/04/2018    AST 18 12/04/2018    ALBUMIN 2.5 (L) 12/04/2018    BILITOT 0.7 12/04/2018       ASSESSMENT/PLAN:     Sedation Plan: local anesthesia  Patient will undergo US guided paracentesis

## 2018-12-07 NOTE — DISCHARGE INSTRUCTIONS
For scheduling: Call Mackenzie at [phone]    For questions or concerns call: SHAR MON[facility] 8 AM- 5PM [phone]. Radiology resident on call [phone].    For immediate concerns that are not emergent, you may call our radiology clinic at: [phone]

## 2018-12-10 ENCOUNTER — TELEPHONE (OUTPATIENT)
Dept: INTERNAL MEDICINE | Facility: CLINIC | Age: 78
End: 2018-12-10

## 2018-12-10 RX ORDER — DIVALPROEX SODIUM 250 MG/1
500 TABLET, DELAYED RELEASE ORAL NIGHTLY
Qty: 60 TABLET | Refills: 3 | Status: SHIPPED | OUTPATIENT
Start: 2018-12-10 | End: 2019-10-25 | Stop reason: SDUPTHER

## 2018-12-10 NOTE — TELEPHONE ENCOUNTER
Spoke to pharmacy and is needing clarification on the Depakote----they are wanting to know if it is Delay release or Extended release---pls advise

## 2018-12-10 NOTE — TELEPHONE ENCOUNTER
----- Message from Scot Stein sent at 12/10/2018  3:51 PM CST -----  Contact: Marleni/[facility]/[phone]  Pt's pharmacy is calling to speak with someone in the office in regards to the medication divalproex (DEPAKOTE) 250 MG EC tablet. They need clarification on the prescription. Please advise.        Thanks

## 2018-12-14 ENCOUNTER — HOSPITAL ENCOUNTER (OUTPATIENT)
Dept: INTERVENTIONAL RADIOLOGY/VASCULAR | Facility: HOSPITAL | Age: 78
Discharge: HOME | End: 2018-12-14
Attending: INTERNAL MEDICINE
Payer: MEDICARE

## 2018-12-14 VITALS — OXYGEN SATURATION: 96 % | SYSTOLIC BLOOD PRESSURE: 152 MMHG | DIASTOLIC BLOOD PRESSURE: 68 MMHG | HEART RATE: 53 BPM

## 2018-12-14 DIAGNOSIS — R18.8 OTHER ASCITES: ICD-10-CM

## 2018-12-14 PROCEDURE — 49083 ABD PARACENTESIS W/IMAGING: CPT | Mod: ,,, | Performed by: FAMILY MEDICINE

## 2018-12-14 PROCEDURE — 49083 ABD PARACENTESIS W/IMAGING: CPT

## 2018-12-14 PROCEDURE — 27201082 IR PARACENTESIS WITH IMAGING

## 2018-12-14 NOTE — PROGRESS NOTES
ZULAY Comer NP to bedside. Hananeeh Catheter placed to LLQ with use of ultrasound guidance. No complaints or signs of distress. Will continue to monitor.

## 2018-12-14 NOTE — H&P
Radiology History & Physical      SUBJECTIVE:     Chief Complaint: abdominal distention    History of Present Illness:  Kenneth Sheikh is a 78 y.o. male who presents for ultrasound guided paracentesis  Past Medical History:   Diagnosis Date    Anticoagulant long-term use     Bipolar 1 disorder     Bipolar 1 disorder 11/24/2016    bipolar    Cancer     history of skin cancer/sinus cancer & rectal cancer    Depressed bipolar affective disorder     Diabetes mellitus     type 2    EBV infection 12/7/2016    EBV DNA, PCR Latest Ref Range: None detected  None detected EBV DNA-Copies/mL Unknown Test Not Performed EBV Early Antigen Ab, IgG Latest Ref Range: <1:10 Titer 1:40 (A) EBV Nuclear Ag Ab Latest Ref Range: <1:5 Titer >=1:80 (A) EBV VCA IgG Latest Ref Range: <1:10 Titer 1:160 (A) EBV VCA IgM Latest Ref Range: <1:10 Titer <1:10     History of TIA (transient ischemic attack)     several-taking Plavix    Hx of gallstones     Wife denies    Hypertension     Hyperthyroidism 11/30/2016    Low HDL (under 40) 12/7/2016    Mixed hyperlipidemia 11/23/2016    JUAN MANUEL (obstructive sleep apnea)     Pneumonia     Skin disease     Squamous cell carcinoma 08/2018    right temple    Stroke     Transient cerebral ischemia 7/22/2016    Type 2 diabetes mellitus      Past Surgical History:   Procedure Laterality Date    BIOPSY-BONE MARROW Left 11/3/2016    Performed by Bud Bob MD at [facility] OR 2ND FLR    ESOPHAGOGASTRODUODENOSCOPY (EGD) N/A 2/3/2017    Performed by Domenico Fox MD at [facility] ENDO (4TH FLR)    EXCISION-MASS RECTAL  6/28/2016    Performed by Haris Talavera MD at [facility] OR 2ND FLR    Moh's procedure x4      rectal cancer      Sinus surgery for sinus cancer      sinus sx for cancer      skin cancer removed-several times-nose & ear      TONSILLECTOMY      ULTRASOUND-ENDOSCOPIC-UPPER/glue coil of gastric varices N/A 4/19/2017    Performed by Aneudy Perla MD at [facility] ENDO (2ND FLR)     Undescened testicle sx      UVULOPALATOPHARYNGOPLASTY      for sleep apnea       Home Meds:   Prior to Admission medications    Medication Sig Start Date End Date Taking? Authorizing Provider   ACCU-CHEK NEYDA PLUS TEST STRP Strp 1 strip by Misc.(Non-Drug; Combo Route) route once daily. 11/5/18   Prisca Espinoza MD   ciprofloxacin HCl (CIPRO) 500 MG tablet Take 1 tablet (500 mg total) by mouth every Monday. 5/14/18   Renato Womack MD   divalproex (DEPAKOTE) 250 MG EC tablet Take 2 tablets (500 mg total) by mouth every evening. Do not fill prescription until patient calls and requests. 12/10/18   Prisca Espinoza MD   furosemide (LASIX) 20 MG tablet Take 2 tablets (40 mg total) by mouth once daily. 10/9/18 10/9/19  Renato Womack MD   lactulose (CHRONULAC) 10 gram/15 mL solution TAKE 15 MLS BY MOUTH TWICE DAILY 3/8/18   Renato Womack MD   lancets [facility] [place] To check blood sugar once daily, to use with Accu-Chek meter. 11/5/18   Prisca Espinoza MD   rifAXIMin (XIFAXAN) 550 mg Tab Take 1 tablet (550 mg total) by mouth 2 (two) times daily. 10/3/17   Renato Womack MD   SITagliptan (JANUVIA) 50 MG Tab Take 1 tablet (50 mg total) by mouth once daily. 4/6/17   Prisca Espinoza MD     Anticoagulants/Antiplatelets: no anticoagulation    Allergies:   Review of patient's allergies indicates:   Allergen Reactions    Abilify [aripiprazole] Other (See Comments)     Sleepy, could not function.     Sedation History:  no adverse reactions    Review of Systems:   Hematological: no known coagulopathies  Respiratory: no shortness of breath  Cardiovascular: no chest pain  Gastrointestinal: no abdominal pain  Genito-Urinary: no dysuria  Musculoskeletal: negative  Neurological: no TIA or stroke symptoms         OBJECTIVE:     Vital Signs (Most Recent)       Physical Exam:  ASA: 2  Mallampati: n/a    General: no acute distress  Mental Status: alert and oriented to person, place and time  HEENT:  normocephalic, atraumatic  Chest: unlabored breathing  Heart: regular heart rate  Abdomen: distended  Extremity: moves all extremities    Laboratory  Lab Results   Component Value Date    INR 1.1 08/10/2018       Lab Results   Component Value Date    WBC 2.93 (L) 12/04/2018    HGB 10.1 (L) 12/04/2018    HCT 32.7 (L) 12/04/2018    MCV 87 12/04/2018     (L) 12/04/2018      Lab Results   Component Value Date     (H) 12/04/2018     12/04/2018    K 3.6 12/04/2018     12/04/2018    CO2 29 12/04/2018    BUN 17 12/04/2018    CREATININE 1.2 12/04/2018    CALCIUM 8.8 12/04/2018    MG 1.7 05/15/2017    ALT 5 (L) 12/04/2018    AST 18 12/04/2018    ALBUMIN 2.5 (L) 12/04/2018    BILITOT 0.7 12/04/2018       ASSESSMENT/PLAN:     Sedation Plan: local  Patient will undergo ultrasound guided paracentesis.    MATT Mauricio, FNP  Interventional Radiology  [phone] [employer]

## 2018-12-14 NOTE — PROGRESS NOTES
Paracentesis complete. Pt tolerated well. Total 3,750 ml peritoneal fluid removed.    Pt verbalized instructions on care for puncture site to Q.  AVS reviewd and provided.

## 2018-12-14 NOTE — PROCEDURES

## 2018-12-17 ENCOUNTER — PROCEDURE VISIT (OUTPATIENT)
Dept: DERMATOLOGY | Facility: CLINIC | Age: 78
End: 2018-12-17
Payer: MEDICARE

## 2018-12-17 VITALS
SYSTOLIC BLOOD PRESSURE: 103 MMHG | HEIGHT: 68 IN | DIASTOLIC BLOOD PRESSURE: 63 MMHG | BODY MASS INDEX: 25.76 KG/M2 | HEART RATE: 51 BPM | WEIGHT: 170 LBS

## 2018-12-17 DIAGNOSIS — C44.01 BASAL CELL CARCINOMA, LIP: Primary | ICD-10-CM

## 2018-12-17 PROCEDURE — 17311 MOHS 1 STAGE H/N/HF/G: CPT | Mod: PBBFAC | Performed by: DERMATOLOGY

## 2018-12-17 PROCEDURE — 99499 UNLISTED E&M SERVICE: CPT | Mod: S$PBB,,, | Performed by: DERMATOLOGY

## 2018-12-17 PROCEDURE — 14060 TIS TRNFR E/N/E/L 10 SQ CM/<: CPT | Mod: S$PBB,,, | Performed by: DERMATOLOGY

## 2018-12-17 PROCEDURE — 14060 TIS TRNFR E/N/E/L 10 SQ CM/<: CPT | Mod: PBBFAC | Performed by: DERMATOLOGY

## 2018-12-17 PROCEDURE — 17311 MOHS 1 STAGE H/N/HF/G: CPT | Mod: 51,S$PBB,, | Performed by: DERMATOLOGY

## 2018-12-17 NOTE — PROGRESS NOTES
ALLERGIES:   Abilify [aripiprazole]      Current Outpatient Medications:     ACCU-CHEK NEYDA PLUS TEST STRP Strp, 1 strip by Misc.(Non-Drug; Combo Route) route once daily., Disp: 90 strip, Rfl: 3    ciprofloxacin HCl (CIPRO) 500 MG tablet, Take 1 tablet (500 mg total) by mouth every Monday., Disp: 12 tablet, Rfl: 3    divalproex (DEPAKOTE) 250 MG EC tablet, Take 2 tablets (500 mg total) by mouth every evening. Do not fill prescription until patient calls and requests., Disp: 60 tablet, Rfl: 3    furosemide (LASIX) 20 MG tablet, Take 2 tablets (40 mg total) by mouth once daily., Disp: 60 tablet, Rfl: 6    lactulose (CHRONULAC) 10 gram/15 mL solution, TAKE 15 MLS BY MOUTH TWICE DAILY, Disp: 2838 mL, Rfl: 5    lancets Misc, To check blood sugar once daily, to use with Accu-Chek meter., Disp: 90 each, Rfl: 3    rifAXIMin (XIFAXAN) 550 mg Tab, Take 1 tablet (550 mg total) by mouth 2 (two) times daily., Disp: 60 tablet, Rfl: 11    SITagliptan (JANUVIA) 50 MG Tab, Take 1 tablet (50 mg total) by mouth once daily., Disp: 90 tablet, Rfl: 3  -------------------------------------------------------------  PROCEDURE: Mohs' Micrographic Surgery    SITE: left upper lip    INDICATION: basal cell carcinoma in an area at increased risk of recurrence    CASE NUMBER: [id number]      ANESTHETIC: 2 mL 1% Lidocaine with Epinephrine 1:100,000    SURGICAL PREP: Ethanol and Hibiclens    SURGEON: Kenn Beach MD    ASSISTANTS: Oumou Landers CST     PREOPERATIVE DIAGNOSIS: basal cell carcinoma    POSTOPERATIVE DIAGNOSIS: basal cell carcinoma    PATHOLOGIC DIAGNOSIS: basal cell carcinoma    STAGES OF MOHS' SURGERY PERFORMED: one    TUMOR-FREE PLANE ACHIEVED: yes    HEMOSTASIS: Hyfrecation     SPECIMENS: one (one in stage A)    INITIAL LESION SIZE: 1.0 x 1.0 cm    FINAL DEFECT SIZE: 1.2 x 1.4 cm    WOUND REPAIR/DISPOSITION: see below    NARRATIVE:    The patient is a 78 y.o.male referred by Melani Kern M.D. with a history of cancer  on the left upper cutaneous lip which was biopsied - pathology accession #[id number], [facility]. Findings revealed basal cell carcinoma. Examination revealed a somewhat sclerotic plaque on the left upper lip at the site of prior biopsy, which was confirmed by reference to the photograph taken at the previous patient visit. In light of the nature of this tumor and the location on the lip, Mohs' micrographic surgery was thought to be the most appropriate management choice, and this diagnosis is appropriate for treatment by Mohs' micrographic surgery.  I discussed it with the patient and he fully understands the aims, risks, alternatives, and possible complications, and elects to proceed.  There are no medical or surgical contraindications to the procedure.     A signed informed consent was obtained.    PROCEDURE:  The patient was placed in the semi-recumbent position on the operating table in the Mohs' Surgery Suite. The area in question was thoroughly prepped with ethanol and Hibiclens, with particular care to avoid application to the immediate periocular skin. A sterile surgical marker was used to outline the clinically apparent margins of the involved area, and a narrow margin of normal-appearing skin. Reference marks were made at the periphery of the outlined area with the surgical marker. The proposed area of excision was measured and photographed. Local anesthesia as noted above was administered.  The total volume of anesthetic used throughout this portion of the procedure was as documented above. The area was prepared and draped in the standard manner. All of the grossly identifiable area of clinically abnormal tissue and an underlying/peripheral layer was taken and processed by the Mohs' technique.  Hemostasis was obtained with the hyfrecator. Tissue was taken from any areas of residual marginal involvement (if present) and processed by the Mohs' technique in as many stages as needed until a tumor-free  "plane was achieved.    Colors of inks used in the reference nicks at epidermal margins (if present) and/or inking of non-epithelial edges, if applicable, is represented on the Mohs map as follows: solid lines represent red ink, dots represent blue ink, jagged lines represent black ink, curlicues represent green ink, "xxx" represents yellow ink.    The first Mohs' layer consisted of one section(s) with 4 slide(s) evaluated. No residual tumor was noted at the margins of the first Mohs' layer. Histology of the specimen(s) showed changes consistent with chronic solar damage.     A total of one section(s) and 4 slide(s) were examined under the microscope via the Mohs technique.  A cancer free plane was reached after layer number one. Defect final size was as noted above.      The wound was covered with a nonadherent dressing between stages, and the patient allowed to wait in the waiting area during these periods. The final defect was photographed at the completion of the Mohs' procedure.    See the separate procedure note which follows regarding repair of the defect following Mohs' surgery.       -----------------------------------------------    REPAIR FOLLOWING MOHS' MICROGRAPHIC SURGERY    PREOPERATIVE DIAGNOSIS: defect following Mohs' surgery for a basal cell carcinoma    POSTOPERATIVE DIAGNOSIS: same    PROCEDURE PERFORMED: advancement flap    ANESTHETIC: 5.5 mL 1% Lidocaine with Epinephrine 1:100,000    SURGICAL PREP: Hibiclens    SURGEON: eKnn Beach MD     ASSISTANTS: as above    LOCATION: left upper lip      INDICATIONS:  Earlier in the day, the patient underwent Mohs' micrographic surgical excision of a basal cell carcinoma on the left upper cutaneous lip. Tumor free margins were achieved after layer number one.  Later in the day, the management of the resulting wound was addressed with the patient. I discussed the various wound management options with the patient and he fully understands the aims, " risks, alternatives, and possible complications of the alternatives, and he elects to proceed with closure of the defect in the manner noted below.  There are no medical or surgical contraindications to the procedure.    A signed informed consent was previously obtained.    PROCEDURE:  Repair via adjacent tissue transfer:  The patient was returned to the procedure room following completion of the Mohs' procedure and final slide review. Because of the size and location of the defect, primary closure could not be achieved without significant distortion of the surrounding structures and an unacceptable risk of wound dehiscence. After discussing options for management of the wound with the patient, a local advancement flap was chosen to close the wound. The flap was outlined on the adjacent skin with a sterile surgical marker. Infiltration of local anesthetic to include the defect and the area of the flap was carried out. The flap was incised and undermined sufficiently to allow movement into the defect. The surrounding skin was undermined to minimize the tension of closure. Areas of redundant skin were excised to permit closure without creation of standing cone deformities. Hemostasis was achieved by hyfrecation. The flap was sutured into place using:      multiple #5-0 buried interrupted Vicryl suture(s) and    multiple #5-0 simple interrupted Prolene suture(s) and    one #5-0 running locked and one #5-0 simple running Prolene suture(s) for final approximation of the wound margins.    Total area of the closure was approximately 6 square centimeters.      The site was photographed following completion of the repair. Final dressing consisted of petrolatum, Telfa and tape.    Estimated blood loss for the total procedure was less than 5 mL.    Total operative time including tissue processing in the Mohs' laboratory and microscopic Mohs' frozen section slide review was 2 hour(s). Verbal and written wound care instructions  were given to the patient, and he expressed understanding of these instructions. The patient tolerated the procedure well and left the operating room in good condition; he is to return in 7 days for suture removal.     Dr. Beach's cell phone number was given to the patient with instructions to call prn with any problems.

## 2018-12-21 ENCOUNTER — HOSPITAL ENCOUNTER (OUTPATIENT)
Dept: INTERVENTIONAL RADIOLOGY/VASCULAR | Facility: HOSPITAL | Age: 78
Discharge: HOME | End: 2018-12-21
Attending: INTERNAL MEDICINE
Payer: MEDICARE

## 2018-12-21 VITALS
SYSTOLIC BLOOD PRESSURE: 131 MMHG | OXYGEN SATURATION: 98 % | DIASTOLIC BLOOD PRESSURE: 62 MMHG | RESPIRATION RATE: 18 BRPM | HEART RATE: 52 BPM

## 2018-12-21 DIAGNOSIS — R18.8 OTHER ASCITES: ICD-10-CM

## 2018-12-21 LAB
APPEARANCE FLD: CLEAR
BODY FLD TYPE: NORMAL
COLOR FLD: YELLOW
LYMPHOCYTES NFR FLD MANUAL: 47 %
MONOS+MACROS NFR FLD MANUAL: 53 %
WBC # FLD: 73 /CU MM

## 2018-12-21 PROCEDURE — 49083 ABD PARACENTESIS W/IMAGING: CPT

## 2018-12-21 PROCEDURE — 27200940 IR PARACENTESIS NO IMAGING

## 2018-12-21 PROCEDURE — 49083 ABD PARACENTESIS W/IMAGING: CPT | Mod: ,,, | Performed by: RADIOLOGY

## 2018-12-21 PROCEDURE — 89051 BODY FLUID CELL COUNT: CPT

## 2018-12-21 NOTE — PROGRESS NOTES
Paracentesis complete. 3700 mLs peritoneal fluid drained. Pt tolerated well. Dressing toright abd clean, dry, and intact. Specimens sent per lab order. Pt discharged

## 2018-12-21 NOTE — PROCEDURES
Radiology Post-Procedure Note    Pre Op Diagnosis: Ascites  Post Op Diagnosis: Same    Procedure: Paracentesis    Procedure performed by: Elbert    Written Informed Consent Obtained: Yes  Specimen Removed: YES 3.7L  Estimated Blood Loss: Minimal    Findings:   Successful paracentesis.  Albumin administered PRN per protocol.    Patient tolerated procedure well.    Abhi Pelletier MD  Radiology

## 2018-12-21 NOTE — DISCHARGE SUMMARY
Radiology Discharge Summary      Hospital Course: No complications    Admit Date: 12/21/2018  Discharge Date: 12/21/2018     Instructions Given to Patient: Yes  Diet: Resume prior diet  Activity: activity as tolerated    Description of Condition on Discharge: Stable  Vital Signs (Most Recent): Pulse: (!) 52 (12/21/18 1318)  Resp: 18 (12/21/18 1318)  BP: 131/62 (12/21/18 1318)  SpO2: 98 % (12/21/18 1318)    Discharge Disposition: Home    Discharge Diagnosis: ascites    Procedure performed: paracentesis    Followup: No dedicated follow up with radiology is required. Patient instructed to call if there is any complication, post procedural pain, or signs of infection. Return to PCP as previously scheduled.      Abhi Pelleiter MD  Radiology

## 2018-12-21 NOTE — H&P
Radiology History & Physical      SUBJECTIVE:     Chief Complaint: Ascites    History of Present Illness:  Kenneth Sheikh is a 78 y.o. male who presents for paracentesis  Past Medical History:   Diagnosis Date    Anticoagulant long-term use     Bipolar 1 disorder     Bipolar 1 disorder 11/24/2016    bipolar    Cancer     history of skin cancer/sinus cancer & rectal cancer    Depressed bipolar affective disorder     Diabetes mellitus     type 2    EBV infection 12/7/2016    EBV DNA, PCR Latest Ref Range: None detected  None detected EBV DNA-Copies/mL Unknown Test Not Performed EBV Early Antigen Ab, IgG Latest Ref Range: <1:10 Titer 1:40 (A) EBV Nuclear Ag Ab Latest Ref Range: <1:5 Titer >=1:80 (A) EBV VCA IgG Latest Ref Range: <1:10 Titer 1:160 (A) EBV VCA IgM Latest Ref Range: <1:10 Titer <1:10     History of TIA (transient ischemic attack)     several-taking Plavix    Hx of gallstones     Wife denies    Hypertension     Hyperthyroidism 11/30/2016    Low HDL (under 40) 12/7/2016    Mixed hyperlipidemia 11/23/2016    JUAN MANUEL (obstructive sleep apnea)     Pneumonia     Skin disease     Squamous cell carcinoma 08/2018    right temple    Stroke     Transient cerebral ischemia 7/22/2016    Type 2 diabetes mellitus      Past Surgical History:   Procedure Laterality Date    BIOPSY-BONE MARROW Left 11/3/2016    Performed by Bud Bob MD at [facility] OR 2ND FLR    ESOPHAGOGASTRODUODENOSCOPY (EGD) N/A 2/3/2017    Performed by Domenico Fox MD at [facility] ENDO (4TH FLR)    EXCISION-MASS RECTAL  6/28/2016    Performed by Haris Talavera MD at [facility] OR 2ND FLR    Moh's procedure x4      rectal cancer      Sinus surgery for sinus cancer      sinus sx for cancer      skin cancer removed-several times-nose & ear      TONSILLECTOMY      ULTRASOUND-ENDOSCOPIC-UPPER/glue coil of gastric varices N/A 4/19/2017    Performed by Aneudy Perla MD at [facility] ENDO (2ND FLR)    Undescened testicle sx       UVULOPALATOPHARYNGOPLASTY      for sleep apnea       Home Meds:   Prior to Admission medications    Medication Sig Start Date End Date Taking? Authorizing Provider   ACCU-CHEK NEYDA PLUS TEST STRP Strp 1 strip by Misc.(Non-Drug; Combo Route) route once daily. 11/5/18   Prisca Espinoza MD   ciprofloxacin HCl (CIPRO) 500 MG tablet Take 1 tablet (500 mg total) by mouth every Monday. 5/14/18   Renato Womack MD   divalproex (DEPAKOTE) 250 MG EC tablet Take 2 tablets (500 mg total) by mouth every evening. Do not fill prescription until patient calls and requests. 12/10/18   Prisca Espinoza MD   furosemide (LASIX) 20 MG tablet Take 2 tablets (40 mg total) by mouth once daily. 10/9/18 10/9/19  Renato Womack MD   lactulose (CHRONULAC) 10 gram/15 mL solution TAKE 15 MLS BY MOUTH TWICE DAILY 3/8/18   Renato Womack MD   lancets [facility] [place] To check blood sugar once daily, to use with Accu-Chek meter. 11/5/18   Prisca Espinoza MD   rifAXIMin (XIFAXAN) 550 mg Tab Take 1 tablet (550 mg total) by mouth 2 (two) times daily. 10/3/17   Renato Womack MD   SITagliptan (JANUVIA) 50 MG Tab Take 1 tablet (50 mg total) by mouth once daily. 4/6/17   Prisca Espinoza MD     Anticoagulants/Antiplatelets: no anticoagulation    Allergies:   Review of patient's allergies indicates:   Allergen Reactions    Abilify [aripiprazole] Other (See Comments)     Sleepy, could not function.     Sedation History:  no adverse reactions    Review of Systems:   Hematological: no known coagulopathies  Respiratory: no shortness of breath  Cardiovascular: no chest pain  Gastrointestinal: no abdominal pain  Genito-Urinary: no dysuria  Musculoskeletal: negative  Neurological: no TIA or stroke symptoms         OBJECTIVE:     Vital Signs (Most Recent)  Pulse: 68 (12/21/18 1155)  Resp: 17 (12/21/18 1155)  BP: 138/74 (12/21/18 1155)  SpO2: 98 % (12/21/18 1155)    Physical Exam:  ASA: 3  Mallampati: 3    General: no acute distress  Mental  Status: alert and oriented to person, place and time  HEENT: normocephalic, atraumatic  Chest: unlabored breathing  Heart: no heaves  Abdomen: distended  Extremity: moves all extremities    Laboratory  Lab Results   Component Value Date    INR 1.1 08/10/2018       Lab Results   Component Value Date    WBC 2.93 (L) 12/04/2018    HGB 10.1 (L) 12/04/2018    HCT 32.7 (L) 12/04/2018    MCV 87 12/04/2018     (L) 12/04/2018      Lab Results   Component Value Date     (H) 12/04/2018     12/04/2018    K 3.6 12/04/2018     12/04/2018    CO2 29 12/04/2018    BUN 17 12/04/2018    CREATININE 1.2 12/04/2018    CALCIUM 8.8 12/04/2018    MG 1.7 05/15/2017    ALT 5 (L) 12/04/2018    AST 18 12/04/2018    ALBUMIN 2.5 (L) 12/04/2018    BILITOT 0.7 12/04/2018       ASSESSMENT/PLAN:     Sedation Plan: none  Patient will undergo paracentesis.    Abhi Pelletier MD  Radiology

## 2018-12-24 ENCOUNTER — CLINICAL SUPPORT (OUTPATIENT)
Dept: DERMATOLOGY | Facility: CLINIC | Age: 78
End: 2018-12-24
Payer: MEDICARE

## 2018-12-24 PROCEDURE — 99212 OFFICE O/P EST SF 10 MIN: CPT | Mod: PBBFAC

## 2018-12-24 PROCEDURE — 99999 PR PBB SHADOW E&M-EST. PATIENT-LVL II: CPT | Mod: PBBFAC,,,

## 2018-12-24 NOTE — PROGRESS NOTES
78 y.o. male patient is here for suture removal following Mohs' surgery.    Patient reports no problems.    WOUND PE:  The left upper lip sutures intact. Wound healing well. Good skin edges. No signs or symptoms of infection. Flap Is pink and viable.        IMPRESSION:  Healing operative site from Mohs' surgery Left upper lip advancement flap. Postop day #7    PLAN:  Sutures removed today.  Continue wound care.  Keep moist with Aquaphor.    RTC:  In 1 month with  for skin check or sooner if new concern arises.

## 2018-12-27 ENCOUNTER — TELEPHONE (OUTPATIENT)
Dept: INTERVENTIONAL RADIOLOGY/VASCULAR | Facility: HOSPITAL | Age: 78
End: 2018-12-27

## 2018-12-27 ENCOUNTER — OFFICE VISIT (OUTPATIENT)
Dept: DERMATOLOGY | Facility: CLINIC | Age: 78
End: 2018-12-27
Payer: MEDICARE

## 2018-12-27 DIAGNOSIS — Z85.828 HISTORY OF MOH'S MICROGRAPHIC SURGERY FOR SKIN CANCER: Primary | ICD-10-CM

## 2018-12-27 DIAGNOSIS — Z98.890 HISTORY OF MOH'S MICROGRAPHIC SURGERY FOR SKIN CANCER: Primary | ICD-10-CM

## 2018-12-27 PROCEDURE — 99024 POSTOP FOLLOW-UP VISIT: CPT | Mod: POP,,, | Performed by: DERMATOLOGY

## 2018-12-27 NOTE — PROGRESS NOTES
CC: 78 y.o.male patient is here for followup     HPI: Patient is 10 days s/p Mohs' micrographic surgery, fresh tissue technique, of a basal cell carcinoma on the left upper lip; with subsequent repair   Patient reports no problems    EXAM: Site appears well healed. Some residual erythema as anticipated. Some mild induration to the area. Overall good cosmetic result    IMPRESSION: Well healed post Mohs' micrographic surgery    PLAN:  Discussed anticipated course  Followup to Dr. Kern or Dr. Anders in 2-3 months; prn to me

## 2018-12-28 ENCOUNTER — HOSPITAL ENCOUNTER (OUTPATIENT)
Dept: INTERVENTIONAL RADIOLOGY/VASCULAR | Facility: HOSPITAL | Age: 78
Discharge: HOME | End: 2018-12-28
Attending: INTERNAL MEDICINE
Payer: MEDICARE

## 2018-12-28 VITALS
HEART RATE: 55 BPM | RESPIRATION RATE: 18 BRPM | DIASTOLIC BLOOD PRESSURE: 63 MMHG | TEMPERATURE: 98 F | SYSTOLIC BLOOD PRESSURE: 134 MMHG | OXYGEN SATURATION: 100 %

## 2018-12-28 DIAGNOSIS — R18.8 OTHER ASCITES: ICD-10-CM

## 2018-12-28 LAB
APPEARANCE FLD: NORMAL
BODY FLD TYPE: NORMAL
COLOR FLD: YELLOW
EOSINOPHIL NFR FLD MANUAL: 2 %
LYMPHOCYTES NFR FLD MANUAL: 64 %
MONOS+MACROS NFR FLD MANUAL: 31 %
NEUTROPHILS NFR FLD MANUAL: 3 %
WBC # FLD: 38 /CU MM

## 2018-12-28 PROCEDURE — 49083 ABD PARACENTESIS W/IMAGING: CPT | Mod: GC,,, | Performed by: FAMILY MEDICINE

## 2018-12-28 PROCEDURE — A7048 VACUUM DRAIN BOTTLE/TUBE KIT: HCPCS

## 2018-12-28 PROCEDURE — 27201082 IR PARACENTESIS NO IMAGING

## 2018-12-28 PROCEDURE — 89051 BODY FLUID CELL COUNT: CPT

## 2018-12-28 NOTE — H&P
Radiology History & Physical      SUBJECTIVE:     Chief Complaint: abdominal distention    History of Present Illness:  Kenneth Sheikh is a 78 y.o. male who presents for ultrasound guided paracentesis  Past Medical History:   Diagnosis Date    Anticoagulant long-term use     Bipolar 1 disorder     Bipolar 1 disorder 11/24/2016    bipolar    Cancer     history of skin cancer/sinus cancer & rectal cancer    Depressed bipolar affective disorder     Diabetes mellitus     type 2    EBV infection 12/7/2016    EBV DNA, PCR Latest Ref Range: None detected  None detected EBV DNA-Copies/mL Unknown Test Not Performed EBV Early Antigen Ab, IgG Latest Ref Range: <1:10 Titer 1:40 (A) EBV Nuclear Ag Ab Latest Ref Range: <1:5 Titer >=1:80 (A) EBV VCA IgG Latest Ref Range: <1:10 Titer 1:160 (A) EBV VCA IgM Latest Ref Range: <1:10 Titer <1:10     History of TIA (transient ischemic attack)     several-taking Plavix    Hx of gallstones     Wife denies    Hypertension     Hyperthyroidism 11/30/2016    Low HDL (under 40) 12/7/2016    Mixed hyperlipidemia 11/23/2016    JUAN MANUEL (obstructive sleep apnea)     Pneumonia     Skin disease     Squamous cell carcinoma 08/2018    right temple    Stroke     Transient cerebral ischemia 7/22/2016    Type 2 diabetes mellitus      Past Surgical History:   Procedure Laterality Date    BIOPSY-BONE MARROW Left 11/3/2016    Performed by Bud Bob MD at [facility] OR 2ND FLR    ESOPHAGOGASTRODUODENOSCOPY (EGD) N/A 2/3/2017    Performed by Domenico Fox MD at [facility] ENDO (4TH FLR)    EXCISION-MASS RECTAL  6/28/2016    Performed by Haris Talavera MD at [facility] OR 2ND FLR    Moh's procedure x4      rectal cancer      Sinus surgery for sinus cancer      sinus sx for cancer      skin cancer removed-several times-nose & ear      TONSILLECTOMY      ULTRASOUND-ENDOSCOPIC-UPPER/glue coil of gastric varices N/A 4/19/2017    Performed by Aneudy Perla MD at [facility] ENDO (2ND FLR)     Undescened testicle sx      UVULOPALATOPHARYNGOPLASTY      for sleep apnea       Home Meds:   Prior to Admission medications    Medication Sig Start Date End Date Taking? Authorizing Provider   ACCU-CHEK NEYDA PLUS TEST STRP Strp 1 strip by Misc.(Non-Drug; Combo Route) route once daily. 11/5/18   Prisca Espinoza MD   ciprofloxacin HCl (CIPRO) 500 MG tablet Take 1 tablet (500 mg total) by mouth every Monday. 5/14/18   Renato Womack MD   divalproex (DEPAKOTE) 250 MG EC tablet Take 2 tablets (500 mg total) by mouth every evening. Do not fill prescription until patient calls and requests. 12/10/18   Prisca Espinoza MD   furosemide (LASIX) 20 MG tablet Take 2 tablets (40 mg total) by mouth once daily. 10/9/18 10/9/19  Renato Womack MD   lactulose (CHRONULAC) 10 gram/15 mL solution TAKE 15 MLS BY MOUTH TWICE DAILY 3/8/18   Renato Womack MD   lancets [facility] To check blood sugar once daily, to use with Accu-Chek meter. 11/5/18   Prisca Espinoza MD   rifAXIMin (XIFAXAN) 550 mg Tab Take 1 tablet (550 mg total) by mouth 2 (two) times daily. 10/3/17   Renato Womack MD   SITagliptan (JANUVIA) 50 MG Tab Take 1 tablet (50 mg total) by mouth once daily. 4/6/17   Prisca Espinoza MD     Anticoagulants/Antiplatelets: no anticoagulation    Allergies:   Review of patient's allergies indicates:   Allergen Reactions    Abilify [aripiprazole] Other (See Comments)     Sleepy, could not function.     Sedation History:  no adverse reactions    Review of Systems:   Hematological: no known coagulopathies  Respiratory: no shortness of breath  Cardiovascular: no chest pain  Gastrointestinal: no abdominal pain  Genito-Urinary: no dysuria  Musculoskeletal: negative  Neurological: no TIA or stroke symptoms         OBJECTIVE:     Vital Signs (Most Recent)       Physical Exam:  ASA: 2  Mallampati: n/a    General: no acute distress  Mental Status: alert and oriented to person, place and time  HEENT:  normocephalic, atraumatic  Chest: unlabored breathing  Heart: regular heart rate  Abdomen: distended  Extremity: moves all extremities    Laboratory  Lab Results   Component Value Date    INR 1.1 08/10/2018       Lab Results   Component Value Date    WBC 2.93 (L) 12/04/2018    HGB 10.1 (L) 12/04/2018    HCT 32.7 (L) 12/04/2018    MCV 87 12/04/2018     (L) 12/04/2018      Lab Results   Component Value Date     (H) 12/04/2018     12/04/2018    K 3.6 12/04/2018     12/04/2018    CO2 29 12/04/2018    BUN 17 12/04/2018    CREATININE 1.2 12/04/2018    CALCIUM 8.8 12/04/2018    MG 1.7 05/15/2017    ALT 5 (L) 12/04/2018    AST 18 12/04/2018    ALBUMIN 2.5 (L) 12/04/2018    BILITOT 0.7 12/04/2018       ASSESSMENT/PLAN:     Sedation Plan: local  Patient will undergo ultrasound guided paracentesis.    MATT Mauricio, FNP  Interventional Radiology  [phone] [employer]

## 2018-12-28 NOTE — PROCEDURES

## 2018-12-28 NOTE — DISCHARGE INSTRUCTIONS
Please call with any questions or concerns.      Monday thru Friday 8:00 am - 4:30 pm    Interventional Radiology   [phone]    After Hours    Ask for the Radiology Intern on call  [phone]

## 2018-12-28 NOTE — PROGRESS NOTES
Paracentesis complete, 4000 MLs removed. Specimen sent to lab. Dressing applied to left side puncture site, dressing clean dry and intact. Pt given discharge instructions and handouts, pt verbalizes understanding. Questions answered. Pt denies pain and discomfort. Pt to lobby via wheelchair to meet family.

## 2018-12-29 NOTE — H&P
Radiology History & Physical      SUBJECTIVE:     Chief Complaint: ascites    History of Present Illness:  Kenneth Sheikh is a 77 y.o. male who presents for ultrasound guided paracentesis  Past Medical History:   Diagnosis Date    Anticoagulant long-term use     Bipolar 1 disorder     Bipolar 1 disorder 11/24/2016    bipolar    Cancer     history of skin cancer/sinus cancer & rectal cancer    Depressed bipolar affective disorder     Diabetes mellitus     type 2    EBV infection 12/7/2016    EBV DNA, PCR Latest Ref Range: None detected  None detected EBV DNA-Copies/mL Unknown Test Not Performed EBV Early Antigen Ab, IgG Latest Ref Range: <1:10 Titer 1:40 (A) EBV Nuclear Ag Ab Latest Ref Range: <1:5 Titer >=1:80 (A) EBV VCA IgG Latest Ref Range: <1:10 Titer 1:160 (A) EBV VCA IgM Latest Ref Range: <1:10 Titer <1:10     History of TIA (transient ischemic attack)     several-taking Plavix    Hx of gallstones     Wife denies    Hypertension     Hyperthyroidism 11/30/2016    Low HDL (under 40) 12/7/2016    Mixed hyperlipidemia 11/23/2016    JUAN MANUEL (obstructive sleep apnea)     Stroke     Transient cerebral ischemia 7/22/2016     Past Surgical History:   Procedure Laterality Date    Moh's procedure x4      Sinus surgery for sinus cancer      sinus sx for cancer      skin cancer removed-several times-nose & ear      TONSILLECTOMY      Undescened testicle sx      UVULOPALATOPHARYNGOPLASTY      for sleep apnea       Home Meds:   Prior to Admission medications    Medication Sig Start Date End Date Taking? Authorizing Provider   ACCU-CHEK NEYDA PLUS TEST STRP Strp  2/23/17   Historical Provider, MD   ciprofloxacin HCl (CIPRO) 500 MG tablet Take 1 tablet (500 mg total) by mouth every Monday. 5/29/17   Renato Womack MD   divalproex (DEPAKOTE) 250 MG EC tablet  5/5/17   Historical Provider, MD   furosemide (LASIX) 20 MG tablet Take 4 tablets (80 mg total) by mouth once daily.  Patient taking differently: Take  40 mg by mouth once daily. Taking 40 mg per day 4/25/17 4/25/18  Renato Womack MD   lactulose (CHRONULAC) 10 gram/15 mL solution Take 15 mLs (10 g total) by mouth 2 (two) times daily. 12/20/16   Renato Womack MD   rifAXIMin (XIFAXAN) 550 mg Tab Take 1 tablet (550 mg total) by mouth 2 (two) times daily. 2/21/17   Renato Womack MD   SITagliptan (JANUVIA) 50 MG Tab Take 1 tablet (50 mg total) by mouth once daily. 4/6/17   Prisca Espinoza MD     Anticoagulants/Antiplatelets: no anticoagulation    Allergies:   Review of patient's allergies indicates:   Allergen Reactions    Abilify [aripiprazole] Other (See Comments)     Sleepy, could not function.     Sedation History:  no adverse reactions    Review of Systems:   Hematological: no known coagulopathies  Respiratory: no shortness of breath  Cardiovascular: no chest pain  Gastrointestinal: no abdominal pain  Genito-Urinary: no dysuria  Musculoskeletal: negative  Neurological: no TIA or stroke symptoms         OBJECTIVE:     Vital Signs (Most Recent)  Pulse: 82 (08/25/17 1330)  Resp: 18 (08/25/17 1330)  BP: 121/64 (08/25/17 1330)  SpO2: 95 % (08/25/17 1330)    Physical Exam:  ASA: 2  Mallampati: n/a    General: no acute distress  Mental Status: alert and oriented to person, place and time  HEENT: normocephalic, atraumatic  Chest: unlabored breathing  Heart: regular heart rate  Abdomen: distended  Extremity: moves all extremities    Laboratory  Lab Results   Component Value Date    INR 1.0 08/25/2017       Lab Results   Component Value Date    WBC 4.22 08/25/2017    HGB 11.0 (L) 08/25/2017    HCT 33.7 (L) 08/25/2017    MCV 86 08/25/2017     (L) 08/25/2017      Lab Results   Component Value Date     (H) 06/16/2017     06/16/2017    K 4.0 06/16/2017     06/16/2017    CO2 28 06/16/2017    BUN 24 (H) 06/16/2017    CREATININE 1.5 (H) 06/16/2017    CALCIUM 8.9 06/16/2017    MG 1.7 05/15/2017    ALT 8 (L) 05/24/2017    AST 26  05/24/2017    ALBUMIN 2.8 (L) 05/24/2017    BILITOT 0.4 05/24/2017       ASSESSMENT/PLAN:     Sedation Plan: local  Patient will undergo ultrasound guided paracentesis.    MATT Mauricio, FNP  Interventional Radiology  [phone] [employer]     toddler

## 2019-01-04 ENCOUNTER — OFFICE VISIT (OUTPATIENT)
Dept: HEPATOLOGY | Facility: CLINIC | Age: 79
End: 2019-01-04
Payer: MEDICARE

## 2019-01-04 ENCOUNTER — HOSPITAL ENCOUNTER (OUTPATIENT)
Dept: INTERVENTIONAL RADIOLOGY/VASCULAR | Facility: HOSPITAL | Age: 79
Discharge: HOME | End: 2019-01-04
Attending: INTERNAL MEDICINE
Payer: MEDICARE

## 2019-01-04 VITALS
TEMPERATURE: 98 F | DIASTOLIC BLOOD PRESSURE: 72 MMHG | BODY MASS INDEX: 25.03 KG/M2 | SYSTOLIC BLOOD PRESSURE: 167 MMHG | OXYGEN SATURATION: 99 % | RESPIRATION RATE: 18 BRPM | HEIGHT: 68 IN | WEIGHT: 165.13 LBS | HEART RATE: 58 BPM

## 2019-01-04 DIAGNOSIS — R18.8 OTHER ASCITES: ICD-10-CM

## 2019-01-04 DIAGNOSIS — R18.8 CIRRHOSIS OF LIVER WITH ASCITES, UNSPECIFIED HEPATIC CIRRHOSIS TYPE: Primary | ICD-10-CM

## 2019-01-04 DIAGNOSIS — K74.60 CIRRHOSIS OF LIVER WITH ASCITES, UNSPECIFIED HEPATIC CIRRHOSIS TYPE: Primary | ICD-10-CM

## 2019-01-04 LAB
APPEARANCE FLD: CLEAR
BODY FLD TYPE: NORMAL
COLOR FLD: YELLOW
LYMPHOCYTES NFR FLD MANUAL: 41 %
MONOS+MACROS NFR FLD MANUAL: 56 %
NEUTROPHILS NFR FLD MANUAL: 3 %
WBC # FLD: 52 /CU MM

## 2019-01-04 PROCEDURE — 49083 ABD PARACENTESIS W/IMAGING: CPT | Mod: ,,, | Performed by: NURSE PRACTITIONER

## 2019-01-04 PROCEDURE — 99999 PR PBB SHADOW E&M-EST. PATIENT-LVL IV: CPT | Mod: PBBFAC,,, | Performed by: INTERNAL MEDICINE

## 2019-01-04 PROCEDURE — 99214 PR OFFICE/OUTPT VISIT, EST, LEVL IV, 30-39 MIN: ICD-10-PCS | Mod: S$PBB,,, | Performed by: INTERNAL MEDICINE

## 2019-01-04 PROCEDURE — 87075 CULTR BACTERIA EXCEPT BLOOD: CPT

## 2019-01-04 PROCEDURE — 99214 OFFICE O/P EST MOD 30 MIN: CPT | Mod: S$PBB,,, | Performed by: INTERNAL MEDICINE

## 2019-01-04 PROCEDURE — 49083 ABD PARACENTESIS W/IMAGING: CPT

## 2019-01-04 PROCEDURE — 89051 BODY FLUID CELL COUNT: CPT

## 2019-01-04 PROCEDURE — 49083 IR PARACENTESIS WITH IMAGING: ICD-10-PCS | Mod: ,,, | Performed by: NURSE PRACTITIONER

## 2019-01-04 PROCEDURE — 99999 PR PBB SHADOW E&M-EST. PATIENT-LVL IV: ICD-10-PCS | Mod: PBBFAC,,, | Performed by: INTERNAL MEDICINE

## 2019-01-04 PROCEDURE — 99214 OFFICE O/P EST MOD 30 MIN: CPT | Mod: PBBFAC,25 | Performed by: INTERNAL MEDICINE

## 2019-01-04 PROCEDURE — 87070 CULTURE OTHR SPECIMN AEROBIC: CPT

## 2019-01-04 NOTE — PROGRESS NOTES
"Subjective:       Patient ID: Kenneth Sheikh is a 78 y.o. male.    Chief Complaint: Cirrhosis    HPI  I saw this 78 y.o. man  whom I had met in hospital when he was admitted with lethargy and some confusion back in Dec 2016.  At that time, he was diagnosed with decompensated cirrhosis.    Admitted because of increasing lethargy +++/confusion/dizziness/dysarthria  - Nov 2016  - imaging showed ascites     No significant alcohol use ("a couple beers when I was younger"). No family history of liver disease. No new medications.    Patient has risk factors for MCDONALD.    He also has a large gastric varix which has never bled-  treated x1 by Dr Perla endoscopically but will need another endoscopic session (4/16/17).  - Had post procedure fever despite antibiotics    Kylie E coli bacteremia- on IV antibiotics at home- PICC    Currently he is reasonably stable with weekly paracentesis.  Decreased dose of diuretics and his para volume appears to also be decreasing.  Mental alert and has not experienced HE for a while although wife describes him as lethargic and forgetful.    - lower leg edema and ascites    MELD-Na score: 9 at 8/10/2018  2:12 PM  MELD score: 9 at 8/10/2018  2:12 PM  Calculated from:  Serum Creatinine: 1.2 mg/dL at 8/10/2018  2:12 PM  Serum Sodium: 136 mmol/L at 8/10/2018  2:12 PM  Total Bilirubin: 0.6 mg/dL (Rounded to 1 mg/dL) at 8/10/2018  2:12 PM  INR(ratio): 1.1 at 8/10/2018  2:12 PM  Age: 78 years      PMH:  DM  Sinus Ca- 1996  Rectal Ca- June 2016    SH:  Retired  Ex [employer] worker      FH:  Aunt had cirrhosis- non- alcohol    Review of Systems   Constitutional: Negative for activity change, appetite change, chills, fatigue, fever and unexpected weight change.   HENT: Negative for hearing loss.    Eyes: Negative for discharge and visual disturbance.   Respiratory: Negative for cough, chest tightness, shortness of breath and wheezing.    Cardiovascular: Negative for chest pain, palpitations and leg " swelling.   Gastrointestinal: Negative for abdominal distention, abdominal pain, constipation, diarrhea and nausea.   Genitourinary: Negative for dysuria and frequency.   Musculoskeletal: Negative for arthralgias and back pain.   Skin: Negative for pallor and rash.   Neurological: Negative for dizziness, tremors, speech difficulty and headaches.   Hematological: Negative for adenopathy.   Psychiatric/Behavioral: Negative for agitation and confusion.           Lab Results   Component Value Date    ALT 5 (L) 12/04/2018    AST 18 12/04/2018    ALKPHOS 105 12/04/2018    BILITOT 0.7 12/04/2018     Past Medical History:   Diagnosis Date    Anticoagulant long-term use     Bipolar 1 disorder     Bipolar 1 disorder 11/24/2016    bipolar    Cancer     history of skin cancer/sinus cancer & rectal cancer    Depressed bipolar affective disorder     Diabetes mellitus     type 2    EBV infection 12/7/2016    EBV DNA, PCR Latest Ref Range: None detected  None detected EBV DNA-Copies/mL Unknown Test Not Performed EBV Early Antigen Ab, IgG Latest Ref Range: <1:10 Titer 1:40 (A) EBV Nuclear Ag Ab Latest Ref Range: <1:5 Titer >=1:80 (A) EBV VCA IgG Latest Ref Range: <1:10 Titer 1:160 (A) EBV VCA IgM Latest Ref Range: <1:10 Titer <1:10     History of TIA (transient ischemic attack)     several-taking Plavix    Hx of gallstones     Wife denies    Hypertension     Hyperthyroidism 11/30/2016    Low HDL (under 40) 12/7/2016    Mixed hyperlipidemia 11/23/2016    JUAN MANUEL (obstructive sleep apnea)     Pneumonia     Skin disease     Squamous cell carcinoma 08/2018    right temple    Stroke     Transient cerebral ischemia 7/22/2016    Type 2 diabetes mellitus      Past Surgical History:   Procedure Laterality Date    BIOPSY-BONE MARROW Left 11/3/2016    Performed by Bud Bob MD at [facility] OR 2ND FLR    ESOPHAGOGASTRODUODENOSCOPY (EGD) N/A 2/3/2017    Performed by Domenico Fox MD at [facility] ENDO (4TH FLR)     EXCISION-MASS RECTAL  6/28/2016    Performed by [facility] at [facility] 2ND FLR    Moh's procedure x4      rectal cancer      Sinus surgery for sinus cancer      sinus sx for cancer      skin cancer removed-several times-nose & ear      TONSILLECTOMY      ULTRASOUND-ENDOSCOPIC-UPPER/glue coil of gastric varices N/A 4/19/2017    Performed by Aneudy Perla MD at [facility] ENDO (2ND FLR)    Undescened testicle sx      UVULOPALATOPHARYNGOPLASTY      for sleep apnea     Current Outpatient Medications   Medication Sig    ciprofloxacin HCl (CIPRO) 500 MG tablet Take 1 tablet (500 mg total) by mouth every Monday.    divalproex (DEPAKOTE) 250 MG EC tablet Take 2 tablets (500 mg total) by mouth every evening. Do not fill prescription until patient calls and requests.    furosemide (LASIX) 20 MG tablet Take 2 tablets (40 mg total) by mouth once daily.    lactulose (CHRONULAC) 10 gram/15 mL solution TAKE 15 MLS BY MOUTH TWICE DAILY    rifAXIMin (XIFAXAN) 550 mg Tab Take 1 tablet (550 mg total) by mouth 2 (two) times daily.    SITagliptan (JANUVIA) 50 MG Tab Take 1 tablet (50 mg total) by mouth once daily.    ACCU-CHEK NEYDA PLUS TEST STRP Strp 1 strip by Misc.(Non-Drug; Combo Route) route once daily.    lancets Misc To check blood sugar once daily, to use with Accu-Chek meter.     No current facility-administered medications for this visit.        Objective:      Physical Exam   Constitutional: He is oriented to person, place, and time. He appears well-nourished.   HENT:   Head: Normocephalic.   Eyes: Pupils are equal, round, and reactive to light.   Neck: No thyromegaly present.   Cardiovascular: Normal rate, regular rhythm and normal heart sounds.   Pulmonary/Chest: Effort normal and breath sounds normal. He has no wheezes.   Abdominal: Soft. He exhibits distension. He exhibits no mass. There is no tenderness.   Mild ascites.   Musculoskeletal: He exhibits edema.   Lymphadenopathy:     He has no  cervical adenopathy.   Neurological: He is alert and oriented to person, place, and time.   Skin: Skin is warm. No rash noted. No erythema.   Psychiatric: He has a normal mood and affect. His behavior is normal.       Assessment:       1. Cirrhosis of liver with ascites, unspecified hepatic cirrhosis type        Plan:   - re-discussed the dx of cirrhosis and the implications of this  - no change in diuretic dose  - paracenteses every week  - on Cipro 500 mg weekly for SBP prophylaxis.  - does not wish for further EUS guided treatment of varices because of post procedure sepsis.    - no change in management  Clinic in  4 months    - can go to Dentist for cleaning  - liver US ordered for next week    Clinic in 4 months     NB Caodaism

## 2019-01-04 NOTE — H&P
Radiology History & Physical      SUBJECTIVE:     Chief Complaint: abdominal distention    History of Present Illness:  Kenneth Sheikh is a 78 y.o. male who presents for eval of abdominal distention  Past Medical History:   Diagnosis Date    Anticoagulant long-term use     Bipolar 1 disorder     Bipolar 1 disorder 11/24/2016    bipolar    Cancer     history of skin cancer/sinus cancer & rectal cancer    Depressed bipolar affective disorder     Diabetes mellitus     type 2    EBV infection 12/7/2016    EBV DNA, PCR Latest Ref Range: None detected  None detected EBV DNA-Copies/mL Unknown Test Not Performed EBV Early Antigen Ab, IgG Latest Ref Range: <1:10 Titer 1:40 (A) EBV Nuclear Ag Ab Latest Ref Range: <1:5 Titer >=1:80 (A) EBV VCA IgG Latest Ref Range: <1:10 Titer 1:160 (A) EBV VCA IgM Latest Ref Range: <1:10 Titer <1:10     History of TIA (transient ischemic attack)     several-taking Plavix    Hx of gallstones     Wife denies    Hypertension     Hyperthyroidism 11/30/2016    Low HDL (under 40) 12/7/2016    Mixed hyperlipidemia 11/23/2016    JUAN MANUEL (obstructive sleep apnea)     Pneumonia     Skin disease     Squamous cell carcinoma 08/2018    right temple    Stroke     Transient cerebral ischemia 7/22/2016    Type 2 diabetes mellitus      Past Surgical History:   Procedure Laterality Date    BIOPSY-BONE MARROW Left 11/3/2016    Performed by Bud [facility] at [facility] 2ND FLR    ESOPHAGOGASTRODUODENOSCOPY (EGD) N/A 2/3/2017    Performed by Domenico Fox MD at [facility] ENDO (4TH FLR)    EXCISION-MASS RECTAL  6/28/2016    Performed by Haris [facility]alavera MD at [facility] OR 2ND FLR    Moh's procedure x4      rectal cancer      Sinus surgery for sinus cancer      sinus sx for cancer      skin cancer removed-several times-nose & ear      TONSILLECTOMY      ULTRASOUND-ENDOSCOPIC-UPPER/glue coil of gastric varices N/A 4/19/2017    Performed by Aneudy Perla MD at [facility] ENDO (2ND FLR)     Undescened testicle sx      UVULOPALATOPHARYNGOPLASTY      for sleep apnea       Home Meds:   Prior to Admission medications    Medication Sig Start Date End Date Taking? Authorizing Provider   ACCU-CHEK NEYDA PLUS TEST STRP Strp 1 strip by Misc.(Non-Drug; Combo Route) route once daily. 11/5/18   Prisca Espinoza MD   ciprofloxacin HCl (CIPRO) 500 MG tablet Take 1 tablet (500 mg total) by mouth every Monday. 5/14/18   Renato Womack MD   divalproex (DEPAKOTE) 250 MG EC tablet Take 2 tablets (500 mg total) by mouth every evening. Do not fill prescription until patient calls and requests. 12/10/18   Prisca Espinoza MD   furosemide (LASIX) 20 MG tablet Take 2 tablets (40 mg total) by mouth once daily. 10/9/18 10/9/19  Renato Womack MD   lactulose (CHRONULAC) 10 gram/15 mL solution TAKE 15 MLS BY MOUTH TWICE DAILY 3/8/18   Renato Womack MD   lancets [facility] To check blood sugar once daily, to use with Accu-Chek meter. 11/5/18   Prisca Espinoza MD   rifAXIMin (XIFAXAN) 550 mg Tab Take 1 tablet (550 mg total) by mouth 2 (two) times daily. 10/3/17   Renato Womack MD   SITagliptan (JANUVIA) 50 MG Tab Take 1 tablet (50 mg total) by mouth once daily. 4/6/17   Prisca Espinoza MD     Anticoagulants/Antiplatelets: no anticoagulation    Allergies:   Review of patient's allergies indicates:   Allergen Reactions    Abilify [aripiprazole] Other (See Comments)     Sleepy, could not function.     Sedation History:  no adverse reactions    Review of Systems:   Hematological: no known coagulopathies  Respiratory: no shortness of breath  Cardiovascular: no chest pain  Gastrointestinal: no abdominal pain  Genito-Urinary: no dysuria  Musculoskeletal: negative  Neurological: no TIA or stroke symptoms         OBJECTIVE:     Vital Signs (Most Recent)       Physical Exam:  ASA: 2  Mallampati: na    General: no acute distress  Mental Status: alert and oriented to person, place and time  HEENT: normocephalic,  atraumatic  Chest: unlabored breathing  Heart: regular heart rate  Abdomen: distended  Extremity: moves all extremities    Laboratory  Lab Results   Component Value Date    INR 1.1 08/10/2018       Lab Results   Component Value Date    WBC 2.93 (L) 12/04/2018    HGB 10.1 (L) 12/04/2018    HCT 32.7 (L) 12/04/2018    MCV 87 12/04/2018     (L) 12/04/2018      Lab Results   Component Value Date     (H) 12/04/2018     12/04/2018    K 3.6 12/04/2018     12/04/2018    CO2 29 12/04/2018    BUN 17 12/04/2018    CREATININE 1.2 12/04/2018    CALCIUM 8.8 12/04/2018    MG 1.7 05/15/2017    ALT 5 (L) 12/04/2018    AST 18 12/04/2018    ALBUMIN 2.5 (L) 12/04/2018    BILITOT 0.7 12/04/2018       ASSESSMENT/PLAN:     Sedation Plan: local anesthesia   Patient will undergo US guided paracenetesis

## 2019-01-04 NOTE — DISCHARGE INSTRUCTIONS
Please call with any questions or concerns.      Monday thru Friday 8:00 am - 4:30 pm    Interventional Radiology   [phone]    After Hours    Ask for the Radiology Intern on call  [phone]

## 2019-01-04 NOTE — PROGRESS NOTES
Paracentesis complete, 3800 MLs removed. Specimen sent to lab. Dressing applied to RLQ puncture site, dressing clean dry and intact. Pt given discharge instructions and handouts, pt verbalizes understanding. Questions answered. Pt denies pain and discomfort. Pt to lobby for transport home with family.

## 2019-01-07 LAB — BACTERIA SPEC AEROBE CULT: NO GROWTH

## 2019-01-10 ENCOUNTER — TELEPHONE (OUTPATIENT)
Dept: INTERNAL MEDICINE | Facility: CLINIC | Age: 79
End: 2019-01-10

## 2019-01-10 NOTE — TELEPHONE ENCOUNTER
----- Message from Mackenzie Joseph sent at 1/10/2019 11:34 AM CST -----  Contact: wife/  Ginna Noguera [phone]/ cell # [phone]  Caller is requesting a sooner appointment. Caller declined first available appointment listed below. Caller will not accept being placed on the wait list and is requesting a message be sent to the provider.    When is the next available appointment:  2/29/19  Did you offer to schedule the next available appt and put the patient on the wait list?:     What visit type:   Symptoms:    Patient preference of timeframe to be scheduled:    What is the reason the patient is requesting a sooner appointment? (insurance terminating, changing jobs):    Would you prefer an answer via [employer]?:    Comments:  pre op clearnce/ eye surgery 2/19 does he need an appointment or can the paperwork be dropped off, please call and advise.

## 2019-01-11 ENCOUNTER — HOSPITAL ENCOUNTER (OUTPATIENT)
Dept: RADIOLOGY | Facility: HOSPITAL | Age: 79
Discharge: HOME | End: 2019-01-11
Attending: INTERNAL MEDICINE
Payer: MEDICARE

## 2019-01-11 ENCOUNTER — HOSPITAL ENCOUNTER (OUTPATIENT)
Dept: INTERVENTIONAL RADIOLOGY/VASCULAR | Facility: HOSPITAL | Age: 79
Discharge: HOME | End: 2019-01-11
Attending: INTERNAL MEDICINE
Payer: MEDICARE

## 2019-01-11 ENCOUNTER — TELEPHONE (OUTPATIENT)
Dept: HEPATOLOGY | Facility: CLINIC | Age: 79
End: 2019-01-11

## 2019-01-11 VITALS
DIASTOLIC BLOOD PRESSURE: 85 MMHG | RESPIRATION RATE: 20 BRPM | OXYGEN SATURATION: 100 % | SYSTOLIC BLOOD PRESSURE: 175 MMHG | HEART RATE: 53 BPM

## 2019-01-11 DIAGNOSIS — R18.8 CIRRHOSIS OF LIVER WITH ASCITES, UNSPECIFIED HEPATIC CIRRHOSIS TYPE: ICD-10-CM

## 2019-01-11 DIAGNOSIS — R18.8 OTHER ASCITES: ICD-10-CM

## 2019-01-11 DIAGNOSIS — K74.60 CIRRHOSIS OF LIVER WITH ASCITES, UNSPECIFIED HEPATIC CIRRHOSIS TYPE: ICD-10-CM

## 2019-01-11 LAB
APPEARANCE FLD: NORMAL
BACTERIA SPEC ANAEROBE CULT: NORMAL
BODY FLD TYPE: NORMAL
COLOR FLD: YELLOW
EOSINOPHIL NFR FLD MANUAL: 2 %
LYMPHOCYTES NFR FLD MANUAL: 83 %
MONOS+MACROS NFR FLD MANUAL: 11 %
NEUTROPHILS NFR FLD MANUAL: 4 %
WBC # FLD: 35 /CU MM

## 2019-01-11 PROCEDURE — A7048 VACUUM DRAIN BOTTLE/TUBE KIT: HCPCS

## 2019-01-11 PROCEDURE — 49083 ABD PARACENTESIS W/IMAGING: CPT

## 2019-01-11 PROCEDURE — 49083 ABD PARACENTESIS W/IMAGING: CPT | Mod: ,,, | Performed by: RADIOLOGY

## 2019-01-11 PROCEDURE — 76705 ECHO EXAM OF ABDOMEN: CPT | Mod: 26,,, | Performed by: RADIOLOGY

## 2019-01-11 PROCEDURE — 76705 US ABDOMEN LIMITED: ICD-10-PCS | Mod: 26,,, | Performed by: RADIOLOGY

## 2019-01-11 PROCEDURE — 89051 BODY FLUID CELL COUNT: CPT

## 2019-01-11 PROCEDURE — 49083 IR PARACENTESIS WITH IMAGING: ICD-10-PCS | Mod: ,,, | Performed by: RADIOLOGY

## 2019-01-11 PROCEDURE — 76705 ECHO EXAM OF ABDOMEN: CPT | Mod: TC

## 2019-01-11 NOTE — DISCHARGE INSTRUCTIONS
SANDIEU MON-F[facility] 8 AM- 5PM [phone]. Radiology resident on call [phone].      3950 ml of ascites removed

## 2019-01-11 NOTE — H&P
Radiology History & Physical      SUBJECTIVE:     Chief Complaint: abdominal distention    History of Present Illness:  Kenneth Sheikh is a 78 y.o. male who presents for eval for abdominal distention  Past Medical History:   Diagnosis Date    Anticoagulant long-term use     Bipolar 1 disorder     Bipolar 1 disorder 11/24/2016    bipolar    Cancer     history of skin cancer/sinus cancer & rectal cancer    Depressed bipolar affective disorder     Diabetes mellitus     type 2    EBV infection 12/7/2016    EBV DNA, PCR Latest Ref Range: None detected  None detected EBV DNA-Copies/mL Unknown Test Not Performed EBV Early Antigen Ab, IgG Latest Ref Range: <1:10 Titer 1:40 (A) EBV Nuclear Ag Ab Latest Ref Range: <1:5 Titer >=1:80 (A) EBV VCA IgG Latest Ref Range: <1:10 Titer 1:160 (A) EBV VCA IgM Latest Ref Range: <1:10 Titer <1:10     History of TIA (transient ischemic attack)     several-taking Plavix    Hx of gallstones     Wife denies    Hypertension     Hyperthyroidism 11/30/2016    Low HDL (under 40) 12/7/2016    Mixed hyperlipidemia 11/23/2016    JUAN MANUEL (obstructive sleep apnea)     Pneumonia     Skin disease     Squamous cell carcinoma 08/2018    right temple    Stroke     Transient cerebral ischemia 7/22/2016    Type 2 diabetes mellitus      Past Surgical History:   Procedure Laterality Date    BIOPSY-BONE MARROW Left 11/3/2016    Performed by [facility] at [facility] OR 2ND FLR    ESOPHAGOGASTRODUODENOSCOPY (EGD) N/A 2/3/2017    Performed by Domenico Fox MD at [facility] ENDO (4TH FLR)    EXCISION-MASS RECTAL  6/28/2016    Performed by Haris Talavera MD at [facility] OR 2ND FLR    Moh's procedure x4      rectal cancer      Sinus surgery for sinus cancer      sinus sx for cancer      skin cancer removed-several times-nose & ear      TONSILLECTOMY      ULTRASOUND-ENDOSCOPIC-UPPER/glue coil of gastric varices N/A 4/19/2017    Performed by Aneudy Perla MD at [facility] ENDO (2ND FLR)     Undescened testicle sx      UVULOPALATOPHARYNGOPLASTY      for sleep apnea       Home Meds:   Prior to Admission medications    Medication Sig Start Date End Date Taking? Authorizing Provider   ACCU-CHEK NEYDA PLUS TEST STRP Strp 1 strip by Misc.(Non-Drug; Combo Route) route once daily. 11/5/18   Prisca Espinoza MD   ciprofloxacin HCl (CIPRO) 500 MG tablet Take 1 tablet (500 mg total) by mouth every Monday. 5/14/18   Renato Womack MD   divalproex (DEPAKOTE) 250 MG EC tablet Take 2 tablets (500 mg total) by mouth every evening. Do not fill prescription until patient calls and requests. 12/10/18   Prisca Espinoza MD   furosemide (LASIX) 20 MG tablet Take 2 tablets (40 mg total) by mouth once daily. 10/9/18 10/9/19  Renato Womack MD   lactulose (CHRONULAC) 10 gram/15 mL solution TAKE 15 MLS BY MOUTH TWICE DAILY 3/8/18   Renato Womack MD   lancets [facility] To check blood sugar once daily, to use with Accu-Chek meter. 11/5/18   Prisca Espinoza MD   rifAXIMin (XIFAXAN) 550 mg Tab Take 1 tablet (550 mg total) by mouth 2 (two) times daily. 10/3/17   Renato Womack MD   SITagliptan (JANUVIA) 50 MG Tab Take 1 tablet (50 mg total) by mouth once daily. 4/6/17   Prisca Espinoza MD     Anticoagulants/Antiplatelets: no anticoagulation    Allergies:   Review of patient's allergies indicates:   Allergen Reactions    Abilify [aripiprazole] Other (See Comments)     Sleepy, could not function.     Sedation History:  no adverse reactions    Review of Systems:   Hematological: no known coagulopathies  Respiratory: no shortness of breath  Cardiovascular: no chest pain  Gastrointestinal: no abdominal pain  Genito-Urinary: no dysuria  Musculoskeletal: negative  Neurological: no TIA or stroke symptoms         OBJECTIVE:     Vital Signs (Most Recent)     Pending, nurse in room    Physical Exam:  ASA: 2  Mallampati: na    General: no acute distress  Mental Status: alert and oriented to person, place and  time  HEENT: normocephalic, atraumatic  Chest: unlabored breathing  Heart: regular heart rate  Abdomen: distended  Extremity: moves all extremities    Laboratory  Lab Results   Component Value Date    INR 1.1 08/10/2018       Lab Results   Component Value Date    WBC 2.93 (L) 12/04/2018    HGB 10.1 (L) 12/04/2018    HCT 32.7 (L) 12/04/2018    MCV 87 12/04/2018     (L) 12/04/2018      Lab Results   Component Value Date     (H) 12/04/2018     12/04/2018    K 3.6 12/04/2018     12/04/2018    CO2 29 12/04/2018    BUN 17 12/04/2018    CREATININE 1.2 12/04/2018    CALCIUM 8.8 12/04/2018    MG 1.7 05/15/2017    ALT 5 (L) 12/04/2018    AST 18 12/04/2018    ALBUMIN 2.5 (L) 12/04/2018    BILITOT 0.7 12/04/2018       ASSESSMENT/PLAN:     Sedation Plan: local anesthesia  Patient will undergo US guided paracentesis

## 2019-01-18 ENCOUNTER — HOSPITAL ENCOUNTER (OUTPATIENT)
Dept: INTERVENTIONAL RADIOLOGY/VASCULAR | Facility: HOSPITAL | Age: 79
Discharge: HOME | End: 2019-01-18
Attending: INTERNAL MEDICINE
Payer: MEDICARE

## 2019-01-18 VITALS
OXYGEN SATURATION: 98 % | SYSTOLIC BLOOD PRESSURE: 137 MMHG | HEART RATE: 52 BPM | RESPIRATION RATE: 16 BRPM | DIASTOLIC BLOOD PRESSURE: 70 MMHG

## 2019-01-18 DIAGNOSIS — R18.8 OTHER ASCITES: ICD-10-CM

## 2019-01-18 PROCEDURE — 49083 IR PARACENTESIS WITH IMAGING: ICD-10-PCS | Mod: ,,, | Performed by: FAMILY MEDICINE

## 2019-01-18 PROCEDURE — 49083 ABD PARACENTESIS W/IMAGING: CPT

## 2019-01-18 PROCEDURE — 49083 ABD PARACENTESIS W/IMAGING: CPT | Mod: ,,, | Performed by: FAMILY MEDICINE

## 2019-01-18 NOTE — PROGRESS NOTES
When attempting to start an IV on the patient he stated that his doctor does not want him to get an IV for albumin unless we fill 5 liters of paracentesis fluid. Patient states he used to get IV albumin after 3 liters removed, but per his doctor he now waits till 5 liters. ZANDER

## 2019-01-18 NOTE — PROCEDURES

## 2019-01-18 NOTE — PROGRESS NOTES
Paracentesis complete. Pt tolerated well. 4000cc removed from left abdomen. Dressing applied clean, dry, and intact. Patient given discharge instructions and verbalized understanding of at home care. Patient transferred to [facility] via wheelchair with spouse by side.

## 2019-01-18 NOTE — DISCHARGE INSTRUCTIONS
For scheduling: Call Mackenzie at [phone]    For questions or concerns call: SHAR MON-[facility] 8 AM- 5PM [phone]. Radiology resident on call [phone].    For immediate concerns that are not emergent, you may call our radiology clinic at: [phone]

## 2019-01-24 ENCOUNTER — TELEPHONE (OUTPATIENT)
Dept: INTERVENTIONAL RADIOLOGY/VASCULAR | Facility: HOSPITAL | Age: 79
End: 2019-01-24

## 2019-01-25 ENCOUNTER — HOSPITAL ENCOUNTER (OUTPATIENT)
Dept: INTERVENTIONAL RADIOLOGY/VASCULAR | Facility: HOSPITAL | Age: 79
Discharge: HOME | End: 2019-01-25
Attending: INTERNAL MEDICINE
Payer: MEDICARE

## 2019-01-25 VITALS
RESPIRATION RATE: 16 BRPM | OXYGEN SATURATION: 100 % | HEART RATE: 58 BPM | SYSTOLIC BLOOD PRESSURE: 124 MMHG | DIASTOLIC BLOOD PRESSURE: 80 MMHG

## 2019-01-25 DIAGNOSIS — R18.8 OTHER ASCITES: ICD-10-CM

## 2019-01-25 LAB
APPEARANCE FLD: NORMAL
BODY FLD TYPE: NORMAL
COLOR FLD: YELLOW
GRAM STN SPEC: NORMAL
LYMPHOCYTES NFR FLD MANUAL: 61 %
MESOTHL CELL NFR FLD MANUAL: 4 %
MONOS+MACROS NFR FLD MANUAL: 30 %
NEUTROPHILS NFR FLD MANUAL: 5 %
WBC # FLD: 46 /CU MM

## 2019-01-25 PROCEDURE — 87075 CULTR BACTERIA EXCEPT BLOOD: CPT

## 2019-01-25 PROCEDURE — 49083 IR PARACENTESIS WITH IMAGING: ICD-10-PCS | Mod: ,,, | Performed by: RADIOLOGY

## 2019-01-25 PROCEDURE — 89051 BODY FLUID CELL COUNT: CPT

## 2019-01-25 PROCEDURE — 87070 CULTURE OTHR SPECIMN AEROBIC: CPT

## 2019-01-25 PROCEDURE — 87205 SMEAR GRAM STAIN: CPT

## 2019-01-25 PROCEDURE — 49083 ABD PARACENTESIS W/IMAGING: CPT | Mod: ,,, | Performed by: RADIOLOGY

## 2019-01-25 PROCEDURE — 49083 ABD PARACENTESIS W/IMAGING: CPT

## 2019-01-25 NOTE — DISCHARGE INSTRUCTIONS
Please call with any questions or concerns.      Monday thru Friday 8:00 am - 4:30 pm    Interventional Radiology   [phone]    After Hours    Ask for the Radiology Intern on call  [phone]           3L removed

## 2019-01-25 NOTE — H&P
Radiology History & Physical      SUBJECTIVE:     Chief Complaint: abdominal distention    History of Present Illness:  Kenneth Sheikh is a 78 y.o. male who presents for evaluation of abdominal distention  Past Medical History:   Diagnosis Date    Anticoagulant long-term use     Bipolar 1 disorder     Bipolar 1 disorder 11/24/2016    bipolar    Cancer     history of skin cancer/sinus cancer & rectal cancer    Depressed bipolar affective disorder     Diabetes mellitus     type 2    EBV infection 12/7/2016    EBV DNA, PCR Latest Ref Range: None detected  None detected EBV DNA-Copies/mL Unknown Test Not Performed EBV Early Antigen Ab, IgG Latest Ref Range: <1:10 Titer 1:40 (A) EBV Nuclear Ag Ab Latest Ref Range: <1:5 Titer >=1:80 (A) EBV VCA IgG Latest Ref Range: <1:10 Titer 1:160 (A) EBV VCA IgM Latest Ref Range: <1:10 Titer <1:10     History of TIA (transient ischemic attack)     several-taking Plavix    Hx of gallstones     Wife denies    Hypertension     Hyperthyroidism 11/30/2016    Low HDL (under 40) 12/7/2016    Mixed hyperlipidemia 11/23/2016    JUAN MANUEL (obstructive sleep apnea)     Pneumonia     Skin disease     Squamous cell carcinoma 08/2018    right temple    Stroke     Transient cerebral ischemia 7/22/2016    Type 2 diabetes mellitus      Past Surgical History:   Procedure Laterality Date    BIOPSY-BONE MARROW Left 11/3/2016    Performed by [facility] at [facility] OR 2ND FLR    ESOPHAGOGASTRODUODENOSCOPY (EGD) N/A 2/3/2017    Performed by Domenico Fox MD at [facility] ENDO (4TH FLR)    EXCISION-MASS RECTAL  6/28/2016    Performed by Haris Talavera MD at [facility] OR 2ND FLR    Moh's procedure x4      rectal cancer      Sinus surgery for sinus cancer      sinus sx for cancer      skin cancer removed-several times-nose & ear      TONSILLECTOMY      ULTRASOUND-ENDOSCOPIC-UPPER/glue coil of gastric varices N/A 4/19/2017    Performed by Aneudy Perla MD at [facility] ENDO (2ND FLR)     Undescened testicle sx      UVULOPALATOPHARYNGOPLASTY      for sleep apnea       Home Meds:   Prior to Admission medications    Medication Sig Start Date End Date Taking? Authorizing Provider   ACCU-CHEK NEYDA PLUS TEST STRP Strp 1 strip by Misc.(Non-Drug; Combo Route) route once daily. 11/5/18   Prisca Espinoza MD   ciprofloxacin HCl (CIPRO) 500 MG tablet Take 1 tablet (500 mg total) by mouth every Monday. 5/14/18   Renato Womack MD   divalproex (DEPAKOTE) 250 MG EC tablet Take 2 tablets (500 mg total) by mouth every evening. Do not fill prescription until patient calls and requests. 12/10/18   Prisca Espinoza MD   furosemide (LASIX) 20 MG tablet Take 2 tablets (40 mg total) by mouth once daily. 10/9/18 10/9/19  Renato Womack MD   lactulose (CHRONULAC) 10 gram/15 mL solution TAKE 15 MLS BY MOUTH TWICE DAILY 3/8/18   Renato Womack MD   lancets [facility] To check blood sugar once daily, to use with Accu-Chek meter. 11/5/18   Prisca Espinoza MD   rifAXIMin (XIFAXAN) 550 mg Tab Take 1 tablet (550 mg total) by mouth 2 (two) times daily. 10/3/17   Renato Womack MD   SITagliptan (JANUVIA) 50 MG Tab Take 1 tablet (50 mg total) by mouth once daily. 4/6/17   Prisca sEpinoza MD     Anticoagulants/Antiplatelets: no anticoagulation    Allergies:   Review of patient's allergies indicates:   Allergen Reactions    Abilify [aripiprazole] Other (See Comments)     Sleepy, could not function.     Sedation History:  no adverse reactions    Review of Systems:   Hematological: no known coagulopathies  Respiratory: no shortness of breath  Cardiovascular: no chest pain  Gastrointestinal: no abdominal pain  Genito-Urinary: no dysuria  Musculoskeletal: negative  Neurological: no TIA or stroke symptoms         OBJECTIVE:     Vital Signs (Most Recent)  Pulse: (!) 58 (01/25/19 1120)  Resp: 16 (01/25/19 1120)  BP: 124/80 (01/25/19 1120)  SpO2: 100 % (01/25/19 1120)    Physical Exam:  ASA: 2  Mallampati:  na    General: no acute distress  Mental Status: alert and oriented to person, place and time  HEENT: normocephalic, atraumatic  Chest: unlabored breathing  Heart: regular heart rate  Abdomen: distended  Extremity: moves all extremities    Laboratory  Lab Results   Component Value Date    INR 1.1 08/10/2018       Lab Results   Component Value Date    WBC 2.93 (L) 12/04/2018    HGB 10.1 (L) 12/04/2018    HCT 32.7 (L) 12/04/2018    MCV 87 12/04/2018     (L) 12/04/2018      Lab Results   Component Value Date     (H) 12/04/2018     12/04/2018    K 3.6 12/04/2018     12/04/2018    CO2 29 12/04/2018    BUN 17 12/04/2018    CREATININE 1.2 12/04/2018    CALCIUM 8.8 12/04/2018    MG 1.7 05/15/2017    ALT 5 (L) 12/04/2018    AST 18 12/04/2018    ALBUMIN 2.5 (L) 12/04/2018    BILITOT 0.7 12/04/2018       ASSESSMENT/PLAN:     Sedation Plan: local anesthesia  Patient will undergo US guided paracentesis

## 2019-01-25 NOTE — PROGRESS NOTES
Paracentesis complete, 3000 MLs removed. Specimen sent to lab. No Albumin administered per protocol. Dressing applied to RLQ puncture site, dressing clean dry and intact. Pt given discharge instructions and handouts, pt verbalizes understanding. Questions answered. Pt denies pain and discomfort.Pt to lobby for transport home with family.    3000 ML removed

## 2019-01-29 LAB — BACTERIA SPEC AEROBE CULT: NO GROWTH

## 2019-01-31 ENCOUNTER — TELEPHONE (OUTPATIENT)
Dept: INTERVENTIONAL RADIOLOGY/VASCULAR | Facility: HOSPITAL | Age: 79
End: 2019-01-31

## 2019-02-01 ENCOUNTER — HOSPITAL ENCOUNTER (OUTPATIENT)
Dept: INTERVENTIONAL RADIOLOGY/VASCULAR | Facility: HOSPITAL | Age: 79
Discharge: HOME | End: 2019-02-01
Attending: INTERNAL MEDICINE
Payer: MEDICARE

## 2019-02-01 VITALS
DIASTOLIC BLOOD PRESSURE: 63 MMHG | SYSTOLIC BLOOD PRESSURE: 133 MMHG | RESPIRATION RATE: 17 BRPM | HEART RATE: 73 BPM | OXYGEN SATURATION: 97 %

## 2019-02-01 DIAGNOSIS — R18.8 OTHER ASCITES: ICD-10-CM

## 2019-02-01 LAB
APPEARANCE FLD: NORMAL
BACTERIA SPEC ANAEROBE CULT: NORMAL
BASOPHILS NFR FLD MANUAL: 0 %
BODY FLD TYPE: NORMAL
COLOR FLD: YELLOW
LYMPHOCYTES NFR FLD MANUAL: 91 %
MONOS+MACROS NFR FLD MANUAL: 7 %
NEUTROPHILS NFR FLD MANUAL: 2 %
WBC # FLD: 44 /CU MM

## 2019-02-01 PROCEDURE — 49083 ABD PARACENTESIS W/IMAGING: CPT

## 2019-02-01 PROCEDURE — 49083 IR PARACENTESIS WITH IMAGING: ICD-10-PCS | Mod: ,,, | Performed by: RADIOLOGY

## 2019-02-01 PROCEDURE — 89051 BODY FLUID CELL COUNT: CPT

## 2019-02-01 PROCEDURE — 49083 ABD PARACENTESIS W/IMAGING: CPT | Mod: ,,, | Performed by: RADIOLOGY

## 2019-02-01 NOTE — H&P
Radiology History & Physical      SUBJECTIVE:     Chief Complaint: abdominal distention    History of Present Illness:  Kenneth Sheikh is a 79 y.o. male who presents for evaluation of ascites  Past Medical History:   Diagnosis Date    Anticoagulant long-term use     Bipolar 1 disorder     Bipolar 1 disorder 11/24/2016    bipolar    Cancer     history of skin cancer/sinus cancer & rectal cancer    Depressed bipolar affective disorder     Diabetes mellitus     type 2    EBV infection 12/7/2016    EBV DNA, PCR Latest Ref Range: None detected  None detected EBV DNA-Copies/mL Unknown Test Not Performed EBV Early Antigen Ab, IgG Latest Ref Range: <1:10 Titer 1:40 (A) EBV Nuclear Ag Ab Latest Ref Range: <1:5 Titer >=1:80 (A) EBV VCA IgG Latest Ref Range: <1:10 Titer 1:160 (A) EBV VCA IgM Latest Ref Range: <1:10 Titer <1:10     History of TIA (transient ischemic attack)     several-taking Plavix    Hx of gallstones     Wife denies    Hypertension     Hyperthyroidism 11/30/2016    Low HDL (under 40) 12/7/2016    Mixed hyperlipidemia 11/23/2016    JUAN MANUEL (obstructive sleep apnea)     Pneumonia     Skin disease     Squamous cell carcinoma 08/2018    right temple    Stroke     Transient cerebral ischemia 7/22/2016    Type 2 diabetes mellitus      Past Surgical History:   Procedure Laterality Date    BIOPSY-BONE MARROW Left 11/3/2016    Performed by [facility] at [facility] OR 2ND FLR    ESOPHAGOGASTRODUODENOSCOPY (EGD) N/A 2/3/2017    Performed by Domenico Fox MD at [facility] ENDO (4TH FLR)    EXCISION-MASS RECTAL  6/28/2016    Performed by Haris Talavera MD at [facility] OR 2ND FLR    Moh's procedure x4      rectal cancer      Sinus surgery for sinus cancer      sinus sx for cancer      skin cancer removed-several times-nose & ear      TONSILLECTOMY      ULTRASOUND-ENDOSCOPIC-UPPER/glue coil of gastric varices N/A 4/19/2017    Performed by Aneudy ePrla MD at [facility] ENDO (2ND FLR)    Undescened  testicle sx      UVULOPALATOPHARYNGOPLASTY      for sleep apnea       Home Meds:   Prior to Admission medications    Medication Sig Start Date End Date Taking? Authorizing Provider   ACCU-CHEK NEYDA PLUS TEST STRP Strp 1 strip by Misc.(Non-Drug; Combo Route) route once daily. 11/5/18   Prisca Espinoza MD   ciprofloxacin HCl (CIPRO) 500 MG tablet Take 1 tablet (500 mg total) by mouth every Monday. 5/14/18   Renato Womack MD   divalproex (DEPAKOTE) 250 MG EC tablet Take 2 tablets (500 mg total) by mouth every evening. Do not fill prescription until patient calls and requests. 12/10/18   Prisca Espinoza MD   furosemide (LASIX) 20 MG tablet Take 2 tablets (40 mg total) by mouth once daily. 10/9/18 10/9/19  Renato Womack MD   lactulose (CHRONULAC) 10 gram/15 mL solution TAKE 15 MLS BY MOUTH TWICE DAILY 3/8/18   Renato Womack MD   lancets [facility] To check blood sugar once daily, to use with Accu-Chek meter. 11/5/18   Prisca Espinoza MD   rifAXIMin (XIFAXAN) 550 mg Tab Take 1 tablet (550 mg total) by mouth 2 (two) times daily. 10/3/17   Renato Womack MD   SITagliptan (JANUVIA) 50 MG Tab Take 1 tablet (50 mg total) by mouth once daily. 4/6/17   Prisca Espinoza MD     Anticoagulants/Antiplatelets: no anticoagulation    Allergies:   Review of patient's allergies indicates:   Allergen Reactions    Abilify [aripiprazole] Other (See Comments)     Sleepy, could not function.     Sedation History:  no adverse reactions    Review of Systems:   Hematological: no known coagulopathies  Respiratory: no shortness of breath  Cardiovascular: no chest pain  Gastrointestinal: no abdominal pain  Genito-Urinary: no dysuria  Musculoskeletal: negative  Neurological: no TIA or stroke symptoms         OBJECTIVE:     Vital Signs (Most Recent)  Pulse: 73 (02/01/19 1116)  Resp: 17 (02/01/19 1116)  BP: 133/63 (02/01/19 1116)  SpO2: 97 % (02/01/19 1116)    Physical Exam:  ASA: 2  Mallampati: na    General: no  acute distress  Mental Status: alert and oriented to person, place and time  HEENT: normocephalic, atraumatic  Chest: unlabored breathing  Heart: regular heart rate  Abdomen: distended  Extremity: moves all extremities    Laboratory  Lab Results   Component Value Date    INR 1.1 08/10/2018       Lab Results   Component Value Date    WBC 2.93 (L) 12/04/2018    HGB 10.1 (L) 12/04/2018    HCT 32.7 (L) 12/04/2018    MCV 87 12/04/2018     (L) 12/04/2018      Lab Results   Component Value Date     (H) 12/04/2018     12/04/2018    K 3.6 12/04/2018     12/04/2018    CO2 29 12/04/2018    BUN 17 12/04/2018    CREATININE 1.2 12/04/2018    CALCIUM 8.8 12/04/2018    MG 1.7 05/15/2017    ALT 5 (L) 12/04/2018    AST 18 12/04/2018    ALBUMIN 2.5 (L) 12/04/2018    BILITOT 0.7 12/04/2018       ASSESSMENT/PLAN:     Sedation Plan: local anesthesia  Patient will undergo US guided paracenetesis

## 2019-02-01 NOTE — DISCHARGE INSTRUCTIONS
For scheduling: Call Mackenzie at [phone]    For questions or concerns call: SHAR MON-[facility] 8 AM- 5PM [phone].   Radiology resident on call [phone].    For immediate concerns that are not emergent, you may call our radiology clinic at: [phone]

## 2019-02-01 NOTE — PROGRESS NOTES
Paracentesis complete at this time. Patient tolerated well 3100mL removed from left abdomen.   Dressing applied, clean dry and intact. Patient verbalized understanding of discharge instructions. Patient refused wheelchair, ambulated to lobby with spouse by side.

## 2019-02-06 ENCOUNTER — TELEPHONE (OUTPATIENT)
Dept: PHARMACY | Facility: CLINIC | Age: 79
End: 2019-02-06

## 2019-02-08 ENCOUNTER — HOSPITAL ENCOUNTER (OUTPATIENT)
Dept: INTERVENTIONAL RADIOLOGY/VASCULAR | Facility: HOSPITAL | Age: 79
Discharge: HOME | End: 2019-02-08
Attending: INTERNAL MEDICINE
Payer: MEDICARE

## 2019-02-08 VITALS — HEART RATE: 50 BPM | RESPIRATION RATE: 16 BRPM | DIASTOLIC BLOOD PRESSURE: 74 MMHG | SYSTOLIC BLOOD PRESSURE: 126 MMHG

## 2019-02-08 DIAGNOSIS — R18.8 OTHER ASCITES: ICD-10-CM

## 2019-02-08 LAB
APPEARANCE FLD: CLEAR
BODY FLD TYPE: NORMAL
COLOR FLD: YELLOW
GRAM STN SPEC: NORMAL
GRAM STN SPEC: NORMAL
LYMPHOCYTES NFR FLD MANUAL: 67 %
MESOTHL CELL NFR FLD MANUAL: 4 %
MONOS+MACROS NFR FLD MANUAL: 28 %
NEUTROPHILS NFR FLD MANUAL: 1 %
WBC # FLD: 29 /CU MM

## 2019-02-08 PROCEDURE — 49083 ABD PARACENTESIS W/IMAGING: CPT

## 2019-02-08 PROCEDURE — 87070 CULTURE OTHR SPECIMN AEROBIC: CPT

## 2019-02-08 PROCEDURE — 49083 IR PARACENTESIS WITH IMAGING: ICD-10-PCS | Mod: ,,, | Performed by: NURSE PRACTITIONER

## 2019-02-08 PROCEDURE — 49083 ABD PARACENTESIS W/IMAGING: CPT | Mod: ,,, | Performed by: NURSE PRACTITIONER

## 2019-02-08 PROCEDURE — 63600175 PHARM REV CODE 636 W HCPCS: Mod: JG | Performed by: INTERNAL MEDICINE

## 2019-02-08 PROCEDURE — 89051 BODY FLUID CELL COUNT: CPT | Mod: 91

## 2019-02-08 PROCEDURE — 87075 CULTR BACTERIA EXCEPT BLOOD: CPT

## 2019-02-08 PROCEDURE — 87205 SMEAR GRAM STAIN: CPT

## 2019-02-08 PROCEDURE — P9047 ALBUMIN (HUMAN), 25%, 50ML: HCPCS | Mod: JG | Performed by: INTERNAL MEDICINE

## 2019-02-08 RX ORDER — ALBUMIN HUMAN 250 G/1000ML
25 SOLUTION INTRAVENOUS ONCE
Status: COMPLETED | OUTPATIENT
Start: 2019-02-08 | End: 2019-02-08

## 2019-02-08 RX ADMIN — ALBUMIN (HUMAN) 25 G: 25 SOLUTION INTRAVENOUS at 12:02

## 2019-02-08 NOTE — NURSING
Paracentesis complete. 3900 mLs peritoneal fluid drained. Pt tolerated well. Dressing to Right side of abdomen  clean, dry, and intact. Albumin 25% given 100 mLs. Specimens sent per lab order Pt acknowledged understanding of discharge instructions. Pt discharged per unit and hospital protocol via wheel chair with family member..

## 2019-02-08 NOTE — DISCHARGE INSTRUCTIONS
For scheduling: Call Mackenzie at [phone]    For questions or concerns call: SHAR MON[facility] 8 AM- 5PM [phone]. Radiology resident on call [phone].    For immediate concerns that are not emergent, you may call our radiology clinic at: [phone]...

## 2019-02-08 NOTE — H&P
Radiology History & Physical      SUBJECTIVE:     Chief Complaint: abdominal distention    History of Present Illness:  Kenneth Sheikh is a 79 y.o. male who presents for evaluation of ascites  Past Medical History:   Diagnosis Date    Anticoagulant long-term use     Bipolar 1 disorder     Bipolar 1 disorder 11/24/2016    bipolar    Cancer     history of skin cancer/sinus cancer & rectal cancer    Depressed bipolar affective disorder     Diabetes mellitus     type 2    EBV infection 12/7/2016    EBV DNA, PCR Latest Ref Range: None detected  None detected EBV DNA-Copies/mL Unknown Test Not Performed EBV Early Antigen Ab, IgG Latest Ref Range: <1:10 Titer 1:40 (A) EBV Nuclear Ag Ab Latest Ref Range: <1:5 Titer >=1:80 (A) EBV VCA IgG Latest Ref Range: <1:10 Titer 1:160 (A) EBV VCA IgM Latest Ref Range: <1:10 Titer <1:10     History of TIA (transient ischemic attack)     several-taking Plavix    Hx of gallstones     Wife denies    Hypertension     Hyperthyroidism 11/30/2016    Low HDL (under 40) 12/7/2016    Mixed hyperlipidemia 11/23/2016    JUAN MANUEL (obstructive sleep apnea)     Pneumonia     Skin disease     Squamous cell carcinoma 08/2018    right temple    Stroke     Transient cerebral ischemia 7/22/2016    Type 2 diabetes mellitus      Past Surgical History:   Procedure Laterality Date    BIOPSY-BONE MARROW Left 11/3/2016    Performed by [facility] at [facility] OR 2ND FLR    ESOPHAGOGASTRODUODENOSCOPY (EGD) N/A 2/3/2017    Performed by Domenico Fox MD at [facility] ENDO (4TH FLR)    EXCISION-MASS RECTAL  6/28/2016    Performed by Haris Talavera MD at [facility] OR 2ND FLR    Moh's procedure x4      rectal cancer      Sinus surgery for sinus cancer      sinus sx for cancer      skin cancer removed-several times-nose & ear      TONSILLECTOMY      ULTRASOUND-ENDOSCOPIC-UPPER/glue coil of gastric varices N/A 4/19/2017    Performed by Aneudy Perla MD at [facility] ENDO (2ND FLR)    Undescened  testicle sx      UVULOPALATOPHARYNGOPLASTY      for sleep apnea       Home Meds:   Prior to Admission medications    Medication Sig Start Date End Date Taking? Authorizing Provider   ACCU-CHEK NEYDA PLUS TEST STRP Strp 1 strip by Misc.(Non-Drug; Combo Route) route once daily. 11/5/18   Prisca Espinoza MD   ciprofloxacin HCl (CIPRO) 500 MG tablet Take 1 tablet (500 mg total) by mouth every Monday. 5/14/18   Renato Womack MD   divalproex (DEPAKOTE) 250 MG EC tablet Take 2 tablets (500 mg total) by mouth every evening. Do not fill prescription until patient calls and requests. 12/10/18   Prisca Espinoza MD   furosemide (LASIX) 20 MG tablet Take 2 tablets (40 mg total) by mouth once daily. 10/9/18 10/9/19  Renato Womack MD   lactulose (CHRONULAC) 10 gram/15 mL solution TAKE 15 MLS BY MOUTH TWICE DAILY 3/8/18   Renato Womack MD   lancets [facility] To check blood sugar once daily, to use with Accu-Chek meter. 11/5/18   Prisca Espinoza MD   rifAXIMin (XIFAXAN) 550 mg Tab Take 1 tablet (550 mg total) by mouth 2 (two) times daily. 10/3/17   Renato Womack MD   SITagliptan (JANUVIA) 50 MG Tab Take 1 tablet (50 mg total) by mouth once daily. 4/6/17   Prisca Espinoza MD     Anticoagulants/Antiplatelets: no anticoagulation    Allergies:   Review of patient's allergies indicates:   Allergen Reactions    Abilify [aripiprazole] Other (See Comments)     Sleepy, could not function.     Sedation History:  no adverse reactions    Review of Systems:   Hematological: no known coagulopathies  Respiratory: no shortness of breath  Cardiovascular: no chest pain  Gastrointestinal: no abdominal pain  Genito-Urinary: no dysuria  Musculoskeletal: negative  Neurological: no TIA or stroke symptoms         OBJECTIVE:     Vital Signs (Most Recent)       Physical Exam:  ASA: 2  Mallampati: na    General: no acute distress  Mental Status: alert and oriented to person, place and time  HEENT: normocephalic,  atraumatic  Chest: unlabored breathing  Heart: regular heart rate  Abdomen: distended  Extremity: moves all extremities    Laboratory  Lab Results   Component Value Date    INR 1.1 08/10/2018       Lab Results   Component Value Date    WBC 2.93 (L) 12/04/2018    HGB 10.1 (L) 12/04/2018    HCT 32.7 (L) 12/04/2018    MCV 87 12/04/2018     (L) 12/04/2018      Lab Results   Component Value Date     (H) 12/04/2018     12/04/2018    K 3.6 12/04/2018     12/04/2018    CO2 29 12/04/2018    BUN 17 12/04/2018    CREATININE 1.2 12/04/2018    CALCIUM 8.8 12/04/2018    MG 1.7 05/15/2017    ALT 5 (L) 12/04/2018    AST 18 12/04/2018    ALBUMIN 2.5 (L) 12/04/2018    BILITOT 0.7 12/04/2018       ASSESSMENT/PLAN:     Sedation Plan: local anesthesia  Patient will undergo US guided paracentesis

## 2019-02-11 LAB — BACTERIA SPEC AEROBE CULT: NO GROWTH

## 2019-02-14 ENCOUNTER — TELEPHONE (OUTPATIENT)
Dept: INTERVENTIONAL RADIOLOGY/VASCULAR | Facility: HOSPITAL | Age: 79
End: 2019-02-14

## 2019-02-15 ENCOUNTER — HOSPITAL ENCOUNTER (OUTPATIENT)
Dept: INTERVENTIONAL RADIOLOGY/VASCULAR | Facility: HOSPITAL | Age: 79
Discharge: HOME | End: 2019-02-15
Attending: INTERNAL MEDICINE
Payer: MEDICARE

## 2019-02-15 VITALS
SYSTOLIC BLOOD PRESSURE: 139 MMHG | RESPIRATION RATE: 16 BRPM | OXYGEN SATURATION: 98 % | HEART RATE: 51 BPM | DIASTOLIC BLOOD PRESSURE: 69 MMHG

## 2019-02-15 DIAGNOSIS — R18.8 OTHER ASCITES: ICD-10-CM

## 2019-02-15 LAB
APPEARANCE FLD: CLEAR
BACTERIA SPEC ANAEROBE CULT: NORMAL
BODY FLD TYPE: NORMAL
COLOR FLD: YELLOW
GRAM STN SPEC: NORMAL
LYMPHOCYTES NFR FLD MANUAL: 45 %
MESOTHL CELL NFR FLD MANUAL: 5 %
MONOS+MACROS NFR FLD MANUAL: 49 %
NEUTROPHILS NFR FLD MANUAL: 1 %
WBC # FLD: 60 /CU MM

## 2019-02-15 PROCEDURE — 87075 CULTR BACTERIA EXCEPT BLOOD: CPT

## 2019-02-15 PROCEDURE — P9047 ALBUMIN (HUMAN), 25%, 50ML: HCPCS | Mod: JG | Performed by: INTERNAL MEDICINE

## 2019-02-15 PROCEDURE — 87205 SMEAR GRAM STAIN: CPT

## 2019-02-15 PROCEDURE — 89051 BODY FLUID CELL COUNT: CPT

## 2019-02-15 PROCEDURE — 63600175 PHARM REV CODE 636 W HCPCS: Mod: JG | Performed by: INTERNAL MEDICINE

## 2019-02-15 PROCEDURE — 49083 IR PARACENTESIS WITH IMAGING: ICD-10-PCS | Mod: ,,, | Performed by: RADIOLOGY

## 2019-02-15 PROCEDURE — 49083 ABD PARACENTESIS W/IMAGING: CPT

## 2019-02-15 PROCEDURE — 87070 CULTURE OTHR SPECIMN AEROBIC: CPT

## 2019-02-15 PROCEDURE — 49083 ABD PARACENTESIS W/IMAGING: CPT | Mod: ,,, | Performed by: RADIOLOGY

## 2019-02-15 RX ORDER — ALBUMIN HUMAN 250 G/1000ML
25 SOLUTION INTRAVENOUS ONCE
Status: COMPLETED | OUTPATIENT
Start: 2019-02-15 | End: 2019-02-15

## 2019-02-15 RX ADMIN — ALBUMIN (HUMAN) 25 G: 25 SOLUTION INTRAVENOUS at 12:02

## 2019-02-15 NOTE — PROGRESS NOTES
Para completed, pt tolerated well. No apparent distress noted. 3.5 Liters removed from right abd, mepore applied CDI. Labs collected and sent. Albumin 100 ml.  Discharge instructions reviewed and acknowledged. Pt discharged via wheelchair and private vehicle.

## 2019-02-15 NOTE — DISCHARGE INSTRUCTIONS
[facility] [phone] (MON-FRI 8 AM- 5PM). Radiology Resident on call [phone].

## 2019-02-18 ENCOUNTER — OFFICE VISIT (OUTPATIENT)
Dept: INTERNAL MEDICINE | Facility: CLINIC | Age: 79
End: 2019-02-18
Payer: MEDICARE

## 2019-02-18 VITALS
WEIGHT: 166.69 LBS | DIASTOLIC BLOOD PRESSURE: 60 MMHG | HEART RATE: 52 BPM | SYSTOLIC BLOOD PRESSURE: 100 MMHG | OXYGEN SATURATION: 99 % | BODY MASS INDEX: 25.26 KG/M2 | HEIGHT: 68 IN

## 2019-02-18 DIAGNOSIS — K75.81 LIVER CIRRHOSIS SECONDARY TO NASH (NONALCOHOLIC STEATOHEPATITIS): ICD-10-CM

## 2019-02-18 DIAGNOSIS — N18.30 TYPE 2 DIABETES MELLITUS WITH STAGE 3 CHRONIC KIDNEY DISEASE, WITHOUT LONG-TERM CURRENT USE OF INSULIN: ICD-10-CM

## 2019-02-18 DIAGNOSIS — M54.50 CHRONIC MIDLINE LOW BACK PAIN WITHOUT SCIATICA: ICD-10-CM

## 2019-02-18 DIAGNOSIS — K74.60 LIVER CIRRHOSIS SECONDARY TO NASH (NONALCOHOLIC STEATOHEPATITIS): ICD-10-CM

## 2019-02-18 DIAGNOSIS — F31.9 BIPOLAR 1 DISORDER: ICD-10-CM

## 2019-02-18 DIAGNOSIS — E11.22 TYPE 2 DIABETES MELLITUS WITH STAGE 3 CHRONIC KIDNEY DISEASE, WITHOUT LONG-TERM CURRENT USE OF INSULIN: ICD-10-CM

## 2019-02-18 DIAGNOSIS — N18.30 CKD (CHRONIC KIDNEY DISEASE) STAGE 3, GFR 30-59 ML/MIN: ICD-10-CM

## 2019-02-18 DIAGNOSIS — G89.29 CHRONIC MIDLINE LOW BACK PAIN WITHOUT SCIATICA: ICD-10-CM

## 2019-02-18 DIAGNOSIS — Z85.828 HISTORY OF SCC (SQUAMOUS CELL CARCINOMA) OF SKIN: ICD-10-CM

## 2019-02-18 DIAGNOSIS — R18.8 ASCITES OF LIVER: ICD-10-CM

## 2019-02-18 DIAGNOSIS — Z01.818 PREOP EXAMINATION: Primary | ICD-10-CM

## 2019-02-18 DIAGNOSIS — R00.1 SINUS BRADYCARDIA: ICD-10-CM

## 2019-02-18 DIAGNOSIS — Z85.048 HISTORY OF RECTAL CANCER: ICD-10-CM

## 2019-02-18 DIAGNOSIS — D61.818 PANCYTOPENIA: ICD-10-CM

## 2019-02-18 LAB — BACTERIA SPEC AEROBE CULT: NO GROWTH

## 2019-02-18 PROCEDURE — 99214 PR OFFICE/OUTPT VISIT, EST, LEVL IV, 30-39 MIN: ICD-10-PCS | Mod: S$PBB,,, | Performed by: INTERNAL MEDICINE

## 2019-02-18 PROCEDURE — 99214 OFFICE O/P EST MOD 30 MIN: CPT | Mod: S$PBB,,, | Performed by: INTERNAL MEDICINE

## 2019-02-18 PROCEDURE — 99999 PR PBB SHADOW E&M-EST. PATIENT-LVL III: CPT | Mod: PBBFAC,,, | Performed by: INTERNAL MEDICINE

## 2019-02-18 PROCEDURE — 99213 OFFICE O/P EST LOW 20 MIN: CPT | Mod: PBBFAC | Performed by: INTERNAL MEDICINE

## 2019-02-18 PROCEDURE — 99999 PR PBB SHADOW E&M-EST. PATIENT-LVL III: ICD-10-PCS | Mod: PBBFAC,,, | Performed by: INTERNAL MEDICINE

## 2019-02-18 RX ORDER — KETOROLAC TROMETHAMINE 5 MG/ML
SOLUTION OPHTHALMIC
COMMUNITY
Start: 2019-01-10 | End: 2019-06-14

## 2019-02-18 RX ORDER — PREDNISOLONE ACETATE 10 MG/ML
SUSPENSION/ DROPS OPHTHALMIC
COMMUNITY
Start: 2019-01-15 | End: 2020-01-16

## 2019-02-18 RX ORDER — OFLOXACIN 3 MG/ML
SOLUTION/ DROPS OPHTHALMIC
COMMUNITY
Start: 2019-01-10 | End: 2020-01-16

## 2019-02-18 NOTE — PROGRESS NOTES
Internal Medicine    Subjective:      Patient ID: Kenneth Sheikh is a 79 y.o. male.    Chief Complaint: Pre-op Exam (cataract surg)    HPI:  Patient presents for follow up appointment.  The patient's last visit with me was on 10/26/2018.    Cataracts:  Has upcoming surgery and needs clearance.  Says he will be on a Valium drip and not be receiving general anesthesia.    Sinus bradycardia (HR 50-60's):  Chronic and asymptomatic.  Last EKG 9/2017.    DM-2:  Last HgA1c 7.4 on 12/4/18.  FSG ranging 100's-130's most of the time with an occasional high at 140-170.  Off metformin due to elevated lactate and diarrhea.  Taking Januvia 50mg daily.    CKD, stage 3:  Cr 1.2 and GFR 56 on 12/4/18.     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  Losartan stopped due to BELLA.  Followed by Dr. Womack - last seen 1/4/19.  Taking Rifaximin twice daily.  Getting paracentesis weekly.  Taking Cipro once a week for SBP prophylaxis.       H/o Rectal cancer s/p resection 06/2016:  Last seen by colorectal surgery (Dr. Talavera) on 3/5/18 - no evidence of recurrent neoplasia - follow up in 1 year.     Pancytopenia:  Has been seen by [facility]e-Onc (2/21/17).  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      HLD:  No longer on medication.     TIAs:  No symptoms recently.  No longer taking ASA.     Bipolar d/o:  Taking Depakote 500mg qHS.  Last Depakote level 58.8 on 5/18/18.  Denies depression or dany.  Followed by psychiatrist, Dr. Figueroa.     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 Beach.     Neck pain, bilateral knee pain:  Stable.  Avoiding NSAIDs due to cirrhosis.  Was told he could occasionally take Tylenol but not taking regularly.    Has had recent eye exam with Dr. Timoteo Sharpe.        Review of Systems   Constitutional: Positive for fatigue (Chronic). Negative for appetite change, chills, fever and  "unexpected weight change.   HENT: Negative for sore throat and trouble swallowing.    Eyes: Negative for visual disturbance.   Respiratory: Negative for cough, chest tightness, shortness of breath and wheezing.    Cardiovascular: Negative for chest pain, palpitations and leg swelling.   Gastrointestinal: Positive for abdominal distention (Chronic). Negative for abdominal pain, blood in stool, constipation, diarrhea, nausea and vomiting.   Genitourinary: Negative for difficulty urinating.   Musculoskeletal: Positive for neck pain (Chronic). Negative for arthralgias and myalgias.   Skin: Negative for rash and wound.   Neurological: Positive for weakness (Chronic). Negative for dizziness, numbness and headaches.   Psychiatric/Behavioral: Negative for behavioral problems and confusion.       Past medical history, surgical history, and family medical history reviewed and updated as appropriate.    Medications and allergies reviewed.     Objective:     Vitals:    02/18/19 1306   BP: 100/60   BP Location: Right arm   Patient Position: Sitting   BP Method: Medium (Manual)   Pulse: (!) 52   SpO2: 99%   Weight: 75.6 kg (166 lb 10.7 oz)   Height: 5' 8" (1.727 m)     Physical Exam   Constitutional: He is oriented to person, place, and time. He appears well-developed and well-nourished. No distress.   Sitting in wheelchair   HENT:   Head: Normocephalic and atraumatic.   Eyes: Conjunctivae and EOM are normal. No scleral icterus.   Cardiovascular: Normal rate and regular rhythm. Exam reveals no gallop and no friction rub.   No murmur heard.  Pulmonary/Chest: Effort normal and breath sounds normal. No respiratory distress. He has no wheezes. He has no rales.   Abdominal: Soft. He exhibits distension (Mild). There is no tenderness. There is no guarding.   Musculoskeletal: He exhibits no edema or tenderness.   Neurological: He is alert and oriented to person, place, and time.   Skin: No rash noted. No erythema.   Psychiatric: He has " a normal mood and affect. His behavior is normal.       RESULTS:   Lab Results   Component Value Date    WBC 2.93 (L) 12/04/2018    HGB 10.1 (L) 12/04/2018    HCT 32.7 (L) 12/04/2018    MCV 87 12/04/2018     (L) 12/04/2018     BMP  Lab Results   Component Value Date     12/04/2018    K 3.6 12/04/2018     12/04/2018    CO2 29 12/04/2018    BUN 17 12/04/2018    CREATININE 1.2 12/04/2018    CALCIUM 8.8 12/04/2018    ANIONGAP 7 (L) 12/04/2018    ESTGFRAFRICA >60.0 12/04/2018    EGFRNONAA 56.8 (A) 12/04/2018     Lab Results   Component Value Date    ALT 5 (L) 12/04/2018    AST 18 12/04/2018    ALKPHOS 105 12/04/2018    BILITOT 0.7 12/04/2018     Lab Results   Component Value Date    TSH 1.179 02/02/2018     Lab Results   Component Value Date    HGBA1C 7.4 (H) 12/04/2018         Assessment:     1. Preop examination    2. Sinus bradycardia    3. Type 2 diabetes mellitus with stage 3 chronic kidney disease, without long-term current use of insulin    4. Ascites of liver    5. 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 Pancytopenia    7. CKD (chronic kidney disease) stage 3, GFR 30-59 ml/min    8. Bipolar 1 disorder    9. History of rectal cancer    10. History of SCC (squamous cell carcinoma) of skin    11. Chronic midline low back pain without sciatica        Plan:     *Continue medication/plan as discussed in HPI except for changes discussed below.    Kenneth was seen today for pre-op exam.    Diagnoses and all orders for this visit:    Preop examination   Patient is cleared for low risk cataract surgery with monitored anesthesia (Valium drip per wife).  Clearance form faxed to Dr. Timoteo Sharpe.    Sinus bradycardia    Type 2 diabetes mellitus with stage 3 chronic kidney disease, without long-term current use of insulin    Ascites of liver  Liver cirrhosis secondary to MCDONALD (nonalcoholic steatohepatitis)  Pancytopenia    CKD (chronic kidney disease) stage 3, GFR 30-59  ml/min    Bipolar 1 disorder    History of rectal cancer  History of SCC (squamous cell carcinoma) of skin    Chronic midline low back pain without sciatica    Health maintenance reviewed with patient.     Follow-up in about 4 months (around 6/18/2019).    Prisca Espinoza MD  Internal Medicine  [facility]

## 2019-02-19 ENCOUNTER — TELEPHONE (OUTPATIENT)
Dept: ADMINISTRATIVE | Facility: HOSPITAL | Age: 79
End: 2019-02-19

## 2019-02-19 NOTE — LETTER
February 19, 2019    Dr. Sharpe  [facility]             [facility]  [address] [place] [address] February 19, 2019     Patient: Kenneth Sheikh    YOB: 1940   Date of Visit: 2/19/2019     To Whom It May Concern:    The patient listed above was seen recently by his primary care provider, Dr. Prisca Espinoza, at [facility]. Please release the following information specified below for the patient:    Most recent eye exam results    Please fax the requested record to our secure fax number [phone], Attention: SUZANNE Winkler.    Thank you for your help! If I can be of any assistance please contact me at the number listed below.      Regards,    Peterson Hodgson LPN  Clinical Care Coordinator  [facility]  [phone] phone  [phone] fax  [email or web address]

## 2019-02-22 ENCOUNTER — HOSPITAL ENCOUNTER (OUTPATIENT)
Dept: INTERVENTIONAL RADIOLOGY/VASCULAR | Facility: HOSPITAL | Age: 79
Discharge: HOME | End: 2019-02-22
Attending: INTERNAL MEDICINE
Payer: MEDICARE

## 2019-02-22 VITALS
HEART RATE: 50 BPM | DIASTOLIC BLOOD PRESSURE: 60 MMHG | SYSTOLIC BLOOD PRESSURE: 141 MMHG | OXYGEN SATURATION: 97 % | RESPIRATION RATE: 16 BRPM

## 2019-02-22 DIAGNOSIS — R18.8 OTHER ASCITES: ICD-10-CM

## 2019-02-22 LAB
APPEARANCE FLD: NORMAL
BACTERIA SPEC ANAEROBE CULT: NORMAL
BODY FLD TYPE: NORMAL
COLOR FLD: YELLOW
LYMPHOCYTES NFR FLD MANUAL: 68 %
MONOS+MACROS NFR FLD MANUAL: 30 %
NEUTROPHILS NFR FLD MANUAL: 2 %
WBC # FLD: 37 /CU MM

## 2019-02-22 PROCEDURE — 49083 ABD PARACENTESIS W/IMAGING: CPT | Mod: ,,, | Performed by: NURSE PRACTITIONER

## 2019-02-22 PROCEDURE — P9047 ALBUMIN (HUMAN), 25%, 50ML: HCPCS | Mod: JG | Performed by: INTERNAL MEDICINE

## 2019-02-22 PROCEDURE — 49083 IR PARACENTESIS WITH IMAGING: ICD-10-PCS | Mod: ,,, | Performed by: NURSE PRACTITIONER

## 2019-02-22 PROCEDURE — 89051 BODY FLUID CELL COUNT: CPT

## 2019-02-22 PROCEDURE — 63600175 PHARM REV CODE 636 W HCPCS: Mod: JG | Performed by: INTERNAL MEDICINE

## 2019-02-22 PROCEDURE — 49083 ABD PARACENTESIS W/IMAGING: CPT

## 2019-02-22 RX ORDER — ALBUMIN HUMAN 250 G/1000ML
25 SOLUTION INTRAVENOUS ONCE
Status: COMPLETED | OUTPATIENT
Start: 2019-02-22 | End: 2019-02-22

## 2019-02-22 RX ADMIN — ALBUMIN (HUMAN) 25 G: 25 SOLUTION INTRAVENOUS at 12:02

## 2019-02-22 NOTE — PROGRESS NOTES
Paracentesis complete. Pt tolerated well. 3300cc removed from right abdomen. Dressing applied clean, dry and intact. 100cc of albumin given IV. Patient given discharge paperwork and verbalized understanding of at home care. Patient transferred to [facility] via wheelchair.

## 2019-02-22 NOTE — DISCHARGE INSTRUCTIONS
For scheduling: Call Mackenzie at [phone]    For questions or concerns call: SHAR MON-FRI 8 AM- 5PM [phone]. Radiology resident on call [phone].    For immediate concerns that are not emergent, you may call our radiology clinic at: [phone]

## 2019-02-22 NOTE — H&P
Radiology History & Physical      SUBJECTIVE:     Chief Complaint: abdominal distention    History of Present Illness:  Kenneth Sheikh is a 79 y.o. male who presents for evaluation of ascites  Past Medical History:   Diagnosis Date    Anticoagulant long-term use     Bipolar 1 disorder     Bipolar 1 disorder 11/24/2016    bipolar    Cancer     history of skin cancer/sinus cancer & rectal cancer    Depressed bipolar affective disorder     Diabetes mellitus     type 2    EBV infection 12/7/2016    EBV DNA, PCR Latest Ref Range: None detected  None detected EBV DNA-Copies/mL Unknown Test Not Performed EBV Early Antigen Ab, IgG Latest Ref Range: <1:10 Titer 1:40 (A) EBV Nuclear Ag Ab Latest Ref Range: <1:5 Titer >=1:80 (A) EBV VCA IgG Latest Ref Range: <1:10 Titer 1:160 (A) EBV VCA IgM Latest Ref Range: <1:10 Titer <1:10     History of TIA (transient ischemic attack)     several-taking Plavix    Hx of gallstones     Wife denies    Hypertension     Hyperthyroidism 11/30/2016    Low HDL (under 40) 12/7/2016    Mixed hyperlipidemia 11/23/2016    JUAN MANUEL (obstructive sleep apnea)     Pneumonia     Skin disease     Squamous cell carcinoma 08/2018    right temple    Stroke     Transient cerebral ischemia 7/22/2016    Type 2 diabetes mellitus      Past Surgical History:   Procedure Laterality Date    BIOPSY-BONE MARROW Left 11/3/2016    Performed by [facility] at [facility] OR 2ND FLR    ESOPHAGOGASTRODUODENOSCOPY (EGD) N/A 2/3/2017    Performed by Domenico Fox MD at [facility] ENDO (4TH FLR)    EXCISION-MASS RECTAL  6/28/2016    Performed by Haris Talavera MD at [facility] OR 2ND FLR    Moh's procedure x4      rectal cancer      Sinus surgery for sinus cancer      sinus sx for cancer      skin cancer removed-several times-nose & ear      TONSILLECTOMY      ULTRASOUND-ENDOSCOPIC-UPPER/glue coil of gastric varices N/A 4/19/2017    Performed by Aneudy Perla MD at [facility] ENDO (2ND FLR)    Undescened  testicle sx      UVULOPALATOPHARYNGOPLASTY      for sleep apnea       Home Meds:   Prior to Admission medications    Medication Sig Start Date End Date Taking? Authorizing Provider   ACCU-CHEK NEYDA PLUS TEST STRP Strp 1 strip by Misc.(Non-Drug; Combo Route) route once daily. 11/5/18   Prisca Espinoza MD   ciprofloxacin HCl (CIPRO) 500 MG tablet Take 1 tablet (500 mg total) by mouth every Monday. 5/14/18   Renato Womack MD   divalproex (DEPAKOTE) 250 MG EC tablet Take 2 tablets (500 mg total) by mouth every evening. Do not fill prescription until patient calls and requests. 12/10/18   Prisca Espinoza MD   furosemide (LASIX) 20 MG tablet Take 2 tablets (40 mg total) by mouth once daily. 10/9/18 10/9/19  Renato Womack MD   ketorolac 0.5% (ACULAR) 0.5 % Drop  1/10/19   Historical Provider, MD   lactulose (CHRONULAC) 10 gram/15 mL solution TAKE 15 MLS BY MOUTH TWICE DAILY 3/8/18   Renato Womack MD   lancets [facility] To check blood sugar once daily, to use with Accu-Chek meter. 11/5/18   Prisca Espinoza MD   ofloxacin (OCUFLOX) 0.3 % ophthalmic solution  1/10/19   Historical Provider, MD   prednisoLONE acetate (PRED FORTE) 1 % DrpS  1/15/19   Historical Provider, MD   rifAXIMin (XIFAXAN) 550 mg Tab Take 1 tablet (550 mg total) by mouth 2 (two) times daily. 10/3/17   Renato Womack MD   SITagliptan (JANUVIA) 50 MG Tab Take 1 tablet (50 mg total) by mouth once daily. 4/6/17   Prisca Espinoza MD     Anticoagulants/Antiplatelets: no anticoagulation    Allergies:   Review of patient's allergies indicates:   Allergen Reactions    Abilify [aripiprazole] Other (See Comments)     Sleepy, could not function.     Sedation History:  no adverse reactions    Review of Systems:   Hematological: no known coagulopathies  Respiratory: no shortness of breath  Cardiovascular: no chest pain  Gastrointestinal: no abdominal pain  Genito-Urinary: no dysuria  Musculoskeletal: negative  Neurological: no TIA or  stroke symptoms         OBJECTIVE:     Vital Signs (Most Recent)  Pulse: (!) 51 (02/22/19 1118)  Resp: 15 (02/22/19 1118)  BP: 133/63 (02/22/19 1118)  SpO2: 97 % (02/22/19 1118)    Physical Exam:  ASA: 2  Mallampati: na    General: no acute distress  Mental Status: alert and oriented to person, place and time  HEENT: normocephalic, atraumatic  Chest: unlabored breathing  Heart: regular heart rate  Abdomen: distended  Extremity: moves all extremities    Laboratory  Lab Results   Component Value Date    INR 1.1 08/10/2018       Lab Results   Component Value Date    WBC 2.93 (L) 12/04/2018    HGB 10.1 (L) 12/04/2018    HCT 32.7 (L) 12/04/2018    MCV 87 12/04/2018     (L) 12/04/2018      Lab Results   Component Value Date     (H) 12/04/2018     12/04/2018    K 3.6 12/04/2018     12/04/2018    CO2 29 12/04/2018    BUN 17 12/04/2018    CREATININE 1.2 12/04/2018    CALCIUM 8.8 12/04/2018    MG 1.7 05/15/2017    ALT 5 (L) 12/04/2018    AST 18 12/04/2018    ALBUMIN 2.5 (L) 12/04/2018    BILITOT 0.7 12/04/2018       ASSESSMENT/PLAN:     Sedation Plan: local anesthesia  Patient will undergo US guided paracentesis

## 2019-03-01 ENCOUNTER — HOSPITAL ENCOUNTER (OUTPATIENT)
Dept: INTERVENTIONAL RADIOLOGY/VASCULAR | Facility: HOSPITAL | Age: 79
Discharge: HOME | End: 2019-03-01
Attending: INTERNAL MEDICINE
Payer: MEDICARE

## 2019-03-01 VITALS — HEART RATE: 48 BPM | OXYGEN SATURATION: 95 % | SYSTOLIC BLOOD PRESSURE: 149 MMHG | DIASTOLIC BLOOD PRESSURE: 68 MMHG

## 2019-03-01 DIAGNOSIS — R18.8 OTHER ASCITES: ICD-10-CM

## 2019-03-01 LAB
APPEARANCE FLD: NORMAL
BODY FLD TYPE: NORMAL
COLOR FLD: YELLOW
LYMPHOCYTES NFR FLD MANUAL: 81 %
MONOS+MACROS NFR FLD MANUAL: 14 %
NEUTROPHILS NFR FLD MANUAL: 5 %
WBC # FLD: 43 /CU MM

## 2019-03-01 PROCEDURE — 49083 ABD PARACENTESIS W/IMAGING: CPT | Mod: ,,, | Performed by: NURSE PRACTITIONER

## 2019-03-01 PROCEDURE — 89051 BODY FLUID CELL COUNT: CPT

## 2019-03-01 PROCEDURE — 63600175 PHARM REV CODE 636 W HCPCS: Mod: JG | Performed by: INTERNAL MEDICINE

## 2019-03-01 PROCEDURE — 49083 IR PARACENTESIS WITH IMAGING: ICD-10-PCS | Mod: ,,, | Performed by: NURSE PRACTITIONER

## 2019-03-01 PROCEDURE — P9047 ALBUMIN (HUMAN), 25%, 50ML: HCPCS | Mod: JG | Performed by: INTERNAL MEDICINE

## 2019-03-01 PROCEDURE — 87070 CULTURE OTHR SPECIMN AEROBIC: CPT

## 2019-03-01 PROCEDURE — 87075 CULTR BACTERIA EXCEPT BLOOD: CPT

## 2019-03-01 PROCEDURE — 49083 ABD PARACENTESIS W/IMAGING: CPT

## 2019-03-01 RX ORDER — ALBUMIN HUMAN 250 G/1000ML
25 SOLUTION INTRAVENOUS ONCE
Status: COMPLETED | OUTPATIENT
Start: 2019-03-01 | End: 2019-03-01

## 2019-03-01 RX ADMIN — ALBUMIN (HUMAN) 25 G: 25 SOLUTION INTRAVENOUS at 12:03

## 2019-03-01 NOTE — H&P
Radiology History & Physical      SUBJECTIVE:     Chief Complaint: abdominal distentino    History of Present Illness:  Kenneth Sheikh is a 79 y.o. male who presents for evaluation of ascites  Past Medical History:   Diagnosis Date    Anticoagulant long-term use     Bipolar 1 disorder     Bipolar 1 disorder 11/24/2016    bipolar    Cancer     history of skin cancer/sinus cancer & rectal cancer    Depressed bipolar affective disorder     Diabetes mellitus     type 2    EBV infection 12/7/2016    EBV DNA, PCR Latest Ref Range: None detected  None detected EBV DNA-Copies/mL Unknown Test Not Performed EBV Early Antigen Ab, IgG Latest Ref Range: <1:10 Titer 1:40 (A) EBV Nuclear Ag Ab Latest Ref Range: <1:5 Titer >=1:80 (A) EBV VCA IgG Latest Ref Range: <1:10 Titer 1:160 (A) EBV VCA IgM Latest Ref Range: <1:10 Titer <1:10     History of TIA (transient ischemic attack)     several-taking Plavix    Hx of gallstones     Wife denies    Hypertension     Hyperthyroidism 11/30/2016    Low HDL (under 40) 12/7/2016    Mixed hyperlipidemia 11/23/2016    JUAN MANUEL (obstructive sleep apnea)     Pneumonia     Skin disease     Squamous cell carcinoma 08/2018    right temple    Stroke     Transient cerebral ischemia 7/22/2016    Type 2 diabetes mellitus      Past Surgical History:   Procedure Laterality Date    BIOPSY-BONE MARROW Left 11/3/2016    Performed by [facility] at [facility] OR 2ND FLR    ESOPHAGOGASTRODUODENOSCOPY (EGD) N/A 2/3/2017    Performed by Domenico Fox MD at [facility] ENDO (4TH FLR)    EXCISION-MASS RECTAL  6/28/2016    Performed by Haris [facility]alavera MD at [facility] OR 2ND FLR    Moh's procedure x4      rectal cancer      Sinus surgery for sinus cancer      sinus sx for cancer      skin cancer removed-several times-nose & ear      TONSILLECTOMY      ULTRASOUND-ENDOSCOPIC-UPPER/glue coil of gastric varices N/A 4/19/2017    Performed by Aneudy Perla MD at [facility] ENDO (2ND FLR)    Undescened  testicle sx      UVULOPALATOPHARYNGOPLASTY      for sleep apnea       Home Meds:   Prior to Admission medications    Medication Sig Start Date End Date Taking? Authorizing Provider   ACCU-CHEK NEYDA PLUS TEST STRP Strp 1 strip by Misc.(Non-Drug; Combo Route) route once daily. 11/5/18   Prisca Espinoza MD   ciprofloxacin HCl (CIPRO) 500 MG tablet Take 1 tablet (500 mg total) by mouth every Monday. 5/14/18   Renato Womack MD   divalproex (DEPAKOTE) 250 MG EC tablet Take 2 tablets (500 mg total) by mouth every evening. Do not fill prescription until patient calls and requests. 12/10/18   Prisca Espinoza MD   furosemide (LASIX) 20 MG tablet Take 2 tablets (40 mg total) by mouth once daily. 10/9/18 10/9/19  Renato Womack MD   ketorolac 0.5% (ACULAR) 0.5 % Drop  1/10/19   Historical Provider, MD   lactulose (CHRONULAC) 10 gram/15 mL solution TAKE 15 MLS BY MOUTH TWICE DAILY 3/8/18   Renato Womack MD   lancets [facility] To check blood sugar once daily, to use with Accu-Chek meter. 11/5/18   Prisca Espinoza MD   ofloxacin (OCUFLOX) 0.3 % ophthalmic solution  1/10/19   Historical Provider, MD   prednisoLONE acetate (PRED FORTE) 1 % DrpS  1/15/19   Historical Provider, MD   rifAXIMin (XIFAXAN) 550 mg Tab Take 1 tablet (550 mg total) by mouth 2 (two) times daily. 10/3/17   Renato Womack MD   SITagliptan (JANUVIA) 50 MG Tab Take 1 tablet (50 mg total) by mouth once daily. 4/6/17   Prisca Espinoza MD     Anticoagulants/Antiplatelets: no anticoagulation    Allergies:   Review of patient's allergies indicates:   Allergen Reactions    Abilify [aripiprazole] Other (See Comments)     Sleepy, could not function.     Sedation History:  no adverse reactions    Review of Systems:   Hematological: no known coagulopathies  Respiratory: no shortness of breath  Cardiovascular: no chest pain  Gastrointestinal: no abdominal pain  Genito-Urinary: no dysuria  Musculoskeletal: negative  Neurological: no TIA or  stroke symptoms         OBJECTIVE:     Vital Signs (Most Recent)       Physical Exam:  ASA: 2  Mallampati: na    General: no acute distress  Mental Status: alert and oriented to person, place and time  HEENT: normocephalic, atraumatic  Chest: unlabored breathing  Heart: regular heart rate  Abdomen: distended  Extremity: moves all extremities    Laboratory  Lab Results   Component Value Date    INR 1.1 08/10/2018       Lab Results   Component Value Date    WBC 2.93 (L) 12/04/2018    HGB 10.1 (L) 12/04/2018    HCT 32.7 (L) 12/04/2018    MCV 87 12/04/2018     (L) 12/04/2018      Lab Results   Component Value Date     (H) 12/04/2018     12/04/2018    K 3.6 12/04/2018     12/04/2018    CO2 29 12/04/2018    BUN 17 12/04/2018    CREATININE 1.2 12/04/2018    CALCIUM 8.8 12/04/2018    MG 1.7 05/15/2017    ALT 5 (L) 12/04/2018    AST 18 12/04/2018    ALBUMIN 2.5 (L) 12/04/2018    BILITOT 0.7 12/04/2018       ASSESSMENT/PLAN:     Sedation Plan: local anesthesia  Patient will undergo US guided paracentesis

## 2019-03-01 NOTE — PROGRESS NOTES
Pt arrived MPU room 3 for paracentesis procedure. Labs  And orders reviewed. ARACELIS Londono at bedside.

## 2019-03-01 NOTE — PROGRESS NOTES
Paracentesis complete. 3400 mLs peritoneal fluid drained. Pt tolerated well. Dressing to  Abdomen clean, dry, and intact. Albumin 25% given 100 mLs. Specimens sent per lab order. Pt acknowledged understanding of discharge instructions. Pt discharged per unit and hospital protocol via wheelchair with COURTNEY oDyle.

## 2019-03-04 LAB — BACTERIA SPEC AEROBE CULT: NO GROWTH

## 2019-03-08 ENCOUNTER — HOSPITAL ENCOUNTER (OUTPATIENT)
Dept: INTERVENTIONAL RADIOLOGY/VASCULAR | Facility: HOSPITAL | Age: 79
Discharge: HOME | End: 2019-03-08
Attending: INTERNAL MEDICINE
Payer: MEDICARE

## 2019-03-08 VITALS
OXYGEN SATURATION: 100 % | HEART RATE: 54 BPM | RESPIRATION RATE: 16 BRPM | SYSTOLIC BLOOD PRESSURE: 162 MMHG | DIASTOLIC BLOOD PRESSURE: 80 MMHG

## 2019-03-08 DIAGNOSIS — R18.8 OTHER ASCITES: ICD-10-CM

## 2019-03-08 LAB
APPEARANCE FLD: CLEAR
BODY FLD TYPE: NORMAL
COLOR FLD: YELLOW
LYMPHOCYTES NFR FLD MANUAL: 76 %
MONOS+MACROS NFR FLD MANUAL: 23 %
NEUTROPHILS NFR FLD MANUAL: 1 %
WBC # FLD: 510 /CU MM

## 2019-03-08 PROCEDURE — 76705 ECHO EXAM OF ABDOMEN: CPT | Mod: 26,,, | Performed by: RADIOLOGY

## 2019-03-08 PROCEDURE — 49083 ABD PARACENTESIS W/IMAGING: CPT

## 2019-03-08 PROCEDURE — 89051 BODY FLUID CELL COUNT: CPT

## 2019-03-08 PROCEDURE — 76705 IR PARACENTESIS WITH IMAGING: ICD-10-PCS | Mod: 26,,, | Performed by: RADIOLOGY

## 2019-03-08 NOTE — PROGRESS NOTES
Paracentesis complete, 3600 MLs removed. Specimen sent to lab. Patient declined albumin. . Dressing applied to RLQ puncture site, dressing clean dry and intact. Pt given discharge instructions and handouts, pt verbalizes understanding. Questions answered. Pt denies pain and discomfort. Pt refusing transport. Pt wheeled to lobby for transport home with family.

## 2019-03-08 NOTE — DISCHARGE INSTRUCTIONS
For scheduling: Call Nichole [phone]    For questions or concerns call: SHAR MON-FRI 8 AM- 5PM [phone]. Radiology resident on call [phone].    For immediate concerns that are not emergent, you may call our radiology clinic at: [phone]

## 2019-03-08 NOTE — PROGRESS NOTES
Pt arrived to MPU room 1 via wheelchair for paracentesis.  Name verified using two identifiers.  Allergies verified.  Will continue to monitor.

## 2019-03-08 NOTE — H&P
Radiology History & Physical      SUBJECTIVE:     Chief Complaint: abdominal distention    History of Present Illness:  Kenneth Sheikh is a 79 y.o. male who presents for evaluation of ascites  Past Medical History:   Diagnosis Date    Anticoagulant long-term use     Bipolar 1 disorder     Bipolar 1 disorder 11/24/2016    bipolar    Cancer     history of skin cancer/sinus cancer & rectal cancer    Depressed bipolar affective disorder     Diabetes mellitus     type 2    EBV infection 12/7/2016    EBV DNA, PCR Latest Ref Range: None detected  None detected EBV DNA-Copies/mL Unknown Test Not Performed EBV Early Antigen Ab, IgG Latest Ref Range: <1:10 Titer 1:40 (A) EBV Nuclear Ag Ab Latest Ref Range: <1:5 Titer >=1:80 (A) EBV VCA IgG Latest Ref Range: <1:10 Titer 1:160 (A) EBV VCA IgM Latest Ref Range: <1:10 Titer <1:10     History of TIA (transient ischemic attack)     several-taking Plavix    Hx of gallstones     Wife denies    Hypertension     Hyperthyroidism 11/30/2016    Low HDL (under 40) 12/7/2016    Mixed hyperlipidemia 11/23/2016    JUAN MANUEL (obstructive sleep apnea)     Pneumonia     Skin disease     Squamous cell carcinoma 08/2018    right temple    Stroke     Transient cerebral ischemia 7/22/2016    Type 2 diabetes mellitus      Past Surgical History:   Procedure Laterality Date    BIOPSY-BONE MARROW Left 11/3/2016    Performed by Bud Bob MD at [facility] OR 2ND FLR    ESOPHAGOGASTRODUODENOSCOPY (EGD) N/A 2/3/2017    Performed by Domenico Fox MD at [facility] ENDO (4TH FLR)    EXCISION-MASS RECTAL  6/28/2016    Performed by Haris Talavera MD at [facility] OR 2ND FLR    Moh's procedure x4      rectal cancer      Sinus surgery for sinus cancer      sinus sx for cancer      skin cancer removed-several times-nose & ear      TONSILLECTOMY      ULTRASOUND-ENDOSCOPIC-UPPER/glue coil of gastric varices N/A 4/19/2017    Performed by Aneudy Perla MD at [facility] ENDO (2ND FLR)    Undescened  testicle sx      UVULOPALATOPHARYNGOPLASTY      for sleep apnea       Home Meds:   Prior to Admission medications    Medication Sig Start Date End Date Taking? Authorizing Provider   ACCU-CHEK NEYDA PLUS TEST STRP Strp 1 strip by Misc.(Non-Drug; Combo Route) route once daily. 11/5/18   Prisca Espinoza MD   ciprofloxacin HCl (CIPRO) 500 MG tablet Take 1 tablet (500 mg total) by mouth every Monday. 5/14/18   Renato Womack MD   divalproex (DEPAKOTE) 250 MG EC tablet Take 2 tablets (500 mg total) by mouth every evening. Do not fill prescription until patient calls and requests. 12/10/18   Prisca Espinoza MD   furosemide (LASIX) 20 MG tablet Take 2 tablets (40 mg total) by mouth once daily. 10/9/18 10/9/19  Renato Womack MD   ketorolac 0.5% (ACULAR) 0.5 % Drop  1/10/19   Historical Provider, MD   lactulose (CHRONULAC) 10 gram/15 mL solution TAKE 15 MLS BY MOUTH TWICE DAILY 3/8/18   Renato Womack MD   lancets [facility] To check blood sugar once daily, to use with Accu-Chek meter. 11/5/18   Prisca Espinoza MD   ofloxacin (OCUFLOX) 0.3 % ophthalmic solution  1/10/19   Historical Provider, MD   prednisoLONE acetate (PRED FORTE) 1 % DrpS  1/15/19   Historical Provider, MD   rifAXIMin (XIFAXAN) 550 mg Tab Take 1 tablet (550 mg total) by mouth 2 (two) times daily. 10/3/17   Renato Womack MD   SITagliptan (JANUVIA) 50 MG Tab Take 1 tablet (50 mg total) by mouth once daily. 4/6/17   Prisca Espinoza MD     Anticoagulants/Antiplatelets: no anticoagulation    Allergies:   Review of patient's allergies indicates:   Allergen Reactions    Abilify [aripiprazole] Other (See Comments)     Sleepy, could not function.     Sedation History:  no adverse reactions    Review of Systems:   Hematological: no known coagulopathies  Respiratory: no shortness of breath  Cardiovascular: no chest pain  Gastrointestinal: no abdominal pain  Genito-Urinary: no dysuria  Musculoskeletal: negative  Neurological: no TIA or  stroke symptoms         OBJECTIVE:     Vital Signs (Most Recent)  Pulse: (!) 51 (03/08/19 1114)  Resp: 18 (03/08/19 1114)  BP: (!) 158/77 (03/08/19 1114)    Physical Exam:  ASA: 2  Mallampati: na    General: no acute distress  Mental Status: alert and oriented to person, place and time  HEENT: normocephalic, atraumatic  Chest: unlabored breathing  Heart: regular heart rate  Abdomen: distended  Extremity: moves all extremities    Laboratory  Lab Results   Component Value Date    INR 1.1 08/10/2018       Lab Results   Component Value Date    WBC 2.93 (L) 12/04/2018    HGB 10.1 (L) 12/04/2018    HCT 32.7 (L) 12/04/2018    MCV 87 12/04/2018     (L) 12/04/2018      Lab Results   Component Value Date     (H) 12/04/2018     12/04/2018    K 3.6 12/04/2018     12/04/2018    CO2 29 12/04/2018    BUN 17 12/04/2018    CREATININE 1.2 12/04/2018    CALCIUM 8.8 12/04/2018    MG 1.7 05/15/2017    ALT 5 (L) 12/04/2018    AST 18 12/04/2018    ALBUMIN 2.5 (L) 12/04/2018    BILITOT 0.7 12/04/2018       ASSESSMENT/PLAN:     Sedation Plan: local anesthesia  Patient will undergo US guided paracentesis

## 2019-03-11 LAB — BACTERIA SPEC ANAEROBE CULT: NORMAL

## 2019-03-15 ENCOUNTER — HOSPITAL ENCOUNTER (OUTPATIENT)
Dept: INTERVENTIONAL RADIOLOGY/VASCULAR | Facility: HOSPITAL | Age: 79
Discharge: HOME | End: 2019-03-15
Attending: INTERNAL MEDICINE
Payer: MEDICARE

## 2019-03-15 VITALS
RESPIRATION RATE: 16 BRPM | HEART RATE: 51 BPM | DIASTOLIC BLOOD PRESSURE: 69 MMHG | SYSTOLIC BLOOD PRESSURE: 160 MMHG | OXYGEN SATURATION: 99 %

## 2019-03-15 DIAGNOSIS — R18.8 OTHER ASCITES: ICD-10-CM

## 2019-03-15 LAB
APPEARANCE FLD: CLEAR
BODY FLD TYPE: NORMAL
COLOR FLD: YELLOW
LYMPHOCYTES NFR FLD MANUAL: 72 %
MESOTHL CELL NFR FLD MANUAL: 1 %
MONOS+MACROS NFR FLD MANUAL: 24 %
NEUTROPHILS NFR FLD MANUAL: 3 %
WBC # FLD: 39 /CU MM

## 2019-03-15 PROCEDURE — 49083 IR PARACENTESIS WITH IMAGING: ICD-10-PCS | Mod: ,,, | Performed by: RADIOLOGY

## 2019-03-15 PROCEDURE — 89051 BODY FLUID CELL COUNT: CPT

## 2019-03-15 PROCEDURE — 49083 ABD PARACENTESIS W/IMAGING: CPT | Mod: ,,, | Performed by: RADIOLOGY

## 2019-03-15 PROCEDURE — 49083 ABD PARACENTESIS W/IMAGING: CPT

## 2019-03-15 NOTE — PROGRESS NOTES
Paracentesis complete. Pt tolerated well. Site clean, dry, intact, no bleeding, no hematoma. Pt given discharge and site care instructions. Pt verbalized understanding. Pt discharged home to wife.

## 2019-03-15 NOTE — H&P
Radiology History & Physical      SUBJECTIVE:     Chief Complaint: abdominal distention    History of Present Illness:  Kenneth Sheikh is a 79 y.o. male who presents for evaluation of ascites  Past Medical History:   Diagnosis Date    Anticoagulant long-term use     Bipolar 1 disorder     Bipolar 1 disorder 11/24/2016    bipolar    Cancer     history of skin cancer/sinus cancer & rectal cancer    Depressed bipolar affective disorder     Diabetes mellitus     type 2    EBV infection 12/7/2016    EBV DNA, PCR Latest Ref Range: None detected  None detected EBV DNA-Copies/mL Unknown Test Not Performed EBV Early Antigen Ab, IgG Latest Ref Range: <1:10 Titer 1:40 (A) EBV Nuclear Ag Ab Latest Ref Range: <1:5 Titer >=1:80 (A) EBV VCA IgG Latest Ref Range: <1:10 Titer 1:160 (A) EBV VCA IgM Latest Ref Range: <1:10 Titer <1:10     History of TIA (transient ischemic attack)     several-taking Plavix    Hx of gallstones     Wife denies    Hypertension     Hyperthyroidism 11/30/2016    Low HDL (under 40) 12/7/2016    Mixed hyperlipidemia 11/23/2016    JUAN MANUEL (obstructive sleep apnea)     Pneumonia     Skin disease     Squamous cell carcinoma 08/2018    right temple    Stroke     Transient cerebral ischemia 7/22/2016    Type 2 diabetes mellitus      Past Surgical History:   Procedure Laterality Date    BIOPSY-BONE MARROW Left 11/3/2016    Performed by Bud Bob MD at [facility] OR 2ND FLR    ESOPHAGOGASTRODUODENOSCOPY (EGD) N/A 2/3/2017    Performed by Domenico Fox MD at [facility] ENDO (4TH FLR)    EXCISION-MASS RECTAL  6/28/2016    Performed by Haris Talavera MD at [facility] OR 2ND FLR    Moh's procedure x4      rectal cancer      Sinus surgery for sinus cancer      sinus sx for cancer      skin cancer removed-several times-nose & ear      TONSILLECTOMY      ULTRASOUND-ENDOSCOPIC-UPPER/glue coil of gastric varices N/A 4/19/2017    Performed by Aneudy Perla MD at [facility] ENDO (2ND FLR)    Undescened  testicle sx      UVULOPALATOPHARYNGOPLASTY      for sleep apnea       Home Meds:   Prior to Admission medications    Medication Sig Start Date End Date Taking? Authorizing Provider   ACCU-CHEK NEYDA PLUS TEST STRP Strp 1 strip by Misc.(Non-Drug; Combo Route) route once daily. 11/5/18   Prisca Espinoza MD   ciprofloxacin HCl (CIPRO) 500 MG tablet Take 1 tablet (500 mg total) by mouth every Monday. 5/14/18   Renato Womack MD   divalproex (DEPAKOTE) 250 MG EC tablet Take 2 tablets (500 mg total) by mouth every evening. Do not fill prescription until patient calls and requests. 12/10/18   Prisca Espinoza MD   furosemide (LASIX) 20 MG tablet Take 2 tablets (40 mg total) by mouth once daily. 10/9/18 10/9/19  Renato Womack MD   ketorolac 0.5% (ACULAR) 0.5 % Drop  1/10/19   Historical Provider, MD   lactulose (CHRONULAC) 10 gram/15 mL solution TAKE 15 MLS BY MOUTH TWICE DAILY 3/8/18   Renato Womack MD   lancets [facility] To check blood sugar once daily, to use with Accu-Chek meter. 11/5/18   Prisca Espinoza MD   ofloxacin (OCUFLOX) 0.3 % ophthalmic solution  1/10/19   Historical Provider, MD   prednisoLONE acetate (PRED FORTE) 1 % DrpS  1/15/19   Historical Provider, MD   rifAXIMin (XIFAXAN) 550 mg Tab Take 1 tablet (550 mg total) by mouth 2 (two) times daily. 10/3/17   Renato Womack MD   SITagliptan (JANUVIA) 50 MG Tab Take 1 tablet (50 mg total) by mouth once daily. 4/6/17   Prisca Espinoza MD     Anticoagulants/Antiplatelets: no anticoagulation    Allergies:   Review of patient's allergies indicates:   Allergen Reactions    Abilify [aripiprazole] Other (See Comments)     Sleepy, could not function.     Sedation History:  no adverse reactions    Review of Systems:   Hematological: no known coagulopathies  Respiratory: no shortness of breath  Cardiovascular: no chest pain  Gastrointestinal: no abdominal pain  Genito-Urinary: no dysuria  Musculoskeletal: negative  Neurological: no TIA or  stroke symptoms         OBJECTIVE:     Vital Signs (Most Recent)       Physical Exam:  ASA: 2  Mallampati: na    General: no acute distress  Mental Status: alert and oriented to person, place and time  HEENT: normocephalic, atraumatic  Chest: unlabored breathing  Heart: regular heart rate  Abdomen: distended  Extremity: moves all extremities    Laboratory  Lab Results   Component Value Date    INR 1.1 08/10/2018       Lab Results   Component Value Date    WBC 2.93 (L) 12/04/2018    HGB 10.1 (L) 12/04/2018    HCT 32.7 (L) 12/04/2018    MCV 87 12/04/2018     (L) 12/04/2018      Lab Results   Component Value Date     (H) 12/04/2018     12/04/2018    K 3.6 12/04/2018     12/04/2018    CO2 29 12/04/2018    BUN 17 12/04/2018    CREATININE 1.2 12/04/2018    CALCIUM 8.8 12/04/2018    MG 1.7 05/15/2017    ALT 5 (L) 12/04/2018    AST 18 12/04/2018    ALBUMIN 2.5 (L) 12/04/2018    BILITOT 0.7 12/04/2018       ASSESSMENT/PLAN:     Sedation Plan: local anesthesia  Patient will undergo US guided paracentesis

## 2019-03-15 NOTE — DISCHARGE INSTRUCTIONS
For scheduling: Call [facility]enzie at [phone]    For questions or concerns call: [facility] 8 AM- 5PM [phone]. Radiology resident on call [phone].    For immediate concerns that are not emergent, you may call our radiology clinic at: [phone]

## 2019-03-22 ENCOUNTER — HOSPITAL ENCOUNTER (OUTPATIENT)
Dept: INTERVENTIONAL RADIOLOGY/VASCULAR | Facility: HOSPITAL | Age: 79
Discharge: HOME | End: 2019-03-22
Attending: INTERNAL MEDICINE
Payer: MEDICARE

## 2019-03-22 VITALS
OXYGEN SATURATION: 97 % | DIASTOLIC BLOOD PRESSURE: 76 MMHG | RESPIRATION RATE: 20 BRPM | HEART RATE: 97 BPM | TEMPERATURE: 98 F | SYSTOLIC BLOOD PRESSURE: 152 MMHG

## 2019-03-22 DIAGNOSIS — R18.8 OTHER ASCITES: ICD-10-CM

## 2019-03-22 PROCEDURE — 49083 ABD PARACENTESIS W/IMAGING: CPT | Mod: ,,, | Performed by: NURSE PRACTITIONER

## 2019-03-22 PROCEDURE — 49083 ABD PARACENTESIS W/IMAGING: CPT

## 2019-03-22 PROCEDURE — 49083 IR PARACENTESIS WITH IMAGING: ICD-10-PCS | Mod: ,,, | Performed by: NURSE PRACTITIONER

## 2019-03-22 NOTE — H&P
Radiology History & Physical      SUBJECTIVE:     Chief Complaint: abdominal distention    History of Present Illness:  Kenneth Sheikh is a 79 y.o. male who presents for evaluation of ascites  Past Medical History:   Diagnosis Date    Anticoagulant long-term use     Bipolar 1 disorder     Bipolar 1 disorder 11/24/2016    bipolar    Cancer     history of skin cancer/sinus cancer & rectal cancer    Depressed bipolar affective disorder     Diabetes mellitus     type 2    EBV infection 12/7/2016    EBV DNA, PCR Latest Ref Range: None detected  None detected EBV DNA-Copies/mL Unknown Test Not Performed EBV Early Antigen Ab, IgG Latest Ref Range: <1:10 Titer 1:40 (A) EBV Nuclear Ag Ab Latest Ref Range: <1:5 Titer >=1:80 (A) EBV VCA IgG Latest Ref Range: <1:10 Titer 1:160 (A) EBV VCA IgM Latest Ref Range: <1:10 Titer <1:10     History of TIA (transient ischemic attack)     several-taking Plavix    Hx of gallstones     Wife denies    Hypertension     Hyperthyroidism 11/30/2016    Low HDL (under 40) 12/7/2016    Mixed hyperlipidemia 11/23/2016    JUAN MANUEL (obstructive sleep apnea)     Pneumonia     Skin disease     Squamous cell carcinoma 08/2018    right temple    Stroke     Transient cerebral ischemia 7/22/2016    Type 2 diabetes mellitus      Past Surgical History:   Procedure Laterality Date    BIOPSY-BONE MARROW Left 11/3/2016    Performed by [facility] MD at [facility] OR 2ND FLR    ESOPHAGOGASTRODUODENOSCOPY (EGD) N/A 2/3/2017    Performed by Domenico Fox MD at [facility] ENDO (4TH FLR)    EXCISION-MASS RECTAL  6/28/2016    Performed by Haris Talavera MD at [facility] OR 2ND FLR    Moh's procedure x4      rectal cancer      Sinus surgery for sinus cancer      sinus sx for cancer      skin cancer removed-several times-nose & ear      TONSILLECTOMY      ULTRASOUND-ENDOSCOPIC-UPPER/glue coil of gastric varices N/A 4/19/2017    Performed by Aneudy Perla MD at [facility] ENDO (2ND FLR)    Undescened  testicle sx      UVULOPALATOPHARYNGOPLASTY      for sleep apnea       Home Meds:   Prior to Admission medications    Medication Sig Start Date End Date Taking? Authorizing Provider   ACCU-CHEK NEYDA PLUS TEST STRP Strp 1 strip by Misc.(Non-Drug; Combo Route) route once daily. 11/5/18   Prisca Espinoza MD   ciprofloxacin HCl (CIPRO) 500 MG tablet Take 1 tablet (500 mg total) by mouth every Monday. 5/14/18   Renato Womack MD   divalproex (DEPAKOTE) 250 MG EC tablet Take 2 tablets (500 mg total) by mouth every evening. Do not fill prescription until patient calls and requests. 12/10/18   Prisca Espinoza MD   furosemide (LASIX) 20 MG tablet Take 2 tablets (40 mg total) by mouth once daily. 10/9/18 10/9/19  Renato Womack MD   ketorolac 0.5% (ACULAR) 0.5 % Drop  1/10/19   Historical Provider, MD   lactulose (CHRONULAC) 10 gram/15 mL solution TAKE 15 MLS BY MOUTH TWICE DAILY 3/8/18   Renato Womack MD   lancets [facility] [place] To check blood sugar once daily, to use with Accu-Chek meter. 11/5/18   Prisca Espinoza MD   ofloxacin (OCUFLOX) 0.3 % ophthalmic solution  1/10/19   Historical Provider, MD   prednisoLONE acetate (PRED FORTE) 1 % DrpS  1/15/19   Historical Provider, MD   rifAXIMin (XIFAXAN) 550 mg Tab Take 1 tablet (550 mg total) by mouth 2 (two) times daily. 10/3/17   Renato Womack MD   SITagliptan (JANUVIA) 50 MG Tab Take 1 tablet (50 mg total) by mouth once daily. 4/6/17   Prisca Espinoza MD     Anticoagulants/Antiplatelets: no anticoagulation    Allergies:   Review of patient's allergies indicates:   Allergen Reactions    Abilify [aripiprazole] Other (See Comments)     Sleepy, could not function.     Sedation History:  no adverse reactions    Review of Systems:   Hematological: no known coagulopathies  Respiratory: no shortness of breath  Cardiovascular: no chest pain  Gastrointestinal: no abdominal pain  Genito-Urinary: no dysuria  Musculoskeletal: negative  Neurological: no TIA or  stroke symptoms         OBJECTIVE:     Vital Signs (Most Recent)   vitals pending, nurse in room     Physical Exam:  ASA: 2  Mallampati: na    General: no acute distress  Mental Status: alert and oriented to person, place and time  HEENT: normocephalic, atraumatic  Chest: unlabored breathing  Heart: regular heart rate  Abdomen: distended  Extremity: moves all extremities    Laboratory  Lab Results   Component Value Date    INR 1.1 08/10/2018       Lab Results   Component Value Date    WBC 2.93 (L) 12/04/2018    HGB 10.1 (L) 12/04/2018    HCT 32.7 (L) 12/04/2018    MCV 87 12/04/2018     (L) 12/04/2018      Lab Results   Component Value Date     (H) 12/04/2018     12/04/2018    K 3.6 12/04/2018     12/04/2018    CO2 29 12/04/2018    BUN 17 12/04/2018    CREATININE 1.2 12/04/2018    CALCIUM 8.8 12/04/2018    MG 1.7 05/15/2017    ALT 5 (L) 12/04/2018    AST 18 12/04/2018    ALBUMIN 2.5 (L) 12/04/2018    BILITOT 0.7 12/04/2018       ASSESSMENT/PLAN:     Sedation Plan: local anesthesia  Patient will undergo US guided paracentesis

## 2019-03-22 NOTE — PROGRESS NOTES
Paracentesis complete, 3.1 Ls removed.Dressing applied to right abd puncture site, dressing clean dry and intact. Pt refused discharge instructions and handouts, pt verbalizes understanding. Questions answered. Pt to lobby via wheelchair escorted by RT to meet wife.

## 2019-03-25 NOTE — Clinical Note
"Chief Complaint   Patient presents with     RECHECK     S/P liver tx     /85   Pulse 73   Temp 98.2  F (36.8  C) (Oral)   Ht 1.88 m (6' 2\")   Wt 124.5 kg (274 lb 6.4 oz)   SpO2 97%   BMI 35.23 kg/m    Sheila Simms CMA    " Dr. Womack,  I saw Mr. Sheikh in clinic today.  His wife wanted clarification about whether you are ok with him taking Depakote 750mg?  His psychiatrist reduced the dose to 500mg daily due to his liver failure, and she is upset about this because she feels he needs the higher dose.  The patient does not seem to care either way.  I told wife I would contact you and let her know your thoughts.  You see him again on 2/21/17. Thanks so much, Prisca Persaud

## 2019-03-28 ENCOUNTER — TELEPHONE (OUTPATIENT)
Dept: INTERVENTIONAL RADIOLOGY/VASCULAR | Facility: HOSPITAL | Age: 79
End: 2019-03-28

## 2019-03-28 ENCOUNTER — TELEPHONE (OUTPATIENT)
Dept: PHARMACY | Facility: CLINIC | Age: 79
End: 2019-03-28

## 2019-03-29 ENCOUNTER — HOSPITAL ENCOUNTER (OUTPATIENT)
Dept: INTERVENTIONAL RADIOLOGY/VASCULAR | Facility: HOSPITAL | Age: 79
Discharge: HOME | End: 2019-03-29
Attending: INTERNAL MEDICINE
Payer: MEDICARE

## 2019-03-29 VITALS
SYSTOLIC BLOOD PRESSURE: 124 MMHG | TEMPERATURE: 98 F | OXYGEN SATURATION: 99 % | RESPIRATION RATE: 18 BRPM | HEART RATE: 84 BPM | DIASTOLIC BLOOD PRESSURE: 78 MMHG

## 2019-03-29 DIAGNOSIS — R18.8 OTHER ASCITES: ICD-10-CM

## 2019-03-29 DIAGNOSIS — K70.11 ASCITES DUE TO ALCOHOLIC HEPATITIS: Primary | ICD-10-CM

## 2019-03-29 LAB
APPEARANCE FLD: NORMAL
BODY FLD TYPE: NORMAL
COLOR FLD: YELLOW
GRAM STN SPEC: NORMAL
GRAM STN SPEC: NORMAL
LYMPHOCYTES NFR FLD MANUAL: 64 %
MESOTHL CELL NFR FLD MANUAL: 1 %
MONOS+MACROS NFR FLD MANUAL: 34 %
NEUTROPHILS NFR FLD MANUAL: 1 %
WBC # FLD: 58 /CU MM

## 2019-03-29 PROCEDURE — 49083 ABD PARACENTESIS W/IMAGING: CPT

## 2019-03-29 PROCEDURE — 87205 SMEAR GRAM STAIN: CPT

## 2019-03-29 PROCEDURE — 63600175 PHARM REV CODE 636 W HCPCS: Mod: JG | Performed by: INTERNAL MEDICINE

## 2019-03-29 PROCEDURE — P9047 ALBUMIN (HUMAN), 25%, 50ML: HCPCS | Mod: JG | Performed by: INTERNAL MEDICINE

## 2019-03-29 PROCEDURE — 87075 CULTR BACTERIA EXCEPT BLOOD: CPT

## 2019-03-29 PROCEDURE — 49083 ABD PARACENTESIS W/IMAGING: CPT | Mod: GC,,, | Performed by: STUDENT IN AN ORGANIZED HEALTH CARE EDUCATION/TRAINING PROGRAM

## 2019-03-29 PROCEDURE — 89051 BODY FLUID CELL COUNT: CPT

## 2019-03-29 PROCEDURE — 49083 IR PARACENTESIS WITH IMAGING: ICD-10-PCS | Mod: GC,,, | Performed by: STUDENT IN AN ORGANIZED HEALTH CARE EDUCATION/TRAINING PROGRAM

## 2019-03-29 PROCEDURE — 87070 CULTURE OTHR SPECIMN AEROBIC: CPT

## 2019-03-29 RX ORDER — ALBUMIN HUMAN 250 G/1000ML
25 SOLUTION INTRAVENOUS ONCE
Status: COMPLETED | OUTPATIENT
Start: 2019-03-29 | End: 2019-03-29

## 2019-03-29 RX ADMIN — ALBUMIN (HUMAN) 25 G: 25 SOLUTION INTRAVENOUS at 01:03

## 2019-03-29 NOTE — PROGRESS NOTES
Paracentesis complete,3.9Ls removed. Specimen sent to lab. 25g Albumin administered per protocol. Dressing applied to left abd puncture site, dressing clean dry and intact. Pt given discharge instructions and handouts, pt verbalizes understanding. Questions answered. Pt denies pain and discomfort. Pt to lobby via wheelchair escorted by RT..

## 2019-03-29 NOTE — H&P
Radiology History & Physical      SUBJECTIVE:     Chief Complaint: ascites     History of Present Illness:  Kenneth Sheikh is a 79 y.o. male who presents for paracentesis.     Past Medical History:   Diagnosis Date    Anticoagulant long-term use     Bipolar 1 disorder     Bipolar 1 disorder 11/24/2016    bipolar    Cancer     history of skin cancer/sinus cancer & rectal cancer    Depressed bipolar affective disorder     Diabetes mellitus     type 2    EBV infection 12/7/2016    EBV DNA, PCR Latest Ref Range: None detected  None detected EBV DNA-Copies/mL Unknown Test Not Performed EBV Early Antigen Ab, IgG Latest Ref Range: <1:10 Titer 1:40 (A) EBV Nuclear Ag Ab Latest Ref Range: <1:5 Titer >=1:80 (A) EBV VCA IgG Latest Ref Range: <1:10 Titer 1:160 (A) EBV VCA IgM Latest Ref Range: <1:10 Titer <1:10     History of TIA (transient ischemic attack)     several-taking Plavix    Hx of gallstones     Wife denies    Hypertension     Hyperthyroidism 11/30/2016    Low HDL (under 40) 12/7/2016    Mixed hyperlipidemia 11/23/2016    JUAN MANUEL (obstructive sleep apnea)     Pneumonia     Skin disease     Squamous cell carcinoma 08/2018    right temple    Stroke     Transient cerebral ischemia 7/22/2016    Type 2 diabetes mellitus      Past Surgical History:   Procedure Laterality Date    BIOPSY-BONE MARROW Left 11/3/2016    Performed by Bud [facility] MD at [facility] OR 2ND FLR    ESOPHAGOGASTRODUODENOSCOPY (EGD) N/A 2/3/2017    Performed by Domenico Fox MD at [facility] ENDO (4TH FLR)    EXCISION-MASS RECTAL  6/28/2016    Performed by Haris Talavera MD at [facility] OR 2ND FLR    Moh's procedure x4      rectal cancer      Sinus surgery for sinus cancer      sinus sx for cancer      skin cancer removed-several times-nose & ear      TONSILLECTOMY      ULTRASOUND-ENDOSCOPIC-UPPER/glue coil of gastric varices N/A 4/19/2017    Performed by Aneudy Perla MD at [facility] ENDO (2ND FLR)    Undescened testicle sx       UVULOPALATOPHARYNGOPLASTY      for sleep apnea       Home Meds:   Prior to Admission medications    Medication Sig Start Date End Date Taking? Authorizing Provider   ACCU-CHEK NEYDA PLUS TEST STRP Strp 1 strip by Misc.(Non-Drug; Combo Route) route once daily. 11/5/18   Prisca Espinoza MD   ciprofloxacin HCl (CIPRO) 500 MG tablet Take 1 tablet (500 mg total) by mouth every Monday. 5/14/18   Renato Womack MD   divalproex (DEPAKOTE) 250 MG EC tablet Take 2 tablets (500 mg total) by mouth every evening. Do not fill prescription until patient calls and requests. 12/10/18   Prisca Espinoza MD   furosemide (LASIX) 20 MG tablet Take 2 tablets (40 mg total) by mouth once daily. 10/9/18 10/9/19  Renato Womack MD   ketorolac 0.5% (ACULAR) 0.5 % Drop  1/10/19   Historical Provider, MD   lactulose (CHRONULAC) 10 gram/15 mL solution TAKE 15 MLS BY MOUTH TWICE DAILY 3/8/18   Renato Womack MD   lancets [facility] [place] To check blood sugar once daily, to use with Accu-Chek meter. 11/5/18   Prisca Espinoza MD   ofloxacin (OCUFLOX) 0.3 % ophthalmic solution  1/10/19   Historical Provider, MD   prednisoLONE acetate (PRED FORTE) 1 % DrpS  1/15/19   Historical Provider, MD   rifAXIMin (XIFAXAN) 550 mg Tab Take 1 tablet (550 mg total) by mouth 2 (two) times daily. 10/3/17   Renato Womack MD   SITagliptan (JANUVIA) 50 MG Tab Take 1 tablet (50 mg total) by mouth once daily. 4/6/17   Prisca Espinoza MD     Anticoagulants/Antiplatelets: no anticoagulation    Allergies:   Review of patient's allergies indicates:   Allergen Reactions    Abilify [aripiprazole] Other (See Comments)     Sleepy, could not function.     Sedation History:  no adverse reactions    Review of Systems:   Hematological: no known coagulopathies  Respiratory: no shortness of breath  Cardiovascular: no chest pain  Gastrointestinal: no abdominal pain  Genito-Urinary: no dysuria  Musculoskeletal: negative  Neurological: no TIA or stroke symptoms          OBJECTIVE:     Vital Signs (Most Recent)  Temp: 97.8 °F (36.6 °C) (03/29/19 1139)  Pulse: 67 (03/29/19 1139)  Resp: 18 (03/29/19 1139)  BP: (!) 168/73 (03/29/19 1139)  SpO2: 99 % (03/29/19 1139)    Physical Exam:  ASA: 3  Mallampati: 2    General: no acute distress  Mental Status: alert and oriented to person, place and time  HEENT: normocephalic, atraumatic  Chest: unlabored breathing  Heart: regular heart rate  Abdomen: distended  Extremity: moves all extremities    Laboratory  Lab Results   Component Value Date    INR 1.1 08/10/2018       Lab Results   Component Value Date    WBC 2.93 (L) 12/04/2018    HGB 10.1 (L) 12/04/2018    HCT 32.7 (L) 12/04/2018    MCV 87 12/04/2018     (L) 12/04/2018      Lab Results   Component Value Date     (H) 12/04/2018     12/04/2018    K 3.6 12/04/2018     12/04/2018    CO2 29 12/04/2018    BUN 17 12/04/2018    CREATININE 1.2 12/04/2018    CALCIUM 8.8 12/04/2018    MG 1.7 05/15/2017    ALT 5 (L) 12/04/2018    AST 18 12/04/2018    ALBUMIN 2.5 (L) 12/04/2018    BILITOT 0.7 12/04/2018       ASSESSMENT/PLAN:     Sedation Plan: local  Patient will undergo paracentesis.    Juventino Prado MD  Radiology PGY-3  [phone]

## 2019-03-29 NOTE — PROCEDURES
Radiology Post-Procedure Note    Pre Op Diagnosis: Ascites  Post Op Diagnosis: Same    Procedure: Paracentesis    Procedure performed by: Juventino Prado MD    Written Informed Consent Obtained: Yes  Specimen Removed: YES clear yellow fluid  Estimated Blood Loss: Minimal    Findings:   Successful paracentesis.  Albumin administered PRN per protocol.    Patient tolerated procedure well.    Juventino Prado MD  Radiology PGY-3  [phone]

## 2019-04-01 LAB — BACTERIA SPEC AEROBE CULT: NO GROWTH

## 2019-04-05 ENCOUNTER — HOSPITAL ENCOUNTER (OUTPATIENT)
Dept: INTERVENTIONAL RADIOLOGY/VASCULAR | Facility: HOSPITAL | Age: 79
Discharge: HOME | End: 2019-04-05
Attending: INTERNAL MEDICINE
Payer: MEDICARE

## 2019-04-05 VITALS
HEART RATE: 57 BPM | RESPIRATION RATE: 16 BRPM | DIASTOLIC BLOOD PRESSURE: 68 MMHG | SYSTOLIC BLOOD PRESSURE: 152 MMHG | OXYGEN SATURATION: 98 %

## 2019-04-05 DIAGNOSIS — R18.8 OTHER ASCITES: ICD-10-CM

## 2019-04-05 LAB — BACTERIA SPEC ANAEROBE CULT: NORMAL

## 2019-04-05 PROCEDURE — A7048 VACUUM DRAIN BOTTLE/TUBE KIT: HCPCS

## 2019-04-05 PROCEDURE — 49083 IR PARACENTESIS WITH IMAGING: ICD-10-PCS | Mod: ,,, | Performed by: RADIOLOGY

## 2019-04-05 PROCEDURE — P9047 ALBUMIN (HUMAN), 25%, 50ML: HCPCS | Mod: JG | Performed by: INTERNAL MEDICINE

## 2019-04-05 PROCEDURE — 49083 ABD PARACENTESIS W/IMAGING: CPT

## 2019-04-05 PROCEDURE — 49083 ABD PARACENTESIS W/IMAGING: CPT | Mod: ,,, | Performed by: RADIOLOGY

## 2019-04-05 PROCEDURE — 63600175 PHARM REV CODE 636 W HCPCS: Mod: JG | Performed by: INTERNAL MEDICINE

## 2019-04-05 RX ORDER — ALBUMIN HUMAN 250 G/1000ML
25 SOLUTION INTRAVENOUS ONCE
Status: COMPLETED | OUTPATIENT
Start: 2019-04-05 | End: 2019-04-05

## 2019-04-05 RX ADMIN — ALBUMIN (HUMAN) 25 G: 25 SOLUTION INTRAVENOUS at 12:04

## 2019-04-05 NOTE — PROGRESS NOTES
Paracentesis complete. Pt tolerated well. Site clean, dry, intact, no bleeding, no hematoma. Pt given discharge and site care instructions. Pt verbalized understanding. Pt discharged home in wheelchair in care of wife.

## 2019-04-05 NOTE — H&P
Radiology History & Physical      SUBJECTIVE:     Chief Complaint: abdominal distention    History of Present Illness:  Kenneth Sheikh is a 79 y.o. male who presents for evalaution of ascites  Past Medical History:   Diagnosis Date    Anticoagulant long-term use     Bipolar 1 disorder     Bipolar 1 disorder 11/24/2016    bipolar    Cancer     history of skin cancer/sinus cancer & rectal cancer    Depressed bipolar affective disorder     Diabetes mellitus     type 2    EBV infection 12/7/2016    EBV DNA, PCR Latest Ref Range: None detected  None detected EBV DNA-Copies/mL Unknown Test Not Performed EBV Early Antigen Ab, IgG Latest Ref Range: <1:10 Titer 1:40 (A) EBV Nuclear Ag Ab Latest Ref Range: <1:5 Titer >=1:80 (A) EBV VCA IgG Latest Ref Range: <1:10 Titer 1:160 (A) EBV VCA IgM Latest Ref Range: <1:10 Titer <1:10     History of TIA (transient ischemic attack)     several-taking Plavix    Hx of gallstones     Wife denies    Hypertension     Hyperthyroidism 11/30/2016    Low HDL (under 40) 12/7/2016    Mixed hyperlipidemia 11/23/2016    JUAN MANUEL (obstructive sleep apnea)     Pneumonia     Skin disease     Squamous cell carcinoma 08/2018    right temple    Stroke     Transient cerebral ischemia 7/22/2016    Type 2 diabetes mellitus      Past Surgical History:   Procedure Laterality Date    BIOPSY-BONE MARROW Left 11/3/2016    Performed by Bud Bob MD at [facility] OR 2ND FLR    ESOPHAGOGASTRODUODENOSCOPY (EGD) N/A 2/3/2017    Performed by Domenico Fox MD at [facility] ENDO (4TH FLR)    EXCISION-MASS RECTAL  6/28/2016    Performed by Haris Talavera MD at [facility] OR 2ND FLR    Moh's procedure x4      rectal cancer      Sinus surgery for sinus cancer      sinus sx for cancer      skin cancer removed-several times-nose & ear      TONSILLECTOMY      ULTRASOUND-ENDOSCOPIC-UPPER/glue coil of gastric varices N/A 4/19/2017    Performed by Aneudy Perla MD at [facility] ENDO (2ND FLR)    Undescened  testicle sx      UVULOPALATOPHARYNGOPLASTY      for sleep apnea       Home Meds:   Prior to Admission medications    Medication Sig Start Date End Date Taking? Authorizing Provider   ACCU-CHEK NEYDA PLUS TEST STRP Strp 1 strip by Misc.(Non-Drug; Combo Route) route once daily. 11/5/18   Prisca Espinoza MD   ciprofloxacin HCl (CIPRO) 500 MG tablet Take 1 tablet (500 mg total) by mouth every Monday. 5/14/18   Renato Womack MD   divalproex (DEPAKOTE) 250 MG EC tablet Take 2 tablets (500 mg total) by mouth every evening. Do not fill prescription until patient calls and requests. 12/10/18   Prisca Espinoza MD   furosemide (LASIX) 20 MG tablet Take 2 tablets (40 mg total) by mouth once daily. 10/9/18 10/9/19  Renato Womack MD   ketorolac 0.5% (ACULAR) 0.5 % Drop  1/10/19   Historical Provider, MD   lactulose (CHRONULAC) 10 gram/15 mL solution TAKE 15 MLS BY MOUTH TWICE DAILY 3/8/18   Renato Womack MD   lancets [facility] [place] check blood sugar once daily, to use with Accu-Chek meter. 11/5/18   Prisca Espinoza MD   ofloxacin (OCUFLOX) 0.3 % ophthalmic solution  1/10/19   Historical Provider, MD   prednisoLONE acetate (PRED FORTE) 1 % DrpS  1/15/19   Historical Provider, MD   rifAXIMin (XIFAXAN) 550 mg Tab Take 1 tablet (550 mg total) by mouth 2 (two) times daily. 10/3/17   Renato Womack MD   SITagliptan (JANUVIA) 50 MG Tab Take 1 tablet (50 mg total) by mouth once daily. 4/6/17   Prisca Espinoza MD     Anticoagulants/Antiplatelets: no anticoagulation    Allergies:   Review of patient's allergies indicates:   Allergen Reactions    Abilify [aripiprazole] Other (See Comments)     Sleepy, could not function.     Sedation History:  no adverse reactions    Review of Systems:   Hematological: no known coagulopathies  Respiratory: no shortness of breath  Cardiovascular: no chest pain  Gastrointestinal: no abdominal pain  Genito-Urinary: no dysuria  Musculoskeletal: negative  Neurological: no TIA or  stroke symptoms         OBJECTIVE:     Vital Signs (Most Recent)       Physical Exam:  ASA: 2  Mallampati: na    General: no acute distress  Mental Status: alert and oriented to person, place and time  HEENT: normocephalic, atraumatic  Chest: unlabored breathing  Heart: regular heart rate  Abdomen: distended  Extremity: moves all extremities    Laboratory  Lab Results   Component Value Date    INR 1.1 08/10/2018       Lab Results   Component Value Date    WBC 2.93 (L) 12/04/2018    HGB 10.1 (L) 12/04/2018    HCT 32.7 (L) 12/04/2018    MCV 87 12/04/2018     (L) 12/04/2018      Lab Results   Component Value Date     (H) 12/04/2018     12/04/2018    K 3.6 12/04/2018     12/04/2018    CO2 29 12/04/2018    BUN 17 12/04/2018    CREATININE 1.2 12/04/2018    CALCIUM 8.8 12/04/2018    MG 1.7 05/15/2017    ALT 5 (L) 12/04/2018    AST 18 12/04/2018    ALBUMIN 2.5 (L) 12/04/2018    BILITOT 0.7 12/04/2018       ASSESSMENT/PLAN:     Sedation Plan: local anesthesia  Patient will undergo US guided paracentesis

## 2019-04-05 NOTE — DISCHARGE INSTRUCTIONS
For scheduling: Call Mackenzie at [phone]    For questions or concerns call: SHAR MON-FRI 8 AM- 5PM [phone]. Radiology resident on call [phone].    For immediate concerns that are not emergent, you may call our radiology clinic at: [phone]

## 2019-04-12 ENCOUNTER — HOSPITAL ENCOUNTER (OUTPATIENT)
Dept: INTERVENTIONAL RADIOLOGY/VASCULAR | Facility: HOSPITAL | Age: 79
Discharge: HOME | End: 2019-04-12
Attending: INTERNAL MEDICINE
Payer: MEDICARE

## 2019-04-12 VITALS
RESPIRATION RATE: 18 BRPM | DIASTOLIC BLOOD PRESSURE: 61 MMHG | HEART RATE: 56 BPM | SYSTOLIC BLOOD PRESSURE: 156 MMHG | OXYGEN SATURATION: 97 %

## 2019-04-12 DIAGNOSIS — K70.11 ASCITES DUE TO ALCOHOLIC HEPATITIS: ICD-10-CM

## 2019-04-12 LAB
APPEARANCE FLD: NORMAL
BASOPHILS NFR FLD MANUAL: 1 %
BODY FLD TYPE: NORMAL
COLOR FLD: YELLOW
EOSINOPHIL NFR FLD MANUAL: 1 %
LYMPHOCYTES NFR FLD MANUAL: 67 %
MONOS+MACROS NFR FLD MANUAL: 24 %
NEUTROPHILS NFR FLD MANUAL: 7 %
WBC # FLD: 76 /CU MM

## 2019-04-12 PROCEDURE — 49083 IR PARACENTESIS NO IMAGING: ICD-10-PCS | Mod: ,,, | Performed by: RADIOLOGY

## 2019-04-12 PROCEDURE — 89051 BODY FLUID CELL COUNT: CPT

## 2019-04-12 PROCEDURE — 63600175 PHARM REV CODE 636 W HCPCS: Mod: JG | Performed by: INTERNAL MEDICINE

## 2019-04-12 PROCEDURE — 49083 ABD PARACENTESIS W/IMAGING: CPT | Mod: ,,, | Performed by: RADIOLOGY

## 2019-04-12 PROCEDURE — P9047 ALBUMIN (HUMAN), 25%, 50ML: HCPCS | Mod: JG | Performed by: INTERNAL MEDICINE

## 2019-04-12 PROCEDURE — 49083 ABD PARACENTESIS W/IMAGING: CPT

## 2019-04-12 RX ORDER — ALBUMIN HUMAN 250 G/1000ML
25 SOLUTION INTRAVENOUS ONCE
Status: COMPLETED | OUTPATIENT
Start: 2019-04-12 | End: 2019-04-12

## 2019-04-12 RX ADMIN — ALBUMIN (HUMAN) 25 G: 25 SOLUTION INTRAVENOUS at 01:04

## 2019-04-12 NOTE — DISCHARGE INSTRUCTIONS
"For scheduling: Call Mackenzie at [phone]    For questions or concerns call: SHAR MON-FRI 8 AM- 5PM: [phone].   **After hours and weekends: Call [phone] and ask for "Radiology Resident on call".    For immediate concerns that are not emergent, you may call our radiology clinic at: [phone]    "

## 2019-04-12 NOTE — H&P
Radiology History & Physical      SUBJECTIVE:     Chief Complaint: abdominal distention    History of Present Illness:  Kenneth Sheikh is a 79 y.o. male who presents for evaluation of ascites  Past Medical History:   Diagnosis Date    Anticoagulant long-term use     Bipolar 1 disorder     Bipolar 1 disorder 11/24/2016    bipolar    Cancer     history of skin cancer/sinus cancer & rectal cancer    Depressed bipolar affective disorder     Diabetes mellitus     type 2    EBV infection 12/7/2016    EBV DNA, PCR Latest Ref Range: None detected  None detected EBV DNA-Copies/mL Unknown Test Not Performed EBV Early Antigen Ab, IgG Latest Ref Range: <1:10 Titer 1:40 (A) EBV Nuclear Ag Ab Latest Ref Range: <1:5 Titer >=1:80 (A) EBV VCA IgG Latest Ref Range: <1:10 Titer 1:160 (A) EBV VCA IgM Latest Ref Range: <1:10 Titer <1:10     History of TIA (transient ischemic attack)     several-taking Plavix    Hx of gallstones     Wife denies    Hypertension     Hyperthyroidism 11/30/2016    Low HDL (under 40) 12/7/2016    Mixed hyperlipidemia 11/23/2016    JUAN MANUEL (obstructive sleep apnea)     Pneumonia     Skin disease     Squamous cell carcinoma 08/2018    right temple    Stroke     Transient cerebral ischemia 7/22/2016    Type 2 diabetes mellitus      Past Surgical History:   Procedure Laterality Date    BIOPSY-BONE MARROW Left 11/3/2016    Performed by [facility] at [facility] OR 2ND FLR    ESOPHAGOGASTRODUODENOSCOPY (EGD) N/A 2/3/2017    Performed by Domenico Fox MD at [facility] ENDO (4TH FLR)    EXCISION-MASS RECTAL  6/28/2016    Performed by [facility]is [facility]alavera MD at [facility] OR 2ND FLR    Moh's procedure x4      rectal cancer      Sinus surgery for sinus cancer      sinus sx for cancer      skin cancer removed-several times-nose & ear      TONSILLECTOMY      ULTRASOUND-ENDOSCOPIC-UPPER/glue coil of gastric varices N/A 4/19/2017    Performed by Aneudy Perla MD at [facility] ENDO (2ND FLR)    Undescened  testicle sx      UVULOPALATOPHARYNGOPLASTY      for sleep apnea       Home Meds:   Prior to Admission medications    Medication Sig Start Date End Date Taking? Authorizing Provider   ACCU-CHEK NEYDA PLUS TEST STRP Strp 1 strip by Misc.(Non-Drug; Combo Route) route once daily. 11/5/18   Prisca Espinoza MD   ciprofloxacin HCl (CIPRO) 500 MG tablet Take 1 tablet (500 mg total) by mouth every Monday. 5/14/18   Renato Womack MD   divalproex (DEPAKOTE) 250 MG EC tablet Take 2 tablets (500 mg total) by mouth every evening. Do not fill prescription until patient calls and requests. 12/10/18   Prisca Espinoza MD   furosemide (LASIX) 20 MG tablet Take 2 tablets (40 mg total) by mouth once daily. 10/9/18 10/9/19  Renato Womack MD   ketorolac 0.5% (ACULAR) 0.5 % Drop  1/10/19   Historical Provider, MD   lactulose (CHRONULAC) 10 gram/15 mL solution TAKE 15 MLS BY MOUTH TWICE DAILY 3/8/18   Renato Womack MD   lancets [facility] To check blood sugar once daily, to use with Accu-Chek meter. 11/5/18   Prisca Espinoza MD   ofloxacin (OCUFLOX) 0.3 % ophthalmic solution  1/10/19   Historical Provider, MD   prednisoLONE acetate (PRED FORTE) 1 % DrpS  1/15/19   Historical Provider, MD   rifAXIMin (XIFAXAN) 550 mg Tab Take 1 tablet (550 mg total) by mouth 2 (two) times daily. 10/3/17   Renato Womack MD   SITagliptan (JANUVIA) 50 MG Tab Take 1 tablet (50 mg total) by mouth once daily. 4/6/17   Prisca Espinoza MD     Anticoagulants/Antiplatelets: no anticoagulation    Allergies:   Review of patient's allergies indicates:   Allergen Reactions    Abilify [aripiprazole] Other (See Comments)     Sleepy, could not function.     Sedation History:  no adverse reactions    Review of Systems:   Hematological: no known coagulopathies  Respiratory: no shortness of breath  Cardiovascular: no chest pain  Gastrointestinal: no abdominal pain  Genito-Urinary: no dysuria  Musculoskeletal: negative  Neurological: no TIA or  stroke symptoms         OBJECTIVE:     Vital Signs (Most Recent)       Physical Exam:  ASA: 2  Mallampati: NA    General: no acute distress  Mental Status: alert and oriented to person, place and time  HEENT: normocephalic, atraumatic  Chest: unlabored breathing  Heart: regular heart rate  Abdomen: nondistended  Extremity: moves all extremities    Laboratory  Lab Results   Component Value Date    INR 1.1 08/10/2018       Lab Results   Component Value Date    WBC 2.93 (L) 12/04/2018    HGB 10.1 (L) 12/04/2018    HCT 32.7 (L) 12/04/2018    MCV 87 12/04/2018     (L) 12/04/2018      Lab Results   Component Value Date     (H) 12/04/2018     12/04/2018    K 3.6 12/04/2018     12/04/2018    CO2 29 12/04/2018    BUN 17 12/04/2018    CREATININE 1.2 12/04/2018    CALCIUM 8.8 12/04/2018    MG 1.7 05/15/2017    ALT 5 (L) 12/04/2018    AST 18 12/04/2018    ALBUMIN 2.5 (L) 12/04/2018    BILITOT 0.7 12/04/2018       ASSESSMENT/PLAN:     Sedation Plan: local anesthesia  Patient will undergo US guided paracentesis

## 2019-04-12 NOTE — PROGRESS NOTES
Patient stable, tolerated paracentesis well, 3.4 L removed, labs sent,100 mL albumin given per protocol.  Discharge instruction, and follow up care reviewed.  Patient verbalized understanding, and denies further questions.  Provided with ROCU and after hours number.  Patient transported via wheelchair.

## 2019-04-15 DIAGNOSIS — K75.81 LIVER CIRRHOSIS SECONDARY TO NASH: Primary | ICD-10-CM

## 2019-04-15 DIAGNOSIS — K74.60 LIVER CIRRHOSIS SECONDARY TO NASH: Primary | ICD-10-CM

## 2019-04-17 ENCOUNTER — TELEPHONE (OUTPATIENT)
Dept: INTERVENTIONAL RADIOLOGY/VASCULAR | Facility: HOSPITAL | Age: 79
End: 2019-04-17

## 2019-04-18 ENCOUNTER — HOSPITAL ENCOUNTER (OUTPATIENT)
Dept: INTERVENTIONAL RADIOLOGY/VASCULAR | Facility: HOSPITAL | Age: 79
Discharge: HOME | End: 2019-04-18
Attending: INTERNAL MEDICINE
Payer: MEDICARE

## 2019-04-18 VITALS
OXYGEN SATURATION: 96 % | HEART RATE: 87 BPM | RESPIRATION RATE: 18 BRPM | SYSTOLIC BLOOD PRESSURE: 137 MMHG | DIASTOLIC BLOOD PRESSURE: 62 MMHG

## 2019-04-18 DIAGNOSIS — K70.11 ASCITES DUE TO ALCOHOLIC HEPATITIS: ICD-10-CM

## 2019-04-18 LAB
APPEARANCE FLD: NORMAL
BODY FLD TYPE: NORMAL
COLOR FLD: YELLOW
LYMPHOCYTES NFR FLD MANUAL: 84 %
MONOS+MACROS NFR FLD MANUAL: 9 %
NEUTROPHILS NFR FLD MANUAL: 7 %
WBC # FLD: 52 /CU MM

## 2019-04-18 PROCEDURE — P9047 ALBUMIN (HUMAN), 25%, 50ML: HCPCS | Mod: JG | Performed by: INTERNAL MEDICINE

## 2019-04-18 PROCEDURE — 49083 ABD PARACENTESIS W/IMAGING: CPT | Mod: ,,, | Performed by: NURSE PRACTITIONER

## 2019-04-18 PROCEDURE — 49083 IR PARACENTESIS NO IMAGING: ICD-10-PCS | Mod: ,,, | Performed by: NURSE PRACTITIONER

## 2019-04-18 PROCEDURE — 89051 BODY FLUID CELL COUNT: CPT

## 2019-04-18 PROCEDURE — 63600175 PHARM REV CODE 636 W HCPCS: Mod: JG | Performed by: INTERNAL MEDICINE

## 2019-04-18 PROCEDURE — 49083 ABD PARACENTESIS W/IMAGING: CPT

## 2019-04-18 RX ORDER — ALBUMIN HUMAN 250 G/1000ML
25 SOLUTION INTRAVENOUS ONCE
Status: COMPLETED | OUTPATIENT
Start: 2019-04-18 | End: 2019-04-18

## 2019-04-18 RX ADMIN — ALBUMIN (HUMAN) 25 G: 25 SOLUTION INTRAVENOUS at 11:04

## 2019-04-18 NOTE — H&P
Radiology History & Physical      SUBJECTIVE:     Chief Complaint: abdominal distention    History of Present Illness:  Kenneth Sheikh is a 79 y.o. male who presents for evaluation of ascites  Past Medical History:   Diagnosis Date    Anticoagulant long-term use     Bipolar 1 disorder     Bipolar 1 disorder 11/24/2016    bipolar    Cancer     history of skin cancer/sinus cancer & rectal cancer    Depressed bipolar affective disorder     Diabetes mellitus     type 2    EBV infection 12/7/2016    EBV DNA, PCR Latest Ref Range: None detected  None detected EBV DNA-Copies/mL Unknown Test Not Performed EBV Early Antigen Ab, IgG Latest Ref Range: <1:10 Titer 1:40 (A) EBV Nuclear Ag Ab Latest Ref Range: <1:5 Titer >=1:80 (A) EBV VCA IgG Latest Ref Range: <1:10 Titer 1:160 (A) EBV VCA IgM Latest Ref Range: <1:10 Titer <1:10     History of TIA (transient ischemic attack)     several-taking Plavix    Hx of gallstones     Wife denies    Hypertension     Hyperthyroidism 11/30/2016    Low HDL (under 40) 12/7/2016    Mixed hyperlipidemia 11/23/2016    JUAN MANUEL (obstructive sleep apnea)     Pneumonia     Skin disease     Squamous cell carcinoma 08/2018    right temple    Stroke     Transient cerebral ischemia 7/22/2016    Type 2 diabetes mellitus      Past Surgical History:   Procedure Laterality Date    BIOPSY-BONE MARROW Left 11/3/2016    Performed by Bud Bob MD at [facility] OR 2ND FLR    ESOPHAGOGASTRODUODENOSCOPY (EGD) N/A 2/3/2017    Performed by Domenico Fox MD at [facility] ENDO (4TH FLR)    EXCISION-MASS RECTAL  6/28/2016    Performed by Haris Talavera MD at [facility] OR 2ND FLR    Moh's procedure x4      rectal cancer      Sinus surgery for sinus cancer      sinus sx for cancer      skin cancer removed-several times-nose & ear      TONSILLECTOMY      ULTRASOUND-ENDOSCOPIC-UPPER/glue coil of gastric varices N/A 4/19/2017    Performed by Aneudy Perla MD at [facility] ENDO (2ND FLR)    Undescened  testicle sx      UVULOPALATOPHARYNGOPLASTY      for sleep apnea       Home Meds:   Prior to Admission medications    Medication Sig Start Date End Date Taking? Authorizing Provider   ACCU-CHEK NEYDA PLUS TEST STRP Strp 1 strip by Misc.(Non-Drug; Combo Route) route once daily. 11/5/18   Prisca Espinoza MD   ciprofloxacin HCl (CIPRO) 500 MG tablet Take 1 tablet (500 mg total) by mouth every Monday. 5/14/18   Renato Womack MD   divalproex (DEPAKOTE) 250 MG EC tablet Take 2 tablets (500 mg total) by mouth every evening. Do not fill prescription until patient calls and requests. 12/10/18   Prisca Espinoza MD   furosemide (LASIX) 20 MG tablet Take 2 tablets (40 mg total) by mouth once daily. 10/9/18 10/9/19  Renato Womack MD   ketorolac 0.5% (ACULAR) 0.5 % Drop  1/10/19   Historical Provider, MD   lactulose (CHRONULAC) 10 gram/15 mL solution TAKE 15 MLS BY MOUTH TWICE DAILY 3/8/18   Renato Womack MD   lancets [facility] To check blood sugar once daily, to use with Accu-Chek meter. 11/5/18   Prisca Espinoza MD   ofloxacin (OCUFLOX) 0.3 % ophthalmic solution  1/10/19   Historical Provider, MD   prednisoLONE acetate (PRED FORTE) 1 % DrpS  1/15/19   Historical Provider, MD   rifAXIMin (XIFAXAN) 550 mg Tab Take 1 tablet (550 mg total) by mouth 2 (two) times daily. 10/3/17   Renato Womack MD   SITagliptan (JANUVIA) 50 MG Tab Take 1 tablet (50 mg total) by mouth once daily. 4/6/17   Prisca Espinoza MD     Anticoagulants/Antiplatelets: no anticoagulation    Allergies:   Review of patient's allergies indicates:   Allergen Reactions    Abilify [aripiprazole] Other (See Comments)     Sleepy, could not function.     Sedation History:  no adverse reactions    Review of Systems:   Hematological: no known coagulopathies  Respiratory: no shortness of breath  Cardiovascular: no chest pain  Gastrointestinal: no abdominal pain  Genito-Urinary: no dysuria  Musculoskeletal: negative  Neurological: no TIA or  stroke symptoms         OBJECTIVE:     Vital Signs (Most Recent)       Physical Exam:  ASA: 2  Mallampati: na    General: no acute distress  Mental Status: alert and oriented to person, place and time  HEENT: normocephalic, atraumatic  Chest: unlabored breathing  Heart: regular heart rate  Abdomen: distended  Extremity: moves all extremities    Laboratory  Lab Results   Component Value Date    INR 1.1 08/10/2018       Lab Results   Component Value Date    WBC 2.93 (L) 12/04/2018    HGB 10.1 (L) 12/04/2018    HCT 32.7 (L) 12/04/2018    MCV 87 12/04/2018     (L) 12/04/2018      Lab Results   Component Value Date     (H) 12/04/2018     12/04/2018    K 3.6 12/04/2018     12/04/2018    CO2 29 12/04/2018    BUN 17 12/04/2018    CREATININE 1.2 12/04/2018    CALCIUM 8.8 12/04/2018    MG 1.7 05/15/2017    ALT 5 (L) 12/04/2018    AST 18 12/04/2018    ALBUMIN 2.5 (L) 12/04/2018    BILITOT 0.7 12/04/2018       ASSESSMENT/PLAN:     Sedation Plan: local anesthesia  Patient will undergo US guided paracentesis

## 2019-04-18 NOTE — PROGRESS NOTES
Paracentesis complete. 3300 mLs peritoneal fluid drained. Pt tolerated well. Dressing to right abd clean, dry, and intact. Albumin 25% given 100 mLs. Specimens sent per lab order. Pt discharged.

## 2019-04-18 NOTE — DISCHARGE INSTRUCTIONS
SANDIEU MON-FRI 8 AM- 5PM [phone]. Radiology resident on call [phone].    Ascites  3300ml removed

## 2019-04-25 ENCOUNTER — TELEPHONE (OUTPATIENT)
Dept: INTERVENTIONAL RADIOLOGY/VASCULAR | Facility: HOSPITAL | Age: 79
End: 2019-04-25

## 2019-04-26 ENCOUNTER — HOSPITAL ENCOUNTER (OUTPATIENT)
Dept: INTERVENTIONAL RADIOLOGY/VASCULAR | Facility: HOSPITAL | Age: 79
Discharge: HOME | End: 2019-04-26
Attending: INTERNAL MEDICINE
Payer: MEDICARE

## 2019-04-26 VITALS
OXYGEN SATURATION: 96 % | RESPIRATION RATE: 16 BRPM | DIASTOLIC BLOOD PRESSURE: 58 MMHG | HEART RATE: 53 BPM | SYSTOLIC BLOOD PRESSURE: 124 MMHG

## 2019-04-26 DIAGNOSIS — K70.11 ASCITES DUE TO ALCOHOLIC HEPATITIS: ICD-10-CM

## 2019-04-26 LAB
APPEARANCE FLD: NORMAL
BODY FLD TYPE: NORMAL
COLOR FLD: YELLOW
LYMPHOCYTES NFR FLD MANUAL: 88 %
MONOS+MACROS NFR FLD MANUAL: 10 %
NEUTROPHILS NFR FLD MANUAL: 2 %
WBC # FLD: 46 /CU MM

## 2019-04-26 PROCEDURE — 89051 BODY FLUID CELL COUNT: CPT

## 2019-04-26 PROCEDURE — 49083 ABD PARACENTESIS W/IMAGING: CPT

## 2019-04-26 PROCEDURE — 49083 IR PARACENTESIS NO IMAGING: ICD-10-PCS | Mod: ,,, | Performed by: FAMILY MEDICINE

## 2019-04-26 PROCEDURE — 49083 ABD PARACENTESIS W/IMAGING: CPT | Mod: ,,, | Performed by: FAMILY MEDICINE

## 2019-04-26 NOTE — H&P
Radiology History & Physical      SUBJECTIVE:     Chief Complaint: abdominal distention    History of Present Illness:  Kenneth Sheikh is a 79 y.o. male who presents for ultrasound guided paracentesis  Past Medical History:   Diagnosis Date    Anticoagulant long-term use     Bipolar 1 disorder     Bipolar 1 disorder 11/24/2016    bipolar    Cancer     history of skin cancer/sinus cancer & rectal cancer    Depressed bipolar affective disorder     Diabetes mellitus     type 2    EBV infection 12/7/2016    EBV DNA, PCR Latest Ref Range: None detected  None detected EBV DNA-Copies/mL Unknown Test Not Performed EBV Early Antigen Ab, IgG Latest Ref Range: <1:10 Titer 1:40 (A) EBV Nuclear Ag Ab Latest Ref Range: <1:5 Titer >=1:80 (A) EBV VCA IgG Latest Ref Range: <1:10 Titer 1:160 (A) EBV VCA IgM Latest Ref Range: <1:10 Titer <1:10     History of TIA (transient ischemic attack)     several-taking Plavix    Hx of gallstones     Wife denies    Hypertension     Hyperthyroidism 11/30/2016    Low HDL (under 40) 12/7/2016    Mixed hyperlipidemia 11/23/2016    JUAN MANUEL (obstructive sleep apnea)     Pneumonia     Skin disease     Squamous cell carcinoma 08/2018    right temple    Stroke     Transient cerebral ischemia 7/22/2016    Type 2 diabetes mellitus      Past Surgical History:   Procedure Laterality Date    BIOPSY-BONE MARROW Left 11/3/2016    Performed by Bud Bob MD at [facility] OR 2ND FLR    ESOPHAGOGASTRODUODENOSCOPY (EGD) N/A 2/3/2017    Performed by Domenico Fox MD at [facility] ENDO (4TH FLR)    EXCISION-MASS RECTAL  6/28/2016    Performed by Haris Talavera MD at [facility] OR 2ND FLR    Moh's procedure x4      rectal cancer      Sinus surgery for sinus cancer      sinus sx for cancer      skin cancer removed-several times-nose & ear      TONSILLECTOMY      ULTRASOUND-ENDOSCOPIC-UPPER/glue coil of gastric varices N/A 4/19/2017    Performed by Aneudy Perla MD at [facility] ENDO (2ND FLR)     Undescened testicle sx      UVULOPALATOPHARYNGOPLASTY      for sleep apnea       Home Meds:   Prior to Admission medications    Medication Sig Start Date End Date Taking? Authorizing Provider   ACCU-CHEK NEYDA PLUS TEST STRP Strp 1 strip by Misc.(Non-Drug; Combo Route) route once daily. 11/5/18   Prisca Espinoza MD   ciprofloxacin HCl (CIPRO) 500 MG tablet Take 1 tablet (500 mg total) by mouth every Monday. 5/14/18   Renato Womack MD   divalproex (DEPAKOTE) 250 MG EC tablet Take 2 tablets (500 mg total) by mouth every evening. Do not fill prescription until patient calls and requests. 12/10/18   Prisca Espinoza MD   furosemide (LASIX) 20 MG tablet Take 2 tablets (40 mg total) by mouth once daily. 10/9/18 10/9/19  Renato Womack MD   ketorolac 0.5% (ACULAR) 0.5 % Drop  1/10/19   Historical Provider, MD   lactulose (CHRONULAC) 10 gram/15 mL solution TAKE 15 MLS BY MOUTH TWICE DAILY 3/8/18   Renato Womack MD   lancets [facility] To check blood sugar once daily, to use with Accu-Chek meter. 11/5/18   Prisca Espinoza MD   ofloxacin (OCUFLOX) 0.3 % ophthalmic solution  1/10/19   Historical Provider, MD   prednisoLONE acetate (PRED FORTE) 1 % DrpS  1/15/19   Historical Provider, MD   rifAXIMin (XIFAXAN) 550 mg Tab Take 1 tablet (550 mg total) by mouth 2 (two) times daily. 10/3/17   Renato Womack MD   SITagliptan (JANUVIA) 50 MG Tab Take 1 tablet (50 mg total) by mouth once daily. 4/6/17   Prisca Espinoza MD     Anticoagulants/Antiplatelets: no anticoagulation    Allergies:   Review of patient's allergies indicates:   Allergen Reactions    Abilify [aripiprazole] Other (See Comments)     Sleepy, could not function.     Sedation History:  no adverse reactions    Review of Systems:   Hematological: no known coagulopathies  Respiratory: no shortness of breath  Cardiovascular: no chest pain  Gastrointestinal: no abdominal pain  Genito-Urinary: no dysuria  Musculoskeletal:  negative  Neurological: no TIA or stroke symptoms         OBJECTIVE:     Vital Signs (Most Recent)       Physical Exam:  ASA: 2  Mallampati: n/a    General: no acute distress  Mental Status: alert and oriented to person, place and time  HEENT: normocephalic, atraumatic  Chest: unlabored breathing  Heart: regular heart rate  Abdomen: distended  Extremity: moves all extremities    Laboratory  Lab Results   Component Value Date    INR 1.1 08/10/2018       Lab Results   Component Value Date    WBC 2.93 (L) 12/04/2018    HGB 10.1 (L) 12/04/2018    HCT 32.7 (L) 12/04/2018    MCV 87 12/04/2018     (L) 12/04/2018      Lab Results   Component Value Date     (H) 12/04/2018     12/04/2018    K 3.6 12/04/2018     12/04/2018    CO2 29 12/04/2018    BUN 17 12/04/2018    CREATININE 1.2 12/04/2018    CALCIUM 8.8 12/04/2018    MG 1.7 05/15/2017    ALT 5 (L) 12/04/2018    AST 18 12/04/2018    ALBUMIN 2.5 (L) 12/04/2018    BILITOT 0.7 12/04/2018       ASSESSMENT/PLAN:     Sedation Plan: local  Patient will undergo ultrasound guided paracentesis.    MATT Mauricio, FNP  Interventional Radiology  [phone] [employer]

## 2019-04-26 NOTE — PROCEDURES

## 2019-05-02 DIAGNOSIS — K74.60 CIRRHOSIS OF LIVER WITH ASCITES, UNSPECIFIED HEPATIC CIRRHOSIS TYPE: ICD-10-CM

## 2019-05-02 DIAGNOSIS — R18.8 ASCITES OF LIVER: ICD-10-CM

## 2019-05-02 DIAGNOSIS — R18.8 CIRRHOSIS OF LIVER WITH ASCITES, UNSPECIFIED HEPATIC CIRRHOSIS TYPE: ICD-10-CM

## 2019-05-02 RX ORDER — LACTULOSE 10 G/15ML
SOLUTION ORAL; RECTAL
Qty: 2838 ML | Refills: 0 | Status: SHIPPED | OUTPATIENT
Start: 2019-05-02 | End: 2019-08-05 | Stop reason: SDUPTHER

## 2019-05-02 RX ORDER — FUROSEMIDE 20 MG/1
TABLET ORAL
Qty: 180 TABLET | Refills: 6 | Status: SHIPPED | OUTPATIENT
Start: 2019-05-02 | End: 2020-07-01

## 2019-05-02 RX ORDER — CIPROFLOXACIN 500 MG/1
TABLET ORAL
Qty: 12 TABLET | Refills: 0 | Status: SHIPPED | OUTPATIENT
Start: 2019-05-02 | End: 2019-07-23 | Stop reason: SDUPTHER

## 2019-05-03 ENCOUNTER — HOSPITAL ENCOUNTER (OUTPATIENT)
Dept: INTERVENTIONAL RADIOLOGY/VASCULAR | Facility: HOSPITAL | Age: 79
Discharge: HOME | End: 2019-05-03
Attending: INTERNAL MEDICINE
Payer: MEDICARE

## 2019-05-03 VITALS
HEART RATE: 63 BPM | SYSTOLIC BLOOD PRESSURE: 132 MMHG | DIASTOLIC BLOOD PRESSURE: 54 MMHG | RESPIRATION RATE: 18 BRPM | OXYGEN SATURATION: 99 %

## 2019-05-03 DIAGNOSIS — K70.11 ASCITES DUE TO ALCOHOLIC HEPATITIS: ICD-10-CM

## 2019-05-03 LAB
APPEARANCE FLD: NORMAL
BODY FLD TYPE: NORMAL
COLOR FLD: YELLOW
GRAM STN SPEC: NORMAL
GRAM STN SPEC: NORMAL
LYMPHOCYTES NFR FLD MANUAL: 78 %
MESOTHL CELL NFR FLD MANUAL: 1 %
MONOS+MACROS NFR FLD MANUAL: 18 %
NEUTROPHILS NFR FLD MANUAL: 3 %
WBC # FLD: 35 /CU MM

## 2019-05-03 PROCEDURE — 49083 IR PARACENTESIS NO IMAGING: ICD-10-PCS | Mod: ,,, | Performed by: RADIOLOGY

## 2019-05-03 PROCEDURE — 87205 SMEAR GRAM STAIN: CPT

## 2019-05-03 PROCEDURE — 89051 BODY FLUID CELL COUNT: CPT

## 2019-05-03 PROCEDURE — 49083 ABD PARACENTESIS W/IMAGING: CPT | Mod: ,,, | Performed by: RADIOLOGY

## 2019-05-03 PROCEDURE — 87075 CULTR BACTERIA EXCEPT BLOOD: CPT

## 2019-05-03 PROCEDURE — A7048 VACUUM DRAIN BOTTLE/TUBE KIT: HCPCS

## 2019-05-03 PROCEDURE — 87070 CULTURE OTHR SPECIMN AEROBIC: CPT

## 2019-05-03 PROCEDURE — 49083 ABD PARACENTESIS W/IMAGING: CPT

## 2019-05-03 NOTE — H&P
Radiology History & Physical      SUBJECTIVE:     Chief Complaint: abdominal distention    History of Present Illness:  Kenneth Sheikh is a 79 y.o. male who presents for evaluation of ascites  Past Medical History:   Diagnosis Date    Anticoagulant long-term use     Bipolar 1 disorder     Bipolar 1 disorder 11/24/2016    bipolar    Cancer     history of skin cancer/sinus cancer & rectal cancer    Depressed bipolar affective disorder     Diabetes mellitus     type 2    EBV infection 12/7/2016    EBV DNA, PCR Latest Ref Range: None detected  None detected EBV DNA-Copies/mL Unknown Test Not Performed EBV Early Antigen Ab, IgG Latest Ref Range: <1:10 Titer 1:40 (A) EBV Nuclear Ag Ab Latest Ref Range: <1:5 Titer >=1:80 (A) EBV VCA IgG Latest Ref Range: <1:10 Titer 1:160 (A) EBV VCA IgM Latest Ref Range: <1:10 Titer <1:10     History of TIA (transient ischemic attack)     several-taking Plavix    Hx of gallstones     Wife denies    Hypertension     Hyperthyroidism 11/30/2016    Low HDL (under 40) 12/7/2016    Mixed hyperlipidemia 11/23/2016    JUAN MANUEL (obstructive sleep apnea)     Pneumonia     Skin disease     Squamous cell carcinoma 08/2018    right temple    Stroke     Transient cerebral ischemia 7/22/2016    Type 2 diabetes mellitus      Past Surgical History:   Procedure Laterality Date    BIOPSY-BONE MARROW Left 11/3/2016    Performed by Bud Bob MD at [facility] OR 2ND FLR    ESOPHAGOGASTRODUODENOSCOPY (EGD) N/A 2/3/2017    Performed by Domenico Fox MD at [facility] ENDO (4TH FLR)    EXCISION-MASS RECTAL  6/28/2016    Performed by Haris Talavera MD at [facility] OR 2ND FLR    Moh's procedure x4      rectal cancer      Sinus surgery for sinus cancer      sinus sx for cancer      skin cancer removed-several times-nose & ear      TONSILLECTOMY      ULTRASOUND-ENDOSCOPIC-UPPER/glue coil of gastric varices N/A 4/19/2017    Performed by Aneudy Perla MD at [facility] ENDO (2ND FLR)    Undescened  testicle sx      UVULOPALATOPHARYNGOPLASTY      for sleep apnea       Home Meds:   Prior to Admission medications    Medication Sig Start Date End Date Taking? Authorizing Provider   ACCU-CHEK NEYDA PLUS TEST STRP Strp 1 strip by Misc.(Non-Drug; Combo Route) route once daily. 11/5/18   Prisca Espinoza MD   ciprofloxacin HCl (CIPRO) 500 MG tablet TAKE 1 TABLET(500 MG) BY MOUTH EVERY MONDAY 5/2/19   Renato Womack MD   divalproex (DEPAKOTE) 250 MG EC tablet Take 2 tablets (500 mg total) by mouth every evening. Do not fill prescription until patient calls and requests. 12/10/18   Prisca Espinoza MD   furosemide (LASIX) 20 MG tablet TAKE 2 TABLETS BY MOUTH EVERY DAY 5/2/19   Renato Womack MD   ketorolac 0.5% (ACULAR) 0.5 % Drop  1/10/19   Historical Provider, MD   lactulose (CHRONULAC) 10 gram/15 mL solution TAKE 15 MLS BY MOUTH TWICE DAILY 5/2/19   Renato Womack MD   lancets [facility] To check blood sugar once daily, to use with Accu-Chek meter. 11/5/18   Prisca Espinoza MD   ofloxacin (OCUFLOX) 0.3 % ophthalmic solution  1/10/19   Historical Provider, MD   prednisoLONE acetate (PRED FORTE) 1 % DrpS  1/15/19   Historical Provider, MD   rifAXIMin (XIFAXAN) 550 mg Tab Take 1 tablet (550 mg total) by mouth 2 (two) times daily. 10/3/17   Renato Womack MD   SITagliptan (JANUVIA) 50 MG Tab Take 1 tablet (50 mg total) by mouth once daily. 4/6/17   Prisca Espinoza MD     Anticoagulants/Antiplatelets: no anticoagulation    Allergies:   Review of patient's allergies indicates:   Allergen Reactions    Abilify [aripiprazole] Other (See Comments)     Sleepy, could not function.     Sedation History:  no adverse reactions    Review of Systems:   Hematological: no known coagulopathies  Respiratory: no shortness of breath  Cardiovascular: no chest pain  Gastrointestinal: no abdominal pain  Genito-Urinary: no dysuria  Musculoskeletal: negative  Neurological: no TIA or stroke symptoms          OBJECTIVE:     Vital Signs (Most Recent)  Pulse: 63 (05/03/19 1059)  Resp: 18 (05/03/19 1059)  BP: (!) 132/54 (05/03/19 1059)  SpO2: 99 % (05/03/19 1059)    Physical Exam:  ASA: 2  Mallampati: na    General: no acute distress  Mental Status: alert and oriented to person, place and time  HEENT: normocephalic, atraumatic  Chest: unlabored breathing  Heart: regular heart rate  Abdomen: distended  Extremity: moves all extremities    Laboratory  Lab Results   Component Value Date    INR 1.1 08/10/2018       Lab Results   Component Value Date    WBC 2.93 (L) 12/04/2018    HGB 10.1 (L) 12/04/2018    HCT 32.7 (L) 12/04/2018    MCV 87 12/04/2018     (L) 12/04/2018      Lab Results   Component Value Date     (H) 12/04/2018     12/04/2018    K 3.6 12/04/2018     12/04/2018    CO2 29 12/04/2018    BUN 17 12/04/2018    CREATININE 1.2 12/04/2018    CALCIUM 8.8 12/04/2018    MG 1.7 05/15/2017    ALT 5 (L) 12/04/2018    AST 18 12/04/2018    ALBUMIN 2.5 (L) 12/04/2018    BILITOT 0.7 12/04/2018       ASSESSMENT/PLAN:     Sedation Plan: local anesthesia  Patient will undergo US guided paracentesis

## 2019-05-03 NOTE — PROGRESS NOTES
Paracentesis complete, 4000 MLs removed. Specimen sent to lab. No  Albumin administered per protocol. Dressing applied to LLQ puncture site, dressing clean dry and intact. Pt given discharge instructions and handouts, pt verbalizes understanding. Questions answered. Pt denies pain and discomfort. Pt to lobby via wheelchair for transport home with family.

## 2019-05-03 NOTE — DISCHARGE INSTRUCTIONS
For scheduling: Call Mackenzie at [phone]    For questions or concerns call: SHAR MON-FRI 8 AM- 5PM [phone]. Radiology resident on call [phone].    For immediate concerns that are not emergent, you may call our radiology clinic at: [phone]

## 2019-05-06 LAB — BACTERIA SPEC AEROBE CULT: NO GROWTH

## 2019-05-10 ENCOUNTER — HOSPITAL ENCOUNTER (OUTPATIENT)
Dept: INTERVENTIONAL RADIOLOGY/VASCULAR | Facility: HOSPITAL | Age: 79
Discharge: HOME | End: 2019-05-10
Attending: INTERNAL MEDICINE
Payer: MEDICARE

## 2019-05-10 VITALS
HEART RATE: 52 BPM | OXYGEN SATURATION: 98 % | SYSTOLIC BLOOD PRESSURE: 170 MMHG | RESPIRATION RATE: 16 BRPM | DIASTOLIC BLOOD PRESSURE: 75 MMHG

## 2019-05-10 DIAGNOSIS — K70.11 ASCITES DUE TO ALCOHOLIC HEPATITIS: ICD-10-CM

## 2019-05-10 LAB
APPEARANCE FLD: NORMAL
BACTERIA SPEC ANAEROBE CULT: NORMAL
BODY FLD TYPE: NORMAL
COLOR FLD: YELLOW
GRAM STN SPEC: NORMAL
GRAM STN SPEC: NORMAL
LYMPHOCYTES NFR FLD MANUAL: 74 %
MONOS+MACROS NFR FLD MANUAL: 25 %
NEUTROPHILS NFR FLD MANUAL: 1 %
WBC # FLD: 37 /CU MM

## 2019-05-10 PROCEDURE — 87070 CULTURE OTHR SPECIMN AEROBIC: CPT

## 2019-05-10 PROCEDURE — 87205 SMEAR GRAM STAIN: CPT

## 2019-05-10 PROCEDURE — 89051 BODY FLUID CELL COUNT: CPT

## 2019-05-10 PROCEDURE — 87075 CULTR BACTERIA EXCEPT BLOOD: CPT

## 2019-05-10 PROCEDURE — 49083 IR PARACENTESIS NO IMAGING: ICD-10-PCS | Mod: ,,, | Performed by: NURSE PRACTITIONER

## 2019-05-10 PROCEDURE — 49083 ABD PARACENTESIS W/IMAGING: CPT

## 2019-05-10 PROCEDURE — 49083 ABD PARACENTESIS W/IMAGING: CPT | Mod: ,,, | Performed by: NURSE PRACTITIONER

## 2019-05-10 RX ORDER — ALBUMIN HUMAN 250 G/1000ML
25 SOLUTION INTRAVENOUS ONCE
Status: DISCONTINUED | OUTPATIENT
Start: 2019-05-10 | End: 2019-05-11 | Stop reason: HOSPADM

## 2019-05-10 NOTE — H&P
Radiology History & Physical      SUBJECTIVE:     Chief Complaint: abdominal distention    History of Present Illness:  Kenneth Sheikh is a 79 y.o. male who presents for evaluation of ascites  Past Medical History:   Diagnosis Date    Anticoagulant long-term use     Bipolar 1 disorder     Bipolar 1 disorder 11/24/2016    bipolar    Cancer     history of skin cancer/sinus cancer & rectal cancer    Depressed bipolar affective disorder     Diabetes mellitus     type 2    EBV infection 12/7/2016    EBV DNA, PCR Latest Ref Range: None detected  None detected EBV DNA-Copies/mL Unknown Test Not Performed EBV Early Antigen Ab, IgG Latest Ref Range: <1:10 Titer 1:40 (A) EBV Nuclear Ag Ab Latest Ref Range: <1:5 Titer >=1:80 (A) EBV VCA IgG Latest Ref Range: <1:10 Titer 1:160 (A) EBV VCA IgM Latest Ref Range: <1:10 Titer <1:10     History of TIA (transient ischemic attack)     several-taking Plavix    Hx of gallstones     Wife denies    Hypertension     Hyperthyroidism 11/30/2016    Low HDL (under 40) 12/7/2016    Mixed hyperlipidemia 11/23/2016    JUAN MANUEL (obstructive sleep apnea)     Pneumonia     Skin disease     Squamous cell carcinoma 08/2018    right temple    Stroke     Transient cerebral ischemia 7/22/2016    Type 2 diabetes mellitus      Past Surgical History:   Procedure Laterality Date    BIOPSY-BONE MARROW Left 11/3/2016    Performed by [facility] at [facility] OR 2ND FLR    ESOPHAGOGASTRODUODENOSCOPY (EGD) N/A 2/3/2017    Performed by Domenico Fox MD at [facility] ENDO (4TH FLR)    EXCISION-MASS RECTAL  6/28/2016    Performed by Haris Talavera MD at [facility] OR 2ND FLR    Moh's procedure x4      rectal cancer      Sinus surgery for sinus cancer      sinus sx for cancer      skin cancer removed-several times-nose & ear      TONSILLECTOMY      ULTRASOUND-ENDOSCOPIC-UPPER/glue coil of gastric varices N/A 4/19/2017    Performed by Aneudy Perla MD at [facility] ENDO (2ND FLR)    Undescened  testicle sx      UVULOPALATOPHARYNGOPLASTY      for sleep apnea       Home Meds:   Prior to Admission medications    Medication Sig Start Date End Date Taking? Authorizing Provider   ACCU-CHEK NEYDA PLUS TEST STRP Strp 1 strip by Misc.(Non-Drug; Combo Route) route once daily. 11/5/18   Prisca Espinoza MD   ciprofloxacin HCl (CIPRO) 500 MG tablet TAKE 1 TABLET(500 MG) BY MOUTH EVERY MONDAY 5/2/19   Renato Womack MD   divalproex (DEPAKOTE) 250 MG EC tablet Take 2 tablets (500 mg total) by mouth every evening. Do not fill prescription until patient calls and requests. 12/10/18   Prisca Espinoza MD   furosemide (LASIX) 20 MG tablet TAKE 2 TABLETS BY MOUTH EVERY DAY 5/2/19   Renato Womack MD   ketorolac 0.5% (ACULAR) 0.5 % Drop  1/10/19   Historical Provider, MD   lactulose (CHRONULAC) 10 gram/15 mL solution TAKE 15 MLS BY MOUTH TWICE DAILY 5/2/19   Renato Womack MD   lancets [facility] To check blood sugar once daily, to use with Accu-Chek meter. 11/5/18   Prisca Espinoza MD   ofloxacin (OCUFLOX) 0.3 % ophthalmic solution  1/10/19   Historical Provider, MD   prednisoLONE acetate (PRED FORTE) 1 % DrpS  1/15/19   Historical Provider, MD   rifAXIMin (XIFAXAN) 550 mg Tab Take 1 tablet (550 mg total) by mouth 2 (two) times daily. 10/3/17   Renato Womack MD   SITagliptan (JANUVIA) 50 MG Tab Take 1 tablet (50 mg total) by mouth once daily. 4/6/17   Prisca Espinoza MD     Anticoagulants/Antiplatelets: no anticoagulation    Allergies:   Review of patient's allergies indicates:   Allergen Reactions    Abilify [aripiprazole] Other (See Comments)     Sleepy, could not function.     Sedation History:  no adverse reactions    Review of Systems:   Hematological: no known coagulopathies  Respiratory: no shortness of breath  Cardiovascular: no chest pain  Gastrointestinal: no abdominal pain  Genito-Urinary: no dysuria  Musculoskeletal: negative  Neurological: no TIA or stroke symptoms          OBJECTIVE:     Vital Signs (Most Recent)  Pulse: (!) 52 (05/10/19 1151)  Resp: 16 (05/10/19 1151)  BP: (!) 165/74 (05/10/19 1151)  SpO2: 97 % (05/10/19 1151)    Physical Exam:  ASA: 2  Mallampati: na    General: no acute distress  Mental Status: alert and oriented to person, place and time  HEENT: normocephalic, atraumatic  Chest: unlabored breathing  Heart: regular heart rate  Abdomen: distended  Extremity: moves all extremities    Laboratory  Lab Results   Component Value Date    INR 1.1 08/10/2018       Lab Results   Component Value Date    WBC 2.93 (L) 12/04/2018    HGB 10.1 (L) 12/04/2018    HCT 32.7 (L) 12/04/2018    MCV 87 12/04/2018     (L) 12/04/2018      Lab Results   Component Value Date     (H) 12/04/2018     12/04/2018    K 3.6 12/04/2018     12/04/2018    CO2 29 12/04/2018    BUN 17 12/04/2018    CREATININE 1.2 12/04/2018    CALCIUM 8.8 12/04/2018    MG 1.7 05/15/2017    ALT 5 (L) 12/04/2018    AST 18 12/04/2018    ALBUMIN 2.5 (L) 12/04/2018    BILITOT 0.7 12/04/2018       ASSESSMENT/PLAN:     Sedation Plan: local anesthesia   Patient will undergo US guided paracentesis

## 2019-05-10 NOTE — PROGRESS NOTES
Pt to waiting area via wheelchair, tolerated procedure well, no s/s of distress, 3600 ml clear yellow fluid removed, sample sent to lab

## 2019-05-13 LAB — BACTERIA SPEC AEROBE CULT: NO GROWTH

## 2019-05-17 ENCOUNTER — HOSPITAL ENCOUNTER (OUTPATIENT)
Dept: INTERVENTIONAL RADIOLOGY/VASCULAR | Facility: HOSPITAL | Age: 79
Discharge: HOME | End: 2019-05-17
Attending: INTERNAL MEDICINE
Payer: MEDICARE

## 2019-05-17 DIAGNOSIS — K70.11 ASCITES DUE TO ALCOHOLIC HEPATITIS: ICD-10-CM

## 2019-05-17 LAB
APPEARANCE FLD: NORMAL
BACTERIA SPEC ANAEROBE CULT: NORMAL
BODY FLD TYPE: NORMAL
COLOR FLD: YELLOW
GRAM STN SPEC: NORMAL
GRAM STN SPEC: NORMAL
LYMPHOCYTES NFR FLD MANUAL: 66 %
MONOS+MACROS NFR FLD MANUAL: 34 %
WBC # FLD: 35 /CU MM

## 2019-05-17 PROCEDURE — 89051 BODY FLUID CELL COUNT: CPT

## 2019-05-17 PROCEDURE — 63600175 PHARM REV CODE 636 W HCPCS: Mod: JG | Performed by: INTERNAL MEDICINE

## 2019-05-17 PROCEDURE — 87070 CULTURE OTHR SPECIMN AEROBIC: CPT

## 2019-05-17 PROCEDURE — P9047 ALBUMIN (HUMAN), 25%, 50ML: HCPCS | Mod: JG | Performed by: INTERNAL MEDICINE

## 2019-05-17 PROCEDURE — 87075 CULTR BACTERIA EXCEPT BLOOD: CPT

## 2019-05-17 PROCEDURE — 49083 IR PARACENTESIS WITH IMAGING: ICD-10-PCS | Mod: ,,, | Performed by: RADIOLOGY

## 2019-05-17 PROCEDURE — 49083 ABD PARACENTESIS W/IMAGING: CPT | Mod: ,,, | Performed by: RADIOLOGY

## 2019-05-17 PROCEDURE — 87205 SMEAR GRAM STAIN: CPT

## 2019-05-17 PROCEDURE — 49083 ABD PARACENTESIS W/IMAGING: CPT

## 2019-05-17 RX ORDER — ALBUMIN HUMAN 250 G/1000ML
25 SOLUTION INTRAVENOUS ONCE
Status: COMPLETED | OUTPATIENT
Start: 2019-05-17 | End: 2019-05-17

## 2019-05-17 RX ORDER — ALBUMIN HUMAN 250 G/1000ML
12.5 SOLUTION INTRAVENOUS ONCE
Status: COMPLETED | OUTPATIENT
Start: 2019-05-17 | End: 2019-05-17

## 2019-05-17 RX ADMIN — ALBUMIN HUMAN 25 G: 0.25 SOLUTION INTRAVENOUS at 12:05

## 2019-05-17 RX ADMIN — ALBUMIN (HUMAN) 12.5 G: 25 SOLUTION INTRAVENOUS at 01:05

## 2019-05-17 NOTE — H&P
Radiology History & Physical      SUBJECTIVE:     Chief Complaint: abdominal distention    History of Present Illness:  Kenneth Sheikh is a 79 y.o. male who presents for evaluation of ascites  Past Medical History:   Diagnosis Date    Anticoagulant long-term use     Bipolar 1 disorder     Bipolar 1 disorder 11/24/2016    bipolar    Cancer     history of skin cancer/sinus cancer & rectal cancer    Depressed bipolar affective disorder     Diabetes mellitus     type 2    EBV infection 12/7/2016    EBV DNA, PCR Latest Ref Range: None detected  None detected EBV DNA-Copies/mL Unknown Test Not Performed EBV Early Antigen Ab, IgG Latest Ref Range: <1:10 Titer 1:40 (A) EBV Nuclear Ag Ab Latest Ref Range: <1:5 Titer >=1:80 (A) EBV VCA IgG Latest Ref Range: <1:10 Titer 1:160 (A) EBV VCA IgM Latest Ref Range: <1:10 Titer <1:10     History of TIA (transient ischemic attack)     several-taking Plavix    Hx of gallstones     Wife denies    Hypertension     Hyperthyroidism 11/30/2016    Low HDL (under 40) 12/7/2016    Mixed hyperlipidemia 11/23/2016    JUAN MANUEL (obstructive sleep apnea)     Pneumonia     Skin disease     Squamous cell carcinoma 08/2018    right temple    Stroke     Transient cerebral ischemia 7/22/2016    Type 2 diabetes mellitus      Past Surgical History:   Procedure Laterality Date    BIOPSY-BONE MARROW Left 11/3/2016    Performed by Bud Bob MD at [facility] OR 2ND FLR    ESOPHAGOGASTRODUODENOSCOPY (EGD) N/A 2/3/2017    Performed by Domenico Fox MD at [facility] ENDO (4TH FLR)    EXCISION-MASS RECTAL  6/28/2016    Performed by Haris Talavera MD at [facility] OR 2ND FLR    Moh's procedure x4      rectal cancer      Sinus surgery for sinus cancer      sinus sx for cancer      skin cancer removed-several times-nose & ear      TONSILLECTOMY      ULTRASOUND-ENDOSCOPIC-UPPER/glue coil of gastric varices N/A 4/19/2017    Performed by Aneudy Perla MD at [facility] ENDO (2ND FLR)    Undescened  testicle sx      UVULOPALATOPHARYNGOPLASTY      for sleep apnea       Home Meds:   Prior to Admission medications    Medication Sig Start Date End Date Taking? Authorizing Provider   ACCU-CHEK NEYDA PLUS TEST STRP Strp 1 strip by Misc.(Non-Drug; Combo Route) route once daily. 11/5/18   Prisca Espinoza MD   ciprofloxacin HCl (CIPRO) 500 MG tablet TAKE 1 TABLET(500 MG) BY MOUTH EVERY MONDAY 5/2/19   Renato Womack MD   divalproex (DEPAKOTE) 250 MG EC tablet Take 2 tablets (500 mg total) by mouth every evening. Do not fill prescription until patient calls and requests. 12/10/18   Prisca Espinoza MD   furosemide (LASIX) 20 MG tablet TAKE 2 TABLETS BY MOUTH EVERY DAY 5/2/19   Renato Womack MD   ketorolac 0.5% (ACULAR) 0.5 % Drop  1/10/19   Historical Provider, MD   lactulose (CHRONULAC) 10 gram/15 mL solution TAKE 15 MLS BY MOUTH TWICE DAILY 5/2/19   Renato Womack MD   lancets [facility] To check blood sugar once daily, to use with Accu-Chek meter. 11/5/18   Prisca Espinoza MD   ofloxacin (OCUFLOX) 0.3 % ophthalmic solution  1/10/19   Historical Provider, MD   prednisoLONE acetate (PRED FORTE) 1 % DrpS  1/15/19   Historical Provider, MD   rifAXIMin (XIFAXAN) 550 mg Tab Take 1 tablet (550 mg total) by mouth 2 (two) times daily. 10/3/17   Renato Womack MD   SITagliptan (JANUVIA) 50 MG Tab Take 1 tablet (50 mg total) by mouth once daily. 4/6/17   Prisca Espinoza MD     Anticoagulants/Antiplatelets: no anticoagulation    Allergies:   Review of patient's allergies indicates:   Allergen Reactions    Abilify [aripiprazole] Other (See Comments)     Sleepy, could not function.     Sedation History:  no adverse reactions    Review of Systems:   Hematological: no known coagulopathies  Respiratory: no shortness of breath  Cardiovascular: no chest pain  Gastrointestinal: no abdominal pain  Genito-Urinary: no dysuria  Musculoskeletal: negative  Neurological: no TIA or stroke symptoms          OBJECTIVE:     Vital Signs (Most Recent)     172/79  98%  66  14    Physical Exam:  ASA: 2  Mallampati: na    General: no acute distress  Mental Status: alert and oriented to person, place and time  HEENT: normocephalic, atraumatic  Chest: unlabored breathing  Heart: regular heart rate  Abdomen: distended  Extremity: moves all extremities    Laboratory  Lab Results   Component Value Date    INR 1.1 08/10/2018       Lab Results   Component Value Date    WBC 2.93 (L) 12/04/2018    HGB 10.1 (L) 12/04/2018    HCT 32.7 (L) 12/04/2018    MCV 87 12/04/2018     (L) 12/04/2018      Lab Results   Component Value Date     (H) 12/04/2018     12/04/2018    K 3.6 12/04/2018     12/04/2018    CO2 29 12/04/2018    BUN 17 12/04/2018    CREATININE 1.2 12/04/2018    CALCIUM 8.8 12/04/2018    MG 1.7 05/15/2017    ALT 5 (L) 12/04/2018    AST 18 12/04/2018    ALBUMIN 2.5 (L) 12/04/2018    BILITOT 0.7 12/04/2018       ASSESSMENT/PLAN:     Sedation Plan: local anesthesia  Patient will undergo US guided paracentesis

## 2019-05-20 LAB — BACTERIA SPEC AEROBE CULT: NO GROWTH

## 2019-05-24 ENCOUNTER — HOSPITAL ENCOUNTER (OUTPATIENT)
Dept: INTERVENTIONAL RADIOLOGY/VASCULAR | Facility: HOSPITAL | Age: 79
Discharge: HOME | End: 2019-05-24
Attending: INTERNAL MEDICINE
Payer: MEDICARE

## 2019-05-24 VITALS — HEART RATE: 52 BPM | OXYGEN SATURATION: 100 % | DIASTOLIC BLOOD PRESSURE: 75 MMHG | SYSTOLIC BLOOD PRESSURE: 174 MMHG

## 2019-05-24 DIAGNOSIS — K70.11 ASCITES DUE TO ALCOHOLIC HEPATITIS: ICD-10-CM

## 2019-05-24 LAB
APPEARANCE FLD: NORMAL
BACTERIA SPEC ANAEROBE CULT: NORMAL
BODY FLD TYPE: NORMAL
COLOR FLD: YELLOW
GRAM STN SPEC: NORMAL
LYMPHOCYTES NFR FLD MANUAL: 92 %
MONOS+MACROS NFR FLD MANUAL: 4 %
NEUTROPHILS NFR FLD MANUAL: 4 %
WBC # FLD: 62 /CU MM

## 2019-05-24 PROCEDURE — 87075 CULTR BACTERIA EXCEPT BLOOD: CPT

## 2019-05-24 PROCEDURE — 63600175 PHARM REV CODE 636 W HCPCS: Mod: JG | Performed by: INTERNAL MEDICINE

## 2019-05-24 PROCEDURE — 49083 ABD PARACENTESIS W/IMAGING: CPT

## 2019-05-24 PROCEDURE — 49083 IR PARACENTESIS WITH IMAGING: ICD-10-PCS | Mod: GC,,, | Performed by: RADIOLOGY

## 2019-05-24 PROCEDURE — P9047 ALBUMIN (HUMAN), 25%, 50ML: HCPCS | Mod: JG | Performed by: INTERNAL MEDICINE

## 2019-05-24 PROCEDURE — 87205 SMEAR GRAM STAIN: CPT

## 2019-05-24 PROCEDURE — 49083 ABD PARACENTESIS W/IMAGING: CPT | Mod: GC,,, | Performed by: RADIOLOGY

## 2019-05-24 PROCEDURE — 87070 CULTURE OTHR SPECIMN AEROBIC: CPT

## 2019-05-24 PROCEDURE — 89051 BODY FLUID CELL COUNT: CPT

## 2019-05-24 RX ORDER — ALBUMIN HUMAN 250 G/1000ML
25 SOLUTION INTRAVENOUS ONCE
Status: COMPLETED | OUTPATIENT
Start: 2019-05-24 | End: 2019-05-24

## 2019-05-24 RX ADMIN — ALBUMIN HUMAN 25 G: 0.25 SOLUTION INTRAVENOUS at 01:05

## 2019-05-24 NOTE — PROGRESS NOTES
Patient taken to [place] via wheelchair, in care of wife, 4700 ml clear yellow fluid removed, 150 ml albumin given per protocol, sample of fluid sent to lab, patient tolerated well

## 2019-05-24 NOTE — H&P
Radiology History & Physical      SUBJECTIVE:     Chief Complaint: Ascites.    History of Present Illness:  Kenneth Sheikh is a 79 y.o. male who presents for paracentesis.    Past Medical History:   Diagnosis Date    Anticoagulant long-term use     Bipolar 1 disorder     Bipolar 1 disorder 11/24/2016    bipolar    Cancer     history of skin cancer/sinus cancer & rectal cancer    Depressed bipolar affective disorder     Diabetes mellitus     type 2    EBV infection 12/7/2016    EBV DNA, PCR Latest Ref Range: None detected  None detected EBV DNA-Copies/mL Unknown Test Not Performed EBV Early Antigen Ab, IgG Latest Ref Range: <1:10 Titer 1:40 (A) EBV Nuclear Ag Ab Latest Ref Range: <1:5 Titer >=1:80 (A) EBV VCA IgG Latest Ref Range: <1:10 Titer 1:160 (A) EBV VCA IgM Latest Ref Range: <1:10 Titer <1:10     History of TIA (transient ischemic attack)     several-taking Plavix    Hx of gallstones     Wife denies    Hypertension     Hyperthyroidism 11/30/2016    Low HDL (under 40) 12/7/2016    Mixed hyperlipidemia 11/23/2016    JUAN MANUEL (obstructive sleep apnea)     Pneumonia     Skin disease     Squamous cell carcinoma 08/2018    right temple    Stroke     Transient cerebral ischemia 7/22/2016    Type 2 diabetes mellitus      Past Surgical History:   Procedure Laterality Date    BIOPSY-BONE MARROW Left 11/3/2016    Performed by [facility] at [facility] 2ND FLR    ESOPHAGOGASTRODUODENOSCOPY (EGD) N/A 2/3/2017    Performed by Domenico Fox MD at [facility] ENDO (4TH FLR)    EXCISION-MASS RECTAL  6/28/2016    Performed by Haris Talavera MD at [facility] OR 2ND FLR    Moh's procedure x4      rectal cancer      Sinus surgery for sinus cancer      sinus sx for cancer      skin cancer removed-several times-nose & ear      TONSILLECTOMY      ULTRASOUND-ENDOSCOPIC-UPPER/glue coil of gastric varices N/A 4/19/2017    Performed by Aneudy Perla MD at [facility] ENDO (2ND FLR)    Undescened testicle sx       UVULOPALATOPHARYNGOPLASTY      for sleep apnea       Home Meds:   Prior to Admission medications    Medication Sig Start Date End Date Taking? Authorizing Provider   ACCU-CHEK NEYDA PLUS TEST STRP Strp 1 strip by Misc.(Non-Drug; Combo Route) route once daily. 11/5/18   Prisca Espinoza MD   ciprofloxacin HCl (CIPRO) 500 MG tablet TAKE 1 TABLET(500 MG) BY MOUTH EVERY MONDAY 5/2/19   Renato Womack MD   divalproex (DEPAKOTE) 250 MG EC tablet Take 2 tablets (500 mg total) by mouth every evening. Do not fill prescription until patient calls and requests. 12/10/18   Prisca Espinoza MD   furosemide (LASIX) 20 MG tablet TAKE 2 TABLETS BY MOUTH EVERY DAY 5/2/19   Renato Womack MD   ketorolac 0.5% (ACULAR) 0.5 % Drop  1/10/19   Historical Provider, MD   lactulose (CHRONULAC) 10 gram/15 mL solution TAKE 15 MLS BY MOUTH TWICE DAILY 5/2/19   Renato Womack MD   lancets [facility] To check blood sugar once daily, to use with Accu-Chek meter. 11/5/18   Prisca Espinoza MD   ofloxacin (OCUFLOX) 0.3 % ophthalmic solution  1/10/19   Historical Provider, MD   prednisoLONE acetate (PRED FORTE) 1 % DrpS  1/15/19   Historical Provider, MD   rifAXIMin (XIFAXAN) 550 mg Tab Take 1 tablet (550 mg total) by mouth 2 (two) times daily. 10/3/17   Renato Womack MD   SITagliptan (JANUVIA) 50 MG Tab Take 1 tablet (50 mg total) by mouth once daily. 4/6/17   Prisca Espinoza MD     Anticoagulants/Antiplatelets: no anticoagulation    Allergies:   Review of patient's allergies indicates:   Allergen Reactions    Abilify [aripiprazole] Other (See Comments)     Sleepy, could not function.     Sedation History:  no adverse reactions    Review of Systems:   Hematological: no known coagulopathies  Respiratory: no shortness of breath  Cardiovascular: no chest pain  Gastrointestinal: no abdominal pain  Genito-Urinary: no dysuria  Musculoskeletal: negative  Neurological: no TIA or stroke symptoms         OBJECTIVE:     Vital Signs  (Most Recent)  Pulse: (!) 52 (05/24/19 1200)  BP: (!) 174/75 (05/24/19 1200)  SpO2: 100 % (05/24/19 1200)    Physical Exam:  ASA: 3  Mallampati: 2    General: no acute distress  Mental Status: alert and oriented to person, place and time  HEENT: normocephalic, atraumatic  Chest: unlabored breathing  Heart: regular heart rate  Abdomen: nondistended  Extremity: moves all extremities    Laboratory  Lab Results   Component Value Date    INR 1.1 08/10/2018       Lab Results   Component Value Date    WBC 2.93 (L) 12/04/2018    HGB 10.1 (L) 12/04/2018    HCT 32.7 (L) 12/04/2018    MCV 87 12/04/2018     (L) 12/04/2018      Lab Results   Component Value Date     (H) 12/04/2018     12/04/2018    K 3.6 12/04/2018     12/04/2018    CO2 29 12/04/2018    BUN 17 12/04/2018    CREATININE 1.2 12/04/2018    CALCIUM 8.8 12/04/2018    MG 1.7 05/15/2017    ALT 5 (L) 12/04/2018    AST 18 12/04/2018    ALBUMIN 2.5 (L) 12/04/2018    BILITOT 0.7 12/04/2018       ASSESSMENT/PLAN:     Sedation Plan: Local.  Patient will undergo Paracentesis.    Maurice Craft MD  PGY-II Radiology Resident  [address] [facility]  Pager: [phone]

## 2019-05-24 NOTE — PROCEDURES
Radiology Post-Procedure Note    Pre Op Diagnosis: Ascites  Post Op Diagnosis: Same    Procedure: Ultrasound Guided Paracentesis    Procedure performed by: Chung Bass MD.    Written Informed Consent Obtained: Yes  Specimen Removed: YES   Estimated Blood Loss: Minimal    Findings:   Successful paracentesis.  Albumin administered PRN per protocol.    Patient tolerated procedure well.      Maurice Craft MD  PGY-II Radiology Resident  [address] [facility]  Pager: [phone]

## 2019-05-27 LAB — BACTERIA SPEC AEROBE CULT: NO GROWTH

## 2019-05-31 ENCOUNTER — HOSPITAL ENCOUNTER (OUTPATIENT)
Dept: INTERVENTIONAL RADIOLOGY/VASCULAR | Facility: HOSPITAL | Age: 79
Discharge: HOME | End: 2019-05-31
Attending: INTERNAL MEDICINE
Payer: MEDICARE

## 2019-05-31 VITALS
HEART RATE: 51 BPM | SYSTOLIC BLOOD PRESSURE: 146 MMHG | OXYGEN SATURATION: 99 % | DIASTOLIC BLOOD PRESSURE: 80 MMHG | RESPIRATION RATE: 18 BRPM

## 2019-05-31 DIAGNOSIS — K70.11 ASCITES DUE TO ALCOHOLIC HEPATITIS: ICD-10-CM

## 2019-05-31 LAB
APPEARANCE FLD: NORMAL
BACTERIA SPEC ANAEROBE CULT: NORMAL
BODY FLD TYPE: NORMAL
COLOR FLD: YELLOW
LYMPHOCYTES NFR FLD MANUAL: 84 %
MONOS+MACROS NFR FLD MANUAL: 15 %
NEUTROPHILS NFR FLD MANUAL: 1 %
WBC # FLD: 40 /CU MM

## 2019-05-31 PROCEDURE — 49083 IR PARACENTESIS WITH IMAGING: ICD-10-PCS | Mod: ,,, | Performed by: RADIOLOGY

## 2019-05-31 PROCEDURE — 49083 ABD PARACENTESIS W/IMAGING: CPT | Mod: ,,, | Performed by: RADIOLOGY

## 2019-05-31 PROCEDURE — 63600175 PHARM REV CODE 636 W HCPCS: Mod: JG | Performed by: INTERNAL MEDICINE

## 2019-05-31 PROCEDURE — P9047 ALBUMIN (HUMAN), 25%, 50ML: HCPCS | Mod: JG | Performed by: INTERNAL MEDICINE

## 2019-05-31 PROCEDURE — 49083 ABD PARACENTESIS W/IMAGING: CPT

## 2019-05-31 PROCEDURE — 89051 BODY FLUID CELL COUNT: CPT

## 2019-05-31 RX ORDER — ALBUMIN HUMAN 250 G/1000ML
25 SOLUTION INTRAVENOUS ONCE
Status: COMPLETED | OUTPATIENT
Start: 2019-05-31 | End: 2019-05-31

## 2019-05-31 RX ADMIN — ALBUMIN HUMAN 25 G: 0.25 SOLUTION INTRAVENOUS at 12:05

## 2019-05-31 NOTE — DISCHARGE INSTRUCTIONS
S[facility]IE[facility] MON-F[facility] 8 AM- 5PM [phone]. Radiology resident on call [phone].      4200 ml ascites REMOVED.

## 2019-05-31 NOTE — PROGRESS NOTES
Paracentesis complete. 4200 mLs peritoneal fluid drained. Pt tolerated well. Dressing to right abd clean, dry, and intact. Albumin 25% given 100mLs. Specimens sent per lab order. Pt discharged

## 2019-05-31 NOTE — H&P
Radiology History & Physical      SUBJECTIVE:     Chief Complaint: abdominal distention    History of Present Illness:  Kenneth Sheikh is a 79 y.o. male who presents for evaluation of ascites  Past Medical History:   Diagnosis Date    Anticoagulant long-term use     Bipolar 1 disorder     Bipolar 1 disorder 11/24/2016    bipolar    Cancer     history of skin cancer/sinus cancer & rectal cancer    Depressed bipolar affective disorder     Diabetes mellitus     type 2    EBV infection 12/7/2016    EBV DNA, PCR Latest Ref Range: None detected  None detected EBV DNA-Copies/mL Unknown Test Not Performed EBV Early Antigen Ab, IgG Latest Ref Range: <1:10 Titer 1:40 (A) EBV Nuclear Ag Ab Latest Ref Range: <1:5 Titer >=1:80 (A) EBV VCA IgG Latest Ref Range: <1:10 Titer 1:160 (A) EBV VCA IgM Latest Ref Range: <1:10 Titer <1:10     History of TIA (transient ischemic attack)     several-taking Plavix    Hx of gallstones     Wife denies    Hypertension     Hyperthyroidism 11/30/2016    Low HDL (under 40) 12/7/2016    Mixed hyperlipidemia 11/23/2016    JUAN MANUEL (obstructive sleep apnea)     Pneumonia     Skin disease     Squamous cell carcinoma 08/2018    right temple    Stroke     Transient cerebral ischemia 7/22/2016    Type 2 diabetes mellitus      Past Surgical History:   Procedure Laterality Date    BIOPSY-BONE MARROW Left 11/3/2016    Performed by [facility] at [facility] OR 2ND FLR    ESOPHAGOGASTRODUODENOSCOPY (EGD) N/A 2/3/2017    Performed by Domenico Fox MD at [facility] ENDO (4TH FLR)    EXCISION-MASS RECTAL  6/28/2016    Performed by Haris Talavera MD at [facility] OR 2ND FLR    Moh's procedure x4      rectal cancer      Sinus surgery for sinus cancer      sinus sx for cancer      skin cancer removed-several times-nose & ear      TONSILLECTOMY      ULTRASOUND-ENDOSCOPIC-UPPER/glue coil of gastric varices N/A 4/19/2017    Performed by Aneudy Perla MD at [facility] ENDO (2ND FLR)    Undescened  testicle sx      UVULOPALATOPHARYNGOPLASTY      for sleep apnea       Home Meds:   Prior to Admission medications    Medication Sig Start Date End Date Taking? Authorizing Provider   ACCU-CHEK NEYDA PLUS TEST STRP Strp 1 strip by Misc.(Non-Drug; Combo Route) route once daily. 11/5/18   Prisca Espinoza MD   ciprofloxacin HCl (CIPRO) 500 MG tablet TAKE 1 TABLET(500 MG) BY MOUTH EVERY MONDAY 5/2/19   Renato Womack MD   divalproex (DEPAKOTE) 250 MG EC tablet Take 2 tablets (500 mg total) by mouth every evening. Do not fill prescription until patient calls and requests. 12/10/18   Prisca Espinoza MD   furosemide (LASIX) 20 MG tablet TAKE 2 TABLETS BY MOUTH EVERY DAY 5/2/19   Renato Womack MD   ketorolac 0.5% (ACULAR) 0.5 % Drop  1/10/19   Historical Provider, MD   lactulose (CHRONULAC) 10 gram/15 mL solution TAKE 15 MLS BY MOUTH TWICE DAILY 5/2/19   Renato Womack MD   lancets [facility] To check blood sugar once daily, to use with Accu-Chek meter. 11/5/18   Prisca Espinoza MD   ofloxacin (OCUFLOX) 0.3 % ophthalmic solution  1/10/19   Historical Provider, MD   prednisoLONE acetate (PRED FORTE) 1 % DrpS  1/15/19   Historical Provider, MD   rifAXIMin (XIFAXAN) 550 mg Tab Take 1 tablet (550 mg total) by mouth 2 (two) times daily. 10/3/17   Renato Womack MD   SITagliptan (JANUVIA) 50 MG Tab Take 1 tablet (50 mg total) by mouth once daily. 4/6/17   Prisca Espinoza MD     Anticoagulants/Antiplatelets: no anticoagulation    Allergies:   Review of patient's allergies indicates:   Allergen Reactions    Abilify [aripiprazole] Other (See Comments)     Sleepy, could not function.     Sedation History:  no adverse reactions    Review of Systems:   Hematological: no known coagulopathies  Respiratory: no shortness of breath  Cardiovascular: no chest pain  Gastrointestinal: no abdominal pain  Genito-Urinary: no dysuria  Musculoskeletal: negative  Neurological: no TIA or stroke symptoms          OBJECTIVE:     Vital Signs (Most Recent)  Pulse: (!) 54 (05/31/19 1103)  Resp: 18 (05/31/19 1103)  BP: (!) 147/75 (05/31/19 1103)  SpO2: 100 % (05/31/19 1103)    Physical Exam:  ASA: na  Mallampati: local anesthesia    General: no acute distress  Mental Status: alert and oriented to person, place and time  HEENT: normocephalic, atraumatic  Chest: unlabored breathing  Heart: regular heart rate  Abdomen: distended  Extremity: moves all extremities    Laboratory  Lab Results   Component Value Date    INR 1.1 08/10/2018       Lab Results   Component Value Date    WBC 2.93 (L) 12/04/2018    HGB 10.1 (L) 12/04/2018    HCT 32.7 (L) 12/04/2018    MCV 87 12/04/2018     (L) 12/04/2018      Lab Results   Component Value Date     (H) 12/04/2018     12/04/2018    K 3.6 12/04/2018     12/04/2018    CO2 29 12/04/2018    BUN 17 12/04/2018    CREATININE 1.2 12/04/2018    CALCIUM 8.8 12/04/2018    MG 1.7 05/15/2017    ALT 5 (L) 12/04/2018    AST 18 12/04/2018    ALBUMIN 2.5 (L) 12/04/2018    BILITOT 0.7 12/04/2018       ASSESSMENT/PLAN:     Sedation Plan: local anestheisa  Patient will undergo US guided paracentesis

## 2019-06-04 ENCOUNTER — TELEPHONE (OUTPATIENT)
Dept: INTERNAL MEDICINE | Facility: CLINIC | Age: 79
End: 2019-06-04

## 2019-06-04 DIAGNOSIS — F31.9 BIPOLAR 1 DISORDER: ICD-10-CM

## 2019-06-04 DIAGNOSIS — N18.30 TYPE 2 DIABETES MELLITUS WITH STAGE 3 CHRONIC KIDNEY DISEASE, WITHOUT LONG-TERM CURRENT USE OF INSULIN: Primary | ICD-10-CM

## 2019-06-04 DIAGNOSIS — E11.22 TYPE 2 DIABETES MELLITUS WITH STAGE 3 CHRONIC KIDNEY DISEASE, WITHOUT LONG-TERM CURRENT USE OF INSULIN: Primary | ICD-10-CM

## 2019-06-04 NOTE — TELEPHONE ENCOUNTER
----- Message from Jocelyn Cox sent at 6/4/2019 10:52 AM CDT -----  Contact: Ginna/Spouse/[phone]  Ginna called in regards needing to talk with Dr Espinoza about ordering some test for patient - A1C, and divalproex acid. Level on the blood.   Please call and advise. Thank you

## 2019-06-04 NOTE — TELEPHONE ENCOUNTER
Spoke to patient wife and states that the patient is scheduled for lab on June 14th for Dr. Womack (CBC W/ AUTO DIFFERENTIAL, PROTIME-INR,   COMPREHENSIVE METABOLIC PANEL, and AFP TUMOR MARKER )----wife is asking if you would like to add anything to that lab appt-----pls advise on orders and link to appt----thanks

## 2019-06-07 ENCOUNTER — HOSPITAL ENCOUNTER (OUTPATIENT)
Dept: INTERVENTIONAL RADIOLOGY/VASCULAR | Facility: HOSPITAL | Age: 79
Discharge: HOME | End: 2019-06-07
Attending: INTERNAL MEDICINE
Payer: MEDICARE

## 2019-06-07 VITALS
OXYGEN SATURATION: 99 % | HEART RATE: 56 BPM | DIASTOLIC BLOOD PRESSURE: 84 MMHG | SYSTOLIC BLOOD PRESSURE: 190 MMHG | RESPIRATION RATE: 18 BRPM

## 2019-06-07 DIAGNOSIS — K70.11 ASCITES DUE TO ALCOHOLIC HEPATITIS: ICD-10-CM

## 2019-06-07 LAB
APPEARANCE FLD: NORMAL
BODY FLD TYPE: NORMAL
COLOR FLD: YELLOW
LYMPHOCYTES NFR FLD MANUAL: 48 %
MONOS+MACROS NFR FLD MANUAL: 47 %
NEUTROPHILS NFR FLD MANUAL: 5 %
WBC # FLD: 61 /CU MM

## 2019-06-07 PROCEDURE — 49083 IR PARACENTESIS WITH IMAGING: ICD-10-PCS | Mod: GC,,, | Performed by: FAMILY MEDICINE

## 2019-06-07 PROCEDURE — 49083 ABD PARACENTESIS W/IMAGING: CPT

## 2019-06-07 PROCEDURE — 89051 BODY FLUID CELL COUNT: CPT

## 2019-06-07 PROCEDURE — 49083 ABD PARACENTESIS W/IMAGING: CPT | Mod: GC,,, | Performed by: FAMILY MEDICINE

## 2019-06-07 NOTE — PROCEDURES

## 2019-06-07 NOTE — H&P
Radiology History & Physical      SUBJECTIVE:     Chief Complaint: abdominal distention    History of Present Illness:  Kenneth Sheikh is a 79 y.o. male who presents for ultrasound guided paracentesis  Past Medical History:   Diagnosis Date    Anticoagulant long-term use     Bipolar 1 disorder     Bipolar 1 disorder 11/24/2016    bipolar    Cancer     history of skin cancer/sinus cancer & rectal cancer    Depressed bipolar affective disorder     Diabetes mellitus     type 2    EBV infection 12/7/2016    EBV DNA, PCR Latest Ref Range: None detected  None detected EBV DNA-Copies/mL Unknown Test Not Performed EBV Early Antigen Ab, IgG Latest Ref Range: <1:10 Titer 1:40 (A) EBV Nuclear Ag Ab Latest Ref Range: <1:5 Titer >=1:80 (A) EBV VCA IgG Latest Ref Range: <1:10 Titer 1:160 (A) EBV VCA IgM Latest Ref Range: <1:10 Titer <1:10     History of TIA (transient ischemic attack)     several-taking Plavix    Hx of gallstones     Wife denies    Hypertension     Hyperthyroidism 11/30/2016    Low HDL (under 40) 12/7/2016    Mixed hyperlipidemia 11/23/2016    JUAN MANUEL (obstructive sleep apnea)     Pneumonia     Skin disease     Squamous cell carcinoma 08/2018    right temple    Stroke     Transient cerebral ischemia 7/22/2016    Type 2 diabetes mellitus      Past Surgical History:   Procedure Laterality Date    BIOPSY-BONE MARROW Left 11/3/2016    Performed by Bud Bob MD at [facility] OR 2ND FLR    ESOPHAGOGASTRODUODENOSCOPY (EGD) N/A 2/3/2017    Performed by Domenico Fox MD at [facility] ENDO (4TH FLR)    EXCISION-MASS RECTAL  6/28/2016    Performed by Haris Talavera MD at [facility] OR 2ND FLR    Moh's procedure x4      rectal cancer      Sinus surgery for sinus cancer      sinus sx for cancer      skin cancer removed-several times-nose & ear      TONSILLECTOMY      ULTRASOUND-ENDOSCOPIC-UPPER/glue coil of gastric varices N/A 4/19/2017    Performed by Aneudy Perla MD at [facility] ENDO (2ND FLR)     Undescened testicle sx      UVULOPALATOPHARYNGOPLASTY      for sleep apnea       Home Meds:   Prior to Admission medications    Medication Sig Start Date End Date Taking? Authorizing Provider   ACCU-CHEK NEYDA PLUS TEST STRP Strp 1 strip by Misc.(Non-Drug; Combo Route) route once daily. 11/5/18   Prisca Espinoza MD   ciprofloxacin HCl (CIPRO) 500 MG tablet TAKE 1 TABLET(500 MG) BY MOUTH EVERY MONDAY 5/2/19   Renato Womack MD   divalproex (DEPAKOTE) 250 MG EC tablet Take 2 tablets (500 mg total) by mouth every evening. Do not fill prescription until patient calls and requests. 12/10/18   Prisca Espinoza MD   furosemide (LASIX) 20 MG tablet TAKE 2 TABLETS BY MOUTH EVERY DAY 5/2/19   Renato Womack MD   ketorolac 0.5% (ACULAR) 0.5 % Drop  1/10/19   Historical Provider, MD   lactulose (CHRONULAC) 10 gram/15 mL solution TAKE 15 MLS BY MOUTH TWICE DAILY 5/2/19   Renato Womack MD   lancets [facility] To check blood sugar once daily, to use with Accu-Chek meter. 11/5/18   Prisca Espinoza MD   ofloxacin (OCUFLOX) 0.3 % ophthalmic solution  1/10/19   Historical Provider, MD   prednisoLONE acetate (PRED FORTE) 1 % DrpS  1/15/19   Historical Provider, MD   rifAXIMin (XIFAXAN) 550 mg Tab Take 1 tablet (550 mg total) by mouth 2 (two) times daily. 10/3/17   Renato Womack MD   SITagliptan (JANUVIA) 50 MG Tab Take 1 tablet (50 mg total) by mouth once daily. 4/6/17   Prisca Espinoza MD     Anticoagulants/Antiplatelets: no anticoagulation    Allergies:   Review of patient's allergies indicates:   Allergen Reactions    Abilify [aripiprazole] Other (See Comments)     Sleepy, could not function.     Sedation History:  no adverse reactions    Review of Systems:   Hematological: no known coagulopathies  Respiratory: no shortness of breath  Cardiovascular: no chest pain  Gastrointestinal: no abdominal pain  Genito-Urinary: no dysuria  Musculoskeletal: negative  Neurological: no TIA or stroke symptoms          OBJECTIVE:     Vital Signs (Most Recent)       Physical Exam:  ASA: 2  Mallampati: n/a    General: no acute distress  Mental Status: alert and oriented to person, place and time  HEENT: normocephalic, atraumatic  Chest: unlabored breathing  Heart: regular heart rate  Abdomen: distended  Extremity: moves all extremities    Laboratory  Lab Results   Component Value Date    INR 1.1 08/10/2018       Lab Results   Component Value Date    WBC 2.93 (L) 12/04/2018    HGB 10.1 (L) 12/04/2018    HCT 32.7 (L) 12/04/2018    MCV 87 12/04/2018     (L) 12/04/2018      Lab Results   Component Value Date     (H) 12/04/2018     12/04/2018    K 3.6 12/04/2018     12/04/2018    CO2 29 12/04/2018    BUN 17 12/04/2018    CREATININE 1.2 12/04/2018    CALCIUM 8.8 12/04/2018    MG 1.7 05/15/2017    ALT 5 (L) 12/04/2018    AST 18 12/04/2018    ALBUMIN 2.5 (L) 12/04/2018    BILITOT 0.7 12/04/2018       ASSESSMENT/PLAN:     Sedation Plan: local   Patient will undergo ultrasound guided paracentesis.    MATT Mauricio, ESTEFANYP  Interventional Radiology  [phone] [facility]

## 2019-06-10 ENCOUNTER — HOSPITAL ENCOUNTER (OUTPATIENT)
Dept: RADIOLOGY | Facility: HOSPITAL | Age: 79
Discharge: HOME | End: 2019-06-10
Attending: INTERNAL MEDICINE
Payer: MEDICARE

## 2019-06-10 DIAGNOSIS — K74.60 LIVER CIRRHOSIS SECONDARY TO NASH: ICD-10-CM

## 2019-06-10 DIAGNOSIS — K75.81 LIVER CIRRHOSIS SECONDARY TO NASH: ICD-10-CM

## 2019-06-10 PROCEDURE — 76700 US EXAM ABDOM COMPLETE: CPT | Mod: 26,,, | Performed by: RADIOLOGY

## 2019-06-10 PROCEDURE — 76700 US EXAM ABDOM COMPLETE: CPT | Mod: TC

## 2019-06-10 PROCEDURE — 76700 US ABDOMEN COMPLETE: ICD-10-PCS | Mod: 26,,, | Performed by: RADIOLOGY

## 2019-06-14 ENCOUNTER — OFFICE VISIT (OUTPATIENT)
Dept: HEPATOLOGY | Facility: CLINIC | Age: 79
End: 2019-06-14
Payer: MEDICARE

## 2019-06-14 ENCOUNTER — HOSPITAL ENCOUNTER (OUTPATIENT)
Dept: INTERVENTIONAL RADIOLOGY/VASCULAR | Facility: HOSPITAL | Age: 79
Discharge: HOME | End: 2019-06-14
Attending: INTERNAL MEDICINE
Payer: MEDICARE

## 2019-06-14 VITALS
OXYGEN SATURATION: 97 % | RESPIRATION RATE: 18 BRPM | HEART RATE: 52 BPM | HEIGHT: 68 IN | WEIGHT: 168.88 LBS | SYSTOLIC BLOOD PRESSURE: 194 MMHG | DIASTOLIC BLOOD PRESSURE: 80 MMHG | BODY MASS INDEX: 25.59 KG/M2

## 2019-06-14 VITALS
SYSTOLIC BLOOD PRESSURE: 137 MMHG | OXYGEN SATURATION: 98 % | HEART RATE: 59 BPM | DIASTOLIC BLOOD PRESSURE: 74 MMHG | RESPIRATION RATE: 16 BRPM

## 2019-06-14 DIAGNOSIS — R18.8 ASCITES OF LIVER: ICD-10-CM

## 2019-06-14 DIAGNOSIS — K75.81 LIVER CIRRHOSIS SECONDARY TO NASH (NONALCOHOLIC STEATOHEPATITIS): Primary | ICD-10-CM

## 2019-06-14 DIAGNOSIS — K70.11 ASCITES DUE TO ALCOHOLIC HEPATITIS: ICD-10-CM

## 2019-06-14 DIAGNOSIS — K74.60 LIVER CIRRHOSIS SECONDARY TO NASH (NONALCOHOLIC STEATOHEPATITIS): Primary | ICD-10-CM

## 2019-06-14 LAB
APPEARANCE FLD: NORMAL
BODY FLD TYPE: NORMAL
COLOR FLD: YELLOW
LYMPHOCYTES NFR FLD MANUAL: 54 %
MESOTHL CELL NFR FLD MANUAL: 1 %
MONOS+MACROS NFR FLD MANUAL: 44 %
NEUTROPHILS NFR FLD MANUAL: 1 %
WBC # FLD: 30 /CU MM

## 2019-06-14 PROCEDURE — 99214 PR OFFICE/OUTPT VISIT, EST, LEVL IV, 30-39 MIN: ICD-10-PCS | Mod: S$PBB,,, | Performed by: INTERNAL MEDICINE

## 2019-06-14 PROCEDURE — 99999 PR PBB SHADOW E&M-EST. PATIENT-LVL IV: CPT | Mod: PBBFAC,,, | Performed by: INTERNAL MEDICINE

## 2019-06-14 PROCEDURE — 99214 OFFICE O/P EST MOD 30 MIN: CPT | Mod: PBBFAC | Performed by: INTERNAL MEDICINE

## 2019-06-14 PROCEDURE — 49083 ABD PARACENTESIS W/IMAGING: CPT

## 2019-06-14 PROCEDURE — 49083 ABD PARACENTESIS W/IMAGING: CPT | Mod: ,,, | Performed by: RADIOLOGY

## 2019-06-14 PROCEDURE — 99999 PR PBB SHADOW E&M-EST. PATIENT-LVL IV: ICD-10-PCS | Mod: PBBFAC,,, | Performed by: INTERNAL MEDICINE

## 2019-06-14 PROCEDURE — 49083 IR PARACENTESIS WITH IMAGING: ICD-10-PCS | Mod: ,,, | Performed by: RADIOLOGY

## 2019-06-14 PROCEDURE — 99214 OFFICE O/P EST MOD 30 MIN: CPT | Mod: S$PBB,,, | Performed by: INTERNAL MEDICINE

## 2019-06-14 PROCEDURE — 89051 BODY FLUID CELL COUNT: CPT

## 2019-06-14 NOTE — PROGRESS NOTES
Pt arrived MPU bed 2 for paracentesis. No acute distress noted,. Labs orders and consent reviewed. NP notified.

## 2019-06-14 NOTE — PROGRESS NOTES
Paracentesis complete. 4200 mLs peritoneal fluid drained. Pt tolerated well. Dressing to abdomen clean, dry, and intact.  VSS.  Specimens sent per lab order.. Pt acknowledged discharge instructions. Pt discharged per unit and hospital protocol via wheelchair with family member..

## 2019-06-14 NOTE — H&P
Radiology History & Physical      SUBJECTIVE:     Chief Complaint: abdominal distention    History of Present Illness:  Kenneth Sheikh is a 79 y.o. male who presents for evaluation  Past Medical History:   Diagnosis Date    Anticoagulant long-term use     Bipolar 1 disorder     Bipolar 1 disorder 11/24/2016    bipolar    Cancer     history of skin cancer/sinus cancer & rectal cancer    Depressed bipolar affective disorder     Diabetes mellitus     type 2    EBV infection 12/7/2016    EBV DNA, PCR Latest Ref Range: None detected  None detected EBV DNA-Copies/mL Unknown Test Not Performed EBV Early Antigen Ab, IgG Latest Ref Range: <1:10 Titer 1:40 (A) EBV Nuclear Ag Ab Latest Ref Range: <1:5 Titer >=1:80 (A) EBV VCA IgG Latest Ref Range: <1:10 Titer 1:160 (A) EBV VCA IgM Latest Ref Range: <1:10 Titer <1:10     History of TIA (transient ischemic attack)     several-taking Plavix    Hx of gallstones     Wife denies    Hypertension     Hyperthyroidism 11/30/2016    Low HDL (under 40) 12/7/2016    Mixed hyperlipidemia 11/23/2016    JUAN MANUEL (obstructive sleep apnea)     Pneumonia     Skin disease     Squamous cell carcinoma 08/2018    right temple    Stroke     Transient cerebral ischemia 7/22/2016    Type 2 diabetes mellitus      Past Surgical History:   Procedure Laterality Date    BIOPSY-BONE MARROW Left 11/3/2016    Performed by Bud Bob MD at [facility] OR 2ND FLR    ESOPHAGOGASTRODUODENOSCOPY (EGD) N/A 2/3/2017    Performed by Domenico Fox MD at [facility] ENDO (4TH FLR)    EXCISION-MASS RECTAL  6/28/2016    Performed by Haris Talavera MD at [facility] OR 2ND FLR    Moh's procedure x4      rectal cancer      Sinus surgery for sinus cancer      sinus sx for cancer      skin cancer removed-several times-nose & ear      TONSILLECTOMY      ULTRASOUND-ENDOSCOPIC-UPPER/glue coil of gastric varices N/A 4/19/2017    Performed by Aneudy Perla MD at [facility] ENDO (2ND FLR)    Undescened testicle sx       UVULOPALATOPHARYNGOPLASTY      for sleep apnea       Home Meds:   Prior to Admission medications    Medication Sig Start Date End Date Taking? Authorizing Provider   ACCU-CHEK NEYDA PLUS TEST STRP Strp 1 strip by Misc.(Non-Drug; Combo Route) route once daily. 11/5/18   Prisca Espinoza MD   ciprofloxacin HCl (CIPRO) 500 MG tablet TAKE 1 TABLET(500 MG) BY MOUTH EVERY MONDAY 5/2/19   Renato Womack MD   divalproex (DEPAKOTE) 250 MG EC tablet Take 2 tablets (500 mg total) by mouth every evening. Do not fill prescription until patient calls and requests. 12/10/18   Prisca Espinoza MD   furosemide (LASIX) 20 MG tablet TAKE 2 TABLETS BY MOUTH EVERY DAY 5/2/19   Renato Womack MD   ketorolac 0.5% (ACULAR) 0.5 % Drop  1/10/19   Historical Provider, MD   lactulose (CHRONULAC) 10 gram/15 mL solution TAKE 15 MLS BY MOUTH TWICE DAILY 5/2/19   Renato Womack MD   lancets [facility] To check blood sugar once daily, to use with Accu-Chek meter. 11/5/18   Prisca Espinoza MD   ofloxacin (OCUFLOX) 0.3 % ophthalmic solution  1/10/19   Historical Provider, MD   prednisoLONE acetate (PRED FORTE) 1 % DrpS  1/15/19   Historical Provider, MD   rifAXIMin (XIFAXAN) 550 mg Tab Take 1 tablet (550 mg total) by mouth 2 (two) times daily. 10/3/17   Renato Womack MD   SITagliptan (JANUVIA) 50 MG Tab Take 1 tablet (50 mg total) by mouth once daily. 4/6/17   Prisca Espinoza MD     Anticoagulants/Antiplatelets: no anticoagulation    Allergies:   Review of patient's allergies indicates:   Allergen Reactions    Abilify [aripiprazole] Other (See Comments)     Sleepy, could not function.     Sedation History:  no adverse reactions    Review of Systems:   Hematological: no known coagulopathies  Respiratory: no shortness of breath  Cardiovascular: no chest pain  Gastrointestinal: no abdominal pain  Genito-Urinary: no dysuria  Musculoskeletal: negative  Neurological: no TIA or stroke symptoms         OBJECTIVE:     Vital  Signs (Most Recent)   nurse in room, vitals pending    Physical Exam:  ASA: 2  Mallampati: na    General: no acute distress  Mental Status: alert and oriented to person, place and time  HEENT: normocephalic, atraumatic  Chest: unlabored breathing  Heart: regular heart rate  Abdomen: distended  Extremity: moves all extremities    ASSESSMENT/PLAN:     Sedation Plan: local anesthesia  Patient will undergo US guided paracentesis

## 2019-06-17 ENCOUNTER — TELEPHONE (OUTPATIENT)
Dept: INTERNAL MEDICINE | Facility: CLINIC | Age: 79
End: 2019-06-17

## 2019-06-17 NOTE — TELEPHONE ENCOUNTER
----- Message from Prisca Espinoza MD sent at 6/17/2019 12:22 PM CDT -----  Please call patient's wife and let her know that HgA1c and Depakote level are stable.

## 2019-06-21 ENCOUNTER — HOSPITAL ENCOUNTER (OUTPATIENT)
Dept: INTERVENTIONAL RADIOLOGY/VASCULAR | Facility: HOSPITAL | Age: 79
Discharge: HOME | End: 2019-06-21
Attending: INTERNAL MEDICINE
Payer: MEDICARE

## 2019-06-21 ENCOUNTER — OFFICE VISIT (OUTPATIENT)
Dept: INTERNAL MEDICINE | Facility: CLINIC | Age: 79
End: 2019-06-21
Payer: MEDICARE

## 2019-06-21 VITALS
SYSTOLIC BLOOD PRESSURE: 130 MMHG | BODY MASS INDEX: 26.01 KG/M2 | OXYGEN SATURATION: 98 % | WEIGHT: 171.06 LBS | DIASTOLIC BLOOD PRESSURE: 60 MMHG | HEART RATE: 49 BPM

## 2019-06-21 VITALS
DIASTOLIC BLOOD PRESSURE: 70 MMHG | SYSTOLIC BLOOD PRESSURE: 161 MMHG | RESPIRATION RATE: 16 BRPM | HEART RATE: 61 BPM | OXYGEN SATURATION: 100 %

## 2019-06-21 DIAGNOSIS — K74.60 LIVER CIRRHOSIS SECONDARY TO NASH (NONALCOHOLIC STEATOHEPATITIS): ICD-10-CM

## 2019-06-21 DIAGNOSIS — Z85.048 HISTORY OF RECTAL CANCER: ICD-10-CM

## 2019-06-21 DIAGNOSIS — Z85.828 HISTORY OF SCC (SQUAMOUS CELL CARCINOMA) OF SKIN: ICD-10-CM

## 2019-06-21 DIAGNOSIS — K75.81 LIVER CIRRHOSIS SECONDARY TO NASH (NONALCOHOLIC STEATOHEPATITIS): ICD-10-CM

## 2019-06-21 DIAGNOSIS — G45.9 TIA (TRANSIENT ISCHEMIC ATTACK): ICD-10-CM

## 2019-06-21 DIAGNOSIS — K70.11 ASCITES DUE TO ALCOHOLIC HEPATITIS: ICD-10-CM

## 2019-06-21 DIAGNOSIS — F31.9 BIPOLAR 1 DISORDER: ICD-10-CM

## 2019-06-21 DIAGNOSIS — N18.30 TYPE 2 DIABETES MELLITUS WITH STAGE 3 CHRONIC KIDNEY DISEASE, WITHOUT LONG-TERM CURRENT USE OF INSULIN: ICD-10-CM

## 2019-06-21 DIAGNOSIS — D61.818 PANCYTOPENIA: ICD-10-CM

## 2019-06-21 DIAGNOSIS — E11.22 TYPE 2 DIABETES MELLITUS WITH STAGE 3 CHRONIC KIDNEY DISEASE, WITHOUT LONG-TERM CURRENT USE OF INSULIN: ICD-10-CM

## 2019-06-21 DIAGNOSIS — R18.8 ASCITES OF LIVER: ICD-10-CM

## 2019-06-21 DIAGNOSIS — R00.1 SINUS BRADYCARDIA: Primary | ICD-10-CM

## 2019-06-21 DIAGNOSIS — I95.1 ORTHOSTATIC HYPOTENSION: ICD-10-CM

## 2019-06-21 PROCEDURE — 99214 OFFICE O/P EST MOD 30 MIN: CPT | Mod: S$PBB,,, | Performed by: INTERNAL MEDICINE

## 2019-06-21 PROCEDURE — 49083 IR PARACENTESIS WITH IMAGING: ICD-10-PCS | Mod: ,,, | Performed by: RADIOLOGY

## 2019-06-21 PROCEDURE — 99999 PR PBB SHADOW E&M-EST. PATIENT-LVL III: ICD-10-PCS | Mod: PBBFAC,,, | Performed by: INTERNAL MEDICINE

## 2019-06-21 PROCEDURE — 99999 PR PBB SHADOW E&M-EST. PATIENT-LVL III: CPT | Mod: PBBFAC,,, | Performed by: INTERNAL MEDICINE

## 2019-06-21 PROCEDURE — 49083 ABD PARACENTESIS W/IMAGING: CPT | Mod: ,,, | Performed by: RADIOLOGY

## 2019-06-21 PROCEDURE — 49083 ABD PARACENTESIS W/IMAGING: CPT

## 2019-06-21 PROCEDURE — 99214 PR OFFICE/OUTPT VISIT, EST, LEVL IV, 30-39 MIN: ICD-10-PCS | Mod: S$PBB,,, | Performed by: INTERNAL MEDICINE

## 2019-06-21 PROCEDURE — 99213 OFFICE O/P EST LOW 20 MIN: CPT | Mod: PBBFAC | Performed by: INTERNAL MEDICINE

## 2019-06-21 NOTE — DISCHARGE INSTRUCTIONS
For scheduling: Call Mackenzie at [phone]    For questions or concerns call: SH[facility] MON-[facility] 8 AM- 5PM [phone]. Radiology resident on call [phone].    For immediate concerns that are not emergent, you may call our radiology clinic at: [phone]...

## 2019-06-21 NOTE — H&P
Radiology Post-Procedure Note    Pre Op Diagnosis: Ascites  Post Op Diagnosis: Same    Procedure: Paracentesis    Procedure performed by: Destiny Boswell MD, MD, Guerrero     Written Informed Consent Obtained: Yes  Specimen Removed: YES   Estimated Blood Loss: Minimal    Findings:   Successful paracentesis.  Albumin administered PRN per protocol.    Patient tolerated procedure well.    Guerrero Victor MD  Interventional Radiology PGY-II  [facility]  Pager: [phone]

## 2019-06-21 NOTE — H&P
Radiology History & Physical      SUBJECTIVE:      Chief Complaint: Cirrhosis    History of Present Illness:  Kenneth Sheikh is a 79 y.o. malewith cirrhosis who presents for paracentesis.    Past Medical History:   Diagnosis Date    Anticoagulant long-term use     Bipolar 1 disorder     Bipolar 1 disorder 11/24/2016    bipolar    Cancer     history of skin cancer/sinus cancer & rectal cancer    Depressed bipolar affective disorder     Diabetes mellitus     type 2    EBV infection 12/7/2016    EBV DNA, PCR Latest Ref Range: None detected  None detected EBV DNA-Copies/mL Unknown Test Not Performed EBV Early Antigen Ab, IgG Latest Ref Range: <1:10 Titer 1:40 (A) EBV Nuclear Ag Ab Latest Ref Range: <1:5 Titer >=1:80 (A) EBV VCA IgG Latest Ref Range: <1:10 Titer 1:160 (A) EBV VCA IgM Latest Ref Range: <1:10 Titer <1:10     History of TIA (transient ischemic attack)     several-taking Plavix    Hx of gallstones     Wife denies    Hypertension     Hyperthyroidism 11/30/2016    Low HDL (under 40) 12/7/2016    Mixed hyperlipidemia 11/23/2016    JUAN MANUEL (obstructive sleep apnea)     Pneumonia     Skin disease     Squamous cell carcinoma 08/2018    right temple    Stroke     Transient cerebral ischemia 7/22/2016    Type 2 diabetes mellitus      Past Surgical History:   Procedure Laterality Date    BIOPSY-BONE MARROW Left 11/3/2016    Performed [facility] at [facility] OR 2ND FLR    ESOPHAGOGASTRODUODENOSCOPY (EGD) N/A 2/3/2017    Performed by Domenico Fox MD at [facility] ENDO (4TH FLR)    EXCISION-MASS RECTAL  6/28/2016    Performed by Haris Talavera MD at [facility] OR 2ND FLR    Moh's procedure x4      rectal cancer      Sinus surgery for sinus cancer      sinus sx for cancer      skin cancer removed-several times-nose & ear      TONSILLECTOMY      ULTRASOUND-ENDOSCOPIC-UPPER/glue coil of gastric varices N/A 4/19/2017    Performed by Aneudy Perla MD at [facility] ENDO (2ND FLR)    Undescened  testicle sx      UVULOPALATOPHARYNGOPLASTY      for sleep apnea       Home Meds:   Prior to Admission medications    Medication Sig Start Date End Date Taking? Authorizing Provider   ACCU-CHEK NEYDA PLUS TEST STRP Strp 1 strip by Misc.(Non-Drug; Combo Route) route once daily. 11/5/18   Prisca Espinoza MD   ciprofloxacin HCl (CIPRO) 500 MG tablet TAKE 1 TABLET(500 MG) BY MOUTH EVERY MONDAY 5/2/19   Renato Womack MD   divalproex (DEPAKOTE) 250 MG EC tablet Take 2 tablets (500 mg total) by mouth every evening. Do not fill prescription until patient calls and requests. 12/10/18   Prisca Espinoza MD   furosemide (LASIX) 20 MG tablet TAKE 2 TABLETS BY MOUTH EVERY DAY 5/2/19   Renato Womack MD   lactulose (CHRONULAC) 10 gram/15 mL solution TAKE 15 MLS BY MOUTH TWICE DAILY 5/2/19   Renato Womack MD   lancets [facility] [place] [facility] To check blood sugar once daily, to use with Accu-Chek meter. 11/5/18   Prisca Espinoza MD   ofloxacin (OCUFLOX) 0.3 % ophthalmic solution  1/10/19   Historical Provider, MD   prednisoLONE acetate (PRED FORTE) 1 % DrpS  1/15/19   Historical Provider, MD   rifAXIMin (XIFAXAN) 550 mg Tab Take 1 tablet (550 mg total) by mouth 2 (two) times daily. 10/3/17   Renato Womack MD   SITagliptan (JANUVIA) 50 MG Tab Take 1 tablet (50 mg total) by mouth once daily. 4/6/17   Prisca Espinoza MD     Anticoagulants/Antiplatelets: no anticoagulation    Allergies:   Review of patient's allergies indicates:   Allergen Reactions    Abilify [aripiprazole] Other (See Comments)     Sleepy, could not function.     Sedation History:  no adverse reactions    Review of Systems:   Hematological: no known coagulopathies  Respiratory: no shortness of breath  Cardiovascular: no chest pain  Gastrointestinal: no abdominal pain  Genito-Urinary: no dysuria  Musculoskeletal: negative  Neurological: no TIA or stroke symptoms         OBJECTIVE:     Vital Signs (Most Recent)  Pulse: (!) 58 (06/21/19 1140)  Resp:  16 (06/21/19 1140)  BP: (!) 154/71 (06/21/19 1140)  SpO2: 100 % (06/21/19 1140)    Physical Exam:  ASA: 2  Mallampati: 2  General: no acute distress  Mental Status: alert and oriented to person, place and time  HEENT: normocephalic, atraumatic  Chest: unlabored breathing  Heart: regular heart rate  Abdomen: nondistended  Extremity: moves all extremities    Laboratory  Lab Results   Component Value Date    INR 1.1 06/14/2019       Lab Results   Component Value Date    WBC 2.97 (L) 06/14/2019    HGB 10.5 (L) 06/14/2019    HCT 33.0 (L) 06/14/2019    MCV 87 06/14/2019     (L) 06/14/2019      Lab Results   Component Value Date     06/14/2019     06/14/2019    K 3.6 06/14/2019     06/14/2019    CO2 27 06/14/2019    BUN 16 06/14/2019    CREATININE 1.1 06/14/2019    CALCIUM 8.8 06/14/2019    MG 1.7 05/15/2017    ALT 10 06/14/2019    AST 22 06/14/2019    ALBUMIN 2.5 (L) 06/14/2019    BILITOT 0.6 06/14/2019       ASSESSMENT/PLAN:     Sedation Plan: local  Patient will undergo paracentesis.    Guerrero Victor MD  Interventional Radiology PGY-II  [facility]  Pager: [phone]

## 2019-06-21 NOTE — PROGRESS NOTES
Paracentesis complete. 3000mLs peritoneal fluid drained. Pt tolerated well. Dressing to abdomen clean, dry, and intact. VSS. Pt acknowledged understanding of discharge instructions. Pt discharged via wheelchair with family member ..

## 2019-06-21 NOTE — PROGRESS NOTES
Internal Medicine    Subjective:      Patient ID: Kenneth Sheikh is a 79 y.o. male.    Chief Complaint: Follow-up    HPI:  Patient presents for follow up appointment.  The patient's last visit with me was on 2/18/2019.     Sinus bradycardia (HR 50-60's):  Chronic and asymptomatic.  Last EKG 9/2017.     DM-2:  Last HgA1c 7.3 on 6/14/19.  Off metformin due to elevated lactate and diarrhea.  Taking Januvia 50mg daily.  FSG running 100-120's.  Only 1 reading that was 160's.     H/o CKD, stage 3:  Cr 1.1 and GFR 60 on 6/14/19.     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  Losartan stopped due to BELLA.  Followed by Dr. Womack - last seen 6/14/19.  Taking Rifaximin twice daily.  Getting paracentesis weekly, last one was today.  Taking Cipro once a week for SBP prophylaxis.       H/o Rectal cancer s/p resection 06/2016:  Last seen by colorectal surgery (Dr. Talavera) on 3/5/18 - no evidence of recurrent neoplasia - follow up in 1 year.  He will call and schedule follow up appointment.       Pancytopenia:  Has been seen by [facility]e-Onc (2/21/17).  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      HLD:  No longer on medication.     TIAs:  No symptoms recently.  No longer taking ASA.     Bipolar d/o:  Taking Depakote 500mg qHS.  Last Depakote level 51.1 on 6/14/19.  Denies depression or dany.       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 Beach.     Neck pain, bilateral knee pain:  Stable.  Avoiding NSAIDs due to cirrhosis.  Was told he could occasionally take Tylenol but not taking regularly.     S/p left cataract, planning to get right eye cataract removal by Dr. Sharpe.         Review of Systems   Constitutional: Negative for appetite change, chills, fatigue, fever and unexpected weight change.   HENT: Negative for sore throat and trouble swallowing.    Eyes: Negative for visual disturbance.    Respiratory: Negative for cough, chest tightness, shortness of breath and wheezing.    Cardiovascular: Positive for leg swelling (Chronic, unchanged). Negative for chest pain and palpitations.   Gastrointestinal: Negative for abdominal pain, blood in stool, constipation, diarrhea, nausea and vomiting.   Genitourinary: Negative for difficulty urinating.   Musculoskeletal: Negative for arthralgias and myalgias.   Skin: Negative for rash and wound.   Neurological: Negative for dizziness, weakness, numbness and headaches.   Psychiatric/Behavioral: Negative for behavioral problems and confusion.       Past medical history, surgical history, and family medical history reviewed and updated as appropriate.    Medications and allergies reviewed.     Objective:     Vitals:    06/21/19 1357   BP: 130/60   BP Location: Left arm   Patient Position: Sitting   BP Method: Large (Manual)   Pulse: (!) 49   SpO2: 98%   Weight: 77.6 kg (171 lb 1.2 oz)     Physical Exam   Constitutional: He is oriented to person, place, and time. He appears well-developed and well-nourished. No distress.   HENT:   Head: Normocephalic and atraumatic.   Eyes: Conjunctivae and EOM are normal. No scleral icterus.   Cardiovascular: Normal rate and regular rhythm. Exam reveals no gallop and no friction rub.   No murmur heard.  Pulmonary/Chest: Effort normal and breath sounds normal. No respiratory distress. He has no wheezes. He has no rales.   Abdominal: Soft. He exhibits distension (Mild). There is no tenderness. There is no guarding.   Musculoskeletal: He exhibits edema. He exhibits no tenderness.   Neurological: He is alert and oriented to person, place, and time.   Skin: No rash noted. No erythema.   Psychiatric: He has a normal mood and affect. His behavior is normal.       RESULTS:   Lab Results   Component Value Date    WBC 2.97 (L) 06/14/2019    HGB 10.5 (L) 06/14/2019    HCT 33.0 (L) 06/14/2019    MCV 87 06/14/2019     (L) 06/14/2019      BMP  Lab Results   Component Value Date     06/14/2019    K 3.6 06/14/2019     06/14/2019    CO2 27 06/14/2019    BUN 16 06/14/2019    CREATININE 1.1 06/14/2019    CALCIUM 8.8 06/14/2019    ANIONGAP 8 06/14/2019    ESTGFRAFRICA >60.0 06/14/2019    EGFRNONAA >60.0 06/14/2019     Lab Results   Component Value Date    ALT 10 06/14/2019    AST 22 06/14/2019    ALKPHOS 114 06/14/2019    BILITOT 0.6 06/14/2019     Lab Results   Component Value Date    TSH 1.179 02/02/2018     Lab Results   Component Value Date    HGBA1C 7.3 (H) 06/14/2019         Assessment:     1. Sinus bradycardia    2. Orthostatic hypotension    3. Type 2 diabetes mellitus with stage 3 chronic kidney disease, without long-term current use of insulin    4. 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 TIA (transient ischemic attack)    6. Ascites of liver    7. History of rectal cancer    8. Pancytopenia    9. Bipolar 1 disorder    10. History of SCC (squamous cell carcinoma) of skin        Plan:     *Continue medication/plan as discussed in HPI except for changes discussed below.    Kenneth was seen today for follow-up.    Diagnoses and all orders for this visit:    Sinus bradycardia    Orthostatic hypotension    Type 2 diabetes mellitus with stage 3 chronic kidney disease, without long-term current use of insulin    Liver cirrhosis secondary to MCDONALD (nonalcoholic steatohepatitis)    TIA (transient ischemic attack)    Ascites of liver    History of rectal cancer    Pancytopenia    Bipolar 1 disorder    History of SCC (squamous cell carcinoma) of skin    Health maintenance reviewed with patient.     Follow up in about 6 months (around 12/21/2019).    Prisca Espinoza MD  Internal Medicine  [facility]

## 2019-06-24 NOTE — DISCHARGE SUMMARY
Radiology Discharge Summary      Hospital Course: No complications    Admit Date: 6/21/2019  Discharge Date: 06/24/2019     Instructions Given to Patient: Yes  Diet: Resume prior diet  Activity: activity as tolerated    Description of Condition on Discharge: Stable  Vital Signs (Most Recent): Pulse: 61 (06/21/19 1321)  Resp: 16 (06/21/19 1321)  BP: (!) 161/70 (06/21/19 1321)  SpO2: 100 % (06/21/19 1321)    Discharge Disposition: Home    Discharge Diagnosis: ascites     Follow-up: with referring    .Dheeraj GRULLON M.D. personally reviewed and agree with the above dictated note.

## 2019-06-26 DIAGNOSIS — K70.31 ASCITES DUE TO ALCOHOLIC CIRRHOSIS: Primary | ICD-10-CM

## 2019-06-28 ENCOUNTER — OFFICE VISIT (OUTPATIENT)
Dept: DERMATOLOGY | Facility: CLINIC | Age: 79
End: 2019-06-28
Payer: MEDICARE

## 2019-06-28 ENCOUNTER — HOSPITAL ENCOUNTER (OUTPATIENT)
Dept: INTERVENTIONAL RADIOLOGY/VASCULAR | Facility: HOSPITAL | Age: 79
Discharge: HOME | End: 2019-06-28
Attending: INTERNAL MEDICINE
Payer: MEDICARE

## 2019-06-28 VITALS
DIASTOLIC BLOOD PRESSURE: 68 MMHG | HEART RATE: 57 BPM | OXYGEN SATURATION: 100 % | SYSTOLIC BLOOD PRESSURE: 165 MMHG | RESPIRATION RATE: 16 BRPM

## 2019-06-28 DIAGNOSIS — K70.11 ASCITES DUE TO ALCOHOLIC HEPATITIS: ICD-10-CM

## 2019-06-28 DIAGNOSIS — L57.0 AK (ACTINIC KERATOSIS): ICD-10-CM

## 2019-06-28 DIAGNOSIS — L82.1 SK (SEBORRHEIC KERATOSIS): ICD-10-CM

## 2019-06-28 DIAGNOSIS — D48.5 NEOPLASM OF UNCERTAIN BEHAVIOR OF SKIN: Primary | ICD-10-CM

## 2019-06-28 PROCEDURE — 17003 DESTRUCTION, PREMALIGNANT LESIONS; SECOND THROUGH 14 LESIONS: ICD-10-PCS | Mod: S$PBB,59,, | Performed by: DERMATOLOGY

## 2019-06-28 PROCEDURE — 11102 PR TANGENTIAL BIOPSY, SKIN, SINGLE LESION: ICD-10-PCS | Mod: S$PBB,,, | Performed by: DERMATOLOGY

## 2019-06-28 PROCEDURE — 11102 TANGNTL BX SKIN SINGLE LES: CPT | Mod: S$PBB,,, | Performed by: DERMATOLOGY

## 2019-06-28 PROCEDURE — 11103 PR TANGENTIAL BIOPSY, SKIN, EA ADDTL LESION: ICD-10-PCS | Mod: S$PBB,,, | Performed by: DERMATOLOGY

## 2019-06-28 PROCEDURE — 11102 TANGNTL BX SKIN SINGLE LES: CPT | Mod: 59,PBBFAC | Performed by: DERMATOLOGY

## 2019-06-28 PROCEDURE — 17003 DESTRUCT PREMALG LES 2-14: CPT | Mod: S$PBB,59,, | Performed by: DERMATOLOGY

## 2019-06-28 PROCEDURE — 17000 DESTRUCT PREMALG LESION: CPT | Mod: PBBFAC | Performed by: DERMATOLOGY

## 2019-06-28 PROCEDURE — 88305 TISSUE SPECIMEN TO PATHOLOGY, DERMATOLOGY: ICD-10-PCS | Mod: 26,,, | Performed by: PATHOLOGY

## 2019-06-28 PROCEDURE — 11103 TANGNTL BX SKIN EA SEP/ADDL: CPT | Mod: S$PBB,,, | Performed by: DERMATOLOGY

## 2019-06-28 PROCEDURE — 99999 PR PBB SHADOW E&M-EST. PATIENT-LVL III: ICD-10-PCS | Mod: PBBFAC,,, | Performed by: DERMATOLOGY

## 2019-06-28 PROCEDURE — 49083 IR PARACENTESIS WITH IMAGING: ICD-10-PCS | Mod: GC,,, | Performed by: RADIOLOGY

## 2019-06-28 PROCEDURE — 49083 ABD PARACENTESIS W/IMAGING: CPT

## 2019-06-28 PROCEDURE — 99213 OFFICE O/P EST LOW 20 MIN: CPT | Mod: PBBFAC,25 | Performed by: DERMATOLOGY

## 2019-06-28 PROCEDURE — 99999 PR PBB SHADOW E&M-EST. PATIENT-LVL III: CPT | Mod: PBBFAC,,, | Performed by: DERMATOLOGY

## 2019-06-28 PROCEDURE — 17000 PR DESTRUCTION(LASER SURGERY,CRYOSURGERY,CHEMOSURGERY),PREMALIGNANT LESIONS,FIRST LESION: ICD-10-PCS | Mod: S$PBB,59,, | Performed by: DERMATOLOGY

## 2019-06-28 PROCEDURE — 11103 TANGNTL BX SKIN EA SEP/ADDL: CPT | Mod: 59,PBBFAC | Performed by: DERMATOLOGY

## 2019-06-28 PROCEDURE — 99212 PR OFFICE/OUTPT VISIT, EST, LEVL II, 10-19 MIN: ICD-10-PCS | Mod: 25,S$PBB,, | Performed by: DERMATOLOGY

## 2019-06-28 PROCEDURE — 88305 TISSUE EXAM BY PATHOLOGIST: CPT | Performed by: PATHOLOGY

## 2019-06-28 PROCEDURE — 49083 ABD PARACENTESIS W/IMAGING: CPT | Mod: GC,,, | Performed by: RADIOLOGY

## 2019-06-28 PROCEDURE — 17000 DESTRUCT PREMALG LESION: CPT | Mod: S$PBB,59,, | Performed by: DERMATOLOGY

## 2019-06-28 PROCEDURE — 17003 DESTRUCT PREMALG LES 2-14: CPT | Mod: PBBFAC | Performed by: DERMATOLOGY

## 2019-06-28 PROCEDURE — 99212 OFFICE O/P EST SF 10 MIN: CPT | Mod: 25,S$PBB,, | Performed by: DERMATOLOGY

## 2019-06-28 NOTE — PATIENT INSTRUCTIONS
Summer Sun Protection      The [facility] would like to remind you of the importance of sun protection all year round and particularly during the summer when the suns rays are the strongest. It has been proven that both acute and chronic sun exposure damages our cells and leads to skin cancer. Beyond skin cancer, the sun causes 90% of the symptoms of pre-mature skin aging, including wrinkles, lentigines (brown spots), and thin, easily bruised skin. Proper sun protection can help prevent these unwanted conditions.    Many patients report that the dont go in the sun. It has been shown that the average person receives 18 hours of incidental sun exposure per week during activities such as walking through parking lots, driving, or sitting next to windows. This accumulates to several bad sunburns per year!    In choosing sunscreen, you want one that protects against both UVA and UVB rays. It is recommended that you use one of SPF 30 or higher. It is important to apply the sunscreen about 20 minutes prior to sun exposure. Most sunscreens are chemical sunscreens and a reaction must take place in the skin so that they are effective. If they are applied and then you are immediately exposed to the sun or start sweating, this reaction has not had time to take place and you are therefore unprotected. Sunscreen needs to be reapplied every 2 hours if you are participating in water sports or sweating. We recommend Elta MD or Neutrogena Ultra Sheer Dry Touch SPF 55 for daily use; however there are many options and it is most important for you to find one that you will use on a consistent basis.    If you have sensitive skin, you may do best with a sunscreen that contains only physical blockers such as titanium dioxide or zinc oxide. These are typically thicker and harder to apply, however they afford very good protection. Neutrogena Sensitive Skin, Blue Lizard Sensitive Skin (pink top) or Neutrogena Pure  and Free are popular ones.     Aside from sunscreen, clothes with UV protection, wide brimmed hats, and sunglasses are other means of sun protection that we recommend.                        [facility] - DERMATOLOGY  [address]  [place] [address]  Dept: [phone]  Dept Fax: [phone]                                                                               CRYOSURGERY      Your doctor has used a method called cryosurgery to treat your skin condition. Cryosurgery refers to the use of very cold substances to treat a variety of skin conditions such as warts, pre-skin cancers, molluscum contagiosum, sun spots, and several benign growths. The substance we use in cryosurgery is liquid nitrogen and is so cold (-195 degrees Celsius) that is burns when administered.     Following treatment in the office, the skin may immediately burn and become red. You may find the area around the lesion is affected as well. It is sometimes necessary to treat not only the lesion, but a small area of the surrounding normal skin to achieve a good response.     A blister, and even a blood filled blister, may form after treatment.   This is a normal response. If the blister is painful, it is acceptable to sterilize a needle and with rubbing alcohol and gently pop the blister. It is important that you gently wash the area with soap and warm water as the blister fluid may contain wart virus if a wart was treated. Do no remove the roof of the blister.     The area treated can take anywhere from 1-3 weeks to heal. Healing time depends on the kind skin lesion treated, the location, and how aggressively the lesion was treated. It is recommended that the areas treated are covered with Vaseline or bacitracin ointment and a band-aid. If a band-aid is not practical, just ointment applied several times per day will do. Keeping these areas moist will speed the healing time.    Treatment with liquid  "nitrogen can leave a scar. In dark skin, it may be a light or dark scar, in light skin it may be a white or pink scar. These will generally fade with time, but may never go away completely.     If you have any concerns after your treatment, please feel free to call the office.       [address] [place] [address]/ [phone] FAX/ [email or web address]     Shave Biopsy Wound Care    Your doctor has performed a shave biopsy today.  A band aid and vaseline ointment has been placed over the site.  This should remain in place for 24 hours.  It is recommended that you keep the area dry for the first 24 hours.  After 24 hours, you may remove the band aid and wash the area with warm soap and water and apply Vaseline jelly.  Many patients prefer to use Neosporin or Bacitracin ointment.  This is acceptable; however, know that you can develop an allergy to this medication even if you have used it safely for years.  It is important to keep the area moist.  Letting it dry out and get air slows healing time, and will worsen the scar.  Band aid is optional after first 24 hours.      If you notice increasing redness, tenderness, pain, or yellow drainage at the biopsy site, please notify your doctor.  These are signs of an infection.    If your biopsy site is bleeding, apply firm pressure for 15 minutes straight.  Repeat for another 15 minutes, if it is still bleeding.   If the surgical site continues to bleed, then please contact your doctor.      For [facility] users:   You will receive a [facility] notification after the pathologist has finished reviewing your biopsy specimen. Pathology results, however, will not be released online so you will see a "no content" message. Once your dermatologist reviews and clinically correlates your biopsy results, you will either receive a letter in the mail with the results of a phone call from your doctor's office if further explanation or treatment is warranted. "       [address] [place] [address]/ [phone] FAX/ [email or web address]

## 2019-06-28 NOTE — PLAN OF CARE
Paracentesis complete 4.8L removed from left abdomen.  Pt tolerated well. Mepore/bandaid applied. Pt in no acute distress, denies pain and discomfort. Discharge care and instructions given and acknolwedged. Pt discharged via wheelchair to wife in waiting room.

## 2019-06-28 NOTE — PLAN OF CARE
Pt arrived to [facility] bay 2 for para. Resting in bed, no acute distress noted. Orders reviewed on chart. Awaiting consent.

## 2019-06-28 NOTE — PROGRESS NOTES
Subjective:       Patient ID:  Kenneth Sheikh is a 79 y.o. male who presents for   Chief Complaint   Patient presents with    Skin Check     Pt here today for skin check, scalp, face and left elbow     HPI  Pt c/o lesion on L side of face x few months that won't heal. Asymptomatic and no prior treatment.  Has also noticed some bumps on scalp and on L elbow x many months.  Pt has an extensive hx of NMSC in the past, mostly treated by Dr. Beach and with Efudex.    Review of Systems   Constitutional: Negative for fever, chills, weight loss, weight gain, fatigue, night sweats and malaise.   Skin: Negative for sun sensitivity and daily sunscreen use.   Hematologic/Lymphatic: Bruises/bleeds easily.        Objective:    Physical Exam   Constitutional: He appears well-developed and well-nourished. No distress.   Neurological: He is alert and oriented to person, place, and time. He is not disoriented.   Psychiatric: He has a normal mood and affect.   Skin:   Areas Examined (abnormalities noted in diagram):   Scalp / Hair Palpated and Inspected  Head / Face Inspection Performed  Neck Inspection Performed  LUE Inspection Performed            Pt declined skin exam of any other areas today.                        Diagram Legend     Erythematous scaling macule/papule c/w actinic keratosis       Vascular papule c/w angioma      Pigmented verrucoid papule/plaque c/w seborrheic keratosis      Yellow umbilicated papule c/w sebaceous hyperplasia      Irregularly shaped tan macule c/w lentigo     1-2 mm smooth white papules consistent with Milia      Movable subcutaneous cyst with punctum c/w epidermal inclusion cyst      Subcutaneous movable cyst c/w pilar cyst      Firm pink to brown papule c/w dermatofibroma      Pedunculated fleshy papule(s) c/w skin tag(s)      Evenly pigmented macule c/w junctional nevus     Mildly variegated pigmented, slightly irregular-bordered macule c/w mildly atypical nevus      Flesh colored to evenly  pigmented papule c/w intradermal nevus       Pink pearly papule/plaque c/w basal cell carcinoma      Erythematous hyperkeratotic cursted plaque c/w SCC      Surgical scar with no sign of skin cancer recurrence      Open and closed comedones      Inflammatory papules and pustules      Verrucoid papule consistent consistent with wart     Erythematous eczematous patches and plaques     Dystrophic onycholytic nail with subungual debris c/w onychomycosis     Umbilicated papule    Erythematous-base heme-crusted tan verrucoid plaque consistent with inflamed seborrheic keratosis     Erythematous Silvery Scaling Plaque c/w Psoriasis     See annotation      Assessment / Plan:      Neoplasm of uncertain behavior of skin  Shave biopsy procedure note:    Shave biopsy performed after verbal consent including risk of infection, scar, recurrence, need for additional treatment of site. Area prepped with alcohol, anesthetized with approximately 1.0cc of 1% lidocaine with epinephrine. Lesional tissue shaved with razor blade. Hemostasis achieved with application of aluminum chloride followed by hyfrecation. No complications. Dressing applied. Wound care explained.    -     Tissue Specimen To Pathology, Dermatology  -     Tissue Specimen To Pathology, Dermatology  -     Tissue Specimen To Pathology, Dermatology  -     Tissue Specimen To Pathology, Dermatology  Pathology Orders:     Normal Orders This Visit    Tissue Specimen To Pathology, Dermatology     Questions:    Directional Terms:  Other(comment)    Clinical Information:  r/o BCC vs. SCC vs. other Comment - shave    Specific Site:  nasal dorsum    Tissue Specimen To Pathology, Dermatology     Questions:    Directional Terms:  Other(comment)    Clinical Information:  r/o SCC vs. prurigo nodule vs. verruca vs. other Comment - shave    Specific Site:  L elbow    Tissue Specimen To Pathology, Dermatology     Questions:    Directional Terms:  Other(comment)    Clinical Information:   r/o AK vs. SCC vs. other Comment - shave    Specific Site:  L lateral cheek    Tissue Specimen To Pathology, Dermatology     Questions:    Directional Terms:  Other(comment)    Clinical Information:  r/o AK vs. SCC vs. other Comment - shave    Specific Site:  L preauricular cheek          AK (actinic keratosis)  Cryosurgery Procedure Note    Verbal consent from the patient is obtained including, but not limited to, risk of hypopigmentation/hyperpigmentation, scar, recurrence of lesion. The patient is aware of the precancerous quality and need for treatment of these lesions. Liquid nitrogen cryosurgery is applied to the 9 actinic keratoses, as detailed in the physical exam, to produce a freeze injury. The patient is aware that blisters may form and is instructed on wound care with gentle cleansing and use of vaseline ointment to keep moist until healed. The patient is supplied a handout on cryosurgery and is instructed to call if lesions do not completely resolve.    SK (seborrheic keratosis)  These are benign inherited growths without a malignant potential. Reassurance given to patient. No treatment is necessary.   Treatment of benign, asymptomatic lesions may be considered cosmetic.  Warned about risk of hypo- or hyperpigmentation with treatment and risk of recurrence.    rtc 3 months for skin check or sooner pending bx results

## 2019-06-28 NOTE — H&P
Radiology History & Physical      SUBJECTIVE:     Chief Complaint: abdominal distention    History of Present Illness:  Kenneth Sheikh is a 79 y.o. male who presents for evaluation of ascites  Past Medical History:   Diagnosis Date    Anticoagulant long-term use     Bipolar 1 disorder     Bipolar 1 disorder 11/24/2016    bipolar    Cancer     history of skin cancer/sinus cancer & rectal cancer    Depressed bipolar affective disorder     Diabetes mellitus     type 2    EBV infection 12/7/2016    EBV DNA, PCR Latest Ref Range: None detected  None detected EBV DNA-Copies/mL Unknown Test Not Performed EBV Early Antigen Ab, IgG Latest Ref Range: <1:10 Titer 1:40 (A) EBV Nuclear Ag Ab Latest Ref Range: <1:5 Titer >=1:80 (A) EBV VCA IgG Latest Ref Range: <1:10 Titer 1:160 (A) EBV VCA IgM Latest Ref Range: <1:10 Titer <1:10     History of TIA (transient ischemic attack)     several-taking Plavix    Hx of gallstones     Wife denies    Hypertension     Hyperthyroidism 11/30/2016    Low HDL (under 40) 12/7/2016    Mixed hyperlipidemia 11/23/2016    JUAN MANUEL (obstructive sleep apnea)     Pneumonia     Skin disease     Squamous cell carcinoma 08/2018    right temple    Stroke     Transient cerebral ischemia 7/22/2016    Type 2 diabetes mellitus      Past Surgical History:   Procedure Laterality Date    BIOPSY-BONE MARROW Left 11/3/2016    Performed [facility] at [facility] 2ND FLR    ESOPHAGOGASTRODUODENOSCOPY (EGD) N/A 2/3/2017    Performed by Domenico Fox MD at [facility] ENDO (4TH FLR)    EXCISION-MASS RECTAL  6/28/2016    Performed by Haris Talavera MD at [facility] OR 2ND FLR    Moh's procedure x4      rectal cancer      Sinus surgery for sinus cancer      sinus sx for cancer      skin cancer removed-several times-nose & ear      TONSILLECTOMY      ULTRASOUND-ENDOSCOPIC-UPPER/glue coil of gastric varices N/A 4/19/2017    Performed by Aneudy Perla MD at [facility] ENDO (2ND FLR)    Undescened  testicle sx      UVULOPALATOPHARYNGOPLASTY      for sleep apnea       Home Meds:   Prior to Admission medications    Medication Sig Start Date End Date Taking? Authorizing Provider   ACCU-CHEK NEYDA PLUS TEST STRP Strp 1 strip by Misc.(Non-Drug; Combo Route) route once daily. 11/5/18   Prisca Espinoza MD   ciprofloxacin HCl (CIPRO) 500 MG tablet TAKE 1 TABLET(500 MG) BY MOUTH EVERY MONDAY 5/2/19   Renato Womack MD   divalproex (DEPAKOTE) 250 MG EC tablet Take 2 tablets (500 mg total) by mouth every evening. Do not fill prescription until patient calls and requests. 12/10/18   Prisca Espinoza MD   furosemide (LASIX) 20 MG tablet TAKE 2 TABLETS BY MOUTH EVERY DAY 5/2/19   Renato Womack MD   lactulose (CHRONULAC) 10 gram/15 mL solution TAKE 15 MLS BY MOUTH TWICE DAILY 5/2/19   Renato Womack MD   lancets [facility] To check blood sugar once daily, to use with Accu-Chek meter. 11/5/18   Prisca Espinoza MD   ofloxacin (OCUFLOX) 0.3 % ophthalmic solution  1/10/19   Historical Provider, MD   prednisoLONE acetate (PRED FORTE) 1 % DrpS  1/15/19   Historical Provider, MD   rifAXIMin (XIFAXAN) 550 mg Tab Take 1 tablet (550 mg total) by mouth 2 (two) times daily. 10/3/17   Renato Womack MD   SITagliptan (JANUVIA) 50 MG Tab Take 1 tablet (50 mg total) by mouth once daily. 4/6/17   Prisca Espinoza MD     Anticoagulants/Antiplatelets: no anticoagulation    Allergies:   Review of patient's allergies indicates:   Allergen Reactions    Abilify [aripiprazole] Other (See Comments)     Sleepy, could not function.     Sedation History:  no adverse reactions    Review of Systems:   Hematological: no known coagulopathies  Respiratory: no shortness of breath  Cardiovascular: no chest pain  Gastrointestinal: no abdominal pain  Genito-Urinary: no dysuria  Musculoskeletal: negative  Neurological: no TIA or stroke symptoms         OBJECTIVE:     Vital Signs (Most Recent)  Pulse: (!) 54 (06/28/19 1131)  Resp:  16 (06/28/19 1131)  BP: (!) 141/93 (06/28/19 1131)  SpO2: 99 % (06/28/19 1131)    Physical Exam:  ASA: 2  Mallampati:  Na    General: no acute distress  Mental Status: alert and oriented to person, place and time  HEENT: normocephalic, atraumatic  Chest: unlabored breathing  Heart: regular heart rate  Abdomen: distended  Extremity: moves all extremities    ASSESSMENT/PLAN:     Sedation Plan: local sedation  Patient will undergo US guided paracentesis

## 2019-07-05 ENCOUNTER — HOSPITAL ENCOUNTER (OUTPATIENT)
Dept: INTERVENTIONAL RADIOLOGY/VASCULAR | Facility: HOSPITAL | Age: 79
Discharge: HOME | End: 2019-07-05
Attending: INTERNAL MEDICINE
Payer: MEDICARE

## 2019-07-05 VITALS
RESPIRATION RATE: 20 BRPM | HEART RATE: 67 BPM | OXYGEN SATURATION: 96 % | SYSTOLIC BLOOD PRESSURE: 146 MMHG | DIASTOLIC BLOOD PRESSURE: 67 MMHG

## 2019-07-05 DIAGNOSIS — K70.31 ASCITES DUE TO ALCOHOLIC CIRRHOSIS: ICD-10-CM

## 2019-07-05 LAB
APPEARANCE FLD: NORMAL
BASOPHILS NFR FLD MANUAL: 1 %
BODY FLD TYPE: NORMAL
COLOR FLD: YELLOW
EOSINOPHIL NFR FLD MANUAL: 1 %
LYMPHOCYTES NFR FLD MANUAL: 57 %
MESOTHL CELL NFR FLD MANUAL: 1 %
MONOS+MACROS NFR FLD MANUAL: 35 %
NEUTROPHILS NFR FLD MANUAL: 5 %
WBC # FLD: 38 /CU MM

## 2019-07-05 PROCEDURE — 89051 BODY FLUID CELL COUNT: CPT

## 2019-07-05 PROCEDURE — 49083 IR PARACENTESIS NO IMAGING: ICD-10-PCS | Mod: ,,, | Performed by: RADIOLOGY

## 2019-07-05 PROCEDURE — P9047 ALBUMIN (HUMAN), 25%, 50ML: HCPCS | Mod: JG | Performed by: INTERNAL MEDICINE

## 2019-07-05 PROCEDURE — 49083 ABD PARACENTESIS W/IMAGING: CPT

## 2019-07-05 PROCEDURE — 63600175 PHARM REV CODE 636 W HCPCS: Mod: JG | Performed by: INTERNAL MEDICINE

## 2019-07-05 PROCEDURE — 49083 ABD PARACENTESIS W/IMAGING: CPT | Mod: ,,, | Performed by: RADIOLOGY

## 2019-07-05 RX ORDER — ALBUMIN HUMAN 250 G/1000ML
50 SOLUTION INTRAVENOUS ONCE
Status: COMPLETED | OUTPATIENT
Start: 2019-07-05 | End: 2019-07-05

## 2019-07-05 RX ADMIN — ALBUMIN HUMAN 50 G: 0.25 SOLUTION INTRAVENOUS at 01:07

## 2019-07-05 NOTE — H&P
Radiology History & Physical      SUBJECTIVE:     Chief Complaint: Ascites    History of Present Illness:  Kenneth Sheikh is a 79 y.o. 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     Past Medical History:   Diagnosis Date    Anticoagulant long-term use     Bipolar 1 disorder     Bipolar 1 disorder 11/24/2016    bipolar    Cancer     history of skin cancer/sinus cancer & rectal cancer    Depressed bipolar affective disorder     Diabetes mellitus     type 2    EBV infection 12/7/2016    EBV DNA, PCR Latest Ref Range: None detected  None detected EBV DNA-Copies/mL Unknown Test Not Performed EBV Early Antigen Ab, IgG Latest Ref Range: <1:10 Titer 1:40 (A) EBV Nuclear Ag Ab Latest Ref Range: <1:5 Titer >=1:80 (A) EBV VCA IgG Latest Ref Range: <1:10 Titer 1:160 (A) EBV VCA IgM Latest Ref Range: <1:10 Titer <1:10     History of TIA (transient ischemic attack)     several-taking Plavix    Hx of gallstones     Wife denies    Hypertension     Hyperthyroidism 11/30/2016    Low HDL (under 40) 12/7/2016    Mixed hyperlipidemia 11/23/2016    JUAN MANUEL (obstructive sleep apnea)     Pneumonia     Skin disease     Squamous cell carcinoma 08/2018    right temple    Stroke     Transient cerebral ischemia 7/22/2016    Type 2 diabetes mellitus      Past Surgical History:   Procedure Laterality Date    BIOPSY-BONE MARROW Left 11/3/2016    Performed by Bud [facility] MD at [facility] OR 2ND FLR    ESOPHAGOGASTRODUODENOSCOPY (EGD) N/A 2/3/2017    Performed by Domenico Fox MD at [facility] ENDO (4TH FLR)    EXCISION-MASS RECTAL  6/28/2016    Performed by Haris Talavera MD at [facility] OR 2ND FLR    Moh's procedure x4      rectal cancer      Sinus surgery for sinus cancer      sinus sx for cancer      skin cancer removed-several times-nose & ear      TONSILLECTOMY      ULTRASOUND-ENDOSCOPIC-UPPER/glue coil of gastric varices N/A 4/19/2017    Performed by Aneudy Perla MD at [facility] ENDO (2ND FLR)     Undescened testicle sx      UVULOPALATOPHARYNGOPLASTY      for sleep apnea       Home Meds:   Prior to Admission medications    Medication Sig Start Date End Date Taking? Authorizing Provider   ACCU-CHEK NEYDA PLUS TEST STRP Strp 1 strip by Misc.(Non-Drug; Combo Route) route once daily. 11/5/18   Prisca Espinoza MD   ciprofloxacin HCl (CIPRO) 500 MG tablet TAKE 1 TABLET(500 MG) BY MOUTH EVERY MONDAY 5/2/19   Renato Womack MD   divalproex (DEPAKOTE) 250 MG EC tablet Take 2 tablets (500 mg total) by mouth every evening. Do not fill prescription until patient calls and requests. 12/10/18   Prisca Espinoza MD   furosemide (LASIX) 20 MG tablet TAKE 2 TABLETS BY MOUTH EVERY DAY 5/2/19   Renato Womack MD   lactulose (CHRONULAC) 10 gram/15 mL solution TAKE 15 MLS BY MOUTH TWICE DAILY 5/2/19   Renato Womack MD   lancets [facility] To check blood sugar once daily, to use with Accu-Chek meter. 11/5/18   Prisca Espinoza MD   ofloxacin (OCUFLOX) 0.3 % ophthalmic solution  1/10/19   Historical Provider, MD   prednisoLONE acetate (PRED FORTE) 1 % DrpS  1/15/19   Historical Provider, MD   rifAXIMin (XIFAXAN) 550 mg Tab Take 1 tablet (550 mg total) by mouth 2 (two) times daily. 10/3/17   Renato Womack MD   SITagliptan (JANUVIA) 50 MG Tab Take 1 tablet (50 mg total) by mouth once daily. 4/6/17   Prisca Espinoza MD     Anticoagulants/Antiplatelets: no anticoagulation    Allergies:   Review of patient's allergies indicates:   Allergen Reactions    Abilify [aripiprazole] Other (See Comments)     Sleepy, could not function.     Sedation History:  no adverse reactions    Review of Systems:   Hematological: no known coagulopathies  Respiratory: no shortness of breath  Cardiovascular: no chest pain  Gastrointestinal: no abdominal pain  Genito-Urinary: no dysuria  Musculoskeletal: negative  Neurological: no TIA or stroke symptoms         OBJECTIVE:     Vital Signs (Most Recent)  Pulse: 64 (07/05/19  1123)  Resp: 18 (07/05/19 1123)  BP: (!) 167/82 (07/05/19 1123)  SpO2: 96 % (07/05/19 1123)    Physical Exam:  ASA: 3  Mallampati: 2    General: no acute distress  Mental Status: alert and oriented to person, place and time  HEENT: normocephalic, atraumatic  Chest: unlabored breathing  Heart: regular heart rate  Abdomen: distended  Extremity: moves all extremities    ASSESSMENT/PLAN:     Sedation Plan: none  Patient will undergo paracentesis     Harleen Castellanos MD  PGY-2  Pager: [phone]

## 2019-07-05 NOTE — PROCEDURES
Radiology Post-Procedure Note    Pre Op Diagnosis: Ascites  Post Op Diagnosis: Same    Procedure: Paracentesis    Procedure performed by: Westley Trotter, Harleen Castellanos, Abhi Pelletier    Written Informed Consent Obtained: Yes  Specimen Removed: YES 20 cc  Estimated Blood Loss: Minimal    Findings:   Successful paracentesis.  Albumin administered PRN per protocol.    Patient tolerated procedure well.    Harleen Castellanos MD  PGY-2  Pager: [phone]

## 2019-07-10 ENCOUNTER — TELEPHONE (OUTPATIENT)
Dept: DERMATOLOGY | Facility: CLINIC | Age: 79
End: 2019-07-10

## 2019-07-10 DIAGNOSIS — D09.9 SQUAMOUS CELL CARCINOMA IN SITU: Primary | ICD-10-CM

## 2019-07-10 RX ORDER — FLUOROURACIL 50 MG/G
CREAM TOPICAL
Qty: 40 G | Refills: 1 | Status: SHIPPED | OUTPATIENT
Start: 2019-07-10 | End: 2019-11-06 | Stop reason: SDUPTHER

## 2019-07-10 NOTE — TELEPHONE ENCOUNTER
FINAL PATHOLOGIC DIAGNOSIS  1. Skin, nasal dorsum, shave biopsy:  - BASAL CELL CARCINOMA.  - THE TUMOR EXTENDS TO A LATERAL BIOPSY MARGIN.    2. Skin, left elbow, shave biopsy:  - HYPERTROPHIC ACTINIC KERATOSIS WITH FOCAL TRANSITION TO SQUAMOUS CELL CARCINOMA IN  SITU.  - MARGINS ARE NEGATIVE FOR FULL THICKNESS SQUAMOUS ATYPIA IN THE PLANES OF SECTION.    3. Skin, left lateral cheek, shave biopsy:  - SQUAMOUS CELL CARCINOMA IN SITU/ BOWEN'S DISEASE.  - THE TUMOR EXTENDS TO THE DEEP AND LATERAL BIOPSY MARGINS.    4. Skin, left preauricular cheek, shave biopsy:  - SQUAMOUS CELL CARCINOMA IN SITU/ BOWEN'S DISEASE.  - MARGINS ARE NEGATIVE IN THE PLANES OF SECTION.    I spoke to Mr. Sheikh and relayed the above bx results.  Will send to Dr. Orellana for BCC on nose.  Will treat the remaining 3 SCCIS with Efudex.  Counseled extensively regarding the proper use and side effects of efudex. Pt will discontinue use if areas begin to ulcerate, bleed, blister, etc.    rtc 3 months

## 2019-07-12 ENCOUNTER — HOSPITAL ENCOUNTER (OUTPATIENT)
Dept: INTERVENTIONAL RADIOLOGY/VASCULAR | Facility: HOSPITAL | Age: 79
Discharge: HOME | End: 2019-07-12
Attending: INTERNAL MEDICINE
Payer: MEDICARE

## 2019-07-12 VITALS
HEART RATE: 55 BPM | OXYGEN SATURATION: 100 % | SYSTOLIC BLOOD PRESSURE: 161 MMHG | RESPIRATION RATE: 20 BRPM | DIASTOLIC BLOOD PRESSURE: 75 MMHG

## 2019-07-12 DIAGNOSIS — K70.31 ASCITES DUE TO ALCOHOLIC CIRRHOSIS: ICD-10-CM

## 2019-07-12 LAB
APPEARANCE FLD: NORMAL
BODY FLD TYPE: NORMAL
COLOR FLD: YELLOW
LYMPHOCYTES NFR FLD MANUAL: 79 %
MONOS+MACROS NFR FLD MANUAL: 16 %
NEUTROPHILS NFR FLD MANUAL: 5 %
WBC # FLD: 52 /CU MM

## 2019-07-12 PROCEDURE — 49083 ABD PARACENTESIS W/IMAGING: CPT | Mod: ,,, | Performed by: RADIOLOGY

## 2019-07-12 PROCEDURE — 89051 BODY FLUID CELL COUNT: CPT

## 2019-07-12 PROCEDURE — 49083 IR PARACENTESIS NO IMAGING: ICD-10-PCS | Mod: ,,, | Performed by: RADIOLOGY

## 2019-07-12 PROCEDURE — 49083 ABD PARACENTESIS W/IMAGING: CPT

## 2019-07-12 NOTE — H&P
Radiology History & Physical      SUBJECTIVE:     Chief Complaint: abdominal distention    History of Present Illness:  Kenneth Sheikh is a 79 y.o. male who presents for evaluation of ascites  Past Medical History:   Diagnosis Date    Anticoagulant long-term use     Bipolar 1 disorder     Bipolar 1 disorder 11/24/2016    bipolar    Cancer     history of skin cancer/sinus cancer & rectal cancer    Depressed bipolar affective disorder     Diabetes mellitus     type 2    EBV infection 12/7/2016    EBV DNA, PCR Latest Ref Range: None detected  None detected EBV DNA-Copies/mL Unknown Test Not Performed EBV Early Antigen Ab, IgG Latest Ref Range: <1:10 Titer 1:40 (A) EBV Nuclear Ag Ab Latest Ref Range: <1:5 Titer >=1:80 (A) EBV VCA IgG Latest Ref Range: <1:10 Titer 1:160 (A) EBV VCA IgM Latest Ref Range: <1:10 Titer <1:10     History of TIA (transient ischemic attack)     several-taking Plavix    Hx of gallstones     Wife denies    Hypertension     Hyperthyroidism 11/30/2016    Low HDL (under 40) 12/7/2016    Mixed hyperlipidemia 11/23/2016    JUAN MANUEL (obstructive sleep apnea)     Pneumonia     Skin disease     Squamous cell carcinoma 08/2018    right temple    Stroke     Transient cerebral ischemia 7/22/2016    Type 2 diabetes mellitus      Past Surgical History:   Procedure Laterality Date    BIOPSY-BONE MARROW Left 11/3/2016    Performed by Bud Bob MD at [facility] OR 2ND FLR    ESOPHAGOGASTRODUODENOSCOPY (EGD) N/A 2/3/2017    Performed by Domenico Fox MD at [facility] ENDO (4TH FLR)    EXCISION-MASS RECTAL  6/28/2016    Performed by Haris Talavera MD at [facility] OR 2ND FLR    Moh's procedure x4      rectal cancer      Sinus surgery for sinus cancer      sinus sx for cancer      skin cancer removed-several times-nose & ear      TONSILLECTOMY      ULTRASOUND-ENDOSCOPIC-UPPER/glue coil of gastric varices N/A 4/19/2017    Performed by Aneudy Perla MD at [facility] ENDO (2ND FLR)    Undescened  testicle sx      UVULOPALATOPHARYNGOPLASTY      for sleep apnea       Home Meds:   Prior to Admission medications    Medication Sig Start Date End Date Taking? Authorizing Provider   ACCU-CHEK NEYDA PLUS TEST STRP Strp 1 strip by Misc.(Non-Drug; Combo Route) route once daily. 11/5/18   Prisca Espinoza MD   ciprofloxacin HCl (CIPRO) 500 MG tablet TAKE 1 TABLET(500 MG) BY MOUTH EVERY MONDAY 5/2/19   Renato Womack MD   divalproex (DEPAKOTE) 250 MG EC tablet Take 2 tablets (500 mg total) by mouth every evening. Do not fill prescription until patient calls and requests. 12/10/18   Prisca Espinoza MD   fluorouracil (EFUDEX) 5 % cream AAA + 1cm of normal appearing surrounding skin BID x 4-6 weeks. Stop if blistered, oozing, or bleeding. Use daily sun protection. 7/10/19   Dejah Anders MD   furosemide (LASIX) 20 MG tablet TAKE 2 TABLETS BY MOUTH EVERY DAY 5/2/19   Renato Womack MD   lactulose (CHRONULAC) 10 gram/15 mL solution TAKE 15 MLS BY MOUTH TWICE DAILY 5/2/19   Renato Womack MD   lancets [facility] To check blood sugar once daily, to use with Accu-Chek meter. 11/5/18   Prisca Espinoza MD   ofloxacin (OCUFLOX) 0.3 % ophthalmic solution  1/10/19   Historical Provider, MD   prednisoLONE acetate (PRED FORTE) 1 % DrpS  1/15/19   Historical Provider, MD   rifAXIMin (XIFAXAN) 550 mg Tab Take 1 tablet (550 mg total) by mouth 2 (two) times daily. 10/3/17   Renato Womack MD   SITagliptan (JANUVIA) 50 MG Tab Take 1 tablet (50 mg total) by mouth once daily. 4/6/17   Prisca Espinoza MD     Anticoagulants/Antiplatelets: no anticoagulation    Allergies:   Review of patient's allergies indicates:   Allergen Reactions    Abilify [aripiprazole] Other (See Comments)     Sleepy, could not function.     Sedation History:  no adverse reactions    Review of Systems:   Hematological: no known coagulopathies  Respiratory: no shortness of breath  Cardiovascular: no chest pain  Gastrointestinal: no  abdominal pain  Genito-Urinary: no dysuria  Musculoskeletal: negative  Neurological: no TIA or stroke symptoms         OBJECTIVE:     Vital Signs (Most Recent)       Physical Exam:  ASA: 2  Mallampati: na    General: no acute distress  Mental Status: alert and oriented to person, place and time  HEENT: normocephalic, atraumatic  Chest: unlabored breathing  Heart: regular heart rate  Abdomen: distended  Extremity: moves all extremities    ASSESSMENT/PLAN:     Sedation Plan: local anesthesia  Patient will undergo us guided paracentesis

## 2019-07-12 NOTE — DISCHARGE INSTRUCTIONS
"For scheduling: Call Mackenzie at [phone]    For questions or concerns call: [facility] 8 AM- 5PM [phone]. Radiology resident on call [phone].    For immediate concerns that are not emergent, you may call our radiology clinic at: [phone]      For right "elbow" wound please apply Bacitracin ointment, 2x2 guaze, and light wrapping of Coban.  This is to keep clean.  At night please leave  "wound" open to air.  Seek medical care for signs of infection ( purulent drainage (pus), fever greater than 100 degrees, increased pain or tenderness.          "

## 2019-07-12 NOTE — PROGRESS NOTES
"Arrived via w/c to [facility] for paracentesis. Wife very upset at "male nurse in imaging department."  States nurse tore a bandage off Mr Aguilar's inner right antecubital area removing skin tissue that "looks infected".  Also states she is upset but doesn't know who to address concerns to but refuses to have "that nurse" near .  Also states patient won't speak up for himself but she is tired of trying to apply bandage to wound.  Patient very pleasant and without c/o's  "

## 2019-07-12 NOTE — PROGRESS NOTES
Paracentesis complete. 4450 mLs peritoneal fluid drained. Pt tolerated well. Dressing to right abd clean, dry, and intact.  Specimens sent per lab order. Written and verbal discharge/care instructions given to wife and patient and voices understanding. Left via w/c without c/o's.

## 2019-07-15 NOTE — PROGRESS NOTES
Sent patient home with bacitracin to address patient care needs (skin tear on right forearm), later clarified with pharmacy on process for prescriptions.

## 2019-07-16 ENCOUNTER — INITIAL CONSULT (OUTPATIENT)
Dept: DERMATOLOGY | Facility: CLINIC | Age: 79
End: 2019-07-16
Payer: MEDICARE

## 2019-07-16 VITALS
HEART RATE: 51 BPM | BODY MASS INDEX: 25.91 KG/M2 | HEIGHT: 68 IN | SYSTOLIC BLOOD PRESSURE: 134 MMHG | DIASTOLIC BLOOD PRESSURE: 64 MMHG | WEIGHT: 171 LBS

## 2019-07-16 DIAGNOSIS — C44.311 BASAL CELL CARCINOMA OF NOSE: Primary | ICD-10-CM

## 2019-07-16 PROCEDURE — 99999 PR PBB SHADOW E&M-EST. PATIENT-LVL III: ICD-10-PCS | Mod: PBBFAC,,, | Performed by: DERMATOLOGY

## 2019-07-16 PROCEDURE — 99214 PR OFFICE/OUTPT VISIT, EST, LEVL IV, 30-39 MIN: ICD-10-PCS | Mod: S$PBB,,, | Performed by: DERMATOLOGY

## 2019-07-16 PROCEDURE — 99999 PR PBB SHADOW E&M-EST. PATIENT-LVL III: CPT | Mod: PBBFAC,,, | Performed by: DERMATOLOGY

## 2019-07-16 PROCEDURE — 99213 OFFICE O/P EST LOW 20 MIN: CPT | Mod: PBBFAC | Performed by: DERMATOLOGY

## 2019-07-16 PROCEDURE — 99214 OFFICE O/P EST MOD 30 MIN: CPT | Mod: S$PBB,,, | Performed by: DERMATOLOGY

## 2019-07-16 NOTE — PROGRESS NOTES
REFERRING MD:  Dejah Anders M.D.    CHIEF COMPLAINT:  New patient being consulted for Mohs' surgery evaluation.    HISTORY OF PRESENT ILLNESS:  79 y.o. male presents with an unknown duration of growth on the nasal dorsum. Has had several NMSC removed in the past, Dr. Beach with Plastics reconstruction.  Very familiar with Mohs.     Positive for scabbing.  Negative for crusting.  Negative for bleeding.  Negative for itching.    Biopsy consistent with basal cell carcinoma.     No prior treatment.    Pacemaker: No  Defibrillator: No  Artificial joints: No  Artificial heart valves: No    PAST MEDICAL HISTORY:  Past Medical History:   Diagnosis Date    Anticoagulant long-term use     Bipolar 1 disorder     Bipolar 1 disorder 11/24/2016    bipolar    Cancer     history of skin cancer/sinus cancer & rectal cancer    Depressed bipolar affective disorder     Diabetes mellitus     type 2    EBV infection 12/7/2016    EBV DNA, PCR Latest Ref Range: None detected  None detected EBV DNA-Copies/mL Unknown Test Not Performed EBV Early Antigen Ab, IgG Latest Ref Range: <1:10 Titer 1:40 (A) EBV Nuclear Ag Ab Latest Ref Range: <1:5 Titer >=1:80 (A) EBV VCA IgG Latest Ref Range: <1:10 Titer 1:160 (A) EBV VCA IgM Latest Ref Range: <1:10 Titer <1:10     History of TIA (transient ischemic attack)     several-taking Plavix    Hx of gallstones     Wife denies    Hypertension     Hyperthyroidism 11/30/2016    Low HDL (under 40) 12/7/2016    Mixed hyperlipidemia 11/23/2016    JUAN MANUEL (obstructive sleep apnea)     Pneumonia     Skin disease     Squamous cell carcinoma 08/2018    right temple    Stroke     Transient cerebral ischemia 7/22/2016    Type 2 diabetes mellitus        PAST SURGICAL HISTORY:  Past Surgical History:   Procedure Laterality Date    BIOPSY-BONE MARROW Left 11/3/2016    Performed by [facility] at [facility] OR 2ND FLR    ESOPHAGOGASTRODUODENOSCOPY (EGD) N/A 2/3/2017    Performed by Domenico MAJANO  MD Ruddy at [facility] ENDO (4TH FLR)    EXCISION-MASS RECTAL  6/28/2016    Performed by Haris Talavera MD at [facility] OR 2ND FLR    Moh's procedure x4      rectal cancer      Sinus surgery for sinus cancer      sinus sx for cancer      skin cancer removed-several times-nose & ear      TONSILLECTOMY      ULTRASOUND-ENDOSCOPIC-UPPER/glue coil of gastric varices N/A 4/19/2017    Performed by Aneudy Perla MD at [facility] ENDO (2ND FLR)    Undescened testicle sx      UVULOPALATOPHARYNGOPLASTY      for sleep apnea        SOCIAL HISTORY:  Dependencies:  former smoker    PERTINENT MEDICATIONS:  See medications list.    ALLERGIES:  Abilify [aripiprazole]    ROS:  Skin: See HPI  Constitutional: No fatigue, fever, malaise, weight loss, or night sweats.  Cardiovascular: No chest pain, palpitations, or edema.  Respiratory: No coughing, wheezing, SOB, or sputum production.    Physical Exam   HENT:   Nose:             General: Mood and affect normal. Alert and orient X3. Normal appearance.  Eyelids:  no suspicious lesions  Head/Face: Nasal dorsum with a 6 x 8 mm pink pearly papule located 3 cm inferiorly from the mid-glabella and 3.5 cm superiorly from the nasal tip. (+) Nasal asymmetry from sinus surgery.  Lips/Teeth/Gums:  no suspicious lesions     IMPRESSION:  Biopsy proven basal cell carcinoma, Nasal dorsum, path# [id number].    PLAN:  The diagnosis and the pathology report were discussed in detail with the patient. Treatment options were reviewed, including Mohs Micrographic Surgery, radiation, topical therapy, and standard excision.  After careful review of patient's history and physical exam, and after discussion of treatment options, the decision was made to perform Mohs micrographic surgery.    Scheduled patient for Mohs Micrographic Surgery. Risks, benefits, and alternatives of Mohs' surgery discussed with the patient. Discussed repair options including complex closure, skin flap, skin graft and second intention  healing with the patient. Pre-operative instructions provided to the patient.     Spent 30 minutes in coordination of care and/or consultation with patient discussing diagnosis, treatment options, risks and benefits of each. All questions answered.

## 2019-07-19 ENCOUNTER — HOSPITAL ENCOUNTER (OUTPATIENT)
Dept: INTERVENTIONAL RADIOLOGY/VASCULAR | Facility: HOSPITAL | Age: 79
Discharge: HOME | End: 2019-07-19
Attending: INTERNAL MEDICINE
Payer: MEDICARE

## 2019-07-19 VITALS — DIASTOLIC BLOOD PRESSURE: 77 MMHG | SYSTOLIC BLOOD PRESSURE: 159 MMHG

## 2019-07-19 DIAGNOSIS — K70.31 ASCITES DUE TO ALCOHOLIC CIRRHOSIS: ICD-10-CM

## 2019-07-19 PROCEDURE — 49083 ABD PARACENTESIS W/IMAGING: CPT | Mod: GC,,, | Performed by: RADIOLOGY

## 2019-07-19 PROCEDURE — 49083 ABD PARACENTESIS W/IMAGING: CPT

## 2019-07-19 PROCEDURE — 49083 IR PARACENTESIS NO IMAGING: ICD-10-PCS | Mod: GC,,, | Performed by: RADIOLOGY

## 2019-07-19 NOTE — PROCEDURES
Radiology Post-Procedure Note    Pre Op Diagnosis: Ascites  Post Op Diagnosis: Same    Procedure: Paracentesis    Procedure performed by: Noe Priest MD    Written Informed Consent Obtained: Yes  Specimen Removed: NO  Estimated Blood Loss: less than 5cc    Findings:   Successful paracentesis.  Albumin not administered, para under 5L.    Patient tolerated procedure well. 4600cc ascites fluid drained.     Noe Priest MD, MS  Radiology  PGY2  Pager: [phone]

## 2019-07-19 NOTE — H&P
Radiology History & Physical      SUBJECTIVE:     Chief Complaint: abdominal discomfort 2/2 ascites    History of Present Illness:  Kenneth Sheikh is a 79 y.o. male who presents for paracentesis evaluation.    Past Medical History:   Diagnosis Date    Anticoagulant long-term use     Bipolar 1 disorder     Bipolar 1 disorder 11/24/2016    bipolar    Cancer     history of skin cancer/sinus cancer & rectal cancer    Depressed bipolar affective disorder     Diabetes mellitus     type 2    EBV infection 12/7/2016    EBV DNA, PCR Latest Ref Range: None detected  None detected EBV DNA-Copies/mL Unknown Test Not Performed EBV Early Antigen Ab, IgG Latest Ref Range: <1:10 Titer 1:40 (A) EBV Nuclear Ag Ab Latest Ref Range: <1:5 Titer >=1:80 (A) EBV VCA IgG Latest Ref Range: <1:10 Titer 1:160 (A) EBV VCA IgM Latest Ref Range: <1:10 Titer <1:10     History of TIA (transient ischemic attack)     several-taking Plavix    Hx of gallstones     Wife denies    Hypertension     Hyperthyroidism 11/30/2016    Low HDL (under 40) 12/7/2016    Mixed hyperlipidemia 11/23/2016    JUAN MANUEL (obstructive sleep apnea)     Pneumonia     Skin disease     Squamous cell carcinoma 08/2018    right temple    Stroke     Transient cerebral ischemia 7/22/2016    Type 2 diabetes mellitus      Past Surgical History:   Procedure Laterality Date    BIOPSY-BONE MARROW Left 11/3/2016    [facility] OR 2ND FLR    ESOPHAGOGASTRODUODENOSCOPY (EGD) N/A 2/3/2017    Performed by Domenico Fox MD at [facility] ENDO (4TH FLR)    EXCISION-MASS RECTAL  6/28/2016    Performed by Haris Talavera MD at [facility] OR 2ND FLR    Moh's procedure x4      rectal cancer      Sinus surgery for sinus cancer      sinus sx for cancer      skin cancer removed-several times-nose & ear      TONSILLECTOMY      ULTRASOUND-ENDOSCOPIC-UPPER/glue coil of gastric varices N/A 4/19/2017    Performed by Aneudy Perla MD at [facility] ENDO (2ND FLR)     Undescened testicle sx      UVULOPALATOPHARYNGOPLASTY      for sleep apnea       Home Meds:   Prior to Admission medications    Medication Sig Start Date End Date Taking? Authorizing Provider   ACCU-CHEK NEYDA PLUS TEST STRP Strp 1 strip by Misc.(Non-Drug; Combo Route) route once daily. 11/5/18   Prisca Espinoza MD   ciprofloxacin HCl (CIPRO) 500 MG tablet TAKE 1 TABLET(500 MG) BY MOUTH EVERY MONDAY 5/2/19   Renato Womack MD   divalproex (DEPAKOTE) 250 MG EC tablet Take 2 tablets (500 mg total) by mouth every evening. Do not fill prescription until patient calls and requests. 12/10/18   Prisca Espinoaz MD   fluorouracil (EFUDEX) 5 % cream AAA + 1cm of normal appearing surrounding skin BID x 4-6 weeks. Stop if blistered, oozing, or bleeding. Use daily sun protection. 7/10/19   Dejah Anders MD   furosemide (LASIX) 20 MG tablet TAKE 2 TABLETS BY MOUTH EVERY DAY 5/2/19   Renato Womack MD   lactulose (CHRONULAC) 10 gram/15 mL solution TAKE 15 MLS BY MOUTH TWICE DAILY 5/2/19   Renato Womack MD   lancets [facility] To check blood sugar once daily, to use with Accu-Chek meter. 11/5/18   Prisca Espinoza MD   ofloxacin (OCUFLOX) 0.3 % ophthalmic solution  1/10/19   Historical Provider, MD   prednisoLONE acetate (PRED FORTE) 1 % DrpS  1/15/19   Historical Provider, MD   rifAXIMin (XIFAXAN) 550 mg Tab Take 1 tablet (550 mg total) by mouth 2 (two) times daily. 10/3/17   Renato Womack MD   SITagliptan (JANUVIA) 50 MG Tab Take 1 tablet (50 mg total) by mouth once daily. 4/6/17   Prisca Espinoza MD     Anticoagulants/Antiplatelets: no anticoagulation    Allergies:   Review of patient's allergies indicates:   Allergen Reactions    Abilify [aripiprazole] Other (See Comments)     Sleepy, could not function.     Sedation History:  no adverse reactions    Review of Systems:   Hematological: negative  no known coagulopathies  Respiratory: no cough, shortness of breath, or wheezing  no shortness of  breath  Cardiovascular: no chest pain or dyspnea on exertion  Gastrointestinal: no abdominal pain, change in bowel habits, or black or bloody stools  Genito-Urinary: no dysuria  Musculoskeletal: negative  Neurological: no TIA or stroke symptoms         OBJECTIVE:     Vital Signs (Most Recent)       Physical Exam:  ASA: 2  Mallampati: na    General: no acute distress  Mental Status: alert and oriented to person, place and time  HEENT: normocephalic, atraumatic  Chest: unlabored breathing  Heart: regular heart rate  Abdomen: nondistended  Extremity: moves all extremities    ASSESSMENT/PLAN:     Sedation Plan: local lidocaine  Patient will undergo paracentesis.    Noe Priest MD, MS  Radiology  PGY2  Pager: [phone]

## 2019-07-23 DIAGNOSIS — R18.8 ASCITES OF LIVER: ICD-10-CM

## 2019-07-23 RX ORDER — CIPROFLOXACIN 500 MG/1
TABLET ORAL
Qty: 12 TABLET | Refills: 0 | Status: SHIPPED | OUTPATIENT
Start: 2019-07-23 | End: 2019-10-20 | Stop reason: SDUPTHER

## 2019-07-26 ENCOUNTER — HOSPITAL ENCOUNTER (OUTPATIENT)
Dept: INTERVENTIONAL RADIOLOGY/VASCULAR | Facility: HOSPITAL | Age: 79
Discharge: HOME | End: 2019-07-26
Attending: INTERNAL MEDICINE
Payer: MEDICARE

## 2019-07-26 VITALS
DIASTOLIC BLOOD PRESSURE: 73 MMHG | RESPIRATION RATE: 20 BRPM | SYSTOLIC BLOOD PRESSURE: 141 MMHG | HEART RATE: 51 BPM | OXYGEN SATURATION: 98 %

## 2019-07-26 DIAGNOSIS — K70.31 ASCITES DUE TO ALCOHOLIC CIRRHOSIS: ICD-10-CM

## 2019-07-26 LAB
APPEARANCE FLD: NORMAL
BODY FLD TYPE: NORMAL
COLOR FLD: YELLOW
LYMPHOCYTES NFR FLD MANUAL: 13 %
MESOTHL CELL NFR FLD MANUAL: 11 %
MONOS+MACROS NFR FLD MANUAL: 69 %
NEUTROPHILS NFR FLD MANUAL: 7 %
WBC # FLD: 79 /CU MM

## 2019-07-26 PROCEDURE — 49083 ABD PARACENTESIS W/IMAGING: CPT

## 2019-07-26 PROCEDURE — 49083 IR PARACENTESIS NO IMAGING: ICD-10-PCS | Mod: ,,, | Performed by: RADIOLOGY

## 2019-07-26 PROCEDURE — 89051 BODY FLUID CELL COUNT: CPT

## 2019-07-26 PROCEDURE — 49083 ABD PARACENTESIS W/IMAGING: CPT | Mod: ,,, | Performed by: RADIOLOGY

## 2019-07-26 NOTE — PROGRESS NOTES
Procedure completed without difficulties. Left via w/c to waiting room to meet wife.  Dsg CDI. Written and verbal discharge/care instructions given and voices understanding.

## 2019-07-26 NOTE — H&P
Radiology History & Physical      SUBJECTIVE:     Chief Complaint: abdominal distention    History of Present Illness:  Kenneth Sheikh is a 79 y.o. male who presents for evaluation of ascites  Past Medical History:   Diagnosis Date    Anticoagulant long-term use     Bipolar 1 disorder     Bipolar 1 disorder 11/24/2016    bipolar    Cancer     history of skin cancer/sinus cancer & rectal cancer    Depressed bipolar affective disorder     Diabetes mellitus     type 2    EBV infection 12/7/2016    EBV DNA, PCR Latest Ref Range: None detected  None detected EBV DNA-Copies/mL Unknown Test Not Performed EBV Early Antigen Ab, IgG Latest Ref Range: <1:10 Titer 1:40 (A) EBV Nuclear Ag Ab Latest Ref Range: <1:5 Titer >=1:80 (A) EBV VCA IgG Latest Ref Range: <1:10 Titer 1:160 (A) EBV VCA IgM Latest Ref Range: <1:10 Titer <1:10     History of TIA (transient ischemic attack)     several-taking Plavix    Hx of gallstones     Wife denies    Hypertension     Hyperthyroidism 11/30/2016    Low HDL (under 40) 12/7/2016    Mixed hyperlipidemia 11/23/2016    JUAN MANUEL (obstructive sleep apnea)     Pneumonia     Skin disease     Squamous cell carcinoma 08/2018    right temple    Stroke     Transient cerebral ischemia 7/22/2016    Type 2 diabetes mellitus      Past Surgical History:   Procedure Laterality Date    BIOPSY-BONE MARROW Left 11/3/2016    Performed by [facility] at [facility] OR 2ND FLR    ESOPHAGOGASTRODUODENOSCOPY (EGD) N/A 2/3/2017    Performed by Domenico Fox MD at [facility] ENDO (4TH FLR)    EXCISION-MASS RECTAL  6/28/2016    Performed by Haris Talavera MD at [facility] OR 2ND FLR    Moh's procedure x4      rectal cancer      Sinus surgery for sinus cancer      sinus sx for cancer      skin cancer removed-several times-nose & ear      TONSILLECTOMY      ULTRASOUND-ENDOSCOPIC-UPPER/glue coil of gastric varices N/A 4/19/2017    Performed by Aneudy Perla MD at [facility] ENDO (2ND FLR)    Undescened  testicle sx      UVULOPALATOPHARYNGOPLASTY      for sleep apnea       Home Meds:   Prior to Admission medications    Medication Sig Start Date End Date Taking? Authorizing Provider   ACCU-CHEK NEYDA PLUS TEST STRP Strp 1 strip by Misc.(Non-Drug; Combo Route) route once daily. 11/5/18   Prisca Espinoza MD   ciprofloxacin HCl (CIPRO) 500 MG tablet TAKE 1 TABLET(500 MG) BY MOUTH EVERY MONDAY 7/23/19   Renato Womack MD   divalproex (DEPAKOTE) 250 MG EC tablet Take 2 tablets (500 mg total) by mouth every evening. Do not fill prescription until patient calls and requests. 12/10/18   Prisca Espinoza MD   fluorouracil (EFUDEX) 5 % cream AAA + 1cm of normal appearing surrounding skin BID x 4-6 weeks. Stop if blistered, oozing, or bleeding. Use daily sun protection. 7/10/19   Dejah Anders MD   furosemide (LASIX) 20 MG tablet TAKE 2 TABLETS BY MOUTH EVERY DAY 5/2/19   Renato Womack MD   lactulose (CHRONULAC) 10 gram/15 mL solution TAKE 15 MLS BY MOUTH TWICE DAILY 5/2/19   Renato Womack MD   lancets [facility] To check blood sugar once daily, to use with Accu-Chek meter. 11/5/18   Prisca Espinoza MD   ofloxacin (OCUFLOX) 0.3 % ophthalmic solution  1/10/19   Historical Provider, MD   prednisoLONE acetate (PRED FORTE) 1 % DrpS  1/15/19   Historical Provider, MD   rifAXIMin (XIFAXAN) 550 mg Tab Take 1 tablet (550 mg total) by mouth 2 (two) times daily. 10/3/17   Renato Womack MD   SITagliptan (JANUVIA) 50 MG Tab Take 1 tablet (50 mg total) by mouth once daily. 4/6/17   Prisca Espinoza MD     Anticoagulants/Antiplatelets: no anticoagulation    Allergies:   Review of patient's allergies indicates:   Allergen Reactions    Abilify [aripiprazole] Other (See Comments)     Sleepy, could not function.     Sedation History:  no adverse reactions    Review of Systems:   Hematological: no known coagulopathies  Respiratory: no shortness of breath  Cardiovascular: no chest pain  Gastrointestinal: no  abdominal pain  Genito-Urinary: no dysuria  Musculoskeletal: negative  Neurological: no TIA or stroke symptoms         OBJECTIVE:     Vital Signs (Most Recent)       Physical Exam:  ASA: 2  Mallampati: na    General: no acute distress  Mental Status: alert and oriented to person, place and time  HEENT: normocephalic, atraumatic  Chest: unlabored breathing  Heart: regular heart rate  Abdomen: distended  Extremity: moves all extremities    ASSESSMENT/PLAN:     Sedation Plan: local anesthesia  Patient will undergo US guided paracentesis

## 2019-07-26 NOTE — DISCHARGE INSTRUCTIONS
For scheduling: Call Mackenzie at [phone]    For questions or concerns call: SHAR MON-FRI 8 AM- 5PM [phone]. Radiology resident on call [phone].    For immediate concerns that are not emergent, you may call our radiology clinic at: [phone]

## 2019-08-02 ENCOUNTER — HOSPITAL ENCOUNTER (OUTPATIENT)
Dept: INTERVENTIONAL RADIOLOGY/VASCULAR | Facility: HOSPITAL | Age: 79
Discharge: HOME | End: 2019-08-02
Attending: INTERNAL MEDICINE
Payer: MEDICARE

## 2019-08-02 VITALS
DIASTOLIC BLOOD PRESSURE: 76 MMHG | RESPIRATION RATE: 18 BRPM | OXYGEN SATURATION: 100 % | SYSTOLIC BLOOD PRESSURE: 146 MMHG | HEART RATE: 50 BPM

## 2019-08-02 DIAGNOSIS — K70.31 ASCITES DUE TO ALCOHOLIC CIRRHOSIS: ICD-10-CM

## 2019-08-02 PROCEDURE — 49083 ABD PARACENTESIS W/IMAGING: CPT | Mod: ,,, | Performed by: NURSE PRACTITIONER

## 2019-08-02 PROCEDURE — 49083 IR PARACENTESIS NO IMAGING: ICD-10-PCS | Mod: ,,, | Performed by: NURSE PRACTITIONER

## 2019-08-02 PROCEDURE — 49083 ABD PARACENTESIS W/IMAGING: CPT

## 2019-08-02 NOTE — PROGRESS NOTES
Pt had paracentesis and tolerated well. Was given discharge instructions on post-op incision care. Pt verbalized understanding and RN allowed time for any further questions. Pt to discharge to home.

## 2019-08-02 NOTE — H&P
Radiology History & Physical      SUBJECTIVE:     Chief Complaint: abdominal distention    History of Present Illness:  Kenneth Sheikh is a 79 y.o. male who presents for evaluation of ascites  Past Medical History:   Diagnosis Date    Anticoagulant long-term use     Bipolar 1 disorder     Bipolar 1 disorder 11/24/2016    bipolar    Cancer     history of skin cancer/sinus cancer & rectal cancer    Depressed bipolar affective disorder     Diabetes mellitus     type 2    EBV infection 12/7/2016    EBV DNA, PCR Latest Ref Range: None detected  None detected EBV DNA-Copies/mL Unknown Test Not Performed EBV Early Antigen Ab, IgG Latest Ref Range: <1:10 Titer 1:40 (A) EBV Nuclear Ag Ab Latest Ref Range: <1:5 Titer >=1:80 (A) EBV VCA IgG Latest Ref Range: <1:10 Titer 1:160 (A) EBV VCA IgM Latest Ref Range: <1:10 Titer <1:10     History of TIA (transient ischemic attack)     several-taking Plavix    Hx of gallstones     Wife denies    Hypertension     Hyperthyroidism 11/30/2016    Low HDL (under 40) 12/7/2016    Mixed hyperlipidemia 11/23/2016    JUAN MANUEL (obstructive sleep apnea)     Pneumonia     Skin disease     Squamous cell carcinoma 08/2018    right temple    Stroke     Transient cerebral ischemia 7/22/2016    Type 2 diabetes mellitus      Past Surgical History:   Procedure Laterality Date    BIOPSY-BONE MARROW Left 11/3/2016    Performed by [facility] at [facility] 2ND FLR    ESOPHAGOGASTRODUODENOSCOPY (EGD) N/A 2/3/2017    Performed by Domenico Fox MD at [facility] ENDO (4TH FLR)    EXCISION-MASS RECTAL  6/28/2016    Performed by Haris Talavera MD at [facility] OR 2ND FLR    Moh's procedure x4      rectal cancer      Sinus surgery for sinus cancer      sinus sx for cancer      skin cancer removed-several times-nose & ear      TONSILLECTOMY      ULTRASOUND-ENDOSCOPIC-UPPER/glue coil of gastric varices N/A 4/19/2017    Performed by Aneudy Perla MD at [facility] ENDO (2ND FLR)    Undescened  testicle sx      UVULOPALATOPHARYNGOPLASTY      for sleep apnea       Home Meds:   Prior to Admission medications    Medication Sig Start Date End Date Taking? Authorizing Provider   ACCU-CHEK NEYDA PLUS TEST STRP Strp 1 strip by Misc.(Non-Drug; Combo Route) route once daily. 11/5/18   Prisca Espinoza MD   ciprofloxacin HCl (CIPRO) 500 MG tablet TAKE 1 TABLET(500 MG) BY MOUTH EVERY MONDAY 7/23/19   Renato Womack MD   divalproex (DEPAKOTE) 250 MG EC tablet Take 2 tablets (500 mg total) by mouth every evening. Do not fill prescription until patient calls and requests. 12/10/18   Prisca Espinoza MD   fluorouracil (EFUDEX) 5 % cream AAA + 1cm of normal appearing surrounding skin BID x 4-6 weeks. Stop if blistered, oozing, or bleeding. Use daily sun protection. 7/10/19   Dejah Anders MD   furosemide (LASIX) 20 MG tablet TAKE 2 TABLETS BY MOUTH EVERY DAY 5/2/19   Renato Womack MD   lactulose (CHRONULAC) 10 gram/15 mL solution TAKE 15 MLS BY MOUTH TWICE DAILY 5/2/19   Renato Womack MD   lancets [facility] To check blood sugar once daily, to use with Accu-Chek meter. 11/5/18   Prisca Espinoza MD   ofloxacin (OCUFLOX) 0.3 % ophthalmic solution  1/10/19   Historical Provider, MD   prednisoLONE acetate (PRED FORTE) 1 % DrpS  1/15/19   Historical Provider, MD   rifAXIMin (XIFAXAN) 550 mg Tab Take 1 tablet (550 mg total) by mouth 2 (two) times daily. 10/3/17   Renato Womack MD   SITagliptan (JANUVIA) 50 MG Tab Take 1 tablet (50 mg total) by mouth once daily. 4/6/17   Prisca Espinoza MD     Anticoagulants/Antiplatelets: no anticoagulation    Allergies:   Review of patient's allergies indicates:   Allergen Reactions    Abilify [aripiprazole] Other (See Comments)     Sleepy, could not function.     Sedation History:  no adverse reactions    Review of Systems:   Hematological: no known coagulopathies  Respiratory: no shortness of breath  Cardiovascular: no chest pain  Gastrointestinal: no  abdominal pain  Genito-Urinary: no dysuria  Musculoskeletal: negative  Neurological: no TIA or stroke symptoms         OBJECTIVE:     Vital Signs (Most Recent)       Physical Exam:  ASA: 2  Mallampati: na    General: no acute distress  Mental Status: alert and oriented to person, place and time  HEENT: normocephalic, atraumatic  Chest: unlabored breathing  Heart: regular heart rate  Abdomen: distended  Extremity: moves all extremities    ASSESSMENT/PLAN:     Sedation Plan: local anesthesia  Patient will undergo US guided paracentesis

## 2019-08-02 NOTE — DISCHARGE INSTRUCTIONS
To make further appointments call [phone].    For questions or concerns call [facility] (Mon-Fri) 8am-5pm. At [phone].    For after hours, call [phone].

## 2019-08-05 DIAGNOSIS — K74.60 CIRRHOSIS OF LIVER WITH ASCITES, UNSPECIFIED HEPATIC CIRRHOSIS TYPE: ICD-10-CM

## 2019-08-05 DIAGNOSIS — R18.8 CIRRHOSIS OF LIVER WITH ASCITES, UNSPECIFIED HEPATIC CIRRHOSIS TYPE: ICD-10-CM

## 2019-08-05 RX ORDER — LACTULOSE 10 G/15ML
SOLUTION ORAL; RECTAL
Qty: 2838 ML | Refills: 0 | Status: SHIPPED | OUTPATIENT
Start: 2019-08-05 | End: 2019-10-25 | Stop reason: SDUPTHER

## 2019-08-09 ENCOUNTER — HOSPITAL ENCOUNTER (OUTPATIENT)
Dept: INTERVENTIONAL RADIOLOGY/VASCULAR | Facility: HOSPITAL | Age: 79
Discharge: HOME | End: 2019-08-09
Attending: INTERNAL MEDICINE
Payer: MEDICARE

## 2019-08-09 VITALS
HEART RATE: 56 BPM | RESPIRATION RATE: 16 BRPM | OXYGEN SATURATION: 97 % | SYSTOLIC BLOOD PRESSURE: 140 MMHG | DIASTOLIC BLOOD PRESSURE: 61 MMHG

## 2019-08-09 DIAGNOSIS — K70.31 ASCITES DUE TO ALCOHOLIC CIRRHOSIS: ICD-10-CM

## 2019-08-09 PROCEDURE — 49083 ABD PARACENTESIS W/IMAGING: CPT

## 2019-08-09 PROCEDURE — 49083 IR PARACENTESIS NO IMAGING: ICD-10-PCS | Mod: ,,, | Performed by: RADIOLOGY

## 2019-08-09 PROCEDURE — 49083 ABD PARACENTESIS W/IMAGING: CPT | Mod: ,,, | Performed by: RADIOLOGY

## 2019-08-09 NOTE — PROCEDURES
Radiology Post-Procedure Note    Pre Op Diagnosis: Ascites  Post Op Diagnosis: Same    Procedure: Paracentesis    Procedure performed by: Westley Trotter MD    Written Informed Consent Obtained: Yes  Specimen Removed: YES   Estimated Blood Loss: Minimal    Findings:   Successful paracentesis.  Albumin administered PRN per protocol.    Patient tolerated procedure well.    Westley Trotter MD  Diagnostic and Interventional Radiologist  Department of Radiology  Pager: [phone]

## 2019-08-09 NOTE — DISCHARGE INSTRUCTIONS
For scheduling: Call Mackenzie at [phone]    For questions or concerns call: SHAR MON-FRI 8 AM- 5PM [phone]. Radiology resident on call [phone].    For immediate concerns that are not emergent, you may call our radiology clinic at: [phone]          4000 Liters

## 2019-08-09 NOTE — H&P
Radiology History & Physical      SUBJECTIVE:     Chief Complaint: Ascites    History of Present Illness:  Kenneth Sheikh is a 79 y.o. male with ascites who presents for paracentesis.    Past Medical History:   Diagnosis Date    Anticoagulant long-term use     Bipolar 1 disorder     Bipolar 1 disorder 11/24/2016    bipolar    Cancer     history of skin cancer/sinus cancer & rectal cancer    Depressed bipolar affective disorder     Diabetes mellitus     type 2    EBV infection 12/7/2016    EBV DNA, PCR Latest Ref Range: None detected  None detected EBV DNA-Copies/mL Unknown Test Not Performed EBV Early Antigen Ab, IgG Latest Ref Range: <1:10 Titer 1:40 (A) EBV Nuclear Ag Ab Latest Ref Range: <1:5 Titer >=1:80 (A) EBV VCA IgG Latest Ref Range: <1:10 Titer 1:160 (A) EBV VCA IgM Latest Ref Range: <1:10 Titer <1:10     History of TIA (transient ischemic attack)     several-taking Plavix    Hx of gallstones     Wife denies    Hypertension     Hyperthyroidism 11/30/2016    Low HDL (under 40) 12/7/2016    Mixed hyperlipidemia 11/23/2016    JUAN MANUEL (obstructive sleep apnea)     Pneumonia     Skin disease     Squamous cell carcinoma 08/2018    right temple    Stroke     Transient cerebral ischemia 7/22/2016    Type 2 diabetes mellitus      Past Surgical History:   Procedure Laterality Date    BIOPSY-BONE MARROW Left 11/3/2016    Performed by [facility] at [facility] OR 2ND FLR    ESOPHAGOGASTRODUODENOSCOPY (EGD) N/A 2/3/2017    Performed by Domenico Fox MD at [facility] ENDO (4TH FLR)    EXCISION-MASS RECTAL  6/28/2016    Performed by Haris Talavera MD at [facility] OR 2ND FLR    Moh's procedure x4      rectal cancer      Sinus surgery for sinus cancer      sinus sx for cancer      skin cancer removed-several times-nose & ear      TONSILLECTOMY      ULTRASOUND-ENDOSCOPIC-UPPER/glue coil of gastric varices N/A 4/19/2017    Performed by Aneudy Perla MD at [facility] ENDO (2ND FLR)    Undescened testicle sx       UVULOPALATOPHARYNGOPLASTY      for sleep apnea       Home Meds:   Prior to Admission medications    Medication Sig Start Date End Date Taking? Authorizing Provider   ACCU-CHEK NEYDA PLUS TEST STRP Strp 1 strip by Misc.(Non-Drug; Combo Route) route once daily. 11/5/18   Prisca Espinoza MD   ciprofloxacin HCl (CIPRO) 500 MG tablet TAKE 1 TABLET(500 MG) BY MOUTH EVERY MONDAY 7/23/19   Renato Womack MD   divalproex (DEPAKOTE) 250 MG EC tablet Take 2 tablets (500 mg total) by mouth every evening. Do not fill prescription until patient calls and requests. 12/10/18   Prisca Espinoza MD   fluorouracil (EFUDEX) 5 % cream AAA + 1cm of normal appearing surrounding skin BID x 4-6 weeks. Stop if blistered, oozing, or bleeding. Use daily sun protection. 7/10/19   Dejah Anders MD   furosemide (LASIX) 20 MG tablet TAKE 2 TABLETS BY MOUTH EVERY DAY 5/2/19   Renato Womack MD   lactulose (CHRONULAC) 10 gram/15 mL solution TAKE 15 MLS BY MOUTH TWICE DAILY 8/5/19   Renato Womack MD   lancets [facility] To check blood sugar once daily, to use with Accu-Chek meter. 11/5/18   Prisca Espinoza MD   ofloxacin (OCUFLOX) 0.3 % ophthalmic solution  1/10/19   Historical Provider, MD   prednisoLONE acetate (PRED FORTE) 1 % DrpS  1/15/19   Historical Provider, MD   rifAXIMin (XIFAXAN) 550 mg Tab Take 1 tablet (550 mg total) by mouth 2 (two) times daily. 10/3/17   Renato Womack MD   SITagliptan (JANUVIA) 50 MG Tab Take 1 tablet (50 mg total) by mouth once daily. 4/6/17   Prisca Espinoza MD     Anticoagulants/Antiplatelets: no anticoagulation    Allergies:   Review of patient's allergies indicates:   Allergen Reactions    Abilify [aripiprazole] Other (See Comments)     Sleepy, could not function.     Sedation History:  no adverse reactions    Review of Systems:   Hematological: no known coagulopathies  Respiratory: no shortness of breath  Cardiovascular: no chest pain  Gastrointestinal: no abdominal  pain  Genito-Urinary: no dysuria  Musculoskeletal: negative  Neurological: no TIA or stroke symptoms         OBJECTIVE:     Vital Signs (Most Recent)  Pulse: (!) 56 (08/09/19 1205)  Resp: 16 (08/09/19 1205)  BP: (!) 140/61 (08/09/19 1205)  SpO2: 97 % (08/09/19 1205)    Physical Exam:  ASA: n/a  Mallampati: n/a    General: no acute distress  Mental Status: alert and oriented to person, place and time  HEENT: normocephalic, atraumatic  Chest: unlabored breathing  Heart: regular heart rate  Abdomen: nondistended  Extremity: moves all extremities    Laboratory  Lab Results   Component Value Date    INR 1.1 06/14/2019       Lab Results   Component Value Date    WBC 2.97 (L) 06/14/2019    HGB 10.5 (L) 06/14/2019    HCT 33.0 (L) 06/14/2019    MCV 87 06/14/2019     (L) 06/14/2019      Lab Results   Component Value Date     06/14/2019     06/14/2019    K 3.6 06/14/2019     06/14/2019    CO2 27 06/14/2019    BUN 16 06/14/2019    CREATININE 1.1 06/14/2019    CALCIUM 8.8 06/14/2019    MG 1.7 05/15/2017    ALT 10 06/14/2019    AST 22 06/14/2019    ALBUMIN 2.5 (L) 06/14/2019    BILITOT 0.6 06/14/2019       ASSESSMENT/PLAN:     Sedation Plan: Local only.  Patient will undergo paracentesis.    Westley Trotter MD  Diagnostic and Interventional Radiologist  Department of Radiology  Pager: [phone]

## 2019-08-09 NOTE — PROGRESS NOTES
Paracentesis complete.4000 mLs peritoneal fluid drained. Pt tolerated well. Dressing to left abd clean, dry, and intact. Vss. NAD. Pt discharged.

## 2019-08-09 NOTE — DISCHARGE SUMMARY
Radiology Discharge Summary      Hospital Course: No complications    Admit Date: 8/9/2019  Discharge Date: 08/09/2019     Instructions Given to Patient: Yes  Diet: Resume prior diet  Activity: activity as tolerated    Description of Condition on Discharge: Stable  Vital Signs (Most Recent): Pulse: (!) 56 (08/09/19 1205)  Resp: 16 (08/09/19 1205)  BP: (!) 140/61 (08/09/19 1205)  SpO2: 97 % (08/09/19 1205)    Discharge Disposition: Home    Discharge Diagnosis: Ascites s/p paracentesis     Follow-up: JAIME Trotter MD  Diagnostic and Interventional Radiologist  Department of Radiology  Pager: [phone]

## 2019-08-13 ENCOUNTER — PROCEDURE VISIT (OUTPATIENT)
Dept: DERMATOLOGY | Facility: CLINIC | Age: 79
End: 2019-08-13
Payer: MEDICARE

## 2019-08-13 VITALS
BODY MASS INDEX: 25.91 KG/M2 | SYSTOLIC BLOOD PRESSURE: 121 MMHG | HEIGHT: 68 IN | DIASTOLIC BLOOD PRESSURE: 69 MMHG | HEART RATE: 56 BPM | WEIGHT: 171 LBS

## 2019-08-13 DIAGNOSIS — C44.311 BASAL CELL CARCINOMA OF DORSUM OF NOSE: Primary | ICD-10-CM

## 2019-08-13 DIAGNOSIS — D48.5 NEOPLASM OF UNCERTAIN BEHAVIOR OF SKIN: ICD-10-CM

## 2019-08-13 PROCEDURE — 11102 TANGNTL BX SKIN SINGLE LES: CPT | Mod: 59,PBBFAC | Performed by: DERMATOLOGY

## 2019-08-13 PROCEDURE — 17311 MOHS 1 STAGE H/N/HF/G: CPT | Mod: PBBFAC | Performed by: DERMATOLOGY

## 2019-08-13 PROCEDURE — 13152 CMPLX RPR E/N/E/L 2.6-7.5 CM: CPT | Mod: S$PBB,59,, | Performed by: DERMATOLOGY

## 2019-08-13 PROCEDURE — 17312 MOHS ADDL STAGE: CPT | Mod: S$PBB,,, | Performed by: DERMATOLOGY

## 2019-08-13 PROCEDURE — 13152 CMPLX RPR E/N/E/L 2.6-7.5 CM: CPT | Mod: PBBFAC,59 | Performed by: DERMATOLOGY

## 2019-08-13 PROCEDURE — 11102 TANGNTL BX SKIN SINGLE LES: CPT | Mod: 59,S$PBB,, | Performed by: DERMATOLOGY

## 2019-08-13 PROCEDURE — 88305 TISSUE SPECIMEN TO PATHOLOGY, DERMATOLOGY: ICD-10-PCS | Mod: 26,,, | Performed by: PATHOLOGY

## 2019-08-13 PROCEDURE — 13152 PR RECMPL WND LID,NOS,EAR 2.6-7.5 CM: ICD-10-PCS | Mod: S$PBB,59,, | Performed by: DERMATOLOGY

## 2019-08-13 PROCEDURE — 17312 MOHS ADDL STAGE: CPT | Mod: PBBFAC,59 | Performed by: DERMATOLOGY

## 2019-08-13 PROCEDURE — 99499 UNLISTED E&M SERVICE: CPT | Mod: S$PBB,,, | Performed by: DERMATOLOGY

## 2019-08-13 PROCEDURE — 17312: ICD-10-PCS | Mod: S$PBB,,, | Performed by: DERMATOLOGY

## 2019-08-13 PROCEDURE — 99499 NO LOS: ICD-10-PCS | Mod: S$PBB,,, | Performed by: DERMATOLOGY

## 2019-08-13 PROCEDURE — 11102 PR TANGENTIAL BIOPSY, SKIN, SINGLE LESION: ICD-10-PCS | Mod: 59,S$PBB,, | Performed by: DERMATOLOGY

## 2019-08-13 PROCEDURE — 17311: ICD-10-PCS | Mod: S$PBB,,, | Performed by: DERMATOLOGY

## 2019-08-13 PROCEDURE — 88305 TISSUE EXAM BY PATHOLOGIST: CPT | Performed by: PATHOLOGY

## 2019-08-13 PROCEDURE — 88305 TISSUE EXAM BY PATHOLOGIST: CPT | Mod: 26,,, | Performed by: PATHOLOGY

## 2019-08-13 PROCEDURE — 17311 MOHS 1 STAGE H/N/HF/G: CPT | Mod: S$PBB,,, | Performed by: DERMATOLOGY

## 2019-08-13 RX ORDER — CEPHALEXIN 500 MG/1
500 CAPSULE ORAL 3 TIMES DAILY
Qty: 21 CAPSULE | Refills: 0 | Status: SHIPPED | OUTPATIENT
Start: 2019-08-13 | End: 2019-08-20

## 2019-08-13 RX ORDER — HYDROCODONE BITARTRATE AND ACETAMINOPHEN 5; 325 MG/1; MG/1
1 TABLET ORAL EVERY 6 HOURS PRN
Qty: 10 TABLET | Refills: 0 | Status: SHIPPED | OUTPATIENT
Start: 2019-08-13 | End: 2020-01-16

## 2019-08-13 NOTE — PROGRESS NOTES
PROCEDURE: Mohs' Micrographic Surgery    INDICATION: Location in mask areas of face including central face, nose, eyelids, eyebrows, lips, chin, preauricular, temple, and ear. Biopsy-proven skin cancer of cosmetically and functionally important areas, including head, neck, genital, hand, foot, or areas known for having difficulty in healing, such as the lower anterior legs. Tumor with ill-defined borders.    REFERRING MD: Dejah Anders M.D.    CASE NUMBER:     ANESTHETIC: 4.5 cc 0.5% Lidocaine with Epi 1:200,000 mixed 1:1 with 0.5% Bupivacaine    SURGICAL PREP: Hibiclens    SURGEON: Kendy Orellana MD    ASSISTANTS: Valorie Castellanos PA-C, Stacey Thornton MA, Jody Posey, Surg Tech and Ghada Sheikh, Surg Tech    PREOPERATIVE DIAGNOSIS: basal cell carcinoma    POSTOPERATIVE DIAGNOSIS: basal cell carcinoma    PATHOLOGIC DIAGNOSIS: basal cell carcinoma- nodular    HISTOLOGY OF SPECIMENS IN FIRST STAGE:   Tumor Type: Tumor seen. Nodular basal cell carcinoma: Nodular tumor in dermis composed of basaloid cells exhibiting peripheral palisading and retraction artifact.   Depth of Invasion: epidermis and dermis  Perineural Invasion: No    HISTOLOGY OF SPECIMENS IN SUBSEQUENT STAGES:  Tumor Type: Tumor seen. Same as in first stage.   Depth of Invasion: epidermis and dermis  Perineural Invasion: No    STAGES OF MOHS' SURGERY PERFORMED: 3    TUMOR-FREE PLANE ACHIEVED: Yes    HEMOSTASIS: electrocoagulation     SPECIMENS: 4 (2 in stage A, 1 in stage B and 1 in stage C)    LOCATION: nasal dorsum. Patient verified location with hand held mirror.    INITIAL LESION SIZE: 0.5 x 0.5 cm    FINAL DEFECT SIZE: 0.9 x 1.1 cm    WOUND REPAIR/DISPOSITION: The patient tolerated Mohs' Micrographic Surgery for a basal cell carcinoma very well. When the tumor was completely removed, a repair of the surgical defect was undertaken.      PROCEDURE: Complex Linear Repair    INDICATION: Status post Mohs' Micrographic Surgery for basal cell  "carcinoma.    CASE NUMBER:     SURGEON: Kendy Orellana MD    ASSISTANTS: Valorie Castellanos PA-C and Bibi Soriano    ANESTHETIC: 3 cc 1% Lidocaine with Epinephrine 1:100,000    SURGICAL PREP: Hibiclens, prepped by Ghada Sheikh Surg [place]    LOCATION: nasal dorsum    DEFECT SIZE: 0.9 x 1.1 cm    WOUND REPAIR/DISPOSITION:  After the patient's carcinoma had been completely removed with Mohs' Micrographic Surgery, a repair of the surgical defect was undertaken. The patient was returned to the operating suite where the area of nasal dorsum was prepped, draped, and anesthetized in the usual sterile fashion. The wound was widely undermined in all directions. Then, electrocoagulation was used to obtain meticulous hemostasis. 4-0 Vicryl buried vertical mattress sutures were placed into the subcutaneous and dermal plane to close the wound and osbaldo the cutaneous wound edge. Bilateral dog ears were identified and were removed by a standard Burow's triangle technique. The cutaneous wound edges were closed using interrupted 4-0 Prolene suture.    The patient tolerated the procedure well.    The area was cleaned and dressed appropriately and the patient was given wound care instructions, as well as appointment for follow-up evaluation. Patient was placed on Norco 5-325 mg prn postop pain and Keflex 500 mg TID x 7 days.    LENGTH OF REPAIR: 3.2 cm    Vitals:    08/13/19 0718 08/13/19 1100   BP: 136/79 121/69   BP Location: Left arm    Patient Position: Sitting    BP Method: Small (Automatic)    Pulse: 67 (!) 56   Weight: 77.6 kg (171 lb)    Height: 5' 8" (1.727 m)        Pt here for Mohs BCC nasal dorsum.    Also c/o lesion on L inferior nasal sidewall. (+) scabbing. No prior tx. Not using any creams.    Physical Exam   HENT:   Nose:           L inferior nasal sidewall with a 5 x 5 mm erythematous scaly papule located 2.2 cm inferiorly from the left medial canthus and 2.4 cm superiorly from the left alar base.    Rule " out BCC, L inferior nasal sidewall.  Shave bx after verbal consent.  Etoh, 1% lido with epi 1:100,000, 2 cc, shave, AlCl, pressure bandage, no complications.  Disc wound care.    If (+) ca, schedule Mohs.    Pathology Orders:     Normal Orders This Visit    Tissue Specimen To Pathology, Dermatology     Questions:    Directional Terms:  Other(comment)    Clinical Information:  rule out BCC    Specific Site:  left inferior nasal sidewall

## 2019-08-15 NOTE — DISCHARGE SUMMARY
"Radiology Discharge Summary      Hospital Course: No complications    Admit Date: 5/29/2017  Discharge Date: 05/29/2017     Instructions Given to Patient: Yes  Diet: Resume prior diet  Activity: activity as tolerated    Description of Condition on Discharge: Stable  Vital Signs (Most Recent): Pulse: (!) 52 (05/29/17 1301)  Resp: 18 (05/29/17 1301)  BP: (!) 98/49 (05/29/17 1301)  SpO2: 100 % (05/29/17 1301)    Discharge Disposition: Home    Discharge Diagnosis: ascites    Kunal "Jennifer Kowalski MD  Radiology PGY-3  [phone]       " Alert-The patient is alert, awake and responds to voice. The patient is oriented to time, place, and person. The triage nurse is able to obtain subjective information.

## 2019-08-16 ENCOUNTER — HOSPITAL ENCOUNTER (OUTPATIENT)
Dept: INTERVENTIONAL RADIOLOGY/VASCULAR | Facility: HOSPITAL | Age: 79
Discharge: HOME | End: 2019-08-16
Attending: INTERNAL MEDICINE
Payer: MEDICARE

## 2019-08-16 VITALS
OXYGEN SATURATION: 98 % | HEART RATE: 57 BPM | DIASTOLIC BLOOD PRESSURE: 62 MMHG | RESPIRATION RATE: 18 BRPM | SYSTOLIC BLOOD PRESSURE: 127 MMHG

## 2019-08-16 DIAGNOSIS — K70.31 ASCITES DUE TO ALCOHOLIC CIRRHOSIS: ICD-10-CM

## 2019-08-16 LAB
GRAM STN SPEC: NORMAL
GRAM STN SPEC: NORMAL

## 2019-08-16 PROCEDURE — 87205 SMEAR GRAM STAIN: CPT

## 2019-08-16 PROCEDURE — 87075 CULTR BACTERIA EXCEPT BLOOD: CPT

## 2019-08-16 PROCEDURE — 49083 IR PARACENTESIS NO IMAGING: ICD-10-PCS | Mod: ,,, | Performed by: FAMILY MEDICINE

## 2019-08-16 PROCEDURE — 49083 ABD PARACENTESIS W/IMAGING: CPT | Mod: ,,, | Performed by: FAMILY MEDICINE

## 2019-08-16 PROCEDURE — 87070 CULTURE OTHR SPECIMN AEROBIC: CPT

## 2019-08-16 PROCEDURE — 49083 ABD PARACENTESIS W/IMAGING: CPT

## 2019-08-16 NOTE — H&P
Radiology History & Physical      SUBJECTIVE:     Chief Complaint: abdominal distention    History of Present Illness:  Kenneth Sheikh is a 79 y.o. male who presents for ultrasound guided paracentesis  Past Medical History:   Diagnosis Date    Anticoagulant long-term use     Basal cell carcinoma     Bipolar 1 disorder     Bipolar 1 disorder 11/24/2016    bipolar    Cancer     history of skin cancer/sinus cancer & rectal cancer    Depressed bipolar affective disorder     Diabetes mellitus     type 2    EBV infection 12/7/2016    EBV DNA, PCR Latest Ref Range: None detected  None detected EBV DNA-Copies/mL Unknown Test Not Performed EBV Early Antigen Ab, IgG Latest Ref Range: <1:10 Titer 1:40 (A) EBV Nuclear Ag Ab Latest Ref Range: <1:5 Titer >=1:80 (A) EBV VCA IgG Latest Ref Range: <1:10 Titer 1:160 (A) EBV VCA IgM Latest Ref Range: <1:10 Titer <1:10     History of TIA (transient ischemic attack)     several-taking Plavix    Hx of gallstones     Wife denies    Hypertension     Hyperthyroidism 11/30/2016    Low HDL (under 40) 12/7/2016    Mixed hyperlipidemia 11/23/2016    JUAN MANUEL (obstructive sleep apnea)     Pneumonia     Skin disease     Squamous cell carcinoma 08/2018    right temple    Stroke     Transient cerebral ischemia 7/22/2016    Type 2 diabetes mellitus      Past Surgical History:   Procedure Laterality Date    BIOPSY-BONE MARROW Left 11/3/2016    Performed by Bud Bob MD at [facility] OR 2ND FLR    ESOPHAGOGASTRODUODENOSCOPY (EGD) N/A 2/3/2017    Performed by Domenico Fox MD at [facility] ENDO (4TH FLR)    EXCISION-MASS RECTAL  6/28/2016    Performed by Haris Talavera MD at [facility] OR 2ND FLR    Moh's procedure x4      rectal cancer      Sinus surgery for sinus cancer      sinus sx for cancer      skin cancer removed-several times-nose & ear      TONSILLECTOMY      ULTRASOUND-ENDOSCOPIC-UPPER/glue coil of gastric varices N/A 4/19/2017    Performed by Aneudy Perla MD at [facility]  ENDO (2ND FLR)    Undescened testicle sx      UVULOPALATOPHARYNGOPLASTY      for sleep apnea       Home Meds:   Prior to Admission medications    Medication Sig Start Date End Date Taking? Authorizing Provider   ACCU-CHEK NEYDA PLUS TEST STRP Strp 1 strip by Misc.(Non-Drug; Combo Route) route once daily. 11/5/18   Prisca Espinoza MD   cephALEXin (KEFLEX) 500 MG capsule Take 1 capsule (500 mg total) by mouth 3 (three) times daily. for 7 days 8/13/19 8/20/19  Valorie Castellanos PA-C   ciprofloxacin HCl (CIPRO) 500 MG tablet TAKE 1 TABLET(500 MG) BY MOUTH EVERY MONDAY 7/23/19   Renato Womack MD   divalproex (DEPAKOTE) 250 MG EC tablet Take 2 tablets (500 mg total) by mouth every evening. Do not fill prescription until patient calls and requests. 12/10/18   Prisca Espinzoa MD   fluorouracil (EFUDEX) 5 % cream AAA + 1cm of normal appearing surrounding skin BID x 4-6 weeks. Stop if blistered, oozing, or bleeding. Use daily sun protection. 7/10/19   Dejah Anders MD   furosemide (LASIX) 20 MG tablet TAKE 2 TABLETS BY MOUTH EVERY DAY 5/2/19   Renato Womack MD   HYDROcodone-acetaminophen (NORCO) 5-325 mg per tablet Take 1 tablet by mouth every 6 (six) hours as needed for Pain. 8/13/19   Valorie Castellanos PA-C   lactulose (CHRONULAC) 10 gram/15 mL solution TAKE 15 MLS BY MOUTH TWICE DAILY 8/5/19   Renato Womack MD   lancets [facility] To check blood sugar once daily, to use with Accu-Chek meter. 11/5/18   Prisca Espinoza MD   ofloxacin (OCUFLOX) 0.3 % ophthalmic solution  1/10/19   Historical Provider, MD   prednisoLONE acetate (PRED FORTE) 1 % DrpS  1/15/19   Historical Provider, MD   rifAXIMin (XIFAXAN) 550 mg Tab Take 1 tablet (550 mg total) by mouth 2 (two) times daily. 10/3/17   Renato Womack MD   SITagliptan (JANUVIA) 50 MG Tab Take 1 tablet (50 mg total) by mouth once daily. 4/6/17   Prisca Espinoza MD     Anticoagulants/Antiplatelets: no anticoagulation    Allergies:   Review of patient's  allergies indicates:   Allergen Reactions    Abilify [aripiprazole] Other (See Comments)     Sleepy, could not function.     Sedation History:  no adverse reactions    Review of Systems:   Hematological: no known coagulopathies  Respiratory: no shortness of breath  Cardiovascular: no chest pain  Gastrointestinal: no abdominal pain  Genito-Urinary: no dysuria  Musculoskeletal: negative  Neurological: no TIA or stroke symptoms         OBJECTIVE:     Vital Signs (Most Recent)       Physical Exam:  ASA: 2  Mallampati: n/a    General: no acute distress  Mental Status: alert and oriented to person, place and time  HEENT: normocephalic, atraumatic  Chest: unlabored breathing  Heart: regular heart rate  Abdomen: distended  Extremity: moves all extremities    ASSESSMENT/PLAN:     Sedation Plan: local  Patient will undergo ultrasound guided paracentesis.    MATT Mauricio, FNP  Interventional Radiology  [phone] [facility]

## 2019-08-16 NOTE — PROCEDURES
Radiology Post-Procedure Note    Pre Op Diagnosis: Ascites  Post Op Diagnosis: Same    Procedure: Ultrasound Guided Paracentesis    Procedure performed by: Souleymane ROSS, Елена     Written Informed Consent Obtained: Yes  Specimen Removed: YES cloudy yellow  Estimated Blood Loss: Minimal    Findings:   Successful paracentesis.  Albumin administered PRN per protocol.    Patient tolerated procedure well.    Елена Comer, APRN, FNP  Interventional Radiology  [phone] clinic

## 2019-08-16 NOTE — PROGRESS NOTES
Paracentesis complete. 4100 mLs peritoneal fluid drained. Pt tolerated well. Dressing to abdomenclean, dry, and intact.. Specimens sent per lab order. VSS. Pt acknowledged discharge instructions. Pt discharged per unit and hospital protocol via wheelchair.

## 2019-08-19 LAB — BACTERIA SPEC AEROBE CULT: NO GROWTH

## 2019-08-20 ENCOUNTER — OFFICE VISIT (OUTPATIENT)
Dept: DERMATOLOGY | Facility: CLINIC | Age: 79
End: 2019-08-20
Payer: MEDICARE

## 2019-08-20 DIAGNOSIS — Z09 POSTOP CHECK: Primary | ICD-10-CM

## 2019-08-20 PROCEDURE — 99213 OFFICE O/P EST LOW 20 MIN: CPT | Mod: PBBFAC | Performed by: DERMATOLOGY

## 2019-08-20 PROCEDURE — 99999 PR PBB SHADOW E&M-EST. PATIENT-LVL III: ICD-10-PCS | Mod: PBBFAC,,, | Performed by: DERMATOLOGY

## 2019-08-20 PROCEDURE — 99999 PR PBB SHADOW E&M-EST. PATIENT-LVL III: CPT | Mod: PBBFAC,,, | Performed by: DERMATOLOGY

## 2019-08-20 PROCEDURE — 99024 POSTOP FOLLOW-UP VISIT: CPT | Mod: POP,,, | Performed by: DERMATOLOGY

## 2019-08-20 PROCEDURE — 99024 PR POST-OP FOLLOW-UP VISIT: ICD-10-PCS | Mod: POP,,, | Performed by: DERMATOLOGY

## 2019-08-20 NOTE — PROGRESS NOTES
79 y.o. male patient is here for suture removal following Mohs' surgery.    Patient reports no problems.    WOUND PE:  The nasal dorsum sutures intact. Wound healing well. Good skin edges. No signs or symptoms of infection.    IMPRESSION:  Healing operative site from Mohs' surgery, BCC nasal dorsum s/p Mohs with CLC, postop day #7.    PLAN:  Sutures removed today. Steri-strips applied.  Continue wound care.  Keep moist with Aquaphor.  Disc bx results - left inferior nasal sidewall (+) for BCC. Need to schedule Mohs. Would like to wait few weeks for this area to heal first.    RTC:  In 3-4 weeks for BCC left inferior nasal sidewall.

## 2019-08-23 ENCOUNTER — HOSPITAL ENCOUNTER (OUTPATIENT)
Dept: INTERVENTIONAL RADIOLOGY/VASCULAR | Facility: HOSPITAL | Age: 79
Discharge: HOME | End: 2019-08-23
Attending: INTERNAL MEDICINE
Payer: MEDICARE

## 2019-08-23 VITALS
DIASTOLIC BLOOD PRESSURE: 72 MMHG | RESPIRATION RATE: 16 BRPM | HEART RATE: 51 BPM | OXYGEN SATURATION: 100 % | SYSTOLIC BLOOD PRESSURE: 116 MMHG

## 2019-08-23 DIAGNOSIS — K70.31 ASCITES DUE TO ALCOHOLIC CIRRHOSIS: ICD-10-CM

## 2019-08-23 LAB — BACTERIA SPEC ANAEROBE CULT: NORMAL

## 2019-08-23 PROCEDURE — 49083 ABD PARACENTESIS W/IMAGING: CPT | Mod: ICN,,, | Performed by: FAMILY MEDICINE

## 2019-08-23 PROCEDURE — 49083 ABD PARACENTESIS W/IMAGING: CPT

## 2019-08-23 PROCEDURE — 49083 IR PARACENTESIS WITH IMAGING: ICD-10-PCS | Mod: ICN,,, | Performed by: FAMILY MEDICINE

## 2019-08-23 NOTE — PLAN OF CARE
Pt arrived to MPU room 11.  NAD distress noted.  Orders and labs reviewed in chart.  Awaiting consent.

## 2019-08-23 NOTE — H&P
Radiology History & Physical      SUBJECTIVE:     Chief Complaint: abdominal distention    History of Present Illness:  Kenneth Sheikh is a 79 y.o. male who presents for ultrasound guided paracentesis  Past Medical History:   Diagnosis Date    Anticoagulant long-term use     Basal cell carcinoma     Bipolar 1 disorder     Bipolar 1 disorder 11/24/2016    bipolar    Cancer     history of skin cancer/sinus cancer & rectal cancer    Depressed bipolar affective disorder     Diabetes mellitus     type 2    EBV infection 12/7/2016    EBV DNA, PCR Latest Ref Range: None detected  None detected EBV DNA-Copies/mL Unknown Test Not Performed EBV Early Antigen Ab, IgG Latest Ref Range: <1:10 Titer 1:40 (A) EBV Nuclear Ag Ab Latest Ref Range: <1:5 Titer >=1:80 (A) EBV VCA IgG Latest Ref Range: <1:10 Titer 1:160 (A) EBV VCA IgM Latest Ref Range: <1:10 Titer <1:10     History of TIA (transient ischemic attack)     several-taking Plavix    Hx of gallstones     Wife denies    Hypertension     Hyperthyroidism 11/30/2016    Low HDL (under 40) 12/7/2016    Mixed hyperlipidemia 11/23/2016    JUAN MANUEL (obstructive sleep apnea)     Pneumonia     Skin disease     Squamous cell carcinoma 08/2018    right temple    Stroke     Transient cerebral ischemia 7/22/2016    Type 2 diabetes mellitus      Past Surgical History:   Procedure Laterality Date    BIOPSY-BONE MARROW Left 11/3/2016    Performed by Bud Bob MD at [facility] OR 2ND FLR    ESOPHAGOGASTRODUODENOSCOPY (EGD) N/A 2/3/2017    Performed by Domenico Fox MD at [facility] ENDO (4TH FLR)    EXCISION-MASS RECTAL  6/28/2016    Performed by Haris Talavera MD at [facility] OR 2ND FLR    Moh's procedure x4      rectal cancer      Sinus surgery for sinus cancer      sinus sx for cancer      skin cancer removed-several times-nose & ear      TONSILLECTOMY      ULTRASOUND-ENDOSCOPIC-UPPER/glue coil of gastric varices N/A 4/19/2017    Performed by Aneudy Perla MD at [facility]  ENDO (2ND FLR)    Undescened testicle sx      UVULOPALATOPHARYNGOPLASTY      for sleep apnea       Home Meds:   Prior to Admission medications    Medication Sig Start Date End Date Taking? Authorizing Provider   ACCU-CHEK NEYDA PLUS TEST STRP Strp 1 strip by Misc.(Non-Drug; Combo Route) route once daily. 11/5/18   Prisca Espinoza MD   ciprofloxacin HCl (CIPRO) 500 MG tablet TAKE 1 TABLET(500 MG) BY MOUTH EVERY MONDAY 7/23/19   Renato Womack MD   divalproex (DEPAKOTE) 250 MG EC tablet Take 2 tablets (500 mg total) by mouth every evening. Do not fill prescription until patient calls and requests. 12/10/18   Prisca Espinoza MD   fluorouracil (EFUDEX) 5 % cream AAA + 1cm of normal appearing surrounding skin BID x 4-6 weeks. Stop if blistered, oozing, or bleeding. Use daily sun protection. 7/10/19   Dejah Anders MD   furosemide (LASIX) 20 MG tablet TAKE 2 TABLETS BY MOUTH EVERY DAY 5/2/19   Renato Womack MD   HYDROcodone-acetaminophen (NORCO) 5-325 mg per tablet Take 1 tablet by mouth every 6 (six) hours as needed for Pain. 8/13/19   Valorie Castellanos PA-C   lactulose (CHRONULAC) 10 gram/15 mL solution TAKE 15 MLS BY MOUTH TWICE DAILY 8/5/19   Renato Womack MD   lancets [facility] To check blood sugar once daily, to use with Accu-Chek meter. 11/5/18   Prisca Espinoza MD   ofloxacin (OCUFLOX) 0.3 % ophthalmic solution  1/10/19   Historical Provider, MD   prednisoLONE acetate (PRED FORTE) 1 % DrpS  1/15/19   Historical Provider, MD   rifAXIMin (XIFAXAN) 550 mg Tab Take 1 tablet (550 mg total) by mouth 2 (two) times daily. 10/3/17   Renato Womack MD   SITagliptan (JANUVIA) 50 MG Tab Take 1 tablet (50 mg total) by mouth once daily. 4/6/17   Prisca Espinoza MD     Anticoagulants/Antiplatelets: no anticoagulation    Allergies:   Review of patient's allergies indicates:   Allergen Reactions    Abilify [aripiprazole] Other (See Comments)     Sleepy, could not function.     Sedation History:   no adverse reactions    Review of Systems:   Hematological: no known coagulopathies  Respiratory: no shortness of breath  Cardiovascular: no chest pain  Gastrointestinal: no abdominal pain  Genito-Urinary: no dysuria  Musculoskeletal: negative  Neurological: no TIA or stroke symptoms         OBJECTIVE:     Vital Signs (Most Recent)       Physical Exam:  ASA: 2  Mallampati: n/a    General: no acute distress  Mental Status: alert and oriented to person, place and time  HEENT: normocephalic, atraumatic  Chest: unlabored breathing  Heart: regular heart rate  Abdomen: distended  Extremity: moves all extremities    ASSESSMENT/PLAN:     Sedation Plan: local  Patient will undergo ultrasound guided paracentesis.    MATT Mauricio, FNP  Interventional Radiology  [phone] clinic

## 2019-08-23 NOTE — PLAN OF CARE
Paracentesis completed.  Pt tolerated well. NAD noted.  4.4 L removed from L abd.  Dressing applied, CDI.  Pt escorted to lobby via wheelchair. Discharged with family.

## 2019-08-23 NOTE — DISCHARGE INSTRUCTIONS
SHAR MONDAY-FRIDAY 8AM-5PM CALL [phone]  AFTER HOURS CALL [phone] ASK FOR RADIOLOGY RESIDENT

## 2019-08-23 NOTE — PROCEDURES
Radiology Post-Procedure Note    Pre Op Diagnosis: Ascites  Post Op Diagnosis: Same    Procedure: Ultrasound Guided Paracentesis    Procedure performed by: Souleymane ROSS, Елена     Written Informed Consent Obtained: Yes  Specimen Removed: YES cloudy  Estimated Blood Loss: Minimal    Findings:   Successful paracentesis.  Albumin administered PRN per protocol.    Patient tolerated procedure well.    Елена Comer, APRN, FNP  Interventional Radiology  [phone] clinic

## 2019-08-30 ENCOUNTER — HOSPITAL ENCOUNTER (OUTPATIENT)
Dept: INTERVENTIONAL RADIOLOGY/VASCULAR | Facility: HOSPITAL | Age: 79
Discharge: HOME | End: 2019-08-30
Attending: INTERNAL MEDICINE
Payer: MEDICARE

## 2019-08-30 VITALS
RESPIRATION RATE: 16 BRPM | OXYGEN SATURATION: 99 % | HEART RATE: 60 BPM | DIASTOLIC BLOOD PRESSURE: 72 MMHG | SYSTOLIC BLOOD PRESSURE: 128 MMHG

## 2019-08-30 DIAGNOSIS — K70.31 ASCITES DUE TO ALCOHOLIC CIRRHOSIS: ICD-10-CM

## 2019-08-30 LAB
APPEARANCE FLD: NORMAL
BODY FLD TYPE: NORMAL
COLOR FLD: YELLOW
LYMPHOCYTES NFR FLD MANUAL: 100 %
WBC # FLD: 23 /CU MM

## 2019-08-30 PROCEDURE — 49083 ABD PARACENTESIS W/IMAGING: CPT

## 2019-08-30 PROCEDURE — 49083 ABD PARACENTESIS W/IMAGING: CPT | Mod: ,,, | Performed by: FAMILY MEDICINE

## 2019-08-30 PROCEDURE — 89051 BODY FLUID CELL COUNT: CPT

## 2019-08-30 PROCEDURE — 49083 IR PARACENTESIS WITH IMAGING: ICD-10-PCS | Mod: ,,, | Performed by: FAMILY MEDICINE

## 2019-08-30 NOTE — H&P
Radiology History & Physical      SUBJECTIVE:     Chief Complaint: abdominal distention    History of Present Illness:  Kenneth Sheikh is a 79 y.o. male who presents for ultrasound guided paracentesis  Past Medical History:   Diagnosis Date    Anticoagulant long-term use     Basal cell carcinoma     Bipolar 1 disorder     Bipolar 1 disorder 11/24/2016    bipolar    Cancer     history of skin cancer/sinus cancer & rectal cancer    Depressed bipolar affective disorder     Diabetes mellitus     type 2    EBV infection 12/7/2016    EBV DNA, PCR Latest Ref Range: None detected  None detected EBV DNA-Copies/mL Unknown Test Not Performed EBV Early Antigen Ab, IgG Latest Ref Range: <1:10 Titer 1:40 (A) EBV Nuclear Ag Ab Latest Ref Range: <1:5 Titer >=1:80 (A) EBV VCA IgG Latest Ref Range: <1:10 Titer 1:160 (A) EBV VCA IgM Latest Ref Range: <1:10 Titer <1:10     History of TIA (transient ischemic attack)     several-taking Plavix    Hx of gallstones     Wife denies    Hypertension     Hyperthyroidism 11/30/2016    Low HDL (under 40) 12/7/2016    Mixed hyperlipidemia 11/23/2016    JUAN MANUEL (obstructive sleep apnea)     Pneumonia     Skin disease     Squamous cell carcinoma 08/2018    right temple    Stroke     Transient cerebral ischemia 7/22/2016    Type 2 diabetes mellitus      Past Surgical History:   Procedure Laterality Date    BIOPSY-BONE MARROW Left 11/3/2016    Performed by Bud Bob MD at [facility] OR 2ND FLR    ESOPHAGOGASTRODUODENOSCOPY (EGD) N/A 2/3/2017    Performed by Domenico Fox MD at [facility] ENDO (4TH FLR)    EXCISION-MASS RECTAL  6/28/2016    Performed by Haris Talavera MD at [facility] OR 2ND FLR    Moh's procedure x4      rectal cancer      Sinus surgery for sinus cancer      sinus sx for cancer      skin cancer removed-several times-nose & ear      TONSILLECTOMY      ULTRASOUND-ENDOSCOPIC-UPPER/glue coil of gastric varices N/A 4/19/2017    Performed by Aneudy Perla MD at [facility]  ENDO (2ND FLR)    Undescened testicle sx      UVULOPALATOPHARYNGOPLASTY      for sleep apnea       Home Meds:   Prior to Admission medications    Medication Sig Start Date End Date Taking? Authorizing Provider   ACCU-CHEK NEYDA PLUS TEST STRP Strp 1 strip by Misc.(Non-Drug; Combo Route) route once daily. 11/5/18   Prisca Espinoza MD   ciprofloxacin HCl (CIPRO) 500 MG tablet TAKE 1 TABLET(500 MG) BY MOUTH EVERY MONDAY 7/23/19   Renato Womack MD   divalproex (DEPAKOTE) 250 MG EC tablet Take 2 tablets (500 mg total) by mouth every evening. Do not fill prescription until patient calls and requests. 12/10/18   Prisca Espinoza MD   fluorouracil (EFUDEX) 5 % cream AAA + 1cm of normal appearing surrounding skin BID x 4-6 weeks. Stop if blistered, oozing, or bleeding. Use daily sun protection. 7/10/19   Dejah Anders MD   furosemide (LASIX) 20 MG tablet TAKE 2 TABLETS BY MOUTH EVERY DAY 5/2/19   Renato Womack MD   HYDROcodone-acetaminophen (NORCO) 5-325 mg per tablet Take 1 tablet by mouth every 6 (six) hours as needed for Pain. 8/13/19   Valorie Castellanos PA-C   lactulose (CHRONULAC) 10 gram/15 mL solution TAKE 15 MLS BY MOUTH TWICE DAILY 8/5/19   Renato Womack MD   lancets [facility] To check blood sugar once daily, to use with Accu-Chek meter. 11/5/18   Prisca Espinoza MD   ofloxacin (OCUFLOX) 0.3 % ophthalmic solution  1/10/19   Historical Provider, MD   prednisoLONE acetate (PRED FORTE) 1 % DrpS  1/15/19   Historical Provider, MD   rifAXIMin (XIFAXAN) 550 mg Tab Take 1 tablet (550 mg total) by mouth 2 (two) times daily. 10/3/17   Renato Womack MD   SITagliptan (JANUVIA) 50 MG Tab Take 1 tablet (50 mg total) by mouth once daily. 4/6/17   Prisca Espinoza MD     Anticoagulants/Antiplatelets: no anticoagulation    Allergies:   Review of patient's allergies indicates:   Allergen Reactions    Abilify [aripiprazole] Other (See Comments)     Sleepy, could not function.     Sedation History:   no adverse reactions    Review of Systems:   Hematological: no known coagulopathies  Respiratory: no shortness of breath  Cardiovascular: no chest pain  Gastrointestinal: no abdominal pain  Genito-Urinary: no dysuria  Musculoskeletal: negative  Neurological: no TIA or stroke symptoms         OBJECTIVE:     Vital Signs (Most Recent)  Pulse: (!) 55 (08/30/19 1125)  Resp: 16 (08/30/19 1125)  BP: (!) 145/70 (08/30/19 1125)  SpO2: 99 % (08/30/19 1125)    Physical Exam:  ASA: 2  Mallampati: n/a    General: no acute distress  Mental Status: alert and oriented to person, place and time  HEENT: normocephalic, atraumatic  Chest: unlabored breathing  Heart: regular heart rate  Abdomen: distended  Extremity: moves all extremities    ASSESSMENT/PLAN:     Sedation Plan: local  Patient will undergo ultrasound guided paracentesis.    MATT Mauricio, FNP  Interventional Radiology  [phone] [facility]

## 2019-08-30 NOTE — PROGRESS NOTES
Paracentesis complete. 4000 mLs peritoneal fluid drained. VSS. Pt tolerated well. Dressing to abdomen clean, dry, and intact. Specimens sent per lab order. Pt acknowledged understanding of discharge instructions. Pt discharged per unit and hospital protocol via wheelchair with family member.

## 2019-09-06 ENCOUNTER — HOSPITAL ENCOUNTER (OUTPATIENT)
Dept: INTERVENTIONAL RADIOLOGY/VASCULAR | Facility: HOSPITAL | Age: 79
Discharge: HOME | End: 2019-09-06
Attending: INTERNAL MEDICINE
Payer: MEDICARE

## 2019-09-06 VITALS
DIASTOLIC BLOOD PRESSURE: 67 MMHG | SYSTOLIC BLOOD PRESSURE: 131 MMHG | OXYGEN SATURATION: 100 % | HEART RATE: 48 BPM | RESPIRATION RATE: 18 BRPM

## 2019-09-06 DIAGNOSIS — K70.31 ASCITES DUE TO ALCOHOLIC CIRRHOSIS: ICD-10-CM

## 2019-09-06 LAB
APPEARANCE FLD: NORMAL
BODY FLD TYPE: NORMAL
COLOR FLD: YELLOW
EOSINOPHIL NFR FLD MANUAL: 1 %
LYMPHOCYTES NFR FLD MANUAL: 73 %
MONOS+MACROS NFR FLD MANUAL: 23 %
NEUTROPHILS NFR FLD MANUAL: 3 %
WBC # FLD: 39 /CU MM

## 2019-09-06 PROCEDURE — 49083 ABD PARACENTESIS W/IMAGING: CPT

## 2019-09-06 PROCEDURE — 89051 BODY FLUID CELL COUNT: CPT

## 2019-09-06 PROCEDURE — 49083 ABD PARACENTESIS W/IMAGING: CPT | Mod: ,,, | Performed by: FAMILY MEDICINE

## 2019-09-06 PROCEDURE — 49083 IR PARACENTESIS WITH IMAGING: ICD-10-PCS | Mod: ,,, | Performed by: FAMILY MEDICINE

## 2019-09-06 NOTE — H&P
Radiology History & Physical      SUBJECTIVE:     Chief Complaint: abdominal distention    History of Present Illness:  Kenneth Sheikh is a 79 y.o. male who presents for ultrasound guided paracentesis  Past Medical History:   Diagnosis Date    Anticoagulant long-term use     Basal cell carcinoma     Bipolar 1 disorder     Bipolar 1 disorder 11/24/2016    bipolar    Cancer     history of skin cancer/sinus cancer & rectal cancer    Depressed bipolar affective disorder     Diabetes mellitus     type 2    EBV infection 12/7/2016    EBV DNA, PCR Latest Ref Range: None detected  None detected EBV DNA-Copies/mL Unknown Test Not Performed EBV Early Antigen Ab, IgG Latest Ref Range: <1:10 Titer 1:40 (A) EBV Nuclear Ag Ab Latest Ref Range: <1:5 Titer >=1:80 (A) EBV VCA IgG Latest Ref Range: <1:10 Titer 1:160 (A) EBV VCA IgM Latest Ref Range: <1:10 Titer <1:10     History of TIA (transient ischemic attack)     several-taking Plavix    Hx of gallstones     Wife denies    Hypertension     Hyperthyroidism 11/30/2016    Low HDL (under 40) 12/7/2016    Mixed hyperlipidemia 11/23/2016    JUAN MANUEL (obstructive sleep apnea)     Pneumonia     Skin disease     Squamous cell carcinoma 08/2018    right temple    Stroke     Transient cerebral ischemia 7/22/2016    Type 2 diabetes mellitus      Past Surgical History:   Procedure Laterality Date    BIOPSY-BONE MARROW Left 11/3/2016    Performed by Bud Bob MD at [facility] OR 2ND FLR    ESOPHAGOGASTRODUODENOSCOPY (EGD) N/A 2/3/2017    Performed by Domenico Fox MD at [facility] ENDO (4TH FLR)    EXCISION-MASS RECTAL  6/28/2016    Performed by Haris Talavera MD at [facility] OR 2ND FLR    Moh's procedure x4      rectal cancer      Sinus surgery for sinus cancer      sinus sx for cancer      skin cancer removed-several times-nose & ear      TONSILLECTOMY      ULTRASOUND-ENDOSCOPIC-UPPER/glue coil of gastric varices N/A 4/19/2017    Performed by Aneudy Perla MD at [facility]  ENDO (2ND FLR)    Undescened testicle sx      UVULOPALATOPHARYNGOPLASTY      for sleep apnea       Home Meds:   Prior to Admission medications    Medication Sig Start Date End Date Taking? Authorizing Provider   ACCU-CHEK NEYDA PLUS TEST STRP Strp 1 strip by Misc.(Non-Drug; Combo Route) route once daily. 11/5/18   Prisca Espinoza MD   ciprofloxacin HCl (CIPRO) 500 MG tablet TAKE 1 TABLET(500 MG) BY MOUTH EVERY MONDAY 7/23/19   Renato Womack MD   divalproex (DEPAKOTE) 250 MG EC tablet Take 2 tablets (500 mg total) by mouth every evening. Do not fill prescription until patient calls and requests. 12/10/18   Prisca Espinoza MD   fluorouracil (EFUDEX) 5 % cream AAA + 1cm of normal appearing surrounding skin BID x 4-6 weeks. Stop if blistered, oozing, or bleeding. Use daily sun protection. 7/10/19   Dejah Anders MD   furosemide (LASIX) 20 MG tablet TAKE 2 TABLETS BY MOUTH EVERY DAY 5/2/19   Renato Womack MD   HYDROcodone-acetaminophen (NORCO) 5-325 mg per tablet Take 1 tablet by mouth every 6 (six) hours as needed for Pain. 8/13/19   Valorie Castellanos PA-C   lactulose (CHRONULAC) 10 gram/15 mL solution TAKE 15 MLS BY MOUTH TWICE DAILY 8/5/19   Renato Womack MD   lancets [facility] To check blood sugar once daily, to use with Accu-Chek meter. 11/5/18   Prisca Espinoza MD   ofloxacin (OCUFLOX) 0.3 % ophthalmic solution  1/10/19   Historical Provider, MD   prednisoLONE acetate (PRED FORTE) 1 % DrpS  1/15/19   Historical Provider, MD   rifAXIMin (XIFAXAN) 550 mg Tab Take 1 tablet (550 mg total) by mouth 2 (two) times daily. 10/3/17   Renato Womack MD   SITagliptan (JANUVIA) 50 MG Tab Take 1 tablet (50 mg total) by mouth once daily. 4/6/17   Prisca Espinoza MD     Anticoagulants/Antiplatelets: no anticoagulation    Allergies:   Review of patient's allergies indicates:   Allergen Reactions    Abilify [aripiprazole] Other (See Comments)     Sleepy, could not function.     Sedation History:   no adverse reactions    Review of Systems:   Hematological: no known coagulopathies  Respiratory: no shortness of breath  Cardiovascular: no chest pain  Gastrointestinal: no abdominal pain  Genito-Urinary: no dysuria  Musculoskeletal: negative  Neurological: no TIA or stroke symptoms         OBJECTIVE:     Vital Signs (Most Recent)       Physical Exam:  ASA: 2  Mallampati: n/a    General: no acute distress  Mental Status: alert and oriented to person, place and time  HEENT: normocephalic, atraumatic  Chest: unlabored breathing  Heart: regular heart rate  Abdomen: distended  Extremity: moves all extremities    ASSESSMENT/PLAN:     Sedation Plan: local  Patient will undergo ultrasound guided paracentesis.    MATT Mauricio, FNP  Interventional Radiology  [phone] clinic

## 2019-09-06 NOTE — PROGRESS NOTES
Pt paracentesis completed. Given discharge paperwork and teaching, questions answered. Pt stated understanding. VSS.

## 2019-09-06 NOTE — PROCEDURES

## 2019-09-06 NOTE — DISCHARGE INSTRUCTIONS
For scheduling: Call Mackenzie at [phone]    For questions or concerns call: SHAR MON-FRI 8 AM- 5PM [phone]. Radiology resident on call [phone].    For immediate concerns that are not emergent, you may call our radiology clinic at: [phone]

## 2019-09-06 NOTE — PLAN OF CARE
Pt in U/S exam room #11 for paracentesis. Identified with name and . Awaiting consent for procedure. Baseline VS obtained.

## 2019-09-13 ENCOUNTER — HOSPITAL ENCOUNTER (OUTPATIENT)
Dept: INTERVENTIONAL RADIOLOGY/VASCULAR | Facility: HOSPITAL | Age: 79
Discharge: HOME | End: 2019-09-13
Attending: INTERNAL MEDICINE
Payer: MEDICARE

## 2019-09-13 VITALS
HEART RATE: 52 BPM | DIASTOLIC BLOOD PRESSURE: 51 MMHG | SYSTOLIC BLOOD PRESSURE: 94 MMHG | OXYGEN SATURATION: 98 % | RESPIRATION RATE: 18 BRPM

## 2019-09-13 DIAGNOSIS — K70.31 ASCITES DUE TO ALCOHOLIC CIRRHOSIS: ICD-10-CM

## 2019-09-13 LAB
APPEARANCE FLD: NORMAL
BODY FLD TYPE: NORMAL
COLOR FLD: YELLOW
EOSINOPHIL NFR FLD MANUAL: 2 %
GRAM STN SPEC: NORMAL
GRAM STN SPEC: NORMAL
LYMPHOCYTES NFR FLD MANUAL: 72 %
MONOS+MACROS NFR FLD MANUAL: 22 %
NEUTROPHILS NFR FLD MANUAL: 4 %
WBC # FLD: 29 /CU MM

## 2019-09-13 PROCEDURE — 87070 CULTURE OTHR SPECIMN AEROBIC: CPT

## 2019-09-13 PROCEDURE — 89051 BODY FLUID CELL COUNT: CPT

## 2019-09-13 PROCEDURE — 87205 SMEAR GRAM STAIN: CPT

## 2019-09-13 PROCEDURE — 49083 ABD PARACENTESIS W/IMAGING: CPT | Mod: GC,,, | Performed by: FAMILY MEDICINE

## 2019-09-13 PROCEDURE — 49083 ABD PARACENTESIS W/IMAGING: CPT

## 2019-09-13 PROCEDURE — 87075 CULTR BACTERIA EXCEPT BLOOD: CPT

## 2019-09-13 PROCEDURE — 49083 IR PARACENTESIS WITH IMAGING: ICD-10-PCS | Mod: GC,,, | Performed by: FAMILY MEDICINE

## 2019-09-13 NOTE — PROCEDURES

## 2019-09-13 NOTE — H&P
Radiology History & Physical      SUBJECTIVE:     Chief Complaint: abdominal distention    History of Present Illness:  Kenneth Sheikh is a 79 y.o. male who presents for ultrasound guided paracentesis  Past Medical History:   Diagnosis Date    Anticoagulant long-term use     Basal cell carcinoma     Bipolar 1 disorder     Bipolar 1 disorder 11/24/2016    bipolar    Cancer     history of skin cancer/sinus cancer & rectal cancer    Depressed bipolar affective disorder     Diabetes mellitus     type 2    EBV infection 12/7/2016    EBV DNA, PCR Latest Ref Range: None detected  None detected EBV DNA-Copies/mL Unknown Test Not Performed EBV Early Antigen Ab, IgG Latest Ref Range: <1:10 Titer 1:40 (A) EBV Nuclear Ag Ab Latest Ref Range: <1:5 Titer >=1:80 (A) EBV VCA IgG Latest Ref Range: <1:10 Titer 1:160 (A) EBV VCA IgM Latest Ref Range: <1:10 Titer <1:10     History of TIA (transient ischemic attack)     several-taking Plavix    Hx of gallstones     Wife denies    Hypertension     Hyperthyroidism 11/30/2016    Low HDL (under 40) 12/7/2016    Mixed hyperlipidemia 11/23/2016    JUAN MANUEL (obstructive sleep apnea)     Pneumonia     Skin disease     Squamous cell carcinoma 08/2018    right temple    Stroke     Transient cerebral ischemia 7/22/2016    Type 2 diabetes mellitus      Past Surgical History:   Procedure Laterality Date    BIOPSY-BONE MARROW Left 11/3/2016    Performed by Bud Bob MD at [facility] OR 2ND FLR    ESOPHAGOGASTRODUODENOSCOPY (EGD) N/A 2/3/2017    Performed by Domenico Fox MD at [facility] ENDO (4TH FLR)    EXCISION-MASS RECTAL  6/28/2016    Performed by Haris Talavera MD at [facility] OR 2ND FLR    Moh's procedure x4      rectal cancer      Sinus surgery for sinus cancer      sinus sx for cancer      skin cancer removed-several times-nose & ear      TONSILLECTOMY      ULTRASOUND-ENDOSCOPIC-UPPER/glue coil of gastric varices N/A 4/19/2017    Performed by Aneudy Perla MD at [facility]  ENDO (2ND FLR)    Undescened testicle sx      UVULOPALATOPHARYNGOPLASTY      for sleep apnea       Home Meds:   Prior to Admission medications    Medication Sig Start Date End Date Taking? Authorizing Provider   ACCU-CHEK NEYDA PLUS TEST STRP Strp 1 strip by Misc.(Non-Drug; Combo Route) route once daily. 11/5/18   Prisca Espinoza MD   ciprofloxacin HCl (CIPRO) 500 MG tablet TAKE 1 TABLET(500 MG) BY MOUTH EVERY MONDAY 7/23/19   Renato Womack MD   divalproex (DEPAKOTE) 250 MG EC tablet Take 2 tablets (500 mg total) by mouth every evening. Do not fill prescription until patient calls and requests. 12/10/18   Prisca Espinoza MD   fluorouracil (EFUDEX) 5 % cream AAA + 1cm of normal appearing surrounding skin BID x 4-6 weeks. Stop if blistered, oozing, or bleeding. Use daily sun protection. 7/10/19   Dejah Anders MD   furosemide (LASIX) 20 MG tablet TAKE 2 TABLETS BY MOUTH EVERY DAY 5/2/19   Renato Womack MD   HYDROcodone-acetaminophen (NORCO) 5-325 mg per tablet Take 1 tablet by mouth every 6 (six) hours as needed for Pain. 8/13/19   Valorie Castellanos PA-C   lactulose (CHRONULAC) 10 gram/15 mL solution TAKE 15 MLS BY MOUTH TWICE DAILY 8/5/19   Renato Womack MD   lancets [facility] To check blood sugar once daily, to use with Accu-Chek meter. 11/5/18   Prisca Espinoza MD   ofloxacin (OCUFLOX) 0.3 % ophthalmic solution  1/10/19   Historical Provider, MD   prednisoLONE acetate (PRED FORTE) 1 % DrpS  1/15/19   Historical Provider, MD   rifAXIMin (XIFAXAN) 550 mg Tab Take 1 tablet (550 mg total) by mouth 2 (two) times daily. 10/3/17   Renato Womack MD   SITagliptan (JANUVIA) 50 MG Tab Take 1 tablet (50 mg total) by mouth once daily. 4/6/17   Prisca Espinoza MD     Anticoagulants/Antiplatelets: no anticoagulation    Allergies:   Review of patient's allergies indicates:   Allergen Reactions    Abilify [aripiprazole] Other (See Comments)     Sleepy, could not function.     Sedation History:   no adverse reactions    Review of Systems:   Hematological: no known coagulopathies  Respiratory: no shortness of breath  Cardiovascular: no chest pain  Gastrointestinal: no abdominal pain  Genito-Urinary: no dysuria  Musculoskeletal: negative  Neurological: no TIA or stroke symptoms         OBJECTIVE:     Vital Signs (Most Recent)       Physical Exam:  ASA: 2  Mallampati: n/a    General: no acute distress  Mental Status: alert and oriented to person, place and time  HEENT: normocephalic, atraumatic  Chest: unlabored breathing  Heart: regular heart rate  Abdomen: distended  Extremity: moves all extremities    ASSESSMENT/PLAN:     Sedation Plan: local  Patient will undergo ultrasound guided paracentesis.    MATT Mauricio, FNP  Interventional Radiology  [phone] clinic

## 2019-09-13 NOTE — NURSING
paracentesis complete. Pt tolerated well. VSS. No signs or symptoms of distress noted. 4100 ml aspirated from peritoneum. No Albumin replacement indicated.  Pt will be dc'd to home. Dressing dry and intact.

## 2019-09-13 NOTE — NURSING
Pt arrived to U  11 for  paracentesis.  Name verified using two identifiers.  Allergies verified. .  Will continue to monitor.

## 2019-09-16 LAB — BACTERIA SPEC AEROBE CULT: NO GROWTH

## 2019-09-18 ENCOUNTER — PROCEDURE VISIT (OUTPATIENT)
Dept: DERMATOLOGY | Facility: CLINIC | Age: 79
End: 2019-09-18
Payer: MEDICARE

## 2019-09-18 VITALS
BODY MASS INDEX: 25.91 KG/M2 | HEART RATE: 51 BPM | SYSTOLIC BLOOD PRESSURE: 137 MMHG | WEIGHT: 171 LBS | DIASTOLIC BLOOD PRESSURE: 73 MMHG | HEIGHT: 68 IN

## 2019-09-18 DIAGNOSIS — C44.311 BASAL CELL CARCINOMA OF LEFT SIDE OF NOSE: Primary | ICD-10-CM

## 2019-09-18 PROCEDURE — 17311 MOHS 1 STAGE H/N/HF/G: CPT | Mod: PBBFAC | Performed by: DERMATOLOGY

## 2019-09-18 PROCEDURE — 13151 CMPLX RPR E/N/E/L 1.1-2.5 CM: CPT | Mod: S$PBB,59,, | Performed by: DERMATOLOGY

## 2019-09-18 PROCEDURE — 17312: ICD-10-PCS | Mod: S$PBB,,, | Performed by: DERMATOLOGY

## 2019-09-18 PROCEDURE — 13151 CMPLX RPR E/N/E/L 1.1-2.5 CM: CPT | Mod: PBBFAC | Performed by: DERMATOLOGY

## 2019-09-18 PROCEDURE — 17311 MOHS 1 STAGE H/N/HF/G: CPT | Mod: S$PBB,,, | Performed by: DERMATOLOGY

## 2019-09-18 PROCEDURE — 99499 NO LOS: ICD-10-PCS | Mod: S$PBB,,, | Performed by: DERMATOLOGY

## 2019-09-18 PROCEDURE — 13151 PR RECMPL WND LID,NOS,EAR 1.1-2.5 CM: ICD-10-PCS | Mod: S$PBB,59,, | Performed by: DERMATOLOGY

## 2019-09-18 PROCEDURE — 99499 UNLISTED E&M SERVICE: CPT | Mod: S$PBB,,, | Performed by: DERMATOLOGY

## 2019-09-18 PROCEDURE — 17312 MOHS ADDL STAGE: CPT | Mod: PBBFAC | Performed by: DERMATOLOGY

## 2019-09-18 PROCEDURE — 17312 MOHS ADDL STAGE: CPT | Mod: S$PBB,,, | Performed by: DERMATOLOGY

## 2019-09-18 PROCEDURE — 17311: ICD-10-PCS | Mod: S$PBB,,, | Performed by: DERMATOLOGY

## 2019-09-18 NOTE — PROGRESS NOTES
PROCEDURE: Mohs' Micrographic Surgery    INDICATION: Location in mask areas of face including central face, nose, eyelids, eyebrows, lips, chin, preauricular, temple, and ear. Biopsy-proven skin cancer of cosmetically and functionally important areas, including head, neck, genital, hand, foot, or areas known for having difficulty in healing, such as the lower anterior legs. Tumor with ill-defined borders.    REFERRING MD: Dejah Anders M.D. (This lesion biopsied by Kendy Orellana MD)    CASE NUMBER:     ANESTHETIC: 3 cc 0.5% Lidocaine with Epi 1:200,000 mixed 1:1 with 0.5% Bupivacaine    SURGICAL PREP: Hibiclens    SURGEON: Kendy Orellana MD    ASSISTANTS: Valorie Castellanos PA-C and Jody Posey, Surg Tech    PREOPERATIVE DIAGNOSIS: basal cell carcinoma    POSTOPERATIVE DIAGNOSIS: basal cell carcinoma    PATHOLOGIC DIAGNOSIS: basal cell carcinoma- nodular, superficial    HISTOLOGY OF SPECIMENS IN FIRST STAGE:   Tumor Type: Tumor seen. Superficial basal cell carcinoma: Foci of basaloid cells with peripheral palisading and focal retraction artifact arising along the dermoepidermal junction and extending into the papillary dermis.   Depth of Invasion: epidermis and dermis  Perineural Invasion: No    HISTOLOGY OF SPECIMENS IN SUBSEQUENT STAGES:  · Tumor Type: No tumor seen.    STAGES OF MOHS' SURGERY PERFORMED: 2    TUMOR-FREE PLANE ACHIEVED: Yes    HEMOSTASIS: electrocoagulation     SPECIMENS: 4 (2 in stage A and 2 in stage B)    LOCATION: left inferior nasal sidewall. Patient verified location with hand held mirror.    INITIAL LESION SIZE: 0.4 x 0.4 cm    FINAL DEFECT SIZE: 0.9 x 0.9 cm    WOUND REPAIR/DISPOSITION: The patient tolerated Mohs' Micrographic Surgery for a basal cell carcinoma very well. When the tumor was completely removed, a repair of the surgical defect was undertaken.      PROCEDURE: Complex Linear Repair    INDICATION: Status post Mohs' Micrographic Surgery for basal cell carcinoma.    CASE NUMBER:  "    SURGEON: Kendy Orellana MD    ASSISTANTS: Valorie Castellanos PA-C and Jody Posey Surg [place]    ANESTHETIC: 2.5 cc 1% Lidocaine with Epinephrine 1:100,000    SURGICAL PREP: Hibiclens, prepped by Jody Posey Surg [place]    LOCATION: left inferior nasal sidewall    DEFECT SIZE: 0.9 x 0.9 cm    WOUND REPAIR/DISPOSITION:  After the patient's carcinoma had been completely removed with Mohs' Micrographic Surgery, a repair of the surgical defect was undertaken. The patient was returned to the operating suite where the area of left inferior nasal sidewall was prepped, draped, and anesthetized in the usual sterile fashion. The wound was widely undermined in all directions. Then, electrocoagulation was used to obtain meticulous hemostasis. 4-0 Vicryl buried vertical mattress sutures were placed into the subcutaneous and dermal plane to close the wound and osbaldo the cutaneous wound edge. Bilateral dog ears were identified and were removed by a standard Burow's triangle technique. The cutaneous wound edges were closed using interrupted 4-0 Prolene suture.    The patient tolerated the procedure well.    The area was cleaned and dressed appropriately and the patient was given wound care instructions, as well as appointment for follow-up evaluation. Pt already has Norco 5 prn postop pain.    LENGTH OF REPAIR: 2.2 cm    Vitals:    09/18/19 0726 09/18/19 1049   BP: 138/68 137/73   BP Location: Right arm Left arm   Patient Position: Sitting Sitting   BP Method: Small (Automatic) Small (Automatic)   Pulse: 60 (!) 51   Weight: 77.6 kg (171 lb)    Height: 5' 8" (1.727 m)        NOTE: Initially, patient wanted to let heal by 2nd intention but wife stated she could not care for it and insisted it be sutured. Disc nasal asymmetry if we suture as patient has had numerous nasal surgeries and has asymmetry at baseline and they were okay with that.   "

## 2019-09-20 ENCOUNTER — HOSPITAL ENCOUNTER (OUTPATIENT)
Dept: INTERVENTIONAL RADIOLOGY/VASCULAR | Facility: HOSPITAL | Age: 79
Discharge: HOME | End: 2019-09-20
Attending: INTERNAL MEDICINE
Payer: MEDICARE

## 2019-09-20 VITALS
SYSTOLIC BLOOD PRESSURE: 136 MMHG | HEART RATE: 52 BPM | RESPIRATION RATE: 18 BRPM | DIASTOLIC BLOOD PRESSURE: 78 MMHG | OXYGEN SATURATION: 100 %

## 2019-09-20 DIAGNOSIS — K70.31 ASCITES DUE TO ALCOHOLIC CIRRHOSIS: ICD-10-CM

## 2019-09-20 LAB
APPEARANCE FLD: NORMAL
BACTERIA SPEC ANAEROBE CULT: NORMAL
BODY FLD TYPE: NORMAL
COLOR FLD: YELLOW
EOSINOPHIL NFR FLD MANUAL: 1 %
GRAM STN SPEC: NORMAL
GRAM STN SPEC: NORMAL
LYMPHOCYTES NFR FLD MANUAL: 54 %
MESOTHL CELL NFR FLD MANUAL: 4 %
MONOS+MACROS NFR FLD MANUAL: 35 %
NEUTROPHILS NFR FLD MANUAL: 6 %
WBC # FLD: 42 /CU MM

## 2019-09-20 PROCEDURE — 87075 CULTR BACTERIA EXCEPT BLOOD: CPT

## 2019-09-20 PROCEDURE — 49083 ABD PARACENTESIS W/IMAGING: CPT

## 2019-09-20 PROCEDURE — 89051 BODY FLUID CELL COUNT: CPT

## 2019-09-20 PROCEDURE — 87205 SMEAR GRAM STAIN: CPT

## 2019-09-20 PROCEDURE — 49083 IR PARACENTESIS WITH IMAGING: ICD-10-PCS | Mod: ,,, | Performed by: FAMILY MEDICINE

## 2019-09-20 PROCEDURE — 49083 ABD PARACENTESIS W/IMAGING: CPT | Mod: ,,, | Performed by: FAMILY MEDICINE

## 2019-09-20 PROCEDURE — 87070 CULTURE OTHR SPECIMN AEROBIC: CPT

## 2019-09-20 NOTE — PROCEDURES

## 2019-09-20 NOTE — PROGRESS NOTES
Paracentesis complete. 4400 mLs peritoneal fluid drained. VSS. Pt tolerated well. Dressing to abdomen  clean, dry, and intact. Specimens sent per lab order.  Pt discharged per unit and hospital protocol via wheelchair with family member.

## 2019-09-20 NOTE — H&P
Radiology History & Physical      SUBJECTIVE:     Chief Complaint: abdominal distention    History of Present Illness:  Kenneth Sheikh is a 79 y.o. male who presents for ultrasound guided paracentesis  Past Medical History:   Diagnosis Date    Anticoagulant long-term use     Basal cell carcinoma     Bipolar 1 disorder     Bipolar 1 disorder 11/24/2016    bipolar    Cancer     history of skin cancer/sinus cancer & rectal cancer    Depressed bipolar affective disorder     Diabetes mellitus     type 2    EBV infection 12/7/2016    EBV DNA, PCR Latest Ref Range: None detected  None detected EBV DNA-Copies/mL Unknown Test Not Performed EBV Early Antigen Ab, IgG Latest Ref Range: <1:10 Titer 1:40 (A) EBV Nuclear Ag Ab Latest Ref Range: <1:5 Titer >=1:80 (A) EBV VCA IgG Latest Ref Range: <1:10 Titer 1:160 (A) EBV VCA IgM Latest Ref Range: <1:10 Titer <1:10     History of TIA (transient ischemic attack)     several-taking Plavix    Hx of gallstones     Wife denies    Hypertension     Hyperthyroidism 11/30/2016    Low HDL (under 40) 12/7/2016    Mixed hyperlipidemia 11/23/2016    JUAN MANUEL (obstructive sleep apnea)     Pneumonia     Skin disease     Squamous cell carcinoma 08/2018    right temple    Stroke     Transient cerebral ischemia 7/22/2016    Type 2 diabetes mellitus      Past Surgical History:   Procedure Laterality Date    BIOPSY-BONE MARROW Left 11/3/2016    Performed by [facility] at [facility] OR 2ND FLR    ESOPHAGOGASTRODUODENOSCOPY (EGD) N/A 2/3/2017    Performed by Domenico Fox MD at [facility] ENDO (4TH FLR)    EXCISION-MASS RECTAL  6/28/2016    Performed by Haris Talavera MD at [facility] OR 2ND FLR    Moh's procedure x4      rectal cancer      Sinus surgery for sinus cancer      sinus sx for cancer      skin cancer removed-several times-nose & ear      TONSILLECTOMY      ULTRASOUND-ENDOSCOPIC-UPPER/glue coil of gastric varices N/A 4/19/2017    Performed by Aneudy Perla MD at [facility]  ENDO (2ND FLR)    Undescened testicle sx      UVULOPALATOPHARYNGOPLASTY      for sleep apnea       Home Meds:   Prior to Admission medications    Medication Sig Start Date End Date Taking? Authorizing Provider   ACCU-CHEK NEYDA PLUS TEST STRP Strp 1 strip by Misc.(Non-Drug; Combo Route) route once daily. 11/5/18   Prisca Espinoza MD   ciprofloxacin HCl (CIPRO) 500 MG tablet TAKE 1 TABLET(500 MG) BY MOUTH EVERY MONDAY 7/23/19   Renato Womack MD   divalproex (DEPAKOTE) 250 MG EC tablet Take 2 tablets (500 mg total) by mouth every evening. Do not fill prescription until patient calls and requests. 12/10/18   Prisca Espinoza MD   fluorouracil (EFUDEX) 5 % cream AAA + 1cm of normal appearing surrounding skin BID x 4-6 weeks. Stop if blistered, oozing, or bleeding. Use daily sun protection. 7/10/19   Dejah Anders MD   furosemide (LASIX) 20 MG tablet TAKE 2 TABLETS BY MOUTH EVERY DAY 5/2/19   Renato Womack MD   HYDROcodone-acetaminophen (NORCO) 5-325 mg per tablet Take 1 tablet by mouth every 6 (six) hours as needed for Pain. 8/13/19   Valorie Castellanos PA-C   lactulose (CHRONULAC) 10 gram/15 mL solution TAKE 15 MLS BY MOUTH TWICE DAILY 8/5/19   Renato Womack MD   lancets [facility] To check blood sugar once daily, to use with Accu-Chek meter. 11/5/18   Prisca Espinoza MD   ofloxacin (OCUFLOX) 0.3 % ophthalmic solution  1/10/19   Historical Provider, MD   prednisoLONE acetate (PRED FORTE) 1 % DrpS  1/15/19   Historical Provider, MD   rifAXIMin (XIFAXAN) 550 mg Tab Take 1 tablet (550 mg total) by mouth 2 (two) times daily. 10/3/17   Renato Womack MD   SITagliptan (JANUVIA) 50 MG Tab Take 1 tablet (50 mg total) by mouth once daily. 4/6/17   Prisca Espinoza MD     Anticoagulants/Antiplatelets: no anticoagulation    Allergies:   Review of patient's allergies indicates:   Allergen Reactions    Abilify [aripiprazole] Other (See Comments)     Sleepy, could not function.     Sedation History:   no adverse reactions    Review of Systems:   Hematological: no known coagulopathies  Respiratory: no shortness of breath  Cardiovascular: no chest pain  Gastrointestinal: no abdominal pain  Genito-Urinary: no dysuria  Musculoskeletal: negative  Neurological: no TIA or stroke symptoms         OBJECTIVE:     Vital Signs (Most Recent)       Physical Exam:  ASA: 2  Mallampati: n/a    General: no acute distress  Mental Status: alert and oriented to person, place and time  HEENT: normocephalic, atraumatic  Chest: unlabored breathing  Heart: regular heart rate  Abdomen: distended  Extremity: moves all extremities    ASSESSMENT/PLAN:     Sedation Plan: local  Patient will undergo ultrasound guided paracentesis.    MATT Mauricio, FNP  Interventional Radiology  [phone] clinic

## 2019-09-23 LAB — BACTERIA SPEC AEROBE CULT: NO GROWTH

## 2019-09-25 ENCOUNTER — OFFICE VISIT (OUTPATIENT)
Dept: DERMATOLOGY | Facility: CLINIC | Age: 79
End: 2019-09-25
Payer: MEDICARE

## 2019-09-25 DIAGNOSIS — Z09 POSTOP CHECK: Primary | ICD-10-CM

## 2019-09-25 PROCEDURE — 99024 PR POST-OP FOLLOW-UP VISIT: ICD-10-PCS | Mod: POP,,, | Performed by: DERMATOLOGY

## 2019-09-25 PROCEDURE — 99213 OFFICE O/P EST LOW 20 MIN: CPT | Mod: PBBFAC | Performed by: DERMATOLOGY

## 2019-09-25 PROCEDURE — 99024 POSTOP FOLLOW-UP VISIT: CPT | Mod: POP,,, | Performed by: DERMATOLOGY

## 2019-09-25 PROCEDURE — 99999 PR PBB SHADOW E&M-EST. PATIENT-LVL III: CPT | Mod: PBBFAC,,, | Performed by: DERMATOLOGY

## 2019-09-25 PROCEDURE — 99999 PR PBB SHADOW E&M-EST. PATIENT-LVL III: ICD-10-PCS | Mod: PBBFAC,,, | Performed by: DERMATOLOGY

## 2019-09-25 NOTE — PROGRESS NOTES
79 y.o. male patient is here for suture removal following Mohs' surgery.    Patient reports no problems.    WOUND PE:  The L inferior nasal sidewall sutures intact. Wound healing well. Good skin edges. No signs or symptoms of infection.    IMPRESSION:  Healing operative site from Mohs' surgery, BCC L inferior nasal sidewall s/p Mohs with CLC, postop day #7.    PLAN:  Sutures removed today. Steri-strips applied.  Continue wound care.  Keep moist with Aquaphor.    RTC:  In 3-6 months with Dejah Anders M.D. for skin check or sooner if new concern arises.

## 2019-09-26 DIAGNOSIS — K74.60 CIRRHOSIS OF LIVER WITH ASCITES, UNSPECIFIED HEPATIC CIRRHOSIS TYPE: ICD-10-CM

## 2019-09-26 DIAGNOSIS — R18.8 ASCITES OF LIVER: ICD-10-CM

## 2019-09-26 DIAGNOSIS — R18.8 CIRRHOSIS OF LIVER WITH ASCITES, UNSPECIFIED HEPATIC CIRRHOSIS TYPE: ICD-10-CM

## 2019-09-26 DIAGNOSIS — K74.60 LIVER CIRRHOSIS SECONDARY TO NASH: Primary | ICD-10-CM

## 2019-09-26 DIAGNOSIS — K75.81 LIVER CIRRHOSIS SECONDARY TO NASH: Primary | ICD-10-CM

## 2019-09-27 ENCOUNTER — HOSPITAL ENCOUNTER (OUTPATIENT)
Dept: INTERVENTIONAL RADIOLOGY/VASCULAR | Facility: HOSPITAL | Age: 79
Discharge: HOME | End: 2019-09-27
Attending: INTERNAL MEDICINE
Payer: MEDICARE

## 2019-09-27 VITALS
SYSTOLIC BLOOD PRESSURE: 147 MMHG | OXYGEN SATURATION: 99 % | HEART RATE: 50 BPM | RESPIRATION RATE: 16 BRPM | DIASTOLIC BLOOD PRESSURE: 69 MMHG

## 2019-09-27 DIAGNOSIS — K74.60 CIRRHOSIS OF LIVER WITH ASCITES, UNSPECIFIED HEPATIC CIRRHOSIS TYPE: ICD-10-CM

## 2019-09-27 DIAGNOSIS — R18.8 CIRRHOSIS OF LIVER WITH ASCITES, UNSPECIFIED HEPATIC CIRRHOSIS TYPE: ICD-10-CM

## 2019-09-27 DIAGNOSIS — K75.81 LIVER CIRRHOSIS SECONDARY TO NASH: ICD-10-CM

## 2019-09-27 DIAGNOSIS — R18.8 ASCITES OF LIVER: ICD-10-CM

## 2019-09-27 DIAGNOSIS — K74.60 LIVER CIRRHOSIS SECONDARY TO NASH: ICD-10-CM

## 2019-09-27 LAB
APPEARANCE FLD: NORMAL
BACTERIA SPEC ANAEROBE CULT: NORMAL
BODY FLD TYPE: NORMAL
COLOR FLD: YELLOW
EOSINOPHIL NFR FLD MANUAL: 4 %
LYMPHOCYTES NFR FLD MANUAL: 42 %
MONOS+MACROS NFR FLD MANUAL: 49 %
NEUTROPHILS NFR FLD MANUAL: 5 %
WBC # FLD: 58 /CU MM

## 2019-09-27 PROCEDURE — 49083 ABD PARACENTESIS W/IMAGING: CPT | Mod: ,,, | Performed by: RADIOLOGY

## 2019-09-27 PROCEDURE — 89051 BODY FLUID CELL COUNT: CPT

## 2019-09-27 PROCEDURE — 49083 IR PARACENTESIS WITH IMAGING: ICD-10-PCS | Mod: ,,, | Performed by: RADIOLOGY

## 2019-09-27 PROCEDURE — 49083 ABD PARACENTESIS W/IMAGING: CPT

## 2019-09-27 NOTE — DISCHARGE INSTRUCTIONS
For scheduling: Call [facility]enzie at [phone]    For questions or concerns call: SHAR MON-FRI 8 AM- 5PM [phone]. Radiology resident on call [phone].    For immediate concerns that are not emergent, you may call our radiology clinic at: [phone]

## 2019-09-27 NOTE — NURSING
Paracentesis complete, 4400 MLs removed. Specimen sent to lab. NO  Albumin administered per protocol. Dressing applied to left abdominal puncture site, dressing clean dry and intact. Pt given discharge instructions and handouts, pt verbalizes understanding. Questions answered. Pt denies pain and discomfort. Pt refusing transport. Pt ambulated to [facility] for transport home with family. Gait steady.

## 2019-09-27 NOTE — CARE UPDATE
Radiology Post-Procedure Note    Pre Op Diagnosis: Ascites  Post Op Diagnosis: Same    Procedure: Ultrasound Guided Paracentesis    Procedure performed by: Jossue Villafuerte NP    Written Informed Consent Obtained: Yes  Specimen Removed: YES   Estimated Blood Loss: Minimal    Findings:   Successful paracentesis.  Albumin administered PRN per protocol.    Patient tolerated procedure well.    Jossue Villafuerte NP  Interventional Radiology  [facility]

## 2019-09-30 DIAGNOSIS — K70.31 ASCITES DUE TO ALCOHOLIC CIRRHOSIS: Primary | ICD-10-CM

## 2019-10-04 ENCOUNTER — HOSPITAL ENCOUNTER (OUTPATIENT)
Dept: INTERVENTIONAL RADIOLOGY/VASCULAR | Facility: HOSPITAL | Age: 79
Discharge: HOME | End: 2019-10-04
Attending: INTERNAL MEDICINE
Payer: MEDICARE

## 2019-10-04 VITALS
SYSTOLIC BLOOD PRESSURE: 142 MMHG | DIASTOLIC BLOOD PRESSURE: 61 MMHG | RESPIRATION RATE: 16 BRPM | OXYGEN SATURATION: 97 % | HEART RATE: 50 BPM

## 2019-10-04 DIAGNOSIS — K75.81 LIVER CIRRHOSIS SECONDARY TO NASH: ICD-10-CM

## 2019-10-04 DIAGNOSIS — K74.60 CIRRHOSIS OF LIVER WITH ASCITES, UNSPECIFIED HEPATIC CIRRHOSIS TYPE: ICD-10-CM

## 2019-10-04 DIAGNOSIS — K74.60 LIVER CIRRHOSIS SECONDARY TO NASH: ICD-10-CM

## 2019-10-04 DIAGNOSIS — R18.8 CIRRHOSIS OF LIVER WITH ASCITES, UNSPECIFIED HEPATIC CIRRHOSIS TYPE: ICD-10-CM

## 2019-10-04 DIAGNOSIS — R18.8 ASCITES OF LIVER: ICD-10-CM

## 2019-10-04 LAB
APPEARANCE FLD: NORMAL
BODY FLD TYPE: NORMAL
COLOR FLD: YELLOW
EOSINOPHIL NFR FLD MANUAL: 1 %
LYMPHOCYTES NFR FLD MANUAL: 60 %
MESOTHL CELL NFR FLD MANUAL: 3 %
MONOS+MACROS NFR FLD MANUAL: 32 %
NEUTROPHILS NFR FLD MANUAL: 4 %
WBC # FLD: 86 /CU MM

## 2019-10-04 PROCEDURE — 49083 ABD PARACENTESIS W/IMAGING: CPT | Mod: GC,ICN,, | Performed by: FAMILY MEDICINE

## 2019-10-04 PROCEDURE — 49083 IR PARACENTESIS WITH IMAGING: ICD-10-PCS | Mod: GC,ICN,, | Performed by: FAMILY MEDICINE

## 2019-10-04 PROCEDURE — 89051 BODY FLUID CELL COUNT: CPT

## 2019-10-04 PROCEDURE — 49083 ABD PARACENTESIS W/IMAGING: CPT

## 2019-10-04 NOTE — H&P
Radiology History & Physical      SUBJECTIVE:     Chief Complaint: abdominal distention    History of Present Illness:  Kenneth Sheikh is a 79 y.o. male who presents for ultrasound guided paracentesis  Past Medical History:   Diagnosis Date    Anticoagulant long-term use     Basal cell carcinoma     Bipolar 1 disorder     Bipolar 1 disorder 11/24/2016    bipolar    Cancer     history of skin cancer/sinus cancer & rectal cancer    Depressed bipolar affective disorder     Diabetes mellitus     type 2    EBV infection 12/7/2016    EBV DNA, PCR Latest Ref Range: None detected  None detected EBV DNA-Copies/mL Unknown Test Not Performed EBV Early Antigen Ab, IgG Latest Ref Range: <1:10 Titer 1:40 (A) EBV Nuclear Ag Ab Latest Ref Range: <1:5 Titer >=1:80 (A) EBV VCA IgG Latest Ref Range: <1:10 Titer 1:160 (A) EBV VCA IgM Latest Ref Range: <1:10 Titer <1:10     History of TIA (transient ischemic attack)     several-taking Plavix    Hx of gallstones     Wife denies    Hypertension     Hyperthyroidism 11/30/2016    Low HDL (under 40) 12/7/2016    Mixed hyperlipidemia 11/23/2016    JUAN MANUEL (obstructive sleep apnea)     Pneumonia     Skin disease     Squamous cell carcinoma 08/2018    right temple    Stroke     Transient cerebral ischemia 7/22/2016    Type 2 diabetes mellitus      Past Surgical History:   Procedure Laterality Date    Moh's procedure x4      rectal cancer      Sinus surgery for sinus cancer      sinus sx for cancer      skin cancer removed-several times-nose & ear      TONSILLECTOMY      Undescened testicle sx      UVULOPALATOPHARYNGOPLASTY      for sleep apnea       Home Meds:   Prior to Admission medications    Medication Sig Start Date End Date Taking? Authorizing Provider   ACCU-CHEK NEYDA PLUS TEST STRP Strp 1 strip by Misc.(Non-Drug; Combo Route) route once daily. 11/5/18   Prisca Espinoza MD   ciprofloxacin HCl (CIPRO) 500 MG tablet TAKE 1 TABLET(500 MG) BY MOUTH EVERY MONDAY  7/23/19   Renato Womack MD   divalproex (DEPAKOTE) 250 MG EC tablet Take 2 tablets (500 mg total) by mouth every evening. Do not fill prescription until patient calls and requests. 12/10/18   Prisca Espinoza MD   fluorouracil (EFUDEX) 5 % cream AAA + 1cm of normal appearing surrounding skin BID x 4-6 weeks. Stop if blistered, oozing, or bleeding. Use daily sun protection. 7/10/19   Dejah Anders MD   furosemide (LASIX) 20 MG tablet TAKE 2 TABLETS BY MOUTH EVERY DAY 5/2/19   Renato Womack MD   HYDROcodone-acetaminophen (NORCO) 5-325 mg per tablet Take 1 tablet by mouth every 6 (six) hours as needed for Pain. 8/13/19   Valorie Castellanos PA-C   lactulose (CHRONULAC) 10 gram/15 mL solution TAKE 15 MLS BY MOUTH TWICE DAILY 8/5/19   Renato Womack MD   lancets Misc To check blood sugar once daily, to use with Accu-Chek meter. 11/5/18   Prisca Espinoza MD   ofloxacin (OCUFLOX) 0.3 % ophthalmic solution  1/10/19   Historical Provider, MD   prednisoLONE acetate (PRED FORTE) 1 % DrpS  1/15/19   Historical Provider, MD   rifAXIMin (XIFAXAN) 550 mg Tab Take 1 tablet (550 mg total) by mouth 2 (two) times daily. 10/3/17   Renato Womack MD   SITagliptan (JANUVIA) 50 MG Tab Take 1 tablet (50 mg total) by mouth once daily. 4/6/17   Prisca Espinoza MD     Anticoagulants/Antiplatelets: no anticoagulation    Allergies:   Review of patient's allergies indicates:   Allergen Reactions    Abilify [aripiprazole] Other (See Comments)     Sleepy, could not function.     Sedation History:  no adverse reactions    Review of Systems:   Hematological: no known coagulopathies  Respiratory: no shortness of breath  Cardiovascular: no chest pain  Gastrointestinal: no abdominal pain  Genito-Urinary: no dysuria  Musculoskeletal: negative  Neurological: no TIA or stroke symptoms         OBJECTIVE:     Vital Signs (Most Recent)       Physical Exam:  ASA: 2  Mallampati: n/a    General: no acute distress  Mental Status:  alert and oriented to person, place and time  HEENT: normocephalic, atraumatic  Chest: unlabored breathing  Heart: regular heart rate  Abdomen: distended  Extremity: moves all extremities    ASSESSMENT/PLAN:     Sedation Plan: local  Patient will undergo ultrasound guided paracentesis.    MATT Mauricio, FNP  Interventional Radiology  [phone] clinic

## 2019-10-04 NOTE — NURSING
[id number] complete. Pt tolerated well. VSS. No signs or symptoms of distress noted.  Patient had 4L peritoneal fluid drained from abdomen. Dressing to Rt abdomen dry and intact. Patient with be discharged to family.

## 2019-10-04 NOTE — NURSING
Pt arrived to U  13 for  paracentesis.  Name verified using two identifiers.  Allergies verified. .  Will continue to monitor.

## 2019-10-04 NOTE — PROCEDURES

## 2019-10-11 ENCOUNTER — HOSPITAL ENCOUNTER (OUTPATIENT)
Dept: INTERVENTIONAL RADIOLOGY/VASCULAR | Facility: HOSPITAL | Age: 79
Discharge: HOME | End: 2019-10-11
Attending: INTERNAL MEDICINE
Payer: MEDICARE

## 2019-10-11 VITALS
HEART RATE: 48 BPM | RESPIRATION RATE: 16 BRPM | DIASTOLIC BLOOD PRESSURE: 65 MMHG | SYSTOLIC BLOOD PRESSURE: 127 MMHG | OXYGEN SATURATION: 100 %

## 2019-10-11 DIAGNOSIS — R18.8 CIRRHOSIS OF LIVER WITH ASCITES, UNSPECIFIED HEPATIC CIRRHOSIS TYPE: ICD-10-CM

## 2019-10-11 DIAGNOSIS — K75.81 LIVER CIRRHOSIS SECONDARY TO NASH: ICD-10-CM

## 2019-10-11 DIAGNOSIS — R18.8 ASCITES OF LIVER: ICD-10-CM

## 2019-10-11 DIAGNOSIS — K74.60 CIRRHOSIS OF LIVER WITH ASCITES, UNSPECIFIED HEPATIC CIRRHOSIS TYPE: ICD-10-CM

## 2019-10-11 DIAGNOSIS — K74.60 LIVER CIRRHOSIS SECONDARY TO NASH: ICD-10-CM

## 2019-10-11 LAB
APPEARANCE FLD: NORMAL
BODY FLD TYPE: NORMAL
COLOR FLD: YELLOW
LYMPHOCYTES NFR FLD MANUAL: 77 %
MONOS+MACROS NFR FLD MANUAL: 15 %
NEUTROPHILS NFR FLD MANUAL: 8 %
WBC # FLD: 47 /CU MM

## 2019-10-11 PROCEDURE — 89051 BODY FLUID CELL COUNT: CPT

## 2019-10-11 PROCEDURE — A4550 SURGICAL TRAYS: HCPCS

## 2019-10-11 PROCEDURE — 49083 IR PARACENTESIS WITH IMAGING: ICD-10-PCS | Mod: GC,ICN,, | Performed by: FAMILY MEDICINE

## 2019-10-11 PROCEDURE — 49083 ABD PARACENTESIS W/IMAGING: CPT | Mod: GC,ICN,, | Performed by: FAMILY MEDICINE

## 2019-10-11 PROCEDURE — 49083 ABD PARACENTESIS W/IMAGING: CPT

## 2019-10-11 NOTE — PROGRESS NOTES
Procedure complete. Patient tolerated well. 3700cc's drained. Sterile bandaid to site. No bleeding noted. Discharge instructions given patient verbalizes compliance and understanding.Discharged home via wheelchair per family.

## 2019-10-11 NOTE — H&P
Radiology History & Physical      SUBJECTIVE:     Chief Complaint: abdominal distention    History of Present Illness:  Kenneth Sheikh is a 79 y.o. male who presents for ultrasound guided paracentesis  Past Medical History:   Diagnosis Date    Anticoagulant long-term use     Basal cell carcinoma     Bipolar 1 disorder     Bipolar 1 disorder 11/24/2016    bipolar    Cancer     history of skin cancer/sinus cancer & rectal cancer    Depressed bipolar affective disorder     Diabetes mellitus     type 2    EBV infection 12/7/2016    EBV DNA, PCR Latest Ref Range: None detected  None detected EBV DNA-Copies/mL Unknown Test Not Performed EBV Early Antigen Ab, IgG Latest Ref Range: <1:10 Titer 1:40 (A) EBV Nuclear Ag Ab Latest Ref Range: <1:5 Titer >=1:80 (A) EBV VCA IgG Latest Ref Range: <1:10 Titer 1:160 (A) EBV VCA IgM Latest Ref Range: <1:10 Titer <1:10     History of TIA (transient ischemic attack)     several-taking Plavix    Hx of gallstones     Wife denies    Hypertension     Hyperthyroidism 11/30/2016    Low HDL (under 40) 12/7/2016    Mixed hyperlipidemia 11/23/2016    JUAN MANUEL (obstructive sleep apnea)     Pneumonia     Skin disease     Squamous cell carcinoma 08/2018    right temple    Stroke     Transient cerebral ischemia 7/22/2016    Type 2 diabetes mellitus      Past Surgical History:   Procedure Laterality Date    Moh's procedure x4      rectal cancer      Sinus surgery for sinus cancer      sinus sx for cancer      skin cancer removed-several times-nose & ear      TONSILLECTOMY      Undescened testicle sx      UVULOPALATOPHARYNGOPLASTY      for sleep apnea       Home Meds:   Prior to Admission medications    Medication Sig Start Date End Date Taking? Authorizing Provider   ACCU-CHEK NEYDA PLUS TEST STRP Strp 1 strip by Misc.(Non-Drug; Combo Route) route once daily. 11/5/18   Prisca Espinoza MD   ciprofloxacin HCl (CIPRO) 500 MG tablet TAKE 1 TABLET(500 MG) BY MOUTH EVERY MONDAY  7/23/19   Renato Womack MD   divalproex (DEPAKOTE) 250 MG EC tablet Take 2 tablets (500 mg total) by mouth every evening. Do not fill prescription until patient calls and requests. 12/10/18   Prisca Espinoza MD   fluorouracil (EFUDEX) 5 % cream AAA + 1cm of normal appearing surrounding skin BID x 4-6 weeks. Stop if blistered, oozing, or bleeding. Use daily sun protection. 7/10/19   Dejah Anders MD   furosemide (LASIX) 20 MG tablet TAKE 2 TABLETS BY MOUTH EVERY DAY 5/2/19   Renato Womack MD   HYDROcodone-acetaminophen (NORCO) 5-325 mg per tablet Take 1 tablet by mouth every 6 (six) hours as needed for Pain. 8/13/19   Valorie Castellanos PA-C   lactulose (CHRONULAC) 10 gram/15 mL solution TAKE 15 MLS BY MOUTH TWICE DAILY 8/5/19   Renato Womack MD   lancets Misc To check blood sugar once daily, to use with Accu-Chek meter. 11/5/18   Prisca Espinoza MD   ofloxacin (OCUFLOX) 0.3 % ophthalmic solution  1/10/19   Historical Provider, MD   prednisoLONE acetate (PRED FORTE) 1 % DrpS  1/15/19   Historical Provider, MD   rifAXIMin (XIFAXAN) 550 mg Tab Take 1 tablet (550 mg total) by mouth 2 (two) times daily. 10/3/17   Renato Womack MD   SITagliptan (JANUVIA) 50 MG Tab Take 1 tablet (50 mg total) by mouth once daily. 4/6/17   Prisca Espinoza MD     Anticoagulants/Antiplatelets: no anticoagulation    Allergies:   Review of patient's allergies indicates:   Allergen Reactions    Abilify [aripiprazole] Other (See Comments)     Sleepy, could not function.     Sedation History:  no adverse reactions    Review of Systems:   Hematological: no known coagulopathies  Respiratory: no shortness of breath  Cardiovascular: no chest pain  Gastrointestinal: no abdominal pain  Genito-Urinary: no dysuria  Musculoskeletal: negative  Neurological: no TIA or stroke symptoms         OBJECTIVE:     Vital Signs (Most Recent)       Physical Exam:  ASA: 2  Mallampati: n/a    General: no acute distress  Mental Status:  alert and oriented to person, place and time  HEENT: normocephalic, atraumatic  Chest: unlabored breathing  Heart: regular heart rate  Abdomen: distended  Extremity: moves all extremities    ASSESSMENT/PLAN:     Sedation Plan: local  Patient will undergo ultrasound guided paracentesis.    MATT Mauricio, FNP  Interventional Radiology  [phone] clinic

## 2019-10-11 NOTE — PROCEDURES

## 2019-10-18 ENCOUNTER — HOSPITAL ENCOUNTER (OUTPATIENT)
Dept: INTERVENTIONAL RADIOLOGY/VASCULAR | Facility: HOSPITAL | Age: 79
Discharge: HOME | End: 2019-10-18
Attending: INTERNAL MEDICINE
Payer: MEDICARE

## 2019-10-18 VITALS
RESPIRATION RATE: 16 BRPM | DIASTOLIC BLOOD PRESSURE: 65 MMHG | HEART RATE: 46 BPM | SYSTOLIC BLOOD PRESSURE: 142 MMHG | OXYGEN SATURATION: 97 %

## 2019-10-18 DIAGNOSIS — R18.8 ASCITES OF LIVER: ICD-10-CM

## 2019-10-18 DIAGNOSIS — K74.60 LIVER CIRRHOSIS SECONDARY TO NASH: ICD-10-CM

## 2019-10-18 DIAGNOSIS — K74.60 CIRRHOSIS OF LIVER WITH ASCITES, UNSPECIFIED HEPATIC CIRRHOSIS TYPE: ICD-10-CM

## 2019-10-18 DIAGNOSIS — R18.8 CIRRHOSIS OF LIVER WITH ASCITES, UNSPECIFIED HEPATIC CIRRHOSIS TYPE: ICD-10-CM

## 2019-10-18 DIAGNOSIS — K75.81 LIVER CIRRHOSIS SECONDARY TO NASH: ICD-10-CM

## 2019-10-18 LAB
APPEARANCE FLD: NORMAL
BODY FLD TYPE: NORMAL
COLOR FLD: YELLOW
LYMPHOCYTES NFR FLD MANUAL: 64 %
MONOS+MACROS NFR FLD MANUAL: 31 %
NEUTROPHILS NFR FLD MANUAL: 5 %
WBC # FLD: 31 /CU MM

## 2019-10-18 PROCEDURE — 49083 IR PARACENTESIS WITH IMAGING: ICD-10-PCS | Mod: ,,, | Performed by: FAMILY MEDICINE

## 2019-10-18 PROCEDURE — 49083 ABD PARACENTESIS W/IMAGING: CPT | Mod: ,,, | Performed by: FAMILY MEDICINE

## 2019-10-18 PROCEDURE — 89051 BODY FLUID CELL COUNT: CPT

## 2019-10-18 PROCEDURE — 49083 ABD PARACENTESIS W/IMAGING: CPT

## 2019-10-18 NOTE — PROCEDURES

## 2019-10-18 NOTE — NURSING
Paracentesis complete, 4200 MLs removed. Specimen sent to lab. NO Albumin administered per protocol. Dressing applied to abdominal puncture site, dressing clean dry and intact. Pt given discharge instructions and handouts, pt verbalizes understanding. Questions answered. Pt denies pain and discomfort. Pt refusing transport. Pt ambulated to [facility] for transport home with family. Gait steady.

## 2019-10-18 NOTE — DISCHARGE INSTRUCTIONS
For scheduling: Call Mackenzie at [phone]    For questions or concerns call: [facility] 8 AM- 5PM [phone]. Radiology resident on call [phone].    For immediate concerns that are not emergent, you may call our radiology clinic at: [phone]

## 2019-10-18 NOTE — H&P
Radiology History & Physical      SUBJECTIVE:     Chief Complaint: abdominal distention    History of Present Illness:  Kenneth hSeikh is a 79 y.o. male who presents for ultrasound guided paracentesis  Past Medical History:   Diagnosis Date    Anticoagulant long-term use     Basal cell carcinoma     Bipolar 1 disorder     Bipolar 1 disorder 11/24/2016    bipolar    Cancer     history of skin cancer/sinus cancer & rectal cancer    Depressed bipolar affective disorder     Diabetes mellitus     type 2    EBV infection 12/7/2016    EBV DNA, PCR Latest Ref Range: None detected  None detected EBV DNA-Copies/mL Unknown Test Not Performed EBV Early Antigen Ab, IgG Latest Ref Range: <1:10 Titer 1:40 (A) EBV Nuclear Ag Ab Latest Ref Range: <1:5 Titer >=1:80 (A) EBV VCA IgG Latest Ref Range: <1:10 Titer 1:160 (A) EBV VCA IgM Latest Ref Range: <1:10 Titer <1:10     History of TIA (transient ischemic attack)     several-taking Plavix    Hx of gallstones     Wife denies    Hypertension     Hyperthyroidism 11/30/2016    Low HDL (under 40) 12/7/2016    Mixed hyperlipidemia 11/23/2016    JUAN MANUEL (obstructive sleep apnea)     Pneumonia     Skin disease     Squamous cell carcinoma 08/2018    right temple    Stroke     Transient cerebral ischemia 7/22/2016    Type 2 diabetes mellitus      Past Surgical History:   Procedure Laterality Date    Moh's procedure x4      rectal cancer      Sinus surgery for sinus cancer      sinus sx for cancer      skin cancer removed-several times-nose & ear      TONSILLECTOMY      Undescened testicle sx      UVULOPALATOPHARYNGOPLASTY      for sleep apnea       Home Meds:   Prior to Admission medications    Medication Sig Start Date End Date Taking? Authorizing Provider   ACCU-CHEK NEYDA PLUS TEST STRP Strp 1 strip by Misc.(Non-Drug; Combo Route) route once daily. 11/5/18   Prisca Espinoza MD   ciprofloxacin HCl (CIPRO) 500 MG tablet TAKE 1 TABLET(500 MG) BY MOUTH EVERY MONDAY  7/23/19   Renato Womack MD   divalproex (DEPAKOTE) 250 MG EC tablet Take 2 tablets (500 mg total) by mouth every evening. Do not fill prescription until patient calls and requests. 12/10/18   Prisca Espinoza MD   fluorouracil (EFUDEX) 5 % cream AAA + 1cm of normal appearing surrounding skin BID x 4-6 weeks. Stop if blistered, oozing, or bleeding. Use daily sun protection. 7/10/19   Dejah Anders MD   furosemide (LASIX) 20 MG tablet TAKE 2 TABLETS BY MOUTH EVERY DAY 5/2/19   Renato Womack MD   HYDROcodone-acetaminophen (NORCO) 5-325 mg per tablet Take 1 tablet by mouth every 6 (six) hours as needed for Pain. 8/13/19   Valorie Castellanos PA-C   lactulose (CHRONULAC) 10 gram/15 mL solution TAKE 15 MLS BY MOUTH TWICE DAILY 8/5/19   Renato Womack MD   lancets Misc To check blood sugar once daily, to use with Accu-Chek meter. 11/5/18   Prisca Espinoza MD   ofloxacin (OCUFLOX) 0.3 % ophthalmic solution  1/10/19   Historical Provider, MD   prednisoLONE acetate (PRED FORTE) 1 % DrpS  1/15/19   Historical Provider, MD   rifAXIMin (XIFAXAN) 550 mg Tab Take 1 tablet (550 mg total) by mouth 2 (two) times daily. 10/3/17   Renato Womack MD   SITagliptan (JANUVIA) 50 MG Tab Take 1 tablet (50 mg total) by mouth once daily. 4/6/17   Prisca Espinoza MD     Anticoagulants/Antiplatelets: no anticoagulation    Allergies:   Review of patient's allergies indicates:   Allergen Reactions    Abilify [aripiprazole] Other (See Comments)     Sleepy, could not function.     Sedation History:  no adverse reactions    Review of Systems:   Hematological: no known coagulopathies  Respiratory: no shortness of breath  Cardiovascular: no chest pain  Gastrointestinal: no abdominal pain  Genito-Urinary: no dysuria  Musculoskeletal: negative  Neurological: no TIA or stroke symptoms         OBJECTIVE:     Vital Signs (Most Recent)       Physical Exam:  ASA: 2  Mallampati: n/a    General: no acute distress  Mental Status:  alert and oriented to person, place and time  HEENT: normocephalic, atraumatic  Chest: unlabored breathing  Heart: regular heart rate  Abdomen: distended  Extremity: moves all extremities    ASSESSMENT/PLAN:     Sedation Plan: local  Patient will undergo ultrasound guided paracentesis.    MATT Mauricio, FNP  Interventional Radiology  [phone] clinic

## 2019-10-20 DIAGNOSIS — R18.8 ASCITES OF LIVER: ICD-10-CM

## 2019-10-20 RX ORDER — CIPROFLOXACIN 500 MG/1
TABLET ORAL
Qty: 12 TABLET | Refills: 0 | Status: SHIPPED | OUTPATIENT
Start: 2019-10-20 | End: 2020-01-19

## 2019-10-25 ENCOUNTER — HOSPITAL ENCOUNTER (OUTPATIENT)
Dept: INTERVENTIONAL RADIOLOGY/VASCULAR | Facility: HOSPITAL | Age: 79
Discharge: HOME | End: 2019-10-25
Attending: INTERNAL MEDICINE
Payer: MEDICARE

## 2019-10-25 VITALS
OXYGEN SATURATION: 100 % | DIASTOLIC BLOOD PRESSURE: 50 MMHG | SYSTOLIC BLOOD PRESSURE: 113 MMHG | HEART RATE: 50 BPM | RESPIRATION RATE: 16 BRPM

## 2019-10-25 DIAGNOSIS — K74.60 CIRRHOSIS OF LIVER WITH ASCITES, UNSPECIFIED HEPATIC CIRRHOSIS TYPE: ICD-10-CM

## 2019-10-25 DIAGNOSIS — K75.81 LIVER CIRRHOSIS SECONDARY TO NASH: ICD-10-CM

## 2019-10-25 DIAGNOSIS — R18.8 CIRRHOSIS OF LIVER WITH ASCITES, UNSPECIFIED HEPATIC CIRRHOSIS TYPE: ICD-10-CM

## 2019-10-25 DIAGNOSIS — R18.8 ASCITES OF LIVER: ICD-10-CM

## 2019-10-25 DIAGNOSIS — K74.60 LIVER CIRRHOSIS SECONDARY TO NASH: ICD-10-CM

## 2019-10-25 DIAGNOSIS — F31.9 BIPOLAR 1 DISORDER: ICD-10-CM

## 2019-10-25 LAB
APPEARANCE FLD: NORMAL
BODY FLD TYPE: NORMAL
COLOR FLD: YELLOW
LYMPHOCYTES NFR FLD MANUAL: 65 %
MESOTHL CELL NFR FLD MANUAL: 13 %
MONOS+MACROS NFR FLD MANUAL: 19 %
NEUTROPHILS NFR FLD MANUAL: 3 %
WBC # FLD: 61 /CU MM

## 2019-10-25 PROCEDURE — 89051 BODY FLUID CELL COUNT: CPT

## 2019-10-25 PROCEDURE — 49083 IR PARACENTESIS WITH IMAGING: ICD-10-PCS | Mod: GC,,, | Performed by: FAMILY MEDICINE

## 2019-10-25 PROCEDURE — 49083 ABD PARACENTESIS W/IMAGING: CPT

## 2019-10-25 PROCEDURE — 49083 ABD PARACENTESIS W/IMAGING: CPT | Mod: GC,,, | Performed by: FAMILY MEDICINE

## 2019-10-25 NOTE — PROCEDURES

## 2019-10-25 NOTE — PLAN OF CARE
Pt arrived to [facility] for paracentesis.  Name verified using two identifiers.  Allergies verified. Orders and labs reviewed in chart.  Will continue to monitor.

## 2019-10-25 NOTE — PLAN OF CARE
Paracentesis complete, 3.6 L removed. Specimen sent to lab. Dressing applied to right puncture site, dressing clean dry and intact. Pt given discharge instructions and handouts, pt verbalizes understanding. Questions answered. Pt denies pain and discomfort. Pt transported to lobby via wheelchair.

## 2019-10-25 NOTE — DISCHARGE INSTRUCTIONS
[facility] MONDAY-FRIDAY 8AM-5PM CALL [phone]  AFTER HOURS CALL [phone] ASK FOR RADIOLOGY RESIDENT

## 2019-10-25 NOTE — H&P
Radiology History & Physical      SUBJECTIVE:     Chief Complaint: abdominal distention    History of Present Illness:  Kenneth Sheikh is a 79 y.o. male who presents for ultrasound guided paracentesis  Past Medical History:   Diagnosis Date    Anticoagulant long-term use     Basal cell carcinoma     Bipolar 1 disorder     Bipolar 1 disorder 11/24/2016    bipolar    Cancer     history of skin cancer/sinus cancer & rectal cancer    Depressed bipolar affective disorder     Diabetes mellitus     type 2    EBV infection 12/7/2016    EBV DNA, PCR Latest Ref Range: None detected  None detected EBV DNA-Copies/mL Unknown Test Not Performed EBV Early Antigen Ab, IgG Latest Ref Range: <1:10 Titer 1:40 (A) EBV Nuclear Ag Ab Latest Ref Range: <1:5 Titer >=1:80 (A) EBV VCA IgG Latest Ref Range: <1:10 Titer 1:160 (A) EBV VCA IgM Latest Ref Range: <1:10 Titer <1:10     History of TIA (transient ischemic attack)     several-taking Plavix    Hx of gallstones     Wife denies    Hypertension     Hyperthyroidism 11/30/2016    Low HDL (under 40) 12/7/2016    Mixed hyperlipidemia 11/23/2016    JUAN MANUEL (obstructive sleep apnea)     Pneumonia     Skin disease     Squamous cell carcinoma 08/2018    right temple    Stroke     Transient cerebral ischemia 7/22/2016    Type 2 diabetes mellitus      Past Surgical History:   Procedure Laterality Date    Moh's procedure x4      rectal cancer      Sinus surgery for sinus cancer      sinus sx for cancer      skin cancer removed-several times-nose & ear      TONSILLECTOMY      Undescened testicle sx      UVULOPALATOPHARYNGOPLASTY      for sleep apnea       Home Meds:   Prior to Admission medications    Medication Sig Start Date End Date Taking? Authorizing Provider   ACCU-CHEK NEYDA PLUS TEST STRP Strp 1 strip by Misc.(Non-Drug; Combo Route) route once daily. 11/5/18   Prisca Espinoza MD   ciprofloxacin HCl (CIPRO) 500 MG tablet TAKE 1 TABLET(500 MG) BY MOUTH EVERY MONDAY  10/20/19   Renato Womack MD   divalproex (DEPAKOTE) 250 MG EC tablet Take 2 tablets (500 mg total) by mouth every evening. Do not fill prescription until patient calls and requests. 12/10/18   Prisca Espinoza MD   fluorouracil (EFUDEX) 5 % cream AAA + 1cm of normal appearing surrounding skin BID x 4-6 weeks. Stop if blistered, oozing, or bleeding. Use daily sun protection. 7/10/19   Dejah Anders MD   furosemide (LASIX) 20 MG tablet TAKE 2 TABLETS BY MOUTH EVERY DAY 5/2/19   Renato Womack MD   HYDROcodone-acetaminophen (NORCO) 5-325 mg per tablet Take 1 tablet by mouth every 6 (six) hours as needed for Pain. 8/13/19   Valorie Castellanos PA-C   lactulose (CHRONULAC) 10 gram/15 mL solution TAKE 15 MLS BY MOUTH TWICE DAILY 8/5/19   Renato Womack MD   lancets Misc To check blood sugar once daily, to use with Accu-Chek meter. 11/5/18   Prisca Espinoza MD   ofloxacin (OCUFLOX) 0.3 % ophthalmic solution  1/10/19   Historical Provider, MD   prednisoLONE acetate (PRED FORTE) 1 % DrpS  1/15/19   Historical Provider, MD   rifAXIMin (XIFAXAN) 550 mg Tab Take 1 tablet (550 mg total) by mouth 2 (two) times daily. 10/3/17   Renato Womack MD   SITagliptan (JANUVIA) 50 MG Tab Take 1 tablet (50 mg total) by mouth once daily. 4/6/17   Prisca Espinoza MD     Anticoagulants/Antiplatelets: no anticoagulation    Allergies:   Review of patient's allergies indicates:   Allergen Reactions    Abilify [aripiprazole] Other (See Comments)     Sleepy, could not function.     Sedation History:  no adverse reactions    Review of Systems:   Hematological: no known coagulopathies  Respiratory: no shortness of breath  Cardiovascular: no chest pain  Gastrointestinal: no abdominal pain  Genito-Urinary: no dysuria  Musculoskeletal: negative  Neurological: no TIA or stroke symptoms         OBJECTIVE:     Vital Signs (Most Recent)  Pulse: (!) 50 (10/25/19 1100)  Resp: 18 (10/25/19 1100)  BP: (!) 144/73 (10/25/19  1100)  SpO2: 100 % (10/25/19 1100)    Physical Exam:  ASA: 2  Mallampati: n/a    General: no acute distress  Mental Status: alert and oriented to person, place and time  HEENT: normocephalic, atraumatic  Chest: unlabored breathing  Heart: regular heart rate  Abdomen: distended  Extremity: moves all extremities    ASSESSMENT/PLAN:     Sedation Plan: local  Patient will undergo ultrasound guided paracentesis.    MATT Mauricio, FNP  Interventional Radiology  [phone] clinic

## 2019-10-27 RX ORDER — LACTULOSE 10 G/15ML
SOLUTION ORAL; RECTAL
Qty: 2838 ML | Refills: 0 | Status: SHIPPED | OUTPATIENT
Start: 2019-10-27 | End: 2020-01-26

## 2019-10-28 RX ORDER — DIVALPROEX SODIUM 250 MG/1
TABLET, DELAYED RELEASE ORAL
Qty: 180 TABLET | Refills: 3 | Status: SHIPPED | OUTPATIENT
Start: 2019-10-28 | End: 2020-08-27 | Stop reason: SDUPTHER

## 2019-11-01 ENCOUNTER — HOSPITAL ENCOUNTER (OUTPATIENT)
Dept: INTERVENTIONAL RADIOLOGY/VASCULAR | Facility: HOSPITAL | Age: 79
Discharge: HOME | End: 2019-11-01
Attending: INTERNAL MEDICINE
Payer: MEDICARE

## 2019-11-01 VITALS — SYSTOLIC BLOOD PRESSURE: 154 MMHG | HEART RATE: 57 BPM | RESPIRATION RATE: 16 BRPM | DIASTOLIC BLOOD PRESSURE: 77 MMHG

## 2019-11-01 DIAGNOSIS — K70.31 ASCITES DUE TO ALCOHOLIC CIRRHOSIS: ICD-10-CM

## 2019-11-01 LAB
GRAM STN SPEC: NORMAL
GRAM STN SPEC: NORMAL

## 2019-11-01 PROCEDURE — 49083 ABD PARACENTESIS W/IMAGING: CPT | Mod: ,,, | Performed by: RADIOLOGY

## 2019-11-01 PROCEDURE — 49083 ABD PARACENTESIS W/IMAGING: CPT

## 2019-11-01 PROCEDURE — 87070 CULTURE OTHR SPECIMN AEROBIC: CPT

## 2019-11-01 PROCEDURE — 87075 CULTR BACTERIA EXCEPT BLOOD: CPT

## 2019-11-01 PROCEDURE — A4550 SURGICAL TRAYS: HCPCS

## 2019-11-01 PROCEDURE — 49083 IR PARACENTESIS NO IMAGING: ICD-10-PCS | Mod: ,,, | Performed by: RADIOLOGY

## 2019-11-01 PROCEDURE — 87205 SMEAR GRAM STAIN: CPT

## 2019-11-01 NOTE — DISCHARGE INSTRUCTIONS
For scheduling: Call Mackenzie at [phone]    For questions or concerns call: [facility] 8 AM- 5PM [phone]. Radiology resident on call [phone].    For immediate concerns that are not emergent, you may call our radiology clinic at: [phone]...

## 2019-11-01 NOTE — H&P
Radiology History & Physical      SUBJECTIVE:     Chief Complaint: ascites    History of Present Illness:  Kenneth Sheikh is a 79 y.o. male who presents for paracentesis.   Past Medical History:   Diagnosis Date    Anticoagulant long-term use     Basal cell carcinoma     Bipolar 1 disorder     Bipolar 1 disorder 11/24/2016    bipolar    Cancer     history of skin cancer/sinus cancer & rectal cancer    Depressed bipolar affective disorder     Diabetes mellitus     type 2    EBV infection 12/7/2016    EBV DNA, PCR Latest Ref Range: None detected  None detected EBV DNA-Copies/mL Unknown Test Not Performed EBV Early Antigen Ab, IgG Latest Ref Range: <1:10 Titer 1:40 (A) EBV Nuclear Ag Ab Latest Ref Range: <1:5 Titer >=1:80 (A) EBV VCA IgG Latest Ref Range: <1:10 Titer 1:160 (A) EBV VCA IgM Latest Ref Range: <1:10 Titer <1:10     History of TIA (transient ischemic attack)     several-taking Plavix    Hx of gallstones     Wife denies    Hypertension     Hyperthyroidism 11/30/2016    Low HDL (under 40) 12/7/2016    Mixed hyperlipidemia 11/23/2016    JUAN MANUEL (obstructive sleep apnea)     Pneumonia     Skin disease     Squamous cell carcinoma 08/2018    right temple    Stroke     Transient cerebral ischemia 7/22/2016    Type 2 diabetes mellitus      Past Surgical History:   Procedure Laterality Date    Moh's procedure x4      rectal cancer      Sinus surgery for sinus cancer      sinus sx for cancer      skin cancer removed-several times-nose & ear      TONSILLECTOMY      Undescened testicle sx      UVULOPALATOPHARYNGOPLASTY      for sleep apnea       Home Meds:   Prior to Admission medications    Medication Sig Start Date End Date Taking? Authorizing Provider   ACCU-CHEK NEYDA PLUS TEST STRP Strp 1 strip by Misc.(Non-Drug; Combo Route) route once daily. 11/5/18   Prisca Espinoza MD   ciprofloxacin HCl (CIPRO) 500 MG tablet TAKE 1 TABLET(500 MG) BY MOUTH EVERY MONDAY 10/20/19   Renato Womack  MD   divalproex (DEPAKOTE) 250 MG EC tablet TAKE 2 TABLETS BY MOUTH EVERY EVENING 10/28/19   Prisca Espinoza MD   fluorouracil (EFUDEX) 5 % cream AAA + 1cm of normal appearing surrounding skin BID x 4-6 weeks. Stop if blistered, oozing, or bleeding. Use daily sun protection. 7/10/19   Dejah Anders MD   furosemide (LASIX) 20 MG tablet TAKE 2 TABLETS BY MOUTH EVERY DAY 5/2/19   Renato Womack MD   HYDROcodone-acetaminophen (NORCO) 5-325 mg per tablet Take 1 tablet by mouth every 6 (six) hours as needed for Pain. 8/13/19   Valorie Castellanos PA-C   lactulose (CHRONULAC) 10 gram/15 mL solution TAKE 15 MLS BY MOUTH TWICE DAILY 10/27/19   Renato Womack MD   lancets Misc To check blood sugar once daily, to use with Accu-Chek meter. 11/5/18   Prisca Espinoza MD   ofloxacin (OCUFLOX) 0.3 % ophthalmic solution  1/10/19   Historical Provider, MD   prednisoLONE acetate (PRED FORTE) 1 % DrpS  1/15/19   Historical Provider, MD   rifAXIMin (XIFAXAN) 550 mg Tab Take 1 tablet (550 mg total) by mouth 2 (two) times daily. 10/3/17   Renato Womack MD   SITagliptan (JANUVIA) 50 MG Tab Take 1 tablet (50 mg total) by mouth once daily. 4/6/17   Prisca Espinoza MD     Anticoagulants/Antiplatelets: no anticoagulation    Allergies:   Review of patient's allergies indicates:   Allergen Reactions    Abilify [aripiprazole] Other (See Comments)     Sleepy, could not function.     Sedation History:  no adverse reactions    Review of Systems:   Hematological: no known coagulopathies  Respiratory: no shortness of breath  Cardiovascular: no chest pain  Gastrointestinal: no abdominal pain  Genito-Urinary: no dysuria  Musculoskeletal: negative  Neurological: no TIA or stroke symptoms         OBJECTIVE:     Vital Signs (Most Recent)  Pulse: (!) 54 (11/01/19 1100)  Resp: 16 (11/01/19 1100)  BP: (!) 150/76 (11/01/19 1100)    Physical Exam:  ASA: 2  Mallampati: 2    General: no acute distress  Mental Status: alert and oriented to  person, place and time  HEENT: normocephalic, atraumatic  Chest: unlabored breathing  Abdomen: nondistended  Extremity: moves all extremities    Laboratory  Lab Results   Component Value Date    INR 1.1 06/14/2019       Lab Results   Component Value Date    WBC 2.97 (L) 06/14/2019    HGB 10.5 (L) 06/14/2019    HCT 33.0 (L) 06/14/2019    MCV 87 06/14/2019     (L) 06/14/2019      Lab Results   Component Value Date     06/14/2019     06/14/2019    K 3.6 06/14/2019     06/14/2019    CO2 27 06/14/2019    BUN 16 06/14/2019    CREATININE 1.1 06/14/2019    CALCIUM 8.8 06/14/2019    MG 1.7 05/15/2017    ALT 10 06/14/2019    AST 22 06/14/2019    ALBUMIN 2.5 (L) 06/14/2019    BILITOT 0.6 06/14/2019       ASSESSMENT/PLAN:     Sedation Plan: local  Patient will undergo paracentesis.     David Galvan MD  PGY - 4  Department of Radiology

## 2019-11-01 NOTE — PROCEDURES
"Paracentesis  Date/Time: 2019 11:20 AM  Location procedure was performed: [facility] INTERVENTIONAL RADIOLOGY  Performed by: Jossue Villafuerte NP  Authorized by: Jossue Villafuerte NP   Pre-operative diagnosis: ascites  Post-operative diagnosis: ascites  Consent Done: Yes  Consent: Written consent obtained.  Consent given by: patient  Patient understanding: patient states understanding of the procedure being performed  Patient consent: the patient's understanding of the procedure matches consent given  Procedure consent: procedure consent matches procedure scheduled  Relevant documents: relevant documents present and verified  Test results: test results available and properly labeled  Site marked: the operative site was marked  Imaging studies: imaging studies available  Required items: required blood products, implants, devices, and special equipment available  Patient identity confirmed: , MRN, name and verbally with patient  Time out: Immediately prior to procedure a "time out" was called to verify the correct patient, procedure, equipment, support staff and site/side marked as required.  Initial or subsequent exam: initial  Procedure purpose: therapeutic  Indications: abdominal discomfort secondary to ascites  Anesthesia: local infiltration    Anesthesia:  Local Anesthetic: lidocaine 1% without epinephrine  Anesthetic total: 10 mL  Patient sedated: no  Preparation: Patient was prepped and draped in the usual sterile fashion.  Needle gauge: 5 [place].  Ultrasound guidance: no  Puncture site: L lower quadrant  Fluid removed: 3900 (ml)  Fluid appearance: cloudy, light yellow  Dressing: 4x4 sterile gauze  Technical procedures used: Yasmine catheter  Specimens: No  Implants: No  Patient tolerance: Patient tolerated the procedure well with no immediate complications        Jossue Villafuerte NP  Interventional Radiology  [facility]      "

## 2019-11-01 NOTE — PROGRESS NOTES
Paracentesis complete. 3900 mLs peritoneal fluid drained. Pt tolerated well. Dressing to abdomen  clean, dry, and intact. Specimens sent per lab order.Pt acknowledged understanding of discharge instructions. Pt discharged per unit and hospital protocol via wheelchair with family member.

## 2019-11-04 ENCOUNTER — PATIENT OUTREACH (OUTPATIENT)
Dept: ADMINISTRATIVE | Facility: OTHER | Age: 79
End: 2019-11-04

## 2019-11-04 LAB — BACTERIA SPEC AEROBE CULT: NO GROWTH

## 2019-11-06 ENCOUNTER — OFFICE VISIT (OUTPATIENT)
Dept: DERMATOLOGY | Facility: CLINIC | Age: 79
End: 2019-11-06
Payer: MEDICARE

## 2019-11-06 DIAGNOSIS — Z12.83 SKIN CANCER SCREENING: ICD-10-CM

## 2019-11-06 DIAGNOSIS — L57.0 AK (ACTINIC KERATOSIS): Primary | ICD-10-CM

## 2019-11-06 DIAGNOSIS — L82.1 SK (SEBORRHEIC KERATOSIS): ICD-10-CM

## 2019-11-06 DIAGNOSIS — D09.9 SQUAMOUS CELL CARCINOMA IN SITU: ICD-10-CM

## 2019-11-06 PROCEDURE — 17003 DESTRUCTION, PREMALIGNANT LESIONS; SECOND THROUGH 14 LESIONS: ICD-10-PCS | Mod: S$PBB,,, | Performed by: DERMATOLOGY

## 2019-11-06 PROCEDURE — 17003 DESTRUCT PREMALG LES 2-14: CPT | Mod: S$PBB,,, | Performed by: DERMATOLOGY

## 2019-11-06 PROCEDURE — 99999 PR PBB SHADOW E&M-EST. PATIENT-LVL III: CPT | Mod: PBBFAC,,, | Performed by: DERMATOLOGY

## 2019-11-06 PROCEDURE — 99213 OFFICE O/P EST LOW 20 MIN: CPT | Mod: PBBFAC,25 | Performed by: DERMATOLOGY

## 2019-11-06 PROCEDURE — 17000 DESTRUCT PREMALG LESION: CPT | Mod: PBBFAC | Performed by: DERMATOLOGY

## 2019-11-06 PROCEDURE — 99213 OFFICE O/P EST LOW 20 MIN: CPT | Mod: 25,S$PBB,, | Performed by: DERMATOLOGY

## 2019-11-06 PROCEDURE — 99999 PR PBB SHADOW E&M-EST. PATIENT-LVL III: ICD-10-PCS | Mod: PBBFAC,,, | Performed by: DERMATOLOGY

## 2019-11-06 PROCEDURE — 17003 DESTRUCT PREMALG LES 2-14: CPT | Mod: PBBFAC | Performed by: DERMATOLOGY

## 2019-11-06 PROCEDURE — 17000 DESTRUCT PREMALG LESION: CPT | Mod: S$PBB,,, | Performed by: DERMATOLOGY

## 2019-11-06 PROCEDURE — 99213 PR OFFICE/OUTPT VISIT, EST, LEVL III, 20-29 MIN: ICD-10-PCS | Mod: 25,S$PBB,, | Performed by: DERMATOLOGY

## 2019-11-06 PROCEDURE — 17000 PR DESTRUCTION(LASER SURGERY,CRYOSURGERY,CHEMOSURGERY),PREMALIGNANT LESIONS,FIRST LESION: ICD-10-PCS | Mod: S$PBB,,, | Performed by: DERMATOLOGY

## 2019-11-06 RX ORDER — FLUOROURACIL 50 MG/G
CREAM TOPICAL
Qty: 40 G | Refills: 1 | Status: SHIPPED | OUTPATIENT
Start: 2019-11-06 | End: 2020-01-16

## 2019-11-06 NOTE — PATIENT INSTRUCTIONS
CRYOSURGERY      Your doctor has used a method called cryosurgery to treat your skin condition. Cryosurgery refers to the use of very cold substances to treat a variety of skin conditions such as warts, pre-skin cancers, molluscum contagiosum, sun spots, and several benign growths. The substance we use in cryosurgery is liquid nitrogen and is so cold (-195 degrees Celsius) that is burns when administered.     Following treatment in the office, the skin may immediately burn and become red. You may find the area around the lesion is affected as well. It is sometimes necessary to treat not only the lesion, but a small area of the surrounding normal skin to achieve a good response.     A blister, and even a blood filled blister, may form after treatment.   This is a normal response. If the blister is painful, it is acceptable to sterilize a needle and with rubbing alcohol and gently pop the blister. It is important that you gently wash the area with soap and warm water as the blister fluid may contain wart virus if a wart was treated. Do no remove the roof of the blister.     The area treated can take anywhere from 1-3 weeks to heal. Healing time depends on the kind skin lesion treated, the location, and how aggressively the lesion was treated. It is recommended that the areas treated are covered with Vaseline or bacitracin ointment and a band-aid. If a band-aid is not practical, just ointment applied several times per day will do. Keeping these areas moist will speed the healing time.    Treatment with liquid nitrogen can leave a scar. In dark skin, it may be a light or dark scar, in light skin it may be a white or pink scar. These will generally fade with time, but may never go away completely.     If you have any concerns after your treatment, please feel free to call the office.       [address] [facility] [place] [address]/ [phone] FAX/ [email or web address]    Summer Sun Protection      The  [facility] would like to remind you of the importance of sun protection all year round and particularly during the summer when the suns rays are the strongest. It has been proven that both acute and chronic sun exposure damages our cells and leads to skin cancer. Beyond skin cancer, the sun causes 90% of the symptoms of pre-mature skin aging, including wrinkles, lentigines (brown spots), and thin, easily bruised skin. Proper sun protection can help prevent these unwanted conditions.    Many patients report that the dont go in the sun. It has been shown that the average person receives 18 hours of incidental sun exposure per week during activities such as walking through parking lots, driving, or sitting next to windows. This accumulates to several bad sunburns per year!    In choosing sunscreen, you want one that protects against both UVA and UVB rays. It is recommended that you use one of SPF 30 or higher. It is important to apply the sunscreen about 20 minutes prior to sun exposure. Most sunscreens are chemical sunscreens and a reaction must take place in the skin so that they are effective. If they are applied and then you are immediately exposed to the sun or start sweating, this reaction has not had time to take place and you are therefore unprotected. Sunscreen needs to be reapplied every 2 hours if you are participating in water sports or sweating. We recommend Elta MD or Neutrogena Ultra Sheer Dry Touch SPF 55 for daily use; however there are many options and it is most important for you to find one that you will use on a consistent basis.    If you have sensitive skin, you may do best with a sunscreen that contains only physical blockers such as titanium dioxide or zinc oxide. These are typically thicker and harder to apply, however they afford very good protection. Neutrogena Sensitive Skin, Blue Lizard Sensitive Skin (pink top) or Neutrogena Pure and Free are popular ones.      Aside from sunscreen, clothes with UV protection, wide brimmed hats, and sunglasses are other means of sun protection that we recommend.                        [facility]  [address] [facility]  [facility] [address]  Dept: [phone]  Dept Fax: [phone]

## 2019-11-06 NOTE — PROGRESS NOTES
Subjective:       Patient ID:  Kenneth Sheikh is a 79 y.o. male who presents for   Chief Complaint   Patient presents with    Skin Check     Pt presents today for UBSE.  Pt c/o bumps on scalp, scaly, 4 months, Tx. none    Pt has an extensive hx of NMSC in the past, mostly treated by Jimmy Beach and Esteban and with Efudex.  Last skin check was in June and since then, pt has treated SCCIS on L preauricular cheek, L lateral cheek, and L elbow with Efudex. Since last visit, Dr. Orellana excised a BCC on nasal dorsum and on L inferior nasal sidewall.  Pt and wife would prefer to decrease the amount of cutting on his skin and would prefer to try and freeze as much as possible.    Review of Systems   Constitutional: Negative for fever, chills, weight loss, weight gain, fatigue, night sweats and malaise.   Skin: Positive for activity-related sunscreen use and wears hat. Negative for daily sunscreen use and recent sunburn.   Hematologic/Lymphatic: Bruises/bleeds easily.        Objective:    Physical Exam   Constitutional: He appears well-developed and well-nourished. No distress.   Neurological: He is alert and oriented to person, place, and time. He is not disoriented.   Psychiatric: He has a normal mood and affect.   Skin:   Areas Examined (abnormalities noted in diagram):   Head / Face Inspection Performed  Neck Inspection Performed  Chest / Axilla Inspection Performed  Abdomen Inspection Performed  Back Inspection Performed  RUE Inspected  LUE Inspection Performed                   Diagram Legend     Erythematous scaling macule/papule c/w actinic keratosis       Vascular papule c/w angioma      Pigmented verrucoid papule/plaque c/w seborrheic keratosis      Yellow umbilicated papule c/w sebaceous hyperplasia      Irregularly shaped tan macule c/w lentigo     1-2 mm smooth white papules consistent with Milia      Movable subcutaneous cyst with punctum c/w epidermal inclusion cyst      Subcutaneous movable cyst c/w pilar  cyst      Firm pink to brown papule c/w dermatofibroma      Pedunculated fleshy papule(s) c/w skin tag(s)      Evenly pigmented macule c/w junctional nevus     Mildly variegated pigmented, slightly irregular-bordered macule c/w mildly atypical nevus      Flesh colored to evenly pigmented papule c/w intradermal nevus       Pink pearly papule/plaque c/w basal cell carcinoma      Erythematous hyperkeratotic cursted plaque c/w SCC      Surgical scar with no sign of skin cancer recurrence      Open and closed comedones      Inflammatory papules and pustules      Verrucoid papule consistent consistent with wart     Erythematous eczematous patches and plaques     Dystrophic onycholytic nail with subungual debris c/w onychomycosis     Umbilicated papule    Erythematous-base heme-crusted tan verrucoid plaque consistent with inflamed seborrheic keratosis     Erythematous Silvery Scaling Plaque c/w Psoriasis     See annotation      Assessment / Plan:        AK (actinic keratosis)  Cryosurgery Procedure Note    Verbal consent from the patient is obtained including, but not limited to, risk of hypopigmentation/hyperpigmentation, scar, recurrence of lesion. The patient is aware of the precancerous quality and need for treatment of these lesions. Liquid nitrogen cryosurgery is applied to the 12 actinic keratoses, as detailed in the physical exam, to produce a freeze injury. The patient is aware that blisters may form and is instructed on wound care with gentle cleansing and use of vaseline ointment to keep moist until healed. The patient is supplied a handout on cryosurgery and is instructed to call if lesions do not completely resolve.    SK (seborrheic keratosis)  These are benign inherited growths without a malignant potential. Reassurance given to patient. No treatment is necessary.   Treatment of benign, asymptomatic lesions may be considered cosmetic.  Warned about risk of hypo- or hyperpigmentation with treatment and risk  of recurrence.    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 Stop if blistered, oozing, or bleeding. Use daily sunscreen  Dispense: 40 g; Refill: 1  Counseled extensively regarding the proper use and side effects of efudex. Pt will discontinue use if areas begin to ulcerate, bleed, blister, etc.    Skin cancer screening  Upper body skin examination performed today including at least 6 points as noted in physical examination. Suspicious lesions noted above.  Patient instructed in importance of daily broad spectrum sunscreen use with spf at least 30. Sun avoidance and topical protection/protective clothing discussed.    Follow up in about 3 months (around 2/6/2020) for skin check or sooner for any concerns.

## 2019-11-08 ENCOUNTER — HOSPITAL ENCOUNTER (OUTPATIENT)
Dept: INTERVENTIONAL RADIOLOGY/VASCULAR | Facility: HOSPITAL | Age: 79
Discharge: HOME | End: 2019-11-08
Attending: INTERNAL MEDICINE
Payer: MEDICARE

## 2019-11-08 VITALS
DIASTOLIC BLOOD PRESSURE: 65 MMHG | SYSTOLIC BLOOD PRESSURE: 149 MMHG | RESPIRATION RATE: 16 BRPM | OXYGEN SATURATION: 99 % | HEART RATE: 54 BPM

## 2019-11-08 DIAGNOSIS — K75.81 LIVER CIRRHOSIS SECONDARY TO NASH: ICD-10-CM

## 2019-11-08 DIAGNOSIS — R18.8 CIRRHOSIS OF LIVER WITH ASCITES, UNSPECIFIED HEPATIC CIRRHOSIS TYPE: ICD-10-CM

## 2019-11-08 DIAGNOSIS — K74.60 CIRRHOSIS OF LIVER WITH ASCITES, UNSPECIFIED HEPATIC CIRRHOSIS TYPE: ICD-10-CM

## 2019-11-08 DIAGNOSIS — K74.60 LIVER CIRRHOSIS SECONDARY TO NASH: ICD-10-CM

## 2019-11-08 DIAGNOSIS — R18.8 ASCITES OF LIVER: ICD-10-CM

## 2019-11-08 LAB
APPEARANCE FLD: NORMAL
BACTERIA SPEC ANAEROBE CULT: NORMAL
BODY FLD TYPE: NORMAL
COLOR FLD: YELLOW
GRAM STN SPEC: NORMAL
GRAM STN SPEC: NORMAL
LYMPHOCYTES NFR FLD MANUAL: 58 %
MONOS+MACROS NFR FLD MANUAL: 42 %
WBC # FLD: 65 /CU MM

## 2019-11-08 PROCEDURE — 49083 ABD PARACENTESIS W/IMAGING: CPT | Mod: GC,,, | Performed by: RADIOLOGY

## 2019-11-08 PROCEDURE — 87075 CULTR BACTERIA EXCEPT BLOOD: CPT

## 2019-11-08 PROCEDURE — 49083 ABD PARACENTESIS W/IMAGING: CPT

## 2019-11-08 PROCEDURE — 49083 IR PARACENTESIS WITH IMAGING: ICD-10-PCS | Mod: GC,,, | Performed by: RADIOLOGY

## 2019-11-08 PROCEDURE — 89051 BODY FLUID CELL COUNT: CPT

## 2019-11-08 PROCEDURE — 87205 SMEAR GRAM STAIN: CPT

## 2019-11-08 PROCEDURE — 87070 CULTURE OTHR SPECIMN AEROBIC: CPT

## 2019-11-08 NOTE — NURSING
Pt arrives to [facility] via wc. Pt is AAOX4, able to state reason he is here. NAD noted or reported. Orders, labs, hx reviewed. Awaiting consent.

## 2019-11-08 NOTE — PLAN OF CARE
2.6L removed from pt abd. Pt tolerated paracentesis well. Site bandaged, CDI. NAD noted or reported. Pt given educational handout. Labs collected and sent. Pt escorted out to family via wc.

## 2019-11-08 NOTE — H&P
Radiology History & Physical      SUBJECTIVE:     Chief Complaint: ascites.     History of Present Illness:  Kenneth Sheikh is a 79 y.o. male who presents for paracentesis.     Past Medical History:   Diagnosis Date    Anticoagulant long-term use     Basal cell carcinoma     Bipolar 1 disorder     Bipolar 1 disorder 11/24/2016    bipolar    Cancer     history of skin cancer/sinus cancer & rectal cancer    Depressed bipolar affective disorder     Diabetes mellitus     type 2    EBV infection 12/7/2016    EBV DNA, PCR Latest Ref Range: None detected  None detected EBV DNA-Copies/mL Unknown Test Not Performed EBV Early Antigen Ab, IgG Latest Ref Range: <1:10 Titer 1:40 (A) EBV Nuclear Ag Ab Latest Ref Range: <1:5 Titer >=1:80 (A) EBV VCA IgG Latest Ref Range: <1:10 Titer 1:160 (A) EBV VCA IgM Latest Ref Range: <1:10 Titer <1:10     History of TIA (transient ischemic attack)     several-taking Plavix    Hx of gallstones     Wife denies    Hypertension     Hyperthyroidism 11/30/2016    Low HDL (under 40) 12/7/2016    Mixed hyperlipidemia 11/23/2016    JUAN MANUEL (obstructive sleep apnea)     Pneumonia     Skin disease     Squamous cell carcinoma 08/2018    right temple    Stroke     Transient cerebral ischemia 7/22/2016    Type 2 diabetes mellitus      Past Surgical History:   Procedure Laterality Date    Moh's procedure x4      rectal cancer      Sinus surgery for sinus cancer      sinus sx for cancer      skin cancer removed-several times-nose & ear      TONSILLECTOMY      Undescened testicle sx      UVULOPALATOPHARYNGOPLASTY      for sleep apnea       Home Meds:   Prior to Admission medications    Medication Sig Start Date End Date Taking? Authorizing Provider   ACCU-CHEK NEYDA PLUS TEST STRP Strp 1 strip by Misc.(Non-Drug; Combo Route) route once daily. 11/5/18   Prisca Espinoza MD   ciprofloxacin HCl (CIPRO) 500 MG tablet TAKE 1 TABLET(500 MG) BY MOUTH EVERY MONDAY 10/20/19   Renato  MD Vu   divalproex (DEPAKOTE) 250 MG EC tablet TAKE 2 TABLETS BY MOUTH EVERY EVENING 10/28/19   Prisca Espinoza MD   fluorouracil (EFUDEX) 5 % cream AAA on L forearm + 1cm of normal appearing surrounding skin BID x 4-6 weeks. Stop if blistered, oozing, or bleeding. Use daily sunscreen 11/6/19   Dejah Anders MD   furosemide (LASIX) 20 MG tablet TAKE 2 TABLETS BY MOUTH EVERY DAY 5/2/19   Renato Womack MD   HYDROcodone-acetaminophen (NORCO) 5-325 mg per tablet Take 1 tablet by mouth every 6 (six) hours as needed for Pain. 8/13/19   Valorie Castellanos PA-C   lactulose (CHRONULAC) 10 gram/15 mL solution TAKE 15 MLS BY MOUTH TWICE DAILY 10/27/19   Renato Womack MD   lancets Misc To check blood sugar once daily, to use with Accu-Chek meter. 11/5/18   Prisca Espinoza MD   ofloxacin (OCUFLOX) 0.3 % ophthalmic solution  1/10/19   Historical Provider, MD   prednisoLONE acetate (PRED FORTE) 1 % DrpS  1/15/19   Historical Provider, MD   rifAXIMin (XIFAXAN) 550 mg Tab Take 1 tablet (550 mg total) by mouth 2 (two) times daily. 10/3/17   Renato Womack MD   SITagliptan (JANUVIA) 50 MG Tab Take 1 tablet (50 mg total) by mouth once daily. 4/6/17   Prisca Espinoza MD     Anticoagulants/Antiplatelets: no anticoagulation    Allergies:   Review of patient's allergies indicates:   Allergen Reactions    Abilify [aripiprazole] Other (See Comments)     Sleepy, could not function.     Sedation History:  no adverse reactions    Review of Systems:   Hematological: no known coagulopathies  Respiratory: no shortness of breath  Cardiovascular: no chest pain  Gastrointestinal: no abdominal pain  Genito-Urinary: no dysuria  Musculoskeletal: negative  Neurological: no TIA or stroke symptoms         OBJECTIVE:     Vital Signs (Most Recent)  Pulse: (!) 53 (11/08/19 1118)  Resp: 16 (11/08/19 1118)  BP: (!) 177/84 (11/08/19 1118)  SpO2: 97 % (11/08/19 1118)    Physical Exam:  ASA: 2  Mallampati: 2    General: no  acute distress  Mental Status: alert and oriented to person, place and time  HEENT: normocephalic, atraumatic  Chest: unlabored breathing  Abdomen: nondistended  Extremity: moves all extremities    Laboratory  Lab Results   Component Value Date    INR 1.1 06/14/2019       Lab Results   Component Value Date    WBC 2.97 (L) 06/14/2019    HGB 10.5 (L) 06/14/2019    HCT 33.0 (L) 06/14/2019    MCV 87 06/14/2019     (L) 06/14/2019      Lab Results   Component Value Date     06/14/2019     06/14/2019    K 3.6 06/14/2019     06/14/2019    CO2 27 06/14/2019    BUN 16 06/14/2019    CREATININE 1.1 06/14/2019    CALCIUM 8.8 06/14/2019    MG 1.7 05/15/2017    ALT 10 06/14/2019    AST 22 06/14/2019    ALBUMIN 2.5 (L) 06/14/2019    BILITOT 0.6 06/14/2019       ASSESSMENT/PLAN:     Sedation Plan: local.   Patient will undergo paracentesis.     David Galvan MD  PGY - 4  Department of Radiology

## 2019-11-08 NOTE — PROCEDURES
Radiology Post-Procedure Note    Pre Op Diagnosis: Ascites  Post Op Diagnosis: Same    Procedure: Paracentesis    Procedure performed by: Dheeraj Boswell and David Galvan     Written Informed Consent Obtained: Yes  Specimen Removed: YES - yellow ascitic fluid.   Estimated Blood Loss: Minimal    Findings:   Successful paracentesis.  Albumin administered PRN per protocol.    Patient tolerated procedure well.    David Galvan MD  PGY - 4  Department of Radiology

## 2019-11-08 NOTE — DISCHARGE INSTRUCTIONS
For scheduling: Call Mackenzie at [phone]    For questions or concerns call: [facility] 8 AM- 5PM [phone]. Radiology resident on call [phone].    For immediate concerns that are not emergent, you may call our radiology clinic at: [phone]

## 2019-11-11 ENCOUNTER — TELEPHONE (OUTPATIENT)
Dept: HEPATOLOGY | Facility: CLINIC | Age: 79
End: 2019-11-11

## 2019-11-11 DIAGNOSIS — K75.81 LIVER CIRRHOSIS SECONDARY TO NASH (NONALCOHOLIC STEATOHEPATITIS): Primary | ICD-10-CM

## 2019-11-11 DIAGNOSIS — R18.8 OTHER ASCITES: ICD-10-CM

## 2019-11-11 DIAGNOSIS — K74.60 LIVER CIRRHOSIS SECONDARY TO NASH (NONALCOHOLIC STEATOHEPATITIS): Primary | ICD-10-CM

## 2019-11-11 DIAGNOSIS — K74.60 CIRRHOSIS OF LIVER WITH ASCITES, UNSPECIFIED HEPATIC CIRRHOSIS TYPE: ICD-10-CM

## 2019-11-11 DIAGNOSIS — R18.8 CIRRHOSIS OF LIVER WITH ASCITES, UNSPECIFIED HEPATIC CIRRHOSIS TYPE: ICD-10-CM

## 2019-11-11 LAB — BACTERIA SPEC AEROBE CULT: NO GROWTH

## 2019-11-11 NOTE — TELEPHONE ENCOUNTER
MA spoke to the pt wife. She was very angry that there were no openings for Dec. I suggested he see an NP pr PA multiple times, but she refued. I did add them to the waitlist and scheduled them for the earliest appt in Jan. The wife said her  has been feeling weak and refuses to see his PCP or go to the ER, he will only see Maggy. I told the Ginna that I would see if Maggy wants him to do blood work sooner or can see him sooner.

## 2019-11-13 ENCOUNTER — TELEPHONE (OUTPATIENT)
Dept: HEPATOLOGY | Facility: CLINIC | Age: 79
End: 2019-11-13

## 2019-11-13 NOTE — TELEPHONE ENCOUNTER
MA left a voicemail for the wife of the ot to call back and see if she wants to reschedule her 's apt to Dec 3rd

## 2019-11-14 ENCOUNTER — TELEPHONE (OUTPATIENT)
Dept: HEPATOLOGY | Facility: CLINIC | Age: 79
End: 2019-11-14

## 2019-11-15 ENCOUNTER — HOSPITAL ENCOUNTER (OUTPATIENT)
Dept: INTERVENTIONAL RADIOLOGY/VASCULAR | Facility: HOSPITAL | Age: 79
Discharge: HOME | End: 2019-11-15
Attending: INTERNAL MEDICINE
Payer: MEDICARE

## 2019-11-15 VITALS
OXYGEN SATURATION: 100 % | DIASTOLIC BLOOD PRESSURE: 70 MMHG | SYSTOLIC BLOOD PRESSURE: 147 MMHG | RESPIRATION RATE: 16 BRPM | HEART RATE: 57 BPM

## 2019-11-15 DIAGNOSIS — R18.8 CIRRHOSIS OF LIVER WITH ASCITES, UNSPECIFIED HEPATIC CIRRHOSIS TYPE: ICD-10-CM

## 2019-11-15 DIAGNOSIS — K74.60 CIRRHOSIS OF LIVER WITH ASCITES, UNSPECIFIED HEPATIC CIRRHOSIS TYPE: ICD-10-CM

## 2019-11-15 DIAGNOSIS — K75.81 LIVER CIRRHOSIS SECONDARY TO NASH: ICD-10-CM

## 2019-11-15 DIAGNOSIS — K74.60 LIVER CIRRHOSIS SECONDARY TO NASH: ICD-10-CM

## 2019-11-15 DIAGNOSIS — R18.8 ASCITES OF LIVER: ICD-10-CM

## 2019-11-15 LAB
APPEARANCE FLD: NORMAL
BACTERIA SPEC ANAEROBE CULT: NORMAL
BODY FLD TYPE: NORMAL
COLOR FLD: YELLOW
GRAM STN SPEC: NORMAL
GRAM STN SPEC: NORMAL
LYMPHOCYTES NFR FLD MANUAL: 72 %
MONOS+MACROS NFR FLD MANUAL: 28 %
WBC # FLD: 27 /CU MM

## 2019-11-15 PROCEDURE — 87070 CULTURE OTHR SPECIMN AEROBIC: CPT

## 2019-11-15 PROCEDURE — 63600175 PHARM REV CODE 636 W HCPCS: Mod: JG | Performed by: INTERNAL MEDICINE

## 2019-11-15 PROCEDURE — 87075 CULTR BACTERIA EXCEPT BLOOD: CPT

## 2019-11-15 PROCEDURE — 49083 ABD PARACENTESIS W/IMAGING: CPT | Mod: ,,, | Performed by: RADIOLOGY

## 2019-11-15 PROCEDURE — 89051 BODY FLUID CELL COUNT: CPT

## 2019-11-15 PROCEDURE — P9047 ALBUMIN (HUMAN), 25%, 50ML: HCPCS | Mod: JG | Performed by: INTERNAL MEDICINE

## 2019-11-15 PROCEDURE — 87205 SMEAR GRAM STAIN: CPT

## 2019-11-15 PROCEDURE — 49083 ABD PARACENTESIS W/IMAGING: CPT

## 2019-11-15 PROCEDURE — 49083 IR PARACENTESIS WITH IMAGING: ICD-10-PCS | Mod: ,,, | Performed by: RADIOLOGY

## 2019-11-15 RX ORDER — ALBUMIN HUMAN 250 G/1000ML
50 SOLUTION INTRAVENOUS ONCE
Status: COMPLETED | OUTPATIENT
Start: 2019-11-15 | End: 2019-11-15

## 2019-11-15 RX ADMIN — ALBUMIN HUMAN 50 G: 0.25 SOLUTION INTRAVENOUS at 12:11

## 2019-11-15 NOTE — PROGRESS NOTES
Paracentesis complete. 5500 mLs peritoneal fluid drained. Pt tolerated well. Dressing to abdomen clean, dry, and intact. Albumin 25% given 200 mLs. Specimens sent per lab order. Pt acknowledged understanding of discharge instructions.  Pt discharged per unit and hospital protocol via wheelchair with family member.

## 2019-11-15 NOTE — H&P
Radiology History & Physical      SUBJECTIVE:     Chief Complaint: recurrent ascites    History of Present Illness:  Kenneth Sheikh is a 79 y.o. male who presents for us guided paracentesis    Past Medical History:   Diagnosis Date    Anticoagulant long-term use     Basal cell carcinoma     Bipolar 1 disorder     Bipolar 1 disorder 11/24/2016    bipolar    Cancer     history of skin cancer/sinus cancer & rectal cancer    Depressed bipolar affective disorder     Diabetes mellitus     type 2    EBV infection 12/7/2016    EBV DNA, PCR Latest Ref Range: None detected  None detected EBV DNA-Copies/mL Unknown Test Not Performed EBV Early Antigen Ab, IgG Latest Ref Range: <1:10 Titer 1:40 (A) EBV Nuclear Ag Ab Latest Ref Range: <1:5 Titer >=1:80 (A) EBV VCA IgG Latest Ref Range: <1:10 Titer 1:160 (A) EBV VCA IgM Latest Ref Range: <1:10 Titer <1:10     History of TIA (transient ischemic attack)     several-taking Plavix    Hx of gallstones     Wife denies    Hypertension     Hyperthyroidism 11/30/2016    Low HDL (under 40) 12/7/2016    Mixed hyperlipidemia 11/23/2016    JUAN MANUEL (obstructive sleep apnea)     Pneumonia     Skin disease     Squamous cell carcinoma 08/2018    right temple    Stroke     Transient cerebral ischemia 7/22/2016    Type 2 diabetes mellitus      Past Surgical History:   Procedure Laterality Date    Moh's procedure x4      rectal cancer      Sinus surgery for sinus cancer      sinus sx for cancer      skin cancer removed-several times-nose & ear      TONSILLECTOMY      Undescened testicle sx      UVULOPALATOPHARYNGOPLASTY      for sleep apnea       Home Meds:   Prior to Admission medications    Medication Sig Start Date End Date Taking? Authorizing Provider   ACCU-CHEK NEYDA PLUS TEST STRP Strp 1 strip by Misc.(Non-Drug; Combo Route) route once daily. 11/5/18   Prisca Espinoza MD   ciprofloxacin HCl (CIPRO) 500 MG tablet TAKE 1 TABLET(500 MG) BY MOUTH EVERY MONDAY 10/20/19    Renato Womack MD   divalproex (DEPAKOTE) 250 MG EC tablet TAKE 2 TABLETS BY MOUTH EVERY EVENING 10/28/19   Prisca Espinoza MD   fluorouracil (EFUDEX) 5 % cream AAA on L forearm + 1cm of normal appearing surrounding skin BID x 4-6 weeks. Stop if blistered, oozing, or bleeding. Use daily sunscreen 11/6/19   Dejah Anders MD   furosemide (LASIX) 20 MG tablet TAKE 2 TABLETS BY MOUTH EVERY DAY 5/2/19   Renato Womack MD   HYDROcodone-acetaminophen (NORCO) 5-325 mg per tablet Take 1 tablet by mouth every 6 (six) hours as needed for Pain. 8/13/19   Valorie Castellanos PA-C   lactulose (CHRONULAC) 10 gram/15 mL solution TAKE 15 MLS BY MOUTH TWICE DAILY 10/27/19   Renato Womack MD   lancets Misc To check blood sugar once daily, to use with Accu-Chek meter. 11/5/18   Prisca Espinoza MD   ofloxacin (OCUFLOX) 0.3 % ophthalmic solution  1/10/19   Historical Provider, MD   prednisoLONE acetate (PRED FORTE) 1 % DrpS  1/15/19   Historical Provider, MD   rifAXIMin (XIFAXAN) 550 mg Tab Take 1 tablet (550 mg total) by mouth 2 (two) times daily. 10/3/17   Renato Womack MD   SITagliptan (JANUVIA) 50 MG Tab Take 1 tablet (50 mg total) by mouth once daily. 4/6/17   Prisca Espinoza MD     Anticoagulants/Antiplatelets: no anticoagulation    Allergies:   Review of patient's allergies indicates:   Allergen Reactions    Abilify [aripiprazole] Other (See Comments)     Sleepy, could not function.     Sedation History:  no adverse reactions    Review of Systems:   Hematological: no known coagulopathies  Respiratory: no shortness of breath  Cardiovascular: no chest pain  Gastrointestinal: no abdominal pain  Genito-Urinary: no dysuria  Musculoskeletal: negative  Neurological: no TIA or stroke symptoms         OBJECTIVE:     Vital Signs (Most Recent)       Physical Exam:  ASA: 2  Mallampati: 2    General: no acute distress  Mental Status: alert and oriented to person, place and time  HEENT: normocephalic,  atraumatic  Chest: unlabored breathing  Heart: regular heart rate  Abdomen: distended   Extremity: moves all extremities    Laboratory  Lab Results   Component Value Date    INR 1.1 06/14/2019       Lab Results   Component Value Date    WBC 2.97 (L) 06/14/2019    HGB 10.5 (L) 06/14/2019    HCT 33.0 (L) 06/14/2019    MCV 87 06/14/2019     (L) 06/14/2019      Lab Results   Component Value Date     06/14/2019     06/14/2019    K 3.6 06/14/2019     06/14/2019    CO2 27 06/14/2019    BUN 16 06/14/2019    CREATININE 1.1 06/14/2019    CALCIUM 8.8 06/14/2019    MG 1.7 05/15/2017    ALT 10 06/14/2019    AST 22 06/14/2019    ALBUMIN 2.5 (L) 06/14/2019    BILITOT 0.6 06/14/2019       ASSESSMENT/PLAN:     Sedation Plan: Local  Patient will undergo US guided paracentesis today.      Randy Carty MD  Radiology Department/ PGY-2  [facility]

## 2019-11-15 NOTE — PROCEDURES
Radiology Post-Procedure Note    Pre Op Diagnosis: Ascites  Post Op Diagnosis: Same    Procedure: Paracentesis    Procedure performed by: Randy Carty MD    Written Informed Consent Obtained: Yes  Specimen Removed: YES   Estimated Blood Loss: Minimal    Findings:   Successful paracentesis.    Albumin administered PRN per protocol.    Patient tolerated procedure well.    Randy Carty MD  Radiology Department/ PGY-2  [facility]

## 2019-11-18 LAB — BACTERIA SPEC AEROBE CULT: NO GROWTH

## 2019-11-22 ENCOUNTER — HOSPITAL ENCOUNTER (OUTPATIENT)
Dept: INTERVENTIONAL RADIOLOGY/VASCULAR | Facility: HOSPITAL | Age: 79
Discharge: HOME | End: 2019-11-22
Attending: INTERNAL MEDICINE
Payer: MEDICARE

## 2019-11-22 VITALS — SYSTOLIC BLOOD PRESSURE: 146 MMHG | HEART RATE: 56 BPM | DIASTOLIC BLOOD PRESSURE: 86 MMHG | RESPIRATION RATE: 17 BRPM

## 2019-11-22 DIAGNOSIS — R18.8 CIRRHOSIS OF LIVER WITH ASCITES, UNSPECIFIED HEPATIC CIRRHOSIS TYPE: ICD-10-CM

## 2019-11-22 DIAGNOSIS — K75.81 LIVER CIRRHOSIS SECONDARY TO NASH: ICD-10-CM

## 2019-11-22 DIAGNOSIS — R18.8 ASCITES OF LIVER: ICD-10-CM

## 2019-11-22 DIAGNOSIS — K74.60 CIRRHOSIS OF LIVER WITH ASCITES, UNSPECIFIED HEPATIC CIRRHOSIS TYPE: ICD-10-CM

## 2019-11-22 DIAGNOSIS — K74.60 LIVER CIRRHOSIS SECONDARY TO NASH: ICD-10-CM

## 2019-11-22 DIAGNOSIS — K76.82 HEPATIC ENCEPHALOPATHY: ICD-10-CM

## 2019-11-22 LAB
APPEARANCE FLD: NORMAL
BACTERIA SPEC ANAEROBE CULT: NORMAL
BODY FLD TYPE: NORMAL
COLOR FLD: YELLOW
EOSINOPHIL NFR FLD MANUAL: 2 %
LYMPHOCYTES NFR FLD MANUAL: 28 %
MONOS+MACROS NFR FLD MANUAL: 65 %
NEUTROPHILS NFR FLD MANUAL: 5 %
WBC # FLD: 42 /CU MM

## 2019-11-22 PROCEDURE — 49083 ABD PARACENTESIS W/IMAGING: CPT | Mod: ,,, | Performed by: FAMILY MEDICINE

## 2019-11-22 PROCEDURE — 49083 IR PARACENTESIS WITH IMAGING: ICD-10-PCS | Mod: ,,, | Performed by: FAMILY MEDICINE

## 2019-11-22 PROCEDURE — 49083 ABD PARACENTESIS W/IMAGING: CPT

## 2019-11-22 PROCEDURE — 89051 BODY FLUID CELL COUNT: CPT

## 2019-11-22 NOTE — H&P
Radiology History & Physical      SUBJECTIVE:     Chief Complaint: abdominal distention    History of Present Illness:  Kenneth Sheikh is a 79 y.o. male who presents for ultrasound guided paracentesis  Past Medical History:   Diagnosis Date    Anticoagulant long-term use     Basal cell carcinoma     Bipolar 1 disorder     Bipolar 1 disorder 11/24/2016    bipolar    Cancer     history of skin cancer/sinus cancer & rectal cancer    Depressed bipolar affective disorder     Diabetes mellitus     type 2    EBV infection 12/7/2016    EBV DNA, PCR Latest Ref Range: None detected  None detected EBV DNA-Copies/mL Unknown Test Not Performed EBV Early Antigen Ab, IgG Latest Ref Range: <1:10 Titer 1:40 (A) EBV Nuclear Ag Ab Latest Ref Range: <1:5 Titer >=1:80 (A) EBV VCA IgG Latest Ref Range: <1:10 Titer 1:160 (A) EBV VCA IgM Latest Ref Range: <1:10 Titer <1:10     History of TIA (transient ischemic attack)     several-taking Plavix    Hx of gallstones     Wife denies    Hypertension     Hyperthyroidism 11/30/2016    Low HDL (under 40) 12/7/2016    Mixed hyperlipidemia 11/23/2016    JUAN MANUEL (obstructive sleep apnea)     Pneumonia     Skin disease     Squamous cell carcinoma 08/2018    right temple    Stroke     Transient cerebral ischemia 7/22/2016    Type 2 diabetes mellitus      Past Surgical History:   Procedure Laterality Date    Moh's procedure x4      rectal cancer      Sinus surgery for sinus cancer      sinus sx for cancer      skin cancer removed-several times-nose & ear      TONSILLECTOMY      Undescened testicle sx      UVULOPALATOPHARYNGOPLASTY      for sleep apnea       Home Meds:   Prior to Admission medications    Medication Sig Start Date End Date Taking? Authorizing Provider   ACCU-CHEK NEYDA PLUS TEST STRP Strp 1 strip by Misc.(Non-Drug; Combo Route) route once daily. 11/5/18   Prisca Espinoza MD   ciprofloxacin HCl (CIPRO) 500 MG tablet TAKE 1 TABLET(500 MG) BY MOUTH EVERY MONDAY  10/20/19   Renato Womack MD   divalproex (DEPAKOTE) 250 MG EC tablet TAKE 2 TABLETS BY MOUTH EVERY EVENING 10/28/19   Prisca Espinoza MD   fluorouracil (EFUDEX) 5 % cream AAA on L forearm + 1cm of normal appearing surrounding skin BID x 4-6 weeks. Stop if blistered, oozing, or bleeding. Use daily sunscreen 11/6/19   Dejah Anders MD   furosemide (LASIX) 20 MG tablet TAKE 2 TABLETS BY MOUTH EVERY DAY 5/2/19   Renato Womack MD   HYDROcodone-acetaminophen (NORCO) 5-325 mg per tablet Take 1 tablet by mouth every 6 (six) hours as needed for Pain. 8/13/19   Valorie Castellanos PA-C   lactulose (CHRONULAC) 10 gram/15 mL solution TAKE 15 MLS BY MOUTH TWICE DAILY 10/27/19   Renato Womack MD   lancets Misc To check blood sugar once daily, to use with Accu-Chek meter. 11/5/18   Prisca Espinoza MD   ofloxacin (OCUFLOX) 0.3 % ophthalmic solution  1/10/19   Historical Provider, MD   prednisoLONE acetate (PRED FORTE) 1 % DrpS  1/15/19   Historical Provider, MD   rifAXIMin (XIFAXAN) 550 mg Tab Take 1 tablet (550 mg total) by mouth 2 (two) times daily. 10/3/17   Renato Womack MD   SITagliptan (JANUVIA) 50 MG Tab Take 1 tablet (50 mg total) by mouth once daily. 4/6/17   Prisca Espinoza MD     Anticoagulants/Antiplatelets: no anticoagulation    Allergies:   Review of patient's allergies indicates:   Allergen Reactions    Abilify [aripiprazole] Other (See Comments)     Sleepy, could not function.     Sedation History:  no adverse reactions    Review of Systems:   Hematological: no known coagulopathies  Respiratory: no shortness of breath  Cardiovascular: no chest pain  Gastrointestinal: no abdominal pain  Genito-Urinary: no dysuria  Musculoskeletal: negative  Neurological: no TIA or stroke symptoms         OBJECTIVE:     Vital Signs (Most Recent)       Physical Exam:  ASA: 2  Mallampati: n/a    General: no acute distress  Mental Status: alert and oriented to person, place and time  HEENT: normocephalic,  atraumatic  Chest: unlabored breathing  Heart: regular heart rate  Abdomen: distended  Extremity: moves all extremities    ASSESSMENT/PLAN:     Sedation Plan: local  Patient will undergo ultrasound guided paracentesis.    MATT Mauricio, FNP  Interventional Radiology  [phone] clinic

## 2019-11-22 NOTE — PROCEDURES

## 2019-11-22 NOTE — PROGRESS NOTES
Paracentesis complete. 4550mLs peritoneal fluid drained. Pt tolerated well. Dressing to left abd clean, dry, and intact. Specimens sent per lab order. Pt discharged

## 2019-11-25 RX ORDER — RIFAXIMIN 550 MG/1
TABLET ORAL
Qty: 180 TABLET | Refills: 11 | Status: SHIPPED | OUTPATIENT
Start: 2019-11-25 | End: 2020-02-10 | Stop reason: SDUPTHER

## 2019-11-29 ENCOUNTER — HOSPITAL ENCOUNTER (OUTPATIENT)
Dept: INTERVENTIONAL RADIOLOGY/VASCULAR | Facility: HOSPITAL | Age: 79
Discharge: HOME | End: 2019-11-29
Attending: INTERNAL MEDICINE
Payer: MEDICARE

## 2019-11-29 VITALS
OXYGEN SATURATION: 99 % | RESPIRATION RATE: 14 BRPM | DIASTOLIC BLOOD PRESSURE: 71 MMHG | SYSTOLIC BLOOD PRESSURE: 151 MMHG | HEART RATE: 54 BPM

## 2019-11-29 DIAGNOSIS — K75.81 LIVER CIRRHOSIS SECONDARY TO NASH: ICD-10-CM

## 2019-11-29 DIAGNOSIS — K74.60 CIRRHOSIS OF LIVER WITH ASCITES, UNSPECIFIED HEPATIC CIRRHOSIS TYPE: ICD-10-CM

## 2019-11-29 DIAGNOSIS — R18.8 CIRRHOSIS OF LIVER WITH ASCITES, UNSPECIFIED HEPATIC CIRRHOSIS TYPE: ICD-10-CM

## 2019-11-29 DIAGNOSIS — K74.60 LIVER CIRRHOSIS SECONDARY TO NASH: ICD-10-CM

## 2019-11-29 DIAGNOSIS — R18.8 ASCITES OF LIVER: ICD-10-CM

## 2019-11-29 LAB
APPEARANCE FLD: NORMAL
BODY FLD TYPE: NORMAL
COLOR FLD: YELLOW
EOSINOPHIL NFR FLD MANUAL: 1 %
LYMPHOCYTES NFR FLD MANUAL: 45 %
MESOTHL CELL NFR FLD MANUAL: 1 %
MONOS+MACROS NFR FLD MANUAL: 51 %
NEUTROPHILS NFR FLD MANUAL: 2 %
WBC # FLD: 42 /CU MM

## 2019-11-29 PROCEDURE — 49083 IR PARACENTESIS WITH IMAGING: ICD-10-PCS | Mod: GC,,, | Performed by: FAMILY MEDICINE

## 2019-11-29 PROCEDURE — 49083 ABD PARACENTESIS W/IMAGING: CPT

## 2019-11-29 PROCEDURE — 49083 ABD PARACENTESIS W/IMAGING: CPT | Mod: GC,,, | Performed by: FAMILY MEDICINE

## 2019-11-29 PROCEDURE — 89051 BODY FLUID CELL COUNT: CPT

## 2019-11-29 NOTE — H&P
Radiology History & Physical      SUBJECTIVE:     Chief Complaint: abdominal distention    History of Present Illness:  Kenneth Sheikh is a 79 y.o. male who presents for ultrasound guided paracentesis  Past Medical History:   Diagnosis Date    Anticoagulant long-term use     Basal cell carcinoma     Bipolar 1 disorder     Bipolar 1 disorder 11/24/2016    bipolar    Cancer     history of skin cancer/sinus cancer & rectal cancer    Depressed bipolar affective disorder     Diabetes mellitus     type 2    EBV infection 12/7/2016    EBV DNA, PCR Latest Ref Range: None detected  None detected EBV DNA-Copies/mL Unknown Test Not Performed EBV Early Antigen Ab, IgG Latest Ref Range: <1:10 Titer 1:40 (A) EBV Nuclear Ag Ab Latest Ref Range: <1:5 Titer >=1:80 (A) EBV VCA IgG Latest Ref Range: <1:10 Titer 1:160 (A) EBV VCA IgM Latest Ref Range: <1:10 Titer <1:10     History of TIA (transient ischemic attack)     several-taking Plavix    Hx of gallstones     Wife denies    Hypertension     Hyperthyroidism 11/30/2016    Low HDL (under 40) 12/7/2016    Mixed hyperlipidemia 11/23/2016    JUAN MANUEL (obstructive sleep apnea)     Pneumonia     Skin disease     Squamous cell carcinoma 08/2018    right temple    Stroke     Transient cerebral ischemia 7/22/2016    Type 2 diabetes mellitus      Past Surgical History:   Procedure Laterality Date    Moh's procedure x4      rectal cancer      Sinus surgery for sinus cancer      sinus sx for cancer      skin cancer removed-several times-nose & ear      TONSILLECTOMY      Undescened testicle sx      UVULOPALATOPHARYNGOPLASTY      for sleep apnea       Home Meds:   Prior to Admission medications    Medication Sig Start Date End Date Taking? Authorizing Provider   ACCU-CHEK NEYDA PLUS TEST STRP Strp 1 strip by Misc.(Non-Drug; Combo Route) route once daily. 11/5/18   Prisca Espinoza MD   ciprofloxacin HCl (CIPRO) 500 MG tablet TAKE 1 TABLET(500 MG) BY MOUTH EVERY MONDAY  10/20/19   Renato Womack MD   divalproex (DEPAKOTE) 250 MG EC tablet TAKE 2 TABLETS BY MOUTH EVERY EVENING 10/28/19   Prisca Espinoza MD   fluorouracil (EFUDEX) 5 % cream AAA on L forearm + 1cm of normal appearing surrounding skin BID x 4-6 weeks. Stop if blistered, oozing, or bleeding. Use daily sunscreen 11/6/19   Dejah Anders MD   furosemide (LASIX) 20 MG tablet TAKE 2 TABLETS BY MOUTH EVERY DAY 5/2/19   Renato Womack MD   HYDROcodone-acetaminophen (NORCO) 5-325 mg per tablet Take 1 tablet by mouth every 6 (six) hours as needed for Pain. 8/13/19   Valorie Castellanos PA-C   lactulose (CHRONULAC) 10 gram/15 mL solution TAKE 15 MLS BY MOUTH TWICE DAILY 10/27/19   Renato Womack MD   lancets Misc To check blood sugar once daily, to use with Accu-Chek meter. 11/5/18   Prisca Espinoza MD   ofloxacin (OCUFLOX) 0.3 % ophthalmic solution  1/10/19   Historical Provider, MD   prednisoLONE acetate (PRED FORTE) 1 % DrpS  1/15/19   Historical Provider, MD   SITagliptan (JANUVIA) 50 MG Tab Take 1 tablet (50 mg total) by mouth once daily. 4/6/17   Prisca Espinoza MD   XIFAXAN 550 mg Tab TAKE ONE TABLET BY MOUTH TWICE DAILY 11/25/19   Renato Womack MD     Anticoagulants/Antiplatelets: no anticoagulation    Allergies:   Review of patient's allergies indicates:   Allergen Reactions    Abilify [aripiprazole] Other (See Comments)     Sleepy, could not function.     Sedation History:  no adverse reactions    Review of Systems:   Hematological: no known coagulopathies  Respiratory: no shortness of breath  Cardiovascular: no chest pain  Gastrointestinal: no abdominal pain  Genito-Urinary: no dysuria  Musculoskeletal: negative  Neurological: no TIA or stroke symptoms         OBJECTIVE:     Vital Signs (Most Recent)       Physical Exam:  ASA: 2  Mallampati: n/a    General: no acute distress  Mental Status: alert and oriented to person, place and time  HEENT: normocephalic, atraumatic  Chest: unlabored  breathing  Heart: regular heart rate  Abdomen: distended  Extremity: moves all extremities    ASSESSMENT/PLAN:     Sedation Plan: local  Patient will undergo ultrasound guided paracentesis.    MATT Mauricio, FNP  Interventional Radiology  [phone] clinic

## 2019-11-29 NOTE — NURSING
Paracentesis complete, 4350 MLs removed. Specimen sent to lab. NO Albumin administered per protocol. Dressing applied to abdominal puncture site, dressing clean dry and intact. Pt given discharge instructions and handouts, pt verbalizes understanding. Questions answered. Pt denies pain and discomfort. Pt refusing transport. Pt ambulated to [facility] for transport home with family. Gait steady.

## 2019-11-29 NOTE — DISCHARGE INSTRUCTIONS
For scheduling: Call Mackenzie at [phone]    For questions or concerns call: [facility] 8 AM- 5PM [phone]. Radiology resident on call [phone].    For immediate concerns that are not emergent, you may call our radiology clinic at: [phone]

## 2019-11-29 NOTE — PROCEDURES
Radiology Post-Procedure Note    Pre Op Diagnosis: Ascites  Post Op Diagnosis: Same    Procedure: Ultrasound Guided Paracentesis    Procedure performed by: Souleymane ROSS, Елена     Written Informed Consent Obtained: Yes  Specimen Removed: YES cloudy yellow  Estimated Blood Loss: Minimal    Findings:   Successful paracentesis.  Albumin administered PRN per protocol.    Patient tolerated procedure well.    Елена Comer, APRN, FNP  Interventional Radiology  [phone] clinic

## 2019-12-02 ENCOUNTER — PATIENT OUTREACH (OUTPATIENT)
Dept: ADMINISTRATIVE | Facility: OTHER | Age: 79
End: 2019-12-02

## 2019-12-03 ENCOUNTER — HOSPITAL ENCOUNTER (OUTPATIENT)
Dept: RADIOLOGY | Facility: HOSPITAL | Age: 79
Discharge: HOME | End: 2019-12-03
Attending: INTERNAL MEDICINE
Payer: MEDICARE

## 2019-12-03 ENCOUNTER — OFFICE VISIT (OUTPATIENT)
Dept: HEPATOLOGY | Facility: CLINIC | Age: 79
End: 2019-12-03
Payer: MEDICARE

## 2019-12-03 VITALS
TEMPERATURE: 98 F | WEIGHT: 168 LBS | BODY MASS INDEX: 25.46 KG/M2 | DIASTOLIC BLOOD PRESSURE: 67 MMHG | SYSTOLIC BLOOD PRESSURE: 149 MMHG | OXYGEN SATURATION: 99 % | HEIGHT: 68 IN | HEART RATE: 65 BPM

## 2019-12-03 DIAGNOSIS — E11.22 TYPE 2 DIABETES MELLITUS WITH STAGE 3 CHRONIC KIDNEY DISEASE, WITHOUT LONG-TERM CURRENT USE OF INSULIN: ICD-10-CM

## 2019-12-03 DIAGNOSIS — K74.60 CIRRHOSIS OF LIVER WITH ASCITES, UNSPECIFIED HEPATIC CIRRHOSIS TYPE: Primary | ICD-10-CM

## 2019-12-03 DIAGNOSIS — K74.60 CIRRHOSIS OF LIVER WITH ASCITES, UNSPECIFIED HEPATIC CIRRHOSIS TYPE: ICD-10-CM

## 2019-12-03 DIAGNOSIS — K75.81 LIVER CIRRHOSIS SECONDARY TO NASH (NONALCOHOLIC STEATOHEPATITIS): ICD-10-CM

## 2019-12-03 DIAGNOSIS — I86.4 GASTRIC VARICES: ICD-10-CM

## 2019-12-03 DIAGNOSIS — R18.8 CIRRHOSIS OF LIVER WITH ASCITES, UNSPECIFIED HEPATIC CIRRHOSIS TYPE: Primary | ICD-10-CM

## 2019-12-03 DIAGNOSIS — G45.9 TIA (TRANSIENT ISCHEMIC ATTACK): ICD-10-CM

## 2019-12-03 DIAGNOSIS — K74.60 LIVER CIRRHOSIS SECONDARY TO NASH (NONALCOHOLIC STEATOHEPATITIS): ICD-10-CM

## 2019-12-03 DIAGNOSIS — R18.8 CIRRHOSIS OF LIVER WITH ASCITES, UNSPECIFIED HEPATIC CIRRHOSIS TYPE: ICD-10-CM

## 2019-12-03 DIAGNOSIS — N18.30 TYPE 2 DIABETES MELLITUS WITH STAGE 3 CHRONIC KIDNEY DISEASE, WITHOUT LONG-TERM CURRENT USE OF INSULIN: ICD-10-CM

## 2019-12-03 DIAGNOSIS — R18.8 ASCITES OF LIVER: ICD-10-CM

## 2019-12-03 DIAGNOSIS — R18.8 OTHER ASCITES: ICD-10-CM

## 2019-12-03 PROCEDURE — 1159F MED LIST DOCD IN RCRD: CPT | Mod: ,,, | Performed by: INTERNAL MEDICINE

## 2019-12-03 PROCEDURE — 1126F PR PAIN SEVERITY QUANTIFIED, NO PAIN PRESENT: ICD-10-PCS | Mod: ,,, | Performed by: INTERNAL MEDICINE

## 2019-12-03 PROCEDURE — 1126F AMNT PAIN NOTED NONE PRSNT: CPT | Mod: ,,, | Performed by: INTERNAL MEDICINE

## 2019-12-03 PROCEDURE — 99214 OFFICE O/P EST MOD 30 MIN: CPT | Mod: PBBFAC,25 | Performed by: INTERNAL MEDICINE

## 2019-12-03 PROCEDURE — 99999 PR PBB SHADOW E&M-EST. PATIENT-LVL IV: CPT | Mod: PBBFAC,,, | Performed by: INTERNAL MEDICINE

## 2019-12-03 PROCEDURE — 76700 US EXAM ABDOM COMPLETE: CPT | Mod: TC

## 2019-12-03 PROCEDURE — 99999 PR PBB SHADOW E&M-EST. PATIENT-LVL IV: ICD-10-PCS | Mod: PBBFAC,,, | Performed by: INTERNAL MEDICINE

## 2019-12-03 PROCEDURE — 76700 US ABDOMEN COMPLETE: ICD-10-PCS | Mod: 26,,, | Performed by: RADIOLOGY

## 2019-12-03 PROCEDURE — 99214 OFFICE O/P EST MOD 30 MIN: CPT | Mod: S$PBB,,, | Performed by: INTERNAL MEDICINE

## 2019-12-03 PROCEDURE — 1159F PR MEDICATION LIST DOCUMENTED IN MEDICAL RECORD: ICD-10-PCS | Mod: ,,, | Performed by: INTERNAL MEDICINE

## 2019-12-03 PROCEDURE — 99214 PR OFFICE/OUTPT VISIT, EST, LEVL IV, 30-39 MIN: ICD-10-PCS | Mod: S$PBB,,, | Performed by: INTERNAL MEDICINE

## 2019-12-03 PROCEDURE — 76700 US EXAM ABDOM COMPLETE: CPT | Mod: 26,,, | Performed by: RADIOLOGY

## 2019-12-03 NOTE — PROGRESS NOTES
"Subjective:       Patient ID: Kenneth Sheikh is a 79 y.o. male.    Chief Complaint: Cirrhosis    HPI  I saw this 79 y.o. man  whom I had met in hospital when he was admitted with lethargy and some confusion back in Dec 2016.  At that time, he was diagnosed with decompensated cirrhosis.    Admitted because of increasing lethargy +++/confusion/dizziness/dysarthria  - Nov 2016  - imaging showed ascites     No significant alcohol use ("a couple beers when I was younger"). No family history of liver disease. No new medications.    Patient has risk factors for MCDONALD.    He also has a large gastric varix which has never bled-  treated x1 by Dr Perla endoscopically but will need another endoscopic session (4/16/17).  - Had post procedure fever despite antibiotics    Kylie E coli bacteremia- on IV antibiotics at home- PICC    He has been reasonably stable with weekly paracentesis of about 4-5 liters on each occasion.    Decreased dose of diuretics and his para volume appears to also be decreasing.  Mental alert and has not experienced any prolonged episodes of HE for a while although wife describes him as lethargic and forgetful. Last month he had an episode where he forgot how to tie his tie but this resolved within 10 min.  - remains on rifaximin + lactulose    - mild lower leg edema      Abdo US: 12/3/2019  Chronic liver disease with findings of portal hypertension including moderate volume ascites.  Stable 1.0 cm hyperechoic lesion in the right hepatic lobe, likely a hemangioma.  Cholelithiasis without evidence of acute cholecystitis.  Mild gallbladder wall thickening, likely reactive.    Continues with regular paracenteses.    MELD-Na score: 10 at 11/29/2019 10:54 AM  MELD score: 10 at 11/29/2019 10:54 AM  Calculated from:  Serum Creatinine: 1.3 mg/dL at 11/29/2019 10:54 AM  Serum Sodium: 140 mmol/L (Rounded to 137 mmol/L) at 11/29/2019 10:54 AM  Total Bilirubin: 0.6 mg/dL (Rounded to 1 mg/dL) at 11/29/2019 10:54 " AM  INR(ratio): 1.1 at 11/29/2019 10:54 AM  Age: 79 years      PMH:  DM  Sinus Ca- 1996  Rectal Ca- June 2016    SH:  Retired  Ex [employer] worker      FH:  Aunt had cirrhosis- non- alcohol    Review of Systems   Constitutional: Negative for activity change, appetite change, chills, fatigue, fever and unexpected weight change.   HENT: Negative for hearing loss.    Eyes: Negative for discharge and visual disturbance.   Respiratory: Negative for cough, chest tightness, shortness of breath and wheezing.    Cardiovascular: Negative for chest pain, palpitations and leg swelling.   Gastrointestinal: Negative for abdominal distention, abdominal pain, constipation, diarrhea and nausea.   Genitourinary: Negative for dysuria and frequency.   Musculoskeletal: Negative for arthralgias and back pain.   Skin: Negative for pallor and rash.   Neurological: Negative for dizziness, tremors, speech difficulty and headaches.   Hematological: Negative for adenopathy.   Psychiatric/Behavioral: Negative for agitation and confusion.           Lab Results   Component Value Date    ALT 10 11/29/2019    AST 22 11/29/2019    ALKPHOS 105 11/29/2019    BILITOT 0.6 11/29/2019     Past Medical History:   Diagnosis Date    Anticoagulant long-term use     Basal cell carcinoma     Bipolar 1 disorder     Bipolar 1 disorder 11/24/2016    bipolar    Cancer     history of skin cancer/sinus cancer & rectal cancer    Depressed bipolar affective disorder     Diabetes mellitus     type 2    EBV infection 12/7/2016    EBV DNA, PCR Latest Ref Range: None detected  None detected EBV DNA-Copies/mL Unknown Test Not Performed EBV Early Antigen Ab, IgG Latest Ref Range: <1:10 Titer 1:40 (A) EBV Nuclear Ag Ab Latest Ref Range: <1:5 Titer >=1:80 (A) EBV VCA IgG Latest Ref Range: <1:10 Titer 1:160 (A) EBV VCA IgM Latest Ref Range: <1:10 Titer <1:10     History of TIA (transient ischemic attack)     several-taking Plavix    Hx of gallstones     Wife denies     Hypertension     Hyperthyroidism 11/30/2016    Low HDL (under 40) 12/7/2016    Mixed hyperlipidemia 11/23/2016    JUAN MANUEL (obstructive sleep apnea)     Pneumonia     Skin disease     Squamous cell carcinoma 08/2018    right temple    Stroke     Transient cerebral ischemia 7/22/2016    Type 2 diabetes mellitus      Past Surgical History:   Procedure Laterality Date    Moh's procedure x4      rectal cancer      Sinus surgery for sinus cancer      sinus sx for cancer      skin cancer removed-several times-nose & ear      TONSILLECTOMY      Undescened testicle sx      UVULOPALATOPHARYNGOPLASTY      for sleep apnea     Current Outpatient Medications   Medication Sig    ACCU-CHEK NEYDA PLUS TEST STRP Strp 1 strip by Misc.(Non-Drug; Combo Route) route once daily.    ciprofloxacin HCl (CIPRO) 500 MG tablet TAKE 1 TABLET(500 MG) BY MOUTH EVERY MONDAY    divalproex (DEPAKOTE) 250 MG EC tablet TAKE 2 TABLETS BY MOUTH EVERY EVENING    furosemide (LASIX) 20 MG tablet TAKE 2 TABLETS BY MOUTH EVERY DAY    lactulose (CHRONULAC) 10 gram/15 mL solution TAKE 15 MLS BY MOUTH TWICE DAILY    lancets Misc To check blood sugar once daily, to use with Accu-Chek meter.    SITagliptan (JANUVIA) 50 MG Tab Take 1 tablet (50 mg total) by mouth once daily.    XIFAXAN 550 mg Tab TAKE ONE TABLET BY MOUTH TWICE DAILY    fluorouracil (EFUDEX) 5 % cream AAA on L forearm + 1cm of normal appearing surrounding skin BID x 4-6 weeks. Stop if blistered, oozing, or bleeding. Use daily sunscreen (Patient not taking: Reported on 12/3/2019)    HYDROcodone-acetaminophen (NORCO) 5-325 mg per tablet Take 1 tablet by mouth every 6 (six) hours as needed for Pain. (Patient not taking: Reported on 12/3/2019)    ofloxacin (OCUFLOX) 0.3 % ophthalmic solution     prednisoLONE acetate (PRED FORTE) 1 % DrpS      No current facility-administered medications for this visit.        Objective:      Physical Exam   Constitutional: He is oriented to  person, place, and time. He appears well-nourished.   HENT:   Head: Normocephalic.   Eyes: Pupils are equal, round, and reactive to light.   Neck: No thyromegaly present.   Cardiovascular: Normal rate, regular rhythm and normal heart sounds.   Pulmonary/Chest: Effort normal and breath sounds normal. He has no wheezes.   Abdominal: Soft. He exhibits distension. He exhibits no mass. There is no tenderness.   Mild ascites.   Musculoskeletal: He exhibits edema.   Lymphadenopathy:     He has no cervical adenopathy.   Neurological: He is alert and oriented to person, place, and time.   Skin: Skin is warm. No rash noted. No erythema.   Psychiatric: He has a normal mood and affect. His behavior is normal.       Assessment:       1. Cirrhosis of liver with ascites, unspecified hepatic cirrhosis type    2. 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 Gastric varices    4. Ascites of liver    5. Type 2 diabetes mellitus with stage 3 chronic kidney disease, without long-term current use of insulin    6. TIA (transient ischemic attack)        Plan:   - re-discussed the dx of cirrhosis and the implications of this  - no change in diuretic dose  - paracenteses every week  - on Cipro 500 mg weekly for SBP prophylaxis.  - does not wish for further EUS guided treatment of varices because of post procedure sepsis.  - discussed finding of small nodule on US- benign- will be repeated in 6 months.    - no change in management  Clinic in  6 months with labs and US       NB Rastafari

## 2019-12-06 ENCOUNTER — HOSPITAL ENCOUNTER (OUTPATIENT)
Dept: INTERVENTIONAL RADIOLOGY/VASCULAR | Facility: HOSPITAL | Age: 79
Discharge: HOME | End: 2019-12-06
Attending: INTERNAL MEDICINE
Payer: MEDICARE

## 2019-12-06 VITALS — SYSTOLIC BLOOD PRESSURE: 135 MMHG | HEART RATE: 54 BPM | RESPIRATION RATE: 18 BRPM | DIASTOLIC BLOOD PRESSURE: 65 MMHG

## 2019-12-06 DIAGNOSIS — K70.31 ASCITES DUE TO ALCOHOLIC CIRRHOSIS: ICD-10-CM

## 2019-12-06 LAB
APPEARANCE FLD: NORMAL
BODY FLD TYPE: NORMAL
COLOR FLD: YELLOW
LYMPHOCYTES NFR FLD MANUAL: 76 %
MONOS+MACROS NFR FLD MANUAL: 21 %
NEUTROPHILS NFR FLD MANUAL: 3 %
WBC # FLD: 32 /CU MM

## 2019-12-06 PROCEDURE — 49083 ABD PARACENTESIS W/IMAGING: CPT | Mod: ,,, | Performed by: FAMILY MEDICINE

## 2019-12-06 PROCEDURE — 49083 ABD PARACENTESIS W/IMAGING: CPT

## 2019-12-06 PROCEDURE — 49083 IR PARACENTESIS NO IMAGING: ICD-10-PCS | Mod: ,,, | Performed by: FAMILY MEDICINE

## 2019-12-06 PROCEDURE — 89051 BODY FLUID CELL COUNT: CPT

## 2019-12-06 NOTE — PROGRESS NOTES
Paracentesis complete. 4200 mLs peritoneal fluid drained. Pt tolerated well. Dressing to right ab clean, dry, and intact.  Specimens sent per lab order.   Pt  Discharged.   Patient defecated all over bed and himself at this time; patient cleaned by Ed, RN. Patient instructed to inform staff when he needed to use restroom.

## 2019-12-06 NOTE — H&P
Radiology History & Physical      SUBJECTIVE:     Chief Complaint: ABD Distention    History of Present Illness:  Kenneth Sheikh is a 79 y.o. male who presents for US guided Paracentesis   Past Medical History:   Diagnosis Date    Anticoagulant long-term use     Basal cell carcinoma     Bipolar 1 disorder     Bipolar 1 disorder 11/24/2016    bipolar    Cancer     history of skin cancer/sinus cancer & rectal cancer    Depressed bipolar affective disorder     Diabetes mellitus     type 2    EBV infection 12/7/2016    EBV DNA, PCR Latest Ref Range: None detected  None detected EBV DNA-Copies/mL Unknown Test Not Performed EBV Early Antigen Ab, IgG Latest Ref Range: <1:10 Titer 1:40 (A) EBV Nuclear Ag Ab Latest Ref Range: <1:5 Titer >=1:80 (A) EBV VCA IgG Latest Ref Range: <1:10 Titer 1:160 (A) EBV VCA IgM Latest Ref Range: <1:10 Titer <1:10     History of TIA (transient ischemic attack)     several-taking Plavix    Hx of gallstones     Wife denies    Hypertension     Hyperthyroidism 11/30/2016    Low HDL (under 40) 12/7/2016    Mixed hyperlipidemia 11/23/2016    JUAN MANUEL (obstructive sleep apnea)     Pneumonia     Skin disease     Squamous cell carcinoma 08/2018    right temple    Stroke     Transient cerebral ischemia 7/22/2016    Type 2 diabetes mellitus      Past Surgical History:   Procedure Laterality Date    Moh's procedure x4      rectal cancer      Sinus surgery for sinus cancer      sinus sx for cancer      skin cancer removed-several times-nose & ear      TONSILLECTOMY      Undescened testicle sx      UVULOPALATOPHARYNGOPLASTY      for sleep apnea       Home Meds:   Prior to Admission medications    Medication Sig Start Date End Date Taking? Authorizing Provider   ACCU-CHEK NEYDA PLUS TEST STRP Strp 1 strip by Misc.(Non-Drug; Combo Route) route once daily. 11/5/18   Prisca Espinoza MD   ciprofloxacin HCl (CIPRO) 500 MG tablet TAKE 1 TABLET(500 MG) BY MOUTH EVERY MONDAY 10/20/19    Renato Womack MD   divalproex (DEPAKOTE) 250 MG EC tablet TAKE 2 TABLETS BY MOUTH EVERY EVENING 10/28/19   Prisca Espinoza MD   fluorouracil (EFUDEX) 5 % cream AAA on L forearm + 1cm of normal appearing surrounding skin BID x 4-6 weeks. Stop if blistered, oozing, or bleeding. Use daily sunscreen  Patient not taking: Reported on 12/3/2019 11/6/19   Dejah Anders MD   furosemide (LASIX) 20 MG tablet TAKE 2 TABLETS BY MOUTH EVERY DAY 5/2/19   Renato Womack MD   HYDROcodone-acetaminophen (NORCO) 5-325 mg per tablet Take 1 tablet by mouth every 6 (six) hours as needed for Pain.  Patient not taking: Reported on 12/3/2019 8/13/19   Valorie Castellanos PA-C   lactulose (CHRONULAC) 10 gram/15 mL solution TAKE 15 MLS BY MOUTH TWICE DAILY 10/27/19   Renato Womack MD   lancets Misc To check blood sugar once daily, to use with Accu-Chek meter. 11/5/18   Prisca Espinoza MD   ofloxacin (OCUFLOX) 0.3 % ophthalmic solution  1/10/19   Historical Provider, MD   prednisoLONE acetate (PRED FORTE) 1 % DrpS  1/15/19   Historical Provider, MD   SITagliptan (JANUVIA) 50 MG Tab Take 1 tablet (50 mg total) by mouth once daily. 4/6/17   Prisca Espinoza MD   XIFAXAN 550 mg Tab TAKE ONE TABLET BY MOUTH TWICE DAILY 11/25/19   Renato Womack MD     Anticoagulants/Antiplatelets: no anticoagulation    Allergies:   Review of patient's allergies indicates:   Allergen Reactions    Abilify [aripiprazole] Other (See Comments)     Sleepy, could not function.     Sedation History:  no adverse reactions    Review of Systems:   Hematological: no known coagulopathies  Respiratory: no cough, shortness of breath, or wheezing  Cardiovascular: no chest pain  Gastrointestinal: no abdominal pain, change in bowel habits, or black or bloody stools  Genito-Urinary: no dysuria, trouble voiding, or hematuria  Musculoskeletal: negative  Neurological: no TIA or stroke symptoms         OBJECTIVE:     Vital Signs (Most Recent)       Physical  Exam:  ASA: 2  Mallampati: N/A    General: no acute distress  Mental Status: alert and oriented to person, place and time  HEENT: normocephalic, atraumatic  Chest: unlabored breathing  Heart: regular heart rate  Abdomen: distended  Extremity: moves all extremities    ASSESSMENT/PLAN:     Sedation Plan: local  Patient will undergo: US guided Paracentesis    Merari Sterling NP

## 2019-12-06 NOTE — DISCHARGE INSTRUCTIONS
[facility] 8 AM- 5PM [phone]. Radiology resident on call [phone].    4200 ml  removed

## 2019-12-06 NOTE — PROCEDURES
Radiology Post-Procedure Note    Pre Op Diagnosis: Ascites  Post Op Diagnosis: Same    Procedure: Ultrasound Guided Paracentesis    Procedure performed by: Merari Sterling NP    Written Informed Consent Obtained: Yes  Specimen Removed: YES cloudy yellow   Estimated Blood Loss: Minimal    Findings:   Successful paracentesis.  Albumin administered PRN per protocol.    Patient tolerated procedure well.    Merari Sterling NP

## 2019-12-13 ENCOUNTER — HOSPITAL ENCOUNTER (OUTPATIENT)
Dept: INTERVENTIONAL RADIOLOGY/VASCULAR | Facility: HOSPITAL | Age: 79
Discharge: HOME | End: 2019-12-13
Attending: INTERNAL MEDICINE
Payer: MEDICARE

## 2019-12-13 VITALS
RESPIRATION RATE: 18 BRPM | OXYGEN SATURATION: 99 % | DIASTOLIC BLOOD PRESSURE: 60 MMHG | HEART RATE: 52 BPM | SYSTOLIC BLOOD PRESSURE: 130 MMHG

## 2019-12-13 DIAGNOSIS — R18.8 ASCITES OF LIVER: ICD-10-CM

## 2019-12-13 DIAGNOSIS — K74.60 CIRRHOSIS OF LIVER WITH ASCITES, UNSPECIFIED HEPATIC CIRRHOSIS TYPE: ICD-10-CM

## 2019-12-13 DIAGNOSIS — K75.81 LIVER CIRRHOSIS SECONDARY TO NASH: ICD-10-CM

## 2019-12-13 DIAGNOSIS — K74.60 LIVER CIRRHOSIS SECONDARY TO NASH: ICD-10-CM

## 2019-12-13 DIAGNOSIS — R18.8 CIRRHOSIS OF LIVER WITH ASCITES, UNSPECIFIED HEPATIC CIRRHOSIS TYPE: ICD-10-CM

## 2019-12-13 LAB
GRAM STN SPEC: NORMAL
GRAM STN SPEC: NORMAL

## 2019-12-13 PROCEDURE — 87205 SMEAR GRAM STAIN: CPT

## 2019-12-13 PROCEDURE — 89051 BODY FLUID CELL COUNT: CPT

## 2019-12-13 PROCEDURE — 87070 CULTURE OTHR SPECIMN AEROBIC: CPT

## 2019-12-13 PROCEDURE — 87075 CULTR BACTERIA EXCEPT BLOOD: CPT

## 2019-12-13 PROCEDURE — 49083 IR PARACENTESIS WITH IMAGING: ICD-10-PCS | Mod: GC,,, | Performed by: FAMILY MEDICINE

## 2019-12-13 PROCEDURE — 49083 ABD PARACENTESIS W/IMAGING: CPT

## 2019-12-13 PROCEDURE — 49083 ABD PARACENTESIS W/IMAGING: CPT | Mod: GC,,, | Performed by: FAMILY MEDICINE

## 2019-12-13 NOTE — H&P
Radiology History & Physical      SUBJECTIVE:     Chief Complaint: abdominal distention    History of Present Illness:  Kenneth Sheikh is a 79 y.o. male who presents for ultrasound guided paracentesis  Past Medical History:   Diagnosis Date    Anticoagulant long-term use     Basal cell carcinoma     Bipolar 1 disorder     Bipolar 1 disorder 11/24/2016    bipolar    Cancer     history of skin cancer/sinus cancer & rectal cancer    Depressed bipolar affective disorder     Diabetes mellitus     type 2    EBV infection 12/7/2016    EBV DNA, PCR Latest Ref Range: None detected  None detected EBV DNA-Copies/mL Unknown Test Not Performed EBV Early Antigen Ab, IgG Latest Ref Range: <1:10 Titer 1:40 (A) EBV Nuclear Ag Ab Latest Ref Range: <1:5 Titer >=1:80 (A) EBV VCA IgG Latest Ref Range: <1:10 Titer 1:160 (A) EBV VCA IgM Latest Ref Range: <1:10 Titer <1:10     History of TIA (transient ischemic attack)     several-taking Plavix    Hx of gallstones     Wife denies    Hypertension     Hyperthyroidism 11/30/2016    Low HDL (under 40) 12/7/2016    Mixed hyperlipidemia 11/23/2016    JUAN MANUEL (obstructive sleep apnea)     Pneumonia     Skin disease     Squamous cell carcinoma 08/2018    right temple    Stroke     Transient cerebral ischemia 7/22/2016    Type 2 diabetes mellitus      Past Surgical History:   Procedure Laterality Date    Moh's procedure x4      rectal cancer      Sinus surgery for sinus cancer      sinus sx for cancer      skin cancer removed-several times-nose & ear      TONSILLECTOMY      Undescened testicle sx      UVULOPALATOPHARYNGOPLASTY      for sleep apnea       Home Meds:   Prior to Admission medications    Medication Sig Start Date End Date Taking? Authorizing Provider   ACCU-CHEK NEYDA PLUS TEST STRP Strp 1 strip by Misc.(Non-Drug; Combo Route) route once daily. 11/5/18   Prisca Espinoza MD   ciprofloxacin HCl (CIPRO) 500 MG tablet TAKE 1 TABLET(500 MG) BY MOUTH EVERY MONDAY  10/20/19   Renato Womack MD   divalproex (DEPAKOTE) 250 MG EC tablet TAKE 2 TABLETS BY MOUTH EVERY EVENING 10/28/19   Prisca Espinoza MD   fluorouracil (EFUDEX) 5 % cream AAA on L forearm + 1cm of normal appearing surrounding skin BID x 4-6 weeks. Stop if blistered, oozing, or bleeding. Use daily sunscreen  Patient not taking: Reported on 12/3/2019 11/6/19   Dejah Anders MD   furosemide (LASIX) 20 MG tablet TAKE 2 TABLETS BY MOUTH EVERY DAY 5/2/19   Renato Womack MD   HYDROcodone-acetaminophen (NORCO) 5-325 mg per tablet Take 1 tablet by mouth every 6 (six) hours as needed for Pain.  Patient not taking: Reported on 12/3/2019 8/13/19   Valorie Castellanos PA-C   lactulose (CHRONULAC) 10 gram/15 mL solution TAKE 15 MLS BY MOUTH TWICE DAILY 10/27/19   Renato Womack MD   lancets Misc To check blood sugar once daily, to use with Accu-Chek meter. 11/5/18   Prisca Espinoza MD   ofloxacin (OCUFLOX) 0.3 % ophthalmic solution  1/10/19   Historical Provider, MD   prednisoLONE acetate (PRED FORTE) 1 % DrpS  1/15/19   Historical Provider, MD   SITagliptan (JANUVIA) 50 MG Tab Take 1 tablet (50 mg total) by mouth once daily. 4/6/17   Prisca Espinoza MD   XIFAXAN 550 mg Tab TAKE ONE TABLET BY MOUTH TWICE DAILY 11/25/19   Renato Womack MD     Anticoagulants/Antiplatelets: no anticoagulation    Allergies:   Review of patient's allergies indicates:   Allergen Reactions    Abilify [aripiprazole] Other (See Comments)     Sleepy, could not function.     Sedation History:  no adverse reactions    Review of Systems:   Hematological: no known coagulopathies  Respiratory: no shortness of breath  Cardiovascular: no chest pain  Gastrointestinal: no abdominal pain  Genito-Urinary: no dysuria  Musculoskeletal: negative  Neurological: no TIA or stroke symptoms         OBJECTIVE:     Vital Signs (Most Recent)       Physical Exam:  ASA: 2  Mallampati: n/a    General: no acute distress  Mental Status: alert and  oriented to person, place and time  HEENT: normocephalic, atraumatic  Chest: unlabored breathing  Heart: regular heart rate  Abdomen: distended  Extremity: moves all extremities    ASSESSMENT/PLAN:     Sedation Plan: local  Patient will undergo ultrasound guided paracentesis.    MATT Mauricio, FNP  Interventional Radiology  [phone] clinic

## 2019-12-13 NOTE — PROGRESS NOTES
Paracentesis complete. 4100 mLs peritoneal fluid drained. Pt tolerated well. Dressing to abdomenclean, dry, and intact.  Specimens sent per lab order. Pt acknowledged understanding of discharge instructions Pt discharged per unit and hospital protocol via wheelchair.

## 2019-12-13 NOTE — PROCEDURES

## 2019-12-13 NOTE — DISCHARGE INSTRUCTIONS
For scheduling: Call Mackenzie at [phone]    For questions or concerns call: [facility] MON[facility] 8 AM- 5PM [phone]. Radiology resident on call [phone].    For immediate concerns that are not emergent, you may call our radiology clinic at: [phone]...

## 2019-12-14 LAB
APPEARANCE FLD: NORMAL
BODY FLD TYPE: NORMAL
COLOR FLD: YELLOW
EOSINOPHIL NFR FLD MANUAL: 1 %
LYMPHOCYTES NFR FLD MANUAL: 69 %
MESOTHL CELL NFR FLD MANUAL: 2 %
MONOS+MACROS NFR FLD MANUAL: 28 %
WBC # FLD: 25 /CU MM

## 2019-12-16 LAB — BACTERIA SPEC AEROBE CULT: NO GROWTH

## 2019-12-20 ENCOUNTER — HOSPITAL ENCOUNTER (OUTPATIENT)
Dept: INTERVENTIONAL RADIOLOGY/VASCULAR | Facility: HOSPITAL | Age: 79
Discharge: HOME | End: 2019-12-20
Attending: INTERNAL MEDICINE
Payer: MEDICARE

## 2019-12-20 VITALS
DIASTOLIC BLOOD PRESSURE: 65 MMHG | RESPIRATION RATE: 12 BRPM | OXYGEN SATURATION: 98 % | HEART RATE: 50 BPM | SYSTOLIC BLOOD PRESSURE: 141 MMHG

## 2019-12-20 DIAGNOSIS — K74.60 CIRRHOSIS OF LIVER WITH ASCITES, UNSPECIFIED HEPATIC CIRRHOSIS TYPE: ICD-10-CM

## 2019-12-20 DIAGNOSIS — K74.60 LIVER CIRRHOSIS SECONDARY TO NASH: ICD-10-CM

## 2019-12-20 DIAGNOSIS — R18.8 ASCITES OF LIVER: ICD-10-CM

## 2019-12-20 DIAGNOSIS — K75.81 LIVER CIRRHOSIS SECONDARY TO NASH: ICD-10-CM

## 2019-12-20 DIAGNOSIS — R18.8 CIRRHOSIS OF LIVER WITH ASCITES, UNSPECIFIED HEPATIC CIRRHOSIS TYPE: ICD-10-CM

## 2019-12-20 LAB
APPEARANCE FLD: NORMAL
BACTERIA SPEC ANAEROBE CULT: NORMAL
BODY FLD TYPE: NORMAL
COLOR FLD: YELLOW
EOSINOPHIL NFR FLD MANUAL: 1 %
LYMPHOCYTES NFR FLD MANUAL: 70 %
MONOS+MACROS NFR FLD MANUAL: 27 %
NEUTROPHILS NFR FLD MANUAL: 2 %
WBC # FLD: 52 /CU MM

## 2019-12-20 PROCEDURE — 49083 ABD PARACENTESIS W/IMAGING: CPT | Mod: GC,,, | Performed by: RADIOLOGY

## 2019-12-20 PROCEDURE — 49083 IR PARACENTESIS WITH IMAGING: ICD-10-PCS | Mod: GC,,, | Performed by: RADIOLOGY

## 2019-12-20 PROCEDURE — 49083 ABD PARACENTESIS W/IMAGING: CPT

## 2019-12-20 PROCEDURE — 89051 BODY FLUID CELL COUNT: CPT

## 2019-12-20 NOTE — H&P
Radiology History & Physical      SUBJECTIVE:     Chief Complaint: ascites    History of Present Illness:  Kenneth Sheikh is a 79 y.o. male who presents for paracentesis  Past Medical History:   Diagnosis Date    Anticoagulant long-term use     Basal cell carcinoma     Bipolar 1 disorder     Bipolar 1 disorder 11/24/2016    bipolar    Cancer     history of skin cancer/sinus cancer & rectal cancer    Depressed bipolar affective disorder     Diabetes mellitus     type 2    EBV infection 12/7/2016    EBV DNA, PCR Latest Ref Range: None detected  None detected EBV DNA-Copies/mL Unknown Test Not Performed EBV Early Antigen Ab, IgG Latest Ref Range: <1:10 Titer 1:40 (A) EBV Nuclear Ag Ab Latest Ref Range: <1:5 Titer >=1:80 (A) EBV VCA IgG Latest Ref Range: <1:10 Titer 1:160 (A) EBV VCA IgM Latest Ref Range: <1:10 Titer <1:10     History of TIA (transient ischemic attack)     several-taking Plavix    Hx of gallstones     Wife denies    Hypertension     Hyperthyroidism 11/30/2016    Low HDL (under 40) 12/7/2016    Mixed hyperlipidemia 11/23/2016    JUA NMANUEL (obstructive sleep apnea)     Pneumonia     Skin disease     Squamous cell carcinoma 08/2018    right temple    Stroke     Transient cerebral ischemia 7/22/2016    Type 2 diabetes mellitus      Past Surgical History:   Procedure Laterality Date    Moh's procedure x4      rectal cancer      Sinus surgery for sinus cancer      sinus sx for cancer      skin cancer removed-several times-nose & ear      TONSILLECTOMY      Undescened testicle sx      UVULOPALATOPHARYNGOPLASTY      for sleep apnea       Home Meds:   Prior to Admission medications    Medication Sig Start Date End Date Taking? Authorizing Provider   ACCU-CHEK NEYDA PLUS TEST STRP Strp 1 strip by Misc.(Non-Drug; Combo Route) route once daily. 11/5/18   Prisca Espinoza MD   ciprofloxacin HCl (CIPRO) 500 MG tablet TAKE 1 TABLET(500 MG) BY MOUTH EVERY MONDAY 10/20/19   Renato Womack MD    divalproex (DEPAKOTE) 250 MG EC tablet TAKE 2 TABLETS BY MOUTH EVERY EVENING 10/28/19   Prisca Espinoza MD   fluorouracil (EFUDEX) 5 % cream AAA on L forearm + 1cm of normal appearing surrounding skin BID x 4-6 weeks. Stop if blistered, oozing, or bleeding. Use daily sunscreen  Patient not taking: Reported on 12/3/2019 11/6/19   Dejah Anders MD   furosemide (LASIX) 20 MG tablet TAKE 2 TABLETS BY MOUTH EVERY DAY 5/2/19   Renato Womack MD   HYDROcodone-acetaminophen (NORCO) 5-325 mg per tablet Take 1 tablet by mouth every 6 (six) hours as needed for Pain.  Patient not taking: Reported on 12/3/2019 8/13/19   Valorie Castellanos PA-C   lactulose (CHRONULAC) 10 gram/15 mL solution TAKE 15 MLS BY MOUTH TWICE DAILY 10/27/19   Renaot Womack MD   lancets Misc To check blood sugar once daily, to use with Accu-Chek meter. 11/5/18   Prisca Espinoza MD   ofloxacin (OCUFLOX) 0.3 % ophthalmic solution  1/10/19   Historical Provider, MD   prednisoLONE acetate (PRED FORTE) 1 % DrpS  1/15/19   Historical Provider, MD   SITagliptan (JANUVIA) 50 MG Tab Take 1 tablet (50 mg total) by mouth once daily. 4/6/17   Prisca Espinoza MD   XIFAXAN 550 mg Tab TAKE ONE TABLET BY MOUTH TWICE DAILY 11/25/19   Renato Womack MD     Anticoagulants/Antiplatelets: no anticoagulation    Allergies:   Review of patient's allergies indicates:   Allergen Reactions    Abilify [aripiprazole] Other (See Comments)     Sleepy, could not function.     Sedation History:  no adverse reactions    Review of Systems:   Hematological: negative  Respiratory: no shortness of breath  Cardiovascular: no chest pain  Gastrointestinal: no abdominal pain  Genito-Urinary: no dysuria  Musculoskeletal: negative  Neurological: no TIA or stroke symptoms         OBJECTIVE:     Vital Signs (Most Recent)       Physical Exam:  ASA: 3    General: no acute distress  Mental Status: alert and oriented to person, place and time  HEENT: normocephalic,  atraumatic  Chest: unlabored breathing  Heart: regular heart rate  Abdomen: distended  Extremity: moves all extremities    Laboratory  Lab Results   Component Value Date    INR 1.1 11/29/2019       Lab Results   Component Value Date    WBC 2.66 (L) 11/29/2019    HGB 10.3 (L) 11/29/2019    HCT 33.3 (L) 11/29/2019    MCV 90 11/29/2019     (L) 11/29/2019      Lab Results   Component Value Date     (H) 11/29/2019     11/29/2019    K 3.9 11/29/2019     11/29/2019    CO2 28 11/29/2019    BUN 18 11/29/2019    CREATININE 1.3 11/29/2019    CALCIUM 8.6 (L) 11/29/2019    MG 1.7 05/15/2017    ALT 10 11/29/2019    AST 22 11/29/2019    ALBUMIN 2.2 (L) 11/29/2019    BILITOT 0.6 11/29/2019       ASSESSMENT/PLAN:     Sedation Plan: local anesthetic only  Patient will undergo ultrasound guided paraecentesis.    Raven Cabello NP  Interventional Radiology

## 2019-12-20 NOTE — NURSING
Paracentesis complete, 4200 MLs removed. Specimen sent to lab. NO Albumin administered per protocol. Dressing applied to abdominal puncture site, dressing clean dry and intact. Pt given discharge instructions and handouts, pt verbalizes understanding. Questions answered. Pt denies pain and discomfort. Pt wheeled to lobby for transport home with family. Gait steady.

## 2019-12-20 NOTE — PROCEDURES
Radiology Post-Procedure Note    Pre Op Diagnosis: Ascites  Post Op Diagnosis: Same    Procedure: Ultrasound Guided Paracentesis    Procedure performed by: Raven Cabello NP    Written Informed Consent Obtained: Yes  Specimen Removed: YES ascites  Estimated Blood Loss: Minimal    Findings:   Successful paracentesis.  Albumin administered PRN per protocol.    Patient tolerated procedure well.    Raven Cabello NP  Interventional Radiology

## 2019-12-20 NOTE — PROCEDURES
"Paracentesis  Date/Time: 19 11:19  Location procedure was performed: [facility] INTERVENTIONAL RADIOLOGY  Performed by: Raven Cabello NP  Authorized by: Raven Cabello NP   Pre-operative diagnosis: ascites  Post-operative diagnosis: ascites  Consent Done: Yes  Consent: Written consent obtained.  Consent given by: patient  Patient understanding: patient states understanding of the procedure being performed  Patient consent: the patient's understanding of the procedure matches consent given  Procedure consent: procedure consent matches procedure scheduled  Relevant documents: relevant documents present and verified  Test results: test results available and properly labeled  Site marked: the operative site was marked  Imaging studies: imaging studies available  Required items: required blood products, implants, devices, and special equipment available  Patient identity confirmed: , MRN, name and verbally with patient  Time out: Immediately prior to procedure a "time out" was called to verify the correct patient, procedure, equipment, support staff and site/side marked as required.  Initial or subsequent exam: subsequent  Procedure purpose: diagnostic/therapeutic  Indications: abdominal discomfort secondary to ascites  Anesthesia: local infiltration    Anesthesia:  Local Anesthetic: lidocaine 1% without epinephrine  Anesthetic total: 10 mL  Patient sedated: no  Preparation: Patient was prepped and draped in the usual sterile fashion.  Needle gauge: 19 gauge/5 [place].  Ultrasound guided: yes  Puncture site: right lower quadrant  Fluid removed: 4200(ml)  Fluid appearance: cloudy, yellow  Dressing: 4x4 sterile gauze  Technical procedures used: Yasmine catheter  Specimens: yes  Implants: No  Patient tolerance: Patient tolerated the procedure well with no immediate complications          Raven Cabello NP  Interventional Radiology    "

## 2019-12-20 NOTE — DISCHARGE INSTRUCTIONS
For scheduling: Call Mackenzie at [phone]    For questions or concerns call: SHAR MON[facility] 8 AM- 5PM [phone]. Radiology resident on call [phone].    For immediate concerns that are not emergent, you may call our radiology clinic at: [phone]

## 2019-12-24 DIAGNOSIS — E11.22 TYPE 2 DIABETES MELLITUS WITH STAGE 3 CHRONIC KIDNEY DISEASE, WITHOUT LONG-TERM CURRENT USE OF INSULIN: ICD-10-CM

## 2019-12-24 DIAGNOSIS — N18.30 TYPE 2 DIABETES MELLITUS WITH STAGE 3 CHRONIC KIDNEY DISEASE, WITHOUT LONG-TERM CURRENT USE OF INSULIN: ICD-10-CM

## 2019-12-26 DIAGNOSIS — E11.22 TYPE 2 DIABETES MELLITUS WITH STAGE 3 CHRONIC KIDNEY DISEASE, WITHOUT LONG-TERM CURRENT USE OF INSULIN: ICD-10-CM

## 2019-12-26 DIAGNOSIS — N18.30 TYPE 2 DIABETES MELLITUS WITH STAGE 3 CHRONIC KIDNEY DISEASE, WITHOUT LONG-TERM CURRENT USE OF INSULIN: ICD-10-CM

## 2019-12-26 RX ORDER — BLOOD SUGAR DIAGNOSTIC
1 STRIP MISCELLANEOUS DAILY
Qty: 90 STRIP | Refills: 3 | Status: SHIPPED | OUTPATIENT
Start: 2019-12-26 | End: 2020-01-16 | Stop reason: SDUPTHER

## 2019-12-27 ENCOUNTER — HOSPITAL ENCOUNTER (OUTPATIENT)
Dept: INTERVENTIONAL RADIOLOGY/VASCULAR | Facility: HOSPITAL | Age: 79
Discharge: HOME | End: 2019-12-27
Attending: INTERNAL MEDICINE
Payer: MEDICARE

## 2019-12-27 VITALS
RESPIRATION RATE: 16 BRPM | OXYGEN SATURATION: 98 % | DIASTOLIC BLOOD PRESSURE: 70 MMHG | HEART RATE: 54 BPM | SYSTOLIC BLOOD PRESSURE: 156 MMHG

## 2019-12-27 DIAGNOSIS — K74.60 LIVER CIRRHOSIS SECONDARY TO NASH: ICD-10-CM

## 2019-12-27 DIAGNOSIS — K75.81 LIVER CIRRHOSIS SECONDARY TO NASH: ICD-10-CM

## 2019-12-27 DIAGNOSIS — R18.8 CIRRHOSIS OF LIVER WITH ASCITES, UNSPECIFIED HEPATIC CIRRHOSIS TYPE: ICD-10-CM

## 2019-12-27 DIAGNOSIS — R18.8 ASCITES OF LIVER: ICD-10-CM

## 2019-12-27 DIAGNOSIS — K74.60 CIRRHOSIS OF LIVER WITH ASCITES, UNSPECIFIED HEPATIC CIRRHOSIS TYPE: ICD-10-CM

## 2019-12-27 LAB
APPEARANCE FLD: NORMAL
BODY FLD TYPE: NORMAL
COLOR FLD: YELLOW
LYMPHOCYTES NFR FLD MANUAL: 67 %
MONOS+MACROS NFR FLD MANUAL: 31 %
NEUTROPHILS NFR FLD MANUAL: 2 %
WBC # FLD: 33 /CU MM

## 2019-12-27 PROCEDURE — 49083 IR PARACENTESIS WITH IMAGING: ICD-10-PCS | Mod: GC,,, | Performed by: FAMILY MEDICINE

## 2019-12-27 PROCEDURE — 89051 BODY FLUID CELL COUNT: CPT

## 2019-12-27 PROCEDURE — 49083 ABD PARACENTESIS W/IMAGING: CPT

## 2019-12-27 PROCEDURE — 49083 ABD PARACENTESIS W/IMAGING: CPT | Mod: GC,,, | Performed by: FAMILY MEDICINE

## 2019-12-27 RX ORDER — BLOOD SUGAR DIAGNOSTIC
STRIP MISCELLANEOUS
Qty: 100 STRIP | Refills: 0 | Status: SHIPPED | OUTPATIENT
Start: 2019-12-27 | End: 2020-04-17

## 2019-12-27 NOTE — DISCHARGE INSTRUCTIONS
For scheduling: Call Mackenzie at [phone]    For questions or concerns call: HSAR MON[facility]FRI 8 AM- 5PM [phone]. Radiology resident on call [phone].    For immediate concerns that are not emergent, you may call our radiology clinic at: [phone]

## 2019-12-27 NOTE — PROGRESS NOTES
Paracentesis complete. 3950  MLs removed. Specimen sent to lab. Dressing applied to LLQ puncture site, dressing clean dry and intact. Pt given discharge instructions and handouts, pt verbalizes understanding. Questions answered. Pt denies pain and discomfort. Pt transported via wheelchair and discharged home with family

## 2019-12-27 NOTE — H&P
Radiology History & Physical      SUBJECTIVE:     Chief Complaint: abdominal distention    History of Present Illness:  Kenneth Sheikh is a 79 y.o. male who presents for ultrasound guided paracentesis  Past Medical History:   Diagnosis Date    Anticoagulant long-term use     Basal cell carcinoma     Bipolar 1 disorder     Bipolar 1 disorder 11/24/2016    bipolar    Cancer     history of skin cancer/sinus cancer & rectal cancer    Depressed bipolar affective disorder     Diabetes mellitus     type 2    EBV infection 12/7/2016    EBV DNA, PCR Latest Ref Range: None detected  None detected EBV DNA-Copies/mL Unknown Test Not Performed EBV Early Antigen Ab, IgG Latest Ref Range: <1:10 Titer 1:40 (A) EBV Nuclear Ag Ab Latest Ref Range: <1:5 Titer >=1:80 (A) EBV VCA IgG Latest Ref Range: <1:10 Titer 1:160 (A) EBV VCA IgM Latest Ref Range: <1:10 Titer <1:10     History of TIA (transient ischemic attack)     several-taking Plavix    Hx of gallstones     Wife denies    Hypertension     Hyperthyroidism 11/30/2016    Low HDL (under 40) 12/7/2016    Mixed hyperlipidemia 11/23/2016    JUAN MANUEL (obstructive sleep apnea)     Pneumonia     Skin disease     Squamous cell carcinoma 08/2018    right temple    Stroke     Transient cerebral ischemia 7/22/2016    Type 2 diabetes mellitus      Past Surgical History:   Procedure Laterality Date    Moh's procedure x4      rectal cancer      Sinus surgery for sinus cancer      sinus sx for cancer      skin cancer removed-several times-nose & ear      TONSILLECTOMY      Undescened testicle sx      UVULOPALATOPHARYNGOPLASTY      for sleep apnea       Home Meds:   Prior to Admission medications    Medication Sig Start Date End Date Taking? Authorizing Provider   ACCU-CHEK NEYDA PLUS TEST STRP Strp CHECK BLOOD SUGAR ONCE DAILY 12/27/19   Aziza Ogden MD   ACCU-CHEK NEYDA PLUS TEST STRP Strp 1 strip by Misc.(Non-Drug; Combo Route) route once daily. 12/26/19   Ernestine  CODY Oglesby   ciprofloxacin HCl (CIPRO) 500 MG tablet TAKE 1 TABLET(500 MG) BY MOUTH EVERY MONDAY 10/20/19   Renato Womack MD   divalproex (DEPAKOTE) 250 MG EC tablet TAKE 2 TABLETS BY MOUTH EVERY EVENING 10/28/19   Prisca Espinoza MD   fluorouracil (EFUDEX) 5 % cream AAA on L forearm + 1cm of normal appearing surrounding skin BID x 4-6 weeks. Stop if blistered, oozing, or bleeding. Use daily sunscreen  Patient not taking: Reported on 12/3/2019 11/6/19   Dejah Anders MD   furosemide (LASIX) 20 MG tablet TAKE 2 TABLETS BY MOUTH EVERY DAY 5/2/19   Renato Womack MD   HYDROcodone-acetaminophen (NORCO) 5-325 mg per tablet Take 1 tablet by mouth every 6 (six) hours as needed for Pain.  Patient not taking: Reported on 12/3/2019 8/13/19   Valorie Castellanos PA-C   lactulose (CHRONULAC) 10 gram/15 mL solution TAKE 15 MLS BY MOUTH TWICE DAILY 10/27/19   Renato Womack MD   lancets Misc To check blood sugar once daily, to use with Accu-Chek meter. 11/5/18   Prisca Espinoza MD   ofloxacin (OCUFLOX) 0.3 % ophthalmic solution  1/10/19   Historical Provider, MD   prednisoLONE acetate (PRED FORTE) 1 % DrpS  1/15/19   Historical Provider, MD   SITagliptan (JANUVIA) 50 MG Tab Take 1 tablet (50 mg total) by mouth once daily. 4/6/17   Prisca Espinoza MD   XIFAXAN 550 mg Tab TAKE ONE TABLET BY MOUTH TWICE DAILY 11/25/19   Renato Womack MD     Anticoagulants/Antiplatelets: no anticoagulation    Allergies:   Review of patient's allergies indicates:   Allergen Reactions    Abilify [aripiprazole] Other (See Comments)     Sleepy, could not function.     Sedation History:  no adverse reactions    Review of Systems:   Hematological: no known coagulopathies  Respiratory: no shortness of breath  Cardiovascular: no chest pain  Gastrointestinal: no abdominal pain  Genito-Urinary: no dysuria  Musculoskeletal: negative  Neurological: no TIA or stroke symptoms         OBJECTIVE:     Vital Signs (Most Recent)        Physical Exam:  ASA: 2  Mallampati: n/a    General: no acute distress  Mental Status: alert and oriented to person, place and time  HEENT: normocephalic, atraumatic  Chest: unlabored breathing  Heart: regular heart rate  Abdomen: distended  Extremity: moves all extremities    ASSESSMENT/PLAN:     Sedation Plan: local  Patient will undergo ultrasound guided paracentesis.    MATT Mauricio, ESTEFANYP  Interventional Radiology  [phone] clinic

## 2019-12-27 NOTE — PROCEDURES

## 2019-12-27 NOTE — PLAN OF CARE
PT in MPU 12 for paracentesis. No acute distress noted. Labs and orders reviewed on chart. Awaiting consent.

## 2019-12-30 ENCOUNTER — TELEPHONE (OUTPATIENT)
Dept: INTERNAL MEDICINE | Facility: CLINIC | Age: 79
End: 2019-12-30

## 2019-12-30 NOTE — TELEPHONE ENCOUNTER
----- Message from Viviane Berman sent at 12/30/2019  8:18 AM CST -----  Prior Authorization Needed    Medication: ACCU-CHEK NEYDA PLUS TEST STRP Strp    Pharmacy Info: [id number] - [facility] [address] AT [facility] [address]    Plan does not cover this medication. Please call plan at   [phone]    to initiate prior authorization or call/fax pharmacy to change medication. Patient ID# [id number]    Note chart when prior authorization has been submitted.    Please notify pharmacy when prior authorization has been approved.    Thank You

## 2020-01-03 ENCOUNTER — HOSPITAL ENCOUNTER (OUTPATIENT)
Dept: INTERVENTIONAL RADIOLOGY/VASCULAR | Facility: HOSPITAL | Age: 80
Discharge: HOME | End: 2020-01-03
Attending: INTERNAL MEDICINE
Payer: MEDICARE

## 2020-01-03 VITALS
HEART RATE: 52 BPM | OXYGEN SATURATION: 100 % | DIASTOLIC BLOOD PRESSURE: 55 MMHG | SYSTOLIC BLOOD PRESSURE: 114 MMHG | RESPIRATION RATE: 18 BRPM

## 2020-01-03 DIAGNOSIS — K70.31 ASCITES DUE TO ALCOHOLIC CIRRHOSIS: ICD-10-CM

## 2020-01-03 LAB
APPEARANCE FLD: NORMAL
BODY FLD TYPE: NORMAL
COLOR FLD: YELLOW
LYMPHOCYTES NFR FLD MANUAL: 65 %
MONOS+MACROS NFR FLD MANUAL: 34 %
NEUTROPHILS NFR FLD MANUAL: 1 %
WBC # FLD: 26 /CU MM

## 2020-01-03 PROCEDURE — 49083 IR PARACENTESIS NO IMAGING: ICD-10-PCS | Mod: ,,, | Performed by: RADIOLOGY

## 2020-01-03 PROCEDURE — 49083 ABD PARACENTESIS W/IMAGING: CPT | Mod: ,,, | Performed by: RADIOLOGY

## 2020-01-03 PROCEDURE — 89051 BODY FLUID CELL COUNT: CPT

## 2020-01-03 PROCEDURE — A7048 VACUUM DRAIN BOTTLE/TUBE KIT: HCPCS

## 2020-01-03 PROCEDURE — 49083 ABD PARACENTESIS W/IMAGING: CPT

## 2020-01-03 NOTE — PLAN OF CARE
Pt arrived to us for us guided paracentesis. Pt oriented to unit and staff. Plan of care reviewed with patient, patient verbalizes understanding. Comfort measures utilized. Pt safely transferred from stretcher to procedural table. Fall risk reviewed with patient, fall risk interventions maintained. Safety strap applied, positioner pillows utilized to minimize pressure points. Blankets applied. Pt prepped and draped utilizing standard sterile technique. Patient placed on continuous monitoring, as required by sedation policy. Timeouts completed utilizing standard universal time-out, per department and facility policy. RN to remain at bedside, continuous monitoring maintained. Pt resting comfortably. Denies pain/discomfort. Will continue to monitor. See flow sheets for monitoring, medication administration, and updates.

## 2020-01-03 NOTE — PROGRESS NOTES
Procedure complete. Patient tolerated well. 4000cc drained from left side. Dressing clean dry and intact. No discomfort noted. Discharged home per wc with wife. Discharge instructions given, verbalizes compliance and understanding.

## 2020-01-03 NOTE — PROCEDURES
"Paracentesis  Date/Time: 1/3/20 11:12  Location procedure was performed: [facility] INTERVENTIONAL RADIOLOGY  Performed by: Raven Cabello NP  Authorized by: Raven Cabello NP   Pre-operative diagnosis: ascites  Post-operative diagnosis: ascites  Consent Done: Yes  Consent: Written consent obtained.  Consent given by: patient  Patient understanding: patient states understanding of the procedure being performed  Patient consent: the patient's understanding of the procedure matches consent given  Procedure consent: procedure consent matches procedure scheduled  Relevant documents: relevant documents present and verified  Test results: test results available and properly labeled  Site marked: the operative site was marked  Imaging studies: imaging studies available  Required items: required blood products, implants, devices, and special equipment available  Patient identity confirmed: , MRN, name and verbally with patient  Time out: Immediately prior to procedure a "time out" was called to verify the correct patient, procedure, equipment, support staff and site/side marked as required.  Initial or subsequent exam: subsequent  Procedure purpose: diagnostic/therapeutic  Indications: abdominal discomfort secondary to ascites  Anesthesia: local infiltration    Anesthesia:  Local Anesthetic: lidocaine 1% without epinephrine  Anesthetic total: 10 mL  Patient sedated: no  Preparation: Patient was prepped and draped in the usual sterile fashion.  Needle gauge: 19 gauge/5 Sinhala.  Ultrasound guided: yes  Puncture site: left lower quadrant  Fluid removed: 4000(ml)  Fluid appearance: yellow, slightly cloudy  Dressing: 4x4 sterile gauze  Technical procedures used: Yasmine catheter  Specimens: yes  Implants: No  Patient tolerance: Patient tolerated the procedure well with no immediate complications          Raven Cabello NP  Interventional Radiology    "

## 2020-01-03 NOTE — H&P
Radiology History & Physical      SUBJECTIVE:     Chief Complaint: ascites    History of Present Illness:  Kenneth Sheikh is a 79 y.o. male who presents for paracentesis  Past Medical History:   Diagnosis Date    Anticoagulant long-term use     Basal cell carcinoma     Bipolar 1 disorder     Bipolar 1 disorder 11/24/2016    bipolar    Cancer     history of skin cancer/sinus cancer & rectal cancer    Depressed bipolar affective disorder     Diabetes mellitus     type 2    EBV infection 12/7/2016    EBV DNA, PCR Latest Ref Range: None detected  None detected EBV DNA-Copies/mL Unknown Test Not Performed EBV Early Antigen Ab, IgG Latest Ref Range: <1:10 Titer 1:40 (A) EBV Nuclear Ag Ab Latest Ref Range: <1:5 Titer >=1:80 (A) EBV VCA IgG Latest Ref Range: <1:10 Titer 1:160 (A) EBV VCA IgM Latest Ref Range: <1:10 Titer <1:10     History of TIA (transient ischemic attack)     several-taking Plavix    Hx of gallstones     Wife denies    Hypertension     Hyperthyroidism 11/30/2016    Low HDL (under 40) 12/7/2016    Mixed hyperlipidemia 11/23/2016    JUAN MANUEL (obstructive sleep apnea)     Pneumonia     Skin disease     Squamous cell carcinoma 08/2018    right temple    Stroke     Transient cerebral ischemia 7/22/2016    Type 2 diabetes mellitus      Past Surgical History:   Procedure Laterality Date    Moh's procedure x4      rectal cancer      Sinus surgery for sinus cancer      sinus sx for cancer      skin cancer removed-several times-nose & ear      TONSILLECTOMY      Undescened testicle sx      UVULOPALATOPHARYNGOPLASTY      for sleep apnea       Home Meds:   Prior to Admission medications    Medication Sig Start Date End Date Taking? Authorizing Provider   ACCU-CHEK NEYDA PLUS TEST STRP Strp CHECK BLOOD SUGAR ONCE DAILY 12/27/19   Aziza Ogden MD   ACCU-CHEK NEYDA PLUS TEST STRP Strp 1 strip by Misc.(Non-Drug; Combo Route) route once daily. 12/26/19   Ernestine Oglesby NP   ciprofloxacin  HCl (CIPRO) 500 MG tablet TAKE 1 TABLET(500 MG) BY MOUTH EVERY MONDAY 10/20/19   Renato Womack MD   divalproex (DEPAKOTE) 250 MG EC tablet TAKE 2 TABLETS BY MOUTH EVERY EVENING 10/28/19   Prisca Espinoza MD   fluorouracil (EFUDEX) 5 % cream AAA on L forearm + 1cm of normal appearing surrounding skin BID x 4-6 weeks. Stop if blistered, oozing, or bleeding. Use daily sunscreen  Patient not taking: Reported on 12/3/2019 11/6/19   Dejah Anders MD   furosemide (LASIX) 20 MG tablet TAKE 2 TABLETS BY MOUTH EVERY DAY 5/2/19   Renato Womack MD   HYDROcodone-acetaminophen (NORCO) 5-325 mg per tablet Take 1 tablet by mouth every 6 (six) hours as needed for Pain.  Patient not taking: Reported on 12/3/2019 8/13/19   Valorie Castellanos PA-C   lactulose (CHRONULAC) 10 gram/15 mL solution TAKE 15 MLS BY MOUTH TWICE DAILY 10/27/19   Renato Womack MD   lancets Misc To check blood sugar once daily, to use with Accu-Chek meter. 11/5/18   Prisca Espinoza MD   ofloxacin (OCUFLOX) 0.3 % ophthalmic solution  1/10/19   Historical Provider, MD   prednisoLONE acetate (PRED FORTE) 1 % DrpS  1/15/19   Historical Provider, MD   SITagliptan (JANUVIA) 50 MG Tab Take 1 tablet (50 mg total) by mouth once daily. 4/6/17   Prisca Espinoza MD   XIFAXAN 550 mg Tab TAKE ONE TABLET BY MOUTH TWICE DAILY 11/25/19   Renato Womack MD     Anticoagulants/Antiplatelets: no anticoagulation    Allergies:   Review of patient's allergies indicates:   Allergen Reactions    Abilify [aripiprazole] Other (See Comments)     Sleepy, could not function.     Sedation History:  no adverse reactions    Review of Systems:   Hematological: no known coagulopathies  Respiratory: no shortness of breath  Cardiovascular: no chest pain  Gastrointestinal: no abdominal pain  Genito-Urinary: no dysuria  Musculoskeletal: negative  Neurological: no TIA or stroke symptoms         OBJECTIVE:     Vital Signs (Most Recent)       Physical Exam:  ASA:  3    General: no acute distress  Mental Status: alert and oriented to person, place and time  HEENT: normocephalic, atraumatic  Chest: unlabored breathing  Heart: regular heart rate  Abdomen: distended  Extremity: moves all extremities    Laboratory  Lab Results   Component Value Date    INR 1.1 11/29/2019       Lab Results   Component Value Date    WBC 2.66 (L) 11/29/2019    HGB 10.3 (L) 11/29/2019    HCT 33.3 (L) 11/29/2019    MCV 90 11/29/2019     (L) 11/29/2019      Lab Results   Component Value Date     (H) 11/29/2019     11/29/2019    K 3.9 11/29/2019     11/29/2019    CO2 28 11/29/2019    BUN 18 11/29/2019    CREATININE 1.3 11/29/2019    CALCIUM 8.6 (L) 11/29/2019    MG 1.7 05/15/2017    ALT 10 11/29/2019    AST 22 11/29/2019    ALBUMIN 2.2 (L) 11/29/2019    BILITOT 0.6 11/29/2019       ASSESSMENT/PLAN:     Sedation Plan: local anesthetic only  Patient will undergo ultrasound guided paracentesis.    Raven Cabello NP  Interventional Radiology

## 2020-01-10 ENCOUNTER — HOSPITAL ENCOUNTER (OUTPATIENT)
Dept: INTERVENTIONAL RADIOLOGY/VASCULAR | Facility: HOSPITAL | Age: 80
Discharge: HOME | End: 2020-01-10
Attending: INTERNAL MEDICINE
Payer: MEDICARE

## 2020-01-10 VITALS
SYSTOLIC BLOOD PRESSURE: 142 MMHG | RESPIRATION RATE: 20 BRPM | OXYGEN SATURATION: 100 % | HEART RATE: 50 BPM | DIASTOLIC BLOOD PRESSURE: 72 MMHG

## 2020-01-10 DIAGNOSIS — K70.31 ASCITES DUE TO ALCOHOLIC CIRRHOSIS: ICD-10-CM

## 2020-01-10 LAB
APPEARANCE FLD: NORMAL
BODY FLD TYPE: NORMAL
COLOR FLD: YELLOW
LYMPHOCYTES NFR FLD MANUAL: 90 %
MONOS+MACROS NFR FLD MANUAL: 10 %
WBC # FLD: 31 /CU MM

## 2020-01-10 PROCEDURE — A7048 VACUUM DRAIN BOTTLE/TUBE KIT: HCPCS

## 2020-01-10 PROCEDURE — 49083 ABD PARACENTESIS W/IMAGING: CPT | Mod: GC,,, | Performed by: RADIOLOGY

## 2020-01-10 PROCEDURE — 49083 ABD PARACENTESIS W/IMAGING: CPT

## 2020-01-10 PROCEDURE — 49083 IR PARACENTESIS NO IMAGING: ICD-10-PCS | Mod: GC,,, | Performed by: RADIOLOGY

## 2020-01-10 PROCEDURE — 89051 BODY FLUID CELL COUNT: CPT

## 2020-01-10 NOTE — H&P
Radiology History & Physical      SUBJECTIVE:     Chief Complaint: abdominal distention    History of Present Illness:  Kenneth Sheikh is a 79 y.o. male who presents for US guided paracentesis.    Past Medical History:   Diagnosis Date    Anticoagulant long-term use     Basal cell carcinoma     Bipolar 1 disorder     Bipolar 1 disorder 11/24/2016    bipolar    Cancer     history of skin cancer/sinus cancer & rectal cancer    Depressed bipolar affective disorder     Diabetes mellitus     type 2    EBV infection 12/7/2016    EBV DNA, PCR Latest Ref Range: None detected  None detected EBV DNA-Copies/mL Unknown Test Not Performed EBV Early Antigen Ab, IgG Latest Ref Range: <1:10 Titer 1:40 (A) EBV Nuclear Ag Ab Latest Ref Range: <1:5 Titer >=1:80 (A) EBV VCA IgG Latest Ref Range: <1:10 Titer 1:160 (A) EBV VCA IgM Latest Ref Range: <1:10 Titer <1:10     History of TIA (transient ischemic attack)     several-taking Plavix    Hx of gallstones     Wife denies    Hypertension     Hyperthyroidism 11/30/2016    Low HDL (under 40) 12/7/2016    Mixed hyperlipidemia 11/23/2016    JUAN MANUEL (obstructive sleep apnea)     Pneumonia     Skin disease     Squamous cell carcinoma 08/2018    right temple    Stroke     Transient cerebral ischemia 7/22/2016    Type 2 diabetes mellitus      Past Surgical History:   Procedure Laterality Date    Moh's procedure x4      rectal cancer      Sinus surgery for sinus cancer      sinus sx for cancer      skin cancer removed-several times-nose & ear      TONSILLECTOMY      Undescened testicle sx      UVULOPALATOPHARYNGOPLASTY      for sleep apnea       Home Meds:   Prior to Admission medications    Medication Sig Start Date End Date Taking? Authorizing Provider   ACCU-CHEK NEYDA PLUS TEST STRP Strp CHECK BLOOD SUGAR ONCE DAILY 12/27/19   Aziza Ogden MD   ACCU-CHEK NEYDA PLUS TEST STRP Strp 1 strip by Misc.(Non-Drug; Combo Route) route once daily. 12/26/19   Ernestine  CODY Oglesby   ciprofloxacin HCl (CIPRO) 500 MG tablet TAKE 1 TABLET(500 MG) BY MOUTH EVERY MONDAY 10/20/19   Renato Womack MD   divalproex (DEPAKOTE) 250 MG EC tablet TAKE 2 TABLETS BY MOUTH EVERY EVENING 10/28/19   Prisca Espinoza MD   fluorouracil (EFUDEX) 5 % cream AAA on L forearm + 1cm of normal appearing surrounding skin BID x 4-6 weeks. Stop if blistered, oozing, or bleeding. Use daily sunscreen  Patient not taking: Reported on 12/3/2019 11/6/19   Dejah Anders MD   furosemide (LASIX) 20 MG tablet TAKE 2 TABLETS BY MOUTH EVERY DAY 5/2/19   Renato Womack MD   HYDROcodone-acetaminophen (NORCO) 5-325 mg per tablet Take 1 tablet by mouth every 6 (six) hours as needed for Pain.  Patient not taking: Reported on 12/3/2019 8/13/19   Valorie Castellanos PA-C   lactulose (CHRONULAC) 10 gram/15 mL solution TAKE 15 MLS BY MOUTH TWICE DAILY 10/27/19   Renato Womack MD   lancets Misc To check blood sugar once daily, to use with Accu-Chek meter. 11/5/18   Prisca Espinoza MD   ofloxacin (OCUFLOX) 0.3 % ophthalmic solution  1/10/19   Historical Provider, MD   prednisoLONE acetate (PRED FORTE) 1 % DrpS  1/15/19   Historical Provider, MD   SITagliptan (JANUVIA) 50 MG Tab Take 1 tablet (50 mg total) by mouth once daily. 4/6/17   Prisca Espinoza MD   XIFAXAN 550 mg Tab TAKE ONE TABLET BY MOUTH TWICE DAILY 11/25/19   Renato Womack MD     Anticoagulants/Antiplatelets: no anticoagulation    Allergies:   Review of patient's allergies indicates:   Allergen Reactions    Abilify [aripiprazole] Other (See Comments)     Sleepy, could not function.     Sedation History:  no adverse reactions    Review of Systems:   Hematological: no known coagulopathies  Respiratory: no shortness of breath  Cardiovascular: no chest pain  Gastrointestinal: no abdominal pain  Genito-Urinary: no dysuria  Musculoskeletal: negative  Neurological: no TIA or stroke symptoms         OBJECTIVE:     Vital Signs (Most  Recent)  Pulse: (!) 51 (01/10/20 1210)  Resp: 20 (01/10/20 1210)  BP: (!) 143/85 (01/10/20 1210)  SpO2: 98 % (01/10/20 1210)    Physical Exam:  ASA: 2  Mallampati: n/a    General: no acute distress  Mental Status: alert and oriented to person, place and time  HEENT: normocephalic, atraumatic  Chest: unlabored breathing  Heart: regular heart rate  Abdomen: distended  Extremity: moves all extremities    ASSESSMENT/PLAN:     Sedation Plan: local  Patient will undergo US guided paracentesis.    Radha Castellanos PA-C

## 2020-01-10 NOTE — PROCEDURES
Radiology Post-Procedure Note    Pre Op Diagnosis: Ascites  Post Op Diagnosis: Same    Procedure: Ultrasound Guided Paracentesis    Procedure performed by: Radha Castellanos PA-C    Written Informed Consent Obtained: Yes  Specimen Removed: YES clear yellow  Estimated Blood Loss: Minimal    Findings:   Successful paracentesis.  Albumin administered PRN per protocol.    Patient tolerated procedure well.    Radha Castellanos PA-C

## 2020-01-10 NOTE — DISCHARGE INSTRUCTIONS
For scheduling: Call Mackenzie at [phone]    For questions or concerns call: [facility] 8 AM- 5PM [phone]. Radiology resident on call [phone].    For immediate concerns that are not emergent, you may call our radiology clinic at: [phone]

## 2020-01-10 NOTE — PROGRESS NOTES
Pt arrived to room  MPU for paracentesis . Pt awake, alert, and oriented. Awaiting consent. Allergies reviewed, patient ID'd using 2 identifiers.

## 2020-01-10 NOTE — PROGRESS NOTES
Procedure completed without difficulty.  4500cc peritoneal fluid removed.  Left via w/c to waiting area to family.

## 2020-01-16 ENCOUNTER — OFFICE VISIT (OUTPATIENT)
Dept: INTERNAL MEDICINE | Facility: CLINIC | Age: 80
End: 2020-01-16
Payer: MEDICARE

## 2020-01-16 VITALS — SYSTOLIC BLOOD PRESSURE: 108 MMHG | HEART RATE: 50 BPM | DIASTOLIC BLOOD PRESSURE: 64 MMHG

## 2020-01-16 DIAGNOSIS — G45.9 TIA (TRANSIENT ISCHEMIC ATTACK): ICD-10-CM

## 2020-01-16 DIAGNOSIS — Z85.048 HISTORY OF RECTAL CANCER: ICD-10-CM

## 2020-01-16 DIAGNOSIS — K74.60 LIVER CIRRHOSIS SECONDARY TO NASH (NONALCOHOLIC STEATOHEPATITIS): ICD-10-CM

## 2020-01-16 DIAGNOSIS — G89.29 CHRONIC MIDLINE LOW BACK PAIN WITHOUT SCIATICA: ICD-10-CM

## 2020-01-16 DIAGNOSIS — M54.50 CHRONIC MIDLINE LOW BACK PAIN WITHOUT SCIATICA: ICD-10-CM

## 2020-01-16 DIAGNOSIS — R00.1 SINUS BRADYCARDIA: ICD-10-CM

## 2020-01-16 DIAGNOSIS — Z85.828 HISTORY OF SCC (SQUAMOUS CELL CARCINOMA) OF SKIN: ICD-10-CM

## 2020-01-16 DIAGNOSIS — N18.30 CKD (CHRONIC KIDNEY DISEASE) STAGE 3, GFR 30-59 ML/MIN: ICD-10-CM

## 2020-01-16 DIAGNOSIS — N18.30 TYPE 2 DIABETES MELLITUS WITH STAGE 3 CHRONIC KIDNEY DISEASE, WITHOUT LONG-TERM CURRENT USE OF INSULIN: Primary | ICD-10-CM

## 2020-01-16 DIAGNOSIS — E11.22 TYPE 2 DIABETES MELLITUS WITH STAGE 3 CHRONIC KIDNEY DISEASE, WITHOUT LONG-TERM CURRENT USE OF INSULIN: Primary | ICD-10-CM

## 2020-01-16 DIAGNOSIS — F31.9 BIPOLAR 1 DISORDER: ICD-10-CM

## 2020-01-16 DIAGNOSIS — Z12.5 PROSTATE CANCER SCREENING: ICD-10-CM

## 2020-01-16 DIAGNOSIS — K75.81 LIVER CIRRHOSIS SECONDARY TO NASH (NONALCOHOLIC STEATOHEPATITIS): ICD-10-CM

## 2020-01-16 DIAGNOSIS — R41.3 MEMORY LOSS: ICD-10-CM

## 2020-01-16 DIAGNOSIS — D61.818 PANCYTOPENIA: ICD-10-CM

## 2020-01-16 PROCEDURE — 99213 OFFICE O/P EST LOW 20 MIN: CPT | Mod: PBBFAC | Performed by: INTERNAL MEDICINE

## 2020-01-16 PROCEDURE — 1159F MED LIST DOCD IN RCRD: CPT | Mod: ,,, | Performed by: INTERNAL MEDICINE

## 2020-01-16 PROCEDURE — 1159F PR MEDICATION LIST DOCUMENTED IN MEDICAL RECORD: ICD-10-PCS | Mod: ,,, | Performed by: INTERNAL MEDICINE

## 2020-01-16 PROCEDURE — 99999 PR PBB SHADOW E&M-EST. PATIENT-LVL III: CPT | Mod: PBBFAC,,, | Performed by: INTERNAL MEDICINE

## 2020-01-16 PROCEDURE — 99999 PR PBB SHADOW E&M-EST. PATIENT-LVL III: ICD-10-PCS | Mod: PBBFAC,,, | Performed by: INTERNAL MEDICINE

## 2020-01-16 PROCEDURE — 99214 OFFICE O/P EST MOD 30 MIN: CPT | Mod: S$PBB,,, | Performed by: INTERNAL MEDICINE

## 2020-01-16 PROCEDURE — 99214 PR OFFICE/OUTPT VISIT, EST, LEVL IV, 30-39 MIN: ICD-10-PCS | Mod: S$PBB,,, | Performed by: INTERNAL MEDICINE

## 2020-01-16 PROCEDURE — 82043 UR ALBUMIN QUANTITATIVE: CPT

## 2020-01-16 PROCEDURE — 1126F PR PAIN SEVERITY QUANTIFIED, NO PAIN PRESENT: ICD-10-PCS | Mod: ,,, | Performed by: INTERNAL MEDICINE

## 2020-01-16 PROCEDURE — 1126F AMNT PAIN NOTED NONE PRSNT: CPT | Mod: ,,, | Performed by: INTERNAL MEDICINE

## 2020-01-16 NOTE — PROGRESS NOTES
Internal Medicine    Subjective:      Patient ID: Kenneth Sheikh is a 79 y.o. male.    Chief Complaint: Follow-up    HPI:  Patient presents for follow up appointment.  The patient's last visit with me was on 6/21/2019.    DM-2:  Last HgA1c 7.3 on 6/14/19.  Off metformin due to elevated lactate and diarrhea.  Taking Januvia 50mg daily.  FSG running 's.      H/o CKD, stage 3:  Cr 1.3 and GFR 51 on 11/29/19.     Sinus bradycardia (HR 50-60's):  Chronic and asymptomatic.    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  Losartan stopped due to BELLA.  Followed by Dr. Womack.  Taking Rifaximin twice daily.  Getting paracentesis weekly.  Taking Cipro once a week for SBP prophylaxis.       H/o Rectal cancer s/p resection 06/2016:  Last seen by colorectal surgery (Dr. Talavera) on 3/5/18 - no evidence of recurrent neoplasia - follow up in 1 year.  Overdue for follow up.     Pancytopenia:  Has been seen by [facility] (2/21/17).  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      HLD:  No longer on medication.     TIAs:  No symptoms recently.  No longer taking ASA.     Bipolar d/o:  Taking Depakote 500mg qHS.  Last Depakote level 51.1 on 6/14/19.  Denies depression or dany.      Short term memory loss:  Patient and wife not interested in seeing Neurology or Neuropsychology.     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 Beach.     Neck pain, bilateral knee pain:  Stable.  Avoiding NSAIDs due to cirrhosis.  Was told he could occasionally take Tylenol but not taking regularly.     S/p left cataract, planning to get right eye cataract removal by Dr. Sharpe on [facility].     Diabetic retinopathy:  Saw Dr. Cortez about 2 years ago.  Due for follow up.    Requesting records from Dr. Sharpe.      Review of Systems   Constitutional: Negative for appetite change, chills, fatigue, fever and unexpected weight  change.   HENT: Negative for sore throat and trouble swallowing.    Eyes: Negative for visual disturbance.   Respiratory: Negative for cough, chest tightness, shortness of breath and wheezing.    Cardiovascular: Positive for leg swelling (Chronic). Negative for chest pain and palpitations.   Gastrointestinal: Positive for abdominal distention (Chronic, getting paracentesis tomorrow). Negative for abdominal pain, blood in stool, constipation, diarrhea, nausea and vomiting.   Genitourinary: Negative for difficulty urinating.   Musculoskeletal: Negative for arthralgias and myalgias.   Skin: Negative for rash and wound.   Neurological: Negative for dizziness, weakness, numbness and headaches.   Psychiatric/Behavioral: Negative for behavioral problems and confusion.       Past medical history, surgical history, and family medical history reviewed and updated as appropriate.    Medications and allergies reviewed.     Objective:     Vitals:    01/16/20 1316   BP: 108/64   BP Location: Right arm   Patient Position: Sitting   BP Method: Medium (Manual)   Pulse: (!) 50     Physical Exam   Constitutional: He is oriented to person, place, and time. He appears well-developed and well-nourished. No distress.   HENT:   Head: Normocephalic and atraumatic.   Eyes: Conjunctivae and EOM are normal. No scleral icterus.   Cardiovascular: Normal rate and regular rhythm. Exam reveals no gallop and no friction rub.   No murmur heard.  Pulmonary/Chest: Effort normal and breath sounds normal. No respiratory distress. He has no wheezes. He has no rales.   Abdominal: Soft. He exhibits distension. There is no tenderness. There is no guarding.   Musculoskeletal: He exhibits edema. He exhibits no tenderness.   Neurological: He is alert and oriented to person, place, and time.   Skin: No rash noted. No erythema.   Psychiatric: He has a normal mood and affect. His behavior is normal.     Foot exam:  Protective Sensation (w/ 10 gram  monofilament):  Right: Intact  Left: Intact    Visual Inspection:  Normal -  Bilateral    Pedal Pulses:   Right: Diminshed  Left: Diminshed    Posterior tibialis:   Right:Diminshed  Left: Diminshed      RESULTS:   Lab Results   Component Value Date    WBC 2.66 (L) 11/29/2019    HGB 10.3 (L) 11/29/2019    HCT 33.3 (L) 11/29/2019    MCV 90 11/29/2019     (L) 11/29/2019     BMP  Lab Results   Component Value Date     11/29/2019    K 3.9 11/29/2019     11/29/2019    CO2 28 11/29/2019    BUN 18 11/29/2019    CREATININE 1.3 11/29/2019    CALCIUM 8.6 (L) 11/29/2019    ANIONGAP 7 (L) 11/29/2019    ESTGFRAFRICA 59.2 (A) 11/29/2019    EGFRNONAA 51.2 (A) 11/29/2019     Lab Results   Component Value Date    ALT 10 11/29/2019    AST 22 11/29/2019    ALKPHOS 105 11/29/2019    BILITOT 0.6 11/29/2019     Lab Results   Component Value Date    TSH 1.179 02/02/2018     Lab Results   Component Value Date    HGBA1C 7.3 (H) 06/14/2019         Assessment:     1. Type 2 diabetes mellitus with stage 3 chronic kidney disease, without long-term current use of insulin    2. CKD (chronic kidney disease) stage 3, GFR 30-59 ml/min    3. 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 Pancytopenia    5. Memory loss    6. Bipolar 1 disorder    7. TIA (transient ischemic attack)    8. Sinus bradycardia    9. History of rectal cancer    10. History of SCC (squamous cell carcinoma) of skin    11. Chronic midline low back pain without sciatica    12. Prostate cancer screening        Plan:     *Continue medication/plan as discussed in HPI except for changes discussed below.    Kenneth was seen today for follow-up.    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)  -     Comprehensive metabolic panel; Future; Expected date: 01/16/2020  -     Ammonia; Future; Expected date: 01/16/2020    Pancytopenia  -     CBC auto differential; Future; Expected date: 01/16/2020    Memory loss  -     TSH; Future; Expected date: 01/16/2020    Bipolar 1 disorder  -     VALPROIC ACID; Future; Expected date: 01/16/2020  -     Ammonia; Future; Expected date: 01/16/2020    TIA (transient ischemic attack)    Sinus bradycardia    History of rectal cancer    History of SCC (squamous cell carcinoma) of skin    Chronic midline low back pain without sciatica    Prostate cancer screening  -     PSA, Screening; Future; Expected date: 01/16/2020    Health maintenance reviewed with patient.     Follow up in about 6 months (around 7/16/2020).    Prisca Espinoza MD  Internal Medicine  [facility]

## 2020-01-16 NOTE — Clinical Note
I saw this patient in Primary Care this week.  I see he is overdue for follow up with Dr. Talavera.  Could you reach out to him to schedule?  Thank you!Prisca Espinoza

## 2020-01-17 ENCOUNTER — TELEPHONE (OUTPATIENT)
Dept: INTERNAL MEDICINE | Facility: CLINIC | Age: 80
End: 2020-01-17

## 2020-01-17 ENCOUNTER — HOSPITAL ENCOUNTER (OUTPATIENT)
Dept: INTERVENTIONAL RADIOLOGY/VASCULAR | Facility: HOSPITAL | Age: 80
Discharge: HOME | End: 2020-01-17
Attending: INTERNAL MEDICINE
Payer: MEDICARE

## 2020-01-17 VITALS
DIASTOLIC BLOOD PRESSURE: 61 MMHG | OXYGEN SATURATION: 99 % | SYSTOLIC BLOOD PRESSURE: 126 MMHG | HEART RATE: 60 BPM | RESPIRATION RATE: 18 BRPM

## 2020-01-17 DIAGNOSIS — K70.31 ASCITES DUE TO ALCOHOLIC CIRRHOSIS: ICD-10-CM

## 2020-01-17 LAB
ALBUMIN/CREAT UR: NORMAL UG/MG (ref 0–30)
APPEARANCE FLD: NORMAL
BODY FLD TYPE: NORMAL
COLOR FLD: YELLOW
CREAT UR-MCNC: 83 MG/DL (ref 23–375)
LYMPHOCYTES NFR FLD MANUAL: 90 %
MICROALBUMIN UR DL<=1MG/L-MCNC: <2.5 UG/ML
MONOS+MACROS NFR FLD MANUAL: 10 %
WBC # FLD: 6 /CU MM

## 2020-01-17 PROCEDURE — 49083 ABD PARACENTESIS W/IMAGING: CPT

## 2020-01-17 PROCEDURE — 89051 BODY FLUID CELL COUNT: CPT

## 2020-01-17 PROCEDURE — 49083 ABD PARACENTESIS W/IMAGING: CPT | Mod: GC,,, | Performed by: FAMILY MEDICINE

## 2020-01-17 PROCEDURE — 49083 IR PARACENTESIS NO IMAGING: ICD-10-PCS | Mod: GC,,, | Performed by: FAMILY MEDICINE

## 2020-01-17 NOTE — TELEPHONE ENCOUNTER
----- Message from Piper Terry MA sent at 1/17/2020  2:45 PM CST -----  Contact: Ms. Christiansen @ [facility] # [phone] ext# [phone]      ----- Message -----  From: Kayleigh Vela  Sent: 1/17/2020  11:12 AM CST  To: Alexis Cope Clinical Staff    Ms. Christiansen would like a call back in regards to her wanting to know if the patient is taking insulin?

## 2020-01-17 NOTE — PROCEDURES
Radiology Post-Procedure Note    Pre Op Diagnosis: Ascites  Post Op Diagnosis: Same    Procedure: Ultrasound Guided Paracentesis    Procedure performed by: Souleymane ROSS, Елена     Written Informed Consent Obtained: Yes  Specimen Removed: YES cloudy yellow  Estimated Blood Loss: Minimal    Findings:   Successful paracentesis.  Albumin administered PRN per protocol.    Patient tolerated procedure well.    Елена Comer, APRN, FNP  Interventional Radiology  [phone] clinic

## 2020-01-17 NOTE — PROGRESS NOTES
Paracentesis complete. 4.4Ls peritoneal fluid drained. Pt tolerated well. Dressing to LLQ clean, dry, and intact. Lab specimens ordered, collected, and placed in bin for lab . Discharge instructions and handouts provided. Pt verbalized understanding. Pt transported to [facility] via wheelchair.

## 2020-01-17 NOTE — H&P
Radiology History & Physical      SUBJECTIVE:     Chief Complaint: abdominal distention    History of Present Illness:  Kenneth Sheikh is a 79 y.o. male who presents for ultrasound guided paracentesis  Past Medical History:   Diagnosis Date    Anticoagulant long-term use     Basal cell carcinoma     Bipolar 1 disorder     Bipolar 1 disorder 11/24/2016    bipolar    Cancer     history of skin cancer/sinus cancer & rectal cancer    Depressed bipolar affective disorder     Diabetes mellitus     type 2    EBV infection 12/7/2016    EBV DNA, PCR Latest Ref Range: None detected  None detected EBV DNA-Copies/mL Unknown Test Not Performed EBV Early Antigen Ab, IgG Latest Ref Range: <1:10 Titer 1:40 (A) EBV Nuclear Ag Ab Latest Ref Range: <1:5 Titer >=1:80 (A) EBV VCA IgG Latest Ref Range: <1:10 Titer 1:160 (A) EBV VCA IgM Latest Ref Range: <1:10 Titer <1:10     History of TIA (transient ischemic attack)     several-taking Plavix    Hx of gallstones     Wife denies    Hypertension     Hyperthyroidism 11/30/2016    Low HDL (under 40) 12/7/2016    Mixed hyperlipidemia 11/23/2016    JUAN MANUEL (obstructive sleep apnea)     Pneumonia     Skin disease     Squamous cell carcinoma 08/2018    right temple    Stroke     Transient cerebral ischemia 7/22/2016    Type 2 diabetes mellitus      Past Surgical History:   Procedure Laterality Date    Moh's procedure x4      rectal cancer      Sinus surgery for sinus cancer      sinus sx for cancer      skin cancer removed-several times-nose & ear      TONSILLECTOMY      Undescened testicle sx      UVULOPALATOPHARYNGOPLASTY      for sleep apnea       Home Meds:   Prior to Admission medications    Medication Sig Start Date End Date Taking? Authorizing Provider   ACCU-CHEK NEYDA PLUS TEST STRP Strp CHECK BLOOD SUGAR ONCE DAILY 12/27/19   Aziza Ogden MD   ciprofloxacin HCl (CIPRO) 500 MG tablet TAKE 1 TABLET(500 MG) BY MOUTH EVERY MONDAY 10/20/19   Renato Womack MD    divalproex (DEPAKOTE) 250 MG EC tablet TAKE 2 TABLETS BY MOUTH EVERY EVENING 10/28/19   Prisca Espinoza MD   furosemide (LASIX) 20 MG tablet TAKE 2 TABLETS BY MOUTH EVERY DAY 5/2/19   Renato Womack MD   lactulose (CHRONULAC) 10 gram/15 mL solution TAKE 15 MLS BY MOUTH TWICE DAILY 10/27/19   Renato Womack MD   lancets Misc To check blood sugar once daily, to use with Accu-Chek meter. 11/5/18   Prisca Espinoza MD   SITagliptan (JANUVIA) 50 MG Tab Take 1 tablet (50 mg total) by mouth once daily. 4/6/17   Prisca Espinoza MD   XIFAXAN 550 mg Tab TAKE ONE TABLET BY MOUTH TWICE DAILY 11/25/19   Renato Womack MD     Anticoagulants/Antiplatelets: no anticoagulation    Allergies:   Review of patient's allergies indicates:   Allergen Reactions    Abilify [aripiprazole] Other (See Comments)     Sleepy, could not function.     Sedation History:  no adverse reactions    Review of Systems:   Hematological: no known coagulopathies  Respiratory: no shortness of breath  Cardiovascular: no chest pain  Gastrointestinal: no abdominal pain  Genito-Urinary: no dysuria  Musculoskeletal: negative  Neurological: no TIA or stroke symptoms         OBJECTIVE:     Vital Signs (Most Recent)       Physical Exam:  ASA: 2  Mallampati: n/a    General: no acute distress  Mental Status: alert and oriented to person, place and time  HEENT: normocephalic, atraumatic  Chest: unlabored breathing  Heart: regular heart rate  Abdomen: distended  Extremity: moves all extremities    ASSESSMENT/PLAN:     Sedation Plan: local  Patient will undergo ultrasound guided paracentesis.    MATT Mauricio, ESTEFANYP  Interventional Radiology  [phone] [facility]

## 2020-01-18 DIAGNOSIS — R18.8 ASCITES OF LIVER: ICD-10-CM

## 2020-01-19 RX ORDER — CIPROFLOXACIN 500 MG/1
TABLET ORAL
Qty: 12 TABLET | Refills: 0 | Status: SHIPPED | OUTPATIENT
Start: 2020-01-19 | End: 2020-01-23

## 2020-01-20 ENCOUNTER — TELEPHONE (OUTPATIENT)
Dept: SURGERY | Facility: CLINIC | Age: 80
End: 2020-01-20

## 2020-01-20 NOTE — TELEPHONE ENCOUNTER
----- Message from Prisca Espinoza MD sent at 1/19/2020  2:28 PM CST -----  I saw this patient in Primary Care this week.  I see he is overdue for follow up with Dr. Talavera.  Could you reach out to him to schedule?      Thank you!  Prisca Espinoza

## 2020-01-22 DIAGNOSIS — R18.8 ASCITES OF LIVER: ICD-10-CM

## 2020-01-23 RX ORDER — CIPROFLOXACIN 500 MG/1
TABLET ORAL
Qty: 12 TABLET | Refills: 0 | Status: SHIPPED | OUTPATIENT
Start: 2020-01-23 | End: 2020-07-04

## 2020-01-24 ENCOUNTER — HOSPITAL ENCOUNTER (OUTPATIENT)
Dept: INTERVENTIONAL RADIOLOGY/VASCULAR | Facility: HOSPITAL | Age: 80
Discharge: HOME | End: 2020-01-24
Attending: INTERNAL MEDICINE
Payer: MEDICARE

## 2020-01-24 VITALS
HEART RATE: 54 BPM | DIASTOLIC BLOOD PRESSURE: 59 MMHG | OXYGEN SATURATION: 98 % | SYSTOLIC BLOOD PRESSURE: 124 MMHG | RESPIRATION RATE: 12 BRPM

## 2020-01-24 DIAGNOSIS — K74.60 CIRRHOSIS OF LIVER WITH ASCITES, UNSPECIFIED HEPATIC CIRRHOSIS TYPE: ICD-10-CM

## 2020-01-24 DIAGNOSIS — R18.8 CIRRHOSIS OF LIVER WITH ASCITES, UNSPECIFIED HEPATIC CIRRHOSIS TYPE: ICD-10-CM

## 2020-01-24 DIAGNOSIS — K70.31 ASCITES DUE TO ALCOHOLIC CIRRHOSIS: ICD-10-CM

## 2020-01-24 PROCEDURE — 49083 ABD PARACENTESIS W/IMAGING: CPT

## 2020-01-24 PROCEDURE — 49083 ABD PARACENTESIS W/IMAGING: CPT | Mod: ,,, | Performed by: FAMILY MEDICINE

## 2020-01-24 PROCEDURE — 49083 IR PARACENTESIS NO IMAGING: ICD-10-PCS | Mod: ,,, | Performed by: FAMILY MEDICINE

## 2020-01-24 NOTE — PROCEDURES
Radiology Post-Procedure Note    Pre Op Diagnosis: Ascites  Post Op Diagnosis: Same    Procedure: Ultrasound Guided Paracentesis    Procedure performed by: Souleymane ROSS, Елена     Written Informed Consent Obtained: Yes  Specimen Removed: YES cloudy yellow  Estimated Blood Loss: Minimal    Findings:   Successful paracentesis.  Albumin administered PRN per protocol.    Patient tolerated procedure well.    Елена Comer, APRN, FNP  Interventional Radiology  [phone] clinic

## 2020-01-24 NOTE — NURSING
Paracentesis complete, 4000 MLs removed. Specimen sent to lab. NO Albumin administered per protocol. Dressing applied to abdominal puncture site, dressing clean dry and intact. Pt given discharge instructions and handouts, pt verbalizes understanding. Questions answered. Pt denies pain and discomfort. Pt refusing transport. Pt ambulated to [facility] for transport home with family. Gait steady.

## 2020-01-24 NOTE — DISCHARGE INSTRUCTIONS
Please call with any questions or concerns.      Monday thru Friday 8:00 am - 4:30 pm    Interventional Radiology   [phone]    After Hours    Ask for the Radiology Intern on call  [phone]

## 2020-01-24 NOTE — H&P
Radiology History & Physical      SUBJECTIVE:     Chief Complaint: abdominal distention    History of Present Illness:  Kenneth Sheikh is a 79 y.o. male who presents for ultrasound guided paracentesis  Past Medical History:   Diagnosis Date    Anticoagulant long-term use     Basal cell carcinoma     Bipolar 1 disorder     Bipolar 1 disorder 11/24/2016    bipolar    Cancer     history of skin cancer/sinus cancer & rectal cancer    Depressed bipolar affective disorder     Diabetes mellitus     type 2    EBV infection 12/7/2016    EBV DNA, PCR Latest Ref Range: None detected  None detected EBV DNA-Copies/mL Unknown Test Not Performed EBV Early Antigen Ab, IgG Latest Ref Range: <1:10 Titer 1:40 (A) EBV Nuclear Ag Ab Latest Ref Range: <1:5 Titer >=1:80 (A) EBV VCA IgG Latest Ref Range: <1:10 Titer 1:160 (A) EBV VCA IgM Latest Ref Range: <1:10 Titer <1:10     History of TIA (transient ischemic attack)     several-taking Plavix    Hx of gallstones     Wife denies    Hypertension     Hyperthyroidism 11/30/2016    Low HDL (under 40) 12/7/2016    Mixed hyperlipidemia 11/23/2016    JUAN MANUEL (obstructive sleep apnea)     Pneumonia     Skin disease     Squamous cell carcinoma 08/2018    right temple    Stroke     Transient cerebral ischemia 7/22/2016    Type 2 diabetes mellitus      Past Surgical History:   Procedure Laterality Date    Moh's procedure x4      rectal cancer      Sinus surgery for sinus cancer      sinus sx for cancer      skin cancer removed-several times-nose & ear      TONSILLECTOMY      Undescened testicle sx      UVULOPALATOPHARYNGOPLASTY      for sleep apnea       Home Meds:   Prior to Admission medications    Medication Sig Start Date End Date Taking? Authorizing Provider   ACCU-CHEK NEYDA PLUS TEST STRP Strp CHECK BLOOD SUGAR ONCE DAILY 12/27/19   Aziza Ogden MD   ciprofloxacin HCl (CIPRO) 500 MG tablet TAKE 1 TABLET(500 MG) BY MOUTH EVERY MONDAY 1/23/20   Renato Womack MD    divalproex (DEPAKOTE) 250 MG EC tablet TAKE 2 TABLETS BY MOUTH EVERY EVENING 10/28/19   Prisca Espinoza MD   furosemide (LASIX) 20 MG tablet TAKE 2 TABLETS BY MOUTH EVERY DAY 5/2/19   Renato Womack MD   lactulose (CHRONULAC) 10 gram/15 mL solution TAKE 15 MLS BY MOUTH TWICE DAILY 10/27/19   Renato Womack MD   lancets Misc To check blood sugar once daily, to use with Accu-Chek meter. 11/5/18   Prisca Espinoza MD   SITagliptan (JANUVIA) 50 MG Tab Take 1 tablet (50 mg total) by mouth once daily. 4/6/17   Prisca Espinoza MD   XIFAXAN 550 mg Tab TAKE ONE TABLET BY MOUTH TWICE DAILY 11/25/19   Renato Womack MD     Anticoagulants/Antiplatelets: no anticoagulation    Allergies:   Review of patient's allergies indicates:   Allergen Reactions    Abilify [aripiprazole] Other (See Comments)     Sleepy, could not function.     Sedation History:  no adverse reactions    Review of Systems:   Hematological: no known coagulopathies  Respiratory: no shortness of breath  Cardiovascular: no chest pain  Gastrointestinal: no abdominal pain  Genito-Urinary: no dysuria  Musculoskeletal: negative  Neurological: no TIA or stroke symptoms         OBJECTIVE:     Vital Signs (Most Recent)       Physical Exam:  ASA: 2  Mallampati: n/a    General: no acute distress  Mental Status: alert and oriented to person, place and time  HEENT: normocephalic, atraumatic  Chest: unlabored breathing  Heart: regular heart rate  Abdomen: distended  Extremity: moves all extremities    ASSESSMENT/PLAN:     Sedation Plan: local  Patient will undergo ultrasound guided paracentesis.    MATT Mauricio, ESTEFANYP  Interventional Radiology  [phone] [facility]

## 2020-01-26 RX ORDER — LACTULOSE 10 G/15ML
SOLUTION ORAL; RECTAL
Qty: 946 ML | Refills: 6 | Status: SHIPPED | OUTPATIENT
Start: 2020-01-26 | End: 2020-04-06

## 2020-01-28 ENCOUNTER — LAB VISIT (OUTPATIENT)
Dept: LAB | Facility: HOSPITAL | Age: 80
End: 2020-01-28
Attending: INTERNAL MEDICINE
Payer: MEDICARE

## 2020-01-28 DIAGNOSIS — F31.9 BIPOLAR 1 DISORDER: ICD-10-CM

## 2020-01-28 DIAGNOSIS — E11.22 TYPE 2 DIABETES MELLITUS WITH STAGE 3 CHRONIC KIDNEY DISEASE, WITHOUT LONG-TERM CURRENT USE OF INSULIN: ICD-10-CM

## 2020-01-28 DIAGNOSIS — N18.30 CKD (CHRONIC KIDNEY DISEASE) STAGE 3, GFR 30-59 ML/MIN: ICD-10-CM

## 2020-01-28 DIAGNOSIS — K75.81 LIVER CIRRHOSIS SECONDARY TO NASH (NONALCOHOLIC STEATOHEPATITIS): ICD-10-CM

## 2020-01-28 DIAGNOSIS — D61.818 PANCYTOPENIA: ICD-10-CM

## 2020-01-28 DIAGNOSIS — Z12.5 PROSTATE CANCER SCREENING: ICD-10-CM

## 2020-01-28 DIAGNOSIS — K74.60 LIVER CIRRHOSIS SECONDARY TO NASH (NONALCOHOLIC STEATOHEPATITIS): ICD-10-CM

## 2020-01-28 DIAGNOSIS — N18.30 TYPE 2 DIABETES MELLITUS WITH STAGE 3 CHRONIC KIDNEY DISEASE, WITHOUT LONG-TERM CURRENT USE OF INSULIN: ICD-10-CM

## 2020-01-28 DIAGNOSIS — R41.3 MEMORY LOSS: ICD-10-CM

## 2020-01-28 LAB
ALBUMIN SERPL BCP-MCNC: 2.1 G/DL (ref 3.5–5.2)
ALP SERPL-CCNC: 118 U/L (ref 55–135)
ALT SERPL W/O P-5'-P-CCNC: 10 U/L (ref 10–44)
AMMONIA PLAS-SCNC: 33 UMOL/L (ref 10–50)
ANION GAP SERPL CALC-SCNC: 7 MMOL/L (ref 8–16)
AST SERPL-CCNC: 23 U/L (ref 10–40)
BASOPHILS # BLD AUTO: 0.02 K/UL (ref 0–0.2)
BASOPHILS NFR BLD: 0.5 % (ref 0–1.9)
BILIRUB SERPL-MCNC: 0.6 MG/DL (ref 0.1–1)
BUN SERPL-MCNC: 18 MG/DL (ref 8–23)
CALCIUM SERPL-MCNC: 8.2 MG/DL (ref 8.7–10.5)
CHLORIDE SERPL-SCNC: 105 MMOL/L (ref 95–110)
CHOLEST SERPL-MCNC: 119 MG/DL (ref 120–199)
CHOLEST/HDLC SERPL: 3.1 {RATIO} (ref 2–5)
CO2 SERPL-SCNC: 27 MMOL/L (ref 23–29)
COMPLEXED PSA SERPL-MCNC: 0.48 NG/ML (ref 0–4)
CREAT SERPL-MCNC: 1.2 MG/DL (ref 0.5–1.4)
DIFFERENTIAL METHOD: ABNORMAL
EOSINOPHIL # BLD AUTO: 0.1 K/UL (ref 0–0.5)
EOSINOPHIL NFR BLD: 1.8 % (ref 0–8)
ERYTHROCYTE [DISTWIDTH] IN BLOOD BY AUTOMATED COUNT: 15 % (ref 11.5–14.5)
EST. GFR  (AFRICAN AMERICAN): >60 ML/MIN/1.73 M^2
EST. GFR  (NON AFRICAN AMERICAN): 56.4 ML/MIN/1.73 M^2
ESTIMATED AVG GLUCOSE: 148 MG/DL (ref 68–131)
GLUCOSE SERPL-MCNC: 114 MG/DL (ref 70–110)
HBA1C MFR BLD HPLC: 6.8 % (ref 4–5.6)
HCT VFR BLD AUTO: 35.1 % (ref 40–54)
HDLC SERPL-MCNC: 38 MG/DL (ref 40–75)
HDLC SERPL: 31.9 % (ref 20–50)
HGB BLD-MCNC: 10.8 G/DL (ref 14–18)
IMM GRANULOCYTES # BLD AUTO: 0.01 K/UL (ref 0–0.04)
IMM GRANULOCYTES NFR BLD AUTO: 0.3 % (ref 0–0.5)
LDLC SERPL CALC-MCNC: 62.6 MG/DL (ref 63–159)
LYMPHOCYTES # BLD AUTO: 0.9 K/UL (ref 1–4.8)
LYMPHOCYTES NFR BLD: 23.6 % (ref 18–48)
MCH RBC QN AUTO: 28.3 PG (ref 27–31)
MCHC RBC AUTO-ENTMCNC: 30.8 G/DL (ref 32–36)
MCV RBC AUTO: 92 FL (ref 82–98)
MONOCYTES # BLD AUTO: 0.8 K/UL (ref 0.3–1)
MONOCYTES NFR BLD: 20.8 % (ref 4–15)
NEUTROPHILS # BLD AUTO: 2 K/UL (ref 1.8–7.7)
NEUTROPHILS NFR BLD: 53 % (ref 38–73)
NONHDLC SERPL-MCNC: 81 MG/DL
NRBC BLD-RTO: 0 /100 WBC
PLATELET # BLD AUTO: 119 K/UL (ref 150–350)
PMV BLD AUTO: 10.4 FL (ref 9.2–12.9)
POTASSIUM SERPL-SCNC: 3.3 MMOL/L (ref 3.5–5.1)
PROT SERPL-MCNC: 7.1 G/DL (ref 6–8.4)
RBC # BLD AUTO: 3.81 M/UL (ref 4.6–6.2)
SODIUM SERPL-SCNC: 139 MMOL/L (ref 136–145)
TRIGL SERPL-MCNC: 92 MG/DL (ref 30–150)
TSH SERPL DL<=0.005 MIU/L-ACNC: 0.94 UIU/ML (ref 0.4–4)
VALPROATE SERPL-MCNC: 54.6 UG/ML (ref 50–100)
WBC # BLD AUTO: 3.85 K/UL (ref 3.9–12.7)

## 2020-01-28 PROCEDURE — 82140 ASSAY OF AMMONIA: CPT

## 2020-01-28 PROCEDURE — 80061 LIPID PANEL: CPT

## 2020-01-28 PROCEDURE — 84153 ASSAY OF PSA TOTAL: CPT

## 2020-01-28 PROCEDURE — 85025 COMPLETE CBC W/AUTO DIFF WBC: CPT

## 2020-01-28 PROCEDURE — 83036 HEMOGLOBIN GLYCOSYLATED A1C: CPT

## 2020-01-28 PROCEDURE — 36415 COLL VENOUS BLD VENIPUNCTURE: CPT

## 2020-01-28 PROCEDURE — 84443 ASSAY THYROID STIM HORMONE: CPT

## 2020-01-28 PROCEDURE — 80164 ASSAY DIPROPYLACETIC ACD TOT: CPT

## 2020-01-28 PROCEDURE — 80053 COMPREHEN METABOLIC PANEL: CPT

## 2020-01-29 ENCOUNTER — TELEPHONE (OUTPATIENT)
Dept: INTERNAL MEDICINE | Facility: CLINIC | Age: 80
End: 2020-01-29

## 2020-01-29 NOTE — TELEPHONE ENCOUNTER
----- Message from Prisca Espinoza MD sent at 1/29/2020  3:16 PM CST -----  Please call patient (and wife) and let them know that recent labs were stable.  Potassium was slightly low, so work on diet higher in potassium.  We can mail them a high potassium diet sheet if they are interested.  
Spoke to pt wife. She verbalized understanding.    
Yes

## 2020-01-30 ENCOUNTER — TELEPHONE (OUTPATIENT)
Dept: INTERNAL MEDICINE | Facility: CLINIC | Age: 80
End: 2020-01-30

## 2020-01-30 NOTE — TELEPHONE ENCOUNTER
----- Message from Prisca Espinoza MD sent at 1/29/2020  3:16 PM CST -----  Please call patient (and wife) and let them know that recent labs were stable.  Potassium was slightly low, so work on diet higher in potassium.  We can mail them a high potassium diet sheet if they are interested.

## 2020-01-31 ENCOUNTER — HOSPITAL ENCOUNTER (OUTPATIENT)
Dept: INTERVENTIONAL RADIOLOGY/VASCULAR | Facility: HOSPITAL | Age: 80
Discharge: HOME | End: 2020-01-31
Attending: INTERNAL MEDICINE
Payer: MEDICARE

## 2020-01-31 VITALS
SYSTOLIC BLOOD PRESSURE: 136 MMHG | RESPIRATION RATE: 16 BRPM | DIASTOLIC BLOOD PRESSURE: 89 MMHG | OXYGEN SATURATION: 98 % | HEART RATE: 57 BPM

## 2020-01-31 DIAGNOSIS — K70.31 ASCITES DUE TO ALCOHOLIC CIRRHOSIS: ICD-10-CM

## 2020-01-31 LAB
APPEARANCE FLD: NORMAL
BASOPHILS NFR FLD MANUAL: 1 %
BODY FLD TYPE: NORMAL
COLOR FLD: YELLOW
LYMPHOCYTES NFR FLD MANUAL: 47 %
MONOS+MACROS NFR FLD MANUAL: 51 %
NEUTROPHILS NFR FLD MANUAL: 1 %
WBC # FLD: 22 /CU MM

## 2020-01-31 PROCEDURE — 49083 ABD PARACENTESIS W/IMAGING: CPT

## 2020-01-31 PROCEDURE — 49083 ABD PARACENTESIS W/IMAGING: CPT | Mod: ,,, | Performed by: RADIOLOGY

## 2020-01-31 PROCEDURE — 49083 IR PARACENTESIS NO IMAGING: ICD-10-PCS | Mod: ,,, | Performed by: RADIOLOGY

## 2020-01-31 PROCEDURE — 89051 BODY FLUID CELL COUNT: CPT

## 2020-01-31 NOTE — PROCEDURES
"Paracentesis  Date/Time: 20 12:49  Location procedure was performed: [facility] INTERVENTIONAL RADIOLOGY  Performed by: Raven Cabello NP  Authorized by: Raven Cabello NP   Pre-operative diagnosis: ascites  Post-operative diagnosis: ascites  Consent Done: Yes  Consent: Written consent obtained.  Consent given by: patient  Patient understanding: patient states understanding of the procedure being performed  Patient consent: the patient's understanding of the procedure matches consent given  Procedure consent: procedure consent matches procedure scheduled  Relevant documents: relevant documents present and verified  Test results: test results available and properly labeled  Site marked: the operative site was marked  Imaging studies: imaging studies available  Required items: required blood products, implants, devices, and special equipment available  Patient identity confirmed: , MRN, name and verbally with patient  Time out: Immediately prior to procedure a "time out" was called to verify the correct patient, procedure, equipment, support staff and site/side marked as required.  Initial or subsequent exam: subsequent  Procedure purpose: diagnostic/therapeutic  Indications: abdominal discomfort secondary to ascites  Anesthesia: local infiltration    Anesthesia:  Local Anesthetic: lidocaine 1% without epinephrine  Anesthetic total: 10 mL  Patient sedated: no  Preparation: Patient was prepped and draped in the usual sterile fashion.  Needle gauge: 19 gauge/5 Slovenian.  Ultrasound guided: yes  Puncture site: left lower quadrant  Fluid removed: 4900(ml)  Fluid appearance: slightly cloudy, yellow  Dressing: 4x4 sterile gauze  Technical procedures used: Yasmine catheter  Specimens: yes  Implants: No  Patient tolerance: Patient tolerated the procedure well with no immediate complications          Raven Cabello NP  Interventional Radiology    "

## 2020-01-31 NOTE — PLAN OF CARE
4900 cc removed from pt abd. Pt tolerated well. NAD noted or reported. Site bandaged, CDI. Pt escorted out via wc to waiting room. Labs collected and sent.

## 2020-01-31 NOTE — H&P
Radiology History & Physical      SUBJECTIVE:     Chief Complaint: ascites    History of Present Illness:  Kenneth Sheikh is a 80 y.o. male who presents for paracentesis  Past Medical History:   Diagnosis Date    Anticoagulant long-term use     Basal cell carcinoma     Bipolar 1 disorder     Bipolar 1 disorder 11/24/2016    bipolar    Cancer     history of skin cancer/sinus cancer & rectal cancer    Depressed bipolar affective disorder     Diabetes mellitus     type 2    EBV infection 12/7/2016    EBV DNA, PCR Latest Ref Range: None detected  None detected EBV DNA-Copies/mL Unknown Test Not Performed EBV Early Antigen Ab, IgG Latest Ref Range: <1:10 Titer 1:40 (A) EBV Nuclear Ag Ab Latest Ref Range: <1:5 Titer >=1:80 (A) EBV VCA IgG Latest Ref Range: <1:10 Titer 1:160 (A) EBV VCA IgM Latest Ref Range: <1:10 Titer <1:10     History of TIA (transient ischemic attack)     several-taking Plavix    Hx of gallstones     Wife denies    Hypertension     Hyperthyroidism 11/30/2016    Low HDL (under 40) 12/7/2016    Mixed hyperlipidemia 11/23/2016    JUAN MANUEL (obstructive sleep apnea)     Pneumonia     Skin disease     Squamous cell carcinoma 08/2018    right temple    Stroke     Transient cerebral ischemia 7/22/2016    Type 2 diabetes mellitus      Past Surgical History:   Procedure Laterality Date    Moh's procedure x4      rectal cancer      Sinus surgery for sinus cancer      sinus sx for cancer      skin cancer removed-several times-nose & ear      TONSILLECTOMY      Undescened testicle sx      UVULOPALATOPHARYNGOPLASTY      for sleep apnea       Home Meds:   Prior to Admission medications    Medication Sig Start Date End Date Taking? Authorizing Provider   ACCU-CHEK NEYDA PLUS TEST STRP Strp CHECK BLOOD SUGAR ONCE DAILY 12/27/19   Aziza Ogden MD   ciprofloxacin HCl (CIPRO) 500 MG tablet TAKE 1 TABLET(500 MG) BY MOUTH EVERY MONDAY 1/23/20   Renato Womack MD   divalproex (DEPAKOTE) 250 MG  EC tablet TAKE 2 TABLETS BY MOUTH EVERY EVENING 10/28/19   Prisca Espinoza MD   furosemide (LASIX) 20 MG tablet TAKE 2 TABLETS BY MOUTH EVERY DAY 5/2/19   Renato Womack MD   lactulose (CHRONULAC) 10 gram/15 mL solution TAKE 15 MLS BY MOUTH TWICE DAILY 1/26/20   Renato Womack MD   lancets Misc To check blood sugar once daily, to use with Accu-Chek meter. 11/5/18   Prisca Espinoza MD   SITagliptan (JANUVIA) 50 MG Tab Take 1 tablet (50 mg total) by mouth once daily. 4/6/17   Prisca Espinoza MD   XIFAXAN 550 mg Tab TAKE ONE TABLET BY MOUTH TWICE DAILY 11/25/19   Renato Womack MD     Anticoagulants/Antiplatelets: no anticoagulation    Allergies:   Review of patient's allergies indicates:   Allergen Reactions    Abilify [aripiprazole] Other (See Comments)     Sleepy, could not function.     Sedation History:  no adverse reactions    Review of Systems:   Hematological: no known coagulopathies  Respiratory: no shortness of breath  Cardiovascular: no chest pain  Gastrointestinal: no abdominal pain  Genito-Urinary: no dysuria  Musculoskeletal: negative  Neurological: no TIA or stroke symptoms         OBJECTIVE:     Vital Signs (Most Recent)       Physical Exam:  ASA: 3    General: no acute distress  Mental Status: alert and oriented to person, place and time  HEENT: normocephalic, atraumatic  Chest: unlabored breathing  Heart: regular heart rate  Abdomen: distended  Extremity: moves all extremities    Laboratory  Lab Results   Component Value Date    INR 1.1 11/29/2019       Lab Results   Component Value Date    WBC 3.85 (L) 01/28/2020    HGB 10.8 (L) 01/28/2020    HCT 35.1 (L) 01/28/2020    MCV 92 01/28/2020     (L) 01/28/2020      Lab Results   Component Value Date     (H) 01/28/2020     01/28/2020    K 3.3 (L) 01/28/2020     01/28/2020    CO2 27 01/28/2020    BUN 18 01/28/2020    CREATININE 1.2 01/28/2020    CALCIUM 8.2 (L) 01/28/2020    MG 1.7 05/15/2017    ALT 10  01/28/2020    AST 23 01/28/2020    ALBUMIN 2.1 (L) 01/28/2020    BILITOT 0.6 01/28/2020       ASSESSMENT/PLAN:     Sedation Plan: local anesthetic only  Patient will undergo ultrasound guided paracentesis.    Raven Cabello NP  Interventional Radiology

## 2020-02-06 ENCOUNTER — PATIENT OUTREACH (OUTPATIENT)
Dept: ADMINISTRATIVE | Facility: OTHER | Age: 80
End: 2020-02-06

## 2020-02-07 ENCOUNTER — HOSPITAL ENCOUNTER (OUTPATIENT)
Dept: INTERVENTIONAL RADIOLOGY/VASCULAR | Facility: HOSPITAL | Age: 80
Discharge: HOME | End: 2020-02-07
Attending: INTERNAL MEDICINE
Payer: MEDICARE

## 2020-02-07 ENCOUNTER — OFFICE VISIT (OUTPATIENT)
Dept: DERMATOLOGY | Facility: CLINIC | Age: 80
End: 2020-02-07
Payer: MEDICARE

## 2020-02-07 VITALS
RESPIRATION RATE: 18 BRPM | OXYGEN SATURATION: 100 % | SYSTOLIC BLOOD PRESSURE: 127 MMHG | HEART RATE: 51 BPM | DIASTOLIC BLOOD PRESSURE: 68 MMHG

## 2020-02-07 DIAGNOSIS — B07.8 OTHER VIRAL WARTS: ICD-10-CM

## 2020-02-07 DIAGNOSIS — L57.0 AK (ACTINIC KERATOSIS): Primary | ICD-10-CM

## 2020-02-07 DIAGNOSIS — D22.9 MULTIPLE BENIGN NEVI: ICD-10-CM

## 2020-02-07 DIAGNOSIS — L82.1 SK (SEBORRHEIC KERATOSIS): ICD-10-CM

## 2020-02-07 DIAGNOSIS — K70.31 ASCITES DUE TO ALCOHOLIC CIRRHOSIS: ICD-10-CM

## 2020-02-07 DIAGNOSIS — Z12.83 SKIN CANCER SCREENING: ICD-10-CM

## 2020-02-07 LAB
APPEARANCE FLD: NORMAL
BODY FLD TYPE: NORMAL
COLOR FLD: YELLOW
LYMPHOCYTES NFR FLD MANUAL: 57 %
MONOS+MACROS NFR FLD MANUAL: 41 %
NEUTROPHILS NFR FLD MANUAL: 2 %
WBC # FLD: 27 /CU MM

## 2020-02-07 PROCEDURE — 99213 PR OFFICE/OUTPT VISIT, EST, LEVL III, 20-29 MIN: ICD-10-PCS | Mod: 25,S$PBB,, | Performed by: DERMATOLOGY

## 2020-02-07 PROCEDURE — 49083 IR PARACENTESIS WITH IMAGING: ICD-10-PCS | Mod: GC,,, | Performed by: FAMILY MEDICINE

## 2020-02-07 PROCEDURE — 99213 OFFICE O/P EST LOW 20 MIN: CPT | Mod: PBBFAC,25 | Performed by: DERMATOLOGY

## 2020-02-07 PROCEDURE — 17110 PR DESTRUCTION BENIGN LESIONS UP TO 14: ICD-10-PCS | Mod: S$PBB,,, | Performed by: DERMATOLOGY

## 2020-02-07 PROCEDURE — 17110 DESTRUCTION B9 LES UP TO 14: CPT | Mod: S$PBB,,, | Performed by: DERMATOLOGY

## 2020-02-07 PROCEDURE — 17003 DESTRUCT PREMALG LES 2-14: CPT | Mod: S$PBB,59,, | Performed by: DERMATOLOGY

## 2020-02-07 PROCEDURE — 17000 DESTRUCT PREMALG LESION: CPT | Mod: 59,S$PBB,, | Performed by: DERMATOLOGY

## 2020-02-07 PROCEDURE — 17000 DESTRUCT PREMALG LESION: CPT | Mod: 59,PBBFAC | Performed by: DERMATOLOGY

## 2020-02-07 PROCEDURE — 17110 DESTRUCTION B9 LES UP TO 14: CPT | Mod: PBBFAC | Performed by: DERMATOLOGY

## 2020-02-07 PROCEDURE — 49083 ABD PARACENTESIS W/IMAGING: CPT | Mod: GC,,, | Performed by: FAMILY MEDICINE

## 2020-02-07 PROCEDURE — 17000 PR DESTRUCTION(LASER SURGERY,CRYOSURGERY,CHEMOSURGERY),PREMALIGNANT LESIONS,FIRST LESION: ICD-10-PCS | Mod: 59,S$PBB,, | Performed by: DERMATOLOGY

## 2020-02-07 PROCEDURE — 89051 BODY FLUID CELL COUNT: CPT

## 2020-02-07 PROCEDURE — 99999 PR PBB SHADOW E&M-EST. PATIENT-LVL III: CPT | Mod: PBBFAC,,, | Performed by: DERMATOLOGY

## 2020-02-07 PROCEDURE — A4215 STERILE NEEDLE: HCPCS

## 2020-02-07 PROCEDURE — 99213 OFFICE O/P EST LOW 20 MIN: CPT | Mod: 25,S$PBB,, | Performed by: DERMATOLOGY

## 2020-02-07 PROCEDURE — 17003 DESTRUCTION, PREMALIGNANT LESIONS; SECOND THROUGH 14 LESIONS: ICD-10-PCS | Mod: S$PBB,59,, | Performed by: DERMATOLOGY

## 2020-02-07 PROCEDURE — 27100111 IR PARACENTESIS WITH IMAGING

## 2020-02-07 PROCEDURE — 99999 PR PBB SHADOW E&M-EST. PATIENT-LVL III: ICD-10-PCS | Mod: PBBFAC,,, | Performed by: DERMATOLOGY

## 2020-02-07 PROCEDURE — 17003 DESTRUCT PREMALG LES 2-14: CPT | Mod: 59,PBBFAC | Performed by: DERMATOLOGY

## 2020-02-07 NOTE — PROGRESS NOTES
Subjective:       Patient ID:  Kenneth Sheikh is a 80 y.o. male who presents for   Chief Complaint   Patient presents with    Skin Check     UBSE     HPI   Pt presents today for UBSE. Used Efudex on scalp and L forearm since last visit with improvement.  Pt's wife c/o spot on right ear, scaly, x 1 month, Tx. None    Pt has an extensive hx of NMSC in the past, mostly treated by [facility] [place] and Esteban and with Efudex.  Pt has been treated for SCCIS on L preauricular cheek, L lateral cheek, and L elbow with Efudex. Dr. Orellana excised a BCC on nasal dorsum and on L inferior nasal sidewall.  Pt and wife would prefer to decrease the amount of cutting on his skin and would prefer to try and freeze as much as possible.    Review of Systems   Constitutional: Negative for fever, chills, weight loss, weight gain, fatigue, night sweats and malaise.   Skin: Positive for daily sunscreen use and activity-related sunscreen use. Negative for recent sunburn.   Hematologic/Lymphatic: Bruises/bleeds easily.        Objective:    Physical Exam   Constitutional: He appears well-developed and well-nourished. He is in a wheelchair.  No distress.   Neurological: He is alert and oriented to person, place, and time. He is not disoriented.   Psychiatric: He has a normal mood and affect.   Skin:   Areas Examined (abnormalities noted in diagram):   Scalp / Hair Palpated and Inspected  Head / Face Inspection Performed  Neck Inspection Performed  Chest / Axilla Inspection Performed  Abdomen Inspection Performed  Back Inspection Performed  RUE Inspected  LUE Inspection Performed                   Diagram Legend     Erythematous scaling macule/papule c/w actinic keratosis       Vascular papule c/w angioma      Pigmented verrucoid papule/plaque c/w seborrheic keratosis      Yellow umbilicated papule c/w sebaceous hyperplasia      Irregularly shaped tan macule c/w lentigo     1-2 mm smooth white papules consistent with Milia      Movable subcutaneous  cyst with punctum c/w epidermal inclusion cyst      Subcutaneous movable cyst c/w pilar cyst      Firm pink to brown papule c/w dermatofibroma      Pedunculated fleshy papule(s) c/w skin tag(s)      Evenly pigmented macule c/w junctional nevus     Mildly variegated pigmented, slightly irregular-bordered macule c/w mildly atypical nevus      Flesh colored to evenly pigmented papule c/w intradermal nevus       Pink pearly papule/plaque c/w basal cell carcinoma      Erythematous hyperkeratotic cursted plaque c/w SCC      Surgical scar with no sign of skin cancer recurrence      Open and closed comedones      Inflammatory papules and pustules      Verrucoid papule consistent consistent with wart     Erythematous eczematous patches and plaques     Dystrophic onycholytic nail with subungual debris c/w onychomycosis     Umbilicated papule    Erythematous-base heme-crusted tan verrucoid plaque consistent with inflamed seborrheic keratosis     Erythematous Silvery Scaling Plaque c/w Psoriasis     See annotation      Assessment / Plan:        AK (actinic keratosis)  Cryosurgery Procedure Note    Verbal consent from the patient is obtained including, but not limited to, risk of hypopigmentation/hyperpigmentation, scar, recurrence of lesion. The patient is aware of the precancerous quality and need for treatment of these lesions. Liquid nitrogen cryosurgery is applied to the 10 actinic keratoses, as detailed in the physical exam, to produce a freeze injury. The patient is aware that blisters may form and is instructed on wound care with gentle cleansing and use of vaseline ointment to keep moist until healed. The patient is supplied a handout on cryosurgery and is instructed to call if lesions do not completely resolve.    Other viral warts  Cryosurgery procedure note:    Verbal consent from the patient is obtained including, but not limited to, risk of hypopigmentation/hyperpigmentation, scar, recurrence of lesion. Liquid  nitrogen cryosurgery is applied to 1 lesions to produce a freeze injury. The patient is aware that blisters may form and is instructed on wound care with gentle cleansing and use of vaseline ointment to keep moist until healed. The patient is supplied a handout on cryosurgery and is instructed to call if lesions do not completely resolve.    Multiple benign nevi  Benign-appearing on exam today. Counseled pt to monitor mole(s) and return to clinic if any changes noted or symptoms (bleeding, itching, pain, etc) noted. Brochure provided.    SK (seborrheic keratosis)  These are benign inherited growths without a malignant potential. Reassurance given to patient. No treatment is necessary.   Treatment of benign, asymptomatic lesions may be considered cosmetic.  Warned about risk of hypo- or hyperpigmentation with treatment and risk of recurrence.    Skin cancer screening  Upper body skin examination performed today including at least 6 points as noted in physical examination. No lesions suspicious for malignancy noted.  Patient instructed in importance of daily broad spectrum sunscreen use with spf at least 30. Sun avoidance and topical protection/protective clothing discussed.    Follow up in about 3 months (around 5/7/2020) for skin check or sooner for any concerns.

## 2020-02-07 NOTE — PLAN OF CARE
Pt arrived to [facility] room 12 for para, no acute distress noted. Orders and labs reviewed on chart. Awaiting consent.

## 2020-02-07 NOTE — PROCEDURES

## 2020-02-07 NOTE — PATIENT INSTRUCTIONS
Summer Sun Protection      The [facility] of Dermat[facility] would like to remind you of the importance of sun protection all year round and particularly during the summer when the suns rays are the strongest. It has been proven that both acute and chronic sun exposure damages our cells and leads to skin cancer. Beyond skin cancer, the sun causes 90% of the symptoms of pre-mature skin aging, including wrinkles, lentigines (brown spots), and thin, easily bruised skin. Proper sun protection can help prevent these unwanted conditions.    Many patients report that the dont go in the sun. It has been shown that the average person receives 18 hours of incidental sun exposure per week during activities such as walking through parking lots, driving, or sitting next to windows. This accumulates to several bad sunburns per year!    In choosing sunscreen, you want one that protects against both UVA and UVB rays. It is recommended that you use one of SPF 30 or higher. It is important to apply the sunscreen about 20 minutes prior to sun exposure. Most sunscreens are chemical sunscreens and a reaction must take place in the skin so that they are effective. If they are applied and then you are immediately exposed to the sun or start sweating, this reaction has not had time to take place and you are therefore unprotected. Sunscreen needs to be reapplied every 2 hours if you are participating in water sports or sweating. We recommend Elta MD or Neutrogena Ultra Sheer Dry Touch SPF 55 for daily use; however there are many options and it is most important for you to find one that you will use on a consistent basis.    If you have sensitive skin, you may do best with a sunscreen that contains only physical blockers such as titanium dioxide or zinc oxide. These are typically thicker and harder to apply, however they afford very good protection. Neutrogena Sensitive Skin, Blue Lizard Sensitive Skin (pink top) or Neutrogena Pure  and Free are popular ones.     Aside from sunscreen, clothes with UV protection, wide brimmed hats, and sunglasses are other means of sun protection that we recommend.                        [facility] - DERMATOLOGY  [address]  [place] [address]  Dept: [phone]  Dept Fax: [phone]                                                                               CRYOSURGERY      Your doctor has used a method called cryosurgery to treat your skin condition. Cryosurgery refers to the use of very cold substances to treat a variety of skin conditions such as warts, pre-skin cancers, molluscum contagiosum, sun spots, and several benign growths. The substance we use in cryosurgery is liquid nitrogen and is so cold (-195 degrees Celsius) that is burns when administered.     Following treatment in the office, the skin may immediately burn and become red. You may find the area around the lesion is affected as well. It is sometimes necessary to treat not only the lesion, but a small area of the surrounding normal skin to achieve a good response.     A blister, and even a blood filled blister, may form after treatment.   This is a normal response. If the blister is painful, it is acceptable to sterilize a needle and with rubbing alcohol and gently pop the blister. It is important that you gently wash the area with soap and warm water as the blister fluid may contain wart virus if a wart was treated. Do no remove the roof of the blister.     The area treated can take anywhere from 1-3 weeks to heal. Healing time depends on the kind skin lesion treated, the location, and how aggressively the lesion was treated. It is recommended that the areas treated are covered with Vaseline or bacitracin ointment and a band-aid. If a band-aid is not practical, just ointment applied several times per day will do. Keeping these areas moist will speed the healing time.    Treatment with liquid  nitrogen can leave a scar. In dark skin, it may be a light or dark scar, in light skin it may be a white or pink scar. These will generally fade with time, but may never go away completely.     If you have any concerns after your treatment, please feel free to call the office.       [facility] [place] [address]/ [phone] FAX/ [email or web address]

## 2020-02-07 NOTE — DISCHARGE INSTRUCTIONS
[facility] [phone] (MON-FRI 8 AM- 5PM). Radiology Resident on call [phone].

## 2020-02-07 NOTE — H&P
Radiology History & Physical      SUBJECTIVE:     Chief Complaint: abdominal distention    History of Present Illness:  Kenneth Sheikh is a 80 y.o. male who presents for ultrasound guided paracentesis  Past Medical History:   Diagnosis Date    Anticoagulant long-term use     Basal cell carcinoma     Bipolar 1 disorder     Bipolar 1 disorder 11/24/2016    bipolar    Cancer     history of skin cancer/sinus cancer & rectal cancer    Depressed bipolar affective disorder     Diabetes mellitus     type 2    Diabetic retinopathy 01/09/2019    EBV infection 12/7/2016    EBV DNA, PCR Latest Ref Range: None detected  None detected EBV DNA-Copies/mL Unknown Test Not Performed EBV Early Antigen Ab, IgG Latest Ref Range: <1:10 Titer 1:40 (A) EBV Nuclear Ag Ab Latest Ref Range: <1:5 Titer >=1:80 (A) EBV VCA IgG Latest Ref Range: <1:10 Titer 1:160 (A) EBV VCA IgM Latest Ref Range: <1:10 Titer <1:10     History of TIA (transient ischemic attack)     several-taking Plavix    Hx of gallstones     Wife denies    Hypertension     Hyperthyroidism 11/30/2016    Low HDL (under 40) 12/7/2016    Mixed hyperlipidemia 11/23/2016    JUAN MANUEL (obstructive sleep apnea)     Pneumonia     Skin disease     Squamous cell carcinoma 08/2018    right temple    Stroke     Transient cerebral ischemia 7/22/2016    Type 2 diabetes mellitus      Past Surgical History:   Procedure Laterality Date    Moh's procedure x4      rectal cancer      Sinus surgery for sinus cancer      sinus sx for cancer      skin cancer removed-several times-nose & ear      TONSILLECTOMY      Undescened testicle sx      UVULOPALATOPHARYNGOPLASTY      for sleep apnea       Home Meds:   Prior to Admission medications    Medication Sig Start Date End Date Taking? Authorizing Provider   ACCU-CHEK NEYDA PLUS TEST STRP Strp CHECK BLOOD SUGAR ONCE DAILY 12/27/19   Aziza Ogden MD   ciprofloxacin HCl (CIPRO) 500 MG tablet TAKE 1 TABLET(500 MG) BY MOUTH EVERY  MONDAY 1/23/20   Renato Womack MD   divalproex (DEPAKOTE) 250 MG EC tablet TAKE 2 TABLETS BY MOUTH EVERY EVENING 10/28/19   Prisca Espinoza MD   furosemide (LASIX) 20 MG tablet TAKE 2 TABLETS BY MOUTH EVERY DAY 5/2/19   Renato Womack MD   lactulose (CHRONULAC) 10 gram/15 mL solution TAKE 15 MLS BY MOUTH TWICE DAILY 1/26/20   Renato Womack MD   lancets Misc To check blood sugar once daily, to use with Accu-Chek meter. 11/5/18   Prisca Espinoza MD   SITagliptan (JANUVIA) 50 MG Tab Take 1 tablet (50 mg total) by mouth once daily. 4/6/17   Prisca Espinoza MD   XIFAXAN 550 mg Tab TAKE ONE TABLET BY MOUTH TWICE DAILY 11/25/19   Renato Womack MD     Anticoagulants/Antiplatelets: no anticoagulation    Allergies:   Review of patient's allergies indicates:   Allergen Reactions    Abilify [aripiprazole] Other (See Comments)     Sleepy, could not function.     Sedation History:  no adverse reactions    Review of Systems:   Hematological: no known coagulopathies  Respiratory: no shortness of breath  Cardiovascular: no chest pain  Gastrointestinal: no abdominal pain  Genito-Urinary: no dysuria  Musculoskeletal: negative  Neurological: no TIA or stroke symptoms         OBJECTIVE:     Vital Signs (Most Recent)       Physical Exam:  ASA: 2  Mallampati: n/a    General: no acute distress  Mental Status: alert and oriented to person, place and time  HEENT: normocephalic, atraumatic  Chest: unlabored breathing  Heart: regular heart rate  Abdomen: distended  Extremity: moves all extremities    ASSESSMENT/PLAN:     Sedation Plan: local  Patient will undergo ultrasound guided paracentesis.    MATT Mauricio, FNP  Interventional Radiology  [phone] clinic

## 2020-02-07 NOTE — PLAN OF CARE
Para completed, pt tolerated well. No apparent distress noted. 4.6 Liters removed from right abd, dressing applied CDI. Labs collected and sent. Discharge instructions reviewed and acknowledged. Pt discharged via wheelchair and private vehicle.

## 2020-02-10 DIAGNOSIS — K76.82 HEPATIC ENCEPHALOPATHY: ICD-10-CM

## 2020-02-10 RX ORDER — RIFAXIMIN 550 MG/1
550 TABLET ORAL 2 TIMES DAILY
Qty: 180 TABLET | Refills: 11 | Status: SHIPPED | OUTPATIENT
Start: 2020-02-10

## 2020-02-12 ENCOUNTER — PATIENT OUTREACH (OUTPATIENT)
Dept: ADMINISTRATIVE | Facility: OTHER | Age: 80
End: 2020-02-12

## 2020-02-13 ENCOUNTER — OFFICE VISIT (OUTPATIENT)
Dept: SURGERY | Facility: CLINIC | Age: 80
End: 2020-02-13
Payer: MEDICARE

## 2020-02-13 VITALS
DIASTOLIC BLOOD PRESSURE: 57 MMHG | BODY MASS INDEX: 26.23 KG/M2 | WEIGHT: 173.06 LBS | HEART RATE: 55 BPM | HEIGHT: 68 IN | SYSTOLIC BLOOD PRESSURE: 121 MMHG

## 2020-02-13 DIAGNOSIS — Z85.048 HISTORY OF RECTAL CANCER: Primary | ICD-10-CM

## 2020-02-13 PROCEDURE — 99999 PR PBB SHADOW E&M-EST. PATIENT-LVL III: ICD-10-PCS | Mod: PBBFAC,,, | Performed by: COLON & RECTAL SURGERY

## 2020-02-13 PROCEDURE — 45330 PR SIGMOIDOSCOPY,DIAG2STIC: ICD-10-PCS | Mod: S$PBB,,, | Performed by: COLON & RECTAL SURGERY

## 2020-02-13 PROCEDURE — 45330 DIAGNOSTIC SIGMOIDOSCOPY: CPT | Mod: PBBFAC | Performed by: COLON & RECTAL SURGERY

## 2020-02-13 PROCEDURE — 99213 OFFICE O/P EST LOW 20 MIN: CPT | Mod: 25,S$PBB,, | Performed by: COLON & RECTAL SURGERY

## 2020-02-13 PROCEDURE — 99999 PR PBB SHADOW E&M-EST. PATIENT-LVL III: CPT | Mod: PBBFAC,,, | Performed by: COLON & RECTAL SURGERY

## 2020-02-13 PROCEDURE — 45330 DIAGNOSTIC SIGMOIDOSCOPY: CPT | Mod: S$PBB,,, | Performed by: COLON & RECTAL SURGERY

## 2020-02-13 PROCEDURE — 99213 OFFICE O/P EST LOW 20 MIN: CPT | Mod: PBBFAC | Performed by: COLON & RECTAL SURGERY

## 2020-02-13 PROCEDURE — 99213 PR OFFICE/OUTPT VISIT, EST, LEVL III, 20-29 MIN: ICD-10-PCS | Mod: 25,S$PBB,, | Performed by: COLON & RECTAL SURGERY

## 2020-02-14 ENCOUNTER — HOSPITAL ENCOUNTER (OUTPATIENT)
Dept: INTERVENTIONAL RADIOLOGY/VASCULAR | Facility: HOSPITAL | Age: 80
Discharge: HOME | End: 2020-02-14
Attending: INTERNAL MEDICINE
Payer: MEDICARE

## 2020-02-14 VITALS
DIASTOLIC BLOOD PRESSURE: 68 MMHG | RESPIRATION RATE: 18 BRPM | SYSTOLIC BLOOD PRESSURE: 131 MMHG | HEART RATE: 57 BPM | OXYGEN SATURATION: 97 %

## 2020-02-14 DIAGNOSIS — K70.31 ASCITES DUE TO ALCOHOLIC CIRRHOSIS: ICD-10-CM

## 2020-02-14 LAB
APPEARANCE FLD: NORMAL
BASOPHILS NFR FLD MANUAL: 1 %
BODY FLD TYPE: NORMAL
COLOR FLD: YELLOW
EOSINOPHIL NFR FLD MANUAL: 1 %
LYMPHOCYTES NFR FLD MANUAL: 48 %
MONOS+MACROS NFR FLD MANUAL: 50 %
WBC # FLD: 35 /CU MM

## 2020-02-14 PROCEDURE — 49083 IR PARACENTESIS WITH IMAGING: ICD-10-PCS | Mod: ,,, | Performed by: FAMILY MEDICINE

## 2020-02-14 PROCEDURE — 49083 ABD PARACENTESIS W/IMAGING: CPT | Mod: ,,, | Performed by: FAMILY MEDICINE

## 2020-02-14 PROCEDURE — 49083 ABD PARACENTESIS W/IMAGING: CPT

## 2020-02-14 PROCEDURE — 89051 BODY FLUID CELL COUNT: CPT

## 2020-02-14 NOTE — DISCHARGE INSTRUCTIONS
For scheduling: Call Mackenzie at [phone]    For questions or concerns call: [facility] 8 AM- 5PM [phone]. Radiology resident on call [phone].    For immediate concerns that are not emergent, you may call our radiology clinic at: [phone]

## 2020-02-14 NOTE — PLAN OF CARE
PT in MPU 11 for paracentesis. No acute distress noted. Labs and orders reviewed on chart. Awaiting consent.

## 2020-02-14 NOTE — PROCEDURES

## 2020-02-14 NOTE — PROGRESS NOTES
Paracentesis complete, 4600 MLs removed. Specimen sent to lab.  Dressing applied to LLQ puncture site, dressing clean dry and intact. Pt given discharge instructions and handouts, pt verbalizes understanding. Questions answered. Pt denies pain and discomfort. Pt refusing transport. Pt ambulated to [facility] for transport home with family. Gait steady.

## 2020-02-14 NOTE — H&P
Radiology History & Physical      SUBJECTIVE:     Chief Complaint: abdominal distention    History of Present Illness:  Kenneth Sheikh is a 80 y.o. male who presents for ultrasound guided paracentesis  Past Medical History:   Diagnosis Date    Anticoagulant long-term use     Basal cell carcinoma     Bipolar 1 disorder     Bipolar 1 disorder 11/24/2016    bipolar    Cancer     history of skin cancer/sinus cancer & rectal cancer    Depressed bipolar affective disorder     Diabetes mellitus     type 2    Diabetic retinopathy 01/09/2019    EBV infection 12/7/2016    EBV DNA, PCR Latest Ref Range: None detected  None detected EBV DNA-Copies/mL Unknown Test Not Performed EBV Early Antigen Ab, IgG Latest Ref Range: <1:10 Titer 1:40 (A) EBV Nuclear Ag Ab Latest Ref Range: <1:5 Titer >=1:80 (A) EBV VCA IgG Latest Ref Range: <1:10 Titer 1:160 (A) EBV VCA IgM Latest Ref Range: <1:10 Titer <1:10     History of TIA (transient ischemic attack)     several-taking Plavix    Hx of gallstones     Wife denies    Hypertension     Hyperthyroidism 11/30/2016    Low HDL (under 40) 12/7/2016    Mixed hyperlipidemia 11/23/2016    JUAN MANUEL (obstructive sleep apnea)     Pneumonia     Skin disease     Squamous cell carcinoma 08/2018    right temple    Stroke     Transient cerebral ischemia 7/22/2016    Type 2 diabetes mellitus      Past Surgical History:   Procedure Laterality Date    Moh's procedure x4      rectal cancer      Sinus surgery for sinus cancer      sinus sx for cancer      skin cancer removed-several times-nose & ear      TONSILLECTOMY      Undescened testicle sx      UVULOPALATOPHARYNGOPLASTY      for sleep apnea       Home Meds:   Prior to Admission medications    Medication Sig Start Date End Date Taking? Authorizing Provider   ACCU-CHEK NEYDA PLUS TEST STRP Strp CHECK BLOOD SUGAR ONCE DAILY 12/27/19   Aziza Ogden MD   ciprofloxacin HCl (CIPRO) 500 MG tablet TAKE 1 TABLET(500 MG) BY MOUTH EVERY  MONDAY 1/23/20   Renato Womack MD   divalproex (DEPAKOTE) 250 MG EC tablet TAKE 2 TABLETS BY MOUTH EVERY EVENING 10/28/19   Prisca Espinoza MD   furosemide (LASIX) 20 MG tablet TAKE 2 TABLETS BY MOUTH EVERY DAY 5/2/19   Renato Womack MD   lactulose (CHRONULAC) 10 gram/15 mL solution TAKE 15 MLS BY MOUTH TWICE DAILY 1/26/20   Renato Womack MD   lancets Misc To check blood sugar once daily, to use with Accu-Chek meter. 11/5/18   Prisca Espinoza MD   SITagliptan (JANUVIA) 50 MG Tab Take 1 tablet (50 mg total) by mouth once daily. 4/6/17   Prisca Espinoza MD   XIFAXAN 550 mg Tab Take 1 tablet (550 mg total) by mouth 2 (two) times daily. 2/10/20   Renato Womack MD     Anticoagulants/Antiplatelets: no anticoagulation    Allergies:   Review of patient's allergies indicates:   Allergen Reactions    Abilify [aripiprazole] Other (See Comments)     Sleepy, could not function.     Sedation History:  no adverse reactions    Review of Systems:   Hematological: no known coagulopathies  Respiratory: no shortness of breath  Cardiovascular: no chest pain  Gastrointestinal: no abdominal pain  Genito-Urinary: no dysuria  Musculoskeletal: negative  Neurological: no TIA or stroke symptoms         OBJECTIVE:     Vital Signs (Most Recent)  Pulse: (!) 55 (02/14/20 1116)  Resp: 18 (02/14/20 1116)  BP: (!) 141/82 (02/14/20 1116)  SpO2: 96 % (02/14/20 1116)    Physical Exam:  ASA: 2  Mallampati: n/a    General: no acute distress  Mental Status: alert and oriented to person, place and time  HEENT: normocephalic, atraumatic  Chest: unlabored breathing  Heart: regular heart rate  Abdomen: distended  Extremity: moves all extremities    ASSESSMENT/PLAN:     Sedation Plan: local  Patient will undergo ultrasound guided paracentesis.    MATT Mauricio, FNP  Interventional Radiology  [phone] [facility]

## 2020-02-16 NOTE — PROGRESS NOTES
Subjective:       Patient ID: Kenneth Sheikh is a 80 y.o. male.    Chief Complaint: Flexible Sigmoidoscopy    HPI established patient.  History of transanal excision for a T1 rectal cancer in 2016.  Presents today for surveillance.  He has developed ascites which requires weekly paracentesis.  He denies change in bowel habits, rectal bleeding, anal, rectal, or pelvic pain      Review of Systems   Constitutional: Negative for chills and fever.   Respiratory: Negative for cough and shortness of breath.    Cardiovascular: Negative for chest pain and palpitations.   Gastrointestinal: Positive for abdominal distention. Negative for nausea and vomiting.   Genitourinary: Negative for dysuria and urgency.   Neurological: Negative for seizures and numbness.       Objective:      Physical Exam   Constitutional: He is oriented to person, place, and time. He appears well-developed and well-nourished.   Eyes: Conjunctivae and EOM are normal.   Pulmonary/Chest: Effort normal. No respiratory distress.   Abdominal: He exhibits distension.   Genitourinary:   Genitourinary Comments: PROCEDURE: Flexible Sigmoidoscopy   Scope # [id number]    With informed consent the flexible sigmoidoscope was passed 15 cm under direcet vision. Prep quality: good    Findings: no evidence of recurrence, normal mucosa    Specimen: None    EBL: None    The procedure was tolerated well. The patient was discharged to home ambulatory. Complications: None     Musculoskeletal: Normal range of motion. He exhibits no edema.   Neurological: He is alert and oriented to person, place, and time.   Skin: Skin is warm and dry.       Assessment:       1. History of rectal cancer        Plan:       Follow up as needed.

## 2020-02-21 ENCOUNTER — HOSPITAL ENCOUNTER (OUTPATIENT)
Dept: INTERVENTIONAL RADIOLOGY/VASCULAR | Facility: HOSPITAL | Age: 80
Discharge: HOME | End: 2020-02-21
Attending: INTERNAL MEDICINE
Payer: MEDICARE

## 2020-02-21 VITALS
DIASTOLIC BLOOD PRESSURE: 58 MMHG | HEART RATE: 70 BPM | OXYGEN SATURATION: 100 % | RESPIRATION RATE: 18 BRPM | SYSTOLIC BLOOD PRESSURE: 122 MMHG

## 2020-02-21 DIAGNOSIS — K70.31 ASCITES DUE TO ALCOHOLIC CIRRHOSIS: ICD-10-CM

## 2020-02-21 LAB
APPEARANCE FLD: NORMAL
BASOPHILS NFR FLD MANUAL: 1 %
BODY FLD TYPE: NORMAL
COLOR FLD: YELLOW
EOSINOPHIL NFR FLD MANUAL: 1 %
LYMPHOCYTES NFR FLD MANUAL: 48 %
MONOS+MACROS NFR FLD MANUAL: 49 %
NEUTROPHILS NFR FLD MANUAL: 1 %
WBC # FLD: 52 /CU MM

## 2020-02-21 PROCEDURE — 49083 ABD PARACENTESIS W/IMAGING: CPT | Mod: ,,, | Performed by: FAMILY MEDICINE

## 2020-02-21 PROCEDURE — 49083 ABD PARACENTESIS W/IMAGING: CPT

## 2020-02-21 PROCEDURE — 49083 IR PARACENTESIS WITH IMAGING: ICD-10-PCS | Mod: ,,, | Performed by: FAMILY MEDICINE

## 2020-02-21 PROCEDURE — 89051 BODY FLUID CELL COUNT: CPT

## 2020-02-21 NOTE — PROGRESS NOTES
Procedure complete. Patient tolerated well. 4600cc drained from right abdominal area. Sterile dressing clean dry and intact. Specimen sent to lab as ordered. Discharge instructions given , patient verbalizes compliance and understanding. Discharged home via wheelchair with wife.

## 2020-02-21 NOTE — PROCEDURES

## 2020-02-21 NOTE — H&P
Radiology History & Physical      SUBJECTIVE:     Chief Complaint: abdominal distention    History of Present Illness:  Kenneth Sheikh is a 80 y.o. male who presents for ultrasound guided paracentesis  Past Medical History:   Diagnosis Date    Anticoagulant long-term use     Basal cell carcinoma     Bipolar 1 disorder     Bipolar 1 disorder 11/24/2016    bipolar    Cancer     history of skin cancer/sinus cancer & rectal cancer    Depressed bipolar affective disorder     Diabetes mellitus     type 2    Diabetic retinopathy 01/09/2019    EBV infection 12/7/2016    EBV DNA, PCR Latest Ref Range: None detected  None detected EBV DNA-Copies/mL Unknown Test Not Performed EBV Early Antigen Ab, IgG Latest Ref Range: <1:10 Titer 1:40 (A) EBV Nuclear Ag Ab Latest Ref Range: <1:5 Titer >=1:80 (A) EBV VCA IgG Latest Ref Range: <1:10 Titer 1:160 (A) EBV VCA IgM Latest Ref Range: <1:10 Titer <1:10     History of TIA (transient ischemic attack)     several-taking Plavix    Hx of gallstones     Wife denies    Hypertension     Hyperthyroidism 11/30/2016    Low HDL (under 40) 12/7/2016    Mixed hyperlipidemia 11/23/2016    JUAN MANUEL (obstructive sleep apnea)     Pneumonia     Skin disease     Squamous cell carcinoma 08/2018    right temple    Stroke     Transient cerebral ischemia 7/22/2016    Type 2 diabetes mellitus      Past Surgical History:   Procedure Laterality Date    Moh's procedure x4      rectal cancer      Sinus surgery for sinus cancer      sinus sx for cancer      skin cancer removed-several times-nose & ear      TONSILLECTOMY      Undescened testicle sx      UVULOPALATOPHARYNGOPLASTY      for sleep apnea       Home Meds:   Prior to Admission medications    Medication Sig Start Date End Date Taking? Authorizing Provider   ACCU-CHEK NEYDA PLUS TEST STRP Strp CHECK BLOOD SUGAR ONCE DAILY 12/27/19   Aziza Ogden MD   ciprofloxacin HCl (CIPRO) 500 MG tablet TAKE 1 TABLET(500 MG) BY MOUTH EVERY  MONDAY 1/23/20   Renato Womack MD   divalproex (DEPAKOTE) 250 MG EC tablet TAKE 2 TABLETS BY MOUTH EVERY EVENING 10/28/19   Prisca Espinoza MD   furosemide (LASIX) 20 MG tablet TAKE 2 TABLETS BY MOUTH EVERY DAY 5/2/19   Renato Womack MD   lactulose (CHRONULAC) 10 gram/15 mL solution TAKE 15 MLS BY MOUTH TWICE DAILY 1/26/20   Renato Womack MD   lancets Misc To check blood sugar once daily, to use with Accu-Chek meter. 11/5/18   Prisca Espinoza MD   SITagliptan (JANUVIA) 50 MG Tab Take 1 tablet (50 mg total) by mouth once daily. 4/6/17   Prisca Espinoza MD   XIFAXAN 550 mg Tab Take 1 tablet (550 mg total) by mouth 2 (two) times daily. 2/10/20   Renato Womack MD     Anticoagulants/Antiplatelets: no anticoagulation    Allergies:   Review of patient's allergies indicates:   Allergen Reactions    Abilify [aripiprazole] Other (See Comments)     Sleepy, could not function.     Sedation History:  no adverse reactions    Review of Systems:   Hematological: no known coagulopathies  Respiratory: no shortness of breath  Cardiovascular: no chest pain  Gastrointestinal: no abdominal pain  Genito-Urinary: no dysuria  Musculoskeletal: negative  Neurological: no TIA or stroke symptoms         OBJECTIVE:     Vital Signs (Most Recent)       Physical Exam:  ASA: 2  Mallampati: n/a    General: no acute distress  Mental Status: alert and oriented to person, place and time  HEENT: normocephalic, atraumatic  Chest: unlabored breathing  Heart: regular heart rate  Abdomen: distended  Extremity: moves all extremities    ASSESSMENT/PLAN:     Sedation Plan: local  Patient will undergo ultrasound guided paracentesis.    MATT Mauricio, FNP  Interventional Radiology  [phone] clinic

## 2020-02-21 NOTE — DISCHARGE INSTRUCTIONS
For scheduling: Call Mackenzie at [phone]    For questions or concerns call: [facility] 8 AM- 5PM [phone]. Radiology resident on call [phone].    For immediate concerns that are not emergent, you may call our radiology clinic at: [phone]

## 2020-02-28 ENCOUNTER — HOSPITAL ENCOUNTER (OUTPATIENT)
Dept: INTERVENTIONAL RADIOLOGY/VASCULAR | Facility: HOSPITAL | Age: 80
Discharge: HOME | End: 2020-02-28
Attending: INTERNAL MEDICINE
Payer: MEDICARE

## 2020-02-28 VITALS
HEART RATE: 49 BPM | SYSTOLIC BLOOD PRESSURE: 116 MMHG | OXYGEN SATURATION: 100 % | DIASTOLIC BLOOD PRESSURE: 59 MMHG | RESPIRATION RATE: 18 BRPM

## 2020-02-28 DIAGNOSIS — K70.31 ASCITES DUE TO ALCOHOLIC CIRRHOSIS: ICD-10-CM

## 2020-02-28 LAB
APPEARANCE FLD: NORMAL
BASOPHILS NFR FLD MANUAL: 2 %
BODY FLD TYPE: NORMAL
COLOR FLD: YELLOW
EOSINOPHIL NFR FLD MANUAL: 1 %
LYMPHOCYTES NFR FLD MANUAL: 78 %
MONOS+MACROS NFR FLD MANUAL: 19 %
WBC # FLD: 29 /CU MM

## 2020-02-28 PROCEDURE — 89051 BODY FLUID CELL COUNT: CPT

## 2020-02-28 PROCEDURE — 49083 ABD PARACENTESIS W/IMAGING: CPT | Mod: ,,, | Performed by: FAMILY MEDICINE

## 2020-02-28 PROCEDURE — 49083 IR PARACENTESIS WITH IMAGING: ICD-10-PCS | Mod: ,,, | Performed by: FAMILY MEDICINE

## 2020-02-28 PROCEDURE — 49083 ABD PARACENTESIS W/IMAGING: CPT

## 2020-02-28 NOTE — H&P
Radiology History & Physical      SUBJECTIVE:     Chief Complaint: abdominal distention    History of Present Illness:  Kenneth Sheikh is a 80 y.o. male who presents for ultrasound guided paracentesis  Past Medical History:   Diagnosis Date    Anticoagulant long-term use     Basal cell carcinoma     Bipolar 1 disorder     Bipolar 1 disorder 11/24/2016    bipolar    Cancer     history of skin cancer/sinus cancer & rectal cancer    Depressed bipolar affective disorder     Diabetes mellitus     type 2    Diabetic retinopathy 01/09/2019    EBV infection 12/7/2016    EBV DNA, PCR Latest Ref Range: None detected  None detected EBV DNA-Copies/mL Unknown Test Not Performed EBV Early Antigen Ab, IgG Latest Ref Range: <1:10 Titer 1:40 (A) EBV Nuclear Ag Ab Latest Ref Range: <1:5 Titer >=1:80 (A) EBV VCA IgG Latest Ref Range: <1:10 Titer 1:160 (A) EBV VCA IgM Latest Ref Range: <1:10 Titer <1:10     History of TIA (transient ischemic attack)     several-taking Plavix    Hx of gallstones     Wife denies    Hypertension     Hyperthyroidism 11/30/2016    Low HDL (under 40) 12/7/2016    Mixed hyperlipidemia 11/23/2016    JUAN MANUEL (obstructive sleep apnea)     Pneumonia     Skin disease     Squamous cell carcinoma 08/2018    right temple    Stroke     Transient cerebral ischemia 7/22/2016    Type 2 diabetes mellitus      Past Surgical History:   Procedure Laterality Date    Moh's procedure x4      rectal cancer      Sinus surgery for sinus cancer      sinus sx for cancer      skin cancer removed-several times-nose & ear      TONSILLECTOMY      Undescened testicle sx      UVULOPALATOPHARYNGOPLASTY      for sleep apnea       Home Meds:   Prior to Admission medications    Medication Sig Start Date End Date Taking? Authorizing Provider   ACCU-CHEK NEYDA PLUS TEST STRP Strp CHECK BLOOD SUGAR ONCE DAILY 12/27/19   Aziza Ogden MD   ciprofloxacin HCl (CIPRO) 500 MG tablet TAKE 1 TABLET(500 MG) BY MOUTH EVERY  MONDAY 1/23/20   Renato Womack MD   divalproex (DEPAKOTE) 250 MG EC tablet TAKE 2 TABLETS BY MOUTH EVERY EVENING 10/28/19   Prisca Espinoza MD   furosemide (LASIX) 20 MG tablet TAKE 2 TABLETS BY MOUTH EVERY DAY 5/2/19   Renato Womack MD   lactulose (CHRONULAC) 10 gram/15 mL solution TAKE 15 MLS BY MOUTH TWICE DAILY 1/26/20   Renato Womack MD   lancets Misc To check blood sugar once daily, to use with Accu-Chek meter. 11/5/18   Prisca Espinoza MD   SITagliptan (JANUVIA) 50 MG Tab Take 1 tablet (50 mg total) by mouth once daily. 4/6/17   Prisca Espinoza MD   XIFAXAN 550 mg Tab Take 1 tablet (550 mg total) by mouth 2 (two) times daily. 2/10/20   Renato Womack MD     Anticoagulants/Antiplatelets: no anticoagulation    Allergies:   Review of patient's allergies indicates:   Allergen Reactions    Abilify [aripiprazole] Other (See Comments)     Sleepy, could not function.     Sedation History:  no adverse reactions    Review of Systems:   Hematological: no known coagulopathies  Respiratory: no shortness of breath  Cardiovascular: no chest pain  Gastrointestinal: no abdominal pain  Genito-Urinary: no dysuria  Musculoskeletal: negative  Neurological: no TIA or stroke symptoms         OBJECTIVE:     Vital Signs (Most Recent)  Pulse: (!) 49 (02/28/20 1102)  Resp: 18 (02/28/20 1102)  BP: 116/66 (02/28/20 1102)  SpO2: 100 % (02/28/20 1102)    Physical Exam:  ASA: 2  Mallampati: n/a    General: no acute distress  Mental Status: alert and oriented to person, place and time  HEENT: normocephalic, atraumatic  Chest: unlabored breathing  Heart: regular heart rate  Abdomen: distended  Extremity: moves all extremities    ASSESSMENT/PLAN:     Sedation Plan: local  Patient will undergo ultrasound guided paracentesis.    MATT Mauricio, ESTEFANYP  Interventional Radiology  [phone] [facility]

## 2020-02-28 NOTE — DISCHARGE INSTRUCTIONS
[phone][facility] Radiology resident on call [phone].    Paracentesis complete. 4550 mLs peritoneal fluid drained.

## 2020-02-28 NOTE — PROCEDURES

## 2020-03-01 DIAGNOSIS — R18.8 OTHER ASCITES: Primary | ICD-10-CM

## 2020-03-06 ENCOUNTER — HOSPITAL ENCOUNTER (OUTPATIENT)
Dept: INTERVENTIONAL RADIOLOGY/VASCULAR | Facility: HOSPITAL | Age: 80
Discharge: HOME | End: 2020-03-06
Attending: INTERNAL MEDICINE
Payer: MEDICARE

## 2020-03-06 VITALS
OXYGEN SATURATION: 99 % | DIASTOLIC BLOOD PRESSURE: 65 MMHG | HEART RATE: 46 BPM | RESPIRATION RATE: 18 BRPM | SYSTOLIC BLOOD PRESSURE: 135 MMHG

## 2020-03-06 DIAGNOSIS — K70.31 ASCITES DUE TO ALCOHOLIC CIRRHOSIS: ICD-10-CM

## 2020-03-06 PROCEDURE — 49083 IR PARACENTESIS WITH IMAGING: ICD-10-PCS | Mod: GC,,, | Performed by: FAMILY MEDICINE

## 2020-03-06 PROCEDURE — 49083 ABD PARACENTESIS W/IMAGING: CPT | Mod: GC,,, | Performed by: FAMILY MEDICINE

## 2020-03-06 PROCEDURE — 49083 ABD PARACENTESIS W/IMAGING: CPT

## 2020-03-06 NOTE — H&P
Radiology History & Physical      SUBJECTIVE:     Chief Complaint: abdominal distention    History of Present Illness:  Kenneth Sheikh is a 80 y.o. male who presents for ultrasound guided paracentesis  Past Medical History:   Diagnosis Date    Anticoagulant long-term use     Basal cell carcinoma     Bipolar 1 disorder     Bipolar 1 disorder 11/24/2016    bipolar    Cancer     history of skin cancer/sinus cancer & rectal cancer    Depressed bipolar affective disorder     Diabetes mellitus     type 2    Diabetic retinopathy 01/09/2019    EBV infection 12/7/2016    EBV DNA, PCR Latest Ref Range: None detected  None detected EBV DNA-Copies/mL Unknown Test Not Performed EBV Early Antigen Ab, IgG Latest Ref Range: <1:10 Titer 1:40 (A) EBV Nuclear Ag Ab Latest Ref Range: <1:5 Titer >=1:80 (A) EBV VCA IgG Latest Ref Range: <1:10 Titer 1:160 (A) EBV VCA IgM Latest Ref Range: <1:10 Titer <1:10     History of TIA (transient ischemic attack)     several-taking Plavix    Hx of gallstones     Wife denies    Hypertension     Hyperthyroidism 11/30/2016    Low HDL (under 40) 12/7/2016    Mixed hyperlipidemia 11/23/2016    JUAN MANUEL (obstructive sleep apnea)     Pneumonia     Skin disease     Squamous cell carcinoma 08/2018    right temple    Stroke     Transient cerebral ischemia 7/22/2016    Type 2 diabetes mellitus      Past Surgical History:   Procedure Laterality Date    Moh's procedure x4      rectal cancer      Sinus surgery for sinus cancer      sinus sx for cancer      skin cancer removed-several times-nose & ear      TONSILLECTOMY      Undescened testicle sx      UVULOPALATOPHARYNGOPLASTY      for sleep apnea       Home Meds:   Prior to Admission medications    Medication Sig Start Date End Date Taking? Authorizing Provider   ACCU-CHEK NEYDA PLUS TEST STRP Strp CHECK BLOOD SUGAR ONCE DAILY 12/27/19   Aziza Ogden MD   ciprofloxacin HCl (CIPRO) 500 MG tablet TAKE 1 TABLET(500 MG) BY MOUTH EVERY  MONDAY 1/23/20   Renato Womack MD   divalproex (DEPAKOTE) 250 MG EC tablet TAKE 2 TABLETS BY MOUTH EVERY EVENING 10/28/19   Prisca Espinoza MD   furosemide (LASIX) 20 MG tablet TAKE 2 TABLETS BY MOUTH EVERY DAY 5/2/19   Renato Womack MD   lactulose (CHRONULAC) 10 gram/15 mL solution TAKE 15 MLS BY MOUTH TWICE DAILY 1/26/20   Renato Womack MD   lancets Misc To check blood sugar once daily, to use with Accu-Chek meter. 11/5/18   Prisca Espinoza MD   SITagliptan (JANUVIA) 50 MG Tab Take 1 tablet (50 mg total) by mouth once daily. 4/6/17   Prisca Espinoza MD   XIFAXAN 550 mg Tab Take 1 tablet (550 mg total) by mouth 2 (two) times daily. 2/10/20   Renato Womack MD     Anticoagulants/Antiplatelets: no anticoagulation    Allergies:   Review of patient's allergies indicates:   Allergen Reactions    Abilify [aripiprazole] Other (See Comments)     Sleepy, could not function.     Sedation History:  no adverse reactions    Review of Systems:   Hematological: no known coagulopathies  Respiratory: no shortness of breath  Cardiovascular: no chest pain  Gastrointestinal: no abdominal pain  Genito-Urinary: no dysuria  Musculoskeletal: negative  Neurological: no TIA or stroke symptoms         OBJECTIVE:     Vital Signs (Most Recent)  Pulse: (!) 48 (03/06/20 1100)  Resp: 16 (03/06/20 1100)  BP: (!) 140/65 (03/06/20 1100)  SpO2: 95 % (03/06/20 1100)    Physical Exam:  ASA: 2  Mallampati: n/a    General: no acute distress  Mental Status: alert and oriented to person, place and time  HEENT: normocephalic, atraumatic  Chest: unlabored breathing  Heart: regular heart rate  Abdomen: distended  Extremity: moves all extremities    ASSESSMENT/PLAN:     Sedation Plan: local  Patient will undergo ultrasound guided paracentesis.    MATT Mauricio, FNP  Interventional Radiology  [phone] [facility]

## 2020-03-06 NOTE — PROCEDURES
Radiology Post-Procedure Note    Pre Op Diagnosis: Ascites  Post Op Diagnosis: Same    Procedure: Ultrasound Guided Paracentesis    Procedure performed by: Souleymane ROSS, Елена     Written Informed Consent Obtained: Yes  Specimen Removed: YES cloudy yellow  Estimated Blood Loss: Minimal    Findings:   Successful paracentesis.  Albumin administered PRN per protocol.    Patient tolerated procedure well.    Елена Comer, APRN, FNP  Interventional Radiology  [phone] clinic

## 2020-03-06 NOTE — PROGRESS NOTES
Paracentesis complete. Pt tolerated well. 4100cc removed from left abdomen. Dressing applied clean, dry and intact. Patient given discharge instructions and verbalized understanding of at home care. Awaiting transport at this time.

## 2020-03-06 NOTE — DISCHARGE INSTRUCTIONS
For scheduling: Call Mackenzie at [phone]    For questions or concerns call: [facility] 8 AM- 5PM [phone]. Radiology resident on call [phone].    For immediate concerns that are not emergent, you may call our radiology clinic at: [phone]

## 2020-03-10 ENCOUNTER — TELEPHONE (OUTPATIENT)
Dept: HEPATOLOGY | Facility: CLINIC | Age: 80
End: 2020-03-10

## 2020-03-11 ENCOUNTER — TELEPHONE (OUTPATIENT)
Dept: PHARMACY | Facility: CLINIC | Age: 80
End: 2020-03-11

## 2020-03-11 NOTE — TELEPHONE ENCOUNTER
Patient is approved with [facility]) until 02/16/2021.  Medication will be shipped to patients home within 7-10 business days.

## 2020-03-13 ENCOUNTER — HOSPITAL ENCOUNTER (OUTPATIENT)
Dept: INTERVENTIONAL RADIOLOGY/VASCULAR | Facility: HOSPITAL | Age: 80
Discharge: HOME | End: 2020-03-13
Attending: INTERNAL MEDICINE
Payer: MEDICARE

## 2020-03-13 VITALS
RESPIRATION RATE: 16 BRPM | OXYGEN SATURATION: 98 % | SYSTOLIC BLOOD PRESSURE: 115 MMHG | HEART RATE: 51 BPM | DIASTOLIC BLOOD PRESSURE: 60 MMHG

## 2020-03-13 DIAGNOSIS — R18.8 OTHER ASCITES: ICD-10-CM

## 2020-03-13 PROCEDURE — 49083 ABD PARACENTESIS W/IMAGING: CPT

## 2020-03-13 PROCEDURE — 49083 ABD PARACENTESIS W/IMAGING: CPT | Mod: GC,,, | Performed by: FAMILY MEDICINE

## 2020-03-13 PROCEDURE — 49083 IR PARACENTESIS NO IMAGING: ICD-10-PCS | Mod: GC,,, | Performed by: FAMILY MEDICINE

## 2020-03-13 NOTE — DISCHARGE INSTRUCTIONS
For scheduling: Call Mackenzie at [phone]    For questions or concerns call: [facility] 8 AM- 5PM [phone]. Radiology resident on call [phone].    For immediate concerns that are not emergent, you may call our radiology clinic at: [phone]

## 2020-03-13 NOTE — PROCEDURES
Radiology Post-Procedure Note    Pre Op Diagnosis: Ascites  Post Op Diagnosis: Same    Procedure: Ultrasound Guided Paracentesis    Procedure performed by: Souleymane ROSS, Елена     Written Informed Consent Obtained: Yes  Specimen Removed: YES cloudy yellow  Estimated Blood Loss: Minimal    Findings:   Successful paracentesis.  Albumin administered PRN per protocol.    Patient tolerated procedure well.    Елена Comer, APRN, FNP  Interventional Radiology  [phone] clinic

## 2020-03-13 NOTE — PROGRESS NOTES
Paracentesis complete. Pt tolerated well. 4800cc removed from right abdomen. Dressing applied clean, dry and intact. Patient given discharge instructions. [facility]

## 2020-03-13 NOTE — H&P
Radiology History & Physical      SUBJECTIVE:     Chief Complaint: abdominal distention    History of Present Illness:  Kenneth Sheikh is a 80 y.o. male who presents for ultrasound guided paracentesis  Past Medical History:   Diagnosis Date    Anticoagulant long-term use     Basal cell carcinoma     Bipolar 1 disorder     Bipolar 1 disorder 11/24/2016    bipolar    Cancer     history of skin cancer/sinus cancer & rectal cancer    Depressed bipolar affective disorder     Diabetes mellitus     type 2    Diabetic retinopathy 01/09/2019    EBV infection 12/7/2016    EBV DNA, PCR Latest Ref Range: None detected  None detected EBV DNA-Copies/mL Unknown Test Not Performed EBV Early Antigen Ab, IgG Latest Ref Range: <1:10 Titer 1:40 (A) EBV Nuclear Ag Ab Latest Ref Range: <1:5 Titer >=1:80 (A) EBV VCA IgG Latest Ref Range: <1:10 Titer 1:160 (A) EBV VCA IgM Latest Ref Range: <1:10 Titer <1:10     History of TIA (transient ischemic attack)     several-taking Plavix    Hx of gallstones     Wife denies    Hypertension     Hyperthyroidism 11/30/2016    Low HDL (under 40) 12/7/2016    Mixed hyperlipidemia 11/23/2016    JUAN MANUEL (obstructive sleep apnea)     Pneumonia     Skin disease     Squamous cell carcinoma 08/2018    right temple    Stroke     Transient cerebral ischemia 7/22/2016    Type 2 diabetes mellitus      Past Surgical History:   Procedure Laterality Date    Moh's procedure x4      rectal cancer      Sinus surgery for sinus cancer      sinus sx for cancer      skin cancer removed-several times-nose & ear      TONSILLECTOMY      Undescened testicle sx      UVULOPALATOPHARYNGOPLASTY      for sleep apnea       Home Meds:   Prior to Admission medications    Medication Sig Start Date End Date Taking? Authorizing Provider   ACCU-CHEK NEYDA PLUS TEST STRP Strp CHECK BLOOD SUGAR ONCE DAILY 12/27/19   Aziza Ogden MD   ciprofloxacin HCl (CIPRO) 500 MG tablet TAKE 1 TABLET(500 MG) BY MOUTH EVERY  MONDAY 1/23/20   Renato Womack MD   divalproex (DEPAKOTE) 250 MG EC tablet TAKE 2 TABLETS BY MOUTH EVERY EVENING 10/28/19   Prisca Espinoza MD   furosemide (LASIX) 20 MG tablet TAKE 2 TABLETS BY MOUTH EVERY DAY 5/2/19   Renato Womack MD   lactulose (CHRONULAC) 10 gram/15 mL solution TAKE 15 MLS BY MOUTH TWICE DAILY 1/26/20   Renato Womack MD   lancets Misc To check blood sugar once daily, to use with Accu-Chek meter. 11/5/18   Prisca Espinoza MD   SITagliptan (JANUVIA) 50 MG Tab Take 1 tablet (50 mg total) by mouth once daily. 4/6/17   Prisca Espinoza MD   XIFAXAN 550 mg Tab Take 1 tablet (550 mg total) by mouth 2 (two) times daily. 2/10/20   Renato Womack MD     Anticoagulants/Antiplatelets: no anticoagulation    Allergies:   Review of patient's allergies indicates:   Allergen Reactions    Abilify [aripiprazole] Other (See Comments)     Sleepy, could not function.     Sedation History:  no adverse reactions    Review of Systems:   Hematological: no known coagulopathies  Respiratory: no shortness of breath  Cardiovascular: no chest pain  Gastrointestinal: no abdominal pain  Genito-Urinary: no dysuria  Musculoskeletal: negative  Neurological: no TIA or stroke symptoms         OBJECTIVE:     Vital Signs (Most Recent)       Physical Exam:  ASA: 2  Mallampati: n/a    General: no acute distress  Mental Status: alert and oriented to person, place and time  HEENT: normocephalic, atraumatic  Chest: unlabored breathing  Heart: regular heart rate  Abdomen: distended  Extremity: moves all extremities    ASSESSMENT/PLAN:     Sedation Plan: local  Patient will undergo ultrasound guided paracentesis.    MATT Mauricio, FNP  Interventional Radiology  [phone] clinic

## 2020-03-20 ENCOUNTER — HOSPITAL ENCOUNTER (OUTPATIENT)
Dept: INTERVENTIONAL RADIOLOGY/VASCULAR | Facility: HOSPITAL | Age: 80
Discharge: HOME | End: 2020-03-20
Attending: INTERNAL MEDICINE
Payer: MEDICARE

## 2020-03-20 VITALS
OXYGEN SATURATION: 97 % | DIASTOLIC BLOOD PRESSURE: 62 MMHG | SYSTOLIC BLOOD PRESSURE: 138 MMHG | RESPIRATION RATE: 16 BRPM | HEART RATE: 49 BPM

## 2020-03-20 DIAGNOSIS — R18.8 OTHER ASCITES: ICD-10-CM

## 2020-03-20 LAB
APPEARANCE FLD: NORMAL
BODY FLD TYPE: NORMAL
COLOR FLD: YELLOW
LYMPHOCYTES NFR FLD MANUAL: 100 %
WBC # FLD: 19 /CU MM

## 2020-03-20 PROCEDURE — 49083 ABD PARACENTESIS W/IMAGING: CPT

## 2020-03-20 PROCEDURE — 49083 ABD PARACENTESIS W/IMAGING: CPT | Mod: GC,,, | Performed by: RADIOLOGY

## 2020-03-20 PROCEDURE — 49083 IR PARACENTESIS WITH IMAGING: ICD-10-PCS | Mod: GC,,, | Performed by: RADIOLOGY

## 2020-03-20 PROCEDURE — 89051 BODY FLUID CELL COUNT: CPT

## 2020-03-20 NOTE — H&P
Radiology History & Physical      SUBJECTIVE:     Chief Complaint: abdominal distention    History of Present Illness:  Kenneth Sheikh is a 80 y.o. male who presents for US guided paracentesis    Past Medical History:   Diagnosis Date    Anticoagulant long-term use     Basal cell carcinoma     Bipolar 1 disorder     Bipolar 1 disorder 11/24/2016    bipolar    Cancer     history of skin cancer/sinus cancer & rectal cancer    Depressed bipolar affective disorder     Diabetes mellitus     type 2    Diabetic retinopathy 01/09/2019    EBV infection 12/7/2016    EBV DNA, PCR Latest Ref Range: None detected  None detected EBV DNA-Copies/mL Unknown Test Not Performed EBV Early Antigen Ab, IgG Latest Ref Range: <1:10 Titer 1:40 (A) EBV Nuclear Ag Ab Latest Ref Range: <1:5 Titer >=1:80 (A) EBV VCA IgG Latest Ref Range: <1:10 Titer 1:160 (A) EBV VCA IgM Latest Ref Range: <1:10 Titer <1:10     History of TIA (transient ischemic attack)     several-taking Plavix    Hx of gallstones     Wife denies    Hypertension     Hyperthyroidism 11/30/2016    Low HDL (under 40) 12/7/2016    Mixed hyperlipidemia 11/23/2016    JUAN MANUEL (obstructive sleep apnea)     Pneumonia     Skin disease     Squamous cell carcinoma 08/2018    right temple    Stroke     Transient cerebral ischemia 7/22/2016    Type 2 diabetes mellitus      Past Surgical History:   Procedure Laterality Date    Moh's procedure x4      rectal cancer      Sinus surgery for sinus cancer      sinus sx for cancer      skin cancer removed-several times-nose & ear      TONSILLECTOMY      Undescened testicle sx      UVULOPALATOPHARYNGOPLASTY      for sleep apnea       Home Meds:   Prior to Admission medications    Medication Sig Start Date End Date Taking? Authorizing Provider   ACCU-CHEK NEYDA PLUS TEST STRP Strp CHECK BLOOD SUGAR ONCE DAILY 12/27/19   Aziza Ogden MD   ciprofloxacin HCl (CIPRO) 500 MG tablet TAKE 1 TABLET(500 MG) BY MOUTH EVERY MONDAY  1/23/20   Renato Womack MD   divalproex (DEPAKOTE) 250 MG EC tablet TAKE 2 TABLETS BY MOUTH EVERY EVENING 10/28/19   Prisca Espinoza MD   furosemide (LASIX) 20 MG tablet TAKE 2 TABLETS BY MOUTH EVERY DAY 5/2/19   Renato Womack MD   lactulose (CHRONULAC) 10 gram/15 mL solution TAKE 15 MLS BY MOUTH TWICE DAILY 1/26/20   Renato Womack MD   lancets Misc To check blood sugar once daily, to use with Accu-Chek meter. 11/5/18   Prisca Espinoza MD   SITagliptan (JANUVIA) 50 MG Tab Take 1 tablet (50 mg total) by mouth once daily. 4/6/17   Prisca Espinoza MD   XIFAXAN 550 mg Tab Take 1 tablet (550 mg total) by mouth 2 (two) times daily. 2/10/20   Renato Womack MD     Anticoagulants/Antiplatelets: no anticoagulation    Allergies:   Review of patient's allergies indicates:   Allergen Reactions    Abilify [aripiprazole] Other (See Comments)     Sleepy, could not function.     Sedation History:  no adverse reactions    Review of Systems:   Hematological: no known coagulopathies  Respiratory: no shortness of breath  Cardiovascular: no chest pain  Gastrointestinal: no abdominal pain  Genito-Urinary: no dysuria  Musculoskeletal: negative  Neurological: no TIA or stroke symptoms         OBJECTIVE:     Vital Signs (Most Recent)       Physical Exam:  ASA: 2  Mallampati: n/a    General: no acute distress  Mental Status: alert and oriented to person, place and time  HEENT: normocephalic, atraumatic  Chest: unlabored breathing  Heart: regular heart rate  Abdomen: distended  Extremity: moves all extremities    ASSESSMENT/PLAN:     Sedation Plan: local  Patient will undergo US guided paracentesis.    Radha Castellanos PA-C

## 2020-03-20 NOTE — DISCHARGE INSTRUCTIONS
Please call with any questions or concerns.      Monday thru Friday 8:00 am - 4:30 pm    Interventional Radiology   [phone]    After Hours    Ask for the Radiology Intern on call  [phone]

## 2020-03-20 NOTE — PROGRESS NOTES
Paracentesis complete. Pt tolerated well. Site clean, dry, intact, no bleeding, no hematoma. Pt given site care an d discharge instructions. Pt verbalized understanding. Pt to be discharged home in wheelchair in care of wife asn taken to main entrance by transporter

## 2020-03-27 ENCOUNTER — HOSPITAL ENCOUNTER (OUTPATIENT)
Dept: INTERVENTIONAL RADIOLOGY/VASCULAR | Facility: HOSPITAL | Age: 80
Discharge: HOME | End: 2020-03-27
Attending: INTERNAL MEDICINE
Payer: MEDICARE

## 2020-03-27 VITALS
OXYGEN SATURATION: 97 % | RESPIRATION RATE: 15 BRPM | SYSTOLIC BLOOD PRESSURE: 145 MMHG | DIASTOLIC BLOOD PRESSURE: 68 MMHG | HEART RATE: 48 BPM

## 2020-03-27 DIAGNOSIS — R18.8 OTHER ASCITES: ICD-10-CM

## 2020-03-27 LAB
APPEARANCE FLD: NORMAL
BASOPHILS NFR FLD MANUAL: 2 %
BODY FLD TYPE: NORMAL
COLOR FLD: YELLOW
GRAM STN SPEC: NORMAL
GRAM STN SPEC: NORMAL
LYMPHOCYTES NFR FLD MANUAL: 40 %
MONOS+MACROS NFR FLD MANUAL: 55 %
NEUTROPHILS NFR FLD MANUAL: 3 %
WBC # FLD: 51 /CU MM

## 2020-03-27 PROCEDURE — 87070 CULTURE OTHR SPECIMN AEROBIC: CPT

## 2020-03-27 PROCEDURE — 49083 ABD PARACENTESIS W/IMAGING: CPT | Mod: GC,,, | Performed by: FAMILY MEDICINE

## 2020-03-27 PROCEDURE — 49083 IR PARACENTESIS WITH IMAGING: ICD-10-PCS | Mod: GC,,, | Performed by: FAMILY MEDICINE

## 2020-03-27 PROCEDURE — 87205 SMEAR GRAM STAIN: CPT

## 2020-03-27 PROCEDURE — 89051 BODY FLUID CELL COUNT: CPT

## 2020-03-27 PROCEDURE — 49083 ABD PARACENTESIS W/IMAGING: CPT

## 2020-03-27 PROCEDURE — 87075 CULTR BACTERIA EXCEPT BLOOD: CPT

## 2020-03-27 NOTE — H&P
Radiology History & Physical      SUBJECTIVE:     Chief Complaint: abdominal distention    History of Present Illness:  Kenneth Sheikh is a 80 y.o. male who presents for ultrasound guided paracentesis  Past Medical History:   Diagnosis Date    Anticoagulant long-term use     Basal cell carcinoma     Bipolar 1 disorder     Bipolar 1 disorder 11/24/2016    bipolar    Cancer     history of skin cancer/sinus cancer & rectal cancer    Depressed bipolar affective disorder     Diabetes mellitus     type 2    Diabetic retinopathy 01/09/2019    EBV infection 12/7/2016    EBV DNA, PCR Latest Ref Range: None detected  None detected EBV DNA-Copies/mL Unknown Test Not Performed EBV Early Antigen Ab, IgG Latest Ref Range: <1:10 Titer 1:40 (A) EBV Nuclear Ag Ab Latest Ref Range: <1:5 Titer >=1:80 (A) EBV VCA IgG Latest Ref Range: <1:10 Titer 1:160 (A) EBV VCA IgM Latest Ref Range: <1:10 Titer <1:10     History of TIA (transient ischemic attack)     several-taking Plavix    Hx of gallstones     Wife denies    Hypertension     Hyperthyroidism 11/30/2016    Low HDL (under 40) 12/7/2016    Mixed hyperlipidemia 11/23/2016    JUAN MANUEL (obstructive sleep apnea)     Pneumonia     Skin disease     Squamous cell carcinoma 08/2018    right temple    Stroke     Transient cerebral ischemia 7/22/2016    Type 2 diabetes mellitus      Past Surgical History:   Procedure Laterality Date    Moh's procedure x4      rectal cancer      Sinus surgery for sinus cancer      sinus sx for cancer      skin cancer removed-several times-nose & ear      TONSILLECTOMY      Undescened testicle sx      UVULOPALATOPHARYNGOPLASTY      for sleep apnea       Home Meds:   Prior to Admission medications    Medication Sig Start Date End Date Taking? Authorizing Provider   ACCU-CHEK NEYDA PLUS TEST STRP Strp CHECK BLOOD SUGAR ONCE DAILY 12/27/19   Aziza Ogden MD   ciprofloxacin HCl (CIPRO) 500 MG tablet TAKE 1 TABLET(500 MG) BY MOUTH EVERY  MONDAY 1/23/20   Renato Womack MD   divalproex (DEPAKOTE) 250 MG EC tablet TAKE 2 TABLETS BY MOUTH EVERY EVENING 10/28/19   Prisca Espinoza MD   furosemide (LASIX) 20 MG tablet TAKE 2 TABLETS BY MOUTH EVERY DAY 5/2/19   Renato Womack MD   lactulose (CHRONULAC) 10 gram/15 mL solution TAKE 15 MLS BY MOUTH TWICE DAILY 1/26/20   Renato Womack MD   lancets Misc To check blood sugar once daily, to use with Accu-Chek meter. 11/5/18   Prisca Espinoza MD   SITagliptan (JANUVIA) 50 MG Tab Take 1 tablet (50 mg total) by mouth once daily. 4/6/17   Prisca Espinoza MD   XIFAXAN 550 mg Tab Take 1 tablet (550 mg total) by mouth 2 (two) times daily. 2/10/20   Renato Womack MD     Anticoagulants/Antiplatelets: no anticoagulation    Allergies:   Review of patient's allergies indicates:   Allergen Reactions    Abilify [aripiprazole] Other (See Comments)     Sleepy, could not function.     Sedation History:  no adverse reactions    Review of Systems:   Hematological: no known coagulopathies  Respiratory: no shortness of breath  Cardiovascular: no chest pain  Gastrointestinal: no abdominal pain  Genito-Urinary: no dysuria  Musculoskeletal: negative  Neurological: no TIA or stroke symptoms         OBJECTIVE:     Vital Signs (Most Recent)  Pulse: (!) 48 (03/27/20 1045)  Resp: 15 (03/27/20 1045)  BP: (!) 145/68 (03/27/20 1045)  SpO2: 97 % (03/27/20 1045)    Physical Exam:  ASA: 2  Mallampati: n/a    General: no acute distress  Mental Status: alert and oriented to person, place and time  HEENT: normocephalic, atraumatic  Chest: unlabored breathing  Heart: regular heart rate  Abdomen: distended  Extremity: moves all extremities    ASSESSMENT/PLAN:     Sedation Plan: local  Patient will undergo ultrasound guided paracentesis.    MATT Mauricio, FNP  Interventional Radiology  [phone] [facility]

## 2020-03-27 NOTE — PLAN OF CARE
Procedure complete, 4400 ml's removed, patient tolerated well,all VSS,, will be discharged home,,,

## 2020-03-27 NOTE — PROCEDURES

## 2020-03-27 NOTE — PLAN OF CARE
Patient to department for paracentesis, ID's times 2, placed on monitors, all VSS,,, prepped for para's, WCTM

## 2020-03-30 LAB — BACTERIA SPEC AEROBE CULT: NO GROWTH

## 2020-04-03 ENCOUNTER — HOSPITAL ENCOUNTER (OUTPATIENT)
Dept: INTERVENTIONAL RADIOLOGY/VASCULAR | Facility: HOSPITAL | Age: 80
Discharge: HOME | End: 2020-04-03
Attending: INTERNAL MEDICINE
Payer: MEDICARE

## 2020-04-03 VITALS
HEART RATE: 50 BPM | RESPIRATION RATE: 16 BRPM | DIASTOLIC BLOOD PRESSURE: 70 MMHG | OXYGEN SATURATION: 98 % | SYSTOLIC BLOOD PRESSURE: 139 MMHG

## 2020-04-03 DIAGNOSIS — R18.8 OTHER ASCITES: ICD-10-CM

## 2020-04-03 LAB
APPEARANCE FLD: NORMAL
BACTERIA SPEC ANAEROBE CULT: NORMAL
BODY FLD TYPE: NORMAL
BODY FLUID COMMENTS: NORMAL
COLOR FLD: YELLOW
GRAM STN SPEC: NORMAL
GRAM STN SPEC: NORMAL
LYMPHOCYTES NFR FLD MANUAL: 38 %
MONOS+MACROS NFR FLD MANUAL: 62 %
WBC # FLD: 31 /CU MM

## 2020-04-03 PROCEDURE — 87205 SMEAR GRAM STAIN: CPT

## 2020-04-03 PROCEDURE — 49083 IR PARACENTESIS WITH IMAGING: ICD-10-PCS | Mod: ,,, | Performed by: RADIOLOGY

## 2020-04-03 PROCEDURE — 49083 ABD PARACENTESIS W/IMAGING: CPT

## 2020-04-03 PROCEDURE — 89051 BODY FLUID CELL COUNT: CPT

## 2020-04-03 PROCEDURE — 87075 CULTR BACTERIA EXCEPT BLOOD: CPT

## 2020-04-03 PROCEDURE — 87070 CULTURE OTHR SPECIMN AEROBIC: CPT

## 2020-04-03 PROCEDURE — 49083 ABD PARACENTESIS W/IMAGING: CPT | Mod: ,,, | Performed by: RADIOLOGY

## 2020-04-03 NOTE — PROGRESS NOTES
Paracentesis complete. 4400 mLs peritoneal fluid drained. Pt tolerated well. Dressing to Abdomen clean, dry, and intact.  Specimens sent per lab order.Pt acknowledged understanding of discharge instructions. Pt discharged per unit and hospital protocol via wheelchair.

## 2020-04-03 NOTE — PROCEDURES
Radiology Post-Procedure Note    Pre Op Diagnosis: Ascites  Post Op Diagnosis: Same    Procedure: Ultrasound Guided Paracentesis    Procedure performed by: Kaia Herrera PA-C    Written Informed Consent Obtained: Yes  Specimen Removed: YES cloudy, yellow  Estimated Blood Loss: Minimal    Findings:   Successful paracentesis.  Albumin administered PRN per protocol.    Patient tolerated procedure well.    Kaia Comer, MATT, FNP  Interventional Radiology  [phone] clinic

## 2020-04-03 NOTE — H&P
Radiology History & Physical      SUBJECTIVE:     Chief Complaint: abdominal distention   History of Present Illness:  Kenneth Sheikh is a 80 y.o. male who presents for ultrasound guided paracentesis  Past Medical History:   Diagnosis Date    Anticoagulant long-term use     Basal cell carcinoma     Bipolar 1 disorder     Bipolar 1 disorder 11/24/2016    bipolar    Cancer     history of skin cancer/sinus cancer & rectal cancer    Depressed bipolar affective disorder     Diabetes mellitus     type 2    Diabetic retinopathy 01/09/2019    EBV infection 12/7/2016    EBV DNA, PCR Latest Ref Range: None detected  None detected EBV DNA-Copies/mL Unknown Test Not Performed EBV Early Antigen Ab, IgG Latest Ref Range: <1:10 Titer 1:40 (A) EBV Nuclear Ag Ab Latest Ref Range: <1:5 Titer >=1:80 (A) EBV VCA IgG Latest Ref Range: <1:10 Titer 1:160 (A) EBV VCA IgM Latest Ref Range: <1:10 Titer <1:10     History of TIA (transient ischemic attack)     several-taking Plavix    Hx of gallstones     Wife denies    Hypertension     Hyperthyroidism 11/30/2016    Low HDL (under 40) 12/7/2016    Mixed hyperlipidemia 11/23/2016    JUAN MANUEL (obstructive sleep apnea)     Pneumonia     Skin disease     Squamous cell carcinoma 08/2018    right temple    Stroke     Transient cerebral ischemia 7/22/2016    Type 2 diabetes mellitus      Past Surgical History:   Procedure Laterality Date    Moh's procedure x4      rectal cancer      Sinus surgery for sinus cancer      sinus sx for cancer      skin cancer removed-several times-nose & ear      TONSILLECTOMY      Undescened testicle sx      UVULOPALATOPHARYNGOPLASTY      for sleep apnea       Home Meds:   Prior to Admission medications    Medication Sig Start Date End Date Taking? Authorizing Provider   ACCU-CHEK NEYDA PLUS TEST STRP Strp CHECK BLOOD SUGAR ONCE DAILY 12/27/19   Aziza Ogden MD   ciprofloxacin HCl (CIPRO) 500 MG tablet TAKE 1 TABLET(500 MG) BY MOUTH EVERY MONDAY  1/23/20   Renato Womack MD   divalproex (DEPAKOTE) 250 MG EC tablet TAKE 2 TABLETS BY MOUTH EVERY EVENING 10/28/19   Prisca Espinoza MD   furosemide (LASIX) 20 MG tablet TAKE 2 TABLETS BY MOUTH EVERY DAY 5/2/19   Renato Womack MD   lactulose (CHRONULAC) 10 gram/15 mL solution TAKE 15 MLS BY MOUTH TWICE DAILY 1/26/20   Renato Womack MD   lancets Misc To check blood sugar once daily, to use with Accu-Chek meter. 11/5/18   Prisca Espinoza MD   SITagliptan (JANUVIA) 50 MG Tab Take 1 tablet (50 mg total) by mouth once daily. 4/6/17   Prisca Espinoza MD   XIFAXAN 550 mg Tab Take 1 tablet (550 mg total) by mouth 2 (two) times daily. 2/10/20   Renato Womack MD     Anticoagulants/Antiplatelets: no anticoagulation    Allergies:   Review of patient's allergies indicates:   Allergen Reactions    Abilify [aripiprazole] Other (See Comments)     Sleepy, could not function.     Sedation History:  no adverse reactions    Review of Systems:   Hematological: no known coagulopathies  Respiratory: no shortness of breath  Cardiovascular: no chest pain  Gastrointestinal: no abdominal pain  Genito-Urinary: no dysuria  Musculoskeletal: negative  Neurological: no TIA or stroke symptoms         OBJECTIVE:     Vital Signs (Most Recent)  Pulse: (!) 49 (04/03/20 1100)  Resp: 16 (04/03/20 1100)  BP: (!) 141/72 (04/03/20 1100)  SpO2: 98 % (04/03/20 1100)    Physical Exam:  ASA: 2  Mallampati: n/a    General: no acute distress  Mental Status: alert and oriented to person, place and time  HEENT: normocephalic, atraumatic  Chest: unlabored breathing  Heart: regular heart rate  Abdomen: distended  Extremity: moves all extremities    ASSESSMENT/PLAN:     Sedation Plan: local  Patient will undergo ultrasound guided paracentesis.    Kaia Comer, MATT, FNP  Interventional Radiology  [phone] [facility]

## 2020-04-05 DIAGNOSIS — K74.60 CIRRHOSIS OF LIVER WITH ASCITES, UNSPECIFIED HEPATIC CIRRHOSIS TYPE: ICD-10-CM

## 2020-04-05 DIAGNOSIS — R18.8 CIRRHOSIS OF LIVER WITH ASCITES, UNSPECIFIED HEPATIC CIRRHOSIS TYPE: ICD-10-CM

## 2020-04-06 LAB — BACTERIA SPEC AEROBE CULT: NO GROWTH

## 2020-04-06 RX ORDER — LACTULOSE 10 G/15ML
SOLUTION ORAL; RECTAL
Qty: 946 ML | Refills: 6 | Status: SHIPPED | OUTPATIENT
Start: 2020-04-06

## 2020-04-09 ENCOUNTER — HOSPITAL ENCOUNTER (OUTPATIENT)
Dept: INTERVENTIONAL RADIOLOGY/VASCULAR | Facility: HOSPITAL | Age: 80
Discharge: HOME | End: 2020-04-09
Attending: INTERNAL MEDICINE
Payer: MEDICARE

## 2020-04-09 VITALS
HEART RATE: 51 BPM | RESPIRATION RATE: 16 BRPM | OXYGEN SATURATION: 100 % | SYSTOLIC BLOOD PRESSURE: 104 MMHG | DIASTOLIC BLOOD PRESSURE: 50 MMHG

## 2020-04-09 DIAGNOSIS — R18.8 OTHER ASCITES: ICD-10-CM

## 2020-04-09 PROCEDURE — 49083 IR PARACENTESIS WITH IMAGING: ICD-10-PCS | Mod: ,,, | Performed by: RADIOLOGY

## 2020-04-09 PROCEDURE — 49083 ABD PARACENTESIS W/IMAGING: CPT

## 2020-04-09 PROCEDURE — 49083 ABD PARACENTESIS W/IMAGING: CPT | Mod: ,,, | Performed by: RADIOLOGY

## 2020-04-09 NOTE — H&P
Radiology History & Physical      SUBJECTIVE:     Chief Complaint: abdominal distension 2/2 ascites    History of Present Illness:  Kenneth Sheikh is a 80 y.o. male presenting for paracentesis.    Past Medical History:   Diagnosis Date    Anticoagulant long-term use     Basal cell carcinoma     Bipolar 1 disorder     Bipolar 1 disorder 11/24/2016    bipolar    Cancer     history of skin cancer/sinus cancer & rectal cancer    Depressed bipolar affective disorder     Diabetes mellitus     type 2    Diabetic retinopathy 01/09/2019    EBV infection 12/7/2016    EBV DNA, PCR Latest Ref Range: None detected  None detected EBV DNA-Copies/mL Unknown Test Not Performed EBV Early Antigen Ab, IgG Latest Ref Range: <1:10 Titer 1:40 (A) EBV Nuclear Ag Ab Latest Ref Range: <1:5 Titer >=1:80 (A) EBV VCA IgG Latest Ref Range: <1:10 Titer 1:160 (A) EBV VCA IgM Latest Ref Range: <1:10 Titer <1:10     History of TIA (transient ischemic attack)     several-taking Plavix    Hx of gallstones     Wife denies    Hypertension     Hyperthyroidism 11/30/2016    Low HDL (under 40) 12/7/2016    Mixed hyperlipidemia 11/23/2016    JUAN MANUEL (obstructive sleep apnea)     Pneumonia     Skin disease     Squamous cell carcinoma 08/2018    right temple    Stroke     Transient cerebral ischemia 7/22/2016    Type 2 diabetes mellitus      Past Surgical History:   Procedure Laterality Date    Moh's procedure x4      rectal cancer      Sinus surgery for sinus cancer      sinus sx for cancer      skin cancer removed-several times-nose & ear      TONSILLECTOMY      Undescened testicle sx      UVULOPALATOPHARYNGOPLASTY      for sleep apnea       Home Meds:   Prior to Admission medications    Medication Sig Start Date End Date Taking? Authorizing Provider   ACCU-CHEK NEYDA PLUS TEST STRP Strp CHECK BLOOD SUGAR ONCE DAILY 12/27/19   Aziza Ogden MD   ciprofloxacin HCl (CIPRO) 500 MG tablet TAKE 1 TABLET(500 MG) BY MOUTH EVERY MONDAY  1/23/20   Renato Womack MD   divalproex (DEPAKOTE) 250 MG EC tablet TAKE 2 TABLETS BY MOUTH EVERY EVENING 10/28/19   Prisca Espinoza MD   furosemide (LASIX) 20 MG tablet TAKE 2 TABLETS BY MOUTH EVERY DAY 5/2/19   Renato Womack MD   lactulose (CHRONULAC) 10 gram/15 mL solution TAKE 15 MLS BY MOUTH TWICE DAILY 4/6/20   Renato Womack MD   lancets Misc To check blood sugar once daily, to use with Accu-Chek meter. 11/5/18   Prisca Espinoza MD   SITagliptan (JANUVIA) 50 MG Tab Take 1 tablet (50 mg total) by mouth once daily. 4/6/17   Prisca Espinoza MD   XIFAXAN 550 mg Tab Take 1 tablet (550 mg total) by mouth 2 (two) times daily. 2/10/20   Renato Womack MD     Anticoagulants/Antiplatelets: no anticoagulation    Allergies:   Review of patient's allergies indicates:   Allergen Reactions    Abilify [aripiprazole] Other (See Comments)     Sleepy, could not function.     Sedation History:  no adverse reactions    Review of Systems:   Hematological: no known coagulopathies  Respiratory: no shortness of breath  Cardiovascular: no chest pain  Gastrointestinal: no abdominal pain  Genito-Urinary: no dysuria  Musculoskeletal: negative  Neurological: no TIA or stroke symptoms         OBJECTIVE:     Vital Signs (Most Recent)       Physical Exam:  ASA: 2  Mallampati: 2    General: no acute distress  Mental Status: alert and oriented to person, place and time  HEENT: normocephalic, atraumatic  Chest: unlabored breathing  Heart: regular heart rate  Abdomen: nondistended  Extremity: moves all extremities    Laboratory  Lab Results   Component Value Date    INR 1.1 11/29/2019       Lab Results   Component Value Date    WBC 3.85 (L) 01/28/2020    HGB 10.8 (L) 01/28/2020    HCT 35.1 (L) 01/28/2020    MCV 92 01/28/2020     (L) 01/28/2020      Lab Results   Component Value Date     (H) 01/28/2020     01/28/2020    K 3.3 (L) 01/28/2020     01/28/2020    CO2 27 01/28/2020    BUN 18  01/28/2020    CREATININE 1.2 01/28/2020    CALCIUM 8.2 (L) 01/28/2020    MG 1.7 05/15/2017    ALT 10 01/28/2020    AST 23 01/28/2020    ALBUMIN 2.1 (L) 01/28/2020    BILITOT 0.6 01/28/2020       ASSESSMENT/PLAN:     Sedation Plan: local    Patient will undergo paracentesis.      Noe Priset MD, MS  Radiology  PGY-2  Pager: [phone]

## 2020-04-09 NOTE — NURSING
Paracentesis completed, 4000ml removed, pt tolerated well, dressing CDI, no bleeding, no hematoma noted, pt left via WC with transport to meet wife @ [facility]

## 2020-04-09 NOTE — PROCEDURES
Radiology Post-Procedure Note    Pre Op Diagnosis: Ascites  Post Op Diagnosis: Same    Procedure: Paracentesis    Procedure performed by: TIMMY Priest    Written Informed Consent Obtained: Yes  Specimen Removed: YES cloudy ascites  Estimated Blood Loss: Minimal    Findings:   Successful paracentesis.  Albumin administered PRN per protocol.    Patient tolerated procedure well.      Noe Priest MD, MS  Radiology  PGY-2  Pager: [phone]

## 2020-04-13 LAB — BACTERIA SPEC ANAEROBE CULT: NORMAL

## 2020-04-15 DIAGNOSIS — U07.1 COVID-19: Primary | ICD-10-CM

## 2020-04-16 ENCOUNTER — LAB VISIT (OUTPATIENT)
Dept: INTERNAL MEDICINE | Facility: CLINIC | Age: 80
End: 2020-04-16
Payer: MEDICARE

## 2020-04-16 ENCOUNTER — TELEPHONE (OUTPATIENT)
Dept: INTERVENTIONAL RADIOLOGY/VASCULAR | Facility: CLINIC | Age: 80
End: 2020-04-16

## 2020-04-16 DIAGNOSIS — U07.1 COVID-19: ICD-10-CM

## 2020-04-16 DIAGNOSIS — E11.22 TYPE 2 DIABETES MELLITUS WITH STAGE 3 CHRONIC KIDNEY DISEASE, WITHOUT LONG-TERM CURRENT USE OF INSULIN: ICD-10-CM

## 2020-04-16 DIAGNOSIS — N18.30 TYPE 2 DIABETES MELLITUS WITH STAGE 3 CHRONIC KIDNEY DISEASE, WITHOUT LONG-TERM CURRENT USE OF INSULIN: ICD-10-CM

## 2020-04-16 LAB — SARS-COV-2 RNA RESP QL NAA+PROBE: NOT DETECTED

## 2020-04-16 PROCEDURE — U0002 COVID-19 LAB TEST NON-CDC: HCPCS

## 2020-04-16 NOTE — TELEPHONE ENCOUNTER
----- Message from Jimmy Elkins sent at 4/16/2020 11:34 AM CDT -----  Contact: Spouse/ Ginna [phone]  Requesting an RX refill or new RX.  Is this a refill or new RX:  Refill  RX name and strength: ACCU-CHEK NEYDA PLUS TEST STRP Strp  Directions (copy/paste from chart):    Is this a 30 day or 90 day RX:    Local pharmacy or mail order pharmacy:    Pharmacy name and phone # [id number] [place] [address] [address] AT [facility] [address] [phone] (Phone)  [phone] (Fax)

## 2020-04-16 NOTE — TELEPHONE ENCOUNTER
Your test was NEGATIVE for COVID-19 (coronavirus).  If you still have symptoms, treat with rest, fluids, and over-the-counter medications.  Continue to stay home and practice proper handwashing.     If your symptoms worsen or if you have any other concerns, please contact [facility] at [phone].     Sincerely,    Kaia Herrera PA-C

## 2020-04-17 ENCOUNTER — HOSPITAL ENCOUNTER (OUTPATIENT)
Dept: INTERVENTIONAL RADIOLOGY/VASCULAR | Facility: HOSPITAL | Age: 80
Discharge: HOME | End: 2020-04-17
Attending: INTERNAL MEDICINE
Payer: MEDICARE

## 2020-04-17 VITALS
OXYGEN SATURATION: 97 % | SYSTOLIC BLOOD PRESSURE: 139 MMHG | HEART RATE: 51 BPM | RESPIRATION RATE: 18 BRPM | DIASTOLIC BLOOD PRESSURE: 61 MMHG

## 2020-04-17 DIAGNOSIS — R18.8 OTHER ASCITES: ICD-10-CM

## 2020-04-17 LAB
APPEARANCE FLD: NORMAL
BODY FLD TYPE: NORMAL
COLOR FLD: YELLOW
LYMPHOCYTES NFR FLD MANUAL: 100 %
WBC # FLD: 24 /CU MM

## 2020-04-17 PROCEDURE — 49083 ABD PARACENTESIS W/IMAGING: CPT | Mod: ,,, | Performed by: RADIOLOGY

## 2020-04-17 PROCEDURE — 89051 BODY FLUID CELL COUNT: CPT

## 2020-04-17 PROCEDURE — 49083 ABD PARACENTESIS W/IMAGING: CPT

## 2020-04-17 PROCEDURE — 87075 CULTR BACTERIA EXCEPT BLOOD: CPT

## 2020-04-17 PROCEDURE — 87070 CULTURE OTHR SPECIMN AEROBIC: CPT

## 2020-04-17 PROCEDURE — 49083 IR PARACENTESIS WITH IMAGING: ICD-10-PCS | Mod: ,,, | Performed by: RADIOLOGY

## 2020-04-17 RX ORDER — BLOOD SUGAR DIAGNOSTIC
STRIP MISCELLANEOUS
Qty: 100 STRIP | Refills: 11 | Status: ON HOLD | OUTPATIENT
Start: 2020-04-17 | End: 2020-09-11 | Stop reason: HOSPADM

## 2020-04-17 NOTE — PROCEDURES
Radiology Post-Procedure Note    Pre Op Diagnosis: Ascites  Post Op Diagnosis: Same    Procedure: Ultrasound Guided Paracentesis    Procedure performed by: Kaia Herrera PA-C    Written Informed Consent Obtained: Yes  Specimen Removed: YES clear, yellow  Estimated Blood Loss: Minimal    Findings:   Successful paracentesis.  Albumin administered PRN per protocol.    Patient tolerated procedure well.    MATT Richards PA-C, FNP  Interventional Radiology  [phone] clinic

## 2020-04-17 NOTE — H&P
Radiology History & Physical      SUBJECTIVE:     Chief Complaint: abdominal distention    History of Present Illness:  Kenneth Sheikh is a 80 y.o. male who presents for ultrasound guided paracentesis  Past Medical History:   Diagnosis Date    Anticoagulant long-term use     Basal cell carcinoma     Bipolar 1 disorder     Bipolar 1 disorder 11/24/2016    bipolar    Cancer     history of skin cancer/sinus cancer & rectal cancer    Depressed bipolar affective disorder     Diabetes mellitus     type 2    Diabetic retinopathy 01/09/2019    EBV infection 12/7/2016    EBV DNA, PCR Latest Ref Range: None detected  None detected EBV DNA-Copies/mL Unknown Test Not Performed EBV Early Antigen Ab, IgG Latest Ref Range: <1:10 Titer 1:40 (A) EBV Nuclear Ag Ab Latest Ref Range: <1:5 Titer >=1:80 (A) EBV VCA IgG Latest Ref Range: <1:10 Titer 1:160 (A) EBV VCA IgM Latest Ref Range: <1:10 Titer <1:10     History of TIA (transient ischemic attack)     several-taking Plavix    Hx of gallstones     Wife denies    Hypertension     Hyperthyroidism 11/30/2016    Low HDL (under 40) 12/7/2016    Mixed hyperlipidemia 11/23/2016    JUAN MANUEL (obstructive sleep apnea)     Pneumonia     Skin disease     Squamous cell carcinoma 08/2018    right temple    Stroke     Transient cerebral ischemia 7/22/2016    Type 2 diabetes mellitus      Past Surgical History:   Procedure Laterality Date    Moh's procedure x4      rectal cancer      Sinus surgery for sinus cancer      sinus sx for cancer      skin cancer removed-several times-nose & ear      TONSILLECTOMY      Undescened testicle sx      UVULOPALATOPHARYNGOPLASTY      for sleep apnea       Home Meds:   Prior to Admission medications    Medication Sig Start Date End Date Taking? Authorizing Provider   ACCU-CHEK NEYDA PLUS TEST STRP Strp CHECK BLOOD SUGAR ONCE DAILY 4/17/20   Prisca Espinoza MD   ciprofloxacin HCl (CIPRO) 500 MG tablet TAKE 1 TABLET(500 MG) BY MOUTH EVERY  MONDAY 1/23/20   Renato Womack MD   divalproex (DEPAKOTE) 250 MG EC tablet TAKE 2 TABLETS BY MOUTH EVERY EVENING 10/28/19   Prisca Espinoza MD   furosemide (LASIX) 20 MG tablet TAKE 2 TABLETS BY MOUTH EVERY DAY 5/2/19   Renato Womack MD   lactulose (CHRONULAC) 10 gram/15 mL solution TAKE 15 MLS BY MOUTH TWICE DAILY 4/6/20   Renato Womack MD   lancets Misc To check blood sugar once daily, to use with Accu-Chek meter. 11/5/18   Prisca Espinoza MD   SITagliptan (JANUVIA) 50 MG Tab Take 1 tablet (50 mg total) by mouth once daily. 4/6/17   Prisca Espinoza MD   XIFAXAN 550 mg Tab Take 1 tablet (550 mg total) by mouth 2 (two) times daily. 2/10/20   Renato Womack MD   ACCU-CHEK NEYDA PLUS TEST STRP Strp CHECK BLOOD SUGAR ONCE DAILY 12/27/19 4/17/20  Aziza Ogden MD     Anticoagulants/Antiplatelets: no anticoagulation    Allergies:   Review of patient's allergies indicates:   Allergen Reactions    Abilify [aripiprazole] Other (See Comments)     Sleepy, could not function.     Sedation History:  no adverse reactions    Review of Systems:   Hematological: no known coagulopathies  Respiratory: no shortness of breath  Cardiovascular: no chest pain  Gastrointestinal: no abdominal pain  Genito-Urinary: no dysuria  Musculoskeletal: negative  Neurological: no TIA or stroke symptoms         OBJECTIVE:     Vital Signs (Most Recent)  Pulse: (!) 55 (04/17/20 1129)  Resp: 16 (04/17/20 1129)  BP: (!) 142/65 (04/17/20 1129)  SpO2: 97 % (04/17/20 1129)    Physical Exam:  ASA: 2  Mallampati: n/a    General: no acute distress  Mental Status: alert and oriented to person, place and time  HEENT: normocephalic, atraumatic  Chest: unlabored breathing  Heart: regular heart rate  Abdomen: distended  Extremity: moves all extremities    ASSESSMENT/PLAN:     Sedation Plan: local  Patient will undergo ultrasound guided paracentesis.    Kaia Herrera PA-C  Interventional Radiology  [phone] [facility]

## 2020-04-17 NOTE — PLAN OF CARE
Pt arrived to IR MPU room 11 for para, no acute distress noted. Orders and labs reviewed on chart. Awaiting consent.

## 2020-04-17 NOTE — DISCHARGE INSTRUCTIONS
[facility] 8 AM- 5PM [phone]. Radiology resident on call [phone].      4400 mLs peritoneal fluid drained

## 2020-04-17 NOTE — PROGRESS NOTES
Paracentesis complete. 4400 mLs peritoneal fluid drained. Pt tolerated well. Dressing to right abd clean, dry, and intact. Specimens sent per lab order. Pt discharged

## 2020-04-20 LAB — BACTERIA SPEC AEROBE CULT: NO GROWTH

## 2020-04-21 DIAGNOSIS — U07.1 COVID-19: Primary | ICD-10-CM

## 2020-04-23 ENCOUNTER — TELEPHONE (OUTPATIENT)
Dept: INTERVENTIONAL RADIOLOGY/VASCULAR | Facility: CLINIC | Age: 80
End: 2020-04-23

## 2020-04-23 ENCOUNTER — LAB VISIT (OUTPATIENT)
Dept: INTERNAL MEDICINE | Facility: CLINIC | Age: 80
End: 2020-04-23
Payer: MEDICARE

## 2020-04-23 DIAGNOSIS — U07.1 COVID-19: ICD-10-CM

## 2020-04-23 LAB — SARS-COV-2 RNA RESP QL NAA+PROBE: NOT DETECTED

## 2020-04-23 PROCEDURE — U0002 COVID-19 LAB TEST NON-CDC: HCPCS

## 2020-04-23 NOTE — TELEPHONE ENCOUNTER
Unable to reach patient to deliver the following results:    Your test was NEGATIVE for COVID-19 (coronavirus).  If you still have symptoms, treat with rest, fluids, and over-the-counter medications.  Continue to stay home and practice proper handwashing.     If your symptoms worsen or if you have any other concerns, please contact [facility] at [phone].     Sincerely,    Kaia Herrera PA-C

## 2020-04-24 ENCOUNTER — HOSPITAL ENCOUNTER (OUTPATIENT)
Dept: INTERVENTIONAL RADIOLOGY/VASCULAR | Facility: HOSPITAL | Age: 80
Discharge: HOME | End: 2020-04-24
Attending: INTERNAL MEDICINE
Payer: MEDICARE

## 2020-04-24 VITALS
WEIGHT: 174 LBS | TEMPERATURE: 98 F | OXYGEN SATURATION: 98 % | HEART RATE: 58 BPM | DIASTOLIC BLOOD PRESSURE: 68 MMHG | RESPIRATION RATE: 16 BRPM | SYSTOLIC BLOOD PRESSURE: 112 MMHG | BODY MASS INDEX: 26.46 KG/M2

## 2020-04-24 DIAGNOSIS — R18.8 OTHER ASCITES: ICD-10-CM

## 2020-04-24 LAB
APPEARANCE FLD: CLEAR
BACTERIA SPEC ANAEROBE CULT: NORMAL
BODY FLD TYPE: NORMAL
COLOR FLD: YELLOW
EOSINOPHIL NFR FLD MANUAL: 1 %
LYMPHOCYTES NFR FLD MANUAL: 48 %
MONOS+MACROS NFR FLD MANUAL: 48 %
NEUTROPHILS NFR FLD MANUAL: 3 %
WBC # FLD: 43 /CU MM

## 2020-04-24 PROCEDURE — 49083 IR PARACENTESIS WITH IMAGING: ICD-10-PCS | Mod: ,,, | Performed by: FAMILY MEDICINE

## 2020-04-24 PROCEDURE — 89051 BODY FLUID CELL COUNT: CPT

## 2020-04-24 PROCEDURE — 49083 ABD PARACENTESIS W/IMAGING: CPT

## 2020-04-24 PROCEDURE — 49083 ABD PARACENTESIS W/IMAGING: CPT | Mod: ,,, | Performed by: FAMILY MEDICINE

## 2020-04-24 NOTE — PROGRESS NOTES
Paracentesis complete. 4450 mLs peritoneal fluid drained. Pt tolerated well. Dressing to right abd  clean, dry, and intact. Specimens sent per lab order. Pt Discharged.

## 2020-04-24 NOTE — DISCHARGE INSTRUCTIONS
[facility] 8 AM- 5PM [phone]. Radiology resident on call [phone].        5639 ml Removed

## 2020-04-24 NOTE — H&P
Radiology History & Physical      SUBJECTIVE:     Chief Complaint: abdominal distention    History of Present Illness:  Kenneth Sheikh is a 80 y.o. male who presents for ultrasound guided paracentesis  Past Medical History:   Diagnosis Date    Anticoagulant long-term use     Basal cell carcinoma     Bipolar 1 disorder     Bipolar 1 disorder 11/24/2016    bipolar    Cancer     history of skin cancer/sinus cancer & rectal cancer    Depressed bipolar affective disorder     Diabetes mellitus     type 2    Diabetic retinopathy 01/09/2019    EBV infection 12/7/2016    EBV DNA, PCR Latest Ref Range: None detected  None detected EBV DNA-Copies/mL Unknown Test Not Performed EBV Early Antigen Ab, IgG Latest Ref Range: <1:10 Titer 1:40 (A) EBV Nuclear Ag Ab Latest Ref Range: <1:5 Titer >=1:80 (A) EBV VCA IgG Latest Ref Range: <1:10 Titer 1:160 (A) EBV VCA IgM Latest Ref Range: <1:10 Titer <1:10     History of TIA (transient ischemic attack)     several-taking Plavix    Hx of gallstones     Wife denies    Hypertension     Hyperthyroidism 11/30/2016    Low HDL (under 40) 12/7/2016    Mixed hyperlipidemia 11/23/2016    JUAN MANUEL (obstructive sleep apnea)     Pneumonia     Skin disease     Squamous cell carcinoma 08/2018    right temple    Stroke     Transient cerebral ischemia 7/22/2016    Type 2 diabetes mellitus      Past Surgical History:   Procedure Laterality Date    Moh's procedure x4      rectal cancer      Sinus surgery for sinus cancer      sinus sx for cancer      skin cancer removed-several times-nose & ear      TONSILLECTOMY      Undescened testicle sx      UVULOPALATOPHARYNGOPLASTY      for sleep apnea       Home Meds:   Prior to Admission medications    Medication Sig Start Date End Date Taking? Authorizing Provider   ACCU-CHEK NEYDA PLUS TEST STRP Strp CHECK BLOOD SUGAR ONCE DAILY 4/17/20   Prisca Espinoza MD   ciprofloxacin HCl (CIPRO) 500 MG tablet TAKE 1 TABLET(500 MG) BY MOUTH EVERY  MONDAY 1/23/20   Renato Womack MD   divalproex (DEPAKOTE) 250 MG EC tablet TAKE 2 TABLETS BY MOUTH EVERY EVENING 10/28/19   Prisca Espinoza MD   furosemide (LASIX) 20 MG tablet TAKE 2 TABLETS BY MOUTH EVERY DAY 5/2/19   Renato Womack MD   lactulose (CHRONULAC) 10 gram/15 mL solution TAKE 15 MLS BY MOUTH TWICE DAILY 4/6/20   Renato Womack MD   lancets Misc To check blood sugar once daily, to use with Accu-Chek meter. 11/5/18   Prisca Espinoza MD   SITagliptan (JANUVIA) 50 MG Tab Take 1 tablet (50 mg total) by mouth once daily. 4/6/17   Prisca Espinoza MD   XIFAXAN 550 mg Tab Take 1 tablet (550 mg total) by mouth 2 (two) times daily. 2/10/20   Renato Womack MD     Anticoagulants/Antiplatelets: no anticoagulation    Allergies:   Review of patient's allergies indicates:   Allergen Reactions    Abilify [aripiprazole] Other (See Comments)     Sleepy, could not function.     Sedation History:  no adverse reactions    Review of Systems:   Hematological: no known coagulopathies  Respiratory: no shortness of breath  Cardiovascular: no chest pain  Gastrointestinal: no abdominal pain  Genito-Urinary: no dysuria  Musculoskeletal: negative  Neurological: no TIA or stroke symptoms         OBJECTIVE:     Vital Signs (Most Recent)  Temp: (P) 98.3 °F (36.8 °C) (04/24/20 1100)  Pulse: (P) 62 (04/24/20 1100)  Resp: (P) 16 (04/24/20 1100)  SpO2: (P) 98 % (04/24/20 1100)    Physical Exam:  ASA: 2  Mallampati: n/a    General: no acute distress  Mental Status: alert and oriented to person, place and time  HEENT: normocephalic, atraumatic  Chest: unlabored breathing  Heart: regular heart rate  Abdomen: distended  Extremity: moves all extremities    ASSESSMENT/PLAN:     Sedation Plan: local  Patient will undergo ultrasound guided paracentesis.    MATT Mauricio, FNP  Interventional Radiology  [phone] [facility]

## 2020-04-28 DIAGNOSIS — U07.1 COVID-19: Primary | ICD-10-CM

## 2020-04-30 ENCOUNTER — LAB VISIT (OUTPATIENT)
Dept: INTERNAL MEDICINE | Facility: CLINIC | Age: 80
End: 2020-04-30
Payer: MEDICARE

## 2020-04-30 DIAGNOSIS — U07.1 COVID-19: ICD-10-CM

## 2020-04-30 LAB — SARS-COV-2 RNA RESP QL NAA+PROBE: NOT DETECTED

## 2020-04-30 PROCEDURE — U0002 COVID-19 LAB TEST NON-CDC: HCPCS

## 2020-05-01 ENCOUNTER — HOSPITAL ENCOUNTER (OUTPATIENT)
Dept: INTERVENTIONAL RADIOLOGY/VASCULAR | Facility: HOSPITAL | Age: 80
Discharge: HOME | End: 2020-05-01
Attending: INTERNAL MEDICINE
Payer: MEDICARE

## 2020-05-01 VITALS — SYSTOLIC BLOOD PRESSURE: 121 MMHG | HEART RATE: 53 BPM | DIASTOLIC BLOOD PRESSURE: 67 MMHG | OXYGEN SATURATION: 98 %

## 2020-05-01 DIAGNOSIS — R18.8 OTHER ASCITES: ICD-10-CM

## 2020-05-01 LAB
APPEARANCE FLD: NORMAL
BODY FLD TYPE: NORMAL
COLOR FLD: YELLOW
LYMPHOCYTES NFR FLD MANUAL: 24 %
MONOS+MACROS NFR FLD MANUAL: 76 %
WBC # FLD: 29 /CU MM

## 2020-05-01 PROCEDURE — 49083 IR PARACENTESIS WITH IMAGING: ICD-10-PCS | Mod: ,,, | Performed by: FAMILY MEDICINE

## 2020-05-01 PROCEDURE — 87075 CULTR BACTERIA EXCEPT BLOOD: CPT

## 2020-05-01 PROCEDURE — 49083 ABD PARACENTESIS W/IMAGING: CPT | Mod: ,,, | Performed by: FAMILY MEDICINE

## 2020-05-01 PROCEDURE — 49083 ABD PARACENTESIS W/IMAGING: CPT

## 2020-05-01 PROCEDURE — 89051 BODY FLUID CELL COUNT: CPT

## 2020-05-01 PROCEDURE — 87070 CULTURE OTHR SPECIMN AEROBIC: CPT

## 2020-05-01 NOTE — H&P
Radiology History & Physical      SUBJECTIVE:     Chief Complaint: abdominal distention    History of Present Illness:  Kenneth hSeikh is a 80 y.o. male who presents for ultrasound guided paracentesis  Past Medical History:   Diagnosis Date    Anticoagulant long-term use     Basal cell carcinoma     Bipolar 1 disorder     Bipolar 1 disorder 11/24/2016    bipolar    Cancer     history of skin cancer/sinus cancer & rectal cancer    Depressed bipolar affective disorder     Diabetes mellitus     type 2    Diabetic retinopathy 01/09/2019    EBV infection 12/7/2016    EBV DNA, PCR Latest Ref Range: None detected  None detected EBV DNA-Copies/mL Unknown Test Not Performed EBV Early Antigen Ab, IgG Latest Ref Range: <1:10 Titer 1:40 (A) EBV Nuclear Ag Ab Latest Ref Range: <1:5 Titer >=1:80 (A) EBV VCA IgG Latest Ref Range: <1:10 Titer 1:160 (A) EBV VCA IgM Latest Ref Range: <1:10 Titer <1:10     History of TIA (transient ischemic attack)     several-taking Plavix    Hx of gallstones     Wife denies    Hypertension     Hyperthyroidism 11/30/2016    Low HDL (under 40) 12/7/2016    Mixed hyperlipidemia 11/23/2016    JUAN MANUEL (obstructive sleep apnea)     Pneumonia     Skin disease     Squamous cell carcinoma 08/2018    right temple    Stroke     Transient cerebral ischemia 7/22/2016    Type 2 diabetes mellitus      Past Surgical History:   Procedure Laterality Date    Moh's procedure x4      rectal cancer      Sinus surgery for sinus cancer      sinus sx for cancer      skin cancer removed-several times-nose & ear      TONSILLECTOMY      Undescened testicle sx      UVULOPALATOPHARYNGOPLASTY      for sleep apnea       Home Meds:   Prior to Admission medications    Medication Sig Start Date End Date Taking? Authorizing Provider   ACCU-CHEK NEYDA PLUS TEST STRP Strp CHECK BLOOD SUGAR ONCE DAILY 4/17/20   Prisca Espinoza MD   ciprofloxacin HCl (CIPRO) 500 MG tablet TAKE 1 TABLET(500 MG) BY MOUTH EVERY  MONDAY 1/23/20   Renato Womack MD   divalproex (DEPAKOTE) 250 MG EC tablet TAKE 2 TABLETS BY MOUTH EVERY EVENING 10/28/19   Prisca Espinoza MD   furosemide (LASIX) 20 MG tablet TAKE 2 TABLETS BY MOUTH EVERY DAY 5/2/19   Renato Womack MD   lactulose (CHRONULAC) 10 gram/15 mL solution TAKE 15 MLS BY MOUTH TWICE DAILY 4/6/20   Renato Womack MD   lancets Misc To check blood sugar once daily, to use with Accu-Chek meter. 11/5/18   Prisca Espinoza MD   SITagliptan (JANUVIA) 50 MG Tab Take 1 tablet (50 mg total) by mouth once daily. 4/6/17   Prisca Espinoza MD   XIFAXAN 550 mg Tab Take 1 tablet (550 mg total) by mouth 2 (two) times daily. 2/10/20   Renato Womack MD     Anticoagulants/Antiplatelets: no anticoagulation    Allergies:   Review of patient's allergies indicates:   Allergen Reactions    Abilify [aripiprazole] Other (See Comments)     Sleepy, could not function.     Sedation History:  no adverse reactions    Review of Systems:   Hematological: no known coagulopathies  Respiratory: no shortness of breath  Cardiovascular: no chest pain  Gastrointestinal: no abdominal pain  Genito-Urinary: no dysuria  Musculoskeletal: negative  Neurological: no TIA or stroke symptoms         OBJECTIVE:     Vital Signs (Most Recent)       Physical Exam:  ASA: 2  Mallampati: n/a    General: no acute distress  Mental Status: alert and oriented to person, place and time  HEENT: normocephalic, atraumatic  Chest: unlabored breathing  Heart: regular heart rate  Abdomen: distended  Extremity: moves all extremities    ASSESSMENT/PLAN:     Sedation Plan: local  Patient will undergo ultrasound guided paracentesis.    MATT Mauricio, FNP  Interventional Radiology  [phone] clinic

## 2020-05-01 NOTE — PROCEDURES

## 2020-05-01 NOTE — PLAN OF CARE
Patient completed paracentesis procedure,  tolerated well. No apparent distress noted. 4300 ml of clear yellow fluid  removed from left ABD, dressing applied, clean dry intact. Labs collected and sent. No  Albumin given. Discharge instructions reviewed and acknowledged. Patient discharge via W/C to private vehicle.

## 2020-05-01 NOTE — DISCHARGE INSTRUCTIONS
For scheduling: Call Mackenzie at [phone]    For questions or concerns call: [facility] 8 AM- 5PM [phone]. Radiology resident on call [phone].    For immediate concerns that are not emergent, you may call our radiology clinic at: [phone]

## 2020-05-01 NOTE — PLAN OF CARE
Patient arrived to MPU room 12 for paracentesis, no acute distress noted, allergies reviewed, two patient ID verified. Orders and labs reviewed on chart. Awaiting consent.

## 2020-05-04 LAB — BACTERIA SPEC AEROBE CULT: NO GROWTH

## 2020-05-08 ENCOUNTER — HOSPITAL ENCOUNTER (OUTPATIENT)
Dept: INTERVENTIONAL RADIOLOGY/VASCULAR | Facility: HOSPITAL | Age: 80
Discharge: HOME | End: 2020-05-08
Attending: INTERNAL MEDICINE
Payer: MEDICARE

## 2020-05-08 VITALS — OXYGEN SATURATION: 100 % | SYSTOLIC BLOOD PRESSURE: 119 MMHG | HEART RATE: 54 BPM | DIASTOLIC BLOOD PRESSURE: 63 MMHG

## 2020-05-08 DIAGNOSIS — R18.8 OTHER ASCITES: ICD-10-CM

## 2020-05-08 LAB
APPEARANCE FLD: NORMAL
BACTERIA SPEC ANAEROBE CULT: NORMAL
BASOPHILS NFR FLD MANUAL: 2 %
BODY FLD TYPE: NORMAL
COLOR FLD: YELLOW
EOSINOPHIL NFR FLD MANUAL: 3 %
LYMPHOCYTES NFR FLD MANUAL: 59 %
MESOTHL CELL NFR FLD MANUAL: 1 %
MONOS+MACROS NFR FLD MANUAL: 34 %
NEUTROPHILS NFR FLD MANUAL: 1 %
WBC # FLD: 26 /CU MM

## 2020-05-08 PROCEDURE — 49083 ABD PARACENTESIS W/IMAGING: CPT

## 2020-05-08 PROCEDURE — 49083 IR PARACENTESIS WITH IMAGING: ICD-10-PCS | Mod: ,,, | Performed by: FAMILY MEDICINE

## 2020-05-08 PROCEDURE — 49083 ABD PARACENTESIS W/IMAGING: CPT | Mod: ,,, | Performed by: FAMILY MEDICINE

## 2020-05-08 PROCEDURE — 89051 BODY FLUID CELL COUNT: CPT

## 2020-05-08 PROCEDURE — 87075 CULTR BACTERIA EXCEPT BLOOD: CPT

## 2020-05-08 PROCEDURE — 87070 CULTURE OTHR SPECIMN AEROBIC: CPT

## 2020-05-08 NOTE — DISCHARGE INSTRUCTIONS
Please call with any questions or concerns.      Monday thru Friday 8:00 am - 4:30 pm    Interventional Radiology   [phone]    After Hours    Ask for the Radiology Intern on call  [phone]

## 2020-05-08 NOTE — PROCEDURES

## 2020-05-08 NOTE — PLAN OF CARE
Paracentesis complete.  Pt tolerated procedure well. 3800ml peritoneal fluid drained. Dressing to RLQ clean dry and intact.  Pt given discharged planning; verbalized understanding.  Taken via WC to meet family.

## 2020-05-08 NOTE — H&P
Radiology History & Physical      SUBJECTIVE:     Chief Complaint: abdominal distention    History of Present Illness:  Kenneth Sheikh is a 80 y.o. male who presents for ultrasound guided paracentesis  Past Medical History:   Diagnosis Date    Anticoagulant long-term use     Basal cell carcinoma     Bipolar 1 disorder     Bipolar 1 disorder 11/24/2016    bipolar    Cancer     history of skin cancer/sinus cancer & rectal cancer    Depressed bipolar affective disorder     Diabetes mellitus     type 2    Diabetic retinopathy 01/09/2019    EBV infection 12/7/2016    EBV DNA, PCR Latest Ref Range: None detected  None detected EBV DNA-Copies/mL Unknown Test Not Performed EBV Early Antigen Ab, IgG Latest Ref Range: <1:10 Titer 1:40 (A) EBV Nuclear Ag Ab Latest Ref Range: <1:5 Titer >=1:80 (A) EBV VCA IgG Latest Ref Range: <1:10 Titer 1:160 (A) EBV VCA IgM Latest Ref Range: <1:10 Titer <1:10     History of TIA (transient ischemic attack)     several-taking Plavix    Hx of gallstones     Wife denies    Hypertension     Hyperthyroidism 11/30/2016    Low HDL (under 40) 12/7/2016    Mixed hyperlipidemia 11/23/2016    JUAN MANUEL (obstructive sleep apnea)     Pneumonia     Skin disease     Squamous cell carcinoma 08/2018    right temple    Stroke     Transient cerebral ischemia 7/22/2016    Type 2 diabetes mellitus      Past Surgical History:   Procedure Laterality Date    Moh's procedure x4      rectal cancer      Sinus surgery for sinus cancer      sinus sx for cancer      skin cancer removed-several times-nose & ear      TONSILLECTOMY      Undescened testicle sx      UVULOPALATOPHARYNGOPLASTY      for sleep apnea       Home Meds:   Prior to Admission medications    Medication Sig Start Date End Date Taking? Authorizing Provider   ACCU-CHEK NEYDA PLUS TEST STRP Strp CHECK BLOOD SUGAR ONCE DAILY 4/17/20   Prisca Espinoza MD   ciprofloxacin HCl (CIPRO) 500 MG tablet TAKE 1 TABLET(500 MG) BY MOUTH EVERY  MONDAY 1/23/20   Renato Womack MD   divalproex (DEPAKOTE) 250 MG EC tablet TAKE 2 TABLETS BY MOUTH EVERY EVENING 10/28/19   Prisca Espinoza MD   furosemide (LASIX) 20 MG tablet TAKE 2 TABLETS BY MOUTH EVERY DAY 5/2/19   Renato Womack MD   lactulose (CHRONULAC) 10 gram/15 mL solution TAKE 15 MLS BY MOUTH TWICE DAILY 4/6/20   Renato Womack MD   lancets Misc To check blood sugar once daily, to use with Accu-Chek meter. 11/5/18   Prisca Espinoza MD   SITagliptan (JANUVIA) 50 MG Tab Take 1 tablet (50 mg total) by mouth once daily. 4/6/17   Prisca Espinoza MD   XIFAXAN 550 mg Tab Take 1 tablet (550 mg total) by mouth 2 (two) times daily. 2/10/20   Renato Womack MD     Anticoagulants/Antiplatelets: no anticoagulation    Allergies:   Review of patient's allergies indicates:   Allergen Reactions    Abilify [aripiprazole] Other (See Comments)     Sleepy, could not function.     Sedation History:  no adverse reactions    Review of Systems:   Hematological: no known coagulopathies  Respiratory: no shortness of breath  Cardiovascular: no chest pain  Gastrointestinal: no abdominal pain  Genito-Urinary: no dysuria  Musculoskeletal: negative  Neurological: no TIA or stroke symptoms         OBJECTIVE:     Vital Signs (Most Recent)  Pulse: (!) 54 (05/08/20 1115)  BP: 124/75 (05/08/20 1115)  SpO2: 98 % (05/08/20 1115)    Physical Exam:  ASA: 2  Mallampati: n/a    General: no acute distress  Mental Status: alert and oriented to person, place and time  HEENT: normocephalic, atraumatic  Chest: unlabored breathing  Heart: regular heart rate  Abdomen: distended  Extremity: moves all extremities    ASSESSMENT/PLAN:     Sedation Plan: local  Patient will undergo ultrasound guided paracentesis.    MATT Mauricio, FNP  Interventional Radiology  [phone] [facility]

## 2020-05-08 NOTE — PLAN OF CARE
Pt arrived to U 12 for paracentesis.  Name verified using two identifiers.  Allergies verified. Will continue to monitor.

## 2020-05-11 LAB — BACTERIA SPEC AEROBE CULT: NO GROWTH

## 2020-05-13 ENCOUNTER — TELEPHONE (OUTPATIENT)
Dept: HEPATOLOGY | Facility: CLINIC | Age: 80
End: 2020-05-13

## 2020-05-13 NOTE — TELEPHONE ENCOUNTER
MA spoke with the pt wife and relayed the following info..    MA spoke with Dr. Womack. He suggested that the pt stop taking his lactulose for another 24 hours. If the pt is still experiencing bad diarrhea and he's not eating or drinking too much, he needs to go to the ER to be checked out as he might end up dehydrated. I let her know to contact us either way to see how he is doing. The pt wife stated that's he understood and would contact us tomorrow around 2/3pm with an update.

## 2020-05-13 NOTE — TELEPHONE ENCOUNTER
Pt wife called in to let us know that her  keeps using the bathroom on himself. He's taking 1/2 a tablespoon a day of huis Lactulose. She said he just started using the bathroom (diarrhea on himself) within the last few days, but previously, he has never had this issue, but she's convinced that the Lactulose is now causing him to use the bathroom on himself.She said he stopped taking it yesterday and today but he's still having diarrhea and going on himself. I asked if he's having any other symptoms, and she said that he's not. She wants to know if he should stop taking his lactulose since he's on Xifaxan or could something else be causing this sudden diarrhea.

## 2020-05-14 ENCOUNTER — TELEPHONE (OUTPATIENT)
Dept: INTERNAL MEDICINE | Facility: CLINIC | Age: 80
End: 2020-05-14

## 2020-05-14 ENCOUNTER — TELEPHONE (OUTPATIENT)
Dept: HEPATOLOGY | Facility: CLINIC | Age: 80
End: 2020-05-14

## 2020-05-14 NOTE — TELEPHONE ENCOUNTER
----- Message from Scot Altamirano sent at 5/14/2020  3:11 PM CDT -----  Contact: Pt wife (Ginna)  Pt wife (Ginna) was calling to speak with nurse.    Ginna# [phone]

## 2020-05-14 NOTE — TELEPHONE ENCOUNTER
----- Message from Meghann Richmond sent at 5/14/2020 10:44 AM CDT -----  Contact: wife/jose carlos/[phone]  Pt wife called in regards to checking to see if the dr filled out the pt medicare form so the insurance can fill the pt. She would like a call back.     Please advise

## 2020-05-15 ENCOUNTER — HOSPITAL ENCOUNTER (OUTPATIENT)
Dept: INTERVENTIONAL RADIOLOGY/VASCULAR | Facility: HOSPITAL | Age: 80
Discharge: HOME | End: 2020-05-15
Attending: INTERNAL MEDICINE
Payer: MEDICARE

## 2020-05-15 ENCOUNTER — LAB VISIT (OUTPATIENT)
Dept: INTERNAL MEDICINE | Facility: CLINIC | Age: 80
End: 2020-05-15
Payer: MEDICARE

## 2020-05-15 ENCOUNTER — TELEPHONE (OUTPATIENT)
Dept: INTERNAL MEDICINE | Facility: CLINIC | Age: 80
End: 2020-05-15

## 2020-05-15 ENCOUNTER — TELEPHONE (OUTPATIENT)
Dept: HEPATOLOGY | Facility: CLINIC | Age: 80
End: 2020-05-15

## 2020-05-15 VITALS
TEMPERATURE: 98 F | HEART RATE: 55 BPM | DIASTOLIC BLOOD PRESSURE: 70 MMHG | OXYGEN SATURATION: 100 % | SYSTOLIC BLOOD PRESSURE: 154 MMHG

## 2020-05-15 DIAGNOSIS — E11.22 TYPE 2 DIABETES MELLITUS WITH STAGE 3 CHRONIC KIDNEY DISEASE, WITHOUT LONG-TERM CURRENT USE OF INSULIN: ICD-10-CM

## 2020-05-15 DIAGNOSIS — R19.7 DIARRHEA, UNSPECIFIED TYPE: ICD-10-CM

## 2020-05-15 DIAGNOSIS — E87.6 HYPOKALEMIA: ICD-10-CM

## 2020-05-15 DIAGNOSIS — N18.30 TYPE 2 DIABETES MELLITUS WITH STAGE 3 CHRONIC KIDNEY DISEASE, WITHOUT LONG-TERM CURRENT USE OF INSULIN: ICD-10-CM

## 2020-05-15 DIAGNOSIS — R19.7 DIARRHEA, UNSPECIFIED TYPE: Primary | ICD-10-CM

## 2020-05-15 DIAGNOSIS — F31.9 BIPOLAR 1 DISORDER: ICD-10-CM

## 2020-05-15 DIAGNOSIS — R18.8 OTHER ASCITES: ICD-10-CM

## 2020-05-15 DIAGNOSIS — N17.9 AKI (ACUTE KIDNEY INJURY): ICD-10-CM

## 2020-05-15 LAB — BACTERIA SPEC ANAEROBE CULT: NORMAL

## 2020-05-15 PROCEDURE — U0003 INFECTIOUS AGENT DETECTION BY NUCLEIC ACID (DNA OR RNA); SEVERE ACUTE RESPIRATORY SYNDROME CORONAVIRUS 2 (SARS-COV-2) (CORONAVIRUS DISEASE [COVID-19]), AMPLIFIED PROBE TECHNIQUE, MAKING USE OF HIGH THROUGHPUT TECHNOLOGIES AS DESCRIBED BY CMS-2020-01-R: HCPCS

## 2020-05-15 PROCEDURE — 49083 ABD PARACENTESIS W/IMAGING: CPT

## 2020-05-15 PROCEDURE — 49083 ABD PARACENTESIS W/IMAGING: CPT | Mod: ,,, | Performed by: FAMILY MEDICINE

## 2020-05-15 PROCEDURE — 49083 IR PARACENTESIS WITH IMAGING: ICD-10-PCS | Mod: ,,, | Performed by: FAMILY MEDICINE

## 2020-05-15 RX ORDER — POTASSIUM CHLORIDE 20 MEQ/1
TABLET, EXTENDED RELEASE ORAL
Qty: 20 TABLET | Refills: 0 | Status: SHIPPED | OUTPATIENT
Start: 2020-05-15 | End: 2020-05-22

## 2020-05-15 RX ORDER — LOPERAMIDE HYDROCHLORIDE 2 MG/1
2 CAPSULE ORAL DAILY PRN
Qty: 20 CAPSULE | Refills: 0 | Status: SHIPPED | OUTPATIENT
Start: 2020-05-15 | End: 2020-06-02

## 2020-05-15 NOTE — TELEPHONE ENCOUNTER
Spoke to pt wife. She stated  who checks pt liver would like for you to add labs since he has been having diarrhea.     He is getting labs today. Can you add more for his diarrhea?

## 2020-05-15 NOTE — PROCEDURES

## 2020-05-15 NOTE — TELEPHONE ENCOUNTER
----- Message from Dana Gloria sent at 5/15/2020  9:45 AM CDT -----  Contact: Ginna (wife) [phone]  Patient's wife would like to speak to the nurse for advise on patient;s health issues. Mrs. Chacko states is very important as she might take patient to the hospital. Please advise.

## 2020-05-15 NOTE — H&P
Radiology History & Physical      SUBJECTIVE:     Chief Complaint: abdominal distention    History of Present Illness:  Kenneth Sheikh is a 80 y.o. male who presents for ultrasound guided paracentesis  Past Medical History:   Diagnosis Date    Anticoagulant long-term use     Basal cell carcinoma     Bipolar 1 disorder     Bipolar 1 disorder 11/24/2016    bipolar    Cancer     history of skin cancer/sinus cancer & rectal cancer    Depressed bipolar affective disorder     Diabetes mellitus     type 2    Diabetic retinopathy 01/09/2019    EBV infection 12/7/2016    EBV DNA, PCR Latest Ref Range: None detected  None detected EBV DNA-Copies/mL Unknown Test Not Performed EBV Early Antigen Ab, IgG Latest Ref Range: <1:10 Titer 1:40 (A) EBV Nuclear Ag Ab Latest Ref Range: <1:5 Titer >=1:80 (A) EBV VCA IgG Latest Ref Range: <1:10 Titer 1:160 (A) EBV VCA IgM Latest Ref Range: <1:10 Titer <1:10     History of TIA (transient ischemic attack)     several-taking Plavix    Hx of gallstones     Wife denies    Hypertension     Hyperthyroidism 11/30/2016    Low HDL (under 40) 12/7/2016    Mixed hyperlipidemia 11/23/2016    JUAN MANUEL (obstructive sleep apnea)     Pneumonia     Skin disease     Squamous cell carcinoma 08/2018    right temple    Stroke     Transient cerebral ischemia 7/22/2016    Type 2 diabetes mellitus      Past Surgical History:   Procedure Laterality Date    Moh's procedure x4      rectal cancer      Sinus surgery for sinus cancer      sinus sx for cancer      skin cancer removed-several times-nose & ear      TONSILLECTOMY      Undescened testicle sx      UVULOPALATOPHARYNGOPLASTY      for sleep apnea       Home Meds:   Prior to Admission medications    Medication Sig Start Date End Date Taking? Authorizing Provider   ACCU-CHEK NEYDA PLUS TEST STRP Strp CHECK BLOOD SUGAR ONCE DAILY 4/17/20   Prisca Espinoza MD   ciprofloxacin HCl (CIPRO) 500 MG tablet TAKE 1 TABLET(500 MG) BY MOUTH EVERY  MONDAY 1/23/20   Renato Womack MD   divalproex (DEPAKOTE) 250 MG EC tablet TAKE 2 TABLETS BY MOUTH EVERY EVENING 10/28/19   Prisca Espinoza MD   furosemide (LASIX) 20 MG tablet TAKE 2 TABLETS BY MOUTH EVERY DAY 5/2/19   Renato Womack MD   lactulose (CHRONULAC) 10 gram/15 mL solution TAKE 15 MLS BY MOUTH TWICE DAILY 4/6/20   Renato Womack MD   lancets Misc To check blood sugar once daily, to use with Accu-Chek meter. 11/5/18   Prisca Espinoza MD   SITagliptan (JANUVIA) 50 MG Tab Take 1 tablet (50 mg total) by mouth once daily. 4/6/17   Prisca Espinoza MD   XIFAXAN 550 mg Tab Take 1 tablet (550 mg total) by mouth 2 (two) times daily. 2/10/20   Renato Womack MD     Anticoagulants/Antiplatelets: no anticoagulation    Allergies:   Review of patient's allergies indicates:   Allergen Reactions    Abilify [aripiprazole] Other (See Comments)     Sleepy, could not function.     Sedation History:  no adverse reactions    Review of Systems:   Hematological: no known coagulopathies  Respiratory: no shortness of breath  Cardiovascular: no chest pain  Gastrointestinal: no abdominal pain  Genito-Urinary: no dysuria  Musculoskeletal: negative  Neurological: no TIA or stroke symptoms         OBJECTIVE:     Vital Signs (Most Recent)       Physical Exam:  ASA: 2  Mallampati: n/a    General: no acute distress  Mental Status: alert and oriented to person, place and time  HEENT: normocephalic, atraumatic  Chest: unlabored breathing  Heart: regular heart rate  Abdomen: distended  Extremity: moves all extremities    ASSESSMENT/PLAN:     Sedation Plan: local  Patient will undergo ultrasound guided paracentesis.    MATT Mauricio, FNP  Interventional Radiology  [phone] clinic         Normal for race

## 2020-05-15 NOTE — TELEPHONE ENCOUNTER
Spoke to pt wife. He does not have fever, muscle aches, cough or shortness of breath.   As for the diarrhea sometimes he cant make it to the bathroom. She would like something to be called in today so he doesn't have to wait all weekend and wait on lab results. She stated as soon as the lab results come back she would like to know what they are.

## 2020-05-15 NOTE — TELEPHONE ENCOUNTER
If they are not doing COVID testing at paracentesis, then will you schedule drive through test after his procedure today?

## 2020-05-15 NOTE — TELEPHONE ENCOUNTER
Please call wife and schedule labs tomorrow afternoon (BMP, Mg).  Also give instructions of where they can  a stool hat tomorrow because I have ordered stool studies too (home collect).

## 2020-05-15 NOTE — TELEPHONE ENCOUNTER
Received a call from Ms. Ginna (patient wife) she is requesting to have blood test drawn today since patient last blood work was back on January.     She reported that patient been having diarrhea for 5 days now. He stop his lactulose due to diarrhea. She want patient to be tested for infection.     Advice patient wife to call his PCP and see if they can run some stool test for him. She said she will and they will do the labs today before his para.

## 2020-05-15 NOTE — PROGRESS NOTES
Procedure complete. Patient tolerated well. 2850cc drained from left abdomen. Sterile dressing applied to site. No s/s bleeding or hematoma noted. Discharge instructions given, verbalizes complliance and understanding. Discharged home via wheelchair.

## 2020-05-15 NOTE — PLAN OF CARE
Pt arrived to [facility] for us paracentesis. Pt oriented to unit and staff. Plan of care reviewed with patient, patient verbalizes understanding. Comfort measures utilized. Pt safely transferred from stretcher to procedural table. Fall risk reviewed with patient, fall risk interventions maintained. Safety strap applied, positioner pillows utilized to minimize pressure points. Blankets applied. Pt prepped and draped utilizing standard sterile technique. Patient placed on continuous monitoring, as required by sedation policy. Timeouts completed utilizing standard universal time-out, per department and facility policy. RN to remain at bedside, continuous monitoring maintained. Pt resting comfortably. Denies pain/discomfort. Will continue to monitor. See flow sheets for monitoring, medication administration, and updates.

## 2020-05-15 NOTE — TELEPHONE ENCOUNTER
----- Message from Piper Terry MA sent at 5/15/2020 11:37 AM CDT -----  Contact: Ginna (wife) [phone]      ----- Message -----  From: Hannah Fox  Sent: 5/15/2020  11:33 AM CDT  To: Alexis Cope Clinical Staff    Patient is returning a phone call.  Who left a message for the patient: Keya Goode LPN  Does patient know what this is regarding:    Comments:

## 2020-05-15 NOTE — TELEPHONE ENCOUNTER
----- Message from Lizzeth Patterson sent at 5/15/2020  8:48 AM CDT -----  Contact: Ginna (spouse)  [phone]  Patient has been having diarrhea for over a week.  He will be having his stomach drained at 11:00 today and need labs order today. Patient wife want to speak with MA or Nurse ASAP prior to leaving for his appointments today.

## 2020-05-15 NOTE — DISCHARGE INSTRUCTIONS
For scheduling: Call Mackenzie at [phone]    For questions or concerns call: [facility] 8 AM- 5PM [phone]. Radiology resident on call [phone].    For immediate concerns that are not emergent, you may call our radiology clinic at: [phone]

## 2020-05-16 ENCOUNTER — LAB VISIT (OUTPATIENT)
Dept: LAB | Facility: HOSPITAL | Age: 80
End: 2020-05-16
Attending: INTERNAL MEDICINE
Payer: MEDICARE

## 2020-05-16 DIAGNOSIS — R19.7 DIARRHEA, UNSPECIFIED TYPE: Primary | ICD-10-CM

## 2020-05-16 DIAGNOSIS — E87.6 HYPOKALEMIA: ICD-10-CM

## 2020-05-16 DIAGNOSIS — N17.9 AKI (ACUTE KIDNEY INJURY): ICD-10-CM

## 2020-05-16 LAB
ANION GAP SERPL CALC-SCNC: 8 MMOL/L (ref 8–16)
BUN SERPL-MCNC: 21 MG/DL (ref 8–23)
CALCIUM SERPL-MCNC: 7.8 MG/DL (ref 8.7–10.5)
CHLORIDE SERPL-SCNC: 107 MMOL/L (ref 95–110)
CO2 SERPL-SCNC: 25 MMOL/L (ref 23–29)
CREAT SERPL-MCNC: 1.2 MG/DL (ref 0.5–1.4)
EST. GFR  (AFRICAN AMERICAN): >60 ML/MIN/1.73 M^2
EST. GFR  (NON AFRICAN AMERICAN): 56 ML/MIN/1.73 M^2
GLUCOSE SERPL-MCNC: 123 MG/DL (ref 70–110)
MAGNESIUM SERPL-MCNC: 1.6 MG/DL (ref 1.6–2.6)
POTASSIUM SERPL-SCNC: 3.1 MMOL/L (ref 3.5–5.1)
SARS-COV-2 RNA RESP QL NAA+PROBE: NOT DETECTED
SODIUM SERPL-SCNC: 140 MMOL/L (ref 136–145)

## 2020-05-16 PROCEDURE — 80048 BASIC METABOLIC PNL TOTAL CA: CPT

## 2020-05-16 PROCEDURE — 83735 ASSAY OF MAGNESIUM: CPT

## 2020-05-16 PROCEDURE — 36415 COLL VENOUS BLD VENIPUNCTURE: CPT

## 2020-05-16 NOTE — TELEPHONE ENCOUNTER
Spoke to pt wife. Explained the stool studies that are ordered. Pt will come in today to  supplies and do blood work

## 2020-05-17 NOTE — TELEPHONE ENCOUNTER
Spoke with wife regarding today's lab results.  K 3.1.  Instructed him to take KCl 40 mEq tonight and 40 mEq in the morning.  He took 1 Imodium and has not yet had another BM.  I will call him on Monday to see if diarrhea has returned.  Informed wife of negative COVID result as well.

## 2020-05-18 ENCOUNTER — TELEPHONE (OUTPATIENT)
Dept: INTERNAL MEDICINE | Facility: CLINIC | Age: 80
End: 2020-05-18

## 2020-05-18 ENCOUNTER — LAB VISIT (OUTPATIENT)
Dept: LAB | Facility: HOSPITAL | Age: 80
End: 2020-05-18
Attending: INTERNAL MEDICINE
Payer: MEDICARE

## 2020-05-18 DIAGNOSIS — R19.7 DIARRHEA, UNSPECIFIED TYPE: ICD-10-CM

## 2020-05-18 PROCEDURE — 89055 LEUKOCYTE ASSESSMENT FECAL: CPT

## 2020-05-18 PROCEDURE — 87045 FECES CULTURE AEROBIC BACT: CPT

## 2020-05-18 PROCEDURE — 87329 GIARDIA AG IA: CPT

## 2020-05-18 PROCEDURE — 87427 SHIGA-LIKE TOXIN AG IA: CPT

## 2020-05-18 PROCEDURE — 87046 STOOL CULTR AEROBIC BACT EA: CPT | Mod: 59

## 2020-05-18 PROCEDURE — 87324 CLOSTRIDIUM AG IA: CPT

## 2020-05-18 PROCEDURE — 87449 NOS EACH ORGANISM AG IA: CPT

## 2020-05-18 NOTE — TELEPHONE ENCOUNTER
Please call wife and see how  is feeling?  Has he had another BM?  If so, was it normal or diarrhea?  Any fecal incontinence?

## 2020-05-19 ENCOUNTER — HOSPITAL ENCOUNTER (EMERGENCY)
Facility: HOSPITAL | Age: 80
Discharge: HOME | End: 2020-05-19
Attending: EMERGENCY MEDICINE
Payer: MEDICARE

## 2020-05-19 ENCOUNTER — TELEPHONE (OUTPATIENT)
Dept: HEPATOLOGY | Facility: CLINIC | Age: 80
End: 2020-05-19

## 2020-05-19 VITALS
RESPIRATION RATE: 16 BRPM | TEMPERATURE: 98 F | BODY MASS INDEX: 25.01 KG/M2 | HEART RATE: 54 BPM | OXYGEN SATURATION: 99 % | SYSTOLIC BLOOD PRESSURE: 148 MMHG | DIASTOLIC BLOOD PRESSURE: 74 MMHG | HEIGHT: 68 IN | WEIGHT: 165 LBS

## 2020-05-19 DIAGNOSIS — R19.7 DIARRHEA: ICD-10-CM

## 2020-05-19 DIAGNOSIS — J90 PLEURAL EFFUSION ON LEFT: ICD-10-CM

## 2020-05-19 DIAGNOSIS — R19.7 DIARRHEA, UNSPECIFIED TYPE: Primary | ICD-10-CM

## 2020-05-19 DIAGNOSIS — R06.02 SHORTNESS OF BREATH: ICD-10-CM

## 2020-05-19 LAB
ALBUMIN SERPL BCP-MCNC: 2.2 G/DL (ref 3.5–5.2)
ALP SERPL-CCNC: 101 U/L (ref 55–135)
ALT SERPL W/O P-5'-P-CCNC: 10 U/L (ref 10–44)
ANION GAP SERPL CALC-SCNC: 7 MMOL/L (ref 8–16)
AST SERPL-CCNC: 28 U/L (ref 10–40)
BASOPHILS # BLD AUTO: 0.01 K/UL (ref 0–0.2)
BASOPHILS NFR BLD: 0.3 % (ref 0–1.9)
BILIRUB SERPL-MCNC: 0.8 MG/DL (ref 0.1–1)
BUN SERPL-MCNC: 20 MG/DL (ref 8–23)
C DIFF GDH STL QL: NEGATIVE
C DIFF TOX A+B STL QL IA: NEGATIVE
CALCIUM SERPL-MCNC: 8.2 MG/DL (ref 8.7–10.5)
CHLORIDE SERPL-SCNC: 109 MMOL/L (ref 95–110)
CO2 SERPL-SCNC: 23 MMOL/L (ref 23–29)
CREAT SERPL-MCNC: 1.3 MG/DL (ref 0.5–1.4)
CRYPTOSP AG STL QL IA: NEGATIVE
DIFFERENTIAL METHOD: ABNORMAL
EOSINOPHIL # BLD AUTO: 0.1 K/UL (ref 0–0.5)
EOSINOPHIL NFR BLD: 1.5 % (ref 0–8)
ERYTHROCYTE [DISTWIDTH] IN BLOOD BY AUTOMATED COUNT: 16 % (ref 11.5–14.5)
EST. GFR  (AFRICAN AMERICAN): 58.7 ML/MIN/1.73 M^2
EST. GFR  (NON AFRICAN AMERICAN): 50.8 ML/MIN/1.73 M^2
G LAMBLIA AG STL QL IA: NEGATIVE
GLUCOSE SERPL-MCNC: 127 MG/DL (ref 70–110)
HCT VFR BLD AUTO: 34.7 % (ref 40–54)
HGB BLD-MCNC: 10.6 G/DL (ref 14–18)
IMM GRANULOCYTES # BLD AUTO: 0.01 K/UL (ref 0–0.04)
IMM GRANULOCYTES NFR BLD AUTO: 0.3 % (ref 0–0.5)
LYMPHOCYTES # BLD AUTO: 0.7 K/UL (ref 1–4.8)
LYMPHOCYTES NFR BLD: 21.9 % (ref 18–48)
MCH RBC QN AUTO: 28.4 PG (ref 27–31)
MCHC RBC AUTO-ENTMCNC: 30.5 G/DL (ref 32–36)
MCV RBC AUTO: 93 FL (ref 82–98)
MONOCYTES # BLD AUTO: 0.7 K/UL (ref 0.3–1)
MONOCYTES NFR BLD: 20.4 % (ref 4–15)
NEUTROPHILS # BLD AUTO: 1.8 K/UL (ref 1.8–7.7)
NEUTROPHILS NFR BLD: 55.6 % (ref 38–73)
NRBC BLD-RTO: 0 /100 WBC
PLATELET # BLD AUTO: 97 K/UL (ref 150–350)
PMV BLD AUTO: 10.2 FL (ref 9.2–12.9)
POCT GLUCOSE: 131 MG/DL (ref 70–110)
POTASSIUM SERPL-SCNC: 3.4 MMOL/L (ref 3.5–5.1)
PROT SERPL-MCNC: 7.6 G/DL (ref 6–8.4)
RBC # BLD AUTO: 3.73 M/UL (ref 4.6–6.2)
SARS-COV-2 RDRP RESP QL NAA+PROBE: NEGATIVE
SODIUM SERPL-SCNC: 139 MMOL/L (ref 136–145)
WBC # BLD AUTO: 3.24 K/UL (ref 3.9–12.7)
WBC #/AREA STL HPF: NORMAL /[HPF]

## 2020-05-19 PROCEDURE — 80053 COMPREHEN METABOLIC PANEL: CPT

## 2020-05-19 PROCEDURE — 93005 ELECTROCARDIOGRAM TRACING: CPT

## 2020-05-19 PROCEDURE — 99285 EMERGENCY DEPT VISIT HI MDM: CPT | Mod: ,,, | Performed by: EMERGENCY MEDICINE

## 2020-05-19 PROCEDURE — 63700000 PHARM REV CODE 250 ALT 637 W/O HCPCS: Performed by: EMERGENCY MEDICINE

## 2020-05-19 PROCEDURE — 85025 COMPLETE CBC W/AUTO DIFF WBC: CPT

## 2020-05-19 PROCEDURE — 93010 EKG 12-LEAD: ICD-10-PCS | Mod: ,,, | Performed by: INTERNAL MEDICINE

## 2020-05-19 PROCEDURE — 93010 ELECTROCARDIOGRAM REPORT: CPT | Mod: ,,, | Performed by: INTERNAL MEDICINE

## 2020-05-19 PROCEDURE — 82962 GLUCOSE BLOOD TEST: CPT

## 2020-05-19 PROCEDURE — 99285 PR EMERGENCY DEPT VISIT,LEVEL V: ICD-10-PCS | Mod: ,,, | Performed by: EMERGENCY MEDICINE

## 2020-05-19 PROCEDURE — U0002 COVID-19 LAB TEST NON-CDC: HCPCS

## 2020-05-19 PROCEDURE — 99285 EMERGENCY DEPT VISIT HI MDM: CPT | Mod: 25

## 2020-05-19 RX ORDER — POTASSIUM CHLORIDE 20 MEQ/15ML
20 SOLUTION ORAL
Status: COMPLETED | OUTPATIENT
Start: 2020-05-19 | End: 2020-05-19

## 2020-05-19 RX ADMIN — POTASSIUM CHLORIDE 20 MEQ: 20 SOLUTION ORAL at 03:05

## 2020-05-19 NOTE — TELEPHONE ENCOUNTER
Spoke with pt wife. Explained the email I have states she cannot go with him. She verbalized understanding. She stated she will go and drop him off and ask if she can go in.

## 2020-05-19 NOTE — TELEPHONE ENCOUNTER
Spoke to pt wife. She said the diarrhea is worse. He cant make it to the bathroom. He cant keep his clothes on

## 2020-05-19 NOTE — TELEPHONE ENCOUNTER
Spoke with Dr. Womack who agrees patient should be admitted to the hospital.  Spoke with wife and she is wondering if she can go with him.  Nurse will find out hospital policy and give wife a call.  We will work on direct admission today for diarrhea.

## 2020-05-19 NOTE — ED NOTES
Bedside commode placed in room for patient's convenience. Denies any requests. Continuous pulse oximetry, BP, and cardiac monitoring in place. Call bell within reach. Will continue to monitor.

## 2020-05-19 NOTE — ED NOTES
Patient identifiers for Kenneth Sheikh 80 y.o. male checked and correct.  Chief Complaint   Patient presents with    Diarrhea     x 2 weeks sent here for admission but needs COVID test      Past Medical History:   Diagnosis Date    Anticoagulant long-term use     Basal cell carcinoma     Bipolar 1 disorder     Bipolar 1 disorder 11/24/2016    bipolar    Cancer     history of skin cancer/sinus cancer & rectal cancer    Depressed bipolar affective disorder     Diabetes mellitus     type 2    Diabetic retinopathy 01/09/2019    EBV infection 12/7/2016    EBV DNA, PCR Latest Ref Range: None detected  None detected EBV DNA-Copies/mL Unknown Test Not Performed EBV Early Antigen Ab, IgG Latest Ref Range: <1:10 Titer 1:40 (A) EBV Nuclear Ag Ab Latest Ref Range: <1:5 Titer >=1:80 (A) EBV VCA IgG Latest Ref Range: <1:10 Titer 1:160 (A) EBV VCA IgM Latest Ref Range: <1:10 Titer <1:10     History of TIA (transient ischemic attack)     several-taking Plavix    Hx of gallstones     Wife denies    Hypertension     Hyperthyroidism 11/30/2016    Low HDL (under 40) 12/7/2016    Mixed hyperlipidemia 11/23/2016    JUAN MANUEL (obstructive sleep apnea)     Pneumonia     Skin disease     Squamous cell carcinoma 08/2018    right temple    Stroke     Transient cerebral ischemia 7/22/2016    Type 2 diabetes mellitus      Allergies reported:   Review of patient's allergies indicates:   Allergen Reactions    Abilify [aripiprazole] Other (See Comments)     Sleepy, could not function.       Patient to be admitted by liver doctor for his loose bowels; diagnosed with nonalcoholic cirrhosis. Needs a covid test.     LOC: Patient is awake, alert, and aware of environment with an appropriate affect. Patient is oriented x 4 and speaking appropriately.  APPEARANCE: Patient resting comfortably and in no acute distress. Patient is clean and well groomed, patient's clothing is properly fastened.  HEENT: Wearing glasses and mask.   SKIN: The  skin is warm and dry. Patient has normal skin turgor and moist mucus membranes. Denies fever and chills. Bruising to bilateral arms.  MUSKULOSKELETAL: Patient is moving all extremities well, no obvious deformities noted. Pulses intact. Ambulatory by self. States generalized weakness.  RESPIRATORY: Airway is open and patent. Respirations are spontaneous and non-labored with normal effort and rate. Reports slight SOB.   CARDIAC: Patient has a bradycardic rate and rhythm. 52 on cardiac monitor. Chronic bilateral lower extremity peripheral edema noted. Denies chest pain.   ABDOMEN: Distention noted. Soft and non-tender upon palpation. Reports diarrhea (soft not watery) for 2 weeks, going 3-4x a day. States cramping with diarrhea. Denies nausea and vomiting.   NEUROLOGICAL: PERRL. Facial expression is symmetrical. Hand grasps are equal bilaterally. Normal sensation in all extremities when touched with finger. Denies headache.

## 2020-05-19 NOTE — DISCHARGE INSTRUCTIONS
If you have diarrhea in the morning take immodium- one.   Drink plenty of fluids to stay hydrated.  Follow up with gastroenterology- call Dr. Espinoza's office tomorrow.  Return to ED for fevers, abdominal pain, weakness, lightheadedness, dizziness, dark or bloody stools or any other concerns.

## 2020-05-19 NOTE — ED PROVIDER NOTES
Encounter Date: 5/19/2020  Pt seen by physician at 1:36 PM     History     Chief Complaint   Patient presents with    Diarrhea     x 2 weeks sent here for admission but needs COVID test      79 y/o M with co-morbidities of HTN, HLD, DM2, hyperthyroidism, JUAN MANUEL, bipolar d/o and MCDONALD cirrhosis requiring weekly paracentesis as well as known squamous cell carcinoma presents to ED with complaint of persistent diarrhea for the past 2-3 weeks. Has been in close contact with PCP whom sent him for admission. Pt states he wakes up and has a BM and then immediately has the sensation that he has to have another BM but is usually not able to make it to the toilet again. He describes his stool as 'mushy' and 'gold to brown'. He has at least 3-5 stools per day. He has stopped taking his lactulose. Has tried immodium which did help some but states he still is not able to control BMs.  Pt has some abdominal discomfort but states it is normal for him.  He also endorses some sob. No cough.  Has had some decreased PO intake but feels his appetite has been better this week.  Generally weak and tired. No focal weakness.  No fevers. No chest pain. No n/v. No recent travel. No recent abx.  Last had IR paracentesis on 5/15/20.  No known covid positive contacts.        Review of patient's allergies indicates:   Allergen Reactions    Abilify [aripiprazole] Other (See Comments)     Sleepy, could not function.     Past Medical History:   Diagnosis Date    Anticoagulant long-term use     Basal cell carcinoma     Bipolar 1 disorder     Bipolar 1 disorder 11/24/2016    bipolar    Cancer     history of skin cancer/sinus cancer & rectal cancer    Depressed bipolar affective disorder     Diabetes mellitus     type 2    Diabetic retinopathy 01/09/2019    EBV infection 12/7/2016    EBV DNA, PCR Latest Ref Range: None detected  None detected EBV DNA-Copies/mL Unknown Test Not Performed EBV Early Antigen Ab, IgG Latest Ref Range: <1:10  Titer 1:40 (A) EBV Nuclear Ag Ab Latest Ref Range: <1:5 Titer >=1:80 (A) EBV VCA IgG Latest Ref Range: <1:10 Titer 1:160 (A) EBV VCA IgM Latest Ref Range: <1:10 Titer <1:10     History of TIA (transient ischemic attack)     several-taking Plavix    Hx of gallstones     Wife denies    Hypertension     Hyperthyroidism 11/30/2016    Low HDL (under 40) 12/7/2016    Mixed hyperlipidemia 11/23/2016    JUAN MANUEL (obstructive sleep apnea)     Pneumonia     Skin disease     Squamous cell carcinoma 08/2018    right temple    Stroke     Transient cerebral ischemia 7/22/2016    Type 2 diabetes mellitus      Past Surgical History:   Procedure Laterality Date    Moh's procedure x4      rectal cancer      Sinus surgery for sinus cancer      sinus sx for cancer      skin cancer removed-several times-nose & ear      TONSILLECTOMY      Undescened testicle sx      UVULOPALATOPHARYNGOPLASTY      for sleep apnea     Family History   Problem Relation Age of Onset    No Known Problems Daughter     Diabetes Son     Anesthesia problems Neg Hx     Melanoma Neg Hx      Social History     Tobacco Use    Smoking status: Former Smoker     Packs/day: 0.25     Years: 2.00     Pack years: 0.50    Smokeless tobacco: Never Used    Tobacco comment: quit at age 17 less than a pack a day   Substance Use Topics    Alcohol use: No     Comment: rare    Drug use: No     Review of Systems   Constitutional: Negative for fatigue and fever.   HENT: Negative for congestion and sore throat.    Respiratory: Positive for shortness of breath. Negative for cough.    Cardiovascular: Positive for leg swelling. Negative for chest pain.   Gastrointestinal: Positive for abdominal distention and diarrhea. Negative for nausea and vomiting.   Genitourinary: Negative for dysuria.   Musculoskeletal: Negative for back pain.   Skin: Negative for rash.   Neurological: Positive for weakness. Negative for dizziness, light-headedness and headaches.    Hematological: Bruises/bleeds easily.       Physical Exam     Initial Vitals [05/19/20 1311]   BP Pulse Resp Temp SpO2   127/60 (!) 54 18 98.3 °F (36.8 °C) 98 %      MAP       --         Physical Exam    Nursing note and vitals reviewed.  Constitutional: He appears well-developed and well-nourished. No distress.   HENT:   Head: Atraumatic.   Eyes: EOM are normal. No scleral icterus.   Neck: Normal range of motion. Neck supple. No JVD present.   Cardiovascular:   No murmur heard.  Bradycardic, rr   Pulmonary/Chest: No respiratory distress. He has no wheezes. He has no rales.   Decreased bs lt base   Abdominal:   Softly distended, non tender   Musculoskeletal: Normal range of motion. He exhibits edema. He exhibits no tenderness.   Neurological: He is alert and oriented to person, place, and time. GCS eye subscore is 4. GCS verbal subscore is 5. GCS motor subscore is 6.   Skin: Skin is warm and dry.         ED Course   Procedures  Labs Reviewed   CBC W/ AUTO DIFFERENTIAL - Abnormal; Notable for the following components:       Result Value    WBC 3.24 (*)     RBC 3.73 (*)     Hemoglobin 10.6 (*)     Hematocrit 34.7 (*)     Mean Corpuscular Hemoglobin Conc 30.5 (*)     RDW 16.0 (*)     Platelets 97 (*)     Lymph # 0.7 (*)     Mono% 20.4 (*)     All other components within normal limits   COMPREHENSIVE METABOLIC PANEL - Abnormal; Notable for the following components:    Potassium 3.4 (*)     Glucose 127 (*)     Calcium 8.2 (*)     Albumin 2.2 (*)     Anion Gap 7 (*)     eGFR if  58.7 (*)     eGFR if non  50.8 (*)     All other components within normal limits   POCT GLUCOSE - Abnormal; Notable for the following components:    POCT Glucose 131 (*)     All other components within normal limits   SARS-COV-2 RNA AMPLIFICATION, QUAL    Narrative:     admission     EKG Readings: (Independently Interpreted)   Previous EKG: Compared with most recent EKG   Sinus bradycardia, rate 53, low voltage  qrs, no acute ischemic changes     ECG Results          EKG 12-lead (Final result)  Result time 05/19/20 16:35:37    Final result by Interface, Lab In [facility] (05/19/20 16:35:37)                 Narrative:    Test Reason : R19.7,    Vent. Rate : 053 BPM     Atrial Rate : 056 BPM     P-R Int : 184 ms          QRS Dur : 072 ms      QT Int : 460 ms       P-R-T Axes : 208 000 228 degrees     QTc Int : 432 ms    Age and gender specific analysis  Unusual P axis, possible ectopic atrial bradycardia with Possible Premature  atrial complexes with Aberrant conduction  Low voltage QRS  Nonspecific ST and T wave abnormality  Abnormal ECG  When compared with ECG of 29-SEP-2017 14:50,  Ectopic atrial rhythm has replaced Sinus rhythm  Nonspecific T wave abnormality, worse in Lateral leads  Confirmed by Ermias TRAYLOR, Gabriel UMANA ([id number]) on 5/19/2020 4:35:25 PM    Referred By: [email or web address]   SELF           Confirmed By:Gabriel Monson MD                            Imaging Results           X-Ray Chest AP Portable (Final result)  Result time 05/19/20 14:52:48    Final result by David Cortes MD (05/19/20 14:52:48)                 Impression:      Findings suggesting interval development of significant left-sided pleural effusion.    This report was flagged in Epic as abnormal.      Electronically signed by: David Cortes MD  Date:    05/19/2020  Time:    14:52             Narrative:    EXAMINATION:  XR CHEST AP PORTABLE    CLINICAL HISTORY:  Shortness of breath    TECHNIQUE:  Single frontal view of the chest was performed.    COMPARISON:  Prior study dated 10 May 2017    FINDINGS:  Monitor leads and wires are in place.  There is now increased attenuation within the inferior left hemithorax obscuring the left heart border and also the entirety of the left hemidiaphragm suggesting interval development of significant left-sided pleural effusion.  Underlying mass, infiltrate or nodule cannot be excluded.    There is stable radiographic  appearance of the remainder of the chest with the trachea remaining midline and the lungs otherwise appearing expanded and clear, the right hemidiaphragm appearing sharply outlined and without evidence of effusion on the right.  There is no indication of hilar enlargement.                              X-Rays:   Independently Interpreted Readings:   Chest X-Ray: Left pleural effusion     Medical Decision Making:   History:   Old Medical Records: I decided to obtain old medical records.  Initial Assessment:   81 y/o M with diarrhea for the past 2 weeks  Ddx: colitis, infection- c.diff, crypto, dehydration, electrolyte abnlty  Routine blood work, stool studies, CXR  Independently Interpreted Test(s):   I have ordered and independently interpreted X-rays - see prior notes.  I have ordered and independently interpreted EKG Reading(s) - see prior notes  Clinical Tests:   Lab Tests: Ordered and Reviewed  Radiological Study: Ordered and Reviewed  Medical Tests: Ordered and Reviewed  ED Management:  3:00 PM  Pt with stable wbc, potassium 3.4. repleted. Creatinine 1.3 slight decrease in gfr but otherwise unremarkable bloodwork. Has not had BM in ED.     4:55 PM  No BM in ED. Several discussions with Dr. Espinoza as well as Dr. Winter. Concern for difficulties with continued tx as pt has had sx for 2 weeks, pts age and his history of cirrhosis but no indication for admission. I have informed pt and his wife (daughter as well) of plan. Dr. Espinoza's office will arrange for expedited GI follow up as well as follow up of new Lt sided pleural effusion. All questions answered. Return precautions provided. Discharge home.  Other:   I have discussed this case with another health care provider.       <> Summary of the Discussion: PCP and hepatology         Clinical Impression:       ICD-10-CM ICD-9-CM   1. Diarrhea, unspecified type R19.7 787.91   2. Diarrhea R19.7 787.91   3. Shortness of breath R06.02 786.05   4. Pleural effusion  on left J90 511.9             ED Disposition Condition    Discharge Stable        ED Prescriptions     None        Follow-up Information     Follow up With Specialties Details Why Contact Info Additional Information    Prisca Espinoza MD Internal Medicine, Psychiatry Call on 5/20/2020  [address] [facility] [address]  [phone]       Renato Womack MD Transplant, Hepatology, Gastroenterology Call in 1 day  [address] [facility] [address]  [phone]       [facility] - Gastroenterology Gastroenterology Call on 5/20/2020  [address] [facility] [phone] [phone] [address] - 4th Floor                                     Kandy Charles MD  05/20/20 0004

## 2020-05-19 NOTE — TELEPHONE ENCOUNTER
I spoke to Ginna. I was a bit confused as to what's going on. We have recommended that the pt go to the ER a few times due to having diarrhea. She said she finally brought him today, but she's angry that he had to wait in the ER lobby and Dr. Womack and Dr. Espinoza didn't put in a direct admit order. I let her know that I don't see where anyone said we were putting in direct admit orders, that we wanted the pt to go to the ER to be evaluated and they will admit him after he goes to one of our ERs. They decided to come to this ER. I let Ginna know that Dr. Womack is currently working in the hospital, so he might be able to see the pt once he's brought to the back. She said that she wants Dr. SUMMERS and the hospital to call her regularly with updates and each time the doctor goes into the room. I let her know that I have no way of controlling that, but if she calls the hospital, she can request that her  can call her on speaker each time the doctor visits. She said he doesn't have a phone, but then she also said that he just called her to tell her that he's been admitted and he's priscila  room now, so I'm confused on if he has a phone or not. She also said that she has her husbands phone and she's freaking out because she hasn't spoken to him since she dropped him off, so I told her it would be best for her to drop it off to the ER if she does have it so that it would be easier for him to communicate with her. She said they won't let her in. I told her she can still drop it off for the pt, but no, she is not allowed in the back. I told her to try dropping the phone off or call the ER department to see if she can get in touch with her  to remind him to stay in contact with her. I will also message Dr. Womack to contact her if he gets to check on the pt today.    ----- Message from Janina Jackson sent at 5/19/2020  1:36 PM CDT -----  Contact: Pt wife Mrs Sheikh  Type:  Needs Medical Advice    Who Called:  Mrs. Sheikh  Symptoms (please be specific): ER admittance  Would the patient rather a call back or a response via [facility]?  callback  Best Call Back Number: [phone]  Additional Information: Requesting a callback from office says pt was told to go to hospital and need to speak with him asked if she can please get a callback ASAP

## 2020-05-20 LAB
E COLI SXT1 STL QL IA: NEGATIVE
E COLI SXT2 STL QL IA: NEGATIVE

## 2020-05-21 ENCOUNTER — TELEPHONE (OUTPATIENT)
Dept: SURGERY | Facility: CLINIC | Age: 80
End: 2020-05-21

## 2020-05-21 ENCOUNTER — TELEPHONE (OUTPATIENT)
Dept: INTERNAL MEDICINE | Facility: CLINIC | Age: 80
End: 2020-05-21

## 2020-05-21 LAB — O+P STL MICRO: NORMAL

## 2020-05-21 NOTE — TELEPHONE ENCOUNTER
Please call wife and let her know that Dr. Womack is working on talking to Dr. Talavera and GI appointment.  He said he would let me know as soon as he hears something.  Please find out how the patient is doing with the diarrhea.  Did the Imodium help?  Let her know all of the stool studies have now come back negative.

## 2020-05-21 NOTE — TELEPHONE ENCOUNTER
----- Message from Kayleigh Vela sent at 5/21/2020 11:03 AM CDT -----  Contact: Pts wife Mrs. Sheikh Home # [phone]  Ms. Sheikh is calling in regards to her saying that her  have been really sick and he is getting worse. Ms. Sheikh said that she spoke with you about her  seeing another doctor and she would like to speak with you about this please.

## 2020-05-21 NOTE — TELEPHONE ENCOUNTER
Spoke with patient's wife. States that patient is still having diarrhea and was wondering if patient could take more than one dose of Imodium. Also asked if patient could take a dose prior to his paracenteses tomorrow. Will inquire with Dr Espinoza regarding Imodium intake. Will also send message to Dr Talavera.

## 2020-05-21 NOTE — TELEPHONE ENCOUNTER
Spoke to pt wife. She said you told her on 05/19 that we will be calling her back to set up a GI appt. She also mentioned you were going to talk to  about who should he see.   Im not sure about this appt or if I was suppose to make him an appt.

## 2020-05-21 NOTE — TELEPHONE ENCOUNTER
Spoke to pt wife. He took imodium last night. He didn't have diarrhea throughout the night. He ate breakfast this morning and after couldn't make it to the bathroom. He ate lunch and had diarrhea but was able to make it to the bathroom.     He has an appt tomorrow and he is concerned he will not be able to make it to the bathroom again. Pt and wife would like to know when should he take the imodium? Tonight or tomorrow morning?

## 2020-05-21 NOTE — TELEPHONE ENCOUNTER
----- Message from Donna Hauser MA sent at 5/21/2020  3:39 PM CDT -----  Contact: [phone]  pt has had diarrhea for 2 weeks and wanted Dr Talavera's oponion on who he should see. Dr Talavera did surgery on his and removed a small cancer from the rectum and he trust his opinion. [phone]

## 2020-05-22 ENCOUNTER — TELEPHONE (OUTPATIENT)
Dept: SURGERY | Facility: CLINIC | Age: 80
End: 2020-05-22

## 2020-05-22 ENCOUNTER — HOSPITAL ENCOUNTER (OUTPATIENT)
Dept: INTERVENTIONAL RADIOLOGY/VASCULAR | Facility: HOSPITAL | Age: 80
Discharge: HOME | End: 2020-05-22
Attending: INTERNAL MEDICINE
Payer: MEDICARE

## 2020-05-22 VITALS
SYSTOLIC BLOOD PRESSURE: 132 MMHG | RESPIRATION RATE: 18 BRPM | OXYGEN SATURATION: 100 % | DIASTOLIC BLOOD PRESSURE: 68 MMHG | HEART RATE: 54 BPM

## 2020-05-22 DIAGNOSIS — E87.6 HYPOKALEMIA: ICD-10-CM

## 2020-05-22 DIAGNOSIS — R19.7 DIARRHEA, UNSPECIFIED TYPE: Primary | ICD-10-CM

## 2020-05-22 DIAGNOSIS — R18.8 OTHER ASCITES: ICD-10-CM

## 2020-05-22 LAB
APPEARANCE FLD: CLEAR
BACTERIA STL CULT: NORMAL
BODY FLD TYPE: NORMAL
COLOR FLD: YELLOW
LYMPHOCYTES NFR FLD MANUAL: 76 %
MESOTHL CELL NFR FLD MANUAL: 3 %
MONOS+MACROS NFR FLD MANUAL: 19 %
NEUTROPHILS NFR FLD MANUAL: 2 %
WBC # FLD: 24 /CU MM

## 2020-05-22 PROCEDURE — 49083 ABD PARACENTESIS W/IMAGING: CPT

## 2020-05-22 PROCEDURE — 49083 IR PARACENTESIS WITH IMAGING: ICD-10-PCS | Mod: ,,, | Performed by: FAMILY MEDICINE

## 2020-05-22 PROCEDURE — 89051 BODY FLUID CELL COUNT: CPT

## 2020-05-22 PROCEDURE — 49083 ABD PARACENTESIS W/IMAGING: CPT | Mod: ,,, | Performed by: FAMILY MEDICINE

## 2020-05-22 RX ORDER — POTASSIUM CHLORIDE 20 MEQ/1
TABLET, EXTENDED RELEASE ORAL
Qty: 20 TABLET | Refills: 0 | Status: SHIPPED | OUTPATIENT
Start: 2020-05-22 | End: 2020-06-29

## 2020-05-22 NOTE — TELEPHONE ENCOUNTER
Imodium once a day is fine if he is continuing to have diarrhea.  If his BM's stop, then do not continue taking it.  I would recommend potassium tablet once daily.

## 2020-05-22 NOTE — TELEPHONE ENCOUNTER
I sent the KCl to the pharmacy but please clarify if patient is still taking.  I didn't think he was taking this except the times I have spoken to his wife.  Please clarify.

## 2020-05-22 NOTE — H&P
Radiology History & Physical      SUBJECTIVE:     Chief Complaint: abdominal distention    History of Present Illness:  Kenneth Sheikh is a 80 y.o. male who presents for ultrasound guided paracentesis  Past Medical History:   Diagnosis Date    Anticoagulant long-term use     Basal cell carcinoma     Bipolar 1 disorder     Bipolar 1 disorder 11/24/2016    bipolar    Cancer     history of skin cancer/sinus cancer & rectal cancer    Depressed bipolar affective disorder     Diabetes mellitus     type 2    Diabetic retinopathy 01/09/2019    EBV infection 12/7/2016    EBV DNA, PCR Latest Ref Range: None detected  None detected EBV DNA-Copies/mL Unknown Test Not Performed EBV Early Antigen Ab, IgG Latest Ref Range: <1:10 Titer 1:40 (A) EBV Nuclear Ag Ab Latest Ref Range: <1:5 Titer >=1:80 (A) EBV VCA IgG Latest Ref Range: <1:10 Titer 1:160 (A) EBV VCA IgM Latest Ref Range: <1:10 Titer <1:10     History of TIA (transient ischemic attack)     several-taking Plavix    Hx of gallstones     Wife denies    Hypertension     Hyperthyroidism 11/30/2016    Low HDL (under 40) 12/7/2016    Mixed hyperlipidemia 11/23/2016    JUAN MANUEL (obstructive sleep apnea)     Pneumonia     Skin disease     Squamous cell carcinoma 08/2018    right temple    Stroke     Transient cerebral ischemia 7/22/2016    Type 2 diabetes mellitus      Past Surgical History:   Procedure Laterality Date    Moh's procedure x4      rectal cancer      Sinus surgery for sinus cancer      sinus sx for cancer      skin cancer removed-several times-nose & ear      TONSILLECTOMY      Undescened testicle sx      UVULOPALATOPHARYNGOPLASTY      for sleep apnea       Home Meds:   Prior to Admission medications    Medication Sig Start Date End Date Taking? Authorizing Provider   ACCU-CHEK NEYDA PLUS TEST STRP Strp CHECK BLOOD SUGAR ONCE DAILY 4/17/20   Prisca Espinoza MD   ciprofloxacin HCl (CIPRO) 500 MG tablet TAKE 1 TABLET(500 MG) BY MOUTH EVERY  MONDAY 1/23/20   Renato Womack MD   divalproex (DEPAKOTE) 250 MG EC tablet TAKE 2 TABLETS BY MOUTH EVERY EVENING 10/28/19   Prisca Espinoza MD   furosemide (LASIX) 20 MG tablet TAKE 2 TABLETS BY MOUTH EVERY DAY 5/2/19   Renato Womack MD   lactulose (CHRONULAC) 10 gram/15 mL solution TAKE 15 MLS BY MOUTH TWICE DAILY 4/6/20   Renato Womack MD   lancets Misc To check blood sugar once daily, to use with Accu-Chek meter. 11/5/18   Prisca Espinoza MD   loperamide (IMODIUM) 2 mg capsule Take 1 capsule (2 mg total) by mouth daily as needed for Diarrhea. 5/15/20 5/25/20  Prisca Espinoza MD   potassium chloride SA (K-DUR,KLOR-CON) 20 MEQ tablet Take 2 tablets tonight and 2 tablets in the morning and then 1 tablet twice daily there after. 5/15/20   Prisca Espinoza MD   SITagliptan (JANUVIA) 50 MG Tab Take 1 tablet (50 mg total) by mouth once daily. 4/6/17   Prisca Espinoza MD   XIFAXAN 550 mg Tab Take 1 tablet (550 mg total) by mouth 2 (two) times daily. 2/10/20   Renato Womack MD     Anticoagulants/Antiplatelets: no anticoagulation    Allergies:   Review of patient's allergies indicates:   Allergen Reactions    Abilify [aripiprazole] Other (See Comments)     Sleepy, could not function.     Sedation History:  no adverse reactions    Review of Systems:   Hematological: no known coagulopathies  Respiratory: no shortness of breath  Cardiovascular: no chest pain  Gastrointestinal: no abdominal pain  Genito-Urinary: no dysuria  Musculoskeletal: negative  Neurological: no TIA or stroke symptoms         OBJECTIVE:     Vital Signs (Most Recent)  Pulse: (!) 56 (05/22/20 1059)  Resp: 18 (05/22/20 1059)  BP: 119/68 (05/22/20 1059)  SpO2: 100 % (05/22/20 1059)    Physical Exam:  ASA: 2  Mallampati: n/a    General: no acute distress  Mental Status: alert and oriented to person, place and time  HEENT: normocephalic, atraumatic  Chest: unlabored breathing  Heart: regular heart rate  Abdomen:  distended  Extremity: moves all extremities    ASSESSMENT/PLAN:     Sedation Plan: local  Patient will undergo ultrasound guided paracentesis.    MATT Mauricio, FNP  Interventional Radiology  [phone] clinic

## 2020-05-22 NOTE — PROCEDURES

## 2020-05-22 NOTE — TELEPHONE ENCOUNTER
Spoke with patient and his wife.  Instructed her on how to get the siOPTICA sridevi on her phone. Appointment scheduled with Dr. Talavera on 6/4 at 11:00.

## 2020-05-22 NOTE — PROGRESS NOTES
Paracentesis complete, 3700 MLs removed. Specimen sent to lab. Dressing applied to RLQ puncture site, dressing clean dry and intact. Pt given discharge instructions and handouts, pt verbalizes understanding. Questions answered. Pt denies pain and discomfort. Pt discharged in wheelchair to family

## 2020-05-22 NOTE — TELEPHONE ENCOUNTER
----- Message from Prisca Espinoza MD sent at 5/22/2020  9:02 AM CDT -----  Yes, if still having diarrhea this morning he can take another Imodium.    Prisca Espinoza      ----- Message -----  From: Ranjana Meng RN  Sent: 5/21/2020   4:40 PM CDT  To: Prisca Espinoza MD    I spoke with patient's wife. She reported that she has only given patient one dose of Imodium. She was concerned about giving him more than one dose given his liver condition. She was also wondering if she could give him a dose before his paracentesis tomorrow? Please advise. As I was on the phone with her she received a call from GI and I now see he has an appointment with them on Wednesday Aliya 3. Thank you!

## 2020-05-22 NOTE — TELEPHONE ENCOUNTER
Left message for patient and wife. Informing patient and wife that it is okay per Dr Espinoza if patient takes another dose of Imodium if needed prior to procedure today.

## 2020-05-22 NOTE — TELEPHONE ENCOUNTER
Spoke to pt wife. The last time he took potassium was Tuesday. Do you want him to continue?     Also she wants to know how much longer can he take imodium ?

## 2020-05-22 NOTE — TELEPHONE ENCOUNTER
----- Message from Haris Talavera MD sent at 5/22/2020 12:51 PM CDT -----  Absolutely     Nichole Lopez - please see if they could do a video visit    cbw  ----- Message -----  From: Renato Womack MD  Sent: 5/21/2020   4:29 PM CDT  To: MD [facility]    He may remember this patient who had a T1 rectal cancer removed by you in 2016- you did a flex sig on him in March this year.    He is a frail marlon with cirrhosis/ascites but he has developed fecal incontinence (he had been on lactulose but it persists despite stopping this).    Would you be able to see him at all?    Thanks  Renato

## 2020-05-25 ENCOUNTER — TELEPHONE (OUTPATIENT)
Dept: INTERNAL MEDICINE | Facility: CLINIC | Age: 80
End: 2020-05-25

## 2020-05-25 NOTE — TELEPHONE ENCOUNTER
Please call wife and see how patient is doing?  How often is he having diarrhea.  How often has he been taking Imodium?  How is he feeling?

## 2020-05-25 NOTE — TELEPHONE ENCOUNTER
Spoke to pt wife. She verbalized understanding. Lab is scheduled    She would like to know if he should be taking lactulose that was prescribe to him from . He has not taken it in 2 weeks

## 2020-05-25 NOTE — TELEPHONE ENCOUNTER
If diarrhea has stopped, then I would recommend stopping the potassium.  Tell her only to resume potassium if the diarrhea returns.  Let's recheck lab end of this week.  Order is in.

## 2020-05-25 NOTE — TELEPHONE ENCOUNTER
Spoke to pt wife. Friday was the last day he too imodium. Saturday he did not have a BM. Sunday he did not have BM either. He started to feel uncomfortable and wanted an enema. After pt had the enema on Sunday he had a BM. Today he had 2 BM. it was not diarrhea but it was soft. He was able to make it to the bathroom by himself.    Pt and wife verbalized understanding with the potassium and imodium medication

## 2020-05-29 ENCOUNTER — HOSPITAL ENCOUNTER (OUTPATIENT)
Dept: INTERVENTIONAL RADIOLOGY/VASCULAR | Facility: HOSPITAL | Age: 80
Discharge: HOME | End: 2020-05-29
Attending: INTERNAL MEDICINE
Payer: MEDICARE

## 2020-05-29 VITALS
OXYGEN SATURATION: 100 % | SYSTOLIC BLOOD PRESSURE: 139 MMHG | DIASTOLIC BLOOD PRESSURE: 68 MMHG | HEART RATE: 47 BPM | RESPIRATION RATE: 18 BRPM

## 2020-05-29 DIAGNOSIS — R18.8 OTHER ASCITES: ICD-10-CM

## 2020-05-29 DIAGNOSIS — E87.6 HYPOKALEMIA: ICD-10-CM

## 2020-05-29 LAB
APPEARANCE FLD: CLEAR
BODY FLD TYPE: NORMAL
BODY FLUID COMMENTS: NORMAL
COLOR FLD: YELLOW
LYMPHOCYTES NFR FLD MANUAL: 55 %
MONOS+MACROS NFR FLD MANUAL: 40 %
NEUTROPHILS NFR FLD MANUAL: 5 %
WBC # FLD: 12 /CU MM

## 2020-05-29 PROCEDURE — 49083 IR PARACENTESIS WITH IMAGING: ICD-10-PCS | Mod: ,,, | Performed by: FAMILY MEDICINE

## 2020-05-29 PROCEDURE — 87075 CULTR BACTERIA EXCEPT BLOOD: CPT

## 2020-05-29 PROCEDURE — 49083 ABD PARACENTESIS W/IMAGING: CPT | Mod: ,,, | Performed by: FAMILY MEDICINE

## 2020-05-29 PROCEDURE — 49083 ABD PARACENTESIS W/IMAGING: CPT

## 2020-05-29 PROCEDURE — 87070 CULTURE OTHR SPECIMN AEROBIC: CPT

## 2020-05-29 PROCEDURE — 89051 BODY FLUID CELL COUNT: CPT

## 2020-05-29 RX ORDER — POTASSIUM CHLORIDE 20 MEQ/1
TABLET, EXTENDED RELEASE ORAL
Qty: 20 TABLET | Refills: 0 | OUTPATIENT
Start: 2020-05-29

## 2020-05-29 NOTE — PLAN OF CARE
Paracentesis complete.  Pt tolerated procedure well. 3500ml peritoneal fluid drained. Dressing to LLQ clean dry and intact.  Pt given discharged planning; verbalized understanding. Pt transported to family via WC.

## 2020-05-29 NOTE — H&P
Radiology History & Physical      SUBJECTIVE:     Chief Complaint: abdominal distention    History of Present Illness:  eKnneth Sheikh is a 80 y.o. male who presents for ultrasound guided paracentesis  Past Medical History:   Diagnosis Date    Anticoagulant long-term use     Basal cell carcinoma     Bipolar 1 disorder     Bipolar 1 disorder 11/24/2016    bipolar    Cancer     history of skin cancer/sinus cancer & rectal cancer    Depressed bipolar affective disorder     Diabetes mellitus     type 2    Diabetic retinopathy 01/09/2019    EBV infection 12/7/2016    EBV DNA, PCR Latest Ref Range: None detected  None detected EBV DNA-Copies/mL Unknown Test Not Performed EBV Early Antigen Ab, IgG Latest Ref Range: <1:10 Titer 1:40 (A) EBV Nuclear Ag Ab Latest Ref Range: <1:5 Titer >=1:80 (A) EBV VCA IgG Latest Ref Range: <1:10 Titer 1:160 (A) EBV VCA IgM Latest Ref Range: <1:10 Titer <1:10     History of TIA (transient ischemic attack)     several-taking Plavix    Hx of gallstones     Wife denies    Hypertension     Hyperthyroidism 11/30/2016    Low HDL (under 40) 12/7/2016    Mixed hyperlipidemia 11/23/2016    JUAN MANUEL (obstructive sleep apnea)     Pneumonia     Skin disease     Squamous cell carcinoma 08/2018    right temple    Stroke     Transient cerebral ischemia 7/22/2016    Type 2 diabetes mellitus      Past Surgical History:   Procedure Laterality Date    Moh's procedure x4      rectal cancer      Sinus surgery for sinus cancer      sinus sx for cancer      skin cancer removed-several times-nose & ear      TONSILLECTOMY      Undescened testicle sx      UVULOPALATOPHARYNGOPLASTY      for sleep apnea       Home Meds:   Prior to Admission medications    Medication Sig Start Date End Date Taking? Authorizing Provider   ACCU-CHEK NEYDA PLUS TEST STRP Strp CHECK BLOOD SUGAR ONCE DAILY 4/17/20   Prisca Espinoza MD   ciprofloxacin HCl (CIPRO) 500 MG tablet TAKE 1 TABLET(500 MG) BY MOUTH EVERY  MONDAY 1/23/20   Renato Womack MD   divalproex (DEPAKOTE) 250 MG EC tablet TAKE 2 TABLETS BY MOUTH EVERY EVENING 10/28/19   Prisca Espinoza MD   furosemide (LASIX) 20 MG tablet TAKE 2 TABLETS BY MOUTH EVERY DAY 5/2/19   Renato Womack MD   lactulose (CHRONULAC) 10 gram/15 mL solution TAKE 15 MLS BY MOUTH TWICE DAILY 4/6/20   Renato Womack MD   lancets Misc To check blood sugar once daily, to use with Accu-Chek meter. 11/5/18   Prisca Espinoza MD   potassium chloride SA (K-DUR,KLOR-CON) 20 MEQ tablet TAKE 2 TABLETS BY MOUTH TONIGHT AND 2 TABLETS EVERY MORNING, THEN 1 TABLET TWICE DAILY THEREAFTER 5/22/20   Prisca Espinoza MD   SITagliptan (JANUVIA) 50 MG Tab Take 1 tablet (50 mg total) by mouth once daily. 4/6/17   Prisca Espinoza MD   XIFAXAN 550 mg Tab Take 1 tablet (550 mg total) by mouth 2 (two) times daily. 2/10/20   Renato Womack MD     Anticoagulants/Antiplatelets: no anticoagulation    Allergies:   Review of patient's allergies indicates:   Allergen Reactions    Abilify [aripiprazole] Other (See Comments)     Sleepy, could not function.     Sedation History:  no adverse reactions    Review of Systems:   Hematological: no known coagulopathies  Respiratory: no shortness of breath  Cardiovascular: no chest pain  Gastrointestinal: no abdominal pain  Genito-Urinary: no dysuria  Musculoskeletal: negative  Neurological: no TIA or stroke symptoms         OBJECTIVE:     Vital Signs (Most Recent)       Physical Exam:  ASA: 2  Mallampati: n/a    General: no acute distress  Mental Status: alert and oriented to person, place and time  HEENT: normocephalic, atraumatic  Chest: unlabored breathing  Heart: regular heart rate  Abdomen: distended  Extremity: moves all extremities    ASSESSMENT/PLAN:     Sedation Plan: local  Patient will undergo ultrasound guided paracentesis.    MATT Mauricio, FNP  Interventional Radiology  [phone] [facility]

## 2020-05-29 NOTE — PLAN OF CARE
Pt arrived to [facility] for paracentesis.  Name verified using two identifiers.  Allergies verified. Will continue to monitor.

## 2020-05-29 NOTE — PROCEDURES

## 2020-05-29 NOTE — DISCHARGE INSTRUCTIONS
Please call with any questions or concerns.      Monday thru Friday 8:00 am - 4:30 pm    Interventional Radiology   [phone]    After Hours    Ask for the Radiology Intern on call  [phone]

## 2020-05-30 RX ORDER — POTASSIUM CHLORIDE 20 MEQ/1
20 TABLET, EXTENDED RELEASE ORAL 2 TIMES DAILY
Qty: 60 TABLET | Refills: 0 | Status: SHIPPED | OUTPATIENT
Start: 2020-05-30 | End: 2020-06-29

## 2020-05-30 RX ORDER — POTASSIUM CHLORIDE 20 MEQ/1
TABLET, EXTENDED RELEASE ORAL
Qty: 180 TABLET | OUTPATIENT
Start: 2020-05-30

## 2020-05-30 NOTE — TELEPHONE ENCOUNTER
Please call patient's wife and let her know potassium is low again (2.9).  He should take 2 tablets now and 2 tablets tonight, and then start 1 tablet twice daily tomorrow.  Refill send to pharmacy.  We will need to recheck labs (BMP, Mg) next week when patient is able.  Order is in please schedule.  Please ask how patient is doing and if diarrhea has improved at all?

## 2020-06-01 ENCOUNTER — PATIENT OUTREACH (OUTPATIENT)
Dept: ADMINISTRATIVE | Facility: OTHER | Age: 80
End: 2020-06-01

## 2020-06-01 ENCOUNTER — TELEPHONE (OUTPATIENT)
Dept: INTERNAL MEDICINE | Facility: CLINIC | Age: 80
End: 2020-06-01

## 2020-06-01 DIAGNOSIS — R18.8 CIRRHOSIS OF LIVER WITH ASCITES, UNSPECIFIED HEPATIC CIRRHOSIS TYPE: Primary | ICD-10-CM

## 2020-06-01 DIAGNOSIS — R18.8 ASCITES OF LIVER: ICD-10-CM

## 2020-06-01 DIAGNOSIS — K75.81 LIVER CIRRHOSIS SECONDARY TO NASH: ICD-10-CM

## 2020-06-01 DIAGNOSIS — K74.60 CIRRHOSIS OF LIVER WITH ASCITES, UNSPECIFIED HEPATIC CIRRHOSIS TYPE: Primary | ICD-10-CM

## 2020-06-01 DIAGNOSIS — K74.60 LIVER CIRRHOSIS SECONDARY TO NASH: ICD-10-CM

## 2020-06-01 LAB — BACTERIA SPEC AEROBE CULT: NO GROWTH

## 2020-06-01 NOTE — TELEPHONE ENCOUNTER
Spoke to pt wife. She is requesting to speak to  directly today. She is not happy and pt is continuing to have diarrhea with no relief.

## 2020-06-01 NOTE — TELEPHONE ENCOUNTER
----- Message from Cricket Tadeo sent at 6/1/2020 12:08 PM CDT -----  Contact: Ginna wife [phone]  Patient would like an call back form the nurse in regards to update on pt health, pt wife states please call back it's important, please advise.

## 2020-06-02 ENCOUNTER — TELEPHONE (OUTPATIENT)
Dept: INTERNAL MEDICINE | Facility: CLINIC | Age: 80
End: 2020-06-02

## 2020-06-02 ENCOUNTER — TELEPHONE (OUTPATIENT)
Dept: GASTROENTEROLOGY | Facility: CLINIC | Age: 80
End: 2020-06-02

## 2020-06-02 DIAGNOSIS — R19.7 DIARRHEA, UNSPECIFIED TYPE: Primary | ICD-10-CM

## 2020-06-02 NOTE — TELEPHONE ENCOUNTER
Spoke with wife 6/1/20 at 4pm.  She reports patient continues to have diarrhea with incontinence.  He usually has 2-3 soft BM's per day.  Denies abdominal pain or changes in appetite.  No nausea or vomiting.  He has been taking Imodium every night and today she gave it to him at noon as well.  He is taking KCl BID.  She is very concerned and wondering about next steps.  They have appointment with GI on Wednesday and Colorectal Surgery on Thursday.  Will send messages to providers to see if they want any imaging prior to appointment.

## 2020-06-02 NOTE — PROGRESS NOTES
[facility] Consultation Note    Reason for Consult:  The primary encounter diagnosis was Full incontinence of feces. A diagnosis of Ascites of liver was also pertinent to this visit.    PCP:   Prisca Espinoza   [address] [facility] [address]    Referring MD:  Renato Womack Md  [address] [place] [address]    Initial History of Present Illness (HPI):  This is a 80 y.o. male here for evaluation of fecal incontinence.  He has seen Dr Talavera for TA excision of rectal cancer a few years ago.  Is taking imodium up to twice a day  He has been off his lactulose for a month, but has been taking xifaxan, also gets weekly paracentesis    Abdominal pain - no  Reflux - no  Dysphagia - no   Bowel habits - normal  GI bleeding - none  NSAID usage - none        ROS:  Constitutional: No fevers, chills, No weight loss  ENT: No allergies  CV: No chest pain  Pulm: No cough, No shortness of breath  Ophtho: No vision changes  GI: see HPI  Derm: No rash  Heme: No lymphadenopathy, No bruising  MSK: No arthritis  : No dysuria, No hematuria  Endo: No hot or cold intolerance  Neuro: No syncope, No seizure  Psych: No anxiety, No depression    Medical History:  has a past medical history of Anticoagulant long-term use, Basal cell carcinoma, Bipolar 1 disorder, Bipolar 1 disorder (11/24/2016), Cancer, Depressed bipolar affective disorder, Diabetes mellitus, Diabetic retinopathy (01/09/2019), EBV infection (12/7/2016), History of TIA (transient ischemic attack), gallstones, Hypertension, Hyperthyroidism (11/30/2016), Low HDL (under 40) (12/7/2016), Mixed hyperlipidemia (11/23/2016), JUAN MANUEL (obstructive sleep apnea), Pneumonia, Skin disease, Squamous cell carcinoma (08/2018), Stroke, Transient cerebral ischemia (7/22/2016), and Type 2 diabetes mellitus.    Surgical History:  has a past surgical history that includes Tonsillectomy; sinus sx for cancer; Sinus surgery for sinus cancer; skin  "cancer removed-several times-nose & ear; Moh's procedure x4; Uvulopalatopharyngoplasty; Undescened testicle sx; and rectal cancer.    Family History: family history includes Diabetes in his son; No Known Problems in his daughter..     Social History:  reports that he has quit smoking. He has a 0.50 pack-year smoking history. He has never used smokeless tobacco. He reports that he does not drink alcohol or use drugs.    Review of patient's allergies indicates:   Allergen Reactions    Abilify [aripiprazole] Other (See Comments)     Sleepy, could not function.       Medication List with Changes/Refills   Current Medications    ACCU-CHEK NEYDA PLUS TEST STRP STRP    CHECK BLOOD SUGAR ONCE DAILY    CIPROFLOXACIN HCL (CIPRO) 500 MG TABLET    TAKE 1 TABLET(500 MG) BY MOUTH EVERY MONDAY    DIVALPROEX (DEPAKOTE) 250 MG EC TABLET    TAKE 2 TABLETS BY MOUTH EVERY EVENING    FUROSEMIDE (LASIX) 20 MG TABLET    TAKE 2 TABLETS BY MOUTH EVERY DAY    LACTULOSE (CHRONULAC) 10 GRAM/15 ML SOLUTION    TAKE 15 MLS BY MOUTH TWICE DAILY    LANCETS MISC    To check blood sugar once daily, to use with Accu-Chek meter.    LOPERAMIDE (IMODIUM) 2 MG CAPSULE    Take 1 capsule (2 mg total) by mouth 2 (two) times daily as needed for Diarrhea.    POTASSIUM CHLORIDE SA (K-DUR,KLOR-CON) 20 MEQ TABLET    TAKE 2 TABLETS BY MOUTH TONIGHT AND 2 TABLETS EVERY MORNING, THEN 1 TABLET TWICE DAILY THEREAFTER    POTASSIUM CHLORIDE SA (K-DUR,KLOR-CON) 20 MEQ TABLET    Take 1 tablet (20 mEq total) by mouth 2 (two) times daily.    SITAGLIPTAN (JANUVIA) 50 MG TAB    Take 1 tablet (50 mg total) by mouth once daily.    XIFAXAN 550 MG TAB    Take 1 tablet (550 mg total) by mouth 2 (two) times daily.         Objective Findings:    Vital Signs:  /60 (BP Location: Left arm, Patient Position: Sitting, BP Method: Medium (Automatic))   Pulse (!) 48   Ht 5' 8" (1.727 m)   Wt 75.9 kg (167 lb 5.3 oz)   BMI 25.44 kg/m²   Body mass index is 25.44 kg/m².    Physical " Exam:  General Appearance: Well appearing in no acute distress  Head:   Normocephalic, without obvious abnormality  Eyes:    No scleral icterus, EOMI  ENT: Neck supple, Lips, mucosa, and tongue normal; teeth and gums normal  Lungs: CTA bilaterally in anterior and posterior fields, no wheezes, no crackles.  Heart:  Regular rate and rhythm, S1, S2 normal, no murmurs heard  Abdomen: Soft, non tender, non distended with positive bowel sounds in all four quadrants. No hepatosplenomegaly, ascites, or mass  Extremities: 2+ pulses, no clubbing, cyanosis or edema  Skin: No rash  Neurologic: CN II-XII intact      Labs:  Lab Results   Component Value Date    WBC 3.24 (L) 05/19/2020    HGB 10.6 (L) 05/19/2020    HCT 34.7 (L) 05/19/2020    PLT 97 (L) 05/19/2020    CHOL 119 (L) 01/28/2020    TRIG 92 01/28/2020    HDL 38 (L) 01/28/2020    ALT 9 (L) 05/29/2020    AST 26 05/29/2020     05/29/2020    K 2.9 (L) 05/29/2020     05/29/2020    CREATININE 1.3 05/29/2020    BUN 24 (H) 05/29/2020    CO2 26 05/29/2020    TSH 1.248 05/15/2020    PSA 0.48 01/28/2020    INR 1.2 05/15/2020    HGBA1C 6.6 (H) 05/15/2020       No results found for: HPYLORINTERP  No results found for: HPYLORIANTIG        Imaging:    Endoscopy:      Colon 5 years ago finding rectal cancer  Flex sig 2020 - no recurrence    Assessment:  1. Full incontinence of feces    2. Ascites of liver      His incontinence is likely multifactorial from muscle weakness from rectal cancer excision, bowel edema from volume overload, and possibly some diabetic neuropathy (fairly well controlled).  His recent flex sig is reassuring.     Recommendations:  1. First trial of increasing fiber up to tid in addition to the imodium  2. If not better then would consider full colonoscopy +/- anorectal manometry  3. Seeing Dr Talavera tomorrow virtually.    No follow-ups on file. - he will call with progress report in 1 month      Order summary:         Thank you so much for allowing me  to participate in the care of Kenneth Tubbs MD

## 2020-06-02 NOTE — TELEPHONE ENCOUNTER
Please call wife and let her know that Dr. Tubbs would like to get abdominal ultrasound today.  Urgent order put in - please schedule.

## 2020-06-02 NOTE — TELEPHONE ENCOUNTER
MA informed pt per Dr. Tubbs - Ok for wife to accompany him to visit tomorrow.     Pt verbalize understanding and thank MA     ----- Message from Gregory Tubbs MD sent at 6/2/2020 10:44 AM CDT -----  Regarding: FW: [facility] patient - diarrhea  Ok for wife to accompany him to visit tomorrow due to memory issues  ----- Message -----  From: Prisca Espinoza MD  Sent: 6/2/2020   9:58 AM CDT  To: Renato Womack MD, Haris Talavera MD, #  Subject: [facility] patient - diarrhea                        I wanted to touch base with each of you regarding this mutual patient (Dr. Tubbs, you are meeting him tomorrow; Dr. Talavera you see him virtually on Thursday).  He has had changes in his BM's (3-4 loose stool per day) with new fecal incontinence for the last 3-4 weeks.  He was previously on lactulose but now has been off for some time and continues to have the incontinence.  I have gotten stool studies which were all negative.  His potassium has been persistently low due to the frequent BM's, and a few weeks ago Dr. Womack and I even tried to have him admitted to the hospital.  We were told he did not meet criteria at that time.  I spoke with wife again last night and she is very distressed from the continued fecal incontinence.  He is currently taking Imodium every night and wife reports it is worsening.  She gave him a second Imodium during the day yesterday.  He is taking KCl twice daily and we are repeating KCl this week.  Wife is very anxious to hear next steps in workup.  I have not ordered CT abdomen/pelvis yet, however if y'all know you want that next I can see if he can have that done today.  Dr. Tubbs, patient does have some short term memory loss so it may be helpful if wife can come with him to appointment tomorrow to give history.  Dr. Womack, wife was also asking if we need to get next month's abdominal ultrasound early in case the liver is causing this.  I told her I would defer to you.  Please reach out  to me any time with questions.  I appreciate your help!    Prisca Espinoza

## 2020-06-02 NOTE — TELEPHONE ENCOUNTER
[facility]. Pt has to be NPO for 8 hours. He ate breakfast for 9:30. They will not be able to the US. I also tried to get the US done tomorrow morning but they don't start until 8:00. Pt has an appt at 8am tomorrow with Gastro.

## 2020-06-03 ENCOUNTER — OFFICE VISIT (OUTPATIENT)
Dept: GASTROENTEROLOGY | Facility: CLINIC | Age: 80
End: 2020-06-03
Payer: MEDICARE

## 2020-06-03 VITALS
WEIGHT: 167.31 LBS | DIASTOLIC BLOOD PRESSURE: 60 MMHG | HEART RATE: 48 BPM | BODY MASS INDEX: 25.36 KG/M2 | SYSTOLIC BLOOD PRESSURE: 126 MMHG | HEIGHT: 68 IN

## 2020-06-03 DIAGNOSIS — R18.8 ASCITES OF LIVER: ICD-10-CM

## 2020-06-03 DIAGNOSIS — R15.9 FULL INCONTINENCE OF FECES: Primary | ICD-10-CM

## 2020-06-03 PROCEDURE — 99213 OFFICE O/P EST LOW 20 MIN: CPT | Mod: PBBFAC | Performed by: INTERNAL MEDICINE

## 2020-06-03 PROCEDURE — 99999 PR PBB SHADOW E&M-EST. PATIENT-LVL III: CPT | Mod: PBBFAC,,, | Performed by: INTERNAL MEDICINE

## 2020-06-03 PROCEDURE — 99214 PR OFFICE/OUTPT VISIT, EST, LEVL IV, 30-39 MIN: ICD-10-PCS | Mod: S$PBB,,, | Performed by: INTERNAL MEDICINE

## 2020-06-03 PROCEDURE — 99214 OFFICE O/P EST MOD 30 MIN: CPT | Mod: S$PBB,,, | Performed by: INTERNAL MEDICINE

## 2020-06-03 PROCEDURE — 99999 PR PBB SHADOW E&M-EST. PATIENT-LVL III: ICD-10-PCS | Mod: PBBFAC,,, | Performed by: INTERNAL MEDICINE

## 2020-06-03 NOTE — PATIENT INSTRUCTIONS
HIGH FIBER KEY POINTS:  1. Goal of 20-25 grams of fiber each day for women, 30-35 grams each day for men. Slowly build up to this goal as you may experience gas and bloating at first.  2. Take a fiber supplement to help reach this goal: Metamucil, Citrucel, Fibercon, Konsyl, or Psyllium  3. For Constipation: Finely ground psyllium husk (with or without flavoring or                                              Additives)       - To start: 1 teaspoon once a day in the morning with 8 oz of liquid    followed by an additional 8 ounce glass of water   - After a week: add a second teaspoon in the middle of the day   - After two weeks: add a third teaspoon at bedtime   - Follow each dose with another glass of water. Can add a splash of juice   or lemonade for taste.  3. Drink 6-8 glasses of water a day.  4. Try to do plant fiber (fruits and vegetables) over processed fibers (cereals and breads)     HIGH FIBER DIET  Fiber is present in all fruits, vegetables, cereals and grains. Fiber passes through the body undigested. A high fiber diet helps food move through the intestinal tract. The added bulk is helpful in preventing constipation. In persons with diverticulosis it serves to clean out the pouches along the colon wall while preventing new ones from forming. A high fiber diet may reduce the risk of colon cancer, decreases blood cholesterol and prevents high blood sugar in diabetics.    The foods listed below are high in fiber and should be included in your diet. If you are not used to high fiber foods, start with 1 or 2 foods from this list. Every 3-4 days add a new one to your diet until you are eating 4 high fiber foods per day. This should give you 20-35 Gm of fiber/day. It is also important to drink a lot of water when you are on this diet (6-8 glasses a day). Water causes the fiber to swell and increases the benefit.  Add Fiber to Your Diet   Adding fiber to your diet can help relieve constipation by making stools  softer and easier to pass. To increase your fiber intake, your doctor may recommend a bulking agent, such as psyllium. This is a high-fiber supplement available at most grocery and drugstores. Eating more fiber-rich foods will also help. There are two types of fiber:    Insoluble fiber is the main ingredient in bulking agents. Its also found in foods such as wheat bran, whole-grain breads, fresh fruits, and vegetables.    Soluble fiber is found in foods such as oat bran. Although soluble fiber is good for you, it may not ease constipation as much as foods high in insoluble fiber.    FOODS HIGH IN DIETARY FIBER:  BREADS: Made with 100% whole wheat flour; [place], wheat or rye crackers; tortillas, bran muffins. Whole grains, such as wheat bran, corn bran, and brown rice.  CEREALS: Whole grain cereal with bran (Chex, Raisin Bran, Corn Bran), oatmeal, rolled oats, granola, wheat flakes, brown rice  NUTS: Any nuts  FRUITS: All fresh fruits along with edible skins, (bananas, citrus fruit, mangoes, pears, prunes, raisins, apples, pineapple, apricot, melon, jams and marmalades), fruit juices (especially prune juice)  VEGETABLES: All types, preferably raw or lightly cooked: especially, celery, eggplant, potatoes,spinach, broccoli, brussel sprouts, winter squash, carrots, cauliflower, soybeans, lentils, fresh and dried beans of all kinds  OTHER: Popcorn, any spices.   Nuts and legumes, especially peanuts, lentils, and kidney beans.  Easy Ways to Add Fiber   The tips below offer some simple ways to add more high-fiber foods to your meals.   Start your day with a high-fiber breakfast. Eat a wheat bran cereal along with a sliced banana. Or, try peanut butter on whole-wheat toast.   Eat carrot sticks for snacks. Theyre easy to prepare, taste great, and are low in calories.   Use whole-grain breads instead of white bread for sandwiches.   Eat fruits for treats. Try an apple and some raisins instead of a candy  bar.  Vegetables  Artichoke, cooked 10.3  Asparagus - 2.8  Beans - 2  Broccoli, boiled 1 cup - 5.1  [place] sprouts, cooked 1 cup - 4.1  Carrots - boiled 2.5, raw 4  Celery - 1  Corn - 4  Corn on the Cob - 5.9  Lettuce - 1  Peas (canned) - 4  Peas (dried) - 7.9  Green peas (cooked) - 8.8  Potato, with skin, baked - 3.0  Spinach - 4  Sweet potato - 3.4  Turnip greens, boiled 1 cup - 5.0  Sweet corn, cooked  1 cup  4.0  Tomato paste -1/4 cup -2.7  Yam - 2.7  Legumes, Nuts, and Seeds  Split peas, cooked  1 cup  16.3  Lentils, cooked 1 cup 15.6  Black beans, cooked 1 cup 15.0  Black eyed peas (1/2 cup) 4.2  Chick peas (1/2 cup) 5  Lima beans, cooked 1 cup  13.2  Baked beans, vegetarian, canned, cooked 1 cup 10.4  Sunflower seed kernels 1/4 cup 3.9  Almonds 1 ounce (23 nuts)  3.5  Pistachio nuts 1 ounce (49 nuts) 2.9  Pecans 1 ounce (19 halves) 2.7  Beans (lima,kidney,baked) - 10 (1/2 cup)  Refried beans -12 (1cup)  Lentils - 8  Soybeans (1/2 cup) 5  Fruit  Raspberries  1 cup  8.0  Pear, with skin - 5.5  Apple, with skin 4.4 (Juice = 0)  Banana - 3.1  Orange - 3.1  Strawberries (halves) 1 cup - 3.0  Figs, dried 2 medium - 1.6  Raisins 1 ounce (60 raisins) -1.0  Grapefruit - 1.4  Peach - 2  Kiwi - 5  [place] - 3.7  Pineapple - 2  Cereal, Grains, and Pasta  Spaghetti, whole-wheat, cooked 1 cup 6.3  Barley, pearled, cooked 1 cup 6.0  Bran flakes 3/4 cup 5.3  Oat bran muffin 1 medium 5.2  Oatmeal, instant, cooked 1 cup 4.0  Popcorn, air-popped 3 cups 3.5  Brown rice, cooked  1 cup  3.5  Bread, rye 1 slice 1.9, pumpernickel - 2.1  Bagel - 1.6  Bread, whole-wheat or multigrain  1 slice 1.9  Fiber One - 14  100% Bran - 13  Raisin Bran - 3.5  All Bran Extra Fiber - 13

## 2020-06-03 NOTE — LETTER
Aliya 3, 2020      Renato Womack MD  [address]  [facility] [address]           [facility]  [address]  [facility] [address]  Phone: [phone]  Fax: [phone]          Patient: Kenneth Sheikh   MR Number: [id number]   YOB: 1940   Date of Visit: 6/3/2020       Dear Dr. Renato Womack:    Thank you for referring Kenneth Sheikh to me for evaluation. Attached you will find relevant portions of my assessment and plan of care.    If you have questions, please do not hesitate to call me. I look forward to following Kenneth Sheikh along with you.    Sincerely,    Gregory Tubbs MD    Enclosure  CC:  No Recipients    If you would like to receive this communication electronically, please contact [email or web address] or [phone] to request more information on [employer] Link access.    For providers and/or their staff who would like to refer a patient to [facility], please contact us through our one-stop-shop provider referral line, [facility], at [phone].    If you feel you have received this communication in error or would no longer like to receive these types of communications, please e-mail [email or web address]

## 2020-06-04 ENCOUNTER — OFFICE VISIT (OUTPATIENT)
Dept: SURGERY | Facility: CLINIC | Age: 80
End: 2020-06-04
Payer: MEDICARE

## 2020-06-04 DIAGNOSIS — Z85.048 HISTORY OF RECTAL CANCER: Primary | ICD-10-CM

## 2020-06-04 DIAGNOSIS — R15.9 FULL INCONTINENCE OF FECES: ICD-10-CM

## 2020-06-04 PROCEDURE — G0463 HOSPITAL OUTPT CLINIC VISIT: HCPCS

## 2020-06-04 PROCEDURE — 99212 PR OFFICE/OUTPT VISIT, EST, LEVL II, 10-19 MIN: ICD-10-PCS | Mod: 95,,, | Performed by: COLON & RECTAL SURGERY

## 2020-06-04 PROCEDURE — 99212 OFFICE O/P EST SF 10 MIN: CPT | Mod: 95,,, | Performed by: COLON & RECTAL SURGERY

## 2020-06-04 PROCEDURE — 99211 OFF/OP EST MAY X REQ PHY/QHP: CPT

## 2020-06-05 ENCOUNTER — HOSPITAL ENCOUNTER (OUTPATIENT)
Dept: INTERVENTIONAL RADIOLOGY/VASCULAR | Facility: HOSPITAL | Age: 80
Discharge: HOME | End: 2020-06-05
Attending: INTERNAL MEDICINE
Payer: MEDICARE

## 2020-06-05 VITALS
OXYGEN SATURATION: 100 % | DIASTOLIC BLOOD PRESSURE: 67 MMHG | SYSTOLIC BLOOD PRESSURE: 148 MMHG | RESPIRATION RATE: 18 BRPM | HEART RATE: 52 BPM

## 2020-06-05 DIAGNOSIS — K74.60 LIVER CIRRHOSIS SECONDARY TO NASH: ICD-10-CM

## 2020-06-05 DIAGNOSIS — R18.8 CIRRHOSIS OF LIVER WITH ASCITES, UNSPECIFIED HEPATIC CIRRHOSIS TYPE: ICD-10-CM

## 2020-06-05 DIAGNOSIS — K75.81 LIVER CIRRHOSIS SECONDARY TO NASH: ICD-10-CM

## 2020-06-05 DIAGNOSIS — K74.60 CIRRHOSIS OF LIVER WITH ASCITES, UNSPECIFIED HEPATIC CIRRHOSIS TYPE: ICD-10-CM

## 2020-06-05 DIAGNOSIS — R18.8 ASCITES OF LIVER: ICD-10-CM

## 2020-06-05 LAB
APPEARANCE FLD: CLEAR
BACTERIA SPEC ANAEROBE CULT: NORMAL
BODY FLD TYPE: NORMAL
COLOR FLD: NORMAL
LYMPHOCYTES NFR FLD MANUAL: 55 %
MONOS+MACROS NFR FLD MANUAL: 44 %
NEUTROPHILS NFR FLD MANUAL: 1 %
WBC # FLD: 27 /CU MM

## 2020-06-05 PROCEDURE — 89051 BODY FLUID CELL COUNT: CPT

## 2020-06-05 PROCEDURE — 49083 IR PARACENTESIS WITH IMAGING: ICD-10-PCS | Mod: ,,, | Performed by: RADIOLOGY

## 2020-06-05 PROCEDURE — 49083 ABD PARACENTESIS W/IMAGING: CPT

## 2020-06-05 PROCEDURE — 49083 ABD PARACENTESIS W/IMAGING: CPT | Mod: ,,, | Performed by: RADIOLOGY

## 2020-06-05 NOTE — PROGRESS NOTES
The patient location is:  [place]  The chief complaint leading to consultation is:  Diarrhea, incontinence    Visit type: audiovisual    Face to Face time with patient:  10 min.  20 min minutes of total time spent on the encounter, which includes face to face time and non-face to face time preparing to see the patient (eg, review of tests), Obtaining and/or reviewing separately obtained history, Documenting clinical information in the electronic or other health record, Independently interpreting results (not separately reported) and communicating results to the patient/family/caregiver, or Care coordination (not separately reported).         Each patient to whom he or she provides medical services by telemedicine is:  (1) informed of the relationship between the physician and patient and the respective role of any other health care provider with respect to management of the patient; and (2) notified that he or she may decline to receive medical services by telemedicine and may withdraw from such care at any time.    Notes:  Has been chronically on lactulose which was stopped about a month ago.  He has had persistent frequent loose bowel movements with incontinence.  He is able to control formed stool.  He had a visit with Dr. Tubbs from gastroenterology yesterday who made some recommendations for fiber.  My recommendation is that he follow recommendations of Dr. Tubbs follow-up with him as this is not a surgically correctable issue.

## 2020-06-05 NOTE — H&P
Radiology History & Physical      SUBJECTIVE:     Chief Complaint: abdominal distention    History of Present Illness:  Kenneth Sheikh is a 80 y.o. male who presents for ultrasound guided paracentesis  Past Medical History:   Diagnosis Date    Anticoagulant long-term use     Basal cell carcinoma     Bipolar 1 disorder     Bipolar 1 disorder 11/24/2016    bipolar    Cancer     history of skin cancer/sinus cancer & rectal cancer    Depressed bipolar affective disorder     Diabetes mellitus     type 2    Diabetic retinopathy 01/09/2019    EBV infection 12/7/2016    EBV DNA, PCR Latest Ref Range: None detected  None detected EBV DNA-Copies/mL Unknown Test Not Performed EBV Early Antigen Ab, IgG Latest Ref Range: <1:10 Titer 1:40 (A) EBV Nuclear Ag Ab Latest Ref Range: <1:5 Titer >=1:80 (A) EBV VCA IgG Latest Ref Range: <1:10 Titer 1:160 (A) EBV VCA IgM Latest Ref Range: <1:10 Titer <1:10     History of TIA (transient ischemic attack)     several-taking Plavix    Hx of gallstones     Wife denies    Hypertension     Hyperthyroidism 11/30/2016    Low HDL (under 40) 12/7/2016    Mixed hyperlipidemia 11/23/2016    JUAN MANUEL (obstructive sleep apnea)     Pneumonia     Skin disease     Squamous cell carcinoma 08/2018    right temple    Stroke     Transient cerebral ischemia 7/22/2016    Type 2 diabetes mellitus      Past Surgical History:   Procedure Laterality Date    Moh's procedure x4      rectal cancer      Sinus surgery for sinus cancer      sinus sx for cancer      skin cancer removed-several times-nose & ear      TONSILLECTOMY      Undescened testicle sx      UVULOPALATOPHARYNGOPLASTY      for sleep apnea       Home Meds:   Prior to Admission medications    Medication Sig Start Date End Date Taking? Authorizing Provider   ACCU-CHEK NEYDA PLUS TEST STRP Strp CHECK BLOOD SUGAR ONCE DAILY  Patient not taking: Reported on 6/3/2020 4/17/20   Prisca Espinoza MD   ciprofloxacin HCl (CIPRO) 500 MG  tablet TAKE 1 TABLET(500 MG) BY MOUTH EVERY MONDAY 1/23/20   Renato Womack MD   divalproex (DEPAKOTE) 250 MG EC tablet TAKE 2 TABLETS BY MOUTH EVERY EVENING 10/28/19   Prisca Espinoza MD   furosemide (LASIX) 20 MG tablet TAKE 2 TABLETS BY MOUTH EVERY DAY 5/2/19   Renato Womack MD   lactulose (CHRONULAC) 10 gram/15 mL solution TAKE 15 MLS BY MOUTH TWICE DAILY  Patient not taking: Reported on 6/3/2020 4/6/20   Renato Womack MD   lancets [facility] To check blood sugar once daily, to use with Accu-Chek meter. 11/5/18   Prisca Espinoza MD   loperamide (IMODIUM) 2 mg capsule Take 1 capsule (2 mg total) by mouth 2 (two) times daily as needed for Diarrhea. 6/2/20   Prisca Espinoza MD   potassium chloride SA (K-DUR,KLOR-CON) 20 MEQ tablet TAKE 2 TABLETS BY MOUTH TONIGHT AND 2 TABLETS EVERY MORNING, THEN 1 TABLET TWICE DAILY THEREAFTER 5/22/20   Prisca Espinoza MD   potassium chloride SA (K-DUR,KLOR-CON) 20 MEQ tablet Take 1 tablet (20 mEq total) by mouth 2 (two) times daily. 5/30/20   Prisca Espinoza MD   SITagliptan (JANUVIA) 50 MG Tab Take 1 tablet (50 mg total) by mouth once daily. 4/6/17   Prisca Espinoza MD   XIFAXAN 550 mg Tab Take 1 tablet (550 mg total) by mouth 2 (two) times daily. 2/10/20   Renato Womack MD     Anticoagulants/Antiplatelets: no anticoagulation    Allergies:   Review of patient's allergies indicates:   Allergen Reactions    Abilify [aripiprazole] Other (See Comments)     Sleepy, could not function.     Sedation History:  no adverse reactions    Review of Systems:   Hematological: no known coagulopathies  Respiratory: no shortness of breath  Cardiovascular: no chest pain  Gastrointestinal: no abdominal pain  Genito-Urinary: no dysuria  Musculoskeletal: negative  Neurological: no TIA or stroke symptoms         OBJECTIVE:     Vital Signs (Most Recent)       Physical Exam:  ASA: 2  Mallampati: n/a    General: no acute distress  Mental Status: alert and oriented  to person, place and time  HEENT: normocephalic, atraumatic  Chest: unlabored breathing  Heart: regular heart rate  Abdomen: distended  Extremity: moves all extremities    ASSESSMENT/PLAN:     Sedation Plan: local  Patient will undergo ultrasound guided paracentesis.    Kaia Herrera PA-C  Interventional Radiology  Clinic [phone]

## 2020-06-05 NOTE — PLAN OF CARE
3600cc removed form pt abd. Pt tolerated well. NAD noted or reported. Site dressed, CDI.  Labs collected and sent. Dc instruction given and AVS.

## 2020-06-05 NOTE — PROCEDURES
Radiology Post-Procedure Note    Pre Op Diagnosis: Ascites  Post Op Diagnosis: Same    Procedure: Ultrasound Guided Paracentesis    Procedure performed by: Kaia Herrera PA-C    Written Informed Consent Obtained: Yes  Specimen Removed: YES clear, yellow  Estimated Blood Loss: Minimal    Findings:   Successful paracentesis.  Albumin administered PRN per protocol.    Patient tolerated procedure well.    Kaia Herrera PA-C  Interventional Radiology  Clinic [phone]

## 2020-06-12 ENCOUNTER — TELEPHONE (OUTPATIENT)
Dept: PHARMACY | Facility: CLINIC | Age: 80
End: 2020-06-12

## 2020-06-12 ENCOUNTER — HOSPITAL ENCOUNTER (OUTPATIENT)
Dept: INTERVENTIONAL RADIOLOGY/VASCULAR | Facility: HOSPITAL | Age: 80
Discharge: HOME | End: 2020-06-12
Attending: INTERNAL MEDICINE
Payer: MEDICARE

## 2020-06-12 VITALS
RESPIRATION RATE: 14 BRPM | SYSTOLIC BLOOD PRESSURE: 113 MMHG | HEART RATE: 55 BPM | OXYGEN SATURATION: 100 % | DIASTOLIC BLOOD PRESSURE: 64 MMHG

## 2020-06-12 DIAGNOSIS — R18.8 ASCITES OF LIVER: ICD-10-CM

## 2020-06-12 DIAGNOSIS — K74.60 CIRRHOSIS OF LIVER WITH ASCITES, UNSPECIFIED HEPATIC CIRRHOSIS TYPE: ICD-10-CM

## 2020-06-12 DIAGNOSIS — R18.8 CIRRHOSIS OF LIVER WITH ASCITES, UNSPECIFIED HEPATIC CIRRHOSIS TYPE: ICD-10-CM

## 2020-06-12 DIAGNOSIS — K75.81 LIVER CIRRHOSIS SECONDARY TO NASH: ICD-10-CM

## 2020-06-12 DIAGNOSIS — K74.60 LIVER CIRRHOSIS SECONDARY TO NASH: ICD-10-CM

## 2020-06-12 LAB
APPEARANCE FLD: NORMAL
BODY FLD TYPE: NORMAL
COLOR FLD: YELLOW
EOSINOPHIL NFR FLD MANUAL: 1 %
LYMPHOCYTES NFR FLD MANUAL: 46 %
MONOS+MACROS NFR FLD MANUAL: 52 %
NEUTROPHILS NFR FLD MANUAL: 1 %
WBC # FLD: 31 /CU MM

## 2020-06-12 PROCEDURE — 49083 ABD PARACENTESIS W/IMAGING: CPT

## 2020-06-12 PROCEDURE — 49083 ABD PARACENTESIS W/IMAGING: CPT | Mod: ,,, | Performed by: FAMILY MEDICINE

## 2020-06-12 PROCEDURE — 89051 BODY FLUID CELL COUNT: CPT

## 2020-06-12 PROCEDURE — 49083 IR PARACENTESIS WITH IMAGING: ICD-10-PCS | Mod: ,,, | Performed by: FAMILY MEDICINE

## 2020-06-12 NOTE — PROGRESS NOTES
PT in MPU 4 for paracentesis. No acute distress noted. Labs and orders reviewed on chart. Awaiting consent.

## 2020-06-12 NOTE — PROCEDURES

## 2020-06-12 NOTE — PROGRESS NOTES
Paracentesis complete, 3600 MLs removed. Specimen sent to lab.  Dressing applied to puncture site, dressing clean dry and intact. Pt given discharge instructions and handouts, pt verbalizes understanding. Questions answered. Pt denies pain and discomfort. Pt refusing transport. Pt discharged in wheelchair to family

## 2020-06-12 NOTE — DISCHARGE INSTRUCTIONS
For scheduling: Call [facility] at [phone]    For questions or concerns call: [facility] 8 AM- 5PM [phone]. Radiology resident on call [phone].    For immediate concerns that are not emergent, you may call our radiology clinic at: [phone]

## 2020-06-12 NOTE — H&P
Radiology History & Physical      SUBJECTIVE:     Chief Complaint: abdominal distention    History of Present Illness:  Kenneth Sheikh is a 80 y.o. male who presents for ultrasound guided paracentesis  Past Medical History:   Diagnosis Date    Anticoagulant long-term use     Basal cell carcinoma     Bipolar 1 disorder     Bipolar 1 disorder 11/24/2016    bipolar    Cancer     history of skin cancer/sinus cancer & rectal cancer    Depressed bipolar affective disorder     Diabetes mellitus     type 2    Diabetic retinopathy 01/09/2019    EBV infection 12/7/2016    EBV DNA, PCR Latest Ref Range: None detected  None detected EBV DNA-Copies/mL Unknown Test Not Performed EBV Early Antigen Ab, IgG Latest Ref Range: <1:10 Titer 1:40 (A) EBV Nuclear Ag Ab Latest Ref Range: <1:5 Titer >=1:80 (A) EBV VCA IgG Latest Ref Range: <1:10 Titer 1:160 (A) EBV VCA IgM Latest Ref Range: <1:10 Titer <1:10     History of TIA (transient ischemic attack)     several-taking Plavix    Hx of gallstones     Wife denies    Hypertension     Hyperthyroidism 11/30/2016    Low HDL (under 40) 12/7/2016    Mixed hyperlipidemia 11/23/2016    JUAN MANUEL (obstructive sleep apnea)     Pneumonia     Skin disease     Squamous cell carcinoma 08/2018    right temple    Stroke     Transient cerebral ischemia 7/22/2016    Type 2 diabetes mellitus      Past Surgical History:   Procedure Laterality Date    Moh's procedure x4      rectal cancer      Sinus surgery for sinus cancer      sinus sx for cancer      skin cancer removed-several times-nose & ear      TONSILLECTOMY      Undescened testicle sx      UVULOPALATOPHARYNGOPLASTY      for sleep apnea       Home Meds:   Prior to Admission medications    Medication Sig Start Date End Date Taking? Authorizing Provider   ACCU-CHEK NEYDA PLUS TEST STRP Strp CHECK BLOOD SUGAR ONCE DAILY  Patient not taking: Reported on 6/3/2020 4/17/20   Prisca Espinoza MD   ciprofloxacin HCl (CIPRO) 500 MG  tablet TAKE 1 TABLET(500 MG) BY MOUTH EVERY MONDAY 1/23/20   Renato Womack MD   divalproex (DEPAKOTE) 250 MG EC tablet TAKE 2 TABLETS BY MOUTH EVERY EVENING 10/28/19   Prisca Espinoza MD   furosemide (LASIX) 20 MG tablet TAKE 2 TABLETS BY MOUTH EVERY DAY 5/2/19   Renato Womack MD   lactulose (CHRONULAC) 10 gram/15 mL solution TAKE 15 MLS BY MOUTH TWICE DAILY  Patient not taking: Reported on 6/3/2020 4/6/20   Renato Womack MD   lancets [facility] To check blood sugar once daily, to use with Accu-Chek meter. 11/5/18   Prisca Espinoza MD   loperamide (IMODIUM) 2 mg capsule Take 1 capsule (2 mg total) by mouth 2 (two) times daily as needed for Diarrhea. 6/2/20   Prisca Espinoza MD   potassium chloride SA (K-DUR,KLOR-CON) 20 MEQ tablet TAKE 2 TABLETS BY MOUTH TONIGHT AND 2 TABLETS EVERY MORNING, THEN 1 TABLET TWICE DAILY THEREAFTER 5/22/20   Prisca Espinoza MD   potassium chloride SA (K-DUR,KLOR-CON) 20 MEQ tablet Take 1 tablet (20 mEq total) by mouth 2 (two) times daily. 5/30/20   Prisca Espinoza MD   SITagliptan (JANUVIA) 50 MG Tab Take 1 tablet (50 mg total) by mouth once daily. 4/6/17   Prisca Espinoza MD   XIFAXAN 550 mg Tab Take 1 tablet (550 mg total) by mouth 2 (two) times daily. 2/10/20   Renato Womack MD     Anticoagulants/Antiplatelets: no anticoagulation    Allergies:   Review of patient's allergies indicates:   Allergen Reactions    Abilify [aripiprazole] Other (See Comments)     Sleepy, could not function.     Sedation History:  no adverse reactions    Review of Systems:   Hematological: no known coagulopathies  Respiratory: no shortness of breath  Cardiovascular: no chest pain  Gastrointestinal: no abdominal pain  Genito-Urinary: no dysuria  Musculoskeletal: negative  Neurological: no TIA or stroke symptoms         OBJECTIVE:     Vital Signs (Most Recent)  Pulse: (!) 54 (06/12/20 1100)  Resp: 16 (06/12/20 1100)  BP: (!) 127/58 (06/12/20 1100)  SpO2: 98 % (06/12/20  1100)    Physical Exam:  ASA: 2  Mallampati: n/a    General: no acute distress  Mental Status: alert and oriented to person, place and time  HEENT: normocephalic, atraumatic  Chest: unlabored breathing  Heart: regular heart rate  Abdomen: distended  Extremity: moves all extremities    ASSESSMENT/PLAN:     Sedation Plan: local  Patient will undergo ultrasound guided paracentesis.    MATT Mauricio, FNP  Interventional Radiology  [phone] clinic

## 2020-06-17 ENCOUNTER — TELEPHONE (OUTPATIENT)
Dept: HEPATOLOGY | Facility: CLINIC | Age: 80
End: 2020-06-17

## 2020-06-17 NOTE — TELEPHONE ENCOUNTER
----- Message from uSzan Zambrano sent at 6/17/2020 10:40 AM CDT -----  Contact: Mrs Sheikh  Pt wife is calling to speak to a nurse regarding blood work pt had labs drawn a month ago        Mrs Sheikh contact [phone]

## 2020-06-18 ENCOUNTER — TELEPHONE (OUTPATIENT)
Dept: INTERNAL MEDICINE | Facility: CLINIC | Age: 80
End: 2020-06-18

## 2020-06-18 NOTE — TELEPHONE ENCOUNTER
----- Message from Mesha Romero sent at 6/18/2020  1:24 PM CDT -----  Regarding: blood test  Patient Requesting Order    Order Needed:  Potassium test     Communication Preference: Ginna -- wife- [phone]    Additional Information:   Pt has an appt schedule for tomorrow (6/19) for blood work and would like to know if you want him to the potassium test as well . Please call to verify.

## 2020-06-18 NOTE — TELEPHONE ENCOUNTER
----- Message from Hannah Fox sent at 6/18/2020  3:10 PM CDT -----  Contact: Ginna (wife) [phone]  Ginna state she need to speak to the nurse about the patient blood test. Please call and advise.

## 2020-06-18 NOTE — TELEPHONE ENCOUNTER
Spoke to patient wife and is wanting to ask Dr. Yi if she is wanting patient to get a potassium blood test----patient was scheduled for tomorrow with another doctor blood test but it was canceled.  Patient wife is ok with scheduling the blood test for next week if Dr. Espinoza wants him to go

## 2020-06-19 ENCOUNTER — HOSPITAL ENCOUNTER (OUTPATIENT)
Dept: INTERVENTIONAL RADIOLOGY/VASCULAR | Facility: HOSPITAL | Age: 80
Discharge: HOME | End: 2020-06-19
Attending: INTERNAL MEDICINE
Payer: MEDICARE

## 2020-06-19 VITALS
RESPIRATION RATE: 16 BRPM | DIASTOLIC BLOOD PRESSURE: 63 MMHG | HEART RATE: 55 BPM | SYSTOLIC BLOOD PRESSURE: 132 MMHG | OXYGEN SATURATION: 100 %

## 2020-06-19 DIAGNOSIS — R18.8 ASCITES OF LIVER: ICD-10-CM

## 2020-06-19 DIAGNOSIS — R18.8 CIRRHOSIS OF LIVER WITH ASCITES, UNSPECIFIED HEPATIC CIRRHOSIS TYPE: ICD-10-CM

## 2020-06-19 DIAGNOSIS — K74.60 CIRRHOSIS OF LIVER WITH ASCITES, UNSPECIFIED HEPATIC CIRRHOSIS TYPE: ICD-10-CM

## 2020-06-19 DIAGNOSIS — K75.81 LIVER CIRRHOSIS SECONDARY TO NASH: ICD-10-CM

## 2020-06-19 DIAGNOSIS — K74.60 LIVER CIRRHOSIS SECONDARY TO NASH: ICD-10-CM

## 2020-06-19 LAB
APPEARANCE FLD: CLEAR
BODY FLD TYPE: NORMAL
COLOR FLD: NORMAL
LYMPHOCYTES NFR FLD MANUAL: 40 %
MONOS+MACROS NFR FLD MANUAL: 60 %
WBC # FLD: 20 /CU MM

## 2020-06-19 PROCEDURE — 89051 BODY FLUID CELL COUNT: CPT

## 2020-06-19 PROCEDURE — 87075 CULTR BACTERIA EXCEPT BLOOD: CPT

## 2020-06-19 PROCEDURE — 49083 ABD PARACENTESIS W/IMAGING: CPT

## 2020-06-19 PROCEDURE — 49083 ABD PARACENTESIS W/IMAGING: CPT | Mod: GC,,, | Performed by: FAMILY MEDICINE

## 2020-06-19 PROCEDURE — 87070 CULTURE OTHR SPECIMN AEROBIC: CPT

## 2020-06-19 PROCEDURE — 49083 IR PARACENTESIS WITH IMAGING: ICD-10-PCS | Mod: GC,,, | Performed by: FAMILY MEDICINE

## 2020-06-19 NOTE — PROCEDURES

## 2020-06-19 NOTE — PLAN OF CARE
Paracentesis complete.  Pt tolerated procedure well. 4100ml peritoneal fluid drained. Dressing to RLQ clean dry and intact.  Pt given discharged planning; verbalized understanding. Pt transported to family in lobby via .

## 2020-06-19 NOTE — PLAN OF CARE
Pt arrived to [facility] for ultrasound guided paracentesis.  Name verified using two identifiers.  Allergies verified. Will continue to monitor.

## 2020-06-19 NOTE — DISCHARGE INSTRUCTIONS
Please call with any questions or concerns.      Monday thru Friday 8:00 am - 4:30 pm    Interventional Radiology   [phone]    After Hours    Ask for the Radiology Intern on call  [phone]

## 2020-06-19 NOTE — H&P
Radiology History & Physical      SUBJECTIVE:     Chief Complaint: abdominal distention    History of Present Illness:  Kenneth Sheikh is a 80 y.o. male who presents for ultrasound guided paracentesis  Past Medical History:   Diagnosis Date    Anticoagulant long-term use     Basal cell carcinoma     Bipolar 1 disorder     Bipolar 1 disorder 11/24/2016    bipolar    Cancer     history of skin cancer/sinus cancer & rectal cancer    Depressed bipolar affective disorder     Diabetes mellitus     type 2    Diabetic retinopathy 01/09/2019    EBV infection 12/7/2016    EBV DNA, PCR Latest Ref Range: None detected  None detected EBV DNA-Copies/mL Unknown Test Not Performed EBV Early Antigen Ab, IgG Latest Ref Range: <1:10 Titer 1:40 (A) EBV Nuclear Ag Ab Latest Ref Range: <1:5 Titer >=1:80 (A) EBV VCA IgG Latest Ref Range: <1:10 Titer 1:160 (A) EBV VCA IgM Latest Ref Range: <1:10 Titer <1:10     History of TIA (transient ischemic attack)     several-taking Plavix    Hx of gallstones     Wife denies    Hypertension     Hyperthyroidism 11/30/2016    Low HDL (under 40) 12/7/2016    Mixed hyperlipidemia 11/23/2016    JUAN MANUEL (obstructive sleep apnea)     Pneumonia     Skin disease     Squamous cell carcinoma 08/2018    right temple    Stroke     Transient cerebral ischemia 7/22/2016    Type 2 diabetes mellitus      Past Surgical History:   Procedure Laterality Date    Moh's procedure x4      rectal cancer      Sinus surgery for sinus cancer      sinus sx for cancer      skin cancer removed-several times-nose & ear      TONSILLECTOMY      Undescened testicle sx      UVULOPALATOPHARYNGOPLASTY      for sleep apnea       Home Meds:   Prior to Admission medications    Medication Sig Start Date End Date Taking? Authorizing Provider   ACCU-CHEK NEYDA PLUS TEST STRP Strp CHECK BLOOD SUGAR ONCE DAILY  Patient not taking: Reported on 6/3/2020 4/17/20   Prisca Espinoza MD   ciprofloxacin HCl (CIPRO) 500 MG  tablet TAKE 1 TABLET(500 MG) BY MOUTH EVERY MONDAY 1/23/20   Renato Womack MD   divalproex (DEPAKOTE) 250 MG EC tablet TAKE 2 TABLETS BY MOUTH EVERY EVENING 10/28/19   Prisca Espinoza MD   furosemide (LASIX) 20 MG tablet TAKE 2 TABLETS BY MOUTH EVERY DAY 5/2/19   Renato Womack MD   lactulose (CHRONULAC) 10 gram/15 mL solution TAKE 15 MLS BY MOUTH TWICE DAILY  Patient not taking: Reported on 6/3/2020 4/6/20   Renato Womack MD   lancets [facility] To check blood sugar once daily, to use with Accu-Chek meter. 11/5/18   Prisca Espinoza MD   loperamide (IMODIUM) 2 mg capsule Take 1 capsule (2 mg total) by mouth 2 (two) times daily as needed for Diarrhea. 6/2/20   Prisca Espinoza MD   potassium chloride SA (K-DUR,KLOR-CON) 20 MEQ tablet TAKE 2 TABLETS BY MOUTH TONIGHT AND 2 TABLETS EVERY MORNING, THEN 1 TABLET TWICE DAILY THEREAFTER 5/22/20   Prisca Espinoza MD   potassium chloride SA (K-DUR,KLOR-CON) 20 MEQ tablet Take 1 tablet (20 mEq total) by mouth 2 (two) times daily. 5/30/20   Prisca Espinoza MD   SITagliptan (JANUVIA) 50 MG Tab Take 1 tablet (50 mg total) by mouth once daily. 4/6/17   Prisca Espinoza MD   XIFAXAN 550 mg Tab Take 1 tablet (550 mg total) by mouth 2 (two) times daily. 2/10/20   Renato Womack MD     Anticoagulants/Antiplatelets: no anticoagulation    Allergies:   Review of patient's allergies indicates:   Allergen Reactions    Abilify [aripiprazole] Other (See Comments)     Sleepy, could not function.     Sedation History:  no adverse reactions    Review of Systems:   Hematological: no known coagulopathies  Respiratory: no shortness of breath  Cardiovascular: no chest pain  Gastrointestinal: no abdominal pain  Genito-Urinary: no dysuria  Musculoskeletal: negative  Neurological: no TIA or stroke symptoms         OBJECTIVE:     Vital Signs (Most Recent)       Physical Exam:  ASA: 2  Mallampati: n/a    General: no acute distress  Mental Status: alert and oriented  to person, place and time  HEENT: normocephalic, atraumatic  Chest: unlabored breathing  Heart: regular heart rate  Abdomen: distended  Extremity: moves all extremities    ASSESSMENT/PLAN:     Sedation Plan: local  Patient will undergo ultrasound guided paracentesis.    MATT Mauricio, FNP  Interventional Radiology  [phone] clinic

## 2020-06-22 ENCOUNTER — PATIENT OUTREACH (OUTPATIENT)
Dept: ADMINISTRATIVE | Facility: OTHER | Age: 80
End: 2020-06-22

## 2020-06-22 LAB — BACTERIA SPEC AEROBE CULT: NO GROWTH

## 2020-06-23 ENCOUNTER — HOSPITAL ENCOUNTER (OUTPATIENT)
Dept: RADIOLOGY | Facility: HOSPITAL | Age: 80
Discharge: HOME | End: 2020-06-23
Attending: INTERNAL MEDICINE
Payer: MEDICARE

## 2020-06-23 DIAGNOSIS — K74.60 CIRRHOSIS OF LIVER WITH ASCITES, UNSPECIFIED HEPATIC CIRRHOSIS TYPE: ICD-10-CM

## 2020-06-23 DIAGNOSIS — R18.8 CIRRHOSIS OF LIVER WITH ASCITES, UNSPECIFIED HEPATIC CIRRHOSIS TYPE: ICD-10-CM

## 2020-06-23 PROCEDURE — 76700 US ABDOMEN COMPLETE: ICD-10-PCS | Mod: 26,,, | Performed by: RADIOLOGY

## 2020-06-23 PROCEDURE — 76700 US EXAM ABDOM COMPLETE: CPT | Mod: TC

## 2020-06-23 PROCEDURE — 76700 US EXAM ABDOM COMPLETE: CPT | Mod: 26,,, | Performed by: RADIOLOGY

## 2020-06-24 ENCOUNTER — OFFICE VISIT (OUTPATIENT)
Dept: HEPATOLOGY | Facility: CLINIC | Age: 80
End: 2020-06-24
Payer: MEDICARE

## 2020-06-24 VITALS
BODY MASS INDEX: 26.07 KG/M2 | SYSTOLIC BLOOD PRESSURE: 142 MMHG | HEART RATE: 56 BPM | HEIGHT: 68 IN | OXYGEN SATURATION: 99 % | DIASTOLIC BLOOD PRESSURE: 66 MMHG | RESPIRATION RATE: 18 BRPM | WEIGHT: 172 LBS

## 2020-06-24 DIAGNOSIS — K75.81 LIVER CIRRHOSIS SECONDARY TO NASH (NONALCOHOLIC STEATOHEPATITIS): ICD-10-CM

## 2020-06-24 DIAGNOSIS — N18.30 TYPE 2 DIABETES MELLITUS WITH STAGE 3 CHRONIC KIDNEY DISEASE, WITHOUT LONG-TERM CURRENT USE OF INSULIN: Primary | ICD-10-CM

## 2020-06-24 DIAGNOSIS — E11.22 TYPE 2 DIABETES MELLITUS WITH STAGE 3 CHRONIC KIDNEY DISEASE, WITHOUT LONG-TERM CURRENT USE OF INSULIN: Primary | ICD-10-CM

## 2020-06-24 DIAGNOSIS — K74.60 LIVER CIRRHOSIS SECONDARY TO NASH (NONALCOHOLIC STEATOHEPATITIS): ICD-10-CM

## 2020-06-24 PROCEDURE — 99999 PR PBB SHADOW E&M-EST. PATIENT-LVL III: CPT | Mod: PBBFAC,,, | Performed by: INTERNAL MEDICINE

## 2020-06-24 PROCEDURE — 99213 OFFICE O/P EST LOW 20 MIN: CPT | Mod: PBBFAC | Performed by: INTERNAL MEDICINE

## 2020-06-24 PROCEDURE — 99999 PR PBB SHADOW E&M-EST. PATIENT-LVL III: ICD-10-PCS | Mod: PBBFAC,,, | Performed by: INTERNAL MEDICINE

## 2020-06-24 PROCEDURE — 99214 PR OFFICE/OUTPT VISIT, EST, LEVL IV, 30-39 MIN: ICD-10-PCS | Mod: S$PBB,,, | Performed by: INTERNAL MEDICINE

## 2020-06-24 PROCEDURE — 99214 OFFICE O/P EST MOD 30 MIN: CPT | Mod: S$PBB,,, | Performed by: INTERNAL MEDICINE

## 2020-06-24 NOTE — PROGRESS NOTES
"Subjective:       Patient ID: Kenneth Sheikh is a 80 y.o. male.    Chief Complaint: Cirrhosis and Ascites    HPI  I saw this 80 y.o. man  whom I had met in hospital when he was admitted with lethargy and some confusion back in Dec 2016.  At that time, he was diagnosed with decompensated cirrhosis.    Admitted because of increasing lethargy +++/confusion/dizziness/dysarthria  - Nov 2016  - imaging showed ascites     No significant alcohol use ("a couple beers when I was younger"). No family history of liver disease. No new medications.    Patient has risk factors for MCDONALD.    He also has a large gastric varix which has never bled-  treated x1 by Dr Perla endoscopically  (4/16/17).  - Had post procedure fever despite antibiotics and refuses further procedure  Kylie E coli bacteremia    He has been reasonably stable with weekly paracentesis of about 4-5 liters on each occasion.    Mental alert and has not experienced any prolonged episodes of HE  - remains on rifaximin    - mild lower leg edema    Abdo US: 6/23/2020  Hepatic cirrhosis with a stable 1.2 cm hyperechoic lesion in the right hepatic lobe. No new hepatic lesions identified.  Cholelithiasis without sonographic evidence of acute cholecystitis.  Mild diffuse gallbladder wall thickening likely secondary to underlying hepatic dysfunction.  Large volume ascites    Continues with regular paracenteses.    Had a prolonged period of fecal incontinence with liquid stool for about 2 months  - apparently persisted despite discontinuation of lactulose  - improved with introduction of fiber     MELD-Na score: 10 at 5/16/2020 11:36 AM  MELD score: 10 at 5/16/2020 11:36 AM  Calculated from:  Serum Creatinine: 1.2 mg/dL at 5/16/2020 11:36 AM  Serum Sodium: 140 mmol/L (Rounded to 137 mmol/L) at 5/16/2020 11:36 AM  Total Bilirubin: 0.6 mg/dL (Rounded to 1 mg/dL) at 5/15/2020 12:49 PM  INR(ratio): 1.2 at 5/15/2020 12:49 PM  Age: 80 years 3 months      PMH:  DM  Sinus Ca- " 1996  Rectal Ca- June 2016    SH:  Retired  Ex [employer] worker      FH:  Aunt had cirrhosis- non- alcohol    Review of Systems   Constitutional: Negative for activity change, appetite change, chills, fatigue, fever and unexpected weight change.   HENT: Negative for hearing loss.    Eyes: Negative for discharge and visual disturbance.   Respiratory: Negative for cough, chest tightness, shortness of breath and wheezing.    Cardiovascular: Negative for chest pain, palpitations and leg swelling.   Gastrointestinal: Negative for abdominal distention, abdominal pain, constipation, diarrhea and nausea.   Genitourinary: Negative for dysuria and frequency.   Musculoskeletal: Negative for arthralgias and back pain.   Skin: Negative for pallor and rash.   Neurological: Negative for dizziness, tremors, speech difficulty and headaches.   Hematological: Negative for adenopathy.   Psychiatric/Behavioral: Negative for agitation and confusion.           Lab Results   Component Value Date    ALT 9 (L) 05/29/2020    AST 26 05/29/2020    ALKPHOS 98 05/29/2020    BILITOT 0.5 05/29/2020     Past Medical History:   Diagnosis Date    Anticoagulant long-term use     Basal cell carcinoma     Bipolar 1 disorder     Bipolar 1 disorder 11/24/2016    bipolar    Cancer     history of skin cancer/sinus cancer & rectal cancer    Depressed bipolar affective disorder     Diabetes mellitus     type 2    Diabetic retinopathy 01/09/2019    EBV infection 12/7/2016    EBV DNA, PCR Latest Ref Range: None detected  None detected EBV DNA-Copies/mL Unknown Test Not Performed EBV Early Antigen Ab, IgG Latest Ref Range: <1:10 Titer 1:40 (A) EBV Nuclear Ag Ab Latest Ref Range: <1:5 Titer >=1:80 (A) EBV VCA IgG Latest Ref Range: <1:10 Titer 1:160 (A) EBV VCA IgM Latest Ref Range: <1:10 Titer <1:10     History of TIA (transient ischemic attack)     several-taking Plavix    Hx of gallstones     Wife denies    Hypertension     Hyperthyroidism  11/30/2016    Low HDL (under 40) 12/7/2016    Mixed hyperlipidemia 11/23/2016    JUAN MANUEL (obstructive sleep apnea)     Pneumonia     Skin disease     Squamous cell carcinoma 08/2018    right temple    Stroke     Transient cerebral ischemia 7/22/2016    Type 2 diabetes mellitus      Past Surgical History:   Procedure Laterality Date    Moh's procedure x4      rectal cancer      Sinus surgery for sinus cancer      sinus sx for cancer      skin cancer removed-several times-nose & ear      TONSILLECTOMY      Undescened testicle sx      UVULOPALATOPHARYNGOPLASTY      for sleep apnea     Current Outpatient Medications   Medication Sig    ACCU-CHEK NEDYA PLUS TEST STRP Strp CHECK BLOOD SUGAR ONCE DAILY    ciprofloxacin HCl (CIPRO) 500 MG tablet TAKE 1 TABLET(500 MG) BY MOUTH EVERY MONDAY    divalproex (DEPAKOTE) 250 MG EC tablet TAKE 2 TABLETS BY MOUTH EVERY EVENING    furosemide (LASIX) 20 MG tablet TAKE 2 TABLETS BY MOUTH EVERY DAY    lancets Misc To check blood sugar once daily, to use with Accu-Chek meter.    loperamide (IMODIUM) 2 mg capsule Take 1 capsule (2 mg total) by mouth 2 (two) times daily as needed for Diarrhea.    potassium chloride SA (K-DUR,KLOR-CON) 20 MEQ tablet TAKE 2 TABLETS BY MOUTH TONIGHT AND 2 TABLETS EVERY MORNING, THEN 1 TABLET TWICE DAILY THEREAFTER    psyllium (METAMUCIL) powder Take 1 packet by mouth as needed. Pt states he takes a teaspoon twice a day    SITagliptan (JANUVIA) 50 MG Tab Take 1 tablet (50 mg total) by mouth once daily.    XIFAXAN 550 mg Tab Take 1 tablet (550 mg total) by mouth 2 (two) times daily.    lactulose (CHRONULAC) 10 gram/15 mL solution TAKE 15 MLS BY MOUTH TWICE DAILY (Patient not taking: Reported on 6/24/2020)    potassium chloride SA (K-DUR,KLOR-CON) 20 MEQ tablet Take 1 tablet (20 mEq total) by mouth 2 (two) times daily. (Patient not taking: Reported on 6/24/2020)     No current facility-administered medications for this visit.         Objective:      Physical Exam  HENT:      Head: Normocephalic.   Eyes:      Pupils: Pupils are equal, round, and reactive to light.   Neck:      Thyroid: No thyromegaly.   Cardiovascular:      Rate and Rhythm: Normal rate and regular rhythm.      Heart sounds: Normal heart sounds.   Pulmonary:      Effort: Pulmonary effort is normal.      Breath sounds: Normal breath sounds. No wheezing.   Abdominal:      General: There is distension.      Palpations: Abdomen is soft. There is no mass.      Tenderness: There is no abdominal tenderness.      Comments: Mild ascites.   Musculoskeletal:      Right lower leg: Edema present.      Left lower leg: Edema present.   Lymphadenopathy:      Cervical: No cervical adenopathy.   Skin:     General: Skin is warm.      Findings: No erythema or rash.   Neurological:      Mental Status: He is alert and oriented to person, place, and time.   Psychiatric:         Behavior: Behavior normal.         Assessment:       1. Type 2 diabetes mellitus with stage 3 chronic kidney disease, without long-term current use of insulin    2. 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  - does not wish for further EUS guided treatment of varices because of post procedure sepsis.  - discussed finding of small nodule on US- benign- will be repeated in 6 months.    - no change in management  Clinic in  6 months with labs and US       NB Uatsdin

## 2020-06-26 ENCOUNTER — HOSPITAL ENCOUNTER (OUTPATIENT)
Dept: INTERVENTIONAL RADIOLOGY/VASCULAR | Facility: HOSPITAL | Age: 80
Discharge: HOME | End: 2020-06-26
Attending: INTERNAL MEDICINE
Payer: MEDICARE

## 2020-06-26 VITALS
DIASTOLIC BLOOD PRESSURE: 65 MMHG | SYSTOLIC BLOOD PRESSURE: 126 MMHG | OXYGEN SATURATION: 99 % | HEART RATE: 60 BPM | RESPIRATION RATE: 16 BRPM

## 2020-06-26 DIAGNOSIS — K74.60 CIRRHOSIS OF LIVER WITH ASCITES, UNSPECIFIED HEPATIC CIRRHOSIS TYPE: ICD-10-CM

## 2020-06-26 DIAGNOSIS — K75.81 LIVER CIRRHOSIS SECONDARY TO NASH: ICD-10-CM

## 2020-06-26 DIAGNOSIS — K74.60 LIVER CIRRHOSIS SECONDARY TO NASH: ICD-10-CM

## 2020-06-26 DIAGNOSIS — R18.8 CIRRHOSIS OF LIVER WITH ASCITES, UNSPECIFIED HEPATIC CIRRHOSIS TYPE: ICD-10-CM

## 2020-06-26 DIAGNOSIS — R18.8 ASCITES OF LIVER: ICD-10-CM

## 2020-06-26 LAB — BACTERIA SPEC ANAEROBE CULT: NORMAL

## 2020-06-26 PROCEDURE — 49083 ABD PARACENTESIS W/IMAGING: CPT

## 2020-06-26 PROCEDURE — 49083 ABD PARACENTESIS W/IMAGING: CPT | Mod: ,,, | Performed by: FAMILY MEDICINE

## 2020-06-26 PROCEDURE — 49083 IR PARACENTESIS WITH IMAGING: ICD-10-PCS | Mod: ,,, | Performed by: FAMILY MEDICINE

## 2020-06-26 NOTE — PROGRESS NOTES
Paracentesis complete. Pt tolerated well. Site clean, dry, intact, no bleeding, no hematoma. Pt given site care and discharge instructions. Pt verbalized understanding. Pt D/C'd home in care of wife.

## 2020-06-26 NOTE — H&P
Radiology History & Physical      SUBJECTIVE:     Chief Complaint: abdominal distention    History of Present Illness:  Kenneth Sheikh is a 80 y.o. male who presents for ultrasound guided paracentesis  Past Medical History:   Diagnosis Date    Anticoagulant long-term use     Basal cell carcinoma     Bipolar 1 disorder     Bipolar 1 disorder 11/24/2016    bipolar    Cancer     history of skin cancer/sinus cancer & rectal cancer    Depressed bipolar affective disorder     Diabetes mellitus     type 2    Diabetic retinopathy 01/09/2019    EBV infection 12/7/2016    EBV DNA, PCR Latest Ref Range: None detected  None detected EBV DNA-Copies/mL Unknown Test Not Performed EBV Early Antigen Ab, IgG Latest Ref Range: <1:10 Titer 1:40 (A) EBV Nuclear Ag Ab Latest Ref Range: <1:5 Titer >=1:80 (A) EBV VCA IgG Latest Ref Range: <1:10 Titer 1:160 (A) EBV VCA IgM Latest Ref Range: <1:10 Titer <1:10     History of TIA (transient ischemic attack)     several-taking Plavix    Hx of gallstones     Wife denies    Hypertension     Hyperthyroidism 11/30/2016    Low HDL (under 40) 12/7/2016    Mixed hyperlipidemia 11/23/2016    JUAN MANUEL (obstructive sleep apnea)     Pneumonia     Skin disease     Squamous cell carcinoma 08/2018    right temple    Stroke     Transient cerebral ischemia 7/22/2016    Type 2 diabetes mellitus      Past Surgical History:   Procedure Laterality Date    Moh's procedure x4      rectal cancer      Sinus surgery for sinus cancer      sinus sx for cancer      skin cancer removed-several times-nose & ear      TONSILLECTOMY      Undescened testicle sx      UVULOPALATOPHARYNGOPLASTY      for sleep apnea       Home Meds:   Prior to Admission medications    Medication Sig Start Date End Date Taking? Authorizing Provider   ACCU-CHEK NEYDA PLUS TEST STRP Strp CHECK BLOOD SUGAR ONCE DAILY 4/17/20   Prisca Espinoza MD   ciprofloxacin HCl (CIPRO) 500 MG tablet TAKE 1 TABLET(500 MG) BY MOUTH EVERY  MONDAY 1/23/20   Renato Womack MD   divalproex (DEPAKOTE) 250 MG EC tablet TAKE 2 TABLETS BY MOUTH EVERY EVENING 10/28/19   Prisca Espinoza MD   furosemide (LASIX) 20 MG tablet TAKE 2 TABLETS BY MOUTH EVERY DAY 5/2/19   Renato Womack MD   lactulose (CHRONULAC) 10 gram/15 mL solution TAKE 15 MLS BY MOUTH TWICE DAILY  Patient not taking: Reported on 6/24/2020 4/6/20   Renato Womack MD   lancets [facility] To check blood sugar once daily, to use with Accu-Chek meter. 11/5/18   Prisca Espinoza MD   loperamide (IMODIUM) 2 mg capsule Take 1 capsule (2 mg total) by mouth 2 (two) times daily as needed for Diarrhea. 6/2/20   Prisca Espinoza MD   potassium chloride SA (K-DUR,KLOR-CON) 20 MEQ tablet TAKE 2 TABLETS BY MOUTH TONIGHT AND 2 TABLETS EVERY MORNING, THEN 1 TABLET TWICE DAILY THEREAFTER 5/22/20   Prisca Espinoza MD   potassium chloride SA (K-DUR,KLOR-CON) 20 MEQ tablet Take 1 tablet (20 mEq total) by mouth 2 (two) times daily.  Patient not taking: Reported on 6/24/2020 5/30/20   Prisca Espinoza MD   psyllium (METAMUCIL) powder Take 1 packet by mouth as needed. Pt states he takes a teaspoon twice a day    Historical Provider, MD   SITagliptan (JANUVIA) 50 MG Tab Take 1 tablet (50 mg total) by mouth once daily. 4/6/17   Prisca Espinoza MD   XIFAXAN 550 mg Tab Take 1 tablet (550 mg total) by mouth 2 (two) times daily. 2/10/20   Renato Womack MD     Anticoagulants/Antiplatelets: no anticoagulation    Allergies:   Review of patient's allergies indicates:   Allergen Reactions    Abilify [aripiprazole] Other (See Comments)     Sleepy, could not function.     Sedation History:  no adverse reactions    Review of Systems:   Hematological: no known coagulopathies  Respiratory: no shortness of breath  Cardiovascular: no chest pain  Gastrointestinal: no abdominal pain  Genito-Urinary: no dysuria  Musculoskeletal: negative  Neurological: no TIA or stroke symptoms         OBJECTIVE:      Vital Signs (Most Recent)       Physical Exam:  ASA: 2  Mallampati: n/a    General: no acute distress  Mental Status: alert and oriented to person, place and time  HEENT: normocephalic, atraumatic  Chest: unlabored breathing  Heart: regular heart rate  Abdomen: distended  Extremity: moves all extremities    ASSESSMENT/PLAN:     Sedation Plan: local  Patient will undergo ultrasound guided paracentesis.    MATT Mauricio, FNP  Interventional Radiology  [phone] clinic

## 2020-06-26 NOTE — DISCHARGE INSTRUCTIONS
Please call with any questions or concerns.      Monday thru Friday 8:00 am - 4:30 pm    Interventional Radiology   [phone]    After Hours    Ask for the Radiology Intern on call  [phone]

## 2020-06-26 NOTE — PROCEDURES
Radiology Post-Procedure Note    Pre Op Diagnosis: Ascites  Post Op Diagnosis: Same    Procedure: Ultrasound Guided Paracentesis    Procedure performed by: Souleymane ROSS, Елеан     Written Informed Consent Obtained: Yes  Specimen Removed: YES clear yellow  Estimated Blood Loss: Minimal    Findings:   Successful paracentesis.  Albumin administered PRN per protocol.    Patient tolerated procedure well.    Елена Comer, APRN, FNP  Interventional Radiology  [phone] clinic

## 2020-07-02 ENCOUNTER — HOSPITAL ENCOUNTER (OUTPATIENT)
Dept: INTERVENTIONAL RADIOLOGY/VASCULAR | Facility: HOSPITAL | Age: 80
Discharge: HOME | End: 2020-07-02
Attending: INTERNAL MEDICINE
Payer: MEDICARE

## 2020-07-02 VITALS
HEART RATE: 59 BPM | SYSTOLIC BLOOD PRESSURE: 123 MMHG | DIASTOLIC BLOOD PRESSURE: 64 MMHG | RESPIRATION RATE: 15 BRPM | OXYGEN SATURATION: 100 %

## 2020-07-02 DIAGNOSIS — K75.81 LIVER CIRRHOSIS SECONDARY TO NASH: ICD-10-CM

## 2020-07-02 DIAGNOSIS — K74.60 LIVER CIRRHOSIS SECONDARY TO NASH: ICD-10-CM

## 2020-07-02 DIAGNOSIS — K74.60 CIRRHOSIS OF LIVER WITH ASCITES, UNSPECIFIED HEPATIC CIRRHOSIS TYPE: ICD-10-CM

## 2020-07-02 DIAGNOSIS — R18.8 ASCITES OF LIVER: ICD-10-CM

## 2020-07-02 DIAGNOSIS — R18.8 CIRRHOSIS OF LIVER WITH ASCITES, UNSPECIFIED HEPATIC CIRRHOSIS TYPE: ICD-10-CM

## 2020-07-02 LAB
APPEARANCE FLD: NORMAL
BODY FLD TYPE: NORMAL
COLOR FLD: YELLOW
LYMPHOCYTES NFR FLD MANUAL: 42 %
MONOS+MACROS NFR FLD MANUAL: 45 %
NEUTROPHILS NFR FLD MANUAL: 13 %
WBC # FLD: 26 /CU MM

## 2020-07-02 PROCEDURE — 49083 ABD PARACENTESIS W/IMAGING: CPT

## 2020-07-02 PROCEDURE — 49083 ABD PARACENTESIS W/IMAGING: CPT | Mod: GC,,, | Performed by: RADIOLOGY

## 2020-07-02 PROCEDURE — 49083 IR PARACENTESIS WITH IMAGING: ICD-10-PCS | Mod: GC,,, | Performed by: RADIOLOGY

## 2020-07-02 PROCEDURE — 89051 BODY FLUID CELL COUNT: CPT

## 2020-07-02 RX ORDER — ALBUMIN HUMAN 250 G/1000ML
25 SOLUTION INTRAVENOUS CONTINUOUS PRN
Status: DISCONTINUED | OUTPATIENT
Start: 2020-07-02 | End: 2020-07-03 | Stop reason: HOSPADM

## 2020-07-02 NOTE — NURSING
- Comfort care   Paracentesis completed, 4800ml fluid removed, pt tolerated well, dressing applied to right abdomen by RT, CDI, labs sent as ordered, pt received dc instructions, pt left via WC with wife in NAD

## 2020-07-02 NOTE — DISCHARGE INSTRUCTIONS
"For scheduling: Call Mackenzie at [phone]    After hours and weekends: Call [phone] and ask for "Radiology Resident on Call'    For immediate concerns that are not emergent, you may call our radiology clinic at [phone]  "

## 2020-07-02 NOTE — H&P
Radiology History & Physical      SUBJECTIVE:     Chief Complaint: recurrent ascites    History of Present Illness:  Kenneth Sheikh is a 80 y.o. male who presents for imaging guided paracentesis.    Past Medical History:   Diagnosis Date    Anticoagulant long-term use     Basal cell carcinoma     Bipolar 1 disorder     Bipolar 1 disorder 11/24/2016    bipolar    Cancer     history of skin cancer/sinus cancer & rectal cancer    Depressed bipolar affective disorder     Diabetes mellitus     type 2    Diabetic retinopathy 01/09/2019    EBV infection 12/7/2016    EBV DNA, PCR Latest Ref Range: None detected  None detected EBV DNA-Copies/mL Unknown Test Not Performed EBV Early Antigen Ab, IgG Latest Ref Range: <1:10 Titer 1:40 (A) EBV Nuclear Ag Ab Latest Ref Range: <1:5 Titer >=1:80 (A) EBV VCA IgG Latest Ref Range: <1:10 Titer 1:160 (A) EBV VCA IgM Latest Ref Range: <1:10 Titer <1:10     History of TIA (transient ischemic attack)     several-taking Plavix    Hx of gallstones     Wife denies    Hypertension     Hyperthyroidism 11/30/2016    Low HDL (under 40) 12/7/2016    Mixed hyperlipidemia 11/23/2016    JUAN MANUEL (obstructive sleep apnea)     Pneumonia     Skin disease     Squamous cell carcinoma 08/2018    right temple    Stroke     Transient cerebral ischemia 7/22/2016    Type 2 diabetes mellitus      Past Surgical History:   Procedure Laterality Date    Moh's procedure x4      rectal cancer      Sinus surgery for sinus cancer      sinus sx for cancer      skin cancer removed-several times-nose & ear      TONSILLECTOMY      Undescened testicle sx      UVULOPALATOPHARYNGOPLASTY      for sleep apnea       Home Meds:   Prior to Admission medications    Medication Sig Start Date End Date Taking? Authorizing Provider   ACCU-CHEK NEYDA PLUS TEST STRP Strp CHECK BLOOD SUGAR ONCE DAILY 4/17/20   Prisca Espinoza MD   ciprofloxacin HCl (CIPRO) 500 MG tablet TAKE 1 TABLET(500 MG) BY MOUTH EVERY MONDAY  1/23/20   Renato Woamck MD   divalproex (DEPAKOTE) 250 MG EC tablet TAKE 2 TABLETS BY MOUTH EVERY EVENING 10/28/19   Prisca Espinoza MD   furosemide (LASIX) 20 MG tablet TAKE 2 TABLETS BY MOUTH EVERY DAY 7/1/20   Renato Womack MD   lactulose (CHRONULAC) 10 gram/15 mL solution TAKE 15 MLS BY MOUTH TWICE DAILY  Patient not taking: Reported on 6/24/2020 4/6/20   Renato Womack MD   lancets [facility] To check blood sugar once daily, to use with Accu-Chek meter. 11/5/18   Prisca Espinoza MD   loperamide (IMODIUM) 2 mg capsule Take 1 capsule (2 mg total) by mouth 2 (two) times daily as needed for Diarrhea. 6/2/20   Prisca Espinoza MD   potassium chloride SA (K-DUR,KLOR-CON) 20 MEQ tablet TAKE 1 TABLET(20 MEQ) BY MOUTH TWICE DAILY 6/29/20   Gokul Whatley Jr., MD   psyllium (METAMUCIL) powder Take 1 packet by mouth as needed. Pt states he takes a teaspoon twice a day    Historical Provider, MD   SITagliptan (JANUVIA) 50 MG Tab Take 1 tablet (50 mg total) by mouth once daily. 4/6/17   Prisca Espinoza MD   XIFAXAN 550 mg Tab Take 1 tablet (550 mg total) by mouth 2 (two) times daily. 2/10/20   Renato Womack MD     Anticoagulants/Antiplatelets: no anticoagulation    Allergies:   Review of patient's allergies indicates:   Allergen Reactions    Abilify [aripiprazole] Other (See Comments)     Sleepy, could not function.     Sedation History:  no adverse reactions    Review of Systems:   Hematological: no known coagulopathies  Respiratory: no shortness of breath  Cardiovascular: no chest pain  Gastrointestinal: no abdominal pain  Genito-Urinary: no dysuria  Musculoskeletal: negative  Neurological: no TIA or stroke symptoms         OBJECTIVE:     Vital Signs (Most Recent)  Pulse: (!) 57 (07/02/20 1100)  Resp: 16 (07/02/20 1100)  BP: 132/62 (07/02/20 1100)  SpO2: 100 % (07/02/20 1100)    Physical Exam:  ASA: 3  Mallampati: 2    General: no acute distress  Mental Status: alert and oriented to  person, place and time  HEENT: normocephalic, atraumatic  Chest: unlabored breathing  Heart: regular heart rate  Abdomen: distended  Extremity: moves all extremities    Laboratory  Lab Results   Component Value Date    INR 1.2 05/15/2020       Lab Results   Component Value Date    WBC 3.24 (L) 05/19/2020    HGB 10.6 (L) 05/19/2020    HCT 34.7 (L) 05/19/2020    MCV 93 05/19/2020    PLT 97 (L) 05/19/2020      Lab Results   Component Value Date     05/29/2020     05/29/2020    K 2.9 (L) 05/29/2020     05/29/2020    CO2 26 05/29/2020    BUN 24 (H) 05/29/2020    CREATININE 1.3 05/29/2020    CALCIUM 8.2 (L) 05/29/2020    MG 1.6 05/16/2020    ALT 9 (L) 05/29/2020    AST 26 05/29/2020    ALBUMIN 2.0 (L) 05/29/2020    BILITOT 0.5 05/29/2020       ASSESSMENT/PLAN:     Sedation Plan: Local  Patient will undergo imaging guided paracentesis for recurrent ascites.    Randy Carty MD  Radiology Department/ PGY-3  [facility]

## 2020-07-02 NOTE — PROCEDURES
Radiology Post-Procedure Note    Pre Op Diagnosis: Ascites  Post Op Diagnosis: Same    Procedure: Paracentesis    Procedure performed by: Denita Lopez MD, Carlo Carty MD    Written Informed Consent Obtained: Yes  Specimen Removed: YES   Estimated Blood Loss: Minimal    Findings:   Successful paracentesis.  Albumin administered PRN per protocol.    Patient tolerated procedure well.      Denita Lopez MD  Radiology PGY 2

## 2020-07-08 ENCOUNTER — PATIENT OUTREACH (OUTPATIENT)
Dept: ADMINISTRATIVE | Facility: OTHER | Age: 80
End: 2020-07-08

## 2020-07-09 ENCOUNTER — OFFICE VISIT (OUTPATIENT)
Dept: INTERNAL MEDICINE | Facility: CLINIC | Age: 80
End: 2020-07-09
Payer: MEDICARE

## 2020-07-09 VITALS
HEART RATE: 61 BPM | WEIGHT: 176.56 LBS | OXYGEN SATURATION: 98 % | BODY MASS INDEX: 26.85 KG/M2 | SYSTOLIC BLOOD PRESSURE: 110 MMHG | DIASTOLIC BLOOD PRESSURE: 54 MMHG

## 2020-07-09 DIAGNOSIS — Z85.048 HISTORY OF RECTAL CANCER: ICD-10-CM

## 2020-07-09 DIAGNOSIS — R53.1 WEAKNESS: ICD-10-CM

## 2020-07-09 DIAGNOSIS — N18.30 TYPE 2 DIABETES MELLITUS WITH STAGE 3 CHRONIC KIDNEY DISEASE, WITHOUT LONG-TERM CURRENT USE OF INSULIN: ICD-10-CM

## 2020-07-09 DIAGNOSIS — R18.8 ASCITES OF LIVER: ICD-10-CM

## 2020-07-09 DIAGNOSIS — R15.9 INCONTINENCE OF FECES, UNSPECIFIED FECAL INCONTINENCE TYPE: Primary | ICD-10-CM

## 2020-07-09 DIAGNOSIS — N18.30 CKD (CHRONIC KIDNEY DISEASE) STAGE 3, GFR 30-59 ML/MIN: ICD-10-CM

## 2020-07-09 DIAGNOSIS — K74.60 LIVER CIRRHOSIS SECONDARY TO NASH (NONALCOHOLIC STEATOHEPATITIS): ICD-10-CM

## 2020-07-09 DIAGNOSIS — D61.818 PANCYTOPENIA: ICD-10-CM

## 2020-07-09 DIAGNOSIS — I70.0 ABDOMINAL AORTIC ATHEROSCLEROSIS: ICD-10-CM

## 2020-07-09 DIAGNOSIS — K75.81 LIVER CIRRHOSIS SECONDARY TO NASH (NONALCOHOLIC STEATOHEPATITIS): ICD-10-CM

## 2020-07-09 DIAGNOSIS — E87.6 HYPOKALEMIA: ICD-10-CM

## 2020-07-09 DIAGNOSIS — Z85.828 HISTORY OF SCC (SQUAMOUS CELL CARCINOMA) OF SKIN: ICD-10-CM

## 2020-07-09 DIAGNOSIS — F31.9 BIPOLAR 1 DISORDER: ICD-10-CM

## 2020-07-09 DIAGNOSIS — E11.22 TYPE 2 DIABETES MELLITUS WITH STAGE 3 CHRONIC KIDNEY DISEASE, WITHOUT LONG-TERM CURRENT USE OF INSULIN: ICD-10-CM

## 2020-07-09 DIAGNOSIS — G45.9 TIA (TRANSIENT ISCHEMIC ATTACK): ICD-10-CM

## 2020-07-09 PROCEDURE — 99999 PR PBB SHADOW E&M-EST. PATIENT-LVL V: ICD-10-PCS | Mod: PBBFAC,,, | Performed by: INTERNAL MEDICINE

## 2020-07-09 PROCEDURE — 99999 PR PBB SHADOW E&M-EST. PATIENT-LVL V: CPT | Mod: PBBFAC,,, | Performed by: INTERNAL MEDICINE

## 2020-07-09 PROCEDURE — 99214 OFFICE O/P EST MOD 30 MIN: CPT | Mod: S$PBB,,, | Performed by: INTERNAL MEDICINE

## 2020-07-09 PROCEDURE — 99215 OFFICE O/P EST HI 40 MIN: CPT | Mod: PBBFAC | Performed by: INTERNAL MEDICINE

## 2020-07-09 PROCEDURE — 99214 PR OFFICE/OUTPT VISIT, EST, LEVL IV, 30-39 MIN: ICD-10-PCS | Mod: S$PBB,,, | Performed by: INTERNAL MEDICINE

## 2020-07-09 NOTE — PROGRESS NOTES
Requested updates within Care Everywhere.  Patient's chart was reviewed for overdue LUIS DANIEL topics.  Immunizations reconciled.    Orders placed:n/a  Labs Linked:n/a

## 2020-07-09 NOTE — PROGRESS NOTES
" Internal Medicine    Subjective:      Patient ID: Kenneth Sheikh is a 80 y.o. male.    Chief Complaint: Annual Exam    HPI:  Patient presents for follow up appointment.  The patient's last visit with me was on 1/16/2020.    Diarrhea, fecal incontinence:  Started middle of May.  Continued even after stopping Lactulose.  Saw GI, Dr. Tubbs, on 6/3/20.  Per note, "His incontinence is likely multifactorial from muscle weakness from rectal cancer excision, bowel edema from volume overload, and possibly some diabetic neuropathy (fairly well controlled).  His recent flex sig is reassuring."  He recommended increasing fiber to TID and continuing Imodium.  If not improving, recommended full colonoscopy +/- anorectal manometry.  He saw Dr. Talavera on 6/4/20 who agreed with Dr. Tubbs's recommendations.  Taking Metamucil TID and symptoms improved 1 week after starting this per wife.  No longer needing Imodium.      Hypokalemia:  Due to diarrhea.  Taking KCl 20mEq BID.  K 3.4 on 7/2/20.     CKD, stage 3:  Recent BELLA due to diarrhea that is now resolving.  Cr 1.4 and GFR 46.5 on 7/2/20.    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  Losartan stopped due to BELLA.  Followed by Dr. Womack.  Taking Rifaximin twice daily.  Getting paracentesis weekly.  Taking Cipro once a week for SBP prophylaxis.  No longer taking lactulose (as above).     H/o Rectal cancer s/p resection 06/2016:  Followed by colorectal surgery (Dr. Talavera).     DM-2:  Last HgA1c 6.6 on 5/15/20.  Off metformin due to elevated lactate and diarrhea.  Taking Januvia 50mg daily.     Sinus bradycardia (HR 50-60's):  Chronic and asymptomatic.     Pancytopenia:  Has been seen by [facility] (2/21/17).  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      HLD:  No longer on medication.     TIAs:  No symptoms recently.  No longer taking ASA.     Bipolar d/o:  Taking Depakote 500mg qHS.  Last Depakote level 54.6 on " "1/28/20.  Denies depression or dany.       Short term memory loss:  Patient and wife not interested in seeing Neurology or Neuropsychology.     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 Beach.     Neck pain, bilateral knee pain:  Stable.  Avoiding NSAIDs due to cirrhosis.  Was told he could occasionally take Tylenol but not taking regularly.     S/p left cataract, planning to get right eye cataract removal by Dr. Sharpe on [address].     Diabetic retinopathy:  Saw Dr. Cortez about 2 years ago.  Due for follow up.     Records will be scanned into Epic "Media."      Review of Systems   Constitutional: Positive for fatigue (Chronic). Negative for appetite change, chills, fever and unexpected weight change.   HENT: Negative for sore throat and trouble swallowing.    Eyes: Negative for visual disturbance.   Respiratory: Negative for cough, chest tightness, shortness of breath and wheezing.    Cardiovascular: Positive for leg swelling (Chronic, unchanged). Negative for chest pain and palpitations.   Gastrointestinal: Positive for abdominal distention (Paracentesis scheduled for today). Negative for abdominal pain, blood in stool, constipation, diarrhea, nausea and vomiting.   Genitourinary: Negative for difficulty urinating.   Musculoskeletal: Negative for arthralgias and myalgias.   Skin: Negative for rash and wound.   Neurological: Positive for weakness (Chronic). Negative for dizziness, numbness and headaches.   Psychiatric/Behavioral: Negative for behavioral problems and confusion.       Past medical history, surgical history, and family medical history reviewed and updated as appropriate.    Medications and allergies reviewed.     Objective:     Vitals:    07/09/20 1342   BP: (!) 110/54   BP Location: Right arm   Patient Position: Sitting   BP Method: Medium (Manual)   Pulse: 61   SpO2: 98%   Weight: 80.1 kg (176 lb 9.4 oz)     Physical Exam  Constitutional:  "      General: He is not in acute distress.     Appearance: He is well-developed. He is not ill-appearing or toxic-appearing.      Comments: Sitting in wheelchair   HENT:      Head: Normocephalic and atraumatic.   Eyes:      General: No scleral icterus.     Conjunctiva/sclera: Conjunctivae normal.   Neck:      Thyroid: No thyromegaly.   Cardiovascular:      Rate and Rhythm: Normal rate and regular rhythm.      Heart sounds: No murmur. No friction rub. No gallop.    Pulmonary:      Effort: Pulmonary effort is normal. No respiratory distress.      Breath sounds: Normal breath sounds. No wheezing or rales.   Abdominal:      General: There is distension.      Tenderness: There is no abdominal tenderness. There is no guarding.   Musculoskeletal:         General: No tenderness.      Right lower leg: Edema (1+) present.      Left lower leg: Edema (1+) present.   Lymphadenopathy:      Cervical: No cervical adenopathy.   Skin:     Findings: No erythema or rash.   Neurological:      General: No focal deficit present.      Mental Status: He is alert and oriented to person, place, and time. Mental status is at baseline.   Psychiatric:         Mood and Affect: Mood normal.         Behavior: Behavior normal.         Thought Content: Thought content normal.         RESULTS:   Lab Results   Component Value Date    WBC 3.24 (L) 05/19/2020    HGB 10.6 (L) 05/19/2020    HCT 34.7 (L) 05/19/2020    MCV 93 05/19/2020    PLT 97 (L) 05/19/2020     BMP  Lab Results   Component Value Date     07/02/2020    K 3.4 (L) 07/02/2020     07/02/2020    CO2 25 07/02/2020    BUN 31 (H) 07/02/2020    CREATININE 1.4 07/02/2020    CALCIUM 9.6 07/02/2020    ANIONGAP 5 (L) 07/02/2020    ESTGFRAFRICA 53.7 (A) 07/02/2020    EGFRNONAA 46.5 (A) 07/02/2020     Lab Results   Component Value Date    ALT 9 (L) 05/29/2020    AST 26 05/29/2020    ALKPHOS 98 05/29/2020    BILITOT 0.5 05/29/2020     Lab Results   Component Value Date    TSH 1.248 05/15/2020      Lab Results   Component Value Date    HGBA1C 6.6 (H) 05/15/2020         Assessment:     1. Incontinence of feces, unspecified fecal incontinence type    2. Hypokalemia    3. CKD (chronic kidney disease) stage 3, GFR 30-59 ml/min    4. 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 Ascites of liver    6. Pancytopenia    7. Weakness    8. TIA (transient ischemic attack)    9. Bipolar 1 disorder    10. Abdominal aortic atherosclerosis    11. History of rectal cancer    12. History of SCC (squamous cell carcinoma) of skin    13. Type 2 diabetes mellitus with stage 3 chronic kidney disease, without long-term current use of insulin        Plan:     *Continue medication/plan as discussed in HPI except for changes discussed below.    Kenneth was seen today for annual exam.    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  Transition of care discussed.  Will refer patient to [facility].    Health maintenance reviewed with patient.     Follow up in about 2 months (around  9/9/2020).    Prisca Espinoza MD  Internal Medicine  [facility]

## 2020-07-10 ENCOUNTER — OFFICE VISIT (OUTPATIENT)
Dept: DERMATOLOGY | Facility: CLINIC | Age: 80
End: 2020-07-10
Payer: MEDICARE

## 2020-07-10 ENCOUNTER — HOSPITAL ENCOUNTER (OUTPATIENT)
Dept: INTERVENTIONAL RADIOLOGY/VASCULAR | Facility: HOSPITAL | Age: 80
Discharge: HOME | End: 2020-07-10
Attending: INTERNAL MEDICINE
Payer: MEDICARE

## 2020-07-10 VITALS
DIASTOLIC BLOOD PRESSURE: 60 MMHG | RESPIRATION RATE: 18 BRPM | HEART RATE: 67 BPM | SYSTOLIC BLOOD PRESSURE: 132 MMHG | OXYGEN SATURATION: 99 %

## 2020-07-10 DIAGNOSIS — K74.60 LIVER CIRRHOSIS SECONDARY TO NASH: ICD-10-CM

## 2020-07-10 DIAGNOSIS — L82.1 SK (SEBORRHEIC KERATOSIS): ICD-10-CM

## 2020-07-10 DIAGNOSIS — L57.0 AK (ACTINIC KERATOSIS): ICD-10-CM

## 2020-07-10 DIAGNOSIS — K74.60 CIRRHOSIS OF LIVER WITH ASCITES, UNSPECIFIED HEPATIC CIRRHOSIS TYPE: ICD-10-CM

## 2020-07-10 DIAGNOSIS — K75.81 LIVER CIRRHOSIS SECONDARY TO NASH: ICD-10-CM

## 2020-07-10 DIAGNOSIS — R18.8 CIRRHOSIS OF LIVER WITH ASCITES, UNSPECIFIED HEPATIC CIRRHOSIS TYPE: ICD-10-CM

## 2020-07-10 DIAGNOSIS — D22.9 MULTIPLE BENIGN NEVI: ICD-10-CM

## 2020-07-10 DIAGNOSIS — R18.8 ASCITES OF LIVER: ICD-10-CM

## 2020-07-10 DIAGNOSIS — D48.5 NEOPLASM OF UNCERTAIN BEHAVIOR OF SKIN: Primary | ICD-10-CM

## 2020-07-10 DIAGNOSIS — D69.2 SENILE PURPURA: ICD-10-CM

## 2020-07-10 LAB
APPEARANCE FLD: NORMAL
BODY FLD TYPE: NORMAL
COLOR FLD: YELLOW
EOSINOPHIL NFR FLD MANUAL: 1 %
LYMPHOCYTES NFR FLD MANUAL: 23 %
MONOS+MACROS NFR FLD MANUAL: 76 %
WBC # FLD: 33 /CU MM

## 2020-07-10 PROCEDURE — 87070 CULTURE OTHR SPECIMN AEROBIC: CPT

## 2020-07-10 PROCEDURE — 17003 DESTRUCT PREMALG LES 2-14: CPT | Mod: PBBFAC | Performed by: DERMATOLOGY

## 2020-07-10 PROCEDURE — 17000 DESTRUCT PREMALG LESION: CPT | Mod: S$PBB,,, | Performed by: DERMATOLOGY

## 2020-07-10 PROCEDURE — 49083 IR PARACENTESIS WITH IMAGING: ICD-10-PCS | Mod: ,,, | Performed by: FAMILY MEDICINE

## 2020-07-10 PROCEDURE — 89051 BODY FLUID CELL COUNT: CPT

## 2020-07-10 PROCEDURE — 99212 PR OFFICE/OUTPT VISIT, EST, LEVL II, 10-19 MIN: ICD-10-PCS | Mod: 25,S$PBB,, | Performed by: DERMATOLOGY

## 2020-07-10 PROCEDURE — 99212 OFFICE O/P EST SF 10 MIN: CPT | Mod: 25,S$PBB,, | Performed by: DERMATOLOGY

## 2020-07-10 PROCEDURE — 11102 TANGNTL BX SKIN SINGLE LES: CPT | Mod: 59,PBBFAC | Performed by: DERMATOLOGY

## 2020-07-10 PROCEDURE — 17003 DESTRUCTION, PREMALIGNANT LESIONS; SECOND THROUGH 14 LESIONS: ICD-10-PCS | Mod: S$PBB,,, | Performed by: DERMATOLOGY

## 2020-07-10 PROCEDURE — 17000 PR DESTRUCTION(LASER SURGERY,CRYOSURGERY,CHEMOSURGERY),PREMALIGNANT LESIONS,FIRST LESION: ICD-10-PCS | Mod: S$PBB,,, | Performed by: DERMATOLOGY

## 2020-07-10 PROCEDURE — 17000 DESTRUCT PREMALG LESION: CPT | Mod: PBBFAC | Performed by: DERMATOLOGY

## 2020-07-10 PROCEDURE — 11102 TANGNTL BX SKIN SINGLE LES: CPT | Mod: 59,S$PBB,, | Performed by: DERMATOLOGY

## 2020-07-10 PROCEDURE — 88305 TISSUE EXAM BY PATHOLOGIST: ICD-10-PCS | Mod: 26,,, | Performed by: PATHOLOGY

## 2020-07-10 PROCEDURE — 88305 TISSUE EXAM BY PATHOLOGIST: CPT | Mod: 26,,, | Performed by: PATHOLOGY

## 2020-07-10 PROCEDURE — 87075 CULTR BACTERIA EXCEPT BLOOD: CPT

## 2020-07-10 PROCEDURE — 17003 DESTRUCT PREMALG LES 2-14: CPT | Mod: S$PBB,,, | Performed by: DERMATOLOGY

## 2020-07-10 PROCEDURE — 88305 TISSUE EXAM BY PATHOLOGIST: CPT | Performed by: PATHOLOGY

## 2020-07-10 PROCEDURE — 99213 OFFICE O/P EST LOW 20 MIN: CPT | Mod: PBBFAC,25 | Performed by: DERMATOLOGY

## 2020-07-10 PROCEDURE — 49083 ABD PARACENTESIS W/IMAGING: CPT | Mod: ,,, | Performed by: FAMILY MEDICINE

## 2020-07-10 PROCEDURE — 49083 ABD PARACENTESIS W/IMAGING: CPT

## 2020-07-10 PROCEDURE — 11102 PR TANGENTIAL BIOPSY, SKIN, SINGLE LESION: ICD-10-PCS | Mod: 59,S$PBB,, | Performed by: DERMATOLOGY

## 2020-07-10 PROCEDURE — 99999 PR PBB SHADOW E&M-EST. PATIENT-LVL III: CPT | Mod: PBBFAC,,, | Performed by: DERMATOLOGY

## 2020-07-10 PROCEDURE — 99999 PR PBB SHADOW E&M-EST. PATIENT-LVL III: ICD-10-PCS | Mod: PBBFAC,,, | Performed by: DERMATOLOGY

## 2020-07-10 RX ORDER — POTASSIUM CHLORIDE 20 MEQ/1
TABLET, EXTENDED RELEASE ORAL
Qty: 180 TABLET | Refills: 1 | Status: ON HOLD | OUTPATIENT
Start: 2020-07-10 | End: 2020-09-11 | Stop reason: HOSPADM

## 2020-07-10 NOTE — H&P
Radiology History & Physical      SUBJECTIVE:     Chief Complaint: abdominal distention    History of Present Illness:  Kenneth Sheikh is a 80 y.o. male who presents for ultrasound guided paracentesis  Past Medical History:   Diagnosis Date    Anticoagulant long-term use     Basal cell carcinoma     Bipolar 1 disorder     Bipolar 1 disorder 11/24/2016    bipolar    Cancer     history of skin cancer/sinus cancer & rectal cancer    Depressed bipolar affective disorder     Diabetes mellitus     type 2    Diabetic retinopathy 01/09/2019    EBV infection 12/7/2016    EBV DNA, PCR Latest Ref Range: None detected  None detected EBV DNA-Copies/mL Unknown Test Not Performed EBV Early Antigen Ab, IgG Latest Ref Range: <1:10 Titer 1:40 (A) EBV Nuclear Ag Ab Latest Ref Range: <1:5 Titer >=1:80 (A) EBV VCA IgG Latest Ref Range: <1:10 Titer 1:160 (A) EBV VCA IgM Latest Ref Range: <1:10 Titer <1:10     History of TIA (transient ischemic attack)     several-taking Plavix    Hx of gallstones     Wife denies    Hypertension     Hyperthyroidism 11/30/2016    Low HDL (under 40) 12/7/2016    Mixed hyperlipidemia 11/23/2016    JUAN MANUEL (obstructive sleep apnea)     Pneumonia     Skin disease     Squamous cell carcinoma 08/2018    right temple    Stroke     Transient cerebral ischemia 7/22/2016    Type 2 diabetes mellitus      Past Surgical History:   Procedure Laterality Date    Moh's procedure x4      rectal cancer      Sinus surgery for sinus cancer      sinus sx for cancer      skin cancer removed-several times-nose & ear      TONSILLECTOMY      Undescened testicle sx      UVULOPALATOPHARYNGOPLASTY      for sleep apnea       Home Meds:   Prior to Admission medications    Medication Sig Start Date End Date Taking? Authorizing Provider   ACCU-CHEK NEYDA PLUS TEST STRP Strp CHECK BLOOD SUGAR ONCE DAILY 4/17/20   Prisca Espinoza MD   ciprofloxacin HCl (CIPRO) 500 MG tablet TAKE 1 TABLET(500 MG) BY MOUTH EVERY  MONDAY 7/4/20   Renato Womack MD   divalproex (DEPAKOTE) 250 MG EC tablet TAKE 2 TABLETS BY MOUTH EVERY EVENING 10/28/19   Prisca Espinoza MD   furosemide (LASIX) 20 MG tablet TAKE 2 TABLETS BY MOUTH EVERY DAY 7/1/20   Renato Womack MD   lactulose (CHRONULAC) 10 gram/15 mL solution TAKE 15 MLS BY MOUTH TWICE DAILY  Patient not taking: Reported on 6/24/2020 4/6/20   Renato Womack MD   lancets Mis To check blood sugar once daily, to use with Accu-Chek meter. 11/5/18   Prisca Espinoza MD   loperamide (IMODIUM) 2 mg capsule Take 1 capsule (2 mg total) by mouth 2 (two) times daily as needed for Diarrhea. 6/2/20   Prisca Espinoza MD   potassium chloride SA (K-DUR,KLOR-CON) 20 MEQ tablet TAKE 1 TABLET(20 MEQ) BY MOUTH TWICE DAILY  Patient not taking: Reported on 7/9/2020 6/29/20   Gokul Whatley Jr., MD   psyllium (METAMUCIL) packet Take 1 packet by mouth once daily.    Historical Provider, MD   psyllium (METAMUCIL) powder Take 1 packet by mouth as needed. Pt states he takes a teaspoon twice a day    Historical Provider, MD   SITagliptan (JANUVIA) 50 MG Tab Take 1 tablet (50 mg total) by mouth once daily. 4/6/17   Prisca Espinoza MD   XIFAXAN 550 mg Tab Take 1 tablet (550 mg total) by mouth 2 (two) times daily. 2/10/20   Renato Womack MD     Anticoagulants/Antiplatelets: no anticoagulation    Allergies:   Review of patient's allergies indicates:   Allergen Reactions    Abilify [aripiprazole] Other (See Comments)     Sleepy, could not function.     Sedation History:  no adverse reactions    Review of Systems:   Hematological: no known coagulopathies  Respiratory: no shortness of breath  Cardiovascular: no chest pain  Gastrointestinal: no abdominal pain  Genito-Urinary: no dysuria  Musculoskeletal: negative  Neurological: no TIA or stroke symptoms         OBJECTIVE:     Vital Signs (Most Recent)       Physical Exam:  ASA: 2  Mallampati: n/a    General: no acute distress  Mental  Status: alert and oriented to person, place and time  HEENT: normocephalic, atraumatic  Chest: unlabored breathing  Heart: regular heart rate  Abdomen: distended  Extremity: moves all extremities    ASSESSMENT/PLAN:     Sedation Plan: local  Patient will undergo ultrasound guided paracentesis.    MATT Mauricio, FNP  Interventional Radiology  [phone] clinic

## 2020-07-10 NOTE — CARE UPDATE
Pt arrived to [facility] *3* for paracentesis.  Name verified using two identifiers.  Allergies verified.  Will continue to monitor.

## 2020-07-10 NOTE — TELEPHONE ENCOUNTER
----- Message from Catracho Emmanuel sent at 7/10/2020 10:51 AM CDT -----  Contact: [phone] (H)  Prescription refill request.  RX name and strength:   1. potassium chloride SA (K-DUR,KLOR-CON) 20 MEQ tablet      Directions: TAKE 1 TABLET(20 MEQ) BY MOUTH TWICE DAILY    Is this a 30 day or 90 day RX:    Local pharmacy or mail order pharmacy:  Local     Pharmacy name and phone #: [id number] [place] [address] [address] AT [facility] [address] [phone] (Phone)  [phone] (Fax)      Additional information:   Pt is completely out

## 2020-07-10 NOTE — PATIENT INSTRUCTIONS
" Shave Biopsy Wound Care    Your doctor has performed a shave biopsy today.  A band aid and vaseline ointment has been placed over the site.  This should remain in place for 24 hours.  It is recommended that you keep the area dry for the first 24 hours.  After 24 hours, you may remove the band aid and wash the area with warm soap and water and apply Vaseline jelly.  Many patients prefer to use Neosporin or Bacitracin ointment.  This is acceptable; however, know that you can develop an allergy to this medication even if you have used it safely for years.  It is important to keep the area moist.  Letting it dry out and get air slows healing time, and will worsen the scar.  Band aid is optional after first 24 hours.      If you notice increasing redness, tenderness, pain, or yellow drainage at the biopsy site, please notify your doctor.  These are signs of an infection.    If your biopsy site is bleeding, apply firm pressure for 15 minutes straight.  Repeat for another 15 minutes, if it is still bleeding.   If the surgical site continues to bleed, then please contact your doctor.      For [facility] users:   You will receive a [facility] notification after the pathologist has finished reviewing your biopsy specimen. Pathology results, however, will not be released online so you will see a "no content" message. Once your dermatologist reviews and clinically correlates your biopsy results, you will either receive a letter in the mail with the results of a phone call from your doctor's office if further explanation or treatment is warranted.       [address] [place] [address]/ [phone] FAX/ [email or web address]      Summer Sun Protection      The [facility] would like to remind you of the importance of sun protection all year round and particularly during the summer when the suns rays are the strongest. It has been proven that both acute and chronic sun exposure " damages our cells and leads to skin cancer. Beyond skin cancer, the sun causes 90% of the symptoms of pre-mature skin aging, including wrinkles, lentigines (brown spots), and thin, easily bruised skin. Proper sun protection can help prevent these unwanted conditions.    Many patients report that the dont go in the sun. It has been shown that the average person receives 18 hours of incidental sun exposure per week during activities such as walking through parking lots, driving, or sitting next to windows. This accumulates to several bad sunburns per year!    In choosing sunscreen, you want one that protects against both UVA and UVB rays. It is recommended that you use one of SPF 30 or higher. It is important to apply the sunscreen about 20 minutes prior to sun exposure. Most sunscreens are chemical sunscreens and a reaction must take place in the skin so that they are effective. If they are applied and then you are immediately exposed to the sun or start sweating, this reaction has not had time to take place and you are therefore unprotected. Sunscreen needs to be reapplied every 2 hours if you are participating in water sports or sweating. We recommend Elta MD or Neutrogena Ultra Sheer Dry Touch SPF 55 for daily use; however there are many options and it is most important for you to find one that you will use on a consistent basis.    If you have sensitive skin, you may do best with a sunscreen that contains only physical blockers such as titanium dioxide or zinc oxide. These are typically thicker and harder to apply, however they afford very good protection. Neutrogena Sensitive Skin, Blue Lizard Sensitive Skin (pink top) or Neutrogena Pure and Free are popular ones.     Aside from sunscreen, clothes with UV protection, wide brimmed hats, and sunglasses are other means of sun protection that we recommend.                        [facility] - DERMATOLOGY  [address]  [address]  Dept: [phone]  Dept Fax: [phone]                                                                               CRYOSURGERY      Your doctor has used a method called cryosurgery to treat your skin condition. Cryosurgery refers to the use of very cold substances to treat a variety of skin conditions such as warts, pre-skin cancers, molluscum contagiosum, sun spots, and several benign growths. The substance we use in cryosurgery is liquid nitrogen and is so cold (-195 degrees Celsius) that is burns when administered.     Following treatment in the office, the skin may immediately burn and become red. You may find the area around the lesion is affected as well. It is sometimes necessary to treat not only the lesion, but a small area of the surrounding normal skin to achieve a good response.     A blister, and even a blood filled blister, may form after treatment.   This is a normal response. If the blister is painful, it is acceptable to sterilize a needle and with rubbing alcohol and gently pop the blister. It is important that you gently wash the area with soap and warm water as the blister fluid may contain wart virus if a wart was treated. Do no remove the roof of the blister.     The area treated can take anywhere from 1-3 weeks to heal. Healing time depends on the kind skin lesion treated, the location, and how aggressively the lesion was treated. It is recommended that the areas treated are covered with Vaseline or bacitracin ointment and a band-aid. If a band-aid is not practical, just ointment applied several times per day will do. Keeping these areas moist will speed the healing time.    Treatment with liquid nitrogen can leave a scar. In dark skin, it may be a light or dark scar, in light skin it may be a white or pink scar. These will generally fade with time, but may never go away completely.     If you have any concerns after your treatment, please feel free to call the office.        [address] [place] [address]/ [phone] FAX/ [email or web address]

## 2020-07-10 NOTE — PROGRESS NOTES
Subjective:       Patient ID:  Kenneth Sheikh is a 80 y.o. male who presents for   Chief Complaint   Patient presents with    Skin Check     UBSE     HPI  Pt has an extensive hx of NMSC in the past, mostly treated by [facility] and [facility] and with Efudex.    Pt and wife would prefer to decrease the amount of cutting on his skin and would prefer to try and freeze as much as possible.    C/o easy bruising on arms x yrs. Using Aveeno cream.  C/o red nose, possibly from mask or glasses rubbing.  Only wants face and arms checked today.    Review of Systems   Constitutional: Negative for fever, chills and fatigue.   Skin: Negative for daily sunscreen use.   Hematologic/Lymphatic: Bruises/bleeds easily.        Objective:    Physical Exam   Constitutional: He appears well-developed and well-nourished. No distress.   Neurological: He is alert and oriented to person, place, and time. He is not disoriented.   Psychiatric: He has a normal mood and affect.   Skin:   Areas Examined (abnormalities noted in diagram):   Head / Face Inspection Performed  Neck Inspection Performed  RUE Inspected  LUE Inspection Performed                 Pt declined skin exam of any other areas today.        Diagram Legend     Erythematous scaling macule/papule c/w actinic keratosis       Vascular papule c/w angioma      Pigmented verrucoid papule/plaque c/w seborrheic keratosis      Yellow umbilicated papule c/w sebaceous hyperplasia      Irregularly shaped tan macule c/w lentigo     1-2 mm smooth white papules consistent with Milia      Movable subcutaneous cyst with punctum c/w epidermal inclusion cyst      Subcutaneous movable cyst c/w pilar cyst      Firm pink to brown papule c/w dermatofibroma      Pedunculated fleshy papule(s) c/w skin tag(s)      Evenly pigmented macule c/w junctional nevus     Mildly variegated pigmented, slightly irregular-bordered macule c/w mildly atypical nevus      Flesh colored to evenly pigmented papule c/w  intradermal nevus       Pink pearly papule/plaque c/w basal cell carcinoma      Erythematous hyperkeratotic cursted plaque c/w SCC      Surgical scar with no sign of skin cancer recurrence      Open and closed comedones      Inflammatory papules and pustules      Verrucoid papule consistent consistent with wart     Erythematous eczematous patches and plaques     Dystrophic onycholytic nail with subungual debris c/w onychomycosis     Umbilicated papule    Erythematous-base heme-crusted tan verrucoid plaque consistent with inflamed seborrheic keratosis     Erythematous Silvery Scaling Plaque c/w Psoriasis     See annotation      Assessment / Plan:      Neoplasm of uncertain behavior of skin  Shave biopsy procedure note:    Shave biopsy performed after verbal consent including risk of infection, scar, recurrence, need for additional treatment of site. Area prepped with alcohol, anesthetized with approximately 1.0cc of 1% lidocaine with epinephrine. Lesional tissue shaved with razor blade. Hemostasis achieved with application of aluminum chloride followed by hyfrecation. No complications. Dressing applied. Wound care explained.    -     Specimen to Pathology, Dermatology  Pathology Orders:     Normal Orders This Visit    Specimen to Pathology, Dermatology     Comments:    Number of Specimens:->1  ------------------------->-------------------------  Spec 1 Procedure:->Biopsy  Spec 1 Clinical Impression:->r/o BCC vs other  Spec 1 Source:->R temple    Questions:    Procedure Type: Dermatology and skin neoplasms    Number of Specimens: 1    ------------------------: -------------------------    Spec 1 Procedure: Biopsy    Spec 1 Clinical Impression: r/o BCC vs other    Spec 1 Source: R temple          AK (actinic keratosis)  Cryosurgery Procedure Note    Verbal consent from the patient is obtained including, but not limited to, risk of hypopigmentation/hyperpigmentation, scar, recurrence of lesion. The patient is aware of  the precancerous quality and need for treatment of these lesions. Liquid nitrogen cryosurgery is applied to the 10 actinic keratoses, as detailed in the physical exam, to produce a freeze injury. The patient is aware that blisters may form and is instructed on wound care with gentle cleansing and use of vaseline ointment to keep moist until healed. The patient is supplied a handout on cryosurgery and is instructed to call if lesions do not completely resolve.    SK (seborrheic keratosis)  These are benign inherited growths without a malignant potential. Reassurance given to patient. No treatment is necessary.   Treatment of benign, asymptomatic lesions may be considered cosmetic.  Warned about risk of hypo- or hyperpigmentation with treatment and risk of recurrence.    Multiple benign nevi  Benign-appearing on exam today. Counseled pt to monitor mole(s) and return to clinic if any changes noted or symptoms (bleeding, itching, pain, etc) noted. Brochure provided.    Senile purpura  Reassurance given to patient. No treatment is necessary.  May try DerMend    Follow up in about 4 months (around 11/10/2020) for skin check or sooner pending biopsy results.

## 2020-07-10 NOTE — CARE UPDATE
Paracentesis complete. 4800 mLs peritoneal fluid drained. Pt tolerated well. Dressing to abd clean, dry, and intact. Specimens sent per lab order. Patient states full understanding of discharge and patient wheeled to lobby and family.     89 yo woman with dementia presenting to the hospital for shortness of breath and weakness. Admitted for aspiration pneumonia and atrial fibrillation with rapid ventricular response. Pulmonary service consulted to evaluate for possible thoracentesis given findings of right pleural effusion on CT chest. Patient with dementia but alert and awake answering some questions. Looks comfortable at rest with oxygen through nasal cannula. No complains.       PAST MEDICAL & SURGICAL HISTORY:  Dementia  History of hip surgery    FAMILY HISTORY:  No pertinent family history in first degree relatives    SOCIAL HISTORY  No tobacco, alcohol or drug use    MEDICATIONS  (STANDING):  albuterol/ipratropium for Nebulization 3 milliLiter(s) Nebulizer every 6 hours  dextrose 5%. 1000 milliLiter(s) (50 mL/Hr) IV Continuous <Continuous>  enoxaparin Injectable 30 milliGRAM(s) SubCutaneous daily  guaiFENesin    Syrup 100 milliGRAM(s) Oral every 6 hours  influenza   Vaccine 0.5 milliLiter(s) IntraMuscular once  piperacillin/tazobactam IVPB.. 3.375 Gram(s) IV Intermittent every 8 hours  polyethylene glycol 3350 17 Gram(s) Oral daily  senna 2 Tablet(s) Oral at bedtime  sodium chloride 3%  Inhalation 3 milliLiter(s) Inhalation three times a day  sorbitol 70%/mineral oil/magnesium hydroxide/glycerin Enema 120 milliLiter(s) Rectal once    MEDICATIONS  (PRN):  acetaminophen   Tablet .. 650 milliGRAM(s) Oral every 6 hours PRN Temp greater or equal to 38C (100.4F), Mild Pain (1 - 3), Moderate Pain (4 - 6), Severe Pain (7 - 10)    ALLERGIES  No known medication allergies    OBJECTIVE    Vital Signs Last 24 Hrs  T(C): 36.7 (07 Nov 2019 15:21), Max: 37.1 (06 Nov 2019 21:49)  T(F): 98 (07 Nov 2019 15:21), Max: 98.7 (06 Nov 2019 21:49)  HR: 107 (07 Nov 2019 15:21) (95 - 107)  BP: 127/75 (07 Nov 2019 15:21) (123/69 - 129/75)  BP(mean): --  RR: 17 (07 Nov 2019 15:21) (17 - 17)  SpO2: 95% (07 Nov 2019 15:21) (94% - 96%)    PHYSICAL EXAM:  General: no acute distress, resting comfortable with nasal cannula  HEENT: normocephalic  Eyes: pupils equal and reactive to light  Neck: supple  Respiratory: non labored breathing, decreased breath sounds in right base  Cardiovascular: normal rate, irregular rhythm  Gastrointestinal: abdomen soft, non tender, non distended  Extremities: no lower extremity edema  Neurological: alert, answering questions   Psychiatric: cooperative    LABORATORY                          10.9   12.71 )-----------( 179      ( 07 Nov 2019 06:00 )             37.1     11-07    150<H>  |  109<H>  |  19  ----------------------------<  125<H>  3.2<L>   |  29  |  0.71    Ca    8.9      07 Nov 2019 06:00  Phos  3.2     11-07  Mg     1.9     11-07    TPro  6.3  /  Alb  2.8<L>  /  TBili  0.4  /  DBili  x   /  AST  15  /  ALT  15  /  AlkPhos  71  11-07    Blood Gas Venous Comprehensive (11.01.19 @ 13:45)    pH, Venous: 7.38 pH    pCO2, Venous: 41 mmHg    pO2, Venous: 55 mmHg    HCO3, Venous: 23 mmol/L    Base Excess, Venous: -1.2: REFERENCE RANGE = -3 + 2 mmol/L mmol/L    Oxygen Saturation, Venous: 86.4 %     RADIOLOGY    CT Angio Chest w/ IV Cont (11.07.19 @ 13:14) >  Limited evaluation for segmental and subsegmental pulmonary embolism. No main, left, right, or lobar pulmonary embolism.  Extensive right lower lobe secretions with complete atelectasis right lower lobe. Partial atelectasis left lower lobe. Lungs otherwise clear.  Large subhepatic mass measuring at least 8 cm. CT abdomen/pelvis recommended for complete evaluation    < end of copied text >

## 2020-07-10 NOTE — PROCEDURES
Radiology Post-Procedure Note    Pre Op Diagnosis: Ascites  Post Op Diagnosis: Same    Procedure: Ultrasound Guided Paracentesis    Procedure performed by: Souleymane ROSS, Елена     Written Informed Consent Obtained: Yes  Specimen Removed: YES cloudy yellow  Estimated Blood Loss: Minimal    Findings:   Successful paracentesis.  Albumin administered PRN per protocol.    Patient tolerated procedure well.    Елена Comer, APRN, FNP  Interventional Radiology  [phone] clinic

## 2020-07-13 LAB — BACTERIA SPEC AEROBE CULT: NO GROWTH

## 2020-07-14 ENCOUNTER — PATIENT OUTREACH (OUTPATIENT)
Dept: ADMINISTRATIVE | Facility: HOSPITAL | Age: 80
End: 2020-07-14

## 2020-07-14 LAB
FINAL PATHOLOGIC DIAGNOSIS: NORMAL
GROSS: NORMAL
MICROSCOPIC EXAM: NORMAL

## 2020-07-17 ENCOUNTER — HOSPITAL ENCOUNTER (OUTPATIENT)
Dept: INTERVENTIONAL RADIOLOGY/VASCULAR | Facility: HOSPITAL | Age: 80
Discharge: HOME | End: 2020-07-17
Attending: INTERNAL MEDICINE
Payer: MEDICARE

## 2020-07-17 VITALS
SYSTOLIC BLOOD PRESSURE: 125 MMHG | DIASTOLIC BLOOD PRESSURE: 68 MMHG | RESPIRATION RATE: 18 BRPM | OXYGEN SATURATION: 99 % | HEART RATE: 58 BPM

## 2020-07-17 DIAGNOSIS — Z71.89 COMPLEX CARE COORDINATION: ICD-10-CM

## 2020-07-17 DIAGNOSIS — K74.60 CIRRHOSIS OF LIVER WITH ASCITES, UNSPECIFIED HEPATIC CIRRHOSIS TYPE: ICD-10-CM

## 2020-07-17 DIAGNOSIS — R18.8 ASCITES OF LIVER: ICD-10-CM

## 2020-07-17 DIAGNOSIS — K75.81 LIVER CIRRHOSIS SECONDARY TO NASH: ICD-10-CM

## 2020-07-17 DIAGNOSIS — K74.60 LIVER CIRRHOSIS SECONDARY TO NASH: ICD-10-CM

## 2020-07-17 DIAGNOSIS — R18.8 CIRRHOSIS OF LIVER WITH ASCITES, UNSPECIFIED HEPATIC CIRRHOSIS TYPE: ICD-10-CM

## 2020-07-17 LAB
APPEARANCE FLD: NORMAL
BACTERIA SPEC ANAEROBE CULT: NORMAL
BODY FLD TYPE: NORMAL
COLOR FLD: YELLOW
LYMPHOCYTES NFR FLD MANUAL: 35 %
MONOS+MACROS NFR FLD MANUAL: 61 %
NEUTROPHILS NFR FLD MANUAL: 4 %
WBC # FLD: 36 /CU MM

## 2020-07-17 PROCEDURE — 49083 IR PARACENTESIS WITH IMAGING: ICD-10-PCS | Mod: ,,, | Performed by: RADIOLOGY

## 2020-07-17 PROCEDURE — 49083 ABD PARACENTESIS W/IMAGING: CPT

## 2020-07-17 PROCEDURE — 49083 ABD PARACENTESIS W/IMAGING: CPT | Mod: ,,, | Performed by: RADIOLOGY

## 2020-07-17 PROCEDURE — 89051 BODY FLUID CELL COUNT: CPT

## 2020-07-17 RX ORDER — ALBUMIN HUMAN 250 G/1000ML
50 SOLUTION INTRAVENOUS ONCE
Status: DISCONTINUED | OUTPATIENT
Start: 2020-07-17 | End: 2020-07-18 | Stop reason: HOSPADM

## 2020-07-17 NOTE — H&P
Radiology History & Physical      SUBJECTIVE:     Chief Complaint: abdominal distention    History of Present Illness:  Kenneth Sheikh is a 80 y.o. male who presents for ultrasound guided paracentesis  Past Medical History:   Diagnosis Date    Anticoagulant long-term use     Basal cell carcinoma     Bipolar 1 disorder     Bipolar 1 disorder 11/24/2016    bipolar    Cancer     history of skin cancer/sinus cancer & rectal cancer    Depressed bipolar affective disorder     Diabetes mellitus     type 2    Diabetic retinopathy 01/09/2019    EBV infection 12/7/2016    EBV DNA, PCR Latest Ref Range: None detected  None detected EBV DNA-Copies/mL Unknown Test Not Performed EBV Early Antigen Ab, IgG Latest Ref Range: <1:10 Titer 1:40 (A) EBV Nuclear Ag Ab Latest Ref Range: <1:5 Titer >=1:80 (A) EBV VCA IgG Latest Ref Range: <1:10 Titer 1:160 (A) EBV VCA IgM Latest Ref Range: <1:10 Titer <1:10     History of TIA (transient ischemic attack)     several-taking Plavix    Hx of gallstones     Wife denies    Hypertension     Hyperthyroidism 11/30/2016    Low HDL (under 40) 12/7/2016    Mixed hyperlipidemia 11/23/2016    JUAN MANUEL (obstructive sleep apnea)     Pneumonia     Skin disease     Squamous cell carcinoma 08/2018    right temple    Stroke     Transient cerebral ischemia 7/22/2016    Type 2 diabetes mellitus      Past Surgical History:   Procedure Laterality Date    Moh's procedure x4      rectal cancer      Sinus surgery for sinus cancer      sinus sx for cancer      skin cancer removed-several times-nose & ear      TONSILLECTOMY      Undescened testicle sx      UVULOPALATOPHARYNGOPLASTY      for sleep apnea       Home Meds:   Prior to Admission medications    Medication Sig Start Date End Date Taking? Authorizing Provider   ACCU-CHEK NEYDA PLUS TEST STRP Strp CHECK BLOOD SUGAR ONCE DAILY 4/17/20   Prisca Espinoza MD   ciprofloxacin HCl (CIPRO) 500 MG tablet TAKE 1 TABLET(500 MG) BY MOUTH EVERY  MONDAY 7/4/20   Renato Womack MD   divalproex (DEPAKOTE) 250 MG EC tablet TAKE 2 TABLETS BY MOUTH EVERY EVENING 10/28/19   Prisca Espinoza MD   furosemide (LASIX) 20 MG tablet TAKE 2 TABLETS BY MOUTH EVERY DAY 7/1/20   Renato Womack MD   lactulose (CHRONULAC) 10 gram/15 mL solution TAKE 15 MLS BY MOUTH TWICE DAILY  Patient not taking: Reported on 6/24/2020 4/6/20   Renato Womack MD   lancets [facility] To check blood sugar once daily, to use with Accu-Chek meter. 11/5/18   Prisca Espinoza MD   loperamide (IMODIUM) 2 mg capsule Take 1 capsule (2 mg total) by mouth 2 (two) times daily as needed for Diarrhea. 6/2/20   Prisca Espinoza MD   potassium chloride SA (K-DUR,KLOR-CON) 20 MEQ tablet TAKE 1 TABLET(20 MEQ) BY MOUTH TWICE DAILY 7/10/20   Prisca Espinoza MD   psyllium (METAMUCIL) packet Take 1 packet by mouth once daily.    Historical Provider, MD   psyllium (METAMUCIL) powder Take 1 packet by mouth as needed. Pt states he takes a teaspoon twice a day    Historical Provider, MD   SITagliptan (JANUVIA) 50 MG Tab Take 1 tablet (50 mg total) by mouth once daily. 4/6/17   Prisca Espinoza MD   XIFAXAN 550 mg Tab Take 1 tablet (550 mg total) by mouth 2 (two) times daily. 2/10/20   Renato Womack MD     Anticoagulants/Antiplatelets: no anticoagulation    Allergies:   Review of patient's allergies indicates:   Allergen Reactions    Abilify [aripiprazole] Other (See Comments)     Sleepy, could not function.     Sedation History:  no adverse reactions    Review of Systems:   Hematological: no known coagulopathies  Respiratory: no shortness of breath  Cardiovascular: no chest pain  Gastrointestinal: no abdominal pain  Genito-Urinary: no dysuria  Musculoskeletal: negative  Neurological: no TIA or stroke symptoms         OBJECTIVE:     Vital Signs (Most Recent)       Physical Exam:  ASA: 2  Mallampati: n/a    General: no acute distress  Mental Status: alert and oriented to person, place and  time  HEENT: normocephalic, atraumatic  Chest: unlabored breathing  Heart: regular heart rate  Abdomen: distended  Extremity: moves all extremities    ASSESSMENT/PLAN:     Sedation Plan: local  Patient will undergo ultrasound guided paracentesis.    Kaia Herrera PA-C  Interventional Radiology  Clinic [phone]

## 2020-07-17 NOTE — PROCEDURES
Radiology Post-Procedure Note    Pre Op Diagnosis: Ascites  Post Op Diagnosis: Same    Procedure: Ultrasound Guided Paracentesis    Procedure performed by: Kaia Herrera PA-C    Written Informed Consent Obtained: Yes  Specimen Removed: YES clear, yellow  Estimated Blood Loss: Minimal    Findings:   Successful paracentesis.  RLQ. Albumin administered PRN per protocol.    Patient tolerated procedure well.    Kaia Herrera PA-C  Interventional Radiology  Clinic [phone]

## 2020-07-17 NOTE — PROGRESS NOTES
Paracentesis complete. 4800 mLs peritoneal fluid drained. Pt tolerated well. Dressing to right  abd clean, dry, and intact. Specimens sent per lab order.  Pt discharged

## 2020-07-17 NOTE — DISCHARGE INSTRUCTIONS
[facility] 8 AM- 5PM [phone]. Radiology resident on call [phone].      4800 ml removed

## 2020-07-24 ENCOUNTER — HOSPITAL ENCOUNTER (OUTPATIENT)
Dept: INTERVENTIONAL RADIOLOGY/VASCULAR | Facility: HOSPITAL | Age: 80
Discharge: HOME | End: 2020-07-24
Attending: INTERNAL MEDICINE
Payer: MEDICARE

## 2020-07-24 VITALS
SYSTOLIC BLOOD PRESSURE: 115 MMHG | HEART RATE: 57 BPM | DIASTOLIC BLOOD PRESSURE: 57 MMHG | OXYGEN SATURATION: 100 % | RESPIRATION RATE: 16 BRPM

## 2020-07-24 DIAGNOSIS — R18.8 CIRRHOSIS OF LIVER WITH ASCITES, UNSPECIFIED HEPATIC CIRRHOSIS TYPE: ICD-10-CM

## 2020-07-24 DIAGNOSIS — K74.60 LIVER CIRRHOSIS SECONDARY TO NASH: ICD-10-CM

## 2020-07-24 DIAGNOSIS — R18.8 ASCITES OF LIVER: ICD-10-CM

## 2020-07-24 DIAGNOSIS — K74.60 CIRRHOSIS OF LIVER WITH ASCITES, UNSPECIFIED HEPATIC CIRRHOSIS TYPE: ICD-10-CM

## 2020-07-24 DIAGNOSIS — K75.81 LIVER CIRRHOSIS SECONDARY TO NASH: ICD-10-CM

## 2020-07-24 PROCEDURE — 49083 ABD PARACENTESIS W/IMAGING: CPT

## 2020-07-24 PROCEDURE — 49083 IR PARACENTESIS WITH IMAGING: ICD-10-PCS | Mod: ,,, | Performed by: PHYSICIAN ASSISTANT

## 2020-07-24 PROCEDURE — 49083 ABD PARACENTESIS W/IMAGING: CPT | Mod: ,,, | Performed by: PHYSICIAN ASSISTANT

## 2020-07-24 RX ORDER — ALBUMIN HUMAN 250 G/1000ML
50 SOLUTION INTRAVENOUS ONCE
Status: DISCONTINUED | OUTPATIENT
Start: 2020-07-24 | End: 2020-07-25 | Stop reason: HOSPADM

## 2020-07-24 NOTE — DISCHARGE INSTRUCTIONS
Please call with any questions or concerns.      Monday thru Friday 8:00 am - 4:30 pm    Interventional Radiology   [phone]    After Hours    Ask for the Radiology Intern on call  [phone]

## 2020-07-24 NOTE — H&P
Radiology History & Physical      SUBJECTIVE:     Chief Complaint: abdominal distention    History of Present Illness:  Kenneth Sheikh is a 80 y.o. male who presents for ultrasound guided paracentesis  Past Medical History:   Diagnosis Date    Anticoagulant long-term use     Basal cell carcinoma     Bipolar 1 disorder     Bipolar 1 disorder 11/24/2016    bipolar    Cancer     history of skin cancer/sinus cancer & rectal cancer    Depressed bipolar affective disorder     Diabetes mellitus     type 2    Diabetic retinopathy 01/09/2019    EBV infection 12/7/2016    EBV DNA, PCR Latest Ref Range: None detected  None detected EBV DNA-Copies/mL Unknown Test Not Performed EBV Early Antigen Ab, IgG Latest Ref Range: <1:10 Titer 1:40 (A) EBV Nuclear Ag Ab Latest Ref Range: <1:5 Titer >=1:80 (A) EBV VCA IgG Latest Ref Range: <1:10 Titer 1:160 (A) EBV VCA IgM Latest Ref Range: <1:10 Titer <1:10     History of TIA (transient ischemic attack)     several-taking Plavix    Hx of gallstones     Wife denies    Hypertension     Hyperthyroidism 11/30/2016    Low HDL (under 40) 12/7/2016    Mixed hyperlipidemia 11/23/2016    JUAN MANUEL (obstructive sleep apnea)     Pneumonia     Skin disease     Squamous cell carcinoma 08/2018    right temple    Stroke     Transient cerebral ischemia 7/22/2016    Type 2 diabetes mellitus      Past Surgical History:   Procedure Laterality Date    Moh's procedure x4      rectal cancer      Sinus surgery for sinus cancer      sinus sx for cancer      skin cancer removed-several times-nose & ear      TONSILLECTOMY      Undescened testicle sx      UVULOPALATOPHARYNGOPLASTY      for sleep apnea       Home Meds:   Prior to Admission medications    Medication Sig Start Date End Date Taking? Authorizing Provider   ACCU-CHEK NEYDA PLUS TEST STRP Strp CHECK BLOOD SUGAR ONCE DAILY 4/17/20   Prisca Espinoza MD   ciprofloxacin HCl (CIPRO) 500 MG tablet TAKE 1 TABLET(500 MG) BY MOUTH EVERY  MONDAY 7/4/20   Renato Womack MD   divalproex (DEPAKOTE) 250 MG EC tablet TAKE 2 TABLETS BY MOUTH EVERY EVENING 10/28/19   Prisca Espinoza MD   furosemide (LASIX) 20 MG tablet TAKE 2 TABLETS BY MOUTH EVERY DAY 7/1/20   Renato Womack MD   lactulose (CHRONULAC) 10 gram/15 mL solution TAKE 15 MLS BY MOUTH TWICE DAILY  Patient not taking: Reported on 6/24/2020 4/6/20   Renato Womack MD   lancets [facility] To check blood sugar once daily, to use with Accu-Chek meter. 11/5/18   Prisca Espinoza MD   loperamide (IMODIUM) 2 mg capsule Take 1 capsule (2 mg total) by mouth 2 (two) times daily as needed for Diarrhea. 6/2/20   Prisca Epsinoza MD   potassium chloride SA (K-DUR,KLOR-CON) 20 MEQ tablet TAKE 1 TABLET(20 MEQ) BY MOUTH TWICE DAILY 7/10/20   Prisca Espinoza MD   psyllium (METAMUCIL) packet Take 1 packet by mouth once daily.    Historical Provider, MD   psyllium (METAMUCIL) powder Take 1 packet by mouth as needed. Pt states he takes a teaspoon twice a day    Historical Provider, MD   SITagliptan (JANUVIA) 50 MG Tab Take 1 tablet (50 mg total) by mouth once daily. 4/6/17   Prisca Espinoza MD   XIFAXAN 550 mg Tab Take 1 tablet (550 mg total) by mouth 2 (two) times daily. 2/10/20   Renato Womack MD     Anticoagulants/Antiplatelets: no anticoagulation    Allergies:   Review of patient's allergies indicates:   Allergen Reactions    Abilify [aripiprazole] Other (See Comments)     Sleepy, could not function.     Sedation History:  no adverse reactions    Review of Systems:   Hematological: no known coagulopathies  Respiratory: no shortness of breath  Cardiovascular: no chest pain  Gastrointestinal: no abdominal pain  Genito-Urinary: no dysuria  Musculoskeletal: negative  Neurological: no TIA or stroke symptoms         OBJECTIVE:     Vital Signs (Most Recent)       Physical Exam:  ASA: 2  Mallampati: n/a    General: no acute distress  Mental Status: alert and oriented to person, place and  time  HEENT: normocephalic, atraumatic  Chest: unlabored breathing  Heart: regular heart rate  Abdomen: distended  Extremity: moves all extremities    ASSESSMENT/PLAN:     Sedation Plan: local  Patient will undergo ultrasound guided paracentesis.    Kaia Herrera PA-C  Interventional Radiology  Clinic [phone]

## 2020-07-24 NOTE — PROCEDURES
Radiology Post-Procedure Note    Pre Op Diagnosis: Ascites  Post Op Diagnosis: Same    Procedure: Ultrasound Guided Paracentesis    Procedure performed by: Kaia Herrera PA-C    Written Informed Consent Obtained: Yes  Specimen Removed: YES clear, yellow  Estimated Blood Loss: Minimal    Findings:   Successful paracentesis.  LLQ. Albumin administered PRN per protocol.    Patient tolerated procedure well.    Kaia Herrera PA-C  Interventional Radiology  Clinic [phone]

## 2020-07-24 NOTE — PROGRESS NOTES
Paracentesis complete. Pt tolerated well. Pt given site care and discharge instructions. Pt Dc'd home in care of wife.

## 2020-07-31 ENCOUNTER — HOSPITAL ENCOUNTER (OUTPATIENT)
Dept: INTERVENTIONAL RADIOLOGY/VASCULAR | Facility: HOSPITAL | Age: 80
Discharge: HOME | End: 2020-07-31
Attending: INTERNAL MEDICINE
Payer: MEDICARE

## 2020-07-31 VITALS — OXYGEN SATURATION: 100 % | HEART RATE: 60 BPM | DIASTOLIC BLOOD PRESSURE: 64 MMHG | SYSTOLIC BLOOD PRESSURE: 136 MMHG

## 2020-07-31 DIAGNOSIS — K74.60 CIRRHOSIS OF LIVER WITH ASCITES, UNSPECIFIED HEPATIC CIRRHOSIS TYPE: ICD-10-CM

## 2020-07-31 DIAGNOSIS — K74.60 LIVER CIRRHOSIS SECONDARY TO NASH: ICD-10-CM

## 2020-07-31 DIAGNOSIS — R18.8 ASCITES OF LIVER: ICD-10-CM

## 2020-07-31 DIAGNOSIS — K75.81 LIVER CIRRHOSIS SECONDARY TO NASH: ICD-10-CM

## 2020-07-31 DIAGNOSIS — R18.8 CIRRHOSIS OF LIVER WITH ASCITES, UNSPECIFIED HEPATIC CIRRHOSIS TYPE: ICD-10-CM

## 2020-07-31 LAB
APPEARANCE FLD: NORMAL
BODY FLD TYPE: NORMAL
COLOR FLD: NORMAL
GRAM STN SPEC: NORMAL
GRAM STN SPEC: NORMAL
LYMPHOCYTES NFR FLD MANUAL: 56 %
MONOS+MACROS NFR FLD MANUAL: 44 %
WBC # FLD: 21 /CU MM

## 2020-07-31 PROCEDURE — 49083 ABD PARACENTESIS W/IMAGING: CPT

## 2020-07-31 PROCEDURE — 49083 ABD PARACENTESIS W/IMAGING: CPT | Mod: GC,,, | Performed by: FAMILY MEDICINE

## 2020-07-31 PROCEDURE — 49083 IR PARACENTESIS WITH IMAGING: ICD-10-PCS | Mod: GC,,, | Performed by: FAMILY MEDICINE

## 2020-07-31 PROCEDURE — 87070 CULTURE OTHR SPECIMN AEROBIC: CPT

## 2020-07-31 PROCEDURE — 89051 BODY FLUID CELL COUNT: CPT

## 2020-07-31 PROCEDURE — 87075 CULTR BACTERIA EXCEPT BLOOD: CPT

## 2020-07-31 PROCEDURE — 87205 SMEAR GRAM STAIN: CPT

## 2020-07-31 NOTE — H&P
Radiology History & Physical      SUBJECTIVE:     Chief Complaint: abdominal distention    History of Present Illness:  Kenneth Sheikh is a 80 y.o. male who presents for ultrasound guided paracentesis  Past Medical History:   Diagnosis Date    Anticoagulant long-term use     Basal cell carcinoma     Bipolar 1 disorder     Bipolar 1 disorder 11/24/2016    bipolar    Cancer     history of skin cancer/sinus cancer & rectal cancer    Depressed bipolar affective disorder     Diabetes mellitus     type 2    Diabetic retinopathy 01/09/2019    EBV infection 12/7/2016    EBV DNA, PCR Latest Ref Range: None detected  None detected EBV DNA-Copies/mL Unknown Test Not Performed EBV Early Antigen Ab, IgG Latest Ref Range: <1:10 Titer 1:40 (A) EBV Nuclear Ag Ab Latest Ref Range: <1:5 Titer >=1:80 (A) EBV VCA IgG Latest Ref Range: <1:10 Titer 1:160 (A) EBV VCA IgM Latest Ref Range: <1:10 Titer <1:10     History of TIA (transient ischemic attack)     several-taking Plavix    Hx of gallstones     Wife denies    Hypertension     Hyperthyroidism 11/30/2016    Low HDL (under 40) 12/7/2016    Mixed hyperlipidemia 11/23/2016    JUAN MANUEL (obstructive sleep apnea)     Pneumonia     Skin disease     Squamous cell carcinoma 08/2018    right temple    Stroke     Transient cerebral ischemia 7/22/2016    Type 2 diabetes mellitus      Past Surgical History:   Procedure Laterality Date    Moh's procedure x4      rectal cancer      Sinus surgery for sinus cancer      sinus sx for cancer      skin cancer removed-several times-nose & ear      TONSILLECTOMY      Undescened testicle sx      UVULOPALATOPHARYNGOPLASTY      for sleep apnea       Home Meds:   Prior to Admission medications    Medication Sig Start Date End Date Taking? Authorizing Provider   ACCU-CHEK NEYDA PLUS TEST STRP Strp CHECK BLOOD SUGAR ONCE DAILY 4/17/20   Prisca Espinoza MD   ciprofloxacin HCl (CIPRO) 500 MG tablet TAKE 1 TABLET(500 MG) BY MOUTH EVERY  MONDAY 7/4/20   Renato Womack MD   divalproex (DEPAKOTE) 250 MG EC tablet TAKE 2 TABLETS BY MOUTH EVERY EVENING 10/28/19   Prisca Espinoza MD   furosemide (LASIX) 20 MG tablet TAKE 2 TABLETS BY MOUTH EVERY DAY 7/1/20   Renato Womack MD   lactulose (CHRONULAC) 10 gram/15 mL solution TAKE 15 MLS BY MOUTH TWICE DAILY  Patient not taking: Reported on 6/24/2020 4/6/20   Renato Womack MD   lancets [facility] To check blood sugar once daily, to use with Accu-Chek meter. 11/5/18   Prisca Espinoza MD   loperamide (IMODIUM) 2 mg capsule Take 1 capsule (2 mg total) by mouth 2 (two) times daily as needed for Diarrhea. 6/2/20   Prisca Espinoza MD   potassium chloride SA (K-DUR,KLOR-CON) 20 MEQ tablet TAKE 1 TABLET(20 MEQ) BY MOUTH TWICE DAILY 7/10/20   Prisca Espinoza MD   psyllium (METAMUCIL) packet Take 1 packet by mouth once daily.    Historical Provider, MD   psyllium (METAMUCIL) powder Take 1 packet by mouth as needed. Pt states he takes a teaspoon twice a day    Historical Provider, MD   SITagliptan (JANUVIA) 50 MG Tab Take 1 tablet (50 mg total) by mouth once daily. 4/6/17   Prisca Espinoza MD   XIFAXAN 550 mg Tab Take 1 tablet (550 mg total) by mouth 2 (two) times daily. 2/10/20   Renato Womack MD     Anticoagulants/Antiplatelets: no anticoagulation    Allergies:   Review of patient's allergies indicates:   Allergen Reactions    Abilify [aripiprazole] Other (See Comments)     Sleepy, could not function.     Sedation History:  no adverse reactions    Review of Systems:   Hematological: no known coagulopathies  Respiratory: no shortness of breath  Cardiovascular: no chest pain  Gastrointestinal: no abdominal pain  Genito-Urinary: no dysuria  Musculoskeletal: negative  Neurological: no TIA or stroke symptoms         OBJECTIVE:     Vital Signs (Most Recent)       Physical Exam:  ASA: 2  Mallampati: n/a    General: no acute distress  Mental Status: alert and oriented to person, place and  time  HEENT: normocephalic, atraumatic  Chest: unlabored breathing  Heart: regular heart rate  Abdomen: distended  Extremity: moves all extremities    ASSESSMENT/PLAN:     Sedation Plan: local  Patient will undergo ultrasound guided paracentesis.    MATT Mauricio, FNP  Interventional Radiology  [phone] clinic

## 2020-07-31 NOTE — PROCEDURES

## 2020-08-03 LAB — BACTERIA SPEC AEROBE CULT: NO GROWTH

## 2020-08-06 ENCOUNTER — LAB VISIT (OUTPATIENT)
Dept: LAB | Facility: HOSPITAL | Age: 80
End: 2020-08-06
Attending: INTERNAL MEDICINE
Payer: MEDICARE

## 2020-08-06 DIAGNOSIS — E87.6 HYPOKALEMIA: ICD-10-CM

## 2020-08-06 LAB
ANION GAP SERPL CALC-SCNC: 5 MMOL/L (ref 8–16)
BUN SERPL-MCNC: 37 MG/DL (ref 8–23)
CALCIUM SERPL-MCNC: 7.9 MG/DL (ref 8.7–10.5)
CHLORIDE SERPL-SCNC: 111 MMOL/L (ref 95–110)
CO2 SERPL-SCNC: 20 MMOL/L (ref 23–29)
CREAT SERPL-MCNC: 2 MG/DL (ref 0.5–1.4)
EST. GFR  (AFRICAN AMERICAN): 34.9 ML/MIN/1.73 M^2
EST. GFR  (NON AFRICAN AMERICAN): 30.2 ML/MIN/1.73 M^2
GLUCOSE SERPL-MCNC: 80 MG/DL (ref 70–110)
POTASSIUM SERPL-SCNC: 3.7 MMOL/L (ref 3.5–5.1)
SODIUM SERPL-SCNC: 136 MMOL/L (ref 136–145)

## 2020-08-06 PROCEDURE — 36415 COLL VENOUS BLD VENIPUNCTURE: CPT

## 2020-08-06 PROCEDURE — 80048 BASIC METABOLIC PNL TOTAL CA: CPT

## 2020-08-07 ENCOUNTER — HOSPITAL ENCOUNTER (OUTPATIENT)
Dept: INTERVENTIONAL RADIOLOGY/VASCULAR | Facility: HOSPITAL | Age: 80
Discharge: HOME | End: 2020-08-07
Attending: INTERNAL MEDICINE
Payer: MEDICARE

## 2020-08-07 DIAGNOSIS — R18.8 CIRRHOSIS OF LIVER WITH ASCITES, UNSPECIFIED HEPATIC CIRRHOSIS TYPE: ICD-10-CM

## 2020-08-07 DIAGNOSIS — K75.81 LIVER CIRRHOSIS SECONDARY TO NASH: ICD-10-CM

## 2020-08-07 DIAGNOSIS — R18.8 ASCITES OF LIVER: ICD-10-CM

## 2020-08-07 DIAGNOSIS — R18.8 OTHER ASCITES: Primary | ICD-10-CM

## 2020-08-07 DIAGNOSIS — K74.60 CIRRHOSIS OF LIVER WITH ASCITES, UNSPECIFIED HEPATIC CIRRHOSIS TYPE: ICD-10-CM

## 2020-08-07 DIAGNOSIS — K74.60 LIVER CIRRHOSIS SECONDARY TO NASH: ICD-10-CM

## 2020-08-07 LAB
APPEARANCE FLD: NORMAL
BACTERIA SPEC ANAEROBE CULT: NORMAL
BODY FLD TYPE: NORMAL
COLOR FLD: YELLOW
LYMPHOCYTES NFR FLD MANUAL: 30 %
MONOS+MACROS NFR FLD MANUAL: 68 %
NEUTROPHILS NFR FLD MANUAL: 2 %
WBC # FLD: 33 /CU MM

## 2020-08-07 PROCEDURE — 49083 IR PARACENTESIS WITH IMAGING: ICD-10-PCS | Mod: GC,,, | Performed by: FAMILY MEDICINE

## 2020-08-07 PROCEDURE — 63600175 PHARM REV CODE 636 W HCPCS: Mod: JG | Performed by: INTERNAL MEDICINE

## 2020-08-07 PROCEDURE — P9047 ALBUMIN (HUMAN), 25%, 50ML: HCPCS | Mod: JG | Performed by: INTERNAL MEDICINE

## 2020-08-07 PROCEDURE — 49083 ABD PARACENTESIS W/IMAGING: CPT

## 2020-08-07 PROCEDURE — 49083 ABD PARACENTESIS W/IMAGING: CPT | Mod: GC,,, | Performed by: FAMILY MEDICINE

## 2020-08-07 PROCEDURE — 89051 BODY FLUID CELL COUNT: CPT

## 2020-08-07 RX ORDER — ALBUMIN HUMAN 250 G/1000ML
50 SOLUTION INTRAVENOUS ONCE
Status: COMPLETED | OUTPATIENT
Start: 2020-08-07 | End: 2020-08-07

## 2020-08-07 RX ADMIN — ALBUMIN HUMAN 50 G: 0.25 SOLUTION INTRAVENOUS at 12:08

## 2020-08-07 NOTE — PROCEDURES

## 2020-08-07 NOTE — PLAN OF CARE
Patient transported to MPU via wheelchair by BERTO RN, consent and H/P on chart, patient tolerating procedure well

## 2020-08-07 NOTE — H&P
Radiology History & Physical      SUBJECTIVE:     Chief Complaint: abdominal distention    History of Present Illness:  Kenneth Sheikh is a 80 y.o. male who presents for ultrasound guided paracentesis  Past Medical History:   Diagnosis Date    Anticoagulant long-term use     Basal cell carcinoma     Bipolar 1 disorder     Bipolar 1 disorder 11/24/2016    bipolar    Cancer     history of skin cancer/sinus cancer & rectal cancer    Depressed bipolar affective disorder     Diabetes mellitus     type 2    Diabetic retinopathy 01/09/2019    EBV infection 12/7/2016    EBV DNA, PCR Latest Ref Range: None detected  None detected EBV DNA-Copies/mL Unknown Test Not Performed EBV Early Antigen Ab, IgG Latest Ref Range: <1:10 Titer 1:40 (A) EBV Nuclear Ag Ab Latest Ref Range: <1:5 Titer >=1:80 (A) EBV VCA IgG Latest Ref Range: <1:10 Titer 1:160 (A) EBV VCA IgM Latest Ref Range: <1:10 Titer <1:10     History of TIA (transient ischemic attack)     several-taking Plavix    Hx of gallstones     Wife denies    Hypertension     Hyperthyroidism 11/30/2016    Low HDL (under 40) 12/7/2016    Mixed hyperlipidemia 11/23/2016    JUAN MANUEL (obstructive sleep apnea)     Pneumonia     Skin disease     Squamous cell carcinoma 08/2018    right temple    Stroke     Transient cerebral ischemia 7/22/2016    Type 2 diabetes mellitus      Past Surgical History:   Procedure Laterality Date    Moh's procedure x4      rectal cancer      Sinus surgery for sinus cancer      sinus sx for cancer      skin cancer removed-several times-nose & ear      TONSILLECTOMY      Undescened testicle sx      UVULOPALATOPHARYNGOPLASTY      for sleep apnea       Home Meds:   Prior to Admission medications    Medication Sig Start Date End Date Taking? Authorizing Provider   ACCU-CHEK NEYDA PLUS TEST STRP Strp CHECK BLOOD SUGAR ONCE DAILY 4/17/20   Prisca Espinoza MD   ciprofloxacin HCl (CIPRO) 500 MG tablet TAKE 1 TABLET(500 MG) BY MOUTH EVERY  MONDAY 7/4/20   Renato Womack MD   divalproex (DEPAKOTE) 250 MG EC tablet TAKE 2 TABLETS BY MOUTH EVERY EVENING 10/28/19   Prisca Espinoza MD   furosemide (LASIX) 20 MG tablet TAKE 2 TABLETS BY MOUTH EVERY DAY 7/1/20   Renato Womack MD   lactulose (CHRONULAC) 10 gram/15 mL solution TAKE 15 MLS BY MOUTH TWICE DAILY  Patient not taking: Reported on 6/24/2020 4/6/20   Renato Womack MD   lancets [facility] To check blood sugar once daily, to use with Accu-Chek meter. 11/5/18   Prisca Espinoza MD   loperamide (IMODIUM) 2 mg capsule Take 1 capsule (2 mg total) by mouth 2 (two) times daily as needed for Diarrhea. 6/2/20   Prisca Espinoza MD   potassium chloride SA (K-DUR,KLOR-CON) 20 MEQ tablet TAKE 1 TABLET(20 MEQ) BY MOUTH TWICE DAILY 7/10/20   Prisca Espinoza MD   psyllium (METAMUCIL) packet Take 1 packet by mouth once daily.    Historical Provider, MD   psyllium (METAMUCIL) powder Take 1 packet by mouth as needed. Pt states he takes a teaspoon twice a day    Historical Provider, MD   SITagliptan (JANUVIA) 50 MG Tab Take 1 tablet (50 mg total) by mouth once daily. 4/6/17   Prisca Espinoza MD   XIFAXAN 550 mg Tab Take 1 tablet (550 mg total) by mouth 2 (two) times daily. 2/10/20   Renato Womack MD     Anticoagulants/Antiplatelets: no anticoagulation    Allergies:   Review of patient's allergies indicates:   Allergen Reactions    Abilify [aripiprazole] Other (See Comments)     Sleepy, could not function.     Sedation History:  no adverse reactions    Review of Systems:   Hematological: no known coagulopathies  Respiratory: no shortness of breath  Cardiovascular: no chest pain  Gastrointestinal: no abdominal pain  Genito-Urinary: no dysuria  Musculoskeletal: negative  Neurological: no TIA or stroke symptoms         OBJECTIVE:     Vital Signs (Most Recent)       Physical Exam:  ASA: 2  Mallampati: n/a    General: no acute distress  Mental Status: alert and oriented to person, place and  time  HEENT: normocephalic, atraumatic  Chest: unlabored breathing  Heart: regular heart rate  Abdomen: distended  Extremity: moves all extremities    ASSESSMENT/PLAN:     Sedation Plan: local  Patient will undergo ultrasound guided paracentesis.    MATT Mauricio, FNP  Interventional Radiology  [phone] clinic

## 2020-08-10 ENCOUNTER — TELEPHONE (OUTPATIENT)
Dept: INTERNAL MEDICINE | Facility: CLINIC | Age: 80
End: 2020-08-10

## 2020-08-10 ENCOUNTER — PATIENT OUTREACH (OUTPATIENT)
Dept: ADMINISTRATIVE | Facility: OTHER | Age: 80
End: 2020-08-10

## 2020-08-10 DIAGNOSIS — E87.6 HYPOKALEMIA: ICD-10-CM

## 2020-08-10 DIAGNOSIS — N17.9 AKI (ACUTE KIDNEY INJURY): Primary | ICD-10-CM

## 2020-08-10 NOTE — LETTER
AUTHORIZATION FOR RELEASE OF   CONFIDENTIAL INFORMATION    Dear Dr Sharpe,    We are seeing Kenneth Sheikh, date of birth 1940, in the clinic at [facility] INTERNAL MEDICINE. Prisca Espinoza MD is the patient's PCP. Kenneth Sheikh has an outstanding lab/procedure at the time we reviewed his chart. In order to help keep his health information updated, he has authorized us to request the following medical record(s):        (  )  MAMMOGRAM                                      (  )  COLONOSCOPY      (  )  PAP SMEAR                                          (  )  OUTSIDE LAB RESULTS     (  )  DEXA SCAN                                          (x )  EYE EXAM            (  )  FOOT EXAM                                          (  )  ENTIRE RECORD     (  )  OUTSIDE IMMUNIZATIONS                 (  )  _______________         Please fax records to [facility], Jennifer M Archie, MD,  [phone]            Patient Name: Kenneth Sheikh  : 1940  Patient Phone #: [phone]   -

## 2020-08-10 NOTE — TELEPHONE ENCOUNTER
Pt wife would like to know pt labs results. Also she stated he doesn't like to take the potassium med. Does he still have to take it?

## 2020-08-10 NOTE — PROGRESS NOTES
LINKS immunization registry updated  Care Everywhere updated  Health Maintenance updated  Chart reviewed for overdue Proactive [facility] Encounters health maintenance testing  Chart search/Media reviewed: for outside diabetic eye exam reports   Orders entered:N/A  E fax request sent to Dr Sharpe for recent diabetic eye exam report

## 2020-08-10 NOTE — TELEPHONE ENCOUNTER
His potassium is normal but his kidney function has worsened some.  Please find out if he is having diarrhea still?  Is he drinking plenty of water?  Please see if we can get him scheduled in [facility] soon.  I am not seeing an appointment on his schedule.

## 2020-08-11 ENCOUNTER — OFFICE VISIT (OUTPATIENT)
Dept: GASTROENTEROLOGY | Facility: CLINIC | Age: 80
End: 2020-08-11
Payer: MEDICARE

## 2020-08-11 VITALS
HEART RATE: 57 BPM | DIASTOLIC BLOOD PRESSURE: 63 MMHG | WEIGHT: 167.31 LBS | HEIGHT: 68 IN | BODY MASS INDEX: 25.36 KG/M2 | SYSTOLIC BLOOD PRESSURE: 121 MMHG

## 2020-08-11 DIAGNOSIS — R19.7 DIARRHEA, UNSPECIFIED TYPE: Primary | ICD-10-CM

## 2020-08-11 DIAGNOSIS — R15.2 INCONTINENCE OF FECES WITH FECAL URGENCY: ICD-10-CM

## 2020-08-11 DIAGNOSIS — R15.9 INCONTINENCE OF FECES WITH FECAL URGENCY: ICD-10-CM

## 2020-08-11 PROCEDURE — 99214 OFFICE O/P EST MOD 30 MIN: CPT | Mod: PBBFAC | Performed by: NURSE PRACTITIONER

## 2020-08-11 PROCEDURE — 99213 OFFICE O/P EST LOW 20 MIN: CPT | Mod: S$PBB,,, | Performed by: NURSE PRACTITIONER

## 2020-08-11 PROCEDURE — 99213 PR OFFICE/OUTPT VISIT, EST, LEVL III, 20-29 MIN: ICD-10-PCS | Mod: S$PBB,,, | Performed by: NURSE PRACTITIONER

## 2020-08-11 PROCEDURE — 99999 PR PBB SHADOW E&M-EST. PATIENT-LVL IV: CPT | Mod: PBBFAC,,, | Performed by: NURSE PRACTITIONER

## 2020-08-11 PROCEDURE — 99999 PR PBB SHADOW E&M-EST. PATIENT-LVL IV: ICD-10-PCS | Mod: PBBFAC,,, | Performed by: NURSE PRACTITIONER

## 2020-08-11 NOTE — TELEPHONE ENCOUNTER
Spoke to pt wife. He had a Gastro appt today because he is still having diarrhea. He had to take imodium before the appt. Pt wife said the Dr mentioned the diarrhea might be coming from his Liver function. He is now taking metamucil  2 times a day rather than 3.     Does he still have to take his potassium med?     He is not drinking enough water. Maybe a few sips a day.     I will reach out to [facility] to see about getting him a appt

## 2020-08-11 NOTE — PROGRESS NOTES
[facility] Consultation Note    Reason for Consult:  The primary encounter diagnosis was Diarrhea, unspecified type. A diagnosis of Incontinence of feces with fecal urgency was also pertinent to this visit.    PCP:   Prisca Espinoza       Referring MD:  No referring provider defined for this encounter.    Initial History of Present Illness (HPI):  This is a 80 y.o. male who is known to this clinic, new to me, here for evaluation of diarrhea and fecal incontinence.   This is an ongoing problem since April/May 2020 that initially improved with discontinuing Lactulose with the addition of TID Metamucil supplement, but 3 days ago returned.   For the past 3 days he reports 4 pasty/mushy bowel movements per day with 1 episode of fecal incontinence yesterday. This is very frustrating for his wife who is his caretaker.   Since yesterday he has taken 2 imodium and has only had 1 bowel movement. Concerns of constipation given liver disease.   He has a history of rectal cancer, seen by CRS for same 2 months for same who states this is not a surgically correctable issue.   He also reports coughing or choking on food. It eventually goes down.     Abdominal pain - no  Reflux - no  Dysphagia - +, does not wish to have EGD given hx of coil for varices and subsequent bacteriemia.    Bowel habits - diarrhea  GI bleeding - none  Fiber/stool softeners - was on TID, Felt stools were better with BID, has stopped all fiber for past few days  Fam hx of CRC or IBD - personal hx of rectal cancer followed by CRS        ROS:  Constitutional: No fevers, chills, No weight loss  ENT:  No heartburn +dysphagia no odynophagia no hoarseness  CV: No chest pain, no palpitation  Pulm: No cough, No shortness of breath, no wheezing  Ophtho: No vision changes  GI: see HPI  Derm: No rash, no itching  Heme: No lymphadenopathy, No easy bruising  MSK: No significant arthritis  : No dysuria, No hematuria  Endo: No hot or cold  intolerance  Neuro: No syncope, No seizure, no strokes  Psych: No uncontrolled anxiety, No uncontrolled depression    Medical History reviewed:  has a past medical history of Anticoagulant long-term use, Basal cell carcinoma, Bipolar 1 disorder, Bipolar 1 disorder (11/24/2016), Cancer, Depressed bipolar affective disorder, Diabetes mellitus, Diabetic retinopathy (01/09/2019), EBV infection (12/7/2016), History of TIA (transient ischemic attack), gallstones, Hypertension, Hyperthyroidism (11/30/2016), Low HDL (under 40) (12/7/2016), Mixed hyperlipidemia (11/23/2016), JUAN MANUEL (obstructive sleep apnea), Pneumonia, Skin disease, Squamous cell carcinoma (08/2018), Stroke, Transient cerebral ischemia (7/22/2016), and Type 2 diabetes mellitus.    Surgical History reviewed:  has a past surgical history that includes Tonsillectomy; sinus sx for cancer; Sinus surgery for sinus cancer; skin cancer removed-several times-nose & ear; Moh's procedure x4; Uvulopalatopharyngoplasty; Undescened testicle sx; and rectal cancer.    Family History reviewed: family history includes Diabetes in his son; No Known Problems in his daughter..     Social History reviewed:  reports that he has quit smoking. He has a 0.50 pack-year smoking history. He has never used smokeless tobacco. He reports that he does not drink alcohol or use drugs.    Review of patient's allergies indicates:   Allergen Reactions    Abilify [aripiprazole] Other (See Comments)     Sleepy, could not function.       Medication List with Changes/Refills   Current Medications    ACCU-CHEK NEYDA PLUS TEST STRP STRP    CHECK BLOOD SUGAR ONCE DAILY    CIPROFLOXACIN HCL (CIPRO) 500 MG TABLET    TAKE 1 TABLET(500 MG) BY MOUTH EVERY MONDAY    DIVALPROEX (DEPAKOTE) 250 MG EC TABLET    TAKE 2 TABLETS BY MOUTH EVERY EVENING    FUROSEMIDE (LASIX) 20 MG TABLET    TAKE 2 TABLETS BY MOUTH EVERY DAY    LACTULOSE (CHRONULAC) 10 GRAM/15 ML SOLUTION    TAKE 15 MLS BY MOUTH TWICE DAILY    LANCETS  "MISC    To check blood sugar once daily, to use with Accu-Chek meter.    LOPERAMIDE (IMODIUM) 2 MG CAPSULE    Take 1 capsule (2 mg total) by mouth 2 (two) times daily as needed for Diarrhea.    POTASSIUM CHLORIDE SA (K-DUR,KLOR-CON) 20 MEQ TABLET    TAKE 1 TABLET(20 MEQ) BY MOUTH TWICE DAILY    PSYLLIUM (METAMUCIL) PACKET    Take 1 packet by mouth once daily.    PSYLLIUM (METAMUCIL) POWDER    Take 1 packet by mouth as needed. Pt states he takes a teaspoon twice a day    SITAGLIPTAN (JANUVIA) 50 MG TAB    Take 1 tablet (50 mg total) by mouth once daily.    XIFAXAN 550 MG TAB    Take 1 tablet (550 mg total) by mouth 2 (two) times daily.         Objective Findings:    Vital Signs:  Blood Pressure 121/63   Pulse (Abnormal) 57   Height 5' 8" (1.727 m)   Weight 75.9 kg (167 lb 5.3 oz)   Body Mass Index 25.44 kg/m²   Body mass index is 25.44 kg/m².    Physical Exam:  General Appearance: Well appearing in no acute distress  Eyes:    No scleral icterus  ENT:  No lesions or masses   Abdomen:  + distended, soft, no guarding, no rebound, no tenderness, + ascites, no bruits, no hepatosplenomegaly,  Musculoskeletal:  No major joint deformities  Skin: No petechiae or rash on exposed skin areas  Neurologic:  Alert and oriented x4  Psychiatric:  Normal speech mentation and affect    Labs reviewed today:  Lab Results   Component Value Date    WBC 3.24 (L) 05/19/2020    HGB 10.6 (L) 05/19/2020    HCT 34.7 (L) 05/19/2020    PLT 97 (L) 05/19/2020    CHOL 119 (L) 01/28/2020    TRIG 92 01/28/2020    HDL 38 (L) 01/28/2020    ALT 9 (L) 05/29/2020    AST 26 05/29/2020     08/06/2020    K 3.7 08/06/2020     (H) 08/06/2020    CREATININE 2.0 (H) 08/06/2020    BUN 37 (H) 08/06/2020    CO2 20 (L) 08/06/2020    TSH 1.248 05/15/2020    PSA 0.48 01/28/2020    INR 1.2 05/15/2020    HGBA1C 6.6 (H) 05/15/2020           Imaging reviewed today:  6/23/20 US abdomen  - Hepatic cirrhosis with a stable 1.2 cm hyperechoic lesion in the right " hepatic lobe.  No new hepatic lesions identified.  - Cholelithiasis without sonographic evidence of acute cholecystitis.  Mild diffuse gallbladder wall thickening likely secondary to underlying hepatic dysfunction.  - Large volume ascites.    Endoscopy reviewed today:    4/19/17 EUS  - Normal esophagus.   - Type 1 isolated gastric varices (IGV1, varices located in the fundus).   - Varices were visualized endosonographically in the fundus of the stomach. Treated with EUS-guided insertion of embolization coils and EUS-guided glue injection.   - No specimens collected.       Medical Decision Making:    Assessment:  1. Diarrhea, unspecified type    2. Incontinence of feces with fecal urgency      Unlikely infectious given negative stool studies in May along with daily Xifaxin and weekly Cipro (no liquid stools)  Pt refuses EGD/EUS given bacteremia from previous  Will consider Gen Sx consult given cholelithiasis and wall thickening if no improvement with adding fiber again at f/u visit.       Recommendations:  1. Resume BID Metamucil - will try 1 dose of powder and 1 dose of wafers as he does not enjoy the consistency of the powder  2. Imodium sparingly for outings if necessary      Follow up in about 4 weeks (around 9/8/2020).      Order summary:       40 min visit with 100% of time spent in face to face consultation of treatment and plan of diarrhea, liver disease, CKD, and tx of dysphagia/ascites.     Thank you for allowing me to participate in the care of EDWARDO Subramanian

## 2020-08-11 NOTE — TELEPHONE ENCOUNTER
I would recommend he continue the potassium and work on drinking more water.  We need to repeat BMP next week.  Placing order now.  He can have it done one day when he comes for paracentesis.

## 2020-08-12 ENCOUNTER — TELEPHONE (OUTPATIENT)
Dept: PRIMARY CARE CLINIC | Facility: CLINIC | Age: 80
End: 2020-08-12

## 2020-08-13 ENCOUNTER — TELEPHONE (OUTPATIENT)
Dept: PRIMARY CARE CLINIC | Facility: CLINIC | Age: 80
End: 2020-08-13

## 2020-08-13 NOTE — TELEPHONE ENCOUNTER
Per our recent convo, you are booked for the next few days. When would you like me to schedule this new patient for you ?

## 2020-08-14 ENCOUNTER — HOSPITAL ENCOUNTER (OUTPATIENT)
Dept: INTERVENTIONAL RADIOLOGY/VASCULAR | Facility: HOSPITAL | Age: 80
Discharge: HOME | End: 2020-08-14
Attending: INTERNAL MEDICINE
Payer: MEDICARE

## 2020-08-14 VITALS
DIASTOLIC BLOOD PRESSURE: 58 MMHG | HEART RATE: 53 BPM | RESPIRATION RATE: 16 BRPM | OXYGEN SATURATION: 100 % | SYSTOLIC BLOOD PRESSURE: 104 MMHG

## 2020-08-14 DIAGNOSIS — R18.8 OTHER ASCITES: ICD-10-CM

## 2020-08-14 PROCEDURE — 49083 ABD PARACENTESIS W/IMAGING: CPT | Mod: ,,, | Performed by: PHYSICIAN ASSISTANT

## 2020-08-14 PROCEDURE — 49083 IR PARACENTESIS WITH IMAGING: ICD-10-PCS | Mod: ,,, | Performed by: PHYSICIAN ASSISTANT

## 2020-08-14 PROCEDURE — 49083 ABD PARACENTESIS W/IMAGING: CPT

## 2020-08-14 NOTE — DISCHARGE INSTRUCTIONS
For scheduling: Call Mackenzie at [phone]    For questions or concerns call: SHAR MON-FRI 8 AM- 5PM [phone]. Radiology resident on call [phone].    For immediate concerns that are not emergent, you may call our radiology clinic at: [phone]

## 2020-08-14 NOTE — H&P
Radiology History & Physical      SUBJECTIVE:     Chief Complaint: abdominal distention    History of Present Illness:  Kenneth Sheikh is a 80 y.o. male who presents for ultrasound guided paracentesis  Past Medical History:   Diagnosis Date    Anticoagulant long-term use     Basal cell carcinoma     Bipolar 1 disorder     Bipolar 1 disorder 11/24/2016    bipolar    Cancer     history of skin cancer/sinus cancer & rectal cancer    Depressed bipolar affective disorder     Diabetes mellitus     type 2    Diabetic retinopathy 01/09/2019    EBV infection 12/7/2016    EBV DNA, PCR Latest Ref Range: None detected  None detected EBV DNA-Copies/mL Unknown Test Not Performed EBV Early Antigen Ab, IgG Latest Ref Range: <1:10 Titer 1:40 (A) EBV Nuclear Ag Ab Latest Ref Range: <1:5 Titer >=1:80 (A) EBV VCA IgG Latest Ref Range: <1:10 Titer 1:160 (A) EBV VCA IgM Latest Ref Range: <1:10 Titer <1:10     History of TIA (transient ischemic attack)     several-taking Plavix    Hx of gallstones     Wife denies    Hypertension     Hyperthyroidism 11/30/2016    Low HDL (under 40) 12/7/2016    Mixed hyperlipidemia 11/23/2016    JUAN MANUEL (obstructive sleep apnea)     Pneumonia     Skin disease     Squamous cell carcinoma 08/2018    right temple    Stroke     Transient cerebral ischemia 7/22/2016    Type 2 diabetes mellitus      Past Surgical History:   Procedure Laterality Date    Moh's procedure x4      rectal cancer      Sinus surgery for sinus cancer      sinus sx for cancer      skin cancer removed-several times-nose & ear      TONSILLECTOMY      Undescened testicle sx      UVULOPALATOPHARYNGOPLASTY      for sleep apnea       Home Meds:   Prior to Admission medications    Medication Sig Start Date End Date Taking? Authorizing Provider   ACCU-CHEK NEYDA PLUS TEST STRP Strp CHECK BLOOD SUGAR ONCE DAILY  Patient not taking: Reported on 8/11/2020 4/17/20   Prisca Espinoza MD   ciprofloxacin HCl (CIPRO) 500 MG  tablet TAKE 1 TABLET(500 MG) BY MOUTH EVERY MONDAY 7/4/20   Renato Womack MD   divalproex (DEPAKOTE) 250 MG EC tablet TAKE 2 TABLETS BY MOUTH EVERY EVENING 10/28/19   Prisca Espinoza MD   furosemide (LASIX) 20 MG tablet TAKE 2 TABLETS BY MOUTH EVERY DAY 7/1/20   Renato Womack MD   lactulose (CHRONULAC) 10 gram/15 mL solution TAKE 15 MLS BY MOUTH TWICE DAILY  Patient not taking: Reported on 6/24/2020 4/6/20   Renato Womack MD   lancets [facility] To check blood sugar once daily, to use with Accu-Chek meter.  Patient not taking: Reported on 8/11/2020 11/5/18   Prisca Espinoza MD   loperamide (IMODIUM) 2 mg capsule Take 1 capsule (2 mg total) by mouth 2 (two) times daily as needed for Diarrhea. 6/2/20   Prisca Espinoza MD   potassium chloride SA (K-DUR,KLOR-CON) 20 MEQ tablet TAKE 1 TABLET(20 MEQ) BY MOUTH TWICE DAILY 7/10/20   Prisca Espinoza MD   psyllium (METAMUCIL) packet Take 1 packet by mouth once daily.    Historical Provider, MD   psyllium (METAMUCIL) powder Take 1 packet by mouth as needed. Pt states he takes a teaspoon twice a day    Historical Provider, MD   SITagliptan (JANUVIA) 50 MG Tab Take 1 tablet (50 mg total) by mouth once daily. 4/6/17   Prisca Espinoza MD   XIFAXAN 550 mg Tab Take 1 tablet (550 mg total) by mouth 2 (two) times daily. 2/10/20   Renato Womack MD     Anticoagulants/Antiplatelets: no anticoagulation    Allergies:   Review of patient's allergies indicates:   Allergen Reactions    Abilify [aripiprazole] Other (See Comments)     Sleepy, could not function.     Sedation History:  no adverse reactions    Review of Systems:   Hematological: no known coagulopathies  Respiratory: no shortness of breath  Cardiovascular: no chest pain  Gastrointestinal: no abdominal pain  Genito-Urinary: no dysuria  Musculoskeletal: negative  Neurological: no TIA or stroke symptoms         OBJECTIVE:     Vital Signs (Most Recent)       Physical Exam:  ASA: 2  Mallampati:  n/a    General: no acute distress  Mental Status: alert and oriented to person, place and time  HEENT: normocephalic, atraumatic  Chest: unlabored breathing  Heart: regular heart rate  Abdomen: distended  Extremity: moves all extremities    ASSESSMENT/PLAN:     Sedation Plan: local  Patient will undergo ultrasound guided paracentesis.    Kaia Herrera PA-C  Interventional Radiology  Clinic [phone]

## 2020-08-14 NOTE — PROCEDURES
Radiology Post-Procedure Note    Pre Op Diagnosis: Ascites  Post Op Diagnosis: Same    Procedure: Ultrasound Guided Paracentesis    Procedure performed by: Kaia Herrera PA-C    Written Informed Consent Obtained: Yes  Specimen Removed: YES clear, yellow  Estimated Blood Loss: Minimal    Findings:   Successful paracentesis.  RLQ. Albumin administered PRN per protocol.    Patient tolerated procedure well.    Kaia Herrera PA-C  Interventional Radiology  Clinic [phone]

## 2020-08-14 NOTE — PROGRESS NOTES
Paracentesis complete, 4275 MLs removed.  Dressing applied to puncture site, dressing clean dry and intact. Pt given discharge instructions and handouts, pt verbalizes understanding. Questions answered. Pt denies pain and discomfort. Pt discharged in wheelchair to family

## 2020-08-17 ENCOUNTER — TELEPHONE (OUTPATIENT)
Dept: ENDOSCOPY | Facility: HOSPITAL | Age: 80
End: 2020-08-17

## 2020-08-17 DIAGNOSIS — R19.7 DIARRHEA, UNSPECIFIED TYPE: Primary | ICD-10-CM

## 2020-08-17 NOTE — TELEPHONE ENCOUNTER
Returned pt's wife's phone call to discuss pt's s/s. He is continuing to have diarrhea with fecal incontinence. Decreased fiber to BID. Increased lomotil to BID. Continuing to have diarrhea with this. Will  stool kit to collect sample tomorrow evening from primary care and return on Wednesday.     ----- Message from Nanci Kelley sent at 8/17/2020  3:24 PM CDT -----  Regarding: Medications  Contact: Myr/wife [phone]  Calling in regards to speaking to nurse regarding medications metamucil and lomotil. Please call and advise

## 2020-08-19 NOTE — PROGRESS NOTES
"Subjective:       Patient ID: Kenneth Sheikh is a 79 y.o. male.    Chief Complaint: Cirrhosis    HPI  I saw this 79 y.o. man  whom I had met in hospital when he was admitted with lethargy and some confusion back in Dec 2016.  At that time, he was diagnosed with decompensated cirrhosis.    Admitted because of increasing lethargy +++/confusion/dizziness/dysarthria  - Nov 2016  - imaging showed ascites     No significant alcohol use ("a couple beers when I was younger"). No family history of liver disease. No new medications.    Patient has risk factors for MCDONALD.    He also has a large gastric varix which has never bled-  treated x1 by Dr Perla endoscopically but will need another endoscopic session (4/16/17).  - Had post procedure fever despite antibiotics    Kylie E coli bacteremia- on IV antibiotics at home- PICC    Currently he is reasonably stable with weekly paracentesis.  Decreased dose of diuretics and his para volume appears to also be decreasing.  Mental alert and has not experienced HE for a while although wife describes him as lethargic and forgetful.    - lower leg edema and ascites    Continues with regular paracenteses.    MELD-Na score: 8 at 6/14/2019 10:15 AM  MELD score: 8 at 6/14/2019 10:15 AM  Calculated from:  Serum Creatinine: 1.1 mg/dL at 6/14/2019 10:15 AM  Serum Sodium: 139 mmol/L (Rounded to 137 mmol/L) at 6/14/2019 10:15 AM  Total Bilirubin: 0.6 mg/dL (Rounded to 1 mg/dL) at 6/14/2019 10:15 AM  INR(ratio): 1.1 at 6/14/2019 10:15 AM  Age: 79 years      PMH:  DM  Sinus Ca- 1996  Rectal Ca- June 2016    SH:  Retired  Ex [employer] worker      FH:  Aunt had cirrhosis- non- alcohol    Review of Systems   Constitutional: Negative for activity change, appetite change, chills, fatigue, fever and unexpected weight change.   HENT: Negative for hearing loss.    Eyes: Negative for discharge and visual disturbance.   Respiratory: Negative for cough, chest tightness, shortness of breath and wheezing.  " NOTIFICATION RETURN TO WORK / SCHOOL 
 
8/19/2020 3:27 PM 
 
Mr. Kalani Nelson [address] [place] [id number] To Whom It May Concern: 
 
Kaalni Nelson is currently under the care of [address] and needs to be quarantine until negative Covid results He will return to work/school on: 8/25/2020 If there are questions or concerns please have the patient contact our office. Sincerely, Luis Alberto Corrales, DNP 
 
                                
 
   Cardiovascular: Negative for chest pain, palpitations and leg swelling.   Gastrointestinal: Negative for abdominal distention, abdominal pain, constipation, diarrhea and nausea.   Genitourinary: Negative for dysuria and frequency.   Musculoskeletal: Negative for arthralgias and back pain.   Skin: Negative for pallor and rash.   Neurological: Negative for dizziness, tremors, speech difficulty and headaches.   Hematological: Negative for adenopathy.   Psychiatric/Behavioral: Negative for agitation and confusion.           Lab Results   Component Value Date    ALT 10 06/14/2019    AST 22 06/14/2019    ALKPHOS 114 06/14/2019    BILITOT 0.6 06/14/2019     Past Medical History:   Diagnosis Date    Anticoagulant long-term use     Bipolar 1 disorder     Bipolar 1 disorder 11/24/2016    bipolar    Cancer     history of skin cancer/sinus cancer & rectal cancer    Depressed bipolar affective disorder     Diabetes mellitus     type 2    EBV infection 12/7/2016    EBV DNA, PCR Latest Ref Range: None detected  None detected EBV DNA-Copies/mL Unknown Test Not Performed EBV Early Antigen Ab, IgG Latest Ref Range: <1:10 Titer 1:40 (A) EBV Nuclear Ag Ab Latest Ref Range: <1:5 Titer >=1:80 (A) EBV VCA IgG Latest Ref Range: <1:10 Titer 1:160 (A) EBV VCA IgM Latest Ref Range: <1:10 Titer <1:10     History of TIA (transient ischemic attack)     several-taking Plavix    Hx of gallstones     Wife denies    Hypertension     Hyperthyroidism 11/30/2016    Low HDL (under 40) 12/7/2016    Mixed hyperlipidemia 11/23/2016    JUAN MANUEL (obstructive sleep apnea)     Pneumonia     Skin disease     Squamous cell carcinoma 08/2018    right temple    Stroke     Transient cerebral ischemia 7/22/2016    Type 2 diabetes mellitus      Past Surgical History:   Procedure Laterality Date    BIOPSY-BONE MARROW Left 11/3/2016    [facility]    ESOPHAGOGASTRODUODENOSCOPY (EGD) N/A 2/3/2017    Performed  by Domenico Fox [facility] at [facility] ENDO (4TH FLR)    EXCISION-MASS RECTAL  6/28/2016    Performed by Haris Talavera MD at [facility] OR 2ND FLR    Moh's procedure x4      rectal cancer      Sinus surgery for sinus cancer      sinus sx for cancer      skin cancer removed-several times-nose & ear      TONSILLECTOMY      ULTRASOUND-ENDOSCOPIC-UPPER/glue coil of gastric varices N/A 4/19/2017    Performed by Aneudy Perla MD at [facility] ENDO (2ND FLR)    Undescened testicle sx      UVULOPALATOPHARYNGOPLASTY      for sleep apnea     Current Outpatient Medications   Medication Sig    ciprofloxacin HCl (CIPRO) 500 MG tablet TAKE 1 TABLET(500 MG) BY MOUTH EVERY MONDAY    divalproex (DEPAKOTE) 250 MG EC tablet Take 2 tablets (500 mg total) by mouth every evening. Do not fill prescription until patient calls and requests.    furosemide (LASIX) 20 MG tablet TAKE 2 TABLETS BY MOUTH EVERY DAY    lactulose (CHRONULAC) 10 gram/15 mL solution TAKE 15 MLS BY MOUTH TWICE DAILY    rifAXIMin (XIFAXAN) 550 mg Tab Take 1 tablet (550 mg total) by mouth 2 (two) times daily.    SITagliptan (JANUVIA) 50 MG Tab Take 1 tablet (50 mg total) by mouth once daily.    ACCU-CHEK NEYDA PLUS TEST STRP Strp 1 strip by Misc.(Non-Drug; Combo Route) route once daily.    lancets Misc To check blood sugar once daily, to use with Accu-Chek meter.    ofloxacin (OCUFLOX) 0.3 % ophthalmic solution     prednisoLONE acetate (PRED FORTE) 1 % DrpS      No current facility-administered medications for this visit.        Objective:      Physical Exam   Constitutional: He is oriented to person, place, and time. He appears well-nourished.   HENT:   Head: Normocephalic.   Eyes: Pupils are equal, round, and reactive to light.   Neck: No thyromegaly present.   Cardiovascular: Normal rate, regular rhythm and normal heart sounds.   Pulmonary/Chest: Effort normal and breath sounds normal. He has no wheezes.   Abdominal: Soft. He exhibits distension. He exhibits  no mass. There is no tenderness.   Mild ascites.   Musculoskeletal: He exhibits edema.   Lymphadenopathy:     He has no cervical adenopathy.   Neurological: He is alert and oriented to person, place, and time.   Skin: Skin is warm. No rash noted. No erythema.   Psychiatric: He has a normal mood and affect. His behavior is normal.       Assessment:       1. 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 Ascites of liver        Plan:   - re-discussed the dx of cirrhosis and the implications of this  - no change in diuretic dose  - paracenteses every week  - on Cipro 500 mg weekly for SBP prophylaxis.  - does not wish for further EUS guided treatment of varices because of post procedure sepsis.  - discussed finding of small nodule on US- benign    - no change in management  Clinic in  6 months with labs and US       NB [employer]

## 2020-08-20 ENCOUNTER — LAB VISIT (OUTPATIENT)
Dept: LAB | Facility: HOSPITAL | Age: 80
End: 2020-08-20
Attending: NURSE PRACTITIONER
Payer: MEDICARE

## 2020-08-20 ENCOUNTER — TELEPHONE (OUTPATIENT)
Dept: PRIMARY CARE CLINIC | Facility: CLINIC | Age: 80
End: 2020-08-20

## 2020-08-20 DIAGNOSIS — R19.7 DIARRHEA, UNSPECIFIED TYPE: ICD-10-CM

## 2020-08-20 PROCEDURE — 83993 ASSAY FOR CALPROTECTIN FECAL: CPT

## 2020-08-20 PROCEDURE — 82656 EL-1 FECAL QUAL/SEMIQ: CPT

## 2020-08-20 NOTE — TELEPHONE ENCOUNTER
Spoke wit pt and wife, explained clinic, scheduled pt for 9/9/2020 @ 1100  Pt has transportation.    Could not schedule from the que.

## 2020-08-21 ENCOUNTER — HOSPITAL ENCOUNTER (OUTPATIENT)
Dept: INTERVENTIONAL RADIOLOGY/VASCULAR | Facility: HOSPITAL | Age: 80
Discharge: HOME | End: 2020-08-21
Attending: INTERNAL MEDICINE
Payer: MEDICARE

## 2020-08-21 VITALS
DIASTOLIC BLOOD PRESSURE: 59 MMHG | RESPIRATION RATE: 16 BRPM | HEART RATE: 52 BPM | OXYGEN SATURATION: 100 % | SYSTOLIC BLOOD PRESSURE: 120 MMHG

## 2020-08-21 DIAGNOSIS — R18.8 OTHER ASCITES: ICD-10-CM

## 2020-08-21 PROCEDURE — 49083 IR PARACENTESIS WITH IMAGING: ICD-10-PCS | Mod: GC,,, | Performed by: FAMILY MEDICINE

## 2020-08-21 PROCEDURE — 49083 ABD PARACENTESIS W/IMAGING: CPT

## 2020-08-21 PROCEDURE — 49083 ABD PARACENTESIS W/IMAGING: CPT | Mod: GC,,, | Performed by: FAMILY MEDICINE

## 2020-08-21 RX ORDER — ALBUMIN HUMAN 250 G/1000ML
25 SOLUTION INTRAVENOUS
Status: DISCONTINUED | OUTPATIENT
Start: 2020-08-21 | End: 2020-08-22 | Stop reason: HOSPADM

## 2020-08-21 RX ORDER — ALBUMIN HUMAN 250 G/1000ML
12.5 SOLUTION INTRAVENOUS ONCE AS NEEDED
Status: DISCONTINUED | OUTPATIENT
Start: 2020-08-21 | End: 2020-08-22 | Stop reason: HOSPADM

## 2020-08-21 NOTE — DISCHARGE INSTRUCTIONS
For scheduling: Call Mackenzie at [phone]    For questions or concerns call: Radiology resident on call [phone].    For immediate concerns that are not emergent, you may call our radiology clinic at: [phone]

## 2020-08-21 NOTE — NURSING
Paracentesis complete, 4300 MLs removed. Dressing applied to abdominal puncture site, dressing clean dry and intact. Pt given discharge instructions and handouts, pt verbalizes understanding. Questions answered. Pt denies pain and discomfort. Pt to lobby for discharge.

## 2020-08-21 NOTE — H&P
Radiology History & Physical      SUBJECTIVE:     Chief Complaint: abdominal distention    History of Present Illness:  Kenneth Sheikh is a 80 y.o. male who presents for ultrasound guided paracentesis  Past Medical History:   Diagnosis Date    Anticoagulant long-term use     Basal cell carcinoma     Bipolar 1 disorder     Bipolar 1 disorder 11/24/2016    bipolar    Cancer     history of skin cancer/sinus cancer & rectal cancer    Depressed bipolar affective disorder     Diabetes mellitus     type 2    Diabetic retinopathy 01/09/2019    EBV infection 12/7/2016    EBV DNA, PCR Latest Ref Range: None detected  None detected EBV DNA-Copies/mL Unknown Test Not Performed EBV Early Antigen Ab, IgG Latest Ref Range: <1:10 Titer 1:40 (A) EBV Nuclear Ag Ab Latest Ref Range: <1:5 Titer >=1:80 (A) EBV VCA IgG Latest Ref Range: <1:10 Titer 1:160 (A) EBV VCA IgM Latest Ref Range: <1:10 Titer <1:10     History of TIA (transient ischemic attack)     several-taking Plavix    Hx of gallstones     Wife denies    Hypertension     Hyperthyroidism 11/30/2016    Low HDL (under 40) 12/7/2016    Mixed hyperlipidemia 11/23/2016    JUAN MANUEL (obstructive sleep apnea)     Pneumonia     Skin disease     Squamous cell carcinoma 08/2018    right temple    Stroke     Transient cerebral ischemia 7/22/2016    Type 2 diabetes mellitus      Past Surgical History:   Procedure Laterality Date    Moh's procedure x4      rectal cancer      Sinus surgery for sinus cancer      sinus sx for cancer      skin cancer removed-several times-nose & ear      TONSILLECTOMY      Undescened testicle sx      UVULOPALATOPHARYNGOPLASTY      for sleep apnea       Home Meds:   Prior to Admission medications    Medication Sig Start Date End Date Taking? Authorizing Provider   ACCU-CHEK NEYDA PLUS TEST STRP Strp CHECK BLOOD SUGAR ONCE DAILY  Patient not taking: Reported on 8/11/2020 4/17/20   Prisca Espinoza MD   ciprofloxacin HCl (CIPRO) 500 MG  tablet TAKE 1 TABLET(500 MG) BY MOUTH EVERY MONDAY 7/4/20   Renato Womack MD   divalproex (DEPAKOTE) 250 MG EC tablet TAKE 2 TABLETS BY MOUTH EVERY EVENING 10/28/19   Prisca Espinoza MD   furosemide (LASIX) 20 MG tablet TAKE 2 TABLETS BY MOUTH EVERY DAY 7/1/20   Renato Womack MD   lactulose (CHRONULAC) 10 gram/15 mL solution TAKE 15 MLS BY MOUTH TWICE DAILY  Patient not taking: Reported on 6/24/2020 4/6/20   Renato Womack MD   lancets [facility] To check blood sugar once daily, to use with Accu-Chek meter.  Patient not taking: Reported on 8/11/2020 11/5/18   Prisca Espinoza MD   loperamide (IMODIUM) 2 mg capsule Take 1 capsule (2 mg total) by mouth 2 (two) times daily as needed for Diarrhea. 6/2/20   Prisca Espinoza MD   potassium chloride SA (K-DUR,KLOR-CON) 20 MEQ tablet TAKE 1 TABLET(20 MEQ) BY MOUTH TWICE DAILY 7/10/20   Prisca Espinoza MD   psyllium (METAMUCIL) packet Take 1 packet by mouth once daily.    Historical Provider, MD   psyllium (METAMUCIL) powder Take 1 packet by mouth as needed. Pt states he takes a teaspoon twice a day    Historical Provider, MD   SITagliptan (JANUVIA) 50 MG Tab Take 1 tablet (50 mg total) by mouth once daily. 4/6/17   Prisca Espinoza MD   XIFAXAN 550 mg Tab Take 1 tablet (550 mg total) by mouth 2 (two) times daily. 2/10/20   Renato Womack MD     Anticoagulants/Antiplatelets: no anticoagulation    Allergies:   Review of patient's allergies indicates:   Allergen Reactions    Abilify [aripiprazole] Other (See Comments)     Sleepy, could not function.     Sedation History:  no adverse reactions    Review of Systems:   Hematological: no known coagulopathies  Respiratory: no shortness of breath  Cardiovascular: no chest pain  Gastrointestinal: no abdominal pain  Genito-Urinary: no dysuria  Musculoskeletal: negative  Neurological: no TIA or stroke symptoms         OBJECTIVE:     Vital Signs (Most Recent)       Physical Exam:  ASA: 2  Mallampati:  n/a    General: no acute distress  Mental Status: alert and oriented to person, place and time  HEENT: normocephalic, atraumatic  Chest: unlabored breathing  Heart: regular heart rate  Abdomen: distended  Extremity: moves all extremities    ASSESSMENT/PLAN:     Sedation Plan: local  Patient will undergo ultrasound guided paracentesis.    MATT Mauricio, FNP  Interventional Radiology  [phone] clinic

## 2020-08-24 DIAGNOSIS — R19.7 DIARRHEA, UNSPECIFIED TYPE: Primary | ICD-10-CM

## 2020-08-24 LAB — ELASTASE 1, FECAL: 77 MCG/G

## 2020-08-24 RX ORDER — PANCRELIPASE LIPASE, PANCRELIPASE PROTEASE, PANCRELIPASE AMYLASE 5000; 17000; 24000 [USP'U]/1; [USP'U]/1; [USP'U]/1
1 CAPSULE, DELAYED RELEASE ORAL
Qty: 90 CAPSULE | Refills: 0 | Status: SHIPPED | OUTPATIENT
Start: 2020-08-24 | End: 2020-09-01 | Stop reason: SDUPTHER

## 2020-08-26 ENCOUNTER — TELEPHONE (OUTPATIENT)
Dept: HEPATOLOGY | Facility: CLINIC | Age: 80
End: 2020-08-26

## 2020-08-26 ENCOUNTER — TELEPHONE (OUTPATIENT)
Dept: PHARMACY | Facility: CLINIC | Age: 80
End: 2020-08-26

## 2020-08-26 LAB — CALPROTECTIN STL-MCNT: <27.1 MCG/G

## 2020-08-26 NOTE — TELEPHONE ENCOUNTER
The pt wants to know if it's safe to take Zenpep prescribed by his Gastro doctor, Dr. Winters for his diarrhea. Pt wants to know that it's safe to take. She said the gastro doctor said his Pancreas isn't working and causing diarrhea. She said he hasn't taken his lactulose in months.     ----- Message from Elvi Correia sent at 8/26/2020  8:43 AM CDT -----  Regarding: Medication question  Contact: pt  Reason: Pt was prescribed a new medication from another provider, Wife would like to clarify that he can take it. Please call to advise    Communication: [phone]

## 2020-08-27 ENCOUNTER — OFFICE VISIT (OUTPATIENT)
Dept: INTERNAL MEDICINE | Facility: CLINIC | Age: 80
End: 2020-08-27
Payer: MEDICARE

## 2020-08-27 ENCOUNTER — TELEPHONE (OUTPATIENT)
Dept: INTERNAL MEDICINE | Facility: CLINIC | Age: 80
End: 2020-08-27

## 2020-08-27 ENCOUNTER — TELEPHONE (OUTPATIENT)
Dept: HEPATOLOGY | Facility: CLINIC | Age: 80
End: 2020-08-27

## 2020-08-27 DIAGNOSIS — N17.9 AKI (ACUTE KIDNEY INJURY): Primary | ICD-10-CM

## 2020-08-27 DIAGNOSIS — F31.9 BIPOLAR 1 DISORDER: ICD-10-CM

## 2020-08-27 DIAGNOSIS — E87.6 HYPOKALEMIA: ICD-10-CM

## 2020-08-27 DIAGNOSIS — N18.3 ACUTE RENAL FAILURE SUPERIMPOSED ON STAGE 3 CHRONIC KIDNEY DISEASE, UNSPECIFIED ACUTE RENAL FAILURE TYPE: ICD-10-CM

## 2020-08-27 DIAGNOSIS — K74.60 LIVER CIRRHOSIS SECONDARY TO NASH (NONALCOHOLIC STEATOHEPATITIS): ICD-10-CM

## 2020-08-27 DIAGNOSIS — R15.1 FECAL SMEARING: ICD-10-CM

## 2020-08-27 DIAGNOSIS — K75.81 LIVER CIRRHOSIS SECONDARY TO NASH (NONALCOHOLIC STEATOHEPATITIS): ICD-10-CM

## 2020-08-27 DIAGNOSIS — K86.89 PANCREATIC INSUFFICIENCY: ICD-10-CM

## 2020-08-27 DIAGNOSIS — K52.9 CHRONIC DIARRHEA: Primary | ICD-10-CM

## 2020-08-27 DIAGNOSIS — N17.9 ACUTE RENAL FAILURE SUPERIMPOSED ON STAGE 3 CHRONIC KIDNEY DISEASE, UNSPECIFIED ACUTE RENAL FAILURE TYPE: ICD-10-CM

## 2020-08-27 PROCEDURE — 99442 PR PHYSICIAN TELEPHONE EVALUATION 11-20 MIN: ICD-10-PCS | Mod: 95,,, | Performed by: INTERNAL MEDICINE

## 2020-08-27 PROCEDURE — 99442 PR PHYSICIAN TELEPHONE EVALUATION 11-20 MIN: CPT | Mod: 95,,, | Performed by: INTERNAL MEDICINE

## 2020-08-27 RX ORDER — LOPERAMIDE HYDROCHLORIDE 2 MG/1
2 CAPSULE ORAL 2 TIMES DAILY PRN
Qty: 60 CAPSULE | Refills: 3 | Status: ON HOLD | OUTPATIENT
Start: 2020-08-27 | End: 2020-09-11 | Stop reason: HOSPADM

## 2020-08-27 RX ORDER — DIVALPROEX SODIUM 250 MG/1
500 TABLET, DELAYED RELEASE ORAL NIGHTLY
Qty: 180 TABLET | Refills: 3 | Status: SHIPPED | OUTPATIENT
Start: 2020-08-27

## 2020-08-27 NOTE — TELEPHONE ENCOUNTER
I called the pt and his wife answered. I let her know that it's safe for the pt to take the medication and the pharmacy approved it until next August, so they can call them to get it filled.

## 2020-08-27 NOTE — TELEPHONE ENCOUNTER
Please call patient's wife and schedule repeat BMP next Friday (1 week from tomorrow) after paracentesis.

## 2020-08-27 NOTE — PROGRESS NOTES
"Internal Medicine    Subjective:      Patient ID: Kenneth Sheikh is a 80 y.o. male.    Chief Complaint: Diarrhea      The patient location is:  Home  The chief complaint leading to consultation is:  Follow up    Visit type:  audio only    Face to Face time with patient:  20 minutes  25 minutes of total time spent on the encounter, which includes face to face time and non-face to face time preparing to see the patient (eg, review of tests), Obtaining and/or reviewing separately obtained history, Documenting clinical information in the electronic or other health record, Independently interpreting results (not separately reported) and communicating results to the patient/family/caregiver, or Care coordination (not separately reported).     Each patient to whom he or she provides medical services by telemedicine is:  (1) informed of the relationship between the physician and patient and the respective role of any other health care provider with respect to management of the patient; and (2) notified that he or she may decline to receive medical services by telemedicine and may withdraw from such care at any time.      HPI:  Patient presents for follow up appointment.  The patient's last visit with me was on 7/9/2020.    Diarrhea, fecal incontinence:  Started middle of May.  Continued even after stopping Lactulose.  Saw GI, Dr. Tubbs, on 6/3/20.  Per note, "His incontinence is likely multifactorial from muscle weakness from rectal cancer excision, bowel edema from volume overload, and possibly some diabetic neuropathy (fairly well controlled).  His recent flex sig is reassuring."  He recommended increasing fiber to TID and continuing Imodium.  If not improving, recommended full colonoscopy +/- anorectal manometry.  He saw Dr. Talavera on 6/4/20 who agreed with Dr. Tubbs's recommendations.  At last appointment, symptoms had improved after starting Metamucil TID and he was no longer needing Imodium.  Today, wife and patient report " diarrhea has worsened again.  Saw GI again on 8/11/20, Kelin Winters, C[facility].  She recommended started pancreatic enzymes after low fecal pancreatic elastase.  Wife states he has only taken this twice - she was worried after reading it could cause diarrhea.  He stopped taking Metamucil when he started pancreatic enzymes.  He occasionally takes Imodium.     Hypokalemia:  Due to diarrhea.  Taking KCl 20mEq BID.  K 4.0 on 8/21/20.      Acute on CKD, stage 3:  Recent BELLA due to diarrhea.  Improved and then worsened again.  Cr 2.3 on 8/21/20 up from 2.0.  He reports not drinking much water at home.     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  Losartan stopped due to BELLA.  Followed by Dr. Womack.  Taking Rifaximin twice daily.  Getting paracentesis weekly.  Taking Cipro once a week for SBP prophylaxis.  No longer taking lactulose (as above).     Bipolar d/o:  Taking Depakote 500mg qHS.  Last Depakote level 54.6 on 1/28/20.  Denies depression or dany.      Chronic issues not discussed today:    H/o Rectal cancer s/p resection 06/2016:  Followed by colorectal surgery (Dr. Talavera).      DM-2:  Last HgA1c 6.6 on 5/15/20.  Off metformin due to elevated lactate and diarrhea.  Taking Januvia 50mg daily.     Sinus bradycardia (HR 50-60's):  Chronic and asymptomatic.     Pancytopenia:  Has been seen by [facility] (2/21/17).  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      HLD:  No longer on medication.     TIAs:  No symptoms recently.  No longer taking ASA.     Short term memory loss:  Patient and wife not interested in seeing Neurology or Neuropsychology.     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 Beach.     Neck pain, bilateral knee pain:  Stable.  Avoiding NSAIDs due to cirrhosis.  Was told he could occasionally take Tylenol but not taking regularly.     S/p left cataract, planning to get  "right eye cataract removal by Dr. Sharpe on [facility].     Diabetic retinopathy:  Saw Dr. Cortez about 2 years ago.  Due for follow up.     Records scanned into Epic "Media."        Review of Systems   Constitutional: Negative for appetite change, chills, fatigue, fever and unexpected weight change.   HENT: Negative for sore throat and trouble swallowing.    Eyes: Negative for visual disturbance.   Respiratory: Negative for cough, chest tightness, shortness of breath and wheezing.    Cardiovascular: Positive for leg swelling (Chronic). Negative for chest pain and palpitations.   Gastrointestinal: Positive for abdominal distention (Chronic) and diarrhea. Negative for abdominal pain, blood in stool, constipation, nausea and vomiting.   Genitourinary: Negative for difficulty urinating.   Musculoskeletal: Negative for arthralgias and myalgias.   Skin: Negative for rash and wound.   Neurological: Positive for weakness (Chronic). Negative for dizziness, numbness and headaches.   Psychiatric/Behavioral: Negative for behavioral problems and confusion.       Past medical history, surgical history, and family medical history reviewed and updated as appropriate.    Medications and allergies reviewed.     Objective:     There were no vitals filed for this visit.  Physical Exam  Neurological:      Mental Status: He is alert and oriented to person, place, and time.   Psychiatric:      Comments:   Behavior:  Cooperative, pleasant  Speech:  Normal rate, rhythm, prosody, and volume  Mood:  "Good"  Affect:  Congruent  Thought Process:  Linear, logical, goal directed  Thought Content:  Negative for suicidal ideation, homicidal ideation, delusions or hallucinations.  Associations:  Intact  Memory:  Grossly Intact  Level of Consciousness/Orientation:  Grossly intact  Fund of Knowledge:  Fair  Attention:  Fair  Language:  Fluent, able to name abstract and concrete objects  Insight:  Fair  Judgment:  Intact         RESULTS:   Lab Results "   Component Value Date    WBC 3.24 (L) 05/19/2020    HGB 10.6 (L) 05/19/2020    HCT 34.7 (L) 05/19/2020    MCV 93 05/19/2020    PLT 97 (L) 05/19/2020     BMP  Lab Results   Component Value Date     08/21/2020    K 4.0 08/21/2020     (H) 08/21/2020    CO2 20 (L) 08/21/2020    BUN 36 (H) 08/21/2020    CREATININE 2.3 (H) 08/21/2020    CALCIUM 8.1 (L) 08/21/2020    ANIONGAP 6 (L) 08/21/2020    ESTGFRAFRICA 29.5 (A) 08/21/2020    EGFRNONAA 25.5 (A) 08/21/2020     Lab Results   Component Value Date    ALT 9 (L) 05/29/2020    AST 26 05/29/2020    ALKPHOS 98 05/29/2020    BILITOT 0.5 05/29/2020     Lab Results   Component Value Date    TSH 1.248 05/15/2020     Lab Results   Component Value Date    HGBA1C 6.6 (H) 05/15/2020         Assessment:     1. Chronic diarrhea    2. Fecal smearing    3. Pancreatic insufficiency    4. Hypokalemia    5. Acute renal failure superimposed on stage 3 chronic kidney disease, unspecified acute renal failure type    6. 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 Bipolar 1 disorder        Plan:     *Continue medication/plan as discussed in HPI except for changes discussed below.    Kenneth was seen today for diarrhea.    Diagnoses and all orders for this visit:    Chronic diarrhea  Fecal smearing  Pancreatic insufficiency   Spoke with Kelin Winters NP, who recommends patient take pancreatic enzymes TID with meals, Metamucil BID, and Imodium BID (until diarrhea improves).  He and wife have expressed understanding.  -     loperamide (IMODIUM) 2 mg capsule; Take 1 capsule (2 mg total) by mouth 2 (two) times daily as needed for Diarrhea.    Hypokalemia   Continue supplement until diarrhea improves.  Recheck BMP in 2 week.    Acute renal failure superimposed on stage 3 chronic kidney disease, unspecified acute renal failure type   Recheck BMP in 2 week.    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    Discussed with patient that I will be leaving Primary Care.  Transition of care discussed.  I will continue to see him every 6 months in Psychiatry.    Health maintenance reviewed with patient.     Follow up in about 2 weeks (around 9/10/2020) with new PCP.    Prisca Espinoza MD  Internal Medicine  [facility]

## 2020-08-28 ENCOUNTER — HOSPITAL ENCOUNTER (OUTPATIENT)
Dept: INTERVENTIONAL RADIOLOGY/VASCULAR | Facility: HOSPITAL | Age: 80
Discharge: HOME | End: 2020-08-28
Attending: INTERNAL MEDICINE
Payer: MEDICARE

## 2020-08-28 VITALS
RESPIRATION RATE: 18 BRPM | OXYGEN SATURATION: 97 % | HEART RATE: 52 BPM | SYSTOLIC BLOOD PRESSURE: 136 MMHG | DIASTOLIC BLOOD PRESSURE: 51 MMHG

## 2020-08-28 DIAGNOSIS — R18.8 OTHER ASCITES: ICD-10-CM

## 2020-08-28 LAB
APPEARANCE FLD: NORMAL
BODY FLD TYPE: NORMAL
COLOR FLD: YELLOW
LYMPHOCYTES NFR FLD MANUAL: 64 %
MONOS+MACROS NFR FLD MANUAL: 17 %
NEUTROPHILS NFR FLD MANUAL: 19 %
WBC # FLD: 14 /CU MM

## 2020-08-28 PROCEDURE — 49083 ABD PARACENTESIS W/IMAGING: CPT

## 2020-08-28 PROCEDURE — 49083 ABD PARACENTESIS W/IMAGING: CPT | Mod: ,,, | Performed by: FAMILY MEDICINE

## 2020-08-28 PROCEDURE — 89051 BODY FLUID CELL COUNT: CPT

## 2020-08-28 PROCEDURE — 49083 IR PARACENTESIS WITH IMAGING: ICD-10-PCS | Mod: ,,, | Performed by: FAMILY MEDICINE

## 2020-08-28 NOTE — PROCEDURES

## 2020-08-28 NOTE — PROGRESS NOTES
Paracentesis complete. 4200mLs peritoneal fluid drained. Pt tolerated well. Dressing to right abd clean, dry, and intact. . Specimens sent per lab order.  Pt discharged.  .

## 2020-08-28 NOTE — H&P
Radiology History & Physical      SUBJECTIVE:     Chief Complaint: abdominal distention    History of Present Illness:  Kenneth Sheikh is a 80 y.o. male who presents for ultrasound guided paracentesis  Past Medical History:   Diagnosis Date    Anticoagulant long-term use     Basal cell carcinoma     Bipolar 1 disorder     Bipolar 1 disorder 11/24/2016    bipolar    Cancer     history of skin cancer/sinus cancer & rectal cancer    Depressed bipolar affective disorder     Diabetes mellitus     type 2    Diabetic retinopathy 01/09/2019    EBV infection 12/7/2016    EBV DNA, PCR Latest Ref Range: None detected  None detected EBV DNA-Copies/mL Unknown Test Not Performed EBV Early Antigen Ab, IgG Latest Ref Range: <1:10 Titer 1:40 (A) EBV Nuclear Ag Ab Latest Ref Range: <1:5 Titer >=1:80 (A) EBV VCA IgG Latest Ref Range: <1:10 Titer 1:160 (A) EBV VCA IgM Latest Ref Range: <1:10 Titer <1:10     History of TIA (transient ischemic attack)     several-taking Plavix    Hx of gallstones     Wife denies    Hypertension     Hyperthyroidism 11/30/2016    Low HDL (under 40) 12/7/2016    Mixed hyperlipidemia 11/23/2016    JUAN MANUEL (obstructive sleep apnea)     Pneumonia     Skin disease     Squamous cell carcinoma 08/2018    right temple    Stroke     Transient cerebral ischemia 7/22/2016    Type 2 diabetes mellitus      Past Surgical History:   Procedure Laterality Date    Moh's procedure x4      rectal cancer      Sinus surgery for sinus cancer      sinus sx for cancer      skin cancer removed-several times-nose & ear      TONSILLECTOMY      Undescened testicle sx      UVULOPALATOPHARYNGOPLASTY      for sleep apnea       Home Meds:   Prior to Admission medications    Medication Sig Start Date End Date Taking? Authorizing Provider   ACCU-CHEK NEYDA PLUS TEST STRP Strp CHECK BLOOD SUGAR ONCE DAILY  Patient not taking: Reported on 8/11/2020 4/17/20   Prisca Espinoza MD   ciprofloxacin HCl (CIPRO) 500 MG  tablet TAKE 1 TABLET(500 MG) BY MOUTH EVERY MONDAY 7/4/20   Renato Womack MD   divalproex (DEPAKOTE) 250 MG EC tablet Take 2 tablets (500 mg total) by mouth every evening. 8/27/20   Prisca Espinoza MD   furosemide (LASIX) 20 MG tablet TAKE 2 TABLETS BY MOUTH EVERY DAY 7/1/20   Renato Womack MD   lactulose (CHRONULAC) 10 gram/15 mL solution TAKE 15 MLS BY MOUTH TWICE DAILY  Patient not taking: Reported on 6/24/2020 4/6/20   Renato Womack MD   lancets [facility] To check blood sugar once daily, to use with Accu-Chek meter.  Patient not taking: Reported on 8/11/2020 11/5/18   Prisca Espinoza MD   LIPASE-PROTEASE-AMYLASE 5,000-17,000 -24,000 UNITS (ZENPEP) 5,000-17,000- 24,000 unit CpDR Take 1 capsule by mouth 3 (three) times daily with meals. 8/24/20 9/23/20  Kelin Winters, CODY   loperamide (IMODIUM) 2 mg capsule Take 1 capsule (2 mg total) by mouth 2 (two) times daily as needed for Diarrhea. 8/27/20   Prisca Espinoza MD   potassium chloride SA (K-DUR,KLOR-CON) 20 MEQ tablet TAKE 1 TABLET(20 MEQ) BY MOUTH TWICE DAILY 7/10/20   Prisca Espinoza MD   psyllium (METAMUCIL) packet Take 1 packet by mouth once daily.    Historical Provider, MD   psyllium (METAMUCIL) powder Take 1 packet by mouth as needed. Pt states he takes a teaspoon twice a day    Historical Provider, MD   SITagliptan (JANUVIA) 50 MG Tab Take 1 tablet (50 mg total) by mouth once daily. 4/6/17   Prisca Espinoza MD   XIFAXAN 550 mg Tab Take 1 tablet (550 mg total) by mouth 2 (two) times daily. 2/10/20   Renato Womack MD     Anticoagulants/Antiplatelets: no anticoagulation    Allergies:   Review of patient's allergies indicates:   Allergen Reactions    Abilify [aripiprazole] Other (See Comments)     Sleepy, could not function.     Sedation History:  no adverse reactions    Review of Systems:   Hematological: no known coagulopathies  Respiratory: no shortness of breath  Cardiovascular: no chest pain  Gastrointestinal: no  abdominal pain  Genito-Urinary: no dysuria  Musculoskeletal: negative  Neurological: no TIA or stroke symptoms         OBJECTIVE:     Vital Signs (Most Recent)       Physical Exam:  ASA: 2  Mallampati: n/a    General: no acute distress  Mental Status: alert and oriented to person, place and time  HEENT: normocephalic, atraumatic  Chest: unlabored breathing  Heart: regular heart rate  Abdomen: distended  Extremity: moves all extremities    ASSESSMENT/PLAN:     Sedation Plan: local  Patient will undergo ultrasound guided paracentesis.    MATT Mauricio, FNP  Interventional Radiology  [phone] clinic

## 2020-08-29 PROBLEM — E87.6 HYPOKALEMIA: Status: ACTIVE | Noted: 2020-08-29

## 2020-08-29 PROBLEM — K86.89 PANCREATIC INSUFFICIENCY: Status: ACTIVE | Noted: 2020-08-29

## 2020-08-29 PROBLEM — R15.1 FECAL SMEARING: Status: ACTIVE | Noted: 2020-08-29

## 2020-08-29 PROBLEM — K52.9 CHRONIC DIARRHEA: Status: ACTIVE | Noted: 2020-08-29

## 2020-08-29 PROBLEM — N18.30 ACUTE RENAL FAILURE SUPERIMPOSED ON STAGE 3 CHRONIC KIDNEY DISEASE: Status: ACTIVE | Noted: 2020-08-29

## 2020-08-29 PROBLEM — N17.9 ACUTE RENAL FAILURE SUPERIMPOSED ON STAGE 3 CHRONIC KIDNEY DISEASE: Status: ACTIVE | Noted: 2020-08-29

## 2020-08-31 ENCOUNTER — TELEPHONE (OUTPATIENT)
Dept: ENDOSCOPY | Facility: HOSPITAL | Age: 80
End: 2020-08-31

## 2020-08-31 DIAGNOSIS — R19.7 DIARRHEA, UNSPECIFIED TYPE: ICD-10-CM

## 2020-08-31 NOTE — TELEPHONE ENCOUNTER
Returned pt's wife call. Pt took Zenpep TID for 5 days then started to become constipated with abdominal pain. Took 2 doses of fiber, and was back to having diarrhea.   Informed fiber is not necessary if it is not helping and that he can take 2 zenpep per day instead of 3. She verbalized understanding and will call back with any questions.     ----- Message from Michelle Shultz sent at 8/31/2020 11:50 AM CDT -----  Pt wife Ginna called stated she would like to talk with someone about pt reaction to LIPASE-PROTEASE-AMYLASE 5,000-17,000 -24,000 UNITS (ZENPEP) 5,000-17,000- 24,000 unit CpDR. [phone]       WDL

## 2020-09-01 RX ORDER — PANCRELIPASE LIPASE, PANCRELIPASE PROTEASE, PANCRELIPASE AMYLASE 5000; 17000; 24000 [USP'U]/1; [USP'U]/1; [USP'U]/1
1 CAPSULE, DELAYED RELEASE ORAL
Qty: 90 CAPSULE | Refills: 11 | Status: SHIPPED | OUTPATIENT
Start: 2020-09-01 | End: 2021-08-27

## 2020-09-04 ENCOUNTER — HOSPITAL ENCOUNTER (OUTPATIENT)
Dept: INTERVENTIONAL RADIOLOGY/VASCULAR | Facility: HOSPITAL | Age: 80
Discharge: HOME | DRG: 441 | End: 2020-09-04
Attending: INTERNAL MEDICINE
Payer: MEDICARE

## 2020-09-04 ENCOUNTER — HOSPITAL ENCOUNTER (INPATIENT)
Facility: HOSPITAL | Age: 80
LOS: 10 days | Discharge: HOSPICE | DRG: 441 | End: 2020-09-14
Attending: EMERGENCY MEDICINE | Admitting: EMERGENCY MEDICINE
Payer: MEDICARE

## 2020-09-04 ENCOUNTER — OFFICE VISIT (OUTPATIENT)
Dept: HEPATOLOGY | Facility: CLINIC | Age: 80
DRG: 441 | End: 2020-09-04
Payer: MEDICARE

## 2020-09-04 VITALS
RESPIRATION RATE: 18 BRPM | DIASTOLIC BLOOD PRESSURE: 49 MMHG | BODY MASS INDEX: 23.69 KG/M2 | SYSTOLIC BLOOD PRESSURE: 106 MMHG | DIASTOLIC BLOOD PRESSURE: 53 MMHG | HEART RATE: 65 BPM | WEIGHT: 156.31 LBS | SYSTOLIC BLOOD PRESSURE: 98 MMHG | RESPIRATION RATE: 18 BRPM | OXYGEN SATURATION: 100 % | HEART RATE: 58 BPM | TEMPERATURE: 98 F | OXYGEN SATURATION: 98 % | HEIGHT: 68 IN

## 2020-09-04 DIAGNOSIS — K75.81 LIVER CIRRHOSIS SECONDARY TO NASH (NONALCOHOLIC STEATOHEPATITIS): ICD-10-CM

## 2020-09-04 DIAGNOSIS — Z51.5 PALLIATIVE CARE ENCOUNTER: ICD-10-CM

## 2020-09-04 DIAGNOSIS — R53.1 WEAKNESS: ICD-10-CM

## 2020-09-04 DIAGNOSIS — R00.1 BRADYCARDIA: ICD-10-CM

## 2020-09-04 DIAGNOSIS — R06.00 DYSPNEA, UNSPECIFIED TYPE: ICD-10-CM

## 2020-09-04 DIAGNOSIS — I95.1 ORTHOSTATIC HYPOTENSION: ICD-10-CM

## 2020-09-04 DIAGNOSIS — D64.9 ANEMIA, UNSPECIFIED TYPE: Primary | ICD-10-CM

## 2020-09-04 DIAGNOSIS — R52 PAIN: ICD-10-CM

## 2020-09-04 DIAGNOSIS — K52.9 CHRONIC DIARRHEA: ICD-10-CM

## 2020-09-04 DIAGNOSIS — K74.60 LIVER CIRRHOSIS SECONDARY TO NASH (NONALCOHOLIC STEATOHEPATITIS): Primary | ICD-10-CM

## 2020-09-04 DIAGNOSIS — D62 ACUTE BLOOD LOSS ANEMIA: ICD-10-CM

## 2020-09-04 DIAGNOSIS — K92.1 GASTROINTESTINAL HEMORRHAGE WITH MELENA: ICD-10-CM

## 2020-09-04 DIAGNOSIS — Z71.89 ADVANCED CARE PLANNING/COUNSELING DISCUSSION: ICD-10-CM

## 2020-09-04 DIAGNOSIS — Z71.89 GOALS OF CARE, COUNSELING/DISCUSSION: ICD-10-CM

## 2020-09-04 DIAGNOSIS — Z78.9 ADVANCED DIRECTIVE PLACED IN CHART THIS ADMISSION: ICD-10-CM

## 2020-09-04 DIAGNOSIS — R18.8 OTHER ASCITES: ICD-10-CM

## 2020-09-04 DIAGNOSIS — R18.8 ASCITES OF LIVER: ICD-10-CM

## 2020-09-04 DIAGNOSIS — Z53.1 REFUSAL OF BLOOD TRANSFUSIONS AS PATIENT IS JEHOVAH'S WITNESS: ICD-10-CM

## 2020-09-04 DIAGNOSIS — K86.89 PANCREATIC INSUFFICIENCY: ICD-10-CM

## 2020-09-04 DIAGNOSIS — K75.81 LIVER CIRRHOSIS SECONDARY TO NASH (NONALCOHOLIC STEATOHEPATITIS): Primary | ICD-10-CM

## 2020-09-04 DIAGNOSIS — K74.60 LIVER CIRRHOSIS SECONDARY TO NASH (NONALCOHOLIC STEATOHEPATITIS): ICD-10-CM

## 2020-09-04 DIAGNOSIS — I86.4 GASTRIC VARICES: ICD-10-CM

## 2020-09-04 DIAGNOSIS — K76.82 HEPATIC ENCEPHALOPATHY: ICD-10-CM

## 2020-09-04 PROBLEM — D68.9 COAGULOPATHY: Status: ACTIVE | Noted: 2020-09-04

## 2020-09-04 PROBLEM — D69.6 THROMBOCYTOPENIA: Status: ACTIVE | Noted: 2020-09-04

## 2020-09-04 PROBLEM — E87.6 HYPOKALEMIA: Status: RESOLVED | Noted: 2020-08-29 | Resolved: 2020-09-04

## 2020-09-04 PROBLEM — R15.1 FECAL SMEARING: Status: RESOLVED | Noted: 2020-08-29 | Resolved: 2020-09-04

## 2020-09-04 LAB
ALBUMIN SERPL BCP-MCNC: 1.9 G/DL (ref 3.5–5.2)
ALP SERPL-CCNC: 89 U/L (ref 55–135)
ALT SERPL W/O P-5'-P-CCNC: 6 U/L (ref 10–44)
AMMONIA PLAS-SCNC: 52 UMOL/L (ref 10–50)
ANION GAP SERPL CALC-SCNC: 8 MMOL/L (ref 8–16)
AST SERPL-CCNC: 17 U/L (ref 10–40)
BASOPHILS # BLD AUTO: 0.01 K/UL (ref 0–0.2)
BASOPHILS NFR BLD: 0.2 % (ref 0–1.9)
BILIRUB DIRECT SERPL-MCNC: 0.3 MG/DL (ref 0.1–0.3)
BILIRUB SERPL-MCNC: 0.4 MG/DL (ref 0.1–1)
BUN SERPL-MCNC: 80 MG/DL (ref 8–23)
CALCIUM SERPL-MCNC: 8.3 MG/DL (ref 8.7–10.5)
CHLORIDE SERPL-SCNC: 107 MMOL/L (ref 95–110)
CO2 SERPL-SCNC: 19 MMOL/L (ref 23–29)
CREAT SERPL-MCNC: 2.9 MG/DL (ref 0.5–1.4)
DIFFERENTIAL METHOD: ABNORMAL
EOSINOPHIL # BLD AUTO: 0 K/UL (ref 0–0.5)
EOSINOPHIL NFR BLD: 0.2 % (ref 0–8)
ERYTHROCYTE [DISTWIDTH] IN BLOOD BY AUTOMATED COUNT: 18.2 % (ref 11.5–14.5)
EST. GFR  (AFRICAN AMERICAN): 22.3 ML/MIN/1.73 M^2
EST. GFR  (NON AFRICAN AMERICAN): 19.3 ML/MIN/1.73 M^2
GLUCOSE SERPL-MCNC: 177 MG/DL (ref 70–110)
HCT VFR BLD AUTO: 20.1 % (ref 40–54)
HGB BLD-MCNC: 6 G/DL (ref 14–18)
IMM GRANULOCYTES # BLD AUTO: 0.05 K/UL (ref 0–0.04)
IMM GRANULOCYTES NFR BLD AUTO: 0.8 % (ref 0–0.5)
INR PPP: 1.3 (ref 0.8–1.2)
LYMPHOCYTES # BLD AUTO: 1 K/UL (ref 1–4.8)
LYMPHOCYTES NFR BLD: 15.7 % (ref 18–48)
MAGNESIUM SERPL-MCNC: 1.6 MG/DL (ref 1.6–2.6)
MCH RBC QN AUTO: 29.1 PG (ref 27–31)
MCHC RBC AUTO-ENTMCNC: 29.9 G/DL (ref 32–36)
MCV RBC AUTO: 98 FL (ref 82–98)
MONOCYTES # BLD AUTO: 0.7 K/UL (ref 0.3–1)
MONOCYTES NFR BLD: 10.4 % (ref 4–15)
NEUTROPHILS # BLD AUTO: 4.8 K/UL (ref 1.8–7.7)
NEUTROPHILS NFR BLD: 72.7 % (ref 38–73)
NRBC BLD-RTO: 0 /100 WBC
PLATELET # BLD AUTO: 67 K/UL (ref 150–350)
PLATELET BLD QL SMEAR: ABNORMAL
PMV BLD AUTO: 10.3 FL (ref 9.2–12.9)
POTASSIUM SERPL-SCNC: 4.5 MMOL/L (ref 3.5–5.1)
PROT SERPL-MCNC: 6.1 G/DL (ref 6–8.4)
PROTHROMBIN TIME: 14 SEC (ref 9–12.5)
RBC # BLD AUTO: 2.06 M/UL (ref 4.6–6.2)
SARS-COV-2 RDRP RESP QL NAA+PROBE: NEGATIVE
SODIUM SERPL-SCNC: 134 MMOL/L (ref 136–145)
WBC # BLD AUTO: 6.56 K/UL (ref 3.9–12.7)

## 2020-09-04 PROCEDURE — 25000003 PHARM REV CODE 250: Performed by: HOSPITALIST

## 2020-09-04 PROCEDURE — 83735 ASSAY OF MAGNESIUM: CPT

## 2020-09-04 PROCEDURE — 63600175 PHARM REV CODE 636 W HCPCS: Performed by: HOSPITALIST

## 2020-09-04 PROCEDURE — 96360 HYDRATION IV INFUSION INIT: CPT

## 2020-09-04 PROCEDURE — 85025 COMPLETE CBC W/AUTO DIFF WBC: CPT

## 2020-09-04 PROCEDURE — 99214 PR OFFICE/OUTPT VISIT, EST, LEVL IV, 30-39 MIN: ICD-10-PCS | Mod: S$PBB,,, | Performed by: INTERNAL MEDICINE

## 2020-09-04 PROCEDURE — 25000003 PHARM REV CODE 250: Performed by: EMERGENCY MEDICINE

## 2020-09-04 PROCEDURE — 49083 ABD PARACENTESIS W/IMAGING: CPT | Mod: GC,,, | Performed by: RADIOLOGY

## 2020-09-04 PROCEDURE — 99999 PR PBB SHADOW E&M-EST. PATIENT-LVL IV: ICD-10-PCS | Mod: PBBFAC,,, | Performed by: INTERNAL MEDICINE

## 2020-09-04 PROCEDURE — C9113 INJ PANTOPRAZOLE SODIUM, VIA: HCPCS | Performed by: HOSPITALIST

## 2020-09-04 PROCEDURE — 96361 HYDRATE IV INFUSION ADD-ON: CPT

## 2020-09-04 PROCEDURE — 99285 EMERGENCY DEPT VISIT HI MDM: CPT | Mod: ,,, | Performed by: EMERGENCY MEDICINE

## 2020-09-04 PROCEDURE — 99999 PR PBB SHADOW E&M-EST. PATIENT-LVL IV: CPT | Mod: PBBFAC,,, | Performed by: INTERNAL MEDICINE

## 2020-09-04 PROCEDURE — 99214 OFFICE O/P EST MOD 30 MIN: CPT | Mod: S$PBB,,, | Performed by: INTERNAL MEDICINE

## 2020-09-04 PROCEDURE — 85610 PROTHROMBIN TIME: CPT

## 2020-09-04 PROCEDURE — U0002 COVID-19 LAB TEST NON-CDC: HCPCS

## 2020-09-04 PROCEDURE — 99285 EMERGENCY DEPT VISIT HI MDM: CPT | Mod: 25,27

## 2020-09-04 PROCEDURE — 99223 PR INITIAL HOSPITAL CARE,LEVL III: ICD-10-PCS | Mod: ,,, | Performed by: HOSPITALIST

## 2020-09-04 PROCEDURE — 49083 IR PARACENTESIS WITH IMAGING: ICD-10-PCS | Mod: GC,,, | Performed by: RADIOLOGY

## 2020-09-04 PROCEDURE — 80048 BASIC METABOLIC PNL TOTAL CA: CPT

## 2020-09-04 PROCEDURE — 80076 HEPATIC FUNCTION PANEL: CPT

## 2020-09-04 PROCEDURE — 11000001 HC ACUTE MED/SURG PRIVATE ROOM

## 2020-09-04 PROCEDURE — 82140 ASSAY OF AMMONIA: CPT

## 2020-09-04 PROCEDURE — 99285 PR EMERGENCY DEPT VISIT,LEVEL V: ICD-10-PCS | Mod: ,,, | Performed by: EMERGENCY MEDICINE

## 2020-09-04 PROCEDURE — 99223 1ST HOSP IP/OBS HIGH 75: CPT | Mod: ,,, | Performed by: HOSPITALIST

## 2020-09-04 PROCEDURE — 99214 OFFICE O/P EST MOD 30 MIN: CPT | Mod: PBBFAC | Performed by: INTERNAL MEDICINE

## 2020-09-04 PROCEDURE — 49083 ABD PARACENTESIS W/IMAGING: CPT

## 2020-09-04 RX ORDER — HYDROCODONE BITARTRATE AND ACETAMINOPHEN 5; 325 MG/1; MG/1
1 TABLET ORAL EVERY 4 HOURS PRN
Status: DISCONTINUED | OUTPATIENT
Start: 2020-09-04 | End: 2020-09-14 | Stop reason: HOSPADM

## 2020-09-04 RX ORDER — HYDROCODONE BITARTRATE AND ACETAMINOPHEN 10; 325 MG/1; MG/1
1 TABLET ORAL EVERY 4 HOURS PRN
Status: DISCONTINUED | OUTPATIENT
Start: 2020-09-04 | End: 2020-09-14 | Stop reason: HOSPADM

## 2020-09-04 RX ORDER — SODIUM CHLORIDE 0.9 % (FLUSH) 0.9 %
5 SYRINGE (ML) INJECTION
Status: DISCONTINUED | OUTPATIENT
Start: 2020-09-04 | End: 2020-09-14 | Stop reason: HOSPADM

## 2020-09-04 RX ORDER — ONDANSETRON 2 MG/ML
8 INJECTION INTRAMUSCULAR; INTRAVENOUS EVERY 8 HOURS PRN
Status: DISCONTINUED | OUTPATIENT
Start: 2020-09-04 | End: 2020-09-14 | Stop reason: HOSPADM

## 2020-09-04 RX ORDER — ALBUMIN HUMAN 250 G/1000ML
1 SOLUTION INTRAVENOUS
Status: DISPENSED | OUTPATIENT
Start: 2020-09-04 | End: 2020-09-05

## 2020-09-04 RX ORDER — ALBUMIN HUMAN 250 G/1000ML
25 SOLUTION INTRAVENOUS DAILY
Status: DISCONTINUED | OUTPATIENT
Start: 2020-09-05 | End: 2020-09-07

## 2020-09-04 RX ORDER — TALC
6 POWDER (GRAM) TOPICAL NIGHTLY PRN
Status: DISCONTINUED | OUTPATIENT
Start: 2020-09-04 | End: 2020-09-14 | Stop reason: HOSPADM

## 2020-09-04 RX ORDER — ACETAMINOPHEN 325 MG/1
650 TABLET ORAL EVERY 4 HOURS PRN
Status: DISCONTINUED | OUTPATIENT
Start: 2020-09-04 | End: 2020-09-14 | Stop reason: HOSPADM

## 2020-09-04 RX ORDER — PANTOPRAZOLE SODIUM 40 MG/10ML
40 INJECTION, POWDER, LYOPHILIZED, FOR SOLUTION INTRAVENOUS 2 TIMES DAILY
Status: DISCONTINUED | OUTPATIENT
Start: 2020-09-04 | End: 2020-09-05

## 2020-09-04 RX ORDER — OCTREOTIDE ACETATE 100 UG/ML
50 INJECTION, SOLUTION INTRAVENOUS; SUBCUTANEOUS ONCE
Status: COMPLETED | OUTPATIENT
Start: 2020-09-04 | End: 2020-09-04

## 2020-09-04 RX ORDER — DIVALPROEX SODIUM 250 MG/1
500 TABLET, DELAYED RELEASE ORAL NIGHTLY
Status: DISCONTINUED | OUTPATIENT
Start: 2020-09-04 | End: 2020-09-14 | Stop reason: HOSPADM

## 2020-09-04 RX ORDER — CEFTRIAXONE 1 G/1
1 INJECTION, POWDER, FOR SOLUTION INTRAMUSCULAR; INTRAVENOUS
Status: COMPLETED | OUTPATIENT
Start: 2020-09-04 | End: 2020-09-08

## 2020-09-04 RX ORDER — AMOXICILLIN 250 MG
1 CAPSULE ORAL 2 TIMES DAILY PRN
Status: DISCONTINUED | OUTPATIENT
Start: 2020-09-04 | End: 2020-09-14 | Stop reason: HOSPADM

## 2020-09-04 RX ADMIN — DIVALPROEX SODIUM 500 MG: 250 TABLET, DELAYED RELEASE ORAL at 09:09

## 2020-09-04 RX ADMIN — EPOETIN ALFA-EPBX 7090 UNITS: 4000 INJECTION, SOLUTION INTRAVENOUS; SUBCUTANEOUS at 11:09

## 2020-09-04 RX ADMIN — OCTREOTIDE ACETATE 50 MCG/HR: 500 INJECTION, SOLUTION INTRAVENOUS; SUBCUTANEOUS at 07:09

## 2020-09-04 RX ADMIN — CEFTRIAXONE SODIUM 1 G: 1 INJECTION, POWDER, FOR SOLUTION INTRAMUSCULAR; INTRAVENOUS at 06:09

## 2020-09-04 RX ADMIN — OCTREOTIDE ACETATE 50 MCG: 100 INJECTION, SOLUTION INTRAVENOUS; SUBCUTANEOUS at 06:09

## 2020-09-04 RX ADMIN — SODIUM CHLORIDE 500 ML: 0.9 INJECTION, SOLUTION INTRAVENOUS at 03:09

## 2020-09-04 RX ADMIN — PANTOPRAZOLE SODIUM 40 MG: 40 INJECTION, POWDER, LYOPHILIZED, FOR SOLUTION INTRAVENOUS at 06:09

## 2020-09-04 RX ADMIN — RIFAXIMIN 550 MG: 550 TABLET ORAL at 09:09

## 2020-09-04 NOTE — ED PROVIDER NOTES
Encounter Date: 9/4/2020       History     Chief Complaint   Patient presents with    covid test       80-year-old male with a history of cirrhosis presents to the ED from liver Clinic for admission.  Patient has had black stools recently and increased fatigue.  Clinic doctor was  Concern for GI bleed.  Patient is [employer].  Needs coronavirus test prior to admission.  Patient admits to fatigue, but denies nausea, vomiting, diarrhea, fever, cough, shortness of breath, chest pain, abdominal pain, or dysuria.  Recently had paracentesis.  A ten point review of systems was completed and is negative except as documented above.  Patient denies any other acute medical complaint.  The patients available PMH, PSH, Social History, medications, allergies, and triage vital signs were reviewed just prior to their medical evaluation.        Review of patient's allergies indicates:   Allergen Reactions    Abilify [aripiprazole] Other (See Comments)     Sleepy, could not function.     Past Medical History:   Diagnosis Date    Anticoagulant long-term use     Basal cell carcinoma     Bipolar 1 disorder     Bipolar 1 disorder 11/24/2016    bipolar    Cancer     history of skin cancer/sinus cancer & rectal cancer    Depressed bipolar affective disorder     Diabetes mellitus     type 2    Diabetic retinopathy 01/09/2019    EBV infection 12/7/2016    EBV DNA, PCR Latest Ref Range: None detected  None detected EBV DNA-Copies/mL Unknown Test Not Performed EBV Early Antigen Ab, IgG Latest Ref Range: <1:10 Titer 1:40 (A) EBV Nuclear Ag Ab Latest Ref Range: <1:5 Titer >=1:80 (A) EBV VCA IgG Latest Ref Range: <1:10 Titer 1:160 (A) EBV VCA IgM Latest Ref Range: <1:10 Titer <1:10     History of TIA (transient ischemic attack)     several-taking Plavix    Hx of gallstones     Wife denies    Hypertension     Hyperthyroidism 11/30/2016    Low HDL (under 40) 12/7/2016    Mixed hyperlipidemia 11/23/2016    JUAN MANUEL (obstructive  sleep apnea)     Pneumonia     Skin disease     Squamous cell carcinoma 08/2018    right temple    Stroke     Transient cerebral ischemia 7/22/2016    Type 2 diabetes mellitus      Past Surgical History:   Procedure Laterality Date    Moh's procedure x4      rectal cancer      Sinus surgery for sinus cancer      sinus sx for cancer      skin cancer removed-several times-nose & ear      TONSILLECTOMY      Undescened testicle sx      UVULOPALATOPHARYNGOPLASTY      for sleep apnea     Family History   Problem Relation Age of Onset    No Known Problems Daughter     Diabetes Son     Anesthesia problems Neg Hx     Melanoma Neg Hx     Colon cancer Neg Hx     Esophageal cancer Neg Hx      Social History     Tobacco Use    Smoking status: Former Smoker     Packs/day: 0.25     Years: 2.00     Pack years: 0.50    Smokeless tobacco: Never Used    Tobacco comment: quit at age 17 less than a pack a day   Substance Use Topics    Alcohol use: No     Comment: rare    Drug use: No     Review of Systems   Constitutional: Positive for fatigue. Negative for fever.   HENT: Negative for sore throat.    Eyes: Negative for visual disturbance.   Respiratory: Negative for cough and shortness of breath.    Cardiovascular: Negative for chest pain.   Gastrointestinal: Negative for abdominal pain, diarrhea, nausea and vomiting.   Genitourinary: Negative for dysuria.   Musculoskeletal: Negative for neck pain.   Skin: Positive for pallor. Negative for rash and wound.   Allergic/Immunologic: Negative for immunocompromised state.   Neurological: Negative for syncope.   Psychiatric/Behavioral: Negative for confusion.       Physical Exam     Initial Vitals [09/04/20 1454]   BP Pulse Resp Temp SpO2   136/80 80 18 98.9 °F (37.2 °C) 100 %      MAP       --         Physical Exam    Nursing note and vitals reviewed.  Constitutional: He appears well-developed and well-nourished. He is not diaphoretic. No distress.   HENT:   Head:  Normocephalic and atraumatic.   Nose: Nose normal.   Eyes: Right eye exhibits no discharge. Left eye exhibits no discharge.   pallor   Neck: Normal range of motion. Neck supple.   Cardiovascular: Normal rate, regular rhythm and normal heart sounds. Exam reveals no gallop and no friction rub.    No murmur heard.  Pulmonary/Chest: Breath sounds normal. No respiratory distress. He has no wheezes. He has no rhonchi. He has no rales.   Abdominal: Soft. He exhibits no distension. There is no abdominal tenderness. There is no rebound and no guarding.   Paracentesis dressing in place   Genitourinary: Rectum:      Guaiac result positive.   Guaiac positive stool. : Acceptable.  Musculoskeletal: Normal range of motion. No tenderness or edema.   Neurological: He is alert and oriented to person, place, and time. He has normal strength. GCS score is 15. GCS eye subscore is 4. GCS verbal subscore is 5. GCS motor subscore is 6.   Skin: Skin is warm and dry. No rash noted. No erythema. There is pallor.   Psychiatric: He has a normal mood and affect. His behavior is normal. Judgment and thought content normal.         ED Course   Procedures  Labs Reviewed   CBC W/ AUTO DIFFERENTIAL - Abnormal; Notable for the following components:       Result Value    RBC 2.06 (*)     Hemoglobin 6.0 (*)     Hematocrit 20.1 (*)     Mean Corpuscular Hemoglobin Conc 29.9 (*)     RDW 18.2 (*)     Platelets 67 (*)     Immature Granulocytes 0.8 (*)     Immature Grans (Abs) 0.05 (*)     Lymph% 15.7 (*)     Platelet Estimate Decreased (*)     All other components within normal limits   BASIC METABOLIC PANEL - Abnormal; Notable for the following components:    Sodium 134 (*)     CO2 19 (*)     Glucose 177 (*)     BUN, Bld 80 (*)     Creatinine 2.9 (*)     Calcium 8.3 (*)     eGFR if  22.3 (*)     eGFR if non  19.3 (*)     All other components within normal limits   PROTIME-INR - Abnormal; Notable for the  following components:    Prothrombin Time 14.0 (*)     INR 1.3 (*)     All other components within normal limits   HEPATIC FUNCTION PANEL - Abnormal; Notable for the following components:    Albumin 1.9 (*)     ALT 6 (*)     All other components within normal limits   AMMONIA - Abnormal; Notable for the following components:    Ammonia 52 (*)     All other components within normal limits   SARS-COV-2 RNA AMPLIFICATION, QUAL   MAGNESIUM   SODIUM, URINE, RANDOM   UREA NITROGEN, URINE, RANDOM   CREATININE, URINE, RANDOM   PROTEIN / CREATININE RATIO, URINE   DHILLON'S STAIN, URINE RANDOM          Imaging Results    None          Medical Decision Making:   History:   I obtained history from: someone other than patient.       <> Summary of History: Wife assists HPI  Old Medical Records: I decided to obtain old medical records.  Old Records Summarized: records from clinic visits.  Clinical Tests:   Lab Tests: Ordered and Reviewed  ED Management:    80-year-old male with history of cirrhosis presents with GI bleed.  Initial vitals here normal.  Patient had episode of hypotension in clinic.  Physical exam as above.  Patient is pale with a positive guaiac.  Labs confirm anemia.  Admitted to IM for eval of GI bleed.  Did bedside teaching.  All questions answered.  Patient acknowledges understanding.  Other:   I have discussed this case with another health care provider.       <> Summary of the Discussion: IM, admission                                 Clinical Impression:   Final diagnoses:  [D64.9] Anemia, unspecified type (Primary)  [K92.1] Gastrointestinal hemorrhage with melena      Disposition:   Disposition: Admitted  Condition: Serious     ED Disposition Condition    Admit              Level of Complexity:  High, level 5.             Marcos Godwin MD  09/04/20 7042

## 2020-09-04 NOTE — ED TRIAGE NOTES
"Pt sent to ED by his "[facility]" for Covid-19 test and admission. Pt reports feeling weak for past couple of days. Pt's wife reports pt is not eating and that he fell this morning. Pt also c/o difuse abdominal pain  "

## 2020-09-04 NOTE — PROGRESS NOTES
Paracentesis complete, 4200 ml clear straw colored fluid removed, LUQ dressing is clean, dry and intact without bleeding or hematoma, pt tolerated procedure well, left MPU per wheelchair accompanied by RN to waiting room where wife was waiting to receive pt, update and discharge instructions provided to pt and wife, avs provided

## 2020-09-04 NOTE — DISCHARGE INSTRUCTIONS
For scheduling: Call Mackenzie at [phone]    For questions or concerns call:. Radiology resident on call [phone].    For immediate concerns that are not emergent, you may call our radiology clinic at: [phone]

## 2020-09-04 NOTE — H&P
Radiology History & Physical      SUBJECTIVE:     Chief Complaint: abdominal distention    History of Present Illness:  Kenneth Sheikh is a 80 y.o. male who presents for paracentesis.  Past Medical History:   Diagnosis Date    Anticoagulant long-term use     Basal cell carcinoma     Bipolar 1 disorder     Bipolar 1 disorder 11/24/2016    bipolar    Cancer     history of skin cancer/sinus cancer & rectal cancer    Depressed bipolar affective disorder     Diabetes mellitus     type 2    Diabetic retinopathy 01/09/2019    EBV infection 12/7/2016    EBV DNA, PCR Latest Ref Range: None detected  None detected EBV DNA-Copies/mL Unknown Test Not Performed EBV Early Antigen Ab, IgG Latest Ref Range: <1:10 Titer 1:40 (A) EBV Nuclear Ag Ab Latest Ref Range: <1:5 Titer >=1:80 (A) EBV VCA IgG Latest Ref Range: <1:10 Titer 1:160 (A) EBV VCA IgM Latest Ref Range: <1:10 Titer <1:10     History of TIA (transient ischemic attack)     several-taking Plavix    Hx of gallstones     Wife denies    Hypertension     Hyperthyroidism 11/30/2016    Low HDL (under 40) 12/7/2016    Mixed hyperlipidemia 11/23/2016    JUAN MANUEL (obstructive sleep apnea)     Pneumonia     Skin disease     Squamous cell carcinoma 08/2018    right temple    Stroke     Transient cerebral ischemia 7/22/2016    Type 2 diabetes mellitus      Past Surgical History:   Procedure Laterality Date    Moh's procedure x4      rectal cancer      Sinus surgery for sinus cancer      sinus sx for cancer      skin cancer removed-several times-nose & ear      TONSILLECTOMY      Undescened testicle sx      UVULOPALATOPHARYNGOPLASTY      for sleep apnea       Home Meds:   Prior to Admission medications    Medication Sig Start Date End Date Taking? Authorizing Provider   ACCU-CHEK NEYDA PLUS TEST STRP Strp CHECK BLOOD SUGAR ONCE DAILY  Patient not taking: Reported on 8/11/2020 4/17/20   Prisca Espinoza MD   ciprofloxacin HCl (CIPRO) 500 MG tablet TAKE 1  TABLET(500 MG) BY MOUTH EVERY MONDAY 7/4/20   Renato Womack MD   divalproex (DEPAKOTE) 250 MG EC tablet Take 2 tablets (500 mg total) by mouth every evening. 8/27/20   Prisca Espinoza MD   furosemide (LASIX) 20 MG tablet TAKE 2 TABLETS BY MOUTH EVERY DAY 7/1/20   Renato Womack MD   lactulose (CHRONULAC) 10 gram/15 mL solution TAKE 15 MLS BY MOUTH TWICE DAILY  Patient not taking: Reported on 6/24/2020 4/6/20   Renato Womack MD   lancets [facility] To check blood sugar once daily, to use with Accu-Chek meter.  Patient not taking: Reported on 8/11/2020 11/5/18   Prisca Espinoza MD   LIPASE-PROTEASE-AMYLASE 5,000-17,000 -24,000 UNITS (ZENPEP) 5,000-17,000- 24,000 unit CpDR Take 1 capsule by mouth 3 (three) times daily with meals. 9/1/20 8/27/21  Kelin Winters, CODY   loperamide (IMODIUM) 2 mg capsule Take 1 capsule (2 mg total) by mouth 2 (two) times daily as needed for Diarrhea. 8/27/20   Prisca Espinoza MD   potassium chloride SA (K-DUR,KLOR-CON) 20 MEQ tablet TAKE 1 TABLET(20 MEQ) BY MOUTH TWICE DAILY 7/10/20   Prisca Espinoza MD   psyllium (METAMUCIL) packet Take 1 packet by mouth once daily.    Historical Provider, MD   psyllium (METAMUCIL) powder Take 1 packet by mouth as needed. Pt states he takes a teaspoon twice a day    Historical Provider, MD   SITagliptan (JANUVIA) 50 MG Tab Take 1 tablet (50 mg total) by mouth once daily. 4/6/17   Prisca Espinoza MD   XIFAXAN 550 mg Tab Take 1 tablet (550 mg total) by mouth 2 (two) times daily. 2/10/20   Renato Womack MD      Anticoagulants/Antiplatelets: no anticoagulation    Allergies:   Review of patient's allergies indicates:   Allergen Reactions    Abilify [aripiprazole] Other (See Comments)     Sleepy, could not function.     Sedation History:  no adverse reactions    Review of Systems:   Hematological: no known coagulopathies  Respiratory: no shortness of breath  Cardiovascular: no chest pain  Gastrointestinal: no abdominal  pain  Genito-Urinary: no dysuria  Musculoskeletal: negative  Neurological: no TIA or stroke symptoms         OBJECTIVE:     Vital Signs (Most Recent)       Physical Exam:  ASA: 2  Mallampati: n/a    General: no acute distress  Mental Status: alert and oriented to person, place and time  HEENT: normocephalic, atraumatic  Chest: unlabored breathing  Heart: regular heart rate  Abdomen: distended  Extremity: moves all extremities    ASSESSMENT/PLAN:     Sedation Plan: local  Patient will undergo US guided paracentesis.    Radha Castellanos PA-C

## 2020-09-04 NOTE — PROGRESS NOTES
"Subjective:       Patient ID: Kenneth Sheikh is a 80 y.o. male.    Chief Complaint: Cirrhosis and Ascites    HPI  I saw this 80 y.o. man  whom I had met in hospital when he was admitted with lethargy and some confusion back in Dec 2016.  At that time, he was diagnosed with decompensated cirrhosis.    Admitted because of increasing lethargy +++/confusion/dizziness/dysarthria  - Nov 2016  - imaging showed ascites     No significant alcohol use ("a couple beers when I was younger"). No family history of liver disease. No new medications.    Patient has risk factors for MCDONALD.    He also has a large gastric varix which has never bled-  treated x1 by Dr Perla endoscopically  (4/16/17).  - Had post procedure fever despite antibiotics and refuses further procedure  Previous E coli bacteremia    He has been reasonably stable with weekly paracentesis of about 4-5 liters on each occasion.    Mental alert and has not experienced any prolonged episodes of HE  - remains on rifaximin    - mild lower leg edema    Abdo US: 6/23/2020  Hepatic cirrhosis with a stable 1.2 cm hyperechoic lesion in the right hepatic lobe. No new hepatic lesions identified.  Cholelithiasis without sonographic evidence of acute cholecystitis.  Mild diffuse gallbladder wall thickening likely secondary to underlying hepatic dysfunction.  Large volume ascites    Continues with regular paracenteses.    - recent deterioration with increasing frailty and falls  - diarrhea  - recent pancreatic enzyme supplementation for pancreatic insufficiency    MELD-Na score: 10 at 5/16/2020 11:36 AM  MELD score: 10 at 5/16/2020 11:36 AM  Calculated from:  Serum Creatinine: 1.2 mg/dL at 5/16/2020 11:36 AM  Serum Sodium: 140 mmol/L (Rounded to 137 mmol/L) at 5/16/2020 11:36 AM  Total Bilirubin: 0.6 mg/dL (Rounded to 1 mg/dL) at 5/15/2020 12:49 PM  INR(ratio): 1.2 at 5/15/2020 12:49 PM  Age: 80 years 3 months      PMH:  DM  Sinus Ca- 1996  Rectal Ca- June 2016    SH:  Retired  Ex " [employer] worker      FH:  Aunt had cirrhosis- non- alcohol    Review of Systems   Constitutional: Positive for activity change, appetite change and fatigue. Negative for chills, fever and unexpected weight change.   HENT: Negative for hearing loss.    Eyes: Negative for discharge and visual disturbance.   Respiratory: Negative for cough, chest tightness, shortness of breath and wheezing.    Cardiovascular: Negative for chest pain, palpitations and leg swelling.   Gastrointestinal: Positive for abdominal distention. Negative for abdominal pain, constipation, diarrhea and nausea.   Genitourinary: Negative for dysuria and frequency.   Musculoskeletal: Negative for arthralgias and back pain.   Skin: Negative for pallor and rash.   Neurological: Negative for dizziness, tremors, speech difficulty and headaches.   Hematological: Negative for adenopathy.   Psychiatric/Behavioral: Negative for agitation and confusion.           Lab Results   Component Value Date    ALT 9 (L) 05/29/2020    AST 26 05/29/2020    ALKPHOS 98 05/29/2020    BILITOT 0.5 05/29/2020     Past Medical History:   Diagnosis Date    Anticoagulant long-term use     Basal cell carcinoma     Bipolar 1 disorder     Bipolar 1 disorder 11/24/2016    bipolar    Cancer     history of skin cancer/sinus cancer & rectal cancer    Depressed bipolar affective disorder     Diabetes mellitus     type 2    Diabetic retinopathy 01/09/2019    EBV infection 12/7/2016    EBV DNA, PCR Latest Ref Range: None detected  None detected EBV DNA-Copies/mL Unknown Test Not Performed EBV Early Antigen Ab, IgG Latest Ref Range: <1:10 Titer 1:40 (A) EBV Nuclear Ag Ab Latest Ref Range: <1:5 Titer >=1:80 (A) EBV VCA IgG Latest Ref Range: <1:10 Titer 1:160 (A) EBV VCA IgM Latest Ref Range: <1:10 Titer <1:10     History of TIA (transient ischemic attack)     several-taking Plavix    Hx of gallstones     Wife denies    Hypertension     Hyperthyroidism 11/30/2016    Low HDL  (under 40) 12/7/2016    Mixed hyperlipidemia 11/23/2016    JUAN MANUEL (obstructive sleep apnea)     Pneumonia     Skin disease     Squamous cell carcinoma 08/2018    right temple    Stroke     Transient cerebral ischemia 7/22/2016    Type 2 diabetes mellitus      Past Surgical History:   Procedure Laterality Date    Moh's procedure x4      rectal cancer      Sinus surgery for sinus cancer      sinus sx for cancer      skin cancer removed-several times-nose & ear      TONSILLECTOMY      Undescened testicle sx      UVULOPALATOPHARYNGOPLASTY      for sleep apnea     Current Outpatient Medications   Medication Sig    ciprofloxacin HCl (CIPRO) 500 MG tablet TAKE 1 TABLET(500 MG) BY MOUTH EVERY MONDAY    divalproex (DEPAKOTE) 250 MG EC tablet Take 2 tablets (500 mg total) by mouth every evening.    furosemide (LASIX) 20 MG tablet TAKE 2 TABLETS BY MOUTH EVERY DAY    LIPASE-PROTEASE-AMYLASE 5,000-17,000 -24,000 UNITS (ZENPEP) 5,000-17,000- 24,000 unit CpDR Take 1 capsule by mouth 3 (three) times daily with meals.    loperamide (IMODIUM) 2 mg capsule Take 1 capsule (2 mg total) by mouth 2 (two) times daily as needed for Diarrhea.    potassium chloride SA (K-DUR,KLOR-CON) 20 MEQ tablet TAKE 1 TABLET(20 MEQ) BY MOUTH TWICE DAILY    psyllium (METAMUCIL) packet Take 1 packet by mouth once daily.    psyllium (METAMUCIL) powder Take 1 packet by mouth as needed. Pt states he takes a teaspoon twice a day    SITagliptan (JANUVIA) 50 MG Tab Take 1 tablet (50 mg total) by mouth once daily.    XIFAXAN 550 mg Tab Take 1 tablet (550 mg total) by mouth 2 (two) times daily.    ACCU-CHEK NEYDA PLUS TEST STRP Strp CHECK BLOOD SUGAR ONCE DAILY (Patient not taking: Reported on 8/11/2020)    lactulose (CHRONULAC) 10 gram/15 mL solution TAKE 15 MLS BY MOUTH TWICE DAILY (Patient not taking: Reported on 6/24/2020)    lancets Misc To check blood sugar once daily, to use with Accu-Chek meter. (Patient not taking: Reported on  8/11/2020)     No current facility-administered medications for this visit.        Objective:      Physical Exam  Constitutional:       Comments: Cachectic   HENT:      Head: Normocephalic.   Eyes:      Pupils: Pupils are equal, round, and reactive to light.   Neck:      Thyroid: No thyromegaly.   Cardiovascular:      Rate and Rhythm: Normal rate and regular rhythm.      Heart sounds: Normal heart sounds.   Pulmonary:      Effort: Pulmonary effort is normal.      Breath sounds: Normal breath sounds. No wheezing.   Abdominal:      General: There is distension.      Palpations: Abdomen is soft. There is no mass.      Tenderness: There is no abdominal tenderness.      Comments: Mild ascites.   Musculoskeletal:      Right lower leg: Edema present.      Left lower leg: Edema present.   Lymphadenopathy:      Cervical: No cervical adenopathy.   Skin:     General: Skin is warm.      Findings: No erythema or rash.   Neurological:      Mental Status: He is alert and oriented to person, place, and time.   Psychiatric:         Behavior: Behavior normal.         Assessment:       1. 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 Gastric varices    3. Pancreatic insufficiency    4. Chronic diarrhea    5. Ascites of liver    6. Weakness    7. Orthostatic hypotension        Plan:   - re-discussed the dx of cirrhosis and the implications of this  - discussed increasing frailty and recent deterioration in functional status and difficulty managing him at home    Currently:  - recent kidney impairment creatinine 2.3 on 8/21/20  - currently describes black stool- ?GI bleed  - functional deterioration  - hypotension    Admit to hospital- via ER for Covid test  1) will likely need rehydration  2) check CBC for low Hb due to GI bleeding  3) check for infection ?SBP (tap already done)  4) PT/OT  5) Palliative care consult- I already discussed this with wife and she agrees.     [facility] will not accept  blood products apart from albumin

## 2020-09-04 NOTE — H&P
Hospital Medicine  History and Physical  [facility]      Patient Name: Kenneth Sheikh  MRN:  [id number]  Hospital Medicine Team: [facility] A Gabi Kingsley MD  Date of Admission:  9/4/2020     Principal Problem:  Acute blood loss anemia   Primary Care Physician: Prisca Espinoza MD       History of Present Illness:    Mr. Kenneth Sheikh is a 80 y.o. male with MCDONALD cirrhosis, complicated by gastric varices, hepatic encephalopathy, ascites requiring weekly paracenteses, and coagulopathy who presents to the ER from Hepatology clinic for evaluation of weakness and melena.  He mentions that he has been feeling weak and fatigued the last 2 days, and the day prior to admission he started having melena.  He denies the use of any blood thinners or Aspirin.  He is a Jewish, and does not want to receive blood transfusions, but he is ok receiving blood stimulators or fractionated blood products - just not whole blood.  He endorses some chills, but no fevers, chest pain, or shortness of breath.  Of note, he just had a paracentesis on the day of admission that removed 4.2L.    Upon arrival to the ER, vitals were temp 98.9F, HR 80 and /80.  Labs showed Hemoglobin 6, Platelet 67, Creatinine 2.9 and INR 1.3.  ESTEFANÍA was positive.  He was given 500cc bolus and was admitted to Hospital Medicine for further management.        Review of Systems:  Constitutional: + fatigue  HENT: Negative for sore throat, trouble swallowing.    Eyes: Negative for photophobia, visual disturbance.   Respiratory: Negative for cough, shortness of breath.    Cardiovascular: + leg swelling.   Gastrointestinal: Negative for abdominal pain, nausea, vomiting, diarrhea, constipation  Endocrine: Negative for cold intolerance, heat intolerance.   Genitourinary: Negative for dysuria, frequency.   Musculoskeletal: Negative for arthralgias, myalgias.   Skin: Negative for rash, wound, erythema   Neurological: Negative for numbness,  paresthesias  Psychiatric/Behavioral: Negative for confusion, hallucinations, anxiety  All other systems reviewed and are negative.      Past Medical History: Patient has a past medical history of Anticoagulant long-term use, Basal cell carcinoma, Bipolar 1 disorder, Bipolar 1 disorder (11/24/2016), Cancer, Depressed bipolar affective disorder, Diabetes mellitus, Diabetic retinopathy (01/09/2019), EBV infection (12/7/2016), History of TIA (transient ischemic attack), gallstones, Hypertension, Hyperthyroidism (11/30/2016), Low HDL (under 40) (12/7/2016), Mixed hyperlipidemia (11/23/2016), JUAN MANUEL (obstructive sleep apnea), Pneumonia, Skin disease, Squamous cell carcinoma (08/2018), Stroke, Transient cerebral ischemia (7/22/2016), and Type 2 diabetes mellitus.      Past Surgical History: Patient has a past surgical history that includes Tonsillectomy; sinus sx for cancer; Sinus surgery for sinus cancer; skin cancer removed-several times-nose & ear; Moh's procedure x4; Uvulopalatopharyngoplasty; Undescened testicle sx; and rectal cancer.      Social History: Patient reports that he has quit smoking. He has a 0.50 pack-year smoking history. He has never used smokeless tobacco. He reports that he does not drink alcohol or use drugs.      Family History: Patient's family history includes Diabetes in his son; No Known Problems in his daughter.      Medications: Scheduled Meds:   [START ON 9/5/2020] albumin human 25%  25 g Intravenous Daily    albumin human 25%  1 g/kg (Order-Specific) Intravenous ED 1 Time    cefTRIAXone (ROCEPHIN) IVPB  1 g Intravenous Q24H    divalproex  500 mg Oral QHS    epoetin mariajose-epbx  100 Units/kg (Order-Specific) Subcutaneous Every Mon, Wed, Fri    octreotide  50 mcg Intravenous Once    pantoprazole  40 mg Intravenous BID    rifAXIMin  550 mg Oral BID     Continuous Infusions:   octreotide (SANDOSTATIN) infusion       PRN Meds:.acetaminophen, HYDROcodone-acetaminophen,  HYDROcodone-acetaminophen, melatonin, ondansetron, promethazine (PHENERGAN) IVPB, senna-docusate 8.6-50 mg, sodium chloride 0.9%      Allergies: Patient is allergic to abilify [aripiprazole].      Physical Exam:    Temp:  [97.6 °F (36.4 °C)-98.9 °F (37.2 °C)]   Pulse:  [58-80]   Resp:  [16-18]   BP: ()/(49-80)   SpO2:  [95 %-100 %]     Constitutional: Appears cachectic  Head: Normocephalic and atraumatic.   Mouth/Throat: Oropharynx is clear and moist.   Eyes: EOM are normal. Pupils are equal, round, and reactive to light. No scleral icterus.   Neck: Normal range of motion. Neck supple.   Cardiovascular: Normal heart rate.  Regular heart rhythm.  No murmur heard.  Pulmonary/Chest: Effort normal. No respiratory distress. No wheezes, rales, or rhonchi  Abdominal: Soft. Bowel sounds are normal.  Distension.  No tenderness  Musculoskeletal: Normal range of motion. 1+ BLE pitting edema.   Neurological: Alert and oriented to person, place, and time. Tremor  Skin: Skin is warm and dry. Bruising throughout all extremities  Psychiatric: Normal mood and affect. Behavior is normal.       No intake or output data in the 24 hours ending 09/04/20 1741  Recent Labs   Lab 09/04/20  1510   WBC 6.56   HGB 6.0*   HCT 20.1*   PLT 67*     Recent Labs   Lab 09/04/20  1255 09/04/20  1510   * 134*   K 4.4 4.5    107   CO2 19* 19*   BUN 79* 80*   CREATININE 2.9* 2.9*   * 177*   CALCIUM 8.4* 8.3*   MG  --  1.6     Recent Labs   Lab 09/04/20  1510   ALKPHOS 89   ALT 6*   AST 17   ALBUMIN 1.9*   PROT 6.1   BILITOT 0.4   INR 1.3*      No results for input(s): LACTATE in the last 72 hours.   No results for input(s): CPK, CPKMB, MB, TROPONINI in the last 72 hours.      Assessment and Plan:    Mr. Kenneth Sheikh is a 80 y.o. male who presented to [facility] on 9/4/2020 with acute blood loss anemia.    GI Hemorrhage  Acute Blood Loss Anemia  Gastric Varices  · Hgb 6 on admit, baseline around 10 in May  · Start Protonix 40mg IV  BID  · GI consulted, appreciate assistance - they are aware of the patient and the high risk for deterioration given Mormonism  · NPO at midnight for possible EGD  · Start Ceftriaxone 1g IV q24h for SBP prophylaxis  · Start Octreotide gtt  · Low threshold to escalate to ICU in the event of hypotension and continued bleeding given refusal of blood transfusions    Refusal of Blood Products as Mormonism  · Does not want to receive blood transfusions, but he is ok receiving blood stimulators or fractionated blood products - just not whole blood  · Lab draws daily with pediatric tubes  · Epo injection    MCDONALD Cirrhosis  · MELD-Na score: 22 at 9/4/2020  3:10 PM  · MELD score: 20 at 9/4/2020  3:10 PM  · Calculated from:  · Serum Creatinine: 2.9 mg/dL at 9/4/2020  3:10 PM  · Serum Sodium: 134 mmol/L at 9/4/2020  3:10 PM  · Total Bilirubin: 0.4 mg/dL (Rounded to 1 mg/dL) at 9/4/2020  3:10 PM  · INR(ratio): 1.3 at 9/4/2020  3:10 PM  · Age: 80 years 7 months   · Hepatology consult  · Hold Lasix with anemia and softer BP    Hepatic Encephalopathy  · Ammonia 54  · Rifaximin 550mg PO BID  · Can consider Zinc 220mg PO daily    Acute Renal Failure on CKD Stage 3  · Creatinine 2.9, baseline around 1-2  · Albumin for resuscitation given ascites and edema  · Check urine studies  · Can consider Nephrology eval    Thrombocytopenia  Coagulopathy  · Chronic issue 2/2 liver disease  · Monitor for continued bleeding    Bipolar 1 Disorder  · Chronic issue  · Continue Depakote 500mg PO qHS       Diet:  Low sodium then NPO at midnight  VTE PPx:  Holding with GIB  Goals of Care:  Full      High Risk Conditions:   Patient has a condition that poses threat to life and bodily function: Acute Renal Failure and Anemia as a Mormonism       Disposition:  Pending GI  Discharge Needs:  TBD      Gabi Kingsley MD  Hospital Medicine  Medical Director, [facility]  Cell:  [phone]  Spectra:  [phone]

## 2020-09-04 NOTE — PROGRESS NOTES
Pt rec'd to [facility] procedure room 4 for US guided paracentesis, allergies reviewed, awaiting evaluation and consent

## 2020-09-05 ENCOUNTER — ANESTHESIA EVENT (OUTPATIENT)
Dept: ENDOSCOPY | Facility: HOSPITAL | Age: 80
DRG: 441 | End: 2020-09-05
Payer: MEDICARE

## 2020-09-05 ENCOUNTER — ANESTHESIA (OUTPATIENT)
Dept: ENDOSCOPY | Facility: HOSPITAL | Age: 80
DRG: 441 | End: 2020-09-05
Payer: MEDICARE

## 2020-09-05 LAB
ANION GAP SERPL CALC-SCNC: 8 MMOL/L (ref 8–16)
ANISOCYTOSIS BLD QL SMEAR: SLIGHT
BASOPHILS # BLD AUTO: 0.01 K/UL (ref 0–0.2)
BASOPHILS NFR BLD: 0.3 % (ref 0–1.9)
BUN SERPL-MCNC: 82 MG/DL (ref 8–23)
CALCIUM SERPL-MCNC: 7.9 MG/DL (ref 8.7–10.5)
CHLORIDE SERPL-SCNC: 109 MMOL/L (ref 95–110)
CO2 SERPL-SCNC: 17 MMOL/L (ref 23–29)
CREAT SERPL-MCNC: 2.8 MG/DL (ref 0.5–1.4)
CREAT UR-MCNC: 37 MG/DL (ref 23–375)
CREAT UR-MCNC: 37 MG/DL (ref 23–375)
DIFFERENTIAL METHOD: ABNORMAL
EOSINOPHIL # BLD AUTO: 0 K/UL (ref 0–0.5)
EOSINOPHIL NFR BLD: 0.7 % (ref 0–8)
EOSINOPHIL URNS QL WRIGHT STN: NORMAL
ERYTHROCYTE [DISTWIDTH] IN BLOOD BY AUTOMATED COUNT: 18.3 % (ref 11.5–14.5)
EST. GFR  (AFRICAN AMERICAN): 23.2 ML/MIN/1.73 M^2
EST. GFR  (NON AFRICAN AMERICAN): 20.1 ML/MIN/1.73 M^2
FERRITIN SERPL-MCNC: 69 NG/ML (ref 20–300)
GLUCOSE SERPL-MCNC: 110 MG/DL (ref 70–110)
HCT VFR BLD AUTO: 17.1 % (ref 40–54)
HGB BLD-MCNC: 5.4 G/DL (ref 14–18)
HYPOCHROMIA BLD QL SMEAR: ABNORMAL
IMM GRANULOCYTES # BLD AUTO: 0.02 K/UL (ref 0–0.04)
IMM GRANULOCYTES NFR BLD AUTO: 0.7 % (ref 0–0.5)
IRON SERPL-MCNC: 18 UG/DL (ref 45–160)
LYMPHOCYTES # BLD AUTO: 1 K/UL (ref 1–4.8)
LYMPHOCYTES NFR BLD: 31.9 % (ref 18–48)
MCH RBC QN AUTO: 29.5 PG (ref 27–31)
MCHC RBC AUTO-ENTMCNC: 31.6 G/DL (ref 32–36)
MCV RBC AUTO: 93 FL (ref 82–98)
MONOCYTES # BLD AUTO: 0.4 K/UL (ref 0.3–1)
MONOCYTES NFR BLD: 12.1 % (ref 4–15)
NEUTROPHILS # BLD AUTO: 1.6 K/UL (ref 1.8–7.7)
NEUTROPHILS NFR BLD: 54.3 % (ref 38–73)
NRBC BLD-RTO: 0 /100 WBC
PLATELET # BLD AUTO: 63 K/UL (ref 150–350)
PLATELET BLD QL SMEAR: ABNORMAL
PMV BLD AUTO: 10.6 FL (ref 9.2–12.9)
POCT GLUCOSE: 132 MG/DL (ref 70–110)
POCT GLUCOSE: 136 MG/DL (ref 70–110)
POLYCHROMASIA BLD QL SMEAR: ABNORMAL
POTASSIUM SERPL-SCNC: 4.1 MMOL/L (ref 3.5–5.1)
PROT UR-MCNC: <7 MG/DL (ref 0–15)
PROT/CREAT UR: NORMAL MG/G{CREAT} (ref 0–0.2)
RBC # BLD AUTO: 1.83 M/UL (ref 4.6–6.2)
SATURATED IRON: 7 % (ref 20–50)
SODIUM SERPL-SCNC: 134 MMOL/L (ref 136–145)
SODIUM UR-SCNC: 25 MMOL/L (ref 20–250)
TOTAL IRON BINDING CAPACITY: 269 UG/DL (ref 250–450)
TRANSFERRIN SERPL-MCNC: 182 MG/DL (ref 200–375)
UUN UR-MCNC: 515 MG/DL (ref 140–1050)
WBC # BLD AUTO: 2.98 K/UL (ref 3.9–12.7)

## 2020-09-05 PROCEDURE — 43235 EGD DIAGNOSTIC BRUSH WASH: CPT | Performed by: INTERNAL MEDICINE

## 2020-09-05 PROCEDURE — 25000003 PHARM REV CODE 250: Performed by: NURSE ANESTHETIST, CERTIFIED REGISTERED

## 2020-09-05 PROCEDURE — 43235 PR EGD, FLEX, DIAGNOSTIC: ICD-10-PCS | Mod: ,,, | Performed by: INTERNAL MEDICINE

## 2020-09-05 PROCEDURE — 36415 COLL VENOUS BLD VENIPUNCTURE: CPT

## 2020-09-05 PROCEDURE — 43235 EGD DIAGNOSTIC BRUSH WASH: CPT | Mod: ,,, | Performed by: INTERNAL MEDICINE

## 2020-09-05 PROCEDURE — 99233 PR SUBSEQUENT HOSPITAL CARE,LEVL III: ICD-10-PCS | Mod: ,,, | Performed by: INTERNAL MEDICINE

## 2020-09-05 PROCEDURE — 99233 SBSQ HOSP IP/OBS HIGH 50: CPT | Mod: ,,, | Performed by: INTERNAL MEDICINE

## 2020-09-05 PROCEDURE — 83540 ASSAY OF IRON: CPT

## 2020-09-05 PROCEDURE — C9113 INJ PANTOPRAZOLE SODIUM, VIA: HCPCS | Performed by: HOSPITALIST

## 2020-09-05 PROCEDURE — 99223 1ST HOSP IP/OBS HIGH 75: CPT | Mod: 25,GC,, | Performed by: INTERNAL MEDICINE

## 2020-09-05 PROCEDURE — 37000008 HC ANESTHESIA 1ST 15 MINUTES: Performed by: INTERNAL MEDICINE

## 2020-09-05 PROCEDURE — 63600175 PHARM REV CODE 636 W HCPCS: Mod: JG | Performed by: HOSPITALIST

## 2020-09-05 PROCEDURE — 85025 COMPLETE CBC W/AUTO DIFF WBC: CPT

## 2020-09-05 PROCEDURE — 37000009 HC ANESTHESIA EA ADD 15 MINS: Performed by: INTERNAL MEDICINE

## 2020-09-05 PROCEDURE — 11000001 HC ACUTE MED/SURG PRIVATE ROOM

## 2020-09-05 PROCEDURE — 87205 SMEAR GRAM STAIN: CPT

## 2020-09-05 PROCEDURE — C9113 INJ PANTOPRAZOLE SODIUM, VIA: HCPCS | Performed by: INTERNAL MEDICINE

## 2020-09-05 PROCEDURE — 94761 N-INVAS EAR/PLS OXIMETRY MLT: CPT

## 2020-09-05 PROCEDURE — D9220A PRA ANESTHESIA: Mod: CRNA,,, | Performed by: NURSE ANESTHETIST, CERTIFIED REGISTERED

## 2020-09-05 PROCEDURE — 25000003 PHARM REV CODE 250: Performed by: INTERNAL MEDICINE

## 2020-09-05 PROCEDURE — D9220A PRA ANESTHESIA: Mod: ANES,,, | Performed by: STUDENT IN AN ORGANIZED HEALTH CARE EDUCATION/TRAINING PROGRAM

## 2020-09-05 PROCEDURE — 99223 PR INITIAL HOSPITAL CARE,LEVL III: ICD-10-PCS | Mod: 25,GC,, | Performed by: INTERNAL MEDICINE

## 2020-09-05 PROCEDURE — D9220A PRA ANESTHESIA: ICD-10-PCS | Mod: CRNA,,, | Performed by: NURSE ANESTHETIST, CERTIFIED REGISTERED

## 2020-09-05 PROCEDURE — 84156 ASSAY OF PROTEIN URINE: CPT

## 2020-09-05 PROCEDURE — 99221 PR INITIAL HOSPITAL CARE,LEVL I: ICD-10-PCS | Mod: ,,, | Performed by: INTERNAL MEDICINE

## 2020-09-05 PROCEDURE — 82728 ASSAY OF FERRITIN: CPT

## 2020-09-05 PROCEDURE — 99221 1ST HOSP IP/OBS SF/LOW 40: CPT | Mod: ,,, | Performed by: INTERNAL MEDICINE

## 2020-09-05 PROCEDURE — P9047 ALBUMIN (HUMAN), 25%, 50ML: HCPCS | Mod: JG | Performed by: HOSPITALIST

## 2020-09-05 PROCEDURE — 80048 BASIC METABOLIC PNL TOTAL CA: CPT

## 2020-09-05 PROCEDURE — D9220A PRA ANESTHESIA: ICD-10-PCS | Mod: ANES,,, | Performed by: STUDENT IN AN ORGANIZED HEALTH CARE EDUCATION/TRAINING PROGRAM

## 2020-09-05 PROCEDURE — 84540 ASSAY OF URINE/UREA-N: CPT

## 2020-09-05 PROCEDURE — 84300 ASSAY OF URINE SODIUM: CPT

## 2020-09-05 PROCEDURE — 63600175 PHARM REV CODE 636 W HCPCS: Performed by: NURSE ANESTHETIST, CERTIFIED REGISTERED

## 2020-09-05 PROCEDURE — 63600175 PHARM REV CODE 636 W HCPCS: Performed by: INTERNAL MEDICINE

## 2020-09-05 PROCEDURE — 25000003 PHARM REV CODE 250: Performed by: HOSPITALIST

## 2020-09-05 RX ORDER — SODIUM CHLORIDE 0.9 % (FLUSH) 0.9 %
10 SYRINGE (ML) INJECTION
Status: DISCONTINUED | OUTPATIENT
Start: 2020-09-05 | End: 2020-09-14 | Stop reason: HOSPADM

## 2020-09-05 RX ORDER — SODIUM CHLORIDE 0.9 % (FLUSH) 0.9 %
10 SYRINGE (ML) INJECTION
Status: DISCONTINUED | OUTPATIENT
Start: 2020-09-05 | End: 2020-09-06

## 2020-09-05 RX ORDER — SUCCINYLCHOLINE CHLORIDE 20 MG/ML
INJECTION INTRAMUSCULAR; INTRAVENOUS
Status: DISCONTINUED | OUTPATIENT
Start: 2020-09-05 | End: 2020-09-05

## 2020-09-05 RX ORDER — PANTOPRAZOLE SODIUM 40 MG/10ML
80 INJECTION, POWDER, LYOPHILIZED, FOR SOLUTION INTRAVENOUS ONCE
Status: COMPLETED | OUTPATIENT
Start: 2020-09-05 | End: 2020-09-05

## 2020-09-05 RX ORDER — LIDOCAINE HYDROCHLORIDE 20 MG/ML
INJECTION INTRAVENOUS
Status: DISCONTINUED | OUTPATIENT
Start: 2020-09-05 | End: 2020-09-05

## 2020-09-05 RX ORDER — ONDANSETRON 2 MG/ML
4 INJECTION INTRAMUSCULAR; INTRAVENOUS DAILY PRN
Status: DISCONTINUED | OUTPATIENT
Start: 2020-09-05 | End: 2020-09-14 | Stop reason: HOSPADM

## 2020-09-05 RX ORDER — FENTANYL CITRATE 50 UG/ML
25 INJECTION, SOLUTION INTRAMUSCULAR; INTRAVENOUS EVERY 5 MIN PRN
Status: DISCONTINUED | OUTPATIENT
Start: 2020-09-05 | End: 2020-09-10

## 2020-09-05 RX ORDER — ONDANSETRON 2 MG/ML
INJECTION INTRAMUSCULAR; INTRAVENOUS
Status: DISCONTINUED | OUTPATIENT
Start: 2020-09-05 | End: 2020-09-05

## 2020-09-05 RX ORDER — ZINC SULFATE 50(220)MG
220 CAPSULE ORAL DAILY
Status: DISCONTINUED | OUTPATIENT
Start: 2020-09-06 | End: 2020-09-14 | Stop reason: HOSPADM

## 2020-09-05 RX ORDER — ETOMIDATE 2 MG/ML
INJECTION INTRAVENOUS
Status: DISCONTINUED | OUTPATIENT
Start: 2020-09-05 | End: 2020-09-05

## 2020-09-05 RX ORDER — SODIUM CHLORIDE 9 MG/ML
INJECTION, SOLUTION INTRAVENOUS CONTINUOUS PRN
Status: DISCONTINUED | OUTPATIENT
Start: 2020-09-05 | End: 2020-09-05

## 2020-09-05 RX ADMIN — ETOMIDATE 4 MG: 2 INJECTION, SOLUTION INTRAVENOUS at 11:09

## 2020-09-05 RX ADMIN — RIFAXIMIN 550 MG: 550 TABLET ORAL at 08:09

## 2020-09-05 RX ADMIN — DEXTROSE 8 MG/HR: 5 SOLUTION INTRAVENOUS at 06:09

## 2020-09-05 RX ADMIN — OCTREOTIDE ACETATE 50 MCG/HR: 500 INJECTION, SOLUTION INTRAVENOUS; SUBCUTANEOUS at 04:09

## 2020-09-05 RX ADMIN — LIDOCAINE HYDROCHLORIDE 100 MG: 20 INJECTION, SOLUTION INTRAVENOUS at 11:09

## 2020-09-05 RX ADMIN — DIVALPROEX SODIUM 500 MG: 250 TABLET, DELAYED RELEASE ORAL at 08:09

## 2020-09-05 RX ADMIN — SUCCINYLCHOLINE CHLORIDE 120 MG: 20 INJECTION, SOLUTION INTRAMUSCULAR; INTRAVENOUS at 11:09

## 2020-09-05 RX ADMIN — CEFTRIAXONE SODIUM 1 G: 1 INJECTION, POWDER, FOR SOLUTION INTRAMUSCULAR; INTRAVENOUS at 06:09

## 2020-09-05 RX ADMIN — PANTOPRAZOLE SODIUM 80 MG: 40 INJECTION, POWDER, LYOPHILIZED, FOR SOLUTION INTRAVENOUS at 06:09

## 2020-09-05 RX ADMIN — SODIUM CHLORIDE: 0.9 INJECTION, SOLUTION INTRAVENOUS at 11:09

## 2020-09-05 RX ADMIN — ETOMIDATE 12 MG: 2 INJECTION, SOLUTION INTRAVENOUS at 11:09

## 2020-09-05 RX ADMIN — RIFAXIMIN 550 MG: 550 TABLET ORAL at 09:09

## 2020-09-05 RX ADMIN — ALBUMIN (HUMAN) 25 G: 12.5 SOLUTION INTRAVENOUS at 09:09

## 2020-09-05 RX ADMIN — OCTREOTIDE ACETATE 50 MCG/HR: 500 INJECTION, SOLUTION INTRAVENOUS; SUBCUTANEOUS at 05:09

## 2020-09-05 RX ADMIN — PANTOPRAZOLE SODIUM 40 MG: 40 INJECTION, POWDER, LYOPHILIZED, FOR SOLUTION INTRAVENOUS at 09:09

## 2020-09-05 RX ADMIN — DEXTROSE 8 MG/HR: 5 SOLUTION INTRAVENOUS at 08:09

## 2020-09-05 RX ADMIN — ONDANSETRON 4 MG: 2 INJECTION, SOLUTION INTRAMUSCULAR; INTRAVENOUS at 11:09

## 2020-09-05 RX ADMIN — OCTREOTIDE ACETATE 50 MCG/HR: 500 INJECTION, SOLUTION INTRAVENOUS; SUBCUTANEOUS at 08:09

## 2020-09-05 RX ADMIN — OCTREOTIDE ACETATE 50 MCG/HR: 500 INJECTION, SOLUTION INTRAVENOUS; SUBCUTANEOUS at 06:09

## 2020-09-05 RX ADMIN — IRON SUCROSE 100 MG: 20 INJECTION, SOLUTION INTRAVENOUS at 05:09

## 2020-09-05 NOTE — PLAN OF CARE
Problem: Fall Injury Risk  Goal: Absence of Fall and Fall-Related Injury  Outcome: Ongoing, Progressing     Problem: Adult Inpatient Plan of Care  Goal: Plan of Care Review  Outcome: Ongoing, Progressing     Problem: Skin Injury Risk Increased  Goal: Skin Health and Integrity  Outcome: Ongoing, Progressing   Pt received on unit from ED. Bed in low position and locked.bed alarm set .Pt assessed and vitals taken, pt oriented to room all questions and concerns addressed, plan of care established for rest of shift.

## 2020-09-05 NOTE — CONSULTS
Radiology Consult    Kenneth Sheikh is a 80 y.o. male with a history of history of MCDONALD Cirrhosis (h/o Ascites with weekly paracenteses, Gastric Varices 2/2017) who presented with findings concerning for variceal upper GI bleed.  Patient unable to received pRBCs due to being Orthodoxy. IR consulted to evaluate for SBP with diagnostic paracentesis.    Past Medical History:   Diagnosis Date    Anticoagulant long-term use     Basal cell carcinoma     Bipolar 1 disorder     Bipolar 1 disorder 11/24/2016    bipolar    Cancer     history of skin cancer/sinus cancer & rectal cancer    Depressed bipolar affective disorder     Diabetes mellitus     type 2    Diabetic retinopathy 01/09/2019    EBV infection 12/7/2016    EBV DNA, PCR Latest Ref Range: None detected  None detected EBV DNA-Copies/mL Unknown Test Not Performed EBV Early Antigen Ab, IgG Latest Ref Range: <1:10 Titer 1:40 (A) EBV Nuclear Ag Ab Latest Ref Range: <1:5 Titer >=1:80 (A) EBV VCA IgG Latest Ref Range: <1:10 Titer 1:160 (A) EBV VCA IgM Latest Ref Range: <1:10 Titer <1:10     History of TIA (transient ischemic attack)     several-taking Plavix    Hx of gallstones     Wife denies    Hypertension     Hyperthyroidism 11/30/2016    Low HDL (under 40) 12/7/2016    Mixed hyperlipidemia 11/23/2016    JUAN MANUEL (obstructive sleep apnea)     Pneumonia     Skin disease     Squamous cell carcinoma 08/2018    right temple    Stroke     Transient cerebral ischemia 7/22/2016    Type 2 diabetes mellitus      Past Surgical History:   Procedure Laterality Date    Moh's procedure x4      rectal cancer      Sinus surgery for sinus cancer      sinus sx for cancer      skin cancer removed-several times-nose & ear      TONSILLECTOMY      Undescened testicle sx      UVULOPALATOPHARYNGOPLASTY      for sleep apnea       Discussed with primary team, Dr. Rainey.    Imaging reviewed with Radiology staff, Dr. Yo.     Procedure: N/A    Scheduled Meds:     albumin human 25%  25 g Intravenous Daily    cefTRIAXone (ROCEPHIN) IVPB  1 g Intravenous Q24H    divalproex  500 mg Oral QHS    epoetin mariajose-epbx  100 Units/kg (Order-Specific) Subcutaneous Once per day on Mon Wed Fri    pantoprazole  40 mg Intravenous BID    rifAXIMin  550 mg Oral BID     Continuous Infusions:    octreotide (SANDOSTATIN) infusion 50 mcg/hr (09/05/20 0518)     PRN Meds:acetaminophen, fentaNYL, HYDROcodone-acetaminophen, HYDROcodone-acetaminophen, melatonin, ondansetron, ondansetron, promethazine (PHENERGAN) IVPB, senna-docusate 8.6-50 mg, sodium chloride 0.9%, sodium chloride 0.9%, sodium chloride 0.9%    Allergies:   Review of patient's allergies indicates:   Allergen Reactions    Abilify [aripiprazole] Other (See Comments)     Sleepy, could not function.       Labs:  Recent Labs   Lab 09/04/20  1510   INR 1.3*       Recent Labs   Lab 09/05/20  0355   WBC 2.98*   HGB 5.4*   HCT 17.1*   MCV 93   PLT 63*      Recent Labs   Lab 09/04/20  1510 09/05/20  0355   * 110   * 134*   K 4.5 4.1    109   CO2 19* 17*   BUN 80* 82*   CREATININE 2.9* 2.8*   CALCIUM 8.3* 7.9*   MG 1.6  --    ALT 6*  --    AST 17  --    ALBUMIN 1.9*  --    BILITOT 0.4  --    BILIDIR 0.3  --          Vitals (Most Recent):  Temp: (!) 94 °F (34.4 °C) (09/05/20 1321)  Pulse: 64 (09/05/20 1321)  Resp: 13 (09/05/20 1321)  BP: 106/69 (09/05/20 1321)  SpO2: 97 % (09/05/20 1321)    Plan:   Patient just underwent large volume paracentesis yesterday afternoon (9/4) with removal of 4.2L of clear/straw colored fluid. Unlikely that he would have sufficient volume of fluid to attempt re-peat para so soon. Patient also with coagulopathy that would make a low-volume ascites paracentesis a higher risk. Will defer repeat paracentesis at this time. Consider obtaining limited abdominal ultrasound in a few days to assess for ascites prior to repeat attempt.     Umesh Medrano MD PGY-III  Interventional Radiology  [facility]

## 2020-09-05 NOTE — SUBJECTIVE & OBJECTIVE
Past Medical History:   Diagnosis Date    Anticoagulant long-term use     Basal cell carcinoma     Bipolar 1 disorder     Bipolar 1 disorder 11/24/2016    bipolar    Cancer     history of skin cancer/sinus cancer & rectal cancer    Depressed bipolar affective disorder     Diabetes mellitus     type 2    Diabetic retinopathy 01/09/2019    EBV infection 12/7/2016    EBV DNA, PCR Latest Ref Range: None detected  None detected EBV DNA-Copies/mL Unknown Test Not Performed EBV Early Antigen Ab, IgG Latest Ref Range: <1:10 Titer 1:40 (A) EBV Nuclear Ag Ab Latest Ref Range: <1:5 Titer >=1:80 (A) EBV VCA IgG Latest Ref Range: <1:10 Titer 1:160 (A) EBV VCA IgM Latest Ref Range: <1:10 Titer <1:10     History of TIA (transient ischemic attack)     several-taking Plavix    Hx of gallstones     Wife denies    Hypertension     Hyperthyroidism 11/30/2016    Low HDL (under 40) 12/7/2016    Mixed hyperlipidemia 11/23/2016    JUAN MANUEL (obstructive sleep apnea)     Pneumonia     Skin disease     Squamous cell carcinoma 08/2018    right temple    Stroke     Transient cerebral ischemia 7/22/2016    Type 2 diabetes mellitus        Past Surgical History:   Procedure Laterality Date    Moh's procedure x4      rectal cancer      Sinus surgery for sinus cancer      sinus sx for cancer      skin cancer removed-several times-nose & ear      TONSILLECTOMY      Undescened testicle sx      UVULOPALATOPHARYNGOPLASTY      for sleep apnea       Review of patient's allergies indicates:   Allergen Reactions    Abilify [aripiprazole] Other (See Comments)     Sleepy, could not function.     Family History     Problem Relation (Age of Onset)    Diabetes Son    No Known Problems Daughter        Tobacco Use    Smoking status: Former Smoker     Packs/day: 0.25     Years: 2.00     Pack years: 0.50    Smokeless tobacco: Never Used    Tobacco comment: quit at age 17 less than a pack a day   Substance and Sexual Activity    Alcohol  use: No     Comment: rare    Drug use: No    Sexual activity: Not on file     Review of Systems   Constitutional: Positive for fatigue.   HENT: Negative.    Eyes: Negative.    Respiratory: Negative.    Cardiovascular: Negative.    Gastrointestinal: Positive for blood in stool. Negative for nausea and vomiting.   Endocrine: Negative.    Genitourinary: Negative.    Musculoskeletal: Negative.    Skin: Negative.    Allergic/Immunologic: Negative.    Neurological: Negative.    Hematological: Negative.           Jehovah Witness, refusing blood transfusions.   Psychiatric/Behavioral: Negative.      Objective:     Vital Signs (Most Recent):  Temp: (!) 94.4 °F (34.7 °C) (09/05/20 0830)  Pulse: (!) 58 (09/05/20 1052)  Resp: 17 (09/05/20 0744)  BP: (!) 90/53 (09/05/20 1052)  SpO2: 97 % (09/05/20 0744) Vital Signs (24h Range):  Temp:  [94.4 °F (34.7 °C)-98.9 °F (37.2 °C)] 94.4 °F (34.7 °C)  Pulse:  [57-80] 58  Resp:  [16-18] 17  SpO2:  [95 %-100 %] 97 %  BP: ()/(49-80) 90/53     Weight: 70.9 kg (156 lb 4.9 oz) (09/04/20 2143)  Body mass index is 23.77 kg/m².      Intake/Output Summary (Last 24 hours) at 9/5/2020 1114  Last data filed at 9/5/2020 0518  Gross per 24 hour   Intake 240 ml   Output 800 ml   Net -560 ml       Lines/Drains/Airways     Peripheral Intravenous Line                 Peripheral IV - Single Lumen 09/04/20 1509 18 G Right Antecubital less than 1 day         Peripheral IV - Single Lumen 09/05/20 1015 20 G Left Forearm less than 1 day                Physical Exam    Gen: NAD, lying comfortably  HENT: NCAT, oropharynx clear  Eyes: anicteric sclerae, EOMI grossly  Neck: supple, no visible masses/goiter  Cardiac: RRR, no M/R/G, S1/S2 present  Lungs: CTAB, no crackles, no wheezes  Abd: soft, NT/ND, normoactive BS,   No rebound, no guarding  Rectal:  Normal rectal tone, brown stool  Ext: no LE edema, warm, well perfused  Skin: skin intact on exposed body parts, no visible rashes, lesions  Neuro: A&Ox4, neuro  exam grossly intact, moves all extremities  Psych: appropriate mood, affect      Significant Labs:  CBC:   Recent Labs   Lab 09/04/20  1510 09/05/20  0355   WBC 6.56 2.98*   HGB 6.0* 5.4*   HCT 20.1* 17.1*   PLT 67* 63*     CMP:   Recent Labs   Lab 09/04/20  1510 09/05/20  0355   * 110   CALCIUM 8.3* 7.9*   ALBUMIN 1.9*  --    PROT 6.1  --    * 134*   K 4.5 4.1   CO2 19* 17*    109   BUN 80* 82*   CREATININE 2.9* 2.8*   ALKPHOS 89  --    ALT 6*  --    AST 17  --    BILITOT 0.4  --      Coagulation:   Recent Labs   Lab 09/04/20  1510   INR 1.3*       Significant Imaging:  Imaging results within the past 24 hours have been reviewed.

## 2020-09-05 NOTE — CONSULTS
[facility]  Gastroenterology  Consult Note    Patient Name: Kenneth Sheikh  MRN: [id number]  Admission Date: 9/4/2020  Hospital Length of Stay: 1 days  Code Status: Full Code   Attending Provider: Terrell Rainey MD   Consulting Provider: King Sosa MD  Primary Care Physician: Prisca Espinoza MD  Principal Problem:Acute blood loss anemia    Inpatient consult to Gastroenterology  Consult performed by: King Sosa MD  Consult ordered by: Gabi Kingsley MD        Subjective:     HPI:  80 male  PMH MCDONALD cirrhosis, gastric varices (IGV 1 s/p injection and coil 2017), hepatic encephalopathy, ascites transfer from hepatology Clinic with melena and weakness.  Patient noted two days of weakness and about 3-5 days of melena.  Denies aspirin use or blood thinners.  He is a Baptism, refusing blood transfusions, okay with EPO.   Of note had paracentesis 4.2 L removed yesterday.  Upon arrival to ED, patient was noted to be normotensive and not tachycardic, hemoglobin 6.0 which downtrended to 5.6.  A KI with creatinine 2.9.  Reportedly rectal exam positive for melena.  He was admitted further management, started on octreotide, ceftriaxone, and PPI.    GI consult for further evaluation.  Overnight patient remained hemodynamically stable with systolic blood pressures 100-110.  Upon interview he denied NSAID use, denied fevers, chills, shortness of breath, abdominal pain, chest pain.      Past Medical History:   Diagnosis Date    Anticoagulant long-term use     Basal cell carcinoma     Bipolar 1 disorder     Bipolar 1 disorder 11/24/2016    bipolar    Cancer     history of skin cancer/sinus cancer & rectal cancer    Depressed bipolar affective disorder     Diabetes mellitus     type 2    Diabetic retinopathy 01/09/2019    EBV infection 12/7/2016    EBV DNA, PCR Latest Ref Range: None detected  None detected EBV DNA-Copies/mL Unknown Test Not Performed EBV Early Antigen Ab, IgG Latest  Ref Range: <1:10 Titer 1:40 (A) EBV Nuclear Ag Ab Latest Ref Range: <1:5 Titer >=1:80 (A) EBV VCA IgG Latest Ref Range: <1:10 Titer 1:160 (A) EBV VCA IgM Latest Ref Range: <1:10 Titer <1:10     History of TIA (transient ischemic attack)     several-taking Plavix    Hx of gallstones     Wife denies    Hypertension     Hyperthyroidism 11/30/2016    Low HDL (under 40) 12/7/2016    Mixed hyperlipidemia 11/23/2016    JUAN MANUEL (obstructive sleep apnea)     Pneumonia     Skin disease     Squamous cell carcinoma 08/2018    right temple    Stroke     Transient cerebral ischemia 7/22/2016    Type 2 diabetes mellitus        Past Surgical History:   Procedure Laterality Date    Moh's procedure x4      rectal cancer      Sinus surgery for sinus cancer      sinus sx for cancer      skin cancer removed-several times-nose & ear      TONSILLECTOMY      Undescened testicle sx      UVULOPALATOPHARYNGOPLASTY      for sleep apnea       Review of patient's allergies indicates:   Allergen Reactions    Abilify [aripiprazole] Other (See Comments)     Sleepy, could not function.     Family History     Problem Relation (Age of Onset)    Diabetes Son    No Known Problems Daughter        Tobacco Use    Smoking status: Former Smoker     Packs/day: 0.25     Years: 2.00     Pack years: 0.50    Smokeless tobacco: Never Used    Tobacco comment: quit at age 17 less than a pack a day   Substance and Sexual Activity    Alcohol use: No     Comment: rare    Drug use: No    Sexual activity: Not on file     Review of Systems   Constitutional: Positive for fatigue.   HENT: Negative.    Eyes: Negative.    Respiratory: Negative.    Cardiovascular: Negative.    Gastrointestinal: Positive for blood in stool. Negative for nausea and vomiting.   Endocrine: Negative.    Genitourinary: Negative.    Musculoskeletal: Negative.    Skin: Negative.    Allergic/Immunologic: Negative.    Neurological: Negative.    Hematological: Negative.            Jehovah Witness, refusing blood transfusions.   Psychiatric/Behavioral: Negative.      Objective:     Vital Signs (Most Recent):  Temp: (!) 94.4 °F (34.7 °C) (09/05/20 0830)  Pulse: (!) 58 (09/05/20 1052)  Resp: 17 (09/05/20 0744)  BP: (!) 90/53 (09/05/20 1052)  SpO2: 97 % (09/05/20 0744) Vital Signs (24h Range):  Temp:  [94.4 °F (34.7 °C)-98.9 °F (37.2 °C)] 94.4 °F (34.7 °C)  Pulse:  [57-80] 58  Resp:  [16-18] 17  SpO2:  [95 %-100 %] 97 %  BP: ()/(49-80) 90/53     Weight: 70.9 kg (156 lb 4.9 oz) (09/04/20 2143)  Body mass index is 23.77 kg/m².      Intake/Output Summary (Last 24 hours) at 9/5/2020 1114  Last data filed at 9/5/2020 0518  Gross per 24 hour   Intake 240 ml   Output 800 ml   Net -560 ml       Lines/Drains/Airways     Peripheral Intravenous Line                 Peripheral IV - Single Lumen 09/04/20 1509 18 G Right Antecubital less than 1 day         Peripheral IV - Single Lumen 09/05/20 1015 20 G Left Forearm less than 1 day                Physical Exam    Gen: NAD, lying comfortably  HENT: NCAT, oropharynx clear  Eyes: anicteric sclerae, EOMI grossly  Neck: supple, no visible masses/goiter  Cardiac: RRR, no M/R/G, S1/S2 present  Lungs: CTAB, no crackles, no wheezes  Abd: soft, NT/ND, normoactive BS,   No rebound, no guarding  Rectal:  Normal rectal tone, brown stool  Ext: no LE edema, warm, well perfused  Skin: skin intact on exposed body parts, no visible rashes, lesions  Neuro: A&Ox4, neuro exam grossly intact, moves all extremities  Psych: appropriate mood, affect      Significant Labs:  CBC:   Recent Labs   Lab 09/04/20  1510 09/05/20  0355   WBC 6.56 2.98*   HGB 6.0* 5.4*   HCT 20.1* 17.1*   PLT 67* 63*     CMP:   Recent Labs   Lab 09/04/20  1510 09/05/20  0355   * 110   CALCIUM 8.3* 7.9*   ALBUMIN 1.9*  --    PROT 6.1  --    * 134*   K 4.5 4.1   CO2 19* 17*    109   BUN 80* 82*   CREATININE 2.9* 2.8*   ALKPHOS 89  --    ALT 6*  --    AST 17  --    BILITOT 0.4  --       Coagulation:   Recent Labs   Lab 09/04/20  1510   INR 1.3*       Significant Imaging:  Imaging results within the past 24 hours have been reviewed.    Assessment/Plan:     Gastric varices  Suspected Variceal GI Bleed  Decompensated Cirrhosis MCDONALD  Management:  -Place patient NPO  -Place 2 large bore IVs, volume resuscitation per primary  -pRBC transfusions as needed, being careful to maintain Hb about 7-8 g/dL, but avoiding excessive transfusion to prevent over distention and aggravation of bleeding varix.  -Start Octreotide:  mcg IV bolus (usual bolus dose: 50 mcg) for suspected variceal bleed ; follow by continuous IV infusion of 25-50 mcg/hr for 3-5 days after confirmation of variceal bleed.  -Correct coagulopathy (goal plt >50, INR <1.5) if present in patients without absolute contraindications.  -Start antibiotic prophylaxis: Ceftriaxone 1gm q24h initially (Other option includes: Ciprofloxacin)  -PPI 80 mg IV bolus once, then 8 mg/hour infusion or IV PPI 40 BID  -Avoid nonsteroidal agents, antiplatelet agents and anticoagulants if possible in patients without absolute contraindications  -EGD today  -Consult IR given history of gastric varices  -Beta blockers after acute bleed has resolved s/p octreotide X72 hours (secondary prophylaxis)    Thank you for involving us in the care of Kenneth Sheikh. Please call with any additional questions, concerns or changes in the patient's clinical status.            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 I will follow-up with patient. Please contact us if you have any additional questions.    King Sosa MD  Gastroenterology  [facility]

## 2020-09-05 NOTE — HPI
Kenneth Sheikh is a 80 y.o. 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 Patient was recently seen in Hepatology clinic 9/4 at which time there was concern for GIB along with continued clinical deterioration, and it was recommended to present to ED. Patient reports black stools for the past few days. He has not had abdominal pain, nausea or vomiting, fever/ chills. He denies being on blood thinners and has not taken OTC medications. He gets weekly paracentesis, the last of which was on on 9/4 at which time 4.2 L removed (fluid does not appear to have been sent for diagnostic testing). Of note, patient is a Religious and does not want to receive blood transfusions but is ok with albumin. In the ED, vitals were temp 98.9F, HR 80 and /80.  Labs significant for Hb 6, Platelet 67, Creatinine 2.9 and INR 1.3.  He was given 500cc bolus and was admitted to hospital medicine. Hepatology consulted for assistance with management.

## 2020-09-05 NOTE — PT/OT/SLP PROGRESS
Occupational Therapy      Patient Name:  Kenneth Sheikh   MRN:  [id number]    Patient not seen today secondary to critical Hemoglobin level 5.4, medical instability. Hypotensive, hypothermic, bradycardic.9/5/2020 Will f/u 09/06/2020

## 2020-09-05 NOTE — ASSESSMENT & PLAN NOTE
80 y.o. male medical history of MCDONALD Cirrhosis (h/o Ascites with weekly paracenteses - last 9/4 4.2L removed, Gastric Varices 2/2017), Oriental orthodox, who presents with weakness black stools, anemia (Hb 6 from 10.6 previously), BELLA on CKD (Cr 2.9).    Recommendations:   - GIB: GI evaluation. Continue Protonix. Continue octreotide gtt for now.   - BELLA: Agree with albumin challenge. Urine studies not consistent with HRS.   - Ascites: Unfortunately ascitic fluid (9/4) does not appear to have been sent for diagnostic studies. Continue Rocephin in setting of GIB  - Rec Palliative care consult - was discussed in clinic and wife agreeable to this.   - 2 gm Na restriction. Hold diuretics.  - Continue rifaxamin  - Please obtain daily CMP, INR

## 2020-09-05 NOTE — TRANSFER OF CARE
"Anesthesia Transfer of Care Note    Patient: Kenneth Sheikh    Procedure(s) Performed: Procedure(s) (LRB):  EGD (ESOPHAGOGASTRODUODENOSCOPY) (N/A)    Patient location: PACU    Anesthesia Type: general    Transport from OR: Transported from OR on 6-10 L/min O2 by face mask with adequate spontaneous ventilation    Post pain: adequate analgesia    Post assessment: no apparent anesthetic complications    Post vital signs: stable    Level of consciousness: sedated    Nausea/Vomiting: no nausea/vomiting    Complications: none    Transfer of care protocol was followed      Last vitals:   Visit Vitals  BP (!) 146/64 (BP Location: Left arm, Patient Position: Lying)   Pulse 76   Temp 36.6 °C (97.9 °F) (Temporal)   Resp 16   Ht 5' 8" (1.727 m)   Wt 70.9 kg (156 lb 4.9 oz)   SpO2 99%   BMI 23.77 kg/m²     "

## 2020-09-05 NOTE — [employer] PATIENT INSTRUCTIONS
Discharge Summary/Instructions after an Endoscopic Procedure  Patient Name: Kenneth Sheikh  Patient MRN: [id number]  Patient YOB: 1940 Saturday, September 5, 2020  Denita Escalona MD  RESTRICTIONS:  During your procedure today, you received medications for sedation.  These   medications may affect your judgment, balance and coordination.  Therefore,   for 24 hours, you have the following restrictions:   - DO NOT drive a car, operate machinery, make legal/financial decisions,   sign important papers or drink alcohol.    ACTIVITY:  Today: no heavy lifting, straining or running due to procedural   sedation/anesthesia.  The following day: return to full activity including work.  DIET:  Eat and drink normally unless instructed otherwise.     TREATMENT FOR COMMON SIDE EFFECTS:  - Mild abdominal pain, nausea, belching, bloating or excessive gas:  rest,   eat lightly and use a heating pad.  - Sore Throat: treat with throat lozenges and/or gargle with warm salt   water.  - Because air was used during the procedure, expelling large amounts of air   from your rectum or belching is normal.  - If a bowel prep was taken, you may not have a bowel movement for 1-3 days.    This is normal.  SYMPTOMS TO WATCH FOR AND REPORT TO YOUR PHYSICIAN:  1. Abdominal pain or bloating, other than gas cramps.  2. Chest pain.  3. Back pain.  4. Signs of infection such as: chills or fever occurring within 24 hours   after the procedure.  5. Rectal bleeding, which would show as bright red, maroon, or black stools.   (A tablespoon of blood from the rectum is not serious, especially if   hemorrhoids are present.)  6. Vomiting.  7. Weakness or dizziness.  GO DIRECTLY TO THE NEAREST EMERGENCY ROOM IF YOU HAVE ANY OF THE FOLLOWING:      Difficulty breathing              Chills and/or fever over 101 F   Persistent vomiting and/or vomiting blood   Severe abdominal pain   Severe chest pain   Black, tarry stools   Bleeding- more than one tablespoon   Any  other symptom or condition that you feel may need urgent attention  Your doctor recommends these additional instructions:  If any biopsies were taken, your doctors clinic will contact you in 1 to 2   weeks with any results.  - Return patient to hospital [facility] for ongoing care.   - NPO.   - Use a proton pump gtt x 72 hours, than 40mg BID.    - Continue octreotide gtt and antibiotics  - Repeat upper endoscopy in 8-12 weeks to assure healing of esophageal   ulcer, biopsy duodenal polyps.   - Will discuss treatment of gastric varices with AES.   For questions, problems or results please call your physician - Denita Escalona MD at Work:  [phone].  [facility], EMERGENCY ROOM PHONE NUMBER: [phone]  IF A COMPLICATION OR EMERGENCY SITUATION ARISES AND YOU ARE UNABLE TO REACH   YOUR PHYSICIAN - GO DIRECTLY TO THE EMERGENCY ROOM.  Denita Escalona MD  9/5/2020 12:08:12 PM  This report has been verified and signed electronically.  [employer]

## 2020-09-05 NOTE — NURSING TRANSFER
Nursing Transfer Note      9/5/2020     Transfer To: [id number]    Transfer via stretcher    Transfer with cardiac monitoring    Transported by pct

## 2020-09-05 NOTE — PROGRESS NOTES
[facility] Hospital Medicine                                                                     Progress Note     Team: [facility] A Terrell Rainey MD   Admit Date: 9/4/2020   Hospital Day: 1  KEILY: 9/7/2020   Code status: Full Code   Principal Problem: Acute blood loss anemia     SUMMARY:     Mr. Kenneth Sheikh is a 80 y.o. male with MCDONALD cirrhosis, complicated by gastric varices, hepatic encephalopathy, ascites requiring weekly paracenteses, and coagulopathy who presents to the ER from Hepatology clinic for evaluation of weakness and melena.  He mentions that he has been feeling weak and fatigued the last 2 days, and the day prior to admission he started having melena.  He denies the use of any blood thinners or Aspirin.  He is a Buddhist, and does not want to receive blood transfusions, but he is ok receiving blood stimulators or fractionated blood products - just not whole blood.  He endorses some chills, but no fevers, chest pain, or shortness of breath.  Of note, he just had a paracentesis on the day of admission that removed 4.2L.     Upon arrival to the ER, vitals were temp 98.9F, HR 80 and /80.  Labs showed Hemoglobin 6, Platelet 67, Creatinine 2.9 and INR 1.3.  ESTEFANÍA was positive.  He was given 500cc bolus and was admitted to Hospital Medicine for further management.    Admitted to hospital medicine for acute UGIB with hx of gastric varices and ulcers. Started on supportive tx octreotide, PPI, ceftriaxone and albumin. GI consulted. S/p EGD 9/5 with LA Grade B esophagitis, Non-bleeding esophageal ulcer, Small (< 5 mm) esophageal varice, Portal hypertensive gastropathy. Type 1 isolated gastric varices (IGV1, varices located in the fundus), previously treated and without bleeding, Multiple duodenal polyps. GI rec clear liquid diet for now, repeat  EGD 8-12 weeks to assure healing of esophageal ulcer and biopsy duodenal polyps, and discuss with AES regarding gastric varices.    He remains on IV PPI, octreotide, ceftriaxone at this time.    SUBJECTIVE:     Pt was seen and examined at bedside. Admitted overnight. No acute events overnight. BP soft. Denied any recent NSAIDS use. No BM since overnight. No fevers or abdominal pain. Weak and pale. NPO for EGD today.       ROS (Positive in Bold, otherwise negative)  Pain Scale: 0 /10   Constitutional: fever, chills, night sweats, weakness, fatigue  CV: chest pain, edema, palpitations  Resp: SOB, cough, sputum production  GI: changes in appetite, NVDC, pain, melena, hematochezia, GERD, hematemesis  : Dysuria, hematuria, urinary urgency, frequency  MSK: arthralgia/myalgia, joint swelling  SKIN: rashes, pruritis, petechiae   Neuro/Psych: FND, anxiety, depression      OBJECTIVE:     Vitals:  Temp:  [94 °F (34.4 °C)-98.1 °F (36.7 °C)]   Pulse:  [57-79]   Resp:  [11-18]   BP: ()/(50-69)   SpO2:  [94 %-100 %]      I & O (Last 24H):     Intake/Output Summary (Last 24 hours) at 9/5/2020 1710  Last data filed at 9/5/2020 1640  Gross per 24 hour   Intake 240 ml   Output 1175 ml   Net -935 ml         GEN: chronically ill appearing male  in no acute distress. Appears pale. Resting in bed.  HEENT: NCAT. PERRL. EOMI. Conjunctivae/corneas clear, sclera Anicteric.  CVS: RRR. Normal s1 s2 no murmur, click, rub or gallop  LUNG: CTAB. Normal respiratory effort. No wheezes, rhonchi, or crackles.  ABD: Normoactive BS, soft, NT, mildly distended, no masses or organomegaly.  EXT: +1 LE edema. No cyanosis. Full ROM.  SKIN: pale and cold  NEURO: Alert, oriented x 4, Spont mvt of all extremities with no focal deficits noted.      All recent labs and imaging has been reviewed.     Recent Results (from the past 24 hour(s))   CBC auto differential    Collection Time: 09/05/20  3:55 AM   Result Value Ref Range    WBC 2.98 (L) 3.90 - 12.70  K/uL    RBC 1.83 (L) 4.60 - 6.20 M/uL    Hemoglobin 5.4 (LL) 14.0 - 18.0 g/dL    Hematocrit 17.1 (LL) 40.0 - 54.0 %    Mean Corpuscular Volume 93 82 - 98 fL    Mean Corpuscular Hemoglobin 29.5 27.0 - 31.0 pg    Mean Corpuscular Hemoglobin Conc 31.6 (L) 32.0 - 36.0 g/dL    RDW 18.3 (H) 11.5 - 14.5 %    Platelets 63 (L) 150 - 350 K/uL    MPV 10.6 9.2 - 12.9 fL    Immature Granulocytes 0.7 (H) 0.0 - 0.5 %    Gran # (ANC) 1.6 (L) 1.8 - 7.7 K/uL    Immature Grans (Abs) 0.02 0.00 - 0.04 K/uL    Lymph # 1.0 1.0 - 4.8 K/uL    Mono # 0.4 0.3 - 1.0 K/uL    Eos # 0.0 0.0 - 0.5 K/uL    Baso # 0.01 0.00 - 0.20 K/uL    nRBC 0 0 /100 WBC    Gran% 54.3 38.0 - 73.0 %    Lymph% 31.9 18.0 - 48.0 %    Mono% 12.1 4.0 - 15.0 %    Eosinophil% 0.7 0.0 - 8.0 %    Basophil% 0.3 0.0 - 1.9 %    Platelet Estimate Decreased (A)     Aniso Slight     Poly Occasional     Hypo Moderate     Differential Method Automated    Basic metabolic panel    Collection Time: 09/05/20  3:55 AM   Result Value Ref Range    Sodium 134 (L) 136 - 145 mmol/L    Potassium 4.1 3.5 - 5.1 mmol/L    Chloride 109 95 - 110 mmol/L    CO2 17 (L) 23 - 29 mmol/L    Glucose 110 70 - 110 mg/dL    BUN, Bld 82 (H) 8 - 23 mg/dL    Creatinine 2.8 (H) 0.5 - 1.4 mg/dL    Calcium 7.9 (L) 8.7 - 10.5 mg/dL    Anion Gap 8 8 - 16 mmol/L    eGFR if African American 23.2 (A) >60 mL/min/1.73 m^2    eGFR if non  20.1 (A) >60 mL/min/1.73 m^2   Iron and TIBC    Collection Time: 09/05/20  3:55 AM   Result Value Ref Range    Iron 18 (L) 45 - 160 ug/dL    Transferrin 182 (L) 200 - 375 mg/dL    TIBC 269 250 - 450 ug/dL    Saturated Iron 7 (L) 20 - 50 %   Ferritin    Collection Time: 09/05/20  3:55 AM   Result Value Ref Range    Ferritin 69 20.0 - 300.0 ng/mL   Sodium, urine, random    Collection Time: 09/05/20  7:55 AM   Result Value Ref Range    Sodium Urine Random 25 20 - 250 mmol/L   Urea Nitrogen, Random Urine    Collection Time: 09/05/20  7:55 AM   Result Value Ref Range    Urine  Urea Nitrogen, Random 515 140 - 1050 mg/dL   Creatinine, urine, random    Collection Time: 09/05/20  7:55 AM   Result Value Ref Range    Creatinine, Random Ur 37.0 23.0 - 375.0 mg/dL   Protein / creatinine ratio, urine    Collection Time: 09/05/20  7:55 AM   Result Value Ref Range    Protein, Urine Random <7 0 - 15 mg/dL    Creatinine, Random Ur 37.0 23.0 - 375.0 mg/dL    Prot/Creat Ratio, Ur Unable to calculate 0.00 - 0.20   Fontanez's Stain, Urine Random    Collection Time: 09/05/20  7:55 AM   Result Value Ref Range    Fontanez's Stain, Ur No eosinophils seen No eosinophils seen   POCT glucose    Collection Time: 09/05/20  4:44 PM   Result Value Ref Range    POCT Glucose 132 (H) 70 - 110 mg/dL       Recent Labs   Lab 09/05/20  1644   POCTGLUCOSE 132*       Hemoglobin A1C   Date Value Ref Range Status   05/15/2020 6.6 (H) 4.0 - 5.6 % Final     Comment:     ADA Screening Guidelines:  5.7-6.4%  Consistent with prediabetes  >or=6.5%  Consistent with diabetes  High levels of fetal hemoglobin interfere with the HbA1C  assay. Heterozygous hemoglobin variants (HbS, HgC, etc)do  not significantly interfere with this assay.   However, presence of multiple variants may affect accuracy.     01/28/2020 6.8 (H) 4.0 - 5.6 % Final     Comment:     ADA Screening Guidelines:  5.7-6.4%  Consistent with prediabetes  >or=6.5%  Consistent with diabetes  High levels of fetal hemoglobin interfere with the HbA1C  assay. Heterozygous hemoglobin variants (HbS, HgC, etc)do  not significantly interfere with this assay.   However, presence of multiple variants may affect accuracy.     06/14/2019 7.3 (H) 4.0 - 5.6 % Final     Comment:     ADA Screening Guidelines:  5.7-6.4%  Consistent with prediabetes  >or=6.5%  Consistent with diabetes  High levels of fetal hemoglobin interfere with the HbA1C  assay. Heterozygous hemoglobin variants (HbS, HgC, etc)do  not significantly interfere with this assay.   However, presence of multiple variants may affect  accuracy.          Active Hospital Problems    Diagnosis  POA    *Acute blood loss anemia [D62]  Yes    Coagulopathy [D68.9]  Yes    Thrombocytopenia [D69.6]  Yes    Gastrointestinal hemorrhage with melena [K92.1]  Yes    Refusal of blood transfusions as patient is Protestant [Z53.1]  Not Applicable     He is ok receiving fractioned blood products and blood stimulators, just not whole blood.     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          ASSESSMENT AND PLAN:     GI Hemorrhage  Acute Blood Loss Anemia  Gastric Varices  - Hgb 6 on admit, baseline around 10 in May  - Start Protonix 40mg IV BID  - GI consulted, appreciate assistance - they are aware of the patient and the high risk for deterioration given Protestant  - Start Ceftriaxone 1g IV q24h for SBP prophylaxis - total 5 days  - Start Octreotide gtt, continue 72 hrs from EGD  - PPI IV changed to drip as per GI, continue 72 hrs from EGD  - S/p EGD 9/5 with LA Grade B esophagitis, Non-bleeding esophageal ulcer, Small (< 5 mm) esophageal varice, Portal hypertensive gastropathy. Type 1 isolated gastric varices (IGV1, varices located in the fundus), previously treated and without bleeding, Multiple duodenal polyps  - GI rec clear liquid diet for now, repeat EGD 8-12 weeks to assure healing of esophageal ulcer and biopsy duodenal polyps, and discuss with AES regarding gastric varices   - EPO and iron infusion  - Low threshold for ICU if bleeds or persistently hypotensive     Refusal of Blood Products as Protestant  - Does not want to receive blood transfusions, but he is ok  receiving blood stimulators or fractionated blood products - just not whole blood  - Lab draws daily with pediatric tubes  - Epo injection and iron infusion     MCDONALD Cirrhosis  - MELD-Na score: 22 at 9/4/2020  3:10 PM  - MELD score: 20 at 9/4/2020  3:10 PM  - Calculated from:  Serum Creatinine: 2.9 mg/dL at 9/4/2020  3:10 PM  Serum Sodium: 134 mmol/L at 9/4/2020  3:10 PM  Total Bilirubin: 0.4 mg/dL (Rounded to 1 mg/dL) at 9/4/2020  3:10 PM  INR(ratio): 1.3 at 9/4/2020  3:10 PM  Age: 80 years 7 months   Hepatology consult  Hold Lasix with anemia and softer BP     Hepatic Encephalopathy  - Ammonia 54  - Rifaximin 550mg PO BID  - Start Zinc 220mg PO daily     Acute Renal Failure on CKD Stage 3  - Creatinine 2.9, baseline around 1-2  - Albumin for resuscitation given ascites and edema  - urine studies not consistent with HRS  - Can consider Nephrology eval if worsening      Thrombocytopenia  Coagulopathy  - Chronic issue 2/2 liver disease  - Monitor for continued bleeding     Bipolar 1 Disorder  - Chronic issue  - Continue Depakote 500mg PO qHS          Prophylaxis- SCDs  Code Status- Full Code   Discharge plan and follow up - d/c home once medically stable    Discharge Planning   KEILY: 9/7/2020     Code Status: Full Code   Is the patient medically ready for discharge?:     Reason for patient still in hospital (select all that apply): Patient unstable         Terrell Rainey MD  Hospital Medicine Staff  Pager [phone]

## 2020-09-05 NOTE — ANESTHESIA PREPROCEDURE EVALUATION
[facility]  Anesthesia Pre-Operative Evaluation         Patient Name: Kenneth Sheikh  YOB: 1940  MRN: [id number]    SUBJECTIVE:     Pre-operative evaluation for Procedure(s) (LRB):  EGD (ESOPHAGOGASTRODUODENOSCOPY) (N/A)     09/05/2020    Kenneth Sheikh is a 80 y.o. male w/ a significant PMHx of HTN, DM2, Hx/o TIA and CVA, hyperthyroidism, MCDONALD cirrhosis, complicated by gastric varices, hepatic encephalopathy, ascites requiring weekly paracenteses, and coagulopathy who presents to the ER from Hepatology clinic for evaluation of weakness and melena.  He is a Jehovah's witness, and does not want to receive blood transfusions, but he is ok receiving blood stimulators or fractionated blood products - just not whole blood.   Hgb this morning 5.4, concern for variceal bleed. Plan for urgent EGD.  Hospital course also complicated by BELLA on CKD.     Appropriately NPO    Patient now presents for the above procedure(s).      LDA:   Right AC 18G PIV, Left forearm 20G PIV    Prev airway: Easy mask with oral airway. Grade I view on DL, ETT placed without complication.     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)    Gastric varices    Fall at home    History of rectal cancer    Chronic midline low back pain without sciatica    History of SCC (squamous cell carcinoma) of skin    Hepatic encephalopathy    Abdominal aortic atherosclerosis    Sinus  bradycardia    Chronic diarrhea    Pancreatic insufficiency    Acute renal failure superimposed on stage 3 chronic kidney disease    Acute blood loss anemia    Coagulopathy    Thrombocytopenia    Gastrointestinal hemorrhage with melena    Refusal of blood transfusions as patient is Temple       Review of patient's allergies indicates:   Allergen Reactions    Abilify [aripiprazole] Other (See Comments)     Sleepy, could not function.       Current Inpatient Medications:   albumin human 25%  25 g Intravenous Daily    cefTRIAXone (ROCEPHIN) IVPB  1 g Intravenous Q24H    divalproex  500 mg Oral QHS    epoetin mariajose-epbx  100 Units/kg (Order-Specific) Subcutaneous Once per day on Mon Wed Fri    pantoprazole  40 mg Intravenous BID    rifAXIMin  550 mg Oral BID       No current facility-administered medications on file prior to encounter.      Current Outpatient Medications on File Prior to Encounter   Medication Sig Dispense Refill    ACCU-CHEK NEYDA PLUS TEST STRP Strp CHECK BLOOD SUGAR ONCE DAILY (Patient not taking: Reported on 8/11/2020) 100 strip 11    ciprofloxacin HCl (CIPRO) 500 MG tablet TAKE 1 TABLET(500 MG) BY MOUTH EVERY MONDAY 12 tablet 0    divalproex (DEPAKOTE) 250 MG EC tablet Take 2 tablets (500 mg total) by mouth every evening. 180 tablet 3    furosemide (LASIX) 20 MG tablet TAKE 2 TABLETS BY MOUTH EVERY  tablet 6    lactulose (CHRONULAC) 10 gram/15 mL solution TAKE 15 MLS BY MOUTH TWICE DAILY (Patient not taking: Reported on 6/24/2020) 946 mL 6    lancets Misc To check blood sugar once daily, to use with Accu-Chek meter. (Patient not taking: Reported on 8/11/2020) 90 each 3    LIPASE-PROTEASE-AMYLASE 5,000-17,000 -24,000 UNITS (ZENPEP) 5,000-17,000- 24,000 unit CpDR Take 1 capsule by mouth 3 (three) times daily with meals. (Patient taking differently: Take 1 capsule by mouth 2 (two) times daily with meals. ) 90 capsule 11    loperamide (IMODIUM) 2 mg capsule Take  1 capsule (2 mg total) by mouth 2 (two) times daily as needed for Diarrhea. 60 capsule 3    potassium chloride SA (K-DUR,KLOR-CON) 20 MEQ tablet TAKE 1 TABLET(20 MEQ) BY MOUTH TWICE DAILY 180 tablet 1    psyllium (METAMUCIL) packet Take 1 packet by mouth once daily.      psyllium (METAMUCIL) powder Take 1 packet by mouth as needed. Pt states he takes a teaspoon twice a day      SITagliptan (JANUVIA) 50 MG Tab Take 1 tablet (50 mg total) by mouth once daily. 90 tablet 3    XIFAXAN 550 mg Tab Take 1 tablet (550 mg total) by mouth 2 (two) times daily. 180 tablet 11       Past Surgical History:   Procedure Laterality Date    Moh's procedure x4      rectal cancer      Sinus surgery for sinus cancer      sinus sx for cancer      skin cancer removed-several times-nose & ear      TONSILLECTOMY      Undescened testicle sx      UVULOPALATOPHARYNGOPLASTY      for sleep apnea       Social History     Socioeconomic History    Marital status:      Spouse name: Not on file    Number of children: Not on file    Years of education: Not on file    Highest education level: Not on file   Occupational History    Not on file   Social Needs    Financial resource strain: Not on file    Food insecurity     Worry: Not on file     Inability: Not on file    Transportation needs     Medical: Not on file     Non-medical: Not on file   Tobacco Use    Smoking status: Former Smoker     Packs/day: 0.25     Years: 2.00     Pack years: 0.50    Smokeless tobacco: Never Used    Tobacco comment: quit at age 17 less than a pack a day   Substance and Sexual Activity    Alcohol use: No     Comment: rare    Drug use: No    Sexual activity: Not on file   Lifestyle    Physical activity     Days per week: Not on file     Minutes per session: Not on file    Stress: Not on file   Relationships    Social connections     Talks on phone: Not on file     Gets together: Not on file     Attends Scientologist service: Not on file      Active member of club or organization: Not on file     Attends meetings of clubs or organizations: Not on file     Relationship status: Not on file   Other Topics Concern    Not on file   Social History Narrative    Not on file       OBJECTIVE:     Vital Signs Range (Last 24H):  Temp:  [34.7 °C (94.4 °F)-37.2 °C (98.9 °F)]   Pulse:  [57-80]   Resp:  [16-18]   BP: ()/(49-80)   SpO2:  [95 %-100 %]       Significant Labs:  Lab Results   Component Value Date    WBC 2.98 (L) 09/05/2020    HGB 5.4 (LL) 09/05/2020    HCT 17.1 (LL) 09/05/2020    PLT 63 (L) 09/05/2020    CHOL 119 (L) 01/28/2020    TRIG 92 01/28/2020    HDL 38 (L) 01/28/2020    ALT 6 (L) 09/04/2020    AST 17 09/04/2020     (L) 09/05/2020    K 4.1 09/05/2020     09/05/2020    CREATININE 2.8 (H) 09/05/2020    BUN 82 (H) 09/05/2020    CO2 17 (L) 09/05/2020    TSH 1.248 05/15/2020    PSA 0.48 01/28/2020    INR 1.3 (H) 09/04/2020    HGBA1C 6.6 (H) 05/15/2020       EKG:   bradycardia with Possible Premature  atrial complexes with Aberrant conduction    2D ECHO: 3/15/2017  TTE:  CONCLUSIONS     1 - Normal left ventricular systolic function (EF 60-65%).     2 - Concentric remodeling.     3 - Normal left ventricular diastolic function.     4 - Normal right ventricular systolic function .     5 - Mild left atrial enlargement.     6 - Trivial mitral regurgitation.     7 - Trivial to mild tricuspid regurgitation.     8 - No evidence of intracardiac shunt.     9 - The estimated PA systolic pressure is 29 mmHg.    ASSESSMENT/PLAN:           Anesthesia Evaluation    I have reviewed the Patient Summary Reports.    I have reviewed the Nursing Notes.    I have reviewed the Medications.     Review of Systems  Anesthesia Hx:  No problems with previous Anesthesia  History of prior surgery of interest to airway management or planning: Previous anesthesia: General Denies Family Hx of Anesthesia complications.   Denies Personal Hx of Anesthesia complications.    Social:  Non-Smoker    Hematology/Oncology:  Hematology Normal       -- Cancer in past history:  surgery  Oncology Comments: Skin     Cardiovascular:   Exercise tolerance: poor Hypertension hyperlipidemia    Pulmonary:   Shortness of breath Sleep Apnea JUAN MANUEL resolved with weight loss   Renal/:  Renal/ Normal     Hepatic/GI:   Liver Disease, Cirrhosis   Musculoskeletal:  Musculoskeletal Normal    Neurological:   TIA, CVA    Endocrine:   Diabetes, type 2 Hyperthyroidism    Dermatological:  Skin Normal    Psych:   Psychiatric History depression Bipolar I         Physical Exam  General:  Malnutrition    Airway/Jaw/Neck:  Airway Findings: Mouth Opening: Normal Tongue: Normal  General Airway Assessment: Adult, Average  Mallampati: II  Improves to I with phonation.  TM Distance: 4 - 6 cm  Jaw/Neck Findings:  Neck ROM: Normal ROM     Eyes/Ears/Nose:  EYES/EARS/NOSE FINDINGS: Normal   Dental:  Dental Findings: upper front caps   Chest/Lungs:  Chest/Lungs Findings: Clear to auscultation, Normal Respiratory Rate     Heart/Vascular:  Heart Findings: Rate: Normal  Rhythm: Regular Rhythm  Sounds: Normal  Heart murmur: negative Vascular Findings: Normal    Abdomen:  Abdomen Findings: Normal    Musculoskeletal:  Musculoskeletal Findings: Normal   Skin:  Skin Findings: Normal    Mental Status:  Mental Status Findings:  Cooperative, Alert and Oriented         Anesthesia Plan  Type of Anesthesia, risks & benefits discussed:  Anesthesia Type:  general, MAC  Patient's Preference:   Intra-op Monitoring Plan: standard ASA monitors  Intra-op Monitoring Plan Comments:   Post Op Pain Control Plan: per primary service following discharge from PACU, IV/PO Opioids PRN and multimodal analgesia  Post Op Pain Control Plan Comments:   Induction:   IV  Beta Blocker:         Informed Consent: Patient understands risks and agrees with Anesthesia plan.  Questions answered. Anesthesia consent signed with patient.  ASA Score: 4  emergent   Day of  Surgery Review of History & Physical:    H&P update referred to the surgeon.         Ready For Surgery From Anesthesia Perspective.

## 2020-09-05 NOTE — CARE UPDATE
Rapid Response Nurse Chart Check     Chart check completed, abnormal VS noted. bedside RNJoel contacted. Pt with rectal temp 94.4, jeannette 50s, BP soft at 80/60s. Pt Anabaptist and refusing whole blood products 2/2 Voodoo beliefs. H/H 5.4/17.1. GI consulted. Mac howard applied per COURTNEY Maldonado. Instructed to call [phone] for further concerns or assistance.

## 2020-09-05 NOTE — PT/OT/SLP PROGRESS
Physical Therapy      Patient Name:  Kenneth Sheikh   MRN:  [id number]    Patient not seen today secondary to Other (Comment)(patient with critical Hemoglobin level 5.4, medical instability). Hypotensive, hypothermic, bradycardic. Alerted RN to PT hold this date. Will follow-up as appropriate.    Raven Reno, PT

## 2020-09-05 NOTE — HPI
80 male  PMH MCDONALD cirrhosis, gastric varices (IGV 1 s/p injection and coil 2017), hepatic encephalopathy, ascites transfer from hepatology Clinic with melena and weakness.  Patient noted two days of weakness and about 3-5 days of melena.  Denies aspirin use or blood thinners.  He is a Bahai, refusing blood transfusions, okay with EPO.   Of note had paracentesis 4.2 L removed yesterday.  Upon arrival to ED, patient was noted to be normotensive and not tachycardic, hemoglobin 6.0 which downtrended to 5.6.  A KI with creatinine 2.9.  Reportedly rectal exam positive for melena.  He was admitted further management, started on octreotide, ceftriaxone, and PPI.      GI consult for further evaluation.  Overnight patient remained hemodynamically stable with systolic blood pressures 100-110.  Upon interview he denied NSAID use, denied fevers, chills, shortness of breath, abdominal pain, chest pain.

## 2020-09-05 NOTE — NURSING
Team A notified of critical lab value.Pt refuses transfusing due to Anabaptism Belief  MD 's plan to discuss status with pt on rounding.

## 2020-09-05 NOTE — ANESTHESIA PROCEDURE NOTES
Intubation  Performed by: Sravani Cabello CRNA  Authorized by: Esdras Carbajal MD     Intubation:     Induction:  Intravenous    Intubated:  Postinduction    Mask Ventilation:  N/a    Attempts:  1    Attempted By:  CRNA    Method of Intubation:  Direct    Blade:  Holloway 2    Laryngeal View Grade: Grade I - full view of chords      Difficult Airway Encountered?: No      Complications:  None    Airway Device:  Oral endotracheal tube    Airway Device Size:  7.5    Style/Cuff Inflation:  Cuffed (inflated to minimal occlusive pressure)    Tube secured:  24    Secured at:  The lips    Placement Verified By:  Capnometry    Complicating Factors:  Anterior larynx and large/floppy epiglottis    Findings Post-Intubation:  BS equal bilateral

## 2020-09-05 NOTE — SUBJECTIVE & OBJECTIVE
Review of Systems   Constitutional: Positive for fatigue. Negative for chills and fever.   Respiratory: Negative for cough, chest tightness and shortness of breath.    Cardiovascular: Negative for chest pain and leg swelling.   Gastrointestinal: Positive for abdominal distention and blood in stool. Negative for abdominal pain, diarrhea, nausea and vomiting.   Genitourinary: Negative for dysuria, frequency and urgency.   Musculoskeletal: Negative for arthralgias and myalgias.   Neurological: Positive for weakness. Negative for dizziness, syncope and light-headedness.   Psychiatric/Behavioral: Negative for agitation and confusion.       Past Medical History:   Diagnosis Date    Anticoagulant long-term use     Basal cell carcinoma     Bipolar 1 disorder     Bipolar 1 disorder 11/24/2016    bipolar    Cancer     history of skin cancer/sinus cancer & rectal cancer    Depressed bipolar affective disorder     Diabetes mellitus     type 2    Diabetic retinopathy 01/09/2019    EBV infection 12/7/2016    EBV DNA, PCR Latest Ref Range: None detected  None detected EBV DNA-Copies/mL Unknown Test Not Performed EBV Early Antigen Ab, IgG Latest Ref Range: <1:10 Titer 1:40 (A) EBV Nuclear Ag Ab Latest Ref Range: <1:5 Titer >=1:80 (A) EBV VCA IgG Latest Ref Range: <1:10 Titer 1:160 (A) EBV VCA IgM Latest Ref Range: <1:10 Titer <1:10     History of TIA (transient ischemic attack)     several-taking Plavix    Hx of gallstones     Wife denies    Hypertension     Hyperthyroidism 11/30/2016    Low HDL (under 40) 12/7/2016    Mixed hyperlipidemia 11/23/2016    JUAN MANUEL (obstructive sleep apnea)     Pneumonia     Skin disease     Squamous cell carcinoma 08/2018    right temple    Stroke     Transient cerebral ischemia 7/22/2016    Type 2 diabetes mellitus        Past Surgical History:   Procedure Laterality Date    Moh's procedure x4      rectal cancer      Sinus surgery for sinus cancer      sinus sx for cancer       skin cancer removed-several times-nose & ear      TONSILLECTOMY      Undescened testicle sx      UVULOPALATOPHARYNGOPLASTY      for sleep apnea       Review of patient's allergies indicates:   Allergen Reactions    Abilify [aripiprazole] Other (See Comments)     Sleepy, could not function.       Tobacco Use    Smoking status: Former Smoker     Packs/day: 0.25     Years: 2.00     Pack years: 0.50    Smokeless tobacco: Never Used    Tobacco comment: quit at age 17 less than a pack a day   Substance and Sexual Activity    Alcohol use: No     Comment: rare    Drug use: No    Sexual activity: Not on file       Medications Prior to Admission   Medication Sig Dispense Refill Last Dose    ACCU-CHEK NEYDA PLUS TEST STRP Strp CHECK BLOOD SUGAR ONCE DAILY (Patient not taking: Reported on 8/11/2020) 100 strip 11     ciprofloxacin HCl (CIPRO) 500 MG tablet TAKE 1 TABLET(500 MG) BY MOUTH EVERY MONDAY 12 tablet 0     divalproex (DEPAKOTE) 250 MG EC tablet Take 2 tablets (500 mg total) by mouth every evening. 180 tablet 3     furosemide (LASIX) 20 MG tablet TAKE 2 TABLETS BY MOUTH EVERY  tablet 6     lactulose (CHRONULAC) 10 gram/15 mL solution TAKE 15 MLS BY MOUTH TWICE DAILY (Patient not taking: Reported on 6/24/2020) 946 mL 6     lancets Misc To check blood sugar once daily, to use with Accu-Chek meter. (Patient not taking: Reported on 8/11/2020) 90 each 3     LIPASE-PROTEASE-AMYLASE 5,000-17,000 -24,000 UNITS (ZENPEP) 5,000-17,000- 24,000 unit CpDR Take 1 capsule by mouth 3 (three) times daily with meals. (Patient taking differently: Take 1 capsule by mouth 2 (two) times daily with meals. ) 90 capsule 11     loperamide (IMODIUM) 2 mg capsule Take 1 capsule (2 mg total) by mouth 2 (two) times daily as needed for Diarrhea. 60 capsule 3     potassium chloride SA (K-DUR,KLOR-CON) 20 MEQ tablet TAKE 1 TABLET(20 MEQ) BY MOUTH TWICE DAILY 180 tablet 1     psyllium (METAMUCIL) packet Take 1 packet by mouth  once daily.       psyllium (METAMUCIL) powder Take 1 packet by mouth as needed. Pt states he takes a teaspoon twice a day       SITagliptan (JANUVIA) 50 MG Tab Take 1 tablet (50 mg total) by mouth once daily. 90 tablet 3     XIFAXAN 550 mg Tab Take 1 tablet (550 mg total) by mouth 2 (two) times daily. 180 tablet 11        Objective:     Vital Signs (Most Recent):  Temp: (!) 94.4 °F (34.7 °C) (09/05/20 0830)  Pulse: (!) 59 (09/05/20 0744)  Resp: 17 (09/05/20 0744)  BP: (!) 98/55 (09/05/20 0744)  SpO2: 97 % (09/05/20 0744) Vital Signs (24h Range):  Temp:  [94.4 °F (34.7 °C)-98.9 °F (37.2 °C)] 94.4 °F (34.7 °C)  Pulse:  [57-80] 59  Resp:  [16-18] 17  SpO2:  [95 %-100 %] 97 %  BP: ()/(49-80) 98/55     Weight: 70.9 kg (156 lb 4.9 oz) (09/04/20 2143)  Body mass index is 23.77 kg/m².    Physical Exam  Constitutional:       Appearance: Normal appearance.   HENT:      Head: Normocephalic and atraumatic.   Eyes:      Pupils: Pupils are equal, round, and reactive to light.   Neck:      Musculoskeletal: Normal range of motion and neck supple.   Cardiovascular:      Rate and Rhythm: Normal rate.      Heart sounds: No murmur.   Pulmonary:      Effort: Pulmonary effort is normal. No respiratory distress.   Abdominal:      General: There is distension.      Palpations: Abdomen is soft.      Tenderness: There is no abdominal tenderness. There is no guarding or rebound.   Musculoskeletal: Normal range of motion.         General: No swelling or tenderness.   Neurological:      Mental Status: He is alert and oriented to person, place, and time. Mental status is at baseline.         MELD-Na score: 21 at 9/5/2020  3:55 AM  MELD score: 19 at 9/5/2020  3:55 AM  Calculated from:  Serum Creatinine: 2.8 mg/dL at 9/5/2020  3:55 AM  Serum Sodium: 134 mmol/L at 9/5/2020  3:55 AM  Total Bilirubin: 0.4 mg/dL (Rounded to 1 mg/dL) at 9/4/2020  3:10 PM  INR(ratio): 1.3 at 9/4/2020  3:10 PM  Age: 80 years 7 months    Significant Labs:  CBC:    Recent Labs   Lab 09/05/20  0355   WBC 2.98*   RBC 1.83*   HGB 5.4*   HCT 17.1*   PLT 63*     CMP:   Recent Labs   Lab 09/04/20  1510 09/05/20 0355   * 110   CALCIUM 8.3* 7.9*   ALBUMIN 1.9*  --    PROT 6.1  --    * 134*   K 4.5 4.1   CO2 19* 17*    109   BUN 80* 82*   CREATININE 2.9* 2.8*   ALKPHOS 89  --    ALT 6*  --    AST 17  --    BILITOT 0.4  --

## 2020-09-05 NOTE — CONSULTS
[facility]  Hepatology  Consult Note    Patient Name: Kenneth Sheikh  MRN: [id number]  Admission Date: 9/4/2020  Hospital Length of Stay: 1 days  Attending Provider: Terrell Rainey MD   Primary Care Physician: Prisca Espinoza MD  Principal Problem:Acute blood loss anemia    Inpatient consult to Hepatology  Consult performed by: Francisco Stanton MD  Consult ordered by: Gabi Kingsley MD  Reason for consult: cirrhosis        Subjective:     Transplant status: No    HPI:  Kenneth Sheikh is a 80 y.o. 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 Patient was recently seen in Hepatology clinic 9/4 at which time there was concern for GIB along with continued clinical deterioration, and it was recommended to present to ED. Patient reports black stools for the past few days. He has not had abdominal pain, nausea or vomiting, fever/ chills. He denies being on blood thinners and has not taken OTC medications. He gets weekly paracentesis, the last of which was on on 9/4 at which time 4.2 L removed (fluid does not appear to have been sent for diagnostic testing). Of note, patient is a [employer] and does not want to receive blood transfusions but is ok with albumin. In the ED, vitals were temp 98.9F, HR 80 and /80.  Labs significant for Hb 6, Platelet 67, Creatinine 2.9 and INR 1.3.  He was given 500cc bolus and was admitted to hospital medicine. Hepatology consulted for assistance with management.     Review of Systems   Constitutional: Positive for fatigue. Negative for chills and fever.   Respiratory: Negative for cough, chest tightness and shortness of breath.    Cardiovascular: Negative for chest pain and leg swelling.   Gastrointestinal: Positive for abdominal distention and blood in stool. Negative for abdominal pain, diarrhea, nausea and vomiting.   Genitourinary:  Negative for dysuria, frequency and urgency.   Musculoskeletal: Negative for arthralgias and myalgias.   Neurological: Positive for weakness. Negative for dizziness, syncope and light-headedness.   Psychiatric/Behavioral: Negative for agitation and confusion.       Past Medical History:   Diagnosis Date    Anticoagulant long-term use     Basal cell carcinoma     Bipolar 1 disorder     Bipolar 1 disorder 11/24/2016    bipolar    Cancer     history of skin cancer/sinus cancer & rectal cancer    Depressed bipolar affective disorder     Diabetes mellitus     type 2    Diabetic retinopathy 01/09/2019    EBV infection 12/7/2016    EBV DNA, PCR Latest Ref Range: None detected  None detected EBV DNA-Copies/mL Unknown Test Not Performed EBV Early Antigen Ab, IgG Latest Ref Range: <1:10 Titer 1:40 (A) EBV Nuclear Ag Ab Latest Ref Range: <1:5 Titer >=1:80 (A) EBV VCA IgG Latest Ref Range: <1:10 Titer 1:160 (A) EBV VCA IgM Latest Ref Range: <1:10 Titer <1:10     History of TIA (transient ischemic attack)     several-taking Plavix    Hx of gallstones     Wife denies    Hypertension     Hyperthyroidism 11/30/2016    Low HDL (under 40) 12/7/2016    Mixed hyperlipidemia 11/23/2016    JUAN MANUEL (obstructive sleep apnea)     Pneumonia     Skin disease     Squamous cell carcinoma 08/2018    right temple    Stroke     Transient cerebral ischemia 7/22/2016    Type 2 diabetes mellitus        Past Surgical History:   Procedure Laterality Date    Moh's procedure x4      rectal cancer      Sinus surgery for sinus cancer      sinus sx for cancer      skin cancer removed-several times-nose & ear      TONSILLECTOMY      Undescened testicle sx      UVULOPALATOPHARYNGOPLASTY      for sleep apnea       Review of patient's allergies indicates:   Allergen Reactions    Abilify [aripiprazole] Other (See Comments)     Sleepy, could not function.       Tobacco Use    Smoking status: Former Smoker     Packs/day: 0.25     Years:  2.00     Pack years: 0.50    Smokeless tobacco: Never Used    Tobacco comment: quit at age 17 less than a pack a day   Substance and Sexual Activity    Alcohol use: No     Comment: rare    Drug use: No    Sexual activity: Not on file       Medications Prior to Admission   Medication Sig Dispense Refill Last Dose    ACCU-CHEK NEYDA PLUS TEST STRP Strp CHECK BLOOD SUGAR ONCE DAILY (Patient not taking: Reported on 8/11/2020) 100 strip 11     ciprofloxacin HCl (CIPRO) 500 MG tablet TAKE 1 TABLET(500 MG) BY MOUTH EVERY MONDAY 12 tablet 0     divalproex (DEPAKOTE) 250 MG EC tablet Take 2 tablets (500 mg total) by mouth every evening. 180 tablet 3     furosemide (LASIX) 20 MG tablet TAKE 2 TABLETS BY MOUTH EVERY  tablet 6     lactulose (CHRONULAC) 10 gram/15 mL solution TAKE 15 MLS BY MOUTH TWICE DAILY (Patient not taking: Reported on 6/24/2020) 946 mL 6     lancets Misc To check blood sugar once daily, to use with Accu-Chek meter. (Patient not taking: Reported on 8/11/2020) 90 each 3     LIPASE-PROTEASE-AMYLASE 5,000-17,000 -24,000 UNITS (ZENPEP) 5,000-17,000- 24,000 unit CpDR Take 1 capsule by mouth 3 (three) times daily with meals. (Patient taking differently: Take 1 capsule by mouth 2 (two) times daily with meals. ) 90 capsule 11     loperamide (IMODIUM) 2 mg capsule Take 1 capsule (2 mg total) by mouth 2 (two) times daily as needed for Diarrhea. 60 capsule 3     potassium chloride SA (K-DUR,KLOR-CON) 20 MEQ tablet TAKE 1 TABLET(20 MEQ) BY MOUTH TWICE DAILY 180 tablet 1     psyllium (METAMUCIL) packet Take 1 packet by mouth once daily.       psyllium (METAMUCIL) powder Take 1 packet by mouth as needed. Pt states he takes a teaspoon twice a day       SITagliptan (JANUVIA) 50 MG Tab Take 1 tablet (50 mg total) by mouth once daily. 90 tablet 3     XIFAXAN 550 mg Tab Take 1 tablet (550 mg total) by mouth 2 (two) times daily. 180 tablet 11        Objective:     Vital Signs (Most Recent):  Temp: (!)  94.4 °F (34.7 °C) (09/05/20 0830)  Pulse: (!) 59 (09/05/20 0744)  Resp: 17 (09/05/20 0744)  BP: (!) 98/55 (09/05/20 0744)  SpO2: 97 % (09/05/20 0744) Vital Signs (24h Range):  Temp:  [94.4 °F (34.7 °C)-98.9 °F (37.2 °C)] 94.4 °F (34.7 °C)  Pulse:  [57-80] 59  Resp:  [16-18] 17  SpO2:  [95 %-100 %] 97 %  BP: ()/(49-80) 98/55     Weight: 70.9 kg (156 lb 4.9 oz) (09/04/20 2143)  Body mass index is 23.77 kg/m².    Physical Exam  Constitutional:       Appearance: Normal appearance.   HENT:      Head: Normocephalic and atraumatic.   Eyes:      Pupils: Pupils are equal, round, and reactive to light.   Neck:      Musculoskeletal: Normal range of motion and neck supple.   Cardiovascular:      Rate and Rhythm: Normal rate.      Heart sounds: No murmur.   Pulmonary:      Effort: Pulmonary effort is normal. No respiratory distress.   Abdominal:      General: There is distension.      Palpations: Abdomen is soft.      Tenderness: There is no abdominal tenderness. There is no guarding or rebound.   Musculoskeletal: Normal range of motion.         General: No swelling or tenderness.   Neurological:      Mental Status: He is alert and oriented to person, place, and time. Mental status is at baseline.         MELD-Na score: 21 at 9/5/2020  3:55 AM  MELD score: 19 at 9/5/2020  3:55 AM  Calculated from:  Serum Creatinine: 2.8 mg/dL at 9/5/2020  3:55 AM  Serum Sodium: 134 mmol/L at 9/5/2020  3:55 AM  Total Bilirubin: 0.4 mg/dL (Rounded to 1 mg/dL) at 9/4/2020  3:10 PM  INR(ratio): 1.3 at 9/4/2020  3:10 PM  Age: 80 years 7 months    Significant Labs:  CBC:   Recent Labs   Lab 09/05/20  0355   WBC 2.98*   RBC 1.83*   HGB 5.4*   HCT 17.1*   PLT 63*     CMP:   Recent Labs   Lab 09/04/20  1510 09/05/20  0355   * 110   CALCIUM 8.3* 7.9*   ALBUMIN 1.9*  --    PROT 6.1  --    * 134*   K 4.5 4.1   CO2 19* 17*    109   BUN 80* 82*   CREATININE 2.9* 2.8*   ALKPHOS 89  --    ALT 6*  --    AST 17  --    BILITOT 0.4  --           Assessment/Plan:     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 male medical history of MCDONALD Cirrhosis (h/o Ascites with weekly paracenteses - last 9/4 4.2L removed, Gastric Varices 2/2017), [employer], who presents with weakness black stools, anemia (Hb 6 from 10.6 previously), BELLA on CKD (Cr 2.9).    Recommendations:   - GIB: GI evaluation. Continue Protonix. Continue octreotide gtt for now.   - BELLA: Agree with albumin challenge. Urine studies not consistent with HRS.   - Ascites: Unfortunately ascitic fluid (9/4) does not appear to have been sent for diagnostic studies. Continue Rocephin in setting of GIB  - Rec Palliative care consult - was discussed in clinic and wife agreeable to this.   - 2 gm Na restriction. Hold diuretics.  - Continue rifaxamin  - Please obtain daily CMP, INR        Thank you for your consult. I will follow-up with patient. Please contact us if you have any additional questions.    Francisco Stanton MD  Hepatology  [facility]

## 2020-09-05 NOTE — ASSESSMENT & PLAN NOTE
Suspected Variceal GI Bleed  Decompensated Cirrhosis MCDONALD  Management:  -Place patient NPO  -Place 2 large bore IVs, volume resuscitation per primary  -pRBC transfusions as needed, being careful to maintain Hb about 7-8 g/dL, but avoiding excessive transfusion to prevent over distention and aggravation of bleeding varix.  -Start Octreotide:  mcg IV bolus (usual bolus dose: 50 mcg) for suspected variceal bleed ; follow by continuous IV infusion of 25-50 mcg/hr for 3-5 days after confirmation of variceal bleed.  -Correct coagulopathy (goal plt >50, INR <1.5) if present in patients without absolute contraindications.  -Start antibiotic prophylaxis: Ceftriaxone 1gm q24h initially (Other option includes: Ciprofloxacin)  -PPI 80 mg IV bolus once, then 8 mg/hour infusion or IV PPI 40 BID  -Avoid nonsteroidal agents, antiplatelet agents and anticoagulants if possible in patients without absolute contraindications  -EGD TODAY  -Consult IR given history of gastric varices  -Beta blockers after acute bleed has resolved s/p octreotide X72 hours (secondary prophylaxis)    Thank you for involving us in the care of Kenneth Sheikh. Please call with any additional questions, concerns or changes in the patient's clinical status.

## 2020-09-06 LAB
ALBUMIN SERPL BCP-MCNC: 2 G/DL (ref 3.5–5.2)
ALP SERPL-CCNC: 82 U/L (ref 55–135)
ALT SERPL W/O P-5'-P-CCNC: 9 U/L (ref 10–44)
ANION GAP SERPL CALC-SCNC: 11 MMOL/L (ref 8–16)
ANISOCYTOSIS BLD QL SMEAR: SLIGHT
AST SERPL-CCNC: 24 U/L (ref 10–40)
BASOPHILS # BLD AUTO: 0.01 K/UL (ref 0–0.2)
BASOPHILS NFR BLD: 0.2 % (ref 0–1.9)
BILIRUB SERPL-MCNC: 0.4 MG/DL (ref 0.1–1)
BUN SERPL-MCNC: 82 MG/DL (ref 8–23)
CALCIUM SERPL-MCNC: 7.7 MG/DL (ref 8.7–10.5)
CHLORIDE SERPL-SCNC: 112 MMOL/L (ref 95–110)
CO2 SERPL-SCNC: 16 MMOL/L (ref 23–29)
CREAT SERPL-MCNC: 3 MG/DL (ref 0.5–1.4)
DIFFERENTIAL METHOD: ABNORMAL
EOSINOPHIL # BLD AUTO: 0 K/UL (ref 0–0.5)
EOSINOPHIL NFR BLD: 0.2 % (ref 0–8)
ERYTHROCYTE [DISTWIDTH] IN BLOOD BY AUTOMATED COUNT: 18.7 % (ref 11.5–14.5)
EST. GFR  (AFRICAN AMERICAN): 21.4 ML/MIN/1.73 M^2
EST. GFR  (NON AFRICAN AMERICAN): 18.5 ML/MIN/1.73 M^2
GLUCOSE SERPL-MCNC: 118 MG/DL (ref 70–110)
HCT VFR BLD AUTO: 17.7 % (ref 40–54)
HGB BLD-MCNC: 5.2 G/DL (ref 14–18)
HYPOCHROMIA BLD QL SMEAR: ABNORMAL
IMM GRANULOCYTES # BLD AUTO: 0.03 K/UL (ref 0–0.04)
IMM GRANULOCYTES NFR BLD AUTO: 0.6 % (ref 0–0.5)
INR PPP: 1.4 (ref 0.8–1.2)
LYMPHOCYTES # BLD AUTO: 0.7 K/UL (ref 1–4.8)
LYMPHOCYTES NFR BLD: 12.6 % (ref 18–48)
MAGNESIUM SERPL-MCNC: 1.6 MG/DL (ref 1.6–2.6)
MCH RBC QN AUTO: 29.4 PG (ref 27–31)
MCHC RBC AUTO-ENTMCNC: 29.4 G/DL (ref 32–36)
MCV RBC AUTO: 100 FL (ref 82–98)
MONOCYTES # BLD AUTO: 0.5 K/UL (ref 0.3–1)
MONOCYTES NFR BLD: 10.2 % (ref 4–15)
NEUTROPHILS # BLD AUTO: 4.1 K/UL (ref 1.8–7.7)
NEUTROPHILS NFR BLD: 76.2 % (ref 38–73)
NRBC BLD-RTO: 0 /100 WBC
PHOSPHATE SERPL-MCNC: 5.4 MG/DL (ref 2.7–4.5)
PLATELET # BLD AUTO: 64 K/UL (ref 150–350)
PLATELET BLD QL SMEAR: ABNORMAL
PMV BLD AUTO: 10.5 FL (ref 9.2–12.9)
POCT GLUCOSE: 195 MG/DL (ref 70–110)
POLYCHROMASIA BLD QL SMEAR: ABNORMAL
POTASSIUM SERPL-SCNC: 4.2 MMOL/L (ref 3.5–5.1)
PROT SERPL-MCNC: 5.4 G/DL (ref 6–8.4)
PROTHROMBIN TIME: 15.2 SEC (ref 9–12.5)
RBC # BLD AUTO: 1.77 M/UL (ref 4.6–6.2)
SODIUM SERPL-SCNC: 139 MMOL/L (ref 136–145)
WBC # BLD AUTO: 5.31 K/UL (ref 3.9–12.7)

## 2020-09-06 PROCEDURE — 93005 ELECTROCARDIOGRAM TRACING: CPT

## 2020-09-06 PROCEDURE — 97162 PT EVAL MOD COMPLEX 30 MIN: CPT

## 2020-09-06 PROCEDURE — 97165 OT EVAL LOW COMPLEX 30 MIN: CPT

## 2020-09-06 PROCEDURE — 99231 SBSQ HOSP IP/OBS SF/LOW 25: CPT | Mod: ,,, | Performed by: INTERNAL MEDICINE

## 2020-09-06 PROCEDURE — 93010 ELECTROCARDIOGRAM REPORT: CPT | Mod: ,,, | Performed by: INTERNAL MEDICINE

## 2020-09-06 PROCEDURE — 63600175 PHARM REV CODE 636 W HCPCS: Performed by: HOSPITALIST

## 2020-09-06 PROCEDURE — 80053 COMPREHEN METABOLIC PANEL: CPT

## 2020-09-06 PROCEDURE — C9113 INJ PANTOPRAZOLE SODIUM, VIA: HCPCS | Performed by: INTERNAL MEDICINE

## 2020-09-06 PROCEDURE — 83735 ASSAY OF MAGNESIUM: CPT

## 2020-09-06 PROCEDURE — 99233 SBSQ HOSP IP/OBS HIGH 50: CPT | Mod: ,,, | Performed by: INTERNAL MEDICINE

## 2020-09-06 PROCEDURE — 93010 EKG 12-LEAD: ICD-10-PCS | Mod: ,,, | Performed by: INTERNAL MEDICINE

## 2020-09-06 PROCEDURE — 85610 PROTHROMBIN TIME: CPT

## 2020-09-06 PROCEDURE — 99231 PR SUBSEQUENT HOSPITAL CARE,LEVL I: ICD-10-PCS | Mod: ,,, | Performed by: INTERNAL MEDICINE

## 2020-09-06 PROCEDURE — 36415 COLL VENOUS BLD VENIPUNCTURE: CPT

## 2020-09-06 PROCEDURE — 63600175 PHARM REV CODE 636 W HCPCS: Performed by: INTERNAL MEDICINE

## 2020-09-06 PROCEDURE — 11000001 HC ACUTE MED/SURG PRIVATE ROOM

## 2020-09-06 PROCEDURE — 25000003 PHARM REV CODE 250: Performed by: HOSPITALIST

## 2020-09-06 PROCEDURE — 84100 ASSAY OF PHOSPHORUS: CPT

## 2020-09-06 PROCEDURE — 25000003 PHARM REV CODE 250: Performed by: INTERNAL MEDICINE

## 2020-09-06 PROCEDURE — 85025 COMPLETE CBC W/AUTO DIFF WBC: CPT

## 2020-09-06 PROCEDURE — P9047 ALBUMIN (HUMAN), 25%, 50ML: HCPCS | Mod: JG | Performed by: HOSPITALIST

## 2020-09-06 PROCEDURE — 99233 PR SUBSEQUENT HOSPITAL CARE,LEVL III: ICD-10-PCS | Mod: ,,, | Performed by: INTERNAL MEDICINE

## 2020-09-06 RX ORDER — LACTULOSE 10 G/15ML
5 SOLUTION ORAL 2 TIMES DAILY
Status: DISCONTINUED | OUTPATIENT
Start: 2020-09-06 | End: 2020-09-08

## 2020-09-06 RX ADMIN — LACTULOSE 5 G: 20 SOLUTION ORAL at 02:09

## 2020-09-06 RX ADMIN — IRON SUCROSE 200 MG: 20 INJECTION, SOLUTION INTRAVENOUS at 12:09

## 2020-09-06 RX ADMIN — DEXTROSE 8 MG/HR: 5 SOLUTION INTRAVENOUS at 02:09

## 2020-09-06 RX ADMIN — RIFAXIMIN 550 MG: 550 TABLET ORAL at 08:09

## 2020-09-06 RX ADMIN — ZINC SULFATE 220 MG (50 MG) CAPSULE 220 MG: CAPSULE at 08:09

## 2020-09-06 RX ADMIN — LACTULOSE 5 G: 20 SOLUTION ORAL at 10:09

## 2020-09-06 RX ADMIN — RIFAXIMIN 550 MG: 550 TABLET ORAL at 10:09

## 2020-09-06 RX ADMIN — CEFTRIAXONE SODIUM 1 G: 1 INJECTION, POWDER, FOR SOLUTION INTRAMUSCULAR; INTRAVENOUS at 05:09

## 2020-09-06 RX ADMIN — ALBUMIN (HUMAN) 25 G: 12.5 SOLUTION INTRAVENOUS at 08:09

## 2020-09-06 RX ADMIN — DIVALPROEX SODIUM 500 MG: 250 TABLET, DELAYED RELEASE ORAL at 10:09

## 2020-09-06 NOTE — PLAN OF CARE
Problem: Physical Therapy Goal  Goal: Physical Therapy Goal  Description: Goals to be met by: 2020     Patient will increase functional independence with mobility by performin. Sit to stand transfer with Modified Payne  2. Bed to chair transfer with Modified Payne using no AD or LRAD if needed.  3. Gait  x 150 feet with Modified Payne using no AD or LRAD if needed.  4. Sitting at edge of bed x10 minutes with Modified Payne  5. Lower extremity exercise program x30 reps per handout, with independence    Outcome: Ongoing, Progressing       PT eval complete. PT anticipate that patient will be safe to return home once medically ready and would benefit from home health physical therapy in order to improve safety and independence in the home environment.      Erika Townsend, PT, DPT  2020

## 2020-09-06 NOTE — PLAN OF CARE
Problem: Fall Injury Risk  Goal: Absence of Fall and Fall-Related Injury  Outcome: Ongoing, Progressing     Problem: Adjustment to Illness (Gastrointestinal Bleeding)  Goal: Optimal Coping with Acute Illness  Outcome: Ongoing, Progressing     Problem: Bleeding (Gastrointestinal Bleeding)  Goal: Hemostasis  Outcome: Ongoing, Progressing     Problem: Adult Inpatient Plan of Care  Goal: Plan of Care Review  Outcome: Ongoing, Progressing  Goal: Patient-Specific Goal (Individualization)  Outcome: Ongoing, Progressing  Goal: Absence of Hospital-Acquired Illness or Injury  Outcome: Ongoing, Progressing  Goal: Optimal Comfort and Wellbeing  Outcome: Ongoing, Progressing  Goal: Readiness for Transition of Care  Outcome: Ongoing, Progressing  Goal: Rounds/Family Conference  Outcome: Ongoing, Progressing     Problem: Adult Inpatient Plan of Care  Goal: Patient-Specific Goal (Individualization)  Outcome: Ongoing, Progressing     Problem: Diabetes Comorbidity  Goal: Blood Glucose Level Within Desired Range  Outcome: Ongoing, Progressing     Problem: Electrolyte Imbalance (Acute Kidney Injury/Impairment)  Goal: Serum Electrolyte Balance  Outcome: Ongoing, Progressing     Problem: Fluid Imbalance (Acute Kidney Injury/Impairment)  Goal: Optimal Fluid Balance  Outcome: Ongoing, Progressing     Problem: Hematologic Alteration (Acute Kidney Injury/Impairment)  Goal: Hemoglobin, Hematocrit and Platelets Within Normal Range  Outcome: Ongoing, Progressing     Problem: Oral Intake Inadequate (Acute Kidney Injury/Impairment)  Goal: Optimal Nutrition Intake  Outcome: Ongoing, Progressing     Problem: Renal Function Impairment (Acute Kidney Injury/Impairment)  Goal: Effective Renal Function  Outcome: Ongoing, Progressing     Problem: Skin Injury Risk Increased  Goal: Skin Health and Integrity  Outcome: Ongoing, Progressing     ASSUMED CARE OF PT. VSS NAD ASSESSMENT DONE/CHARTED. UNEVENTFUL SHIFT. REPORT GIVEN TO ONCOMING RN

## 2020-09-06 NOTE — PLAN OF CARE
Pt free of fall this shift. Pain assessed and pt denied pain. Pt appear pale. H&H was low 5.4, 1731 but refused blood transfusion. Pt was also hypothermic at the beginning to 1600 of shift and bp was low; MD aware. Temperature at 1600 was 97.4 orally then at 1730 was 96.4 orally and bp was stable. Will continue to monitor pt.

## 2020-09-06 NOTE — TREATMENT PLAN
GI Treatment Plan    Kenneth Sheikh is a 80 y.o. male admitted to hospital 9/4/2020 (Hospital Day: 3) due to Acute blood loss anemia.     Interval History  No acute events overnight.  EGD yesterday with friable gastric mucosa.  No bleeding varices or ulcers seen.  Please see official EGD report for full details.  Patient feeling well today, no complaints, no bowel movements overnight.    Objective  Temp:  [94 °F (34.4 °C)-98.7 °F (37.1 °C)] 97.3 °F (36.3 °C) (09/06 0300)  Pulse:  [58-81] 60 (09/06 0800)  BP: ()/(49-69) 97/49 (09/06 0036)  Resp:  [11-19] 18 (09/06 0036)  SpO2:  [93 %-99 %] 96 % (09/06 0036)    General: Alert, Oriented x3, no distress  Abdomen: Normoactive bowel sounds. Non-distended. Normal tympany. Soft. Non-tender. No peritoneal signs.    Laboratory    Recent Labs   Lab 09/04/20  1510 09/05/20  0355 09/06/20  0353   HGB 6.0* 5.4* 5.2*       Lab Results   Component Value Date    WBC 5.31 09/06/2020    HGB 5.2 (LL) 09/06/2020    HCT 17.7 (LL) 09/06/2020     (H) 09/06/2020    PLT 64 (L) 09/06/2020       Lab Results   Component Value Date     09/06/2020    K 4.2 09/06/2020     (H) 09/06/2020    CO2 16 (L) 09/06/2020    BUN 82 (H) 09/06/2020    CREATININE 3.0 (H) 09/06/2020    CALCIUM 7.7 (L) 09/06/2020    ANIONGAP 11 09/06/2020    ESTGFRAFRICA 21.4 (A) 09/06/2020    EGFRNONAA 18.5 (A) 09/06/2020       Lab Results   Component Value Date    ALT 9 (L) 09/06/2020    AST 24 09/06/2020    ALKPHOS 82 09/06/2020    BILITOT 0.4 09/06/2020       Lab Results   Component Value Date    INR 1.4 (H) 09/06/2020    INR 1.3 (H) 09/04/2020    INR 1.2 05/15/2020       Plan  - continue IV PPI for 72 hr  - continue antibiotics for total of five days  - agree with pediatric blood draws, trend hemoglobin  - cut space with hematology about any further hematopoietic medications  - continue EPO and IV iron  - will arrange for outpatient advanced endoscopy follow-up for gastric varices  - Plan of care was  discussed with primary team.  - We will continue to follow.    Thank you for involving us in the care of Kenneth Sheikh. Please call with any additional questions, concerns or changes in the patient's clinical status.    King Sosa MD  Gastroenterology Fellow  Spectralink: [id number]

## 2020-09-06 NOTE — PROGRESS NOTES
[facility] Hospital Medicine                                                                     Progress Note     Team: [facility] A Terrell Rainey MD   Admit Date: 9/4/2020   Hospital Day: 2  KEILY: 9/7/2020   Code status: Full Code   Principal Problem: Acute blood loss anemia     SUMMARY:     Mr. Kenneth Sheikh is a 80 y.o. male with MCDONALD cirrhosis, complicated by gastric varices, hepatic encephalopathy, ascites requiring weekly paracenteses, and coagulopathy who presents to the ER from Hepatology clinic for evaluation of weakness and melena.  He mentions that he has been feeling weak and fatigued the last 2 days, and the day prior to admission he started having melena.  He denies the use of any blood thinners or Aspirin.  He is a [employer], and does not want to receive blood transfusions, but he is ok receiving blood stimulators or fractionated blood products - just not whole blood.  He endorses some chills, but no fevers, chest pain, or shortness of breath.  Of note, he just had a paracentesis on the day of admission that removed 4.2L.     Upon arrival to the ER, vitals were temp 98.9F, HR 80 and /80.  Labs showed Hemoglobin 6, Platelet 67, Creatinine 2.9 and INR 1.3.  ESTEFANÍA was positive.  He was given 500cc bolus and was admitted to Hospital Medicine for further management.    Admitted to hospital medicine for acute UGIB with hx of gastric varices and ulcers. Started on supportive tx IV octreotide, PPI, ceftriaxone and albumin. GI and hepatology consulted. S/p EGD 9/5 with LA Grade B esophagitis, Non-bleeding esophageal ulcer, Small (< 5 mm) esophageal varice, Portal hypertensive gastropathy. Type 1 isolated gastric varices (IGV1, varices located in the fundus), previously treated and without bleeding, Multiple duodenal polyps. Very friable gastric  mucosa. GI rec slowly advancing diet, repeat EGD 8-12 weeks to assure healing of esophageal ulcer and biopsy duodenal polyps.    He remains on IV PPI and ceftriaxone at this time. Octreotide discontinued as no overt variceal bleed. Pediatric blood draws in the meantime, and avoiding unnecessary blood draws.     SUBJECTIVE:     Pt was seen and examined at bedside. Some intermittent confusion voiced by family overnight, otherwise stable. Last BM was Friday before admit (dark stool). No further stools since. BP remains soft. No abdominal pain, fevers, bloody or dark BM endorsed. Continues to be weak and pale. Consultants following. Advancing diet slowly. Palliative consulted.      ROS (Positive in Bold, otherwise negative)  Pain Scale: 0 /10   Constitutional: fever, chills, night sweats, weakness, fatigue  CV: chest pain, edema, palpitations  Resp: SOB, cough, sputum production  GI: changes in appetite, NVDC, pain, melena (resolved), hematochezia, GERD, hematemesis  : Dysuria, hematuria, urinary urgency, frequency  MSK: arthralgia/myalgia, joint swelling  SKIN: rashes, pruritis, petechiae   Neuro/Psych: FND, anxiety, depression      OBJECTIVE:     Vitals:  Temp:  [94 °F (34.4 °C)-98.7 °F (37.1 °C)]   Pulse:  [52-81]   Resp:  [11-19]   BP: ()/(49-70)   SpO2:  [93 %-98 %]      I & O (Last 24H):     Intake/Output Summary (Last 24 hours) at 9/6/2020 1228  Last data filed at 9/5/2020 1640  Gross per 24 hour   Intake --   Output 375 ml   Net -375 ml         GEN: chronically ill appearing male  in no acute distress. Appears pale. Resting in bed.  HEENT: NCAT. PERRL. EOMI. Conjunctivae/corneas clear, sclera Anicteric.  CVS: RRR. Normal s1 s2 no murmur, click, rub or gallop  LUNG: CTAB. Normal respiratory effort. No wheezes, rhonchi, or crackles.  ABD: Normoactive BS, soft, NT, mildly distended, no masses or organomegaly.  EXT: +1 LE edema. No cyanosis. Full ROM.  SKIN: pale and cold  NEURO: Alert, oriented x 4, Spont  mvt of all extremities with no focal deficits noted.      All recent labs and imaging has been reviewed.     Recent Results (from the past 24 hour(s))   POCT glucose    Collection Time: 09/05/20  4:44 PM   Result Value Ref Range    POCT Glucose 132 (H) 70 - 110 mg/dL   POCT glucose    Collection Time: 09/05/20  8:46 PM   Result Value Ref Range    POCT Glucose 136 (H) 70 - 110 mg/dL   CBC auto differential    Collection Time: 09/06/20  3:53 AM   Result Value Ref Range    WBC 5.31 3.90 - 12.70 K/uL    RBC 1.77 (L) 4.60 - 6.20 M/uL    Hemoglobin 5.2 (LL) 14.0 - 18.0 g/dL    Hematocrit 17.7 (LL) 40.0 - 54.0 %    Mean Corpuscular Volume 100 (H) 82 - 98 fL    Mean Corpuscular Hemoglobin 29.4 27.0 - 31.0 pg    Mean Corpuscular Hemoglobin Conc 29.4 (L) 32.0 - 36.0 g/dL    RDW 18.7 (H) 11.5 - 14.5 %    Platelets 64 (L) 150 - 350 K/uL    MPV 10.5 9.2 - 12.9 fL    Immature Granulocytes 0.6 (H) 0.0 - 0.5 %    Gran # (ANC) 4.1 1.8 - 7.7 K/uL    Immature Grans (Abs) 0.03 0.00 - 0.04 K/uL    Lymph # 0.7 (L) 1.0 - 4.8 K/uL    Mono # 0.5 0.3 - 1.0 K/uL    Eos # 0.0 0.0 - 0.5 K/uL    Baso # 0.01 0.00 - 0.20 K/uL    nRBC 0 0 /100 WBC    Gran% 76.2 (H) 38.0 - 73.0 %    Lymph% 12.6 (L) 18.0 - 48.0 %    Mono% 10.2 4.0 - 15.0 %    Eosinophil% 0.2 0.0 - 8.0 %    Basophil% 0.2 0.0 - 1.9 %    Platelet Estimate Decreased (A)     Aniso Slight     Poly Occasional     Hypo Moderate     Differential Method Automated    Comprehensive metabolic panel    Collection Time: 09/06/20  3:53 AM   Result Value Ref Range    Sodium 139 136 - 145 mmol/L    Potassium 4.2 3.5 - 5.1 mmol/L    Chloride 112 (H) 95 - 110 mmol/L    CO2 16 (L) 23 - 29 mmol/L    Glucose 118 (H) 70 - 110 mg/dL    BUN, Bld 82 (H) 8 - 23 mg/dL    Creatinine 3.0 (H) 0.5 - 1.4 mg/dL    Calcium 7.7 (L) 8.7 - 10.5 mg/dL    Total Protein 5.4 (L) 6.0 - 8.4 g/dL    Albumin 2.0 (L) 3.5 - 5.2 g/dL    Total Bilirubin 0.4 0.1 - 1.0 mg/dL    Alkaline Phosphatase 82 55 - 135 U/L    AST 24 10 - 40 U/L     ALT 9 (L) 10 - 44 U/L    Anion Gap 11 8 - 16 mmol/L    eGFR if African American 21.4 (A) >60 mL/min/1.73 m^2    eGFR if non African American 18.5 (A) >60 mL/min/1.73 m^2   Protime-INR    Collection Time: 09/06/20  3:53 AM   Result Value Ref Range    Prothrombin Time 15.2 (H) 9.0 - 12.5 sec    INR 1.4 (H) 0.8 - 1.2   Magnesium    Collection Time: 09/06/20  3:53 AM   Result Value Ref Range    Magnesium 1.6 1.6 - 2.6 mg/dL   Phosphorus    Collection Time: 09/06/20  3:53 AM   Result Value Ref Range    Phosphorus 5.4 (H) 2.7 - 4.5 mg/dL       Recent Labs   Lab 09/05/20  1644 09/05/20 2046   POCTGLUCOSE 132* 136*       Hemoglobin A1C   Date Value Ref Range Status   05/15/2020 6.6 (H) 4.0 - 5.6 % Final     Comment:     ADA Screening Guidelines:  5.7-6.4%  Consistent with prediabetes  >or=6.5%  Consistent with diabetes  High levels of fetal hemoglobin interfere with the HbA1C  assay. Heterozygous hemoglobin variants (HbS, HgC, etc)do  not significantly interfere with this assay.   However, presence of multiple variants may affect accuracy.     01/28/2020 6.8 (H) 4.0 - 5.6 % Final     Comment:     ADA Screening Guidelines:  5.7-6.4%  Consistent with prediabetes  >or=6.5%  Consistent with diabetes  High levels of fetal hemoglobin interfere with the HbA1C  assay. Heterozygous hemoglobin variants (HbS, HgC, etc)do  not significantly interfere with this assay.   However, presence of multiple variants may affect accuracy.     06/14/2019 7.3 (H) 4.0 - 5.6 % Final     Comment:     ADA Screening Guidelines:  5.7-6.4%  Consistent with prediabetes  >or=6.5%  Consistent with diabetes  High levels of fetal hemoglobin interfere with the HbA1C  assay. Heterozygous hemoglobin variants (HbS, HgC, etc)do  not significantly interfere with this assay.   However, presence of multiple variants may affect accuracy.          Active Hospital Problems    Diagnosis  POA    *Acute blood loss anemia [D62]  Yes    Coagulopathy [D68.9]  Yes     Thrombocytopenia [D69.6]  Yes    Gastrointestinal hemorrhage with melena [K92.1]  Yes    Refusal of blood transfusions as patient is Lutheran [Z53.1]  Not Applicable     He is ok receiving fractioned blood products and blood stimulators, just not whole blood.     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          ASSESSMENT AND PLAN:     GI Hemorrhage  Acute Blood Loss Anemia  Gastric Varices  - Hgb 6 on admit, baseline around 10 in May  - Start Protonix 40mg IV BID  - GI consulted, appreciate assistance - they are aware of the patient and the high risk for deterioration given Lutheran  - Start Ceftriaxone 1g IV q24h for SBP prophylaxis - total 5 days  - Start Octreotide gtt, stopped since no overt variceal bleed  - PPI IV changed to drip as per GI, continue 72 hrs from EGD  - S/p EGD 9/5 with LA Grade B esophagitis, Non-bleeding esophageal ulcer, Small (< 5 mm) esophageal varice, Portal hypertensive gastropathy. Type 1 isolated gastric varices (IGV1, varices located in the fundus), previously treated and without bleeding, Multiple duodenal polyps. Very friable gastric mucosa.   - GI rec slowly advancing diet, repeat EGD 8-12 weeks to assure healing of esophageal ulcer and biopsy duodenal polyps, and discuss with AES regarding gastric varices   - EPO and iron infusion  - Low threshold for ICU if bleeds or persistently hypotensive     Refusal of Blood Products as Lutheran  - Does not want to receive blood transfusions, but he is ok receiving blood stimulators or fractionated blood products - just not whole blood  - Lab draws daily  with pediatric tubes  - Epo injection and iron infusion     MCDONALD Cirrhosis  - MELD-Na score: 22 at 9/4/2020  3:10 PM  - MELD score: 20 at 9/4/2020  3:10 PM  - Calculated from:  Serum Creatinine: 2.9 mg/dL at 9/4/2020  3:10 PM  Serum Sodium: 134 mmol/L at 9/4/2020  3:10 PM  Total Bilirubin: 0.4 mg/dL (Rounded to 1 mg/dL) at 9/4/2020  3:10 PM  INR(ratio): 1.3 at 9/4/2020  3:10 PM  Age: 80 years 7 months   Hepatology consult, appreciate recs  Hold Lasix with anemia and softer BP  Restart low dose lactulose as per 5mL BID and continue rifaximin   Palliative consult as per hepatology recs  Not a transplant candidate due to age  BELLA not consistent with HRS, likely prerenal from GIB     Hepatic Encephalopathy  - Ammonia 54  - Rifaximin 550mg PO BID  - Start Zinc 220mg PO daily  - Restart low dose lactulose as per 5mL BID and continue rifaximin      Acute Renal Failure on CKD Stage 3  - Creatinine 2.9, baseline around 1-2  - Albumin for resuscitation given ascites and edema  - urine studies not consistent with HRS, likely BELLA from GIB  - Can consider Nephrology eval if worsening      Thrombocytopenia  Coagulopathy  - Chronic issue 2/2 liver disease  - Monitor for continued bleeding     Bipolar 1 Disorder  - Chronic issue  - Continue Depakote 500mg PO qHS          Prophylaxis- SCDs  Code Status- Full Code   Discharge plan and follow up - d/c home once medically stable    Discharge Planning   KEILY: 9/7/2020     Code Status: Full Code   Is the patient medically ready for discharge?:     Reason for patient still in hospital (select all that apply): Patient unstable and Patient trending condition         Terrell Rainey MD  Hospital Medicine Staff  Pager [phone]

## 2020-09-06 NOTE — PT/OT/SLP EVAL
Physical Therapy Evaluation    Patient Name:  Kenneth Sheikh   MRN:  [id number]    Recommendations:     Discharge Recommendations:  home, home health PT   Discharge Equipment Recommendations: none   Barriers to discharge: None    Assessment:     Kenneth Sheikh is a 80 y.o. male admitted with a medical diagnosis of Acute blood loss anemia.  He presents with the following impairments/functional limitations:  impaired cardiopulmonary response to activity, impaired endurance, weakness, impaired functional mobilty, impaired self care skills PT eval complete. Patient pleasant and willing to participate in therapy. Alert and oriented but perseverating on his lost sock throughout session. Mobility primarily limited by patient with critically low H & H and receiving transfusion. Stand by assistance for bed mobility and transfer to chair with 3-4 steps. No LOB. No SOB. No complaints of dizziness. Patient demonstrates globally deconditioning. PT will follow patient in the hospital to prevent further deconditioning and decline in functional mobility. PT anticipates patient will be safe to return home once medically ready and would benefit from home health physical therapy in order to improve safety and independence in the home environment.     Rehab Prognosis: Good; patient would benefit from acute skilled PT services to address these deficits and reach maximum level of function.    Recent Surgery: Procedure(s) (LRB):  EGD (ESOPHAGOGASTRODUODENOSCOPY) (N/A) 1 Day Post-Op    Plan:     During this hospitalization, patient to be seen 3 x/week to address the identified rehab impairments via gait training, therapeutic activities, therapeutic exercises, neuromuscular re-education and progress toward the following goals:    · Plan of Care Expires:  10/05/20    Subjective     Chief Complaint: fatigue  Patient/Family Comments/goals:   Pain/Comfort:  · Pain Rating 1: 0/10  · Pain Rating Post-Intervention 1: 0/10    Patients cultural, spiritual,  Adventism conflicts given the current situation: no    Living Environment:  Lives with wife in 1SH with no TULIO.  Prior to admission, patients level of function was Modified Independent with ADLs and household mobility, use of straight cane PRN.  Equipment used at home: cane, straight, grab bar, shower chair.  DME owned (not currently used): none.  Upon discharge, patient will have assistance from wife.    Objective:     Communicated with nurse prior to session.  Patient found up in chair with Condom Catheter, peripheral IV  upon PT entry to room.    General Precautions: Standard, fall   Orthopedic Precautions:N/A   Braces: N/A     Exams:  · RLE ROM: WFL  · RLE Strength: Grossly 4/5  · LLE ROM: WFL  · LLE Strength: Grossly 4/5    Functional Mobility:  · Bed Mobility:     · Scooting: stand by assistance  · Supine to Sit: stand by assistance  · Transfers:     · Sit to Stand:  stand by assistance with no AD  · Bed to Chair: stand by assistance with  no AD  using  Step Transfer  · Gait: 3-4 steps to chair, stand by assistance. Decreased step length, decreased gait speed, flexed posture. No LOB. No SOB. No c/o of dizzines.    Therapeutic Activities and Exercises:   Patient educated on role of PT in the hospital.   Pt educated on importance of OOB activity to decrease the risks associated with bed rest.   Pt educated on plan and goals with physical therapy.   Patient instructed to move carefully and take time transitioning for safe mobility.  Instruction provided for safety and technique for gait and transfers.  Pt educated on activity pacing   All questions and concerns addressed within PT scope of practice.   Increased time discussing discharge planning options with patient and wife  Instructed to call nursing for assistance with out of bed mobility.       AM-PAC 6 CLICK MOBILITY  Total Score:19     Patient left up in chair with all lines intact, call button in reach, nurse notified and wife present.    GOALS:    Multidisciplinary Problems     Physical Therapy Goals        Problem: Physical Therapy Goal    Goal Priority Disciplines Outcome Goal Variances Interventions   Physical Therapy Goal     PT, PT/OT Ongoing, Progressing     Description: Goals to be met by: 2020     Patient will increase functional independence with mobility by performin. Sit to stand transfer with Modified [place]  2. Bed to chair transfer with Modified [place] using no AD or LRAD if needed.  3. Gait  x 150 feet with Modified [place] using no AD or LRAD if needed.  4. Sitting at edge of bed x10 minutes with Modified [place]  5. Lower extremity exercise program x30 reps per handout, with independence                     History:     Past Medical History:   Diagnosis Date    Anticoagulant long-term use     Basal cell carcinoma     Bipolar 1 disorder     Bipolar 1 disorder 2016    bipolar    Cancer     history of skin cancer/sinus cancer & rectal cancer    Depressed bipolar affective disorder     Diabetes mellitus     type 2    Diabetic retinopathy 2019    EBV infection 2016    EBV DNA, PCR Latest Ref Range: None detected  None detected EBV DNA-Copies/mL Unknown Test Not Performed EBV Early Antigen Ab, IgG Latest Ref Range: <1:10 Titer 1:40 (A) EBV Nuclear Ag Ab Latest Ref Range: <1:5 Titer >=1:80 (A) EBV VCA IgG Latest Ref Range: <1:10 Titer 1:160 (A) EBV VCA IgM Latest Ref Range: <1:10 Titer <1:10     History of TIA (transient ischemic attack)     several-taking Plavix    Hx of gallstones     Wife denies    Hypertension     Hyperthyroidism 2016    Low HDL (under 40) 2016    Mixed hyperlipidemia 2016    JUAN MANUEL (obstructive sleep apnea)     Pneumonia     Skin disease     Squamous cell carcinoma 2018    right temple    Stroke     Transient cerebral ischemia 2016    Type 2 diabetes mellitus        Past Surgical History:   Procedure Laterality Date    Moh's  procedure x4      rectal cancer      Sinus surgery for sinus cancer      sinus sx for cancer      skin cancer removed-several times-nose & ear      TONSILLECTOMY      Undescened testicle sx      UVULOPALATOPHARYNGOPLASTY      for sleep apnea       Time Tracking:     PT Received On: 09/06/20  PT Start Time: 1029     PT Stop Time: 1047  PT Total Time (min): 18 min     Billable Minutes: Evaluation 10 and Therapeutic Activity 8      Erika Townsend, PT  09/06/2020

## 2020-09-06 NOTE — PLAN OF CARE
Problem: Fall Injury Risk  Goal: Absence of Fall and Fall-Related Injury  Outcome: Ongoing, Progressing     Problem: Adjustment to Illness (Gastrointestinal Bleeding)  Goal: Optimal Coping with Acute Illness  Outcome: Ongoing, Progressing     Problem: Bleeding (Gastrointestinal Bleeding)  Goal: Hemostasis  Outcome: Ongoing, Progressing     Problem: Adult Inpatient Plan of Care  Goal: Plan of Care Review  Outcome: Ongoing, Progressing  Goal: Patient-Specific Goal (Individualization)  Outcome: Ongoing, Progressing  Goal: Absence of Hospital-Acquired Illness or Injury  Outcome: Ongoing, Progressing  Goal: Optimal Comfort and Wellbeing  Outcome: Ongoing, Progressing  Goal: Readiness for Transition of Care  Outcome: Ongoing, Progressing  Goal: Rounds/Family Conference  Outcome: Ongoing, Progressing     Problem: Diabetes Comorbidity  Goal: Blood Glucose Level Within Desired Range  Outcome: Ongoing, Progressing     Problem: Electrolyte Imbalance (Acute Kidney Injury/Impairment)  Goal: Serum Electrolyte Balance  Outcome: Ongoing, Progressing     Problem: Fluid Imbalance (Acute Kidney Injury/Impairment)  Goal: Optimal Fluid Balance  Outcome: Ongoing, Progressing     Problem: Hematologic Alteration (Acute Kidney Injury/Impairment)  Goal: Hemoglobin, Hematocrit and Platelets Within Normal Range  Outcome: Ongoing, Progressing     Problem: Oral Intake Inadequate (Acute Kidney Injury/Impairment)  Goal: Optimal Nutrition Intake  Outcome: Ongoing, Progressing     Problem: Renal Function Impairment (Acute Kidney Injury/Impairment)  Goal: Effective Renal Function  Outcome: Ongoing, Progressing     Problem: Skin Injury Risk Increased  Goal: Skin Health and Integrity  Outcome: Ongoing, Progressing     Problem: Coping Ineffective  Goal: Effective Coping  Outcome: Ongoing, Progressing     Patient alert and oriented. No noted c/o pain. Bp continues to be low as well as heart rate. MD aware. Medications given as ordered. Will continue to  monitor

## 2020-09-06 NOTE — CARE UPDATE
Rapid Response Nurse Chart Check     Chart check completed, abnormal VS noted. Charge RNAziza contacted. No concerns verbalized at this time. Instructed to call [phone] for further concerns or assistance.

## 2020-09-06 NOTE — PT/OT/SLP EVAL
Occupational Therapy   Evaluation    Name: Kenneth Sheikh  MRN: [id number]  Admitting Diagnosis:  Acute blood loss anemia 1 Day Post-Op    Recommendations:     Discharge Recommendations: home with home health  Discharge Equipment Recommendations:  none  Barriers to discharge:  None    Assessment:     Kenneth Sheikh is a 80 y.o. male with a medical diagnosis of Acute blood loss anemia. Pt. currently demonstrates decreased (I) with ADLs, functional mobility & t/fs as well as decreased overall strength, ROM, endurance and balance. Pt would benefit from skilled OT services as well as to address these deficits and to facilitate improving (I) with daily tasks. Performance deficits affecting function: weakness, impaired endurance, impaired self care skills, impaired functional mobilty, gait instability, impaired balance, decreased coordination.      Rehab Prognosis: Good; patient would benefit from acute skilled OT services to address these deficits and reach maximum level of function.       Plan:     Patient to be seen 3 x/week to address the above listed problems via self-care/home management, therapeutic activities, therapeutic exercises  · Plan of Care Expires:    · Plan of Care Reviewed with: patient, spouse    Subjective     Occupational Profile:  Living Environment: pt lives with his wife in a [facility] with no steps and a tub/shower.   Previous level of function: Normally (I) but has declined the last few days. Uses a SPC or furniture walks PRN and recently had a fall at home.  Equipment Used at Home:  cane, straight, shower chair  Assistance upon Discharge: wife    Pain/Comfort:  · Pain Rating 1: 0/10    Patients cultural, spiritual, Restoration conflicts given the current situation:      Objective:     Communicated with: rn prior to session.  Patient found supine with peripheral IV upon OT entry to room.    General Precautions: Standard, fall     Occupational Performance:    Bed Mobility:    · Patient completed Rolling/Turning to  Right with supervision  · Patient completed Scooting/Bridging with stand by assistance  · Patient completed Supine to Sit with stand by assistance    Functional Mobility/Transfers:  · Patient completed Sit <> Stand Transfer with contact guard assistance  with  no assistive device   · Patient completed Bed <> Chair Transfer using Step Transfer technique with stand by assistance with no assistive device  · Functional Mobility: CGA with no AD.    Activities of Daily Living:  · Feeding:  independence   · Grooming: supervision seated  · Lower Body Dressing: stand by assistance     Cognitive/Visual Perceptual:  Cognitive/Psychosocial Skills:     -       Oriented to: Person, Place, Time and Situation   -       Safety awareness/insight to disability: intact     Physical Exam:  BUE AROM/MMT: WNL    AMPAC 6 Click ADL:  AMPAC Total Score: 22    Treatment & Education:  UE ROM/MMT  Bed mobility training / assessment  Functional mobility assessment  Sit/standing balance assessment  Educated on importance of sitting OOB in bedside chair to promote increased strength, endurance & breathing.  Discussed OT POC / Post-acute plan  Education:    Patient left up in chair with all lines intact and call button in reach    GOALS:   Multidisciplinary Problems     Occupational Therapy Goals        Problem: Occupational Therapy Goal    Goal Priority Disciplines Outcome Interventions   Occupational Therapy Goal     OT, PT/OT Ongoing, Progressing    Description: Goals to be met by: 9/13/20    Patient will increase functional independence with ADLs by performing:    LE Dressing with Set-up Assistance.  Grooming while standing at sink with Set-up Assistance.  Toileting from toilet with Supervision for hygiene and clothing management.   Supine to sit with Colver.  Toilet transfer to toilet with Supervision.                     History:     Past Medical History:   Diagnosis Date    Anticoagulant long-term use     Basal cell carcinoma      Bipolar 1 disorder     Bipolar 1 disorder 11/24/2016    bipolar    Cancer     history of skin cancer/sinus cancer & rectal cancer    Depressed bipolar affective disorder     Diabetes mellitus     type 2    Diabetic retinopathy 01/09/2019    EBV infection 12/7/2016    EBV DNA, PCR Latest Ref Range: None detected  None detected EBV DNA-Copies/mL Unknown Test Not Performed EBV Early Antigen Ab, IgG Latest Ref Range: <1:10 Titer 1:40 (A) EBV Nuclear Ag Ab Latest Ref Range: <1:5 Titer >=1:80 (A) EBV VCA IgG Latest Ref Range: <1:10 Titer 1:160 (A) EBV VCA IgM Latest Ref Range: <1:10 Titer <1:10     History of TIA (transient ischemic attack)     several-taking Plavix    Hx of gallstones     Wife denies    Hypertension     Hyperthyroidism 11/30/2016    Low HDL (under 40) 12/7/2016    Mixed hyperlipidemia 11/23/2016    JUAN MANUEL (obstructive sleep apnea)     Pneumonia     Skin disease     Squamous cell carcinoma 08/2018    right temple    Stroke     Transient cerebral ischemia 7/22/2016    Type 2 diabetes mellitus        Past Surgical History:   Procedure Laterality Date    Moh's procedure x4      rectal cancer      Sinus surgery for sinus cancer      sinus sx for cancer      skin cancer removed-several times-nose & ear      TONSILLECTOMY      Undescened testicle sx      UVULOPALATOPHARYNGOPLASTY      for sleep apnea       Time Tracking:     OT Date of Treatment: 09/06/20  OT Start Time: 1028  OT Stop Time: 1046  OT Total Time (min): 18 min    Billable Minutes:Evaluation 18    ZACKARY [place]  9/6/2020

## 2020-09-06 NOTE — CARE UPDATE
Rapid Response Nurse Chart Check     Chart check completed, abnormal VS noted. Bedside RN contacted. No concerns verbalized at this time. Pt now jeannette 40s. BP remains soft with H/H 5.2/17.7 (decreased today from yesterday). EKG ordered. Palliative to see pt, currently remains full code. Instructed to call [phone] for further concerns or assistance.

## 2020-09-06 NOTE — ASSESSMENT & PLAN NOTE
80 y.o. male medical history of MCDONALD Cirrhosis (h/o Ascites with weekly paracenteses - last 9/4 4.2L removed, Gastric Varices 2/2017), Yazidi, who presents with weakness black stools, anemia (Hb 6 from 10.6 previously), BELLA on CKD (Cr 2.9).    Recommendations:   - GIB: Appreciate GI assistance. S/p EGD 9/5 with likely bleeding from PHG and ?esophageal ulcer. Can likely stop octreotide gtt as not variceal bleed. Continue Abx x5 days. Agree with IV iron as patient declines blood products.  - BELLA: Urine studies not consistent with HRS. Likely prerenal from GIB.  - Ascites: Unfortunately ascitic fluid (9/4) does not appear to have been sent for diagnostic studies. Continue Abx in setting of GIB for ppx  - Recommend Palliative care consult - was discussed in clinic and wife agreeable to this.   - 2 gm Na restriction. Hold diuretics.  - HE: mild. Restart lactulose at low dose (5mL bid) and continue rifaximin  - Please obtain daily CMP, INR  - Transplant: not a candidate due to age

## 2020-09-06 NOTE — SUBJECTIVE & OBJECTIVE
Interval History:   EGD yesterday with small esophageal varices and IGV1 with evidence of prior treatment, small non-bleeding esophageal ulcer and portal hypertensive gastropathy with contact bleeding.  Last BM 2 days ago    Current Facility-Administered Medications   Medication    acetaminophen tablet 650 mg    albumin human 25% bottle 25 g    cefTRIAXone injection 1 g    divalproex EC tablet 500 mg    epoetin mariajose-epbx injection 7,090 Units    fentaNYL injection 25 mcg    HYDROcodone-acetaminophen  mg per tablet 1 tablet    HYDROcodone-acetaminophen 5-325 mg per tablet 1 tablet    iron sucrose (VENOFER) 200 mg in sodium chloride 0.9% 100 mL IVPB    melatonin tablet 6 mg    octreotide (SANDOSTATIN) 500 mcg in sodium chloride 0.9% 100 mL infusion    ondansetron injection 4 mg    ondansetron injection 8 mg    pantoprazole (PROTONIX) 40 mg in dextrose 5 % 100 mL infusion    promethazine (PHENERGAN) 25 mg in dextrose 5 % 50 mL IVPB    rifAXIMin tablet 550 mg    senna-docusate 8.6-50 mg per tablet 1 tablet    sodium chloride 0.9% flush 10 mL    sodium chloride 0.9% flush 5 mL    zinc sulfate capsule 220 mg       Objective:     Vital Signs (Most Recent):  Temp: 97.5 °F (36.4 °C) (09/06/20 0855)  Pulse: (!) 56 (09/06/20 0855)  Resp: 11 (09/06/20 0855)  BP: 99/70 (09/06/20 0855)  SpO2: 97 % (09/06/20 0855) Vital Signs (24h Range):  Temp:  [94 °F (34.4 °C)-98.7 °F (37.1 °C)] 97.5 °F (36.4 °C)  Pulse:  [56-81] 56  Resp:  [11-19] 11  SpO2:  [93 %-99 %] 97 %  BP: ()/(49-70) 99/70     Weight: 70.9 kg (156 lb 4.9 oz) (09/04/20 2143)  Body mass index is 23.77 kg/m².    Physical Exam  Vitals signs and nursing note reviewed.   Constitutional:       Appearance: He is ill-appearing.   Eyes:      General: No scleral icterus.  Cardiovascular:      Rate and Rhythm: Normal rate.   Pulmonary:      Effort: Pulmonary effort is normal.   Abdominal:      General: There is no distension.      Palpations: Abdomen  is soft.      Tenderness: There is no abdominal tenderness.   Skin:     Coloration: Skin is pale. Skin is not jaundiced.   Neurological:      Mental Status: He is alert and oriented to person, place, and time.      Comments: Mild asterixis         MELD-Na score: 21 at 9/6/2020  3:53 AM  MELD score: 21 at 9/6/2020  3:53 AM  Calculated from:  Serum Creatinine: 3.0 mg/dL at 9/6/2020  3:53 AM  Serum Sodium: 139 mmol/L (Rounded to 137 mmol/L) at 9/6/2020  3:53 AM  Total Bilirubin: 0.4 mg/dL (Rounded to 1 mg/dL) at 9/6/2020  3:53 AM  INR(ratio): 1.4 at 9/6/2020  3:53 AM  Age: 80 years 7 months    Significant Labs:  Labs within the past month have been reviewed.    Significant Imaging:  Reviewed

## 2020-09-07 LAB
ALBUMIN SERPL BCP-MCNC: 2.3 G/DL (ref 3.5–5.2)
ALP SERPL-CCNC: 84 U/L (ref 55–135)
ALT SERPL W/O P-5'-P-CCNC: 10 U/L (ref 10–44)
ANION GAP SERPL CALC-SCNC: 15 MMOL/L (ref 8–16)
AST SERPL-CCNC: 27 U/L (ref 10–40)
BASOPHILS # BLD AUTO: 0.01 K/UL (ref 0–0.2)
BASOPHILS NFR BLD: 0.3 % (ref 0–1.9)
BILIRUB SERPL-MCNC: 0.4 MG/DL (ref 0.1–1)
BUN SERPL-MCNC: 80 MG/DL (ref 8–23)
CALCIUM SERPL-MCNC: 7.5 MG/DL (ref 8.7–10.5)
CHLORIDE SERPL-SCNC: 108 MMOL/L (ref 95–110)
CO2 SERPL-SCNC: 12 MMOL/L (ref 23–29)
CREAT SERPL-MCNC: 2.9 MG/DL (ref 0.5–1.4)
DIFFERENTIAL METHOD: ABNORMAL
EOSINOPHIL # BLD AUTO: 0.1 K/UL (ref 0–0.5)
EOSINOPHIL NFR BLD: 1.6 % (ref 0–8)
ERYTHROCYTE [DISTWIDTH] IN BLOOD BY AUTOMATED COUNT: 19.2 % (ref 11.5–14.5)
EST. GFR  (AFRICAN AMERICAN): 22.3 ML/MIN/1.73 M^2
EST. GFR  (NON AFRICAN AMERICAN): 19.3 ML/MIN/1.73 M^2
GLUCOSE SERPL-MCNC: 132 MG/DL (ref 70–110)
HCT VFR BLD AUTO: 19.3 % (ref 40–54)
HGB BLD-MCNC: 5.8 G/DL (ref 14–18)
IMM GRANULOCYTES # BLD AUTO: 0.03 K/UL (ref 0–0.04)
IMM GRANULOCYTES NFR BLD AUTO: 0.8 % (ref 0–0.5)
INR PPP: 1.4 (ref 0.8–1.2)
LYMPHOCYTES # BLD AUTO: 0.8 K/UL (ref 1–4.8)
LYMPHOCYTES NFR BLD: 22.2 % (ref 18–48)
MAGNESIUM SERPL-MCNC: 1.7 MG/DL (ref 1.6–2.6)
MCH RBC QN AUTO: 29.7 PG (ref 27–31)
MCHC RBC AUTO-ENTMCNC: 30.1 G/DL (ref 32–36)
MCV RBC AUTO: 99 FL (ref 82–98)
MONOCYTES # BLD AUTO: 0.5 K/UL (ref 0.3–1)
MONOCYTES NFR BLD: 13.2 % (ref 4–15)
NEUTROPHILS # BLD AUTO: 2.4 K/UL (ref 1.8–7.7)
NEUTROPHILS NFR BLD: 61.9 % (ref 38–73)
NRBC BLD-RTO: 0 /100 WBC
PHOSPHATE SERPL-MCNC: 5 MG/DL (ref 2.7–4.5)
PLATELET # BLD AUTO: 71 K/UL (ref 150–350)
PMV BLD AUTO: 10.5 FL (ref 9.2–12.9)
POCT GLUCOSE: 177 MG/DL (ref 70–110)
POTASSIUM SERPL-SCNC: 3.7 MMOL/L (ref 3.5–5.1)
PROT SERPL-MCNC: 5.7 G/DL (ref 6–8.4)
PROTHROMBIN TIME: 15 SEC (ref 9–12.5)
RBC # BLD AUTO: 1.95 M/UL (ref 4.6–6.2)
SODIUM SERPL-SCNC: 135 MMOL/L (ref 136–145)
WBC # BLD AUTO: 3.79 K/UL (ref 3.9–12.7)

## 2020-09-07 PROCEDURE — C1751 CATH, INF, PER/CENT/MIDLINE: HCPCS

## 2020-09-07 PROCEDURE — 99233 SBSQ HOSP IP/OBS HIGH 50: CPT | Mod: ,,, | Performed by: INTERNAL MEDICINE

## 2020-09-07 PROCEDURE — 36415 COLL VENOUS BLD VENIPUNCTURE: CPT

## 2020-09-07 PROCEDURE — 63600175 PHARM REV CODE 636 W HCPCS: Performed by: HOSPITALIST

## 2020-09-07 PROCEDURE — 25000003 PHARM REV CODE 250: Performed by: INTERNAL MEDICINE

## 2020-09-07 PROCEDURE — 99231 SBSQ HOSP IP/OBS SF/LOW 25: CPT | Mod: ,,, | Performed by: INTERNAL MEDICINE

## 2020-09-07 PROCEDURE — 83735 ASSAY OF MAGNESIUM: CPT

## 2020-09-07 PROCEDURE — 25000003 PHARM REV CODE 250: Performed by: HOSPITALIST

## 2020-09-07 PROCEDURE — 11000001 HC ACUTE MED/SURG PRIVATE ROOM

## 2020-09-07 PROCEDURE — 84100 ASSAY OF PHOSPHORUS: CPT

## 2020-09-07 PROCEDURE — 76937 US GUIDE VASCULAR ACCESS: CPT

## 2020-09-07 PROCEDURE — 63600175 PHARM REV CODE 636 W HCPCS: Performed by: INTERNAL MEDICINE

## 2020-09-07 PROCEDURE — 85610 PROTHROMBIN TIME: CPT

## 2020-09-07 PROCEDURE — P9047 ALBUMIN (HUMAN), 25%, 50ML: HCPCS | Mod: JG | Performed by: INTERNAL MEDICINE

## 2020-09-07 PROCEDURE — 99233 PR SUBSEQUENT HOSPITAL CARE,LEVL III: ICD-10-PCS | Mod: ,,, | Performed by: INTERNAL MEDICINE

## 2020-09-07 PROCEDURE — 80053 COMPREHEN METABOLIC PANEL: CPT

## 2020-09-07 PROCEDURE — 99231 PR SUBSEQUENT HOSPITAL CARE,LEVL I: ICD-10-PCS | Mod: ,,, | Performed by: INTERNAL MEDICINE

## 2020-09-07 PROCEDURE — 85025 COMPLETE CBC W/AUTO DIFF WBC: CPT

## 2020-09-07 PROCEDURE — P9047 ALBUMIN (HUMAN), 25%, 50ML: HCPCS | Mod: JG | Performed by: HOSPITALIST

## 2020-09-07 PROCEDURE — C9113 INJ PANTOPRAZOLE SODIUM, VIA: HCPCS | Performed by: INTERNAL MEDICINE

## 2020-09-07 PROCEDURE — 36410 VNPNXR 3YR/> PHY/QHP DX/THER: CPT

## 2020-09-07 RX ORDER — SODIUM BICARBONATE 650 MG/1
650 TABLET ORAL 3 TIMES DAILY
Status: DISCONTINUED | OUTPATIENT
Start: 2020-09-07 | End: 2020-09-08

## 2020-09-07 RX ORDER — ALBUMIN HUMAN 250 G/1000ML
25 SOLUTION INTRAVENOUS 2 TIMES DAILY
Status: DISCONTINUED | OUTPATIENT
Start: 2020-09-07 | End: 2020-09-09

## 2020-09-07 RX ADMIN — ALBUMIN (HUMAN) 25 G: 12.5 SOLUTION INTRAVENOUS at 09:09

## 2020-09-07 RX ADMIN — SODIUM BICARBONATE 650 MG TABLET 650 MG: at 04:09

## 2020-09-07 RX ADMIN — ALBUMIN (HUMAN) 25 G: 12.5 SOLUTION INTRAVENOUS at 10:09

## 2020-09-07 RX ADMIN — EPOETIN ALFA-EPBX 7090 UNITS: 4000 INJECTION, SOLUTION INTRAVENOUS; SUBCUTANEOUS at 10:09

## 2020-09-07 RX ADMIN — SODIUM BICARBONATE 650 MG TABLET 650 MG: at 09:09

## 2020-09-07 RX ADMIN — DEXTROSE 8 MG/HR: 5 SOLUTION INTRAVENOUS at 07:09

## 2020-09-07 RX ADMIN — SODIUM BICARBONATE 650 MG TABLET 650 MG: at 11:09

## 2020-09-07 RX ADMIN — ZINC SULFATE 220 MG (50 MG) CAPSULE 220 MG: CAPSULE at 09:09

## 2020-09-07 RX ADMIN — CEFTRIAXONE SODIUM 1 G: 1 INJECTION, POWDER, FOR SOLUTION INTRAMUSCULAR; INTRAVENOUS at 05:09

## 2020-09-07 RX ADMIN — DIVALPROEX SODIUM 500 MG: 250 TABLET, DELAYED RELEASE ORAL at 09:09

## 2020-09-07 RX ADMIN — SODIUM BICARBONATE: 84 INJECTION, SOLUTION INTRAVENOUS at 11:09

## 2020-09-07 RX ADMIN — LACTULOSE 5 G: 20 SOLUTION ORAL at 09:09

## 2020-09-07 RX ADMIN — PHYTONADIONE 5 MG: 10 INJECTION, EMULSION INTRAMUSCULAR; INTRAVENOUS; SUBCUTANEOUS at 10:09

## 2020-09-07 RX ADMIN — RIFAXIMIN 550 MG: 550 TABLET ORAL at 09:09

## 2020-09-07 RX ADMIN — IRON SUCROSE 200 MG: 20 INJECTION, SOLUTION INTRAVENOUS at 10:09

## 2020-09-07 NOTE — NURSING
H&H critical result(s) hemoglobin 5.8, Hematocrit 19.3 per Lab. I called Med team A to notify about the result. Waiting on call back. At 0736 MD noted!

## 2020-09-07 NOTE — TREATMENT PLAN
GI Treatment Plan    Kenneth Sheikh is a 80 y.o. male admitted to hospital 9/4/2020 (Hospital Day: 4) due to Acute blood loss anemia.     Interval History  No acute events overnight.  Hgb stable this AM, 5.2-->5.8    Objective  Temp:  [96.6 °F (35.9 °C)-98.5 °F (36.9 °C)] 96.6 °F (35.9 °C) (09/07 0900)  Pulse:  [] 58 (09/07 0900)  BP: ()/(52-78) 108/53 (09/07 0900)  Resp:  [10-21] 16 (09/07 0900)  SpO2:  [97 %-99 %] 99 % (09/07 0900)    Laboratory    Recent Labs   Lab 09/05/20  0355 09/06/20  0353 09/07/20  0615   HGB 5.4* 5.2* 5.8*       Lab Results   Component Value Date    WBC 3.79 (L) 09/07/2020    HGB 5.8 (LL) 09/07/2020    HCT 19.3 (LL) 09/07/2020    MCV 99 (H) 09/07/2020    PLT 71 (L) 09/07/2020       Lab Results   Component Value Date     (L) 09/07/2020    K 3.7 09/07/2020     09/07/2020    CO2 12 (L) 09/07/2020    BUN 80 (H) 09/07/2020    CREATININE 2.9 (H) 09/07/2020    CALCIUM 7.5 (L) 09/07/2020    ANIONGAP 15 09/07/2020    ESTGFRAFRICA 22.3 (A) 09/07/2020    EGFRNONAA 19.3 (A) 09/07/2020       Lab Results   Component Value Date    ALT 10 09/07/2020    AST 27 09/07/2020    ALKPHOS 84 09/07/2020    BILITOT 0.4 09/07/2020       Lab Results   Component Value Date    INR 1.4 (H) 09/07/2020    INR 1.4 (H) 09/06/2020    INR 1.3 (H) 09/04/2020       Plan  - continue IV PPI for 72 hr  - continue antibiotics for total of five days  - agree with pediatric blood draws, trend hemoglobin  - continue EPO and IV iron  - will arrange for outpatient advanced endoscopy follow-up for gastric varices  - Plan of care was discussed with primary team.  - We will continue to follow.    Thank you for involving us in the care of Kenneth Sheikh. Please call with any additional questions, concerns or changes in the patient's clinical status.    King Sosa MD  Gastroenterology Fellow  Spectralink: [phone]

## 2020-09-07 NOTE — CONSULTS
Midline placed  in left basilic, [id number] Lot# [id number] Removal date on or before 10/6/20. Needle advanced into vessel with real time ultrasound guidance. Image recorded and saved.

## 2020-09-07 NOTE — NURSING
Uneventful shift. Patient alert and calm.  Pt did not voice  discomfort/ pain.  Medicated per MD order. Pt had formed BM this AM.  Bed in the low position , wheels locked, call light in reach, upper side rails raised x2.Will continue to monitor.

## 2020-09-07 NOTE — PROGRESS NOTES
[facility] Hospital Medicine                                                                     Progress Note     Team: [facility] HOSP MED A Terrell Rainey MD   Admit Date: 9/4/2020   Hospital Day: 3  KEILY: 9/10/2020   Code status: Full Code   Principal Problem: Acute blood loss anemia     SUMMARY:     Mr. Kenneth Sheikh is a 80 y.o. male with MCDONALD cirrhosis, complicated by gastric varices, hepatic encephalopathy, ascites requiring weekly paracenteses, and coagulopathy who presents to the ER from Hepatology clinic for evaluation of weakness and melena.  He mentions that he has been feeling weak and fatigued the last 2 days, and the day prior to admission he started having melena.  He denies the use of any blood thinners or Aspirin.  He is a Confucianism, and does not want to receive blood transfusions, but he is ok receiving blood stimulators or fractionated blood products - just not whole blood.  He endorses some chills, but no fevers, chest pain, or shortness of breath.  Of note, he just had a paracentesis on the day of admission that removed 4.2L.     Upon arrival to the ER, vitals were temp 98.9F, HR 80 and /80.  Labs showed Hemoglobin 6, Platelet 67, Creatinine 2.9 and INR 1.3.  ESTEFANÍA was positive.  He was given 500cc bolus and was admitted to Hospital Medicine for further management.    Admitted to hospital medicine for acute UGIB with hx of gastric varices and ulcers. Started on supportive tx IV octreotide, PPI, ceftriaxone and albumin. GI and hepatology consulted. S/p EGD 9/5 with LA Grade B esophagitis, Non-bleeding esophageal ulcer, Small (< 5 mm) esophageal varice, Portal hypertensive gastropathy. Type 1 isolated gastric varices (IGV1, varices located in the fundus), previously treated and without bleeding, Multiple duodenal polyps. Very friable gastric  mucosa. GI rec slowly advancing diet, repeat EGD 8-12 weeks to assure healing of esophageal ulcer and biopsy duodenal polyps.    He remains on IV PPI and ceftriaxone at this time. Octreotide discontinued as no overt variceal bleed. Pediatric blood draws in the meantime, and avoiding unnecessary blood draws.     SUBJECTIVE:     Pt was seen and examined at bedside. No acute events overnight. No new complaints this AM. HDS and afebrile. BP gradually improving. Had one BM last night, dark stool. No abdominal pain, fevers, bloody or dark BM endorsed. Continues to be weak and pale. Consultants following. Advancing diet slowly now on soft diet. Palliative consulted, pending eval.      ROS (Positive in Bold, otherwise negative)  Pain Scale: 0 /10   Constitutional: fever, chills, night sweats, weakness, fatigue  CV: chest pain, edema, palpitations  Resp: SOB, cough, sputum production  GI: changes in appetite, NVDC, pain, melena (resolved), hematochezia, GERD, hematemesis  : Dysuria, hematuria, urinary urgency, frequency  MSK: arthralgia/myalgia, joint swelling  SKIN: rashes, pruritis, petechiae   Neuro/Psych: FND, anxiety, depression      OBJECTIVE:     Vitals:  Temp:  [96.6 °F (35.9 °C)-98.5 °F (36.9 °C)]   Pulse:  []   Resp:  [10-21]   BP: ()/(52-78)   SpO2:  [97 %-99 %]      I & O (Last 24H):     Intake/Output Summary (Last 24 hours) at 9/7/2020 1218  Last data filed at 9/7/2020 0000  Gross per 24 hour   Intake --   Output 601 ml   Net -601 ml         GEN: chronically ill appearing male  in no acute distress. Appears pale. Resting in bed.  HEENT: NCAT. PERRL. EOMI. Conjunctivae/corneas clear, sclera Anicteric.  CVS: RRR. Normal s1 s2 no murmur, click, rub or gallop  LUNG: CTAB. Normal respiratory effort. No wheezes, rhonchi, or crackles.  ABD: Normoactive BS, soft, NT, mildly distended, no masses or organomegaly.  EXT: Trace LE edema. No cyanosis. Full ROM.  SKIN: pale and cold  NEURO: Alert, oriented x 4,  Spont mvt of all extremities with no focal deficits noted.      All recent labs and imaging has been reviewed.     Recent Results (from the past 24 hour(s))   POCT glucose    Collection Time: 09/06/20  8:12 PM   Result Value Ref Range    POCT Glucose 195 (H) 70 - 110 mg/dL   CBC auto differential    Collection Time: 09/07/20  6:15 AM   Result Value Ref Range    WBC 3.79 (L) 3.90 - 12.70 K/uL    RBC 1.95 (L) 4.60 - 6.20 M/uL    Hemoglobin 5.8 (LL) 14.0 - 18.0 g/dL    Hematocrit 19.3 (LL) 40.0 - 54.0 %    Mean Corpuscular Volume 99 (H) 82 - 98 fL    Mean Corpuscular Hemoglobin 29.7 27.0 - 31.0 pg    Mean Corpuscular Hemoglobin Conc 30.1 (L) 32.0 - 36.0 g/dL    RDW 19.2 (H) 11.5 - 14.5 %    Platelets 71 (L) 150 - 350 K/uL    MPV 10.5 9.2 - 12.9 fL    Immature Granulocytes 0.8 (H) 0.0 - 0.5 %    Gran # (ANC) 2.4 1.8 - 7.7 K/uL    Immature Grans (Abs) 0.03 0.00 - 0.04 K/uL    Lymph # 0.8 (L) 1.0 - 4.8 K/uL    Mono # 0.5 0.3 - 1.0 K/uL    Eos # 0.1 0.0 - 0.5 K/uL    Baso # 0.01 0.00 - 0.20 K/uL    nRBC 0 0 /100 WBC    Gran% 61.9 38.0 - 73.0 %    Lymph% 22.2 18.0 - 48.0 %    Mono% 13.2 4.0 - 15.0 %    Eosinophil% 1.6 0.0 - 8.0 %    Basophil% 0.3 0.0 - 1.9 %    Differential Method Automated    Comprehensive metabolic panel    Collection Time: 09/07/20  6:15 AM   Result Value Ref Range    Sodium 135 (L) 136 - 145 mmol/L    Potassium 3.7 3.5 - 5.1 mmol/L    Chloride 108 95 - 110 mmol/L    CO2 12 (L) 23 - 29 mmol/L    Glucose 132 (H) 70 - 110 mg/dL    BUN, Bld 80 (H) 8 - 23 mg/dL    Creatinine 2.9 (H) 0.5 - 1.4 mg/dL    Calcium 7.5 (L) 8.7 - 10.5 mg/dL    Total Protein 5.7 (L) 6.0 - 8.4 g/dL    Albumin 2.3 (L) 3.5 - 5.2 g/dL    Total Bilirubin 0.4 0.1 - 1.0 mg/dL    Alkaline Phosphatase 84 55 - 135 U/L    AST 27 10 - 40 U/L    ALT 10 10 - 44 U/L    Anion Gap 15 8 - 16 mmol/L    eGFR if African American 22.3 (A) >60 mL/min/1.73 m^2    eGFR if non  19.3 (A) >60 mL/min/1.73 m^2   Protime-INR    Collection Time:  09/07/20  6:15 AM   Result Value Ref Range    Prothrombin Time 15.0 (H) 9.0 - 12.5 sec    INR 1.4 (H) 0.8 - 1.2   Magnesium    Collection Time: 09/07/20  6:15 AM   Result Value Ref Range    Magnesium 1.7 1.6 - 2.6 mg/dL   Phosphorus    Collection Time: 09/07/20  6:15 AM   Result Value Ref Range    Phosphorus 5.0 (H) 2.7 - 4.5 mg/dL   POCT glucose    Collection Time: 09/07/20  9:25 AM   Result Value Ref Range    POCT Glucose 177 (H) 70 - 110 mg/dL       Recent Labs   Lab 09/05/20  1644 09/05/20 2046 09/06/20 2012 09/07/20  0925   POCTGLUCOSE 132* 136* 195* 177*       Hemoglobin A1C   Date Value Ref Range Status   05/15/2020 6.6 (H) 4.0 - 5.6 % Final     Comment:     ADA Screening Guidelines:  5.7-6.4%  Consistent with prediabetes  >or=6.5%  Consistent with diabetes  High levels of fetal hemoglobin interfere with the HbA1C  assay. Heterozygous hemoglobin variants (HbS, HgC, etc)do  not significantly interfere with this assay.   However, presence of multiple variants may affect accuracy.     01/28/2020 6.8 (H) 4.0 - 5.6 % Final     Comment:     ADA Screening Guidelines:  5.7-6.4%  Consistent with prediabetes  >or=6.5%  Consistent with diabetes  High levels of fetal hemoglobin interfere with the HbA1C  assay. Heterozygous hemoglobin variants (HbS, HgC, etc)do  not significantly interfere with this assay.   However, presence of multiple variants may affect accuracy.     06/14/2019 7.3 (H) 4.0 - 5.6 % Final     Comment:     ADA Screening Guidelines:  5.7-6.4%  Consistent with prediabetes  >or=6.5%  Consistent with diabetes  High levels of fetal hemoglobin interfere with the HbA1C  assay. Heterozygous hemoglobin variants (HbS, HgC, etc)do  not significantly interfere with this assay.   However, presence of multiple variants may affect accuracy.          Active Hospital Problems    Diagnosis  POA    *Acute blood loss anemia [D62]  Yes    Coagulopathy [D68.9]  Yes    Thrombocytopenia [D69.6]  Yes    Gastrointestinal  hemorrhage with melena [K92.1]  Yes    Refusal of blood transfusions as patient is Alevism [Z53.1]  Not Applicable     He is ok receiving fractioned blood products and blood stimulators, just not whole blood.     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          ASSESSMENT AND PLAN:     GI Hemorrhage  Acute Blood Loss Anemia  Gastric Varices  - Hgb 6 on admit, baseline around 10 in May  - Start Protonix 40mg IV BID  - GI consulted, appreciate assistance - they are aware of the patient and the high risk for deterioration given Alevism  - Start Ceftriaxone 1g IV q24h for SBP prophylaxis - total 5 days  - Start Octreotide gtt, stopped since no overt variceal bleed  - PPI IV changed to drip as per GI, continue 72 hrs from EGD  - S/p EGD 9/5 with LA Grade B esophagitis, Non-bleeding esophageal ulcer, Small (< 5 mm) esophageal varice, Portal hypertensive gastropathy. Type 1 isolated gastric varices (IGV1, varices located in the fundus), previously treated and without bleeding, Multiple duodenal polyps. Very friable gastric mucosa.   - GI rec slowly advancing diet, repeat EGD 8-12 weeks to assure healing of esophageal ulcer and biopsy duodenal polyps, and discuss with AES regarding gastric varices   - EPO and iron infusion    Refusal of Blood Products as Alevism  - Does not want to receive blood transfusions, but he is ok receiving blood stimulators or fractionated blood products - just not whole blood  - Lab draws daily with pediatric tubes  - Epo injection WMF and iron infusion     MCDONALD Cirrhosis  - MELD-Na score: 22 at 9/4/2020   3:10 PM  - MELD score: 20 at 9/4/2020  3:10 PM  - Calculated from:  Serum Creatinine: 2.9 mg/dL at 9/4/2020  3:10 PM  Serum Sodium: 134 mmol/L at 9/4/2020  3:10 PM  Total Bilirubin: 0.4 mg/dL (Rounded to 1 mg/dL) at 9/4/2020  3:10 PM  INR(ratio): 1.3 at 9/4/2020  3:10 PM  Age: 80 years 7 months   Hepatology consult, appreciate recs  Hold Lasix with anemia and softer BP  Restart low dose lactulose as per 5mL BID and continue rifaximin   Albumin 25% BID as per hepatology   Palliative consult as per hepatology recs  Not a transplant candidate due to age  BELLA not consistent with HRS, likely prerenal with ATN from GIB     Hepatic Encephalopathy  - Ammonia 54 on admit  - Rifaximin 550mg PO BID  - Zinc 220mg PO daily  - Restart low dose lactulose as per 5mL BID and continue rifaximin      Acute Renal Failure on CKD Stage 3  ATN  - Creatinine 2.9, baseline around 1-2  - Albumin for resuscitation given ascites and edema  - urine studies not consistent with HRS, likely BELLA from GIB  - Can consider Nephrology eval if worsening      Thrombocytopenia  Coagulopathy  - Chronic issue 2/2 liver disease  - Monitor for continued bleeding     Bipolar 1 Disorder  - Chronic issue  - Continue Depakote 500mg PO qHS          Prophylaxis- SCDs  Code Status- Full Code   Discharge plan and follow up - d/c home once medically stable    Discharge Planning   KEILY: 9/10/2020     Code Status: Full Code   Is the patient medically ready for discharge?:     Reason for patient still in hospital (select all that apply): Patient trending condition         Terrell Rainey MD  Hospital Medicine Staff  Pager [phone]

## 2020-09-07 NOTE — SUBJECTIVE & OBJECTIVE
Interval History:   Started on lactulose - 1 BM yesterday.  On IV iron, Hgb up to 5.8 today.  Cr 2.9, on albumin daily  Patient with no complaints today.      Current Facility-Administered Medications   Medication    acetaminophen tablet 650 mg    albumin human 25% bottle 25 g    cefTRIAXone injection 1 g    divalproex EC tablet 500 mg    epoetin mariajose-epbx injection 7,090 Units    fentaNYL injection 25 mcg    HYDROcodone-acetaminophen  mg per tablet 1 tablet    HYDROcodone-acetaminophen 5-325 mg per tablet 1 tablet    iron sucrose (VENOFER) 200 mg in sodium chloride 0.9% 100 mL IVPB    lactulose 20 gram/30 mL solution Soln 5 g    melatonin tablet 6 mg    ondansetron injection 4 mg    ondansetron injection 8 mg    pantoprazole (PROTONIX) 40 mg in dextrose 5 % 100 mL infusion    promethazine (PHENERGAN) 25 mg in dextrose 5 % 50 mL IVPB    rifAXIMin tablet 550 mg    senna-docusate 8.6-50 mg per tablet 1 tablet    sodium bicarbonate 150 mEq in dextrose 5 % 1,000 mL infusion    sodium bicarbonate tablet 650 mg    sodium chloride 0.9% flush 10 mL    sodium chloride 0.9% flush 5 mL    zinc sulfate capsule 220 mg       Objective:     Vital Signs (Most Recent):  Temp: 96.6 °F (35.9 °C) (09/07/20 0900)  Pulse: (!) 51 (09/07/20 1114)  Resp: 16 (09/07/20 0900)  BP: (!) 108/53 (09/07/20 0900)  SpO2: 99 % (09/07/20 0900) Vital Signs (24h Range):  Temp:  [96.6 °F (35.9 °C)-98.5 °F (36.9 °C)] 96.6 °F (35.9 °C)  Pulse:  [] 51  Resp:  [10-21] 16  SpO2:  [97 %-99 %] 99 %  BP: ()/(52-78) 108/53     Weight: 70.9 kg (156 lb 4.9 oz) (09/04/20 2143)  Body mass index is 23.77 kg/m².    Physical Exam  Vitals signs and nursing note reviewed.   Constitutional:       General: He is not in acute distress.  Eyes:      General: No scleral icterus.  Cardiovascular:      Rate and Rhythm: Normal rate.   Pulmonary:      Effort: Pulmonary effort is normal.   Abdominal:      General: Bowel sounds are normal. There  is no distension.      Palpations: Abdomen is soft.      Tenderness: There is no abdominal tenderness.   Skin:     Coloration: Skin is pale. Skin is not jaundiced.   Neurological:      Mental Status: He is alert and oriented to person, place, and time.      Comments: Action tremor         MELD-Na score: 21 at 9/7/2020  6:15 AM  MELD score: 20 at 9/7/2020  6:15 AM  Calculated from:  Serum Creatinine: 2.9 mg/dL at 9/7/2020  6:15 AM  Serum Sodium: 135 mmol/L at 9/7/2020  6:15 AM  Total Bilirubin: 0.4 mg/dL (Rounded to 1 mg/dL) at 9/7/2020  6:15 AM  INR(ratio): 1.4 at 9/7/2020  6:15 AM  Age: 80 years 7 months    Significant Labs:  Labs within the past month have been reviewed.    Significant Imaging:  Reviewed

## 2020-09-07 NOTE — CARE UPDATE
Rapid Response Nurse Chart Check     Chart check completed, abnormal VS noted. Please call [phone] for further concerns or assistance.

## 2020-09-07 NOTE — ANESTHESIA POSTPROCEDURE EVALUATION
Anesthesia Post Evaluation    Patient: Kenneth Sheikh    Procedure(s) Performed: Procedure(s) (LRB):  EGD (ESOPHAGOGASTRODUODENOSCOPY) (N/A)    Final Anesthesia Type: general    Patient location during evaluation: PACU  Patient participation: Yes- Able to Participate  Level of consciousness: awake and alert  Post-procedure vital signs: reviewed and stable  Pain management: adequate  Airway patency: patent  JUAN MANUEL mitigation strategies: Extubation while patient is awake  PONV status at discharge: No PONV  Anesthetic complications: no      Cardiovascular status: stable  Respiratory status: unassisted and room air  Hydration status: euvolemic  Follow-up not needed.          Vitals Value Taken Time   /51 09/07/20 1641   Temp 36.4 °C (97.5 °F) 09/07/20 1608   Pulse 59 09/07/20 1641   Resp 15 09/07/20 1641   SpO2 96 % 09/07/20 1641   Vitals shown include unvalidated device data.      Event Time   Out of Recovery 09/05/2020 12:34:12         Pain/Sean Score: No data recorded

## 2020-09-07 NOTE — ASSESSMENT & PLAN NOTE
80 y.o. male medical history of MCDONALD Cirrhosis (h/o Ascites with weekly paracenteses - last 9/4 4.2L removed, Gastric Varices 2/2017), [facility], who presents with weakness black stools, anemia (Hb 6 from 10.6 previously), BELLA on CKD (Cr 2.9).    Recommendations:   - GIB: Appreciate GI assistance. S/p EGD 9/5 with likely bleeding from PHG and ?esophageal ulcer. Continue Abx x5 days. Agree with IV iron as patient declines blood products.  - BELLA: Urine studies not consistent with HRS. Likely prerenal from GIB. Increase albumin to 25g twice a day  - Ascites: Unfortunately ascitic fluid (9/4) does not appear to have been sent for diagnostic studies. Continue Abx in setting of GIB for ppx  - Recommend Palliative care consult - was discussed in clinic and wife agreeable to this.   - 2 gm Na restriction. Hold diuretics.  - HE: mild. Lactulose at low dose (5mL bid) and continue rifaximin  - Please obtain daily CMP, INR  - Transplant: not a candidate due to age

## 2020-09-08 ENCOUNTER — TELEPHONE (OUTPATIENT)
Dept: GASTROENTEROLOGY | Facility: HOSPITAL | Age: 80
End: 2020-09-08

## 2020-09-08 ENCOUNTER — TELEPHONE (OUTPATIENT)
Dept: PRIMARY CARE CLINIC | Facility: CLINIC | Age: 80
End: 2020-09-08

## 2020-09-08 ENCOUNTER — TELEPHONE (OUTPATIENT)
Dept: ENDOSCOPY | Facility: HOSPITAL | Age: 80
End: 2020-09-08

## 2020-09-08 ENCOUNTER — TELEPHONE (OUTPATIENT)
Dept: INTERNAL MEDICINE | Facility: CLINIC | Age: 80
End: 2020-09-08

## 2020-09-08 ENCOUNTER — TELEPHONE (OUTPATIENT)
Dept: HEPATOLOGY | Facility: CLINIC | Age: 80
End: 2020-09-08

## 2020-09-08 DIAGNOSIS — I86.4 GASTRIC VARICES: Primary | ICD-10-CM

## 2020-09-08 PROBLEM — Z51.5 PALLIATIVE CARE ENCOUNTER: Status: ACTIVE | Noted: 2020-09-08

## 2020-09-08 LAB
ALBUMIN SERPL BCP-MCNC: 2.5 G/DL (ref 3.5–5.2)
ALP SERPL-CCNC: 75 U/L (ref 55–135)
ALT SERPL W/O P-5'-P-CCNC: 8 U/L (ref 10–44)
ANION GAP SERPL CALC-SCNC: 12 MMOL/L (ref 8–16)
ANISOCYTOSIS BLD QL SMEAR: ABNORMAL
AST SERPL-CCNC: 26 U/L (ref 10–40)
BASOPHILS # BLD AUTO: 0.01 K/UL (ref 0–0.2)
BASOPHILS NFR BLD: 0.4 % (ref 0–1.9)
BILIRUB SERPL-MCNC: 0.4 MG/DL (ref 0.1–1)
BUN SERPL-MCNC: 73 MG/DL (ref 8–23)
CALCIUM SERPL-MCNC: 7.5 MG/DL (ref 8.7–10.5)
CHLORIDE SERPL-SCNC: 106 MMOL/L (ref 95–110)
CO2 SERPL-SCNC: 20 MMOL/L (ref 23–29)
CREAT SERPL-MCNC: 2.8 MG/DL (ref 0.5–1.4)
DIFFERENTIAL METHOD: ABNORMAL
EOSINOPHIL # BLD AUTO: 0 K/UL (ref 0–0.5)
EOSINOPHIL NFR BLD: 1.1 % (ref 0–8)
ERYTHROCYTE [DISTWIDTH] IN BLOOD BY AUTOMATED COUNT: 19.7 % (ref 11.5–14.5)
EST. GFR  (AFRICAN AMERICAN): 23.2 ML/MIN/1.73 M^2
EST. GFR  (NON AFRICAN AMERICAN): 20.1 ML/MIN/1.73 M^2
GLUCOSE SERPL-MCNC: 136 MG/DL (ref 70–110)
HCT VFR BLD AUTO: 17.3 % (ref 40–54)
HGB BLD-MCNC: 5.2 G/DL (ref 14–18)
HYPOCHROMIA BLD QL SMEAR: ABNORMAL
IMM GRANULOCYTES # BLD AUTO: 0.07 K/UL (ref 0–0.04)
IMM GRANULOCYTES NFR BLD AUTO: 2.7 % (ref 0–0.5)
INR PPP: 1.4 (ref 0.8–1.2)
LYMPHOCYTES # BLD AUTO: 0.5 K/UL (ref 1–4.8)
LYMPHOCYTES NFR BLD: 19.9 % (ref 18–48)
MAGNESIUM SERPL-MCNC: 1.7 MG/DL (ref 1.6–2.6)
MCH RBC QN AUTO: 30.1 PG (ref 27–31)
MCHC RBC AUTO-ENTMCNC: 30.1 G/DL (ref 32–36)
MCV RBC AUTO: 100 FL (ref 82–98)
MONOCYTES # BLD AUTO: 0.3 K/UL (ref 0.3–1)
MONOCYTES NFR BLD: 13 % (ref 4–15)
NEUTROPHILS # BLD AUTO: 1.6 K/UL (ref 1.8–7.7)
NEUTROPHILS NFR BLD: 62.9 % (ref 38–73)
NRBC BLD-RTO: 0 /100 WBC
PHOSPHATE SERPL-MCNC: 4.2 MG/DL (ref 2.7–4.5)
PLATELET # BLD AUTO: 56 K/UL (ref 150–350)
PLATELET BLD QL SMEAR: ABNORMAL
PMV BLD AUTO: 10.6 FL (ref 9.2–12.9)
POCT GLUCOSE: 140 MG/DL (ref 70–110)
POCT GLUCOSE: 163 MG/DL (ref 70–110)
POCT GLUCOSE: 183 MG/DL (ref 70–110)
POLYCHROMASIA BLD QL SMEAR: ABNORMAL
POTASSIUM SERPL-SCNC: 3.4 MMOL/L (ref 3.5–5.1)
PROT SERPL-MCNC: 5.5 G/DL (ref 6–8.4)
PROTHROMBIN TIME: 15.6 SEC (ref 9–12.5)
RBC # BLD AUTO: 1.73 M/UL (ref 4.6–6.2)
SODIUM SERPL-SCNC: 138 MMOL/L (ref 136–145)
WBC # BLD AUTO: 2.61 K/UL (ref 3.9–12.7)

## 2020-09-08 PROCEDURE — 99223 1ST HOSP IP/OBS HIGH 75: CPT | Mod: ,,, | Performed by: CLINICAL NURSE SPECIALIST

## 2020-09-08 PROCEDURE — 36415 COLL VENOUS BLD VENIPUNCTURE: CPT

## 2020-09-08 PROCEDURE — 99497 ADVNCD CARE PLAN 30 MIN: CPT | Mod: 25,,, | Performed by: CLINICAL NURSE SPECIALIST

## 2020-09-08 PROCEDURE — 99223 PR INITIAL HOSPITAL CARE,LEVL III: ICD-10-PCS | Mod: ,,, | Performed by: CLINICAL NURSE SPECIALIST

## 2020-09-08 PROCEDURE — 63600175 PHARM REV CODE 636 W HCPCS: Performed by: HOSPITALIST

## 2020-09-08 PROCEDURE — 99233 SBSQ HOSP IP/OBS HIGH 50: CPT | Mod: ,,, | Performed by: INTERNAL MEDICINE

## 2020-09-08 PROCEDURE — 25000003 PHARM REV CODE 250: Performed by: INTERNAL MEDICINE

## 2020-09-08 PROCEDURE — 85610 PROTHROMBIN TIME: CPT

## 2020-09-08 PROCEDURE — 84100 ASSAY OF PHOSPHORUS: CPT

## 2020-09-08 PROCEDURE — C9113 INJ PANTOPRAZOLE SODIUM, VIA: HCPCS | Performed by: INTERNAL MEDICINE

## 2020-09-08 PROCEDURE — 80053 COMPREHEN METABOLIC PANEL: CPT

## 2020-09-08 PROCEDURE — 85025 COMPLETE CBC W/AUTO DIFF WBC: CPT

## 2020-09-08 PROCEDURE — 99233 PR SUBSEQUENT HOSPITAL CARE,LEVL III: ICD-10-PCS | Mod: ,,, | Performed by: INTERNAL MEDICINE

## 2020-09-08 PROCEDURE — 63600175 PHARM REV CODE 636 W HCPCS: Performed by: INTERNAL MEDICINE

## 2020-09-08 PROCEDURE — 99497 PR ADVNCD CARE PLAN 30 MIN: ICD-10-PCS | Mod: 25,,, | Performed by: CLINICAL NURSE SPECIALIST

## 2020-09-08 PROCEDURE — 11000001 HC ACUTE MED/SURG PRIVATE ROOM

## 2020-09-08 PROCEDURE — 94761 N-INVAS EAR/PLS OXIMETRY MLT: CPT

## 2020-09-08 PROCEDURE — P9047 ALBUMIN (HUMAN), 25%, 50ML: HCPCS | Mod: JG | Performed by: INTERNAL MEDICINE

## 2020-09-08 PROCEDURE — 25000003 PHARM REV CODE 250: Performed by: HOSPITALIST

## 2020-09-08 PROCEDURE — 83735 ASSAY OF MAGNESIUM: CPT

## 2020-09-08 RX ORDER — LACTULOSE 10 G/15ML
10 SOLUTION ORAL 2 TIMES DAILY
Status: DISCONTINUED | OUTPATIENT
Start: 2020-09-08 | End: 2020-09-08

## 2020-09-08 RX ORDER — LACTULOSE 10 G/15ML
10 SOLUTION ORAL 3 TIMES DAILY
Status: DISCONTINUED | OUTPATIENT
Start: 2020-09-08 | End: 2020-09-09

## 2020-09-08 RX ORDER — POTASSIUM CHLORIDE 7.45 MG/ML
10 INJECTION INTRAVENOUS
Status: COMPLETED | OUTPATIENT
Start: 2020-09-08 | End: 2020-09-08

## 2020-09-08 RX ORDER — LACTULOSE 10 G/15ML
10 SOLUTION ORAL 3 TIMES DAILY
Status: DISCONTINUED | OUTPATIENT
Start: 2020-09-08 | End: 2020-09-08

## 2020-09-08 RX ADMIN — DEXTROSE 8 MG/HR: 5 SOLUTION INTRAVENOUS at 08:09

## 2020-09-08 RX ADMIN — SODIUM BICARBONATE: 84 INJECTION, SOLUTION INTRAVENOUS at 04:09

## 2020-09-08 RX ADMIN — LACTULOSE 5 G: 20 SOLUTION ORAL at 08:09

## 2020-09-08 RX ADMIN — IRON SUCROSE 200 MG: 20 INJECTION, SOLUTION INTRAVENOUS at 09:09

## 2020-09-08 RX ADMIN — ALBUMIN (HUMAN) 25 G: 12.5 SOLUTION INTRAVENOUS at 08:09

## 2020-09-08 RX ADMIN — POTASSIUM CHLORIDE 10 MEQ: 7.46 INJECTION, SOLUTION INTRAVENOUS at 12:09

## 2020-09-08 RX ADMIN — RIFAXIMIN 550 MG: 550 TABLET ORAL at 08:09

## 2020-09-08 RX ADMIN — ZINC SULFATE 220 MG (50 MG) CAPSULE 220 MG: CAPSULE at 08:09

## 2020-09-08 RX ADMIN — CEFTRIAXONE SODIUM 1 G: 1 INJECTION, POWDER, FOR SOLUTION INTRAMUSCULAR; INTRAVENOUS at 05:09

## 2020-09-08 RX ADMIN — POTASSIUM CHLORIDE 10 MEQ: 7.46 INJECTION, SOLUTION INTRAVENOUS at 10:09

## 2020-09-08 RX ADMIN — POTASSIUM CHLORIDE 10 MEQ: 7.46 INJECTION, SOLUTION INTRAVENOUS at 02:09

## 2020-09-08 RX ADMIN — DEXTROSE 8 MG/HR: 5 SOLUTION INTRAVENOUS at 04:09

## 2020-09-08 RX ADMIN — POTASSIUM CHLORIDE 10 MEQ: 7.46 INJECTION, SOLUTION INTRAVENOUS at 01:09

## 2020-09-08 RX ADMIN — DIVALPROEX SODIUM 500 MG: 250 TABLET, DELAYED RELEASE ORAL at 08:09

## 2020-09-08 RX ADMIN — LACTULOSE 10 G: 20 SOLUTION ORAL at 11:09

## 2020-09-08 RX ADMIN — DEXTROSE 8 MG/HR: 5 SOLUTION INTRAVENOUS at 01:09

## 2020-09-08 RX ADMIN — LACTULOSE 10 G: 20 SOLUTION ORAL at 08:09

## 2020-09-08 RX ADMIN — LACTULOSE 10 G: 20 SOLUTION ORAL at 05:09

## 2020-09-08 NOTE — HPI
"HPI obtained from chart review: please see H and P from primary team 9/4/2020    Mr. Sheikh is an 81 yo gentlmen with PMH of: MCDONALD cirrhosis, gastric varices, hepatic encephalopathy, coagulopathy,  and ascites requiring weekly paracenteses.  He [place] [facility] for evaluation of c/o weakness,  Melena, weakness and fatigue over two days. No use of any blood thinners or Aspirin. He reports chills,  no fevers, chest pain, or shortness of breath.  Paracentesis with removal of 4.2 liters in hepatology clinic.      He is a Latter day, and does not want to receive blood transfusions, but he is ok receiving blood stimulators or fractionated blood products - just not whole blood.       Upon arrival to the ER, vitals were temp 98.9F, HR 80 and /80.  Labs showed Hemoglobin 6, Platelet 67, Creatinine 2.9 and INR 1.3.  digital rectal exam positive.  He was given 500cc bolus and was admitted to Hospital Medicine for further management.    "Admitted to hospital medicine for acute UGIB with hx of gastric varices and ulcers. Started on supportive tx IV octreotide, PPI, ceftriaxone and albumin. GI and hepatology consulted. S/p EGD 9/5 with LA Grade B esophagitis, Non-bleeding esophageal ulcer, Small (< 5 mm) esophageal varice, Portal hypertensive gastropathy. Type 1 isolated gastric varices (IGV1, varices located in the fundus), previously treated and without bleeding, Multiple duodenal polyps. Very friable gastric mucosa. GI rec slowly advancing diet, repeat EGD 8-12 weeks to assure healing of esophageal ulcer and biopsy duodenal polyps.     He remains on IV PPI and ceftriaxone at this time. Octreotide discontinued as no overt variceal bleed. Trending CBC with Pediatric blood draws in the meantime daily, and avoiding unnecessary blood draws. "    Palliative medicine consulted for goals of care and advanced care planning   "

## 2020-09-08 NOTE — TREATMENT PLAN
GI Treatment Plan    Kenneth Sheikh is a 80 y.o. male admitted to hospital 9/4/2020 (Hospital Day: 5) due to Acute blood loss anemia.     Interval History  No acute events overnight.  Hgb stable this AM, 5.2-->5.8-->5.2, no further melena or hematochezia.    Objective  Temp:  [93.5 °F (34.2 °C)-97.8 °F (36.6 °C)] 97.4 °F (36.3 °C) (09/08 0809)  Pulse:  [51-61] 59 (09/08 0809)  BP: ()/(45-69) 104/51 (09/08 0809)  Resp:  [9-19] 14 (09/08 0809)  SpO2:  [96 %-99 %] 98 % (09/08 0809)    Gen: NAD, lying in bed  Abd: soft, NT/ND    Laboratory    Recent Labs   Lab 09/06/20  0353 09/07/20  0615 09/08/20  0552   HGB 5.2* 5.8* 5.2*       Lab Results   Component Value Date    WBC 2.61 (L) 09/08/2020    HGB 5.2 (LL) 09/08/2020    HCT 17.3 (LL) 09/08/2020     (H) 09/08/2020    PLT 56 (L) 09/08/2020       Lab Results   Component Value Date     09/08/2020    K 3.4 (L) 09/08/2020     09/08/2020    CO2 20 (L) 09/08/2020    BUN 73 (H) 09/08/2020    CREATININE 2.8 (H) 09/08/2020    CALCIUM 7.5 (L) 09/08/2020    ANIONGAP 12 09/08/2020    ESTGFRAFRICA 23.2 (A) 09/08/2020    EGFRNONAA 20.1 (A) 09/08/2020       Lab Results   Component Value Date    ALT 8 (L) 09/08/2020    AST 26 09/08/2020    ALKPHOS 75 09/08/2020    BILITOT 0.4 09/08/2020       Lab Results   Component Value Date    INR 1.4 (H) 09/08/2020    INR 1.4 (H) 09/07/2020    INR 1.4 (H) 09/06/2020       Plan  - PO PPI BID for at least 8 weeks.  - continue antibiotics for total of five days  - agree with pediatric blood draws, trend hemoglobin  - continue EPO and IV iron  - will arrange for outpatient advanced endoscopy follow-up for gastric varices  - Plan of care was discussed with primary team.  - We will continue to follow.    Thank you for involving us in the care of Kenneth Sheikh. Please call with any additional questions, concerns or changes in the patient's clinical status.    King Sosa MD  Gastroenterology Fellow  Spectralink: [phone]

## 2020-09-08 NOTE — TELEPHONE ENCOUNTER
----- Message from King Sosa MD sent at 9/8/2020  3:14 PM CDT -----  Regarding: Outpatient EGD with possible injection and coiling  Discussed setting up EGD with possible coiling with Dr. Jeffry Clement.     Plan was for 12 week follow up with Dr. Aneudy Perla given he previously performed the last procedure and has most coiling experience.  Has history of IGV-1 that were seen on recent inpatient endoscopy.    Thanks,    King

## 2020-09-08 NOTE — PROGRESS NOTES
[facility] Hospital Medicine                                                                     Progress Note     Team: [facility] HOSP MED A Terrell Rainey MD   Admit Date: 9/4/2020   Hospital Day: 4  KEILY: 9/11/2020   Code status: Full Code   Principal Problem: Acute blood loss anemia     SUMMARY:     Mr. Kenneth Sheikh is a 80 y.o. male with MCDONALD cirrhosis, complicated by gastric varices, hepatic encephalopathy, ascites requiring weekly paracenteses, and coagulopathy who presents to the ER from Hepatology clinic for evaluation of weakness and melena.  He mentions that he has been feeling weak and fatigued the last 2 days, and the day prior to admission he started having melena.  He denies the use of any blood thinners or Aspirin.  He is a Taoist, and does not want to receive blood transfusions, but he is ok receiving blood stimulators or fractionated blood products - just not whole blood.  He endorses some chills, but no fevers, chest pain, or shortness of breath.  Of note, he just had a paracentesis on the day of admission that removed 4.2L.     Upon arrival to the ER, vitals were temp 98.9F, HR 80 and /80.  Labs showed Hemoglobin 6, Platelet 67, Creatinine 2.9 and INR 1.3.  ESTEFANÍA was positive.  He was given 500cc bolus and was admitted to Hospital Medicine for further management.    Admitted to hospital medicine for acute UGIB with hx of gastric varices and ulcers. Started on supportive tx IV octreotide, PPI, ceftriaxone and albumin. GI and hepatology consulted. S/p EGD 9/5 with LA Grade B esophagitis, Non-bleeding esophageal ulcer, Small (< 5 mm) esophageal varice, Portal hypertensive gastropathy. Type 1 isolated gastric varices (IGV1, varices located in the fundus), previously treated and without bleeding, Multiple duodenal polyps. Very friable gastric  mucosa. GI rec slowly advancing diet, repeat EGD 8-12 weeks to assure healing of esophageal ulcer and biopsy duodenal polyps.    He remains on IV PPI and ceftriaxone at this time. Octreotide discontinued as no overt variceal bleed. Trending CBC with Pediatric blood draws in the meantime daily, and avoiding unnecessary blood draws.     SUBJECTIVE:     Pt was seen and examined at bedside. No acute events overnight. No new complaints this AM. HDS and afebrile. BP gradually improving. Had one dark BM this AM. This morning patient more confused, inattentive and decreased concentration, hepatic flaps, likely worsening hepatic encephalopathy. No abdominal pain, fevers endorsed. Continues to be weak and pale. Consultants following. [facility]icussed [facility] with wife, and she plans to speak to her children today for guidance. Hgb 5.2      ROS (Positive in Bold, otherwise negative)  Pain Scale: 0 /10   Constitutional: fever, chills, night sweats, weakness, fatigue  CV: chest pain, edema, palpitations  Resp: SOB, cough, sputum production  GI: changes in appetite, NVDC, pain, melena, hematochezia, GERD, hematemesis  : Dysuria, hematuria, urinary urgency, frequency  MSK: arthralgia/myalgia, joint swelling  SKIN: rashes, pruritis, petechiae   Neuro/Psych: FND, confusion, anxiety, depression      OBJECTIVE:     Vitals:  Temp:  [93.5 °F (34.2 °C)-97.8 °F (36.6 °C)]   Pulse:  [51-70]   Resp:  [9-19]   BP: ()/(45-69)   SpO2:  [96 %-99 %]      I & O (Last 24H):     Intake/Output Summary (Last 24 hours) at 9/8/2020 1329  Last data filed at 9/8/2020 1227  Gross per 24 hour   Intake 300 ml   Output 750 ml   Net -450 ml         GEN: chronically ill appearing male  in no acute distress. Appears pale. Resting in bed.  HEENT: NCAT. PERRL. EOMI. Conjunctivae/corneas clear, sclera Anicteric.  CVS: RRR. Normal s1 s2 no murmur, click, rub or gallop  LUNG: CTAB. Normal respiratory effort. No wheezes, rhonchi, or crackles.  ABD: Normoactive BS, soft,  NT, mildly distended, no masses or organomegaly.  EXT: Trace LE edema. No cyanosis. Full ROM. Hepatic flaps  SKIN: pale and cold  NEURO: Alert, oriented x 2, decreased concentration, Spont mvt of all extremities with no focal deficits noted.      All recent labs and imaging has been reviewed.     Recent Results (from the past 24 hour(s))   CBC auto differential    Collection Time: 09/08/20  5:52 AM   Result Value Ref Range    WBC 2.61 (L) 3.90 - 12.70 K/uL    RBC 1.73 (L) 4.60 - 6.20 M/uL    Hemoglobin 5.2 (LL) 14.0 - 18.0 g/dL    Hematocrit 17.3 (LL) 40.0 - 54.0 %    Mean Corpuscular Volume 100 (H) 82 - 98 fL    Mean Corpuscular Hemoglobin 30.1 27.0 - 31.0 pg    Mean Corpuscular Hemoglobin Conc 30.1 (L) 32.0 - 36.0 g/dL    RDW 19.7 (H) 11.5 - 14.5 %    Platelets 56 (L) 150 - 350 K/uL    MPV 10.6 9.2 - 12.9 fL    Immature Granulocytes 2.7 (H) 0.0 - 0.5 %    Gran # (ANC) 1.6 (L) 1.8 - 7.7 K/uL    Immature Grans (Abs) 0.07 (H) 0.00 - 0.04 K/uL    Lymph # 0.5 (L) 1.0 - 4.8 K/uL    Mono # 0.3 0.3 - 1.0 K/uL    Eos # 0.0 0.0 - 0.5 K/uL    Baso # 0.01 0.00 - 0.20 K/uL    nRBC 0 0 /100 WBC    Gran% 62.9 38.0 - 73.0 %    Lymph% 19.9 18.0 - 48.0 %    Mono% 13.0 4.0 - 15.0 %    Eosinophil% 1.1 0.0 - 8.0 %    Basophil% 0.4 0.0 - 1.9 %    Platelet Estimate Decreased (A)     Aniso Moderate     Poly Occasional     Hypo Occasional     Differential Method Automated    Comprehensive metabolic panel    Collection Time: 09/08/20  5:52 AM   Result Value Ref Range    Sodium 138 136 - 145 mmol/L    Potassium 3.4 (L) 3.5 - 5.1 mmol/L    Chloride 106 95 - 110 mmol/L    CO2 20 (L) 23 - 29 mmol/L    Glucose 136 (H) 70 - 110 mg/dL    BUN, Bld 73 (H) 8 - 23 mg/dL    Creatinine 2.8 (H) 0.5 - 1.4 mg/dL    Calcium 7.5 (L) 8.7 - 10.5 mg/dL    Total Protein 5.5 (L) 6.0 - 8.4 g/dL    Albumin 2.5 (L) 3.5 - 5.2 g/dL    Total Bilirubin 0.4 0.1 - 1.0 mg/dL    Alkaline Phosphatase 75 55 - 135 U/L    AST 26 10 - 40 U/L    ALT 8 (L) 10 - 44 U/L    Anion  Gap 12 8 - 16 mmol/L    eGFR if African American 23.2 (A) >60 mL/min/1.73 m^2    eGFR if non  20.1 (A) >60 mL/min/1.73 m^2   Protime-INR    Collection Time: 09/08/20  5:52 AM   Result Value Ref Range    Prothrombin Time 15.6 (H) 9.0 - 12.5 sec    INR 1.4 (H) 0.8 - 1.2   Magnesium    Collection Time: 09/08/20  5:52 AM   Result Value Ref Range    Magnesium 1.7 1.6 - 2.6 mg/dL   Phosphorus    Collection Time: 09/08/20  5:52 AM   Result Value Ref Range    Phosphorus 4.2 2.7 - 4.5 mg/dL   POCT glucose    Collection Time: 09/08/20  7:43 AM   Result Value Ref Range    POCT Glucose 163 (H) 70 - 110 mg/dL       Recent Labs   Lab 09/05/20  1644 09/05/20  2046 09/06/20 2012 09/07/20  0925 09/08/20  0743   POCTGLUCOSE 132* 136* 195* 177* 163*       Hemoglobin A1C   Date Value Ref Range Status   05/15/2020 6.6 (H) 4.0 - 5.6 % Final     Comment:     ADA Screening Guidelines:  5.7-6.4%  Consistent with prediabetes  >or=6.5%  Consistent with diabetes  High levels of fetal hemoglobin interfere with the HbA1C  assay. Heterozygous hemoglobin variants (HbS, HgC, etc)do  not significantly interfere with this assay.   However, presence of multiple variants may affect accuracy.     01/28/2020 6.8 (H) 4.0 - 5.6 % Final     Comment:     ADA Screening Guidelines:  5.7-6.4%  Consistent with prediabetes  >or=6.5%  Consistent with diabetes  High levels of fetal hemoglobin interfere with the HbA1C  assay. Heterozygous hemoglobin variants (HbS, HgC, etc)do  not significantly interfere with this assay.   However, presence of multiple variants may affect accuracy.     06/14/2019 7.3 (H) 4.0 - 5.6 % Final     Comment:     ADA Screening Guidelines:  5.7-6.4%  Consistent with prediabetes  >or=6.5%  Consistent with diabetes  High levels of fetal hemoglobin interfere with the HbA1C  assay. Heterozygous hemoglobin variants (HbS, HgC, etc)do  not significantly interfere with this assay.   However, presence of multiple variants may affect  accuracy.          Active Hospital Problems    Diagnosis  POA    *Acute blood loss anemia [D62]  Yes    Coagulopathy [D68.9]  Yes    Thrombocytopenia [D69.6]  Yes    Gastrointestinal hemorrhage with melena [K92.1]  Yes    Refusal of blood transfusions as patient is Worship [Z53.1]  Not Applicable     He is ok receiving fractioned blood products and blood stimulators, just not whole blood.     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          ASSESSMENT AND PLAN:     GI Hemorrhage  Acute Blood Loss Anemia  Gastric Varices  - Hgb 6 on admit, baseline around 10 in May  - Start Protonix 40mg IV BID  - GI consulted, appreciate assistance - they are aware of the patient and the high risk for deterioration given Worship  - Start Ceftriaxone 1g IV q24h for SBP prophylaxis - total 5 days  - Start Octreotide gtt, stopped since no overt variceal bleed  - PPI IV changed to drip as per GI, continue 72 hrs from EGD - end date 9/9  - S/p EGD 9/5 with LA Grade B esophagitis, Non-bleeding esophageal ulcer, Small (< 5 mm) esophageal varice, Portal hypertensive gastropathy. Type 1 isolated gastric varices (IGV1, varices located in the fundus), previously treated and without bleeding, Multiple duodenal polyps. Very friable gastric mucosa.   - GI rec slowly advancing diet, repeat EGD 8-12 weeks to assure healing of esophageal ulcer and biopsy duodenal polyps, and discuss with AES regarding gastric varices   - EPO and iron infusion  - Trending CBC with Pediatric blood draws in the meantime daily, and avoiding unnecessary blood draws  - Hgb 10.6 (5/16/20)  --> 6 (admit) --> 5.4 --> 5.2 --> 5.8 --> 5.2    Refusal of Blood Products as Spiritism  - Does not want to receive blood transfusions, but he is ok receiving blood stimulators or fractionated blood products - just not whole blood  - Lab draws daily with pediatric tubes  - Epo injection WMF and iron infusion     MCDONALD Cirrhosis  - MELD-Na score: 22 at 9/4/2020  3:10 PM  - MELD score: 20 at 9/4/2020  3:10 PM  - Calculated from:  Serum Creatinine: 2.9 mg/dL at 9/4/2020  3:10 PM  Serum Sodium: 134 mmol/L at 9/4/2020  3:10 PM  Total Bilirubin: 0.4 mg/dL (Rounded to 1 mg/dL) at 9/4/2020  3:10 PM  INR(ratio): 1.3 at 9/4/2020  3:10 PM  Age: 80 years 7 months   Hepatology consult, appreciate recs  Hold Lasix with anemia and softer BP  Restart low dose lactulose as per 5mL BID and continue rifaximin   Albumin 25% BID as per hepatology   Palliative consult as per hepatology recs  Not a transplant candidate due to age  BELLA not consistent with HRS, likely prerenal with ATN from GIB  Poor prognosis overall, continue to discuss GOC with family     Hepatic Encephalopathy  - Ammonia 54 on admit  - Continue Rifaximin 550mg PO BID  - Start Zinc 220mg PO daily  - Noted to be worse 9/8 - increased lactulose to 10 mL TID for goal 3-4 BM     Acute Renal Failure on CKD Stage 3  ATN  - Creatinine 2.9, baseline around 1-2  - Albumin for resuscitation given ascites and edema  - urine studies not consistent with HRS, likely BELLA from GIB  - BID IV albumin as per hepatology  - Can consider Nephrology eval if worsening      Thrombocytopenia  Coagulopathy  - Chronic issue 2/2 liver disease  - Monitor for continued bleeding     Bipolar 1 Disorder  - Chronic issue  - Continue Depakote 500mg PO qHS  - Note Depakote can increased ammonia lvls but family wants to continue this for bipolar disorder maintenance           Prophylaxis- SCDs  Code Status- Full Code   Discharge plan and follow up - pending PT OT , pending stability , GOC  underway    Discharge Planning   KEILY: 9/11/2020     Code Status: Full Code   Is the patient medically ready for discharge?:     Reason for patient still in hospital (select all that apply): Patient trending condition         Terrell Rainey MD  [facility] Medicine Staff  Pager [phone]

## 2020-09-08 NOTE — CONSULTS
Palliative Care Acknowledgement of Consult - .date    Consult received. Palliative Care Provider:__CARIDAD Gilliam, APRN, ACNS-BC_______ will touch base with team prior to seeing patient. Full consult to follow.    Thank you for allowing us to be a part of the care of this patient.          Prisca Yarbrough LCSW, ACHP-SW

## 2020-09-08 NOTE — TELEPHONE ENCOUNTER
----- Message from Nanci Kelley sent at 9/8/2020 10:28 AM CDT -----  Regarding: Cancellation  Contact: [phone]  Calling to inform patient has to cancel appointment due to being admitted to hospital.

## 2020-09-08 NOTE — TREATMENT PLAN
Hepatology Treatment Plan    Kenneth Sheikh is a 80 y.o. male admitted to hospital 9/4/2020 (Hospital Day: 5) due to Acute blood loss anemia.     Interval History  Noted to be more confused today. On low dose lactulose, only 1 BM yesterday.  Hgb 5.2 (all cell lines down)  Cr 2.8    Objective  Temp:  [93.5 °F (34.2 °C)-97.8 °F (36.6 °C)] 97.4 °F (36.3 °C) (09/08 0809)  Pulse:  [51-70] 70 (09/08 1107)  BP: ()/(45-69) 104/51 (09/08 0809)  Resp:  [9-19] 14 (09/08 0809)  SpO2:  [96 %-99 %] 98 % (09/08 0809)      Laboratory    MELD-Na score: 20 at 9/8/2020  5:52 AM  MELD score: 20 at 9/8/2020  5:52 AM  Calculated from:  Serum Creatinine: 2.8 mg/dL at 9/8/2020  5:52 AM  Serum Sodium: 138 mmol/L (Rounded to 137 mmol/L) at 9/8/2020  5:52 AM  Total Bilirubin: 0.4 mg/dL (Rounded to 1 mg/dL) at 9/8/2020  5:52 AM  INR(ratio): 1.4 at 9/8/2020  5:52 AM  Age: 80 years 7 months    Recent Labs   Lab 09/06/20  0353 09/07/20  0615 09/08/20  0552   HGB 5.2* 5.8* 5.2*       Lab Results   Component Value Date    WBC 2.61 (L) 09/08/2020    HGB 5.2 (LL) 09/08/2020    HCT 17.3 (LL) 09/08/2020     (H) 09/08/2020    PLT 56 (L) 09/08/2020       Lab Results   Component Value Date     09/08/2020    K 3.4 (L) 09/08/2020     09/08/2020    CO2 20 (L) 09/08/2020    BUN 73 (H) 09/08/2020    CREATININE 2.8 (H) 09/08/2020    CALCIUM 7.5 (L) 09/08/2020    ANIONGAP 12 09/08/2020    ESTGFRAFRICA 23.2 (A) 09/08/2020    EGFRNONAA 20.1 (A) 09/08/2020       Lab Results   Component Value Date    ALT 8 (L) 09/08/2020    AST 26 09/08/2020    ALKPHOS 75 09/08/2020    BILITOT 0.4 09/08/2020       Lab Results   Component Value Date    INR 1.4 (H) 09/08/2020    INR 1.4 (H) 09/07/2020    INR 1.4 (H) 09/06/2020       Plan  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 male medical history of MCDONALD Cirrhosis (h/o Ascites with weekly paracenteses - last 9/4 4.2L removed, Gastric Varices 2/2017), [facility], who  presents with weakness black stools, anemia (Hb 6 from 10.6 previously), BELLA on CKD (Cr 2.9).     Recommendations:   - GIB: Appreciate GI assistance. S/p EGD 9/5 with likely bleeding from PHG and ?esophageal ulcer. Continue Abx x5 days. Agree with IV iron as patient declines blood products.  - BELLA: Urine studies not consistent with HRS. Likely prerenal from GIB. Continue albumin IV 25g twice a day  - Ascites: Unfortunately ascitic fluid (9/4) does not appear to have been sent for diagnostic studies. Continue Abx in setting of GIB for ppx  - Recommend Palliative care consult - was discussed in clinic and wife agreeable to this.   - 2 gm Na restriction. Hold diuretics.  - HE: mild. Increase lactulose with goal 3-4 BMs daily and continue rifaximin  - Please obtain daily CMP, INR  - Transplant: not a candidate due to age      Thank you for involving us in the care of Kenneth Sheikh. Please call with any additional questions, concerns or changes in the patient's clinical status.    Greg Henley MD  Gastroenterology Fellow, PGY V

## 2020-09-08 NOTE — SUBJECTIVE & OBJECTIVE
Interval History:     Past Medical History:   Diagnosis Date    Anticoagulant long-term use     Basal cell carcinoma     Bipolar 1 disorder     Bipolar 1 disorder 11/24/2016    bipolar    Cancer     history of skin cancer/sinus cancer & rectal cancer    Depressed bipolar affective disorder     Diabetes mellitus     type 2    Diabetic retinopathy 01/09/2019    EBV infection 12/7/2016    EBV DNA, PCR Latest Ref Range: None detected  None detected EBV DNA-Copies/mL Unknown Test Not Performed EBV Early Antigen Ab, IgG Latest Ref Range: <1:10 Titer 1:40 (A) EBV Nuclear Ag Ab Latest Ref Range: <1:5 Titer >=1:80 (A) EBV VCA IgG Latest Ref Range: <1:10 Titer 1:160 (A) EBV VCA IgM Latest Ref Range: <1:10 Titer <1:10     History of TIA (transient ischemic attack)     several-taking Plavix    Hx of gallstones     Wife denies    Hypertension     Hyperthyroidism 11/30/2016    Low HDL (under 40) 12/7/2016    Mixed hyperlipidemia 11/23/2016    JUAN MANUEL (obstructive sleep apnea)     Pneumonia     Skin disease     Squamous cell carcinoma 08/2018    right temple    Stroke     Transient cerebral ischemia 7/22/2016    Type 2 diabetes mellitus        Past Surgical History:   Procedure Laterality Date    Moh's procedure x4      rectal cancer      Sinus surgery for sinus cancer      sinus sx for cancer      skin cancer removed-several times-nose & ear      TONSILLECTOMY      Undescened testicle sx      UVULOPALATOPHARYNGOPLASTY      for sleep apnea       Review of patient's allergies indicates:   Allergen Reactions    Abilify [aripiprazole] Other (See Comments)     Sleepy, could not function.       Medications:  Continuous Infusions:   pantoprozole (PROTONIX) IV infusion 8 mg/hr (09/08/20 1336)     Scheduled Meds:   albumin human 25%  25 g Intravenous BID    cefTRIAXone (ROCEPHIN) IVPB  1 g Intravenous Q24H    divalproex  500 mg Oral QHS    epoetin mariajose-epbx  100 Units/kg (Order-Specific) Subcutaneous Once  per day on Mon Wed Fri    iron sucrose (VENOFER) IVPB  200 mg Intravenous Daily    lactulose  10 g Oral TID    rifAXIMin  550 mg Oral BID    zinc sulfate  220 mg Oral Daily     PRN Meds:acetaminophen, fentaNYL, HYDROcodone-acetaminophen, HYDROcodone-acetaminophen, melatonin, ondansetron, ondansetron, promethazine (PHENERGAN) IVPB, senna-docusate 8.6-50 mg, sodium chloride 0.9%, sodium chloride 0.9%    Family History     Problem Relation (Age of Onset)    Diabetes Son    No Known Problems Daughter        Tobacco Use    Smoking status: Former Smoker     Packs/day: 0.25     Years: 2.00     Pack years: 0.50    Smokeless tobacco: Never Used    Tobacco comment: quit at age 17 less than a pack a day   Substance and Sexual Activity    Alcohol use: No     Comment: rare    Drug use: No    Sexual activity: Not on file       Review of Systems   Unable to perform ROS: Mental status change     Objective:     Vital Signs (Most Recent):  Temp: 97.5 °F (36.4 °C) (09/08/20 1158)  Pulse: 68 (09/08/20 1158)  Resp: 11 (09/08/20 1158)  BP: (!) 92/55 (09/08/20 1158)  SpO2: 98 % (09/08/20 1158) Vital Signs (24h Range):  Temp:  [97.4 °F (36.3 °C)-97.8 °F (36.6 °C)] 97.5 °F (36.4 °C)  Pulse:  [56-70] 68  Resp:  [11-19] 11  SpO2:  [96 %-99 %] 98 %  BP: ()/(45-69) 92/55     Weight: 70.9 kg (156 lb 4.9 oz)  Body mass index is 23.77 kg/m².    Physical Exam  Constitutional:       General: He is not in acute distress.     Comments: encephalopathic   HENT:      Head: Normocephalic and atraumatic.      Right Ear: External ear normal.      Left Ear: External ear normal.      Nose: Nose normal.      Mouth/Throat:      Mouth: Mucous membranes are moist.   Eyes:      Extraocular Movements: Extraocular movements intact.      Pupils: Pupils are equal, round, and reactive to light.   Neck:      Musculoskeletal: Normal range of motion and neck supple.   Cardiovascular:      Rate and Rhythm: Regular rhythm.      Pulses: Normal pulses.       Heart sounds: Normal heart sounds.      Comments: Bradycardia  Pulmonary:      Effort: Pulmonary effort is normal.      Breath sounds: Normal breath sounds.   Abdominal:      General: Abdomen is flat. Bowel sounds are normal.      Palpations: Abdomen is soft.   Neurological:      Mental Status: He is alert.         Review of Symptoms    Symptom Assessment (ESAS 0-10 Scale)  Pain:  0  Dyspnea:  0  Anxiety:  0  Nausea:  0  Depression:  0  Anorexia:  0  Fatigue:  0  Insomnia:  0  Restlessness:  0  Agitation:  0     CAM / Delirium:  Positive  Constipation:  Negative  Diarrhea:  Negative    Bowel Management Plan (BMP):  Yes      Comments:  Patient is encephalopathic unable to complete ESAS, appears comfortable, no facial grimacing or moaning,  Does not appear to have any shortness of breath           OME in 24 hours:  0    Performance Status:  30    Living Arrangements:  Lives with spouse    Psychosocial/Cultural:  60 years,  Two adult children (son and daughter) two grandchildren, retired from [employer] and [employer],  Enjoyed spending time with the family going out to eat.  Has not been able to continue with life style since has been sick in the last 4 yrs     Spiritual:  F - Cassandra and Belief:  Jehoviah's Witness,   I - Importance:  Cassandra guides medical decision making   A - Address in Care:  JW durable power of  placed in blue chart       Advance Care Planning   Advance Care Planning       Significant Labs: All pertinent labs within the past 24 hours have been reviewed.  CBC:   Recent Labs   Lab 09/08/20  0552   WBC 2.61*   HGB 5.2*   HCT 17.3*   *   PLT 56*     BMP:  Recent Labs   Lab 09/08/20  0552   *      K 3.4*      CO2 20*   BUN 73*   CREATININE 2.8*   CALCIUM 7.5*   MG 1.7     LFT:  Lab Results   Component Value Date    AST 26 09/08/2020    ALKPHOS 75 09/08/2020    BILITOT 0.4 09/08/2020     Albumin:   Albumin   Date Value Ref Range Status   09/08/2020 2.5 (L) 3.5 - 5.2 g/dL  Final     Protein:   Total Protein   Date Value Ref Range Status   09/08/2020 5.5 (L) 6.0 - 8.4 g/dL Final     Lactic acid:   Lab Results   Component Value Date    LACTATE 2.2 05/11/2017    LACTATE 1.8 05/10/2017       Significant Imaging: I have reviewed all pertinent imaging results/findings within the past 24 hours.

## 2020-09-08 NOTE — PT/OT/SLP PROGRESS
Physical Therapy      Patient Name:  Kenneth Sheikh   MRN:  [id number]    Patient not seen today secondary to fatigue. PT to follow up as appropriate.     MC SOLER, PT

## 2020-09-08 NOTE — TELEPHONE ENCOUNTER
----- Message from Agustina Benavides sent at 9/8/2020  9:56 AM CDT -----  Contact: Pt wife [phone]  Needs Advice    Reason for call:--Questions--        Communication Preference:--Ginna--Wife--[phone]    Additional Information:Ginna calling to speak with a nurse regarding questions she has. (no other information was given)

## 2020-09-09 LAB
ALBUMIN FLD-MCNC: 0.5 G/DL
ALBUMIN SERPL BCP-MCNC: 3 G/DL (ref 3.5–5.2)
ALP SERPL-CCNC: 81 U/L (ref 55–135)
ALT SERPL W/O P-5'-P-CCNC: 10 U/L (ref 10–44)
ANION GAP SERPL CALC-SCNC: 12 MMOL/L (ref 8–16)
APPEARANCE FLD: NORMAL
AST SERPL-CCNC: 29 U/L (ref 10–40)
BASOPHILS # BLD AUTO: 0.01 K/UL (ref 0–0.2)
BASOPHILS NFR BLD: 0.3 % (ref 0–1.9)
BILIRUB SERPL-MCNC: 0.5 MG/DL (ref 0.1–1)
BODY FLD TYPE: NORMAL
BODY FLUID SOURCE, LDH: NORMAL
BUN SERPL-MCNC: 61 MG/DL (ref 8–23)
CALCIUM SERPL-MCNC: 8.1 MG/DL (ref 8.7–10.5)
CHLORIDE SERPL-SCNC: 108 MMOL/L (ref 95–110)
CO2 SERPL-SCNC: 22 MMOL/L (ref 23–29)
COLOR FLD: YELLOW
CREAT SERPL-MCNC: 2.7 MG/DL (ref 0.5–1.4)
DIFFERENTIAL METHOD: ABNORMAL
EOSINOPHIL # BLD AUTO: 0 K/UL (ref 0–0.5)
EOSINOPHIL NFR BLD: 0.6 % (ref 0–8)
ERYTHROCYTE [DISTWIDTH] IN BLOOD BY AUTOMATED COUNT: 20.9 % (ref 11.5–14.5)
EST. GFR  (AFRICAN AMERICAN): 24.3 ML/MIN/1.73 M^2
EST. GFR  (NON AFRICAN AMERICAN): 21 ML/MIN/1.73 M^2
GLUCOSE SERPL-MCNC: 107 MG/DL (ref 70–110)
HCT VFR BLD AUTO: 18.2 % (ref 40–54)
HGB BLD-MCNC: 5.5 G/DL (ref 14–18)
IMM GRANULOCYTES # BLD AUTO: 0.07 K/UL (ref 0–0.04)
IMM GRANULOCYTES NFR BLD AUTO: 2.2 % (ref 0–0.5)
INR PPP: 1.4 (ref 0.8–1.2)
LDH FLD L TO P-CCNC: 58 U/L
LYMPHOCYTES # BLD AUTO: 0.6 K/UL (ref 1–4.8)
LYMPHOCYTES NFR BLD: 18.7 % (ref 18–48)
LYMPHOCYTES NFR FLD MANUAL: 16 %
MAGNESIUM SERPL-MCNC: 1.7 MG/DL (ref 1.6–2.6)
MCH RBC QN AUTO: 30.2 PG (ref 27–31)
MCHC RBC AUTO-ENTMCNC: 30.2 G/DL (ref 32–36)
MCV RBC AUTO: 100 FL (ref 82–98)
MONOCYTES # BLD AUTO: 0.4 K/UL (ref 0.3–1)
MONOCYTES NFR BLD: 13.3 % (ref 4–15)
MONOS+MACROS NFR FLD MANUAL: 77 %
NEUTROPHILS # BLD AUTO: 2.1 K/UL (ref 1.8–7.7)
NEUTROPHILS NFR BLD: 64.9 % (ref 38–73)
NEUTROPHILS NFR FLD MANUAL: 7 %
NRBC BLD-RTO: 1 /100 WBC
PHOSPHATE SERPL-MCNC: 4.1 MG/DL (ref 2.7–4.5)
PLATELET # BLD AUTO: 56 K/UL (ref 150–350)
PMV BLD AUTO: 10.6 FL (ref 9.2–12.9)
POTASSIUM SERPL-SCNC: 3.7 MMOL/L (ref 3.5–5.1)
PROT FLD-MCNC: 1.2 G/DL
PROT SERPL-MCNC: 5.9 G/DL (ref 6–8.4)
PROTHROMBIN TIME: 15.6 SEC (ref 9–12.5)
RBC # BLD AUTO: 1.82 M/UL (ref 4.6–6.2)
SODIUM SERPL-SCNC: 142 MMOL/L (ref 136–145)
SPECIMEN SOURCE: NORMAL
SPECIMEN SOURCE: NORMAL
WBC # BLD AUTO: 3.16 K/UL (ref 3.9–12.7)
WBC # FLD: 31 /CU MM

## 2020-09-09 PROCEDURE — 83615 LACTATE (LD) (LDH) ENZYME: CPT

## 2020-09-09 PROCEDURE — 80053 COMPREHEN METABOLIC PANEL: CPT

## 2020-09-09 PROCEDURE — 89051 BODY FLUID CELL COUNT: CPT

## 2020-09-09 PROCEDURE — 82042 OTHER SOURCE ALBUMIN QUAN EA: CPT

## 2020-09-09 PROCEDURE — 94761 N-INVAS EAR/PLS OXIMETRY MLT: CPT

## 2020-09-09 PROCEDURE — 84157 ASSAY OF PROTEIN OTHER: CPT

## 2020-09-09 PROCEDURE — 63600175 PHARM REV CODE 636 W HCPCS: Performed by: HOSPITALIST

## 2020-09-09 PROCEDURE — 99497 ADVNCD CARE PLAN 30 MIN: CPT | Mod: ,,, | Performed by: CLINICAL NURSE SPECIALIST

## 2020-09-09 PROCEDURE — 85610 PROTHROMBIN TIME: CPT

## 2020-09-09 PROCEDURE — 99231 SBSQ HOSP IP/OBS SF/LOW 25: CPT | Mod: ,,, | Performed by: INTERNAL MEDICINE

## 2020-09-09 PROCEDURE — 36415 COLL VENOUS BLD VENIPUNCTURE: CPT

## 2020-09-09 PROCEDURE — 87070 CULTURE OTHR SPECIMN AEROBIC: CPT

## 2020-09-09 PROCEDURE — 25000003 PHARM REV CODE 250: Performed by: INTERNAL MEDICINE

## 2020-09-09 PROCEDURE — 11000001 HC ACUTE MED/SURG PRIVATE ROOM

## 2020-09-09 PROCEDURE — 99231 PR SUBSEQUENT HOSPITAL CARE,LEVL I: ICD-10-PCS | Mod: ,,, | Performed by: INTERNAL MEDICINE

## 2020-09-09 PROCEDURE — 84100 ASSAY OF PHOSPHORUS: CPT

## 2020-09-09 PROCEDURE — 85025 COMPLETE CBC W/AUTO DIFF WBC: CPT

## 2020-09-09 PROCEDURE — 97530 THERAPEUTIC ACTIVITIES: CPT

## 2020-09-09 PROCEDURE — 99233 PR SUBSEQUENT HOSPITAL CARE,LEVL III: ICD-10-PCS | Mod: ,,, | Performed by: INTERNAL MEDICINE

## 2020-09-09 PROCEDURE — 25000003 PHARM REV CODE 250: Performed by: HOSPITALIST

## 2020-09-09 PROCEDURE — 99497 PR ADVNCD CARE PLAN 30 MIN: ICD-10-PCS | Mod: ,,, | Performed by: CLINICAL NURSE SPECIALIST

## 2020-09-09 PROCEDURE — 99233 PR SUBSEQUENT HOSPITAL CARE,LEVL III: ICD-10-PCS | Mod: ,,, | Performed by: CLINICAL NURSE SPECIALIST

## 2020-09-09 PROCEDURE — 87102 FUNGUS ISOLATION CULTURE: CPT

## 2020-09-09 PROCEDURE — 99233 SBSQ HOSP IP/OBS HIGH 50: CPT | Mod: ,,, | Performed by: INTERNAL MEDICINE

## 2020-09-09 PROCEDURE — P9047 ALBUMIN (HUMAN), 25%, 50ML: HCPCS | Mod: JG | Performed by: INTERNAL MEDICINE

## 2020-09-09 PROCEDURE — 83735 ASSAY OF MAGNESIUM: CPT

## 2020-09-09 PROCEDURE — 99233 SBSQ HOSP IP/OBS HIGH 50: CPT | Mod: ,,, | Performed by: CLINICAL NURSE SPECIALIST

## 2020-09-09 PROCEDURE — 87075 CULTR BACTERIA EXCEPT BLOOD: CPT

## 2020-09-09 PROCEDURE — 63600175 PHARM REV CODE 636 W HCPCS: Mod: JG | Performed by: INTERNAL MEDICINE

## 2020-09-09 RX ORDER — PANTOPRAZOLE SODIUM 40 MG/1
40 TABLET, DELAYED RELEASE ORAL
Status: DISCONTINUED | OUTPATIENT
Start: 2020-09-09 | End: 2020-09-14 | Stop reason: HOSPADM

## 2020-09-09 RX ORDER — ALBUMIN HUMAN 250 G/1000ML
25 SOLUTION INTRAVENOUS ONCE
Status: DISCONTINUED | OUTPATIENT
Start: 2020-09-09 | End: 2020-09-09

## 2020-09-09 RX ORDER — LACTULOSE 10 G/15ML
20 SOLUTION ORAL 3 TIMES DAILY
Status: DISCONTINUED | OUTPATIENT
Start: 2020-09-09 | End: 2020-09-12

## 2020-09-09 RX ADMIN — LACTULOSE 10 G: 20 SOLUTION ORAL at 08:09

## 2020-09-09 RX ADMIN — LACTULOSE 20 G: 20 SOLUTION ORAL at 02:09

## 2020-09-09 RX ADMIN — ALBUMIN (HUMAN) 25 G: 12.5 SOLUTION INTRAVENOUS at 08:09

## 2020-09-09 RX ADMIN — RIFAXIMIN 550 MG: 550 TABLET ORAL at 08:09

## 2020-09-09 RX ADMIN — PANTOPRAZOLE SODIUM 40 MG: 40 TABLET, DELAYED RELEASE ORAL at 04:09

## 2020-09-09 RX ADMIN — ZINC SULFATE 220 MG (50 MG) CAPSULE 220 MG: CAPSULE at 08:09

## 2020-09-09 RX ADMIN — IRON SUCROSE 200 MG: 20 INJECTION, SOLUTION INTRAVENOUS at 08:09

## 2020-09-09 RX ADMIN — LACTULOSE 20 G: 20 SOLUTION ORAL at 08:09

## 2020-09-09 RX ADMIN — DIVALPROEX SODIUM 500 MG: 250 TABLET, DELAYED RELEASE ORAL at 08:09

## 2020-09-09 RX ADMIN — EPOETIN ALFA-EPBX 7090 UNITS: 4000 INJECTION, SOLUTION INTRAVENOUS; SUBCUTANEOUS at 09:09

## 2020-09-09 NOTE — PROGRESS NOTES
Pt arrived to room MPU for paracentesis . Pt awake, alert, and oriented. Phone consent from wife obtained.  Allergies reviewed, patient ID'd using 2 identifiers.  Dressing to both lower legs and both forearms with bruising and dressings.

## 2020-09-09 NOTE — SUBJECTIVE & OBJECTIVE
Interval History:   More confused, on lactulose, only 1 BM yesterday. Patient's wife concerned about patient being alone at night and not being able to press call button or answer phone.  Seen by Palliative Care and patient DNR / DNI. Children tried to come by, but not allowed on the floor for some reason.  Hgb 5.5, Cr down slightly 2.7.      Current Facility-Administered Medications   Medication    acetaminophen tablet 650 mg    albumin human 25% bottle 25 g    divalproex EC tablet 500 mg    epoetin mariajose-epbx injection 7,090 Units    fentaNYL injection 25 mcg    HYDROcodone-acetaminophen  mg per tablet 1 tablet    HYDROcodone-acetaminophen 5-325 mg per tablet 1 tablet    lactulose 20 gram/30 mL solution Soln 10 g    melatonin tablet 6 mg    ondansetron injection 4 mg    ondansetron injection 8 mg    promethazine (PHENERGAN) 25 mg in dextrose 5 % 50 mL IVPB    rifAXIMin tablet 550 mg    senna-docusate 8.6-50 mg per tablet 1 tablet    sodium chloride 0.9% flush 10 mL    sodium chloride 0.9% flush 5 mL    zinc sulfate capsule 220 mg       Objective:     Vital Signs (Most Recent):  Temp: 97 °F (36.1 °C) (09/09/20 0916)  Pulse: 64 (09/09/20 0916)  Resp: 16 (09/09/20 0916)  BP: 129/69 (09/09/20 0916)  SpO2: 95 % (09/09/20 0916) Vital Signs (24h Range):  Temp:  [97 °F (36.1 °C)-97.8 °F (36.6 °C)] 97 °F (36.1 °C)  Pulse:  [48-76] 64  Resp:  [11-21] 16  SpO2:  [89 %-98 %] 95 %  BP: ()/(47-69) 129/69     Weight: 70.9 kg (156 lb 4.9 oz) (09/04/20 2143)  Body mass index is 23.77 kg/m².    Physical Exam  Vitals signs and nursing note reviewed.   Constitutional:       General: He is not in acute distress.  Eyes:      General: No scleral icterus.  Cardiovascular:      Rate and Rhythm: Normal rate.   Pulmonary:      Effort: Pulmonary effort is normal.      Breath sounds: Wheezing present.   Abdominal:      General: Bowel sounds are normal. There is distension (moderate).      Palpations: Abdomen is  soft.      Tenderness: There is no abdominal tenderness.   Skin:     Coloration: Skin is pale. Skin is not jaundiced.   Neurological:      Mental Status: He is alert. He is disoriented.         MELD-Na score: 20 at 9/9/2020  8:02 AM  MELD score: 20 at 9/9/2020  8:02 AM  Calculated from:  Serum Creatinine: 2.7 mg/dL at 9/9/2020  8:02 AM  Serum Sodium: 142 mmol/L (Rounded to 137 mmol/L) at 9/9/2020  8:02 AM  Total Bilirubin: 0.5 mg/dL (Rounded to 1 mg/dL) at 9/9/2020  8:02 AM  INR(ratio): 1.4 at 9/9/2020  8:02 AM  Age: 80 years 7 months    Significant Labs:  Labs within the past month have been reviewed.    Significant Imaging:  Reviewed

## 2020-09-09 NOTE — PT/OT/SLP PROGRESS
"Occupational Therapy   Treatment    Name: Kenneth Sheikh  MRN: [id number]  Admitting Diagnosis:  Acute blood loss anemia  4 Days Post-Op    Recommendations:     Discharge Recommendations: home with home health  Discharge Equipment Recommendations:  none  Barriers to discharge:  Other (Comment)(increased assistance at current level of function)    Assessment:     Kenneth Sheikh is a 80 y.o. male with a medical diagnosis of Acute blood loss anemia.   Pt's wife was upset upon OT entry to room.  She stated getting pt on and off the commode was a struggle and that she wasn't satisfied with the assistance provided.  She said it took a lot out of him and further activity was declined.  Pt's wife stated concern about leaving pt tonight due to lack of trust in the pt's supervision and due to pt's inability to press the call button for asssitance.  Therapist reassured pt's wife that the telesitters and nursing staff are supervising the pt.  Therapist turned on the bed alarm for additional safety measures.  Reiterated to pt's wife that assistance will come immediately if the bed alarm goes off.  She verbalized understanding.  Pt's functional status is declining.  Per pt's wife, he required maximal assistance to transfer to the bedside commode and required two people to transfer back to bed.  He presents with the following deficits. Performance deficits affecting function are weakness, impaired endurance, impaired self care skills, impaired functional mobilty, gait instability, impaired balance, decreased coordination.     Rehab Prognosis:  Fair; patient would benefit from acute skilled OT services to address these deficits and reach maximum level of function.       Plan:     Patient to be seen 3 x/week to address the above listed problems via self-care/home management, therapeutic activities, therapeutic exercises  · Plan of Care Expires: 10/06/20  · Plan of Care Reviewed with: patient, spouse    Subjective   Pt's wife stated, "I called " "for assistance to get him on the commode but nobody came.  I had to get him on myself and struggled.  That was major.  I'm thinking of filing a complaint.  He had to wait on the commode for 10 minutes before someone came to assist him.  I may hire a private sitter."  Pain/Comfort:  · Pain Rating 1: other (see comments)(Pt did not report pain.)  · Pain Rating Post-Intervention 1: (Pt did not mention pain.)    Objective:     Communicated with: RN prior to session.  Patient found HOB elevated with Condom Catheter, telemetry([employer]) with his wife present upon OT entry to room.    General Precautions: Standard, fall   Orthopedic Precautions:N/A   Braces: N/A     Occupational Performance:     Bed Mobility:    Declined     Functional Mobility/Transfers:  · Declined    Activities of Daily Living:  EOB tasks declined.  Pt's wife was providing pt with food while in bed.       [facility] 6 Click ADL: 14    Treatment & Education:  -Therapeutic listening to pt's wife as noted above.      - Pt and his wife edu on role of OT, POC, benefit of performing EOB activity.    - Self care tasks completed-- as noted above       Patient left HOB elevated with all lines intact, call button in reach, bed alarm on, RN notified and his wife presentEducation:      GOALS:   Multidisciplinary Problems     Occupational Therapy Goals        Problem: Occupational Therapy Goal    Goal Priority Disciplines Outcome Interventions   Occupational Therapy Goal     OT, PT/OT Ongoing, Not Progressing    Description: Goals to be met by: 9/13/20    Patient will increase functional independence with ADLs by performing:    LE Dressing with Set-up Assistance.  Grooming while standing at sink with Set-up Assistance.  Toileting from toilet with Supervision for hygiene and clothing management.   Supine to sit with [place].  Toilet transfer to toilet with Supervision.                     Time Tracking:     OT Date of Treatment: 09/09/20  OT Start Time: 1323  OT Stop " Time: 1332  OT Total Time (min): 9 min    Billable Minutes:Therapeutic Activity 9 min.    Miguel Angel Woodson, OT  9/9/2020

## 2020-09-09 NOTE — PROGRESS NOTES
Paracentesis complete, 3700 MLs removed. Specimen sent to lab. No Albumin administered per protocol. Dressing applied to left abd puncture site, dressing clean dry and intact. Pt denies pain and discomfort. Pt awaiting transport to floor and report called to floor COURTNEY Messer.

## 2020-09-09 NOTE — PROCEDURES
Radiology Post-Procedure Note    Pre Op Diagnosis: Ascites  Post Op Diagnosis: Same    Procedure: Paracentesis    Procedure performed by:  Rolando Fish DO; Kunal Kowalski MD    Written Informed Consent Obtained: Verbal consent Ginna Sheikh wife  Specimen Removed: YES   Estimated Blood Loss: Minimal    Findings:   Successful paracentesis.  Albumin administered PRN per protocol.    Patient tolerated procedure well.  Please see dictated note for further details and amount of fluid removed.         Rolando Fish DO  PGY-II  Diagnostic and Interventional Radiology  [facility]

## 2020-09-09 NOTE — PROGRESS NOTES
[facility]  Palliative Medicine  Progress Note    Patient Name: Kenneth Sheikh  MRN: [id number]  Admission Date: 9/4/2020  Hospital Length of Stay: 5 days  Code Status: DNR   Attending Provider: Terrell Rainey MD  Consulting Provider: DOTTY Hearn  Primary Care Physician: Prisca Espinoza MD  Principal Problem:Acute blood loss anemia    Patient information was obtained from patient, relative(s), past medical records and ER records.      Assessment/Plan:     Palliative care encounter  Reason for Consult: Goals of care and advanced care planning     Impression: Mr. Sheikh is an 79 yo gentleman admitted to internal medicine with upper GI bleed and hx of MCDONALD cirrhosis with esophageal varices.  He is encephalopathic and arouses easily with verbal stimuli,  oriented to person and place only.  Appears to be comfortable with no facial grimacing or moaning. Does not appear short of breath. Supplemental iron infusion in progress.      Mr. Sheikh has liver failure with no curative or transplant options.  Mr. Sheikh also has active GI bleed  And will not accept transfusion secondary to Sikh believes.  It is appropriate for patient and family to consider transition to home hospice or nursing home hospice.  Currently Mr. Sheikh does not appear to meet criteria for inpatient hospice.  Inpatient hospice is appropriate when patient has poor prognosis of days to weeks and has a high symptom burden.    GI Hemorhage/ACute Blood Loss Anemia/ MCDONALD Cirrhosis/Hepatic Encephalopathy and other medical problems   - plan per primary team and other specialty consultants     Symptom Management    Pain:   - no c/o pain and no behavioral signs - facial grimacing or moaning   - Continue current medical management - prn hydrocodone and tylenol  - 24 hr OME zero     Bowel Management  - continue current medical management  -lactulose three times daily     Dyspnea   - no apparent shortness of breath   - room air saturation 98 -  100% on room air   O2 Sat goal      Advance Care Planning   Goals of Care      - Advanced care planning documents - Jehoviah's Witness Durable Power of  has been received.  - Presently Mr. Sheikh is unable to make medical decisions.  - Legal next of kin is wife Ginna Sheikh [phone] and son Kenneth Sheikh, [phone]   -  Resuscitation status - full code per primary team   - CPR and Intubation discussed including the risks, benefits and survivability of CPR .    Also discussed peaceful and natural death.   - DNR orders written per primary team.    - LaPOST discussed with wife.  Wife has declined LaPOST at this time.  States if the patient's heart stops beating at home she will call 911.         - Palliative medicine APRN returned to bedside for goals of care clarification.    - As per chart review it appears family has made decisions for hospice.  On further discussion wife has expressed interest in inpatient hospice and her daughter  has contacted [facility]. A consult has been sent per [facility] primary team .    - Hospice education (frequently asked questions) provided and emphasized levels of hospice.  Explained the patient does not appear to meet criteria for inpatient hospice. Mrs. Sheikh appeared to be in  disbelief. Her goals do not include stopping lactulose and other medical management   - Recommended she keep appointment with hospice representative   - Also discussed home hospice and nursing home with hospice as alternatives. Mrs. Sheikh states being opposed to nursing home with hospice and states she does not have family support for home hospice.   - Discussed use of sitters in addition to home hospice.  Wife appeared concern about costs of sitter care.      - As discussed with COURTNEY Espinoza case manager, she and Dr Rainey will continue discussion with the patient's wife.   -  Mrs. Sheikh states her wish is Mr. Sheikh not have suffering and be allowed to have peaceful and natural death.  "  - Appears Mrs. Sheikh is having some difficulty making these decision. She reports her children have not available to support her through this process.   - Wife reports her children have jobs and families and has not discussed having a family conference.     Plan/Recommendations   - Patient does not appear to meet inpatient hospice criteria. Recommend home hospice or nursing home with hospice.   - Patient and family would benefit from continued information and education regarding clinical status.  -Continue current plan of care                 Primary team  and   aware of the above goals of care and recommendations.  Thank you for consult and opportunity to participate in patient's care       .        I will follow-up with patient. Please contact us if you have any additional questions.    Subjective:     Chief Complaint:   Chief Complaint   Patient presents with    covid test       HPI:   HPI obtained from chart review: please see H and P from primary team 9/4/2020    Mr. Sheikh is an 81 yo gentlmen with PMH of: MCDONALD cirrhosis, gastric varices, hepatic encephalopathy, coagulopathy,  and ascites requiring weekly paracenteses.  He presented to [facility] from [facility] Hepatology clinic for evaluation of c/o weakness,  Melena, weakness and fatigue over two days. No use of any blood thinners or Aspirin. He reports chills,  no fevers, chest pain, or shortness of breath.  Paracentesis with removal of 4.2 liters in hepatology clinic.      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       Upon arrival to the ER, vitals were temp 98.9F, HR 80 and /80.  Labs showed Hemoglobin 6, Platelet 67, Creatinine 2.9 and INR 1.3.  digital rectal exam positive.  He was given 500cc bolus and was admitted to Hospital Medicine for further management.    "Admitted to hospital medicine for " "acute UGIB with hx of gastric varices and ulcers. Started on supportive tx IV octreotide, PPI, ceftriaxone and albumin. GI and hepatology consulted. S/p EGD 9/5 with LA Grade B esophagitis, Non-bleeding esophageal ulcer, Small (< 5 mm) esophageal varice, Portal hypertensive gastropathy. Type 1 isolated gastric varices (IGV1, varices located in the fundus), previously treated and without bleeding, Multiple duodenal polyps. Very friable gastric mucosa. GI rec slowly advancing diet, repeat EGD 8-12 weeks to assure healing of esophageal ulcer and biopsy duodenal polyps.     He remains on IV PPI and ceftriaxone at this time. Octreotide discontinued as no overt variceal bleed. Trending CBC with Pediatric blood draws in the meantime daily, and avoiding unnecessary blood draws. "    Palliative medicine consulted for goals of care and advanced care planning     Hospital Course:  No notes on file    Interval History: s/p iron infusion h/h 5.5/18.2, DNR per primary team,  Poor response to continued lactulose, hospice consulted     Past Medical History:   Diagnosis Date    Anticoagulant long-term use     Basal cell carcinoma     Bipolar 1 disorder     Bipolar 1 disorder 11/24/2016    bipolar    Cancer     history of skin cancer/sinus cancer & rectal cancer    Depressed bipolar affective disorder     Diabetes mellitus     type 2    Diabetic retinopathy 01/09/2019    EBV infection 12/7/2016    EBV DNA, PCR Latest Ref Range: None detected  None detected EBV DNA-Copies/mL Unknown Test Not Performed EBV Early Antigen Ab, IgG Latest Ref Range: <1:10 Titer 1:40 (A) EBV Nuclear Ag Ab Latest Ref Range: <1:5 Titer >=1:80 (A) EBV VCA IgG Latest Ref Range: <1:10 Titer 1:160 (A) EBV VCA IgM Latest Ref Range: <1:10 Titer <1:10     History of TIA (transient ischemic attack)     several-taking Plavix    Hx of gallstones     Wife denies    Hypertension     Hyperthyroidism 11/30/2016    Low HDL (under 40) 12/7/2016    Mixed " hyperlipidemia 11/23/2016    JUAN MANUEL (obstructive sleep apnea)     Pneumonia     Skin disease     Squamous cell carcinoma 08/2018    right temple    Stroke     Transient cerebral ischemia 7/22/2016    Type 2 diabetes mellitus        Past Surgical History:   Procedure Laterality Date    ESOPHAGOGASTRODUODENOSCOPY N/A 9/5/2020    Procedure: EGD (ESOPHAGOGASTRODUODENOSCOPY);  Surgeon: Denita Escalona MD;  Location: [address]);  Service: Endoscopy;  Laterality: N/A;    Moh's procedure x4      rectal cancer      Sinus surgery for sinus cancer      sinus sx for cancer      skin cancer removed-several times-nose & ear      TONSILLECTOMY      Undescened testicle sx      UVULOPALATOPHARYNGOPLASTY      for sleep apnea       Review of patient's allergies indicates:   Allergen Reactions    Abilify [aripiprazole] Other (See Comments)     Sleepy, could not function.       Medications:  Continuous Infusions:    Scheduled Meds:   albumin human 25%  25 g Intravenous BID    divalproex  500 mg Oral QHS    epoetin mariajose-epbx  100 Units/kg (Order-Specific) Subcutaneous Once per day on Mon Wed Fri    lactulose  20 g Oral TID    rifAXIMin  550 mg Oral BID    zinc sulfate  220 mg Oral Daily     PRN Meds:acetaminophen, fentaNYL, HYDROcodone-acetaminophen, HYDROcodone-acetaminophen, melatonin, ondansetron, ondansetron, promethazine (PHENERGAN) IVPB, senna-docusate 8.6-50 mg, sodium chloride 0.9%, sodium chloride 0.9%    Family History     Problem Relation (Age of Onset)    Diabetes Son    No Known Problems Daughter        Tobacco Use    Smoking status: Former Smoker     Packs/day: 0.25     Years: 2.00     Pack years: 0.50    Smokeless tobacco: Never Used    Tobacco comment: quit at age 17 less than a pack a day   Substance and Sexual Activity    Alcohol use: No     Comment: rare    Drug use: No    Sexual activity: Not on file       Review of Systems   Unable to perform ROS: Mental status change      Objective:     Vital Signs (Most Recent):  Temp: 97.7 °F (36.5 °C) (09/09/20 1206)  Pulse: 78 (09/09/20 1133)  Resp: 16 (09/09/20 1133)  BP: (!) 140/75 (09/09/20 1133)  SpO2: (!) 93 % (09/09/20 1133) Vital Signs (24h Range):  Temp:  [97 °F (36.1 °C)-97.8 °F (36.6 °C)] 97.7 °F (36.5 °C)  Pulse:  [48-79] 78  Resp:  [12-21] 16  SpO2:  [89 %-97 %] 93 %  BP: ()/(47-75) 140/75     Weight: 70.9 kg (156 lb 4.9 oz)  Body mass index is 23.77 kg/m².    Physical Exam  Constitutional:       General: He is not in acute distress.     Comments: encephalopathic   HENT:      Head: Normocephalic and atraumatic.      Right Ear: External ear normal.      Left Ear: External ear normal.      Nose: Nose normal.      Mouth/Throat:      Mouth: Mucous membranes are moist.   Eyes:      Extraocular Movements: Extraocular movements intact.      Pupils: Pupils are equal, round, and reactive to light.   Neck:      Musculoskeletal: Normal range of motion and neck supple.   Cardiovascular:      Rate and Rhythm: Regular rhythm.      Pulses: Normal pulses.      Heart sounds: Normal heart sounds.      Comments: Bradycardia  Pulmonary:      Effort: Pulmonary effort is normal.      Breath sounds: Normal breath sounds.   Abdominal:      General: Abdomen is flat. Bowel sounds are normal.      Palpations: Abdomen is soft.   Neurological:      Mental Status: He is alert.         Review of Symptoms    Symptom Assessment (ESAS 0-10 Scale)  Pain:  0  Dyspnea:  0  Anxiety:  0  Nausea:  0  Depression:  0  Anorexia:  0  Fatigue:  0  Insomnia:  0  Restlessness:  0  Agitation:  0     CAM / Delirium:  Positive  Constipation:  Negative  Diarrhea:  Negative    Bowel Management Plan (BMP):  Yes      Comments:  Patient is encephalopathic unable to complete ESAS, appears comfortable, no facial grimacing or moaning,  Does not appear to have any shortness of breath           OME in 24 hours:  0    Performance Status:  30    Living Arrangements:  Lives with  spouse    Psychosocial/Cultural:  60 years,  Two adult children (son and daughter) two grandchildren, retired from [employer] and [employer],  Enjoyed spending time with the family going out to eat.  Has not been able to continue with life style since has been sick in the last 4 yrs     Spiritual:  F - Cassandra and Belief:  Jehoviah's Witness,   I - Importance:  Cassandra guides medical decision making   A - Address in Care:  JW durable power of  placed in blue chart       Advance Care Planning   Advance Care Planning       Significant Labs: All pertinent labs within the past 24 hours have been reviewed.  CBC:   Recent Labs   Lab 09/09/20  0802   WBC 3.16*   HGB 5.5*   HCT 18.2*   *   PLT 56*     BMP:  Recent Labs   Lab 09/09/20  0802         K 3.7      CO2 22*   BUN 61*   CREATININE 2.7*   CALCIUM 8.1*   MG 1.7     LFT:  Lab Results   Component Value Date    AST 29 09/09/2020    ALKPHOS 81 09/09/2020    BILITOT 0.5 09/09/2020     Albumin:   Albumin   Date Value Ref Range Status   09/09/2020 3.0 (L) 3.5 - 5.2 g/dL Final     Protein:   Total Protein   Date Value Ref Range Status   09/09/2020 5.9 (L) 6.0 - 8.4 g/dL Final     Lactic acid:   Lab Results   Component Value Date    LACTATE 2.2 05/11/2017    LACTATE 1.8 05/10/2017       Significant Imaging: I have reviewed all pertinent imaging results/findings within the past 24 hours.    Additional 16 minutes spent in advanced care planning     CARIDAD Gilliam, APRN, ACNS-BC  Palliative Medicine  [facility]

## 2020-09-09 NOTE — ASSESSMENT & PLAN NOTE
Reason for Consult: Goals of care and advanced care planning     Impression: Mr. Sheikh is an 79 yo gentleman admitted to internal medicine with upper GI bleed and hx of MCDONALD cirrhosis with esophageal varices.  He is encephalopathic and arouses easily with verbal stimuli,  oriented to person and place only.  Appears to be comfortable with no facial grimacing or moaning. Does not appear short of breath. Supplemental iron infusion in progress.       GI Hemorhage/ACute Blood Loss Anemia/ MCDONALD Cirrhosis/Hepatic Encephalopathy and other medical problems   - plan per primary team and other specialty consultants     Symptom Management    Pain:   - no c/o pain and no behavioral signs - facial grimacing or moaning   - Continue current medical management - prn hydrocodone and tylenol  - 24 hr OME zero     Bowel Management  - continue current medical management  -lactulose three times daily     Dyspnea   - no apparent shortness of breath   - room air saturation 98 - 100% on room air   O2 Sat goal      Advance Care Planning   Goals of Care      - Advanced care planning documents - Jehoviah's Witness Durable Power of  has been received.  - Presently Mr. Sheikh is unable to make medical decisions.  - Legal next of kin is wife Ginna Sheikh [phone] and son Kenneth Sheikh, [phone]   -  Resuscitation status - full code per primary team   - CPR and Intubation discussed including the risks, benefits and survivability of CPR .    Also discussed peaceful and natural death.   - Appears wife may have limited understanding and insight regarding overall poor prognosis  - Mrs. Sheikh is unable to come to any conclusions about CPR and states she will discuss with the patient.   She is hopeful he will be less confused tomorrow      Goals of Care:   - Palliative medicine APRN met with patient and wife.  Palliative medicine introduced.  - Lengthy discussion with patient 's wife regarding plan of care.    - Wife steadfast in Confucianism  convictions - refusal of whole blood products.   - Wife states understanding Mr. Sheikh is not transplant candidate secondary to his age and Adventism beliefs.   - Wife appears to have understanding that his condition is worsening   - Mrs. Sheikh states her wish is Mr. Sheikh not have suffering and be allowed to have peaceful and natural death. Mrs. Sheikh states she is is unable to make any plan of care decisions at this time.  -   Wife will continue to discuss with her children.     Plan/Recommendations   - continue current plan of care  - Patient may benefit from interdisciplinary family meeting - wife will coordinate with children.   - Patient and family would benefit from continued information and education regarding clinical status.      Dr. Rainey notified of above goals of care and recommendations             Primary team aware of the above goals of care and recommendations.  Thank you for consult and opportunity to participate in patient's care       .

## 2020-09-09 NOTE — CONSULTS
[facility]  Palliative Medicine  Consult Note    Patient Name: Kenneth Sheikh  MRN: [id number]  Admission Date: 9/4/2020  Hospital Length of Stay: 4 days  Code Status: Full Code   Attending Provider: Terrell Rainey MD  Consulting Provider: DOTTY Hearn  Primary Care Physician: Prisca Espinoza MD  Principal Problem:Acute blood loss anemia    Patient information was obtained from relative(s), past medical records and ER records.      Consults  Assessment/Plan:     Palliative care encounter  Reason for Consult: Goals of care and advanced care planning     Impression: Mr. Sheikh is an 79 yo gentleman admitted to internal medicine with upper GI bleed and hx of MCDONALD cirrhosis with esophageal varices.  He is encephalopathic and arouses easily with verbal stimuli,  oriented to person and place only.  Appears to be comfortable with no facial grimacing or moaning. Does not appear short of breath. Supplemental iron infusion in progress.       GI Hemorhage/ACute Blood Loss Anemia/ MCDONALD Cirrhosis/Hepatic Encephalopathy and other medical problems   - plan per primary team and other specialty consultants     Symptom Management    Pain:   - no c/o pain and no behavioral signs - facial grimacing or moaning   - Continue current medical management - prn hydrocodone and tylenol  - 24 hr OME zero     Bowel Management  - continue current medical management  -lactulose three times daily     Dyspnea   - no apparent shortness of breath   - room air saturation 98 - 100% on room air   O2 Sat goal      Advance Care Planning   Goals of Care      - Advanced care planning documents - Jehoviah's Witness Durable Power of  has been received.  - Presently Mr. Sheikh is unable to make medical decisions.  - Legal next of kin is wife Ginna Sheikh [phone] and son Kenneth Sheikh, [phone]   -  Resuscitation status - full code per primary team   - CPR and Intubation discussed including the risks, benefits and  survivability of CPR .    Also discussed peaceful and natural death.   - Appears wife may have limited understanding and insight regarding overall poor prognosis  - Mrs. Sheikh is unable to come to any conclusions about CPR and states she will discuss with the patient.   She is hopeful he will be less confused tomorrow      Goals of Care:   - Palliative medicine APRN met with patient and wife.  Palliative medicine introduced.  - Lengthy discussion with patient 's wife regarding plan of care.    - Wife steadfast in Hindu convictions - refusal of whole blood products.   - Wife states understanding Mr. Sheikh is not transplant candidate secondary to his age and Hindu beliefs.   - Wife appears to have understanding that his condition is worsening   - Mrs. Sheikh states her wish is Mr. Sheikh not have suffering and be allowed to have peaceful and natural death. Mrs. Sheikh states she is is unable to make any plan of care decisions at this time.  -   Wife will continue to discuss with her children.     Plan/Recommendations   - continue current plan of care  - Patient may benefit from interdisciplinary family meeting - wife will coordinate with children.   - Patient and family would benefit from continued information and education regarding clinical status.      Dr. Rainey notified of above goals of care and recommendations             Primary team aware of the above goals of care and recommendations.  Thank you for consult and opportunity to participate in patient's care       .        Thank you for your consult. I will follow-up with patient. Please contact us if you have any additional questions.    Subjective:     HPI:   HPI obtained from chart review: please see H and P from primary team 9/4/2020    Mr. Sheikh is an 79 yo gentlmen with PMH of: MCDONALD cirrhosis, gastric varices, hepatic encephalopathy, coagulopathy,  and ascites requiring weekly paracenteses.  He presented to [facility] for evaluation  "of c/o weakness,  Melena, weakness and fatigue over two days. No use of any blood thinners or Aspirin. He reports chills,  no fevers, chest pain, or shortness of breath.  Paracentesis with removal of 4.2 liters in hepatology clinic.      He is a Catholic, and does not want to receive blood transfusions, but he is ok receiving blood stimulators or fractionated blood products - just not whole blood.       Upon arrival to the ER, vitals were temp 98.9F, HR 80 and /80.  Labs showed Hemoglobin 6, Platelet 67, Creatinine 2.9 and INR 1.3.  digital rectal exam positive.  He was given 500cc bolus and was admitted to Hospital Medicine for further management.    "Admitted to hospital medicine for acute UGIB with hx of gastric varices and ulcers. Started on supportive tx IV octreotide, PPI, ceftriaxone and albumin. GI and hepatology consulted. S/p EGD 9/5 with LA Grade B esophagitis, Non-bleeding esophageal ulcer, Small (< 5 mm) esophageal varice, Portal hypertensive gastropathy. Type 1 isolated gastric varices (IGV1, varices located in the fundus), previously treated and without bleeding, Multiple duodenal polyps. Very friable gastric mucosa. GI rec slowly advancing diet, repeat EGD 8-12 weeks to assure healing of esophageal ulcer and biopsy duodenal polyps.     He remains on IV PPI and ceftriaxone at this time. Octreotide discontinued as no overt variceal bleed. Trending CBC with Pediatric blood draws in the meantime daily, and avoiding unnecessary blood draws. "    Palliative medicine consulted for goals of care and advanced care planning     Hospital Course:  No notes on file    Interval History:     Past Medical History:   Diagnosis Date    Anticoagulant long-term use     Basal cell carcinoma     Bipolar 1 disorder     Bipolar 1 disorder 11/24/2016    bipolar    Cancer     history of skin cancer/sinus cancer & rectal cancer    Depressed bipolar affective disorder     Diabetes mellitus     type 2    " Diabetic retinopathy 01/09/2019    EBV infection 12/7/2016    EBV DNA, PCR Latest Ref Range: None detected  None detected EBV DNA-Copies/mL Unknown Test Not Performed EBV Early Antigen Ab, IgG Latest Ref Range: <1:10 Titer 1:40 (A) EBV Nuclear Ag Ab Latest Ref Range: <1:5 Titer >=1:80 (A) EBV VCA IgG Latest Ref Range: <1:10 Titer 1:160 (A) EBV VCA IgM Latest Ref Range: <1:10 Titer <1:10     History of TIA (transient ischemic attack)     several-taking Plavix    Hx of gallstones     Wife denies    Hypertension     Hyperthyroidism 11/30/2016    Low HDL (under 40) 12/7/2016    Mixed hyperlipidemia 11/23/2016    JUAN MANUEL (obstructive sleep apnea)     Pneumonia     Skin disease     Squamous cell carcinoma 08/2018    right temple    Stroke     Transient cerebral ischemia 7/22/2016    Type 2 diabetes mellitus        Past Surgical History:   Procedure Laterality Date    Moh's procedure x4      rectal cancer      Sinus surgery for sinus cancer      sinus sx for cancer      skin cancer removed-several times-nose & ear      TONSILLECTOMY      Undescened testicle sx      UVULOPALATOPHARYNGOPLASTY      for sleep apnea       Review of patient's allergies indicates:   Allergen Reactions    Abilify [aripiprazole] Other (See Comments)     Sleepy, could not function.       Medications:  Continuous Infusions:   pantoprozole (PROTONIX) IV infusion 8 mg/hr (09/08/20 1336)     Scheduled Meds:   albumin human 25%  25 g Intravenous BID    cefTRIAXone (ROCEPHIN) IVPB  1 g Intravenous Q24H    divalproex  500 mg Oral QHS    epoetin mariajose-epbx  100 Units/kg (Order-Specific) Subcutaneous Once per day on Mon Wed Fri    iron sucrose (VENOFER) IVPB  200 mg Intravenous Daily    lactulose  10 g Oral TID    rifAXIMin  550 mg Oral BID    zinc sulfate  220 mg Oral Daily     PRN Meds:acetaminophen, fentaNYL, HYDROcodone-acetaminophen, HYDROcodone-acetaminophen, melatonin, ondansetron, ondansetron, promethazine (PHENERGAN) IVPB,  senna-docusate 8.6-50 mg, sodium chloride 0.9%, sodium chloride 0.9%    Family History     Problem Relation (Age of Onset)    Diabetes Son    No Known Problems Daughter        Tobacco Use    Smoking status: Former Smoker     Packs/day: 0.25     Years: 2.00     Pack years: 0.50    Smokeless tobacco: Never Used    Tobacco comment: quit at age 17 less than a pack a day   Substance and Sexual Activity    Alcohol use: No     Comment: rare    Drug use: No    Sexual activity: Not on file       Review of Systems   Unable to perform ROS: Mental status change     Objective:     Vital Signs (Most Recent):  Temp: 97.5 °F (36.4 °C) (09/08/20 1158)  Pulse: 68 (09/08/20 1158)  Resp: 11 (09/08/20 1158)  BP: (!) 92/55 (09/08/20 1158)  SpO2: 98 % (09/08/20 1158) Vital Signs (24h Range):  Temp:  [97.4 °F (36.3 °C)-97.8 °F (36.6 °C)] 97.5 °F (36.4 °C)  Pulse:  [56-70] 68  Resp:  [11-19] 11  SpO2:  [96 %-99 %] 98 %  BP: ()/(45-69) 92/55     Weight: 70.9 kg (156 lb 4.9 oz)  Body mass index is 23.77 kg/m².    Physical Exam  Constitutional:       General: He is not in acute distress.     Comments: encephalopathic   HENT:      Head: Normocephalic and atraumatic.      Right Ear: External ear normal.      Left Ear: External ear normal.      Nose: Nose normal.      Mouth/Throat:      Mouth: Mucous membranes are moist.   Eyes:      Extraocular Movements: Extraocular movements intact.      Pupils: Pupils are equal, round, and reactive to light.   Neck:      Musculoskeletal: Normal range of motion and neck supple.   Cardiovascular:      Rate and Rhythm: Regular rhythm.      Pulses: Normal pulses.      Heart sounds: Normal heart sounds.      Comments: Bradycardia  Pulmonary:      Effort: Pulmonary effort is normal.      Breath sounds: Normal breath sounds.   Abdominal:      General: Abdomen is flat. Bowel sounds are normal.      Palpations: Abdomen is soft.   Neurological:      Mental Status: He is alert.         Review of  Symptoms    Symptom Assessment (ESAS 0-10 Scale)  Pain:  0  Dyspnea:  0  Anxiety:  0  Nausea:  0  Depression:  0  Anorexia:  0  Fatigue:  0  Insomnia:  0  Restlessness:  0  Agitation:  0     CAM / Delirium:  Positive  Constipation:  Negative  Diarrhea:  Negative    Bowel Management Plan (BMP):  Yes      Comments:  Patient is encephalopathic unable to complete ESAS, appears comfortable, no facial grimacing or moaning,  Does not appear to have any shortness of breath           OME in 24 hours:  0    Performance Status:  30    Living Arrangements:  Lives with spouse    Psychosocial/Cultural:  60 years,  Two adult children (son and daughter) two grandchildren, retired from [employer] and [employer],  Enjoyed spending time with the family going out to eat.  Has not been able to continue with life style since has been sick in the last 4 yrs     Spiritual:  F - Cassandra and Belief:  Javadhovimargie's Witness,   I - Importance:  Cassandra guides medical decision making   A - Address in Care:  JW durable power of  placed in blue chart       Advance Care Planning   Advance Care Planning       Significant Labs: All pertinent labs within the past 24 hours have been reviewed.  CBC:   Recent Labs   Lab 09/08/20  0552   WBC 2.61*   HGB 5.2*   HCT 17.3*   *   PLT 56*     BMP:  Recent Labs   Lab 09/08/20  0552   *      K 3.4*      CO2 20*   BUN 73*   CREATININE 2.8*   CALCIUM 7.5*   MG 1.7     LFT:  Lab Results   Component Value Date    AST 26 09/08/2020    ALKPHOS 75 09/08/2020    BILITOT 0.4 09/08/2020     Albumin:   Albumin   Date Value Ref Range Status   09/08/2020 2.5 (L) 3.5 - 5.2 g/dL Final     Protein:   Total Protein   Date Value Ref Range Status   09/08/2020 5.5 (L) 6.0 - 8.4 g/dL Final     Lactic acid:   Lab Results   Component Value Date    LACTATE 2.2 05/11/2017    LACTATE 1.8 05/10/2017       Significant Imaging: I have reviewed all pertinent imaging results/findings within the past 24  hours.      Additional 20 mins spent in advanced care planning     Pamela James, CNS  Palliative Medicine  [facility]

## 2020-09-09 NOTE — NURSING
Call received from telemetry tech that patient holding in upper 40s heartrate.  Noted BPs trenting to 80s/40s per visi.  MD consulted.  Notified charge nurse. Requested bedside eval by  Rapid Response nurse.  propak to bedside,  Noted 750 clear daniel urine to  bag from external catheter.  /65 HR 75 SPo2 85%. Patient lethargic.  O2 applied at 2L/min per nasal cannula. SpO2 increased to 95%.  Patient repositioned for comfort.  propak removed from bedside. Will continue to monitor and report to oncoming nurse.

## 2020-09-09 NOTE — PLAN OF CARE
09/09/20 1135   Discharge Assessment   Assessment Type Discharge Planning Assessment   Confirmed/corrected address and phone number on facesheet? Yes   Assessment information obtained from? Caregiver  (spouse, Ginna Sheikh ([phone]))   Expected Length of Stay (days) 7   Prior to hospitilization cognitive status: Alert/Oriented   Prior to hospitalization functional status: Assistive Equipment   Current cognitive status: Alert/Oriented   Current Functional Status: Partially Dependent   Lives With spouse  (spouse, Ginna Sheikh ([phone]))   Able to Return to Prior Arrangements no   Is patient able to care for self after discharge? No   Patient's perception of discharge disposition hospice/medical facility   Readmission Within the Last 30 Days no previous admission in last 30 days   Patient currently being followed by outpatient case management? No   Patient currently receives any other outside agency services? No   Equipment Currently Used at Home cane, straight;grab bar;shower chair   Do you have any problems affording any of your prescribed medications? No   Is the patient taking medications as prescribed? yes   Does the patient have transportation home?   (unsure)   Transportation Anticipated agency   Does the patient receive services at the Coumadin Clinic? No   Discharge Plan A Inpatient Hospice  ([facility])   Discharge Plan B Hospice/home  ([facility])   DME Needed Upon Discharge  other (see comments)  (tbd)   Patient/Family in Agreement with Plan yes       Dx: acute blood loss anemia  Consult: GI, hep, PT/OT, [facility]      [id number] - [facility] [address] [facility] [address] [address]  Phone: [phone] Fax: [phone]    [facility] [place] [place] - [address]  [address] [facility] [place] [address]  Phone: [phone] Fax: [phone]    [id number] - [place]  [address] AT [facility] [address]  [address] [facility]  [place] [address]  Phone: [phone] Fax: [phone]    Payor: MEDICARE / Plan: MEDICARE PART A & B / Product Type: Government /     Prisca Espinoza MD (PCP)  [address] [facility] / [place] [id number] [phone]      Patient resting quietly in bed with spouse present when CM rounded. Spouse stated that she cannot care for the patient at home, refuses to have the patient admitted to a NH, & would like the patient to be admitted to [facility] if the patient is not allowed to stay at [facility] until he expires. CM questioned Dr. Rainey if the patient is at "end of life". MD stated that death is not expended to be imminent. CM to rounded on the patient & spouse with Dr. Rainey this afternoon.     Will continue to follow.     "

## 2020-09-09 NOTE — PLAN OF CARE
SW faxed referral to [facility] via  for review. [facility] will continue to follow.      09/09/20 1300   Post-Acute Status   Post-Acute Authorization Hospice   Hospice Status Referrals Sent       Fartun Murphy LMSW   - [facility]  Ext. [phone]

## 2020-09-09 NOTE — CONSULTS
Spoke with Nurse Ramirez regarding consult for skin tears.  Recommend nursing to continue with skin tear wound care orders already in place. Wound care team to sign off.  Please reconsult for any further needs. [id number]

## 2020-09-09 NOTE — ASSESSMENT & PLAN NOTE
Reason for Consult: Goals of care and advanced care planning     Impression: Mr. Sheikh is an 79 yo gentleman admitted to internal medicine with upper GI bleed and hx of MCDONALD cirrhosis with esophageal varices.  He is encephalopathic and arouses easily with verbal stimuli,  oriented to person and place only.  Appears to be comfortable with no facial grimacing or moaning. Does not appear short of breath. Supplemental iron infusion in progress.      Mr. Sheikh has liver failure with no curative or transplant options.  Mr. Sheikh also has active GI bleed  And will not accept transfusion secondary to Caodaism believes.  It is appropriate for patient and family to consider transition to home hospice or nursing home hospice.  Currently Mr. Sheikh does not appear to meet criteria for inpatient hospice.  Inpatient hospice is appropriate when patient has poor prognosis of days to weeks and has a high symptom burden.    GI Hemorhage/ACute Blood Loss Anemia/ MCDONALD Cirrhosis/Hepatic Encephalopathy and other medical problems   - plan per primary team and other specialty consultants     Symptom Management    Pain:   - no c/o pain and no behavioral signs - facial grimacing or moaning   - Continue current medical management - prn hydrocodone and tylenol  - 24 hr OME zero     Bowel Management  - continue current medical management  -lactulose three times daily     Dyspnea   - no apparent shortness of breath   - room air saturation 98 - 100% on room air   O2 Sat goal      Advance Care Planning   Goals of Care      - Advanced care planning documents - Jehoviah's Witness Durable Power of  has been received.  - Presently Mr. Sheikh is unable to make medical decisions.  - Legal next of kin is wife Ginna Sheikh [phone] and son Kenneth Sheikh, [phone]   -  Resuscitation status - full code per primary team   - CPR and Intubation discussed including the risks, benefits and survivability of CPR .    Also discussed peaceful and natural death.    - DNR orders written per primary team.    - LaPOST discussed with wife.  Wife has declined LaPOST at this time.  States if the patient's heart stops beating at home she will call 911.        Goals of Care:   - Palliative medicine APRN returned to bedside for goals of care clarification.    - As per chart review it appears family has made decisions for hospice.  On further discussion wife has expressed interest in inpatient hospice and her daughter  has contacted [facility]. A consult has been sent per Fartun, primary team .    - Hospice education (frequently asked questions) provided and emphasized levels of hospice.  Explained the patient does not appear to meet criteria for inpatient hospice. Mrs. Sheikh appeared to be in  disbelief. Her goals do not include stopping lactulose and other medical management   - Recommended she keep appointment with hospice representative   - Also discussed home hospice and nursing home with hospice as alternatives. Mrs. Sheikh states being opposed to nursing home with hospice and states she does not have family support for home hospice.   - Discussed use of sitters in addition to home hospice.  Wife appeared concern about costs of sitter care.      - As discussed with COURTNEY Espinoza case manager, she and Dr Rainey will continue discussion with the patient's wife.   -  Mrs. Sheikh states her wish is Mr. Sheikh not have suffering and be allowed to have peaceful and natural death.   - Appears Mrs. Sheikh is having some difficulty making these decision. She reports her children have not available to support her through this process.   - Wife reports her children have jobs and families and has not discussed having a family conference.     Plan/Recommendations   - Patient does not appear to meet inpatient hospice criteria. Recommend home hospice or nursing home with hospice.   - Patient and family would benefit from continued information and education regarding clinical  status.  -Continue current plan of care                 Primary team aware of the above goals of care and recommendations.  Thank you for consult and opportunity to participate in patient's care       .

## 2020-09-09 NOTE — TREATMENT PLAN
GI Treatment Plan    Kenneth Sheikh is a 80 y.o. male admitted to hospital 9/4/2020 (Hospital Day: 6) due to Acute blood loss anemia.     Interval History  NAEON.  HGb, no further bleeding    Objective  Temp:  [97 °F (36.1 °C)-97.8 °F (36.6 °C)] 97.7 °F (36.5 °C) (09/09 1206)  Pulse:  [48-79] 75 (09/09 1444)  BP: ()/(47-84) 157/84 (09/09 1444)  Resp:  [12-21] 16 (09/09 1133)  SpO2:  [89 %-98 %] 98 % (09/09 1444)    General: Alert, Oriented x3, no distress  Abdomen: Normoactive bowel sounds. Non-distended. Normal tympany. Soft. Non-tender. No peritoneal signs.    Laboratory    Recent Labs   Lab 09/07/20  0615 09/08/20  0552 09/09/20  0802   HGB 5.8* 5.2* 5.5*       Lab Results   Component Value Date    WBC 3.16 (L) 09/09/2020    HGB 5.5 (LL) 09/09/2020    HCT 18.2 (LL) 09/09/2020     (H) 09/09/2020    PLT 56 (L) 09/09/2020       Lab Results   Component Value Date     09/09/2020    K 3.7 09/09/2020     09/09/2020    CO2 22 (L) 09/09/2020    BUN 61 (H) 09/09/2020    CREATININE 2.7 (H) 09/09/2020    CALCIUM 8.1 (L) 09/09/2020    ANIONGAP 12 09/09/2020    ESTGFRAFRICA 24.3 (A) 09/09/2020    EGFRNONAA 21.0 (A) 09/09/2020       Lab Results   Component Value Date    ALT 10 09/09/2020    AST 29 09/09/2020    ALKPHOS 81 09/09/2020    BILITOT 0.5 09/09/2020       Lab Results   Component Value Date    INR 1.4 (H) 09/09/2020    INR 1.4 (H) 09/08/2020    INR 1.4 (H) 09/07/2020       Plan  -Will be contacted for outpatient repeat EGD and possible coiling of gastric varices.  -continue PPI BID for 8 weeks  - Plan of care was discussed with primary team.  - We are signing-off. Please call with any questions.    Thank you for involving us in the care of Kenneth Sheikh. Please call with any additional questions, concerns or changes in the patient's clinical status.    King Sosa MD  Gastroenterology Fellow  Spectralink: [id number]

## 2020-09-09 NOTE — PLAN OF CARE
Problem: Occupational Therapy Goal  Goal: Occupational Therapy Goal  Description: Goals to be met by: 9/13/20    Patient will increase functional independence with ADLs by performing:    LE Dressing with Set-up Assistance.  Grooming while standing at sink with Set-up Assistance.  Toileting from toilet with Supervision for hygiene and clothing management.   Supine to sit with Bertie.  Toilet transfer to toilet with Supervision.    Outcome: Ongoing, Not Progressing     Pt's functional status is declining.  Per pt's wife, pt needed maximal assistance to transfer to the commode and required 2 people to return to bed.     Miguel Angel Woodson, CHARMAINE   09/09/2020

## 2020-09-09 NOTE — PROGRESS NOTES
[facility] Hospital Medicine                                                                     Progress Note     Team: [facility] A Terrell Rainey MD   Admit Date: 9/4/2020   Hospital Day: 5  KEILY: 9/11/2020   Code status: Full Code   Principal Problem: Acute blood loss anemia     SUMMARY:     Mr. Kenneth Sheikh is a 80 y.o. male with MCDONALD cirrhosis, complicated by gastric varices, hepatic encephalopathy, ascites requiring weekly paracenteses, and coagulopathy who presents to the ER from Hepatology clinic for evaluation of weakness and melena.  He mentions that he has been feeling weak and fatigued the last 2 days, and the day prior to admission he started having melena.  He denies the use of any blood thinners or Aspirin.  He is a Zoroastrian, and does not want to receive blood transfusions, but he is ok receiving blood stimulators or fractionated blood products - just not whole blood.  He endorses some chills, but no fevers, chest pain, or shortness of breath.  Of note, he just had a paracentesis on the day of admission that removed 4.2L.     Upon arrival to the ER, vitals were temp 98.9F, HR 80 and /80.  Labs showed Hemoglobin 6, Platelet 67, Creatinine 2.9 and INR 1.3.  ESTEFANÍA was positive.  He was given 500cc bolus and was admitted to Hospital Medicine for further management.    Admitted to hospital medicine for acute UGIB with hx of gastric varices and ulcers. Started on supportive tx IV octreotide, PPI, ceftriaxone and albumin. GI and hepatology consulted. S/p EGD 9/5 with LA Grade B esophagitis, Non-bleeding esophageal ulcer, Small (< 5 mm) esophageal varice, Portal hypertensive gastropathy. Type 1 isolated gastric varices (IGV1, varices located in the fundus), previously treated and without bleeding, Multiple duodenal polyps. Very friable gastric  mucosa. GI rec slowly advancing diet, repeat EGD 8-12 weeks to assure healing of esophageal ulcer and biopsy duodenal polyps.    Octreotide discontinued post EGD as no overt variceal bleed. He finished his IV PPI drip now transitioned to oral BID. Finished 5 day course of ceftriaxone.  Trending CBC with Pediatric blood draws in the meantime daily, and avoiding unnecessary blood draws. He is currently encephalopathy secondary to hepatic encephalopathy, increasing lactulose for goal BM 3-4. He will need paracentesis by rads, ordered.     Overall very poor prognosis considering his cirrhosis and GIB unable to transfuse. GOC underway. Patient now DNR. Discussing hospice with family.    SUBJECTIVE:     Pt was seen and examined at bedside. Overnight BP low, rapid called, please see note. Afebrile. This morning BP much better . Aox2 this AM, same as yesterday. Only 2 BM in 24 hrs. Increased lactulose. Continues to have melena but appears to be clearing up. No abdominal pain or fevers endorsed. Continues to be weak and pale. Consultants following. Dicussed GOC with wife, DNR. Discussing hospice considering overall poor prognosis.       ROS (Positive in Bold, otherwise negative)  Pain Scale: 0 /10   Constitutional: fever, chills, night sweats, weakness, fatigue  CV: chest pain, edema, palpitations  Resp: SOB, cough, sputum production  GI: changes in appetite, NVDC, pain, melena, hematochezia, GERD, hematemesis  : Dysuria, hematuria, urinary urgency, frequency  MSK: arthralgia/myalgia, joint swelling  SKIN: rashes, pruritis, petechiae   Neuro/Psych: FND, confusion, anxiety, depression      OBJECTIVE:     Vitals:  Temp:  [97 °F (36.1 °C)-97.8 °F (36.6 °C)]   Pulse:  [48-79]   Resp:  [12-21]   BP: ()/(47-75)   SpO2:  [89 %-97 %]      I & O (Last 24H):     Intake/Output Summary (Last 24 hours) at 9/9/2020 1417  Last data filed at 9/9/2020 1402  Gross per 24 hour   Intake 100 ml   Output 1850 ml   Net -1750 ml          GEN: chronically ill appearing male  in no acute distress. Appears pale. Resting in bed.  HEENT: NCAT. PERRL. EOMI. Conjunctivae/corneas clear, sclera Anicteric.  CVS: RRR. Normal s1 s2 no murmur, click, rub or gallop  LUNG: CTAB. Normal respiratory effort. No wheezes, rhonchi, or crackles.  ABD: Normoactive BS, soft, NT, mildly distended, no masses or organomegaly.  EXT: Trace LE edema. No cyanosis. Full ROM. Hepatic flaps improving   SKIN: pale and cold  NEURO: Alert, oriented x 2, decreased concentration, Spont mvt of all extremities with no focal deficits noted.      All recent labs and imaging has been reviewed.     Recent Results (from the past 24 hour(s))   POCT glucose    Collection Time: 09/08/20  4:07 PM   Result Value Ref Range    POCT Glucose 183 (H) 70 - 110 mg/dL   POCT glucose    Collection Time: 09/08/20  8:33 PM   Result Value Ref Range    POCT Glucose 140 (H) 70 - 110 mg/dL   CBC auto differential    Collection Time: 09/09/20  8:02 AM   Result Value Ref Range    WBC 3.16 (L) 3.90 - 12.70 K/uL    RBC 1.82 (L) 4.60 - 6.20 M/uL    Hemoglobin 5.5 (LL) 14.0 - 18.0 g/dL    Hematocrit 18.2 (LL) 40.0 - 54.0 %    Mean Corpuscular Volume 100 (H) 82 - 98 fL    Mean Corpuscular Hemoglobin 30.2 27.0 - 31.0 pg    Mean Corpuscular Hemoglobin Conc 30.2 (L) 32.0 - 36.0 g/dL    RDW 20.9 (H) 11.5 - 14.5 %    Platelets 56 (L) 150 - 350 K/uL    MPV 10.6 9.2 - 12.9 fL    Immature Granulocytes 2.2 (H) 0.0 - 0.5 %    Gran # (ANC) 2.1 1.8 - 7.7 K/uL    Immature Grans (Abs) 0.07 (H) 0.00 - 0.04 K/uL    Lymph # 0.6 (L) 1.0 - 4.8 K/uL    Mono # 0.4 0.3 - 1.0 K/uL    Eos # 0.0 0.0 - 0.5 K/uL    Baso # 0.01 0.00 - 0.20 K/uL    nRBC 1 (A) 0 /100 WBC    Gran% 64.9 38.0 - 73.0 %    Lymph% 18.7 18.0 - 48.0 %    Mono% 13.3 4.0 - 15.0 %    Eosinophil% 0.6 0.0 - 8.0 %    Basophil% 0.3 0.0 - 1.9 %    Differential Method Automated    Comprehensive metabolic panel    Collection Time: 09/09/20  8:02 AM   Result Value Ref Range     Sodium 142 136 - 145 mmol/L    Potassium 3.7 3.5 - 5.1 mmol/L    Chloride 108 95 - 110 mmol/L    CO2 22 (L) 23 - 29 mmol/L    Glucose 107 70 - 110 mg/dL    BUN, Bld 61 (H) 8 - 23 mg/dL    Creatinine 2.7 (H) 0.5 - 1.4 mg/dL    Calcium 8.1 (L) 8.7 - 10.5 mg/dL    Total Protein 5.9 (L) 6.0 - 8.4 g/dL    Albumin 3.0 (L) 3.5 - 5.2 g/dL    Total Bilirubin 0.5 0.1 - 1.0 mg/dL    Alkaline Phosphatase 81 55 - 135 U/L    AST 29 10 - 40 U/L    ALT 10 10 - 44 U/L    Anion Gap 12 8 - 16 mmol/L    eGFR if African American 24.3 (A) >60 mL/min/1.73 m^2    eGFR if non African American 21.0 (A) >60 mL/min/1.73 m^2   Protime-INR    Collection Time: 09/09/20  8:02 AM   Result Value Ref Range    Prothrombin Time 15.6 (H) 9.0 - 12.5 sec    INR 1.4 (H) 0.8 - 1.2   Magnesium    Collection Time: 09/09/20  8:02 AM   Result Value Ref Range    Magnesium 1.7 1.6 - 2.6 mg/dL   Phosphorus    Collection Time: 09/09/20  8:02 AM   Result Value Ref Range    Phosphorus 4.1 2.7 - 4.5 mg/dL       Recent Labs   Lab 09/05/20  2046 09/06/20 2012 09/07/20  0925 09/08/20  0743 09/08/20  1607 09/08/20 2033   POCTGLUCOSE 136* 195* 177* 163* 183* 140*       Hemoglobin A1C   Date Value Ref Range Status   05/15/2020 6.6 (H) 4.0 - 5.6 % Final     Comment:     ADA Screening Guidelines:  5.7-6.4%  Consistent with prediabetes  >or=6.5%  Consistent with diabetes  High levels of fetal hemoglobin interfere with the HbA1C  assay. Heterozygous hemoglobin variants (HbS, HgC, etc)do  not significantly interfere with this assay.   However, presence of multiple variants may affect accuracy.     01/28/2020 6.8 (H) 4.0 - 5.6 % Final     Comment:     ADA Screening Guidelines:  5.7-6.4%  Consistent with prediabetes  >or=6.5%  Consistent with diabetes  High levels of fetal hemoglobin interfere with the HbA1C  assay. Heterozygous hemoglobin variants (HbS, HgC, etc)do  not significantly interfere with this assay.   However, presence of multiple variants may affect accuracy.      06/14/2019 7.3 (H) 4.0 - 5.6 % Final     Comment:     ADA Screening Guidelines:  5.7-6.4%  Consistent with prediabetes  >or=6.5%  Consistent with diabetes  High levels of fetal hemoglobin interfere with the HbA1C  assay. Heterozygous hemoglobin variants (HbS, HgC, etc)do  not significantly interfere with this assay.   However, presence of multiple variants may affect accuracy.          Active Hospital Problems    Diagnosis  POA    *Acute blood loss anemia [D62]  Yes    Palliative care encounter [Z51.5]  Not Applicable    Pain [R52]  Unknown    Dyspnea [R06.00]  Unknown    Advanced care planning/counseling discussion [Z71.89]  Not Applicable    Advanced directive placed in chart this admission [Z78.9]  Unknown    Goals of care, counseling/discussion [Z71.89]  Not Applicable    Coagulopathy [D68.9]  Yes    Thrombocytopenia [D69.6]  Yes    Gastrointestinal hemorrhage with melena [K92.1]  Yes    Refusal of blood transfusions as patient is Confucianism [Z53.1]  Not Applicable     He is ok receiving fractioned blood products and blood stimulators, just not whole blood.     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          ASSESSMENT AND PLAN:     GI Hemorrhage  Acute Blood Loss Anemia  Gastric Varices  - Hgb 6 on admit, baseline around 10 in May  - Start Protonix 40mg IV BID  - GI consulted, appreciate assistance - they are aware of the patient and the high risk for deterioration given Confucianism  - PPI IV changed to drip as per GI, continue 72 hrs from EGD- Finished now on PO BID  - S/p EGD 9/5 with LA Grade B esophagitis, Non-bleeding  esophageal ulcer, Small (< 5 mm) esophageal varice, Portal hypertensive gastropathy. Type 1 isolated gastric varices (IGV1, varices located in the fundus), previously treated and without bleeding, Multiple duodenal polyps. Very friable gastric mucosa.   - Started Ceftriaxone 1g IV q24h for SBP prophylaxis - total 5 days - finished  - Start Octreotide gtt, stopped post EGD since no overt variceal bleed  - GI rec slowly advancing diet, repeat EGD 8-12 weeks to assure healing of esophageal ulcer and biopsy duodenal polyps, and discuss with AES regarding gastric varices   - EPO (MWF) and iron infusion (x5 days total)  - Hgb 10.6 (5/16/20) --> 6 (admit) --> 5.4 --> 5.2 --> 5.8 --> 5.2 --> 5.5  - Bleed contributing to hepatic encephalopathy  - IR paracentesis planned for 9/9  - Trending CBC with Pediatric blood draws in the meantime daily, and avoiding unnecessary blood draws    Refusal of Blood Products as Anabaptist  - Does not want to receive blood products, but he is ok receiving blood stimulators  - Lab draws daily with pediatric tubes  - Epo injection WMF and iron infusion     MCDONALD Cirrhosis  - MELD-Na score: 22 on admit  - MELD score: 20 on admit  - Hepatology consult, appreciate recs  - Held Lasix with anemia and softer BP  - Continue rifaximin   - Albumin 25% BID given as per hepatology now held with better pressures and no changed in Cr   - Restart lactulose for worsening hepatic encephalopathy   - Palliative consult as per hepatology recs  - Not a transplant candidate due to age  - BELLA not consistent with HRS, likely prerenal with ATN from GIB  - Poor prognosis overall, continue to discuss GOC with family     Hepatic Encephalopathy  - Ammonia 54 on admit, he was more lucid at the time  - Continue Rifaximin 550mg PO BID  - Start Zinc 220mg PO daily  - Mentation worsened 9/8, suspecting worsening hepatic encephalopathy clinically - increased lactulose, now 20mL TID for goal 3-4 BM     Acute Renal Failure  on CKD Stage 3  ATN  - Creatinine 2.9, baseline around 1-2  - Albumin for resuscitation given ascites and edema  - urine studies not consistent with HRS, likely BELLA from GIB  - Dc IV albumin, BP better  - Can consider Nephrology eval if worsening, very gradually improving, ATN picture from prerenal BELLA and bleed     Thrombocytopenia  Coagulopathy  - Chronic issue 2/2 liver disease  - Monitor for continued bleeding  - Unable to transfuse plt as per family's wishes     Bipolar 1 Disorder  - Chronic issue  - Continue Depakote 500mg PO qHS  - Note Depakote can increased ammonia lvls but family wants to continue this for bipolar disorder maintenance           Prophylaxis- SCDs  Code Status- Full Code   Discharge plan and follow up - pending PT OT , pending stability , Hospice discussion [place]    Discharge Planning   KEILY: 9/11/2020     Code Status: DNR   Is the patient medically ready for discharge?:     Reason for patient still in hospital (select all that apply): Patient trending condition  Discharge Plan A: Inpatient Hospice([facility])      Terrell Rainey MD  Hospital Medicine Staff  Pager [phone]

## 2020-09-09 NOTE — SUBJECTIVE & OBJECTIVE
Interval History:     Past Medical History:   Diagnosis Date    Anticoagulant long-term use     Basal cell carcinoma     Bipolar 1 disorder     Bipolar 1 disorder 11/24/2016    bipolar    Cancer     history of skin cancer/sinus cancer & rectal cancer    Depressed bipolar affective disorder     Diabetes mellitus     type 2    Diabetic retinopathy 01/09/2019    EBV infection 12/7/2016    EBV DNA, PCR Latest Ref Range: None detected  None detected EBV DNA-Copies/mL Unknown Test Not Performed EBV Early Antigen Ab, IgG Latest Ref Range: <1:10 Titer 1:40 (A) EBV Nuclear Ag Ab Latest Ref Range: <1:5 Titer >=1:80 (A) EBV VCA IgG Latest Ref Range: <1:10 Titer 1:160 (A) EBV VCA IgM Latest Ref Range: <1:10 Titer <1:10     History of TIA (transient ischemic attack)     several-taking Plavix    Hx of gallstones     Wife denies    Hypertension     Hyperthyroidism 11/30/2016    Low HDL (under 40) 12/7/2016    Mixed hyperlipidemia 11/23/2016    JUAN MANUEL (obstructive sleep apnea)     Pneumonia     Skin disease     Squamous cell carcinoma 08/2018    right temple    Stroke     Transient cerebral ischemia 7/22/2016    Type 2 diabetes mellitus        Past Surgical History:   Procedure Laterality Date    ESOPHAGOGASTRODUODENOSCOPY N/A 9/5/2020    Procedure: EGD (ESOPHAGOGASTRODUODENOSCOPY);  Surgeon: Denita Escalona MD;  Location: [address]);  Service: Endoscopy;  Laterality: N/A;    Moh's procedure x4      rectal cancer      Sinus surgery for sinus cancer      sinus sx for cancer      skin cancer removed-several times-nose & ear      TONSILLECTOMY      Undescened testicle sx      UVULOPALATOPHARYNGOPLASTY      for sleep apnea       Review of patient's allergies indicates:   Allergen Reactions    Abilify [aripiprazole] Other (See Comments)     Sleepy, could not function.       Medications:  Continuous Infusions:    Scheduled Meds:   albumin human 25%  25 g Intravenous BID    divalproex  500 mg Oral  QHS    epoetin mariajose-epbx  100 Units/kg (Order-Specific) Subcutaneous Once per day on Mon Wed Fri    lactulose  20 g Oral TID    rifAXIMin  550 mg Oral BID    zinc sulfate  220 mg Oral Daily     PRN Meds:acetaminophen, fentaNYL, HYDROcodone-acetaminophen, HYDROcodone-acetaminophen, melatonin, ondansetron, ondansetron, promethazine (PHENERGAN) IVPB, senna-docusate 8.6-50 mg, sodium chloride 0.9%, sodium chloride 0.9%    Family History     Problem Relation (Age of Onset)    Diabetes Son    No Known Problems Daughter        Tobacco Use    Smoking status: Former Smoker     Packs/day: 0.25     Years: 2.00     Pack years: 0.50    Smokeless tobacco: Never Used    Tobacco comment: quit at age 17 less than a pack a day   Substance and Sexual Activity    Alcohol use: No     Comment: rare    Drug use: No    Sexual activity: Not on file       Review of Systems   Unable to perform ROS: Mental status change     Objective:     Vital Signs (Most Recent):  Temp: 97.7 °F (36.5 °C) (09/09/20 1206)  Pulse: 78 (09/09/20 1133)  Resp: 16 (09/09/20 1133)  BP: (!) 140/75 (09/09/20 1133)  SpO2: (!) 93 % (09/09/20 1133) Vital Signs (24h Range):  Temp:  [97 °F (36.1 °C)-97.8 °F (36.6 °C)] 97.7 °F (36.5 °C)  Pulse:  [48-79] 78  Resp:  [12-21] 16  SpO2:  [89 %-97 %] 93 %  BP: ()/(47-75) 140/75     Weight: 70.9 kg (156 lb 4.9 oz)  Body mass index is 23.77 kg/m².    Physical Exam  Constitutional:       General: He is not in acute distress.     Comments: encephalopathic   HENT:      Head: Normocephalic and atraumatic.      Right Ear: External ear normal.      Left Ear: External ear normal.      Nose: Nose normal.      Mouth/Throat:      Mouth: Mucous membranes are moist.   Eyes:      Extraocular Movements: Extraocular movements intact.      Pupils: Pupils are equal, round, and reactive to light.   Neck:      Musculoskeletal: Normal range of motion and neck supple.   Cardiovascular:      Rate and Rhythm: Regular rhythm.      Pulses:  Normal pulses.      Heart sounds: Normal heart sounds.      Comments: Bradycardia  Pulmonary:      Effort: Pulmonary effort is normal.      Breath sounds: Normal breath sounds.   Abdominal:      General: Abdomen is flat. Bowel sounds are normal.      Palpations: Abdomen is soft.   Neurological:      Mental Status: He is alert.         Review of Symptoms    Symptom Assessment (ESAS 0-10 Scale)  Pain:  0  Dyspnea:  0  Anxiety:  0  Nausea:  0  Depression:  0  Anorexia:  0  Fatigue:  0  Insomnia:  0  Restlessness:  0  Agitation:  0     CAM / Delirium:  Positive  Constipation:  Negative  Diarrhea:  Negative    Bowel Management Plan (BMP):  Yes      Comments:  Patient is encephalopathic unable to complete ESAS, appears comfortable, no facial grimacing or moaning,  Does not appear to have any shortness of breath           OME in 24 hours:  0    Performance Status:  30    Living Arrangements:  Lives with spouse    Psychosocial/Cultural:  60 years,  Two adult children (son and daughter) two grandchildren, retired from [employer] and [employer],  Enjoyed spending time with the family going out to eat.  Has not been able to continue with life style since has been sick in the last 4 yrs     Spiritual:  F - Cassandra and Belief:  Jehoviah's Witness,   I - Importance:  Cassandra guides medical decision making   A - Address in Care:  JW durable power of  placed in blue chart       Advance Care Planning   Advance Care Planning       Significant Labs: All pertinent labs within the past 24 hours have been reviewed.  CBC:   Recent Labs   Lab 09/09/20  0802   WBC 3.16*   HGB 5.5*   HCT 18.2*   *   PLT 56*     BMP:  Recent Labs   Lab 09/09/20  0802         K 3.7      CO2 22*   BUN 61*   CREATININE 2.7*   CALCIUM 8.1*   MG 1.7     LFT:  Lab Results   Component Value Date    AST 29 09/09/2020    ALKPHOS 81 09/09/2020    BILITOT 0.5 09/09/2020     Albumin:   Albumin   Date Value Ref Range Status   09/09/2020 3.0  (L) 3.5 - 5.2 g/dL Final     Protein:   Total Protein   Date Value Ref Range Status   09/09/2020 5.9 (L) 6.0 - 8.4 g/dL Final     Lactic acid:   Lab Results   Component Value Date    LACTATE 2.2 05/11/2017    LACTATE 1.8 05/10/2017       Significant Imaging: I have reviewed all pertinent imaging results/findings within the past 24 hours.

## 2020-09-09 NOTE — PLAN OF CARE
Problem: Fall Injury Risk  Goal: Absence of Fall and Fall-Related Injury  Outcome: Ongoing, Progressing     Problem: Adjustment to Illness (Gastrointestinal Bleeding)  Goal: Optimal Coping with Acute Illness  Outcome: Ongoing, Progressing     Problem: Adult Inpatient Plan of Care  Goal: Plan of Care Review  Outcome: Ongoing, Progressing  Goal: Patient-Specific Goal (Individualization)  Outcome: Ongoing, Progressing  Goal: Absence of Hospital-Acquired Illness or Injury  Outcome: Ongoing, Progressing  Goal: Optimal Comfort and Wellbeing  Outcome: Ongoing, Progressing  Goal: Readiness for Transition of Care  Outcome: Ongoing, Progressing  Goal: Rounds/Family Conference  Outcome: Ongoing, Progressing     Problem: Electrolyte Imbalance (Acute Kidney Injury/Impairment)  Goal: Serum Electrolyte Balance  Outcome: Ongoing, Progressing     Problem: Coping Ineffective  Goal: Effective Coping  Outcome: Ongoing, Progressing     Problem: Skin Injury Risk Increased  Goal: Skin Health and Integrity  Outcome: Ongoing, Progressing     POC reviewed with pt and spouse, both verbalize understanding with intermittent reassurance to POC. Pt remains with low h&h, no transfusions d/t jehovahs witness status. Pt alert, verbally able to communicate. VSS. Paracentesis this afternoon with a good amount of fluid removal. Condom cath remains in place. BM x 2 on BSC. Fair appetite. Palliative care consulted. DNR status. Bed low and locked and call light in reach, [facility].

## 2020-09-09 NOTE — PROGRESS NOTES
[facility]  Hepatology  Progress Note    Patient Name: Kenneth Sheikh  MRN: [id number]  Admission Date: 9/4/2020  Hospital Length of Stay: 5 days  Attending Provider: Terrell Rainey MD   Primary Care Physician: Prisca Espinoza MD  Principal Problem:Acute blood loss anemia    Subjective:     Transplant status: No    HPI: Kenneth Sheikh is a 80 y.o. 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 Patient was recently seen in Hepatology clinic 9/4 at which time there was concern for GIB along with continued clinical deterioration, and it was recommended to present to ED. Patient reports black stools for the past few days. He has not had abdominal pain, nausea or vomiting, fever/ chills. He denies being on blood thinners and has not taken OTC medications. He gets weekly paracentesis, the last of which was on on 9/4 at which time 4.2 L removed (fluid does not appear to have been sent for diagnostic testing). Of note, patient is a [employer] and does not want to receive blood transfusions but is ok with albumin. In the ED, vitals were temp 98.9F, HR 80 and /80.  Labs significant for Hb 6, Platelet 67, Creatinine 2.9 and INR 1.3.  He was given 500cc bolus and was admitted to hospital medicine. Hepatology consulted for assistance with management.     Interval History:   More confused, on lactulose, only 1 BM yesterday. Patient's wife concerned about patient being alone at night and not being able to press call button or answer phone.  Seen by Palliative Care and patient DNR / DNI. Children tried to come by, but not allowed on the floor for some reason.  Hgb 5.5, Cr down slightly 2.7.      Current Facility-Administered Medications   Medication    acetaminophen tablet 650 mg    albumin human 25% bottle 25 g    divalproex EC tablet 500 mg    epoetin mariajose-epbx injection 7,090 Units     fentaNYL injection 25 mcg    HYDROcodone-acetaminophen  mg per tablet 1 tablet    HYDROcodone-acetaminophen 5-325 mg per tablet 1 tablet    lactulose 20 gram/30 mL solution Soln 10 g    melatonin tablet 6 mg    ondansetron injection 4 mg    ondansetron injection 8 mg    promethazine (PHENERGAN) 25 mg in dextrose 5 % 50 mL IVPB    rifAXIMin tablet 550 mg    senna-docusate 8.6-50 mg per tablet 1 tablet    sodium chloride 0.9% flush 10 mL    sodium chloride 0.9% flush 5 mL    zinc sulfate capsule 220 mg       Objective:     Vital Signs (Most Recent):  Temp: 97 °F (36.1 °C) (09/09/20 0916)  Pulse: 64 (09/09/20 0916)  Resp: 16 (09/09/20 0916)  BP: 129/69 (09/09/20 0916)  SpO2: 95 % (09/09/20 0916) Vital Signs (24h Range):  Temp:  [97 °F (36.1 °C)-97.8 °F (36.6 °C)] 97 °F (36.1 °C)  Pulse:  [48-76] 64  Resp:  [11-21] 16  SpO2:  [89 %-98 %] 95 %  BP: ()/(47-69) 129/69     Weight: 70.9 kg (156 lb 4.9 oz) (09/04/20 2143)  Body mass index is 23.77 kg/m².    Physical Exam  Vitals signs and nursing note reviewed.   Constitutional:       General: He is not in acute distress.  Eyes:      General: No scleral icterus.  Cardiovascular:      Rate and Rhythm: Normal rate.   Pulmonary:      Effort: Pulmonary effort is normal.      Breath sounds: Wheezing present.   Abdominal:      General: Bowel sounds are normal. There is distension (moderate).      Palpations: Abdomen is soft.      Tenderness: There is no abdominal tenderness.   Skin:     Coloration: Skin is pale. Skin is not jaundiced.   Neurological:      Mental Status: He is alert. He is disoriented.         MELD-Na score: 20 at 9/9/2020  8:02 AM  MELD score: 20 at 9/9/2020  8:02 AM  Calculated from:  Serum Creatinine: 2.7 mg/dL at 9/9/2020  8:02 AM  Serum Sodium: 142 mmol/L (Rounded to 137 mmol/L) at 9/9/2020  8:02 AM  Total Bilirubin: 0.5 mg/dL (Rounded to 1 mg/dL) at 9/9/2020  8:02 AM  INR(ratio): 1.4 at 9/9/2020  8:02 AM  Age: 80 years 7  months    Significant Labs:  Labs within the past month have been reviewed.    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 male medical history of MCDONALD Cirrhosis (h/o Ascites with weekly paracenteses - last 9/4 4.2L removed, Gastric Varices 2/2017), Christian, who presents with weakness black stools, anemia (Hb 6 from 10.6 previously), BELLA on CKD (Cr 2.9).    Recommendations:   - GIB: Appreciate GI assistance. S/p EGD 9/5 with likely bleeding from PHG and ?esophageal ulcer. S/p Abx x 5 days. Agree with IV iron as patient declines blood products.  - BELLA: Stable. Urine studies not consistent with HRS. Likely prerenal from GIB. Continue albumin IV 25g twice a day  - Ascites: Unfortunately ascitic fluid (9/4) does not appear to have been sent for diagnostic studies. Continue Abx in setting of GIB for ppx  - 2 gm Na restriction. Hold diuretics.  - HE: Present. Lactulose with goal 3-4 BMs daily and continue rifaximin  - Transplant: not a candidate due to age  - Appreciate Palliative care assistance. Now DNR / DNI. We discussed possible hospice if patient worsens or does not clinically improve        Thank you for your consult. I will follow-up with patient. Please contact us if you have any additional questions.    Greg Henley MD  Hepatology  [facility]

## 2020-09-09 NOTE — ASSESSMENT & PLAN NOTE
80 y.o. male medical history of MCDONALD Cirrhosis (h/o Ascites with weekly paracenteses - last 9/4 4.2L removed, Gastric Varices 2/2017), Religion, who presents with weakness black stools, anemia (Hb 6 from 10.6 previously), BELLA on CKD (Cr 2.9).    Recommendations:   - GIB: Appreciate GI assistance. S/p EGD 9/5 with likely bleeding from PHG and ?esophageal ulcer. S/p Abx x 5 days. Agree with IV iron as patient declines blood products.  - BELLA: Stable. Urine studies not consistent with HRS. Likely prerenal from GIB. Continue albumin IV 25g twice a day  - Ascites: Unfortunately ascitic fluid (9/4) does not appear to have been sent for diagnostic studies. Continue Abx in setting of GIB for ppx  - 2 gm Na restriction. Hold diuretics.  - HE: Present. Lactulose with goal 3-4 BMs daily and continue rifaximin  - Transplant: not a candidate due to age  - Appreciate Palliative care assistance. Now DNR / DNI. We discussed possible hospice if patient worsens or does not clinically improve

## 2020-09-09 NOTE — H&P
Inpatient Radiology Pre-procedure Note    History of Present Illness:  Kenneth Sheikh is a 80 y.o. male who presents for paracentesis.  Admission H&P reviewed.  Past Medical History:   Diagnosis Date    Anticoagulant long-term use     Basal cell carcinoma     Bipolar 1 disorder     Bipolar 1 disorder 11/24/2016    bipolar    Cancer     history of skin cancer/sinus cancer & rectal cancer    Depressed bipolar affective disorder     Diabetes mellitus     type 2    Diabetic retinopathy 01/09/2019    EBV infection 12/7/2016    EBV DNA, PCR Latest Ref Range: None detected  None detected EBV DNA-Copies/mL Unknown Test Not Performed EBV Early Antigen Ab, IgG Latest Ref Range: <1:10 Titer 1:40 (A) EBV Nuclear Ag Ab Latest Ref Range: <1:5 Titer >=1:80 (A) EBV VCA IgG Latest Ref Range: <1:10 Titer 1:160 (A) EBV VCA IgM Latest Ref Range: <1:10 Titer <1:10     History of TIA (transient ischemic attack)     several-taking Plavix    Hx of gallstones     Wife denies    Hypertension     Hyperthyroidism 11/30/2016    Low HDL (under 40) 12/7/2016    Mixed hyperlipidemia 11/23/2016    JUAN MANUEL (obstructive sleep apnea)     Pneumonia     Skin disease     Squamous cell carcinoma 08/2018    right temple    Stroke     Transient cerebral ischemia 7/22/2016    Type 2 diabetes mellitus      Past Surgical History:   Procedure Laterality Date    ESOPHAGOGASTRODUODENOSCOPY N/A 9/5/2020    Procedure: EGD (ESOPHAGOGASTRODUODENOSCOPY);  Surgeon: Denita Escalona MD;  Location: [address];  Service: Endoscopy;  Laterality: N/A;    Moh's procedure x4      rectal cancer      Sinus surgery for sinus cancer      sinus sx for cancer      skin cancer removed-several times-nose & ear      TONSILLECTOMY      Undescened testicle sx      UVULOPALATOPHARYNGOPLASTY      for sleep apnea       Review of Systems:   As documented in primary team H&P    Home Meds:   Prior to Admission medications    Medication Sig Start Date End Date  Taking? Authorizing Provider   ACCU-CHEK NEYDA PLUS TEST STRP Strp CHECK BLOOD SUGAR ONCE DAILY  Patient not taking: Reported on 8/11/2020 4/17/20   Prisca Espinoza MD   ciprofloxacin HCl (CIPRO) 500 MG tablet TAKE 1 TABLET(500 MG) BY MOUTH EVERY MONDAY 7/4/20   Renato Womack MD   divalproex (DEPAKOTE) 250 MG EC tablet Take 2 tablets (500 mg total) by mouth every evening. 8/27/20   Prisca Espinoza MD   furosemide (LASIX) 20 MG tablet TAKE 2 TABLETS BY MOUTH EVERY DAY 7/1/20   Renato Womack MD   lactulose (CHRONULAC) 10 gram/15 mL solution TAKE 15 MLS BY MOUTH TWICE DAILY  Patient not taking: Reported on 6/24/2020 4/6/20   Renato Womack MD   lancets [facility] To check blood sugar once daily, to use with Accu-Chek meter.  Patient not taking: Reported on 8/11/2020 11/5/18   Prisca Espinoza MD   LIPASE-PROTEASE-AMYLASE 5,000-17,000 -24,000 UNITS (ZENPEP) 5,000-17,000- 24,000 unit CpDR Take 1 capsule by mouth 3 (three) times daily with meals.  Patient taking differently: Take 1 capsule by mouth 2 (two) times daily with meals.  9/1/20 8/27/21  Kelin Winters, CODY   loperamide (IMODIUM) 2 mg capsule Take 1 capsule (2 mg total) by mouth 2 (two) times daily as needed for Diarrhea. 8/27/20   Prisca Espinoza MD   potassium chloride SA (K-DUR,KLOR-CON) 20 MEQ tablet TAKE 1 TABLET(20 MEQ) BY MOUTH TWICE DAILY 7/10/20   Prisca Espinoza MD   psyllium (METAMUCIL) packet Take 1 packet by mouth once daily.    Historical Provider, MD   psyllium (METAMUCIL) powder Take 1 packet by mouth as needed. Pt states he takes a teaspoon twice a day    Historical Provider, MD   SITagliptan (JANUVIA) 50 MG Tab Take 1 tablet (50 mg total) by mouth once daily. 4/6/17   Prisca Espinoza MD   XIFAXAN 550 mg Tab Take 1 tablet (550 mg total) by mouth 2 (two) times daily. 2/10/20   Renato Womack MD     Scheduled Meds:    divalproex  500 mg Oral QHS    epoetin mariajose-epbx  100 Units/kg (Order-Specific)  Subcutaneous Once per day on Mon Wed Fri    lactulose  20 g Oral TID    pantoprazole  40 mg Oral BID AC    rifAXIMin  550 mg Oral BID    zinc sulfate  220 mg Oral Daily     Continuous Infusions:   PRN Meds:acetaminophen, fentaNYL, HYDROcodone-acetaminophen, HYDROcodone-acetaminophen, melatonin, ondansetron, ondansetron, promethazine (PHENERGAN) IVPB, senna-docusate 8.6-50 mg, sodium chloride 0.9%, sodium chloride 0.9%  Anticoagulants/Antiplatelets: no anticoagulation    Allergies:   Review of patient's allergies indicates:   Allergen Reactions    Abilify [aripiprazole] Other (See Comments)     Sleepy, could not function.     Sedation Hx: have not been any systemic reactions    Labs:  Recent Labs   Lab 09/09/20  0802   INR 1.4*       Recent Labs   Lab 09/09/20  0802   WBC 3.16*   HGB 5.5*   HCT 18.2*   *   PLT 56*      Recent Labs   Lab 09/04/20  1510  09/09/20  0802   *   < > 107   *   < > 142   K 4.5   < > 3.7      < > 108   CO2 19*   < > 22*   BUN 80*   < > 61*   CREATININE 2.9*   < > 2.7*   CALCIUM 8.3*   < > 8.1*   MG 1.6   < > 1.7   ALT 6*   < > 10   AST 17   < > 29   ALBUMIN 1.9*   < > 3.0*   BILITOT 0.4   < > 0.5   BILIDIR 0.3  --   --     < > = values in this interval not displayed.         Vitals:  Temp: 97.7 °F (36.5 °C) (09/09/20 1206)  Pulse: 78 (09/09/20 1133)  Resp: 16 (09/09/20 1133)  BP: (!) 140/75 (09/09/20 1133)  SpO2: (!) 93 % (09/09/20 1133)     Physical Exam:  ASA: II  Mallampati: III    General: no acute distress  Mental Status: alert   HEENT: normocephalic, atraumatic  Chest: unlabored breathing  Heart: regular heart rate  Abdomen: nondistended  Extremity: moves all extremities    Plan: paracentesis  Sedation Plan: local    Rolando Fish DO  PGY-II  Diagnostic and Interventional Radiology  [facility]

## 2020-09-10 LAB
ALBUMIN SERPL BCP-MCNC: 3.1 G/DL (ref 3.5–5.2)
ALP SERPL-CCNC: 88 U/L (ref 55–135)
ALT SERPL W/O P-5'-P-CCNC: 10 U/L (ref 10–44)
ANION GAP SERPL CALC-SCNC: 12 MMOL/L (ref 8–16)
AST SERPL-CCNC: 31 U/L (ref 10–40)
BASOPHILS # BLD AUTO: 0.01 K/UL (ref 0–0.2)
BASOPHILS NFR BLD: 0.1 % (ref 0–1.9)
BILIRUB SERPL-MCNC: 0.6 MG/DL (ref 0.1–1)
BUN SERPL-MCNC: 55 MG/DL (ref 8–23)
CALCIUM SERPL-MCNC: 8.4 MG/DL (ref 8.7–10.5)
CHLORIDE SERPL-SCNC: 108 MMOL/L (ref 95–110)
CO2 SERPL-SCNC: 22 MMOL/L (ref 23–29)
CREAT SERPL-MCNC: 2.7 MG/DL (ref 0.5–1.4)
DIFFERENTIAL METHOD: ABNORMAL
EOSINOPHIL # BLD AUTO: 0 K/UL (ref 0–0.5)
EOSINOPHIL NFR BLD: 0.4 % (ref 0–8)
ERYTHROCYTE [DISTWIDTH] IN BLOOD BY AUTOMATED COUNT: 21.9 % (ref 11.5–14.5)
EST. GFR  (AFRICAN AMERICAN): 24.3 ML/MIN/1.73 M^2
EST. GFR  (NON AFRICAN AMERICAN): 21 ML/MIN/1.73 M^2
GLUCOSE SERPL-MCNC: 119 MG/DL (ref 70–110)
HCT VFR BLD AUTO: 22.1 % (ref 40–54)
HGB BLD-MCNC: 6.5 G/DL (ref 14–18)
IMM GRANULOCYTES # BLD AUTO: 0.09 K/UL (ref 0–0.04)
IMM GRANULOCYTES NFR BLD AUTO: 1.1 % (ref 0–0.5)
INR PPP: 1.5 (ref 0.8–1.2)
LYMPHOCYTES # BLD AUTO: 0.8 K/UL (ref 1–4.8)
LYMPHOCYTES NFR BLD: 10 % (ref 18–48)
MAGNESIUM SERPL-MCNC: 1.8 MG/DL (ref 1.6–2.6)
MCH RBC QN AUTO: 29.8 PG (ref 27–31)
MCHC RBC AUTO-ENTMCNC: 29.4 G/DL (ref 32–36)
MCV RBC AUTO: 101 FL (ref 82–98)
MONOCYTES # BLD AUTO: 0.8 K/UL (ref 0.3–1)
MONOCYTES NFR BLD: 9.3 % (ref 4–15)
NEUTROPHILS # BLD AUTO: 6.6 K/UL (ref 1.8–7.7)
NEUTROPHILS NFR BLD: 79.1 % (ref 38–73)
NRBC BLD-RTO: 1 /100 WBC
PHOSPHATE SERPL-MCNC: 3.8 MG/DL (ref 2.7–4.5)
PLATELET # BLD AUTO: 61 K/UL (ref 150–350)
PMV BLD AUTO: 10.7 FL (ref 9.2–12.9)
POTASSIUM SERPL-SCNC: 3.6 MMOL/L (ref 3.5–5.1)
PROT SERPL-MCNC: 5.9 G/DL (ref 6–8.4)
PROTHROMBIN TIME: 16.7 SEC (ref 9–12.5)
RBC # BLD AUTO: 2.18 M/UL (ref 4.6–6.2)
SODIUM SERPL-SCNC: 142 MMOL/L (ref 136–145)
WBC # BLD AUTO: 8.37 K/UL (ref 3.9–12.7)

## 2020-09-10 PROCEDURE — 25000003 PHARM REV CODE 250: Performed by: INTERNAL MEDICINE

## 2020-09-10 PROCEDURE — 25000242 PHARM REV CODE 250 ALT 637 W/ HCPCS: Performed by: HOSPITALIST

## 2020-09-10 PROCEDURE — 85025 COMPLETE CBC W/AUTO DIFF WBC: CPT

## 2020-09-10 PROCEDURE — 11000001 HC ACUTE MED/SURG PRIVATE ROOM

## 2020-09-10 PROCEDURE — 85610 PROTHROMBIN TIME: CPT

## 2020-09-10 PROCEDURE — 25000003 PHARM REV CODE 250: Performed by: HOSPITALIST

## 2020-09-10 PROCEDURE — 80053 COMPREHEN METABOLIC PANEL: CPT

## 2020-09-10 PROCEDURE — 83735 ASSAY OF MAGNESIUM: CPT

## 2020-09-10 PROCEDURE — 63600175 PHARM REV CODE 636 W HCPCS: Performed by: HOSPITALIST

## 2020-09-10 PROCEDURE — 99232 PR SUBSEQUENT HOSPITAL CARE,LEVL II: ICD-10-PCS | Mod: ,,, | Performed by: HOSPITALIST

## 2020-09-10 PROCEDURE — 27000221 HC OXYGEN, UP TO 24 HOURS

## 2020-09-10 PROCEDURE — 36415 COLL VENOUS BLD VENIPUNCTURE: CPT

## 2020-09-10 PROCEDURE — 94761 N-INVAS EAR/PLS OXIMETRY MLT: CPT

## 2020-09-10 PROCEDURE — 99232 SBSQ HOSP IP/OBS MODERATE 35: CPT | Mod: ,,, | Performed by: HOSPITALIST

## 2020-09-10 PROCEDURE — 99233 SBSQ HOSP IP/OBS HIGH 50: CPT | Mod: ,,, | Performed by: CLINICAL NURSE SPECIALIST

## 2020-09-10 PROCEDURE — 84100 ASSAY OF PHOSPHORUS: CPT

## 2020-09-10 PROCEDURE — 99233 PR SUBSEQUENT HOSPITAL CARE,LEVL III: ICD-10-PCS | Mod: ,,, | Performed by: CLINICAL NURSE SPECIALIST

## 2020-09-10 PROCEDURE — 94640 AIRWAY INHALATION TREATMENT: CPT

## 2020-09-10 RX ORDER — FUROSEMIDE 10 MG/ML
40 INJECTION INTRAMUSCULAR; INTRAVENOUS ONCE
Status: COMPLETED | OUTPATIENT
Start: 2020-09-10 | End: 2020-09-10

## 2020-09-10 RX ORDER — LEVALBUTEROL INHALATION SOLUTION 0.63 MG/3ML
0.63 SOLUTION RESPIRATORY (INHALATION) EVERY 8 HOURS
Status: DISCONTINUED | OUTPATIENT
Start: 2020-09-10 | End: 2020-09-14

## 2020-09-10 RX ORDER — HALOPERIDOL 5 MG/ML
1 INJECTION INTRAMUSCULAR EVERY 4 HOURS PRN
Status: DISCONTINUED | OUTPATIENT
Start: 2020-09-10 | End: 2020-09-14 | Stop reason: HOSPADM

## 2020-09-10 RX ORDER — MORPHINE SULFATE 2 MG/ML
2 INJECTION, SOLUTION INTRAMUSCULAR; INTRAVENOUS EVERY 4 HOURS PRN
Status: DISCONTINUED | OUTPATIENT
Start: 2020-09-10 | End: 2020-09-14 | Stop reason: HOSPADM

## 2020-09-10 RX ORDER — ATROPINE SULFATE 10 MG/ML
2 SOLUTION/ DROPS OPHTHALMIC EVERY 4 HOURS PRN
Status: DISCONTINUED | OUTPATIENT
Start: 2020-09-10 | End: 2020-09-14 | Stop reason: HOSPADM

## 2020-09-10 RX ADMIN — PANTOPRAZOLE SODIUM 40 MG: 40 TABLET, DELAYED RELEASE ORAL at 06:09

## 2020-09-10 RX ADMIN — LACTULOSE 20 G: 20 SOLUTION ORAL at 04:09

## 2020-09-10 RX ADMIN — ZINC SULFATE 220 MG (50 MG) CAPSULE 220 MG: CAPSULE at 10:09

## 2020-09-10 RX ADMIN — FUROSEMIDE 40 MG: 10 INJECTION, SOLUTION INTRAMUSCULAR; INTRAVENOUS at 06:09

## 2020-09-10 RX ADMIN — LEVALBUTEROL HYDROCHLORIDE 0.63 MG: 0.63 SOLUTION RESPIRATORY (INHALATION) at 11:09

## 2020-09-10 RX ADMIN — DIVALPROEX SODIUM 500 MG: 250 TABLET, DELAYED RELEASE ORAL at 08:09

## 2020-09-10 RX ADMIN — RIFAXIMIN 550 MG: 550 TABLET ORAL at 08:09

## 2020-09-10 RX ADMIN — LACTULOSE 20 G: 20 SOLUTION ORAL at 10:09

## 2020-09-10 RX ADMIN — LACTULOSE 20 G: 20 SOLUTION ORAL at 08:09

## 2020-09-10 RX ADMIN — RIFAXIMIN 550 MG: 550 TABLET ORAL at 10:09

## 2020-09-10 NOTE — CHAPLAIN
Attempted visit with patient for second day in a row. Wife expressly stated that she, nor her  want a visit from the .     No more spiritual care consults orders are needed for [facility].

## 2020-09-10 NOTE — PROGRESS NOTES
[facility]  Palliative Medicine  Progress Note    Patient Name: Kenneth Sheikh  MRN: [id number]  Admission Date: 9/4/2020  Hospital Length of Stay: 6 days  Code Status: DNR   Attending Provider: Keyonna Padilla MD  Consulting Provider: DOTTY Hearn  Primary Care Physician: Prisca Espinoza MD  Principal Problem:Acute blood loss anemia    Patient information was obtained from spouse/SO, primary team  and ER records.      Assessment/Plan:     Palliative care encounter  Reason for Consult: Goals of care and advanced care planning     Impression: Mr. Sheikh is an 79 yo gentleman admitted to internal medicine with upper GI bleed and hx of MCDONALD cirrhosis with esophageal varices.  He is encephalopathic and arouses easily with verbal stimuli,  oriented to person and place only. No change from previous assessment.  Received report Mr. Sheikh was experiencing agonal breathing.  At time of this assessment Mr. Sheikh's respiratory effort appears labored with accessory muscle use and not agonal.   Appears to be comfortable with no facial grimacing or moaning. He is sitting up in bed and is eating with assistance from his wife. He does not appear to be in any acute distress.       Mr. Sheikh has liver failure with no curative or transplant options.  Mr. Sheikh also has active GI bleed  And will not accept transfusion secondary to Christian believes.  It is appropriate for patient and family to consider transition to home hospice or nursing home hospice.  Currently Mr. Sheikh does not appear to meet criteria for inpatient hospice.     GI Hemorhage/ACute Blood Loss Anemia/ MCDONALD Cirrhosis/Hepatic Encephalopathy and other medical problems   - plan per primary team and other specialty consultants     Symptom Management    Pain:   - no c/o pain and no behavioral signs - facial grimacing or moaning   - Continue current medical management - prn hydrocodone and tylenol  - 24 hr OME zero     Bowel Management  - continue current  medical management  -lactulose three times daily     Dyspnea   - patient is able to talk and eat.   - respiratory effort appears mildly labored with use of accessory muscles  -  room air saturation 93% on room air   - lungs sounds wet, chest xray ordered per primary team      Advance Care Planning   Goals of Care      - Advanced care planning documents - Jehoviah's Witness Durable Power of  has been received.  - Presently Mr. Sheikh is unable to make medical decisions.  - Legal next of kin is wife Ginna Sheihk [phone] and son Kenneth Sheikh, [phone]   -  Resuscitation status - full code per primary team   - CPR and Intubation discussed including the risks, benefits and survivability of CPR .    Also discussed peaceful and natural death.   - DNR orders written per primary team.    - LaPOST discussed with wife.  Wife has declined LaPOST at this time.  States if the patient's heart stops beating at home she will call 911.        Goals of Care:   - Palliative medicine APRN returned to bedside for goals of care clarification.    - As per chart review it appears family has made decisions for hospice.  On further discussion wife has expressed interest in inpatient hospice and her daughter  has contacted [facility]. A consult has been sent per [facility] primary team .    -  Hospice education (frequently asked questions) provided and emphasized levels of hospice.  Explained the patient does not appear to meet criteria for inpatient hospice. Mrs. Sheikh appeared to be in  disbelief. Her goals do not include stopping lactulose and other medical management   - Recommended she keep appointment with hospice representative   - As per conversation with primary team attending patient does not meet inpatient hospice criteria.    - Also discussed home hospice and nursing home with hospice as alternatives. Mrs. Sheikh states being opposed to nursing home with hospice and states she does not have family  "support for home hospice.   - [facility] hospice will continue to follow, if clinical condition worsens will most likely transition to inpatient hospice.     Plan/Recommendations   - Patient does not appear to meet inpatient hospice criteria at this time.  If clinical status changes and respiratory status worsens [facility]. .   - Patient and family would benefit from continued information and education regarding clinical status.  -Continue current plan of care         Primary team attending and  aware of the above goals of care and recommendations.  Thank you for consult and opportunity to participate in patient's care       .        I will follow-up with patient. Please contact us if you have any additional questions.    Subjective:     Chief Complaint:   Chief Complaint   Patient presents with    covid test       HPI:   HPI obtained from chart review: please see H and P from primary team 9/4/2020    Mr. Sheikh is an 81 yo gentlmen with PMH of: MCDONALD cirrhosis, gastric varices, hepatic encephalopathy, coagulopathy,  and ascites requiring weekly paracenteses.  He presented to [facility] from [facility] Hepatology clinic for evaluation of c/o weakness,  Melena, weakness and fatigue over two days. No use of any blood thinners or Aspirin. He reports chills,  no fevers, chest pain, or shortness of breath.  Paracentesis with removal of 4.2 liters in hepatology clinic.      He is a Sabianism, and does not want to receive blood transfusions, but he is ok receiving blood stimulators or fractionated blood products - just not whole blood.       Upon arrival to the ER, vitals were temp 98.9F, HR 80 and /80.  Labs showed Hemoglobin 6, Platelet 67, Creatinine 2.9 and INR 1.3.  digital rectal exam positive.  He was given 500cc bolus and was admitted to Hospital Medicine for further management.    "Admitted to hospital medicine for acute UGIB with hx of gastric varices and " "ulcers. Started on supportive tx IV octreotide, PPI, ceftriaxone and albumin. GI and hepatology consulted. S/p EGD 9/5 with LA Grade B esophagitis, Non-bleeding esophageal ulcer, Small (< 5 mm) esophageal varice, Portal hypertensive gastropathy. Type 1 isolated gastric varices (IGV1, varices located in the fundus), previously treated and without bleeding, Multiple duodenal polyps. Very friable gastric mucosa. GI rec slowly advancing diet, repeat EGD 8-12 weeks to assure healing of esophageal ulcer and biopsy duodenal polyps.     He remains on IV PPI and ceftriaxone at this time. Octreotide discontinued as no overt variceal bleed. Trending CBC with Pediatric blood draws in the meantime daily, and avoiding unnecessary blood draws. "    Palliative medicine consulted for goals of care and advanced care planning     Hospital Course:  No notes on file    No new subjective & objective note has been filed under this hospital service since the last note was generated.          CARIDAD Gilliam, APRN, ACNS-BC  Palliative Medicine  [facility]            "

## 2020-09-10 NOTE — PROGRESS NOTES
[facility] Hospital Medicine                                                                     Progress Note     Team: [facility] MED A Keyonna Padilla MD   Admit Date: 9/4/2020   Hospital Day: 6  KEILY: 9/12/2020   Code status: Full Code   Principal Problem: Acute blood loss anemia     SUMMARY:     Mr. Kenneth Sheikh is a 80 y.o. male with MCDONALD cirrhosis, complicated by gastric varices, hepatic encephalopathy, ascites requiring weekly paracenteses, and coagulopathy who presents to the ER from Hepatology clinic for evaluation of weakness and melena.  He mentions that he has been feeling weak and fatigued the last 2 days, and the day prior to admission he started having melena.  He denies the use of any blood thinners or Aspirin.  He is a Voodoo, and does not want to receive blood transfusions, but he is ok receiving blood stimulators or fractionated blood products - just not whole blood.  He endorses some chills, but no fevers, chest pain, or shortness of breath.  Of note, he just had a paracentesis on the day of admission that removed 4.2L.     Upon arrival to the ER, vitals were temp 98.9F, HR 80 and /80.  Labs showed Hemoglobin 6, Platelet 67, Creatinine 2.9 and INR 1.3.  ESTEFANÍA was positive.  He was given 500cc bolus and was admitted to Hospital Medicine for further management.    Admitted to hospital medicine for acute UGIB with hx of gastric varices and ulcers. Started on supportive tx IV octreotide, PPI, ceftriaxone and albumin. GI and hepatology consulted. S/p EGD 9/5 with LA Grade B esophagitis, Non-bleeding esophageal ulcer, Small (< 5 mm) esophageal varice, Portal hypertensive gastropathy. Type 1 isolated gastric varices (IGV1, varices located in the fundus), previously treated and without bleeding, Multiple duodenal polyps. Very friable gastric  mucosa. GI rec slowly advancing diet, repeat EGD 8-12 weeks to assure healing of esophageal ulcer and biopsy duodenal polyps.    Octreotide discontinued post EGD as no overt variceal bleed. He finished his IV PPI drip now transitioned to oral BID. Finished 5 day course of ceftriaxone.  Trending CBC with Pediatric blood draws in the meantime daily, and avoiding unnecessary blood draws. He is currently encephalopathy secondary to hepatic encephalopathy, increasing lactulose for goal BM 3-4. He will need paracentesis by rads, ordered.     Overall very poor prognosis considering his cirrhosis and GIB unable to transfuse. GOC underway. Patient now DNR. Discussing hospice with family.    SUBJECTIVE:     Pt was seen and examined at bedside. Concern raised for increased work of breathing and worsening confusion. Wife very conflicted about direction of care. We discussed options concerning focusing on comfort management. She is open to continuing lactulose. We will check CXR and possibly require diuresis. Wife aware that this may worsen his renal function.     ROS (Positive in Bold, otherwise negative)  Pain Scale: 0 /10   Constitutional: fever, chills, night sweats, weakness, fatigue  CV: chest pain, edema, palpitations  Resp: SOB, cough, sputum production  GI: changes in appetite, NVDC, pain, melena, hematochezia, GERD, hematemesis  : Dysuria, hematuria, urinary urgency, frequency  MSK: arthralgia/myalgia, joint swelling  SKIN: rashes, pruritis, petechiae   Neuro/Psych: FND, confusion, anxiety, depression      OBJECTIVE:     Vitals:  Temp:  [97 °F (36.1 °C)-98 °F (36.7 °C)]   Pulse:  [38-78]   Resp:  [13-18]   BP: (114-157)/(52-84)   SpO2:  [93 %-99 %]      I & O (Last 24H):     Intake/Output Summary (Last 24 hours) at 9/10/2020 1848  Last data filed at 9/10/2020 0813  Gross per 24 hour   Intake 300 ml   Output 1650 ml   Net -1350 ml         GEN: chronically ill appearing male  in no acute distress. Appears pale. Resting  in bed.  HEENT: NCAT. PERRL. EOMI. Conjunctivae/corneas clear, sclera Anicteric.  CVS: RRR. Normal s1 s2 no murmur, click, rub or gallop  LUNG: CTAB. Normal respiratory effort. No wheezes, rhonchi, or crackles.  ABD: Normoactive BS, soft, NT, mildly distended, no masses or organomegaly.  EXT: Trace LE edema. No cyanosis. Full ROM. Hepatic flaps improving   SKIN: pale and cold  NEURO: Alert, oriented x 2, decreased concentration, Spont mvt of all extremities with no focal deficits noted.      All recent labs and imaging has been reviewed.     Recent Results (from the past 24 hour(s))   CBC auto differential    Collection Time: 09/10/20  4:24 AM   Result Value Ref Range    WBC 8.37 3.90 - 12.70 K/uL    RBC 2.18 (L) 4.60 - 6.20 M/uL    Hemoglobin 6.5 (L) 14.0 - 18.0 g/dL    Hematocrit 22.1 (L) 40.0 - 54.0 %    Mean Corpuscular Volume 101 (H) 82 - 98 fL    Mean Corpuscular Hemoglobin 29.8 27.0 - 31.0 pg    Mean Corpuscular Hemoglobin Conc 29.4 (L) 32.0 - 36.0 g/dL    RDW 21.9 (H) 11.5 - 14.5 %    Platelets 61 (L) 150 - 350 K/uL    MPV 10.7 9.2 - 12.9 fL    Immature Granulocytes 1.1 (H) 0.0 - 0.5 %    Gran # (ANC) 6.6 1.8 - 7.7 K/uL    Immature Grans (Abs) 0.09 (H) 0.00 - 0.04 K/uL    Lymph # 0.8 (L) 1.0 - 4.8 K/uL    Mono # 0.8 0.3 - 1.0 K/uL    Eos # 0.0 0.0 - 0.5 K/uL    Baso # 0.01 0.00 - 0.20 K/uL    nRBC 1 (A) 0 /100 WBC    Gran% 79.1 (H) 38.0 - 73.0 %    Lymph% 10.0 (L) 18.0 - 48.0 %    Mono% 9.3 4.0 - 15.0 %    Eosinophil% 0.4 0.0 - 8.0 %    Basophil% 0.1 0.0 - 1.9 %    Differential Method Automated    Comprehensive metabolic panel    Collection Time: 09/10/20  4:24 AM   Result Value Ref Range    Sodium 142 136 - 145 mmol/L    Potassium 3.6 3.5 - 5.1 mmol/L    Chloride 108 95 - 110 mmol/L    CO2 22 (L) 23 - 29 mmol/L    Glucose 119 (H) 70 - 110 mg/dL    BUN, Bld 55 (H) 8 - 23 mg/dL    Creatinine 2.7 (H) 0.5 - 1.4 mg/dL    Calcium 8.4 (L) 8.7 - 10.5 mg/dL    Total Protein 5.9 (L) 6.0 - 8.4 g/dL    Albumin 3.1 (L)  3.5 - 5.2 g/dL    Total Bilirubin 0.6 0.1 - 1.0 mg/dL    Alkaline Phosphatase 88 55 - 135 U/L    AST 31 10 - 40 U/L    ALT 10 10 - 44 U/L    Anion Gap 12 8 - 16 mmol/L    eGFR if African American 24.3 (A) >60 mL/min/1.73 m^2    eGFR if non African American 21.0 (A) >60 mL/min/1.73 m^2   Protime-INR    Collection Time: 09/10/20  4:24 AM   Result Value Ref Range    Prothrombin Time 16.7 (H) 9.0 - 12.5 sec    INR 1.5 (H) 0.8 - 1.2   Magnesium    Collection Time: 09/10/20  4:24 AM   Result Value Ref Range    Magnesium 1.8 1.6 - 2.6 mg/dL   Phosphorus    Collection Time: 09/10/20  4:24 AM   Result Value Ref Range    Phosphorus 3.8 2.7 - 4.5 mg/dL       Recent Labs   Lab 09/05/20 2046 09/06/20 2012 09/07/20  0925 09/08/20  0743 09/08/20  1607 09/08/20 2033   POCTGLUCOSE 136* 195* 177* 163* 183* 140*       Hemoglobin A1C   Date Value Ref Range Status   05/15/2020 6.6 (H) 4.0 - 5.6 % Final     Comment:     ADA Screening Guidelines:  5.7-6.4%  Consistent with prediabetes  >or=6.5%  Consistent with diabetes  High levels of fetal hemoglobin interfere with the HbA1C  assay. Heterozygous hemoglobin variants (HbS, HgC, etc)do  not significantly interfere with this assay.   However, presence of multiple variants may affect accuracy.     01/28/2020 6.8 (H) 4.0 - 5.6 % Final     Comment:     ADA Screening Guidelines:  5.7-6.4%  Consistent with prediabetes  >or=6.5%  Consistent with diabetes  High levels of fetal hemoglobin interfere with the HbA1C  assay. Heterozygous hemoglobin variants (HbS, HgC, etc)do  not significantly interfere with this assay.   However, presence of multiple variants may affect accuracy.     06/14/2019 7.3 (H) 4.0 - 5.6 % Final     Comment:     ADA Screening Guidelines:  5.7-6.4%  Consistent with prediabetes  >or=6.5%  Consistent with diabetes  High levels of fetal hemoglobin interfere with the HbA1C  assay. Heterozygous hemoglobin variants (HbS, HgC, etc)do  not significantly interfere with this assay.    However, presence of multiple variants may affect accuracy.          Active Hospital Problems    Diagnosis  POA    *Acute blood loss anemia [D62]  Yes    Palliative care encounter [Z51.5]  Not Applicable    Pain [R52]  Unknown    Dyspnea [R06.00]  Unknown    Advanced care planning/counseling discussion [Z71.89]  Not Applicable    Advanced directive placed in chart this admission [Z78.9]  Unknown    Goals of care, counseling/discussion [Z71.89]  Not Applicable    Coagulopathy [D68.9]  Yes    Thrombocytopenia [D69.6]  Yes    Gastrointestinal hemorrhage with melena [K92.1]  Yes    Refusal of blood transfusions as patient is Anglican [Z53.1]  Not Applicable     He is ok receiving fractioned blood products and blood stimulators, just not whole blood.     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          ASSESSMENT AND PLAN:     GI Hemorrhage  Acute Blood Loss Anemia  Gastric Varices  - Hgb 6 on admit, baseline around 10 in May  - Start Protonix 40mg IV BID  - GI consulted, appreciate assistance - they are aware of the patient and the high risk for deterioration given Anglican  - PPI IV changed to drip as per GI, continue 72 hrs from EGD- Finished now on PO BID  - S/p EGD 9/5 with LA Grade B esophagitis, Non-bleeding esophageal ulcer, Small (< 5 mm) esophageal varice, Portal hypertensive gastropathy. Type 1 isolated gastric varices (IGV1, varices located in the fundus), previously treated and without bleeding, Multiple duodenal polyps. Very friable gastric mucosa.   - Started Ceftriaxone 1g IV q24h for SBP prophylaxis - total 5 days -  finished  - Start Octreotide gtt, stopped post EGD since no overt variceal bleed  - GI rec slowly advancing diet, repeat EGD 8-12 weeks to assure healing of esophageal ulcer and biopsy duodenal polyps, and discuss with AES regarding gastric varices   - EPO (MWF) and iron infusion (x5 days total)  - Hgb 10.6 (5/16/20) --> 6 (admit) --> 5.4 --> 5.2 --> 5.8 --> 5.2 --> 5.5 --> 6.2  - Bleed contributing to hepatic encephalopathy  - IR paracentesis completed on 9/9  - Trending CBC with Pediatric blood draws in the meantime daily, and avoiding unnecessary blood draws    Refusal of Blood Products as Buddhism  - Does not want to receive blood products, but he is ok receiving blood stimulators  - Lab draws daily with pediatric tubes  - Epo injection WMF and iron infusion     MCDONALD Cirrhosis  - MELD-Na score: 22 on admit  - MELD score: 20 on admit  - Hepatology consult, appreciate recs  - Held Lasix with anemia and softer BP  - Continue rifaximin   - Albumin 25% BID given as per hepatology now held with better pressures and no changed in Cr   - Restart lactulose for worsening hepatic encephalopathy   - Palliative consult as per hepatology recs  - Not a transplant candidate due to age  - BELLA not consistent with HRS, likely prerenal with ATN from GIB  - Poor prognosis overall, continue to discuss GOC with family     Hepatic Encephalopathy  - Ammonia 54 on admit, he was more lucid at the time  - Continue Rifaximin 550mg PO BID  - Start Zinc 220mg PO daily  - Mentation worsened 9/8, suspecting worsening hepatic encephalopathy clinically - increased lactulose, now 20mL TID for goal 3-4 BM     Acute Renal Failure on CKD Stage 3  ATN  - Creatinine 2.9, baseline around 1-2  - Albumin for resuscitation given ascites and edema  - urine studies not consistent with HRS, likely BELLA from GIB  - Dc IV albumin, BP better  - Can consider Nephrology eval if worsening, very gradually improving, ATN picture from prerenal BELLA and  bleed     Thrombocytopenia  Coagulopathy  - Chronic issue 2/2 liver disease  - Monitor for continued bleeding  - Unable to transfuse plt as per family's wishes     Bipolar 1 Disorder  - Chronic issue  - Continue Depakote 500mg PO qHS  - Note Depakote can increased ammonia lvls but family wants to continue this for bipolar disorder maintenance           Prophylaxis- SCDs  Code Status- DNR  Discharge plan and follow up - pending PT OT , pending stability , Hospice discussion [place]    Discharge Planning   KEILY: 9/12/2020     Code Status: DNR   Is the patient medically ready for discharge?:     Reason for patient still in hospital (select all that apply): Patient trending condition  Discharge Plan A: Inpatient Hospice([facility])      Keyonna Padilla MD

## 2020-09-10 NOTE — ASSESSMENT & PLAN NOTE
Reason for Consult: Goals of care and advanced care planning     Impression: Mr. Sheikh is an 79 yo gentleman admitted to internal medicine with upper GI bleed and hx of MCDONALD cirrhosis with esophageal varices.  He is encephalopathic and arouses easily with verbal stimuli,  oriented to person and place only. No change from previous assessment.  Received report Mr. Sheikh was experiencing agonal breathing.  At time of this assessment Mr. Sheikh's respiratory effort appears labored with accessory muscle use and not agonal.   Appears to be comfortable with no facial grimacing or moaning. He is sitting up in bed and is eating with assistance from his wife. He does not appear to be in any acute distress.       Mr. Sheikh has liver failure with no curative or transplant options.  Mr. Sheikh also has active GI bleed  And will not accept transfusion secondary to Rastafarian believes.  It is appropriate for patient and family to consider transition to home hospice or nursing home hospice.  Currently Mr. Sheikh does not appear to meet criteria for inpatient hospice.     GI Hemorhage/ACute Blood Loss Anemia/ MCDONALD Cirrhosis/Hepatic Encephalopathy and other medical problems   - plan per primary team and other specialty consultants     Symptom Management    Pain:   - no c/o pain and no behavioral signs - facial grimacing or moaning   - Continue current medical management - prn hydrocodone and tylenol  - 24 hr OME zero     Bowel Management  - continue current medical management  -lactulose three times daily     Dyspnea   - patient is able to talk and eat.   - respiratory effort appears mildly labored with use of accessory muscles  -  room air saturation 93% on room air   - lungs sounds wet, chest xray ordered per primary team      Advance Care Planning   Goals of Care      - Advanced care planning documents - Jehoviah's Witness Durable Power of  has been received.  - Presently Mr. Sheikh is unable to make medical decisions.  - Legal next of kin  is wife Ginna Sheikh [phone] and son Kenneth Sheikh, [phone]   -  Resuscitation status - full code per primary team   - CPR and Intubation discussed including the risks, benefits and survivability of CPR .    Also discussed peaceful and natural death.   - DNR orders written per primary team.    - LaPOST discussed with wife.  Wife has declined LaPOST at this time.  States if the patient's heart stops beating at home she will call 911.        Goals of Care:   - Palliative medicine APRN returned to bedside for goals of care clarification.    - As per chart review it appears family has made decisions for hospice.  On further discussion wife has expressed interest in inpatient hospice and her daughter  has contacted [facility]. A consult has been sent per Fartun primary team .    -  Hospice education (frequently asked questions) provided and emphasized levels of hospice.  Explained the patient does not appear to meet criteria for inpatient hospice. Mrs. Sheikh appeared to be in  disbelief. Her goals do not include stopping lactulose and other medical management   - Recommended she keep appointment with hospice representative   - As per conversation with primary team attending patient does not meet inpatient hospice criteria.    - Also discussed home hospice and nursing home with hospice as alternatives. Mrs. Sheikh states being opposed to nursing home with hospice and states she does not have family support for home hospice.   - [facility] will continue to follow, if clinical condition worsens will most likely transition to inpatient hospice.     Plan/Recommendations   - Patient does not appear to meet inpatient hospice criteria at this time.  If clinical status changes and [facility] .   - Patient and family would benefit from continued information and education regarding clinical status.  -Continue current plan of care          Primary team attending and  aware of the above goals of care and recommendations.  Thank you for consult and opportunity to participate in patient's care       .

## 2020-09-10 NOTE — PLAN OF CARE
Problem: Fall Injury Risk  Goal: Absence of Fall and Fall-Related Injury  Outcome: Ongoing, Progressing     Problem: Adult Inpatient Plan of Care  Goal: Plan of Care Review  Outcome: Ongoing, Progressing     Problem: Oral Intake Inadequate (Acute Kidney Injury/Impairment)  Goal: Optimal Nutrition Intake  Outcome: Ongoing, Progressing   No acute events throughout shift. Sitter at bedside,call light in reach,,pt arms elevated ,right arm weeping more.  Vitals and assessment completed as ordered. Pt calm and cooperative. No complains of pain. Pt free from injury or falls

## 2020-09-10 NOTE — PT/OT/SLP PROGRESS
Physical Therapy      Patient Name:  Kenneth Sehikh   MRN:  [id number]    Patient not seen today. Per RN, pt not appropriate for therapy this date. PT to follow up tomorrow as schedule permits.     MC SOLER, PT

## 2020-09-11 LAB
ALBUMIN SERPL BCP-MCNC: 2.6 G/DL (ref 3.5–5.2)
ALP SERPL-CCNC: 82 U/L (ref 55–135)
ALT SERPL W/O P-5'-P-CCNC: 9 U/L (ref 10–44)
ANION GAP SERPL CALC-SCNC: 8 MMOL/L (ref 8–16)
ANISOCYTOSIS BLD QL SMEAR: ABNORMAL
AST SERPL-CCNC: 20 U/L (ref 10–40)
BASO STIPL BLD QL SMEAR: ABNORMAL
BASOPHILS # BLD AUTO: 0.01 K/UL (ref 0–0.2)
BASOPHILS NFR BLD: 0.2 % (ref 0–1.9)
BILIRUB SERPL-MCNC: 0.7 MG/DL (ref 0.1–1)
BUN SERPL-MCNC: 52 MG/DL (ref 8–23)
CALCIUM SERPL-MCNC: 8.3 MG/DL (ref 8.7–10.5)
CHLORIDE SERPL-SCNC: 108 MMOL/L (ref 95–110)
CO2 SERPL-SCNC: 27 MMOL/L (ref 23–29)
CREAT SERPL-MCNC: 2.5 MG/DL (ref 0.5–1.4)
DIFFERENTIAL METHOD: ABNORMAL
EOSINOPHIL # BLD AUTO: 0 K/UL (ref 0–0.5)
EOSINOPHIL NFR BLD: 0.7 % (ref 0–8)
ERYTHROCYTE [DISTWIDTH] IN BLOOD BY AUTOMATED COUNT: 22.7 % (ref 11.5–14.5)
EST. GFR  (AFRICAN AMERICAN): 26.6 ML/MIN/1.73 M^2
EST. GFR  (NON AFRICAN AMERICAN): 23 ML/MIN/1.73 M^2
GLUCOSE SERPL-MCNC: 154 MG/DL (ref 70–110)
HCT VFR BLD AUTO: 21 % (ref 40–54)
HGB BLD-MCNC: 6.2 G/DL (ref 14–18)
HYPOCHROMIA BLD QL SMEAR: ABNORMAL
IMM GRANULOCYTES # BLD AUTO: 0.06 K/UL (ref 0–0.04)
IMM GRANULOCYTES NFR BLD AUTO: 1.1 % (ref 0–0.5)
INR PPP: 1.6 (ref 0.8–1.2)
LYMPHOCYTES # BLD AUTO: 1.1 K/UL (ref 1–4.8)
LYMPHOCYTES NFR BLD: 19 % (ref 18–48)
MAGNESIUM SERPL-MCNC: 1.7 MG/DL (ref 1.6–2.6)
MCH RBC QN AUTO: 30.5 PG (ref 27–31)
MCHC RBC AUTO-ENTMCNC: 29.5 G/DL (ref 32–36)
MCV RBC AUTO: 103 FL (ref 82–98)
MONOCYTES # BLD AUTO: 0.6 K/UL (ref 0.3–1)
MONOCYTES NFR BLD: 11 % (ref 4–15)
NEUTROPHILS # BLD AUTO: 3.8 K/UL (ref 1.8–7.7)
NEUTROPHILS NFR BLD: 68 % (ref 38–73)
NRBC BLD-RTO: 0 /100 WBC
PHOSPHATE SERPL-MCNC: 3.6 MG/DL (ref 2.7–4.5)
PLATELET # BLD AUTO: 61 K/UL (ref 150–350)
PLATELET BLD QL SMEAR: ABNORMAL
PMV BLD AUTO: 10.6 FL (ref 9.2–12.9)
POLYCHROMASIA BLD QL SMEAR: ABNORMAL
POTASSIUM SERPL-SCNC: 3.4 MMOL/L (ref 3.5–5.1)
PROT SERPL-MCNC: 5.4 G/DL (ref 6–8.4)
PROTHROMBIN TIME: 17.5 SEC (ref 9–12.5)
RBC # BLD AUTO: 2.03 M/UL (ref 4.6–6.2)
SODIUM SERPL-SCNC: 143 MMOL/L (ref 136–145)
WBC # BLD AUTO: 5.54 K/UL (ref 3.9–12.7)

## 2020-09-11 PROCEDURE — 99233 PR SUBSEQUENT HOSPITAL CARE,LEVL III: ICD-10-PCS | Mod: ,,, | Performed by: CLINICAL NURSE SPECIALIST

## 2020-09-11 PROCEDURE — 27000221 HC OXYGEN, UP TO 24 HOURS

## 2020-09-11 PROCEDURE — 11000001 HC ACUTE MED/SURG PRIVATE ROOM

## 2020-09-11 PROCEDURE — 94761 N-INVAS EAR/PLS OXIMETRY MLT: CPT

## 2020-09-11 PROCEDURE — 63600175 PHARM REV CODE 636 W HCPCS: Performed by: HOSPITALIST

## 2020-09-11 PROCEDURE — 25000242 PHARM REV CODE 250 ALT 637 W/ HCPCS: Performed by: HOSPITALIST

## 2020-09-11 PROCEDURE — 99232 PR SUBSEQUENT HOSPITAL CARE,LEVL II: ICD-10-PCS | Mod: ,,, | Performed by: HOSPITALIST

## 2020-09-11 PROCEDURE — 85610 PROTHROMBIN TIME: CPT

## 2020-09-11 PROCEDURE — 97530 THERAPEUTIC ACTIVITIES: CPT | Mod: CQ

## 2020-09-11 PROCEDURE — 83735 ASSAY OF MAGNESIUM: CPT

## 2020-09-11 PROCEDURE — 25000003 PHARM REV CODE 250: Performed by: INTERNAL MEDICINE

## 2020-09-11 PROCEDURE — 85025 COMPLETE CBC W/AUTO DIFF WBC: CPT

## 2020-09-11 PROCEDURE — 80053 COMPREHEN METABOLIC PANEL: CPT

## 2020-09-11 PROCEDURE — 99233 SBSQ HOSP IP/OBS HIGH 50: CPT | Mod: ,,, | Performed by: CLINICAL NURSE SPECIALIST

## 2020-09-11 PROCEDURE — 94640 AIRWAY INHALATION TREATMENT: CPT

## 2020-09-11 PROCEDURE — 36415 COLL VENOUS BLD VENIPUNCTURE: CPT

## 2020-09-11 PROCEDURE — 97535 SELF CARE MNGMENT TRAINING: CPT | Mod: CQ

## 2020-09-11 PROCEDURE — 84100 ASSAY OF PHOSPHORUS: CPT

## 2020-09-11 PROCEDURE — 99232 SBSQ HOSP IP/OBS MODERATE 35: CPT | Mod: ,,, | Performed by: HOSPITALIST

## 2020-09-11 PROCEDURE — 25000003 PHARM REV CODE 250: Performed by: HOSPITALIST

## 2020-09-11 PROCEDURE — 97110 THERAPEUTIC EXERCISES: CPT | Mod: CQ

## 2020-09-11 RX ORDER — ONDANSETRON 2 MG/ML
4 INJECTION INTRAMUSCULAR; INTRAVENOUS DAILY PRN
Start: 2020-09-11

## 2020-09-11 RX ORDER — ATROPINE SULFATE 10 MG/ML
2 SOLUTION/ DROPS OPHTHALMIC EVERY 4 HOURS PRN
Start: 2020-09-11

## 2020-09-11 RX ORDER — HALOPERIDOL 5 MG/ML
1 INJECTION INTRAMUSCULAR EVERY 4 HOURS PRN
Start: 2020-09-11 | End: 2021-09-11

## 2020-09-11 RX ORDER — FUROSEMIDE 10 MG/ML
40 INJECTION INTRAMUSCULAR; INTRAVENOUS ONCE
Status: COMPLETED | OUTPATIENT
Start: 2020-09-11 | End: 2020-09-11

## 2020-09-11 RX ORDER — PANTOPRAZOLE SODIUM 40 MG/1
40 TABLET, DELAYED RELEASE ORAL
Qty: 60 TABLET | Refills: 11
Start: 2020-09-11 | End: 2021-09-11

## 2020-09-11 RX ADMIN — RIFAXIMIN 550 MG: 550 TABLET ORAL at 09:09

## 2020-09-11 RX ADMIN — DIVALPROEX SODIUM 500 MG: 250 TABLET, DELAYED RELEASE ORAL at 09:09

## 2020-09-11 RX ADMIN — LEVALBUTEROL HYDROCHLORIDE 0.63 MG: 0.63 SOLUTION RESPIRATORY (INHALATION) at 07:09

## 2020-09-11 RX ADMIN — ZINC SULFATE 220 MG (50 MG) CAPSULE 220 MG: CAPSULE at 09:09

## 2020-09-11 RX ADMIN — LACTULOSE 20 G: 20 SOLUTION ORAL at 08:09

## 2020-09-11 RX ADMIN — LACTULOSE 20 G: 20 SOLUTION ORAL at 09:09

## 2020-09-11 RX ADMIN — EPOETIN ALFA-EPBX 7080 UNITS: 4000 INJECTION, SOLUTION INTRAVENOUS; SUBCUTANEOUS at 09:09

## 2020-09-11 RX ADMIN — RIFAXIMIN 550 MG: 550 TABLET ORAL at 08:09

## 2020-09-11 RX ADMIN — PANTOPRAZOLE SODIUM 40 MG: 40 TABLET, DELAYED RELEASE ORAL at 05:09

## 2020-09-11 RX ADMIN — LEVALBUTEROL HYDROCHLORIDE 0.63 MG: 0.63 SOLUTION RESPIRATORY (INHALATION) at 02:09

## 2020-09-11 RX ADMIN — FUROSEMIDE 40 MG: 10 INJECTION, SOLUTION INTRAMUSCULAR; INTRAVENOUS at 05:09

## 2020-09-11 RX ADMIN — LACTULOSE 20 G: 20 SOLUTION ORAL at 04:09

## 2020-09-11 NOTE — PLAN OF CARE
SW faxed Inpatient Hospice Orders to [facility] via  for review. [facility] will continue to follow.     Fartun Murphy LMSW   - [facility]  Ext. [phone]

## 2020-09-11 NOTE — PT/OT/SLP PROGRESS
"Physical Therapy Treatment    Patient Name:  Kenneth Sheikh   MRN:  [id number]    Recommendations:     Discharge Recommendations:  home with home health   Discharge Equipment Recommendations: none   Barriers to discharge: Decreased caregiver support    Assessment:     Kenneth Sheikh is a 80 y.o. male admitted with a medical diagnosis of Acute blood loss anemia.  He presents with the following impairments/functional limitations:  weakness, impaired endurance, impaired self care skills, impaired functional mobilty, gait instability, impaired balance, decreased coordination Patient tolerated treatment well focusing on bed mobility, transfers, therex, sitting balance/tolerance and standing balance/tolerance. Patient will continue to improve with skilled physical therapy services in order to return to functional baseline.    Rehab Prognosis: Good and Fair; patient would benefit from acute skilled PT services to address these deficits and reach maximum level of function.    Recent Surgery: Procedure(s) (LRB):  EGD (ESOPHAGOGASTRODUODENOSCOPY) (N/A) 6 Days Post-Op    Plan:     During this hospitalization, patient to be seen 3 x/week to address the identified rehab impairments via gait training, therapeutic activities, therapeutic exercises, neuromuscular re-education and progress toward the following goals:    · Plan of Care Expires:  10/05/20    Subjective     Chief Complaint: none noted  Patient/Family Comments/goals: "I feel good sitting up"  Pain/Comfort:  · Pain Rating 1: 0/10  · Pain Rating Post-Intervention 1: 0/10      Objective:     Communicated with nursing prior to session.  Patient found HOB elevated with telemetry, Condom Catheter(avasys) upon PT entry to room.     General Precautions: Standard, fall   Orthopedic Precautions:N/A   Braces:       Functional Mobility:  · Bed Mobility:     · Rolling Right: minimum assistance  · Scooting: maximal assistance  · Supine to Sit: minimum assistance  · Sit to Supine: minimum " assistance  · Transfers:     · Sit to Stand:  minimum assistance with hand-held assist  · Balance: seated EOB SBA ~20 minutes, static stand 4 trials 10s x 1, 15s x 2, 25s x 1, dynamic standing Mod HHA with weightshift L/R, standing marches 1 x 2 L/R Mod HHA for stability, pt also completed ADLs at EOB with SBA for balance      AM-PAC 6 CLICK MOBILITY  Turning over in bed (including adjusting bedclothes, sheets and blankets)?: 3  Sitting down on and standing up from a chair with arms (e.g., wheelchair, bedside commode, etc.): 3  Moving from lying on back to sitting on the side of the bed?: 3  Moving to and from a bed to a chair (including a wheelchair)?: 3  Need to walk in hospital room?: 3  Climbing 3-5 steps with a railing?: 3  Basic Mobility Total Score: 18       Therapeutic Activities and Exercises:  ADLs at EOB Min A from spouse  Supine 1 x 15 AP AROM, 1 x 10 SLR AAROM  Seated EOB 1 x 5 LAQ with decreased ROM due to weakness, 1 x 5 shoulder rotation to facilitate increased upright posture while seated, improved posture noted following exercise until end of treatment with increased fatigue pt returned to supine  Patient educated on importance of increased time edge of bed, SAMMY and HOB elevated as tolerated throughout the day  discussed/educated on home environment safety, DME needs and HHPT pt verbalized understanding all questions answered within PTA scope of practice and all other questions directed to appropriate persons, pt's spouse verbalized understanding      Patient left HOB elevated with call button in reach, nursing notified and spouse present..    GOALS:   Multidisciplinary Problems     Physical Therapy Goals        Problem: Physical Therapy Goal    Goal Priority Disciplines Outcome Goal Variances Interventions   Physical Therapy Goal     PT, PT/OT Ongoing, Progressing     Description: Goals to be met by: 2020     Patient will increase functional independence with mobility by performin.  Sit to stand transfer with Modified Ten Sleep  2. Bed to chair transfer with Modified Ten Sleep using no AD or LRAD if needed.  3. Gait  x 150 feet with Modified Ten Sleep using no AD or LRAD if needed.  4. Sitting at edge of bed x10 minutes with Modified Ten Sleep  5. Lower extremity exercise program x30 reps per handout, with independence                     Time Tracking:     PT Received On: 09/11/20  PT Start Time: 0937     PT Stop Time: 1030  PT Total Time (min): 53 min     Billable Minutes: Therapeutic Activity 23 and Therapeutic Exercise 15 Self Care/Home Management Training 15    Treatment Type: Treatment  PT/PTA: PTA     PTA Visit Number: 1     Millie Pérez, PTA  09/11/2020

## 2020-09-11 NOTE — PROGRESS NOTES
[facility] Hospital Medicine                                                                     Progress Note     Team: [facility] HOSP MED A Keyonna Padilla MD   Admit Date: 9/4/2020   Hospital Day: 7  KEILY: 9/14/2020   Code status: Full Code   Principal Problem: Acute blood loss anemia     SUMMARY:     Mr. Kenneth Sheikh is a 80 y.o. male with MCDONALD cirrhosis, complicated by gastric varices, hepatic encephalopathy, ascites requiring weekly paracenteses, and coagulopathy who presents to the ER from Hepatology clinic for evaluation of weakness and melena.  He mentions that he has been feeling weak and fatigued the last 2 days, and the day prior to admission he started having melena.  He denies the use of any blood thinners or Aspirin.  He is a Amish, and does not want to receive blood transfusions, but he is ok receiving blood stimulators or fractionated blood products - just not whole blood.  He endorses some chills, but no fevers, chest pain, or shortness of breath.  Of note, he just had a paracentesis on the day of admission that removed 4.2L.     Upon arrival to the ER, vitals were temp 98.9F, HR 80 and /80.  Labs showed Hemoglobin 6, Platelet 67, Creatinine 2.9 and INR 1.3.  ESTEFANÍA was positive.  He was given 500cc bolus and was admitted to Hospital Medicine for further management.    Admitted to hospital medicine for acute UGIB with hx of gastric varices and ulcers. Started on supportive tx IV octreotide, PPI, ceftriaxone and albumin. GI and hepatology consulted. S/p EGD 9/5 with LA Grade B esophagitis, Non-bleeding esophageal ulcer, Small (< 5 mm) esophageal varice, Portal hypertensive gastropathy. Type 1 isolated gastric varices (IGV1, varices located in the fundus), previously treated and without bleeding, Multiple duodenal polyps. Very friable gastric  mucosa. GI rec slowly advancing diet, repeat EGD 8-12 weeks to assure healing of esophageal ulcer and biopsy duodenal polyps.    Octreotide discontinued post EGD as no overt variceal bleed. He finished his IV PPI drip now transitioned to oral BID. Finished 5 day course of ceftriaxone.  Trending CBC with Pediatric blood draws in the meantime daily, and avoiding unnecessary blood draws. He is currently encephalopathy secondary to hepatic encephalopathy, increasing lactulose for goal BM 3-4. He will need paracentesis by rads, ordered.     Overall very poor prognosis considering his cirrhosis and GIB unable to transfuse. GOC underway. Patient now DNR. Discussing hospice with family.    SUBJECTIVE:     Pt was seen and examined at bedside. Concern raised for increased work of breathing and worsening confusion. Wife very conflicted about direction of care. We discussed options concerning focusing on comfort management. She is open to continuing lactulose. One additional dose of lasix today for respiratory support.     ROS (Positive in Bold, otherwise negative)  Pain Scale: 0 /10   Constitutional: fever, chills, night sweats, weakness, fatigue  CV: chest pain, edema, palpitations  Resp: SOB, cough, sputum production  GI: changes in appetite, NVDC, pain, melena, hematochezia, GERD, hematemesis  : Dysuria, hematuria, urinary urgency, frequency  MSK: arthralgia/myalgia, joint swelling  SKIN: rashes, pruritis, petechiae   Neuro/Psych: FND, confusion, anxiety, depression      OBJECTIVE:     Vitals:  Temp:  [97.3 °F (36.3 °C)-98.3 °F (36.8 °C)]   Pulse:  []   Resp:  [13-23]   BP: (102-136)/(55-88)   SpO2:  [95 %-99 %]      I & O (Last 24H):     Intake/Output Summary (Last 24 hours) at 9/11/2020 1849  Last data filed at 9/11/2020 1200  Gross per 24 hour   Intake 240 ml   Output 1000 ml   Net -760 ml         GEN: chronically ill appearing male  in no acute distress. Appears pale. Resting in bed.  HEENT: NCAT. PERRL. EOMI.  Conjunctivae/corneas clear, sclera Anicteric.  CVS: RRR. Normal s1 s2 no murmur, click, rub or gallop  LUNG: CTAB. Normal respiratory effort. No wheezes, rhonchi, or crackles.  ABD: Normoactive BS, soft, NT, mildly distended, no masses or organomegaly.  EXT: Trace LE edema. No cyanosis. Full ROM. Hepatic flaps improving   SKIN: pale and cold  NEURO: Alert, oriented x 2, decreased concentration, Spont mvt of all extremities with no focal deficits noted.      All recent labs and imaging has been reviewed.     Recent Results (from the past 24 hour(s))   CBC auto differential    Collection Time: 09/11/20  5:36 AM   Result Value Ref Range    WBC 5.54 3.90 - 12.70 K/uL    RBC 2.03 (L) 4.60 - 6.20 M/uL    Hemoglobin 6.2 (L) 14.0 - 18.0 g/dL    Hematocrit 21.0 (L) 40.0 - 54.0 %    Mean Corpuscular Volume 103 (H) 82 - 98 fL    Mean Corpuscular Hemoglobin 30.5 27.0 - 31.0 pg    Mean Corpuscular Hemoglobin Conc 29.5 (L) 32.0 - 36.0 g/dL    RDW 22.7 (H) 11.5 - 14.5 %    Platelets 61 (L) 150 - 350 K/uL    MPV 10.6 9.2 - 12.9 fL    Immature Granulocytes 1.1 (H) 0.0 - 0.5 %    Gran # (ANC) 3.8 1.8 - 7.7 K/uL    Immature Grans (Abs) 0.06 (H) 0.00 - 0.04 K/uL    Lymph # 1.1 1.0 - 4.8 K/uL    Mono # 0.6 0.3 - 1.0 K/uL    Eos # 0.0 0.0 - 0.5 K/uL    Baso # 0.01 0.00 - 0.20 K/uL    nRBC 0 0 /100 WBC    Gran% 68.0 38.0 - 73.0 %    Lymph% 19.0 18.0 - 48.0 %    Mono% 11.0 4.0 - 15.0 %    Eosinophil% 0.7 0.0 - 8.0 %    Basophil% 0.2 0.0 - 1.9 %    Platelet Estimate Decreased (A)     Aniso Moderate     Poly Occasional     Hypo Occasional     Basophilic Stippling Occasional     Differential Method Automated    Comprehensive metabolic panel    Collection Time: 09/11/20  5:36 AM   Result Value Ref Range    Sodium 143 136 - 145 mmol/L    Potassium 3.4 (L) 3.5 - 5.1 mmol/L    Chloride 108 95 - 110 mmol/L    CO2 27 23 - 29 mmol/L    Glucose 154 (H) 70 - 110 mg/dL    BUN, Bld 52 (H) 8 - 23 mg/dL    Creatinine 2.5 (H) 0.5 - 1.4 mg/dL    Calcium 8.3  (L) 8.7 - 10.5 mg/dL    Total Protein 5.4 (L) 6.0 - 8.4 g/dL    Albumin 2.6 (L) 3.5 - 5.2 g/dL    Total Bilirubin 0.7 0.1 - 1.0 mg/dL    Alkaline Phosphatase 82 55 - 135 U/L    AST 20 10 - 40 U/L    ALT 9 (L) 10 - 44 U/L    Anion Gap 8 8 - 16 mmol/L    eGFR if African American 26.6 (A) >60 mL/min/1.73 m^2    eGFR if non  23.0 (A) >60 mL/min/1.73 m^2   Protime-INR    Collection Time: 09/11/20  5:36 AM   Result Value Ref Range    Prothrombin Time 17.5 (H) 9.0 - 12.5 sec    INR 1.6 (H) 0.8 - 1.2   Magnesium    Collection Time: 09/11/20  5:36 AM   Result Value Ref Range    Magnesium 1.7 1.6 - 2.6 mg/dL   Phosphorus    Collection Time: 09/11/20  5:36 AM   Result Value Ref Range    Phosphorus 3.6 2.7 - 4.5 mg/dL       Recent Labs   Lab 09/05/20  2046 09/06/20  2012 09/07/20  0925 09/08/20  0743 09/08/20  1607 09/08/20  2033   POCTGLUCOSE 136* 195* 177* 163* 183* 140*       Hemoglobin A1C   Date Value Ref Range Status   05/15/2020 6.6 (H) 4.0 - 5.6 % Final     Comment:     ADA Screening Guidelines:  5.7-6.4%  Consistent with prediabetes  >or=6.5%  Consistent with diabetes  High levels of fetal hemoglobin interfere with the HbA1C  assay. Heterozygous hemoglobin variants (HbS, HgC, etc)do  not significantly interfere with this assay.   However, presence of multiple variants may affect accuracy.     01/28/2020 6.8 (H) 4.0 - 5.6 % Final     Comment:     ADA Screening Guidelines:  5.7-6.4%  Consistent with prediabetes  >or=6.5%  Consistent with diabetes  High levels of fetal hemoglobin interfere with the HbA1C  assay. Heterozygous hemoglobin variants (HbS, HgC, etc)do  not significantly interfere with this assay.   However, presence of multiple variants may affect accuracy.     06/14/2019 7.3 (H) 4.0 - 5.6 % Final     Comment:     ADA Screening Guidelines:  5.7-6.4%  Consistent with prediabetes  >or=6.5%  Consistent with diabetes  High levels of fetal hemoglobin interfere with the HbA1C  assay. Heterozygous  hemoglobin variants (HbS, HgC, etc)do  not significantly interfere with this assay.   However, presence of multiple variants may affect accuracy.          Active Hospital Problems    Diagnosis  POA    *Acute blood loss anemia [D62]  Yes    Palliative care encounter [Z51.5]  Not Applicable    Pain [R52]  Unknown    Dyspnea [R06.00]  Unknown    Advanced care planning/counseling discussion [Z71.89]  Not Applicable    Advanced directive placed in chart this admission [Z78.9]  Unknown    Goals of care, counseling/discussion [Z71.89]  Not Applicable    Coagulopathy [D68.9]  Yes    Thrombocytopenia [D69.6]  Yes    Gastrointestinal hemorrhage with melena [K92.1]  Yes    Refusal of blood transfusions as patient is Sabianism [Z53.1]  Not Applicable     He is ok receiving fractioned blood products and blood stimulators, just not whole blood.     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          ASSESSMENT AND PLAN:     GI Hemorrhage  Acute Blood Loss Anemia  Gastric Varices  - Hgb 6 on admit, baseline around 10 in May  - Start Protonix 40mg IV BID  - GI consulted, appreciate assistance - they are aware of the patient and the high risk for deterioration given Sabianism  - PPI IV changed to drip as per GI, continue 72 hrs from EGD- Finished now on PO BID  - S/p EGD 9/5 with LA Grade B esophagitis, Non-bleeding esophageal ulcer, Small (< 5 mm) esophageal varice, Portal hypertensive gastropathy. Type 1 isolated gastric varices (IGV1, varices located in the fundus), previously treated and without bleeding, Multiple duodenal polyps. Very friable  gastric mucosa.   - Started Ceftriaxone 1g IV q24h for SBP prophylaxis - total 5 days - finished  - Start Octreotide gtt, stopped post EGD since no overt variceal bleed  - GI rec slowly advancing diet, repeat EGD 8-12 weeks to assure healing of esophageal ulcer and biopsy duodenal polyps, and discuss with AES regarding gastric varices   - EPO (MWF) and iron infusion (x5 days total)  - Hgb 10.6 (5/16/20) --> 6 (admit) --> 5.4 --> 5.2 --> 5.8 --> 5.2 --> 5.5 --> 6.2  - Bleed contributing to hepatic encephalopathy  - IR paracentesis completed on 9/9  - Trending CBC with Pediatric blood draws in the meantime daily, and avoiding unnecessary blood draws    Refusal of Blood Products as Mu-ism  - Does not want to receive blood products, but he is ok receiving blood stimulators  - Lab draws daily with pediatric tubes  - Epo injection WMF and iron infusion     MCDONALD Cirrhosis  - MELD-Na score: 22 on admit  - MELD score: 20 on admit  - Hepatology consult, appreciate recs  - Continue rifaximin   - Albumin 25% BID previously given as per hepatology now held with better pressures and no changed in Cr   - Restarted lactulose for worsening hepatic encephalopathy but no current stool output  - Palliative consult as per hepatology recs  - Not a transplant candidate due to age  - BELLA not consistent with HRS, likely prerenal with ATN from GIB  - Poor prognosis overall, continue to discuss GOC with family     Hepatic Encephalopathy  - Ammonia 54 on admit, he was more lucid at the time  - Continue Rifaximin 550mg PO BID  - Start Zinc 220mg PO daily  - Mentation worsened 9/8, suspecting worsening hepatic encephalopathy clinically - increased lactulose, now 20mL TID for goal 3-4 BM     Acute Renal Failure on CKD Stage 3  ATN  - Creatinine 2.9, baseline around 1-2  - Albumin for resuscitation given ascites and edema  - urine studies not consistent with HRS, likely BELLA from GIB  -  Thrombocytopenia  Coagulopathy  - Chronic issue  2/2 liver disease  - Monitor for continued bleeding  - Unable to transfuse plt as per family's wishes     Bipolar 1 Disorder  - Chronic issue  - Continue Depakote 500mg PO qHS  - Note Depakote can increased ammonia lvls but family wants to continue this for bipolar disorder maintenance      Palliative care- Emphasized the importance of palliative care in current situation. We discussed limitations to inpatient hospice including that hospice does not do items such as dialysis, procedures, frequent labs or physical therapy.  Current plan- placement of pleurex peritoneal catheter most likely Monday then hospice placement.      Prophylaxis- SCDs  Code Status- DNR  Discharge plan and follow up - pending PT OT , pending stability , Hospice discussion up[place]    Discharge Planning   KEILY: 9/14/2020     Code Status: DNR   Is the patient medically ready for discharge?:     Reason for patient still in hospital (select all that apply): Patient trending condition  Discharge Plan A: Inpatient Hospice([facility])      Keyonna Padilla MD

## 2020-09-11 NOTE — PLAN OF CARE
[facility]     Department of Hospital Medicine     [address] [place] [address]     [phone] after hours  [phone] fax                                   HOSPICE  ORDERS     Patient Name: Kenneth Sheikh  YOB: [birth year]    Admit to Hospice:    Inpatient Service    Diagnoses:  Active Hospital Problems    Diagnosis  POA    *Acute blood loss anemia [D62]  Yes    Palliative care encounter [Z51.5]  Not Applicable    Pain [R52]  Unknown    Dyspnea [R06.00]  Unknown    Advanced care planning/counseling discussion [Z71.89]  Not Applicable    Advanced directive placed in chart this admission [Z78.9]  Unknown    Goals of care, counseling/discussion [Z71.89]  Not Applicable    Coagulopathy [D68.9]  Yes    Thrombocytopenia [D69.6]  Yes    Gastrointestinal hemorrhage with melena [K92.1]  Yes    Refusal of blood transfusions as patient is Anglican [Z53.1]  Not Applicable     He is ok receiving fractioned blood products and blood stimulators, just not whole blood.     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       Hospice Qualifying Diagnoses: Liver cirrhosis       Patient has a life expectancy < 6 months due to these conditions.    Vital Signs: Routine per Hospice Protocol.    Allergies:  Review of patient's allergies indicates:   Allergen Reactions    Abilify [aripiprazole] Other (See Comments)     Sleepy, could not function.       Diet: Diet Dysphagia Mechanical Soft with thin liquids    Activities: activity as tolerated    Nursing: Per Hospice Routine    Future  Orders:  Hospice Medical Director may dictate new orders for comfortable care measures & sign death certificate.    Medications:            Kenneth Sheikh   Home Medication Instructions LUZ MARIA:[id number]    Printed on:09/11/20 0918   Medication Information                      atropine 1% (ISOPTO ATROPINE) 1 % Drop  Place 2 drops under the tongue every 4 (four) hours as needed (excessive secretions).             divalproex (DEPAKOTE) 250 MG EC tablet  Take 2 tablets (500 mg total) by mouth every evening.             furosemide (LASIX) 20 MG tablet  TAKE 2 TABLETS BY MOUTH EVERY DAY             haloperidol lactate (HALDOL) 5 mg/mL injection  Inject 0.2 mLs (1 mg total) into the vein every 4 (four) hours as needed (agitation/delirium).             lactulose (CHRONULAC) 10 gram/15 mL solution  TAKE 15 MLS BY MOUTH TWICE DAILY Hold for bowel movements > 2 per 24 hours             LIPASE-PROTEASE-AMYLASE 5,000-17,000 -24,000 UNITS (ZENPEP) 5,000-17,000- 24,000 unit CpDR  Take 1 capsule by mouth 3 (three) times daily with meals.             ondansetron 4 mg/2 mL Soln  Inject 4 mg into the vein daily as needed PRN nausea/emesis             pantoprazole (PROTONIX) 40 MG tablet  Take 1 tablet (40 mg total) by mouth 2 (two) times daily before meals.             Morphine 2mg IV Q 4 hours PRN dyspnea or pain           XIFAXAN 550 mg Tab  Take 1 tablet (550 mg total) by mouth 2 (two) times daily.                     _________________________________  Keyonna Padilla MD  09/11/2020

## 2020-09-11 NOTE — PT/OT/SLP PROGRESS
Occupational Therapy      Patient Name:  Kenneth Sheikh   MRN:  [id number]    Patient not seen today secondary to due to pt with orders/notes written in Epic stating pt to discharge on this date to in hospice.  If pt does not discharge today will follow-up 9/14/2020 if appropriate.    ZACKARY Yeh  9/11/2020

## 2020-09-11 NOTE — PLAN OF CARE
Problem: Fall Injury Risk  Goal: Absence of Fall and Fall-Related Injury  Outcome: Ongoing, Progressing     Problem: Adult Inpatient Plan of Care  Goal: Plan of Care Review  Outcome: Ongoing, Progressing     Problem: Oral Intake Inadequate (Acute Kidney Injury/Impairment)  Goal: Optimal Nutrition Intake  Outcome: Ongoing, Progressing   No acute events throughout shift.  Monitored by Saff and  Personal Sitter.Vitals and assessment completed as ordered.Pt turned and reposition q two hours. Pt calm and cooperative. No complains of pain. Pt free from injury or falls.

## 2020-09-11 NOTE — PLAN OF CARE
Dr. Padilla & this CM met with the patient & spouse, Ginna Sheikh ([phone]), to discuss plan for the patient to discharge to [facility] in-pt hospice. MD answered questioned & provided support. Spouse questioned if the patient's weekly paracentesis will continued. HUGO was informed by Sarah [phone]) [facility] that the patient will not be transported back to [facility] for out pt paracentesis but that they could manage a pleurX catheter if the catheter is placed prior to the patient's discharge. CM informed Dr. Padilla of above. MD stated she will order to have the pleurX catheter placed on Monday 9/14/2020 (catheter will not be placed over the weekend). CM informed Kirstie ([phone]) [facility] & [facility] of above.     Will continue to follow.

## 2020-09-12 LAB
ALBUMIN SERPL BCP-MCNC: 2.6 G/DL (ref 3.5–5.2)
ALP SERPL-CCNC: 85 U/L (ref 55–135)
ALT SERPL W/O P-5'-P-CCNC: 8 U/L (ref 10–44)
ANION GAP SERPL CALC-SCNC: 8 MMOL/L (ref 8–16)
AST SERPL-CCNC: 17 U/L (ref 10–40)
BACTERIA SPEC AEROBE CULT: NO GROWTH
BASOPHILS # BLD AUTO: 0.01 K/UL (ref 0–0.2)
BASOPHILS NFR BLD: 0.2 % (ref 0–1.9)
BILIRUB SERPL-MCNC: 0.7 MG/DL (ref 0.1–1)
BUN SERPL-MCNC: 53 MG/DL (ref 8–23)
CALCIUM SERPL-MCNC: 8.3 MG/DL (ref 8.7–10.5)
CHLORIDE SERPL-SCNC: 104 MMOL/L (ref 95–110)
CO2 SERPL-SCNC: 28 MMOL/L (ref 23–29)
CREAT SERPL-MCNC: 2.6 MG/DL (ref 0.5–1.4)
DIFFERENTIAL METHOD: ABNORMAL
EOSINOPHIL # BLD AUTO: 0.1 K/UL (ref 0–0.5)
EOSINOPHIL NFR BLD: 1.5 % (ref 0–8)
ERYTHROCYTE [DISTWIDTH] IN BLOOD BY AUTOMATED COUNT: 22.7 % (ref 11.5–14.5)
EST. GFR  (AFRICAN AMERICAN): 25.4 ML/MIN/1.73 M^2
EST. GFR  (NON AFRICAN AMERICAN): 22 ML/MIN/1.73 M^2
GLUCOSE SERPL-MCNC: 135 MG/DL (ref 70–110)
HCT VFR BLD AUTO: 20 % (ref 40–54)
HGB BLD-MCNC: 6 G/DL (ref 14–18)
IMM GRANULOCYTES # BLD AUTO: 0.05 K/UL (ref 0–0.04)
IMM GRANULOCYTES NFR BLD AUTO: 1.1 % (ref 0–0.5)
INR PPP: 1.5 (ref 0.8–1.2)
LYMPHOCYTES # BLD AUTO: 1 K/UL (ref 1–4.8)
LYMPHOCYTES NFR BLD: 21.3 % (ref 18–48)
MAGNESIUM SERPL-MCNC: 1.8 MG/DL (ref 1.6–2.6)
MCH RBC QN AUTO: 31.1 PG (ref 27–31)
MCHC RBC AUTO-ENTMCNC: 30 G/DL (ref 32–36)
MCV RBC AUTO: 104 FL (ref 82–98)
MONOCYTES # BLD AUTO: 0.5 K/UL (ref 0.3–1)
MONOCYTES NFR BLD: 11.1 % (ref 4–15)
NEUTROPHILS # BLD AUTO: 3 K/UL (ref 1.8–7.7)
NEUTROPHILS NFR BLD: 64.8 % (ref 38–73)
NRBC BLD-RTO: 0 /100 WBC
PHOSPHATE SERPL-MCNC: 2.7 MG/DL (ref 2.7–4.5)
PLATELET # BLD AUTO: 57 K/UL (ref 150–350)
PMV BLD AUTO: 10.1 FL (ref 9.2–12.9)
POCT GLUCOSE: 330 MG/DL (ref 70–110)
POCT GLUCOSE: 350 MG/DL (ref 70–110)
POTASSIUM SERPL-SCNC: 3.3 MMOL/L (ref 3.5–5.1)
PROT SERPL-MCNC: 5.5 G/DL (ref 6–8.4)
PROTHROMBIN TIME: 16 SEC (ref 9–12.5)
RBC # BLD AUTO: 1.93 M/UL (ref 4.6–6.2)
SODIUM SERPL-SCNC: 140 MMOL/L (ref 136–145)
WBC # BLD AUTO: 4.61 K/UL (ref 3.9–12.7)

## 2020-09-12 PROCEDURE — 85025 COMPLETE CBC W/AUTO DIFF WBC: CPT

## 2020-09-12 PROCEDURE — 94761 N-INVAS EAR/PLS OXIMETRY MLT: CPT

## 2020-09-12 PROCEDURE — 63600175 PHARM REV CODE 636 W HCPCS: Performed by: NURSE PRACTITIONER

## 2020-09-12 PROCEDURE — 27000221 HC OXYGEN, UP TO 24 HOURS

## 2020-09-12 PROCEDURE — 99232 PR SUBSEQUENT HOSPITAL CARE,LEVL II: ICD-10-PCS | Mod: ,,, | Performed by: HOSPITALIST

## 2020-09-12 PROCEDURE — 25000003 PHARM REV CODE 250: Performed by: INTERNAL MEDICINE

## 2020-09-12 PROCEDURE — 83735 ASSAY OF MAGNESIUM: CPT

## 2020-09-12 PROCEDURE — 99232 SBSQ HOSP IP/OBS MODERATE 35: CPT | Mod: ,,, | Performed by: HOSPITALIST

## 2020-09-12 PROCEDURE — 11000001 HC ACUTE MED/SURG PRIVATE ROOM

## 2020-09-12 PROCEDURE — 80053 COMPREHEN METABOLIC PANEL: CPT

## 2020-09-12 PROCEDURE — 85610 PROTHROMBIN TIME: CPT

## 2020-09-12 PROCEDURE — 94640 AIRWAY INHALATION TREATMENT: CPT

## 2020-09-12 PROCEDURE — 84100 ASSAY OF PHOSPHORUS: CPT

## 2020-09-12 PROCEDURE — 36415 COLL VENOUS BLD VENIPUNCTURE: CPT

## 2020-09-12 PROCEDURE — 25000242 PHARM REV CODE 250 ALT 637 W/ HCPCS: Performed by: HOSPITALIST

## 2020-09-12 PROCEDURE — 25000003 PHARM REV CODE 250: Performed by: HOSPITALIST

## 2020-09-12 PROCEDURE — 63600175 PHARM REV CODE 636 W HCPCS: Performed by: HOSPITALIST

## 2020-09-12 RX ORDER — INSULIN ASPART 100 [IU]/ML
0-5 INJECTION, SOLUTION INTRAVENOUS; SUBCUTANEOUS
Status: DISCONTINUED | OUTPATIENT
Start: 2020-09-12 | End: 2020-09-14 | Stop reason: HOSPADM

## 2020-09-12 RX ORDER — IBUPROFEN 200 MG
24 TABLET ORAL
Status: DISCONTINUED | OUTPATIENT
Start: 2020-09-12 | End: 2020-09-14 | Stop reason: HOSPADM

## 2020-09-12 RX ORDER — IBUPROFEN 200 MG
16 TABLET ORAL
Status: DISCONTINUED | OUTPATIENT
Start: 2020-09-12 | End: 2020-09-14 | Stop reason: HOSPADM

## 2020-09-12 RX ORDER — GLUCAGON 1 MG
1 KIT INJECTION
Status: DISCONTINUED | OUTPATIENT
Start: 2020-09-12 | End: 2020-09-14 | Stop reason: HOSPADM

## 2020-09-12 RX ORDER — FUROSEMIDE 10 MG/ML
20 INJECTION INTRAMUSCULAR; INTRAVENOUS ONCE
Status: COMPLETED | OUTPATIENT
Start: 2020-09-12 | End: 2020-09-12

## 2020-09-12 RX ORDER — LACTULOSE 10 G/15ML
20 SOLUTION ORAL 2 TIMES DAILY
Status: DISCONTINUED | OUTPATIENT
Start: 2020-09-13 | End: 2020-09-14 | Stop reason: HOSPADM

## 2020-09-12 RX ADMIN — DIVALPROEX SODIUM 500 MG: 250 TABLET, DELAYED RELEASE ORAL at 09:09

## 2020-09-12 RX ADMIN — FUROSEMIDE 20 MG: 10 INJECTION, SOLUTION INTRAMUSCULAR; INTRAVENOUS at 09:09

## 2020-09-12 RX ADMIN — PANTOPRAZOLE SODIUM 40 MG: 40 TABLET, DELAYED RELEASE ORAL at 05:09

## 2020-09-12 RX ADMIN — PANTOPRAZOLE SODIUM 40 MG: 40 TABLET, DELAYED RELEASE ORAL at 04:09

## 2020-09-12 RX ADMIN — RIFAXIMIN 550 MG: 550 TABLET ORAL at 09:09

## 2020-09-12 RX ADMIN — LACTULOSE 20 G: 20 SOLUTION ORAL at 03:09

## 2020-09-12 RX ADMIN — RIFAXIMIN 550 MG: 550 TABLET ORAL at 08:09

## 2020-09-12 RX ADMIN — LEVALBUTEROL HYDROCHLORIDE 0.63 MG: 0.63 SOLUTION RESPIRATORY (INHALATION) at 05:09

## 2020-09-12 RX ADMIN — LACTULOSE 20 G: 20 SOLUTION ORAL at 08:09

## 2020-09-12 RX ADMIN — PANTOPRAZOLE SODIUM 40 MG: 40 TABLET, DELAYED RELEASE ORAL at 06:09

## 2020-09-12 RX ADMIN — LEVALBUTEROL HYDROCHLORIDE 0.63 MG: 0.63 SOLUTION RESPIRATORY (INHALATION) at 08:09

## 2020-09-12 RX ADMIN — INSULIN ASPART 2 UNITS: 100 INJECTION, SOLUTION INTRAVENOUS; SUBCUTANEOUS at 11:09

## 2020-09-12 RX ADMIN — LEVALBUTEROL HYDROCHLORIDE 0.63 MG: 0.63 SOLUTION RESPIRATORY (INHALATION) at 12:09

## 2020-09-12 RX ADMIN — ZINC SULFATE 220 MG (50 MG) CAPSULE 220 MG: CAPSULE at 08:09

## 2020-09-12 NOTE — PLAN OF CARE
Problem: Fall Injury Risk  Goal: Absence of Fall and Fall-Related Injury  Outcome: Ongoing, Progressing     Problem: Bleeding (Gastrointestinal Bleeding)  Goal: Hemostasis  Outcome: Ongoing, Progressing     Problem: Adult Inpatient Plan of Care  Goal: Plan of Care Review  Outcome: Ongoing, Progressing     Problem: Adult Inpatient Plan of Care  Goal: Patient-Specific Goal (Individualization)  Outcome: Ongoing, Progressing     Problem: Adult Inpatient Plan of Care  Goal: Absence of Hospital-Acquired Illness or Injury  Outcome: Ongoing, Progressing    Pt AO to self. No acute events noted during shift. Vitals remained stable. No c/o pain or discomfort noted. All scheduled meds given per MD orders. Bed remained low, locked, with all personal items in reach. Will continue to monitor.

## 2020-09-12 NOTE — TREATMENT PLAN
Hepatology Treatment Plan    Kenneth Sheikh is a 80 y.o. male admitted to hospital 9/4/2020 (Hospital Day: 9) due to Acute blood loss anemia.     Interval History  Seen by Palliative Care, plan to discharge to inpatient hospice.  Hgb 6    Objective  Temp:  [96.8 °F (36 °C)-98.3 °F (36.8 °C)] 96.8 °F (36 °C) (09/12 1147)  Pulse:  [65-78] 66 (09/12 0827)  BP: (122-152)/(62-67) 152/67 (09/12 0827)  Resp:  [16-18] 16 (09/12 0827)  SpO2:  [91 %-98 %] 96 % (09/12 0827)      Laboratory    MELD-Na score: 20 at 9/12/2020  4:27 AM  MELD score: 20 at 9/12/2020  4:27 AM  Calculated from:  Serum Creatinine: 2.6 mg/dL at 9/12/2020  4:27 AM  Serum Sodium: 140 mmol/L (Rounded to 137 mmol/L) at 9/12/2020  4:27 AM  Total Bilirubin: 0.7 mg/dL (Rounded to 1 mg/dL) at 9/12/2020  4:27 AM  INR(ratio): 1.5 at 9/12/2020  4:27 AM  Age: 80 years 7 months    Recent Labs   Lab 09/10/20  0424 09/11/20  0536 09/12/20  0427   HGB 6.5* 6.2* 6.0*       Lab Results   Component Value Date    WBC 4.61 09/12/2020    HGB 6.0 (L) 09/12/2020    HCT 20.0 (L) 09/12/2020     (H) 09/12/2020    PLT 57 (L) 09/12/2020       Lab Results   Component Value Date     09/12/2020    K 3.3 (L) 09/12/2020     09/12/2020    CO2 28 09/12/2020    BUN 53 (H) 09/12/2020    CREATININE 2.6 (H) 09/12/2020    CALCIUM 8.3 (L) 09/12/2020    ANIONGAP 8 09/12/2020    ESTGFRAFRICA 25.4 (A) 09/12/2020    EGFRNONAA 22.0 (A) 09/12/2020       Lab Results   Component Value Date    ALT 8 (L) 09/12/2020    AST 17 09/12/2020    ALKPHOS 85 09/12/2020    BILITOT 0.7 09/12/2020       Lab Results   Component Value Date    INR 1.5 (H) 09/12/2020    INR 1.6 (H) 09/11/2020    INR 1.5 (H) 09/10/2020       Plan  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 male medical history of MCDONALD Cirrhosis (h/o Ascites with weekly paracenteses - last 9/4 4.2L removed, Gastric Varices 2/2017), Amish, who presents with weakness black stools, anemia (Hb 6 from  10.6 previously), BELLA on CKD (Cr 2.9).     Recommendations:   - GIB: Appreciate GI assistance. S/p EGD 9/5 with likely bleeding from PHG and ?esophageal ulcer. S/p Abx x 5 days.  - BELLA: Stable. Urine studies not consistent with HRS. Likely prerenal from GIB.  - Ascites: Unfortunately ascitic fluid (9/4) does not appear to have been sent for diagnostic studies. 2 gm Na restriction. Restart diuretics as Na improved.  - HE: Present. Lactulose with goal 3-4 BMs daily and continue rifaximin  - Transplant: not a candidate due to age  - Appreciate Palliative care assistance. Now DNR / DNI. Plan to transition to hospice. Would advise against Pleurx abdominal drain placement given risk of infection if possible - can restart diuretics as above.    Thank you for involving us in the care of Kenneth Sheikh. Please call with any additional questions, concerns or changes in the patient's clinical status.    Greg Henley MD  Gastroenterology Fellow, PGY V

## 2020-09-12 NOTE — ASSESSMENT & PLAN NOTE
"Reason for Consult: Goals of care and advanced care planning     Impression: Mr. Sheikh is an 79 yo gentleman admitted to internal medicine with upper GI bleed and hx of MCDONALD cirrhosis with esophageal varices.  He is encephalopathic and arouses easily with verbal stimuli,  oriented to person and place only. No change from previous assessment. Breathing  labored with accessory muscle use.  No acute distress.  Appears to be comfortable with no facial grimacing or moaning. He is sitting up in bed, minimal oral intake even with assistance from his wife. Wife reports decreased appetite today.   Appears Mrs. Sheikh has some distress regarding plan of care.  Her wish is for him to be comfortable but does not appear ready to discontinue lactulose.  Remains concerned for level of "poisons" in his body.  Also remains distressed as she is not able to provide his care alone in her home.     Mr. Sheikh has liver failure with no curative or transplant options.  Mr. Sheikh also has active GI bleed  And will not accept transfusion secondary to Taoism believes.  It is appropriate for patient and family to consider transition to home hospice or nursing home hospice.  Currently Mr. Sheikh does not appear to meet criteria for inpatient hospice.     GI Hemorhage/ACute Blood Loss Anemia/ MCDONALD Cirrhosis/Hepatic Encephalopathy and other medical problems   - plan per primary team and other specialty consultants     Symptom Management    Pain:   - no c/o pain and no behavioral signs - facial grimacing or moaning   - Continue current medical management - prn hydrocodone and tylenol  - 24 hr OME zero     Bowel Management  - continue current medical management  -lactulose three times daily     Dyspnea   - patient is able to talk and eat.   - respiratory effort appears mildly labored with use of accessory muscles  -  room air saturation 93% on room air   - lungs sounds wet, chest xray ordered per primary team  -continue lasix  - Consider placement of " palliative pleurex catheter to manage ascites. Hospice necessary for placement  - Wife is aware of pleurex catheter and is amenable to placement      Advance Care Planning   Goals of Care      - Advanced care planning documents - Jehoviah's Witness Durable Power of  has been received.  - Presently Mr. Sheikh is unable to make medical decisions.  - Legal next of kin is wife Ginna Sheikh [phone] and son Kenneth Sheikh, [phone]   -  Resuscitation status - DNR per primary team   - CPR and Intubation discussed including the risks, benefits and survivability of CPR .    Also discussed peaceful and natural death.   - DNR orders written per primary team.    - LaPOST discussed with wife.  Wife has declined LaPOST at this time.  States if the patient's heart stops beating at home she will call 911.        Goals of Care:   - Palliative medicine APRN returned to bedside for goals of care clarification.    - As per chart review it appears family has made decisions for hospice.  On further discussion wife has expressed interest in inpatient hospice. Consult has been placed to [facility].  Family discussions held with hospice representative  -  Hospice education (frequently asked questions) provided and emphasized levels of hospice.  Explained the patient does not appear to meet criteria for inpatient hospice. Mrs. Sheikh appeared to be in  disbelief. Her goals do not include stopping lactulose and other medical management   - Mrs. Sheikh more amenable to stopping lactulose and would like to discuss with her children.  Without lactulose may meet inpatient hospice criteria   - - [facility] hospice will continue to follow, if clinical condition worsens will most likely transition to inpatient hospice.     Plan/Recommendations   - Patient does not appear to meet inpatient hospice criteria at this time.  If clinical status changes and [facility] status [facility]. .   -  Patient and family would benefit from continued information and education regarding clinical status.  -Continue current plan of care  - Consider pleurex catheter placement, especially if hospice is chosen  - LaPOST on discharge          Primary team aware of the above goals of care and recommendations.  Thank you for consult and opportunity to participate in patient's care       .

## 2020-09-12 NOTE — PROGRESS NOTES
"[facility]  Palliative Medicine  Progress Note    Patient Name: Kenneth Sheikh  MRN: [id number]  Admission Date: 9/4/2020  Hospital Length of Stay: 7 days  Code Status: DNR   Attending Provider: Keyonna Padilla MD  Consulting Provider: DOTTY Hearn  Primary Care Physician: Prisca Espinoza MD  Principal Problem:Acute blood loss anemia    Patient information was obtained from relative, EMR, primary team and ED notes.      Assessment/Plan:     Palliative care encounter  Reason for Consult: Goals of care and advanced care planning     Impression: Mr. Sheikh is an 79 yo gentleman admitted to internal medicine with upper GI bleed and hx of MCDONALD cirrhosis with esophageal varices.  He is encephalopathic and arouses easily with verbal stimuli,  oriented to person and place only. No change from previous assessment. Breathing  labored with accessory muscle use.  No acute distress.  Appears to be comfortable with no facial grimacing or moaning. He is sitting up in bed, minimal oral intake even with assistance from his wife. Wife reports decreased appetite today.   Appears Mrs. Sheikh has some distress regarding plan of care.  Her wish is for him to be comfortable but does not appear ready to discontinue lactulose.  Remains concerned for level of "poisons" in his body.  Also remains distressed as she is not able to provide his care alone in her home.     Mr. Sheikh has liver failure with no curative or transplant options.  Mr. Sheikh also has active GI bleed  And will not accept transfusion secondary to Taoist believes.  It is appropriate for patient and family to consider transition to home hospice or nursing home hospice.  Currently Mr. Sheikh does not appear to meet criteria for inpatient hospice.     GI Hemorhage/ACute Blood Loss Anemia/ MCDONALD Cirrhosis/Hepatic Encephalopathy and other medical problems   - plan per primary team and other specialty consultants     Symptom Management    Pain:   - no c/o pain " and no behavioral signs - facial grimacing or moaning   - Continue current medical management - prn hydrocodone and tylenol  - 24 hr OME zero     Bowel Management  - continue current medical management  -lactulose three times daily     Dyspnea   - patient is able to talk and eat.   - respiratory effort appears mildly labored with use of accessory muscles  -  room air saturation 93% on room air   - lungs sounds wet, chest xray ordered per primary team  -continue lasix  - Consider placement of palliative pleurex catheter to manage ascites. Hospice necessary for placement  - Wife is aware of pleurex catheter and is amenable to placement      Advance Care Planning   Goals of Care      - Advanced care planning documents - Jehoviah's Witness Durable Power of  has been received.  - Presently Mr. Sheikh is unable to make medical decisions.  - Legal next of kin is wife Ginna Sheikh [phone] and son Kenneth Sheikh, [phone]   -  Resuscitation status - DNR per primary team   - CPR and Intubation discussed including the risks, benefits and survivability of CPR .    Also discussed peaceful and natural death.   - DNR orders written per primary team.    - LaPOST discussed with wife.  Wife has declined LaPOST at this time.  States if the patient's heart stops beating at home she will call 911.        Goals of Care:   - Palliative medicine APRN returned to bedside for goals of care clarification.    - As per chart review it appears family has made decisions for hospice.  On further discussion wife has expressed interest in inpatient hospice. Consult has been placed to [facility].  Family discussions held with hospice representative  -  Hospice education (frequently asked questions) provided and emphasized levels of hospice.  Explained the patient does not appear to meet criteria for inpatient hospice. Mrs. Sheikh appeared to be in  disbelief. Her goals do not include stopping lactulose and other medical management    - Mrs. Sheikh more amenable to stopping lactulose and would like to discuss with her children.  Without lactulose may meet inpatient hospice criteria   - - [facility] hospice will continue to follow, if clinical condition worsens will most likely transition to inpatient hospice.     Plan/Recommendations   - Patient does not appear to meet inpatient hospice criteria at this time.  If clinical status changes and respiratory status worsens in-paitent hospice may be more appropriate. .   - Patient and family would benefit from continued information and education regarding clinical status.  -Continue current plan of care  - Consider pleurex catheter placement, especially if hospice is chosen  - LaPOST on discharge          Primary team aware of the above goals of care and recommendations.  Thank you for consult and opportunity to participate in patient's care       .        I will follow-up with patient. Please contact us if you have any additional questions.    Subjective:     Chief Complaint:   Chief Complaint   Patient presents with    covid test       HPI:   HPI obtained from chart review: please see H and P from primary team 9/4/2020    Mr. Sheikh is an 79 yo gentlmen with PMH of: MCDONALD cirrhosis, gastric varices, hepatic encephalopathy, coagulopathy,  and ascites requiring weekly paracenteses.  He presented to [facility] [place] [place] [facility] from [facility] [place] [place] Hepatology clinic for evaluation of c/o weakness,  Melena, weakness and fatigue over two days. No use of any blood thinners or Aspirin. He reports chills,  no fevers, chest pain, or shortness of breath.  Paracentesis with removal of 4.2 liters in hepatology clinic.      He is a Religion, and does not want to receive blood transfusions, but he is ok receiving blood stimulators or fractionated blood products - just not whole blood.       Upon arrival to the ER, vitals were temp 98.9F, HR 80 and /80.  Labs showed Hemoglobin 6, Platelet 67, Creatinine 2.9 and INR 1.3.  digital  "rectal exam positive.  He was given 500cc bolus and was admitted to Hospital Medicine for further management.    "Admitted to hospital medicine for acute UGIB with hx of gastric varices and ulcers. Started on supportive tx IV octreotide, PPI, ceftriaxone and albumin. GI and hepatology consulted. S/p EGD 9/5 with LA Grade B esophagitis, Non-bleeding esophageal ulcer, Small (< 5 mm) esophageal varice, Portal hypertensive gastropathy. Type 1 isolated gastric varices (IGV1, varices located in the fundus), previously treated and without bleeding, Multiple duodenal polyps. Very friable gastric mucosa. GI rec slowly advancing diet, repeat EGD 8-12 weeks to assure healing of esophageal ulcer and biopsy duodenal polyps.     He remains on IV PPI and ceftriaxone at this time. Octreotide discontinued as no overt variceal bleed. Trending CBC with Pediatric blood draws in the meantime daily, and avoiding unnecessary blood draws. "    Palliative medicine consulted for goals of care and advanced care planning     Hospital Course:  No notes on file    No new subjective & objective note has been filed under this hospital service since the last note was generated.        CARIDAD Gilliam, APRN, ACNS-BC  Palliative Medicine  [facility]            "

## 2020-09-13 ENCOUNTER — PATIENT OUTREACH (OUTPATIENT)
Dept: ADMINISTRATIVE | Facility: OTHER | Age: 80
End: 2020-09-13

## 2020-09-13 LAB
ALBUMIN SERPL BCP-MCNC: 2.4 G/DL (ref 3.5–5.2)
ALP SERPL-CCNC: 116 U/L (ref 55–135)
ALT SERPL W/O P-5'-P-CCNC: 8 U/L (ref 10–44)
ANION GAP SERPL CALC-SCNC: 10 MMOL/L (ref 8–16)
AST SERPL-CCNC: 20 U/L (ref 10–40)
BASOPHILS # BLD AUTO: 0 K/UL (ref 0–0.2)
BASOPHILS NFR BLD: 0 % (ref 0–1.9)
BILIRUB SERPL-MCNC: 0.6 MG/DL (ref 0.1–1)
BUN SERPL-MCNC: 57 MG/DL (ref 8–23)
CALCIUM SERPL-MCNC: 7.9 MG/DL (ref 8.7–10.5)
CHLORIDE SERPL-SCNC: 102 MMOL/L (ref 95–110)
CO2 SERPL-SCNC: 28 MMOL/L (ref 23–29)
CREAT SERPL-MCNC: 2.8 MG/DL (ref 0.5–1.4)
DIFFERENTIAL METHOD: ABNORMAL
EOSINOPHIL # BLD AUTO: 0 K/UL (ref 0–0.5)
EOSINOPHIL NFR BLD: 0.8 % (ref 0–8)
ERYTHROCYTE [DISTWIDTH] IN BLOOD BY AUTOMATED COUNT: 22.2 % (ref 11.5–14.5)
EST. GFR  (AFRICAN AMERICAN): 23.2 ML/MIN/1.73 M^2
EST. GFR  (NON AFRICAN AMERICAN): 20.1 ML/MIN/1.73 M^2
ESTIMATED AVG GLUCOSE: 97 MG/DL (ref 68–131)
GLUCOSE SERPL-MCNC: 164 MG/DL (ref 70–110)
HBA1C MFR BLD HPLC: 5 % (ref 4–5.6)
HCT VFR BLD AUTO: 21.7 % (ref 40–54)
HGB BLD-MCNC: 6.3 G/DL (ref 14–18)
IMM GRANULOCYTES # BLD AUTO: 0.03 K/UL (ref 0–0.04)
IMM GRANULOCYTES NFR BLD AUTO: 0.6 % (ref 0–0.5)
INR PPP: 1.4 (ref 0.8–1.2)
LYMPHOCYTES # BLD AUTO: 0.8 K/UL (ref 1–4.8)
LYMPHOCYTES NFR BLD: 16.8 % (ref 18–48)
MAGNESIUM SERPL-MCNC: 1.8 MG/DL (ref 1.6–2.6)
MCH RBC QN AUTO: 30.3 PG (ref 27–31)
MCHC RBC AUTO-ENTMCNC: 29 G/DL (ref 32–36)
MCV RBC AUTO: 104 FL (ref 82–98)
MONOCYTES # BLD AUTO: 0.5 K/UL (ref 0.3–1)
MONOCYTES NFR BLD: 11.2 % (ref 4–15)
NEUTROPHILS # BLD AUTO: 3.4 K/UL (ref 1.8–7.7)
NEUTROPHILS NFR BLD: 70.6 % (ref 38–73)
NRBC BLD-RTO: 0 /100 WBC
PHOSPHATE SERPL-MCNC: 2.4 MG/DL (ref 2.7–4.5)
PLATELET # BLD AUTO: 48 K/UL (ref 150–350)
PMV BLD AUTO: 10.2 FL (ref 9.2–12.9)
POCT GLUCOSE: 163 MG/DL (ref 70–110)
POCT GLUCOSE: 172 MG/DL (ref 70–110)
POCT GLUCOSE: 202 MG/DL (ref 70–110)
POCT GLUCOSE: 262 MG/DL (ref 70–110)
POCT GLUCOSE: 328 MG/DL (ref 70–110)
POTASSIUM SERPL-SCNC: 3.3 MMOL/L (ref 3.5–5.1)
PROT SERPL-MCNC: 5.6 G/DL (ref 6–8.4)
PROTHROMBIN TIME: 15.3 SEC (ref 9–12.5)
RBC # BLD AUTO: 2.08 M/UL (ref 4.6–6.2)
SODIUM SERPL-SCNC: 140 MMOL/L (ref 136–145)
WBC # BLD AUTO: 4.81 K/UL (ref 3.9–12.7)

## 2020-09-13 PROCEDURE — 99232 SBSQ HOSP IP/OBS MODERATE 35: CPT | Mod: ,,, | Performed by: HOSPITALIST

## 2020-09-13 PROCEDURE — 25000003 PHARM REV CODE 250: Performed by: HOSPITALIST

## 2020-09-13 PROCEDURE — 27000221 HC OXYGEN, UP TO 24 HOURS

## 2020-09-13 PROCEDURE — 63600175 PHARM REV CODE 636 W HCPCS: Performed by: NURSE PRACTITIONER

## 2020-09-13 PROCEDURE — 84100 ASSAY OF PHOSPHORUS: CPT

## 2020-09-13 PROCEDURE — 63600175 PHARM REV CODE 636 W HCPCS: Performed by: HOSPITALIST

## 2020-09-13 PROCEDURE — 94640 AIRWAY INHALATION TREATMENT: CPT

## 2020-09-13 PROCEDURE — 11000001 HC ACUTE MED/SURG PRIVATE ROOM

## 2020-09-13 PROCEDURE — 83735 ASSAY OF MAGNESIUM: CPT

## 2020-09-13 PROCEDURE — 36415 COLL VENOUS BLD VENIPUNCTURE: CPT

## 2020-09-13 PROCEDURE — 85610 PROTHROMBIN TIME: CPT

## 2020-09-13 PROCEDURE — 25000003 PHARM REV CODE 250: Performed by: INTERNAL MEDICINE

## 2020-09-13 PROCEDURE — 25000242 PHARM REV CODE 250 ALT 637 W/ HCPCS: Performed by: HOSPITALIST

## 2020-09-13 PROCEDURE — 85025 COMPLETE CBC W/AUTO DIFF WBC: CPT

## 2020-09-13 PROCEDURE — 94761 N-INVAS EAR/PLS OXIMETRY MLT: CPT

## 2020-09-13 PROCEDURE — 80053 COMPREHEN METABOLIC PANEL: CPT

## 2020-09-13 PROCEDURE — 99232 PR SUBSEQUENT HOSPITAL CARE,LEVL II: ICD-10-PCS | Mod: ,,, | Performed by: HOSPITALIST

## 2020-09-13 PROCEDURE — 83036 HEMOGLOBIN GLYCOSYLATED A1C: CPT

## 2020-09-13 RX ORDER — FUROSEMIDE 10 MG/ML
20 INJECTION INTRAMUSCULAR; INTRAVENOUS ONCE
Status: COMPLETED | OUTPATIENT
Start: 2020-09-13 | End: 2020-09-13

## 2020-09-13 RX ORDER — POTASSIUM CHLORIDE 20 MEQ/1
40 TABLET, EXTENDED RELEASE ORAL ONCE
Status: COMPLETED | OUTPATIENT
Start: 2020-09-13 | End: 2020-09-13

## 2020-09-13 RX ADMIN — DIVALPROEX SODIUM 500 MG: 250 TABLET, DELAYED RELEASE ORAL at 09:09

## 2020-09-13 RX ADMIN — POTASSIUM CHLORIDE 40 MEQ: 1500 TABLET, EXTENDED RELEASE ORAL at 04:09

## 2020-09-13 RX ADMIN — FUROSEMIDE 20 MG: 10 INJECTION, SOLUTION INTRAMUSCULAR; INTRAVENOUS at 04:09

## 2020-09-13 RX ADMIN — RIFAXIMIN 550 MG: 550 TABLET ORAL at 09:09

## 2020-09-13 RX ADMIN — RIFAXIMIN 550 MG: 550 TABLET ORAL at 08:09

## 2020-09-13 RX ADMIN — LACTULOSE 20 G: 20 SOLUTION ORAL at 09:09

## 2020-09-13 RX ADMIN — LEVALBUTEROL HYDROCHLORIDE 0.63 MG: 0.63 SOLUTION RESPIRATORY (INHALATION) at 03:09

## 2020-09-13 RX ADMIN — LACTULOSE 20 G: 20 SOLUTION ORAL at 08:09

## 2020-09-13 RX ADMIN — LEVALBUTEROL HYDROCHLORIDE 0.63 MG: 0.63 SOLUTION RESPIRATORY (INHALATION) at 11:09

## 2020-09-13 RX ADMIN — LEVALBUTEROL HYDROCHLORIDE 0.63 MG: 0.63 SOLUTION RESPIRATORY (INHALATION) at 09:09

## 2020-09-13 RX ADMIN — ZINC SULFATE 220 MG (50 MG) CAPSULE 220 MG: CAPSULE at 08:09

## 2020-09-13 RX ADMIN — LEVALBUTEROL HYDROCHLORIDE 0.63 MG: 0.63 SOLUTION RESPIRATORY (INHALATION) at 12:09

## 2020-09-13 RX ADMIN — PANTOPRAZOLE SODIUM 40 MG: 40 TABLET, DELAYED RELEASE ORAL at 04:09

## 2020-09-13 RX ADMIN — INSULIN ASPART 3 UNITS: 100 INJECTION, SOLUTION INTRAVENOUS; SUBCUTANEOUS at 07:09

## 2020-09-13 RX ADMIN — PANTOPRAZOLE SODIUM 40 MG: 40 TABLET, DELAYED RELEASE ORAL at 06:09

## 2020-09-13 NOTE — PROGRESS NOTES
[facility] Hospital Medicine                                                                     Progress Note     Team: [facility]OS[facility] MED A Keyonna Padilla MD   Admit Date: 9/4/2020   Hospital Day: 8  KEILY: 9/14/2020   Code status: Full Code   Principal Problem: Acute blood loss anemia     SUMMARY:     Mr. Kenneth Sheikh is a 80 y.o. male with MCDONALD cirrhosis, complicated by gastric varices, hepatic encephalopathy, ascites requiring weekly paracenteses, and coagulopathy who presents to the ER from Hepatology clinic for evaluation of weakness and melena.  He mentions that he has been feeling weak and fatigued the last 2 days, and the day prior to admission he started having melena.  He denies the use of any blood thinners or Aspirin.  He is a [employer], and does not want to receive blood transfusions, but he is ok receiving blood stimulators or fractionated blood products - just not whole blood.  He endorses some chills, but no fevers, chest pain, or shortness of breath.  Of note, he just had a paracentesis on the day of admission that removed 4.2L.     Upon arrival to the ER, vitals were temp 98.9F, HR 80 and /80.  Labs showed Hemoglobin 6, Platelet 67, Creatinine 2.9 and INR 1.3.  ESTEFANÍA was positive.  He was given 500cc bolus and was admitted to Hospital Medicine for further management.    Admitted to hospital medicine for acute UGIB with hx of gastric varices and ulcers. Started on supportive tx IV octreotide, PPI, ceftriaxone and albumin. GI and hepatology consulted. S/p EGD 9/5 with LA Grade B esophagitis, Non-bleeding esophageal ulcer, Small (< 5 mm) esophageal varice, Portal hypertensive gastropathy. Type 1 isolated gastric varices (IGV1, varices located in the fundus), previously treated and without bleeding, Multiple duodenal polyps. Very friable gastric  mucosa. GI rec slowly advancing diet, repeat EGD 8-12 weeks to assure healing of esophageal ulcer and biopsy duodenal polyps.    Octreotide discontinued post EGD as no overt variceal bleed. He finished his IV PPI drip now transitioned to oral BID. Finished 5 day course of ceftriaxone.  Trending CBC with Pediatric blood draws in the meantime daily, and avoiding unnecessary blood draws. He is currently encephalopathy secondary to hepatic encephalopathy, increasing lactulose for goal BM 3-4. He will need paracentesis by rads, ordered.     Overall very poor prognosis considering his cirrhosis and GIB unable to transfuse. GOC underway. Patient now DNR. Discussing hospice with family.    SUBJECTIVE:     Pt was seen and examined at bedside. Family debating home hospice versus inpateint hospice.     ROS (Positive in Bold, otherwise negative)  Pain Scale: 0 /10   Constitutional: fever, chills, night sweats, weakness, fatigue  CV: chest pain, edema, palpitations  Resp: SOB, cough, sputum production  GI: changes in appetite, NVDC, pain, melena, hematochezia, GERD, hematemesis  : Dysuria, hematuria, urinary urgency, frequency  MSK: arthralgia/myalgia, joint swelling  SKIN: rashes, pruritis, petechiae   Neuro/Psych: FND, confusion, anxiety, depression      OBJECTIVE:     Vitals:  Temp:  [96.8 °F (36 °C)-98.3 °F (36.8 °C)]   Pulse:  [65-78]   Resp:  [14-18]   BP: (122-152)/(62-67)   SpO2:  [91 %-98 %]      I & O (Last 24H):     Intake/Output Summary (Last 24 hours) at 9/12/2020 1926  Last data filed at 9/12/2020 1000  Gross per 24 hour   Intake 360 ml   Output 1050 ml   Net -690 ml         GEN: chronically ill appearing male  in no acute distress. Appears pale. Resting in bed.  HEENT: NCAT. PERRL. EOMI. Conjunctivae/corneas clear, sclera Anicteric.  CVS: RRR. Normal s1 s2 no murmur, click, rub or gallop  LUNG: CTAB. Normal respiratory effort. No wheezes, rhonchi, or crackles.  ABD: Normoactive BS, soft, NT, mildly distended, no  masses or organomegaly.  EXT: Trace LE edema. No cyanosis. Full ROM. Hepatic flaps improving   SKIN: pale and cold  NEURO: Alert, oriented x 2, decreased concentration, Spont mvt of all extremities with no focal deficits noted.      All recent labs and imaging has been reviewed.     Recent Results (from the past 24 hour(s))   CBC auto differential    Collection Time: 09/12/20  4:27 AM   Result Value Ref Range    WBC 4.61 3.90 - 12.70 K/uL    RBC 1.93 (L) 4.60 - 6.20 M/uL    Hemoglobin 6.0 (L) 14.0 - 18.0 g/dL    Hematocrit 20.0 (L) 40.0 - 54.0 %    Mean Corpuscular Volume 104 (H) 82 - 98 fL    Mean Corpuscular Hemoglobin 31.1 (H) 27.0 - 31.0 pg    Mean Corpuscular Hemoglobin Conc 30.0 (L) 32.0 - 36.0 g/dL    RDW 22.7 (H) 11.5 - 14.5 %    Platelets 57 (L) 150 - 350 K/uL    MPV 10.1 9.2 - 12.9 fL    Immature Granulocytes 1.1 (H) 0.0 - 0.5 %    Gran # (ANC) 3.0 1.8 - 7.7 K/uL    Immature Grans (Abs) 0.05 (H) 0.00 - 0.04 K/uL    Lymph # 1.0 1.0 - 4.8 K/uL    Mono # 0.5 0.3 - 1.0 K/uL    Eos # 0.1 0.0 - 0.5 K/uL    Baso # 0.01 0.00 - 0.20 K/uL    nRBC 0 0 /100 WBC    Gran% 64.8 38.0 - 73.0 %    Lymph% 21.3 18.0 - 48.0 %    Mono% 11.1 4.0 - 15.0 %    Eosinophil% 1.5 0.0 - 8.0 %    Basophil% 0.2 0.0 - 1.9 %    Differential Method Automated    Comprehensive metabolic panel    Collection Time: 09/12/20  4:27 AM   Result Value Ref Range    Sodium 140 136 - 145 mmol/L    Potassium 3.3 (L) 3.5 - 5.1 mmol/L    Chloride 104 95 - 110 mmol/L    CO2 28 23 - 29 mmol/L    Glucose 135 (H) 70 - 110 mg/dL    BUN, Bld 53 (H) 8 - 23 mg/dL    Creatinine 2.6 (H) 0.5 - 1.4 mg/dL    Calcium 8.3 (L) 8.7 - 10.5 mg/dL    Total Protein 5.5 (L) 6.0 - 8.4 g/dL    Albumin 2.6 (L) 3.5 - 5.2 g/dL    Total Bilirubin 0.7 0.1 - 1.0 mg/dL    Alkaline Phosphatase 85 55 - 135 U/L    AST 17 10 - 40 U/L    ALT 8 (L) 10 - 44 U/L    Anion Gap 8 8 - 16 mmol/L    eGFR if African American 25.4 (A) >60 mL/min/1.73 m^2    eGFR if non African American 22.0 (A) >60  mL/min/1.73 m^2   Protime-INR    Collection Time: 09/12/20  4:27 AM   Result Value Ref Range    Prothrombin Time 16.0 (H) 9.0 - 12.5 sec    INR 1.5 (H) 0.8 - 1.2   Magnesium    Collection Time: 09/12/20  4:27 AM   Result Value Ref Range    Magnesium 1.8 1.6 - 2.6 mg/dL   Phosphorus    Collection Time: 09/12/20  4:27 AM   Result Value Ref Range    Phosphorus 2.7 2.7 - 4.5 mg/dL       Recent Labs   Lab 09/05/20 2046 09/06/20 2012 09/07/20  0925 09/08/20  0743 09/08/20  1607 09/08/20 2033   POCTGLUCOSE 136* 195* 177* 163* 183* 140*       Hemoglobin A1C   Date Value Ref Range Status   05/15/2020 6.6 (H) 4.0 - 5.6 % Final     Comment:     ADA Screening Guidelines:  5.7-6.4%  Consistent with prediabetes  >or=6.5%  Consistent with diabetes  High levels of fetal hemoglobin interfere with the HbA1C  assay. Heterozygous hemoglobin variants (HbS, HgC, etc)do  not significantly interfere with this assay.   However, presence of multiple variants may affect accuracy.     01/28/2020 6.8 (H) 4.0 - 5.6 % Final     Comment:     ADA Screening Guidelines:  5.7-6.4%  Consistent with prediabetes  >or=6.5%  Consistent with diabetes  High levels of fetal hemoglobin interfere with the HbA1C  assay. Heterozygous hemoglobin variants (HbS, HgC, etc)do  not significantly interfere with this assay.   However, presence of multiple variants may affect accuracy.     06/14/2019 7.3 (H) 4.0 - 5.6 % Final     Comment:     ADA Screening Guidelines:  5.7-6.4%  Consistent with prediabetes  >or=6.5%  Consistent with diabetes  High levels of fetal hemoglobin interfere with the HbA1C  assay. Heterozygous hemoglobin variants (HbS, HgC, etc)do  not significantly interfere with this assay.   However, presence of multiple variants may affect accuracy.          Active Hospital Problems    Diagnosis  POA    *Acute blood loss anemia [D62]  Yes    Palliative care encounter [Z51.5]  Not Applicable    Pain [R52]  Unknown    Dyspnea [R06.00]  Unknown     Advanced care planning/counseling discussion [Z71.89]  Not Applicable    Advanced directive placed in chart this admission [Z78.9]  Unknown    Goals of care, counseling/discussion [Z71.89]  Not Applicable    Coagulopathy [D68.9]  Yes    Thrombocytopenia [D69.6]  Yes    Gastrointestinal hemorrhage with melena [K92.1]  Yes    Refusal of blood transfusions as patient is Faith [Z53.1]  Not Applicable     He is ok receiving fractioned blood products and blood stimulators, just not whole blood.     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          ASSESSMENT AND PLAN:     GI Hemorrhage  Acute Blood Loss Anemia  Gastric Varices  - Hgb 6 on admit, baseline around 10 in May  - Start Protonix 40mg IV BID  - GI consulted, appreciate assistance - they are aware of the patient and the high risk for deterioration given Faith  - PPI IV changed to drip as per GI, continue 72 hrs from EGD- Finished now on PO BID  - S/p EGD 9/5 with LA Grade B esophagitis, Non-bleeding esophageal ulcer, Small (< 5 mm) esophageal varice, Portal hypertensive gastropathy. Type 1 isolated gastric varices (IGV1, varices located in the fundus), previously treated and without bleeding, Multiple duodenal polyps. Very friable gastric mucosa.   - Started Ceftriaxone 1g IV q24h for SBP prophylaxis - total 5 days - finished  - Start Octreotide gtt, stopped post EGD since no overt variceal bleed  - GI rec slowly advancing diet, repeat EGD 8-12 weeks to assure healing of esophageal ulcer and biopsy duodenal polyps, and discuss with AES regarding gastric varices   - EPO (MWBARRY)  and iron infusion (x5 days total)  - Hgb 10.6 (5/16/20) --> 6 (admit) --> 5.4 --> 5.2 --> 5.8 --> 5.2 --> 5.5 --> 6.2  - Bleed contributing to hepatic encephalopathy  - IR paracentesis completed on 9/9  - Trending CBC with Pediatric blood draws in the meantime daily, and avoiding unnecessary blood draws    Refusal of Blood Products as Anabaptist  - Does not want to receive blood products, but he is ok receiving blood stimulators  - Lab draws daily with pediatric tubes  - Epo injection WMF and iron infusion     MCDONALD Cirrhosis  - MELD-Na score: 22 on admit  - MELD score: 20 on admit  - Hepatology consult, appreciate recs  - Continue rifaximin   - Albumin 25% BID previously given as per hepatology now held with better pressures and no changed in Cr   - Restarted lactulose for worsening hepatic encephalopathy but no current stool output  - Palliative consult as per hepatology recs  - Not a transplant candidate due to age  - BELLA not consistent with HRS, likely prerenal with ATN from GIB  - Poor prognosis overall, continue to discuss GOC with family     Hepatic Encephalopathy  - Ammonia 54 on admit, he was more lucid at the time  - Continue Rifaximin 550mg PO BID  - Start Zinc 220mg PO daily  - Mentation worsened 9/8, suspecting worsening hepatic encephalopathy clinically - increased lactulose, now 20mL TID for goal 3-4 BM     Acute Renal Failure on CKD Stage 3  ATN  - Creatinine 2.9, baseline around 1-2  - Albumin for resuscitation given ascites and edema  - urine studies not consistent with HRS, likely BELLA from GIB  -  Thrombocytopenia  Coagulopathy  - Chronic issue 2/2 liver disease  - Monitor for continued bleeding  - Unable to transfuse plt as per family's wishes     Bipolar 1 Disorder  - Chronic issue  - Continue Depakote 500mg PO qHS  - Note Depakote can increased ammonia lvls but family wants to continue this for bipolar disorder maintenance      Palliative care- Emphasized the importance of palliative care  in current situation. We discussed limitations to inpatient hospice including that hospice does not do items such as dialysis, procedures, frequent labs or physical therapy.  Current plan- placement of pleurex peritoneal catheter most likely Monday then hospice placement.      Prophylaxis- SCDs  Code Status- DNR  Discharge plan and follow up - pending PT OT , pending stability , Hospice discussion [place]    Discharge Planning   KEILY: 9/14/2020     Code Status: DNR   Is the patient medically ready for discharge?:     Reason for patient still in hospital (select all that apply): Patient trending condition  Discharge Plan A: Inpatient Hospice([facility])      Keyonna Padilla MD

## 2020-09-13 NOTE — H&P
Inpatient Radiology Pre-procedure Note    History of Present Illness:  Kenneth Sheikh is a 80 y.o. male with a history of  MCDONALD cirrhosis c/b gastric varices and recurrent ascites requiring weekly paracentesis. Due to poor prognosis, family agreed to place the pt for hospice service. Plan for PleurX placement for recurrent ascites.     Admission H&P reviewed.  Past Medical History:   Diagnosis Date    Anticoagulant long-term use     Basal cell carcinoma     Bipolar 1 disorder     Bipolar 1 disorder 11/24/2016    bipolar    Cancer     history of skin cancer/sinus cancer & rectal cancer    Depressed bipolar affective disorder     Diabetes mellitus     type 2    Diabetic retinopathy 01/09/2019    EBV infection 12/7/2016    EBV DNA, PCR Latest Ref Range: None detected  None detected EBV DNA-Copies/mL Unknown Test Not Performed EBV Early Antigen Ab, IgG Latest Ref Range: <1:10 Titer 1:40 (A) EBV Nuclear Ag Ab Latest Ref Range: <1:5 Titer >=1:80 (A) EBV VCA IgG Latest Ref Range: <1:10 Titer 1:160 (A) EBV VCA IgM Latest Ref Range: <1:10 Titer <1:10     History of TIA (transient ischemic attack)     several-taking Plavix    Hx of gallstones     Wife denies    Hypertension     Hyperthyroidism 11/30/2016    Low HDL (under 40) 12/7/2016    Mixed hyperlipidemia 11/23/2016    JUAN MANUEL (obstructive sleep apnea)     Pneumonia     Skin disease     Squamous cell carcinoma 08/2018    right temple    Stroke     Transient cerebral ischemia 7/22/2016    Type 2 diabetes mellitus      Past Surgical History:   Procedure Laterality Date    ESOPHAGOGASTRODUODENOSCOPY N/A 9/5/2020    Procedure: EGD (ESOPHAGOGASTRODUODENOSCOPY);  Surgeon: Denita Escalona MD;  Location: [address];  Service: Endoscopy;  Laterality: N/A;    Moh's procedure x4      rectal cancer      Sinus surgery for sinus cancer      sinus sx for cancer      skin cancer removed-several times-nose & ear      TONSILLECTOMY      Undescened testicle  sx      UVULOPALATOPHARYNGOPLASTY      for sleep apnea       Review of Systems:   As documented in primary team H&P    Home Meds:   Prior to Admission medications    Medication Sig Start Date End Date Taking? Authorizing Provider   atropine 1% (ISOPTO ATROPINE) 1 % Drop Place 2 drops under the tongue every 4 (four) hours as needed (excessive secretions). 9/11/20   Keyonna Padilla MD   divalproex (DEPAKOTE) 250 MG EC tablet Take 2 tablets (500 mg total) by mouth every evening. 8/27/20   Prisca Espinoza MD   furosemide (LASIX) 20 MG tablet TAKE 2 TABLETS BY MOUTH EVERY DAY 7/1/20   Renato Womack MD   haloperidol lactate (HALDOL) 5 mg/mL injection Inject 0.2 mLs (1 mg total) into the vein every 4 (four) hours as needed (agitation/delirium). 9/11/20 9/11/21  Keyonna Padilla MD   lactulose (CHRONULAC) 10 gram/15 mL solution TAKE 15 MLS BY MOUTH TWICE DAILY  Patient not taking: Reported on 6/24/2020 4/6/20   Renato Womack MD   LIPASE-PROTEASE-AMYLASE 5,000-17,000 -24,000 UNITS (ZENPEP) 5,000-17,000- 24,000 unit CpDR Take 1 capsule by mouth 3 (three) times daily with meals.  Patient taking differently: Take 1 capsule by mouth 2 (two) times daily with meals.  9/1/20 8/27/21  Kelin Winters, CODY   ondansetron 4 mg/2 mL Soln Inject 4 mg into the vein daily as needed. 9/11/20   Keyonna Padilla MD   pantoprazole (PROTONIX) 40 MG tablet Take 1 tablet (40 mg total) by mouth 2 (two) times daily before meals. 9/11/20 9/11/21  Keyonna Padilla MD   XIFAXAN 550 mg Tab Take 1 tablet (550 mg total) by mouth 2 (two) times daily. 2/10/20   Renato Womack MD     Scheduled Meds:    divalproex  500 mg Oral QHS    epoetin mariajose-epbx  100 Units/kg (Order-Specific) Subcutaneous Once per day on Mon Wed Fri    lactulose  20 g Oral BID    levalbuterol  0.63 mg Nebulization Q8H    pantoprazole  40 mg Oral BID AC    rifAXIMin  550 mg Oral BID    zinc sulfate  220 mg Oral Daily     Continuous Infusions:   PRN  Meds:acetaminophen, atropine 1%, dextrose 50%, dextrose 50%, glucagon (human recombinant), glucose, glucose, haloperidol lactate, HYDROcodone-acetaminophen, HYDROcodone-acetaminophen, insulin aspart U-100, melatonin, morphine, ondansetron, ondansetron, promethazine (PHENERGAN) IVPB, senna-docusate 8.6-50 mg, sodium chloride 0.9%, sodium chloride 0.9%  Anticoagulants/Antiplatelets: no anticoagulation    Allergies:   Review of patient's allergies indicates:   Allergen Reactions    Abilify [aripiprazole] Other (See Comments)     Sleepy, could not function.     Sedation Hx: have not been any systemic reactions    Labs:  Recent Labs   Lab 09/13/20 0436   INR 1.4*       Recent Labs   Lab 09/13/20 0436   WBC 4.81   HGB 6.3*   HCT 21.7*   *   PLT 48*      Recent Labs   Lab 09/13/20 0436   *      K 3.3*      CO2 28   BUN 57*   CREATININE 2.8*   CALCIUM 7.9*   MG 1.8   ALT 8*   AST 20   ALBUMIN 2.4*   BILITOT 0.6         Vitals:  Temp: 97 °F (36.1 °C) (09/13/20 0809)  Pulse: 89 (09/13/20 1700)  Resp: 19 (09/13/20 1700)  BP: 113/67 (09/13/20 1700)  SpO2: 95 % (09/13/20 1700)     Physical Exam:  ASA: 3  Mallampati: 2    General: no acute distress  Mental Status: alert and oriented to person and place but not time.  HEENT: normocephalic, atraumatic  Chest: unlabored breathing  Heart: regular heart rate  Abdomen: distended  Extremity: moves all extremities    Plan: imaging guided PleurX drain placement for recurrent ascites.  Sedation Plan: up to moderate.    Randy Carty MD  Radiology Department/ PGY-3  [facility]

## 2020-09-13 NOTE — PROGRESS NOTES
LINKS immunization registry updated  Care Everywhere updated  Health Maintenance updated  Chart reviewed for overdue [facility]active [facility] (LUIS DANIEL) health maintenance testing (CRS, Breast Ca, Diabetic Eye Exam)   Orders entered:N/A  Last eye exam confirmed by fax from Dr Sharpe was 1/9/19

## 2020-09-13 NOTE — PROGRESS NOTES
[facility] Hospital Medicine                                                                     Progress Note     Team: [facility] Keyonna Padilla MD   Admit Date: 9/4/2020   Hospital Day: 9  KEILY: 9/14/2020   Code status: Full Code   Principal Problem: Acute blood loss anemia     SUMMARY:     Mr. Kenneth Sheikh is a 80 y.o. male with MCDONALD cirrhosis, complicated by gastric varices, hepatic encephalopathy, ascites requiring weekly paracenteses, and coagulopathy who presents to the ER from Hepatology clinic for evaluation of weakness and melena.  He mentions that he has been feeling weak and fatigued the last 2 days, and the day prior to admission he started having melena.  He denies the use of any blood thinners or Aspirin.  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  He endorses some chills, but no fevers, chest pain, or shortness of breath.  Of note, he just had a paracentesis on the day of admission that removed 4.2L.     Upon arrival to the ER, vitals were temp 98.9F, HR 80 and /80.  Labs showed Hemoglobin 6, Platelet 67, Creatinine 2.9 and INR 1.3.  ESTEFANÍA was positive.  He was given 500cc bolus and was admitted to Hospital Medicine for further management.    Admitted to hospital medicine for acute UGIB with hx of gastric varices and ulcers. Started on supportive tx IV octreotide, PPI, ceftriaxone and albumin. GI and hepatology consulted. S/p EGD 9/5 with LA Grade B esophagitis, Non-bleeding esophageal ulcer, Small (< 5 mm) esophageal varice, Portal hypertensive gastropathy. Type 1 isolated gastric varices (IGV1, varices located in the fundus), previously treated and without bleeding, Multiple duodenal polyps. Very friable gastric  mucosa. GI rec slowly advancing diet, repeat EGD 8-12 weeks to assure healing of esophageal ulcer and biopsy duodenal polyps.    Octreotide discontinued post EGD as no overt variceal bleed. He finished his IV PPI drip now transitioned to oral BID. Finished 5 day course of ceftriaxone.  Trending CBC with Pediatric blood draws in the meantime daily, and avoiding unnecessary blood draws. He is currently encephalopathy secondary to hepatic encephalopathy, increasing lactulose for goal BM 3-4. He will need paracentesis by rads, ordered.     Overall very poor prognosis considering his cirrhosis and GIB unable to transfuse. GOC underway. Patient now DNR. Discussing hospice with family.    SUBJECTIVE:     Pt was seen and examined at bedside. Family debating home hospice versus inpateint hospice.     ROS (Positive in Bold, otherwise negative)  Pain Scale: 0 /10   Constitutional: fever, chills, night sweats, weakness, fatigue  CV: chest pain, edema, palpitations  Resp: SOB, cough, sputum production  GI: changes in appetite, NVDC, pain, melena, hematochezia, GERD, hematemesis  : Dysuria, hematuria, urinary urgency, frequency  MSK: arthralgia/myalgia, joint swelling  SKIN: rashes, pruritis, petechiae   Neuro/Psych: FND, confusion, anxiety, depression      OBJECTIVE:     Vitals:  Temp:  [97 °F (36.1 °C)-98.2 °F (36.8 °C)]   Pulse:  [62-80]   Resp:  [11-18]   BP: (111-161)/(65-80)   SpO2:  [92 %-99 %]      I & O (Last 24H):     Intake/Output Summary (Last 24 hours) at 9/13/2020 1526  Last data filed at 9/13/2020 0600  Gross per 24 hour   Intake 240 ml   Output 900 ml   Net -660 ml         GEN: chronically ill appearing male  in no acute distress. Appears pale. Resting in bed.  HEENT: NCAT. PERRL. EOMI. Conjunctivae/corneas clear, sclera Anicteric.  CVS: RRR. Normal s1 s2 no murmur, click, rub or gallop  LUNG: CTAB. Normal respiratory effort. No wheezes, rhonchi, or crackles.  ABD: Normoactive BS, soft, NT, mildly distended, no  masses or organomegaly.  EXT: Trace LE edema. No cyanosis. Full ROM. Hepatic flaps improving   SKIN: pale and cold  NEURO: Alert, oriented x 2, decreased concentration, Spont mvt of all extremities with no focal deficits noted.      All recent labs and imaging has been reviewed.     Recent Results (from the past 24 hour(s))   POCT glucose    Collection Time: 09/12/20 10:02 PM   Result Value Ref Range    POCT Glucose 350 (H) 70 - 110 mg/dL   POCT glucose    Collection Time: 09/12/20 10:32 PM   Result Value Ref Range    POCT Glucose 330 (H) 70 - 110 mg/dL   Hemoglobin A1c if not done in past 3 months    Collection Time: 09/13/20  4:36 AM   Result Value Ref Range    Hemoglobin A1C 5.0 4.0 - 5.6 %    Estimated Avg Glucose 97 68 - 131 mg/dL   CBC auto differential    Collection Time: 09/13/20  4:36 AM   Result Value Ref Range    WBC 4.81 3.90 - 12.70 K/uL    RBC 2.08 (L) 4.60 - 6.20 M/uL    Hemoglobin 6.3 (L) 14.0 - 18.0 g/dL    Hematocrit 21.7 (L) 40.0 - 54.0 %    Mean Corpuscular Volume 104 (H) 82 - 98 fL    Mean Corpuscular Hemoglobin 30.3 27.0 - 31.0 pg    Mean Corpuscular Hemoglobin Conc 29.0 (L) 32.0 - 36.0 g/dL    RDW 22.2 (H) 11.5 - 14.5 %    Platelets 48 (L) 150 - 350 K/uL    MPV 10.2 9.2 - 12.9 fL    Immature Granulocytes 0.6 (H) 0.0 - 0.5 %    Gran # (ANC) 3.4 1.8 - 7.7 K/uL    Immature Grans (Abs) 0.03 0.00 - 0.04 K/uL    Lymph # 0.8 (L) 1.0 - 4.8 K/uL    Mono # 0.5 0.3 - 1.0 K/uL    Eos # 0.0 0.0 - 0.5 K/uL    Baso # 0.00 0.00 - 0.20 K/uL    nRBC 0 0 /100 WBC    Gran% 70.6 38.0 - 73.0 %    Lymph% 16.8 (L) 18.0 - 48.0 %    Mono% 11.2 4.0 - 15.0 %    Eosinophil% 0.8 0.0 - 8.0 %    Basophil% 0.0 0.0 - 1.9 %    Differential Method Automated    Comprehensive metabolic panel    Collection Time: 09/13/20  4:36 AM   Result Value Ref Range    Sodium 140 136 - 145 mmol/L    Potassium 3.3 (L) 3.5 - 5.1 mmol/L    Chloride 102 95 - 110 mmol/L    CO2 28 23 - 29 mmol/L    Glucose 164 (H) 70 - 110 mg/dL    BUN, Bld 57 (H) 8  - 23 mg/dL    Creatinine 2.8 (H) 0.5 - 1.4 mg/dL    Calcium 7.9 (L) 8.7 - 10.5 mg/dL    Total Protein 5.6 (L) 6.0 - 8.4 g/dL    Albumin 2.4 (L) 3.5 - 5.2 g/dL    Total Bilirubin 0.6 0.1 - 1.0 mg/dL    Alkaline Phosphatase 116 55 - 135 U/L    AST 20 10 - 40 U/L    ALT 8 (L) 10 - 44 U/L    Anion Gap 10 8 - 16 mmol/L    eGFR if African American 23.2 (A) >60 mL/min/1.73 m^2    eGFR if non  20.1 (A) >60 mL/min/1.73 m^2   Protime-INR    Collection Time: 09/13/20  4:36 AM   Result Value Ref Range    Prothrombin Time 15.3 (H) 9.0 - 12.5 sec    INR 1.4 (H) 0.8 - 1.2   Magnesium    Collection Time: 09/13/20  4:36 AM   Result Value Ref Range    Magnesium 1.8 1.6 - 2.6 mg/dL   Phosphorus    Collection Time: 09/13/20  4:36 AM   Result Value Ref Range    Phosphorus 2.4 (L) 2.7 - 4.5 mg/dL   POCT glucose    Collection Time: 09/13/20  6:14 AM   Result Value Ref Range    POCT Glucose 172 (H) 70 - 110 mg/dL   POCT glucose    Collection Time: 09/13/20  7:39 AM   Result Value Ref Range    POCT Glucose 163 (H) 70 - 110 mg/dL   POCT glucose    Collection Time: 09/13/20 12:25 PM   Result Value Ref Range    POCT Glucose 202 (H) 70 - 110 mg/dL       Recent Labs   Lab 09/08/20 2033 09/12/20  2202 09/12/20  2232 09/13/20  0614 09/13/20  0739 09/13/20  1225   POCTGLUCOSE 140* 350* 330* 172* 163* 202*       Hemoglobin A1C   Date Value Ref Range Status   09/13/2020 5.0 4.0 - 5.6 % Final     Comment:     ADA Screening Guidelines:  5.7-6.4%  Consistent with prediabetes  >or=6.5%  Consistent with diabetes  High levels of fetal hemoglobin interfere with the HbA1C  assay. Heterozygous hemoglobin variants (HbS, HgC, etc)do  not significantly interfere with this assay.   However, presence of multiple variants may affect accuracy.     05/15/2020 6.6 (H) 4.0 - 5.6 % Final     Comment:     ADA Screening Guidelines:  5.7-6.4%  Consistent with prediabetes  >or=6.5%  Consistent with diabetes  High levels of fetal hemoglobin interfere with  the HbA1C  assay. Heterozygous hemoglobin variants (HbS, HgC, etc)do  not significantly interfere with this assay.   However, presence of multiple variants may affect accuracy.     01/28/2020 6.8 (H) 4.0 - 5.6 % Final     Comment:     ADA Screening Guidelines:  5.7-6.4%  Consistent with prediabetes  >or=6.5%  Consistent with diabetes  High levels of fetal hemoglobin interfere with the HbA1C  assay. Heterozygous hemoglobin variants (HbS, HgC, etc)do  not significantly interfere with this assay.   However, presence of multiple variants may affect accuracy.          Active Hospital Problems    Diagnosis  POA    *Acute blood loss anemia [D62]  Yes    Palliative care encounter [Z51.5]  Not Applicable    Pain [R52]  Unknown    Dyspnea [R06.00]  Unknown    Advanced care planning/counseling discussion [Z71.89]  Not Applicable    Advanced directive placed in chart this admission [Z78.9]  Unknown    Goals of care, counseling/discussion [Z71.89]  Not Applicable    Coagulopathy [D68.9]  Yes    Thrombocytopenia [D69.6]  Yes    Gastrointestinal hemorrhage with melena [K92.1]  Yes    Refusal of blood transfusions as patient is Christianity [Z53.1]  Not Applicable     He is ok receiving fractioned blood products and blood stimulators, just not whole blood.     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          ASSESSMENT AND PLAN:     GI Hemorrhage  Acute Blood Loss Anemia  Gastric Varices  - Hgb 6 on admit, baseline around 10 in May  - Start Protonix 40mg IV BID  - GI consulted, appreciate assistance - they are aware of the patient and the high risk for  deterioration given Luther[employer]  - PPI IV changed to drip as per GI, continue 72 hrs from EGD- Finished now on PO BID  - S/p EGD 9/5 with LA Grade B esophagitis, Non-bleeding esophageal ulcer, Small (< 5 mm) esophageal varice, Portal hypertensive gastropathy. Type 1 isolated gastric varices (IGV1, varices located in the fundus), previously treated and without bleeding, Multiple duodenal polyps. Very friable gastric mucosa.   - Started Ceftriaxone 1g IV q24h for SBP prophylaxis - total 5 days - finished  - Start Octreotide gtt, stopped post EGD since no overt variceal bleed  - GI rec slowly advancing diet, repeat EGD 8-12 weeks to assure healing of esophageal ulcer and biopsy duodenal polyps, and discuss with AES regarding gastric varices   - EPO (MWF) and iron infusion (x5 days total)  - Hgb 10.6 (5/16/20) --> 6 (admit) --> 5.4 --> 5.2 --> 5.8 --> 5.2 --> 5.5 --> 6.2  - Bleed contributing to hepatic encephalopathy  - IR paracentesis completed on 9/9  - Trending CBC with Pediatric blood draws in the meantime daily, and avoiding unnecessary blood draws    Refusal of Blood Products as Lutheran  - Does not want to receive blood products, but he is ok receiving blood stimulators  - Lab draws daily with pediatric tubes  - Epo injection WMF and iron infusion     MCDONALD Cirrhosis  - MELD-Na score: 22 on admit  - MELD score: 20 on admit  - Hepatology consult, appreciate recs  - Continue rifaximin   - Albumin 25% BID previously given as per hepatology now held with better pressures and no changed in Cr   - Restarted lactulose for worsening hepatic encephalopathy but no current stool output  - Palliative consult as per hepatology recs  - Not a transplant candidate due to age  - BELLA not consistent with HRS, likely prerenal with ATN from GIB  - Poor prognosis overall, continue to discuss GOC with family     Hepatic Encephalopathy  - Ammonia 54 on admit, he was more lucid at the time  - Continue Rifaximin 550mg PO  BID  - Start Zinc 220mg PO daily  - Mentation worsened 9/8, suspecting worsening hepatic encephalopathy clinically - increased lactulose, now 20mL TID for goal 3-4 BM     Acute Renal Failure on CKD Stage 3  ATN  - Creatinine 2.9, baseline around 1-2  - Albumin for resuscitation given ascites and edema  - urine studies not consistent with HRS, likely BELLA from GIB  -  Thrombocytopenia  Coagulopathy  - Chronic issue 2/2 liver disease  - Monitor for continued bleeding  - Unable to transfuse plt as per family's wishes     Bipolar 1 Disorder  - Chronic issue  - Continue Depakote 500mg PO qHS  - Note Depakote can increased ammonia lvls but family wants to continue this for bipolar disorder maintenance      Palliative care- Emphasized the importance of palliative care in current situation. We discussed limitations to inpatient hospice including that hospice does not do items such as dialysis, procedures, frequent labs or physical therapy.  Current plan- placement of pleurex peritoneal catheter most likely Monday then hospice placement.      Prophylaxis- SCDs  Code Status- DNR  Discharge plan and follow up - pending PT OT , pending stability , Hospice discussion [place]    Discharge Planning   KEILY: 9/14/2020     Code Status: DNR   Is the patient medically ready for discharge?:     Reason for patient still in hospital (select all that apply): Patient trending condition  Discharge Plan A: Inpatient Hospice([facility])      Keyonna Padilla MD

## 2020-09-13 NOTE — PLAN OF CARE
Problem: Fall Injury Risk  Goal: Absence of Fall and Fall-Related Injury  Outcome: Ongoing, Progressing     Problem: Adult Inpatient Plan of Care  Goal: Optimal Comfort and Wellbeing  Outcome: Ongoing, Progressing     Problem: Diabetes Comorbidity  Goal: Blood Glucose Level Within Desired Range  Outcome: Ongoing, Progressing     Problem: Skin Injury Risk Increased  Goal: Skin Health and Integrity  Outcome: Ongoing, Progressing    Pt oriented to himself. Altered skin integrity on both upper extremities (weeping). Hyperglycemic at 10pm (350). Given a correction dose, aspart 2units. Turned prn and incontinence pad changed. Denies any pain/discomfort. Monitoring pt

## 2020-09-13 NOTE — PLAN OF CARE
Problem: Fall Injury Risk  Goal: Absence of Fall and Fall-Related Injury  Outcome: Ongoing, Progressing     Problem: Bleeding (Gastrointestinal Bleeding)  Goal: Hemostasis  Outcome: Ongoing, Progressing     Pt AOx3. No acute events noted during shift. Q2 turns completed. Vitals remained stable. No c/o pain or discomfort noted. All scheduled meds given per MD orders. Bed remained low, locked, with all personal items in reach. Will continue to monitor.

## 2020-09-13 NOTE — CONSULTS
Radiology Consult    Kenneth Sheikh is a 80 y.o. male with a history of  MCDONALD cirrhosis c/b gastric varices and recurrent ascites requiring weekly paracentesis. Due to poor prognosis, family agreed to place the pt for hospice service. IR is consulted for PleurX placement for recurrent ascites.    Past Medical History:   Diagnosis Date    Anticoagulant long-term use     Basal cell carcinoma     Bipolar 1 disorder     Bipolar 1 disorder 11/24/2016    bipolar    Cancer     history of skin cancer/sinus cancer & rectal cancer    Depressed bipolar affective disorder     Diabetes mellitus     type 2    Diabetic retinopathy 01/09/2019    EBV infection 12/7/2016    EBV DNA, PCR Latest Ref Range: None detected  None detected EBV DNA-Copies/mL Unknown Test Not Performed EBV Early Antigen Ab, IgG Latest Ref Range: <1:10 Titer 1:40 (A) EBV Nuclear Ag Ab Latest Ref Range: <1:5 Titer >=1:80 (A) EBV VCA IgG Latest Ref Range: <1:10 Titer 1:160 (A) EBV VCA IgM Latest Ref Range: <1:10 Titer <1:10     History of TIA (transient ischemic attack)     several-taking Plavix    Hx of gallstones     Wife denies    Hypertension     Hyperthyroidism 11/30/2016    Low HDL (under 40) 12/7/2016    Mixed hyperlipidemia 11/23/2016    JUAN MANUEL (obstructive sleep apnea)     Pneumonia     Skin disease     Squamous cell carcinoma 08/2018    right temple    Stroke     Transient cerebral ischemia 7/22/2016    Type 2 diabetes mellitus      Past Surgical History:   Procedure Laterality Date    ESOPHAGOGASTRODUODENOSCOPY N/A 9/5/2020    Procedure: EGD (ESOPHAGOGASTRODUODENOSCOPY);  Surgeon: Denita Escalona MD;  Location: [address];  Service: Endoscopy;  Laterality: N/A;    Moh's procedure x4      rectal cancer      Sinus surgery for sinus cancer      sinus sx for cancer      skin cancer removed-several times-nose & ear      TONSILLECTOMY      Undescened testicle sx      UVULOPALATOPHARYNGOPLASTY      for sleep apnea        Discussed with primary team, Dr. Valerie MD.    Imaging reviewed with Radiology staff, Dr. Migel MD.     Procedure: PleurX placement for recurrent ascites.    Scheduled Meds:    divalproex  500 mg Oral QHS    epoetin mariajose-epbx  100 Units/kg (Order-Specific) Subcutaneous Once per day on Mon Wed Fri    lactulose  20 g Oral BID    levalbuterol  0.63 mg Nebulization Q8H    pantoprazole  40 mg Oral BID AC    rifAXIMin  550 mg Oral BID    zinc sulfate  220 mg Oral Daily     Continuous Infusions:   PRN Meds:acetaminophen, atropine 1%, dextrose 50%, dextrose 50%, glucagon (human recombinant), glucose, glucose, haloperidol lactate, HYDROcodone-acetaminophen, HYDROcodone-acetaminophen, insulin aspart U-100, melatonin, morphine, ondansetron, ondansetron, promethazine (PHENERGAN) IVPB, senna-docusate 8.6-50 mg, sodium chloride 0.9%, sodium chloride 0.9%    Allergies:   Review of patient's allergies indicates:   Allergen Reactions    Abilify [aripiprazole] Other (See Comments)     Sleepy, could not function.       Labs:  Recent Labs   Lab 09/13/20 0436   INR 1.4*       Recent Labs   Lab 09/13/20 0436   WBC 4.81   HGB 6.3*   HCT 21.7*   *   PLT 48*      Recent Labs   Lab 09/13/20  0436   *      K 3.3*      CO2 28   BUN 57*   CREATININE 2.8*   CALCIUM 7.9*   MG 1.8   ALT 8*   AST 20   ALBUMIN 2.4*   BILITOT 0.6         Vitals (Most Recent):  Temp: 97 °F (36.1 °C) (09/13/20 0809)  Pulse: 74 (09/13/20 1300)  Resp: 14 (09/13/20 1300)  BP: 111/65 (09/13/20 1300)  SpO2: 96 % (09/13/20 1300)    Plan:   1. NPO after midnight.  2. Hold any non vital anticoagulants.  3. PleurX placement is scheduled for tomorrow.    Randy Carty MD  Radiology Department/ PGY-3  [facility]

## 2020-09-14 VITALS
RESPIRATION RATE: 17 BRPM | DIASTOLIC BLOOD PRESSURE: 57 MMHG | HEIGHT: 68 IN | TEMPERATURE: 97 F | HEART RATE: 58 BPM | WEIGHT: 156.31 LBS | BODY MASS INDEX: 23.69 KG/M2 | SYSTOLIC BLOOD PRESSURE: 113 MMHG | OXYGEN SATURATION: 98 %

## 2020-09-14 LAB
ALBUMIN SERPL BCP-MCNC: 2.3 G/DL (ref 3.5–5.2)
ALP SERPL-CCNC: 106 U/L (ref 55–135)
ALT SERPL W/O P-5'-P-CCNC: 7 U/L (ref 10–44)
ANION GAP SERPL CALC-SCNC: 8 MMOL/L (ref 8–16)
ANISOCYTOSIS BLD QL SMEAR: SLIGHT
AST SERPL-CCNC: 15 U/L (ref 10–40)
BASO STIPL BLD QL SMEAR: ABNORMAL
BASOPHILS # BLD AUTO: 0.01 K/UL (ref 0–0.2)
BASOPHILS NFR BLD: 0.3 % (ref 0–1.9)
BILIRUB SERPL-MCNC: 0.6 MG/DL (ref 0.1–1)
BUN SERPL-MCNC: 57 MG/DL (ref 8–23)
CALCIUM SERPL-MCNC: 7.8 MG/DL (ref 8.7–10.5)
CHLORIDE SERPL-SCNC: 102 MMOL/L (ref 95–110)
CO2 SERPL-SCNC: 27 MMOL/L (ref 23–29)
CREAT SERPL-MCNC: 2.9 MG/DL (ref 0.5–1.4)
DIFFERENTIAL METHOD: ABNORMAL
EOSINOPHIL # BLD AUTO: 0.1 K/UL (ref 0–0.5)
EOSINOPHIL NFR BLD: 2.2 % (ref 0–8)
ERYTHROCYTE [DISTWIDTH] IN BLOOD BY AUTOMATED COUNT: 22.5 % (ref 11.5–14.5)
EST. GFR  (AFRICAN AMERICAN): 22.3 ML/MIN/1.73 M^2
EST. GFR  (NON AFRICAN AMERICAN): 19.3 ML/MIN/1.73 M^2
GLUCOSE SERPL-MCNC: 154 MG/DL (ref 70–110)
HCT VFR BLD AUTO: 20.6 % (ref 40–54)
HGB BLD-MCNC: 6.1 G/DL (ref 14–18)
HYPOCHROMIA BLD QL SMEAR: ABNORMAL
IMM GRANULOCYTES # BLD AUTO: 0.03 K/UL (ref 0–0.04)
IMM GRANULOCYTES NFR BLD AUTO: 0.9 % (ref 0–0.5)
INR PPP: 1.4 (ref 0.8–1.2)
LYMPHOCYTES # BLD AUTO: 1 K/UL (ref 1–4.8)
LYMPHOCYTES NFR BLD: 30.5 % (ref 18–48)
MAGNESIUM SERPL-MCNC: 1.9 MG/DL (ref 1.6–2.6)
MCH RBC QN AUTO: 30.5 PG (ref 27–31)
MCHC RBC AUTO-ENTMCNC: 29.6 G/DL (ref 32–36)
MCV RBC AUTO: 103 FL (ref 82–98)
MONOCYTES # BLD AUTO: 0.4 K/UL (ref 0.3–1)
MONOCYTES NFR BLD: 13.5 % (ref 4–15)
NEUTROPHILS # BLD AUTO: 1.7 K/UL (ref 1.8–7.7)
NEUTROPHILS NFR BLD: 52.6 % (ref 38–73)
NRBC BLD-RTO: 0 /100 WBC
OVALOCYTES BLD QL SMEAR: ABNORMAL
PHOSPHATE SERPL-MCNC: 2.3 MG/DL (ref 2.7–4.5)
PLATELET # BLD AUTO: 53 K/UL (ref 150–350)
PLATELET BLD QL SMEAR: ABNORMAL
PMV BLD AUTO: 10.5 FL (ref 9.2–12.9)
POCT GLUCOSE: 120 MG/DL (ref 70–110)
POCT GLUCOSE: 124 MG/DL (ref 70–110)
POCT GLUCOSE: 149 MG/DL (ref 70–110)
POCT GLUCOSE: 230 MG/DL (ref 70–110)
POIKILOCYTOSIS BLD QL SMEAR: SLIGHT
POLYCHROMASIA BLD QL SMEAR: ABNORMAL
POTASSIUM SERPL-SCNC: 3.8 MMOL/L (ref 3.5–5.1)
PROT SERPL-MCNC: 5.6 G/DL (ref 6–8.4)
PROTHROMBIN TIME: 14.8 SEC (ref 9–12.5)
RBC # BLD AUTO: 2 M/UL (ref 4.6–6.2)
SODIUM SERPL-SCNC: 137 MMOL/L (ref 136–145)
WBC # BLD AUTO: 3.25 K/UL (ref 3.9–12.7)

## 2020-09-14 PROCEDURE — 63600175 PHARM REV CODE 636 W HCPCS: Mod: JG | Performed by: RADIOLOGY

## 2020-09-14 PROCEDURE — 94640 AIRWAY INHALATION TREATMENT: CPT

## 2020-09-14 PROCEDURE — 99232 SBSQ HOSP IP/OBS MODERATE 35: CPT | Mod: ,,, | Performed by: HOSPITALIST

## 2020-09-14 PROCEDURE — 85025 COMPLETE CBC W/AUTO DIFF WBC: CPT

## 2020-09-14 PROCEDURE — 25000003 PHARM REV CODE 250: Performed by: HOSPITALIST

## 2020-09-14 PROCEDURE — 25000242 PHARM REV CODE 250 ALT 637 W/ HCPCS: Performed by: HOSPITALIST

## 2020-09-14 PROCEDURE — 99232 PR SUBSEQUENT HOSPITAL CARE,LEVL II: ICD-10-PCS | Mod: ,,, | Performed by: HOSPITALIST

## 2020-09-14 PROCEDURE — 63600175 PHARM REV CODE 636 W HCPCS: Performed by: RADIOLOGY

## 2020-09-14 PROCEDURE — 99900035 HC TECH TIME PER 15 MIN (STAT)

## 2020-09-14 PROCEDURE — P9047 ALBUMIN (HUMAN), 25%, 50ML: HCPCS | Mod: JG | Performed by: RADIOLOGY

## 2020-09-14 PROCEDURE — 27000221 HC OXYGEN, UP TO 24 HOURS

## 2020-09-14 PROCEDURE — 36415 COLL VENOUS BLD VENIPUNCTURE: CPT

## 2020-09-14 PROCEDURE — 99497 PR ADVNCD CARE PLAN 30 MIN: ICD-10-PCS | Mod: ,,, | Performed by: CLINICAL NURSE SPECIALIST

## 2020-09-14 PROCEDURE — 25000003 PHARM REV CODE 250: Performed by: INTERNAL MEDICINE

## 2020-09-14 PROCEDURE — 99233 SBSQ HOSP IP/OBS HIGH 50: CPT | Mod: ,,, | Performed by: CLINICAL NURSE SPECIALIST

## 2020-09-14 PROCEDURE — 94761 N-INVAS EAR/PLS OXIMETRY MLT: CPT

## 2020-09-14 PROCEDURE — 99233 PR SUBSEQUENT HOSPITAL CARE,LEVL III: ICD-10-PCS | Mod: ,,, | Performed by: CLINICAL NURSE SPECIALIST

## 2020-09-14 PROCEDURE — 99497 ADVNCD CARE PLAN 30 MIN: CPT | Mod: ,,, | Performed by: CLINICAL NURSE SPECIALIST

## 2020-09-14 PROCEDURE — 83735 ASSAY OF MAGNESIUM: CPT

## 2020-09-14 PROCEDURE — 25000003 PHARM REV CODE 250: Performed by: RADIOLOGY

## 2020-09-14 PROCEDURE — 85610 PROTHROMBIN TIME: CPT

## 2020-09-14 PROCEDURE — 84100 ASSAY OF PHOSPHORUS: CPT

## 2020-09-14 PROCEDURE — 80053 COMPREHEN METABOLIC PANEL: CPT

## 2020-09-14 RX ORDER — FUROSEMIDE 40 MG/1
40 TABLET ORAL DAILY
Status: DISCONTINUED | OUTPATIENT
Start: 2020-09-14 | End: 2020-09-14 | Stop reason: HOSPADM

## 2020-09-14 RX ORDER — ALBUMIN HUMAN 250 G/1000ML
25 SOLUTION INTRAVENOUS ONCE
Status: COMPLETED | OUTPATIENT
Start: 2020-09-14 | End: 2020-09-14

## 2020-09-14 RX ORDER — MIDAZOLAM HYDROCHLORIDE 1 MG/ML
INJECTION INTRAMUSCULAR; INTRAVENOUS CODE/TRAUMA/SEDATION MEDICATION
Status: COMPLETED | OUTPATIENT
Start: 2020-09-14 | End: 2020-09-14

## 2020-09-14 RX ORDER — FUROSEMIDE 40 MG/1
40 TABLET ORAL DAILY
Qty: 30 TABLET | Refills: 11 | Status: SHIPPED | OUTPATIENT
Start: 2020-09-14 | End: 2021-09-14

## 2020-09-14 RX ADMIN — MIDAZOLAM HYDROCHLORIDE 0.5 MG: 1 INJECTION, SOLUTION INTRAMUSCULAR; INTRAVENOUS at 02:09

## 2020-09-14 RX ADMIN — LEVALBUTEROL HYDROCHLORIDE 0.63 MG: 0.63 SOLUTION RESPIRATORY (INHALATION) at 03:09

## 2020-09-14 RX ADMIN — ALBUMIN (HUMAN) 25 G: 25 SOLUTION INTRAVENOUS at 02:09

## 2020-09-14 RX ADMIN — LEVALBUTEROL HYDROCHLORIDE 0.63 MG: 0.63 SOLUTION RESPIRATORY (INHALATION) at 08:09

## 2020-09-14 RX ADMIN — PANTOPRAZOLE SODIUM 40 MG: 40 TABLET, DELAYED RELEASE ORAL at 06:09

## 2020-09-14 RX ADMIN — SODIUM CHLORIDE 1000 ML: 0.9 INJECTION, SOLUTION INTRAVENOUS at 02:09

## 2020-09-14 RX ADMIN — FUROSEMIDE 40 MG: 40 TABLET ORAL at 10:09

## 2020-09-14 RX ADMIN — RIFAXIMIN 550 MG: 550 TABLET ORAL at 10:09

## 2020-09-14 NOTE — PROGRESS NOTES
[facility] Hospital Medicine                                                                     Progress Note     Team: [facility] HOSP MED A Keyonna Paidlla MD   Admit Date: 9/4/2020   Hospital Day: 10  KEILY: 9/14/2020   Code status: Full Code   Principal Problem: Acute blood loss anemia     SUMMARY:     Mr. Kenneth Sheikh is a 80 y.o. male with MCDONALD cirrhosis, complicated by gastric varices, hepatic encephalopathy, ascites requiring weekly paracenteses, and coagulopathy who presents to the ER from Hepatology clinic for evaluation of weakness and melena.  He mentions that he has been feeling weak and fatigued the last 2 days, and the day prior to admission he started having melena.  He denies the use of any blood thinners or Aspirin.  He is a [employer], and does not want to receive blood transfusions, but he is ok receiving blood stimulators or fractionated blood products - just not whole blood.  He endorses some chills, but no fevers, chest pain, or shortness of breath.  Of note, he just had a paracentesis on the day of admission that removed 4.2L.     Upon arrival to the ER, vitals were temp 98.9F, HR 80 and /80.  Labs showed Hemoglobin 6, Platelet 67, Creatinine 2.9 and INR 1.3.  ESTEFANÍA was positive.  He was given 500cc bolus and was admitted to Hospital Medicine for further management.    Admitted to hospital medicine for acute UGIB with hx of gastric varices and ulcers. Started on supportive tx IV octreotide, PPI, ceftriaxone and albumin. GI and hepatology consulted. S/p EGD 9/5 with LA Grade B esophagitis, Non-bleeding esophageal ulcer, Small (< 5 mm) esophageal varice, Portal hypertensive gastropathy. Type 1 isolated gastric varices (IGV1, varices located in the fundus), previously treated and without bleeding, Multiple duodenal polyps. Very friable gastric  mucosa. GI rec slowly advancing diet, repeat EGD 8-12 weeks to assure healing of esophageal ulcer and biopsy duodenal polyps.    Octreotide discontinued post EGD as no overt variceal bleed. He finished his IV PPI drip now transitioned to oral BID. Finished 5 day course of ceftriaxone.  Trending CBC with Pediatric blood draws in the meantime daily, and avoiding unnecessary blood draws. He is currently encephalopathy secondary to hepatic encephalopathy, increasing lactulose for goal BM 3-4. He will need paracentesis by rads, ordered.     Overall very poor prognosis considering his cirrhosis and GIB unable to transfuse. GOC underway. Patient now DNR. Discussing hospice with family.    SUBJECTIVE:     Pt was seen and examined at bedside. Hospice company at bedside    ROS (Positive in Bold, otherwise negative)  Pain Scale: 0 /10   Constitutional: fever, chills, night sweats, weakness, fatigue  CV: chest pain, edema, palpitations  Resp: SOB, cough, sputum production  GI: changes in appetite, NVDC, pain, melena, hematochezia, GERD, hematemesis  : Dysuria, hematuria, urinary urgency, frequency  MSK: arthralgia/myalgia, joint swelling  SKIN: rashes, pruritis, petechiae   Neuro/Psych: FND, confusion, anxiety, depression      OBJECTIVE:     Vitals:  Temp:  [98.3 °F (36.8 °C)]   Pulse:  [62-89]   Resp:  [13-24]   BP: (102-122)/(60-77)   SpO2:  [95 %-97 %]      I & O (Last 24H):     Intake/Output Summary (Last 24 hours) at 9/14/2020 0945  Last data filed at 9/14/2020 0000  Gross per 24 hour   Intake 600 ml   Output 500 ml   Net 100 ml         GEN: chronically ill appearing male  in no acute distress. Appears pale. Resting in bed.  HEENT: NCAT. PERRL. EOMI. Conjunctivae/corneas clear, sclera Anicteric.  CVS: RRR. Normal s1 s2 no murmur, click, rub or gallop  LUNG: CTAB. Normal respiratory effort. No wheezes, rhonchi, or crackles.  ABD: Normoactive BS, soft, NT, mildly distended, no masses or organomegaly.  EXT: Trace LE edema.  No cyanosis. Full ROM.   SKIN: pale and cold  NEURO: Alert, oriented x 1,     All recent labs and imaging has been reviewed.     Recent Results (from the past 24 hour(s))   POCT glucose    Collection Time: 09/13/20 12:25 PM   Result Value Ref Range    POCT Glucose 202 (H) 70 - 110 mg/dL   POCT glucose    Collection Time: 09/13/20  4:28 PM   Result Value Ref Range    POCT Glucose 262 (H) 70 - 110 mg/dL   POCT glucose    Collection Time: 09/13/20  9:37 PM   Result Value Ref Range    POCT Glucose 328 (H) 70 - 110 mg/dL   POCT glucose    Collection Time: 09/14/20  1:11 AM   Result Value Ref Range    POCT Glucose 230 (H) 70 - 110 mg/dL   CBC auto differential    Collection Time: 09/14/20  3:46 AM   Result Value Ref Range    WBC 3.25 (L) 3.90 - 12.70 K/uL    RBC 2.00 (L) 4.60 - 6.20 M/uL    Hemoglobin 6.1 (L) 14.0 - 18.0 g/dL    Hematocrit 20.6 (L) 40.0 - 54.0 %    Mean Corpuscular Volume 103 (H) 82 - 98 fL    Mean Corpuscular Hemoglobin 30.5 27.0 - 31.0 pg    Mean Corpuscular Hemoglobin Conc 29.6 (L) 32.0 - 36.0 g/dL    RDW 22.5 (H) 11.5 - 14.5 %    Platelets 53 (L) 150 - 350 K/uL    MPV 10.5 9.2 - 12.9 fL    Immature Granulocytes 0.9 (H) 0.0 - 0.5 %    Gran # (ANC) 1.7 (L) 1.8 - 7.7 K/uL    Immature Grans (Abs) 0.03 0.00 - 0.04 K/uL    Lymph # 1.0 1.0 - 4.8 K/uL    Mono # 0.4 0.3 - 1.0 K/uL    Eos # 0.1 0.0 - 0.5 K/uL    Baso # 0.01 0.00 - 0.20 K/uL    nRBC 0 0 /100 WBC    Gran% 52.6 38.0 - 73.0 %    Lymph% 30.5 18.0 - 48.0 %    Mono% 13.5 4.0 - 15.0 %    Eosinophil% 2.2 0.0 - 8.0 %    Basophil% 0.3 0.0 - 1.9 %    Platelet Estimate Decreased (A)     Aniso Slight     Poik Slight     Poly Occasional     Hypo Occasional     Ovalocytes Occasional     Basophilic Stippling Occasional     Differential Method Automated    Comprehensive metabolic panel    Collection Time: 09/14/20  3:46 AM   Result Value Ref Range    Sodium 137 136 - 145 mmol/L    Potassium 3.8 3.5 - 5.1 mmol/L    Chloride 102 95 - 110 mmol/L    CO2 27 23 - 29  mmol/L    Glucose 154 (H) 70 - 110 mg/dL    BUN, Bld 57 (H) 8 - 23 mg/dL    Creatinine 2.9 (H) 0.5 - 1.4 mg/dL    Calcium 7.8 (L) 8.7 - 10.5 mg/dL    Total Protein 5.6 (L) 6.0 - 8.4 g/dL    Albumin 2.3 (L) 3.5 - 5.2 g/dL    Total Bilirubin 0.6 0.1 - 1.0 mg/dL    Alkaline Phosphatase 106 55 - 135 U/L    AST 15 10 - 40 U/L    ALT 7 (L) 10 - 44 U/L    Anion Gap 8 8 - 16 mmol/L    eGFR if African American 22.3 (A) >60 mL/min/1.73 m^2    eGFR if non  19.3 (A) >60 mL/min/1.73 m^2   Protime-INR    Collection Time: 09/14/20  3:46 AM   Result Value Ref Range    Prothrombin Time 14.8 (H) 9.0 - 12.5 sec    INR 1.4 (H) 0.8 - 1.2   Magnesium    Collection Time: 09/14/20  3:46 AM   Result Value Ref Range    Magnesium 1.9 1.6 - 2.6 mg/dL   Phosphorus    Collection Time: 09/14/20  3:46 AM   Result Value Ref Range    Phosphorus 2.3 (L) 2.7 - 4.5 mg/dL   POCT glucose    Collection Time: 09/14/20  7:49 AM   Result Value Ref Range    POCT Glucose 149 (H) 70 - 110 mg/dL       Recent Labs   Lab 09/13/20  0739 09/13/20  1225 09/13/20  1628 09/13/20  2137 09/14/20  0111 09/14/20  0749   POCTGLUCOSE 163* 202* 262* 328* 230* 149*       Hemoglobin A1C   Date Value Ref Range Status   09/13/2020 5.0 4.0 - 5.6 % Final     Comment:     ADA Screening Guidelines:  5.7-6.4%  Consistent with prediabetes  >or=6.5%  Consistent with diabetes  High levels of fetal hemoglobin interfere with the HbA1C  assay. Heterozygous hemoglobin variants (HbS, HgC, etc)do  not significantly interfere with this assay.   However, presence of multiple variants may affect accuracy.     05/15/2020 6.6 (H) 4.0 - 5.6 % Final     Comment:     ADA Screening Guidelines:  5.7-6.4%  Consistent with prediabetes  >or=6.5%  Consistent with diabetes  High levels of fetal hemoglobin interfere with the HbA1C  assay. Heterozygous hemoglobin variants (HbS, HgC, etc)do  not significantly interfere with this assay.   However, presence of multiple variants may affect  accuracy.     01/28/2020 6.8 (H) 4.0 - 5.6 % Final     Comment:     ADA Screening Guidelines:  5.7-6.4%  Consistent with prediabetes  >or=6.5%  Consistent with diabetes  High levels of fetal hemoglobin interfere with the HbA1C  assay. Heterozygous hemoglobin variants (HbS, HgC, etc)do  not significantly interfere with this assay.   However, presence of multiple variants may affect accuracy.          Active Hospital Problems    Diagnosis  POA    *Acute blood loss anemia [D62]  Yes    Palliative care encounter [Z51.5]  Not Applicable    Pain [R52]  Unknown    Dyspnea [R06.00]  Unknown    Advanced care planning/counseling discussion [Z71.89]  Not Applicable    Advanced directive placed in chart this admission [Z78.9]  Unknown    Goals of care, counseling/discussion [Z71.89]  Not Applicable    Coagulopathy [D68.9]  Yes    Thrombocytopenia [D69.6]  Yes    Gastrointestinal hemorrhage with melena [K92.1]  Yes    Refusal of blood transfusions as patient is Shinto [Z53.1]  Not Applicable     He is ok receiving fractioned blood products and blood stimulators, just not whole blood.     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          ASSESSMENT AND PLAN:     GI Hemorrhage  Acute Blood Loss Anemia  Gastric Varices  - Hgb 6 on admit, baseline around 10 in May  - Start Protonix 40mg IV BID  - GI consulted, appreciate assistance - they are aware of the patient and the high risk for deterioration given Shinto  - PPI IV changed to drip as per GI, continue 72 hrs from EGD- Finished now on PO BID  - S/p EGD 9/5 with LA Grade B esophagitis,  Non-bleeding esophageal ulcer, Small (< 5 mm) esophageal varice, Portal hypertensive gastropathy. Type 1 isolated gastric varices (IGV1, varices located in the fundus), previously treated and without bleeding, Multiple duodenal polyps. Very friable gastric mucosa.   - Started Ceftriaxone 1g IV q24h for SBP prophylaxis - total 5 days - finished  - Start Octreotide gtt, stopped post EGD since no overt variceal bleed  - GI rec slowly advancing diet, repeat EGD 8-12 weeks to assure healing of esophageal ulcer and biopsy duodenal polyps, and discuss with AES regarding gastric varices   - EPO (MWF) and iron infusion (x5 days total)  - Hgb 10.6 (5/16/20) --> 6 (admit) --> 5.4 --> 5.2 --> 5.8 --> 5.2 --> 5.5 --> 6.2  - Bleed contributing to hepatic encephalopathy  - IR paracentesis completed on 9/9  - stop Hg checks- patient not transfusion candidate    Refusal of Blood Products as Roman Catholic  - Does not want to receive blood products, but he is ok receiving blood stimulators  - Lab draws daily with pediatric tubes  - Epo injection WMF and iron infusion     MCDONALD Cirrhosis  - MELD-Na score: 22 on admit  - MELD score: 20 on admit  - Hepatology consult, appreciate recs  - Continue rifaximin   - Albumin 25% BID previously given as per hepatology now held with better pressures and no changed in Cr   - Restarted lactulose for worsening hepatic encephalopathy but no current stool output  - Palliative consult as per hepatology recs  - Not a transplant candidate due to age  - BELLA not consistent with HRS, likely prerenal with ATN from GIB  - Poor prognosis overall, continue to discuss GOC with family     Hepatic Encephalopathy  - Ammonia 54 on admit, he was more lucid at the time  - Continue Rifaximin 550mg PO BID  - Start Zinc 220mg PO daily  - Mentation worsened 9/8, suspecting worsening hepatic encephalopathy clinically - increased lactulose, now 20mL TID for goal 3-4 BM     Acute Renal Failure on CKD Stage 3  ATN  -  Creatinine 2.9, baseline around 1-2  - Albumin for resuscitation given ascites and edema  - urine studies not consistent with HRS, likely BELLA from GIB  -  Thrombocytopenia  Coagulopathy  - Chronic issue 2/2 liver disease  - Monitor for continued bleeding  - Unable to transfuse plt as per family's wishes     Bipolar 1 Disorder  - Chronic issue  - Continue Depakote 500mg PO qHS  - Note Depakote can increased ammonia lvls but family wants to continue this for bipolar disorder maintenance      Palliative care- Emphasized the importance of palliative care in current situation. We discussed limitations to inpatient hospice including that hospice does not do items such as dialysis, procedures, frequent labs or physical therapy.  Current plan- placement of pleurex peritoneal catheter most likely Monday then hospice placement.      Prophylaxis- SCDs  Code Status- DNR  Discharge plan and follow up - Hospice home asap pending placement for pleurex      Discharge Planning   KEILY: 9/14/2020     Code Status: DNR   Is the patient medically ready for discharge?:     Reason for patient still in hospital (select all that apply): Patient trending condition  Discharge Plan A: Inpatient Hospice([facility])      Keyonna Padilla MD

## 2020-09-14 NOTE — ASSESSMENT & PLAN NOTE
"Follow up to goals of care     Impression: Mr. Sheikh is an 79 yo gentleman admitted to internal medicine with upper GI bleed and hx of MCDONALD cirrhosis with esophageal varices.  He is encephalopathic and arouses easily with verbal stimuli,  oriented to person and place only. No change from previous assessment. Breathing  labored with accessory muscle use.  No acute distress.  Appears to be comfortable with no facial grimacing or moaning. He is sitting up in bed, minimal oral intake even with assistance from his wife. Wife reports decreased appetite today.   Appears Mrs. Sheikh has some distress regarding plan of care.  Her wish is for him to be comfortable but does not appear ready to discontinue lactulose.  Remains concerned for level of "poisons" in his body.  Also remains distressed as she is not able to provide his care alone in her home.     Mr. Sheikh has liver failure with no curative or transplant options.  Mr. Sheikh also has active GI bleed  And will not accept transfusion secondary to Baptism believes.  It is appropriate for patient and family to consider transition to home hospice or nursing home hospice.  Mrs. Sheikh meeting with hospice representative from [facility].  Appears she is amenable to home hospice.  Wife has signed LaPOST      GI Hemorhage/ACute Blood Loss Anemia/ MCDONALD Cirrhosis/Hepatic Encephalopathy and other medical problems   - plan per primary team and other specialty consultants     Symptom Management    Pain:   - no c/o pain and no behavioral signs - facial grimacing or moaning   - Continue current medical management - prn hydrocodone and tylenol  - 24 hr OME zero     Bowel Management  - continue current medical management  -lactulose three times daily     Dyspnea   - patient is able to talk and eat.   - respiratory effort appears mildly labored with use of accessory muscles  -  room air saturation 93% on room air   - lungs sounds wet, chest xray ordered per primary team  -continue lasix  - " Consider placement of palliative pleurex catheter to manage ascites. Hospice necessary for placement  - Wife is aware of pleurex catheter and is amenable to placement      Advance Care Planning   Goals of Care      - Advanced care planning documents - Jehoviah's Witness Durable Power of  has been received.  - Presently Mr. Sheikh is unable to make medical decisions.  - Legal next of kin is wife Ginna Sheikh [phone] and son Kenneth Sheikh, [phone]   -  Resuscitation status - DNR per primary team   - CPR and Intubation discussed including the risks, benefits and survivability of CPR .    Also discussed peaceful and natural death.   - DNR orders written per primary team.    - LaPOST discussed with wife.  Wife signed         Goals of Care:   - Palliative medicine APRN returned to bedside for goals of care clarification.    - Mrs. Sheikh meeting with [facility] for home services.    - Sitter resource list provided as wife indicates she is unable to provide his care at home without assistance.   - Patient has DNR order, wife is amenable to LaPOST.  Education completed and wife has signed   - Will have pleurex catheter placed prior to discharge to hospice        Plan/Recommendations   - Family is amenable to home hospice.  -Continue current plan of care -  pleurex catheter placement   - LaPOST on discharge - gold copy in blue chart awaiting MD signature  - Please give gold copy to patient/family and obtain copy for EMR         Primary team aware of the above goals of care and recommendations.  Thank you for consult and opportunity to participate in patient's care       .

## 2020-09-14 NOTE — NURSING
Patients wife given d/c orders; patient IV's and midline removed intact; patient transported home via ambulance.

## 2020-09-14 NOTE — PROGRESS NOTES
"[facility]  Palliative Medicine  Progress Note    Patient Name: Kenneth Sheikh  MRN: [id number]  Admission Date: 9/4/2020  Hospital Length of Stay: 10 days  Code Status: DNR   Attending Provider: Keyonna Padilla MD  Consulting Provider: DOTTY Hearn  Primary Care Physician: Prisca Espinoza MD  Principal Problem:Acute blood loss anemia    Patient information was obtained from spouse/SO, past medical records and primary team, emr.      Assessment/Plan:     Palliative care encounter  Follow up to goals of care     Impression: Mr. Sheikh is an 81 yo gentleman admitted to internal medicine with upper GI bleed and hx of MCDONALD cirrhosis with esophageal varices.  He is encephalopathic and arouses easily with verbal stimuli,  oriented to person and place only. No change from previous assessment. Breathing  labored with accessory muscle use.  No acute distress.  Appears to be comfortable with no facial grimacing or moaning. He is sitting up in bed, minimal oral intake even with assistance from his wife. Wife reports decreased appetite today.   Appears Mrs. Sheikh has some distress regarding plan of care.  Her wish is for him to be comfortable but does not appear ready to discontinue lactulose.  Remains concerned for level of "poisons" in his body.  Also remains distressed as she is not able to provide his care alone in her home.     Mr. Sheikh has liver failure with no curative or transplant options.  Mr. Sheikh also has active GI bleed  And will not accept transfusion secondary to Moravian believes.  It is appropriate for patient and family to consider transition to home hospice or nursing home hospice.  Mrs. Sheikh meeting with hospice representative from [facility].  Appears she is amenable to home hospice.  Wife has signed LaPOST      GI Hemorhage/ACute Blood Loss Anemia/ MCDONALD Cirrhosis/Hepatic Encephalopathy and other medical problems   - plan per primary team and other specialty consultants "     Symptom Management    Pain:   - no c/o pain and no behavioral signs - facial grimacing or moaning   - Continue current medical management - prn hydrocodone and tylenol  - 24 hr OME zero     Bowel Management  - continue current medical management  -lactulose three times daily     Dyspnea   - patient is able to talk and eat.   - respiratory effort appears mildly labored with use of accessory muscles  -  room air saturation 93% on room air   - lungs sounds wet, chest xray ordered per primary team  -continue lasix  - Consider placement of palliative pleurex catheter to manage ascites. Hospice necessary for placement  - Wife is aware of pleurex catheter and is amenable to placement      Advance Care Planning   Goals of Care      - Advanced care planning documents - Jehoviah's Witness Durable Power of  has been received.  - Presently Mr. Sheikh is unable to make medical decisions.  - Legal next of kin is wife Ginna Sheikh [phone] and son Kenneth Sheikh, [phone]   -  Resuscitation status - DNR per primary team   - CPR and Intubation discussed including the risks, benefits and survivability of CPR .    Also discussed peaceful and natural death.   - DNR orders written per primary team.    - LaPOST discussed with wife.  Wife signed         Goals of Care:   - Palliative medicine APRN returned to bedside for goals of care clarification.    - Mrs. Sheikh meeting with [facility] for home services.    - Sitter resource list provided as wife indicates she is unable to provide his care at home without assistance.   - Patient has DNR order, wife is amenable to LaPOST.  Education completed and wife has signed   - Will have pleurex catheter placed prior to discharge to hospice        Plan/Recommendations   - Family is amenable to home hospice.  -Continue current plan of care -  pleurex catheter placement   - LaPOST on discharge - gold copy in blue chart awaiting MD signature  - Please give gold copy to  "patient/family and obtain copy for EMR         Primary team aware of the above goals of care and recommendations.  Thank you for consult and opportunity to participate in patient's care       .        I will sign off. Please contact us if you have any additional questions.    Subjective:     Chief Complaint:   Chief Complaint   Patient presents with    covid test       HPI:   HPI obtained from chart review: please see H and P from primary team 9/4/2020    Mr. Sheikh is an 79 yo gentlmen with PMH of: MCDONALD cirrhosis, gastric varices, hepatic encephalopathy, coagulopathy,  and ascites requiring weekly paracenteses.  He presented to [facility] from [facility]atology clinic for evaluation of c/o weakness,  Melena, weakness and fatigue over two days. No use of any blood thinners or Aspirin. He reports chills,  no fevers, chest pain, or shortness of breath.  Paracentesis with removal of 4.2 liters in hepatology clinic.      He is a Sikh, and does not want to receive blood transfusions, but he is ok receiving blood stimulators or fractionated blood products - just not whole blood.       Upon arrival to the ER, vitals were temp 98.9F, HR 80 and /80.  Labs showed Hemoglobin 6, Platelet 67, Creatinine 2.9 and INR 1.3.  digital rectal exam positive.  He was given 500cc bolus and was admitted to Hospital Medicine for further management.    "Admitted to hospital medicine for acute UGIB with hx of gastric varices and ulcers. Started on supportive tx IV octreotide, PPI, ceftriaxone and albumin. GI and hepatology consulted. S/p EGD 9/5 with LA Grade B esophagitis, Non-bleeding esophageal ulcer, Small (< 5 mm) esophageal varice, Portal hypertensive gastropathy. Type 1 isolated gastric varices (IGV1, varices located in the fundus), previously treated and without bleeding, Multiple duodenal polyps. Very friable gastric mucosa. GI rec slowly advancing diet, repeat EGD 8-12 weeks to assure healing of esophageal ulcer and " "biopsy duodenal polyps.     He remains on IV PPI and ceftriaxone at this time. Octreotide discontinued as no overt variceal bleed. Trending CBC with Pediatric blood draws in the meantime daily, and avoiding unnecessary blood draws. "    Palliative medicine consulted for goals of care and advanced care planning     Hospital Course:  No notes on file    Interval History: patient is more somnolent today, arouses to tactile and verbal stimuli,  Family amenable to home hospice, LaPOST signed by family     Past Medical History:   Diagnosis Date    Anticoagulant long-term use     Basal cell carcinoma     Bipolar 1 disorder     Bipolar 1 disorder 11/24/2016    bipolar    Cancer     history of skin cancer/sinus cancer & rectal cancer    Depressed bipolar affective disorder     Diabetes mellitus     type 2    Diabetic retinopathy 01/09/2019    EBV infection 12/7/2016    EBV DNA, PCR Latest Ref Range: None detected  None detected EBV DNA-Copies/mL Unknown Test Not Performed EBV Early Antigen Ab, IgG Latest Ref Range: <1:10 Titer 1:40 (A) EBV Nuclear Ag Ab Latest Ref Range: <1:5 Titer >=1:80 (A) EBV VCA IgG Latest Ref Range: <1:10 Titer 1:160 (A) EBV VCA IgM Latest Ref Range: <1:10 Titer <1:10     History of TIA (transient ischemic attack)     several-taking Plavix    Hx of gallstones     Wife denies    Hypertension     Hyperthyroidism 11/30/2016    Low HDL (under 40) 12/7/2016    Mixed hyperlipidemia 11/23/2016    JUAN MANUEL (obstructive sleep apnea)     Pneumonia     Skin disease     Squamous cell carcinoma 08/2018    right temple    Stroke     Transient cerebral ischemia 7/22/2016    Type 2 diabetes mellitus        Past Surgical History:   Procedure Laterality Date    ESOPHAGOGASTRODUODENOSCOPY N/A 9/5/2020    Procedure: EGD (ESOPHAGOGASTRODUODENOSCOPY);  Surgeon: Denita Escalona MD;  Location: [facility] ([address] [place] [address]);  Service: Endoscopy;  Laterality: N/A;    Moh's procedure x4      rectal cancer      Sinus " surgery for sinus cancer      sinus sx for cancer      skin cancer removed-several times-nose & ear      TONSILLECTOMY      Undescened testicle sx      UVULOPALATOPHARYNGOPLASTY      for sleep apnea       Review of patient's allergies indicates:   Allergen Reactions    Abilify [aripiprazole] Other (See Comments)     Sleepy, could not function.       Medications:  Continuous Infusions:    Scheduled Meds:   divalproex  500 mg Oral QHS    epoetin mariajose-epbx  100 Units/kg (Order-Specific) Subcutaneous Once per day on Mon Wed Fri    furosemide  40 mg Oral Daily    lactulose  20 g Oral BID    levalbuterol  0.63 mg Nebulization Q8H    pantoprazole  40 mg Oral BID AC    rifAXIMin  550 mg Oral BID    zinc sulfate  220 mg Oral Daily     PRN Meds:acetaminophen, atropine 1%, dextrose 50%, dextrose 50%, glucagon (human recombinant), glucose, glucose, haloperidol lactate, HYDROcodone-acetaminophen, HYDROcodone-acetaminophen, insulin aspart U-100, melatonin, morphine, ondansetron, ondansetron, promethazine (PHENERGAN) IVPB, senna-docusate 8.6-50 mg, sodium chloride 0.9%, sodium chloride 0.9%    Family History     Problem Relation (Age of Onset)    Diabetes Son    No Known Problems Daughter        Tobacco Use    Smoking status: Former Smoker     Packs/day: 0.25     Years: 2.00     Pack years: 0.50    Smokeless tobacco: Never Used    Tobacco comment: quit at age 17 less than a pack a day   Substance and Sexual Activity    Alcohol use: No     Comment: rare    Drug use: No    Sexual activity: Not on file       Review of Systems   Unable to perform ROS: Mental status change     Objective:     Vital Signs (Most Recent):  Temp: 98.3 °F (36.8 °C) (09/13/20 2006)  Pulse: 62 (09/14/20 0852)  Resp: 18 (09/14/20 0852)  BP: 112/66 (09/13/20 2346)  SpO2: 95 % (09/14/20 0852) Vital Signs (24h Range):  Temp:  [98.3 °F (36.8 °C)] 98.3 °F (36.8 °C)  Pulse:  [62-89] 62  Resp:  [13-24] 18  SpO2:  [95 %-97 %] 95 %  BP:  (102-122)/(60-77) 112/66     Weight: 70.9 kg (156 lb 4.9 oz)  Body mass index is 23.77 kg/m².    Physical Exam  Vitals signs and nursing note reviewed.   Constitutional:       General: He is not in acute distress.     Comments: encephalopathic   HENT:      Head: Normocephalic and atraumatic.      Right Ear: External ear normal.      Left Ear: External ear normal.      Nose: Nose normal.      Mouth/Throat:      Mouth: Mucous membranes are moist.   Eyes:      Extraocular Movements: Extraocular movements intact.      Pupils: Pupils are equal, round, and reactive to light.   Neck:      Musculoskeletal: Normal range of motion and neck supple.   Cardiovascular:      Rate and Rhythm: Regular rhythm.      Pulses: Normal pulses.      Heart sounds: Normal heart sounds.      Comments: Bradycardia  Pulmonary:      Effort: Pulmonary effort is normal.      Breath sounds: Normal breath sounds.   Abdominal:      General: Abdomen is flat. Bowel sounds are normal.      Palpations: Abdomen is soft.   Skin:     General: Skin is warm and dry.      Coloration: Skin is pale.   Neurological:      Mental Status: He is alert.      Comments: Arouses to verbal and tactile stimuli,  Falls back to sleep quickly. Oriented to person only    Psychiatric:         Behavior: Behavior normal.      Comments: Confusion - dementia, unable to assess thought content and judgement          Review of Symptoms    Symptom Assessment (ESAS 0-10 Scale)  Pain:  0  Dyspnea:  0  Anxiety:  0  Nausea:  0  Depression:  0  Anorexia:  0  Fatigue:  0  Insomnia:  0  Restlessness:  0  Agitation:  0     CAM / Delirium:  Positive  Constipation:  Negative  Diarrhea:  Negative    Bowel Management Plan (BMP):  Yes      Comments:  Patient is encephalopathic unable to complete ESAS, appears comfortable, no facial grimacing or moaning,  Does not appear to have any shortness of breath           OME in 24 hours:  0    Performance Status:  30    Living Arrangements:  Lives with  spouse    Psychosocial/Cultural:  60 years,  Two adult children (son and daughter) two grandchildren, retired from [employer] and [employer],  Enjoyed spending time with the family going out to eat.  Has not been able to continue with life style since has been sick in the last 4 yrs     Spiritual:  F - Cassandra and Belief:  Jehoviah's Witness,   I - Importance:  Cassandra guides medical decision making   A - Address in Care:  JW durable power of  placed in blue chart       Advance Care Planning   Advance Care Planning       Significant Labs: All pertinent labs within the past 24 hours have been reviewed.  CBC:   Recent Labs   Lab 09/14/20  0346   WBC 3.25*   HGB 6.1*   HCT 20.6*   *   PLT 53*     BMP:  Recent Labs   Lab 09/14/20  0346   *      K 3.8      CO2 27   BUN 57*   CREATININE 2.9*   CALCIUM 7.8*   MG 1.9     LFT:  Lab Results   Component Value Date    AST 15 09/14/2020    ALKPHOS 106 09/14/2020    BILITOT 0.6 09/14/2020     Albumin:   Albumin   Date Value Ref Range Status   09/14/2020 2.3 (L) 3.5 - 5.2 g/dL Final     Protein:   Total Protein   Date Value Ref Range Status   09/14/2020 5.6 (L) 6.0 - 8.4 g/dL Final     Lactic acid:   Lab Results   Component Value Date    LACTATE 2.2 05/11/2017    LACTATE 1.8 05/10/2017       Significant Imaging: I have reviewed all pertinent imaging results/findings within the past 24 hours.      Additional 16 mins spent in advanced care planning and completion of documents       CARIDAD Gilliam, APRN, ACNS-BC  Palliative Medicine  [facility]

## 2020-09-14 NOTE — SUBJECTIVE & OBJECTIVE
Interval History: patient is more somnolent today, arouses to tactile and verbal stimuli,  Family amenable to home hospice, LaPOST signed by family     Past Medical History:   Diagnosis Date    Anticoagulant long-term use     Basal cell carcinoma     Bipolar 1 disorder     Bipolar 1 disorder 11/24/2016    bipolar    Cancer     history of skin cancer/sinus cancer & rectal cancer    Depressed bipolar affective disorder     Diabetes mellitus     type 2    Diabetic retinopathy 01/09/2019    EBV infection 12/7/2016    EBV DNA, PCR Latest Ref Range: None detected  None detected EBV DNA-Copies/mL Unknown Test Not Performed EBV Early Antigen Ab, IgG Latest Ref Range: <1:10 Titer 1:40 (A) EBV Nuclear Ag Ab Latest Ref Range: <1:5 Titer >=1:80 (A) EBV VCA IgG Latest Ref Range: <1:10 Titer 1:160 (A) EBV VCA IgM Latest Ref Range: <1:10 Titer <1:10     History of TIA (transient ischemic attack)     several-taking Plavix    Hx of gallstones     Wife denies    Hypertension     Hyperthyroidism 11/30/2016    Low HDL (under 40) 12/7/2016    Mixed hyperlipidemia 11/23/2016    JUAN MANUEL (obstructive sleep apnea)     Pneumonia     Skin disease     Squamous cell carcinoma 08/2018    right temple    Stroke     Transient cerebral ischemia 7/22/2016    Type 2 diabetes mellitus        Past Surgical History:   Procedure Laterality Date    ESOPHAGOGASTRODUODENOSCOPY N/A 9/5/2020    Procedure: EGD (ESOPHAGOGASTRODUODENOSCOPY);  Surgeon: Denita Escalona MD;  Location: [address];  Service: Endoscopy;  Laterality: N/A;    Moh's procedure x4      rectal cancer      Sinus surgery for sinus cancer      sinus sx for cancer      skin cancer removed-several times-nose & ear      TONSILLECTOMY      Undescened testicle sx      UVULOPALATOPHARYNGOPLASTY      for sleep apnea       Review of patient's allergies indicates:   Allergen Reactions    Abilify [aripiprazole] Other (See Comments)     Sleepy, could not function.        Medications:  Continuous Infusions:    Scheduled Meds:   divalproex  500 mg Oral QHS    epoetin mariajose-epbx  100 Units/kg (Order-Specific) Subcutaneous Once per day on Mon Wed Fri    furosemide  40 mg Oral Daily    lactulose  20 g Oral BID    levalbuterol  0.63 mg Nebulization Q8H    pantoprazole  40 mg Oral BID AC    rifAXIMin  550 mg Oral BID    zinc sulfate  220 mg Oral Daily     PRN Meds:acetaminophen, atropine 1%, dextrose 50%, dextrose 50%, glucagon (human recombinant), glucose, glucose, haloperidol lactate, HYDROcodone-acetaminophen, HYDROcodone-acetaminophen, insulin aspart U-100, melatonin, morphine, ondansetron, ondansetron, promethazine (PHENERGAN) IVPB, senna-docusate 8.6-50 mg, sodium chloride 0.9%, sodium chloride 0.9%    Family History     Problem Relation (Age of Onset)    Diabetes Son    No Known Problems Daughter        Tobacco Use    Smoking status: Former Smoker     Packs/day: 0.25     Years: 2.00     Pack years: 0.50    Smokeless tobacco: Never Used    Tobacco comment: quit at age 17 less than a pack a day   Substance and Sexual Activity    Alcohol use: No     Comment: rare    Drug use: No    Sexual activity: Not on file       Review of Systems   Unable to perform ROS: Mental status change     Objective:     Vital Signs (Most Recent):  Temp: 98.3 °F (36.8 °C) (09/13/20 2006)  Pulse: 62 (09/14/20 0852)  Resp: 18 (09/14/20 0852)  BP: 112/66 (09/13/20 2346)  SpO2: 95 % (09/14/20 0852) Vital Signs (24h Range):  Temp:  [98.3 °F (36.8 °C)] 98.3 °F (36.8 °C)  Pulse:  [62-89] 62  Resp:  [13-24] 18  SpO2:  [95 %-97 %] 95 %  BP: (102-122)/(60-77) 112/66     Weight: 70.9 kg (156 lb 4.9 oz)  Body mass index is 23.77 kg/m².    Physical Exam  Vitals signs and nursing note reviewed.   Constitutional:       General: He is not in acute distress.     Comments: encephalopathic   HENT:      Head: Normocephalic and atraumatic.      Right Ear: External ear normal.      Left Ear: External ear normal.       Nose: Nose normal.      Mouth/Throat:      Mouth: Mucous membranes are moist.   Eyes:      Extraocular Movements: Extraocular movements intact.      Pupils: Pupils are equal, round, and reactive to light.   Neck:      Musculoskeletal: Normal range of motion and neck supple.   Cardiovascular:      Rate and Rhythm: Regular rhythm.      Pulses: Normal pulses.      Heart sounds: Normal heart sounds.      Comments: Bradycardia  Pulmonary:      Effort: Pulmonary effort is normal.      Breath sounds: Normal breath sounds.   Abdominal:      General: Abdomen is flat. Bowel sounds are normal.      Palpations: Abdomen is soft.   Skin:     General: Skin is warm and dry.      Coloration: Skin is pale.   Neurological:      Mental Status: He is alert.      Comments: Arouses to verbal and tactile stimuli,  Falls back to sleep quickly. Oriented to person only    Psychiatric:         Behavior: Behavior normal.      Comments: Confusion - dementia, unable to assess thought content and judgement          Review of Symptoms    Symptom Assessment (ESAS 0-10 Scale)  Pain:  0  Dyspnea:  0  Anxiety:  0  Nausea:  0  Depression:  0  Anorexia:  0  Fatigue:  0  Insomnia:  0  Restlessness:  0  Agitation:  0     CAM / Delirium:  Positive  Constipation:  Negative  Diarrhea:  Negative    Bowel Management Plan (BMP):  Yes      Comments:  Patient is encephalopathic unable to complete ESAS, appears comfortable, no facial grimacing or moaning,  Does not appear to have any shortness of breath           OME in 24 hours:  0    Performance Status:  30    Living Arrangements:  Lives with spouse    Psychosocial/Cultural:  60 years,  Two adult children (son and daughter) two grandchildren, retired from [employer] and [employer],  Enjoyed spending time with the family going out to eat.  Has not been able to continue with life style since has been sick in the last 4 yrs     Spiritual:  F - Cassandra and Belief:  Munira's Witness,   I - Importance:  Cassandra  guides medical decision making   A - Address in Care:  JW durable power of  placed in blue chart       Advance Care Planning   Advance Care Planning       Significant Labs: All pertinent labs within the past 24 hours have been reviewed.  CBC:   Recent Labs   Lab 09/14/20  0346   WBC 3.25*   HGB 6.1*   HCT 20.6*   *   PLT 53*     BMP:  Recent Labs   Lab 09/14/20  0346   *      K 3.8      CO2 27   BUN 57*   CREATININE 2.9*   CALCIUM 7.8*   MG 1.9     LFT:  Lab Results   Component Value Date    AST 15 09/14/2020    ALKPHOS 106 09/14/2020    BILITOT 0.6 09/14/2020     Albumin:   Albumin   Date Value Ref Range Status   09/14/2020 2.3 (L) 3.5 - 5.2 g/dL Final     Protein:   Total Protein   Date Value Ref Range Status   09/14/2020 5.6 (L) 6.0 - 8.4 g/dL Final     Lactic acid:   Lab Results   Component Value Date    LACTATE 2.2 05/11/2017    LACTATE 1.8 05/10/2017       Significant Imaging: I have reviewed all pertinent imaging results/findings within the past 24 hours.

## 2020-09-14 NOTE — PT/OT/SLP PROGRESS
Occupational Therapy      Patient Name:  Kenneth Sheikh   MRN:  [id number]    Patient not seen today secondary to (pt to discharge after pleuryx catheter placement on this date to home with hospice per CM.). Will follow-up 9/15/2020 if not discharged on this date & if pt appropriate for therapy.    ZACKARY Yeh  9/14/2020

## 2020-09-14 NOTE — PLAN OF CARE
09/14/20 1030   Post-Acute Status   Post-Acute Authorization Hospice   Hospice Status Referrals Sent     As reported by pt's HUGO Espinoza pt is in need of Hospice care and prefers a referral be sent to [facility]. SW hard faxed referral to [facility] via  for review. SW will follow up as needed.    Winsome Hodgson LMSW  Case Management   [facility]   Ext. [phone]

## 2020-09-14 NOTE — PT/OT/SLP PROGRESS
Physical Therapy      Patient Name:  Kenneth Sheikh   MRN:  [id number]    Patient not seen today secondary to patient ANTHONY for pleurex drain placement, also with plan of discharge home with hospice this date per  documentation. Will follow-up if patient remains in house.    Millie Saenz, PT   09/14/2020

## 2020-09-14 NOTE — PROGRESS NOTES
Patient transferred to [facility] post pleurx drain placement. Patients blood pressure running low. EMI Peña MD notified and ordered albumin to be given IV. Patient asymptomatic. Report called to floor nurse COURTNEY Loomis. Patient awaiting transport back to room.

## 2020-09-14 NOTE — NURSING
Pt arrived to [facility] bed 1 for post Pleurex Drain recovery. Report received from Kelin Schuster RN. Pt denies pain/discomfort. Dressing CDI. VSS. No acute events. RN to bedside. See flow sheets for post procedure monitoring.

## 2020-09-14 NOTE — PROGRESS NOTES
Verified with patient's wife that he is able to receive albumin per his Adventism beliefs. Kelin Schuster RN present for verification.

## 2020-09-14 NOTE — PLAN OF CARE
[facility]     Department of Hospital Medicine     [address] [place] [address]     [phone] after hours  [phone] fax                                   HOSPICE  ORDERS     Patient Name: Kenneth Sheikh  YOB: [birth year]    Admit to Hospice:  Home Service    Diagnoses:  Active Hospital Problems    Diagnosis  POA    *Acute blood loss anemia [D62]  Yes    Palliative care encounter [Z51.5]  Not Applicable    Pain [R52]  Unknown    Dyspnea [R06.00]  Unknown    Advanced care planning/counseling discussion [Z71.89]  Not Applicable    Advanced directive placed in chart this admission [Z78.9]  Unknown    Goals of care, counseling/discussion [Z71.89]  Not Applicable    Coagulopathy [D68.9]  Yes    Thrombocytopenia [D69.6]  Yes    Gastrointestinal hemorrhage with melena [K92.1]  Yes    Refusal of blood transfusions as patient is Gnosticism [Z53.1]  Not Applicable     He is ok receiving fractioned blood products and blood stimulators, just not whole blood.     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       Hospice Qualifying Diagnoses: Liver cirrhosis       Patient has a life expectancy < 6 months due to these conditions.    Vital Signs: Routine per Hospice Protocol.    Allergies:  Review of patient's allergies indicates:   Allergen Reactions    Abilify [aripiprazole] Other (See Comments)     Sleepy, could not function.       Diet: pureed diet thin     Activities: activity as tolerated    Nursing: Per Hospice Routine    Routine pleurex catheter care- assist with drainage  for increased abdominal swelling.     Oxygen at 2L NC    Routine stephen care. Change monthly    Future Orders:  Hospice Medical Director may dictate new orders for comfortable care measures & sign death certificate.    Medications:           Kenneth Sheikh   Home Medication Instructions LUZ MARIA:[id number]    Printed on:09/14/20 0946   Medication Information                      atropine 1% (ISOPTO ATROPINE) 1 % Drop  Place 2 drops under the tongue every 4 (four) hours as needed (excessive secretions).             divalproex (DEPAKOTE) 250 MG EC tablet  Take 2 tablets (500 mg total) by mouth every evening.             furosemide (LASIX) 40 MG tablet  Take 1 tablet (40 mg total) by mouth once daily.             lactulose (CHRONULAC) 10 gram/15 mL solution  TAKE 15 MLS BY MOUTH TWICE DAILY             LIPASE-PROTEASE-AMYLASE 5,000-17,000 -24,000 UNITS (ZENPEP) 5,000-17,000- 24,000 unit CpDR  Take 1 capsule by mouth 3 (three) times daily with meals.             pantoprazole (PROTONIX) 40 MG tablet  Take 1 tablet (40 mg total) by mouth 2 (two) times daily before meals.             Haldol 2mg PO Q 8 hours PRN non directable agitation           XIFAXAN 550 mg Tab  Take 1 tablet (550 mg total) by mouth 2 (two) times daily.                         DIABETES CARE:    Nurse to perform and educate diabetic management with blood glucose monitoring:      Fingerstick blood sugar AC and HS      Report CBG < 60 or > 350 to physician.         Insulin Sliding Scale         Glucose  Novolog Insulin Subcutaneous        0 - 60   Orange juice or glucose tablet      No insulin   201-250  2 units   251-300  4 units   301-350  6 units   351-400  8 units   >400   10 units then call physician        _________________________________  Keyonna Padilla MD  09/14/2020

## 2020-09-14 NOTE — PLAN OF CARE
1000  CM was informed by Dr. Rodriguez & Patricia with [facility] that the patient will discharge home today with home hospice after the pleur-x catheter is placed. Hospice paperwork has been assigned, the [facility] is completed, & arrangements are being made for home equipment delivery. CM informed SEBAS Schumacher of discharge status.     1150  Patient's facesheet, H&P, and hospice orders manually faxed to [facility] (f) [phone].    1320  CM was informed by [facility] that the referral was not received. Documents refaxed to (f) [phone].    1405  CM was informed by [facility] that the referral was not received. Documents refaxed to (f) [phone].    1500  CM was informed by Мария espino/[facility] that the referral was received & that the patient's hospital bed has been delivered. Мария requested an estimated discharge time because [facility] would like to send a hospice nurse out tonight.    HUGO informed Dr. Padilla that the patient has returned to room after having the pleur-x catheter placed.     Patient's spouse stating she does not want the patient to discharge because of inclement weather.     1600  Discharge order noted. CM informed Elissa Thomas, of above. HUGO was instructed by Dr. Padilla to administer a HINN letter. CM & patient relations met with the spouse at the bedside to discuss discharge grievances & administer the HINN letter. Spouse stated that she is not in agreement with the discharge, requested that transportation be arranged for pickup as soon as possible, & requested that an MD see the patient before he leaves.     1635  Ambulance transportation arranged for pickup as soon as possible. Dr. Padilla on the phone speaking with the patient's spouse. CM informed charge nurse Jose, nurse Loomis, & Мария with [facility] of discharge status. HUGO requested that nurse Loomis call Мария ([phone]) when the patient leaves the hospital.     Will  continue to follow.

## 2020-09-14 NOTE — PLAN OF CARE
Problem: Fall Injury Risk  Goal: Absence of Fall and Fall-Related Injury  Outcome: Ongoing, Progressing     Problem: Adult Inpatient Plan of Care  Goal: Optimal Comfort and Wellbeing  Outcome: Ongoing, Progressing     Problem: Skin Injury Risk Increased  Goal: Skin Health and Integrity  Outcome: Ongoing, Progressing    Patient is AAOx4. Vital signs remain stable. Turned and body weight shift provided as needed. Medicated as per prescription orders. Upper extremities remain bruised and generally the pt looks pale. Had a personal sitter all shift. Monitoring pt

## 2020-09-15 ENCOUNTER — TELEPHONE (OUTPATIENT)
Dept: PHARMACY | Facility: CLINIC | Age: 80
End: 2020-09-15

## 2020-09-15 NOTE — TELEPHONE ENCOUNTER
Ordered refills  with [facility] (Xifaxan)   Medication will be shipped within 7-10 business days.

## 2020-09-15 NOTE — PLAN OF CARE
09/15/20 0743   Final Note   Assessment Type Final Discharge Note       Patient discharged home with [facility] on 9/14/2020. Ambulance transportation arranged by this CM.

## 2020-09-16 LAB — BACTERIA SPEC ANAEROBE CULT: NORMAL

## 2020-09-26 NOTE — DISCHARGE SUMMARY
DISCHARGE SUMMARY  Hospital Medicine    Team: [facility] HOSP MED A    Patient Name: Kenneth Sheikh  YOB: 1940    Admit Date: 9/4/2020    Discharge Date: 09/14/2020    Discharge Attending Physician: Keyonna Padilla     Diagnoses:  Active Hospital Problems    Diagnosis  POA    *Acute blood loss anemia [D62]  Yes    Palliative care encounter [Z51.5]  Not Applicable    Pain [R52]  Unknown    Dyspnea [R06.00]  Unknown    Advanced care planning/counseling discussion [Z71.89]  Not Applicable    Advanced directive placed in chart this admission [Z78.9]  Unknown    Goals of care, counseling/discussion [Z71.89]  Not Applicable    Coagulopathy [D68.9]  Yes    Thrombocytopenia [D69.6]  Yes    Gastrointestinal hemorrhage with melena [K92.1]  Yes    Refusal of blood transfusions as patient is Faith [Z53.1]  Not Applicable     He is ok receiving fractioned blood products and blood stimulators, just not whole blood.     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       Discharged Condition: admit problems have stabilized )    HOSPITAL COURSE:      Initial Presentation:    Mr. Kenneth Sheikh is a 80 y.o. male with MCDONALD cirrhosis, complicated by gastric varices, hepatic encephalopathy, ascites requiring weekly paracenteses, and coagulopathy who presents to the ER from Hepatology clinic for evaluation of weakness and melena.  He mentions that he has been feeling weak and fatigued the last 2 days, and the day prior to admission he started having melena.  He denies the use of any blood thinners or Aspirin.  He is a Faith, and does not want to  receive blood transfusions, but he is ok receiving blood stimulators or fractionated blood products - just not whole blood.  He endorses some chills, but no fevers, chest pain, or shortness of breath.  Of note, he just had a paracentesis on the day of admission that removed 4.2L.     Upon arrival to the ER, vitals were temp 98.9F, HR 80 and /80.  Labs showed Hemoglobin 6, Platelet 67, Creatinine 2.9 and INR 1.3.  ESTEFANÍA was positive.  He was given 500cc bolus and was admitted to Hospital Medicine for further management.    Admitted to hospital medicine for acute UGIB with hx of gastric varices and ulcers. Started on supportive tx IV octreotide, PPI, ceftriaxone and albumin. GI and hepatology consulted. S/p EGD 9/5 with LA Grade B esophagitis, Non-bleeding esophageal ulcer, Small (< 5 mm) esophageal varice, Portal hypertensive gastropathy. Type 1 isolated gastric varices (IGV1, varices located in the fundus), previously treated and without bleeding, Multiple duodenal polyps. Very friable gastric mucosa. GI rec slowly advancing diet, repeat EGD 8-12 weeks to assure healing of esophageal ulcer and biopsy duodenal polyps.     Octreotide discontinued post EGD as no overt variceal bleed. He finished his IV PPI drip now transitioned to oral BID. Finished 5 day course of ceftriaxone.  Trending CBC with Pediatric blood draws in the meantime daily, and avoiding unnecessary blood draws. He is currently encephalopathy secondary to hepatic encephalopathy, increasing lactulose for goal BM 3-4. He will need paracentesis by rads, ordered.      Overall very poor prognosis considering his cirrhosis and GIB unable to transfuse. GOC underway. Patient now DNR. Discussing hospice with family.    Course of Principle Problem for Admission:    GI Hemorrhage  Acute Blood Loss Anemia  Gastric Varices  - Hgb 6 on admit, baseline around 10 in May  - Start Protonix 40mg IV BID  - GI consulted, appreciate assistance - they are aware of the  patient and the high risk for deterioration given Jewish  - PPI IV changed to drip as per GI, continue 72 hrs from EGD- Finished now on PO BID  - S/p EGD 9/5 with LA Grade B esophagitis, Non-bleeding esophageal ulcer, Small (< 5 mm) esophageal varice, Portal hypertensive gastropathy. Type 1 isolated gastric varices (IGV1, varices located in the fundus), previously treated and without bleeding, Multiple duodenal polyps. Very friable gastric mucosa.   - Started Ceftriaxone 1g IV q24h for SBP prophylaxis - total 5 days - finished  - Start Octreotide gtt, stopped post EGD since no overt variceal bleed  - GI rec slowly advancing diet, repeat EGD 8-12 weeks to assure healing of esophageal ulcer and biopsy duodenal polyps, and discuss with AES regarding gastric varices   - EPO (MWF) and iron infusion (x5 days total)  - Hgb 10.6 (5/16/20) --> 6 (admit) --> 5.4 --> 5.2 --> 5.8 --> 5.2 --> 5.5 --> 6.2  - Bleed contributing to hepatic encephalopathy  - IR paracentesis completed on 9/9  - stop Hg checks- patient not transfusion candidate     Refusal of Blood Products as Jewish  - Does not want to receive blood products, but he is ok receiving blood stimulators  - Lab draws daily with pediatric tubes  - Epo injection WMF and iron infusion     MCDONALD Cirrhosis  - MELD-Na score: 22 on admit  - MELD score: 20 on admit  - Hepatology consult, appreciate recs  - Continue rifaximin   - Albumin 25% BID previously given as per hepatology now held with better pressures and no changed in Cr   - Restarted lactulose for worsening hepatic encephalopathy but no current stool output  - Palliative consult as per hepatology recs  - Not a transplant candidate due to age  - BELLA not consistent with HRS, likely prerenal with ATN from GIB  - Poor prognosis overall, continue to discuss GOC with family     Hepatic Encephalopathy  - Ammonia 54 on admit, he was more lucid at the time  - Continue Rifaximin 550mg PO BID  - Start Zinc  220mg PO daily  - Mentation worsened 9/8, suspecting worsening hepatic encephalopathy clinically - increased lactulose, now 20mL TID for goal 3-4 BM     Acute Renal Failure on CKD Stage 3  ATN  - Creatinine 2.9, baseline around 1-2  - Albumin for resuscitation given ascites and edema  - urine studies not consistent with HRS, likely BELLA from GIB  -  Thrombocytopenia  Coagulopathy  - Chronic issue 2/2 liver disease  - Monitor for continued bleeding  - Unable to transfuse plt as per family's wishes     Bipolar 1 Disorder  - Chronic issue  - Continue Depakote 500mg PO qHS  - Note Depakote can increased ammonia lvls but family wants to continue this for bipolar disorder maintenance      Palliative care- Emphasized the importance of palliative care in current situation. We discussed limitations to inpatient hospice including that hospice does not do items such as dialysis, procedures, frequent labs or physical therapy.  Current plan- placement of pleurex peritoneal catheter and home hospice per wifes preference.         CONSULTS: GI, hepatology, interventional radiology, palliative    Last CBC/BMP/HgbA1c (if applicable):  Recent Results (from the past 336 hour(s))   CBC auto differential    Collection Time: 09/14/20  3:46 AM   Result Value Ref Range    WBC 3.25 (L) 3.90 - 12.70 K/uL    Hemoglobin 6.1 (L) 14.0 - 18.0 g/dL    Hematocrit 20.6 (L) 40.0 - 54.0 %    Platelets 53 (L) 150 - 350 K/uL   CBC auto differential    Collection Time: 09/13/20  4:36 AM   Result Value Ref Range    WBC 4.81 3.90 - 12.70 K/uL    Hemoglobin 6.3 (L) 14.0 - 18.0 g/dL    Hematocrit 21.7 (L) 40.0 - 54.0 %    Platelets 48 (L) 150 - 350 K/uL     No results found for this or any previous visit (from the past 336 hour(s)).  Lab Results   Component Value Date    HGBA1C 5.0 09/13/2020       Disposition:  Home  Hospice      Future Scheduled Appointments:  No future appointments.    Follow-up Plans from This Hospitalization:  Hospice services to continue  to follow    Discharge Medication List:       Kenneth Sheikh   Home Medication Instructions LUZ MARIA:[id number]    Printed on:09/26/20 1334   Medication Information                      atropine 1% (ISOPTO ATROPINE) 1 % Drop  Place 2 drops under the tongue every 4 (four) hours as needed (excessive secretions).             divalproex (DEPAKOTE) 250 MG EC tablet  Take 2 tablets (500 mg total) by mouth every evening.             furosemide (LASIX) 20 MG tablet  TAKE 2 TABLETS BY MOUTH EVERY DAY             furosemide (LASIX) 40 MG tablet  Take 1 tablet (40 mg total) by mouth once daily.             haloperidol lactate (HALDOL) 5 mg/mL injection  Inject 0.2 mLs (1 mg total) into the vein every 4 (four) hours as needed (agitation/delirium).             lactulose (CHRONULAC) 10 gram/15 mL solution  TAKE 15 MLS BY MOUTH TWICE DAILY             LIPASE-PROTEASE-AMYLASE 5,000-17,000 -24,000 UNITS (ZENPEP) 5,000-17,000- 24,000 unit CpDR  Take 1 capsule by mouth 3 (three) times daily with meals.             ondansetron 4 mg/2 mL Soln  Inject 4 mg into the vein daily as needed.             pantoprazole (PROTONIX) 40 MG tablet  Take 1 tablet (40 mg total) by mouth 2 (two) times daily before meals.             XIFAXAN 550 mg Tab  Take 1 tablet (550 mg total) by mouth 2 (two) times daily.                 Patient Instructions:  No discharge procedures on file.    Signing Physician:  Keyonna Padilla MD

## 2020-09-28 NOTE — PROCEDURES
Radiology Post-Procedure Note    Pre Op Diagnosis: Ascites    Post Op Diagnosis: Same    Procedure: Abdominal pleur-x    Procedure performed by: George Peña MD    Written Informed Consent Obtained: Yes  Specimen Removed: YES 1.2 L clear yellow fluid  Estimated Blood Loss: Minimal    Findings:   RLQ abdominal Pleur-X was placed. No complications. See dictation.    Patient tolerated procedure well.    George Peña M.D.  Diagnostic and Interventional Radiologist  Department of Radiology  Pager: [phone]

## 2020-09-30 ENCOUNTER — TELEPHONE (OUTPATIENT)
Dept: GASTROENTEROLOGY | Facility: CLINIC | Age: 80
End: 2020-09-30

## 2020-09-30 NOTE — TELEPHONE ENCOUNTER
MA spoke to pt wife regarding Kelin message below.     Pt wife stated the pt isn't taking pancreatic enzymes at this time because he's very ill , no diarrhea and he don't need a refill and thank MA     ----- Message from Kelin Lindquist DNP sent at 9/30/2020 12:28 PM CDT -----  Regarding: Prescription refill  Received refill request for pancreatic enzymes that were prescribed by Kelin Winters NP. Please explain that she is no longer in the GI clinic.    Are these enzymes helping his diarrhea? How is he taking them (he's supposed to take them 3 times per day, with meals). If they are effective, I can send in the refill.    Thank you,  Kelin

## 2020-10-01 ENCOUNTER — PATIENT MESSAGE (OUTPATIENT)
Dept: OTHER | Facility: OTHER | Age: 80
End: 2020-10-01

## 2020-10-05 LAB — FUNGUS SPEC CULT: NORMAL

## 2020-10-28 NOTE — DISCHARGE INSTRUCTIONS
"For scheduling: Call Mackenzie at [phone]    **After hours and weekends: Call [phone] and ask for "Radiology Resident on call".    For immediate concerns that are not emergent, you may call our radiology clinic at: [phone]    " done

## 2020-11-06 ENCOUNTER — TELEPHONE (OUTPATIENT)
Dept: ENDOSCOPY | Facility: HOSPITAL | Age: 80
End: 2020-11-06

## 2020-12-02 NOTE — PROGRESS NOTES
Patient, Kenneth Sheikh (MRN #[id number]), presented with a recent Platelet count less than 150 K/uL consistent with the definition of thrombocytopenia (ICD10 - D69.6).    Platelets   Date Value Ref Range Status   05/15/2017 98 (L) 150 - 350 K/uL Final     The patient's thrombocytopenia was monitored, evaluated, addressed and/or treated. This addendum to the medical record is made on 06/09/2017.   Attending MD Rios:   I personally have seen and examined this patient.  Physician assistant note reviewed and agree on plan of care and except where noted.  See HPI, PE, and MDM for details.

## 2020-12-04 ENCOUNTER — TELEPHONE (OUTPATIENT)
Dept: ENDOSCOPY | Facility: HOSPITAL | Age: 80
End: 2020-12-04

## 2021-01-11 ENCOUNTER — TELEPHONE (OUTPATIENT)
Dept: ENDOSCOPY | Facility: HOSPITAL | Age: 81
End: 2021-01-11

## 2021-03-03 ENCOUNTER — TELEPHONE (OUTPATIENT)
Dept: ENDOSCOPY | Facility: HOSPITAL | Age: 81
End: 2021-03-03

## 2021-03-21 NOTE — PROGRESS NOTES
Operative Report                     Shave/  Tangential biopsy     Clinical diagnosis:    Size of lesion:    Location:pt with crusted papule scabs, bleeds x nearly 1 year  Spec 1 Description >>>>>: left lateral cheek  Spec 1 Comment: r/o SCC keratoti Patient transferred to MPU for paracentesis. Awaiting consent at this time.

## 2022-11-24 NOTE — PROGRESS NOTES
Paracentesis complete at this time. Patient tolerated well 4400ml removed from right abdomen.  100cc of albumin given IV. Dressing applied, clean dry and intact. Patient verbalized understanding of discharge instructions. Patient taken out in wheelchair, wife in lobby.   
Patient arrived to MPU room 1 for paracentesis. Awaiting consent    
Poor
[facility]

## 2023-01-19 NOTE — TELEPHONE ENCOUNTER
Refer to [facility] . Spoke with pt wife and she understood that the pt does not have gallstones and that the pt will continue taking his medication

## 2023-06-08 NOTE — TELEPHONE ENCOUNTER
Received call from the wife of the patient requesting refill for Lactulose to the [facility]. Last Rx in chart from 2016.  Please send to [facility] [place], [address].   Baseline feeding skill unclear

## (undated) DEVICE — KIT ENDOKIT COMPLIANCE CUSTOM